# Patient Record
Sex: MALE | Race: WHITE | Employment: FULL TIME | URBAN - METROPOLITAN AREA
[De-identification: names, ages, dates, MRNs, and addresses within clinical notes are randomized per-mention and may not be internally consistent; named-entity substitution may affect disease eponyms.]

---

## 2017-06-26 ENCOUNTER — TRANSCRIBE ORDERS (OUTPATIENT)
Dept: ADMINISTRATIVE | Facility: HOSPITAL | Age: 57
End: 2017-06-26

## 2017-06-26 ENCOUNTER — ALLSCRIPTS OFFICE VISIT (OUTPATIENT)
Dept: OTHER | Facility: OTHER | Age: 57
End: 2017-06-26

## 2017-06-26 DIAGNOSIS — I25.10 ATHEROSCLEROSIS OF NATIVE CORONARY ARTERY OF NATIVE HEART WITHOUT ANGINA PECTORIS: Primary | ICD-10-CM

## 2017-09-26 ENCOUNTER — HOSPITAL ENCOUNTER (OUTPATIENT)
Dept: NON INVASIVE DIAGNOSTICS | Facility: CLINIC | Age: 57
Discharge: HOME | End: 2017-09-26
Payer: COMMERCIAL

## 2017-09-26 DIAGNOSIS — E78.00 PURE HYPERCHOLESTEROLEMIA: ICD-10-CM

## 2017-09-26 DIAGNOSIS — I25.10 ATHEROSCLEROTIC HEART DISEASE OF NATIVE CORONARY ARTERY WITHOUT ANGINA PECTORIS: ICD-10-CM

## 2017-09-26 DIAGNOSIS — I10 ESSENTIAL (PRIMARY) HYPERTENSION: ICD-10-CM

## 2017-09-26 DIAGNOSIS — Z72.0 TOBACCO USE: ICD-10-CM

## 2017-09-26 PROCEDURE — 93306 TTE W/DOPPLER COMPLETE: CPT

## 2018-01-13 VITALS
WEIGHT: 264 LBS | BODY MASS INDEX: 35.76 KG/M2 | SYSTOLIC BLOOD PRESSURE: 142 MMHG | HEART RATE: 78 BPM | DIASTOLIC BLOOD PRESSURE: 88 MMHG | HEIGHT: 72 IN

## 2018-04-09 DIAGNOSIS — E78.00 PURE HYPERCHOLESTEROLEMIA: Primary | ICD-10-CM

## 2018-04-09 RX ORDER — ATORVASTATIN CALCIUM 40 MG/1
TABLET, FILM COATED ORAL
Qty: 30 TABLET | Refills: 5 | Status: SHIPPED | OUTPATIENT
Start: 2018-04-09 | End: 2018-10-03 | Stop reason: SDUPTHER

## 2018-06-11 DIAGNOSIS — I10 ESSENTIAL (PRIMARY) HYPERTENSION: Primary | ICD-10-CM

## 2018-06-11 RX ORDER — LISINOPRIL 10 MG/1
TABLET ORAL
Qty: 30 TABLET | Refills: 5 | Status: SHIPPED | OUTPATIENT
Start: 2018-06-11 | End: 2018-12-20 | Stop reason: SDUPTHER

## 2018-06-20 ENCOUNTER — OFFICE VISIT (OUTPATIENT)
Dept: CARDIOLOGY CLINIC | Facility: CLINIC | Age: 58
End: 2018-06-20
Payer: COMMERCIAL

## 2018-06-20 VITALS
HEART RATE: 96 BPM | HEIGHT: 72 IN | WEIGHT: 256 LBS | DIASTOLIC BLOOD PRESSURE: 84 MMHG | SYSTOLIC BLOOD PRESSURE: 140 MMHG | BODY MASS INDEX: 34.67 KG/M2

## 2018-06-20 DIAGNOSIS — I25.10 CORONARY ARTERIOSCLEROSIS: ICD-10-CM

## 2018-06-20 DIAGNOSIS — Z72.0 TOBACCO ABUSE: ICD-10-CM

## 2018-06-20 DIAGNOSIS — E78.00 PURE HYPERCHOLESTEROLEMIA: ICD-10-CM

## 2018-06-20 DIAGNOSIS — I10 ESSENTIAL (PRIMARY) HYPERTENSION: Primary | ICD-10-CM

## 2018-06-20 PROCEDURE — 99214 OFFICE O/P EST MOD 30 MIN: CPT | Performed by: INTERNAL MEDICINE

## 2018-06-20 PROCEDURE — 93000 ELECTROCARDIOGRAM COMPLETE: CPT | Performed by: INTERNAL MEDICINE

## 2018-06-20 RX ORDER — GLIMEPIRIDE 4 MG/1
1 TABLET ORAL DAILY
COMMUNITY
Start: 2015-02-27

## 2018-06-20 RX ORDER — SITAGLIPTIN AND METFORMIN HYDROCHLORIDE 100; 1000 MG/1; MG/1
TABLET, FILM COATED, EXTENDED RELEASE ORAL DAILY
COMMUNITY
Start: 2018-06-11

## 2018-06-20 NOTE — PROGRESS NOTES
Cardiology Follow Up    Giuseppe Myers  1960  [id number]  [address] [facility]  [address] [place] [address] [address]    1  Essential (primary) hypertension     2  Pure hypercholesterolemia     3  Coronary arteriosclerosis     4  Tobacco abuse         Interval History:  Patient is here for a follow-up visit  He was last seen by me in June of last year  He has a prior history of drug-eluting stent placement in the mid left circumflex in February 2015  He does have a history of tobacco use  He has had no chest pain or significant dyspnea  He had an echocardiogram done September 2017 which demonstrated preserved LV systolic function with mild LVH and no significant valvular heart disease  He has had no chest pain or significant dyspnea  I did talk to him about tobacco cessation  His EKG today demonstrates sinus rhythm with no ST segment abnormality noted  There is no problem list on file for this patient  No past medical history on file  Social History     Social History    Marital status: /Civil Union     Spouse name: N/A    Number of children: N/A    Years of education: N/A     Occupational History    Not on file  Social History Main Topics    Smoking status: Not on file    Smokeless tobacco: Not on file    Alcohol use Not on file    Drug use: Unknown    Sexual activity: Not on file     Other Topics Concern    Not on file     Social History Narrative    No narrative on file      No family history on file  No past surgical history on file  Current Outpatient Prescriptions:     atorvastatin (LIPITOR) 40 mg tablet, TAKE ONE TABLET BY MOUTH ONE TIME DAILY, Disp: 30 tablet, Rfl: 5    lisinopril (ZESTRIL) 10 mg tablet, TAKE 1 TABLET BY MOUTH EVERY DAY, Disp: 30 tablet, Rfl: 5  Allergies not on file    Labs:not applicable  Imaging: No results found      Review of Systems:  Review of Systems All other systems reviewed and are negative  Physical Exam:  Physical Exam   Constitutional: He is oriented to person, place, and time  He appears well-developed and well-nourished  HENT:   Head: Normocephalic and atraumatic  Eyes: Conjunctivae are normal  Pupils are equal, round, and reactive to light  Neck: Normal range of motion  Neck supple  Cardiovascular: Normal rate and normal heart sounds  Pulmonary/Chest: Effort normal and breath sounds normal    Neurological: He is alert and oriented to person, place, and time  Skin: Skin is warm and dry  Psychiatric: He has a normal mood and affect  Vitals reviewed  Discussion/Summary:I will continue the patient's present medical regimen  Patient appears well compensated  I have asked the patient to call if there is a problem in the interim otherwise I will see the patient in one years time

## 2018-10-03 DIAGNOSIS — E78.00 PURE HYPERCHOLESTEROLEMIA: ICD-10-CM

## 2018-10-03 RX ORDER — ATORVASTATIN CALCIUM 40 MG/1
TABLET, FILM COATED ORAL
Qty: 30 TABLET | Refills: 5 | Status: SHIPPED | OUTPATIENT
Start: 2018-10-03 | End: 2019-06-24 | Stop reason: SDUPTHER

## 2018-12-20 DIAGNOSIS — I10 ESSENTIAL (PRIMARY) HYPERTENSION: ICD-10-CM

## 2018-12-20 RX ORDER — LISINOPRIL 10 MG/1
TABLET ORAL
Qty: 30 TABLET | Refills: 5 | Status: SHIPPED | OUTPATIENT
Start: 2018-12-20 | End: 2019-06-24 | Stop reason: SDUPTHER

## 2019-06-24 DIAGNOSIS — I10 ESSENTIAL (PRIMARY) HYPERTENSION: ICD-10-CM

## 2019-06-24 DIAGNOSIS — E78.00 PURE HYPERCHOLESTEROLEMIA: ICD-10-CM

## 2019-06-24 RX ORDER — ATORVASTATIN CALCIUM 40 MG/1
TABLET, FILM COATED ORAL
Qty: 30 TABLET | Refills: 5 | Status: SHIPPED | OUTPATIENT
Start: 2019-06-24 | End: 2019-12-21 | Stop reason: SDUPTHER

## 2019-06-24 RX ORDER — LISINOPRIL 10 MG/1
TABLET ORAL
Qty: 30 TABLET | Refills: 5 | Status: SHIPPED | OUTPATIENT
Start: 2019-06-24 | End: 2020-03-08

## 2019-10-23 NOTE — PROGRESS NOTES
Cardiology Follow Up    Sandra Manley  1960  [id number]  [facility] [address] [facility]  [phone]    1  Essential (primary) hypertension     2  Pure hypercholesterolemia     3  Coronary arteriosclerosis  POCT ECG   4  Tobacco abuse         Interval History:  Patient is here for a follow-up visit  He was last seen by me in June 2018  He has a prior history of HANNA placement in the mid LCX in February 2015  He does have a history of tobacco use  He has had no chest pain or significant dyspnea  He had an echocardiogram done September 2017 which demonstrated preserved LV systolic function with mild LVH and no significant valvular heart disease  most recent cholesterol profile done August 1, 2017 demonstrated total cholesterol of 144 an HDL of 40 and a calculated LDL of 82  EKG today demonstrates sinus tachycardia and otherwise a normal tracing  He did tell me that he had a stressful call from his son prior to this EKG being done  He has had no chest pain or significant dyspnea  He is due for a lipid profile which I will order  There is no problem list on file for this patient  No past medical history on file    Social History     Socioeconomic History    Marital status: /Civil Union     Spouse name: Not on file    Number of children: Not on file    Years of education: Not on file    Highest education level: Not on file   Occupational History    Not on file   Social Needs    Financial resource strain: Not on file    Food insecurity:     Worry: Not on file     Inability: Not on file    Transportation needs:     Medical: Not on file     Non-medical: Not on file   Tobacco Use    Smoking status: Current Every Day Smoker     Packs/day: 1 00     Types: Cigarettes    Smokeless tobacco: Never Used   Substance and Sexual Activity    Alcohol use: Not on file    Drug use: Not on file    Sexual activity: Not on file   Lifestyle    Physical activity:     Days per week: Not on file     Minutes per session: Not on file    Stress: Not on file   Relationships    Social connections:     Talks on phone: Not on file     Gets together: Not on file     Attends Church service: Not on file     Active member of club or organization: Not on file     Attends meetings of clubs or organizations: Not on file     Relationship status: Not on file    Intimate partner violence:     Fear of current or ex partner: Not on file     Emotionally abused: Not on file     Physically abused: Not on file     Forced sexual activity: Not on file   Other Topics Concern    Not on file   Social History Narrative    Not on file      No family history on file  No past surgical history on file  Current Outpatient Medications:     aspirin 81 MG tablet, Take 1 tablet by mouth daily, Disp: , Rfl:     atorvastatin (LIPITOR) 40 mg tablet, TAKE ONE TABLET BY MOUTH ONE TIME DAILY, Disp: 30 tablet, Rfl: 5    glimepiride (AMARYL) 4 mg tablet, Take 1 tablet by mouth daily, Disp: , Rfl:     JANUMET -1000 MG TB24, daily , Disp: , Rfl:     lisinopril (ZESTRIL) 10 mg tablet, TAKE 1 TABLET BY MOUTH EVERY DAY, Disp: 30 tablet, Rfl: 5  No Known Allergies    Labs:not applicable  Imaging: No results found  Review of Systems:  Review of Systems   All other systems reviewed and are negative  Physical Exam:  /82 (BP Location: Right arm, Patient Position: Sitting, Cuff Size: Large)   Pulse 102   Ht 6' (1 829 m)   Wt 111 kg (244 lb 1 6 oz)   BMI 33 11 kg/m²   Physical Exam   Constitutional: He is oriented to person, place, and time  He appears well-developed and well-nourished  HENT:   Head: Normocephalic and atraumatic  Eyes: Pupils are equal, round, and reactive to light  Conjunctivae are normal    Neck: Normal range of motion  Neck supple  Cardiovascular: Normal rate and normal heart sounds  Pulmonary/Chest: Effort normal and breath sounds normal    Neurological: He is alert and oriented to person, place, and time  Skin: Skin is warm and dry  Psychiatric: He has a normal mood and affect  Vitals reviewed  Discussion/Summary:I will continue the patient's present medical regimen  Patient appears well compensated  I have asked the patient to call if there is a problem in the interim otherwise I will see the patient in one years time  Will order blood work in reference to lipids and liver profile

## 2019-10-28 ENCOUNTER — OFFICE VISIT (OUTPATIENT)
Dept: CARDIOLOGY CLINIC | Facility: CLINIC | Age: 59
End: 2019-10-28
Payer: COMMERCIAL

## 2019-10-28 VITALS
BODY MASS INDEX: 33.06 KG/M2 | SYSTOLIC BLOOD PRESSURE: 138 MMHG | HEART RATE: 102 BPM | DIASTOLIC BLOOD PRESSURE: 82 MMHG | HEIGHT: 72 IN | WEIGHT: 244.1 LBS

## 2019-10-28 DIAGNOSIS — Z72.0 TOBACCO ABUSE: ICD-10-CM

## 2019-10-28 DIAGNOSIS — I25.10 CORONARY ARTERIOSCLEROSIS: ICD-10-CM

## 2019-10-28 DIAGNOSIS — I10 ESSENTIAL (PRIMARY) HYPERTENSION: Primary | ICD-10-CM

## 2019-10-28 DIAGNOSIS — E78.00 PURE HYPERCHOLESTEROLEMIA: ICD-10-CM

## 2019-10-28 PROCEDURE — 3075F SYST BP GE 130 - 139MM HG: CPT | Performed by: INTERNAL MEDICINE

## 2019-10-28 PROCEDURE — 99214 OFFICE O/P EST MOD 30 MIN: CPT | Performed by: INTERNAL MEDICINE

## 2019-10-28 PROCEDURE — 93000 ELECTROCARDIOGRAM COMPLETE: CPT | Performed by: INTERNAL MEDICINE

## 2019-10-28 PROCEDURE — 3079F DIAST BP 80-89 MM HG: CPT | Performed by: INTERNAL MEDICINE

## 2019-10-28 NOTE — PATIENT INSTRUCTIONS
I will continue the patient's present medical regimen  Patient appears well compensated  I have asked the patient to call if there is a problem in the interim otherwise I will see the patient in one years time    Please have your blood work done prior to her next visit

## 2019-12-21 DIAGNOSIS — E78.00 PURE HYPERCHOLESTEROLEMIA: ICD-10-CM

## 2019-12-21 RX ORDER — ATORVASTATIN CALCIUM 40 MG/1
TABLET, FILM COATED ORAL
Qty: 30 TABLET | Refills: 5 | Status: SHIPPED | OUTPATIENT
Start: 2019-12-21 | End: 2020-07-28

## 2020-02-12 LAB
CREAT ?TM UR-SCNC: 74.5 UMOL/L
EXT MICROALBUMIN URINE RANDOM: 27.2
HBA1C MFR BLD HPLC: 12.1 %
MICROALBUMIN/CREAT UR: 365.1 MG/G{CREAT}

## 2020-03-08 DIAGNOSIS — I10 ESSENTIAL (PRIMARY) HYPERTENSION: ICD-10-CM

## 2020-03-08 RX ORDER — LISINOPRIL 10 MG/1
TABLET ORAL
Qty: 30 TABLET | Refills: 5 | Status: SHIPPED | OUTPATIENT
Start: 2020-03-08 | End: 2020-09-21

## 2020-07-28 DIAGNOSIS — E78.00 PURE HYPERCHOLESTEROLEMIA: ICD-10-CM

## 2020-07-28 RX ORDER — ATORVASTATIN CALCIUM 40 MG/1
TABLET, FILM COATED ORAL
Qty: 30 TABLET | Refills: 5 | Status: SHIPPED | OUTPATIENT
Start: 2020-07-28 | End: 2021-08-26

## 2020-09-20 DIAGNOSIS — I10 ESSENTIAL (PRIMARY) HYPERTENSION: ICD-10-CM

## 2020-09-21 RX ORDER — LISINOPRIL 10 MG/1
TABLET ORAL
Qty: 30 TABLET | Refills: 5 | Status: SHIPPED | OUTPATIENT
Start: 2020-09-21 | End: 2021-09-22

## 2021-01-04 NOTE — PROGRESS NOTES
Cardiology Follow Up    Lindsay Valadez  1960  [id number]  [facility] [address]  [phone]    1  Essential (primary) hypertension  POCT ECG   2  Pure hypercholesterolemia  POCT ECG   3  Coronary arteriosclerosis  POCT ECG   4  Tobacco abuse  POCT ECG       Interval History:  Patient is here for a follow-up visit  He has a prior history of HANNA placement in the mid LCX (dominant vessel) in the setting of a 99% stenosis  in February 2015  At that time he had 40% mid and distal LAD disease with a 50% stenosis in an left AV groove vessel   He does have a history of tobacco use  He has a history of hypertension, hyperlipidemia and diabetes mellitus  Mel Barnett had an echocardiogram done September 2017 which demonstrated preserved LV systolic function with mild LVH and no significant valvular heart disease   Patient had a lipid profile done February 2020 which demonstrated total cholesterol of 179 with an HDL of 36 and a calculated LDL of 110  EKG today demonstrates sinus tachycardia and is otherwise a normal tracing with no significant change compared to a prior tracing done November 1, 2019  Systolic blood pressure is mildly elevated today although he is anxious  There is no problem list on file for this patient  No past medical history on file    Social History     Socioeconomic History    Marital status: /Civil Union     Spouse name: Not on file    Number of children: Not on file    Years of education: Not on file    Highest education level: Not on file   Occupational History    Not on file   Social Needs    Financial resource strain: Not on file    Food insecurity     Worry: Not on file     Inability: Not on file    Transportation needs     Medical: Not on file     Non-medical: Not on file   Tobacco Use    Smoking status: Current Every Day Smoker     Packs/day: 1 00 Types: Cigarettes    Smokeless tobacco: Never Used   Substance and Sexual Activity    Alcohol use: Not on file    Drug use: Not on file    Sexual activity: Not on file   Lifestyle    Physical activity     Days per week: Not on file     Minutes per session: Not on file    Stress: Not on file   Relationships    Social connections     Talks on phone: Not on file     Gets together: Not on file     Attends [employer] service: Not on file     Active member of club or organization: Not on file     Attends meetings of clubs or organizations: Not on file     Relationship status: Not on file    Intimate partner violence     Fear of current or ex partner: Not on file     Emotionally abused: Not on file     Physically abused: Not on file     Forced sexual activity: Not on file   Other Topics Concern    Not on file   Social History Narrative    Not on file      No family history on file  No past surgical history on file  Current Outpatient Medications:     aspirin 81 MG tablet, Take 1 tablet by mouth daily, Disp: , Rfl:     atorvastatin (LIPITOR) 40 mg tablet, TAKE ONE TABLET BY MOUTH ONE TIME DAILY, Disp: 30 tablet, Rfl: 5    glimepiride (AMARYL) 4 mg tablet, Take 1 tablet by mouth daily, Disp: , Rfl:     JANUMET -1000 MG TB24, daily , Disp: , Rfl:     lisinopril (ZESTRIL) 10 mg tablet, TAKE 1 TABLET BY MOUTH EVERY DAY, Disp: 30 tablet, Rfl: 5  No Known Allergies    Labs:not applicable  Imaging: No results found  Review of Systems:  Review of Systems   All other systems reviewed and are negative  Physical Exam:  /78 (BP Location: Right arm, Cuff Size: Large)   Pulse 100   Ht 6' (1 829 m)   Wt 103 kg (228 lb)   BMI 30 92 kg/m²   Physical Exam  Vitals signs reviewed  Constitutional:       Appearance: He is well-developed  HENT:      Head: Normocephalic and atraumatic  Eyes:      Conjunctiva/sclera: Conjunctivae normal       Pupils: Pupils are equal, round, and reactive to light  Neck:      Musculoskeletal: Normal range of motion and neck supple  Cardiovascular:      Rate and Rhythm: Normal rate  Heart sounds: Normal heart sounds  Pulmonary:      Effort: Pulmonary effort is normal       Breath sounds: Normal breath sounds  Skin:     General: Skin is warm and dry  Neurological:      Mental Status: He is alert and oriented to person, place, and time  Discussion/Summary:I will continue the patient's present medical regimen  Patient appears well compensated  I have asked the patient to call if there is a problem in the interim otherwise I will see the patient in one years time

## 2021-01-13 ENCOUNTER — OFFICE VISIT (OUTPATIENT)
Dept: CARDIOLOGY CLINIC | Facility: CLINIC | Age: 61
End: 2021-01-13

## 2021-01-13 VITALS
DIASTOLIC BLOOD PRESSURE: 78 MMHG | HEART RATE: 100 BPM | SYSTOLIC BLOOD PRESSURE: 148 MMHG | HEIGHT: 72 IN | BODY MASS INDEX: 30.88 KG/M2 | WEIGHT: 228 LBS

## 2021-01-13 DIAGNOSIS — I10 ESSENTIAL (PRIMARY) HYPERTENSION: Primary | ICD-10-CM

## 2021-01-13 DIAGNOSIS — E78.00 PURE HYPERCHOLESTEROLEMIA: ICD-10-CM

## 2021-01-13 DIAGNOSIS — I25.10 CORONARY ARTERIOSCLEROSIS: ICD-10-CM

## 2021-01-13 DIAGNOSIS — Z72.0 TOBACCO ABUSE: ICD-10-CM

## 2021-01-13 PROCEDURE — 99214 OFFICE O/P EST MOD 30 MIN: CPT | Performed by: INTERNAL MEDICINE

## 2021-01-13 PROCEDURE — 93000 ELECTROCARDIOGRAM COMPLETE: CPT | Performed by: INTERNAL MEDICINE

## 2021-08-26 DIAGNOSIS — E78.00 PURE HYPERCHOLESTEROLEMIA: ICD-10-CM

## 2021-08-26 RX ORDER — ATORVASTATIN CALCIUM 40 MG/1
TABLET, FILM COATED ORAL
Qty: 30 TABLET | Refills: 5 | Status: SHIPPED | OUTPATIENT
Start: 2021-08-26

## 2021-09-22 DIAGNOSIS — I10 ESSENTIAL (PRIMARY) HYPERTENSION: ICD-10-CM

## 2021-09-22 RX ORDER — LISINOPRIL 10 MG/1
TABLET ORAL
Qty: 30 TABLET | Refills: 5 | Status: SHIPPED | OUTPATIENT
Start: 2021-09-22 | End: 2022-06-08

## 2022-03-22 NOTE — PROGRESS NOTES
Cardiology Follow Up    Jose M Carbajal  1960  [id number]  [facility] [address]  [phone]    1  Essential (primary) hypertension     2  Pure hypercholesterolemia     3  Coronary arteriosclerosis     4  Tobacco abuse         Interval History: Patient is here for a follow-up visit  Raad Fiore had HANNA placement in the mid LCX (dominant vessel) in the setting of a 99% stenosis in February 2015  At that time he had 40% mid and distal LAD disease with a 50% stenosis in an left AV groove vessel   He had tobacco use  He has HTN, HLD and DM   An echocardiogram done September 2017  demonstrated preserved LV systolic function with mild LVH and no significant valvular heart disease  A lipid profile done February 2020  demonstrated total cholesterol of 179 with an HDL of 36 and a calculated LDL of 110  Patient has been well  He has had no chest pain or significant dyspnea  There is no problem list on file for this patient  No past medical history on file    Social History     Socioeconomic History    Marital status: /Civil Union     Spouse name: Not on file    Number of children: Not on file    Years of education: Not on file    Highest education level: Not on file   Occupational History    Not on file   Tobacco Use    Smoking status: Current Every Day Smoker     Packs/day: 1 00     Types: Cigarettes    Smokeless tobacco: Never Used   Substance and Sexual Activity    Alcohol use: Not on file    Drug use: Not on file    Sexual activity: Not on file   Other Topics Concern    Not on file   Social History Narrative    Not on file     Social Determinants of Health     Financial Resource Strain: Not on file   Food Insecurity: Not on file   Transportation Needs: Not on file   Physical Activity: Not on file   Stress: Not on file   Social Connections: Not on file   Intimate Partner Violence: Not on file   Housing Stability: Not on file      No family history on file  No past surgical history on file  Current Outpatient Medications:     aspirin 81 MG tablet, Take 1 tablet by mouth daily, Disp: , Rfl:     atorvastatin (LIPITOR) 40 mg tablet, TAKE 1 TABLET BY MOUTH EVERY DAY, Disp: 30 tablet, Rfl: 5    glimepiride (AMARYL) 4 mg tablet, Take 1 tablet by mouth daily, Disp: , Rfl:     JANUMET -1000 MG TB24, daily , Disp: , Rfl:     lisinopril (ZESTRIL) 10 mg tablet, TAKE 1 TABLET BY MOUTH EVERY DAY, Disp: 30 tablet, Rfl: 5    metFORMIN (GLUCOPHAGE) 1000 MG tablet, TAKE 1 TABLET BY MOUTH TWICE DAILY WITH A MEAL, Disp: , Rfl:   No Known Allergies    Labs:not applicable  Imaging: No results found  Review of Systems:  Review of Systems   All other systems reviewed and are negative  Physical Exam:  /70 (BP Location: Left arm, Patient Position: Sitting, Cuff Size: Standard)   Pulse 90   Wt 102 kg (224 lb 12 8 oz)   SpO2 96%   BMI 30 49 kg/m²   Physical Exam  Vitals reviewed  Constitutional:       Appearance: He is well-developed  HENT:      Head: Normocephalic and atraumatic  Eyes:      Conjunctiva/sclera: Conjunctivae normal       Pupils: Pupils are equal, round, and reactive to light  Cardiovascular:      Rate and Rhythm: Normal rate  Heart sounds: Normal heart sounds  Pulmonary:      Effort: Pulmonary effort is normal       Breath sounds: Normal breath sounds  Musculoskeletal:      Cervical back: Normal range of motion and neck supple  Skin:     General: Skin is warm and dry  Neurological:      Mental Status: He is alert and oriented to person, place, and time  Discussion/Summary:I will continue the patient's present medical regimen  Patient appears well compensated  I have asked the patient to call if there is a problem in the interim otherwise I will see the patient in one years time

## 2022-04-01 ENCOUNTER — OFFICE VISIT (OUTPATIENT)
Dept: CARDIOLOGY CLINIC | Facility: CLINIC | Age: 62
End: 2022-04-01

## 2022-04-01 VITALS
OXYGEN SATURATION: 96 % | DIASTOLIC BLOOD PRESSURE: 70 MMHG | HEART RATE: 90 BPM | WEIGHT: 224.8 LBS | SYSTOLIC BLOOD PRESSURE: 120 MMHG | BODY MASS INDEX: 30.49 KG/M2

## 2022-04-01 DIAGNOSIS — I25.10 CORONARY ARTERIOSCLEROSIS: ICD-10-CM

## 2022-04-01 DIAGNOSIS — Z72.0 TOBACCO ABUSE: ICD-10-CM

## 2022-04-01 DIAGNOSIS — E78.00 PURE HYPERCHOLESTEROLEMIA: ICD-10-CM

## 2022-04-01 DIAGNOSIS — I10 ESSENTIAL (PRIMARY) HYPERTENSION: Primary | ICD-10-CM

## 2022-04-01 PROCEDURE — 99214 OFFICE O/P EST MOD 30 MIN: CPT | Performed by: INTERNAL MEDICINE

## 2022-06-08 DIAGNOSIS — I10 ESSENTIAL (PRIMARY) HYPERTENSION: ICD-10-CM

## 2022-06-08 RX ORDER — LISINOPRIL 10 MG/1
TABLET ORAL
Qty: 30 TABLET | Refills: 5 | Status: SHIPPED | OUTPATIENT
Start: 2022-06-08

## 2022-09-14 DIAGNOSIS — E78.00 PURE HYPERCHOLESTEROLEMIA: ICD-10-CM

## 2022-09-14 RX ORDER — ATORVASTATIN CALCIUM 40 MG/1
TABLET, FILM COATED ORAL
Qty: 30 TABLET | Refills: 5 | Status: SHIPPED | OUTPATIENT
Start: 2022-09-14

## 2023-04-24 NOTE — PROGRESS NOTES
Cardiology Follow Up    Zahida Son  1960  [id number]  [address] [facility] [id number] [phone]    1  Essential (primary) hypertension  POCT ECG    Transfer to other facility      2  Pure hypercholesterolemia  POCT ECG    Transfer to other facility      3  Coronary arteriosclerosis  POCT ECG    Transfer to other facility      4  Tobacco abuse  Transfer to other facility      5  Atrial fibrillation, unspecified type ([address] )  Transfer to other facility      6  LINDSEY (dyspnea on exertion)  Transfer to other facility          Interval History:  Patient is here for a follow-up visit  America Martinez had HANNA placement in the mid [address] vessel) in the setting of a 99% stenosis in February 2015  At that time he had 40% mid and distal LAD disease with a 50% stenosis in an left AV groove vessel   He had tobacco use   He has HTN, HLD and DM   An echocardiogram done September 2017  demonstrated preserved LV systolic function with mild LVH and no significant valvular heart disease  A lipid profile done February 2023 demonstrated total cholesterol of 236 with an HDL of 42 and a calculated LDL of 168  Patient tells me he has had LINDSEY  EKG demonstrates atrial fibrillation with RVR with a new RBBB and AWMI, age indeterminant  He will need to go to the hospital to be stabilized  On exam he does have rales in the bases  He has lower extremity edema  He has no chest pain  He is an ongoing tobacco user  There is no problem list on file for this patient  No past medical history on file    Social History     Socioeconomic History    Marital status: /Civil Union     Spouse name: Not on file    Number of children: Not on file    Years of education: Not on file    Highest education level: Not on file   Occupational History    Not on file   Tobacco Use    Smoking status: Every Day     Packs/day: 1 00     Types: Cigarettes    Smokeless tobacco: Never   Substance and Sexual Activity    Alcohol use: Not on file    Drug use: Not on file    Sexual activity: Not on file   Other Topics Concern    Not on file   Social History Narrative    Not on file     Social Determinants of Health     Financial Resource Strain: Not on file   Food Insecurity: Not on file   Transportation Needs: Not on file   Physical Activity: Not on file   Stress: Not on file   Social Connections: Not on file   Intimate Partner Violence: Not on file   Housing Stability: Not on file      No family history on file  No past surgical history on file  Current Outpatient Medications:     aspirin 81 MG tablet, Take 1 tablet by mouth daily, Disp: , Rfl:     atorvastatin (LIPITOR) 40 mg tablet, TAKE 1 TABLET BY MOUTH EVERY DAY, Disp: 30 tablet, Rfl: 5    JANUMET -1000 MG TB24, daily , Disp: , Rfl:     lisinopril (ZESTRIL) 10 mg tablet, TAKE 1 TABLET BY MOUTH EVERY DAY, Disp: 30 tablet, Rfl: 5    metFORMIN (GLUCOPHAGE) 1000 MG tablet, TAKE 1 TABLET BY MOUTH TWICE DAILY WITH A MEAL, Disp: , Rfl:     glimepiride (AMARYL) 4 mg tablet, Take 1 tablet by mouth daily (Patient not taking: Reported on 5/2/2023), Disp: , Rfl:   No Known Allergies    Labs:not applicable  Imaging: No results found  Review of Systems:  Review of Systems   All other systems reviewed and are negative  Physical Exam:  /80 (BP Location: Right arm, Patient Position: Sitting, Cuff Size: Large)   Pulse (!) 144   Ht 6' (1 829 m)   Wt 116 kg (254 lb 12 8 oz)   SpO2 99%   BMI 34 56 kg/m²   Physical Exam  Vitals reviewed  Constitutional:       Appearance: He is well-developed  HENT:      Head: Normocephalic and atraumatic  Cardiovascular:      Rate and Rhythm: Normal rate  Heart sounds: Normal heart sounds  Pulmonary:      Effort: Pulmonary effort is normal       Breath sounds: Rales present  Musculoskeletal:      Cervical back: Normal range of motion  Right lower leg: Edema present  Left lower leg: Edema present  Skin:     General: Skin is warm and dry  Neurological:      Mental Status: He is alert and oriented to person, place, and time  Psychiatric:         Mood and Affect: Mood normal          Discussion/Summary: Given atrial fibrillation with RVR and new RBBB and AWMI (which is age indeterminant) compared to prior EKG done January 13, 2021, have advised the patient go to the ED  I arranged an ambulance  His wife is with him  Patient will likely require admit for stabilization  I have notified my cardiology colleagues

## 2023-05-02 ENCOUNTER — APPOINTMENT (EMERGENCY)
Dept: RADIOLOGY | Facility: HOSPITAL | Age: 63
End: 2023-05-02

## 2023-05-02 ENCOUNTER — APPOINTMENT (INPATIENT)
Dept: RADIOLOGY | Facility: HOSPITAL | Age: 63
End: 2023-05-02

## 2023-05-02 ENCOUNTER — OFFICE VISIT (OUTPATIENT)
Dept: CARDIOLOGY CLINIC | Facility: CLINIC | Age: 63
End: 2023-05-02

## 2023-05-02 ENCOUNTER — HOSPITAL ENCOUNTER (INPATIENT)
Facility: HOSPITAL | Age: 63
LOS: 14 days | Discharge: HOME HEALTH | End: 2023-05-16
Attending: EMERGENCY MEDICINE | Admitting: INTERNAL MEDICINE

## 2023-05-02 ENCOUNTER — APPOINTMENT (EMERGENCY)
Dept: NON INVASIVE DIAGNOSTICS | Facility: HOSPITAL | Age: 63
End: 2023-05-02

## 2023-05-02 VITALS
HEIGHT: 72 IN | WEIGHT: 254.8 LBS | SYSTOLIC BLOOD PRESSURE: 110 MMHG | OXYGEN SATURATION: 99 % | DIASTOLIC BLOOD PRESSURE: 80 MMHG | HEART RATE: 144 BPM | BODY MASS INDEX: 34.51 KG/M2

## 2023-05-02 DIAGNOSIS — R06.09 DOE (DYSPNEA ON EXERTION): ICD-10-CM

## 2023-05-02 DIAGNOSIS — R77.8 ELEVATED TROPONIN: ICD-10-CM

## 2023-05-02 DIAGNOSIS — I48.91 ATRIAL FIBRILLATION, UNSPECIFIED TYPE (HCC): ICD-10-CM

## 2023-05-02 DIAGNOSIS — I10 ESSENTIAL (PRIMARY) HYPERTENSION: Primary | ICD-10-CM

## 2023-05-02 DIAGNOSIS — I50.9 ACUTE ON CHRONIC HEART FAILURE (HCC): ICD-10-CM

## 2023-05-02 DIAGNOSIS — I50.21 ACUTE HFREF (HEART FAILURE WITH REDUCED EJECTION FRACTION) (HCC): ICD-10-CM

## 2023-05-02 DIAGNOSIS — R60.0 BILATERAL LOWER EXTREMITY EDEMA: ICD-10-CM

## 2023-05-02 DIAGNOSIS — I48.91 A-FIB (HCC): Primary | ICD-10-CM

## 2023-05-02 DIAGNOSIS — I25.5 ISCHEMIC CARDIOMYOPATHY: ICD-10-CM

## 2023-05-02 DIAGNOSIS — I25.10 CORONARY ARTERIOSCLEROSIS: ICD-10-CM

## 2023-05-02 DIAGNOSIS — I47.20 SUSTAINED VT (VENTRICULAR TACHYCARDIA) (HCC): ICD-10-CM

## 2023-05-02 DIAGNOSIS — E78.00 PURE HYPERCHOLESTEROLEMIA: ICD-10-CM

## 2023-05-02 DIAGNOSIS — K59.09 OTHER CONSTIPATION: ICD-10-CM

## 2023-05-02 DIAGNOSIS — F17.200 TOBACCO DEPENDENCE SYNDROME: ICD-10-CM

## 2023-05-02 DIAGNOSIS — I25.10 3-VESSEL CORONARY ARTERY DISEASE: ICD-10-CM

## 2023-05-02 DIAGNOSIS — I47.20 V-TACH (HCC): ICD-10-CM

## 2023-05-02 DIAGNOSIS — I50.23 ACUTE ON CHRONIC SYSTOLIC HEART FAILURE (HCC): ICD-10-CM

## 2023-05-02 DIAGNOSIS — Z72.0 TOBACCO ABUSE: ICD-10-CM

## 2023-05-02 PROBLEM — N40.0 BENIGN PROSTATIC HYPERPLASIA WITHOUT LOWER URINARY TRACT SYMPTOMS: Status: ACTIVE | Noted: 2018-04-11

## 2023-05-02 LAB
2HR DELTA HS TROPONIN: -7 NG/L
4HR DELTA HS TROPONIN: 5 NG/L
ALBUMIN SERPL BCP-MCNC: 3.1 G/DL (ref 3.5–5)
ALP SERPL-CCNC: 41 U/L (ref 46–116)
ALT SERPL W P-5'-P-CCNC: 36 U/L (ref 12–78)
ANION GAP SERPL CALCULATED.3IONS-SCNC: 3 MMOL/L (ref 4–13)
ANION GAP SERPL CALCULATED.3IONS-SCNC: 4 MMOL/L (ref 4–13)
AORTIC ROOT: 3.3 CM
APTT PPP: 28 SECONDS (ref 23–37)
APTT PPP: 37 SECONDS (ref 23–37)
ASCENDING AORTA: 3.4 CM
AST SERPL W P-5'-P-CCNC: 25 U/L (ref 5–45)
ATRIAL RATE: 108 BPM
ATRIAL RATE: 145 BPM
ATRIAL RATE: 89 BPM
BASE EX.OXY STD BLDV CALC-SCNC: 44 % (ref 60–80)
BASE EXCESS BLDV CALC-SCNC: -3.9 MMOL/L
BASOPHILS # BLD AUTO: 0.03 THOUSANDS/ÂΜL (ref 0–0.1)
BASOPHILS # BLD AUTO: 0.04 THOUSANDS/ÂΜL (ref 0–0.1)
BASOPHILS NFR BLD AUTO: 0 % (ref 0–1)
BASOPHILS NFR BLD AUTO: 0 % (ref 0–1)
BILIRUB SERPL-MCNC: 0.39 MG/DL (ref 0.2–1)
BUN SERPL-MCNC: 36 MG/DL (ref 5–25)
BUN SERPL-MCNC: 46 MG/DL (ref 5–25)
CALCIUM ALBUM COR SERPL-MCNC: 9.4 MG/DL (ref 8.3–10.1)
CALCIUM SERPL-MCNC: 8.7 MG/DL (ref 8.3–10.1)
CALCIUM SERPL-MCNC: 9 MG/DL (ref 8.3–10.1)
CARDIAC TROPONIN I PNL SERPL HS: 23 NG/L
CARDIAC TROPONIN I PNL SERPL HS: 30 NG/L
CARDIAC TROPONIN I PNL SERPL HS: 35 NG/L
CHLORIDE SERPL-SCNC: 104 MMOL/L (ref 96–108)
CHLORIDE SERPL-SCNC: 105 MMOL/L (ref 96–108)
CO2 SERPL-SCNC: 23 MMOL/L (ref 21–32)
CO2 SERPL-SCNC: 25 MMOL/L (ref 21–32)
CREAT SERPL-MCNC: 0.97 MG/DL (ref 0.6–1.3)
CREAT SERPL-MCNC: 1.37 MG/DL (ref 0.6–1.3)
EOSINOPHIL # BLD AUTO: 0.06 THOUSAND/ÂΜL (ref 0–0.61)
EOSINOPHIL # BLD AUTO: 0.07 THOUSAND/ÂΜL (ref 0–0.61)
EOSINOPHIL NFR BLD AUTO: 1 % (ref 0–6)
EOSINOPHIL NFR BLD AUTO: 1 % (ref 0–6)
ERYTHROCYTE [DISTWIDTH] IN BLOOD BY AUTOMATED COUNT: 13.5 % (ref 11.6–15.1)
ERYTHROCYTE [DISTWIDTH] IN BLOOD BY AUTOMATED COUNT: 13.5 % (ref 11.6–15.1)
FRACTIONAL SHORTENING: 13 % (ref 28–44)
GFR SERPL CREATININE-BSD FRML MDRD: 54 ML/MIN/1.73SQ M
GFR SERPL CREATININE-BSD FRML MDRD: 83 ML/MIN/1.73SQ M
GLUCOSE SERPL-MCNC: 145 MG/DL (ref 65–140)
GLUCOSE SERPL-MCNC: 166 MG/DL (ref 65–140)
GLUCOSE SERPL-MCNC: 171 MG/DL (ref 65–140)
GLUCOSE SERPL-MCNC: 184 MG/DL (ref 65–140)
GLUCOSE SERPL-MCNC: 195 MG/DL (ref 65–140)
GLUCOSE SERPL-MCNC: 249 MG/DL (ref 65–140)
GLUCOSE SERPL-MCNC: 276 MG/DL (ref 65–140)
HCO3 BLDV-SCNC: 22.3 MMOL/L (ref 24–30)
HCT VFR BLD AUTO: 48.7 % (ref 36.5–49.3)
HCT VFR BLD AUTO: 48.8 % (ref 36.5–49.3)
HGB BLD-MCNC: 14.9 G/DL (ref 12–17)
HGB BLD-MCNC: 14.9 G/DL (ref 12–17)
IMM GRANULOCYTES # BLD AUTO: 0.04 THOUSAND/UL (ref 0–0.2)
IMM GRANULOCYTES # BLD AUTO: 0.06 THOUSAND/UL (ref 0–0.2)
IMM GRANULOCYTES NFR BLD AUTO: 0 % (ref 0–2)
IMM GRANULOCYTES NFR BLD AUTO: 1 % (ref 0–2)
INR PPP: 1.13 (ref 0.84–1.19)
INTERVENTRICULAR SEPTUM IN DIASTOLE (PARASTERNAL SHORT AXIS VIEW): 1.3 CM
INTERVENTRICULAR SEPTUM: 1.3 CM (ref 0.6–1.1)
LAAS-AP2: 24.8 CM2
LAAS-AP4: 22.6 CM2
LACTATE SERPL-SCNC: 1.9 MMOL/L (ref 0.5–2)
LEFT ATRIUM SIZE: 4.5 CM
LEFT INTERNAL DIMENSION IN SYSTOLE: 4.9 CM (ref 2.1–4)
LEFT VENTRICULAR INTERNAL DIMENSION IN DIASTOLE: 5.6 CM (ref 3.5–6)
LEFT VENTRICULAR POSTERIOR WALL IN END DIASTOLE: 1.1 CM
LEFT VENTRICULAR STROKE VOLUME: 43 ML
LVSV (TEICH): 43 ML
LYMPHOCYTES # BLD AUTO: 1.36 THOUSANDS/ÂΜL (ref 0.6–4.47)
LYMPHOCYTES # BLD AUTO: 2.1 THOUSANDS/ÂΜL (ref 0.6–4.47)
LYMPHOCYTES NFR BLD AUTO: 15 % (ref 14–44)
LYMPHOCYTES NFR BLD AUTO: 23 % (ref 14–44)
MAGNESIUM SERPL-MCNC: 2.2 MG/DL (ref 1.6–2.6)
MAGNESIUM SERPL-MCNC: 2.5 MG/DL (ref 1.6–2.6)
MCH RBC QN AUTO: 26.5 PG (ref 26.8–34.3)
MCH RBC QN AUTO: 26.9 PG (ref 26.8–34.3)
MCHC RBC AUTO-ENTMCNC: 30.5 G/DL (ref 31.4–37.4)
MCHC RBC AUTO-ENTMCNC: 30.6 G/DL (ref 31.4–37.4)
MCV RBC AUTO: 87 FL (ref 82–98)
MCV RBC AUTO: 88 FL (ref 82–98)
MONOCYTES # BLD AUTO: 0.54 THOUSAND/ÂΜL (ref 0.17–1.22)
MONOCYTES # BLD AUTO: 0.62 THOUSAND/ÂΜL (ref 0.17–1.22)
MONOCYTES NFR BLD AUTO: 6 % (ref 4–12)
MONOCYTES NFR BLD AUTO: 7 % (ref 4–12)
NEUTROPHILS # BLD AUTO: 6.4 THOUSANDS/ÂΜL (ref 1.85–7.62)
NEUTROPHILS # BLD AUTO: 7.1 THOUSANDS/ÂΜL (ref 1.85–7.62)
NEUTS SEG NFR BLD AUTO: 69 % (ref 43–75)
NEUTS SEG NFR BLD AUTO: 77 % (ref 43–75)
NRBC BLD AUTO-RTO: 0 /100 WBCS
NRBC BLD AUTO-RTO: 0 /100 WBCS
NT-PROBNP SERPL-MCNC: 5942 PG/ML
O2 CT BLDV-SCNC: 9.7 ML/DL
P AXIS: 71 DEGREES
P AXIS: 85 DEGREES
P AXIS: 89 DEGREES
PCO2 BLDV: 44.3 MM HG (ref 42–50)
PH BLDV: 7.32 [PH] (ref 7.3–7.4)
PLATELET # BLD AUTO: 184 THOUSANDS/UL (ref 149–390)
PLATELET # BLD AUTO: 195 THOUSANDS/UL (ref 149–390)
PMV BLD AUTO: 12.1 FL (ref 8.9–12.7)
PMV BLD AUTO: 12.3 FL (ref 8.9–12.7)
PO2 BLDV: 27.3 MM HG (ref 35–45)
POTASSIUM SERPL-SCNC: 4.8 MMOL/L (ref 3.5–5.3)
POTASSIUM SERPL-SCNC: 5.2 MMOL/L (ref 3.5–5.3)
PR INTERVAL: 142 MS
PR INTERVAL: 146 MS
PROCALCITONIN SERPL-MCNC: 0.16 NG/ML
PROT SERPL-MCNC: 6.3 G/DL (ref 6.4–8.4)
PROTHROMBIN TIME: 14.7 SECONDS (ref 11.6–14.5)
QRS AXIS: 57 DEGREES
QRS AXIS: 68 DEGREES
QRS AXIS: 94 DEGREES
QRSD INTERVAL: 88 MS
QRSD INTERVAL: 90 MS
QRSD INTERVAL: 90 MS
QT INTERVAL: 338 MS
QT INTERVAL: 342 MS
QT INTERVAL: 374 MS
QTC INTERVAL: 416 MS
QTC INTERVAL: 422 MS
QTC INTERVAL: 501 MS
RA PRESSURE ESTIMATED: 15 MMHG
RBC # BLD AUTO: 5.54 MILLION/UL (ref 3.88–5.62)
RBC # BLD AUTO: 5.63 MILLION/UL (ref 3.88–5.62)
RIGHT ATRIUM AREA SYSTOLE A4C: 24.1 CM2
RIGHT VENTRICLE ID DIMENSION: 4 CM
RV PSP: 49 MMHG
SL CV LEFT ATRIUM LENGTH A2C: 6.2 CM
SL CV LV EF: 15
SL CV PED ECHO LEFT VENTRICLE DIASTOLIC VOLUME (MOD BIPLANE) 2D: 153 ML
SL CV PED ECHO LEFT VENTRICLE SYSTOLIC VOLUME (MOD BIPLANE) 2D: 110 ML
SODIUM SERPL-SCNC: 131 MMOL/L (ref 135–147)
SODIUM SERPL-SCNC: 133 MMOL/L (ref 135–147)
T WAVE AXIS: 58 DEGREES
T WAVE AXIS: 71 DEGREES
T WAVE AXIS: 80 DEGREES
TR MAX PG: 34 MMHG
TR PEAK VELOCITY: 2.9 M/S
TRICUSPID ANNULAR PLANE SYSTOLIC EXCURSION: 2 CM
TRICUSPID VALVE PEAK REGURGITATION VELOCITY: 2.91 M/S
VENTRICULAR RATE: 108 BPM
VENTRICULAR RATE: 89 BPM
VENTRICULAR RATE: 94 BPM
WBC # BLD AUTO: 9.15 THOUSAND/UL (ref 4.31–10.16)
WBC # BLD AUTO: 9.27 THOUSAND/UL (ref 4.31–10.16)

## 2023-05-02 RX ORDER — FUROSEMIDE 10 MG/ML
40 INJECTION INTRAMUSCULAR; INTRAVENOUS DAILY
Status: DISCONTINUED | OUTPATIENT
Start: 2023-05-02 | End: 2023-05-02

## 2023-05-02 RX ORDER — ALBUMIN (HUMAN) 12.5 G/50ML
25 SOLUTION INTRAVENOUS ONCE
Status: COMPLETED | OUTPATIENT
Start: 2023-05-02 | End: 2023-05-03

## 2023-05-02 RX ORDER — INSULIN LISPRO 100 [IU]/ML
2-12 INJECTION, SOLUTION INTRAVENOUS; SUBCUTANEOUS
Status: DISCONTINUED | OUTPATIENT
Start: 2023-05-02 | End: 2023-05-05

## 2023-05-02 RX ORDER — HEPARIN SODIUM 1000 [USP'U]/ML
4000 INJECTION, SOLUTION INTRAVENOUS; SUBCUTANEOUS ONCE
Status: COMPLETED | OUTPATIENT
Start: 2023-05-02 | End: 2023-05-02

## 2023-05-02 RX ORDER — MAGNESIUM SULFATE HEPTAHYDRATE 40 MG/ML
2 INJECTION, SOLUTION INTRAVENOUS ONCE
Status: COMPLETED | OUTPATIENT
Start: 2023-05-02 | End: 2023-05-02

## 2023-05-02 RX ORDER — PRAVASTATIN SODIUM 80 MG/1
80 TABLET ORAL EVERY EVENING
Status: DISCONTINUED | OUTPATIENT
Start: 2023-05-02 | End: 2023-05-02

## 2023-05-02 RX ORDER — NITROGLYCERIN 0.4 MG/1
0.4 TABLET SUBLINGUAL
Status: DISCONTINUED | OUTPATIENT
Start: 2023-05-02 | End: 2023-05-16 | Stop reason: HOSPADM

## 2023-05-02 RX ORDER — METOPROLOL TARTRATE 5 MG/5ML
5 INJECTION INTRAVENOUS ONCE
Status: COMPLETED | OUTPATIENT
Start: 2023-05-02 | End: 2023-05-02

## 2023-05-02 RX ORDER — HYDRALAZINE HYDROCHLORIDE 20 MG/ML
5 INJECTION INTRAMUSCULAR; INTRAVENOUS EVERY 6 HOURS PRN
Status: DISCONTINUED | OUTPATIENT
Start: 2023-05-02 | End: 2023-05-10

## 2023-05-02 RX ORDER — FUROSEMIDE 10 MG/ML
40 INJECTION INTRAMUSCULAR; INTRAVENOUS
Status: DISCONTINUED | OUTPATIENT
Start: 2023-05-03 | End: 2023-05-02

## 2023-05-02 RX ORDER — AMOXICILLIN 250 MG
1 CAPSULE ORAL
Status: DISCONTINUED | OUTPATIENT
Start: 2023-05-02 | End: 2023-05-08

## 2023-05-02 RX ORDER — INSULIN GLARGINE 100 [IU]/ML
10 INJECTION, SOLUTION SUBCUTANEOUS
Status: DISCONTINUED | OUTPATIENT
Start: 2023-05-02 | End: 2023-05-09

## 2023-05-02 RX ORDER — ATORVASTATIN CALCIUM 80 MG/1
80 TABLET, FILM COATED ORAL
Status: DISCONTINUED | OUTPATIENT
Start: 2023-05-02 | End: 2023-05-16 | Stop reason: HOSPADM

## 2023-05-02 RX ORDER — ASPIRIN 325 MG
325 TABLET ORAL ONCE
Status: COMPLETED | OUTPATIENT
Start: 2023-05-02 | End: 2023-05-02

## 2023-05-02 RX ORDER — ONDANSETRON 2 MG/ML
4 INJECTION INTRAMUSCULAR; INTRAVENOUS EVERY 6 HOURS PRN
Status: DISCONTINUED | OUTPATIENT
Start: 2023-05-02 | End: 2023-05-16 | Stop reason: HOSPADM

## 2023-05-02 RX ORDER — ASPIRIN 81 MG/1
81 TABLET, CHEWABLE ORAL DAILY
Status: DISCONTINUED | OUTPATIENT
Start: 2023-05-03 | End: 2023-05-16 | Stop reason: HOSPADM

## 2023-05-02 RX ORDER — INSULIN LISPRO 100 [IU]/ML
2-12 INJECTION, SOLUTION INTRAVENOUS; SUBCUTANEOUS EVERY 6 HOURS SCHEDULED
Status: DISCONTINUED | OUTPATIENT
Start: 2023-05-02 | End: 2023-05-02

## 2023-05-02 RX ORDER — NICOTINE 21 MG/24HR
1 PATCH, TRANSDERMAL 24 HOURS TRANSDERMAL DAILY
Status: DISCONTINUED | OUTPATIENT
Start: 2023-05-02 | End: 2023-05-16 | Stop reason: HOSPADM

## 2023-05-02 RX ORDER — FUROSEMIDE 10 MG/ML
40 INJECTION INTRAMUSCULAR; INTRAVENOUS
Status: DISCONTINUED | OUTPATIENT
Start: 2023-05-03 | End: 2023-05-03

## 2023-05-02 RX ORDER — HEPARIN SODIUM 10000 [USP'U]/100ML
3-20 INJECTION, SOLUTION INTRAVENOUS
Status: DISCONTINUED | OUTPATIENT
Start: 2023-05-02 | End: 2023-05-05

## 2023-05-02 RX ADMIN — MAGNESIUM SULFATE HEPTAHYDRATE 2 G: 40 INJECTION, SOLUTION INTRAVENOUS at 13:02

## 2023-05-02 RX ADMIN — HEPARIN SODIUM 4000 UNITS: 1000 INJECTION INTRAVENOUS; SUBCUTANEOUS at 11:51

## 2023-05-02 RX ADMIN — INSULIN LISPRO 4 UNITS: 100 INJECTION, SOLUTION INTRAVENOUS; SUBCUTANEOUS at 17:57

## 2023-05-02 RX ADMIN — Medication 12.5 MG: at 17:57

## 2023-05-02 RX ADMIN — ASPIRIN 325 MG ORAL TABLET 325 MG: 325 PILL ORAL at 10:05

## 2023-05-02 RX ADMIN — FUROSEMIDE 40 MG: 10 INJECTION, SOLUTION INTRAMUSCULAR; INTRAVENOUS at 13:02

## 2023-05-02 RX ADMIN — METOROPROLOL TARTRATE 5 MG: 5 INJECTION, SOLUTION INTRAVENOUS at 10:06

## 2023-05-02 RX ADMIN — ALBUMIN (HUMAN) 25 G: 0.25 INJECTION, SOLUTION INTRAVENOUS at 23:45

## 2023-05-02 RX ADMIN — ATORVASTATIN CALCIUM 80 MG: 80 TABLET, FILM COATED ORAL at 17:57

## 2023-05-02 RX ADMIN — SENNOSIDES AND DOCUSATE SODIUM 1 TABLET: 8.6; 5 TABLET ORAL at 22:07

## 2023-05-02 RX ADMIN — INSULIN GLARGINE 10 UNITS: 100 INJECTION, SOLUTION SUBCUTANEOUS at 22:07

## 2023-05-02 RX ADMIN — HEPARIN SODIUM 11.1 UNITS/KG/HR: 10000 INJECTION, SOLUTION INTRAVENOUS at 11:51

## 2023-05-02 RX ADMIN — PERFLUTREN 0.8 ML/MIN: 6.52 INJECTION, SUSPENSION INTRAVENOUS at 12:00

## 2023-05-02 NOTE — ASSESSMENT & PLAN NOTE
· At cardio appointment outpt when he was noted to be in A-fib RVR on EKG, also with Q waves concerning for prior infarct, in the setting of chest pressure last week concerning for MI  · Heparin drip pending ischemic evaluation  · Noted reduced LVEF of 10 to 15%, concern for ischemia versus tachycardia mediated cardiomyopathy?

## 2023-05-02 NOTE — CONSULTS
Reason for Consult / Principal Problem:Heart failure     Physician Requesting Consult:  Vinetta Nageotte, DO    Cardiologist: Dr Francine Pierre  Assessment and Plan      Current Problem List   Principal Problem:    Acute on chronic heart failure (HCC)  Active Problems:    Essential (primary) hypertension    Coronary artery disease    Type 2 diabetes mellitus, with long-term current use of insulin (HCC)    Tobacco dependence syndrome    A-fib ([facility])    Assessment/Plan:    1  Acute on chronic heart failure - likely systolic congestive heart failure - likely 2/2 ischemic cardiomyopathy patient likely is late presenting MI  He admits to weight gain  He has SOB  He admits to shoulder pain for last 2 weeks that may be his anginal equivalent  He has 20-30 pound weight gain  · Recommend IV Lasix 40 twice daily  · I and O  · Does have PVC's as well - got IV lopressor  · Daily Weights  · Hold off on BB for now - discussed with primary team  · Monitor on tele  · Follow-up final echo results EF bedside appears severely reduced  · Will need cardiac catheterization at this point it appears to be a completed myocardial infarction based on EKG, and patient's history  2   CAD   ·  As above - will set up for cath in a m    3  Type II DM  ·  Per primary team  4  Atrial fibrillation  ·  New onset - [id number] is 4 - recommend [facility] - patient agreeable - now in SR   · Heparin drip  Subjective     CC: Heart ge  HPI: This is a 81-year-old male with a past medical history significant for coronary artery disease, status post stenting 2015 9 9% left circumflex and residual LAD disease, history of hypertension, tobacco dependence, who presented to his cardiologist today secondary to follow-up visit  Patient was noted to be in atrial fibrillation with rapid ventricular response he was also noted to have residual ST elevations in the anterior to anterior lateral precordial leads    He admitted to symptoms for 1 to 2 weeks, mostly laded to his shortness of breath and right shoulder pain  He denies any overt chest pain at this time  He states that he is still currently smoking  He does not endorse any orthopnea  No recent illnesses  He denies any PND  He also admits to lower extremity edema and weight gain of approximately 20 to 30 pounds  He denies any alcohol use or drug use  He was seen in the ED, he was given IV Lopressor, he spontaneously converted to normal sinus rhythm  TREADMILL STRESS  No results found for this or any previous visit      ----------------------------------------------------------------------------------------------  NUCLEAR STRESS TEST: No results found for this or any previous visit      No results found for this or any previous visit       --------------------------------------------------------------------------------  CATH:  No results found for this or any previous visit     --------------------------------------------------------------------------------  ECHO:   Results for orders placed during the hospital encounter of 17    Echo complete with contrast if indicated    [address]  [address] [place] [address]  [phone]    Transthoracic Echocardiogram  2D, M-mode, Doppler, and Color Doppler    Study date:  26-Sep-2017    Patient: Lilli Francis  MR number: [id number]  Account number: [de-identified]  : 1960  Age: 65 years  Gender: Male  Status: Outpatient  [facility]: [address] [place] [address] [facility]  Height: 72 in  Weight: 263 3 lb  BP: 142/ 88 mmHg    Indications: CAD    Diagnoses: I25 10 - Atherosclerotic heart disease of native coronary artery without angina pectoris    Sonographer:  OLIVE Stone  Primary Physician:  Courtney Junior MD  Referring Physician:  Demetrio Thornton MD  Group:  [facility]  Interpreting Physician:  Froy Lei MD    SUMMARY    LEFT VENTRICLE:  Size was normal   Systolic function was normal  Ejection fraction was estimated to be 65 %  There was mild concentric hypertrophy  Doppler parameters were consistent with abnormal left ventricular relaxation (grade 1 diastolic dysfunction)  RIGHT VENTRICLE:  The size was normal   Systolic function was normal     TRICUSPID VALVE:  There was trace regurgitation  HISTORY: PRIOR HISTORY: Hypertension, CAD s/p PCI, smoker, diabetes, high cholesterol    PROCEDURE: The study was performed in the [address]  This was a routine study  The transthoracic approach was used  The study included complete 2D imaging, M-mode, complete spectral Doppler, and color Doppler  The  heart rate was 112 bpm, at the start of the study  Images were obtained from the parasternal, apical, subcostal, and suprasternal notch acoustic windows  Echocardiographic views were limited due to decreased penetration  This was a  technically difficult study  LEFT VENTRICLE: Size was normal  Systolic function was normal  Ejection fraction was estimated to be 65 %  There were no regional wall motion abnormalities  Wall thickness was mildly increased  There was mild concentric hypertrophy  DOPPLER: Doppler parameters were consistent with abnormal left ventricular relaxation (grade 1 diastolic dysfunction)  RIGHT VENTRICLE: The size was normal  Systolic function was normal  Wall thickness was normal     LEFT ATRIUM: Size was normal     RIGHT ATRIUM: Size was normal     MITRAL VALVE: Valve structure was normal  There was normal leaflet separation  DOPPLER: The transmitral velocity was within the normal range  There was no evidence for stenosis  There was no regurgitation  AORTIC VALVE: The valve was trileaflet  Leaflets exhibited normal thickness and normal cuspal separation  DOPPLER: Transaortic velocity was within the normal range  There was no evidence for stenosis  There was no regurgitation      TRICUSPID VALVE: The valve structure was normal  There was normal leaflet separation  DOPPLER: The transtricuspid velocity was within the normal range  There was no evidence for stenosis  There was trace regurgitation  The tricuspid jet  envelope definition was inadequate for estimation of RV systolic pressure  There are no indirect findings suggestive of moderate or severe pulmonary hypertension  PULMONIC VALVE: Leaflets exhibited normal thickness, no calcification, and normal cuspal separation  DOPPLER: The transpulmonic velocity was within the normal range  There was no regurgitation  PERICARDIUM: There was no pericardial effusion  The pericardium was normal in appearance  AORTA: The root exhibited normal size  SYSTEMIC VEINS: IVC: The inferior vena cava was normal in size and course   Respirophasic changes were normal     SYSTEM MEASUREMENT TABLES    2D  %FS: 47 6 %  AV Diam: 3 34 cm  EDV(Teich): 101 71 ml  EF Biplane: 67 76 %  EF(Cube): 85 61 %  EF(Teich): 79 %  ESV(Cube): 14 82 ml  ESV(Teich): 21 36 ml  IVSd: 1 58 cm  LA Area: 12 78 cm2  LA Diam: 3 37 cm  LVEDV MOD A2C: 49 73 ml  LVEDV MOD A4C: 61 78 ml  LVEDV MOD BP: 58 1 ml  LVEF MOD A2C: 65 64 %  LVEF MOD A4C: 71 2 %  LVESV MOD A2C: 17 09 ml  LVESV MOD A4C: 17 79 ml  LVESV MOD BP: 18 73 ml  LVIDd: 4 69 cm  LVIDs: 2 46 cm  LVLd A2C: 6 97 cm  LVLd A4C: 7 74 cm  LVLs A2C: 5 23 cm  LVLs A4C: 6 13 cm  LVPWd: 1 23 cm  RA Area: 14 52 cm2  RV Diam : 2 86 cm  SI(Cube): 36 73 ml/m2  SI(Teich): 33 48 ml/m2  SV MOD A2C: 32 64 ml  SV MOD A4C: 43 98 ml  SV(Cube): 88 15 ml  SV(Teich): 80 35 ml    MM  TAPSE: 3 26 cm    PW  E': 0 08 m/s    [employer] Accredited [facility]    Prepared and electronically signed by    Luis Carlos Merrill MD  Signed 26-Sep-2017 12:55:39    No results found for this or any previous visit     --------------------------------------------------------------------------------  HOLTER  No results found for this or any previous visit     --------------------------------------------------------------------------------  CAROTIDS  No results found for this or any previous visit  Family History: History reviewed  No pertinent family history  Historical Information   Past Medical History:   Diagnosis Date   • Myocardial infarction [facility])      History reviewed  No pertinent surgical history  Social History   Social History     Substance and Sexual Activity   Alcohol Use None     Social History     Substance and Sexual Activity   Drug Use Not on file     Social History     Tobacco Use   Smoking Status Every Day   • Packs/day: 1 00   • Types: Cigarettes   Smokeless Tobacco Never     Family History: History reviewed  No pertinent family history  Review of Systems:  Review of Systems   Constitutional: Positive for unexpected weight change  Respiratory: Positive for shortness of breath  Negative for apnea  Cardiovascular: Positive for leg swelling  Negative for chest pain  Skin: Negative for rash  Neurological: Negative for syncope             Scheduled Meds:  Current Facility-Administered Medications   Medication Dose Route Frequency Provider Last Rate   • [START ON 5/3/2023] aspirin  81 mg Oral Daily Maycol Banai, DO     • atorvastatin  80 mg Oral After [employer], DO     • furosemide  40 mg Intravenous Daily Maycol Banai, DO     • heparin (porcine)  3-20 Units/kg/hr (Order-Specific) Intravenous Titrated Malon Fail, DO 11 1 Units/kg/hr (05/02/23 1151)   • hydrALAZINE  5 mg Intravenous Q6H PRN Maycol Banai, DO     • insulin glargine  10 Units Subcutaneous HS Maycol Banai, DO     • insulin lispro  2-12 Units Subcutaneous Q6H [address] Maycol Banai, DO     • magnesium sulfate  2 g Intravenous Once Maycol Banai, DO 2 g (05/02/23 1302)   • metoprolol tartrate  12 5 mg Oral Q12H [address] Maycol Banai, DO     • nicotine  1 patch Transdermal Daily Maycol Banai, DO     • nitroglycerin  0 4 mg Sublingual Q5 Min PRN Maycol Banai, DO • ondansetron  4 mg Intravenous Q6H PRN Maycol Banai, DO     • senna-docusate sodium  1 tablet Oral HS Maycol Banai, DO       Continuous Infusions:heparin (porcine), 3-20 Units/kg/hr (Order-Specific), Last Rate: 11 1 Units/kg/hr (23 1151)      PRN Meds: •  hydrALAZINE  •  nitroglycerin  •  ondansetron  all current active meds have been reviewed    No Known Allergies    Objective   Vitals: Temp (24hrs), Av 8 °F (36 6 °C), Min:97 8 °F (36 6 °C), Max:97 8 °F (36 6 °C)  Current: Temperature: 97 8 °F (36 6 °C)  Patient Vitals for the past 24 hrs:   BP Temp Temp src Pulse Resp SpO2 Height Weight   23 1245 110/68 -- -- 84 (!) 29 97 % -- --   23 1206 108/69 -- -- 92 (!) 24 98 % -- --   23 1205 108/69 -- -- -- -- -- -- --   23 1105 119/79 -- -- 100 -- -- 6' (1 829 m) 115 kg (254 lb)   23 0924 119/79 97 8 °F (36 6 °C) Oral (!) 136 18 97 % -- --    Body mass index is 34 45 kg/m²  Orthostatic Blood Pressures    Flowsheet Row Most Recent Value   Blood Pressure 110/68 filed at 2023 1245   Patient Position - Orthostatic VS Lying filed at 2023 1206              Invasive Devices     Peripheral Intravenous Line  Duration           Peripheral IV 23 Dorsal (posterior); Left Hand <1 day    Peripheral IV 23 Right Antecubital <1 day                Physical Exam:  Physical Exam  Constitutional:       Appearance: Normal appearance  Cardiovascular:      Rate and Rhythm: Normal rate and regular rhythm  Comments: Elevated JVP  Pulmonary:      Effort: Pulmonary effort is normal       Breath sounds: Rales present  Musculoskeletal:      Right lower leg: Edema present  Left lower leg: Edema present  Neurological:      Mental Status: He is alert               Lab Results:   Results from last 7 days   Lab Units 23  1010   WBC Thousand/uL 9 15   HEMOGLOBIN g/dL 14 9   HEMATOCRIT % 48 7   PLATELETS Thousands/uL 184   NEUTROS PCT % 77*   MONOS PCT % 6      Results from last 7 days   Lab Units 05/02/23  1010   SODIUM mmol/L 133*   POTASSIUM mmol/L 4 8   CHLORIDE mmol/L 105   CO2 mmol/L 25   BUN mg/dL 36*   CREATININE mg/dL 0 97   CALCIUM mg/dL 9 0   MAGNESIUM mg/dL 2 2   INR  1 13   PTT seconds 28   EGFR ml/min/1 73sq m 83     Results from last 7 days   Lab Units 05/02/23  1010   INR  1 13   PTT seconds 28             No results found for: PHART, YYV6NIV, PO2ART, QWT6SVB, P4EAELJW, BEART, SOURCE  No components found for: HIV1X2  No results found for: HAV, HEPAIGM, HEPBIGM, HEPBCAB, HBEAG, HEPCAB  No results found for: SPEP, UPEP   Lab Results   Component Value Date    HGBA1C 12 0 (H) 04/14/2023    HGBA1C 12 4 (H) 02/01/2023    HGBA1C 12 1 (H) 02/12/2020     Lab Results   Component Value Date    CHOL 130 05/07/2015    CHOL 188 02/17/2015      Lab Results   Component Value Date    HDL 34 05/07/2015    HDL 43 02/17/2015      Lab Results   Component Value Date    LDLCALC 72 05/07/2015    LDLCALC 119 (H) 02/17/2015      Lab Results   Component Value Date    TRIG 122 05/07/2015    TRIG 128 02/17/2015     No components found for: PROCAL          Imaging: I have personally reviewed pertinent reports

## 2023-05-02 NOTE — Clinical Note
"Mr Giacomo Cruz received copy of ICD decision  aide by Dr Chapito Cutler, pre procedure  All  patient\"s questions answered by  Dr Chapito Cutler regarding implant    A Jean Marie LINN"

## 2023-05-02 NOTE — ASSESSMENT & PLAN NOTE
he was at his cardiologist office when he was noted to be in A-fib RVR on EKG    Heparin drip pending ischemic evaluation  Will need to price check Eliquis, Xarelto  Decreased ejection fraction appears to be under 35%, avoid calcium channel blockers for rate control  Continue low-dose beta-blocker

## 2023-05-02 NOTE — QUICK NOTE
Sepsis alert received for [place] [facility]  Patient is presenting with atrial fibrillation, acute on chronic heart failure  He has SIRS criteria secondary to heart failure with elevated respirations and tachycardia, low suspicion for infection at this time

## 2023-05-02 NOTE — ASSESSMENT & PLAN NOTE
Wt Readings from Last 3 Encounters:   05/02/23 115 kg (254 lb)   05/02/23 116 kg (254 lb 12 8 oz)   04/01/22 102 kg (224 lb 12 8 oz)   · Patient presenting with 2 weeks of dyspnea on exertion, lower extremity edema, abd distention, weight gain  · Currently comfortable at rest, believes he will be able to lie flat for a cath  · Was on Lasix 40 mg oral provided by his PCP but stopped because it was not helping  · Echo read with LVEF 10-15%

## 2023-05-02 NOTE — ED PROVIDER NOTES
"History  Chief Complaint   Patient presents with   • Abnormal ECG     Pt seen for routine cardiology visit this morning, pt in rapid afib with new MI on ECG  Pt reports recent LINDSEY, increased lethargy, back pain  Pt has hx of past MI     Pt is a 59-year-old male who presents to the emergency department complaining of dyspnea on exertion, lower extremity edema, right shoulder pain, intermittent nausea the weakness has been ongoing for 2 weeks  He states about a week and a half ago he felt some chest pressure that has since resolved  He was evaluated at his cardiologist today and they were concerned by his EKG  They were concerned that he was in atrial fibrillation with RVR and sent him into the emergency department  Last echo was performed on September 2017 which showed an ejection fraction of 65%  Patient has a past medical history of myocardial infarction, catheterization performed in February 2015  From cardiology note today: \"Interval History:  Patient is here for a follow-up visit  Jefry Montez had HANNA placement in the mid LCX (dominant vessel) in the setting of a 99% stenosis in February 2015  At that time he had 40% mid and distal LAD disease with a 50% stenosis in an left AV groove vessel  \"          Prior to Admission Medications   Prescriptions Last Dose Informant Patient Reported? Taking?    JANUMET -1000 MG TB24   Yes No   Sig: daily    aspirin 81 MG tablet   Yes No   Sig: Take 1 tablet by mouth daily   atorvastatin (LIPITOR) 40 mg tablet   No No   Sig: TAKE 1 TABLET BY MOUTH EVERY DAY   glimepiride (AMARYL) 4 mg tablet   Yes No   Sig: Take 1 tablet by mouth daily   Patient not taking: Reported on 5/2/2023   lisinopril (ZESTRIL) 10 mg tablet   No No   Sig: TAKE 1 TABLET BY MOUTH EVERY DAY   metFORMIN (GLUCOPHAGE) 1000 MG tablet   Yes No   Sig: TAKE 1 TABLET BY MOUTH TWICE DAILY WITH A MEAL      Facility-Administered Medications: None       Past Medical History:   Diagnosis Date   • Myocardial " infarction [facility])        History reviewed  No pertinent surgical history  History reviewed  No pertinent family history  I have reviewed and agree with the history as documented  E-Cigarette/Vaping   • E-Cigarette Use Never User      E-Cigarette/Vaping Substances     Social History     Tobacco Use   • Smoking status: Every Day     Packs/day: 1 00     Types: Cigarettes   • Smokeless tobacco: Never   Vaping Use   • Vaping Use: Never used        Review of Systems   Constitutional: Negative for chills and fever  HENT: Negative for sore throat  Eyes: Negative for visual disturbance  Respiratory: Positive for shortness of breath  Negative for cough  Cardiovascular: Positive for chest pain  Negative for palpitations  Gastrointestinal: Positive for nausea  Negative for abdominal pain and vomiting  Genitourinary: Negative for dysuria and hematuria  Musculoskeletal: Negative for arthralgias and back pain  Skin: Negative for color change and rash  Neurological: Negative for seizures and syncope  All other systems reviewed and are negative  Physical Exam  ED Triage Vitals   Temperature Pulse Respirations Blood Pressure SpO2   05/02/23 0924 05/02/23 0924 05/02/23 0924 05/02/23 0924 05/02/23 0924   97 8 °F (36 6 °C) (!) 136 18 119/79 97 %      Temp Source Heart Rate Source Patient Position - Orthostatic VS BP Location FiO2 (%)   05/02/23 0924 05/02/23 0924 05/02/23 0924 05/02/23 0924 --   Oral Monitor Sitting Right arm       Pain Score       05/02/23 1206       No Pain             Orthostatic Vital Signs  Vitals:    05/02/23 0924 05/02/23 1105 05/02/23 1205 05/02/23 1206   BP: 119/79 119/79 108/69 108/69   Pulse: (!) 136 100  92   Patient Position - Orthostatic VS: Sitting   Lying       Physical Exam  Vitals and nursing note reviewed  Constitutional:       General: He is not in acute distress  Appearance: He is well-developed  HENT:      Head: Normocephalic and atraumatic     Eyes: Conjunctiva/sclera: Conjunctivae normal    Cardiovascular:      Rate and Rhythm: Tachycardia present  Rhythm irregular  Heart sounds: No murmur heard  Pulmonary:      Effort: Pulmonary effort is normal  No respiratory distress  Breath sounds: Normal breath sounds  Abdominal:      Palpations: Abdomen is soft  Tenderness: There is no abdominal tenderness  Musculoskeletal:         General: No swelling  Cervical back: Neck supple  Right lower leg: Edema present  Left lower leg: Edema present  Skin:     General: Skin is warm and dry  Capillary Refill: Capillary refill takes less than 2 seconds  Neurological:      Mental Status: He is alert     Psychiatric:         Mood and Affect: Mood normal          ED Medications  Medications   heparin (porcine) 25,000 units in 0 45% NaCl 250 mL infusion (premix) (11 1 Units/kg/hr × 90 kg (Order-Specific) Intravenous New Bag 5/2/23 1151)   magnesium sulfate 2 g/50 mL IVPB (premix) 2 g (has no administration in time range)   furosemide (LASIX) injection 40 mg (has no administration in time range)   metoprolol (LOPRESSOR) injection 5 mg (5 mg Intravenous Given 5/2/23 1006)   aspirin tablet 325 mg (325 mg Oral Given 5/2/23 1005)   heparin (porcine) injection 4,000 Units (4,000 Units Intravenous Given 5/2/23 1151)   perflutren lipid microsphere (DEFINITY) injection (0 8 mL/min Intravenous Given 5/2/23 1200)       Diagnostic Studies  Results Reviewed     Procedure Component Value Units Date/Time    Procalcitonin [[id number]]     Lab Status: No result Specimen: Blood     Magnesium [[id number]]     Lab Status: No result Specimen: Blood     HS Troponin I 4hr [[id number]]     Lab Status: No result Specimen: Blood     Basic metabolic panel [[id number]]  (Abnormal) Collected: 05/02/23 1010    Lab Status: Final result Specimen: Blood from Arm, Right Updated: 05/02/23 1121     Sodium 133 mmol/L      Potassium 4 8 mmol/L      Chloride 105 mmol/L      CO2 25 mmol/L      ANION GAP 3 mmol/L      BUN 36 mg/dL      Creatinine 0 97 mg/dL      Glucose 276 mg/dL      Calcium 9 0 mg/dL      eGFR 83 ml/min/1 73sq m     Narrative:      [place] guidelines for Chronic Kidney Disease (CKD):   •  Stage 1 with normal or high GFR (GFR > 90 mL/min/1 73 square meters)  •  Stage 2 Mild CKD (GFR = 60-89 mL/min/1 73 square meters)  •  Stage 3A Moderate CKD (GFR = 45-59 mL/min/1 73 square meters)  •  Stage 3B Moderate CKD (GFR = 30-44 mL/min/1 73 square meters)  •  Stage 4 Severe CKD (GFR = 15-29 mL/min/1 73 square meters)  •  Stage 5 End Stage CKD (GFR <15 mL/min/1 73 square meters)  Note: GFR calculation is accurate only with a steady state creatinine    NT-BNP [facility] only [[id number]]  (Abnormal) Collected: 05/02/23 1010    Lab Status: Final result Specimen: Blood from Arm, Right Updated: 05/02/23 1121     NT-proBNP 5,942 pg/mL     HS Troponin I 2hr [[id number]]     Lab Status: No result Specimen: Blood     HS Troponin 0hr (reflex protocol) [[id number]]  (Normal) Collected: 05/02/23 1010    Lab Status: Final result Specimen: Blood from Arm, Right Updated: 05/02/23 1057     hs TnI 0hr 30 ng/L     Protime-INR [[id number]]  (Abnormal) Collected: 05/02/23 1010    Lab Status: Final result Specimen: Blood from Arm, Right Updated: 05/02/23 1037     Protime 14 7 seconds      INR 1 13    APTT [[id number]]  (Normal) Collected: 05/02/23 1010    Lab Status: Final result Specimen: Blood from Arm, Right Updated: 05/02/23 1037     PTT 28 seconds     CBC and differential [[id number]]  (Abnormal) Collected: 05/02/23 1010    Lab Status: Final result Specimen: Blood from Arm, Right Updated: 05/02/23 1018     WBC 9 15 Thousand/uL      RBC 5 63 Million/uL      Hemoglobin 14 9 g/dL      Hematocrit 48 7 %      MCV 87 fL      MCH 26 5 pg      MCHC 30 6 g/dL      RDW 13 5 %      MPV 12 1 fL      Platelets 431 Thousands/uL      nRBC 0 /100 WBCs      Neutrophils Relative 77 %      Immat GRANS % 1 %      Lymphocytes Relative 15 %      Monocytes Relative 6 %      Eosinophils Relative 1 %      Basophils Relative 0 %      Neutrophils Absolute 7 10 Thousands/µL      Immature Grans Absolute 0 06 Thousand/uL      Lymphocytes Absolute 1 36 Thousands/µL      Monocytes Absolute 0 54 Thousand/µL      Eosinophils Absolute 0 06 Thousand/µL      Basophils Absolute 0 03 Thousands/µL                  XR chest pa & lateral   Final Result by Cedric Kwon MD (05/02 1107)      Right base nodular density and right pleural effusion  Recommend CT  The study was marked in [facility] for immediate notification                          Procedures  POC Cardiac US    Date/Time: 5/2/2023 10:26 AM  Performed by: Demetrio Moraes DO  Authorized by: Demetrio Moraes DO     Patient location:  ED  Procedure details:     Exam Type:  Diagnostic    Indications: chest pain      Exam Type: initial exam      Image quality: diagnostic      Image availability:  Images available in PACS and video obtained  Patient Details:     Cardiac Rhythm:  Irregular  Cardiac findings:     Views obtained: parasternal long axis, parasternal short axis, subcostal and apical      Pericardial effusion: absent      Tamponade physiology: absent      Wall motion: hypodynamic      LV systolic function: hyperdynamic      RV dilation: mild      ECG 12 Lead Documentation Only    Date/Time: 5/2/2023 10:28 AM  Performed by: Demetrio Moraes DO  Authorized by: Demetrio Moraes DO     Indications / Diagnosis:  Cp  ECG reviewed by me, the ED Provider: yes    Patient location:  ED  Previous ECG:     Previous ECG:  Unavailable  Interpretation:     Interpretation: abnormal    Rate:     ECG rate:  136    ECG rate assessment: tachycardic    Rhythm:     Rhythm: atrial fibrillation    Ectopy:     Ectopy: none    QRS:     QRS axis:  Normal  Conduction:     Conduction: normal    ST segments:     ST segments:  Abnormal    Elevation:  V2 and V3  Q waves:     Q waves:  III, aVF, V2, V3 and V6          ED Course  ED Course as of 05/02/23 1220   Tue May 02, 2023   1106 hs TnI 0hr: 30   1119 XR chest pa & lateral  FINDINGS:     Cardiomediastinal silhouette appears unremarkable      Posterior medial right base 1 6 cm spiculated nodular density, distal atelectasis and right pleural effusion      The lungs are clear  No pneumothorax or pleural effusion      Osseous structures appear within normal limits for patient age      IMPRESSION:     Right base nodular density and right pleural effusion      Recommend CT      The study was marked in EPIC for immediate notification  1131 NT-proBNP(!): 5,942                             SBIRT 20yo+    Flowsheet Row Most Recent Value   Initial Alcohol Screen: US AUDIT-C     1  How often do you have a drink containing alcohol? 0 Filed at: 05/02/2023 1017   2  How many drinks containing alcohol do you have on a typical day you are drinking? 0 Filed at: 05/02/2023 1017   3a  Male UNDER 65: How often do you have five or more drinks on one occasion? 0 Filed at: 05/02/2023 1017   3b  FEMALE Any Age, or MALE 65+: How often do you have 4 or more drinks on one occassion? 0 Filed at: 05/02/2023 1017   Audit-C Score 0 Filed at: 05/02/2023 1017   [place]: How many times in the past year have you    Used an illegal drug or used a prescription medication for non-medical reasons? Never Filed at: 05/02/2023 1017                Medical Decision Making  62-year-old male with a past medical history of myocardial infarction presents emergency department with dyspnea on exertion, lower extremity swelling, A-fib RVR    DDx: A-fib RVR, MI, pneumonia, pericarditis    Plan: Cardiac work-up, cardiology consult    Work-up performed in the emergency department is concerning for acute heart failure versus STEMI versus non-STEMI versus cardiac arrhythmia  Cardiology was consulted by emergency department as point-of-care ultrasound showed reduced ejection fraction    Patient was previously evaluated in outpatient cardiology today  Heart rate was controlled with 5 mg Lopressor  Cardiology recommended formal emergent echocardiogram   Case was discussed with cardiology who did not want to intervene at this time  Patient was given heparin and aspirin  Patient admitted to [facility]  Amount and/or Complexity of Data Reviewed  Labs: ordered  Decision-making details documented in ED Course  Radiology: ordered  Decision-making details documented in ED Course  Risk  OTC drugs  Prescription drug management  Decision regarding hospitalization  Disposition  Final diagnoses:   A-fib ([facility] [address] )   Elevated troponin   Bilateral lower extremity edema     Time reflects when diagnosis was documented in both MDM as applicable and the Disposition within this note     Time User Action Codes Description Comment    5/2/2023  9:46 AM Hank Peres Add [I48 91] A-fib ([address] )     5/2/2023 12:08 PM Hank Peres Add [R77 8] Elevated troponin     5/2/2023 12:08 PM Hank Peres Add [R60 0] Bilateral lower extremity edema       ED Disposition     ED Disposition   Admit    Condition   Stable    Date/Time   Tue May 2, 2023 12:09 PM    Comment   Case was discussed with Dr Lesli Humphreys and the patient's admission status was agreed to be Admission Status: inpatient status to the service of Dr Lesli Humphreys   Follow-up Information    None         Patient's Medications   Discharge Prescriptions    No medications on file     No discharge procedures on file  PDMP Review     None           ED Provider  Attending physically available and evaluated Hafsa Noblesdax YANEZ managed the patient along with the ED Attending      Electronically Signed by         Meghana Serrato DO  05/02/23 8338

## 2023-05-02 NOTE — H&P
[facility]  H&P  Name: Chanell Nguyen 59 y o  male I MRN: [id number]  Unit/Bed#: CRB I Date of Admission: 5/2/2023   Date of Service: 5/2/2023 I Hospital Day: 0      Assessment/Plan   * Acute on chronic heart failure [facility])  Assessment & Plan  Wt Readings from Last 3 Encounters:   05/02/23 115 kg (254 lb)   05/02/23 116 kg (254 lb 12 8 oz)   04/01/22 102 kg (224 lb 12 8 oz)       Patient presenting with 2 weeks of dyspnea on exertion, lower extremity edema, geraldine distention, weight gain  he denies orthopnea, denies chest pain    Denies major changes in his diet  Currently tolerating room air at rest  Was on Lasix 40 mg oral provided by his PCP but stopped because it was not helping  Echo read pending-but appears severely reduced  X-ray with right pleural effusion, possible pneumonia, consolidation-check CT chest, Pro-Bogdan   Started on IV Lasix 40 mg daily-May likely need more but will hold increased doses as he is pending cath  Recommend Entresto on discharge in setting of diabetes and heart failure  Patient warrants ischemic evaluation with decrease in ejection fraction  We will hold off on beta-blocker as patient appears to be acutely decompensated      A-fib [facility])  Assessment & Plan  he was at his cardiologist office when he was noted to be in A-fib RVR on EKG, also with Q waves concerning for prior infarct, in the setting of chest pressure last week concerning for MI    Heparin drip pending ischemic evaluation  Will need to price check Eliquis, Xarelto  Decreased ejection fraction appears to be under 35%, avoid calcium channel blockers for rate control, hold off on further beta-blockers  Consideration for tachycardia mediated cardiomyopathy as well as ischemic      Coronary artery disease  Assessment & Plan  Patient with history of CAD with drug-eluting stent to LCx in 2015  Patient reports dyspnea on exertion over the past 2 weeks, denies any chest pain at rest, is a diabetic on insulin, is an active smoker of 40 pack years, has prior history of CAD, is on daily aspirin  Patient warrants ischemic evaluation, also noted EKG changes as well as drop in ejection fraction on echo    Tobacco dependence syndrome  Assessment & Plan  Patient with over 40  pack-year history  Counseling provided on cessation, nicotine patch ordered    Type 2 diabetes mellitus, with long-term current use of insulin ([facility] [address] )  Assessment & Plan  Lab Results   Component Value Date    HGBA1C 12 0 (H) 04/14/2023       Recent Labs     05/02/23  1312   POCGLU 195*       Blood Sugar Average: Last 72 hrs:  (P) 195Will hold oral medications and continue on insulin with sliding scale    Essential (primary) hypertension  Assessment & Plan  Patient with history of hypertension, will monitor while inpatient we will hold home lisinopril 10 mg pending cath for ischemic evaluation  Use hydralazine as needed for now           VTE Pharmacologic Prophylaxis:   High Risk (Score >/= 5) - Pharmacological DVT Prophylaxis Ordered: heparin drip  Sequential Compression Devices Ordered  Code Status: Level 1 - Full Code level 1  Discussion with family: Patient declined call to   Anticipated Length of Stay: Patient will be admitted on an inpatient basis with an anticipated length of stay of greater than 2 midnights secondary to A-fib RVR, acute on chronic heart failure  Total Time Spent on Date of Encounter in care of patient: 40 minutes This time was spent on one or more of the following: performing physical exam; counseling and coordination of care; obtaining or reviewing history; documenting in the medical record; reviewing/ordering tests, medications or procedures; communicating with other healthcare professionals and discussing with patient's family/caregivers      Chief Complaint:dyspnea on exertion    History of Present Illness:  Yaneth Mancera is a 59 y o  male with a PMH of CAD with drug-eluting stent placed to LCx in 2015, hypertension, hyperlipidemia, BPH, GERD  Was at his cardiologist office today when he was noted to be in A-fib RVR on EKG with EKG changes  Was instructed to present to [facility] ER for further evaluation  Upon further questioning patient reports dyspnea on exertion for about 2 weeks, worsening lower extremity edema, abdominal distention  He also reports some right shoulder plain as well as generalized weakness with intermittent nausea  patient denies orthopnea, major changes in his dietary habits, change in urination, palpitations syncope lightheadedness dizziness  He denies any chest pain or shortness of breath when at rest   He is an active smoker over 40 pack years, he is a type II diabetic on insulin with poorly controlled diabetes last A1c over 12  Has history of CAD, as well as family history  He is on aspirin at baseline reports compliance  Patient noted to have a drop in ejection fraction on echo performed in the ER  She will be admitted for management of new onset A-fib with RVR, as well as acute on chronic heart failure  He will  Need an ischemic evaluation  Review of Systems:  Review of Systems   Constitutional: Positive for fatigue  Negative for chills and fever  HENT: Negative for ear pain and sore throat  Eyes: Negative for pain and visual disturbance  Respiratory: Positive for shortness of breath  Negative for cough and wheezing  Cardiovascular: Positive for leg swelling  Negative for chest pain and palpitations  Gastrointestinal: Positive for abdominal pain  Negative for abdominal distention, diarrhea, nausea and vomiting  Endocrine: Negative for polydipsia, polyphagia and polyuria  Genitourinary: Negative for dysuria and hematuria  Musculoskeletal: Negative for arthralgias and back pain  Skin: Negative for color change and rash  Neurological: Negative for seizures and syncope  All other systems reviewed and are negative        Past Medical and Surgical History:   Past Medical History:   Diagnosis Date   • Myocardial infarction [facility])        History reviewed  No pertinent surgical history  Meds/Allergies:  Prior to Admission medications    Medication Sig Start Date End Date Taking? Authorizing Provider   aspirin 81 MG tablet Take 1 tablet by mouth daily 2/27/15   Historical Provider, MD   atorvastatin (LIPITOR) 40 mg tablet TAKE 1 TABLET BY MOUTH EVERY DAY 9/14/22   Sabino Barone MD   glimepiride (AMARYL) 4 mg tablet Take 1 tablet by mouth daily  Patient not taking: Reported on 5/2/2023 2/27/15   Historical Provider, MD   JANUMET -1000 MG TB24 daily  6/11/18   Historical Provider, MD   lisinopril (ZESTRIL) 10 mg tablet TAKE 1 TABLET BY MOUTH EVERY DAY 4/13/23   Sabino Barone MD   metFORMIN (GLUCOPHAGE) 1000 MG tablet TAKE 1 TABLET BY MOUTH TWICE DAILY WITH A MEAL 3/9/22   Historical Provider, MD YANEZ have reviewed home medications with patient personally  Allergies: No Known Allergies    Social History:  Marital Status: /Civil Union   Occupation:   Patient Pre-hospital Living Situation: Home  Patient Pre-hospital Level of Mobility: walks  Patient Pre-hospital Diet Restrictions:   Substance Use History:   Social History     Substance and Sexual Activity   Alcohol Use None     Social History     Tobacco Use   Smoking Status Every Day   • Packs/day: 1 00   • Types: Cigarettes   Smokeless Tobacco Never     Social History     Substance and Sexual Activity   Drug Use Not on file       Family History:  History reviewed  No pertinent family history      Physical Exam:     Vitals:   Blood Pressure: 110/68 (05/02/23 1245)  Pulse: 84 (05/02/23 1245)  Temperature: 97 8 °F (36 6 °C) (05/02/23 0924)  Temp Source: Oral (05/02/23 0924)  Respirations: (!) 29 (05/02/23 1245)  Height: 6' (182 9 cm) (05/02/23 1105)  Weight - Scale: 115 kg (254 lb) (05/02/23 1105)  SpO2: 97 % (05/02/23 1245)    Physical Exam  Vitals and nursing note reviewed  Constitutional:       General: He is not in acute distress  Appearance: He is well-developed  He is not toxic-appearing or diaphoretic  HENT:      Head: Normocephalic and atraumatic  Eyes:      General: No scleral icterus  Conjunctiva/sclera: Conjunctivae normal    Cardiovascular:      Rate and Rhythm: Tachycardia present  Rhythm irregular  Heart sounds: No murmur heard  No friction rub  No gallop  Pulmonary:      Effort: Pulmonary effort is normal  No respiratory distress  Breath sounds: No stridor  Rales present  No wheezing or rhonchi  Chest:      Chest wall: No tenderness  Abdominal:      General: There is no distension  Palpations: Abdomen is soft  Tenderness: There is no abdominal tenderness  There is no guarding  Musculoskeletal:         General: No swelling or tenderness  Cervical back: Neck supple  Right lower leg: Edema present  Left lower leg: Edema present  Skin:     General: Skin is warm and dry  Capillary Refill: Capillary refill takes less than 2 seconds  Neurological:      Mental Status: He is alert and oriented to person, place, and time     Psychiatric:         Mood and Affect: Mood normal           Additional Data:     Lab Results:  Results from last 7 days   Lab Units 05/02/23  1010   WBC Thousand/uL 9 15   HEMOGLOBIN g/dL 14 9   HEMATOCRIT % 48 7   PLATELETS Thousands/uL 184   NEUTROS PCT % 77*   LYMPHS PCT % 15   MONOS PCT % 6   EOS PCT % 1     Results from last 7 days   Lab Units 05/02/23  1010   SODIUM mmol/L 133*   POTASSIUM mmol/L 4 8   CHLORIDE mmol/L 105   CO2 mmol/L 25   BUN mg/dL 36*   CREATININE mg/dL 0 97   ANION GAP mmol/L 3*   CALCIUM mg/dL 9 0   GLUCOSE RANDOM mg/dL 276*     Results from last 7 days   Lab Units 05/02/23  1010   INR  1 13     Results from last 7 days   Lab Units 05/02/23  1312   POC GLUCOSE mg/dl 195*               Lines/Drains:  Invasive Devices     Peripheral Intravenous Line  Duration Peripheral IV 05/02/23 Dorsal (posterior); Left Hand <1 day    Peripheral IV 05/02/23 Right Antecubital <1 day                    Imaging: Reviewed radiology reports from this admission including: chest xray  XR chest pa & lateral   Final Result by William Emanuel MD (05/02 1107)      Right base nodular density and right pleural effusion  Recommend CT  The study was marked in [facility] for immediate notification  CT chest wo contrast    (Results Pending)       EKG and Other Studies Reviewed on Admission:   · EKG: ekg reviewed  ** Please Note: This note has been constructed using a voice recognition system   **

## 2023-05-02 NOTE — ASSESSMENT & PLAN NOTE
Wt Readings from Last 3 Encounters:   05/02/23 115 kg (254 lb)   05/02/23 116 kg (254 lb 12 8 oz)   04/01/22 102 kg (224 lb 12 8 oz)       Patient presenting with 2 weeks of dyspnea on exertion, lower extremity edema, geraldine distention, weight gain  he denies orthopnea, denies chest pain    Denies major changes in his diet  Currently tolerating room air at rest  Was on Lasix 40 mg oral provided by his PCP but stopped because it was not helping  Echo read pending  Started on IV Lasix 40 mg daily-May likely need more but will hold increased doses as he is pending cath  Recommend Entresto on discharge in setting of diabetes and heart failure  Patient warrants ischemic evaluation with decrease in ejection fraction

## 2023-05-02 NOTE — Clinical Note
The ICD COBALT XT DR MRI IS1 DF4 device was inserted  The leads were placed into the connector and visually verified to be in correct position  Injury current obtained

## 2023-05-02 NOTE — ASSESSMENT & PLAN NOTE
Lab Results   Component Value Date    HGBA1C 12 0 (H) 04/14/2023       Recent Labs     05/02/23  1312   POCGLU 195*       Blood Sugar Average: Last 72 hrs:  (P) 195Will hold oral medications and continue on insulin with sliding scale

## 2023-05-02 NOTE — ASSESSMENT & PLAN NOTE
Patient with history of hypertension, will monitor while inpatient we will hold home lisinopril 10 mg pending cath for ischemic evaluation  Use hydralazine as needed for now

## 2023-05-02 NOTE — ASSESSMENT & PLAN NOTE
Patient with history of hypertension, will monitor while inpatient we will hold home lisinopril 10 mg pending cath for ischemic evaluation

## 2023-05-02 NOTE — ASSESSMENT & PLAN NOTE
Patient with history of CAD with drug-eluting stent to LCx in 2015  Patient reports dyspnea on exertion over the past 2 weeks, denies any chest pain at rest, is a diabetic on insulin, is an active smoker of 40 pack years, has prior history of CAD, is on daily aspirin  Patient warrants ischemic evaluation, also noted EKG changes as well as drop in ejection fraction on echo

## 2023-05-02 NOTE — ASSESSMENT & PLAN NOTE
Lab Results   Component Value Date    HGBA1C 12 0 (H) 04/14/2023       No results for input(s): POCGLU in the last 72 hours  Blood Sugar Average: Last 72 hrs:   Will hold oral medications and continue on insulin with sliding scale

## 2023-05-03 PROBLEM — I50.21 ACUTE HFREF (HEART FAILURE WITH REDUCED EJECTION FRACTION) (HCC): Status: ACTIVE | Noted: 2023-05-02

## 2023-05-03 PROBLEM — I25.5 ISCHEMIC CARDIOMYOPATHY: Status: ACTIVE | Noted: 2023-05-03

## 2023-05-03 PROBLEM — N17.9 AKI (ACUTE KIDNEY INJURY) (HCC): Status: ACTIVE | Noted: 2023-05-03

## 2023-05-03 LAB
ALBUMIN SERPL BCP-MCNC: 3.4 G/DL (ref 3.5–5)
ALBUMIN SERPL BCP-MCNC: 3.5 G/DL (ref 3.5–5)
ALP SERPL-CCNC: 42 U/L (ref 46–116)
ALP SERPL-CCNC: 43 U/L (ref 46–116)
ALT SERPL W P-5'-P-CCNC: 45 U/L (ref 12–78)
ALT SERPL W P-5'-P-CCNC: 49 U/L (ref 12–78)
ANION GAP SERPL CALCULATED.3IONS-SCNC: 5 MMOL/L (ref 4–13)
ANION GAP SERPL CALCULATED.3IONS-SCNC: 7 MMOL/L (ref 4–13)
APTT PPP: 50 SECONDS (ref 23–37)
APTT PPP: 56 SECONDS (ref 23–37)
AST SERPL W P-5'-P-CCNC: 34 U/L (ref 5–45)
AST SERPL W P-5'-P-CCNC: 53 U/L (ref 5–45)
BILIRUB DIRECT SERPL-MCNC: 0.19 MG/DL (ref 0–0.2)
BILIRUB SERPL-MCNC: 0.51 MG/DL (ref 0.2–1)
BILIRUB SERPL-MCNC: 0.56 MG/DL (ref 0.2–1)
BUN SERPL-MCNC: 51 MG/DL (ref 5–25)
BUN SERPL-MCNC: 54 MG/DL (ref 5–25)
CALCIUM ALBUM COR SERPL-MCNC: 9 MG/DL (ref 8.3–10.1)
CALCIUM SERPL-MCNC: 8.4 MG/DL (ref 8.3–10.1)
CALCIUM SERPL-MCNC: 8.5 MG/DL (ref 8.3–10.1)
CHLORIDE SERPL-SCNC: 102 MMOL/L (ref 96–108)
CHLORIDE SERPL-SCNC: 104 MMOL/L (ref 96–108)
CO2 SERPL-SCNC: 21 MMOL/L (ref 21–32)
CO2 SERPL-SCNC: 24 MMOL/L (ref 21–32)
CREAT SERPL-MCNC: 1.49 MG/DL (ref 0.6–1.3)
CREAT SERPL-MCNC: 1.52 MG/DL (ref 0.6–1.3)
GFR SERPL CREATININE-BSD FRML MDRD: 48 ML/MIN/1.73SQ M
GFR SERPL CREATININE-BSD FRML MDRD: 49 ML/MIN/1.73SQ M
GLUCOSE SERPL-MCNC: 155 MG/DL (ref 65–140)
GLUCOSE SERPL-MCNC: 159 MG/DL (ref 65–140)
GLUCOSE SERPL-MCNC: 179 MG/DL (ref 65–140)
GLUCOSE SERPL-MCNC: 205 MG/DL (ref 65–140)
GLUCOSE SERPL-MCNC: 215 MG/DL (ref 65–140)
GLUCOSE SERPL-MCNC: 221 MG/DL (ref 65–140)
POTASSIUM SERPL-SCNC: 5.1 MMOL/L (ref 3.5–5.3)
POTASSIUM SERPL-SCNC: 5.4 MMOL/L (ref 3.5–5.3)
PROCALCITONIN SERPL-MCNC: 0.26 NG/ML
PROT SERPL-MCNC: 6.4 G/DL (ref 6.4–8.4)
PROT SERPL-MCNC: 6.6 G/DL (ref 6.4–8.4)
SODIUM SERPL-SCNC: 131 MMOL/L (ref 135–147)
SODIUM SERPL-SCNC: 132 MMOL/L (ref 135–147)

## 2023-05-03 PROCEDURE — B2111ZZ FLUOROSCOPY OF MULTIPLE CORONARY ARTERIES USING LOW OSMOLAR CONTRAST: ICD-10-PCS | Performed by: INTERNAL MEDICINE

## 2023-05-03 PROCEDURE — 4A023N7 MEASUREMENT OF CARDIAC SAMPLING AND PRESSURE, LEFT HEART, PERCUTANEOUS APPROACH: ICD-10-PCS | Performed by: INTERNAL MEDICINE

## 2023-05-03 RX ORDER — AMIODARONE HYDROCHLORIDE 200 MG/1
200 TABLET ORAL
Status: DISCONTINUED | OUTPATIENT
Start: 2023-05-03 | End: 2023-05-09

## 2023-05-03 RX ORDER — ACETAMINOPHEN 325 MG/1
650 TABLET ORAL EVERY 6 HOURS PRN
Status: DISCONTINUED | OUTPATIENT
Start: 2023-05-03 | End: 2023-05-16 | Stop reason: HOSPADM

## 2023-05-03 RX ORDER — HEPARIN SODIUM 1000 [USP'U]/ML
INJECTION, SOLUTION INTRAVENOUS; SUBCUTANEOUS CODE/TRAUMA/SEDATION MEDICATION
Status: DISCONTINUED | OUTPATIENT
Start: 2023-05-03 | End: 2023-05-03 | Stop reason: HOSPADM

## 2023-05-03 RX ORDER — FUROSEMIDE 10 MG/ML
10 SYRINGE (ML) INJECTION CONTINUOUS
Status: DISCONTINUED | OUTPATIENT
Start: 2023-05-03 | End: 2023-05-05

## 2023-05-03 RX ORDER — NITROGLYCERIN 20 MG/100ML
INJECTION INTRAVENOUS CODE/TRAUMA/SEDATION MEDICATION
Status: DISCONTINUED | OUTPATIENT
Start: 2023-05-03 | End: 2023-05-03 | Stop reason: HOSPADM

## 2023-05-03 RX ORDER — LIDOCAINE HYDROCHLORIDE 10 MG/ML
INJECTION, SOLUTION EPIDURAL; INFILTRATION; INTRACAUDAL; PERINEURAL CODE/TRAUMA/SEDATION MEDICATION
Status: DISCONTINUED | OUTPATIENT
Start: 2023-05-03 | End: 2023-05-03 | Stop reason: HOSPADM

## 2023-05-03 RX ORDER — ISOSORBIDE DINITRATE 10 MG/1
10 TABLET ORAL
Status: DISCONTINUED | OUTPATIENT
Start: 2023-05-03 | End: 2023-05-05

## 2023-05-03 RX ORDER — MIDAZOLAM HYDROCHLORIDE 2 MG/2ML
INJECTION, SOLUTION INTRAMUSCULAR; INTRAVENOUS CODE/TRAUMA/SEDATION MEDICATION
Status: DISCONTINUED | OUTPATIENT
Start: 2023-05-03 | End: 2023-05-03 | Stop reason: HOSPADM

## 2023-05-03 RX ORDER — FUROSEMIDE 10 MG/ML
80 INJECTION INTRAMUSCULAR; INTRAVENOUS
Status: DISCONTINUED | OUTPATIENT
Start: 2023-05-03 | End: 2023-05-03

## 2023-05-03 RX ORDER — FUROSEMIDE 10 MG/ML
INJECTION INTRAMUSCULAR; INTRAVENOUS CODE/TRAUMA/SEDATION MEDICATION
Status: DISCONTINUED | OUTPATIENT
Start: 2023-05-03 | End: 2023-05-03 | Stop reason: HOSPADM

## 2023-05-03 RX ORDER — VERAPAMIL HCL 2.5 MG/ML
AMPUL (ML) INTRAVENOUS CODE/TRAUMA/SEDATION MEDICATION
Status: DISCONTINUED | OUTPATIENT
Start: 2023-05-03 | End: 2023-05-03 | Stop reason: HOSPADM

## 2023-05-03 RX ORDER — ACETAMINOPHEN 325 MG/1
650 TABLET ORAL EVERY 6 HOURS PRN
Status: DISCONTINUED | OUTPATIENT
Start: 2023-05-03 | End: 2023-05-03 | Stop reason: SDUPTHER

## 2023-05-03 RX ORDER — FENTANYL CITRATE 50 UG/ML
INJECTION, SOLUTION INTRAMUSCULAR; INTRAVENOUS CODE/TRAUMA/SEDATION MEDICATION
Status: DISCONTINUED | OUTPATIENT
Start: 2023-05-03 | End: 2023-05-03 | Stop reason: HOSPADM

## 2023-05-03 RX ORDER — METOPROLOL SUCCINATE 25 MG/1
12.5 TABLET, EXTENDED RELEASE ORAL DAILY
Status: DISCONTINUED | OUTPATIENT
Start: 2023-05-03 | End: 2023-05-03

## 2023-05-03 RX ORDER — SODIUM CHLORIDE 9 MG/ML
50 INJECTION, SOLUTION INTRAVENOUS CONTINUOUS
Status: DISCONTINUED | OUTPATIENT
Start: 2023-05-03 | End: 2023-05-03

## 2023-05-03 RX ORDER — HYDRALAZINE HYDROCHLORIDE 10 MG/1
10 TABLET, FILM COATED ORAL EVERY 8 HOURS SCHEDULED
Status: DISCONTINUED | OUTPATIENT
Start: 2023-05-03 | End: 2023-05-05

## 2023-05-03 RX ORDER — FUROSEMIDE 10 MG/ML
40 INJECTION INTRAMUSCULAR; INTRAVENOUS ONCE
Status: DISCONTINUED | OUTPATIENT
Start: 2023-05-03 | End: 2023-05-03

## 2023-05-03 RX ADMIN — HEPARIN SODIUM 17.1 UNITS/KG/HR: 10000 INJECTION, SOLUTION INTRAVENOUS at 08:32

## 2023-05-03 RX ADMIN — FUROSEMIDE 80 MG: 10 INJECTION, SOLUTION INTRAMUSCULAR; INTRAVENOUS at 14:58

## 2023-05-03 RX ADMIN — SODIUM CHLORIDE 50 ML/HR: 0.9 INJECTION, SOLUTION INTRAVENOUS at 13:14

## 2023-05-03 RX ADMIN — INSULIN LISPRO 4 UNITS: 100 INJECTION, SOLUTION INTRAVENOUS; SUBCUTANEOUS at 17:19

## 2023-05-03 RX ADMIN — AMIODARONE HYDROCHLORIDE 200 MG: 200 TABLET ORAL at 17:17

## 2023-05-03 RX ADMIN — ISOSORBIDE DINITRATE 10 MG: 10 TABLET ORAL at 21:39

## 2023-05-03 RX ADMIN — HYDRALAZINE HYDROCHLORIDE 10 MG: 10 TABLET, FILM COATED ORAL at 17:17

## 2023-05-03 RX ADMIN — Medication 10 MG/HR: at 22:31

## 2023-05-03 RX ADMIN — FUROSEMIDE 120 MG: 10 INJECTION, SOLUTION INTRAMUSCULAR; INTRAVENOUS at 21:29

## 2023-05-03 RX ADMIN — ATORVASTATIN CALCIUM 80 MG: 80 TABLET, FILM COATED ORAL at 17:17

## 2023-05-03 RX ADMIN — HEPARIN SODIUM 19.1 UNITS/KG/HR: 10000 INJECTION, SOLUTION INTRAVENOUS at 20:36

## 2023-05-03 RX ADMIN — SENNOSIDES AND DOCUSATE SODIUM 1 TABLET: 8.6; 5 TABLET ORAL at 21:39

## 2023-05-03 RX ADMIN — INSULIN GLARGINE 10 UNITS: 100 INJECTION, SOLUTION SUBCUTANEOUS at 21:39

## 2023-05-03 RX ADMIN — ASPIRIN 81 MG 81 MG: 81 TABLET ORAL at 09:27

## 2023-05-03 NOTE — RESTORATIVE TECHNICIAN NOTE
Restorative Technician Note      Patient Name: Cesario Gonzalez     Restorative Tech Visit Date: 05/03/23  Note Type: Mobility  Patient Position Upon Consult: Other (Comment) (in the BR)  Activity Performed: Ambulated  Assistive Device: Other (Comment) (none)  Patient Position at End of Consult: All needs within reach;  Seated edge of bed

## 2023-05-03 NOTE — OCCUPATIONAL THERAPY NOTE
Occupational Therapy Evaluation     Patient Name: Jonathon Hanson  ZDXNT'F Date: 5/3/2023  Problem List  Principal Problem:    Acute on chronic heart failure ([facility] [address] )  Active Problems:    Essential (primary) hypertension    Coronary artery disease    Type 2 diabetes mellitus, with long-term current use of insulin ([facility] [address] )    Tobacco dependence syndrome    A-fib [facility])    Past Medical History  Past Medical History:   Diagnosis Date    Myocardial infarction [facility])      Past Surgical History  History reviewed  No pertinent surgical history  05/03/23 0846   OT Last Visit   OT Visit Date 05/03/23   Note Type   Note type Evaluation   Additional Comments Pt seen w/ PT to increase safety, decrease fall risk, and maximize functional/occupational performance 2* medical complexity which is a regression from pt's functional baseline  Pain Assessment   Pain Assessment Tool 0-10   Pain Score No Pain   Hospital Pain Intervention(s) Repositioned; Ambulation/increased activity; Emotional support   Restrictions/Precautions   Weight Bearing Precautions Per Order No   Other Precautions Multiple lines;Telemetry   Home Living   Type of Home House  (2  with 3 CONNIE)   Home Layout Two level;Bed/bath upstairs; Access   Bathroom Shower/Tub Tub/shower unit   Bathroom Toilet Standard   Bathroom Equipment Other (Comment)  (No DME)   Bathroom Accessibility Accessible   Home Equipment Cane   Additional Comments Pt reports living with his wife in a 2  with 3 CONNIE; reports using cane prn for functional mobility   Prior Function   Level of [place] Independent with ADLs; Independent with functional mobility; Independent with IADLS   Lives With Spouse   Receives Help From Family   IADLs Independent with driving; Independent with meal prep; Independent with medication management   Falls in the last 6 months 0   Vocational Retired   Comments (+) driving   Lifestyle   Autonomy PTA, pt was independent in ADLs/IADLs and functional mobility w/ cane prn; "(+) driving   Reciprocal Relationships Lives with his wife   Service to Others Retired; 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   General   Family/Caregiver Present Yes   Additional General Comments Pt's brother in law and mother in law were present @ beginning of session in which both were supportive and interactive w/ pt   Subjective   Subjective \"I don't have any pain  \"   ADL   Where Assessed Edge of bed   Eating Assistance 7  Independent   Grooming Assistance 7  Independent   UB Bathing Assistance 5  Supervision/Setup   LB Bathing Assistance 5  Supervision/Setup   UB Dressing Assistance 5  Supervision/Setup   LB Dressing Assistance 5  Supervision/Setup   Toileting Assistance  5  Supervision/Setup   Functional Assistance 5  Supervision/Setup   Bed Mobility   Supine to Sit 5  Supervision   Sit to Supine Unable to assess   Additional Comments When sitting EOB, pt was tachycardic in which RN present in room; at end of the session, pt left sitting upright in recliner with all functional needs in reach   Transfers   Sit to Stand 5  Supervision   Stand to Sit 5  Supervision   Additional Comments no DME   Functional Mobility   Functional Mobility 4  Minimal assistance  (CGA)   Additional Comments Pt completed few small steps from EOB <> recliner with CGA with no DME as pt's functional mobility was limited by tachycardia @ this time   Balance   Static Sitting Normal   Dynamic Sitting Good   Static Standing Fair +   Dynamic Standing Fair   Ambulatory Fair -   Activity Tolerance   Activity Tolerance Patient tolerated treatment well;Treatment limited secondary to medical complications (Comment)  (Tachycardia)   Medical Staff Made Aware Vernamelony Terrazas DPT   Nurse Made Aware RN cleared and present in room @ times   RUE Assessment   RUE Assessment WFL   LUE Assessment   LUE Assessment WFL   Hand Function   Gross Motor Coordination Functional   Fine Motor Coordination Functional " Psychosocial   Psychosocial (WDL) WDL   Cognition   Overall Cognitive Status WFL   Arousal/Participation Alert; Responsive;Arousable; Cooperative   Attention Within functional limits   Orientation Level Oriented X4   Memory Within functional limits   Following Commands Follows all commands and directions without difficulty   Comments Pt pleasant and cooperative   Assessment   Limitation Decreased ADL status; Decreased endurance;Decreased self-care trans;Decreased high-level ADLs   Prognosis Fair   Assessment Pt is a pleasant and cooperative 58 yo male who presented to [facility] from outpatient cardiology in which pt noted to be in A-fib with RVR on EKG with EKG changes in which pt instructed to present to ER for further evaluation  Pt dx w/ acute on chronic heart failure  Pt  has a past medical history of Myocardial infarction ([facility] [address] )  Pt with active OT orders in which OT consulted to assess pt's functional status and occupational performance to determine safe d/c needs  Pt seen with PT to increase safety, decrease fall risk, and maximize functional/occupational performance 2* medical complexity which is a regression from pt's functional baseline  Pt lives with his wife in a 2  with 3 CONNIE  PTA, pt was independent in ADLs/IADLs and functional mobility w/ cane prn  (+) driving  Currently, pt performing bed mobility w/ supervision, functional transfers w/ supervision and no DME, functional mobility w/ CGA and no DME, and UB/LB ADLs w/ supervision  Pt demonstrates the following limitations/impairments which impact the pt's ability to engage in valued occupations: endurance/activity tolerance, standing tolerance, and tachycardia  From an OT standpoint, recommend discharge to home once medically stable  The patient's raw score on the AM-PAC Daily Activity Inpatient Short Form is 21  A raw score of greater than or equal to 19 suggests the patient may benefit from discharge to home   Please refer to the recommendation of the Occupational Therapist for safe discharge planning  Pt would benefit from skilled OT services 1-2x/wk to address immediate acute care needs and underlying performance skills to promote safety, decrease fall risk, and enhance occupational performance to return to PLOF  Goals to be met within the next 10-14 days  Goals   Patient Goals To feel better   LTG Time Frame 10-14   Long Term Goal #1 See OT goals listed below   Plan   Treatment Interventions ADL retraining;Functional transfer training; Endurance training;Patient/family training;Equipment evaluation/education; Compensatory technique education;Continued evaluation; Energy conservation; Activityengagement   Goal Expiration Date 05/17/23   OT Frequency 1-2x/wk   Recommendation   OT Discharge Recommendation No rehabilitation needs   Equipment Recommended Other (comment)  (TBD however anticipate pt w/ require no DME upon d/c)   AM-PAC Daily Activity Inpatient   Lower Body Dressing 3   Bathing 3   Toileting 3   Upper Body Dressing 4   Grooming 4   Eating 4   Daily Activity Raw Score 21   Daily Activity Standardized Score (Calc for Raw Score >=11) 44 27   End of Consult   Education Provided Yes;Family or social support of family present for education by provider   Patient Position at End of Consult Bedside chair; All needs within reach   Nurse Communication Nurse aware of consult     OT GOALS:    Pt will improve functional mobility during ADL/IADL/leisure tasks with Mod I using AE/DME prn  Pt will improve activity tolerance/functional endurance during ADL/IADL/leisure tasks for at least 20 minutes to improve occupational performance and engagement in valued occupations using AE/DME prn  Pt will engage in ongoing functional/formal cognitive assessments to assist with safe d/c planning and increase safety during functional tasks      Pt will participate in simulated IADL management task w/ Mod I to increase independence and engagement in valued occupations w/ good balance/safety  Pt will improve dynamic standing balance for at least 15 minutes with Mod I during functional tasks to decrease fall risk and improve independence and engagement in ADL/IADL/leisure activities  Pt will follow 100% of multi-step commands in ADL/IADL/leisure activities to improve functional cognition used in functional daily routines  Pt will complete functional transfers on and off all surfaces used in daily routines with Mod I for safety to maximize functional/occupational performance  Pt will complete all bed mobility tasks with Mod I to serve as a prerequisite for EOB/OOB ADL/IADL/leisure tasks, optimize positioning/comfort, and increase functional independence  Pt will independently demonstrate good carryover of safety precautions and education/training during ADL/IADL/leisure tasks with energy conservation techniques s/p skilled instruction without verbal cues  Pt will complete UB ADL tasks with Mod I using AE/DME prn to increase functional independence in ADL/IADL/leisure tasks  Pt will complete LB ADL tasks with Mod I using AE/DME prn to increase functional independence in ADL/IADL/leisure tasks  Pt will complete toileting tasks with Mod I and good hygiene/thoroughness using AE/DME prn to increase functional independence  Pt will independently identify and utilize 2-3 positive coping strategies to enhance overall wellbeing and engagement in valued occupations      Cari Swanson, MS, OTR/L

## 2023-05-03 NOTE — UTILIZATION REVIEW
Initial Clinical Review    Admission: Date/Time/Statement:   Admission Orders (From admission, onward)     Ordered        05/02/23 1209  1 [facility],[address]  Once                      Orders Placed This Encounter   Procedures   • INPATIENT ADMISSION     Standing Status:   Standing     Number of Occurrences:   1     Order Specific Question:   Level of Care     Answer:   Level 2 Stepdown / HOT [14]     Order Specific Question:   Estimated length of stay     Answer:   More than 2 Midnights     Order Specific Question:   Certification     Answer:   I certify that inpatient services are medically necessary for this patient for a duration of greater than two midnights  See H&P and MD Progress Notes for additional information about the patient's course of treatment  ED Arrival Information     Expected   5/2/2023     Arrival   5/2/2023 09:01    Acuity   Emergent            Means of arrival   Ambulance    Escorted by   [facility]    Service   Hospitalist    Admission type   Emergency            Arrival complaint   Essential (primary) hypertension           Chief Complaint   Patient presents with   • Abnormal ECG     Pt seen for routine cardiology visit this morning, pt in rapid afib with new MI on ECG  Pt reports recent LINDSEY, increased lethargy, back pain  Pt has hx of past MI       Initial Presentation: 59 y o  male who presented by EMS to [address] [place] [address] ED  Inpatient admission for evaluation and treatment of new onset afib w/ RVR and CHF  PMHx: MI, CHF, CAD, HTN, T2DM (HgbA1c > 12), BPH  Presented after cardiology appt where he was noted to be in afib w/ RVR  Reports dyspnea for 2 weeks, worsening LE edema, abdominal distention  On exam, irregular tachycardic rhythm, rales,b/l LE edema  CXR showed R pleural effusion  Plan: IV Lasix 40 mg, echo, heparin gtt, SSI w/ accuchecks ACHS  Cardiology consulted: Pt appears clinically volume overloaded   EKG w/stuble ST changes w/ wide Q waves suggestive of possible old infarct  Echo w/ EF 10-15%  Plan: IV Lasix 40mg BID, low dose metoprolol to help w/ frequent PVCs  Will need ischemic eval w/ cardic cath, NPO after midnight, DW, I&O, telemetry  Date: 05/03/23   Day 2: Reports feeling improved from presentation  On exam, JVP @ 12 cm, irregular rhythm, b/l LE edema  AAC Stage C HFrEF  New Q-wave MI  Will proceed with cardiac catheterization to evaluate anatomy, although he does have non-viability of the mid-distal LAD territory  If he is found to have multivessel CAD with viability of other cardiac segments, eventual CABG could be considered  Telemetry showing sinus rhythm in 90s with frequent PVCs in couplets  Continue aggressive diuresis w/ IV lasix 80 mg BID, DW, I&O, Trend labs, replete electrolytes as needed   Continue ASA, statin, initiate metoprolol succinate 12 5 mg          ED Triage Vitals   Temperature Pulse Respirations Blood Pressure SpO2   05/02/23 0924 05/02/23 0924 05/02/23 0924 05/02/23 0924 05/02/23 0924   97 8 °F (36 6 °C) (!) 136 18 119/79 97 %      Temp Source Heart Rate Source Patient Position - Orthostatic VS BP Location FiO2 (%)   05/02/23 0924 05/02/23 0924 05/02/23 0924 05/02/23 0924 --   Oral Monitor Sitting Right arm       Pain Score       05/02/23 1206       No Pain          Wt Readings from Last 1 Encounters:   05/03/23 117 kg (257 lb 15 oz)     Additional Vital Signs:   Date/Time Temp Pulse Resp BP MAP (mmHg) SpO2 O2 Device   05/03/23 0717 97 6 °F (36 4 °C) -- -- -- -- -- --   05/03/23 0545 -- 84 18 118/62 81 97 % None (Room air)   05/03/23 0300 97 8 °F (36 6 °C) 83 18 93/52 66 97 % None (Room air)   05/03/23 0200 -- 84 18 93/60 72 98 % None (Room air)   05/03/23 0100 -- 98 20 90/51 64 Abnormal  95 % None (Room air)   05/03/23 0000 -- 84 20 87/56 Abnormal  67 99 % None (Room air)   05/02/23 2225 97 4 °F (36 3 °C) Abnormal  84 28 Abnormal  81/54 Abnormal  63 Abnormal  94 % None (Room air)   05/02/23 1851 97 8 °F (36 6 °C) 96 24 Abnormal  94/50 61 Abnormal  95 % None (Room air)   05/02/23 1528 97 7 °F (36 5 °C) 96 -- 120/69 88 97 % None (Room air)   05/02/23 1245 -- 84 29 Abnormal  110/68 82 97 % --   05/02/23 1206 -- 92 24 Abnormal  108/69 82 98 % None (Room air)   05/02/23 1205 -- -- -- 108/69 -- -- --   05/02/23 1105 -- 100 -- 119/79 -- -- --     Pertinent Labs/Diagnostic Test Results:   5/2 - EKG 1  Sinus tachycardia with frequent and consecutive Premature ventricular complexes  Biatrial enlargement  Low voltage QRS  Possible Inferior infarct , old (cited on or before 02-MAY-2023)  Possible Anterolateral infarct , old (cited on or before 02-MAY-2023)    5/2 - EKG 2  Sinus tachycardia with frequent Premature ventricular complexes in a pattern of bigeminy  Low voltage QRS  Inferior infarct (cited on or before 02-MAY-2023)  Anterolateral infarct (cited on or before 02-MAY-2023)    5/2 - EKG 3  Sinus rhythm with frequent Premature ventricular complexes in a pattern of bigeminy  Biatrial enlargement  Inferior infarct (cited on or before 02-MAY-2023)  Anterolateral infarct (cited on or before 02-MAY-2023)    5/2 - Echo  •  Left Ventricle: Left ventricular cavity size is mildly dilated  Wall thickness is mildly increased  The left ventricular ejection fraction is 10-15%  Systolic function is severely reduced  There is severe global hypokinesis, with akinesis of the apex with aneurysm formation  There is no thrombus  •  Right Ventricle: Right ventricular cavity size is normal  Systolic function is moderately to severely reduced  •  Mitral Valve: There is mild regurgitation  •  Tricuspid Valve: There is mild regurgitation  The right ventricular systolic pressure is mildly elevated  The estimated right ventricular systolic pressure is 70 86 mmHg  •  Prior TTE study available for comparison  Prior study date: 9/26/2017  Changes noted when compared to prior study  Changes include: LVEF is now severely reduced          CT chest wo contrast Final Result by Radha Bliss MD (05/03 4407)      Small to moderate right effusion with ascites may be due to congestion/heart failure      Mild groundglass is seen at the right lung base, nonspecific related to atelectasis or pneumonitis      Mild enlarged mediastinal lymph nodes may be reactive, consider follow-up at 3 months to demonstrate resolution/stability  The study was marked in EPIC for significant notification  Workstation performed: [id number]         XR chest pa & lateral   Final Result by Keena Price MD (05/02 1107)      Right base nodular density and right pleural effusion  Recommend CT  The study was marked in [facility] for immediate notification                          Results from last 7 days   Lab Units 05/02/23 2314 05/02/23  1010   WBC Thousand/uL 9 27 9 15   HEMOGLOBIN g/dL 14 9 14 9   HEMATOCRIT % 48 8 48 7   PLATELETS Thousands/uL 195 184   NEUTROS ABS Thousands/µL 6 40 7 10         Results from last 7 days   Lab Units 05/02/23  2314 05/02/23  1010   SODIUM mmol/L 131* 133*   POTASSIUM mmol/L 5 2 4 8   CHLORIDE mmol/L 104 105   CO2 mmol/L 23 25   ANION GAP mmol/L 4 3*   BUN mg/dL 46* 36*   CREATININE mg/dL 1 37* 0 97   EGFR ml/min/1 73sq m 54 83   CALCIUM mg/dL 8 7 9 0   MAGNESIUM mg/dL 2 5 2 2     Results from last 7 days   Lab Units 05/02/23  2314   AST U/L 25   ALT U/L 36   ALK PHOS U/L 41*   TOTAL PROTEIN g/dL 6 3*   ALBUMIN g/dL 3 1*   TOTAL BILIRUBIN mg/dL 0 39     Results from last 7 days   Lab Units 05/03/23  0614 05/02/23  2240 05/02/23  2050 05/02/23  1610 05/02/23  1429 05/02/23  1312   POC GLUCOSE mg/dl 155* 145* 184* 249* 171* 195*     Results from last 7 days   Lab Units 05/02/23 2314 05/02/23  1010   GLUCOSE RANDOM mg/dL 166* 276*         Results from last 7 days   Lab Units 05/02/23  2314   PH RODRIGUEZ  7 319   PCO2 RODRIGUEZ mm Hg 44 3   PO2 RODRIGUEZ mm Hg 27 3*   HCO3 RODRIGUEZ mmol/L 22 3*   BASE EXC RODRIGUEZ mmol/L -3 9   O2 CONTENT RODRIGUEZ ml/dL 9 7   O2 HGB, VENOUS % 44 0*             Results from last 7 days   Lab Units 05/02/23  1430 05/02/23  1315 05/02/23  1010   HS TNI 0HR ng/L  --   --  30   HS TNI 2HR ng/L  --  23  --    HSTNI D2 ng/L  --  -7  --    HS TNI 4HR ng/L 35  --   --    HSTNI D4 ng/L 5  --   --          Results from last 7 days   Lab Units 05/03/23  0029 05/02/23  1802 05/02/23  1010   PROTIME seconds  --   --  14 7*   INR   --   --  1 13   PTT seconds 50* 37 28         Results from last 7 days   Lab Units 05/02/23  2314 05/02/23  1010   PROCALCITONIN ng/ml 0 26* 0 16     Results from last 7 days   Lab Units 05/02/23  2314   LACTIC ACID mmol/L 1 9             Results from last 7 days   Lab Units 05/02/23  1010   NT-PRO BNP pg/mL 5,942*         ED Treatment:   Medication Administration from 05/02/2023 0834 to 05/02/2023 1454       Date/Time Order Dose Route Action     05/02/2023 1006 EDT metoprolol (LOPRESSOR) injection 5 mg 5 mg Intravenous Given     05/02/2023 1005 EDT aspirin tablet 325 mg 325 mg Oral Given     05/02/2023 1151 EDT heparin (porcine) injection 4,000 Units 4,000 Units Intravenous Given     05/02/2023 1151 EDT heparin (porcine) 25,000 units in 0 45% NaCl 250 mL infusion (premix) 11 1 Units/kg/hr Intravenous New Bag     05/02/2023 1200 EDT perflutren lipid microsphere (DEFINITY) injection 0 8 mL/min Intravenous Given     05/02/2023 1302 EDT magnesium sulfate 2 g/50 mL IVPB (premix) 2 g 2 g Intravenous New Bag     05/02/2023 1302 EDT furosemide (LASIX) injection 40 mg 40 mg Intravenous Given        Past Medical History:   Diagnosis Date   • [facility]      Present on Admission:  • Acute on chronic heart failure (HCC)  • Coronary artery disease  • Tobacco dependence syndrome  • A-fib ([facility])      Admitting Diagnosis: A-fib ([facility] [address] ) [I48 91]  Essential (primary) hypertension [I10]  Elevated troponin [R77 8]  Bilateral lower extremity edema [R60 0]  Age/Sex: 59 y o  male  Admission Orders:  NPO  -- Cardiac Consistent Carbohydrate Diet   Accu-checks ACHS  Telemetry  DW  I&O  SCDs  Scheduled Medications:  aspirin, 81 mg, Oral, Daily  atorvastatin, 80 mg, Oral, After Dinner  furosemide, 40 mg, Intravenous, Once  furosemide, 80 mg, Intravenous, BID (diuretic)  insulin glargine, 10 Units, Subcutaneous, HS  insulin lispro, 2-12 Units, Subcutaneous, TID AC  nicotine, 1 patch, Transdermal, Daily  senna-docusate sodium, 1 tablet, Oral, HS    Continuous IV Infusions:  heparin (porcine), 3-20 Units/kg/hr (Order-Specific), Intravenous, Titrated    PRN Meds:  albumin human 25 %, 25 g, Intravenous; 5/2 x1  hydrALAZINE, 5 mg, Intravenous, Q6H PRN  metoprolol tartrate, 12 5 mg, Oral; 5/2 x1  nitroglycerin, 0 4 mg, Sublingual, Q5 Min PRN  ondansetron, 4 mg, Intravenous, Q6H PRN        IP CONSULT TO CARDIOLOGY  IP CONSULT TO CASE MANAGEMENT    Network Utilization Review Department  ATTENTION: Please call with any questions or concerns to [phone] and carefully listen to the prompts so that you are directed to the right person  All voicemails are confidential   [place] all requests for admission clinical reviews, approved or denied determinations and any other requests to dedicated fax number below belonging to the campus where the patient is receiving treatment  List of dedicated fax numbers for the Facilities:  [address] DENIALS (Administrative/Medical Necessity) [phone]   [address]  (Maternity/NICU/Pediatrics) [phone]   [facility] Josefina Plunkett [phone]   [facility] [phone]   [facility] [place] [address] Medical [place] [address] Soph[place] [address] [phone]     [address] [phone]

## 2023-05-03 NOTE — CASE MANAGEMENT
Case Management Discharge Planning Note    Patient name Heri Mahoney  Location [facility] 411/[facility] 310-41 MRN [id number]  : 1960 Date 5/3/2023       Current Admission Date: 2023  Current Admission Diagnosis:Ischemic cardiomyopathy   Patient Active Problem List    Diagnosis Date Noted   • Ischemic cardiomyopathy 2023   • LINDEN (acute kidney injury) ([facility] ) 2023   • Essential (primary) hypertension 2023   • Acute HFrEF 2023   • A-fib ([facility] ) 2023   • Benign prostatic hyperplasia without lower urinary tract symptoms 2018   • 3-vessel CAD 2015   • Type 2 diabetes mellitus, with long-term current use of insulin ([place] [address] ) 2015   • GERD (gastroesophageal reflux disease) 2013   • Tobacco dependence syndrome 2012      LOS (days): 1  Geometric Mean LOS (GMLOS) (days): 3 90  Days to GMLOS:2 7     OBJECTIVE:  Risk of Unplanned Readmission Score: 11 07         Current admission status: Inpatient   Preferred Pharmacy:   [address] [address]  Phone: [phone] Fax: [phone]    Primary Care Provider: Harmeet Ferrell DO    Primary Insurance: [facility]  Secondary Insurance:     DISCHARGE DETAILS:    CM called pt room and wife to complete assessment, no answer

## 2023-05-03 NOTE — CONSULTS
Consultation - Electrophysiology Cardiology (EP)   Andre Mark 59 y o  male MRN: [id number]  Unit/Bed#: [facility] 411-01 Encounter: [id number]            Inpatient consult to Electrophysiology     Performed by  Aatri oSlano MD     Authorized by Thomas Donahue MD            PCP: Caro Hughes DO  Outpatient Cardiologist: Shelley Ramires    Assessment/Plan   Andre Mark is a 59y o  year old male past medical history of coronary artery disease with left heart catheterization in 2015 nonobstructive disease of the mid circumflex treated with a balloon and stenting and residual nonobstructive disease in the LAD, that is presenting with 2 weeks of heart failure symptoms including shortness of breath, leg swelling and 20 pound weight gain  He was found to have new worsen cardiomyopathy and  of the LAD and PDA from the left dominant system  Ventricular Tachycardia RBBB with early transition and mostly negative in the precordial lead, negative in the inferior leads, mostly negative in the lateral leads  Tachycardia concerning VT from posterior LV apex area  This appears to be all scar mediated  -worsening kidney function, cardiomyopathy, and structural heart disease make amio the only real option at this time   -would start BB when the patient is more compensated   -start oral amio load 200 mg TID     Cardiomyopathy HFrEF 15% NYHA III/C: ischemic etiology with significant scar related  of the LAD:  -GDMT and diuretics per heart failure team   -would start BB when able   -amio as above for VT and PVC burden   -I do not foresee the patient's cardiomyopathy significantly improving and I would plan for a dual chamber ICD this admission before discharge     Case discussed and reviewed with Dr Kaylynn Steward pending attending addendum  Thank you for involving us in the care of your patient        Aarti Solano MD  Cardiology Fellow PGY-5    ==========================================================================================    History of Present Illness   Physician Requesting Consult: Roberto Wren  Reason for Consult / Principal Problem: VT    HPI: Jayla Montalvo is a 59y o  year old male past medical history of coronary artery disease with left heart catheterization in 2015 nonobstructive disease of the mid circumflex treated with a balloon and stenting and residual nonobstructive disease in the LAD, that is presenting with 2 weeks of heart failure symptoms including shortness of breath, leg swelling and 20 pound weight gain  He was found to have new worsen cardiomyopathy and  of the LAD and PDA from the left dominant system  At home he was not having any chest pain symptoms for years and only most recently had heart failure symptoms two weeks ago  Unfortunately he continues to use tobacco products  Here in the hospital he is very decompensated and is being started on IV diuresis per the heart failure  He is still having bigeminy of the PVCS from the scar  Currently Denies fevers/chills, nausea/vomiting, abdominal pain, diarrhea, cough, chest pain, shortness of breath, PND, orthopnea, presyncopal symptoms, syncopal episodes    Electrophysiology is consulted for antiarrhythmic therapy and question for ICD device  TELEMETRY: personally reviewed by myself Sherren Banister, MD sinus with bigeminy with similar PVC morphology of the apex    ECG:        ER ECGS:            DEVICE INTERROGATION      HOLTER  No results found for this or any previous visit  Review of Systems  ROS as noted above in HPI  Historical Information   Past Medical History:   Diagnosis Date   • Myocardial infarction [facility])      History reviewed  No pertinent surgical history    Social History     Substance and Sexual Activity   Alcohol Use None     Social History     Substance and Sexual Activity   Drug Use Not on file     Social History Tobacco Use   Smoking Status Every Day   • Packs/day: 1 00   • Types: Cigarettes   Smokeless Tobacco Never     Family History: non-contributory    Meds/Allergies   Hospital Medications:   Current Facility-Administered Medications   Medication Dose Route Frequency   • acetaminophen (TYLENOL) tablet 650 mg  650 mg Oral Q6H PRN   • aspirin chewable tablet 81 mg  81 mg Oral Daily   • atorvastatin (LIPITOR) tablet 80 mg  80 mg Oral After Dinner   • furosemide (LASIX) injection 40 mg  40 mg Intravenous Once   • furosemide (LASIX) injection 80 mg  80 mg Intravenous BID (diuretic)   • heparin (porcine) 25,000 units in 0 45% NaCl 250 mL infusion (premix)  3-20 Units/kg/hr (Order-Specific) Intravenous Titrated   • hydrALAZINE (APRESOLINE) injection 5 mg  5 mg Intravenous Q6H PRN   • insulin glargine (LANTUS) subcutaneous injection 10 Units 0 1 mL  10 Units Subcutaneous HS   • insulin lispro (HumaLOG) 100 units/mL subcutaneous injection 2-12 Units  2-12 Units Subcutaneous TID AC   • metoprolol succinate (TOPROL-XL) 24 hr tablet 12 5 mg  12 5 mg Oral Daily   • nicotine (NICODERM CQ) 14 mg/24hr TD 24 hr patch 1 patch  1 patch Transdermal Daily   • nitroglycerin (NITROSTAT) SL tablet 0 4 mg  0 4 mg Sublingual Q5 Min PRN   • ondansetron (ZOFRAN) injection 4 mg  4 mg Intravenous Q6H PRN   • senna-docusate sodium (SENOKOT S) 8 6-50 mg per tablet 1 tablet  1 tablet Oral HS   • sodium chloride 0 9 % infusion  50 mL/hr Intravenous Continuous     Home Medications:   Medications Prior to Admission   Medication   • aspirin 81 MG tablet   • atorvastatin (LIPITOR) 40 mg tablet   • glimepiride (AMARYL) 4 mg tablet   • JANUMET -1000 MG TB24   • lisinopril (ZESTRIL) 10 mg tablet   • metFORMIN (GLUCOPHAGE) 1000 MG tablet       No Known Allergies    Objective   Vitals: Blood pressure 118/62, pulse 84, temperature 97 6 °F (36 4 °C), temperature source Oral, resp   rate 18, height 6' (1 829 m), weight 117 kg (257 lb 15 oz), SpO2 98 %   Orthostatic Blood Pressures    Flowsheet Row Most Recent Value   Blood Pressure 118/62 filed at 05/03/2023 0545   Patient Position - Orthostatic VS Sitting filed at 05/03/2023 0545            Intake/Output Summary (Last 24 hours) at 5/3/2023 1400  Last data filed at 5/3/2023 1202  Gross per 24 hour   Intake 330 ml   Output 250 ml   Net 80 ml       Invasive Devices     Peripheral Intravenous Line  Duration           Peripheral IV 05/02/23 Dorsal (posterior); Left Hand 1 day    Peripheral IV 05/02/23 Right Antecubital 1 day                Physical Exam    GEN: Mohan Drake appears well, alert and oriented x 3, pleasant and cooperative   HEENT:  Normocephalic, atraumatic, anicteric, moist mucous membranes  NECK: No carotid bruits   HEART: JVP 18 with AJR, regular rhythm, regular rate, normal S1 and S2, no murmurs, clicks, gallops or rubs   LUNGS: Clear to auscultation bilaterally; no wheezes, rales, or rhonchi; respiration nonlabored   ABDOMEN:  Normoactive bowel sounds, soft, no tenderness, no distention  EXTREMITIES: peripheral pulses palpable; +3 pitting edema and cool distal extremities   NEURO: no gross focal findings; cranial nerves grossly intact   SKIN:  Dry, intact, warm to touch    Lab Results:   I have personally reviewed pertinent lab results      CBC with diff:   Results from last 7 days   Lab Units 05/02/23  2314   WBC Thousand/uL 9 27   RBC Million/uL 5 54   HEMOGLOBIN g/dL 14 9   HEMATOCRIT % 48 8   MCV fL 88   MCH pg 26 9   MCHC g/dL 30 5*   RDW % 13 5   MPV fL 12 3   PLATELETS Thousands/uL 195     CMP:   Results from last 7 days   Lab Units 05/03/23  0831   SODIUM mmol/L 132*   CHLORIDE mmol/L 104   CO2 mmol/L 21   BUN mg/dL 51*   CREATININE mg/dL 1 49*   CALCIUM mg/dL 8 5   AST U/L 53*   ALT U/L 49   ALK PHOS U/L 43*   EGFR ml/min/1 73sq m 49     HS Troponin:   0   Lab Value Date/Time    HSTNI0 30 05/02/2023 1010    HSTNI2 23 05/02/2023 1315    HSTNI4 35 05/02/2023 1430     BNP:   Results from last 7 days   Lab Units 05/03/23  0831   POTASSIUM mmol/L 5 4*   CHLORIDE mmol/L 104   CO2 mmol/L 21   BUN mg/dL 51*   CREATININE mg/dL 1 49*   CALCIUM mg/dL 8 5   EGFR ml/min/1 73sq m 49     Coags:   Results from last 7 days   Lab Units 05/03/23  0831 05/02/23  1802 05/02/23  1010   PTT seconds 56*   < > 28   INR   --   --  1 13    < > = values in this interval not displayed  TSH:     Magnesium:   Results from last 7 days   Lab Units 05/02/23  2314   MAGNESIUM mg/dL 2 5     Lipid Profile:     Results from last 7 days   Lab Units 05/02/23  1430 05/02/23  1315 05/02/23  1010   HS TNI 0HR ng/L  --   --  30   HS TNI 2HR ng/L  --  23  --    HS TNI 4HR ng/L 35  --   --      Results from last 7 days   Lab Units 05/02/23  1010   NT-PRO BNP pg/mL 5,942*     Results from last 7 days   Lab Units 05/03/23  0831 05/02/23 2314 05/02/23  1010   POTASSIUM mmol/L 5 4* 5 2 4 8   CO2 mmol/L 21 23 25   CHLORIDE mmol/L 104 104 105   BUN mg/dL 51* 46* 36*   CREATININE mg/dL 1 49* 1 37* 0 97     Results from last 7 days   Lab Units 05/02/23  2314 05/02/23  1010   HEMOGLOBIN g/dL 14 9 14 9   HEMATOCRIT % 48 8 48 7   PLATELETS Thousands/uL 195 184         Results from last 7 days   Lab Units 05/03/23  0831 05/03/23  0029 05/02/23 1802 05/02/23  1010   INR   --   --   --  1 13   PTT seconds 56* 50* 37 28       Imaging: I have personally reviewed pertinent reports  Previous STRESS TEST:  No results found for this or any previous visit  No results found for this or any previous visit  No results found for this or any previous visit  Previous Cath/PCI:  No results found for this or any previous visit  No results found for this or any previous visit  No results found for this or any previous visit        ECHO:  Results for orders placed during the hospital encounter of 09/26/17    Echo complete with contrast if indicated    Narrative  [address]  [place], [place] [address]  [phone]    Transthoracic Echocardiogram  2D, M-mode, Doppler, and Color Doppler    Study date:  26-Sep-2017    Patient: Kerri Sharpe  MR number: [id number]  Account number: [de-identified]  : 1960  Age: 65 years  Gender: Male  Status: Outpatient  Location: [address] [place] [address] [facility] [place]  Height: 72 in  Weight: 263 3 lb  BP: 142/ 88 mmHg    Indications: CAD    Diagnoses: I25 10 - Atherosclerotic heart disease of native coronary artery without angina pectoris    Sonographer:  OLIVE Herzog  Primary Physician:  Candace Riley MD  Referring Physician:  Divya Armendariz MD  Group:  [facility]  Interpreting Physician:  Chris Murphy MD    SUMMARY    LEFT VENTRICLE:  Size was normal   Systolic function was normal  Ejection fraction was estimated to be 65 %  There was mild concentric hypertrophy  Doppler parameters were consistent with abnormal left ventricular relaxation (grade 1 diastolic dysfunction)  RIGHT VENTRICLE:  The size was normal   Systolic function was normal     TRICUSPID VALVE:  There was trace regurgitation  HISTORY: PRIOR HISTORY: Hypertension, CAD s/p PCI, smoker, diabetes, high cholesterol    PROCEDURE: The study was performed in the [address] [facility] [place]  This was a routine study  The transthoracic approach was used  The study included complete 2D imaging, M-mode, complete spectral Doppler, and color Doppler  The  heart rate was 112 bpm, at the start of the study  Images were obtained from the parasternal, apical, subcostal, and suprasternal notch acoustic windows  Echocardiographic views were limited due to decreased penetration  This was a  technically difficult study  LEFT VENTRICLE: Size was normal  Systolic function was normal  Ejection fraction was estimated to be 65 %  There were no regional wall motion abnormalities  Wall thickness was mildly increased  There was mild concentric hypertrophy    DOPPLER: Doppler parameters were consistent with abnormal left ventricular relaxation (grade 1 diastolic dysfunction)  RIGHT VENTRICLE: The size was normal  Systolic function was normal  Wall thickness was normal     LEFT ATRIUM: Size was normal     RIGHT ATRIUM: Size was normal     MITRAL VALVE: Valve structure was normal  There was normal leaflet separation  DOPPLER: The transmitral velocity was within the normal range  There was no evidence for stenosis  There was no regurgitation  AORTIC VALVE: The valve was trileaflet  Leaflets exhibited normal thickness and normal cuspal separation  DOPPLER: Transaortic velocity was within the normal range  There was no evidence for stenosis  There was no regurgitation  TRICUSPID VALVE: The valve structure was normal  There was normal leaflet separation  DOPPLER: The transtricuspid velocity was within the normal range  There was no evidence for stenosis  There was trace regurgitation  The tricuspid jet  envelope definition was inadequate for estimation of RV systolic pressure  There are no indirect findings suggestive of moderate or severe pulmonary hypertension  PULMONIC VALVE: Leaflets exhibited normal thickness, no calcification, and normal cuspal separation  DOPPLER: The transpulmonic velocity was within the normal range  There was no regurgitation  PERICARDIUM: There was no pericardial effusion  The pericardium was normal in appearance  AORTA: The root exhibited normal size  SYSTEMIC VEINS: IVC: The inferior vena cava was normal in size and course   Respirophasic changes were normal     SYSTEM MEASUREMENT TABLES    2D  %FS: 47 6 %  AV Diam: 3 34 cm  EDV(Teich): 101 71 ml  EF Biplane: 67 76 %  EF(Cube): 85 61 %  EF(Teich): 79 %  ESV(Cube): 14 82 ml  ESV(Teich): 21 36 ml  IVSd: 1 58 cm  LA Area: 12 78 cm2  LA Diam: 3 37 cm  LVEDV MOD A2C: 49 73 ml  LVEDV MOD A4C: 61 78 ml  LVEDV MOD BP: 58 1 ml  LVEF MOD A2C: 65 64 %  LVEF MOD A4C: 71 2 %  LVESV MOD A2C: 17 09 ml  LVESV MOD A4C: 17 79 ml  LVESV MOD BP: 18 73 ml  LVIDd: 4 69 cm  LVIDs: 2 46 cm  LVLd A2C: 6 97 cm  LVLd A4C: 7 74 cm  LVLs A2C: 5 23 cm  LVLs A4C: 6 13 cm  LVPWd: 1 23 cm  RA Area: 14 52 cm2  RV Diam : 2 86 cm  SI(Cube): 36 73 ml/m2  SI(Teich): 33 48 ml/m2  SV MOD A2C: 32 64 ml  SV MOD A4C: 43 98 ml  SV(Cube): 88 15 ml  SV(Teich): 80 35 ml    MM  TAPSE: 3 26 cm    PW  E': 0 08 m/s    [facility]    Prepared and electronically signed by    German Madera MD  Signed 26-Sep-2017 12:55:39    Results for orders placed during the hospital encounter of 05/02/23    Echo complete w/ contrast if indicated    Interpretation Summary  •  Left Ventricle: Left ventricular cavity size is mildly dilated  Wall thickness is mildly increased  The left ventricular ejection fraction is 10-15%  Systolic function is severely reduced  There is severe global hypokinesis, with akinesis of the apex with aneurysm formation  There is no thrombus  •  Right Ventricle: Right ventricular cavity size is normal  Systolic function is moderately to severely reduced  •  Mitral Valve: There is mild regurgitation  •  Tricuspid Valve: There is mild regurgitation  The right ventricular systolic pressure is mildly elevated  The estimated right ventricular systolic pressure is 92 38 mmHg  •  Prior TTE study available for comparison  Prior study date: 9/26/2017  Changes noted when compared to prior study  Changes include: LVEF is now severely reduced         GILL:  No results found for this or any previous visit  No results found for this or any previous visit  CMR:  No results found for this or any previous visit  No results found for this or any previous visit  No results found for this or any previous visit  HOLTER  No results found for this or any previous visit  No results found for this or any previous visit          Anticoagulation VTE Prophylaxis: AC per primary       Counseling / Coordination of Care  Total floor / unit time spent today 45 minutes minutes  Greater than 50% of total time was spent with the patient and / or family counseling and / or coordination of care  A description of the counseling / coordination of care:          Epic/ Allscripts/Care Everywhere records reviewed:     ** Please Note: Fluency DirectDictation voice to text software may have been used in the creation of this document   **

## 2023-05-03 NOTE — PROGRESS NOTES
Progress Note - Acute Heart Failure  Nolan Gosselin 59 y o  male MRN: [id number]  Unit/Bed#: [facility] 411-01 Encounter: [id number]    Assessment:    Acute decompensated heart failure with reduced EF (ACC Stage C) likely due to ischemic cardiomyopathy    New Q-wave MI, likely representing late-presenting LAD-territory infarction    LV apical aneurysm    Scar-mediated sustained VT, questionable atrial fibrillation/flutter    History of CAD with mid-circumflex artery stenting (2015)    Current tobacco use    Hyperlipidemia    Hypervolemic hyponatremia due to heart failure    Right pleural effusion     LINDEN     Diabetes Mellitus    59year old male with known CAD with LHC in 2015 showing obstructive mid-LCx disease treated with PTCA/stening and residual non-obstructive disease in the LAD, now presented with 1-2 weeks of shortness of breath, leg swelling, and >20 lb weight gain in 1 week  He was suspected during a cardiology visit to be in rapid atrial fibrillation, and sent to the ED  He converted to sinus rhythm shortly following IV beta blocker administration  ECG in office demonstrated wide-complex tachycardia at ~150 bpm with evidence of fusion beats which was sustained over 30 seconds  ECG in sinus rhythm with ventricular bigeminy (similar PVC morphology to [facility] seen above with unusually wide, slurred RBBB pattern and + in aVR) also showing inferior and anterolateral Q waves suggestive of an age-indeterminate myocardial infarction  ECGs above are most consistent with scar-mediated VT involving the LV apex (RBBB morphology with QS complexes in V4-6  Echocardiogram revealed global severely reduced LV EF 15% (compared to 65% in 2017) with akinesis at the apex with aneurysmal apical-anterior segment  No LV thrombus was identified, but significant swirling of contrast noted at apex  There was no significant MR or AS, with normal RV function   LA/RA sizes were normal, arguing against significant Afib chronicity  TSH was within normal limits, and serial troponins have been normal, further suggesting remote myocardial infarction  BNP elevated  Lactic acid normal  Peripheral VBG noted with SVO2 of 44 with Hb 15  Telemetry showing sinus rhythm in 90s with frequent PVCs in couplets  Warm and wet on examination  Plan: Will proceed with cardiac catheterization to evaluate anatomy, although he does have non-viability of the mid-distal LAD territory  If he is found to have multivessel CAD with viability of other cardiac segments, eventual CABG could be considered  Continue aggressive diuresis with Furosemide 80mg BID with monitoring of daily weights / I/O / and renal function  Maintain K 4-5 and Mg > 2  Continue with aspirin and high-intensity statin  Will initiate metoprolol succinate 12 5mg QD    Will need EP evaluation for ICD placement this admission  Subjective/Objective   Subjective: Seated upright in chair with no complaints  Feels significantly better than yesterday  Has been walking to the bathroom and back without any shortness of breath or chest pain  Objective:    Vitals: /62 (BP Location: Left arm)   Pulse 84   Temp 97 6 °F (36 4 °C) (Oral)   Resp 18   Ht 6' (1 829 m)   Wt 117 kg (257 lb 15 oz)   SpO2 97%   BMI 34 98 kg/m²   Vitals:    05/02/23 1105 05/03/23 0600   Weight: 115 kg (254 lb) 117 kg (257 lb 15 oz)     Orthostatic Blood Pressures    Flowsheet Row Most Recent Value   Blood Pressure 118/62 filed at 05/03/2023 0545   Patient Position - Orthostatic VS Sitting filed at 05/03/2023 0545            Intake/Output Summary (Last 24 hours) at 5/3/2023 0935  Last data filed at 5/3/2023 0717  Gross per 24 hour   Intake 330 ml   Output 250 ml   Net 80 ml       Invasive Devices     Peripheral Intravenous Line  Duration           Peripheral IV 05/02/23 Dorsal (posterior); Left Hand <1 day    Peripheral IV 05/02/23 Right Antecubital <1 day              Physical Exam  Constitutional:       Appearance: Normal appearance  HENT:      Mouth/Throat:      Mouth: Mucous membranes are moist    Neck:      Comments: JVP assessed at 12cm  Cardiovascular:      Rate and Rhythm: Normal rate  Rhythm irregular  Pulses: Normal pulses  Musculoskeletal:      Right lower leg: Edema present  Left lower leg: Edema present  Neurological:      Mental Status: He is alert

## 2023-05-03 NOTE — ASSESSMENT & PLAN NOTE
· Likely mild LINDEN in the setting of hypervolemia vs medication side effect from lisinopril  · Optimize volume status and trend BMP  · Consider Nephro consult if renal function not improving in AM

## 2023-05-03 NOTE — PROGRESS NOTES
[address] [facility]  Progress Note  Name: Frank Duenas  MRN: [id number]  Unit/Bed#: PPHP 948-72 I Date of Admission: 5/2/2023   Date of Service: 5/3/2023 I Hospital Day: 1    Assessment/Plan   LINDEN (acute kidney injury) ([facility] )  Assessment & Plan  · Likely mild LINDEN in the setting of hypervolemia vs medication side effect from lisinopril  · Optimize volume status and trend BMP  · Consider Nephro consult if renal function not improving in AM    A-fib [facility])  Assessment & Plan  · At cardio appointment outpt when he was noted to be in A-fib RVR on EKG, also with Q waves concerning for prior infarct, in the setting of chest pressure last week concerning for MI  · Heparin drip pending ischemic evaluation  · Noted reduced LVEF of 10 to 15%, concern for ischemia versus tachycardia mediated cardiomyopathy?       Acute HFrEF  Assessment & Plan  Wt Readings from Last 3 Encounters:   05/02/23 115 kg (254 lb)   05/02/23 116 kg (254 lb 12 8 oz)   04/01/22 102 kg (224 lb 12 8 oz)   · Patient presenting with 2 weeks of dyspnea on exertion, lower extremity edema, abd distention, weight gain  · Currently comfortable at rest, believes he will be able to lie flat for a cath  · Was on Lasix 40 mg oral provided by his PCP but stopped because it was not helping  · Echo read with LVEF 10-15%      Tobacco dependence syndrome  Assessment & Plan  Patient with over 40  pack-year history  Counseling provided on cessation, nicotine patch ordered    Type 2 diabetes mellitus, with long-term current use of insulin ([place] [id number] )  Assessment & Plan  Lab Results   Component Value Date    HGBA1C 12 0 (H) 04/14/2023       Recent Labs     05/02/23  1312   POCGLU 195*       Blood Sugar Average: Last 72 hrs:  (P) 195Will hold oral medications and continue on insulin with sliding scale    3-vessel CAD  Assessment & Plan  Patient with history of CAD with drug-eluting stent to LCx in 2015  Patient reports dyspnea on exertion over the past 2 weeks, denies any chest pain at rest, is a diabetic on insulin, is an active smoker of 40 pack years, has prior history of CAD, is on daily aspirin  Patient warrants ischemic evaluation, also noted EKG changes as well as drop in ejection fraction on echo    Essential (primary) hypertension  Assessment & Plan  Patient with history of hypertension, will monitor while inpatient we will hold home lisinopril 10 mg pending cath for ischemic evaluation  Use hydralazine as needed for now             VTE Pharmacologic Prophylaxis: VTE Score: 5 Moderate Risk (Score 3-4) - Pharmacological DVT Prophylaxis Ordered: heparin drip  Patient Centered Rounds: I performed bedside rounds with nursing staff today  Discussions with Specialists or Other Care Team Provider: N/A    Education and Discussions with Family / Patient: Updated  (wife) at bedside  Total Time Spent on Date of Encounter in care of patient: 55 minutes This time was spent on one or more of the following: performing physical exam; counseling and coordination of care; obtaining or reviewing history; documenting in the medical record; reviewing/ordering tests, medications or procedures; communicating with other healthcare professionals and discussing with patient's family/caregivers  Current Length of Stay: 1 day(s)  Current Patient Status: Inpatient   Certification Statement: The patient will continue to require additional inpatient hospital stay due to Heart Cath  Discharge Plan: Anticipate discharge in 48-72 hrs to discharge location to be determined pending rehab evaluations  Code Status: Level 1 - Full Code    Subjective:   Seen and examined at bedside    Kate Shea states he is feeling well and breathing a little better since coming to the hospital   He thinks he will be a little short of breath if he were to lay flat but does think to be able to tolerate a cardiac catheterization procedure    Objective:     Vitals:   Temp (24hrs), Av 7 °F (36 5 °C), Min:97 4 °F (36 3 °C), Max:97 8 °F (36 6 °C)    Temp:  [97 4 °F (36 3 °C)-97 8 °F (36 6 °C)] 97 6 °F (36 4 °C)  HR:  [83-98] 84  Resp:  [18-28] 18  BP: ()/(50-69) 118/62  SpO2:  [94 %-99 %] 98 %  Body mass index is 34 98 kg/m²  Input and Output Summary (last 24 hours): Intake/Output Summary (Last 24 hours) at 5/3/2023 1456  Last data filed at 5/3/2023 1202  Gross per 24 hour   Intake 280 ml   Output 250 ml   Net 30 ml       Physical Exam:   Physical Exam  Vitals and nursing note reviewed  Constitutional:       General: He is not in acute distress  Appearance: He is well-developed  He is not toxic-appearing or diaphoretic  HENT:      Head: Normocephalic and atraumatic  Mouth/Throat:      Mouth: Mucous membranes are moist    Eyes:      General: No scleral icterus  Extraocular Movements: Extraocular movements intact  Conjunctiva/sclera: Conjunctivae normal    Cardiovascular:      Rate and Rhythm: Normal rate and regular rhythm  Pulses: Normal pulses  Heart sounds: No murmur heard  No friction rub  No gallop  Pulmonary:      Effort: Pulmonary effort is normal  No respiratory distress  Breath sounds: Normal breath sounds  No wheezing, rhonchi or rales  Abdominal:      General: Abdomen is flat  Bowel sounds are normal  There is no distension  Palpations: Abdomen is soft  There is no mass  Tenderness: There is no abdominal tenderness  There is no guarding  Musculoskeletal:         General: No swelling  Cervical back: Neck supple  Right lower leg: Edema present  Left lower leg: Edema present  Lymphadenopathy:      Cervical: No cervical adenopathy  Skin:     General: Skin is warm and dry  Capillary Refill: Capillary refill takes less than 2 seconds  Coloration: Skin is not jaundiced  Findings: No rash  Neurological:      General: No focal deficit present        Mental Status: He is alert and oriented to person, place, and time  Sensory: No sensory deficit  Motor: No weakness  Psychiatric:         Mood and Affect: Mood normal           Additional Data:     Labs:  Results from last 7 days   Lab Units 05/02/23  2314   WBC Thousand/uL 9 27   HEMOGLOBIN g/dL 14 9   HEMATOCRIT % 48 8   PLATELETS Thousands/uL 195   NEUTROS PCT % 69   LYMPHS PCT % 23   MONOS PCT % 7   EOS PCT % 1     Results from last 7 days   Lab Units 05/03/23  0831   SODIUM mmol/L 132*   POTASSIUM mmol/L 5 4*   CHLORIDE mmol/L 104   CO2 mmol/L 21   BUN mg/dL 51*   CREATININE mg/dL 1 49*   ANION GAP mmol/L 7   CALCIUM mg/dL 8 5   ALBUMIN g/dL 3 4*   TOTAL BILIRUBIN mg/dL 0 56   ALK PHOS U/L 43*   ALT U/L 49   AST U/L 53*   GLUCOSE RANDOM mg/dL 179*     Results from last 7 days   Lab Units 05/02/23  1010   INR  1 13     Results from last 7 days   Lab Units 05/03/23  1050 05/03/23  0614 05/02/23  2240 05/02/23  2050 05/02/23  1610 05/02/23  1429 05/02/23  1312   POC GLUCOSE mg/dl 159* 155* 145* 184* 249* 171* 195*         Results from last 7 days   Lab Units 05/02/23  2314 05/02/23  1010   LACTIC ACID mmol/L 1 9  --    PROCALCITONIN ng/ml 0 26* 0 16       Lines/Drains:  Invasive Devices     Peripheral Intravenous Line  Duration           Peripheral IV 05/02/23 Dorsal (posterior); Left Hand 1 day    Peripheral IV 05/02/23 Right Antecubital 1 day                  Telemetry:  Telemetry Orders (From admission, onward)             48 Hour Telemetry Monitoring  Continuous x 48 hours        References:    Telemetry Guidelines   Question:  Reason for 48 Hour Telemetry  Answer:  Acute Decompensated CHF (continuous diuretic infusion or total diuretic dose > 200 mg daily, associated electrolyte derangement, ionotropic drip, history of ventricular arrhythmia, or new EF <35%)                 Telemetry Reviewed: Normal Sinus Rhythm and Atrial fibrillation   HR averaging 90  Indication for Continued Telemetry Use: Arrthymias requiring medical therapy             Imaging: Reviewed radiology reports from this admission including: chest CT scan    Recent Cultures (last 7 days):         Last 24 Hours Medication List:   Current Facility-Administered Medications   Medication Dose Route Frequency Provider Last Rate   • acetaminophen  650 mg Oral Q6H PRN CARLA Desouza     • aspirin  81 mg Oral Daily Maycol Banai, DO     • atorvastatin  80 mg Oral After [employer], DO     • furosemide  40 mg Intravenous Once Lamont Murcia MD     • furosemide  80 mg Intravenous BID (diuretic) Lamont Espino MD     • heparin (porcine)  3-20 Units/kg/hr (Order-Specific) Intravenous Titrated Nguyen Jared, DO 19 1 Units/kg/hr (05/03/23 0965)   • hydrALAZINE  5 mg Intravenous Q6H PRN Maycol Jones, DO     • insulin glargine  10 Units Subcutaneous HS Maycol Karen, DO     • insulin lispro  2-12 Units Subcutaneous TID AC Maycol Jones, DO     • metoprolol succinate  12 5 mg Oral Daily Lamont Murcia MD     • nicotine  1 patch Transdermal Daily Maycol Jones, DO     • nitroglycerin  0 4 mg Sublingual Q5 Min PRN Maycol Jones, DO     • ondansetron  4 mg Intravenous Q6H PRN Maycol Jones, DO     • senna-docusate sodium  1 tablet Oral HS Maycol Jones, DO     • sodium chloride  50 mL/hr Intravenous Continuous CARLA Desouza 50 mL/hr (05/03/23 1314)        Today, Patient Was Seen By: Boom Guy    **Please Note: This note may have been constructed using a voice recognition system  **

## 2023-05-03 NOTE — PLAN OF CARE
Problem: PHYSICAL THERAPY ADULT  Goal: Performs mobility at highest level of function for planned discharge setting  See evaluation for individualized goals  Description: Treatment/Interventions: Functional transfer training, LE strengthening/ROM, Elevations, Therapeutic exercise, Endurance training, Bed mobility, Gait training, Spoke to nursing, Spoke to case management, OT          See flowsheet documentation for full assessment, interventions and recommendations  Note: Prognosis: Good  Problem List: Decreased strength, Decreased endurance, Impaired balance, Decreased mobility  Assessment: Pt is 59 y o  male admitted with hx of LINDSEY and Dx of Acute on chronic heart failure, a-fib w/ RVR  Pt 's comorbidities affecting POC include: CAD, Myocardial infarction, tobacco dependence, DM, and HTN and personal factors of: CONNIE w/o hand rail and steps in the house  Pt's clinical presentation is currently unstable/unpredictable which is evident in ongoing telem monitoring, abn lab values and additional procedure pending (cardiac cath)  Pt presents w/ min overall weakness, decreased functional endurance and inconsistent amb balance and gait patterns (a few steps at bedside) w/ overall observed mobility status appearing to be near or min below premorbid level  Will cont to follow pt in PT for progressive mobilization to max level of (I), endurance, and safety  Otherwise, anticipate pt will return home w/ available family support upon D/C provided he cont improving w/ mobility skills, safety, and endurance and when medically cleared; will follow  Barriers to Discharge: Inaccessible home environment, Other (Comment) (steps in the house)     PT Discharge Recommendation: No rehabilitation needs    See flowsheet documentation for full assessment

## 2023-05-03 NOTE — PLAN OF CARE
Problem: OCCUPATIONAL THERAPY ADULT  Goal: Performs self-care activities at highest level of function for planned discharge setting  See evaluation for individualized goals  Description: Treatment Interventions: ADL retraining, Functional transfer training, Endurance training, Patient/family training, Equipment evaluation/education, Compensatory technique education, Continued evaluation, Energy conservation, Activityengagement  Equipment Recommended: Other (comment) (TBD however anticipate pt w/ require no DME upon d/c)       See flowsheet documentation for full assessment, interventions and recommendations  Note: Limitation: Decreased ADL status, Decreased endurance, Decreased self-care trans, Decreased high-level ADLs  Prognosis: Fair  Assessment: Pt is a pleasant and cooperative 60 yo male who presented to hospitals from outpatient cardiology in which pt noted to be in A-fib with RVR on EKG with EKG changes in which pt instructed to present to ER for further evaluation  Pt dx w/ acute on chronic heart failure  Pt  has a past medical history of Myocardial infarction ([facility] )  Pt with active OT orders in which OT consulted to assess pt's functional status and occupational performance to determine safe d/c needs  Pt seen with PT to increase safety, decrease fall risk, and maximize functional/occupational performance 2* medical complexity which is a regression from pt's functional baseline  Pt lives with his wife in a 2 SH with 3 CONNIE  PTA, pt was independent in ADLs/IADLs and functional mobility w/ cane prn  (+) driving  Currently, pt performing bed mobility w/ supervision, functional transfers w/ supervision and no DME, functional mobility w/ CGA and no DME, and UB/LB ADLs w/ supervision  Pt demonstrates the following limitations/impairments which impact the pt's ability to engage in valued occupations: endurance/activity tolerance, standing tolerance, and tachycardia   From an OT standpoint, recommend discharge to home once medically stable  The patient's raw score on the AM-PAC Daily Activity Inpatient Short Form is 21  A raw score of greater than or equal to 19 suggests the patient may benefit from discharge to home  Please refer to the recommendation of the Occupational Therapist for safe discharge planning  Pt would benefit from skilled OT services 1-2x/wk to address immediate acute care needs and underlying performance skills to promote safety, decrease fall risk, and enhance occupational performance to return to PLOF  Goals to be met within the next 10-14 days       OT Discharge Recommendation: No rehabilitation needs

## 2023-05-03 NOTE — PROGRESS NOTES
Advanced Heart Failure/Pulmonary Hypertension - Attending Note - Sherri Bhavin 59 y o  male MRN: [id number]      Please see complete HF note documented by the resident/fellow/AP; this is attending attestation  Assessment:  59 y o  male Hx per chart p/w ADHF, sent from cardiac clinic for ADHF, tachyarrhythmia, seen by gen radha in ED where new low EF 15% noted, started diuresis, referred for [facility]  ICM, HFrEF, EF 10% (EF wnl in 2017), LVIDD 5 6, RV size mildly dilated, RV fxn wnl, apical aneurysm  CAD, 2015 MI, s/p HANNA placement in the mid LCX (dominant vessel); also 40% midLAD disease with a 50% stenosis in an left AV groove vessel  New rapid Afib noted in clinic and ED on arrival, per chart converted to NSR frequent PVCs after IV metop  Concern for sustained VT on ECG review  Current long term smoker  HTN  HLD  DM  Obese  Lives near [place]      Reviewed all pertinent labs/imaging/data including:    Temp:  [97 4 °F (36 3 °C)-97 8 °F (36 6 °C)] 97 5 °F (36 4 °C)  HR:  [83-98] 88  Resp:  [18-28] 18  BP: ()/(50-86) 132/79     Weight (last 2 days)     Date/Time Weight    05/03/23 1202 --    Comment rows:    OBSERV: Received by tong on monitor with nurse inpatient foe elective study  Aware of planned procedure and asking  questions freely  Quiet in affect cooperative and appropriate  Withotu current complaints  Intermittent dry cough  Spoke with cardiology fellow and infrmed consent obtained for procedure   at 05/03/23 1202    05/03/23 0600 117 ([phone])    05/02/23 1105 115 (254)           Intake/Output Summary (Last 24 hours) at 5/3/2023 1540  Last data filed at 5/3/2023 1500  Gross per 24 hour   Intake 280 ml   Output 450 ml   Net -170 ml       Drips:  heparin (porcine), 3-20 Units/kg/hr (Order-Specific), Last Rate: 19 1 Units/kg/hr (05/03/23 0949)  sodium chloride, 50 mL/hr, Last Rate: 50 mL/hr (05/03/23 1314)         Plan:  Cool LE, significantly vol overloaded on exam  LVEDP 30 at [facility] done 5/3/23, rest of [facility] findings as documented  Making minimal urine so far after lasix 40 IV yesterday in ED, 80 during LHC midday today 5/3  Not an acute MI presentation    Needs bladder scan and retention protocol    DC IV fluids    Repeat labs now    Getting additional lasix 80 IV at bedside now around 3pm, 5/3 (did received 80mg IV in cath lab earlier today)  Diurese with lasix 80 IV tid, may escalate diuresis pending response  Some concern low flow even with current normotension, close monitoring now  Strict IOs and daily weights  Keep K>4, Mg>2  Check BID lytes while aggressively diuresing  Goal net neg 2-4L in 24 hr  Home diuretic: his PCP started lasix about 2 weeks ago - initially he says he responded then urine output decreased    Needs close monitoring in case evidences worsening shock  Tenuous patient    In NSR with frequent PVCs now  concern for sustained VT vs SVT/AFL with aberrancy  EP consult requested    cw ASA, high intens statin    Hold bblocker now  Consider near term pending course    Start cautious hydral/isordil 10/10 tid    On hep gtt now    GDMT future    revasc consideration future, will be limited    Neurohormonal Blockade/GDMT:  --Beta-Blocker:  --ACEi, ARB, ARNi:  --MRA:  --SGLT2i:  --Hydralazine/nitrates:    --Diuretic:    Other HF pharmaco-invasive therapy (if applicable):   PPM,  ICD , CRT (if applicable): Interrogation:  Advanced Therapies (if applicable): --Inotrope:  --LVAD/Transplant Candidacy: will continue to monitor  Current smoker    Studies:  I have reviewed all pertinent patient data/labs/imaging where available, including but not limited to:    5/2/23 TTE:  •  Left Ventricle: Left ventricular cavity size is mildly dilated  Wall thickness is mildly increased  The left ventricular ejection fraction is 10-15%  Systolic function is severely reduced  There is severe global hypokinesis, with akinesis of the apex with aneurysm formation  There is no thrombus    •  Right Ventricle: Right ventricular cavity size is normal  Systolic function is moderately to severely reduced  •  Mitral Valve: There is mild regurgitation  •  Tricuspid Valve: There is mild regurgitation  The right ventricular systolic pressure is mildly elevated  The estimated right ventricular systolic pressure is 62 16 mmHg  •  Prior TTE study available for comparison  Prior study date: 9/26/2017  Changes noted when compared to prior study  Changes include: LVEF is now severely reduced       5/3/23 LHC:  •  Chronically occluded mid LAD & LPDA consistent w/ echo findings (apical aneurysm) & not amenable to PCI  •  Widely patent mid LCx stent with mild-mod lesions proximal & distal to stent margins  •  Significantly elevated LVEDP (30 mmHg)    Thank you for the opportunity to participate in the care of this patient      Jena Feldman MD  Attending Physician  Advanced Heart Failure and Transplant Cardiology  [address] [place] [address]

## 2023-05-03 NOTE — PHYSICAL THERAPY NOTE
Physical Therapy Evaluation     Patient's Name: Andre Mark    Admitting Diagnosis  A-fib ([facility] 75 ) [I48 91]  Essential (primary) hypertension [I10]  Elevated troponin [R77 8]  Bilateral lower extremity edema [R60 0]    Problem List  Patient Active Problem List   Diagnosis    Essential (primary) hypertension    Benign prostatic hyperplasia without lower urinary tract symptoms    Coronary artery disease    Type 2 diabetes mellitus, with long-term current use of insulin ([facility])    GERD (gastroesophageal reflux disease)    Tobacco dependence syndrome    Acute on chronic heart failure ([facility] )    A-fib ([facility] )       Past Medical History  Past Medical History:   Diagnosis Date    Myocardial infarction [facility])        Past Surgical History  History reviewed  No pertinent surgical history         05/03/23 0845   PT Last Visit   PT Visit Date 05/03/23   Note Type   Note type Evaluation   Pain Assessment   Pain Assessment Tool 0-10   Pain Score No Pain   Restrictions/Precautions   Braces or Orthoses   (denies)   Other Precautions Cardiac/sternal;Telemetry   Home Living   Type of Home House   Home Layout Two level  (3 CONNIE w/o hand rail and 14 steps up w/o hand rail (wall on the side))   Prior Function   Level of Comfort Independent with functional mobility  (amb w/o AD)   Lives With Spouse   General   Additional Pertinent History cleared for assessment by osorio   Cognition   Overall Cognitive Status [facility]   Arousal/Participation Alert   Attention Within functional limits   Orientation Level Oriented to person;Oriented to situation;Oriented to place   Memory Within functional limits   Following Commands Follows one step commands without difficulty   Subjective   Subjective Alert; in bed; responds to questions appropriately; denies dizziness; agreeable to mobilize   RUE Assessment   RUE Assessment WFL  (AROM)   LUE Assessment   LUE Assessment WFL  (AROM)   RLE Assessment   RLE Assessment WFL  (AROM)   Strength RLE   RLE Overall Strength   (good - (grossly))   LLE Assessment   LLE Assessment WFL  (AROM)   Strength LLE   LLE Overall Strength   (good - (grossly))   Bed Mobility   Supine to Sit 5  Supervision   Transfers   Sit to Stand 5  Supervision   Stand to Sit 5  Supervision   Stand pivot 4  Minimal assistance   Additional items Assist x 1;Verbal cues  (took a few steps from bed to chair to the (R) side;)   Ambulation/Elevation   Gait pattern Excessively slow; Short stride   Gait Assistance 4  Minimal assist   Additional items Assist x 1;Verbal cues; Tactile cues   Assistive Device None   Distance 4 ft total distance for bed to chair transition; further amb was not performed due to elevated/irreg HR   Balance   Static Sitting Good   Dynamic Sitting Fair +   Static Standing Fair   Dynamic Standing Fair -   Ambulatory Poor +   Activity Tolerance   Activity Tolerance Treatment limited secondary to medical complications (Comment)   Medical Staff Made Aware Co-eval performed w/ OTR due to complexity of medical status   Nurse Made Aware spoke to KAVEH Floyd   Assessment   Prognosis Good   Problem List Decreased strength;Decreased endurance; Impaired balance;Decreased mobility   Assessment Pt is 59 y o  male admitted with hx of LINDSEY and Dx of Acute on chronic heart failure, a-fib w/ RVR  Pt 's comorbidities affecting POC include: CAD, Myocardial infarction, tobacco dependence, DM, and HTN and personal factors of: CONNIE w/o hand rail and steps in the house  Pt's clinical presentation is currently unstable/unpredictable which is evident in ongoing telem monitoring, abn lab values and additional procedure pending (cardiac cath)  Pt presents w/ min overall weakness, decreased functional endurance and inconsistent amb balance and gait patterns (a few steps at bedside) w/ overall observed mobility status appearing to be near or min below premorbid level  Will cont to follow pt in PT for progressive mobilization to max level of (I), endurance, and safety  Otherwise, anticipate pt will return home w/ available family support upon D/C provided he cont improving w/ mobility skills, safety, and endurance and when medically cleared; will follow  Barriers to Discharge Inaccessible home environment; Other (Comment)  (steps in the house)   Goals   Patient Goals to feel better   STG Expiration Date 05/13/23   Short Term Goal #1 7-10 days  Pt will amb 300 ft w/o assistive device, (I) in order to facilitate safe return to premorbid environment and community amb status  Pt will negotiate 12 steps w/o hand rail, mod (I) in order to navigate between levels of home environment safely  Pt will achieve (I) level w/ bed mob in order to facilitate safety with OOB and back to bed transitions in own living environment  Pt will perform transfers w/ mod (I) to assure (I) and safety w/ transitions during aspects of mobility/locomotion  Pt will participate in LE therex and balance activities to max progression w/ mobility skills  PT Treatment Day 0   Plan   Treatment/Interventions Functional transfer training;LE strengthening/ROM; Elevations; Therapeutic exercise; Endurance training;Bed mobility;Gait training;Spoke to nursing;Spoke to case management;OT   PT Frequency 3-5x/wk   Recommendation   PT Discharge Recommendation No rehabilitation needs   AM-PAC Basic Mobility Inpatient   Turning in Flat Bed Without Bedrails 4   Lying on Back to Sitting on Edge of Flat Bed Without Bedrails 3   Moving Bed to Chair 3   Standing Up From Chair Using Arms 3   Walk in Room 3   Climb 3-5 Stairs With Railing 2   Basic Mobility Inpatient Raw Score 18   Basic Mobility Standardized Score 41 05   Highest Level Of Mobility   JH-HLM Goal 6: Walk 10 steps or more   JH-HLM Achieved 4: Move to chair/commode   Modified [place] Scale   Modified [place] Scale 4   End of Consult   Patient Position at End of Consult Bedside chair; All needs within reach         [facility], PT

## 2023-05-03 NOTE — DISCHARGE INSTR - AVS FIRST PAGE
Follow-up with heart failure in 1 week  1  Please see the post cardiac catheterization dishcarge instructions  No heavy lifting, greater than 10 lbs  or strenuous  activity for 48 hrs  2 Remove band aid tomorrow  Shower and wash area- wrist gently with soap and water- beginning tomorrow  Rinse and pat dry  Apply new water seal band aid  Repeat this process for 5 days  No powders, creams lotions or antibiotic ointments  for 5 days  No tub baths, hot tubs or swimming for 5 days  3  Please call our office ([phone]) if you have any fever, redness, swelling, discharge from your wrist access site  4 No driving for 1 day        Please refer to post ICD implantation discharge instructions and restrictions below and your ICD booklet/temporary card  Keep incision dry for one week  Do not use lotions/powders/creams on incision  Leave outer bandage in place for 1 week - it is water proof, and as long as it is fully adhered to your skin you may shower with it  If it appears as though the bandage is coming off and/or there is any communication to the area of device incision, please then keep the whole area dry for the remaining week  After 1 week, please remove by pulling all edges away from the center of the bandage  No overhead reaching/pushing/pulling/lifting greater than 5-10lbs with left arm for six weeks  Please call the office if you notice redness, swelling, bleeding, or drainage from incision or if you develop fevers    Implantable Cardioverter Defibrillator      If you have any questions, please call [phone] to speak with a nurse (8:30am-4pm, or [phone] after hours)  For appointments, please call [phone]  WHAT YOU SHOULD KNOW:    An implantable cardioverter defibrillator (ICD) is a small device that monitors your heart rate and rhythm  It is commonly placed inside your upper chest region   It may be used if you have a ventricular arrhythmia, which is an irregular, dangerous rhythm from the bottom chamber of your heart  Some arrhythmias may cause your heart to suddenly stop beating  An ICD can give a shock to your heart to make it start beating again, or it can give pacing therapy (also known as pain-free therapy) to return your heart to normal rhythm  It is also a pacemaker, so it will pace your heart if needed to prevent it from beating too slowly  AFTER YOU LEAVE:      Follow up with your primary healthcare provider or cardiologist as directed: You will need to follow-up to have your ICD checked and make sure you are not having problems  Write down your questions so you remember to ask them during your visits  Driving: you are ok to drive 48 hours after device is implanted     Self-care:   Ask about activity: Ask if you need to avoid moving your shoulder or arm, and for how long  Ask if you should avoid lifting heavy objects  Do not play any contact sports, such as football or wrestling, until your primary healthcare provider [facility] or cardiologist tells you it is okay  You may only be able to drive for a certain amount of time per day, or during certain hours  Ask when you can return to work  Care for your skin over the ICD: see instructions above     When you get a shock from your ICD: A shock may feel like someone has hit you, or you may feel a thump in the chest  If someone is touching you when you get a shock, they may feel a tingling feeling  The first time you feel a shock, it may scare you  Sit or lie down and stay calm  Ask someone to stay with you if possible  Please either call your cardiologist or report to an emergency room  Safety instructions when you have an ICD:   Carry an ID card for the ICD: This card has important information about your ICD  Stay away from magnets or machines with electric fields: This includes MRI machines  Avoid leaning into a car engine or doing welding  These things can interfere with how your ICD works      Tell airport security you have an ICD: You may need to be searched by hand when you go through a security gate  The security gate or handheld wand could harm your ICD  Keep an ICD diary: Record when you get a shock and what you were doing before you got the shock  Keep track of how you felt before and after the shock, as well as how many shocks you received  Write down the day and time of each shock  Bring the diary with you when you see your PHP or cardiologist    Marilee Fox medical alert identification: Wear medical alert jewelry or carry a card that says you have an ICD  Ask your PHP or cardiologist where to get these items  Contact your primary healthcare provider or cardiologist if:   You have a fever  You feel 1 or more shocks from your ICD and feel fine afterwards  Your feet or ankles swell  The area around your ICD is painful or tender after surgery  The skin around your stitches or staples is red, swollen, or draining pus or fluid  You have chills, a cough, and feel weak or achy  You are sad or anxious and find it hard to do your usual activities  You have questions or concerns about your condition or care  Seek care immediately or call 911 if:   Your stitches or staples come apart  Blood soaks through your bandage  You feel more than 3 shocks in a row from your ICD  You become weak, dizzy, or faint  You feel your heart skip beats or beat very fast or slow, but you do not feel a shock from your ICD  You have chest pain that does not go away with rest or medicine  © 2014 [employer] is for End User's use only and may not be sold, redistributed or otherwise used for commercial purposes  All illustrations and images included in CareNotes® are the copyrighted property of [employer]  or [employer]  The above information is an  only  It is not intended as medical advice for individual conditions or treatments   Talk to your doctor, nurse or pharmacist before following any medical regimen to see if it is safe and effective for you

## 2023-05-04 PROBLEM — I47.20 V-TACH (HCC): Status: ACTIVE | Noted: 2023-05-04

## 2023-05-04 LAB
ANION GAP SERPL CALCULATED.3IONS-SCNC: 4 MMOL/L (ref 4–13)
APTT PPP: 69 SECONDS (ref 23–37)
APTT PPP: 84 SECONDS (ref 23–37)
BUN SERPL-MCNC: 59 MG/DL (ref 5–25)
CALCIUM SERPL-MCNC: 8.2 MG/DL (ref 8.3–10.1)
CHLORIDE SERPL-SCNC: 102 MMOL/L (ref 96–108)
CO2 SERPL-SCNC: 23 MMOL/L (ref 21–32)
CREAT SERPL-MCNC: 1.46 MG/DL (ref 0.6–1.3)
ERYTHROCYTE [DISTWIDTH] IN BLOOD BY AUTOMATED COUNT: 13.7 % (ref 11.6–15.1)
GFR SERPL CREATININE-BSD FRML MDRD: 50 ML/MIN/1.73SQ M
GLUCOSE SERPL-MCNC: 182 MG/DL (ref 65–140)
GLUCOSE SERPL-MCNC: 195 MG/DL (ref 65–140)
GLUCOSE SERPL-MCNC: 205 MG/DL (ref 65–140)
GLUCOSE SERPL-MCNC: 235 MG/DL (ref 65–140)
GLUCOSE SERPL-MCNC: 245 MG/DL (ref 65–140)
HCT VFR BLD AUTO: 44.9 % (ref 36.5–49.3)
HGB BLD-MCNC: 13.9 G/DL (ref 12–17)
MAGNESIUM SERPL-MCNC: 2.5 MG/DL (ref 1.6–2.6)
MCH RBC QN AUTO: 26.7 PG (ref 26.8–34.3)
MCHC RBC AUTO-ENTMCNC: 31 G/DL (ref 31.4–37.4)
MCV RBC AUTO: 86 FL (ref 82–98)
PLATELET # BLD AUTO: 166 THOUSANDS/UL (ref 149–390)
PMV BLD AUTO: 12.6 FL (ref 8.9–12.7)
POTASSIUM SERPL-SCNC: 4.5 MMOL/L (ref 3.5–5.3)
RBC # BLD AUTO: 5.2 MILLION/UL (ref 3.88–5.62)
SODIUM SERPL-SCNC: 129 MMOL/L (ref 135–147)
WBC # BLD AUTO: 8.09 THOUSAND/UL (ref 4.31–10.16)

## 2023-05-04 RX ADMIN — HYDRALAZINE HYDROCHLORIDE 10 MG: 10 TABLET, FILM COATED ORAL at 13:50

## 2023-05-04 RX ADMIN — INSULIN LISPRO 2 UNITS: 100 INJECTION, SOLUTION INTRAVENOUS; SUBCUTANEOUS at 06:24

## 2023-05-04 RX ADMIN — ASPIRIN 81 MG 81 MG: 81 TABLET ORAL at 10:07

## 2023-05-04 RX ADMIN — AMIODARONE HYDROCHLORIDE 150 MG: 50 INJECTION, SOLUTION INTRAVENOUS at 18:03

## 2023-05-04 RX ADMIN — INSULIN GLARGINE 10 UNITS: 100 INJECTION, SOLUTION SUBCUTANEOUS at 22:15

## 2023-05-04 RX ADMIN — ATORVASTATIN CALCIUM 80 MG: 80 TABLET, FILM COATED ORAL at 17:49

## 2023-05-04 RX ADMIN — Medication 10 MG/HR: at 11:30

## 2023-05-04 RX ADMIN — INSULIN LISPRO 2 UNITS: 100 INJECTION, SOLUTION INTRAVENOUS; SUBCUTANEOUS at 17:49

## 2023-05-04 RX ADMIN — AMIODARONE HYDROCHLORIDE 1 MG/MIN: 50 INJECTION, SOLUTION INTRAVENOUS at 18:11

## 2023-05-04 RX ADMIN — Medication 12.5 MG: at 20:54

## 2023-05-04 RX ADMIN — AMIODARONE HYDROCHLORIDE 200 MG: 200 TABLET ORAL at 11:53

## 2023-05-04 RX ADMIN — INSULIN LISPRO 4 UNITS: 100 INJECTION, SOLUTION INTRAVENOUS; SUBCUTANEOUS at 11:35

## 2023-05-04 RX ADMIN — ISOSORBIDE DINITRATE 10 MG: 10 TABLET ORAL at 11:51

## 2023-05-04 RX ADMIN — AMIODARONE HYDROCHLORIDE 200 MG: 200 TABLET ORAL at 17:49

## 2023-05-04 RX ADMIN — HYDRALAZINE HYDROCHLORIDE 10 MG: 10 TABLET, FILM COATED ORAL at 06:25

## 2023-05-04 RX ADMIN — ISOSORBIDE DINITRATE 10 MG: 10 TABLET ORAL at 10:16

## 2023-05-04 RX ADMIN — ISOSORBIDE DINITRATE 10 MG: 10 TABLET ORAL at 17:50

## 2023-05-04 RX ADMIN — AMIODARONE HYDROCHLORIDE 200 MG: 200 TABLET ORAL at 10:07

## 2023-05-04 RX ADMIN — HYDRALAZINE HYDROCHLORIDE 10 MG: 10 TABLET, FILM COATED ORAL at 00:12

## 2023-05-04 RX ADMIN — HEPARIN SODIUM 19.1 UNITS/KG/HR: 10000 INJECTION, SOLUTION INTRAVENOUS at 10:49

## 2023-05-04 NOTE — PROGRESS NOTES
Progress Note - Cardiology   Alisia Li 59 y o  male MRN: [id number]  Unit/Bed#: [facility] 411-01 Encounter: [id number]    Assessment/Plan:  Alisia Li is a 59y o  year old male past medical history of coronary artery disease with left heart catheterization in 2015 nonobstructive disease of the mid circumflex treated with a balloon and stenting and residual nonobstructive disease in the LAD, that is presenting with 2 weeks of heart failure symptoms including shortness of breath, leg swelling and 20 pound weight gain  He was found to have new worsen cardiomyopathy and  of the LAD and PDA from the left dominant system       Ventricular Tachycardia RBBB with early transition and mostly negative in the precordial lead, negative in the inferior leads, mostly negative in the lateral leads  Tachycardia concerning VT from posterior LV mid-apex area  This appears to be all scar mediated     -worsening kidney function, cardiomyopathy, and structural heart disease make amio the only real option at this time   -would start BB when the patient is more compensated   -start oral amio load 200 mg TID      Cardiomyopathy HFrEF 15% NYHA III/C: ischemic etiology with significant scar related  of the LAD:  -GDMT and diuretics per heart failure team   -would start BB when able   -amio as above for VT and PVC burden   -I do not foresee the patient's cardiomyopathy significantly improving and I would plan for a dual chamber ICD this admission before discharge       Recommendations Today:  -Patient very decompensated and HF is managing/adjusting diuresis  -ideally better plan for dual chamber ICD early next week closer to discharge and when patient is more euvolemic     Subjective/Objective       Vitals: /83   Pulse (!) 114   Temp 98 5 °F (36 9 °C) (Oral)   Resp 18   Ht 6' (1 829 m)   Wt 115 kg (254 lb 3 1 oz)   SpO2 94%   BMI 34 47 kg/m²   Vitals:    05/03/23 0600 05/04/23 0600   Weight: 117 kg (257 lb 15 oz) 115 kg (254 lb 3 1 oz)     Orthostatic Blood Pressures    Flowsheet Row Most Recent Value   Blood Pressure 112/83 filed at 05/04/2023 0600   Patient Position - Orthostatic VS Lying filed at 05/04/2023 0230            Intake/Output Summary (Last 24 hours) at 5/4/2023 0837  Last data filed at 5/4/2023 0831  Gross per 24 hour   Intake 1829 6 ml   Output 2300 ml   Net -470 4 ml       Invasive Devices     Peripheral Intravenous Line  Duration           Peripheral IV 05/02/23 Dorsal (posterior); Left Hand 1 day    Peripheral IV 05/02/23 Right Antecubital 1 day                Review of Systems: Negative     Physical Exam: GEN: Jonathon Hanson appears well, alert and oriented x 3, pleasant and cooperative   HEENT:  Normocephalic, atraumatic, anicteric, moist mucous membranes  NECK: No carotid bruits   HEART: JVP 18 with AJR, irregular rhythm, regular rate, normal S1 and S2, no murmurs, clicks, gallops or rubs   LUNGS: Clear to auscultation bilaterally; no wheezes, rales, or rhonchi; respiration nonlabored   ABDOMEN:  Normoactive bowel sounds, soft, no tenderness, no distention  EXTREMITIES: peripheral pulses palpable; +3 pitting edema and cool distal extremities   NEURO: no gross focal findings; cranial nerves grossly intact   SKIN:  Dry, intact, warm to touch    Lab Results: I have personally reviewed pertinent lab results  Imaging: I have personally reviewed pertinent reports  EKG:   VTE Pharmacologic Prophylaxis: Sequential compression device (Venodyne)  heparin gtt  VTE Mechanical Prophylaxis: sequential compression device and foot pump applied    Counseling / Coordination of Care  Total Critical Care time spent 45 minutes excluding procedures, teaching and family updates

## 2023-05-04 NOTE — ASSESSMENT & PLAN NOTE
· At cardio appointment outpt when he was noted to be in A-fib RVR on EKG, also with Q waves concerning for prior infarct, in the setting of chest pressure last week concerning for MI  · Heparin drip pending ischemic evaluation  · Noted reduced LVEF of 10 to 15%, concern for ischemia versus tachycardia mediated cardiomyopathy

## 2023-05-04 NOTE — ASSESSMENT & PLAN NOTE
· Patient with history of CAD with drug-eluting stent to LCx in 2015  · Patient reports dyspnea on exertion over the past 2 weeks, denies any chest pain at rest, is a diabetic on insulin, is an active smoker of 40 pack years, has prior history of CAD, is on daily aspirin  · LVEF 10-15%, continue medical management   [address] Cardiology comanagement and expertise

## 2023-05-04 NOTE — CASE MANAGEMENT
Case Management Assessment & Discharge Planning Note    Patient name Yuan Aldrich  Location [facility] 411/[facility] 377-28 MRN [id number]  : 1960 Date 2023       Current Admission Date: 2023  Current Admission Diagnosis:Ischemic cardiomyopathy   Patient Active Problem List    Diagnosis Date Noted   • V-tach [facility]) 2023   • Ischemic cardiomyopathy 2023   • LINDEN [facility]) ([facility] ) 2023   • Essential (primary) hypertension 2023   • Acute HFrEF 2023   • A-fib ([place] [address] ) 2023   • Benign prostatic hyperplasia without lower urinary tract symptoms 2018   • 3-vessel CAD 2015   • Type 2 diabetes mellitus, with long-term current use of insulin ([place] [address] ) 2015   • GERD (gastroesophageal reflux disease) 2013   • Tobacco dependence syndrome 2012      LOS (days): 2  Geometric Mean LOS (GMLOS) (days): 3 90  Days to GMLOS:1 8     OBJECTIVE:    Risk of Unplanned Readmission Score: 12 97         Current admission status: Inpatient       Preferred Pharmacy:   [place] [id number]  Phone: [phone] Fax: [phone]    Primary Care Provider: Maria Guadalupe Haile DO    Primary Insurance: [facility]  Secondary Insurance:     ASSESSMENT:  Tom Suárez Proxies    There are no active Health Care Proxies on file         Advance Directives  Does patient have a [address] [place] [address] Avenue?: No  Was patient offered paperwork?: Yes (booklet given)  Does patient currently have a Health Care decision maker?: Yes, please see Health Care Proxy section  Does patient have Advance Directives?: No  Was patient offered paperwork?: Yes (booklet given)  Primary Contact: wife Arturo Booker         Readmission Root Cause  30 Day Readmission: No    Patient Information  Admitted from[de-identified] Home  Mental Status: Alert  During Assessment patient was accompanied by: Spouse, Daughter  Assessment information provided by[de-identified] Patient  Primary Caregiver: Self  Support Systems: Spouse/significant other, Daughter, Family members  [place] of Residence: [address] do you live in?: [address] entry access options   Select all that apply : Stairs  Number of steps to enter home : 5  Do the steps have railings?: Yes  Type of Current Residence: [address] [place] home  Upon entering residence, is there a bedroom on the main floor (no further steps)?: No  A bedroom is located on the following floor levels of residence (select all that apply):: 2nd Floor  Upon entering residence, is there a bathroom on the main floor (no further steps)?: No  Indicate which floors of current residence have a bathroom (select all the apply):: 2nd Floor  Number of steps to 2nd floor from main floor: One Flight  In the last 12 months, was there a time when you were not able to pay the mortgage or rent on time?: No  In the last 12 months, how many places have you lived?: 1  In the last 12 months, was there a time when you did not have a steady place to sleep or slept in a shelter (including now)?: No  Homeless/housing insecurity resource given?: N/A  Living Arrangements: Lives w/ Spouse/significant other, Lives w/ Daughter  Is patient a ?: No    Activities of Daily Living Prior to Admission  Functional Status: Independent  Completes ADLs independently?: Yes  Ambulates independently?: Yes  Does patient use assisted devices?: Yes  Assisted Devices (DME) used: Straight Cane (cane prn)  Does patient currently own DME?: Yes  What DME does the patient currently own?: Straight Cane  Does patient have a history of Outpatient Therapy (PT/OT)?: No  Does the patient have a history of Short-Term Rehab?: No  Does patient have a history of HHC?: No  Does patient currently have [address]?: No         Patient Information Continued  Income Source: Employed  Does patient have prescription coverage?: Yes  Within the past 12 months, you worried that your food would run out before you got the money to buy more : Never true  Within the past 12 months, the food you bought just didn't last and you didn't have money to get more : Never true  Food insecurity resource given?: N/A  Does patient receive dialysis treatments?: No  Does patient have a history of substance abuse?: No  Does patient have a history of Mental Health Diagnosis?: No         Means of Transportation  Means of Transport to Appts[de-identified] Drives Self  In the past 12 months, has lack of transportation kept you from medical appointments or from getting medications?: No  In the past 12 months, has lack of transportation kept you from meetings, work, or from getting things needed for daily living?: No  Was application for public transport provided?: N/A        DISCHARGE DETAILS:    Discharge planning discussed with[de-identified] pt, wife, daughter---no [facility] needed at this time  Freedom of Choice: Yes     CM contacted family/caregiver?: Yes  Were Treatment Team discharge recommendations reviewed with patient/caregiver?: Yes  Did patient/caregiver verbalize understanding of patient care needs?: Yes  Were patient/caregiver advised of the risks associated with not following Treatment Team discharge recommendations?: Yes    Contacts  Patient Contacts: wife Giovanni Marshall  Relationship to Patient[de-identified] Family  Contact Method: Phone  Phone Number: [phone]  Reason/Outcome: Continuity of Care, Emergency Contact, Discharge Planning                                                                  Family notified[de-identified] family here  Additional Comments: 64yo male adm  with new Q-wave MI, decompensated CHF, ischemic cardiomyopathy, uncontrolled DM2  Has LV apical aneurysm  At this time no surgery planned  Pt  new EF is 10-15%, also on Lasix gtt  Room air sat 98%  Plan ICD in a few days  Noted tachycardia   [facility] tbd

## 2023-05-04 NOTE — PROGRESS NOTES
[address] [facility]  Progress Note  Name: John Chavez  MRN: [id number]  [facility] Date of Admission: 5/2/2023   Date of Service: 5/4/2023 I Hospital Day: 2    Assessment/Plan   V-tach [facility])  Assessment & Plan  · EP following and started amiodarone, given ischemic cardiomyopathy and VT, tentatively patient is planned for a ICD prior to discharge  · Plan when patient is more euvolemic    LINDEN (acute kidney injury) ([facility] [address] )  Assessment & Plan  · Likely mild LINDEN in the setting of hypervolemia vs medication side effect from lisinopril  · Optimize volume status and trend BMP  · Continue Lasix infusion, patient appears to be diuresing appropriately    A-fib [facility])  Assessment & Plan  · At cardio appointment outpt when he was noted to be in A-fib RVR on EKG, also with Q waves concerning for prior infarct, in the setting of chest pressure last week concerning for MI  · Heparin drip pending ischemic evaluation  · Noted reduced LVEF of 10 to 15%, concern for ischemia versus tachycardia mediated cardiomyopathy      Acute HFrEF  Assessment & Plan  Wt Readings from Last 3 Encounters:   05/04/23 115 kg (254 lb 3 1 oz)   05/02/23 116 kg (254 lb 12 8 oz)   04/01/22 102 kg (224 lb 12 8 oz)   · Patient presenting with 2 weeks of dyspnea on exertion, lower extremity edema, abd distention, weight gain  · Currently comfortable at rest, believes he will be able to lie flat for a cath  · Was on Lasix 40 mg oral provided by his PCP but stopped because it was not helping  · Echo read with LVEF 10-15%      Tobacco dependence syndrome  Assessment & Plan  Patient with over 40  pack-year history  Counseling provided on cessation, nicotine patch ordered    Type 2 diabetes mellitus, with long-term current use of insulin [facility])  Assessment & Plan  Lab Results   Component Value Date    HGBA1C 12 0 (H) 04/14/2023       Recent Labs     05/03/23  1600 05/03/23  2049 05/04/23  0539 05/04/23  1033   POCGLU "215* 205* 195* 245*       Blood Sugar Average: Last 72 hrs:  (P) 414 [id number] hold oral medications and continue on insulin with sliding scale    3-vessel CAD  Assessment & Plan  · Patient with history of CAD with drug-eluting stent to LCx in 2015  · Patient reports dyspnea on exertion over the past 2 weeks, denies any chest pain at rest, is a diabetic on insulin, is an active smoker of 40 pack years, has prior history of CAD, is on daily aspirin  · LVEF 10-15%, continue medical management  Appreciate Cardiology comanagement and expertise    Essential (primary) hypertension  Assessment & Plan  Resume lopressor and PRN hydralazine             VTE Pharmacologic Prophylaxis: VTE Score: 5 Moderate Risk (Score 3-4) - Pharmacological DVT Prophylaxis Ordered: heparin drip  Patient Centered Rounds: I performed bedside rounds with nursing staff today  Discussions with Specialists or Other Care Team Provider: Cardiology/EP    Education and Discussions with Family / Patient: Updated  (wife) at bedside  Total Time Spent on Date of Encounter in care of patient: 55 minutes This time was spent on one or more of the following: performing physical exam; counseling and coordination of care; obtaining or reviewing history; documenting in the medical record; reviewing/ordering tests, medications or procedures; communicating with other healthcare professionals and discussing with patient's family/caregivers  Current Length of Stay: 2 day(s)  Current Patient Status: Inpatient   Certification Statement: The patient will continue to require additional inpatient hospital stay due to Cardiac optimization and lasix Gtt  Discharge Plan: Anticipate discharge in 48-72 hrs to discharge location to be determined pending rehab evaluations  Code Status: Level 1 - Full Code    Subjective:   Seen and examined at bedside this morning  Patient states he feels better \"in every way\" today    Reports great response to " diuretics  No other concerns at this time    Objective:     Vitals:   Temp (24hrs), Av °F (36 7 °C), Min:97 5 °F (36 4 °C), Max:98 5 °F (36 9 °C)    Temp:  [97 5 °F (36 4 °C)-98 5 °F (36 9 °C)] 98 3 °F (36 8 °C)  HR:  [] 130  Resp:  [16-20] 20  BP: ()/(55-87) 111/78  SpO2:  [94 %-99 %] 97 %  Body mass index is 34 47 kg/m²  Input and Output Summary (last 24 hours): Intake/Output Summary (Last 24 hours) at 2023 1435  Last data filed at 2023 1405  Gross per 24 hour   Intake 2664 2 ml   Output 3625 ml   Net -960 8 ml       Physical Exam:   Physical Exam  Vitals and nursing note reviewed  Constitutional:       General: He is not in acute distress  Appearance: He is well-developed  He is not toxic-appearing or diaphoretic  HENT:      Head: Normocephalic and atraumatic  Mouth/Throat:      Mouth: Mucous membranes are moist    Eyes:      General: No scleral icterus  Extraocular Movements: Extraocular movements intact  Conjunctiva/sclera: Conjunctivae normal    Cardiovascular:      Rate and Rhythm: Normal rate and regular rhythm  Pulses: Normal pulses  Heart sounds: No murmur heard  No friction rub  No gallop  Pulmonary:      Effort: Pulmonary effort is normal  No respiratory distress  Breath sounds: Normal breath sounds  No wheezing, rhonchi or rales  Abdominal:      General: Abdomen is flat  Bowel sounds are normal  There is no distension  Palpations: Abdomen is soft  There is no mass  Tenderness: There is no abdominal tenderness  There is no guarding  Musculoskeletal:         General: No swelling  Cervical back: Neck supple  Right lower leg: Edema present  Left lower leg: Edema present  Lymphadenopathy:      Cervical: No cervical adenopathy  Skin:     General: Skin is warm and dry  Capillary Refill: Capillary refill takes less than 2 seconds  Coloration: Skin is not jaundiced  Findings: No rash  Neurological:      General: No focal deficit present  Mental Status: He is alert and oriented to person, place, and time  Sensory: No sensory deficit  Motor: No weakness  Psychiatric:         Mood and Affect: Mood normal           Additional Data:     Labs:  Results from last 7 days   Lab Units 05/04/23  0235 05/02/23  2314   WBC Thousand/uL 8 09 9 27   HEMOGLOBIN g/dL 13 9 14 9   HEMATOCRIT % 44 9 48 8   PLATELETS Thousands/uL 166 195   NEUTROS PCT %  --  69   LYMPHS PCT %  --  23   MONOS PCT %  --  7   EOS PCT %  --  1     Results from last 7 days   Lab Units 05/04/23  0235 05/03/23  1641   SODIUM mmol/L 129* 131*   POTASSIUM mmol/L 4 5 5 1   CHLORIDE mmol/L 102 102   CO2 mmol/L 23 24   BUN mg/dL 59* 54*   CREATININE mg/dL 1 46* 1 52*   ANION GAP mmol/L 4 5   CALCIUM mg/dL 8 2* 8 4   ALBUMIN g/dL  --  3 5   TOTAL BILIRUBIN mg/dL  --  0 51   ALK PHOS U/L  --  42*   ALT U/L  --  45   AST U/L  --  34   GLUCOSE RANDOM mg/dL 235* 221*     Results from last 7 days   Lab Units 05/02/23  1010   INR  1 13     Results from last 7 days   Lab Units 05/04/23  1033 05/04/23  0539 05/03/23  2049 05/03/23  1600 05/03/23  1050 05/03/23  0614 05/02/23  2240 05/02/23  2050 05/02/23  1610 05/02/23  1429 05/02/23  1312   POC GLUCOSE mg/dl 245* 195* 205* 215* 159* 155* 145* 184* 249* 171* 195*         Results from last 7 days   Lab Units 05/02/23  2314 05/02/23  1010   LACTIC ACID mmol/L 1 9  --    PROCALCITONIN ng/ml 0 26* 0 16       Lines/Drains:  Invasive Devices     Peripheral Intravenous Line  Duration           Peripheral IV 05/02/23 Dorsal (posterior); Left Hand 2 days    Peripheral IV 05/02/23 Right Antecubital 2 days                  Telemetry:  Telemetry Orders (From admission, onward)             48 Hour Telemetry Monitoring  Continuous x 48 hours        References:    Telemetry Guidelines   Question:  Reason for 48 Hour Telemetry  Answer:  Arrhythmias Requiring Medical Therapy (eg  SVT, Vtach/fib, Bradycardia, Uncontrolled A-fib)                 Telemetry Reviewed: Normal Sinus Rhythm with ectopy  Indication for Continued Telemetry Use: Arrthymias requiring medical therapy             Imaging: Reviewed radiology reports from this admission including: chest CT scan    Recent Cultures (last 7 days):         Last 24 Hours Medication List:   Current Facility-Administered Medications   Medication Dose Route Frequency Provider Last Rate   • acetaminophen  650 mg Oral Q6H PRN CARLA Levi     • amiodarone  200 mg Oral TID With Meals Rock Will MD     • aspirin  81 mg Oral Daily Maycol Jones DO     • atorvastatin  80 mg Oral After Dinner Kimberlee Neal DO     • furosemide  10 mg/hr Intravenous Continuous Han Wright MD 10 mg/hr (05/04/23 1130)   • heparin (porcine)  3-20 Units/kg/hr (Order-Specific) Intravenous Titrated Tristan Michel DO 19 1 Units/kg/hr (05/04/23 1049)   • hydrALAZINE  5 mg Intravenous Q6H PRN Maycol Jones, DO     • hydrALAZINE  10 mg Oral Q8H [address] Lamont Murcia MD     • insulin glargine  10 Units Subcutaneous HS Maycol Jones, DO     • insulin lispro  2-12 Units Subcutaneous TID AC Maycol Jones DO     • isosorbide dinitrate  10 mg Oral TID after meals Lamont Murcia MD     • metoprolol tartrate  12 5 mg Oral Q12H [address] Lamont Murcia MD     • nicotine  1 patch Transdermal Daily Maycol Jones, DO     • nitroglycerin  0 4 mg Sublingual Q5 Min PRN Maycol Jones DO     • ondansetron  4 mg Intravenous Q6H PRN Maycol Jones, DO     • senna-docusate sodium  1 tablet Oral HS Maycol Jones DO          Today, Patient Was Seen By: Ena Yoon    **Please Note: This note may have been constructed using a voice recognition system  **

## 2023-05-04 NOTE — ASSESSMENT & PLAN NOTE
· Likely mild LINDEN in the setting of hypervolemia vs medication side effect from lisinopril  · Optimize volume status and trend BMP  · Continue Lasix infusion, patient appears to be diuresing appropriately

## 2023-05-04 NOTE — PROGRESS NOTES
Advanced Heart Failure/Pulmonary Hypertension - Attending Note - Sonido Day 59 y o  male MRN: [id number]      Please see complete HF note documented by the resident/fellow/AP; this is attending attestation  Assessment:  59 y o  male Hx per chart p/w ADHF, sent from cardiac clinic for ADHF, tachyarrhythmia, seen by gen radha in ED where new low EF 15% noted, started diuresis, referred for [facility]  ICM, HFrEF, EF 10% (EF wnl in 2017), LVIDD 5 6, RV size mildly dilated, RV fxn wnl, apical aneurysm  CAD, 2015 MI, s/p HANNA placement in the mid LCX (dominant vessel); also 40% midLAD disease with a 50% stenosis in an left AV groove vessel  Considered to be in new rapid Afib noted in clinic and ED on arrival  converted to NSR frequent PVCs after IV metop per chart  Concern for sustained VT on ECG review  Current long term smoker  HTN  HLD  DM  Obese  Lives near [place]      Reviewed all pertinent labs/imaging/data including:    Temp:  [97 5 °F (36 4 °C)-98 5 °F (36 9 °C)] 98 3 °F (36 8 °C)  HR:  [] 130  Resp:  [16-20] 20  BP: ()/(55-87) 99/66     Weight (last 2 days)     Date/Time Weight    05/04/23 0600 115 (254 19)    05/03/23 1202 --    Comment rows:    OBSERV: Received by strether on monitor with nurse inpatient foe elective study  Aware of planned procedure and asking  questions freely  Quiet in affect cooperative and appropriate  Withotu current complaints  Intermittent dry cough  Spoke with cardiology fellow and infrmed consent obtained for procedure   at 05/03/23 1202    05/03/23 0600 117 (257 94)    05/02/23 1105 115 (254)           Intake/Output Summary (Last 24 hours) at 5/4/2023 1324  Last data filed at 5/4/2023 1131  Gross per 24 hour   Intake 1884 2 ml   Output 3125 ml   Net -1240 8 ml       Drips:  furosemide, 10 mg/hr, Last Rate: 10 mg/hr (05/04/23 1130)  heparin (porcine), 3-20 Units/kg/hr (Order-Specific), Last Rate: 19 1 Units/kg/hr (05/04/23 1047)         Plan:  Warm LE, vol overloaded on exam but improving w IV lasix, now drip    cw lasix drip 10/hr, may escalate diuresis pending response  Some initial concern low flow even with normotension, appears improved now, close monitoring now  Strict IOs and daily weights  Keep K>4, Mg>2  Check BID lytes while aggressively diuresing  Goal net neg 2-4L in 24 hr  Home diuretic: his PCP started lasix about 2 weeks ago - initially he says he responded then urine output decreased    In NSR with frequent PVCs now  concern for sustained VT vs SVT/AFL with aberrancy  EP consult recs appreciated  Starting [facility], [facility]  -  [facility]  Plan for secondary prevention ICD before DC, once euvolemic    cw ASA, high intens statin    Start cautious bblocker    cw hydral/isordil 10/10 tid    On hep gtt now    GDMT future    revasc consideration future, limited role here    Neurohormonal Blockade/GDMT: future  --Beta-Blocker:  --ACEi, ARB, ARNi:  --MRA:  --SGLT2i:  --Hydralazine/nitrates:    --Diuretic:    Other HF pharmaco-invasive therapy (if applicable):   PPM,  ICD , CRT (if applicable): Interrogation:  Advanced Therapies (if applicable): --Inotrope:  --LVAD/Transplant Candidacy: will continue to monitor  Current smoker    Studies:  I have reviewed all pertinent patient data/labs/imaging where available, including but not limited to:    5/2/23 TTE:  •  Left Ventricle: Left ventricular cavity size is mildly dilated  Wall thickness is mildly increased  The left ventricular ejection fraction is 10-15%  Systolic function is severely reduced  There is severe global hypokinesis, with akinesis of the apex with aneurysm formation  There is no thrombus  •  Right Ventricle: Right ventricular cavity size is normal  Systolic function is moderately to severely reduced  •  Mitral Valve: There is mild regurgitation  •  Tricuspid Valve: There is mild regurgitation  The right ventricular systolic pressure is mildly elevated  The estimated right ventricular systolic pressure is 04 48 mmHg    • Prior TTE study available for comparison  Prior study date: 9/26/2017  Changes noted when compared to prior study  Changes include: LVEF is now severely reduced       5/3/23 [facility]:  •  Chronically occluded mid LAD & LPDA consistent w/ echo findings (apical aneurysm) & not amenable to PCI  •  Widely patent mid LCx stent with mild-mod lesions proximal & distal to stent margins  •  Significantly elevated LVEDP (30 mmHg)    Thank you for the opportunity to participate in the care of this patient      Elvi Peraza MD  Attending Physician  Advanced Heart Failure and Transplant Cardiology  [address] [place] [address]

## 2023-05-04 NOTE — ASSESSMENT & PLAN NOTE
Wt Readings from Last 3 Encounters:   05/04/23 115 kg (254 lb 3 1 oz)   05/02/23 116 kg (254 lb 12 8 oz)   04/01/22 102 kg (224 lb 12 8 oz)   · Patient presenting with 2 weeks of dyspnea on exertion, lower extremity edema, abd distention, weight gain  · Currently comfortable at rest, believes he will be able to lie flat for a cath  · Was on Lasix 40 mg oral provided by his PCP but stopped because it was not helping  · Echo read with LVEF 10-15%

## 2023-05-04 NOTE — ASSESSMENT & PLAN NOTE
Lab Results   Component Value Date    HGBA1C 12 0 (H) 04/14/2023       Recent Labs     05/03/23  1600 05/03/23 2049 05/04/23  0539 05/04/23  1033   POCGLU 215* 205* 195* 245*       Blood Sugar Average: Last 72 hrs:  (P) 355 [id number] hold oral medications and continue on insulin with sliding scale

## 2023-05-04 NOTE — WOUND OSTOMY CARE
Consult Note - Wound   Nirali Lies 59 y o  male MRN: [id number]  Unit/Bed#: [facility] 411-01 Encounter: [id number]        History and Present Illness:  Patient is a 58 yo male that was admitted to [facility] for treatment of Ischemic Cardiomyopathy  Patient has a PMH of CAD with drug-eluting stent placed to LCx in 2015, hypertension, hyperlipidemia, BPH, GERD  Patient is a min assist for turning and repositioning  Patient reports continence of bowel and bladder  On assessment, patient is sitting in bed  Family present at bedside for assessment  Wound Care was consulted for left elbow wound  Assessment Findings:   B/L heels are dry intact and artemio with no skin loss or wounds present  Recommend preventative Hydraguard Cream and proper offloading/ repositioning  Patient refused assessment of sacro-buttocks  Patient reports no wounds or pain in area  1 Left Elbow Wound: patient reports that area began as a blister that he popped  He is unsure of what caused blister  Wound bed is irregular in shape with partial thickness skin loss  Wound bed is yellow dry scab with islands of pink tissue  Rtini-wound is intact and fragile  Scant serosanguineous drainage noted  Will recommend silvasorb gel and allevyn foam dressing to area  No induration, fluctuance, odor, warmth/temperature differences, redness, or purulence noted to the above noted wounds and skin areas assessed  New dressings applied per orders listed below  Patient tolerated well- no s/s of non-verbal pain or discomfort observed during the encounter  Bedside nurse aware of plan of care  See flow sheets for more detailed assessment findings  Orders listed below and wound care will continue to follow, call or tiger text with questions  Skin Care Plan:  1-Left Elbow Wound: Cleanse area with NS and pat dry  Apply silvasorb gel to wound bed and cover with small clover allevyn foam dressing   Jones with T for Treatment and change every other day or PRN   2-Turn/reposition q2h or when medically stable for pressure re-distribution on skin   3-Elevate heels to offload pressure  4-Moisturize skin daily with skin nourishing cream  5-Ehob cushion in chair when out of bed  6-Preventative Hydraguard to bilateral heels BID and PRN  Wounds:  Wound 05/04/23 Elbow Left;Posterior (Active)   Wound Image   05/04/23 1134   Wound Description Yellow;Fragile;Dry 05/04/23 1134   Trini-wound Assessment Fragile 05/04/23 1134   Wound Length (cm) 1 8 cm 05/04/23 1134   Wound Width (cm) 1 4 cm 05/04/23 1134   Wound Depth (cm) 0 1 cm 05/04/23 1134   Wound Surface Area (cm^2) 2 52 cm^2 05/04/23 1134   Wound Volume (cm^3) 0 252 cm^3 05/04/23 1134   Calculated Wound Volume (cm^3) 0 25 cm^3 05/04/23 1134   Drainage Amount Scant 05/04/23 1134   Drainage Description Serosanguineous 05/04/23 1134   Non-staged Wound Description Partial thickness 05/04/23 1134   Treatments Cleansed;Irrigation with NSS;Site care 05/04/23 1134   Dressing Silvasorb gel; Foam, Silicon (eg  Allevyn, etc) 05/04/23 1134   Wound packed?  No 05/04/23 1134   Dressing Changed New 05/04/23 1134   Patient Tolerance Tolerated well 05/04/23 1134   Dressing Status Intact;Dry;Clean 05/04/23 [facility] [facility] RN, BSN

## 2023-05-04 NOTE — QUICK NOTE
Patient net neg 500mL today on IV lasix 80mg BID  Warm on exam with no worsening symptoms per patient  Will give IV lasix 120mg x1 and start lasix gtt at 10mg/hr  Titrate to urine output      Ángel Hernandez MD

## 2023-05-04 NOTE — PROGRESS NOTES
Progress Note - Heart Failure  Cuba Chow 59 y o  male MRN: [id number]  Unit/Bed#: [facility] 411-01 Encounter: [id number]    Assessment:    Acute decompensated heart failure with reduced EF (ACC Stage C) likely due to ischemic cardiomyopathy     New Q-wave MI, likely representing late-presenting LAD-territory infarction     LV apical aneurysm     Scar-mediated sustained VT, questionable atrial fibrillation/flutter     History of CAD with mid-circumflex artery stenting (2015)     Current tobacco use     Hyperlipidemia     Hypervolemic hyponatremia due to heart failure     Right pleural effusion      LINDEN      Diabetes Mellitus    59year old male with known CAD with LHC in 2015 showing obstructive mid-LCx disease treated with PTCA/stening and residual non-obstructive disease in the LAD, now presented with 1-2 weeks of shortness of breath, leg swelling, and >20 lb weight gain in 1 week  He was suspected during a cardiology visit to be in rapid atrial fibrillation, which on further analysis appeared to be consistent with stable, but sustained monomorphic VT  Echocardiogram revealed global severely reduced LV EF 15% (compared to 65% in 2017) with akinesis at the apex with aneurysmal apical-anterior segment  No LV thrombus was identified, but significant swirling of contrast noted at apex  There was no significant MR or AS, with normal RV function  LA/RA sizes were normal     He underwent cardiac catheterization yesterday, demonstrating occluded mid-LAD and posterior descending arteries, and patent circumflex stent from 2015  Since the occluded arteries are supplying clearly infarcted/non-viable myocardium on echocardiogram, revascularization with PCI or CABG is not indicated  Aggressive diuresis initiated yesterday with Furosemide 80mg x 2 + 120mg IV  Due to limited urine output, he is now switched to continuous infusion at 10mg/hr (240mg daily)     Serum creatinine is stable and LFTs not markedly elevated, consistent with preserved cardiac output, with no need for inotrope support  Plan:  Continue amiodarone per EP, and will cautiously initiate beta blocker (Metoprolol Tartrate 12 5mg BID) today  Plan for ICD implantation prior to discharge  As he is not expected to be pacer dependent and has a narrow sinus QRS, there is no indication for CRT  Continue IV Furosemide at 10/hr with goal to maintain net output of ~2L over the next 24 hours  Will plan to introduce further GDMT once he is closer to euvolemia and renal function has stabilized  Subjective/Objective     Subjective: Reports feeling improved compared to yesterday with easier breathing and reduced leg swelling  Overnight was switched from bolus dosing to continuous infusion  Objective:     Vitals: BP 99/66   Pulse (!) 130   Temp 98 3 °F (36 8 °C) (Oral)   Resp 20   Ht 6' (1 829 m)   Wt 115 kg (254 lb 3 1 oz)   SpO2 98%   BMI 34 47 kg/m²   Vitals:    05/03/23 0600 05/04/23 0600   Weight: 117 kg (257 lb 15 oz) 115 kg (254 lb 3 1 oz)     Orthostatic Blood Pressures    Flowsheet Row Most Recent Value   Blood Pressure 99/66 filed at 05/04/2023 1036   Patient Position - Orthostatic VS Lying filed at 05/04/2023 0230            Intake/Output Summary (Last 24 hours) at 5/4/2023 1331  Last data filed at 5/4/2023 1131  Gross per 24 hour   Intake 1884 2 ml   Output 3125 ml   Net -1240 8 ml       Invasive Devices     Peripheral Intravenous Line  Duration           Peripheral IV 05/02/23 Dorsal (posterior); Left Hand 2 days    Peripheral IV 05/02/23 Right Antecubital 2 days              Physical Exam  Neck:      Comments: JVP elevated to 12cm  Cardiovascular:      Rate and Rhythm: Tachycardia present  Rhythm irregular  Pulses: Normal pulses  Musculoskeletal:      Right lower leg: Edema present  Left lower leg: Edema present  Neurological:      Mental Status: He is alert

## 2023-05-04 NOTE — ASSESSMENT & PLAN NOTE
· EP following and started amiodarone, given ischemic cardiomyopathy and VT, tentatively patient is planned for a ICD prior to discharge  · Plan when patient is more euvolemic

## 2023-05-04 NOTE — CONSULTS
Please see note dated 05/03/2023 at 0935 from cardiology fellow, MELANIE Sousa , for heart failure consultation note

## 2023-05-04 NOTE — RESTORATIVE TECHNICIAN NOTE
Restorative Technician Note      Patient Name: Enid Tanner     Restorative Tech Visit Date: 05/04/23  Note Type: Mobility      Per pt's nurse he is allowed to ambulate if his heart rate does not reach 140's and stays in the 140's  Upon standing his heart rate reached 141-143 and stayed there for 4-5 minutes of just standing  Returned pt to his bed

## 2023-05-04 NOTE — DISCHARGE INSTR - OTHER ORDERS
Skin Care Plan:  1-Left Elbow Wound: Cleanse area with NS and pat dry  Apply silvasorb gel to wound bed and cover with small clover allevyn foam dressing  Jones with T for Treatment and change every other day or PRN   2-Turn/reposition q2h or when medically stable for pressure re-distribution on skin   3-Elevate heels to offload pressure  4-Moisturize skin daily with skin nourishing cream  5-Ehob cushion in chair when out of bed  6-Preventative Hydraguard to bilateral heels BID and PRN

## 2023-05-05 LAB
ANION GAP SERPL CALCULATED.3IONS-SCNC: 8 MMOL/L (ref 4–13)
APTT PPP: 90 SECONDS (ref 23–37)
ATRIAL RATE: 110 BPM
BUN SERPL-MCNC: 56 MG/DL (ref 5–25)
CALCIUM SERPL-MCNC: 8.3 MG/DL (ref 8.3–10.1)
CHLORIDE SERPL-SCNC: 99 MMOL/L (ref 96–108)
CO2 SERPL-SCNC: 20 MMOL/L (ref 21–32)
CREAT SERPL-MCNC: 1.77 MG/DL (ref 0.6–1.3)
ERYTHROCYTE [DISTWIDTH] IN BLOOD BY AUTOMATED COUNT: 13.8 % (ref 11.6–15.1)
GFR SERPL CREATININE-BSD FRML MDRD: 40 ML/MIN/1.73SQ M
GLUCOSE SERPL-MCNC: 219 MG/DL (ref 65–140)
GLUCOSE SERPL-MCNC: 254 MG/DL (ref 65–140)
GLUCOSE SERPL-MCNC: 260 MG/DL (ref 65–140)
GLUCOSE SERPL-MCNC: 286 MG/DL (ref 65–140)
GLUCOSE SERPL-MCNC: 316 MG/DL (ref 65–140)
HCT VFR BLD AUTO: 49.9 % (ref 36.5–49.3)
HGB BLD-MCNC: 15.8 G/DL (ref 12–17)
MAGNESIUM SERPL-MCNC: 2.4 MG/DL (ref 1.6–2.6)
MCH RBC QN AUTO: 27.1 PG (ref 26.8–34.3)
MCHC RBC AUTO-ENTMCNC: 31.7 G/DL (ref 31.4–37.4)
MCV RBC AUTO: 86 FL (ref 82–98)
PLATELET # BLD AUTO: 181 THOUSANDS/UL (ref 149–390)
PMV BLD AUTO: 12 FL (ref 8.9–12.7)
POTASSIUM SERPL-SCNC: 4.6 MMOL/L (ref 3.5–5.3)
QRS AXIS: 201 DEGREES
QRSD INTERVAL: 142 MS
QT INTERVAL: 358 MS
QTC INTERVAL: 538 MS
RBC # BLD AUTO: 5.82 MILLION/UL (ref 3.88–5.62)
SODIUM SERPL-SCNC: 127 MMOL/L (ref 135–147)
T WAVE AXIS: 41 DEGREES
VENTRICULAR RATE: 136 BPM
WBC # BLD AUTO: 12.21 THOUSAND/UL (ref 4.31–10.16)

## 2023-05-05 RX ORDER — FUROSEMIDE 10 MG/ML
60 INJECTION INTRAMUSCULAR; INTRAVENOUS
Status: DISCONTINUED | OUTPATIENT
Start: 2023-05-05 | End: 2023-05-05

## 2023-05-05 RX ORDER — HEPARIN SODIUM 5000 [USP'U]/ML
5000 INJECTION, SOLUTION INTRAVENOUS; SUBCUTANEOUS EVERY 8 HOURS SCHEDULED
Status: DISCONTINUED | OUTPATIENT
Start: 2023-05-05 | End: 2023-05-16 | Stop reason: HOSPADM

## 2023-05-05 RX ORDER — FUROSEMIDE 10 MG/ML
60 INJECTION INTRAMUSCULAR; INTRAVENOUS
Status: DISCONTINUED | OUTPATIENT
Start: 2023-05-05 | End: 2023-05-06

## 2023-05-05 RX ORDER — INSULIN LISPRO 100 [IU]/ML
2-12 INJECTION, SOLUTION INTRAVENOUS; SUBCUTANEOUS
Status: DISCONTINUED | OUTPATIENT
Start: 2023-05-05 | End: 2023-05-06

## 2023-05-05 RX ORDER — INSULIN LISPRO 100 [IU]/ML
2-12 INJECTION, SOLUTION INTRAVENOUS; SUBCUTANEOUS
Status: DISCONTINUED | OUTPATIENT
Start: 2023-05-06 | End: 2023-05-05

## 2023-05-05 RX ORDER — HYDRALAZINE HYDROCHLORIDE 10 MG/1
10 TABLET, FILM COATED ORAL EVERY 8 HOURS SCHEDULED
Status: DISCONTINUED | OUTPATIENT
Start: 2023-05-05 | End: 2023-05-06

## 2023-05-05 RX ORDER — INSULIN LISPRO 100 [IU]/ML
2-12 INJECTION, SOLUTION INTRAVENOUS; SUBCUTANEOUS EVERY 6 HOURS
Status: DISCONTINUED | OUTPATIENT
Start: 2023-05-05 | End: 2023-05-05

## 2023-05-05 RX ORDER — LIDOCAINE 50 MG/G
1 PATCH TOPICAL DAILY
Status: DISCONTINUED | OUTPATIENT
Start: 2023-05-05 | End: 2023-05-16 | Stop reason: HOSPADM

## 2023-05-05 RX ORDER — ISOSORBIDE DINITRATE 10 MG/1
10 TABLET ORAL
Status: DISCONTINUED | OUTPATIENT
Start: 2023-05-05 | End: 2023-05-06

## 2023-05-05 RX ADMIN — AMIODARONE HYDROCHLORIDE 200 MG: 200 TABLET ORAL at 11:54

## 2023-05-05 RX ADMIN — INSULIN LISPRO 6 UNITS: 100 INJECTION, SOLUTION INTRAVENOUS; SUBCUTANEOUS at 11:54

## 2023-05-05 RX ADMIN — AMIODARONE HYDROCHLORIDE 200 MG: 200 TABLET ORAL at 09:03

## 2023-05-05 RX ADMIN — INSULIN LISPRO 6 UNITS: 100 INJECTION, SOLUTION INTRAVENOUS; SUBCUTANEOUS at 06:29

## 2023-05-05 RX ADMIN — LIDOCAINE 5% 1 PATCH: 700 PATCH TOPICAL at 13:48

## 2023-05-05 RX ADMIN — AMIODARONE HYDROCHLORIDE 1 MG/MIN: 50 INJECTION, SOLUTION INTRAVENOUS at 00:32

## 2023-05-05 RX ADMIN — ATORVASTATIN CALCIUM 80 MG: 80 TABLET, FILM COATED ORAL at 17:21

## 2023-05-05 RX ADMIN — INSULIN GLARGINE 10 UNITS: 100 INJECTION, SOLUTION SUBCUTANEOUS at 21:31

## 2023-05-05 RX ADMIN — INSULIN LISPRO 8 UNITS: 100 INJECTION, SOLUTION INTRAVENOUS; SUBCUTANEOUS at 16:41

## 2023-05-05 RX ADMIN — AMIODARONE HYDROCHLORIDE 1 MG/MIN: 50 INJECTION, SOLUTION INTRAVENOUS at 12:06

## 2023-05-05 RX ADMIN — ASPIRIN 81 MG 81 MG: 81 TABLET ORAL at 09:04

## 2023-05-05 RX ADMIN — HEPARIN SODIUM 5000 UNITS: 5000 INJECTION INTRAVENOUS; SUBCUTANEOUS at 21:31

## 2023-05-05 RX ADMIN — HEPARIN SODIUM 19.1 UNITS/KG/HR: 10000 INJECTION, SOLUTION INTRAVENOUS at 01:55

## 2023-05-05 RX ADMIN — SENNOSIDES AND DOCUSATE SODIUM 1 TABLET: 8.6; 5 TABLET ORAL at 21:30

## 2023-05-05 RX ADMIN — FUROSEMIDE 60 MG: 10 INJECTION, SOLUTION INTRAMUSCULAR; INTRAVENOUS at 17:23

## 2023-05-05 RX ADMIN — AMIODARONE HYDROCHLORIDE 200 MG: 200 TABLET ORAL at 17:21

## 2023-05-05 NOTE — PROGRESS NOTES
Progress Note - Cardiology   Robby Luis 59 y o  male MRN: [id number]  Unit/Bed#: [facility] 411-01 Encounter: [id number]    Assessment/Plan:  Robby Luis is a 59year old male with past medical history of coronary artery disease with left heart catheterization in 2015 showing nonobstructive disease of the mid circumflex treated with a balloon and stenting and residual nonobstructive disease in the LAD, that is presenting with 2 weeks of heart failure symptoms including shortness of breath, leg swelling and 20 pound weight gain  He was found to have new worsening cardiomyopathy and  of the LAD and PDA from the left dominant system       Ventricular Tachycardia RBBB with early transition and mostly negative in the precordial lead, negative in the inferior leads, mostly negative in the lateral leads  Tachycardia concerning VT from posterior LV mid-apex area  This appears to be all scar mediated     -worsening kidney function, cardiomyopathy, and structural heart disease make amio the only real option at this time   -would start BB when the patient is more compensated   -oral amio load 200 mg TID and amio IV 1 mg/min  -frequent PVCs on tele (bigemin)  -plan for implantation of dual chamber ICD on Monday 5/8 (NPO p midnight Sunday night)     Cardiomyopathy HFrEF 15% NYHA III/C: ischemic etiology with significant scar related  of the LAD:  -GDMT and diuretics per heart failure team   -would start BB when able   -amio as above for VT and PVC burden   -I do not foresee the patient's cardiomyopathy significantly improving and I would plan for a dual chamber ICD this admission before discharge     Recommendations Today:  -continue diuresis for decompensated HF per HF team  -NPO p MN on Sunday for implantation of [employer] dual chamber ICD on Monday 5/8    Subjective/Objective       Vitals: BP 95/63   Pulse 76   Temp 98 °F (36 7 °C) (Oral)   Resp 20   Ht 6' (1 829 m)   Wt 115 kg (254 lb 10 1 oz)   SpO2 96% BMI 34 53 kg/m²   Vitals:    05/04/23 0600 05/05/23 0552   Weight: 115 kg (254 lb 3 1 oz) 115 kg (254 lb 10 1 oz)     Orthostatic Blood Pressures    Flowsheet Row Most Recent Value   Blood Pressure 95/63 filed at 05/05/2023 1355   Patient Position - Orthostatic VS Sitting filed at 05/05/2023 0615            Intake/Output Summary (Last 24 hours) at 5/5/2023 1456  Last data filed at 5/5/2023 1140  Gross per 24 hour   Intake 480 ml   Output 2325 ml   Net -1845 ml       Invasive Devices     Peripheral Intravenous Line  Duration           Peripheral IV 05/02/23 Dorsal (posterior); Left Hand 3 days    Peripheral IV 05/02/23 Right Antecubital 3 days    Peripheral IV 05/04/23 Dorsal (posterior); Right Hand <1 day                Review of Systems: Negative     Physical Exam: GEN: Abraham Mendosa appears well, alert and oriented x 3, pleasant and cooperative   HEENT:  Normocephalic, atraumatic, anicteric, moist mucous membranes  NECK: No carotid bruits   HEART: JVP 18 with AJR, irregular rhythm, regular rate, normal S1 and S2, no murmurs, clicks, gallops or rubs   LUNGS: Clear to auscultation bilaterally; no wheezes, rales, or rhonchi; respiration nonlabored   ABDOMEN:  Normoactive bowel sounds, soft, no tenderness, no distention  EXTREMITIES: peripheral pulses palpable; +2 pitting edema and cool distal extremities   NEURO: no gross focal findings; cranial nerves grossly intact   SKIN:  Dry, intact, warm to touch    Lab Results: I have personally reviewed pertinent lab results  Imaging: I have personally reviewed pertinent reports  EKG:   VTE Pharmacologic Prophylaxis: Sequential compression device (Venodyne)  heparin gtt  VTE Mechanical Prophylaxis: sequential compression device and foot pump applied    Counseling / Coordination of Care  Total Critical Care time spent 45 minutes excluding procedures, teaching and family updates

## 2023-05-05 NOTE — ASSESSMENT & PLAN NOTE
Lab Results   Component Value Date    HGBA1C 12 0 (H) 04/14/2023       Recent Labs     05/04/23  1536 05/04/23  2044 05/05/23  0544 05/05/23  1020   POCGLU 182* 205* 260* 254*       Blood Sugar Average: Last 72 hrs:  (P) 782 [id number] hold oral medications and continue on insulin with sliding scale

## 2023-05-05 NOTE — ASSESSMENT & PLAN NOTE
· Patient with history of CAD with drug-eluting stent to LCx in 2015  · Patient reports dyspnea on exertion over the past 2 weeks, denies any chest pain at rest, is a diabetic on insulin, is an active smoker of 40 pack years, has prior history of CAD, is on daily aspirin  · LVEF 10-15%, continue medical management  Appreciate Cardiology comanagement and expertise  · Cath 5/3 with multivessel disease not amenable to PCI, continue medical management   Appreciate cardio recommendations

## 2023-05-05 NOTE — ASSESSMENT & PLAN NOTE
· Likely mild LINDEN in the setting of hypervolemia vs medication side effect from lisinopril  · Optimize volume status and trend BMP  · Still appears hypervolemic  · Check Renal US and consider renal consult if failing to improve medically  · Continue Lasix infusion, patient appears to be diuresing appropriately

## 2023-05-05 NOTE — PROGRESS NOTES
[facility]  Progress Note  Name: Lindsay Ingram  MRN: [id number]  [facility] I Date of Admission: 5/2/2023   Date of Service: 5/5/2023 I Hospital Day: 3    Assessment/Plan   V-tach [facility])  Assessment & Plan  · EP following and started amiodarone, given ischemic cardiomyopathy and VT, tentatively patient is planned for a ICD prior to discharge  · Plan when patient is more euvolemic    LINDEN (acute kidney injury) ([address] )  Assessment & Plan  · Likely mild LINDEN in the setting of hypervolemia vs medication side effect from lisinopril  · Optimize volume status and trend BMP  · Still appears hypervolemic  · Check Renal US and consider renal consult if failing to improve medically  · Continue Lasix infusion, patient appears to be diuresing appropriately    A-fib [facility])  Assessment & Plan  · At cardio appointment outpt when he was noted to be in A-fib RVR on EKG, also with Q waves concerning for prior infarct, in the setting of chest pressure last week concerning for MI  · Heparin drip pending ischemic evaluation  · Noted reduced LVEF of 10 to 15%, concern for ischemia versus tachycardia mediated cardiomyopathy      Acute HFrEF  Assessment & Plan  Wt Readings from Last 3 Encounters:   05/05/23 115 kg (254 lb 10 1 oz)   05/02/23 116 kg (254 lb 12 8 oz)   04/01/22 102 kg (224 lb 12 8 oz)   · Patient presenting with 2 weeks of dyspnea on exertion, lower extremity edema, abd distention, weight gain  · Appears to be diuresing well, switch to 60mg IV push bid Lasix today  · Continue I/O, daily weight  · Appreciate heart failure team  · -1 6 L net yesterday      Tobacco dependence syndrome  Assessment & Plan  Patient with over 40  pack-year history  Counseling provided on cessation, nicotine patch ordered    Type 2 diabetes mellitus, with long-term current use of insulin [facility])  Assessment & Plan  Lab Results   Component Value Date    HGBA1C 12 0 (H) 04/14/2023       Recent Labs 05/04/23  1536 05/04/23  2044 05/05/23  0544 05/05/23  1020   POCGLU 182* 205* 260* 254*       Blood Sugar Average: Last 72 hrs:  (P) 038 [id number] hold oral medications and continue on insulin with sliding scale    3-vessel CAD  Assessment & Plan  · Patient with history of CAD with drug-eluting stent to LCx in 2015  · Patient reports dyspnea on exertion over the past 2 weeks, denies any chest pain at rest, is a diabetic on insulin, is an active smoker of 40 pack years, has prior history of CAD, is on daily aspirin  · LVEF 10-15%, continue medical management  Appreciate Cardiology comanagement and expertise  · Cath 5/3 with multivessel disease not amenable to PCI, continue medical management  Appreciate cardio recommendations    Essential (primary) hypertension  Assessment & Plan  Resume lopressor and PRN hydralazine           VTE Pharmacologic Prophylaxis: VTE Score: 5 Moderate Risk (Score 3-4) - Pharmacological DVT Prophylaxis Ordered: heparin  Patient Centered Rounds: I performed bedside rounds with nursing staff today  Discussions with Specialists or Other Care Team Provider: Heart failure service    Education and Discussions with Family / Patient: Updated  (wife and daughter) at bedside  Total Time Spent on Date of Encounter in care of patient: 55 minutes This time was spent on one or more of the following: performing physical exam; counseling and coordination of care; obtaining or reviewing history; documenting in the medical record; reviewing/ordering tests, medications or procedures; communicating with other healthcare professionals and discussing with patient's family/caregivers  Current Length of Stay: 3 day(s)  Current Patient Status: Inpatient   Certification Statement: The patient will continue to require additional inpatient hospital stay due to CHF  Discharge Plan: Anticipate discharge in 48-72 hrs to home      Code Status: Level 1 - Full Code    Subjective:   Seen and examined at bedside  Today patient is a little frustrated because he is n p o  He was told he was getting his ICD today but does not have a time  Denies any chest pain, dyspnea, or any other issues    Objective:     Vitals:   Temp (24hrs), Av 1 °F (36 7 °C), Min:97 7 °F (36 5 °C), Max:98 7 °F (37 1 °C)    Temp:  [97 7 °F (36 5 °C)-98 7 °F (37 1 °C)] 98 7 °F (37 1 °C)  HR:  [] 80  Resp:  [17-20] 20  BP: ()/(53-85) 139/79  SpO2:  [95 %-100 %] 97 %  Body mass index is 34 53 kg/m²  Input and Output Summary (last 24 hours): Intake/Output Summary (Last 24 hours) at 2023 1626  Last data filed at 2023 1140  Gross per 24 hour   Intake 480 ml   Output 1925 ml   Net -1445 ml       Physical Exam:   Physical Exam  Vitals and nursing note reviewed  Constitutional:       General: He is not in acute distress  Appearance: He is well-developed  He is not toxic-appearing or diaphoretic  HENT:      Head: Normocephalic and atraumatic  Mouth/Throat:      Mouth: Mucous membranes are moist    Eyes:      General: No scleral icterus  Extraocular Movements: Extraocular movements intact  Conjunctiva/sclera: Conjunctivae normal    Cardiovascular:      Rate and Rhythm: Normal rate and regular rhythm  Pulses: Normal pulses  Heart sounds: No murmur heard  No friction rub  No gallop  Pulmonary:      Effort: Pulmonary effort is normal  No respiratory distress  Breath sounds: Normal breath sounds  No wheezing, rhonchi or rales  Abdominal:      General: Abdomen is flat  Bowel sounds are normal  There is no distension  Palpations: Abdomen is soft  There is no mass  Tenderness: There is no abdominal tenderness  There is no guarding  Musculoskeletal:         General: No swelling  Cervical back: Neck supple  Right lower leg: Edema present  Left lower leg: Edema present  Lymphadenopathy:      Cervical: No cervical adenopathy     Skin:     General: Skin is warm and dry  Capillary Refill: Capillary refill takes less than 2 seconds  Coloration: Skin is not jaundiced  Findings: No rash  Neurological:      General: No focal deficit present  Mental Status: He is alert and oriented to person, place, and time  Sensory: No sensory deficit  Motor: No weakness  Psychiatric:         Mood and Affect: Mood normal           Additional Data:     Labs:  Results from last 7 days   Lab Units 05/05/23 0447 05/04/23  0235 05/02/23  2314   WBC Thousand/uL 12 21*   < > 9 27   HEMOGLOBIN g/dL 15 8   < > 14 9   HEMATOCRIT % 49 9*   < > 48 8   PLATELETS Thousands/uL 181   < > 195   NEUTROS PCT %  --   --  69   LYMPHS PCT %  --   --  23   MONOS PCT %  --   --  7   EOS PCT %  --   --  1    < > = values in this interval not displayed  Results from last 7 days   Lab Units 05/05/23 0447 05/04/23  0235 05/03/23  1641   SODIUM mmol/L 127*   < > 131*   POTASSIUM mmol/L 4 6   < > 5 1   CHLORIDE mmol/L 99   < > 102   CO2 mmol/L 20*   < > 24   BUN mg/dL 56*   < > 54*   CREATININE mg/dL 1 77*   < > 1 52*   ANION GAP mmol/L 8   < > 5   CALCIUM mg/dL 8 3   < > 8 4   ALBUMIN g/dL  --   --  3 5   TOTAL BILIRUBIN mg/dL  --   --  0 51   ALK PHOS U/L  --   --  42*   ALT U/L  --   --  45   AST U/L  --   --  34   GLUCOSE RANDOM mg/dL 286*   < > 221*    < > = values in this interval not displayed       Results from last 7 days   Lab Units 05/02/23  1010   INR  1 13     Results from last 7 days   Lab Units 05/05/23  1618 05/05/23  1020 05/05/23  0544 05/04/23  2044 05/04/23  1536 05/04/23  1033 05/04/23  0539 05/03/23  2049 05/03/23  1600 05/03/23  1050 05/03/23  0614 05/02/23  2240   POC GLUCOSE mg/dl 316* 254* 260* 205* 182* 245* 195* 205* 215* 159* 155* 145*         Results from last 7 days   Lab Units 05/02/23  2314 05/02/23  1010   LACTIC ACID mmol/L 1 9  --    PROCALCITONIN ng/ml 0 26* 0 16       Lines/Drains:  Invasive Devices     Peripheral Intravenous Line Duration           Peripheral IV 05/02/23 Dorsal (posterior); Left Hand 3 days    Peripheral IV 05/02/23 Right Antecubital 3 days    Peripheral IV 05/04/23 Dorsal (posterior); Right Hand <1 day                  Telemetry:  Telemetry Orders (From admission, onward)             48 Hour Telemetry Monitoring  Continuous x 48 hours        References:    Telemetry Guidelines   Question:  Reason for 48 Hour Telemetry  Answer:  Arrhythmias Requiring Medical Therapy (eg  SVT, Vtach/fib, Bradycardia, Uncontrolled A-fib)                 Telemetry Reviewed: Atrial fibrillation   HR averaging 90s  Indication for Continued Telemetry Use: Arrthymias requiring medical therapy             Imaging: Reviewed radiology reports from this admission including: chest CT scan    Recent Cultures (last 7 days):         Last 24 Hours Medication List:   Current Facility-Administered Medications   Medication Dose Route Frequency Provider Last Rate   • acetaminophen  650 mg Oral Q6H PRN CARLA Reno     • amiodarone  1 mg/min Intravenous Continuous Angelo Tan MD 1 mg/min (05/05/23 1206)   • amiodarone  200 mg Oral TID With Meals Angelo Tan MD     • aspirin  81 mg Oral Daily Maycolprema Jones, DO     • atorvastatin  80 mg Oral After [employer], DO     • furosemide  60 mg Intravenous BID (diuretic) Lamont Fry MD     • hydrALAZINE  5 mg Intravenous Q6H PRN Maycol Jones, DO     • hydrALAZINE  10 mg Oral Q8H [address] Lamont Murcia MD     • insulin glargine  10 Units Subcutaneous HS Maycol Jones, DO     • insulin lispro  2-12 Units Subcutaneous Q6H Lilly Sandifer, CRNP     • isosorbide dinitrate  10 mg Oral TID after meals Lamont Murcia MD     • lidocaine  1 patch Topical Daily Deborha Allis     • metoprolol tartrate  25 mg Oral Q12H Nilson Castelan MD     • nicotine  1 patch Transdermal Daily Maycolprema Simentalai, DO     • nitroglycerin  0 4 mg Sublingual Q5 Min PRN Maycol Jones, DO     • ondansetron  4 mg Intravenous Q6H PRN Maycol Simentalai, DO • senna-docusate sodium  1 tablet Oral HS Maycol Jones DO          Today, Patient Was Seen By: Clay Quiroga    **Please Note: This note may have been constructed using a voice recognition system  **

## 2023-05-05 NOTE — PROGRESS NOTES
Progress Note - Heart Failure  Abraham Mendosa 59 y o  male MRN: [id number]  Unit/Bed#: [facility] 411-01 Encounter: [id number]    Assessment:    Acute decompensated heart failure with reduced EF (ACC Stage C) due to ischemic cardiomyopathy     New Q-wave MI, representing late-presenting or silent LAD-territory infarction (acute MI / ACS ruled out)     Multivessel coronary artery disease    LV apical aneurysm     Scar-mediated sustained VT     Current tobacco use     Hyperlipidemia     Persistent/worsening hyponatremia despite approaching euvolemia, possibly related to limited oral intake     Right pleural effusion and ascites related to heart failure    LINDEN      Diabetes Mellitus    59year old male with known CAD with [facility] in 2015 showing obstructive mid-LCx disease treated with PTCA/stening and residual non-obstructive disease in the LAD, now presented with 1-2 weeks of shortness of breath, leg swelling, and >20 lb weight gain in 1 week  He was suspected during a cardiology visit to be in rapid atrial fibrillation, which on further analysis appeared to be consistent with stable, but sustained monomorphic VT  Echocardiogram revealed global severely reduced LV EF 15% (compared to 65% in 2017) with akinesis at the apex with aneurysmal apical-anterior segment  No LV thrombus was identified, but significant swirling of contrast noted at apex  There was no significant MR or AS, with normal RV function  LA/RA sizes were normal     He underwent cardiac catheterization yesterday, demonstrating occluded mid-LAD and posterior descending arteries, and patent circumflex stent from 2015  Since the occluded arteries are supplying clearly infarcted/non-viable myocardium on echocardiogram, revascularization with PCI or CABG is not indicated  Aggressive diuresis initiated with Furosemide IVP -> drip  Adequate urine output over past 24 hours with net 1 5L negative   Patient appears more clinically euvolemic, though reporting PND overnight  Creatinine rise from 1 5-> 1 7 noted today  Potassium 4 6 / Mg 2 4 today  Telemetry showing sinus rhythm with ventricular bigeminy, decreased ectopy noted on amio and beta blocker  Plan:  Rhythm Management: Continue amiodarone per EP  Would suggest discontinuation of heparin drip given no ongoing ACS and no clear evidence of atrial fibrillation  Increase Metoprolol Tartrate to 25mg BID given continued high burden of ectopy, will uptitrate based on BP/HR response and eventually transition to metoprolol succinate  Device therapy  Plan for secondary prevention ICD implantation prior to discharge per EP  As he is not expected to be pacer dependent and has a narrow sinus QRS, there is no indication for CRT  GDMT  Will continue Hydral/ISDN 10/10mg TID for now given fluctuating renal function, will plan to transition to ACE/ARB/ARNI when appropriate  Beta blocker to be adjusted as above  MRA/SGLT-2i to be considered prior to discharge once renal function stabilizes  Diuretic Therapy  Switch to bolus dosing Furosemide IV 60mg BID for today, goal to maintain -1 to -2L net volume output over next 24 hours  Subjective/Objective     Subjective: Reports feeling improved compared to yesterday with reduced leg swelling  Overnight did have one episode of waking up feeling short of breath  Ambulating to bathroom without any difficulty or symptoms       Objective:     Vitals: BP (!) 87/58 (BP Location: Left arm)   Pulse 75   Temp 97 9 °F (36 6 °C) (Oral)   Resp 20   Ht 6' (1 829 m)   Wt 115 kg (254 lb 10 1 oz)   SpO2 97%   BMI 34 53 kg/m²   Vitals:    05/04/23 0600 05/05/23 0552   Weight: 115 kg (254 lb 3 1 oz) 115 kg (254 lb 10 1 oz)     Orthostatic Blood Pressures    Flowsheet Row Most Recent Value   Blood Pressure 87/58 filed at 05/05/2023 9080   Patient Position - Orthostatic VS Sitting filed at 05/05/2023 0615            Intake/Output Summary (Last 24 hours) at 5/5/2023 525 [facility] filed at 5/5/2023 0700  Gross per 24 hour   Intake 1014 6 ml   Output 2750 ml   Net -1735 4 ml       Invasive Devices     Peripheral Intravenous Line  Duration           Peripheral IV 05/02/23 Right Antecubital 3 days    Peripheral IV 05/02/23 Dorsal (posterior); Left Hand 2 days    Peripheral IV 05/04/23 Dorsal (posterior); Right Hand <1 day              Physical Exam  Neck:      Comments: JVP to 7cm  Cardiovascular:      Rate and Rhythm: Normal rate  Rhythm irregular  Pulses: Normal pulses  Musculoskeletal:      Right lower leg: No edema  Left lower leg: No edema  Neurological:      Mental Status: He is alert

## 2023-05-05 NOTE — ASSESSMENT & PLAN NOTE
Wt Readings from Last 3 Encounters:   05/05/23 115 kg (254 lb 10 1 oz)   05/02/23 116 kg (254 lb 12 8 oz)   04/01/22 102 kg (224 lb 12 8 oz)   · Patient presenting with 2 weeks of dyspnea on exertion, lower extremity edema, abd distention, weight gain  · Appears to be diuresing well, switch to 60mg IV push bid Lasix today  · Continue I/O, daily weight  · Appreciate heart failure team  · -1 6 L net yesterday

## 2023-05-06 ENCOUNTER — APPOINTMENT (INPATIENT)
Dept: RADIOLOGY | Facility: HOSPITAL | Age: 63
End: 2023-05-06

## 2023-05-06 PROBLEM — E87.20 ACIDOSIS: Status: ACTIVE | Noted: 2023-05-06

## 2023-05-06 PROBLEM — E87.1 HYPONATREMIA: Status: ACTIVE | Noted: 2023-05-06

## 2023-05-06 LAB
ANION GAP SERPL CALCULATED.3IONS-SCNC: 11 MMOL/L (ref 4–13)
ATRIAL RATE: 88 BPM
ATRIAL RATE: 94 BPM
BACTERIA UR QL AUTO: ABNORMAL /HPF
BASE EX.OXY STD BLDV CALC-SCNC: 21.6 % (ref 60–80)
BASE EXCESS BLDV CALC-SCNC: -6.2 MMOL/L
BILIRUB UR QL STRIP: NEGATIVE
BUN SERPL-MCNC: 69 MG/DL (ref 5–25)
CALCIUM SERPL-MCNC: 7.7 MG/DL (ref 8.3–10.1)
CHLORIDE SERPL-SCNC: 99 MMOL/L (ref 96–108)
CHLORIDE UR-SCNC: <10 MMOL/L
CLARITY UR: CLEAR
CO2 SERPL-SCNC: 15 MMOL/L (ref 21–32)
COLOR UR: YELLOW
CREAT SERPL-MCNC: 2.33 MG/DL (ref 0.6–1.3)
CREAT UR-MCNC: 138 MG/DL
ERYTHROCYTE [DISTWIDTH] IN BLOOD BY AUTOMATED COUNT: 13.9 % (ref 11.6–15.1)
GFR SERPL CREATININE-BSD FRML MDRD: 28 ML/MIN/1.73SQ M
GLUCOSE SERPL-MCNC: 186 MG/DL (ref 65–140)
GLUCOSE SERPL-MCNC: 269 MG/DL (ref 65–140)
GLUCOSE SERPL-MCNC: 273 MG/DL (ref 65–140)
GLUCOSE SERPL-MCNC: 282 MG/DL (ref 65–140)
GLUCOSE SERPL-MCNC: 293 MG/DL (ref 65–140)
GLUCOSE UR STRIP-MCNC: ABNORMAL MG/DL
HCO3 BLDV-SCNC: 21.6 MMOL/L (ref 24–30)
HCT VFR BLD AUTO: 49 % (ref 36.5–49.3)
HGB BLD-MCNC: 15.6 G/DL (ref 12–17)
HGB UR QL STRIP.AUTO: NEGATIVE
HYALINE CASTS #/AREA URNS LPF: ABNORMAL /LPF
KETONES UR STRIP-MCNC: NEGATIVE MG/DL
LEUKOCYTE ESTERASE UR QL STRIP: NEGATIVE
MAGNESIUM SERPL-MCNC: 2.6 MG/DL (ref 1.6–2.6)
MCH RBC QN AUTO: 27.1 PG (ref 26.8–34.3)
MCHC RBC AUTO-ENTMCNC: 31.8 G/DL (ref 31.4–37.4)
MCV RBC AUTO: 85 FL (ref 82–98)
NITRITE UR QL STRIP: NEGATIVE
NON-SQ EPI CELLS URNS QL MICRO: ABNORMAL /HPF
O2 CT BLDV-SCNC: 4.8 ML/DL
P AXIS: 76 DEGREES
P AXIS: 89 DEGREES
PCO2 BLDV: 51.1 MM HG (ref 42–50)
PH BLDV: 7.24 [PH] (ref 7.3–7.4)
PH UR STRIP.AUTO: 5 [PH]
PLATELET # BLD AUTO: 188 THOUSANDS/UL (ref 149–390)
PMV BLD AUTO: 12.7 FL (ref 8.9–12.7)
PO2 BLDV: 18.4 MM HG (ref 35–45)
POTASSIUM SERPL-SCNC: 5.1 MMOL/L (ref 3.5–5.3)
PR INTERVAL: 140 MS
PR INTERVAL: 150 MS
PROT UR STRIP-MCNC: ABNORMAL MG/DL
QRS AXIS: 6 DEGREES
QRS AXIS: 78 DEGREES
QRSD INTERVAL: 104 MS
QRSD INTERVAL: 110 MS
QT INTERVAL: 340 MS
QT INTERVAL: 404 MS
QTC INTERVAL: 494 MS
QTC INTERVAL: 539 MS
RBC # BLD AUTO: 5.75 MILLION/UL (ref 3.88–5.62)
RBC #/AREA URNS AUTO: ABNORMAL /HPF
SODIUM 24H UR-SCNC: <5 MOL/L
SODIUM SERPL-SCNC: 125 MMOL/L (ref 135–147)
SP GR UR STRIP.AUTO: 1.02 (ref 1–1.03)
T WAVE AXIS: 65 DEGREES
T WAVE AXIS: 73 DEGREES
UROBILINOGEN UR STRIP-ACNC: 2 MG/DL
UUN 24H UR-MCNC: 306 MG/DL
VENTRICULAR RATE: 107 BPM
VENTRICULAR RATE: 127 BPM
WBC # BLD AUTO: 11.31 THOUSAND/UL (ref 4.31–10.16)
WBC #/AREA URNS AUTO: ABNORMAL /HPF

## 2023-05-06 RX ORDER — MILRINONE LACTATE 0.2 MG/ML
0.13 INJECTION, SOLUTION INTRAVENOUS CONTINUOUS
Status: DISCONTINUED | OUTPATIENT
Start: 2023-05-06 | End: 2023-05-10

## 2023-05-06 RX ORDER — INSULIN LISPRO 100 [IU]/ML
2-12 INJECTION, SOLUTION INTRAVENOUS; SUBCUTANEOUS
Status: DISCONTINUED | OUTPATIENT
Start: 2023-05-06 | End: 2023-05-16 | Stop reason: HOSPADM

## 2023-05-06 RX ORDER — FUROSEMIDE 10 MG/ML
10 SYRINGE (ML) INJECTION CONTINUOUS
Status: DISCONTINUED | OUTPATIENT
Start: 2023-05-06 | End: 2023-05-10

## 2023-05-06 RX ADMIN — LIDOCAINE 5% 1 PATCH: 700 PATCH TOPICAL at 08:04

## 2023-05-06 RX ADMIN — ASPIRIN 81 MG 81 MG: 81 TABLET ORAL at 08:04

## 2023-05-06 RX ADMIN — INSULIN GLARGINE 10 UNITS: 100 INJECTION, SOLUTION SUBCUTANEOUS at 21:07

## 2023-05-06 RX ADMIN — AMIODARONE HYDROCHLORIDE 200 MG: 200 TABLET ORAL at 08:04

## 2023-05-06 RX ADMIN — SENNOSIDES AND DOCUSATE SODIUM 1 TABLET: 8.6; 5 TABLET ORAL at 21:06

## 2023-05-06 RX ADMIN — MILRINONE LACTATE IN DEXTROSE 0.25 MCG/KG/MIN: 200 INJECTION, SOLUTION INTRAVENOUS at 18:05

## 2023-05-06 RX ADMIN — INSULIN LISPRO 6 UNITS: 100 INJECTION, SOLUTION INTRAVENOUS; SUBCUTANEOUS at 06:24

## 2023-05-06 RX ADMIN — MILRINONE LACTATE IN DEXTROSE 0.25 MCG/KG/MIN: 200 INJECTION, SOLUTION INTRAVENOUS at 10:00

## 2023-05-06 RX ADMIN — METOPROLOL TARTRATE 25 MG: 25 TABLET, FILM COATED ORAL at 08:04

## 2023-05-06 RX ADMIN — ATORVASTATIN CALCIUM 80 MG: 80 TABLET, FILM COATED ORAL at 17:16

## 2023-05-06 RX ADMIN — HEPARIN SODIUM 5000 UNITS: 5000 INJECTION INTRAVENOUS; SUBCUTANEOUS at 14:32

## 2023-05-06 RX ADMIN — AMIODARONE HYDROCHLORIDE 200 MG: 200 TABLET ORAL at 12:25

## 2023-05-06 RX ADMIN — ONDANSETRON 4 MG: 2 INJECTION INTRAMUSCULAR; INTRAVENOUS at 09:54

## 2023-05-06 RX ADMIN — HEPARIN SODIUM 5000 UNITS: 5000 INJECTION INTRAVENOUS; SUBCUTANEOUS at 05:56

## 2023-05-06 RX ADMIN — AMIODARONE HYDROCHLORIDE 1 MG/MIN: 50 INJECTION, SOLUTION INTRAVENOUS at 05:20

## 2023-05-06 RX ADMIN — INSULIN LISPRO 2 UNITS: 100 INJECTION, SOLUTION INTRAVENOUS; SUBCUTANEOUS at 21:11

## 2023-05-06 RX ADMIN — AMIODARONE HYDROCHLORIDE 1 MG/MIN: 50 INJECTION, SOLUTION INTRAVENOUS at 22:18

## 2023-05-06 RX ADMIN — FUROSEMIDE 60 MG: 10 INJECTION, SOLUTION INTRAMUSCULAR; INTRAVENOUS at 08:04

## 2023-05-06 RX ADMIN — INSULIN LISPRO 6 UNITS: 100 INJECTION, SOLUTION INTRAVENOUS; SUBCUTANEOUS at 15:57

## 2023-05-06 RX ADMIN — HEPARIN SODIUM 5000 UNITS: 5000 INJECTION INTRAVENOUS; SUBCUTANEOUS at 21:06

## 2023-05-06 RX ADMIN — AMIODARONE HYDROCHLORIDE 200 MG: 200 TABLET ORAL at 15:58

## 2023-05-06 RX ADMIN — Medication 10 MG/HR: at 10:51

## 2023-05-06 RX ADMIN — INSULIN LISPRO 6 UNITS: 100 INJECTION, SOLUTION INTRAVENOUS; SUBCUTANEOUS at 12:22

## 2023-05-06 NOTE — ASSESSMENT & PLAN NOTE
Caller: Milagro Sanderson    Relationship: Self    Best call back number: [phone]     Requested Prescriptions:   Requested Prescriptions     Pending Prescriptions Disp Refills   • levothyroxine (SYNTHROID, LEVOTHROID) 25 MCG tablet 90 tablet 1        Pharmacy where request should be sent: [id number] [place] [address] AT [facility] - [phone] PH - [phone] FX     Additional details provided by patient: HAS 10 DAYS REMAINING AND IS REQUESTING A 90 DAY SUPPLY    Does the patient have less than a 3 day supply:  [] Yes  [x] No    Would you like a call back once the refill request has been completed: [x] Yes [] No    If the office needs to give you a call back, can they leave a voicemail: [x] Yes [] No    Ray Harrell Rep   12/28/22 09:59 EST        Lab Results   Component Value Date    HGBA1C 12 0 (H) 04/14/2023       Recent Labs     05/05/23  2130 05/06/23  0552 05/06/23  1140 05/06/23  1548   POCGLU 219* 273* 293* 282*       Blood Sugar Average: Last 72 hrs:  (P) 230 [id number] hold oral medications and continue on insulin with sliding scale

## 2023-05-06 NOTE — ASSESSMENT & PLAN NOTE
Wt Readings from Last 3 Encounters:   05/06/23 118 kg (261 lb 0 4 oz)   05/02/23 116 kg (254 lb 12 8 oz)   04/01/22 102 kg (224 lb 12 8 oz)   · Patient presenting with 2 weeks of dyspnea on exertion, lower extremity edema, abd distention, weight gain  · Increasing renal function parameters, reached out to nephrology and cardio and we will initiate milrinone at this time  · Continue I/O, daily weight  · Appreciate heart failure team

## 2023-05-06 NOTE — CONSULTS
Consultation - Nephrology   Yuan Aldrich 59 y o  male MRN: [id number]  Unit/Bed#: [facility] 411-01 Encounter: [id number]    ASSESSMENT and PLAN:  1  LINDEN, worsening during hospital stay, likely due to cardiorenal syndrome  -b/l sCr prior to admission < 1, sCr 0 9 on admission, up to 2 33 today  -receiving diuresis per cardiology with minimal urine output  -now on milrinone gtt  -monitor serial labs/UOP  -hopeful for improvement with increased renal perfusion with inotropic support  -f/u renal u/s  -f/u urine studies    2  Hyponatremia in part due to hyperglycemia - sNa 125, glucose 269,     3  High normal potassium due to low flow state/LINDEN - monitor this  No acute RRT needs yet, on lasix gtt per cardio, not on K supplementation    4  Decompensated CHF with EF 10-15%, ICM - on lasix gtt/milrinone gtt per HF team, monitor UOP/daily weight    5  Non elevated anion gap acidosis - serum bicarb has downtrended to 15, monitor on lasix gtt  ? If lactate rising  If no improvement, consider checking lactate in setting of heart failure    6  DM2 - needs improved glycemic control, per primary team    HISTORY OF PRESENT ILLNESS:  Requesting Physician: Leonard Barrera  Reason for Consult: LINDEN    Yuan Aldrich is a 59y o  year old male who was admitted to [address] [place] [address] after presenting with dyspnea on exertion  A renal consultation is requested today for assistance in the management of LINDEN  The patient denies history of any kidney disease or having seen a kidney doctor in the past   He denies history of recurrent UTIs or kidney stones  He does have a history of diabetes mellitus type 2 for 20 years and his blood sugars have never been controlled  He denies NSAID use  He states his appetite is okay  He presented to the ER for dyspnea on exertion  He also has noted recently that his urine output has declined  He denies fevers, chills, nausea, vomiting, diarrhea, constipation, chest pain or headache    He has been having some dizziness  He has had lower extremity edema for about 3 weeks  No other complaints  PAST MEDICAL HISTORY:  Past Medical History:   Diagnosis Date   • Myocardial infarction [facility])        PAST SURGICAL HISTORY:  Past Surgical History:   Procedure Laterality Date   • CARDIAC CATHETERIZATION N/A 5/3/2023    Procedure: Cardiac Coronary Angiogram;  Surgeon: Felecia Brenner MD;  Location: [facility] CARD[facility]AC [facility]ATH LAB; Service: Cardiology   • CARDIAC CATHETERIZATION Left 5/3/2023    Procedure: Cardiac Left Heart Cath;  Surgeon: Felecia Brenner MD;  Location: [facility] CARDIAC CATH [facility]; Service: Cardiology       ALLERGIES:  No Known Allergies    SOCIAL HISTORY:  Social History     Substance and Sexual Activity   Alcohol Use None     Social History     Substance and Sexual Activity   Drug Use Not on file     Social History     Tobacco Use   Smoking Status Every Day   • Packs/day: 1 00   • Types: Cigarettes   Smokeless Tobacco Never       FAMILY HISTORY:  History reviewed  No pertinent family history      MEDICATIONS:    Current Facility-Administered Medications:   •  acetaminophen (TYLENOL) tablet 650 mg, 650 mg, Oral, Q6H PRN, CARLA Shelton  •  [] amiodarone (CORDARONE) 900 mg in dextrose 5 % 500 mL infusion, 1 mg/min, Intravenous, Continuous, Stopped at 23 0031 **FOLLOWED BY** amiodarone (CORDARONE) 900 mg in dextrose 5 % 500 mL infusion, 1 mg/min, Intravenous, Continuous, Livia Ball MD, Last Rate: 33 3 mL/hr at 23 0520, 1 mg/min at 23  •  amiodarone tablet 200 mg, 200 mg, Oral, TID With Meals, Livia Ball MD, 200 mg at 23 0804  •  aspirin chewable tablet 81 mg, 81 mg, Oral, Daily, Maycol Jones DO, 81 mg at 23 0804  •  atorvastatin (LIPITOR) tablet 80 mg, 80 mg, Oral, After Dinner, Maycol Jones DO, 80 mg at 23 1721  •  furosemide (LASIX) 500 mg infusion 50 mL, 10 mg/hr, Intravenous, Continuous, Leopold Lei, DO, Last Rate: 1 mL/hr at 23 1051, 10 mg/hr at 05/06/23 1051  •  heparin (porcine) subcutaneous injection 5,000 Units, 5,000 Units, Subcutaneous, Q8H [facility], Gustavo Cash, 5,000 Units at 05/06/23 0556  •  hydrALAZINE (APRESOLINE) injection 5 mg, 5 mg, Intravenous, Q6H PRN, Maycol Jones, DO  •  hydrALAZINE (APRESOLINE) tablet 10 mg, 10 mg, Oral, Q8H [facility], Lamont Murcia MD  •  insulin glargine (LANTUS) subcutaneous injection 10 Units 0 1 mL, 10 Units, Subcutaneous, HS, Maycol Kamilleai, DO, 10 Units at 05/05/23 2131  •  insulin lispro (HumaLOG) 100 units/mL subcutaneous injection 2-12 Units, 2-12 Units, Subcutaneous, 4x Daily (AC & HS), 6 Units at 05/06/23 2006 **AND** Fingerstick Glucose (POCT), , , 4x Daily AC and at bedtime, CARLA Srivastava  •  isosorbide dinitrate (ISORDIL) tablet 10 mg, 10 mg, Oral, TID after meals, Lamont Murcia MD  •  lidocaine (LIDODERM) 5 % patch 1 patch, 1 patch, Topical, Daily, Gustavo Cash, 1 patch at 05/06/23 0804  •  metoprolol tartrate (LOPRESSOR) partial tablet 12 5 mg, 12 5 mg, Oral, Q12H [facility], Rosa Adhikari, DO  •  milrinone (PRIMACOR) 20 mg in 100 mL infusion (premix), 0 25 mcg/kg/min, Intravenous, Continuous, Rosa Adhikari, DO, Last Rate: 8 9 mL/hr at 05/06/23 1000, 0 25 mcg/kg/min at 05/06/23 1000  •  nicotine (NICODERM CQ) 14 mg/24hr TD 24 hr patch 1 patch, 1 patch, Transdermal, Daily, Maycol Jones, DO  •  nitroglycerin (NITROSTAT) SL tablet 0 4 mg, 0 4 mg, Sublingual, Q5 Min PRN, Maycol Jones, DO  •  ondansetron (ZOFRAN) injection 4 mg, 4 mg, Intravenous, Q6H PRN, Maycol Banai, DO, 4 mg at 05/06/23 0954  •  senna-docusate sodium (SENOKOT S) 8 6-50 mg per tablet 1 tablet, 1 tablet, Oral, HS, Maycol Jones, DO, 1 tablet at 05/05/23 2130    REVIEW OF SYSTEMS:  More than 10 systems were reviewed  No other pertinent positive findings other than those mentioned in HPI      PHYSICAL EXAM:  Current Weight: Weight - Scale: 118 kg (261 lb 0 4 oz)  First Weight: Weight - Scale: 115 kg (254 lb)  Vitals:    05/06/23 0600 05/06/23 0604 05/06/23 0804 05/06/23 1142   BP:  98/53 99/73 (!) 83/51   BP Location:    Left arm   Pulse:   69 58   Resp:    17   Temp:   (!) 97 2 °F (36 2 °C) 97 5 °F (36 4 °C)   TempSrc:   Oral Oral   SpO2:   97% 94%   Weight: 118 kg (261 lb 0 4 oz)      Height:           Intake/Output Summary (Last 24 hours) at 5/6/2023 1218  Last data filed at 5/6/2023 0800  Gross per 24 hour   Intake 1763 82 ml   Output 100 ml   Net 1663 82 ml     Physical Exam  Vitals reviewed  Constitutional:       General: He is not in acute distress  Appearance: Normal appearance  He is well-developed  He is not diaphoretic  HENT:      Head: Normocephalic and atraumatic  Nose: Nose normal       Mouth/Throat:      Mouth: Mucous membranes are moist       Pharynx: No oropharyngeal exudate  Eyes:      General: No scleral icterus  Right eye: No discharge  Left eye: No discharge  Neck:      Thyroid: No thyromegaly  Comments: +JVD  Cardiovascular:      Rate and Rhythm: Normal rate and regular rhythm  Heart sounds: Normal heart sounds  Pulmonary:      Effort: Pulmonary effort is normal       Breath sounds: Normal breath sounds  No wheezing or rales  Abdominal:      General: Bowel sounds are normal  There is no distension  Palpations: Abdomen is soft  Tenderness: There is no abdominal tenderness  Musculoskeletal:         General: Swelling (b/l LE) present  Normal range of motion  Cervical back: Neck supple  Lymphadenopathy:      Cervical: No cervical adenopathy  Skin:     General: Skin is warm and dry  Findings: No rash  Neurological:      General: No focal deficit present  Mental Status: He is alert        Comments: awake   Psychiatric:         Mood and Affect: Mood normal          Behavior: Behavior normal          Invasive Devices:      Lab Results:   Results from last 7 days   Lab Units 05/06/23  0547 05/05/23  0447 05/04/23  0235 05/03/23  1641 05/03/23  0831 05/02/23  2314   WBC Thousand/uL 11 31* 12 21* 8 09  --   --  9 27   HEMOGLOBIN g/dL 15 6 15 8 13 9  --   --  14 9   HEMATOCRIT % 49 0 49 9* 44 9  --   --  48 8   PLATELETS Thousands/uL 188 181 166  --   --  195   POTASSIUM mmol/L 5 1 4 6 4 5 5 1 5 4* 5 2   CHLORIDE mmol/L 99 99 102 102 104 104   CO2 mmol/L 15* 20* 23 24 21 23   BUN mg/dL 69* 56* 59* 54* 51* 46*   CREATININE mg/dL 2 33* 1 77* 1 46* 1 52* 1 49* 1 37*   CALCIUM mg/dL 7 7* 8 3 8 2* 8 4 8 5 8 7   MAGNESIUM mg/dL 2 6 2 4 2 5  --   --  2 5   ALK PHOS U/L  --   --   --  42* 43* 41*   ALT U/L  --   --   --  45 49 36   AST U/L  --   --   --  34 53* 25

## 2023-05-06 NOTE — PROGRESS NOTES
[facility]  Progress Note  Name: Gabriela Slade  MRN: [id number]  Unit/Bed#: [facility] 662-79 I Date of Admission: 5/2/2023   Date of Service: 5/6/2023 I Hospital Day: 4    Assessment/Plan   V-tach [facility])  Assessment & Plan  · EP following and started amiodarone, given ischemic cardiomyopathy and VT, tentatively patient is planned for a ICD prior to discharge  · Plan when patient is more euvolemic    LINDEN (acute kidney injury) ([facility] [address] )  Assessment & Plan  · Progressive increase in BUN and creatinine, possibly somewhat cardiorenal from poor forward perfusion  · Discussed with heart failure team, and milrinone infusion started  · We will also reach out to nephrology, appreciate the comanagement of all specializing services    A-fib [facility])  Assessment & Plan  · At cardio appointment outpt when he appeared to be in A-fib RVR on EKG, also with Q waves concerning for prior infarct, in the setting of chest pressure last week concerning for MI  · Converted to sinus  · Noted reduced LVEF of 10 to 15%, concern for ischemia versus tachycardia mediated cardiomyopathy      Acute HFrEF  Assessment & Plan  Wt Readings from Last 3 Encounters:   05/06/23 118 kg (261 lb 0 4 oz)   05/02/23 116 kg (254 lb 12 8 oz)   04/01/22 102 kg (224 lb 12 8 oz)   · Patient presenting with 2 weeks of dyspnea on exertion, lower extremity edema, abd distention, weight gain  · Increasing renal function parameters, reached out to nephrology and cardio and we will initiate milrinone at this time  · Continue I/O, daily weight  · Appreciate heart failure team      Tobacco dependence syndrome  Assessment & Plan  Patient with over 40  pack-year history  Counseling provided on cessation, nicotine patch ordered    Type 2 diabetes mellitus, with long-term current use of insulin [facility])  Assessment & Plan  Lab Results   Component Value Date    HGBA1C 12 0 (H) 04/14/2023       Recent Labs     05/05/23 2130 05/06/23  5007 05/06/23  1140 05/06/23  1548   POCGLU 219* 273* 293* 282*       Blood Sugar Average: Last 72 hrs:  (P) 866 [id number] hold oral medications and continue on insulin with sliding scale    3-vessel CAD  Assessment & Plan  · Patient with history of CAD with drug-eluting stent to LCx in 2015  · Patient reports dyspnea on exertion over the past 2 weeks, denies any chest pain at rest, is a diabetic on insulin, is an active smoker of 40 pack years, has prior history of CAD, is on daily aspirin  · LVEF 10-15%, continue medical management  Appreciate Cardiology comanagement and expertise  · Cath 5/3 with multivessel disease not amenable to PCI, continue medical management  Appreciate cardio recommendations    Essential (primary) hypertension  Assessment & Plan  Resume lopressor and PRN hydralazine           VTE Pharmacologic Prophylaxis: VTE Score: 5 Moderate Risk (Score 3-4) - Pharmacological DVT Prophylaxis Ordered: heparin  Patient Centered Rounds: I performed bedside rounds with nursing staff today  Discussions with Specialists or Other Care Team Provider: Heart failure service    Education and Discussions with Family / Patient: Updated  (sister) at bedside  Total Time Spent on Date of Encounter in care of patient: 55 minutes This time was spent on one or more of the following: performing physical exam; counseling and coordination of care; obtaining or reviewing history; documenting in the medical record; reviewing/ordering tests, medications or procedures; communicating with other healthcare professionals and discussing with patient's family/caregivers  Current Length of Stay: 4 day(s)  Current Patient Status: Inpatient   Certification Statement: The patient will continue to require additional inpatient hospital stay due to CHF management  Discharge Plan: Anticipate discharge in >72 hrs to discharge location to be determined pending rehab evaluations      Code Status: Level 1 - Full Code    Subjective:   Seen and examined at bedside  Carla Cronin states he did not have a great night and is complaining some abdominal distention  Denies any chest pain, nausea, vomiting, or trouble breathing  Blood pressures on the lower side and so cardiology ordered milrinone  Patient endorsing a little bit of lightheadedness throughout the morning    Objective:     Vitals:   Temp (24hrs), Av 7 °F (36 5 °C), Min:97 2 °F (36 2 °C), Max:98 1 °F (36 7 °C)    Temp:  [97 2 °F (36 2 °C)-98 1 °F (36 7 °C)] 97 6 °F (36 4 °C)  HR:  [58-89] 65  Resp:  [14-20] 17  BP: ()/(51-73) 90/63  SpO2:  [91 %-97 %] 91 %  Body mass index is 35 4 kg/m²  Input and Output Summary (last 24 hours): Intake/Output Summary (Last 24 hours) at 2023 1637  Last data filed at 2023 1200  Gross per 24 hour   Intake 778 34 ml   Output 100 ml   Net 678 34 ml       Physical Exam:   Physical Exam  Vitals and nursing note reviewed  Constitutional:       General: He is not in acute distress  Appearance: He is well-developed  He is not toxic-appearing or diaphoretic  HENT:      Head: Normocephalic and atraumatic  Mouth/Throat:      Mouth: Mucous membranes are moist    Eyes:      General: No scleral icterus  Extraocular Movements: Extraocular movements intact  Conjunctiva/sclera: Conjunctivae normal    Cardiovascular:      Rate and Rhythm: Normal rate and regular rhythm  Pulses: Normal pulses  Heart sounds: No murmur heard  No friction rub  No gallop  Pulmonary:      Effort: Pulmonary effort is normal  No respiratory distress  Breath sounds: Normal breath sounds  No wheezing, rhonchi or rales  Abdominal:      General: Abdomen is flat  Bowel sounds are normal  There is distension  Palpations: Abdomen is soft  There is no mass  Tenderness: There is no abdominal tenderness  There is no guarding  Musculoskeletal:         General: No swelling  Cervical back: Neck supple  Right lower leg: Edema present  Left lower leg: Edema present  Comments: 2+ BL LE pitting edema   Lymphadenopathy:      Cervical: No cervical adenopathy  Skin:     General: Skin is warm and dry  Capillary Refill: Capillary refill takes less than 2 seconds  Coloration: Skin is not jaundiced  Findings: No rash  Neurological:      General: No focal deficit present  Mental Status: He is alert and oriented to person, place, and time  Sensory: No sensory deficit  Motor: No weakness  Psychiatric:         Mood and Affect: Mood normal           Additional Data:     Labs:  Results from last 7 days   Lab Units 05/06/23  0547 05/04/23  0235 05/02/23  2314   WBC Thousand/uL 11 31*   < > 9 27   HEMOGLOBIN g/dL 15 6   < > 14 9   HEMATOCRIT % 49 0   < > 48 8   PLATELETS Thousands/uL 188   < > 195   NEUTROS PCT %  --   --  69   LYMPHS PCT %  --   --  23   MONOS PCT %  --   --  7   EOS PCT %  --   --  1    < > = values in this interval not displayed  Results from last 7 days   Lab Units 05/06/23  0547 05/04/23  0235 05/03/23  1641   SODIUM mmol/L 125*   < > 131*   POTASSIUM mmol/L 5 1   < > 5 1   CHLORIDE mmol/L 99   < > 102   CO2 mmol/L 15*   < > 24   BUN mg/dL 69*   < > 54*   CREATININE mg/dL 2 33*   < > 1 52*   ANION GAP mmol/L 11   < > 5   CALCIUM mg/dL 7 7*   < > 8 4   ALBUMIN g/dL  --   --  3 5   TOTAL BILIRUBIN mg/dL  --   --  0 51   ALK PHOS U/L  --   --  42*   ALT U/L  --   --  45   AST U/L  --   --  34   GLUCOSE RANDOM mg/dL 269*   < > 221*    < > = values in this interval not displayed       Results from last 7 days   Lab Units 05/02/23  1010   INR  1 13     Results from last 7 days   Lab Units 05/06/23  1548 05/06/23  1140 05/06/23  0552 05/05/23  2130 05/05/23  1618 05/05/23  1020 05/05/23  0544 05/04/23  2044 05/04/23  1536 05/04/23  1033 05/04/23  0539 05/03/23  2049   POC GLUCOSE mg/dl 282* 293* 273* 219* 316* 254* 260* 205* 182* 245* 195* 205*         Results from last 7 days   Lab Units 05/02/23  2314 05/02/23  1010   LACTIC ACID mmol/L 1 9  --    PROCALCITONIN ng/ml 0 26* 0 16       Lines/Drains:  Invasive Devices     Peripheral Intravenous Line  Duration           Peripheral IV 05/04/23 Dorsal (posterior); Right Hand 1 day    Peripheral IV 05/06/23 Right;Upper;Ventral (anterior) Arm <1 day                  Telemetry:  Telemetry Orders (From admission, onward)             48 Hour Telemetry Monitoring  Continuous x 48 hours        References:    Telemetry Guidelines   Question:  Reason for 48 Hour Telemetry  Answer:  Acute Decompensated CHF (continuous diuretic infusion or total diuretic dose > 200 mg daily, associated electrolyte derangement, ionotropic drip, history of ventricular arrhythmia, or new EF <35%)                 Telemetry Reviewed: Normal Sinus Rhythm  Indication for Continued Telemetry Use: Acute CHF on >200 mg lasix/day or equivalent dose or with new reduced EF                Imaging: Reviewed radiology reports from this admission including: ultrasound(s)    Recent Cultures (last 7 days):         Last 24 Hours Medication List:   Current Facility-Administered Medications   Medication Dose Route Frequency Provider Last Rate   • acetaminophen  650 mg Oral Q6H PRN CARLA Mcmillan     • amiodarone  1 mg/min Intravenous Continuous Amy Emerson MD 1 mg/min (05/06/23 0520)   • amiodarone  200 mg Oral TID With Meals Amy Emerson MD     • aspirin  81 mg Oral Daily Maycol Simentalai, DO     • atorvastatin  80 mg Oral After Dinner Deyanira Cardoza DO     • furosemide  10 mg/hr Intravenous Continuous Meño Tavares DO 10 mg/hr (05/06/23 1051)   • heparin (porcine)  5,000 Units Subcutaneous Q8H [address] Michelle Croft     • hydrALAZINE  5 mg Intravenous Q6H PRN Maycol Simentalai, DO     • insulin glargine  10 Units Subcutaneous HS Maycolprmea Simentalai, DO     • insulin lispro  2-12 Units Subcutaneous 4x Daily (AC & HS) CARLA Payne     • lidocaine  1 patch Topical Daily Thomas Monterroso Elliot     • metoprolol tartrate  12 5 mg Oral Q12H [address], DO     • milrinone [facility]) infusion  0 25 mcg/kg/min Intravenous Continuous Yoly Grange, DO 0 25 mcg/kg/min (05/06/23 1000)   • nicotine  1 patch Transdermal Daily Maycol Jones, DO     • nitroglycerin  0 4 mg Sublingual Q5 Min PRN Maycol Jones, DO     • ondansetron  4 mg Intravenous Q6H PRN Maycol Jones, DO     • senna-docusate sodium  1 tablet Oral HS Maycol Jones, DO          Today, Patient Was Seen By: Boom Guy    **Please Note: This note may have been constructed using a voice recognition system  **

## 2023-05-06 NOTE — ASSESSMENT & PLAN NOTE
· Progressive increase in BUN and creatinine, possibly somewhat cardiorenal from poor forward perfusion  · Discussed with heart failure team, and milrinone infusion started  · We will also reach out to nephrology, appreciate the comanagement of all specializing services

## 2023-05-06 NOTE — ASSESSMENT & PLAN NOTE
· At cardio appointment outpt when he appeared to be in A-fib RVR on EKG, also with Q waves concerning for prior infarct, in the setting of chest pressure last week concerning for MI  · Converted to sinus  · Noted reduced LVEF of 10 to 15%, concern for ischemia versus tachycardia mediated cardiomyopathy

## 2023-05-06 NOTE — PROGRESS NOTES
Heart Failure/ Pulmonary Hypertension Progress Note - Sonido Day 59 y o  male MRN: [id number]    Unit/Bed#: [facility] 411-01 Encounter: [id number]      Assessment:    Principal Problem:    Ischemic cardiomyopathy  Active Problems:    Essential (primary) hypertension    3-vessel CAD    Type 2 diabetes mellitus, with long-term current use of insulin (HCC)    Tobacco dependence syndrome    Acute HFrEF    A-fib (HCC)    LINDEN (acute kidney injury) ([address] )    V-tach (HCC)    Objective: Intake/ Output:  1997/700 w/ lasix 60 mg IV  Weight: 261 lbs-  (254 lbs_  Tele:   Cr 1 77--> 2 3  MAPs dipped to high 50's mmHg over night  Oxygen: room air     # Acute HFrEF, Stage D,   Impression: iCM with large apical aneurysm     Weight:  NT proBNP     Studies- personally reviewed by me  EKG- narrow QRS with bigeminy; inferior infarct, anterolateral infarct     LHC 5/3/23: chronically occluded mid LAD and LPDA c/w echo findings of apical aneurysm, not amenable to PCI  Patent mid LCx stent   LVEDP 30 mmHg     Echocardiogram 5/2/23  LVEF:  10-15%, apical aneurysm  LVIDd: 5 6 cm  RV: normal  MR: mild  PASP: 49 mmHg     Echo 9/26/17:  LVEF: 65%     Neurohormonal Blockade:  --Beta-Blocker: metoprolol tartrate 25 mg BID- decrease to 12 mg BID  --ACEi, ARB or ARNi: hydralazine 10 mg Q8    (or SVR reduction) hydralazine 10 mg Q8, isordil 10 mg Q8  --Aldosterone Receptor Blocker:  --SGLT2-I:   --Diuretic: lasix 60 mg IV BID     Sudden Cardiac Death Risk Reduction:  --ICD: LifeVest     Electrical Resynchronization:  Narrow QRS     Advanced Therapies (If appropriate):   --Inotrope:  --LVAD/Transplant Candidacy:     # New Q wave MI, later presenting or silent LAD territory infarction  # hyperlipidemia-  # current tobacco use  # Ventricular tachycardia- scar mediated  Rx: amiodarone 200 mg TID  # CKD, Cr 1 77--> 2 3  # hyponatremia- due to HF,      Plan:  JVP up, Cr rising  Start milrinone 0 25 mcg/kg/min  picc line  Decrease metoprolol to 12 5 mg BID  Watch tele given V ectopy  Continue amio 200 mg TID and gtt  May need paracentesis  Restart lasix gtt 10 mg/hr      LifeVest eventually    Review of Systems   Constitutional: Negative for activity change, appetite change, fatigue and unexpected weight change  HENT: Negative for congestion and nosebleeds  Eyes: Negative  Respiratory: Negative for cough, chest tightness and shortness of breath  Cardiovascular: Negative for chest pain, palpitations and leg swelling  Gastrointestinal: Positive for abdominal distention  Endocrine: Negative  Genitourinary: Negative  Musculoskeletal: Negative  Skin: Negative  Neurological: Negative for dizziness, syncope and weakness  Hematological: Negative  Psychiatric/Behavioral: Negative  [facility] ([facility], reason): Escobar catheter (day, reason):    Vitals: Blood pressure 99/73, pulse 69, temperature (!) 97 2 °F (36 2 °C), temperature source Oral, resp  rate 16, height 6' (1 829 m), weight 118 kg (261 lb 0 4 oz), SpO2 97 %  , Body mass index is 35 4 kg/m² , I/O last 3 completed shifts: In: 1997 8 [P O :300; I V :1697 8]  Out: 1500 [Urine:1500]  No intake/output data recorded  Wt Readings from Last 3 Encounters:   05/06/23 118 kg (261 lb 0 4 oz)   05/02/23 116 kg (254 lb 12 8 oz)   04/01/22 102 kg (224 lb 12 8 oz)       Intake/Output Summary (Last 24 hours) at 5/6/2023 0830  Last data filed at 5/6/2023 0601  Gross per 24 hour   Intake 1997 78 ml   Output 700 ml   Net 1297 78 ml     I/O last 3 completed shifts: In: 1997 8 [P O :300; I V :1697 8]  Out: 1500 [Urine:1500]    No significant arrhythmias seen on telemetry review         Physical Exam:  Vitals:    05/06/23 0211 05/06/23 0600 05/06/23 0604 05/06/23 0804   BP: 92/57  98/53 99/73   BP Location: Left arm      Pulse: 68   69   Resp: 16      Temp: 97 8 °F (36 6 °C)   (!) 97 2 °F (36 2 °C)   TempSrc: Oral   Oral   SpO2: 95%   97%   Weight:  118 kg (261 lb 0 4 oz)     Height: GEN: Chidi Parks appears well, alert and oriented x 3, pleasant and cooperative   HEENT: pupils equal, round, and reactive to light; extraocular muscles intact  NECK: supple, no carotid bruits   HEART: regular rhythm, normal S1 and S2, no murmurs, clicks, gallops or rubs, JVP is elevated  LUNGS: clear to auscultation bilaterally; no wheezes, rales, or rhonchi   ABDOMEN: normal bowel sounds, soft, no tenderness, positive for distention  EXTREMITIES: peripheral pulses normal; no clubbing, cyanosis, or edema  NEURO: no focal findings   SKIN: normal without suspicious lesions on exposed skin      Current Facility-Administered Medications:   •  acetaminophen (TYLENOL) tablet 650 mg, 650 mg, Oral, Q6H PRN, CARLA Reno  •  [] amiodarone (CORDARONE) 900 mg in dextrose 5 % 500 mL infusion, 1 mg/min, Intravenous, Continuous, Stopped at 23 0031 **FOLLOWED BY** amiodarone (CORDARONE) 900 mg in dextrose 5 % 500 mL infusion, 1 mg/min, Intravenous, Continuous, Angelo Tan MD, Last Rate: 33 3 mL/hr at 23 0520, 1 mg/min at 23 1319  •  amiodarone tablet 200 mg, 200 mg, Oral, TID With Meals, Angelo Tan MD, 200 mg at 23 0804  •  aspirin chewable tablet 81 mg, 81 mg, Oral, Daily, Maycol Jones, DO, 81 mg at 23 0804  •  atorvastatin (LIPITOR) tablet 80 mg, 80 mg, Oral, After Dinner, Maycol Banai, DO, 80 mg at 23 1721  •  furosemide (LASIX) injection 60 mg, 60 mg, Intravenous, BID (diuretic), Lamont Fry MD, 60 mg at 23 0804  •  heparin (porcine) subcutaneous injection 5,000 Units, 5,000 Units, Subcutaneous, Q8H [facility], Dhirajmary Franciscoml, 5,000 Units at 23 0556  •  hydrALAZINE (APRESOLINE) injection 5 mg, 5 mg, Intravenous, Q6H PRN, Maycol Jones DO  •  hydrALAZINE (APRESOLINE) tablet 10 mg, 10 mg, Oral, Q8H [facility], Lamont Murcia MD  •  insulin glargine (LANTUS) subcutaneous injection 10 Units 0 1 mL, 10 Units, Subcutaneous, HS, Maycol Jones DO, 10 Units at 23 2131  •  insulin lispro (HumaLOG) 100 units/mL subcutaneous injection 2-12 Units, 2-12 Units, Subcutaneous, 4x Daily (AC & HS), 6 Units at 05/06/23 0157 **AND** Fingerstick Glucose (POCT), , , 4x Daily AC and at bedtime, CARLA Grider  •  isosorbide dinitrate (ISORDIL) tablet 10 mg, 10 mg, Oral, TID after meals, Lamont Rodriguez MD  •  lidocaine (LIDODERM) 5 % patch 1 patch, 1 patch, Topical, Daily, Lajean Spine, 1 patch at 05/06/23 0804  •  metoprolol tartrate (LOPRESSOR) tablet 25 mg, 25 mg, Oral, Q12H [address], Lamont Murcia MD, 25 mg at 05/06/23 0804  •  nicotine (NICODERM CQ) 14 mg/24hr TD 24 hr patch 1 patch, 1 patch, Transdermal, Daily, Maycol Jones, DO  •  nitroglycerin (NITROSTAT) SL tablet 0 4 mg, 0 4 mg, Sublingual, Q5 Min PRN, Maycol Jones, DO  •  ondansetron (ZOFRAN) injection 4 mg, 4 mg, Intravenous, Q6H PRN, Maycol Jones, DO  •  senna-docusate sodium (SENOKOT S) 8 6-50 mg per tablet 1 tablet, 1 tablet, Oral, HS, Maycol Jones, DO, 1 tablet at 05/05/23 2130      Labs & Results:        Results from last 7 days   Lab Units 05/06/23  0547 05/05/23 0447 05/04/23  0235   WBC Thousand/uL 11 31* 12 21* 8 09   HEMOGLOBIN g/dL 15 6 15 8 13 9   HEMATOCRIT % 49 0 49 9* 44 9   PLATELETS Thousands/uL 188 181 166         Results from last 7 days   Lab Units 05/06/23  0547 05/05/23  0447 05/04/23  0235 05/03/23  1641 05/03/23  0831 05/02/23  2314   POTASSIUM mmol/L 5 1 4 6 4 5 5 1 5 4* 5 2   CHLORIDE mmol/L 99 99 102 102 104 104   CO2 mmol/L 15* 20* 23 24 21 23   BUN mg/dL 69* 56* 59* 54* 51* 46*   CREATININE mg/dL 2 33* 1 77* 1 46* 1 52* 1 49* 1 37*   CALCIUM mg/dL 7 7* 8 3 8 2* 8 4 8 5 8 7   ALK PHOS U/L  --   --   --  42* 43* 41*   ALT U/L  --   --   --  45 49 36   AST U/L  --   --   --  34 53* 25     Results from last 7 days   Lab Units 05/02/23  1010   INR  1 13         Counseling / Coordination of Care  Total floor / unit time spent today 35 minutes    Greater than 50% of total time was spent with the patient and / or family counseling and / or coordination of care  A description of the counseling / coordination of care:25      Thank you for the opportunity to participate in the care of this patient    [facility] PULMONARY HYPERTENSION  MEDICAL DIRECTOR OF [place]

## 2023-05-07 LAB
ANION GAP SERPL CALCULATED.3IONS-SCNC: 10 MMOL/L (ref 4–13)
BASE EX.OXY STD BLDV CALC-SCNC: 86.5 % (ref 60–80)
BASE EXCESS BLDV CALC-SCNC: -4.6 MMOL/L
BUN SERPL-MCNC: 79 MG/DL (ref 5–25)
CALCIUM SERPL-MCNC: 8.4 MG/DL (ref 8.3–10.1)
CHLORIDE SERPL-SCNC: 95 MMOL/L (ref 96–108)
CO2 SERPL-SCNC: 22 MMOL/L (ref 21–32)
CREAT SERPL-MCNC: 2.34 MG/DL (ref 0.6–1.3)
ERYTHROCYTE [DISTWIDTH] IN BLOOD BY AUTOMATED COUNT: 13.6 % (ref 11.6–15.1)
GFR SERPL CREATININE-BSD FRML MDRD: 28 ML/MIN/1.73SQ M
GLUCOSE SERPL-MCNC: 162 MG/DL (ref 65–140)
GLUCOSE SERPL-MCNC: 169 MG/DL (ref 65–140)
GLUCOSE SERPL-MCNC: 188 MG/DL (ref 65–140)
GLUCOSE SERPL-MCNC: 286 MG/DL (ref 65–140)
GLUCOSE SERPL-MCNC: 309 MG/DL (ref 65–140)
HCO3 BLDV-SCNC: 21.4 MMOL/L (ref 24–30)
HCT VFR BLD AUTO: 42.9 % (ref 36.5–49.3)
HGB BLD-MCNC: 14.4 G/DL (ref 12–17)
MAGNESIUM SERPL-MCNC: 2.4 MG/DL (ref 1.6–2.6)
MCH RBC QN AUTO: 27.6 PG (ref 26.8–34.3)
MCHC RBC AUTO-ENTMCNC: 33.6 G/DL (ref 31.4–37.4)
MCV RBC AUTO: 82 FL (ref 82–98)
O2 CT BLDV-SCNC: 18.2 ML/DL
PCO2 BLDV: 42.5 MM HG (ref 42–50)
PH BLDV: 7.32 [PH] (ref 7.3–7.4)
PLATELET # BLD AUTO: 185 THOUSANDS/UL (ref 149–390)
PMV BLD AUTO: 12.5 FL (ref 8.9–12.7)
PO2 BLDV: 61.5 MM HG (ref 35–45)
POTASSIUM SERPL-SCNC: 4.2 MMOL/L (ref 3.5–5.3)
RBC # BLD AUTO: 5.21 MILLION/UL (ref 3.88–5.62)
SODIUM SERPL-SCNC: 127 MMOL/L (ref 135–147)
WBC # BLD AUTO: 11.63 THOUSAND/UL (ref 4.31–10.16)

## 2023-05-07 RX ORDER — POTASSIUM CHLORIDE 20 MEQ/1
20 TABLET, EXTENDED RELEASE ORAL 2 TIMES DAILY
Status: COMPLETED | OUTPATIENT
Start: 2023-05-07 | End: 2023-05-07

## 2023-05-07 RX ORDER — FUROSEMIDE 10 MG/ML
40 INJECTION INTRAMUSCULAR; INTRAVENOUS ONCE
Status: COMPLETED | OUTPATIENT
Start: 2023-05-07 | End: 2023-05-07

## 2023-05-07 RX ADMIN — AMIODARONE HYDROCHLORIDE 200 MG: 200 TABLET ORAL at 08:26

## 2023-05-07 RX ADMIN — Medication 12.5 MG: at 08:27

## 2023-05-07 RX ADMIN — POTASSIUM CHLORIDE 20 MEQ: 1500 TABLET, EXTENDED RELEASE ORAL at 13:10

## 2023-05-07 RX ADMIN — POTASSIUM CHLORIDE 20 MEQ: 1500 TABLET, EXTENDED RELEASE ORAL at 17:15

## 2023-05-07 RX ADMIN — INSULIN GLARGINE 10 UNITS: 100 INJECTION, SOLUTION SUBCUTANEOUS at 21:40

## 2023-05-07 RX ADMIN — MILRINONE LACTATE IN DEXTROSE 0.25 MCG/KG/MIN: 200 INJECTION, SOLUTION INTRAVENOUS at 06:07

## 2023-05-07 RX ADMIN — AMIODARONE HYDROCHLORIDE 200 MG: 200 TABLET ORAL at 17:15

## 2023-05-07 RX ADMIN — FUROSEMIDE 40 MG: 10 INJECTION, SOLUTION INTRAMUSCULAR; INTRAVENOUS at 13:10

## 2023-05-07 RX ADMIN — LIDOCAINE 5% 1 PATCH: 700 PATCH TOPICAL at 08:26

## 2023-05-07 RX ADMIN — ATORVASTATIN CALCIUM 80 MG: 80 TABLET, FILM COATED ORAL at 17:16

## 2023-05-07 RX ADMIN — Medication 12.5 MG: at 21:43

## 2023-05-07 RX ADMIN — ASPIRIN 81 MG 81 MG: 81 TABLET ORAL at 08:26

## 2023-05-07 RX ADMIN — HEPARIN SODIUM 5000 UNITS: 5000 INJECTION INTRAVENOUS; SUBCUTANEOUS at 21:40

## 2023-05-07 RX ADMIN — HEPARIN SODIUM 5000 UNITS: 5000 INJECTION INTRAVENOUS; SUBCUTANEOUS at 06:09

## 2023-05-07 RX ADMIN — INSULIN LISPRO 2 UNITS: 100 INJECTION, SOLUTION INTRAVENOUS; SUBCUTANEOUS at 06:08

## 2023-05-07 RX ADMIN — INSULIN LISPRO 8 UNITS: 100 INJECTION, SOLUTION INTRAVENOUS; SUBCUTANEOUS at 21:39

## 2023-05-07 RX ADMIN — INSULIN LISPRO 2 UNITS: 100 INJECTION, SOLUTION INTRAVENOUS; SUBCUTANEOUS at 11:42

## 2023-05-07 RX ADMIN — HEPARIN SODIUM 5000 UNITS: 5000 INJECTION INTRAVENOUS; SUBCUTANEOUS at 13:10

## 2023-05-07 RX ADMIN — SENNOSIDES AND DOCUSATE SODIUM 1 TABLET: 8.6; 5 TABLET ORAL at 21:40

## 2023-05-07 RX ADMIN — Medication 20 MG/HR: at 15:54

## 2023-05-07 RX ADMIN — INSULIN LISPRO 6 UNITS: 100 INJECTION, SOLUTION INTRAVENOUS; SUBCUTANEOUS at 15:58

## 2023-05-07 RX ADMIN — MILRINONE LACTATE IN DEXTROSE 0.25 MCG/KG/MIN: 200 INJECTION, SOLUTION INTRAVENOUS at 17:23

## 2023-05-07 RX ADMIN — AMIODARONE HYDROCHLORIDE 1 MG/MIN: 50 INJECTION, SOLUTION INTRAVENOUS at 14:22

## 2023-05-07 RX ADMIN — AMIODARONE HYDROCHLORIDE 200 MG: 200 TABLET ORAL at 11:42

## 2023-05-07 NOTE — PROGRESS NOTES
Progress Note - Nephrology   Nolan Gosselin 59 y o  male MRN: [id number]  Unit/Bed#: [facility] 411-01 Encounter: [id number]      Assessment / Plan:  1  LINDEN, worsening during hospital stay, likely due to cardiorenal syndrome  -b/l sCr prior to admission < 1, sCr 0 9 on admission, up to 2 34 and stable today  -receiving diuresis per cardiology with minimal urine output  -now on milrinone gtt  -monitor serial labs/UOP  -hopeful for improvement with increased renal perfusion with inotropic support   -renal u/s shows small volume ascites, trace perinephric fluid seen bilaterally   -Urine studies show low urine sodium and low urine chloride with innumerable hyaline casts on urinalysis performed yesterday  - If blood pressure drops further serum creatinine rises, would have concern for possible volume depletion in light of these urine studies     2  Hyponatremia in part due to hyperglycemia - sNa 127, glucose 269, continue to monitor on Lasix drip     3  High normal potassium due to low flow state/LINDEN - monitor this  No acute RRT needs yet, on lasix gtt per cardio, not on K supplementation, potassium improved down to 4 2     4  Decompensated CHF with EF 10-15%, ICM - on lasix gtt/milrinone gtt per HF team, monitor UOP/daily weight     5  Non elevated anion gap acidosis -improved on Lasix drip, monitor this     6  DM2 - needs improved glycemic control, per primary team        Subjective:   He remains on Lasix and milrinone drips  He denies dizziness  He denies chest pain or shortness of breath  He thinks he is urinating a lot  Objective:     Vitals: Blood pressure 116/67, pulse 71, temperature (!) 97 4 °F (36 3 °C), temperature source Oral, resp  rate 17, height 6' (1 829 m), weight 117 kg (258 lb 2 5 oz), SpO2 94 %  ,Body mass index is 35 01 kg/m²  Temp (24hrs), Av 6 °F (36 4 °C), Min:97 4 °F (36 3 °C), Max:97 8 °F (36 6 °C)      Weight (last 2 days)     Date/Time Weight    23 0600 117 (258 16)    23 0600 118 (261 02)    05/05/23 0552 115 (254 63)            Intake/Output Summary (Last 24 hours) at 5/7/2023 1013  Last data filed at 5/7/2023 0600  Gross per 24 hour   Intake 929 75 ml   Output 1225 ml   Net -295 25 ml     I/O last 24 hours: In: 995 8 [I V :995 8]  Out: 9329 [Urine:1225]        Physical Exam:   Physical Exam  Vitals reviewed  Constitutional:       General: He is not in acute distress  Appearance: He is well-developed  He is not diaphoretic  HENT:      Head: Normocephalic and atraumatic  Mouth/Throat:      Pharynx: No oropharyngeal exudate  Eyes:      General: No scleral icterus  Right eye: No discharge  Left eye: No discharge  Neck:      Thyroid: No thyromegaly  Cardiovascular:      Rate and Rhythm: Normal rate and regular rhythm  Heart sounds: Normal heart sounds  Pulmonary:      Effort: Pulmonary effort is normal       Breath sounds: Normal breath sounds  No wheezing or rales  Abdominal:      General: Bowel sounds are normal  There is no distension  Palpations: Abdomen is soft  Tenderness: There is no abdominal tenderness  Genitourinary:     Comments: chatman  Musculoskeletal:         General: Swelling (b/l LE) present  Normal range of motion  Cervical back: Neck supple  Lymphadenopathy:      Cervical: No cervical adenopathy  Skin:     General: Skin is warm and dry  Findings: No rash  Neurological:      Mental Status: He is alert  Comments: awake   Psychiatric:         Behavior: Behavior normal          Invasive Devices     Peripheral Intravenous Line  Duration           Peripheral IV 05/04/23 Dorsal (posterior); Right Hand 2 days    Peripheral IV 05/06/23 Right;Upper;Ventral (anterior) Arm <1 day    Peripheral IV 05/07/23 Left Antecubital <1 day          Drain  Duration           Urethral Catheter <1 day                Medications:    Scheduled Meds:  Current Facility-Administered Medications   Medication Dose Route Frequency Provider Last Rate   • acetaminophen  650 mg Oral Q6H PRN CARLA Tineo     • amiodarone  1 mg/min Intravenous Continuous Opal Johnson MD 1 mg/min (05/06/23 2218)   • amiodarone  200 mg Oral TID With Meals Opal Johnson MD     • aspirin  81 mg Oral Daily Maycol Banai, DO     • atorvastatin  80 mg Oral After Dinner Blas Blight, DO     • furosemide  10 mg/hr Intravenous Continuous Orpah Prime, DO 10 mg/hr (05/06/23 1051)   • heparin (porcine)  5,000 Units Subcutaneous Q8H [facility] Particia Junaid     • hydrALAZINE  5 mg Intravenous Q6H PRN Maycol Banai, DO     • insulin glargine  10 Units Subcutaneous HS Maycol Banai, DO     • insulin lispro  2-12 Units Subcutaneous 4x Daily (AC & HS) CARLA Razo     • lidocaine  1 patch Topical Daily Particia Junaid     • metoprolol tartrate  12 5 mg Oral Q12H [facility] Orpah Prime, DO     • milrinone [facility]) infusion  0 25 mcg/kg/min Intravenous Continuous Orpah Prime, DO 0 25 mcg/kg/min (05/07/23 7810)   • nicotine  1 patch Transdermal Daily Maycol Banai, DO     • nitroglycerin  0 4 mg Sublingual Q5 Min PRN Maycol Banai, DO     • ondansetron  4 mg Intravenous Q6H PRN Maycol Banai, DO     • senna-docusate sodium  1 tablet Oral HS Maycol Banai, DO         PRN Meds: •  acetaminophen  •  hydrALAZINE  •  nitroglycerin  •  ondansetron    Continuous Infusions:amiodarone, 1 mg/min, Last Rate: 1 mg/min (05/06/23 2218)  furosemide, 10 mg/hr, Last Rate: 10 mg/hr (05/06/23 1051)  milrinone (PRIMACOR) infusion, 0 25 mcg/kg/min, Last Rate: 0 25 mcg/kg/min (05/07/23 0607)            LAB RESULTS:      Results from last 7 days   Lab Units 05/07/23  0451 05/06/23  0547 05/05/23  0447 05/04/23  0235 05/03/23  1641 05/03/23  0831 05/02/23  2314 05/02/23  1010   WBC Thousand/uL 11 63* 11 31* 12 21* 8 09  --   --  9 27 9 15   HEMOGLOBIN g/dL 14 4 15 6 15 8 13 9  --   --  14 9 14 9   HEMATOCRIT % 42 9 49 0 49 9* 44 9  --   --  48 8 48 7   PLATELETS Thousands/uL 185 188 181 166 "--   --  195 184   NEUTROS PCT %  --   --   --   --   --   --  69 77*   LYMPHS PCT %  --   --   --   --   --   --  23 15   MONOS PCT %  --   --   --   --   --   --  7 6   EOS PCT %  --   --   --   --   --   --  1 1   POTASSIUM mmol/L 4 2 5 1 4 6 4 5 5 1 5 4* 5 2 4 8   CHLORIDE mmol/L 95* 99 99 102 102 104 104 105   CO2 mmol/L 22 15* 20* 23 24 21 23 25   BUN mg/dL 79* 69* 56* 59* 54* 51* 46* 36*   CREATININE mg/dL 2 34* 2 33* 1 77* 1 46* 1 52* 1 49* 1 37* 0 97   CALCIUM mg/dL 8 4 7 7* 8 3 8 2* 8 4 8 5 8 7 9 0   ALK PHOS U/L  --   --   --   --  42* 43* 41*  --    ALT U/L  --   --   --   --  45 49 36  --    AST U/L  --   --   --   --  34 53* 25  --    MAGNESIUM mg/dL 2 4 2 6 2 4 2 5  --   --  2 5 2 2       CUTURES:  No results found for: Ritu Janel              Portions of the record may have been created with voice recognition software  Occasional wrong word or \"sound a like\" substitutions may have occurred due to the inherent limitations of voice recognition software  Read the chart carefully and recognize, using context, where substitutions have occurred  If you have any questions, please contact the dictating provider      "

## 2023-05-07 NOTE — PROGRESS NOTES
[address] [facility]  Progress Note  Name: Luisa Smith  MRN: [id number]  Unit/Bed#: [facility] 715-01 I Date of Admission: 5/2/2023   Date of Service: 5/7/2023 I Hospital Day: 5    Assessment/Plan   V-tach [facility])  Assessment & Plan  · EP following and started amiodarone, given ischemic cardiomyopathy and VT, tentatively patient is planned for a ICD prior to discharge  · Plan when patient is more euvolemic    LINDEN (acute kidney injury) ([address] )  Assessment & Plan  · Progressive increase in BUN and creatinine, possibly somewhat cardiorenal from poor forward perfusion  · Discussed with heart failure team, and milrinone infusion started  · We will also reach out to nephrology, appreciate the comanagement of all specializing services    A-fib [facility])  Assessment & Plan  · At cardio appointment outpt when he appeared to be in A-fib RVR on EKG, also with Q waves concerning for prior infarct, in the setting of chest pressure last week concerning for MI  · Now in sinus  Noted reduced LVEF of 10 to 15%, continue positive inotropic support to aid in diuresis    Acute HFrEF  Assessment & Plan  Wt Readings from Last 3 Encounters:   05/07/23 117 kg (258 lb 2 5 oz)   05/02/23 116 kg (254 lb 12 8 oz)   04/01/22 102 kg (224 lb 12 8 oz)   · Patient presenting with 2 weeks of dyspnea on exertion, lower extremity edema, abd distention, weight gain  · Increasing renal function parameters, reached out to nephrology and cardio and we will initiate milrinone at this time  · Continue I/O, daily weight  · Appreciate heart failure team      Tobacco dependence syndrome  Assessment & Plan  Patient with over 40  pack-year history  Counseling provided on cessation, nicotine patch ordered    Type 2 diabetes mellitus, with long-term current use of insulin [facility])  Assessment & Plan  Lab Results   Component Value Date    HGBA1C 12 0 (H) 04/14/2023       Recent Labs     05/06/23 2049 05/07/23  0550 05/07/23  1123 05/07/23  1557 POCGLU 186* 162* 188* 286*       Blood Sugar Average: Last 72 hrs:  (P) 236 4Will hold oral medications and continue on insulin with sliding scale    3-vessel CAD  Assessment & Plan  · Patient with history of CAD with drug-eluting stent to LCx in 2015  · Patient reports dyspnea on exertion over the past 2 weeks, denies any chest pain at rest, is a diabetic on insulin, is an active smoker of 40 pack years, has prior history of CAD, is on daily aspirin  · LVEF 10-15%, continue medical management  Appreciate Cardiology comanagement and expertise  · Cath 5/3 with multivessel disease not amenable to PCI, continue medical management  Appreciate cardio recommendations    Essential (primary) hypertension  Assessment & Plan  Resume lopressor and PRN hydralazine           VTE Pharmacologic Prophylaxis: VTE Score: 5 Moderate Risk (Score 3-4) - Pharmacological DVT Prophylaxis Ordered: heparin  Patient Centered Rounds: I performed bedside rounds with nursing staff today  Discussions with Specialists or Other Care Team Provider: Cardiology    Education and Discussions with Family / Patient: Updated  (wife) at bedside  Total Time Spent on Date of Encounter in care of patient: 55 minutes This time was spent on one or more of the following: performing physical exam; counseling and coordination of care; obtaining or reviewing history; documenting in the medical record; reviewing/ordering tests, medications or procedures; communicating with other healthcare professionals and discussing with patient's family/caregivers  Current Length of Stay: 5 day(s)  Current Patient Status: Inpatient   Certification Statement: The patient will continue to require additional inpatient hospital stay due to Heart failure  Discharge Plan: Anticipate discharge in 48-72 hrs to home  Code Status: Level 1 - Full Code    Subjective: And examined at bedside  Patient states he feels much better than yesterday    Feels like he is making more urine and swelling is improving    Objective:     Vitals:   Temp (24hrs), Av 6 °F (36 4 °C), Min:97 3 °F (36 3 °C), Max:97 8 °F (36 6 °C)    Temp:  [97 3 °F (36 3 °C)-97 8 °F (36 6 °C)] 97 3 °F (36 3 °C)  HR:  [65-73] 72  Resp:  [17-18] 17  BP: ()/(55-67) 98/61  SpO2:  [92 %-97 %] 92 %  Body mass index is 35 01 kg/m²  Input and Output Summary (last 24 hours): Intake/Output Summary (Last 24 hours) at 2023 1724  Last data filed at 2023 1700  Gross per 24 hour   Intake 777 6 ml   Output 2625 ml   Net -1847 4 ml       Physical Exam:   Physical Exam  Vitals and nursing note reviewed  Constitutional:       General: He is not in acute distress  Appearance: He is well-developed  He is not toxic-appearing or diaphoretic  HENT:      Head: Normocephalic and atraumatic  Mouth/Throat:      Mouth: Mucous membranes are moist    Eyes:      General: No scleral icterus  Extraocular Movements: Extraocular movements intact  Conjunctiva/sclera: Conjunctivae normal    Cardiovascular:      Rate and Rhythm: Normal rate and regular rhythm  Pulses: Normal pulses  Heart sounds: No murmur heard  No friction rub  No gallop  Pulmonary:      Effort: Pulmonary effort is normal  No respiratory distress  Breath sounds: Normal breath sounds  No wheezing, rhonchi or rales  Abdominal:      General: Abdomen is flat  Bowel sounds are normal  There is distension  Palpations: Abdomen is soft  There is no mass  Tenderness: There is no abdominal tenderness  There is no guarding  Musculoskeletal:         General: No swelling  Cervical back: Neck supple  Right lower leg: Edema present  Left lower leg: Edema present  Comments: 2+ BL LE pitting edema   Lymphadenopathy:      Cervical: No cervical adenopathy  Skin:     General: Skin is warm and dry  Capillary Refill: Capillary refill takes less than 2 seconds        Coloration: Skin is not jaundiced  Findings: No rash  Neurological:      General: No focal deficit present  Mental Status: He is alert and oriented to person, place, and time  Sensory: No sensory deficit  Motor: No weakness  Psychiatric:         Mood and Affect: Mood normal           Additional Data:     Labs:  Results from last 7 days   Lab Units 05/07/23 0451 05/04/23 0235 05/02/23  2314   WBC Thousand/uL 11 63*   < > 9 27   HEMOGLOBIN g/dL 14 4   < > 14 9   HEMATOCRIT % 42 9   < > 48 8   PLATELETS Thousands/uL 185   < > 195   NEUTROS PCT %  --   --  69   LYMPHS PCT %  --   --  23   MONOS PCT %  --   --  7   EOS PCT %  --   --  1    < > = values in this interval not displayed  Results from last 7 days   Lab Units 05/07/23 0451 05/04/23 0235 05/03/23  1641   SODIUM mmol/L 127*   < > 131*   POTASSIUM mmol/L 4 2   < > 5 1   CHLORIDE mmol/L 95*   < > 102   CO2 mmol/L 22   < > 24   BUN mg/dL 79*   < > 54*   CREATININE mg/dL 2 34*   < > 1 52*   ANION GAP mmol/L 10   < > 5   CALCIUM mg/dL 8 4   < > 8 4   ALBUMIN g/dL  --   --  3 5   TOTAL BILIRUBIN mg/dL  --   --  0 51   ALK PHOS U/L  --   --  42*   ALT U/L  --   --  45   AST U/L  --   --  34   GLUCOSE RANDOM mg/dL 169*   < > 221*    < > = values in this interval not displayed  Results from last 7 days   Lab Units 05/02/23  1010   INR  1 13     Results from last 7 days   Lab Units 05/07/23  1557 05/07/23  1123 05/07/23  0550 05/06/23  2049 05/06/23  1548 05/06/23  1140 05/06/23  0552 05/05/23  2130 05/05/23  1618 05/05/23  1020 05/05/23  0544 05/04/23  2044   POC GLUCOSE mg/dl 286* 188* 162* 186* 282* 293* 273* 219* 316* 254* 260* 205*         Results from last 7 days   Lab Units 05/02/23  2314 05/02/23  1010   LACTIC ACID mmol/L 1 9  --    PROCALCITONIN ng/ml 0 26* 0 16       Lines/Drains:  Invasive Devices     Peripheral Intravenous Line  Duration           Peripheral IV 05/04/23 Dorsal (posterior); Right Hand 2 days    Peripheral IV 05/06/23 Right; Upper;Ventral (anterior) Arm 1 day    Peripheral IV 05/07/23 Left Antecubital <1 day          Drain  Duration           Urethral Catheter 1 day              Urinary Catheter:  Goal for removal: Remove after 48 hrs of I/O monitoring           Telemetry:  Telemetry Orders (From admission, onward)             48 Hour Telemetry Monitoring  Continuous x 48 hours        References:    Telemetry Guidelines   Question:  Reason for 48 Hour Telemetry  Answer:  Acute Decompensated CHF (continuous diuretic infusion or total diuretic dose > 200 mg daily, associated electrolyte derangement, ionotropic drip, history of ventricular arrhythmia, or new EF <35%)                 Telemetry Reviewed: Normal Sinus Rhythm  Indication for Continued Telemetry Use: Acute CHF on >200 mg lasix/day or equivalent dose or with new reduced EF                Imaging: Reviewed radiology reports from this admission including: ultrasound(s)    Recent Cultures (last 7 days):         Last 24 Hours Medication List:   Current Facility-Administered Medications   Medication Dose Route Frequency Provider Last Rate   • acetaminophen  650 mg Oral Q6H PRN Merline Baas, CRNP     • amiodarone  1 mg/min Intravenous Continuous Juna Mohs, MD 1 mg/min (05/07/23 1422)   • amiodarone  200 mg Oral TID With Meals Juna Mohs, MD     • aspirin  81 mg Oral Daily Maycol Jones DO     • atorvastatin  80 mg Oral After Dinner Abdulaziz Campos DO     • furosemide  20 mg/hr Intravenous Continuous Marva Jordan MD 20 mg/hr (05/07/23 4304)   • heparin (porcine)  5,000 Units Subcutaneous Q8H [address] Laisha Cruz     • hydrALAZINE  5 mg Intravenous Q6H PRN Maycol Jones DO     • insulin glargine  10 Units Subcutaneous HS Maycol Jones, DO     • insulin lispro  2-12 Units Subcutaneous 4x Daily (AC & HS) CARLA Rubalcava     • lidocaine  1 patch Topical Daily Laisha Cruz     • metoprolol tartrate  12 5 mg Oral Q12H [address] Tyrese Mckenna DO     • [facility]) infusion  0 25 mcg/kg/min Intravenous Continuous Pretty Mendes DO 0 25 mcg/kg/min (05/07/23 9664)   • nicotine  1 patch Transdermal Daily Maycolprema Jones, DO     • nitroglycerin  0 4 mg Sublingual Q5 Min PRN Maycol Simentalai, DO     • ondansetron  4 mg Intravenous Q6H PRN Maycol Simentalai, DO     • senna-docusate sodium  1 tablet Oral HS Maycol Jones, DO          Today, Patient Was Seen By: Leonard Barrera    **Please Note: This note may have been constructed using a voice recognition system  **

## 2023-05-07 NOTE — ASSESSMENT & PLAN NOTE
Wt Readings from Last 3 Encounters:   05/07/23 117 kg (258 lb 2 5 oz)   05/02/23 116 kg (254 lb 12 8 oz)   04/01/22 102 kg (224 lb 12 8 oz)   · Patient presenting with 2 weeks of dyspnea on exertion, lower extremity edema, abd distention, weight gain  · Increasing renal function parameters, reached out to nephrology and cardio and we will initiate milrinone at this time  · Continue I/O, daily weight  · Appreciate heart failure team

## 2023-05-07 NOTE — PROGRESS NOTES
Progress Note - Cardiology   Bianca Ty 59 y o  male MRN: [id number]  Unit/Bed#: [facility] 411-01 Encounter: [id number]    Assessment/Plan:  Bianca Ty is a 59year old male with past medical history of coronary artery disease with left heart catheterization in 2015 showing nonobstructive disease of the mid circumflex treated with a balloon and stenting and residual nonobstructive disease in the LAD, that is presenting with 2 weeks of heart failure symptoms including shortness of breath, leg swelling and 20 pound weight gain  He was found to have new worsening cardiomyopathy and  of the LAD and PDA from the left dominant system       Ventricular Tachycardia RBBB with early transition and mostly negative in the precordial lead, negative in the inferior leads, mostly negative in the lateral leads  Tachycardia concerning VT from posterior LV mid-apex area  This appears to be all scar mediated     -worsening kidney function, cardiomyopathy, and structural heart disease make amio the only real option at this time   -metoprolol 12 5 mg po bid started  -oral amio load 200 mg TID and amio IV 1 mg/min  -frequent PVCs on tele (bigemin)  -plan for implantation of dual chamber ICD on Monday 5/8 (NPO p midnight Edwin night)     Cardiomyopathy HFrEF 15% NYHA III/C: ischemic etiology with significant scar related  of the LAD:  -GDMT and diuretics per heart failure team   -low dose metoprolol started  -amio as above for VT and PVC burden   -on Lasix IV  -milrinone started Saturday 5/6/23  -I do not foresee the patient's cardiomyopathy significantly improving and I would plan for a dual chamber ICD this admission before discharge     Recommendations Today:  -continue management for decompensated HF per HF team (currently on Lasix drip and milrinone)  -may need paracentesis  -monitor for VT on telemetry  -will defer ICD implant for Monday, plan for dual chamber ICD implant when medically optimized    Subjective/Objective Vitals: /67 (BP Location: Right arm)   Pulse 71   Temp (!) 97 4 °F (36 3 °C) (Oral)   Resp 17   Ht 6' (1 829 m)   Wt 117 kg (258 lb 2 5 oz)   SpO2 94%   BMI 35 01 kg/m²   Vitals:    05/06/23 0600 05/07/23 0600   Weight: 118 kg (261 lb 0 4 oz) 117 kg (258 lb 2 5 oz)     Orthostatic Blood Pressures    Flowsheet Row Most Recent Value   Blood Pressure 116/67 filed at 05/07/2023 0744   Patient Position - Orthostatic VS Lying filed at 05/07/2023 0744            Intake/Output Summary (Last 24 hours) at 5/7/2023 1056  Last data filed at 5/7/2023 0600  Gross per 24 hour   Intake 929 75 ml   Output 1225 ml   Net -295 25 ml       Invasive Devices     Peripheral Intravenous Line  Duration           Peripheral IV 05/04/23 Dorsal (posterior); Right Hand 2 days    Peripheral IV 05/06/23 Right;Upper;Ventral (anterior) Arm 1 day    Peripheral IV 05/07/23 Left Antecubital <1 day          Drain  Duration           Urethral Catheter <1 day                Review of Systems: Negative     Physical Exam: GEN: Cuba Chow appears well, alert and oriented x 3, pleasant and cooperative   HEENT:  Normocephalic, atraumatic, anicteric, moist mucous membranes  NECK: No carotid bruits   HEART: JVP 18 with AJR, irregular rhythm, regular rate, normal S1 and S2, no murmurs, clicks, gallops or rubs   LUNGS: Clear to auscultation bilaterally; no wheezes, rales, or rhonchi; respiration nonlabored   ABDOMEN:  Normoactive bowel sounds, soft, no tenderness, no distention  EXTREMITIES: peripheral pulses palpable; +2 pitting edema and cool distal extremities   NEURO: no gross focal findings; cranial nerves grossly intact   SKIN:  Dry, intact, warm to touch    Lab Results: I have personally reviewed pertinent lab results  Imaging: I have personally reviewed pertinent reports      EKG:   VTE Pharmacologic Prophylaxis: Sequential compression device (Venodyne)  heparin gtt  VTE Mechanical Prophylaxis: sequential compression device and foot pump applied    Counseling / Coordination of Care  Total Critical Care time spent 45 minutes excluding procedures, teaching and family updates

## 2023-05-07 NOTE — ASSESSMENT & PLAN NOTE
Lab Results   Component Value Date    HGBA1C 12 0 (H) 04/14/2023       Recent Labs     05/06/23  2049 05/07/23  0550 05/07/23  1123 05/07/23  1557   POCGLU 186* 162* 188* 286*       Blood Sugar Average: Last 72 hrs:  (P) 236 4Will hold oral medications and continue on insulin with sliding scale

## 2023-05-07 NOTE — PROGRESS NOTES
Heart Failure/ Pulmonary Hypertension Progress Note - Parth Weber 59 y o  male MRN: [id number]    Unit/Bed#: [facility] 411-01 Encounter: [id number]      Assessment:    Principal Problem:    Ischemic cardiomyopathy  Active Problems:    Essential (primary) hypertension    3-vessel CAD    Type 2 diabetes mellitus, with long-term current use of insulin (HCC)    Tobacco dependence syndrome    Acute HFrEF    A-fib (HCC)    LINDEN (acute kidney injury) ([address] )    V-tach (HCC)    Hyponatremia    Acidosis    # Acute HFrEF, Stage D  Impression: iCM with large apical aneurysm  Started on milrinone 0 25mcg/kg/min 5/6/23 due to rising creatinine and worsening volume status    Studies- personally reviewed by me  EKG- narrow QRS with bigeminy; inferior infarct, anterolateral infarct     LHC 5/3/23: chronically occluded mid LAD and LPDA c/w echo findings of apical aneurysm, not amenable to PCI  Patent mid LCx stent   LVEDP 30 mmHg     Echocardiogram 5/2/23  LVEF:  10-15%, apical aneurysm  LVIDd: 5 6 cm  RV: normal  MR: mild  PASP: 49 mmHg     Echo 9/26/17:  LVEF: 65%     Neurohormonal Blockade:  --Beta-Blocker: metoprolol tartrate 25 mg BID- decreased to 12 mg BID  --ACEi, ARB or ARNi:    (or SVR reduction) hydralazine 10 mg Q8, isordil 10 mg Q8 - off  --Aldosterone Receptor Blocker:  --SGLT2-I:   --Diuretic: lasix 60 mg IV BID - switched to lasix 10mg/hr     Sudden Cardiac Death Risk Reduction:  --ICD: LifeVest     Electrical Resynchronization:  Narrow QRS     Advanced Therapies (If appropriate):   --Inotrope: milrinone 0 25mcg/kg/min  --LVAD/Transplant Candidacy:     # New Q wave MI, later presenting or silent LAD territory infarction  # hyperlipidemia-  # current tobacco use  # Ventricular tachycardia- scar mediated  Rx: amiodarone 200 mg TID  # CKD, Cr 1 77--> 2 3 >> 2 34  # hyponatremia- due to HF,  >>127  # ascites, small volume on US 5/6/23    Plan:  Continue milrinone 0 25 mcg/kg/min at this time  Continue decreased dose of metoprolol  Increase Lasix to 20 mg/h with 40 mg IV bolus  Strict I's and O's and daily weights  Keep K>4 and Mg>2      Subjective:   Patient seen and examined  No significant events overnight  Review of Systems   Constitutional: Negative for chills and fever  Respiratory: Negative for chest tightness  Cardiovascular: Positive for leg swelling  Negative for chest pain and palpitations  Gastrointestinal: Positive for abdominal distention  Negative for nausea and vomiting  Neurological: Negative for dizziness and light-headedness  Objective: Intake/ Output: 995/1225, -229  Weight: 258 from 261 lbs  Tele: sinus, no events      Vitals: Blood pressure 116/67, pulse 71, temperature (!) 97 4 °F (36 3 °C), temperature source Oral, resp  rate 17, height 6' (1 829 m), weight 117 kg (258 lb 2 5 oz), SpO2 94 %  , Body mass index is 35 01 kg/m² , I/O last 3 completed shifts: In: 1555 9 [I V :1555 9]  Out: 1325 [Urine:1325]  I/O this shift:  In: -   Out: 800 [Urine:800]  Wt Readings from Last 3 Encounters:   05/07/23 117 kg (258 lb 2 5 oz)   05/02/23 116 kg (254 lb 12 8 oz)   04/01/22 102 kg (224 lb 12 8 oz)       Intake/Output Summary (Last 24 hours) at 5/7/2023 1216  Last data filed at 5/7/2023 1200  Gross per 24 hour   Intake 777 6 ml   Output 2025 ml   Net -1247 4 ml     I/O last 3 completed shifts:   In: 1555 9 [I V :1555 9]  Out: 1325 [[facility]:9065]      Physical Exam:  Vitals:    05/06/23 2258 05/07/23 0251 05/07/23 0600 05/07/23 0744   BP: 94/62 100/62  116/67   BP Location: Left arm Left arm  Right arm   Pulse: 66 67  71   Resp:  18  17   Temp: 97 8 °F (36 6 °C) (!) 97 4 °F (36 3 °C)     TempSrc: Oral Oral     SpO2: 97% 92%  94%   Weight:   117 kg (258 lb 2 5 oz)    Height:           GEN: Aniyah Myers ill appearing, alert and oriented x 3, pleasant and cooperative   HEENT: NC/AT, moist mucosa, anicteric sclerae; extraocular muscles intact  NECK: supple, no carotid bruits   HEART: regular rhythm, normal S1 and S2, no murmurs, clicks, gallops or rubs, JVP is elevated, +HJR  LUNGS: clear to auscultation bilaterally; no wheezes, rales, or rhonchi   ABDOMEN: normal bowel sounds, soft, no tenderness, + abdominal distension  EXTREMITIES: peripheral pulses normal; no clubbing, cyanosis, pitting edema up to the thighs, R>L  NEURO: no focal findings   SKIN: normal without suspicious lesions on exposed skin      Current Facility-Administered Medications:   •  acetaminophen (TYLENOL) tablet 650 mg, 650 mg, Oral, Q6H PRN, CARLA Kemp  •  [] amiodarone (CORDARONE) 900 mg in dextrose 5 % 500 mL infusion, 1 mg/min, Intravenous, Continuous, Stopped at 23 0031 **FOLLOWED BY** amiodarone (CORDARONE) 900 mg in dextrose 5 % 500 mL infusion, 1 mg/min, Intravenous, Continuous, Helena Uribe MD, Last Rate: 33 3 mL/hr at 23 2218, 1 mg/min at 23 2218  •  amiodarone tablet 200 mg, 200 mg, Oral, TID With Meals, Helena Uribe MD, 200 mg at 23 1142  •  aspirin chewable tablet 81 mg, 81 mg, Oral, Daily, Maycol Jones DO, 81 mg at 23 7986  •  atorvastatin (LIPITOR) tablet 80 mg, 80 mg, Oral, After Dinner, Maycol Jones DO, 80 mg at 23 1716  •  furosemide (LASIX) 500 mg infusion 50 mL, 10 mg/hr, Intravenous, Continuous, Luciano Brush DO, Last Rate: 1 mL/hr at 23 1051, 10 mg/hr at 23 1051  •  heparin (porcine) subcutaneous injection 5,000 Units, 5,000 Units, Subcutaneous, Q8H [address], Quan Rubinstein, 5,000 Units at 23 0609  •  hydrALAZINE (APRESOLINE) injection 5 mg, 5 mg, Intravenous, Q6H PRN, Maycol Jones DO  •  insulin glargine (LANTUS) subcutaneous injection 10 Units 0 1 mL, 10 Units, Subcutaneous, HS, Maycol Jones, DO, 10 Units at 23 2107  •  insulin lispro (HumaLOG) 100 units/mL subcutaneous injection 2-12 Units, 2-12 Units, Subcutaneous, 4x Daily (AC & HS), 2 Units at 23 1142 **AND** Fingerstick Glucose (POCT), , , 4x Daily AC and at bedtime, CARLA Damon  •  lidocaine (LIDODERM) 5 % patch 1 patch, 1 patch, Topical, Daily, Alexandra Escobra, 1 patch at 05/07/23 1690  •  metoprolol tartrate (LOPRESSOR) partial tablet 12 5 mg, 12 5 mg, Oral, Q12H [facility], Guzman DO Ángel, 12 5 mg at 05/07/23 0827  •  milrinone (PRIMACOR) 20 mg in 100 mL infusion (premix), 0 25 mcg/kg/min, Intravenous, Continuous, Megan Neal DO, Last Rate: 8 9 mL/hr at 05/07/23 0607, 0 25 mcg/kg/min at 05/07/23 0607  •  nicotine (NICODERM CQ) 14 mg/24hr TD 24 hr patch 1 patch, 1 patch, Transdermal, Daily, Maycol Jones, DO  •  nitroglycerin (NITROSTAT) SL tablet 0 4 mg, 0 4 mg, Sublingual, Q5 Min PRN, Maycol Jones, DO  •  ondansetron (ZOFRAN) injection 4 mg, 4 mg, Intravenous, Q6H PRN, Maycol Jones, DO, 4 mg at 05/06/23 0954  •  senna-docusate sodium (SENOKOT S) 8 6-50 mg per tablet 1 tablet, 1 tablet, Oral, HS, Maycol Jones, DO, 1 tablet at 05/06/23 2106      Labs & Results:        Results from last 7 days   Lab Units 05/07/23  0451 05/06/23  0547 05/05/23  0447   WBC Thousand/uL 11 63* 11 31* 12 21*   HEMOGLOBIN g/dL 14 4 15 6 15 8   HEMATOCRIT % 42 9 49 0 49 9*   PLATELETS Thousands/uL 185 188 181         Results from last 7 days   Lab Units 05/07/23  0451 05/06/23  0547 05/05/23  0447 05/04/23  0235 05/03/23  1641 05/03/23  0831 05/02/23  2314   POTASSIUM mmol/L 4 2 5 1 4 6   < > 5 1 5 4* 5 2   CHLORIDE mmol/L 95* 99 99   < > 102 104 104   CO2 mmol/L 22 15* 20*   < > 24 21 23   BUN mg/dL 79* 69* 56*   < > 54* 51* 46*   CREATININE mg/dL 2 34* 2 33* 1 77*   < > 1 52* 1 49* 1 37*   CALCIUM mg/dL 8 4 7 7* 8 3   < > 8 4 8 5 8 7   ALK PHOS U/L  --   --   --   --  42* 43* 41*   ALT U/L  --   --   --   --  45 49 36   AST U/L  --   --   --   --  34 53* 25    < > = values in this interval not displayed       Results from last 7 days   Lab Units 05/02/23  1010   INR  1 13       Parish Ledesma MD  Advanced Heart Failure and Mechanical Circulatory [id number] [facility]

## 2023-05-07 NOTE — ASSESSMENT & PLAN NOTE
· At cardio appointment outpt when he appeared to be in A-fib RVR on EKG, also with Q waves concerning for prior infarct, in the setting of chest pressure last week concerning for MI  · Now in sinus  Noted reduced LVEF of 10 to 15%, continue positive inotropic support to aid in diuresis

## 2023-05-08 PROBLEM — K59.09 OTHER CONSTIPATION: Status: ACTIVE | Noted: 2023-05-08

## 2023-05-08 LAB
ANION GAP SERPL CALCULATED.3IONS-SCNC: 6 MMOL/L (ref 4–13)
BASE EX.OXY STD BLDV CALC-SCNC: 51.7 % (ref 60–80)
BASE EXCESS BLDV CALC-SCNC: 4 MMOL/L
BASOPHILS # BLD AUTO: 0.02 THOUSANDS/ÂΜL (ref 0–0.1)
BASOPHILS NFR BLD AUTO: 0 % (ref 0–1)
BUN SERPL-MCNC: 68 MG/DL (ref 5–25)
CALCIUM SERPL-MCNC: 8.4 MG/DL (ref 8.3–10.1)
CHLORIDE SERPL-SCNC: 97 MMOL/L (ref 96–108)
CO2 SERPL-SCNC: 26 MMOL/L (ref 21–32)
CREAT SERPL-MCNC: 1.89 MG/DL (ref 0.6–1.3)
EOSINOPHIL # BLD AUTO: 0 THOUSAND/ÂΜL (ref 0–0.61)
EOSINOPHIL NFR BLD AUTO: 0 % (ref 0–6)
ERYTHROCYTE [DISTWIDTH] IN BLOOD BY AUTOMATED COUNT: 13.9 % (ref 11.6–15.1)
GFR SERPL CREATININE-BSD FRML MDRD: 37 ML/MIN/1.73SQ M
GLUCOSE SERPL-MCNC: 172 MG/DL (ref 65–140)
GLUCOSE SERPL-MCNC: 178 MG/DL (ref 65–140)
GLUCOSE SERPL-MCNC: 247 MG/DL (ref 65–140)
GLUCOSE SERPL-MCNC: 271 MG/DL (ref 65–140)
GLUCOSE SERPL-MCNC: 318 MG/DL (ref 65–140)
HCO3 BLDV-SCNC: 29.2 MMOL/L (ref 24–30)
HCT VFR BLD AUTO: 43.8 % (ref 36.5–49.3)
HGB BLD-MCNC: 14.6 G/DL (ref 12–17)
IMM GRANULOCYTES # BLD AUTO: 0.06 THOUSAND/UL (ref 0–0.2)
IMM GRANULOCYTES NFR BLD AUTO: 1 % (ref 0–2)
LYMPHOCYTES # BLD AUTO: 0.9 THOUSANDS/ÂΜL (ref 0.6–4.47)
LYMPHOCYTES NFR BLD AUTO: 7 % (ref 14–44)
MCH RBC QN AUTO: 27.3 PG (ref 26.8–34.3)
MCHC RBC AUTO-ENTMCNC: 33.3 G/DL (ref 31.4–37.4)
MCV RBC AUTO: 82 FL (ref 82–98)
MONOCYTES # BLD AUTO: 1.12 THOUSAND/ÂΜL (ref 0.17–1.22)
MONOCYTES NFR BLD AUTO: 9 % (ref 4–12)
NEUTROPHILS # BLD AUTO: 10.12 THOUSANDS/ÂΜL (ref 1.85–7.62)
NEUTS SEG NFR BLD AUTO: 83 % (ref 43–75)
NRBC BLD AUTO-RTO: 0 /100 WBCS
O2 CT BLDV-SCNC: 10.9 ML/DL
PCO2 BLDV: 45.9 MM HG (ref 42–50)
PH BLDV: 7.42 [PH] (ref 7.3–7.4)
PLATELET # BLD AUTO: 193 THOUSANDS/UL (ref 149–390)
PMV BLD AUTO: 12.7 FL (ref 8.9–12.7)
PO2 BLDV: 29.9 MM HG (ref 35–45)
POTASSIUM SERPL-SCNC: 4 MMOL/L (ref 3.5–5.3)
RBC # BLD AUTO: 5.35 MILLION/UL (ref 3.88–5.62)
SODIUM SERPL-SCNC: 129 MMOL/L (ref 135–147)
WBC # BLD AUTO: 12.22 THOUSAND/UL (ref 4.31–10.16)

## 2023-05-08 RX ORDER — AMOXICILLIN 250 MG
2 CAPSULE ORAL
Status: DISCONTINUED | OUTPATIENT
Start: 2023-05-08 | End: 2023-05-16 | Stop reason: HOSPADM

## 2023-05-08 RX ORDER — POLYETHYLENE GLYCOL 3350 17 G/17G
17 POWDER, FOR SOLUTION ORAL DAILY
Status: DISCONTINUED | OUTPATIENT
Start: 2023-05-08 | End: 2023-05-16 | Stop reason: HOSPADM

## 2023-05-08 RX ADMIN — INSULIN LISPRO 2 UNITS: 100 INJECTION, SOLUTION INTRAVENOUS; SUBCUTANEOUS at 06:17

## 2023-05-08 RX ADMIN — AMIODARONE HYDROCHLORIDE 200 MG: 200 TABLET ORAL at 08:53

## 2023-05-08 RX ADMIN — INSULIN LISPRO 6 UNITS: 100 INJECTION, SOLUTION INTRAVENOUS; SUBCUTANEOUS at 11:53

## 2023-05-08 RX ADMIN — AMIODARONE HYDROCHLORIDE 200 MG: 200 TABLET ORAL at 16:44

## 2023-05-08 RX ADMIN — MILRINONE LACTATE IN DEXTROSE 0.25 MCG/KG/MIN: 200 INJECTION, SOLUTION INTRAVENOUS at 05:24

## 2023-05-08 RX ADMIN — AMIODARONE HYDROCHLORIDE 200 MG: 200 TABLET ORAL at 11:52

## 2023-05-08 RX ADMIN — MILRINONE LACTATE IN DEXTROSE 0.25 MCG/KG/MIN: 200 INJECTION, SOLUTION INTRAVENOUS at 16:47

## 2023-05-08 RX ADMIN — Medication 12.5 MG: at 08:55

## 2023-05-08 RX ADMIN — ATORVASTATIN CALCIUM 80 MG: 80 TABLET, FILM COATED ORAL at 17:22

## 2023-05-08 RX ADMIN — HEPARIN SODIUM 5000 UNITS: 5000 INJECTION INTRAVENOUS; SUBCUTANEOUS at 06:17

## 2023-05-08 RX ADMIN — AMIODARONE HYDROCHLORIDE 1 MG/MIN: 50 INJECTION, SOLUTION INTRAVENOUS at 16:46

## 2023-05-08 RX ADMIN — LIDOCAINE 5% 1 PATCH: 700 PATCH TOPICAL at 08:54

## 2023-05-08 RX ADMIN — AMIODARONE HYDROCHLORIDE 1 MG/MIN: 50 INJECTION, SOLUTION INTRAVENOUS at 08:53

## 2023-05-08 RX ADMIN — HEPARIN SODIUM 5000 UNITS: 5000 INJECTION INTRAVENOUS; SUBCUTANEOUS at 21:59

## 2023-05-08 RX ADMIN — INSULIN GLARGINE 10 UNITS: 100 INJECTION, SOLUTION SUBCUTANEOUS at 21:58

## 2023-05-08 RX ADMIN — SENNOSIDES AND DOCUSATE SODIUM 2 TABLET: 8.6; 5 TABLET ORAL at 21:57

## 2023-05-08 RX ADMIN — MILRINONE LACTATE IN DEXTROSE 0.25 MCG/KG/MIN: 200 INJECTION, SOLUTION INTRAVENOUS at 21:56

## 2023-05-08 RX ADMIN — POLYETHYLENE GLYCOL 3350 17 G: 17 POWDER, FOR SOLUTION ORAL at 09:51

## 2023-05-08 RX ADMIN — Medication 12.5 MG: at 21:57

## 2023-05-08 RX ADMIN — HEPARIN SODIUM 5000 UNITS: 5000 INJECTION INTRAVENOUS; SUBCUTANEOUS at 16:51

## 2023-05-08 RX ADMIN — ASPIRIN 81 MG 81 MG: 81 TABLET ORAL at 08:53

## 2023-05-08 RX ADMIN — INSULIN LISPRO 4 UNITS: 100 INJECTION, SOLUTION INTRAVENOUS; SUBCUTANEOUS at 16:44

## 2023-05-08 RX ADMIN — INSULIN LISPRO 8 UNITS: 100 INJECTION, SOLUTION INTRAVENOUS; SUBCUTANEOUS at 22:01

## 2023-05-08 RX ADMIN — Medication 20 MG/HR: at 16:51

## 2023-05-08 NOTE — ASSESSMENT & PLAN NOTE
Lab Results   Component Value Date    HGBA1C 12 0 (H) 04/14/2023       Recent Labs     05/07/23  1123 05/07/23  1557 05/07/23  2130 05/08/23  0557   POCGLU 188* 286* 309* 172*       Blood Sugar Average: Last 72 hrs:  (P) 974 [id number]     · Will hold oral medications   · Continue Lantus 18 U   · Continue SSI algorithm 4

## 2023-05-08 NOTE — PROGRESS NOTES
NEPHROLOGY PROGRESS NOTE   Abraham Mendosa 59 y o  male MRN: [id number]  Unit/Bed#: [place] 411-01 Encounter: [id number]      ASSESSMENT & PLAN:  1 acute kidney injury suspected secondary to cardiorenal syndrome, volume overload, decompensated heart failure  Creatinine prior to admission less than 1    Creatinine on admission 0 97, creatinine peak at 2 34 on 5/7, creatinine improving down to 1 89 this morning  Good urine output with 4 2 L over the last 24 hours and net negative of 2 3  Heart failure team on board managing diuretics, currently on Lasix drip  Monitor ins and outs, follow daily labs and daily weight    2 acute HFrEF stage D, heart failure team on board managing diuretics, currently on milrinone as well as Lasix drip  Follow daily weights, monitor ins and outs    #3 V-tach, EP on board, given ischemic cardiomyopathy plan for ICD    #4 hyponatremia in the setting of volume overload, CHF, LINDEN, continue with diuresis, follow daily labs    My plan and recommendation were discussed with internal medicine attending, he agreed with the plan, continue with diuresis as per heart failure team, follow daily labs      SUBJECTIVE:  Patient seen and examined, feeling in general better, shortness of breath improving, denies any chest pain, no shortness of breath, no nausea, no vomiting      OBJECTIVE:  Current Weight: Weight - Scale: 118 kg (259 lb 4 2 oz)  Vitals:    05/08/23 0855   BP: 111/63   Pulse: 76   Resp:    Temp:    SpO2:        Intake/Output Summary (Last 24 hours) at 5/8/2023 0958  Last data filed at 5/8/2023 0800  Gross per 24 hour   Intake 1586 04 ml   Output 4275 ml   Net -2688 96 ml       General: Morbidly obese, conscious, cooperative, in not acute distress  Eyes: conjunctivae pink, anicteric sclerae  ENT: lips and mucous membranes moist  Neck: supple, no JVD  Chest: clear breath sounds bilateral, no crackles, ronchus or wheezings  CVS: distinct S1 & S2, normal rate, regular rhythm  Abdomen: Obese, non-tender, non-distended, normoactive bowel sounds  Extremities: + edema of both legs    Skin: no rash  Neuro: awake, alert, oriented        Medications:    Current Facility-Administered Medications:   •  acetaminophen (TYLENOL) tablet 650 mg, 650 mg, Oral, Q6H PRN, Vinton Najjar, CRNP  •  [] amiodarone (CORDARONE) 900 mg in dextrose 5 % 500 mL infusion, 1 mg/min, Intravenous, Continuous, Stopped at 23 0031 **FOLLOWED BY** amiodarone (CORDARONE) 900 mg in dextrose 5 % 500 mL infusion, 1 mg/min, Intravenous, Continuous, Melita Jamil MD, Last Rate: 33 3 mL/hr at 23 0853, 1 mg/min at 23 4629  •  amiodarone tablet 200 mg, 200 mg, Oral, TID With Meals, Melita Jamil MD, 200 mg at 23 3253  •  aspirin chewable tablet 81 mg, 81 mg, Oral, Daily, Maycol Jones DO, 81 mg at 23 7826  •  atorvastatin (LIPITOR) tablet 80 mg, 80 mg, Oral, After Dinner, Maycol Jones DO, 80 mg at 23 1716  •  furosemide (LASIX) 500 mg infusion 50 mL, 20 mg/hr, Intravenous, Continuous, Kamlesh Nunez MD, Last Rate: 2 mL/hr at 23 1554, 20 mg/hr at 23 1554  •  heparin (porcine) subcutaneous injection 5,000 Units, 5,000 Units, Subcutaneous, Q8H [address], Odalis Waldrop, 5,000 Units at 23 0617  •  hydrALAZINE (APRESOLINE) injection 5 mg, 5 mg, Intravenous, Q6H PRN, Maycol Jones, DO  •  insulin glargine (LANTUS) subcutaneous injection 10 Units 0 1 mL, 10 Units, Subcutaneous, HS, Maycol Jones, DO, 10 Units at 23 2140  •  insulin lispro (HumaLOG) 100 units/mL subcutaneous injection 2-12 Units, 2-12 Units, Subcutaneous, 4x Daily (AC & HS), 2 Units at 23 0617 **AND** Fingerstick Glucose (POCT), , , 4x Daily AC and at bedtime, CARLA Benton  •  lidocaine (LIDODERM) 5 % patch 1 patch, 1 patch, Topical, Daily, Odalis Waldrop, 1 patch at 23 0854  •  metoprolol tartrate (LOPRESSOR) partial tablet 12 5 mg, 12 5 mg, Oral, Q12H [address], Marlboromando Baez, DO, 12 5 mg at 23 0262  •  milrinone (PRIMACOR) 20 mg in 100 mL infusion (premix), 0 25 mcg/kg/min, Intravenous, Continuous, Manju Carrier, DO, Last Rate: 8 9 mL/hr at 05/08/23 0524, 0 25 mcg/kg/min at 05/08/23 0524  •  nicotine (NICODERM CQ) 14 mg/24hr TD 24 hr patch 1 patch, 1 patch, Transdermal, Daily, Maycol Banai, DO  •  nitroglycerin (NITROSTAT) SL tablet 0 4 mg, 0 4 mg, Sublingual, Q5 Min PRN, Maycol Banai, DO  •  ondansetron (ZOFRAN) injection 4 mg, 4 mg, Intravenous, Q6H PRN, Maycol Banai, DO, 4 mg at 05/06/23 0954  •  polyethylene glycol (MIRALAX) packet 17 g, 17 g, Oral, Daily, Sharon Gallery Minnix, DO, 17 g at 05/08/23 7409  •  senna-docusate sodium (SENOKOT S) 8 6-50 mg per tablet 2 tablet, 2 tablet, Oral, HS, Sharon Gallery Minnix, DO    Invasive Devices:   Urethral Catheter (Active)   Amt returned on insertion(mL) 0 mL 05/06/23 1800   Reasons to continue Urinary Catheter  Accurate I&O assessment in critically ill patients (48 hr  max) 05/07/23 0800   Site Assessment Clean;Skin intact 05/08/23 0027   Escobar Care Done 05/07/23 2100   Collection Container Standard drainage bag 05/08/23 0027   Securement Method Securing device (Describe) 05/08/23 0027   Output (mL) 1250 mL 05/08/23 0621   CAUTI Time-out performed before Urine Culture Yes 05/06/23 1800       Lab Results:   Results from last 7 days   Lab Units 05/08/23  0425 05/07/23  0451 05/06/23  0547 05/05/23  0447 05/04/23  0235 05/03/23  1641 05/03/23  0831 05/02/23  2314   WBC Thousand/uL 12 22* 11 63* 11 31* 12 21*   < >  --   --  9 27   HEMOGLOBIN g/dL 14 6 14 4 15 6 15 8   < >  --   --  14 9   HEMATOCRIT % 43 8 42 9 49 0 49 9*   < >  --   --  48 8   PLATELETS Thousands/uL 193 185 188 181   < >  --   --  195   SODIUM mmol/L 129* 127* 125* 127*   < > 131* 132* 131*   POTASSIUM mmol/L 4 0 4 2 5 1 4 6   < > 5 1 5 4* 5 2   CHLORIDE mmol/L 97 95* 99 99   < > 102 104 104   CO2 mmol/L 26 22 15* 20*   < > 24 21 23   BUN mg/dL 68* 79* 69* 56*   < > 54* 51* 46*   CREATININE mg/dL 1 89* 2 34* "2 33* 1 77*   < > 1 52* 1 49* 1 37*   CALCIUM mg/dL 8 4 8 4 7 7* 8 3   < > 8 4 8 5 8 7   MAGNESIUM mg/dL  --  2 4 2 6 2 4   < >  --   --  2 5   ALK PHOS U/L  --   --   --   --   --  42* 43* 41*   ALT U/L  --   --   --   --   --  45 49 36   AST U/L  --   --   --   --   --  34 53* 25    < > = values in this interval not displayed  Previous work up:  See previous notes      Portions of the record may have been created with voice recognition software  Occasional wrong word or \"sound a like\" substitutions may have occurred due to the inherent limitations of voice recognition software  Read the chart carefully and recognize, using context, where substitutions have occurred  If you have any questions, please contact the dictating provider    "

## 2023-05-08 NOTE — ASSESSMENT & PLAN NOTE
· EP following and started amiodarone, given ischemic cardiomyopathy and VT plan for ICD  · S/p amio load    · Continue amiodarone 200 mg  · ICD placement on 5/12

## 2023-05-08 NOTE — PROGRESS NOTES
INTERNAL MEDICINE RESIDENCY PROGRESS NOTE     Name: David Moyer   Age & Sex: 59 y o  male   MRN: [id number]  Unit/Bed#: [facility] 411-01   Encounter: [id number]  Team: Montserrat Lora    PATIENT INFORMATION     Name: David Moyer   Age & Sex: 59 y o  male   MRN: [id number]  Hospital Stay Days: 6    ASSESSMENT/PLAN     Principal Problem:    Acute HFrEF  Active Problems:    Ischemic cardiomyopathy    3-vessel CAD    [facility])    Essential (primary) hypertension    Type 2 diabetes mellitus, with long-term current use of insulin ([facility])    Tobacco dependence syndrome    A-fib (HCC)    LINDEN (acute kidney injury) ([facility] [address] )    Hyponatremia    Acidosis    Other constipation      * Acute HFrEF  Assessment & Plan  Wt Readings from Last 3 Encounters:   05/08/23 118 kg (259 lb 4 2 oz)   05/02/23 116 kg (254 lb 12 8 oz)   04/01/22 102 kg (224 lb 12 8 oz)   · Patient presenting with 2 weeks of dyspnea on exertion, lower extremity edema, abd distention, weight gain  · Increasing renal function parameters, reached out to nephrology and cardio   · Continue milrinone drip  · Continue I/O, daily weight  · Appreciate heart failure team      3-vessel CAD  Assessment & Plan  · Patient with history of CAD with drug-eluting stent to LCx in 2015  · Patient reports dyspnea on exertion over the past 2 weeks, denies any chest pain at rest, is a diabetic on insulin, is an active smoker of 40 pack years, has prior history of CAD, is on daily aspirin  · LVEF 10-15%, continue medical management  Appreciate Cardiology comanagement and expertise  · Cath 5/3 with multivessel disease not amenable to PCI, continue medical management   Appreciate cardio recommendations    [facility])  Assessment & Plan  · EP following and started amiodarone, given ischemic cardiomyopathy and VT, tentatively patient is planned for a ICD prior to discharge  · Plan when patient is more euvolemic    Other constipation  Assessment & Plan  Complains of constipation for 2 days     · Increase Senokot-S to 2 tablets  · Start MiraLAX    LINDEN (acute kidney injury) ([facility] [address] )  Assessment & Plan  · Progressive increase in BUN and creatinine, possibly somewhat cardiorenal from poor forward perfusion  · Discussed with heart failure team, and milrinone infusion started  · We will also reach out to nephrology, appreciate the comanagement of all specializing services    [facility])  Assessment & Plan  · At cardio appointment outpt when he appeared to be in A-fib RVR on EKG, also with Q waves concerning for prior infarct, in the setting of chest pressure last week concerning for MI  · Now in sinus  Noted reduced LVEF of 10 to 15%, continue positive inotropic support to aid in diuresis    Tobacco dependence syndrome  Assessment & Plan  Patient with over 40  pack-year history  Counseling provided on cessation, nicotine patch ordered    Type 2 diabetes mellitus, with long-term current use of insulin [facility])  Assessment & Plan  Lab Results   Component Value Date    HGBA1C 12 0 (H) 04/14/2023       Recent Labs     05/07/23  1123 05/07/23  1557 05/07/23  2130 05/08/23  0557   POCGLU 188* 286* 309* 172*       Blood Sugar Average: Last 72 hrs:  (P) 069 [id number]     · Will hold oral medications and continue on insulin with sliding scale    Essential (primary) hypertension  Assessment & Plan  Resume lopressor and PRN hydralazine      Disposition: Continue inpatient management    SUBJECTIVE     Patient seen and examined  No acute events overnight  Endorses diffuse abdominal pain secondary to constipation  States his last bowel movement was 2 days ago  Denies chest pain, shortness of breath, nausea, vomiting, difficulty urinating      OBJECTIVE     Vitals:    05/08/23 0156 05/08/23 0538 05/08/23 0702 05/08/23 0855   BP: (!) 88/58  105/65 111/63   BP Location: Left arm      Pulse:   68 76   Resp:   20    Temp:   (!) 97 4 °F (36 3 °C)    TempSrc:   Oral    SpO2:   93%    Weight:  118 kg (259 lb 4 2 oz) Height:          Temperature:   Temp (24hrs), Av 5 °F (36 4 °C), Min:97 3 °F (36 3 °C), Max:97 7 °F (36 5 °C)    Temperature: (!) 97 4 °F (36 3 °C)  Intake & Output:  I/O       05/06 0701  05/07 0700 05/07 0701  05/08 0700 05/08 0701  05/09 0700    P  O   844     I V  (mL/kg) 995 8 (8 5) 1069 1 (9 1) 72 9 (0 6)    Total Intake(mL/kg) 995 8 (8 5) 1913 1 (16 2) 72 9 (0 6)    Urine (mL/kg/hr) 1225 (0 4) 4275 (1 5) 850 (2)    Total Output 1225 4275 850    Net -229 2 -2361 9 -777 1           Unmeasured Urine Occurrence 2 x          Weights:   IBW (Ideal Body Weight): 77 6 kg    Body mass index is 35 16 kg/m²  Weight (last 2 days)     Date/Time Weight    23 0538 118 (259 26)    23 0600 117 (258 16)    23 0600 118 (261 02)        Physical Exam  Vitals reviewed  Constitutional:       General: He is not in acute distress  Appearance: He is obese  HENT:      Head: Normocephalic and atraumatic  Nose: No rhinorrhea  Eyes:      General: No scleral icterus  Cardiovascular:      Rate and Rhythm: Normal rate and regular rhythm  Pulses: Normal pulses  Heart sounds: Normal heart sounds  No murmur heard  No friction rub  No gallop  Pulmonary:      Effort: Pulmonary effort is normal  No respiratory distress  Breath sounds: Normal breath sounds  No wheezing, rhonchi or rales  Abdominal:      General: Bowel sounds are normal  There is no distension  Palpations: Abdomen is soft  There is no mass  Tenderness: There is no abdominal tenderness  There is no guarding  Hernia: No hernia is present  Musculoskeletal:      Right lower leg: Edema present  Left lower leg: Edema present  Skin:     General: Skin is warm and dry  Capillary Refill: Capillary refill takes less than 2 seconds  Coloration: Skin is not jaundiced  Neurological:      Mental Status: He is alert  Cranial Nerves: No dysarthria        Comments: CN 2-12 grossly intact, moves all 4 extremities   Psychiatric:         Mood and Affect: Mood normal          Behavior: Behavior normal        LABORATORY DATA     Labs: I have personally reviewed pertinent reports  Results from last 7 days   Lab Units 05/08/23 0425 05/07/23  0451 05/06/23  0547 05/04/23  0235 05/02/23  2314 05/02/23  1010   WBC Thousand/uL 12 22* 11 63* 11 31*   < > 9 27 9 15   HEMOGLOBIN g/dL 14 6 14 4 15 6   < > 14 9 14 9   HEMATOCRIT % 43 8 42 9 49 0   < > 48 8 48 7   PLATELETS Thousands/uL 193 185 188   < > 195 184   NEUTROS PCT % 83*  --   --   --  69 77*   MONOS PCT % 9  --   --   --  7 6    < > = values in this interval not displayed  Results from last 7 days   Lab Units 05/08/23 0425 05/07/23 0451 05/06/23  0547 05/04/23  0235 05/03/23  1641 05/03/23  0831 05/02/23  2314   POTASSIUM mmol/L 4 0 4 2 5 1   < > 5 1 5 4* 5 2   CHLORIDE mmol/L 97 95* 99   < > 102 104 104   CO2 mmol/L 26 22 15*   < > 24 21 23   BUN mg/dL 68* 79* 69*   < > 54* 51* 46*   CREATININE mg/dL 1 89* 2 34* 2 33*   < > 1 52* 1 49* 1 37*   CALCIUM mg/dL 8 4 8 4 7 7*   < > 8 4 8 5 8 7   ALK PHOS U/L  --   --   --   --  42* 43* 41*   ALT U/L  --   --   --   --  45 49 36   AST U/L  --   --   --   --  34 53* 25    < > = values in this interval not displayed  Results from last 7 days   Lab Units 05/07/23 0451 05/06/23  0547 05/05/23  0447   MAGNESIUM mg/dL 2 4 2 6 2 4          Results from last 7 days   Lab Units 05/05/23 0447 05/04/23  0926 05/04/23  0235 05/02/23  1802 05/02/23  1010   INR   --   --   --   --  1 13   PTT seconds 90* 84* 69*   < > 28    < > = values in this interval not displayed  Results from last 7 days   Lab Units 05/02/23  2314   LACTIC ACID mmol/L 1 9           IMAGING & DIAGNOSTIC TESTING     Radiology Results: I have personally reviewed pertinent reports  XR chest pa & lateral    Result Date: 5/2/2023  Impression: Right base nodular density and right pleural effusion  Recommend CT   The study was marked in [facility] for immediate notification  CT chest wo contrast    Result Date: 5/3/2023  Impression: Small to moderate right effusion with ascites may be due to congestion/heart failure Mild groundglass is seen at the right lung base, nonspecific related to atelectasis or pneumonitis Mild enlarged mediastinal lymph nodes may be reactive, consider follow-up at 3 months to demonstrate resolution/stability  The study was marked in [facility] for significant notification  Workstation performed: Simple Emotion abdomen limited    Result Date: 5/6/2023  Impression: 1  Small volume ascites within all 4 quadrants  Workstation performed: [id number]     US kidney and bladder    Result Date: 5/7/2023  Impression: 1  No hydronephrosis  Trace perinephric fluid is seen bilaterally  2  The bladder is nondistended, limiting evaluation  3  Small volume ascites  Workstation performed: [id number]     Other Diagnostic Testing: I have personally reviewed pertinent reports      ACTIVE MEDICATIONS     Current Facility-Administered Medications   Medication Dose Route Frequency   • acetaminophen (TYLENOL) tablet 650 mg  650 mg Oral Q6H PRN   • amiodarone (CORDARONE) 900 mg in dextrose 5 % 500 mL infusion  1 mg/min Intravenous Continuous   • amiodarone tablet 200 mg  200 mg Oral TID With Meals   • aspirin chewable tablet 81 mg  81 mg Oral Daily   • atorvastatin (LIPITOR) tablet 80 mg  80 mg Oral After Dinner   • furosemide (LASIX) 500 mg infusion 50 mL  20 mg/hr Intravenous Continuous   • heparin (porcine) subcutaneous injection 5,000 Units  5,000 Units Subcutaneous Q8H [facility]   • hydrALAZINE (APRESOLINE) injection 5 mg  5 mg Intravenous Q6H PRN   • insulin glargine (LANTUS) subcutaneous injection 10 Units 0 1 mL  10 Units Subcutaneous HS   • insulin lispro (HumaLOG) 100 units/mL subcutaneous injection 2-12 Units  2-12 Units Subcutaneous 4x Daily (AC & HS)   • lidocaine (LIDODERM) 5 % patch 1 patch  1 patch Topical Daily   • metoprolol tartrate (LOPRESSOR) "partial tablet 12 5 mg  12 5 mg Oral Q12H [facility]   • milrinone (PRIMACOR) 20 mg in 100 mL infusion (premix)  0 25 mcg/kg/min Intravenous Continuous   • nicotine (NICODERM CQ) 14 mg/24hr TD 24 hr patch 1 patch  1 patch Transdermal Daily   • nitroglycerin (NITROSTAT) SL tablet 0 4 mg  0 4 mg Sublingual Q5 Min PRN   • ondansetron (ZOFRAN) injection 4 mg  4 mg Intravenous Q6H PRN   • polyethylene glycol (MIRALAX) packet 17 g  17 g Oral Daily   • senna-docusate sodium (SENOKOT S) 8 6-50 mg per tablet 2 tablet  2 tablet Oral HS       VTE Pharmacologic Prophylaxis: Heparin  VTE Mechanical Prophylaxis: sequential compression device    Portions of the record may have been created with voice recognition software  Occasional wrong word or \"sound a like\" substitutions may have occurred due to the inherent limitations of voice recognition software    Read the chart carefully and recognize, using context, where substitutions have occurred   ==  Mariaelena Skelton, DO  [address]  Internal Medicine Residency PGY-2       "

## 2023-05-08 NOTE — ASSESSMENT & PLAN NOTE
· Continue Lopressor 12 5 mg twice daily  · Continue Entresto 49-51 mg twice daily  · Spironolactone 25 mg

## 2023-05-08 NOTE — PLAN OF CARE
Problem: OCCUPATIONAL THERAPY ADULT  Goal: Performs self-care activities at highest level of function for planned discharge setting  See evaluation for individualized goals  Description: Treatment Interventions: ADL retraining, Functional transfer training, Endurance training, Patient/family training, Equipment evaluation/education, Compensatory technique education, Continued evaluation, Energy conservation, Activityengagement  Equipment Recommended: Other (comment) (TBD however anticipate pt w/ require no DME upon d/c)       See flowsheet documentation for full assessment, interventions and recommendations  Note: Limitation: Decreased ADL status, Decreased endurance, Decreased self-care trans, Decreased high-level ADLs  Prognosis: Fair  Assessment: Pt seen for skilled OT treatment session this day  Pt demonstrates ability to complete all UB self care self, S for LB  Cues for self pacing and breathing  Pt able to walk to bathroom w S, no AD  No loss of balance  Tolerate standing at sink side for completion of hygiene  Pt S for mobility in room, no use of AD, no loss of balance noted  Pt has chatman, but demonstrates on/off toilet mod I  Pt educated on ECT/self pacing skills, breathing techniques with good understanding  Pt instructed in HEP for UE such as shoulder flex, abduction, bicep curls etc to maintain movement/strenght UE and to reduce edema hands  Pt with good understanding  OT will continue to follow while in acute care       OT Discharge Recommendation: No rehabilitation needs

## 2023-05-08 NOTE — PLAN OF CARE
Problem: CARDIOVASCULAR - ADULT  Goal: Maintains optimal cardiac output and hemodynamic stability  Description: INTERVENTIONS:  - Monitor I/O, vital signs and rhythm  - Monitor for S/S and trends of decreased cardiac output  - Administer and titrate ordered vasoactive medications to optimize hemodynamic stability  - Assess quality of pulses, skin color and temperature  - Assess for signs of decreased coronary artery perfusion  - Instruct patient to report change in severity of symptoms  Outcome: Progressing  Goal: Absence of cardiac dysrhythmias or at baseline rhythm  Description: INTERVENTIONS:  - Continuous cardiac monitoring, vital signs, obtain 12 lead EKG if ordered  - Administer antiarrhythmic and heart rate control medications as ordered  - Monitor electrolytes and administer replacement therapy as ordered  Outcome: Progressing     Problem: PAIN - ADULT  Goal: Verbalizes/displays adequate comfort level or baseline comfort level  Description: Interventions:  - Encourage patient to monitor pain and request assistance  - Assess pain using appropriate pain scale  - Administer analgesics based on type and severity of pain and evaluate response  - Implement non-pharmacological measures as appropriate and evaluate response  - Consider cultural and social influences on pain and pain management  - Notify physician/advanced practitioner if interventions unsuccessful or patient reports new pain  Outcome: Progressing     Problem: INFECTION - ADULT  Goal: Absence or prevention of progression during hospitalization  Description: INTERVENTIONS:  - Assess and monitor for signs and symptoms of infection  - Monitor lab/diagnostic results  - Monitor all insertion sites, i e  indwelling lines, tubes, and drains  - Monitor endotracheal if appropriate and nasal secretions for changes in amount and color  - Whigham appropriate cooling/warming therapies per order  - Administer medications as ordered  - Instruct and encourage patient and family to use good hand hygiene technique  - Identify and instruct in appropriate isolation precautions for identified infection/condition  Outcome: Progressing  Goal: Absence of fever/infection during neutropenic period  Description: INTERVENTIONS:  - Monitor WBC    Outcome: Progressing     Problem: SAFETY ADULT  Goal: Patient will remain free of falls  Description: INTERVENTIONS:  - Educate patient/family on patient safety including physical limitations  - Instruct patient to call for assistance with activity   - Consult OT/PT to assist with strengthening/mobility   - Keep Call bell within reach  - Keep bed low and locked with side rails adjusted as appropriate  - Keep care items and personal belongings within reach  - Initiate and maintain comfort rounds  - Make Fall Risk Sign visible to staff  - Apply yellow socks and bracelet for high fall risk patients  - Consider moving patient to room near nurses station  Outcome: Progressing  Goal: Maintain or return to baseline ADL function  Description: INTERVENTIONS:  -  Assess patient's ability to carry out ADLs; assess patient's baseline for ADL function and identify physical deficits which impact ability to perform ADLs (bathing, care of mouth/teeth, toileting, grooming, dressing, etc )  - Assess/evaluate cause of self-care deficits   - Assess range of motion  - Assess patient's mobility; develop plan if impaired  - Assess patient's need for assistive devices and provide as appropriate  - Encourage maximum independence but intervene and supervise when necessary  - Involve family in performance of ADLs  - Assess for home care needs following discharge   - Consider OT consult to assist with ADL evaluation and planning for discharge  - Provide patient education as appropriate  Outcome: Progressing  Goal: Maintains/Returns to pre admission functional level  Description: INTERVENTIONS:  - Perform BMAT or MOVE assessment daily    - Set and communicate daily mobility goal to care team and patient/family/caregiver  - Collaborate with rehabilitation services on mobility goals if consulted  - Out of bed for toileting  - Record patient progress and toleration of activity level   Outcome: Progressing     Problem: DISCHARGE PLANNING  Goal: Discharge to home or other facility with appropriate resources  Description: INTERVENTIONS:  - Identify barriers to discharge w/patient and caregiver  - Arrange for needed discharge resources and transportation as appropriate  - Identify discharge learning needs (meds, wound care, etc )  - Arrange for interpretive services to assist at discharge as needed  - Refer to Case Management Department for coordinating discharge planning if the patient needs post-hospital services based on physician/advanced practitioner order or complex needs related to functional status, cognitive ability, or social support system  Outcome: Progressing     Problem: Knowledge Deficit  Goal: Patient/family/caregiver demonstrates understanding of disease process, treatment plan, medications, and discharge instructions  Description: Complete learning assessment and assess knowledge base    Interventions:  - Provide teaching at level of understanding  - Provide teaching via preferred learning methods  Outcome: Progressing     Problem: MOBILITY - ADULT  Goal: Maintain or return to baseline ADL function  Description: INTERVENTIONS:  -  Assess patient's ability to carry out ADLs; assess patient's baseline for ADL function and identify physical deficits which impact ability to perform ADLs (bathing, care of mouth/teeth, toileting, grooming, dressing, etc )  - Assess/evaluate cause of self-care deficits   - Assess range of motion  - Assess patient's mobility; develop plan if impaired  - Assess patient's need for assistive devices and provide as appropriate  - Encourage maximum independence but intervene and supervise when necessary  - Involve family in performance of ADLs  - Assess for home care needs following discharge   - Consider OT consult to assist with ADL evaluation and planning for discharge  - Provide patient education as appropriate  Outcome: Progressing  Goal: Maintains/Returns to pre admission functional level  Description: INTERVENTIONS:  - Perform BMAT or MOVE assessment daily    - Set and communicate daily mobility goal to care team and patient/family/caregiver     - Collaborate with rehabilitation services on mobility goals if consulted  - Out of bed for toileting  - Record patient progress and toleration of activity level   Outcome: Progressing

## 2023-05-08 NOTE — NURSING NOTE
Received PICC consult  Reached out to Dr Lorna Zhu, nephrology, as pt's GFR is 45 with LINDEN  Ok'd to place PICC as long as it's in dominant arm

## 2023-05-08 NOTE — PROGRESS NOTES
Cardiology Progress Note   [facility] Living 59 y o  male MRN: [id number]  Unit/Bed#: [facility] 411-01 Encounter: [id number]    Hospital Course/Assessment  The patient is a 43-year-old male with a past medical history of CAD status post PCI in 2015 who presented to the emergency department on 5/2 complaining of 2 weeks of worsening shortness of breath, lower extremity edema and 20 pound weight gain  In the ED he was found to be in rapid atrial fibrillation and heart failure  He received a dose of IV metoprolol and converted back to sinus rhythm  Echocardiogram was obtained which showed severely reduced LVEF to 10 to 15% and cardiac catheterization showed chronic occlusion of mid LAD and L PDA  The patient was admitted for heart failure exacerbation  Subjective: Today the patient was seen and examined at bedside  He had no acute events overnight  He reports that his lower extremity swelling has been improving as well as his respiratory status  Otherwise he had no other acute complaints  Plan  1  Acute HFrEF, Stage D  -Secondary to ICM with large apical aneurysm  -Echocardiogram on 5/2 showed an EF of 10 to 15% with an apical aneurysm  -LHC on 5/3 showed a chronically occluded mid LAD and LPDA, not amenable to PCI  -Patient was started on milrinone 0 25 mcg/kg/min on 5/6 due to rising creatinine and worsening volume status -> continue for now, will reassess in a m   -Follow up VBG  -Patient was originally on Lasix 60 mg IV twice daily but was later switched to a Lasix drip on 5/7 that is currently running at 20 mg/h -> continue for now, will reassess in a m   -Metoprolol tartrate was decreased from 25 mg twice daily to 12 5 mg twice daily on 5/6 -> continue 12 5 mg twice daily  -Electrophysiology is following -> plan for ICD placement prior to discharge after improvement of heart failure symptoms  -Keep K above 4 and mag above 2  -Monitor I's and O's and daily weights    2  Atrial fibrillation  -Continue p o  and IV amiodarone with plan to transition to p o   -Continue metoprolol tartrate 12 5 mg twice daily  -Continue telemetry monitoring  -EP following    3  Scar mediated VT  -Continue p o  and IV amiodarone with plan to transition to p o   -Continue metoprolol tartrate 12 5 mg twice daily  -Continue telemetry monitoring -> frequent PVCs  -EP following      Wiley Ji MD  - PGY-3 IM Resident  - Tiger text enabled    ======================================================  Objective  VS: Blood pressure 105/65, pulse 69, temperature 97 6 °F (36 4 °C), resp  rate 20, height 6' (1 829 m), weight 118 kg (259 lb 4 2 oz), SpO2 95 %  Gen: well appearing  Psych: AOx3  Skin: intact  Cardiac: S1, S2, regular rate, no S3 or S4 appreciated  No murmurs  +2 PT, radial pulses  Bilateral lower extremity edema  JVD present  No carotid bruits  Resp: CTABL  Decreased bilateral lower lobe breath sounds  MSK: 5/5 strength throughout muscle groups  Neuro: CN grossly intact  Sensory to light touch, pain, proprioception intact BL LE, UE  LN: no cervical LAD  Rheum: no joint deformities in UE or LE  ======================================================  TREADMILL STRESS  No results found for this or any previous visit      ----------------------------------------------------------------------------------------------  NUCLEAR STRESS TEST: No results found for this or any previous visit      No results found for this or any previous visit       --------------------------------------------------------------------------------  CATH:  No results found for this or any previous visit     --------------------------------------------------------------------------------  ECHO:   Results for orders placed during the hospital encounter of 09/26/17    Echo complete with contrast if indicated    Bronson Oden [address] [place] [address], [address] [place] [address]  [phone]    Transthoracic Echocardiogram  2D, M-mode, Doppler, and Color Doppler    Study date:  26-Sep-2017    Patient: Audrey Mabry  MR number: [id number]  Account number: [de-identified]  : 1960  Age: 65 years  Gender: Male  Status: Outpatient  Location: [address] [place] [address] [facility]  Height: 72 in  Weight: 263 3 lb  BP: 142/ 88 mmHg    Indications: CAD    Diagnoses: I25 10 - Atherosclerotic heart disease of native coronary artery without angina pectoris    Sonographer:  OLIVE Arana  Primary Physician:  Patito Wilson MD  Referring Physician:  Jes Jain MD  Group:  [facility]  Interpreting Physician:  Luis Carlos Merrill MD    SUMMARY    LEFT VENTRICLE:  Size was normal   Systolic function was normal  Ejection fraction was estimated to be 65 %  There was mild concentric hypertrophy  Doppler parameters were consistent with abnormal left ventricular relaxation (grade 1 diastolic dysfunction)  RIGHT VENTRICLE:  The size was normal   Systolic function was normal     TRICUSPID VALVE:  There was trace regurgitation  HISTORY: PRIOR HISTORY: Hypertension, CAD s/p PCI, smoker, diabetes, high cholesterol    PROCEDURE: The study was performed in the [address] [facility]  This was a routine study  The transthoracic approach was used  The study included complete 2D imaging, M-mode, complete spectral Doppler, and color Doppler  The  heart rate was 112 bpm, at the start of the study  Images were obtained from the parasternal, apical, subcostal, and suprasternal notch acoustic windows  Echocardiographic views were limited due to decreased penetration  This was a  technically difficult study  LEFT VENTRICLE: Size was normal  Systolic function was normal  Ejection fraction was estimated to be 65 %  There were no regional wall motion abnormalities  Wall thickness was mildly increased  There was mild concentric hypertrophy    DOPPLER: Doppler parameters were consistent with abnormal left ventricular relaxation (grade 1 diastolic dysfunction)  RIGHT VENTRICLE: The size was normal  Systolic function was normal  Wall thickness was normal     LEFT ATRIUM: Size was normal     RIGHT ATRIUM: Size was normal     MITRAL VALVE: Valve structure was normal  There was normal leaflet separation  DOPPLER: The transmitral velocity was within the normal range  There was no evidence for stenosis  There was no regurgitation  AORTIC VALVE: The valve was trileaflet  Leaflets exhibited normal thickness and normal cuspal separation  DOPPLER: Transaortic velocity was within the normal range  There was no evidence for stenosis  There was no regurgitation  TRICUSPID VALVE: The valve structure was normal  There was normal leaflet separation  DOPPLER: The transtricuspid velocity was within the normal range  There was no evidence for stenosis  There was trace regurgitation  The tricuspid jet  envelope definition was inadequate for estimation of RV systolic pressure  There are no indirect findings suggestive of moderate or severe pulmonary hypertension  PULMONIC VALVE: Leaflets exhibited normal thickness, no calcification, and normal cuspal separation  DOPPLER: The transpulmonic velocity was within the normal range  There was no regurgitation  PERICARDIUM: There was no pericardial effusion  The pericardium was normal in appearance  AORTA: The root exhibited normal size  SYSTEMIC VEINS: IVC: The inferior vena cava was normal in size and course   Respirophasic changes were normal     SYSTEM MEASUREMENT TABLES    2D  %FS: 47 6 %  AV Diam: 3 34 cm  EDV(Teich): 101 71 ml  EF Biplane: 67 76 %  EF(Cube): 85 61 %  EF(Teich): 79 %  ESV(Cube): 14 82 ml  ESV(Teich): 21 36 ml  IVSd: 1 58 cm  LA Area: 12 78 cm2  LA Diam: 3 37 cm  LVEDV MOD A2C: 49 73 ml  LVEDV MOD A4C: 61 78 ml  LVEDV MOD BP: 58 1 ml  LVEF MOD A2C: 65 64 %  LVEF MOD A4C: 71 2 %  LVESV MOD A2C: 17 09 ml  LVESV MOD A4C: 17 79 ml  LVESV MOD BP: 18 73 ml  LVIDd: 4 69 cm  LVIDs: 2 46 cm  LVLd A2C: 6 97 cm  LVLd A4C: 7 74 cm  LVLs A2C: 5 23 cm  LVLs A4C: 6 13 cm  LVPWd: 1 23 cm  RA Area: 14 52 cm2  RV Diam : 2 86 cm  SI(Cube): 36 73 ml/m2  SI(Teich): 33 48 ml/m2  SV MOD A2C: 32 64 ml  SV MOD A4C: 43 98 ml  SV(Cube): 88 15 ml  SV(Teich): 80 35 ml    MM  TAPSE: 3 26 cm    PW  E': 0 08 m/s    [facility]    Prepared and electronically signed by    Holland Buchanan MD  Signed 26-Sep-2017 12:55:39    No results found for this or any previous visit     --------------------------------------------------------------------------------  HOLTER  No results found for this or any previous visit     --------------------------------------------------------------------------------  CAROTIDS  No results found for this or any previous visit         [unfilled]   =====================================================    Active Meds    Current Facility-Administered Medications:   •  acetaminophen (TYLENOL) tablet 650 mg, 650 mg, Oral, Q6H PRN, CARLA Muir  •  [] amiodarone (CORDARONE) 900 mg in dextrose 5 % 500 mL infusion, 1 mg/min, Intravenous, Continuous, Stopped at 23 0031 **FOLLOWED BY** amiodarone (CORDARONE) 900 mg in dextrose 5 % 500 mL infusion, 1 mg/min, Intravenous, Continuous, Pari Yi MD, Last Rate: 33 3 mL/hr at 23 0853, 1 mg/min at 23 8076  •  amiodarone tablet 200 mg, 200 mg, Oral, TID With Meals, Pari Yi MD, 200 mg at 23 1152  •  aspirin chewable tablet 81 mg, 81 mg, Oral, Daily, Maycol Jones DO, 81 mg at 23 0853  •  atorvastatin (LIPITOR) tablet 80 mg, 80 mg, Oral, After Dinner, Maycol Jones DO, 80 mg at 23 1716  •  furosemide (LASIX) 500 mg infusion 50 mL, 20 mg/hr, Intravenous, Continuous, Raúl Earl MD, Last Rate: 2 mL/hr at 23 1554, 20 mg/hr at 23 1554  •  heparin (porcine) subcutaneous injection 5,000 Units, 5,000 Units, Subcutaneous, Q8H [address], Antoine Dys, 5,000 Units at 05/08/23 0617  •  hydrALAZINE (APRESOLINE) injection 5 mg, 5 mg, Intravenous, Q6H PRN, Maycol Jones, DO  •  insulin glargine (LANTUS) subcutaneous injection 10 Units 0 1 mL, 10 Units, Subcutaneous, HS, Maycol Jones, DO, 10 Units at 05/07/23 2140  •  insulin lispro (HumaLOG) 100 units/mL subcutaneous injection 2-12 Units, 2-12 Units, Subcutaneous, 4x Daily (AC & HS), 6 Units at 05/08/23 1153 **AND** Fingerstick Glucose (POCT), , , 4x Daily AC and at bedtime, CARLA Byrd  •  lidocaine (LIDODERM) 5 % patch 1 patch, 1 patch, Topical, Daily, Antoine Dys, 1 patch at 05/08/23 0854  •  metoprolol tartrate (LOPRESSOR) partial tablet 12 5 mg, 12 5 mg, Oral, Q12H [address], Guillermo Anderson, DO, 12 5 mg at 05/08/23 0855  •  milrinone (PRIMACOR) 20 mg in 100 mL infusion (premix), 0 25 mcg/kg/min, Intravenous, Continuous, Guillermo Anderson DO, Last Rate: 8 9 mL/hr at 05/08/23 0524, 0 25 mcg/kg/min at 05/08/23 0524  •  nicotine (NICODERM CQ) 14 mg/24hr TD 24 hr patch 1 patch, 1 patch, Transdermal, Daily, Maycol Jones DO  •  nitroglycerin (NITROSTAT) SL tablet 0 4 mg, 0 4 mg, Sublingual, Q5 Min PRN, Maycol Jones, DO  •  ondansetron (ZOFRAN) injection 4 mg, 4 mg, Intravenous, Q6H PRN, Maycol Jones, DO, 4 mg at 05/06/23 0954  •  polyethylene glycol (MIRALAX) packet 17 g, 17 g, Oral, Daily, Theola Fior Minnix, DO, 17 g at 05/08/23 7315  •  senna-docusate sodium (SENOKOT S) 8 6-50 mg per tablet 2 tablet, 2 tablet, Oral, HS, Theola Fior Minnix, DO    Labs & Results  Lab Results   Component Value Date    TROPONINI 2 79 (H) 02/16/2015    TROPONINI 1 31 (H) 02/16/2015     Lab Results   Component Value Date    GLUCOSE 203 (H) 02/17/2015    CALCIUM 8 4 05/08/2023     02/17/2015    K 4 0 05/08/2023    CO2 26 05/08/2023    CL 97 05/08/2023    BUN 68 (H) 05/08/2023    CREATININE 1 89 (H) 05/08/2023     Lab Results   Component Value Date    WBC 12 22 (H) 05/08/2023    HGB 14 6 05/08/2023    HCT 43 8 05/08/2023    MCV 82 05/08/2023     05/08/2023     Results from last 7 days   Lab Units 05/02/23  1010   INR  1 13     Lab Results   Component Value Date    CHOL 130 05/07/2015    CHOL 188 02/17/2015     Lab Results   Component Value Date    HDL 34 05/07/2015    HDL 43 02/17/2015     Lab Results   Component Value Date    LDLCALC 72 05/07/2015    LDLCALC 119 (H) 02/17/2015     Lab Results   Component Value Date    TRIG 122 05/07/2015    TRIG 128 02/17/2015     Lab Results   Component Value Date    ALT 45 05/03/2023    AST 34 05/03/2023

## 2023-05-08 NOTE — ASSESSMENT & PLAN NOTE
· Progressive increase in BUN and creatinine, possibly somewhat cardiorenal from poor forward perfusion  · Discussed with heart failure team, and milrinone infusion started  · We will also reach out to nephrology, appreciate the comanagement of all specializing services  · Improving

## 2023-05-08 NOTE — ASSESSMENT & PLAN NOTE
· At cardio appointment outpt when he appeared to be in A-fib RVR on EKG, also with Q waves concerning for prior infarct, in the setting of chest pressure last week concerning for MI  · Now in sinus, no episodes while inpatient  · Noted reduced LVEF of 10 to 15%  · Milrinone discontinued on 5/10  · Continue amiodarone 200 mg

## 2023-05-08 NOTE — PHYSICAL THERAPY NOTE
PHYSICAL THERAPY NOTE          Patient Name: Yuan Aldrich  [id number] Date: 5/8/2023 05/08/23 1032   PT Last Visit   PT Visit Date 05/08/23   Note Type   Note Type Treatment   Pain Assessment   Pain Assessment Tool 0-10   Pain Score No Pain   Restrictions/Precautions   Other Precautions Cardiac/sternal;Multiple lines;Telemetry   General   Chart Reviewed Yes   Additional Pertinent History cleared for Tx session (spoke to osorio)   Response to Previous Treatment Patient with no complaints from previous session  Family/Caregiver Present Yes   Cognition   Overall Cognitive Status WFL   Arousal/Participation Alert; Cooperative   Attention Within functional limits   Orientation Level Oriented to person;Oriented to place;Oriented to situation   Memory Within functional limits   Following Commands Follows one step commands without difficulty   Subjective   Subjective Pt is observed coming out of the BR w/o rw (holding on to a pole); agreeable to amb after seated rest period   Transfers   Sit to Stand 5  Supervision   Stand to Sit 5  Supervision   Ambulation/Elevation   Gait pattern Excessively slow; Short stride; Inconsistent balaji   Gait Assistance 4  Minimal assist  (w/ periods of close (S))   Additional items Verbal cues; Tactile cues; Assist x 1   Assistive Device Rolling walker   Distance 8 ft w/o AD; after seated rest period, another 35 ft + 25 ft + 25 ft + 35 ft w/ extended seated rest period in between   Balance   Static Sitting Good   Dynamic Sitting Fair +   Static Standing Fair   Dynamic Standing Fair -   Ambulatory Poor +   Activity Tolerance   Activity Tolerance Patient limited by fatigue   Nurse Made Aware spoke to KAVEH Adams   Assessment   Prognosis Guarded   Problem List Decreased strength;Decreased endurance; Impaired balance;Decreased mobility   Assessment Pt was able to demonstrate increased tolerance to mobilization ambulating further distances w/ rw and close guarding/min (A); pt still exhibits general weakness and deconditioning w/ associated min balance and mobility deficits requiring rw for amb and mint to assure safety; overall, cont to anticipate pt will return home w/ available family support upon D/C provided he cont improving w/ mobility skills, safety, and endurance and when medically cleared; home PT follow up may need to be considered; will follow   Barriers to Discharge Inaccessible home environment   Goals   Patient Goals to walk   STG Expiration Date 05/13/23   PT Treatment Day 1   Plan   Treatment/Interventions Functional transfer training;LE strengthening/ROM; Elevations; Therapeutic exercise; Endurance training;Bed mobility;Gait training;Spoke to nursing;Spoke to case management; Family   Progress Progressing toward goals   PT Frequency 3-5x/wk   Recommendation   PT Discharge Recommendation (S)  Home with home health rehabilitation  (home PT)   Equipment Recommended [place] walker   AM-PAC Basic Mobility Inpatient   Turning in Flat Bed Without Bedrails 4   Lying on Back to Sitting on Edge of Flat Bed Without Bedrails 3   Moving Bed to Chair 3   Standing Up From Chair Using Arms 3   Walk in Room 3   Climb 3-5 Stairs With Railing 2   Basic Mobility Inpatient Raw Score 18   Basic Mobility Standardized Score 41 05   Highest Level Of Mobility   JH-HLM Goal 6: Walk 10 steps or more   JH-HLM Achieved 7: Walk 25 feet or more   End of Consult   Patient Position at End of Consult Bedside chair; All needs within [facility]

## 2023-05-08 NOTE — OCCUPATIONAL THERAPY NOTE
Occupational Therapy Treatment Note      Jayla Gomezs    5/8/2023    Principal Problem:    Acute HFrEF  Active Problems:    Essential (primary) hypertension    3-vessel CAD    Type 2 diabetes mellitus, with long-term current use of insulin (HCC)    Tobacco dependence syndrome    A-fib (HCC)    Ischemic cardiomyopathy    LINDEN (acute kidney injury) ([facility] [address] )    V-tach (HCC)    Hyponatremia    Acidosis    Other constipation      Past Medical History:   Diagnosis Date    Myocardial infarction [facility])        Past Surgical History:   Procedure Laterality Date    CARDIAC CATHETERIZATION N/A 5/3/2023    Procedure: Cardiac Coronary Angiogram;  Surgeon: Jennifer Zuluaga MD;  Location: [facility]; Service: Cardiology    CARDIAC CATHETERIZATION Left 5/3/2023    Procedure: Cardiac Left Heart Cath;  Surgeon: Jennifer Zuluaga MD;  Location: [facility]; Service: Cardiology        05/08/23 1349   OT Last Visit   OT Visit Date 05/08/23   Note Type   Note Type Treatment   Pain Assessment   Pain Assessment Tool 0-10   Pain Score No Pain   Restrictions/Precautions   Other Precautions Multiple lines;Telemetry   Lifestyle   Autonomy PTA, pt was independent in ADLs/IADLs and functional mobility w/ cane prn; (+) driving   Reciprocal Relationships Lives with his wife   Service to Others Retired; reports previously working at [employer] Enjoys cutting wood   ADL   Eating Assistance 7  Independent   Grooming Assistance 7  Independent   Grooming Deficit Wash/dry hands; Wash/dry face; Teeth care   Grooming Comments standing at sink side   UB Bathing Assistance 7  Independent   UB Bathing Deficit Setup; Increased time to complete   LB Bathing Assistance 5  Supervision/Setup   UB Dressing Assistance 7  Independent   LB Dressing Assistance 5  Supervision/Setup   LB Dressing Deficit Don/doff R sock; Don/doff L sock   LB Dressing Comments cues for self pacing   noted to hold his breath   Functional Standing "Tolerance   Time 4-5 min   Activity standing in bathroom for functional self care   Transfers   Sit to Stand 7  Independent   Stand to Sit 7  Independent   Stand pivot 5  Supervision   Toilet transfer 5  Supervision   Additional items Standard toilet   Additional Comments no use of DME   Functional Mobility   Functional Mobility 5  Supervision   Additional Comments walk in room  no use of DME  cues for line managment   Subjective   Subjective \"i probably wont have the defibrillator put in until wednesday\"   Cognition   Overall Cognitive Status [facility]   Arousal/Participation Alert; Cooperative   Attention Within functional limits   Orientation Level Oriented X4   Memory Within functional limits   Following Commands Follows all commands and directions without difficulty   Activity Tolerance   Activity Tolerance Patient tolerated treatment well   Assessment   Assessment Pt seen for skilled OT treatment session this day  Pt demonstrates ability to complete all UB self care self, S for LB  Cues for self pacing and breathing  Pt able to walk to bathroom w S, no AD  No loss of balance  Tolerate standing at sink side for completion of hygiene  Pt S for mobility in room, no use of AD, no loss of balance noted  Pt has lurdes, but demonstrates on/off toilet mod I  Pt educated on ECT/self pacing skills, breathing techniques with good understanding  Pt instructed in HEP for UE such as shoulder flex, abduction, bicep curls etc to maintain movement/strenght UE and to reduce edema hands  Pt with good understanding  OT will continue to follow while in acute care  Plan   Treatment Interventions ADL retraining;Functional transfer training; Endurance training;Patient/family training;Energy conservation; Activityengagement; Compensatory technique education   Goal Expiration Date 05/17/23   OT Treatment Day 1   OT Frequency 1-2x/wk   Recommendation   OT Discharge Recommendation No rehabilitation needs   AM-PAC Daily Activity Inpatient   Lower " Body Dressing 3   Bathing 3   Toileting 4   Upper Body Dressing 4   Grooming 4   Eating 4   Daily Activity Raw Score 22   Daily Activity Standardized Score (Calc for Raw Score >=11) 47 1

## 2023-05-08 NOTE — PLAN OF CARE
Problem: PHYSICAL THERAPY ADULT  Goal: Performs mobility at highest level of function for planned discharge setting  See evaluation for individualized goals  Description: Treatment/Interventions: Functional transfer training, LE strengthening/ROM, Elevations, Therapeutic exercise, Endurance training, Bed mobility, Gait training, Spoke to nursing, Spoke to case management, OT          See flowsheet documentation for full assessment, interventions and recommendations  Outcome: Progressing  Note: Prognosis: Guarded  Problem List: Decreased strength, Decreased endurance, Impaired balance, Decreased mobility  Assessment: Pt was able to demonstrate increased tolerance to mobilization ambulating further distances w/ rw and close guarding/min (A); pt still exhibits general weakness and deconditioning w/ associated min balance and mobility deficits requiring rw for amb and mint to assure safety; overall, cont to anticipate pt will return home w/ available family support upon D/C provided he cont improving w/ mobility skills, safety, and endurance and when medically cleared; home PT follow up may need to be considered; will follow  Barriers to Discharge: Inaccessible home environment     PT Discharge Recommendation: (S) Home with home health rehabilitation (home PT)    See flowsheet documentation for full assessment

## 2023-05-08 NOTE — ASSESSMENT & PLAN NOTE
Wt Readings from Last 3 Encounters:   05/08/23 118 kg (259 lb 4 2 oz)   05/02/23 116 kg (254 lb 12 8 oz)   04/01/22 102 kg (224 lb 12 8 oz)   · Patient presenting with 2 weeks of dyspnea on exertion, lower extremity edema, abd distention, weight gain  · Nephrology and cardiology on board  · Echo showed ejection fraction of 10 to 15% with apical aneurysm  · Cardiac catheterization showed chronically occluded mid LAD and LPDA, not amenable to PCI  · Was on milrinone and Lasix drip, both discontinued 5/10/2023  · Started on Lasix 80 mg IV twice daily, decreased to 60 mg IV twice daily, continues to have good urine output, continues to lose weight  · Plan is to continue Lasix IV today  · Continue Entresto  · Continue spironolactone 25 mg daily  · Status post ICD placement 5/12/2023  · Escobar catheter placed on this admission for accurate I's and O's    Discontinue Escobar  · Continue I/O, daily weight  · Appreciate heart failure team

## 2023-05-08 NOTE — ASSESSMENT & PLAN NOTE
· Patient with history of CAD with drug-eluting stent to LCx in 2015  · Patient reports dyspnea on exertion over the past 2 weeks, denies any chest pain at rest, is a diabetic on insulin, is an active smoker of 40 pack years, has prior history of CAD, is on daily aspirin  · LVEF 10-15%, continue medical management  Appreciate Cardiology comanagement and expertise  · Cath 5/3 with multivessel disease not amenable to PCI, continue medical management   Appreciate cardio recommendations  · Continue aspirin, Lipitor, metoprolol

## 2023-05-09 ENCOUNTER — HOME HEALTH ADMISSION (OUTPATIENT)
Dept: HOME HEALTH SERVICES | Facility: HOME HEALTHCARE | Age: 63
End: 2023-05-09
Payer: COMMERCIAL

## 2023-05-09 LAB
ANION GAP SERPL CALCULATED.3IONS-SCNC: 5 MMOL/L (ref 4–13)
BASE EX.OXY STD BLDV CALC-SCNC: 54.5 % (ref 60–80)
BASE EX.OXY STD BLDV CALC-SCNC: 60.6 % (ref 60–80)
BASE EXCESS BLDV CALC-SCNC: 2.6 MMOL/L
BASE EXCESS BLDV CALC-SCNC: 5 MMOL/L
BASOPHILS # BLD AUTO: 0.01 THOUSANDS/ÂΜL (ref 0–0.1)
BASOPHILS NFR BLD AUTO: 0 % (ref 0–1)
BODY TEMPERATURE: 98.2 DEGREES FEHRENHEIT
BUN SERPL-MCNC: 47 MG/DL (ref 5–25)
CALCIUM SERPL-MCNC: 8.6 MG/DL (ref 8.3–10.1)
CHLORIDE SERPL-SCNC: 97 MMOL/L (ref 96–108)
CO2 SERPL-SCNC: 29 MMOL/L (ref 21–32)
CREAT SERPL-MCNC: 1.3 MG/DL (ref 0.6–1.3)
EOSINOPHIL # BLD AUTO: 0 THOUSAND/ÂΜL (ref 0–0.61)
EOSINOPHIL NFR BLD AUTO: 0 % (ref 0–6)
ERYTHROCYTE [DISTWIDTH] IN BLOOD BY AUTOMATED COUNT: 13.8 % (ref 11.6–15.1)
GFR SERPL CREATININE-BSD FRML MDRD: 58 ML/MIN/1.73SQ M
GLUCOSE SERPL-MCNC: 189 MG/DL (ref 65–140)
GLUCOSE SERPL-MCNC: 209 MG/DL (ref 65–140)
GLUCOSE SERPL-MCNC: 216 MG/DL (ref 65–140)
GLUCOSE SERPL-MCNC: 269 MG/DL (ref 65–140)
GLUCOSE SERPL-MCNC: 295 MG/DL (ref 65–140)
HCO3 BLDV-SCNC: 28.1 MMOL/L (ref 24–30)
HCO3 BLDV-SCNC: 30.1 MMOL/L (ref 24–30)
HCT VFR BLD AUTO: 43 % (ref 36.5–49.3)
HGB BLD-MCNC: 14.3 G/DL (ref 12–17)
IMM GRANULOCYTES # BLD AUTO: 0.07 THOUSAND/UL (ref 0–0.2)
IMM GRANULOCYTES NFR BLD AUTO: 1 % (ref 0–2)
LYMPHOCYTES # BLD AUTO: 0.88 THOUSANDS/ÂΜL (ref 0.6–4.47)
LYMPHOCYTES NFR BLD AUTO: 8 % (ref 14–44)
MCH RBC QN AUTO: 27.5 PG (ref 26.8–34.3)
MCHC RBC AUTO-ENTMCNC: 33.3 G/DL (ref 31.4–37.4)
MCV RBC AUTO: 83 FL (ref 82–98)
MONOCYTES # BLD AUTO: 1.13 THOUSAND/ÂΜL (ref 0.17–1.22)
MONOCYTES NFR BLD AUTO: 10 % (ref 4–12)
NEUTROPHILS # BLD AUTO: 9.59 THOUSANDS/ÂΜL (ref 1.85–7.62)
NEUTS SEG NFR BLD AUTO: 81 % (ref 43–75)
NON VENT ROOM AIR: YES %
NRBC BLD AUTO-RTO: 0 /100 WBCS
O2 CT BLDV-SCNC: 11.5 ML/DL
O2 CT BLDV-SCNC: 13 ML/DL
PCO2 BLD: 45.6 MM HG (ref 42–50)
PCO2 BLDV: 46 MM HG (ref 42–50)
PCO2 BLDV: 46.5 MM HG (ref 42–50)
PH BLD: 7.44 [PH] (ref 7.3–7.4)
PH BLDV: 7.4 [PH] (ref 7.3–7.4)
PH BLDV: 7.43 [PH] (ref 7.3–7.4)
PLATELET # BLD AUTO: 195 THOUSANDS/UL (ref 149–390)
PMV BLD AUTO: 12.5 FL (ref 8.9–12.7)
PO2 BLDV: 29.2 MM HG (ref 35–45)
PO2 BLDV: 33.1 MM HG (ref 35–45)
PO2 VENOUS TEMP CORRECTED: 28.8 MM HG (ref 35–45)
POTASSIUM SERPL-SCNC: 3.5 MMOL/L (ref 3.5–5.3)
RBC # BLD AUTO: 5.2 MILLION/UL (ref 3.88–5.62)
SODIUM SERPL-SCNC: 131 MMOL/L (ref 135–147)
WBC # BLD AUTO: 11.68 THOUSAND/UL (ref 4.31–10.16)

## 2023-05-09 RX ORDER — AMIODARONE HYDROCHLORIDE 200 MG/1
200 TABLET ORAL
Status: DISCONTINUED | OUTPATIENT
Start: 2023-05-10 | End: 2023-05-16 | Stop reason: HOSPADM

## 2023-05-09 RX ORDER — SACUBITRIL AND VALSARTAN 24; 26 MG/1; MG/1
1 TABLET, FILM COATED ORAL 2 TIMES DAILY
Qty: 60 TABLET | Refills: 0 | Status: SHIPPED | OUTPATIENT
Start: 2023-05-09 | End: 2023-05-16

## 2023-05-09 RX ORDER — INSULIN GLARGINE 100 [IU]/ML
15 INJECTION, SOLUTION SUBCUTANEOUS
Status: DISCONTINUED | OUTPATIENT
Start: 2023-05-09 | End: 2023-05-10

## 2023-05-09 RX ADMIN — ASPIRIN 81 MG 81 MG: 81 TABLET ORAL at 08:57

## 2023-05-09 RX ADMIN — AMIODARONE HYDROCHLORIDE 200 MG: 200 TABLET ORAL at 07:33

## 2023-05-09 RX ADMIN — ATORVASTATIN CALCIUM 80 MG: 80 TABLET, FILM COATED ORAL at 17:58

## 2023-05-09 RX ADMIN — MILRINONE LACTATE IN DEXTROSE 0.25 MCG/KG/MIN: 200 INJECTION, SOLUTION INTRAVENOUS at 09:05

## 2023-05-09 RX ADMIN — Medication 12.5 MG: at 21:14

## 2023-05-09 RX ADMIN — HEPARIN SODIUM 5000 UNITS: 5000 INJECTION INTRAVENOUS; SUBCUTANEOUS at 13:15

## 2023-05-09 RX ADMIN — AMIODARONE HYDROCHLORIDE 1 MG/MIN: 50 INJECTION, SOLUTION INTRAVENOUS at 06:39

## 2023-05-09 RX ADMIN — Medication 12.5 MG: at 08:57

## 2023-05-09 RX ADMIN — INSULIN LISPRO 6 UNITS: 100 INJECTION, SOLUTION INTRAVENOUS; SUBCUTANEOUS at 21:14

## 2023-05-09 RX ADMIN — INSULIN LISPRO 2 UNITS: 100 INJECTION, SOLUTION INTRAVENOUS; SUBCUTANEOUS at 17:58

## 2023-05-09 RX ADMIN — Medication 20 MG/HR: at 03:24

## 2023-05-09 RX ADMIN — HEPARIN SODIUM 5000 UNITS: 5000 INJECTION INTRAVENOUS; SUBCUTANEOUS at 21:14

## 2023-05-09 RX ADMIN — SACUBITRIL AND VALSARTAN 1 TABLET: 24; 26 TABLET, FILM COATED ORAL at 17:58

## 2023-05-09 RX ADMIN — SACUBITRIL AND VALSARTAN 1 TABLET: 24; 26 TABLET, FILM COATED ORAL at 14:37

## 2023-05-09 RX ADMIN — INSULIN LISPRO 4 UNITS: 100 INJECTION, SOLUTION INTRAVENOUS; SUBCUTANEOUS at 07:30

## 2023-05-09 RX ADMIN — INSULIN GLARGINE 15 UNITS: 100 INJECTION, SOLUTION SUBCUTANEOUS at 21:14

## 2023-05-09 RX ADMIN — POLYETHYLENE GLYCOL 3350 17 G: 17 POWDER, FOR SOLUTION ORAL at 08:57

## 2023-05-09 RX ADMIN — HEPARIN SODIUM 5000 UNITS: 5000 INJECTION INTRAVENOUS; SUBCUTANEOUS at 06:36

## 2023-05-09 RX ADMIN — LIDOCAINE 5% 1 PATCH: 700 PATCH TOPICAL at 08:58

## 2023-05-09 RX ADMIN — INSULIN LISPRO 6 UNITS: 100 INJECTION, SOLUTION INTRAVENOUS; SUBCUTANEOUS at 13:15

## 2023-05-09 NOTE — PROGRESS NOTES
Progress Note - Nephrology   Page Living 59 y o  male MRN: [id number]  Unit/Bed#: [facility] 411-01 Encounter: [id number]      Assessment / Plan:    Acute kidney injury  · Baseline creatinine of less than 1  · Peak creatinine of 2 34 on   · Creatinine trending down with diuresis, milrinone and optimization of hemodynamics  · Trend renal function with decreasing Primacor and Lasix drip and initiation of Entresto by cardiology today  · Recheck BMP in a m  · Renal ultrasound without hydronephrosis/urinalysis with hyaline cast  · The patient is at high risk for recurrent acute kidney injury given his underlying cardiomyopathy and hemodynamic fluctuations  Acute HFrEF CHF  · Ejection fraction of 10 to 15%  · Milrinone and Lasix drip adjusted today, both decreased  · Monitor renal function, volume status, peripheral edema, weights and hemodynamics closely with changes made today  Hyponatremia  · Secondary to volume overload and LINDEN with decreased free water clearance  · Continue to diurese  · Add fluid restriction to current diet  Other issues:  · Three-vessel coronary artery disease  · V  Tach  · Type 2 diabetes mellitus  · Atrial fibrillation  · Constipation  · + Apical aneurysm    Subjective: The patient was seen and examined  He denied any shortness of breath, chest pain or pressure, abdominal pain, vomiting, diarrhea, sweats, chills, paroxysmal nocturnal dyspnea, orthopnea  He felt that his peripheral edema has improved but still above his baseline  Objective:     Vitals: Blood pressure 100/66, pulse 68, temperature 98 5 °F (36 9 °C), temperature source Oral, resp  rate 18, height 6' (1 829 m), weight 113 kg (250 lb 3 6 oz), SpO2 96 %  ,Body mass index is 33 94 kg/m²  Temp (24hrs), Av 1 °F (36 7 °C), Min:97 5 °F (36 4 °C), Max:98 5 °F (36 9 °C)      Weight (last 2 days)     Date/Time Weight    23 0600 113 (250 22)    23 0459 113 (250 22)    23 0538 118 (259 26)    23 0600 117 (258 16)                 Intake/Output Summary (Last 24 hours) at 5/9/2023 1500  Last data filed at 5/9/2023 1301  Gross per 24 hour   Intake 1901 7 ml   Output 6825 ml   Net -4923 3 ml     I/O last 24 hours:   In: 1974 6 [P O :716; I V :1258 6]  Out: 7675 [Urine:7675]        Physical Exam    /66   Pulse 68   Temp 98 5 °F (36 9 °C) (Oral)   Resp 18   Ht 6' (1 829 m)   Wt 113 kg (250 lb 3 6 oz)   SpO2 96%   BMI 33 94 kg/m²   Vital signs were reviewed  Constitutional: The patient was awake, alert, pleasant, cooperative and in no apparent distress  Cardiovascular: Jugular venous distention was present, S1-S2, no pericardial friction rub S3 were present, there was 3+ millimeter pitting edema three quarters of the way up to the knees bilaterally  Pulmonary: Adequate inspiratory effort, lungs were clear with decreased breath sounds  Abdominal/GI: Soft, nontender              Invasive Devices     Peripherally Inserted Central Catheter Line  Duration           PICC Line 88/02/76 Right Basilic <1 day          Drain  Duration           Urethral Catheter 2 days                Medications:    Scheduled Meds:  Current Facility-Administered Medications   Medication Dose Route Frequency Provider Last Rate   • acetaminophen  650 mg Oral Q6H PRN CARLA Hoyt     • [START ON 5/10/2023] amiodarone  200 mg Oral Daily With Breakfast Aarti Solano MD     • aspirin  81 mg Oral Daily Maycol Jones, DO     • atorvastatin  80 mg Oral After Dinner Miley Minors, DO     • furosemide  10 mg/hr Intravenous Continuous Aarti Solano MD 10 mg/hr (05/09/23 1043)   • heparin (porcine)  5,000 Units Subcutaneous Q8H [address]      • hydrALAZINE  5 mg Intravenous Q6H PRN Maycol Jones DO     • insulin glargine  10 Units Subcutaneous HS Maycol Jones DO     • insulin lispro  2-12 Units Subcutaneous 4x Daily (AC & HS) CARLA Leone     • lidocaine  1 patch Topical Daily Fleeta Sand     • metoprolol tartrate 12 5 mg Oral Q12H [facility] Nanci Leon, DO     • milrinone [facility]) infusion  0 13 mcg/kg/min Intravenous Continuous Cedric Faulkner MD 0 13 mcg/kg/min (05/09/23 1043)   • nicotine  1 patch Transdermal Daily Maycol Jones, DO     • nitroglycerin  0 4 mg Sublingual Q5 Min PRN Maycol Jones, DO     • ondansetron  4 mg Intravenous Q6H PRN Maycol Jones, DO     • polyethylene glycol  17 g Oral Daily Camelia MACK Amyx, DO     • sacubitril-valsartan  1 tablet Oral BID Cedric Faulkner MD     • senna-docusate sodium  2 tablet Oral HS Camelia MACK Es, DO         PRN Meds: •  acetaminophen  •  hydrALAZINE  •  nitroglycerin  •  ondansetron    Continuous Infusions:furosemide, 10 mg/hr, Last Rate: 10 mg/hr (05/09/23 1043)  milrinone (PRIMACOR) infusion, 0 13 mcg/kg/min, Last Rate: 0 13 mcg/kg/min (05/09/23 1043)            LAB RESULTS:      Results from last 7 days   Lab Units 05/09/23  0511 05/08/23  0425 05/07/23  0451 05/06/23  0547 05/05/23  0447 05/04/23  0235 05/03/23  1641 05/03/23  0831 05/02/23  2314   WBC Thousand/uL 11 68* 12 22* 11 63* 11 31* 12 21* 8 09  --   --  9 27   HEMOGLOBIN g/dL 14 3 14 6 14 4 15 6 15 8 13 9  --   --  14 9   HEMATOCRIT % 43 0 43 8 42 9 49 0 49 9* 44 9  --   --  48 8   PLATELETS Thousands/uL 195 193 185 188 181 166  --   --  195   NEUTROS PCT % 81* 83*  --   --   --   --   --   --  69   LYMPHS PCT % 8* 7*  --   --   --   --   --   --  23   MONOS PCT % 10 9  --   --   --   --   --   --  7   EOS PCT % 0 0  --   --   --   --   --   --  1   POTASSIUM mmol/L 3 5 4 0 4 2 5 1 4 6 4 5 5 1 5 4* 5 2   CHLORIDE mmol/L 97 97 95* 99 99 102 102 104 104   CO2 mmol/L 29 26 22 15* 20* 23 24 21 23   BUN mg/dL 47* 68* 79* 69* 56* 59* 54* 51* 46*   CREATININE mg/dL 1 30 1 89* 2 34* 2 33* 1 77* 1 46* 1 52* 1 49* 1 37*   CALCIUM mg/dL 8 6 8 4 8 4 7 7* 8 3 8 2* 8 4 8 5 8 7   ALK PHOS U/L  --   --   --   --   --   --  42* 43* 41*   ALT U/L  --   --   --   --   --   --  45 49 36   AST U/L  --   --   --   --   --   --  34 53* 25 MAGNESIUM mg/dL  --   --  2 4 2 6 2 4 2 5  --   --  2 5       CUTURES:  No results found for: Recardo Breeding              PLEASE NOTE:  This encounter was completed utilizing the [employer] Direct Speech Voice Recognition Software  Grammatical errors, random word insertions, pronoun errors and incomplete sentences are occasional consequences of the system due to software limitations, ambient noise and hardware issues  These may be missed by proof reading prior to affixing electronic signature  Any questions or concerns about the content, text or information contained within the body of this dictation should be directly addressed to the physician for clarification  Please do not hesitate to call me directly if you have any any questions or concerns

## 2023-05-09 NOTE — PROGRESS NOTES
INTERNAL MEDICINE RESIDENCY PROGRESS NOTE     Name: Enid Living   Age & Sex: 59 y o  male   MRN: [id number]  Unit/Bed#: [facility] 411-01   Encounter: [id number]  Team: [facility]    PATIENT INFORMATION     Name: Enid Living   Age & Sex: 59 y o  male   MRN: [id number]  Hospital Stay Days: 7    ASSESSMENT/PLAN     Principal Problem:    Acute HFrEF  Active Problems:    Ischemic cardiomyopathy    3-vessel CAD    [facility])    Essential (primary) hypertension    Type 2 diabetes mellitus, with long-term current use of insulin ([facility])    Tobacco dependence syndrome    A-fib (HCC)    ILNDEN (acute kidney injury) ([facility] [address] )    Hyponatremia    Acidosis    Other constipation      * Acute HFrEF  Assessment & Plan  Wt Readings from Last 3 Encounters:   05/08/23 118 kg (259 lb 4 2 oz)   05/02/23 116 kg (254 lb 12 8 oz)   04/01/22 102 kg (224 lb 12 8 oz)   · Patient presenting with 2 weeks of dyspnea on exertion, lower extremity edema, abd distention, weight gain  · Increasing renal function parameters, reached out to nephrology and cardio   · Continue milrinone drip, Lasix drip  · Continue I/O, daily weight  · Appreciate heart failure team      3-vessel CAD  Assessment & Plan  · Patient with history of CAD with drug-eluting stent to LCx in 2015  · Patient reports dyspnea on exertion over the past 2 weeks, denies any chest pain at rest, is a diabetic on insulin, is an active smoker of 40 pack years, has prior history of CAD, is on daily aspirin  · LVEF 10-15%, continue medical management  Appreciate Cardiology comanagement and expertise  · Cath 5/3 with multivessel disease not amenable to PCI, continue medical management   Appreciate cardio recommendations    [facility])  Assessment & Plan  · EP following and started amiodarone, given ischemic cardiomyopathy and VT, tentatively patient is planned for a ICD prior to discharge  · Plan when patient is more euvolemic    Other constipation  Assessment & Plan  Complains of constipation for 2 days     · Increase Senokot-S to 2 tablets  · Start MiraLAX    LINDEN (acute kidney injury) ([facility] [address] )  Assessment & Plan  · Progressive increase in BUN and creatinine, possibly somewhat cardiorenal from poor forward perfusion  · Discussed with heart failure team, and milrinone infusion started  · We will also reach out to nephrology, appreciate the comanagement of all specializing services  · Improving    A-fib [facility])  Assessment & Plan  · At cardio appointment outpt when he appeared to be in A-fib RVR on EKG, also with Q waves concerning for prior infarct, in the setting of chest pressure last week concerning for MI  · Now in sinus  Noted reduced LVEF of 10 to 15%, continue positive inotropic support to aid in diuresis    Tobacco dependence syndrome  Assessment & Plan  Patient with over 40  pack-year history  Counseling provided on cessation, nicotine patch ordered    Type 2 diabetes mellitus, with long-term current use of insulin [facility])  Assessment & Plan  Lab Results   Component Value Date    HGBA1C 12 0 (H) 04/14/2023       Recent Labs     05/07/23  1123 05/07/23  1557 05/07/23  2130 05/08/23  0557   POCGLU 188* 286* 309* 172*       Blood Sugar Average: Last 72 hrs:  (P) 974 [id number]     · Will hold oral medications and continue on insulin with sliding scale    Essential (primary) hypertension  Assessment & Plan  Resume lopressor and PRN hydralazine      Disposition: Continue inpatient management for heart failure    SUBJECTIVE     Patient seen and examined  No acute events overnight  Able to walk to the end of the hallway  Had small bowel movement yesterday  Denies chest pain, shortness of breath, abdominal pain, nausea, vomiting, difficulty urinating      OBJECTIVE     Vitals:    05/09/23 0459 05/09/23 0600 05/09/23 0705 05/09/23 1031   BP:   133/68 100/66   BP Location:   Left arm    Pulse:   77 68   Resp:   20 18   Temp:   98 4 °F (36 9 °C) 98 5 °F (36 9 °C)   TempSrc:   Oral Oral   SpO2:   93% 96% Weight: 113 kg (250 lb 3 6 oz) 113 kg (250 lb 3 6 oz)     Height:          Temperature:   Temp (24hrs), Av 1 °F (36 7 °C), Min:97 5 °F (36 4 °C), Max:98 5 °F (36 9 °C)    Temperature: 98 5 °F (36 9 °C)  Intake & Output:  I/O       / 0701  05/08 0700 / 0701  / 0700 / 0701  /10 0700    P  O  844 480 236    I V  (mL/kg) 1069 1 (9 1) 1077 2 (9 5) 38 3 (0 3)    Total Intake(mL/kg) 1913 1 (16 2) 1557 2 (13 8) 274 3 (2 4)    Urine (mL/kg/hr) 4275 (1 5) 6775 (2 5)     Stool  0     Total Output 4275 6775     Net -2361 9 -5217 8 +274 3           Unmeasured Stool Occurrence  1 x         Weights:   IBW (Ideal Body Weight): 77 6 kg    Body mass index is 33 94 kg/m²  Weight (last 2 days)     Date/Time Weight    23 0600 113 (250 22)    23 0459 113 (250 22)    23 0538 118 (259 26)    23 0600 117 (258 16)        Physical Exam  Vitals reviewed  Constitutional:       General: He is not in acute distress  Appearance: He is obese  HENT:      Head: Normocephalic and atraumatic  Nose: No rhinorrhea  Eyes:      General: No scleral icterus  Cardiovascular:      Rate and Rhythm: Normal rate and regular rhythm  Pulses: Normal pulses  Heart sounds: Normal heart sounds  No murmur heard  No friction rub  No gallop  Pulmonary:      Effort: Pulmonary effort is normal  No respiratory distress  Breath sounds: Normal breath sounds  No wheezing, rhonchi or rales  Abdominal:      General: Bowel sounds are normal       Palpations: Abdomen is soft  There is no mass  Tenderness: There is no abdominal tenderness  There is no guarding  Hernia: No hernia is present  Musculoskeletal:      Right lower leg: Edema present  Left lower leg: Edema present  Skin:     General: Skin is warm and dry  Capillary Refill: Capillary refill takes less than 2 seconds  Coloration: Skin is not jaundiced  Neurological:      Mental Status: He is alert        Cranial Nerves: No dysarthria  Comments: CN 2-12 grossly intact, moves all 4 extremities   Psychiatric:         Mood and Affect: Mood normal          Behavior: Behavior normal        LABORATORY DATA     Labs: I have personally reviewed pertinent reports  Results from last 7 days   Lab Units 05/09/23 0511 05/08/23 0425 05/07/23  0451 05/04/23  0235 05/02/23  2314   WBC Thousand/uL 11 68* 12 22* 11 63*   < > 9 27   HEMOGLOBIN g/dL 14 3 14 6 14 4   < > 14 9   HEMATOCRIT % 43 0 43 8 42 9   < > 48 8   PLATELETS Thousands/uL 195 193 185   < > 195   NEUTROS PCT % 81* 83*  --   --  69   MONOS PCT % 10 9  --   --  7    < > = values in this interval not displayed  Results from last 7 days   Lab Units 05/09/23 0511 05/08/23 0425 05/07/23  0451 05/04/23  0235 05/03/23  1641 05/03/23  0831 05/02/23  2314   POTASSIUM mmol/L 3 5 4 0 4 2   < > 5 1 5 4* 5 2   CHLORIDE mmol/L 97 97 95*   < > 102 104 104   CO2 mmol/L 29 26 22   < > 24 21 23   BUN mg/dL 47* 68* 79*   < > 54* 51* 46*   CREATININE mg/dL 1 30 1 89* 2 34*   < > 1 52* 1 49* 1 37*   CALCIUM mg/dL 8 6 8 4 8 4   < > 8 4 8 5 8 7   ALK PHOS U/L  --   --   --   --  42* 43* 41*   ALT U/L  --   --   --   --  45 49 36   AST U/L  --   --   --   --  34 53* 25    < > = values in this interval not displayed  Results from last 7 days   Lab Units 05/07/23 0451 05/06/23  0547 05/05/23  0447   MAGNESIUM mg/dL 2 4 2 6 2 4          Results from last 7 days   Lab Units 05/05/23 0447 05/04/23  0926 05/04/23  0235   PTT seconds 90* 84* 69*     Results from last 7 days   Lab Units 05/02/23  2314   LACTIC ACID mmol/L 1 9           IMAGING & DIAGNOSTIC TESTING     Radiology Results: I have personally reviewed pertinent reports  XR chest pa & lateral    Result Date: 5/2/2023  Impression: Right base nodular density and right pleural effusion  Recommend CT  The study was marked in [facility] for immediate notification       CT chest wo contrast    Result Date: 5/3/2023  Impression: Small to moderate right effusion with ascites may be due to congestion/heart failure Mild groundglass is seen at the right lung base, nonspecific related to atelectasis or pneumonitis Mild enlarged mediastinal lymph nodes may be reactive, consider follow-up at 3 months to demonstrate resolution/stability  The study was marked in [facility] for significant notification  Workstation performed: Nevin Grecoett abdomen limited    Result Date: 5/6/2023  Impression: 1  Small volume ascites within all 4 quadrants  Workstation performed: [id number]     US kidney and bladder    Result Date: 5/7/2023  Impression: 1  No hydronephrosis  Trace perinephric fluid is seen bilaterally  2  The bladder is nondistended, limiting evaluation  3  Small volume ascites  Workstation performed: [id number]     Other Diagnostic Testing: I have personally reviewed pertinent reports      ACTIVE MEDICATIONS     Current Facility-Administered Medications   Medication Dose Route Frequency   • acetaminophen (TYLENOL) tablet 650 mg  650 mg Oral Q6H PRN   • [START ON 5/10/2023] amiodarone tablet 200 mg  200 mg Oral Daily With Breakfast   • aspirin chewable tablet 81 mg  81 mg Oral Daily   • atorvastatin (LIPITOR) tablet 80 mg  80 mg Oral After Dinner   • furosemide (LASIX) 500 mg infusion 50 mL  10 mg/hr Intravenous Continuous   • heparin (porcine) subcutaneous injection 5,000 Units  5,000 Units Subcutaneous Q8H [address]   • hydrALAZINE (APRESOLINE) injection 5 mg  5 mg Intravenous Q6H PRN   • insulin glargine (LANTUS) subcutaneous injection 10 Units 0 1 mL  10 Units Subcutaneous HS   • insulin lispro (HumaLOG) 100 units/mL subcutaneous injection 2-12 Units  2-12 Units Subcutaneous 4x Daily (AC & HS)   • lidocaine (LIDODERM) 5 % patch 1 patch  1 patch Topical Daily   • metoprolol tartrate (LOPRESSOR) partial tablet 12 5 mg  12 5 mg Oral Q12H TEJAS   • milrinone (PRIMACOR) 20 mg in 100 mL infusion (premix)  0 13 mcg/kg/min Intravenous Continuous   • nicotine (NICODERM CQ) "14 mg/24hr TD 24 hr patch 1 patch  1 patch Transdermal Daily   • nitroglycerin (NITROSTAT) SL tablet 0 4 mg  0 4 mg Sublingual Q5 Min PRN   • ondansetron (ZOFRAN) injection 4 mg  4 mg Intravenous Q6H PRN   • polyethylene glycol (MIRALAX) packet 17 g  17 g Oral Daily   • sacubitril-valsartan (ENTRESTO) 24-26 MG per tablet 1 tablet  1 tablet Oral BID   • senna-docusate sodium (SENOKOT S) 8 6-50 mg per tablet 2 tablet  2 tablet Oral HS       VTE Pharmacologic Prophylaxis: Heparin  VTE Mechanical Prophylaxis: sequential compression device    Portions of the record may have been created with voice recognition software  Occasional wrong word or \"sound a like\" substitutions may have occurred due to the inherent limitations of voice recognition software    Read the chart carefully and recognize, using context, where substitutions have occurred   ==  Damaso Adams DO  [address]  Internal Medicine Residency PGY-2       "

## 2023-05-09 NOTE — RESTORATIVE TECHNICIAN NOTE
Restorative Technician Note      Patient Name: Andre Mark     Restorative Tech Visit Date: 05/09/23  Note Type: Mobility  Patient Position Upon Consult: Bedside chair  Activity Performed: Ambulated  Assistive Device: Roller walker  Patient Position at End of Consult: All needs within reach;  Bedside chair

## 2023-05-09 NOTE — PLAN OF CARE
Problem: PHYSICAL THERAPY ADULT  Goal: Performs mobility at highest level of function for planned discharge setting  See evaluation for individualized goals  Description: Treatment/Interventions: Functional transfer training, LE strengthening/ROM, Elevations, Therapeutic exercise, Endurance training, Bed mobility, Gait training, Spoke to nursing, Spoke to case management, OT          See flowsheet documentation for full assessment, interventions and recommendations  Outcome: Progressing  Note: Prognosis: Good  Problem List: Decreased strength, Decreased endurance, Impaired balance, Decreased mobility  Assessment: Pt seen for session for setup, transfers, gait w/ rest time, repositioning  Pt cooperative, states he feels stronger  HR stable throughout except 1 brief period in 140s  some fatugue but improving  continue to anticipate d/c home when stable w/ home PT  Barriers to Discharge: [address] home environment     PT Discharge Recommendation: Home with home health rehabilitation    See flowsheet documentation for full assessment

## 2023-05-09 NOTE — QUICK NOTE
HEMOS this morning:  Primacor 0 25   Lasix gtt at 20     BSA: 2 3   Venous sat off picc line: 60   Arterial sat 93% RA   CO: 4 3  CI: 1 9     I/O: 6 775 urine on lasix gtt at 20   Negative 5 2     -Decrease primacor to 0 13 and lasix gtt to 10  -Start entresto low dose   -recheck venous sat off picc line around 3 pm         Illene Riser

## 2023-05-09 NOTE — PROGRESS NOTES
Cardiology Progress Note   Darrius Ferris 59 y o  male MRN: [id number]  Unit/Bed#: [facility] 411-01 Encounter: [id number]    Hospital Course/Assessment  The patient is a 71-year-old male with a past medical history of CAD status post PCI in 2015 who presented to the emergency department on 5/2 complaining of 2 weeks of worsening shortness of breath, lower extremity edema and 20 pound weight gain  In the ED he was found to be in rapid atrial fibrillation and heart failure  He received a dose of IV metoprolol and converted back to sinus rhythm  Echocardiogram was obtained which showed severely reduced LVEF to 10 to 15% and cardiac catheterization showed chronic occlusion of mid LAD and L PDA  The patient was admitted for heart failure exacerbation      Subjective: Today the patient was seen and examined at bedside  He had no acute events overnight  He reports that his lower extremity swelling has been improving as well as his respiratory status  Otherwise he had no other acute complaints      Plan  1  Acute HFrEF, Stage D  -Secondary to ICM with large apical aneurysm  -Echocardiogram on 5/2 showed an EF of 10 to 15% with an apical aneurysm  -LHC on 5/3 showed a chronically occluded mid LAD and LPDA, not amenable to PCI  -Patient was started on milrinone 0 25 mcg/kg/min on 5/6 due to rising creatinine and worsening volume status -> improving -> will decrease to 0 13 mcg/kg/min   -Monitor VBG  -Patient was originally on Lasix 60 mg IV twice daily but was later switched to a Lasix drip on 5/7 -> will decrease from 20 mg/h to 10 mg/hr  -Metoprolol tartrate was decreased from 25 mg twice daily to 12 5 mg twice daily on 5/6 -> continue 12 5 mg twice daily  -Will start Entresto 24-26 mg daily    -Price check sent: $47  -Electrophysiology is following -> plan for ICD placement prior to discharge after improvement of heart failure symptoms  -Keep K above 4 and mag above 2  -Monitor I's and O's and daily weights     2  Atrial fibrillation  -Continue p o  amiodarone   -Continue metoprolol tartrate 12 5 mg twice daily  -Continue telemetry monitoring  -EP following     3  Scar mediated VT  -Continue p o  and IV amiodarone with plan to transition to p o   -Continue metoprolol tartrate 12 5 mg twice daily  -Continue telemetry monitoring -> frequent PVCs  -EP following  -Plan for ICD placement prior to discharge        Goldy Zhang MD  - PGY-3 IM Resident  - Tiger text enabled    ======================================================  Objective  VS: Blood pressure 100/66, pulse 68, temperature 98 5 °F (36 9 °C), temperature source Oral, resp  rate 18, height 6' (1 829 m), weight 113 kg (250 lb 3 6 oz), SpO2 96 %  Gen: well appearing  Psych: AOx3  Skin: intact  Cardiac: S1, S2, regular rate, no S3 or S4 appreciated  No murmurs  +2 PT, radial pulses  Bilateral lower extremity edema  No carotid bruits  Resp: CTABL  No crackles  MSK: 5/5 strength throughout muscle groups  Neuro: CN grossly intact  Sensory to light touch, pain, proprioception intact BL LE, UE  LN: no cervical LAD  Rheum: no joint deformities in UE or LE  ======================================================  TREADMILL STRESS  No results found for this or any previous visit      ----------------------------------------------------------------------------------------------  NUCLEAR STRESS TEST: No results found for this or any previous visit      No results found for this or any previous visit       --------------------------------------------------------------------------------  CATH:  No results found for this or any previous visit     --------------------------------------------------------------------------------  ECHO:   Results for orders placed during the hospital encounter of 09/26/17    Echo complete with contrast if indicated    Bronson Oden [id number] [address]  [phone]    Transthoracic Echocardiogram  2D, M-mode, Doppler, and Color Doppler    Study date:  26-Sep-2017    Patient: Wilber Nascimento  MR number: [id number]  Account number: [de-identified]  : 1960  Age: 65 years  Gender: Male  Status: Outpatient  Location: [address] [place] [address] [facility]  Height: 72 in  Weight: 263 3 lb  BP: 142/ 88 mmHg    Indications: CAD    Diagnoses: I25 10 - Atherosclerotic heart disease of native coronary artery without angina pectoris    Sonographer:  OLIVE Norris  Primary Physician:  Sol Bernardo MD  Referring Physician:  Duane Manes, MD  Group:  [facility]  Interpreting Physician:  Gus Skelton MD    SUMMARY    LEFT VENTRICLE:  Size was normal   Systolic function was normal  Ejection fraction was estimated to be 65 %  There was mild concentric hypertrophy  Doppler parameters were consistent with abnormal left ventricular relaxation (grade 1 diastolic dysfunction)  RIGHT VENTRICLE:  The size was normal   Systolic function was normal     TRICUSPID VALVE:  There was trace regurgitation  HISTORY: PRIOR HISTORY: Hypertension, CAD s/p PCI, smoker, diabetes, high cholesterol    PROCEDURE: The study was performed in the [address]  This was a routine study  The transthoracic approach was used  The study included complete 2D imaging, M-mode, complete spectral Doppler, and color Doppler  The  heart rate was 112 bpm, at the start of the study  Images were obtained from the parasternal, apical, subcostal, and suprasternal notch acoustic windows  Echocardiographic views were limited due to decreased penetration  This was a  technically difficult study  LEFT VENTRICLE: Size was normal  Systolic function was normal  Ejection fraction was estimated to be 65 %  There were no regional wall motion abnormalities  Wall thickness was mildly increased  There was mild concentric hypertrophy    DOPPLER: Doppler parameters were consistent with abnormal left ventricular relaxation (grade 1 diastolic dysfunction)  RIGHT VENTRICLE: The size was normal  Systolic function was normal  Wall thickness was normal     LEFT ATRIUM: Size was normal     RIGHT ATRIUM: Size was normal     MITRAL VALVE: Valve structure was normal  There was normal leaflet separation  DOPPLER: The transmitral velocity was within the normal range  There was no evidence for stenosis  There was no regurgitation  AORTIC VALVE: The valve was trileaflet  Leaflets exhibited normal thickness and normal cuspal separation  DOPPLER: Transaortic velocity was within the normal range  There was no evidence for stenosis  There was no regurgitation  TRICUSPID VALVE: The valve structure was normal  There was normal leaflet separation  DOPPLER: The transtricuspid velocity was within the normal range  There was no evidence for stenosis  There was trace regurgitation  The tricuspid jet  envelope definition was inadequate for estimation of RV systolic pressure  There are no indirect findings suggestive of moderate or severe pulmonary hypertension  PULMONIC VALVE: Leaflets exhibited normal thickness, no calcification, and normal cuspal separation  DOPPLER: The transpulmonic velocity was within the normal range  There was no regurgitation  PERICARDIUM: There was no pericardial effusion  The pericardium was normal in appearance  AORTA: The root exhibited normal size  SYSTEMIC VEINS: IVC: The inferior vena cava was normal in size and course   Respirophasic changes were normal     SYSTEM MEASUREMENT TABLES    2D  %FS: 47 6 %  AV Diam: 3 34 cm  EDV(Teich): 101 71 ml  EF Biplane: 67 76 %  EF(Cube): 85 61 %  EF(Teich): 79 %  ESV(Cube): 14 82 ml  ESV(Teich): 21 36 ml  IVSd: 1 58 cm  LA Area: 12 78 cm2  LA Diam: 3 37 cm  LVEDV MOD A2C: 49 73 ml  LVEDV MOD A4C: 61 78 ml  LVEDV MOD BP: 58 1 ml  LVEF MOD A2C: 65 64 %  LVEF MOD A4C: 71 2 %  LVESV MOD A2C: 17 09 ml  LVESV MOD A4C: 17 79 ml  LVESV MOD BP: 18 73 ml  LVIDd: 4 69 cm  LVIDs: 2 46 cm  LVLd A2C: 6 97 cm  LVLd A4C: 7 74 cm  LVLs A2C: 5 23 cm  LVLs A4C: 6 13 cm  LVPWd: 1 23 cm  RA Area: 14 52 cm2  RV Diam : 2 86 cm  SI(Cube): 36 73 ml/m2  SI(Teich): 33 48 ml/m2  SV MOD A2C: 32 64 ml  SV MOD A4C: 43 98 ml  SV(Cube): 88 15 ml  SV(Teich): 80 35 ml    MM  TAPSE: 3 26 cm    PW  E': 0 08 m/s    [facility]    Prepared and electronically signed by    Allison Ayala MD  Signed 26-Sep-2017 12:55:39    No results found for this or any previous visit     --------------------------------------------------------------------------------  HOLTER  No results found for this or any previous visit     --------------------------------------------------------------------------------  CAROTIDS  No results found for this or any previous visit         [unfilled]   =====================================================    Active Meds    Current Facility-Administered Medications:   •  acetaminophen (TYLENOL) tablet 650 mg, 650 mg, Oral, Q6H PRN, CARLA Garzon  •  [START ON 5/10/2023] amiodarone tablet 200 mg, 200 mg, Oral, Daily With Breakfast, Richar Dejesus MD  •  aspirin chewable tablet 81 mg, 81 mg, Oral, Daily, Maycol oJnes DO, 81 mg at 05/09/23 0857  •  atorvastatin (LIPITOR) tablet 80 mg, 80 mg, Oral, After Dinner, Maycol Jones DO, 80 mg at 05/08/23 1722  •  furosemide (LASIX) 500 mg infusion 50 mL, 10 mg/hr, Intravenous, Continuous, Richar Dejesus MD, Last Rate: 1 mL/hr at 05/09/23 1043, 10 mg/hr at 05/09/23 1043  •  heparin (porcine) subcutaneous injection 5,000 Units, 5,000 Units, Subcutaneous, Q8H [address], [place], 5,000 Units at 05/09/23 1315  •  hydrALAZINE (APRESOLINE) injection 5 mg, 5 mg, Intravenous, Q6H PRN, Maycol Jones DO  •  insulin glargine (LANTUS) subcutaneous injection 10 Units 0 1 mL, 10 Units, Subcutaneous, HS, Maycol Jones DO, 10 Units at 05/08/23 2158  •  insulin lispro (HumaLOG) 100 units/mL subcutaneous injection 2-12 Units, 2-12 Units, Subcutaneous, 4x Daily (AC & HS), 6 Units at 05/09/23 1315 **AND** Fingerstick Glucose (POCT), , , 4x Daily AC and at bedtime, CARLA Aguirre  •  lidocaine (LIDODERM) 5 % patch 1 patch, 1 patch, Topical, Daily, Daralyn Marcellus, 1 patch at 05/09/23 0858  •  metoprolol tartrate (LOPRESSOR) partial tablet 12 5 mg, 12 5 mg, Oral, Q12H [address], Paul Zia, DO, 12 5 mg at 05/09/23 0857  •  milrinone (PRIMACOR) 20 mg in 100 mL infusion (premix), 0 13 mcg/kg/min, Intravenous, Continuous, Mohamud Peraza MD, Last Rate: 4 6 mL/hr at 05/09/23 1043, 0 13 mcg/kg/min at 05/09/23 1043  •  nicotine (NICODERM CQ) 14 mg/24hr TD 24 hr patch 1 patch, 1 patch, Transdermal, Daily, Maycol Jones, DO  •  nitroglycerin (NITROSTAT) SL tablet 0 4 mg, 0 4 mg, Sublingual, Q5 Min PRN, Maycol Jones, DO  •  ondansetron (ZOFRAN) injection 4 mg, 4 mg, Intravenous, Q6H PRN, Maycol Jones, DO, 4 mg at 05/06/23 0954  •  polyethylene glycol (MIRALAX) packet 17 g, 17 g, Oral, Daily, Camelia GIRON Minnix, DO, 17 g at 05/09/23 0857  •  sacubitril-valsartan (ENTRESTO) 24-26 MG per tablet 1 tablet, 1 tablet, Oral, BID, Mohamud Peraza MD  •  senna-docusate sodium (SENOKOT S) 8 6-50 mg per tablet 2 tablet, 2 tablet, Oral, HS, Adialka Craft Minnix, DO, 2 tablet at 05/08/23 9983    Labs & Results  Lab Results   Component Value Date    TROPONINI 2 79 (H) 02/16/2015    TROPONINI 1 31 (H) 02/16/2015     Lab Results   Component Value Date    GLUCOSE 203 (H) 02/17/2015    CALCIUM 8 6 05/09/2023     02/17/2015    K 3 5 05/09/2023    CO2 29 05/09/2023    CL 97 05/09/2023    BUN 47 (H) 05/09/2023    CREATININE 1 30 05/09/2023     Lab Results   Component Value Date    WBC 11 68 (H) 05/09/2023    HGB 14 3 05/09/2023    HCT 43 0 05/09/2023    MCV 83 05/09/2023     05/09/2023         Lab Results   Component Value Date    CHOL 130 05/07/2015    CHOL 188 02/17/2015     Lab Results   Component Value Date    HDL 34 05/07/2015    HDL 43 02/17/2015     Lab Results   Component Value Date    LDLCALC 72 05/07/2015    LDLCALC 119 (H) 02/17/2015     Lab Results   Component Value Date    TRIG 122 05/07/2015    TRIG 128 02/17/2015     Lab Results   Component Value Date    ALT 45 05/03/2023    AST 34 05/03/2023

## 2023-05-09 NOTE — PHYSICAL THERAPY NOTE
Physical Therapy Treatment Note       05/09/23 0950   PT Last Visit   PT Visit Date 05/09/23   Note Type   Note Type Treatment   Pain Assessment   Pain Assessment Tool 0-10   Pain Score No Pain   Restrictions/Precautions   Weight Bearing Precautions Per Order No   Other Precautions Multiple lines;Telemetry; Fall Risk   General   Chart Reviewed Yes   Family/Caregiver Present No   Cognition   Overall Cognitive Status WFL   Arousal/Participation Alert; Responsive   Attention Attends with cues to redirect   Orientation Level Oriented X4   Memory Unable to assess   Following Commands Follows all commands and directions without difficulty   Subjective   Subjective states he feels better overall  feels stronger w/ imprived mobility tolerance   Transfers   Sit to Stand 7  Independent   Additional items Increased time required   Stand to Sit 7  Independent   Additional items Increased time required   Ambulation/Elevation   Gait pattern   (slow, short step length, forward flexion)   Gait Assistance   (CGA)   Additional items Assist x 1;Verbal cues   Assistive Device Rolling walker   Distance 130'x2, standing rest x 3-5 min   time spent for setup, repostioning   Balance   Static Sitting Normal   Dynamic Sitting Good   Static Standing Fair   Dynamic Standing Fair -   Ambulatory Fair -   Activity Tolerance   Activity Tolerance Patient limited by fatigue;Treatment limited secondary to medical complications (Comment)   Nurse Made Aware yes   Assessment   Prognosis Good   Problem List Decreased strength;Decreased endurance; Impaired balance;Decreased mobility   Assessment Pt seen for session for setup, transfers, gait w/ rest time, repositioning  Pt cooperative, states he feels stronger  HR stable throughout except 1 brief period in 140s  some fatugue but improving    continue to anticipate d/c home when stable w/ home PT   Goals   Patient Goals to walk more   STG Expiration Date 05/13/23   PT Treatment Day 2   Plan Treatment/Interventions Functional transfer training;LE strengthening/ROM; Therapeutic exercise;Elevations; Endurance training;Patient/family training;Equipment eval/education;Gait training   Progress Progressing toward goals   PT Frequency 3-5x/wk   Recommendation   PT Discharge Recommendation Home with home health rehabilitation   Equipment Recommended [facility] Recommended Wheeled walker   Change/add to [employer]?  No   AM-PAC Basic Mobility Inpatient   Turning in Flat Bed Without Bedrails 4   Lying on Back to Sitting on Edge of Flat Bed Without Bedrails 4   Moving Bed to Chair 4   Standing Up From Chair Using Arms 4   Walk in Room 3   Climb 3-5 Stairs With Railing 3   Basic Mobility Inpatient Raw Score 22   Basic Mobility Standardized Score 47 4   Highest Level Of Mobility   JH-HLM Goal 7: Walk 25 feet or more   JH-HLM Achieved 8: Walk 250 feet ot more     Brayden Womack PT, DPT CSRS

## 2023-05-10 PROBLEM — N17.9 AKI (ACUTE KIDNEY INJURY) (HCC): Status: RESOLVED | Noted: 2023-05-03 | Resolved: 2023-05-10

## 2023-05-10 LAB
ANION GAP SERPL CALCULATED.3IONS-SCNC: 2 MMOL/L (ref 4–13)
BASE EX.OXY STD BLDV CALC-SCNC: 72.6 % (ref 60–80)
BASE EXCESS BLDV CALC-SCNC: 8.6 MMOL/L
BUN SERPL-MCNC: 29 MG/DL (ref 5–25)
CALCIUM SERPL-MCNC: 8.1 MG/DL (ref 8.3–10.1)
CHLORIDE SERPL-SCNC: 97 MMOL/L (ref 96–108)
CO2 SERPL-SCNC: 33 MMOL/L (ref 21–32)
CREAT SERPL-MCNC: 0.91 MG/DL (ref 0.6–1.3)
GFR SERPL CREATININE-BSD FRML MDRD: 90 ML/MIN/1.73SQ M
GLUCOSE SERPL-MCNC: 206 MG/DL (ref 65–140)
GLUCOSE SERPL-MCNC: 225 MG/DL (ref 65–140)
GLUCOSE SERPL-MCNC: 238 MG/DL (ref 65–140)
GLUCOSE SERPL-MCNC: 249 MG/DL (ref 65–140)
GLUCOSE SERPL-MCNC: 374 MG/DL (ref 65–140)
HCO3 BLDV-SCNC: 35.5 MMOL/L (ref 24–30)
MAGNESIUM SERPL-MCNC: 1.9 MG/DL (ref 1.6–2.6)
O2 CT BLDV-SCNC: 14.9 ML/DL
PCO2 BLDV: 57.8 MM HG (ref 42–50)
PH BLDV: 7.41 [PH] (ref 7.3–7.4)
PHOSPHATE SERPL-MCNC: 2.5 MG/DL (ref 2.3–4.1)
PO2 BLDV: 39.7 MM HG (ref 35–45)
POTASSIUM SERPL-SCNC: 3.3 MMOL/L (ref 3.5–5.3)
SODIUM SERPL-SCNC: 132 MMOL/L (ref 135–147)

## 2023-05-10 RX ORDER — FUROSEMIDE 10 MG/ML
80 INJECTION INTRAMUSCULAR; INTRAVENOUS
Status: DISCONTINUED | OUTPATIENT
Start: 2023-05-10 | End: 2023-05-11

## 2023-05-10 RX ORDER — POTASSIUM CHLORIDE 20 MEQ/1
40 TABLET, EXTENDED RELEASE ORAL ONCE
Status: COMPLETED | OUTPATIENT
Start: 2023-05-10 | End: 2023-05-10

## 2023-05-10 RX ORDER — INSULIN GLARGINE 100 [IU]/ML
18 INJECTION, SOLUTION SUBCUTANEOUS
Status: DISCONTINUED | OUTPATIENT
Start: 2023-05-10 | End: 2023-05-14

## 2023-05-10 RX ADMIN — ATORVASTATIN CALCIUM 80 MG: 80 TABLET, FILM COATED ORAL at 18:10

## 2023-05-10 RX ADMIN — ASPIRIN 81 MG 81 MG: 81 TABLET ORAL at 08:28

## 2023-05-10 RX ADMIN — SACUBITRIL AND VALSARTAN 1 TABLET: 24; 26 TABLET, FILM COATED ORAL at 08:28

## 2023-05-10 RX ADMIN — FUROSEMIDE 80 MG: 10 INJECTION, SOLUTION INTRAMUSCULAR; INTRAVENOUS at 11:30

## 2023-05-10 RX ADMIN — INSULIN LISPRO 4 UNITS: 100 INJECTION, SOLUTION INTRAVENOUS; SUBCUTANEOUS at 18:12

## 2023-05-10 RX ADMIN — SACUBITRIL AND VALSARTAN 1 TABLET: 24; 26 TABLET, FILM COATED ORAL at 11:33

## 2023-05-10 RX ADMIN — HEPARIN SODIUM 5000 UNITS: 5000 INJECTION INTRAVENOUS; SUBCUTANEOUS at 22:17

## 2023-05-10 RX ADMIN — MILRINONE LACTATE IN DEXTROSE 0.13 MCG/KG/MIN: 200 INJECTION, SOLUTION INTRAVENOUS at 05:48

## 2023-05-10 RX ADMIN — INSULIN LISPRO 4 UNITS: 100 INJECTION, SOLUTION INTRAVENOUS; SUBCUTANEOUS at 11:36

## 2023-05-10 RX ADMIN — HEPARIN SODIUM 5000 UNITS: 5000 INJECTION INTRAVENOUS; SUBCUTANEOUS at 05:49

## 2023-05-10 RX ADMIN — INSULIN LISPRO 10 UNITS: 100 INJECTION, SOLUTION INTRAVENOUS; SUBCUTANEOUS at 22:14

## 2023-05-10 RX ADMIN — Medication 12.5 MG: at 08:28

## 2023-05-10 RX ADMIN — Medication 12.5 MG: at 22:13

## 2023-05-10 RX ADMIN — FUROSEMIDE 80 MG: 10 INJECTION, SOLUTION INTRAMUSCULAR; INTRAVENOUS at 18:12

## 2023-05-10 RX ADMIN — LIDOCAINE 5% 1 PATCH: 700 PATCH TOPICAL at 08:28

## 2023-05-10 RX ADMIN — INSULIN LISPRO 6 UNITS: 100 INJECTION, SOLUTION INTRAVENOUS; SUBCUTANEOUS at 06:10

## 2023-05-10 RX ADMIN — INSULIN GLARGINE 18 UNITS: 100 INJECTION, SOLUTION SUBCUTANEOUS at 22:14

## 2023-05-10 RX ADMIN — SENNOSIDES AND DOCUSATE SODIUM 2 TABLET: 8.6; 5 TABLET ORAL at 22:13

## 2023-05-10 RX ADMIN — SACUBITRIL AND VALSARTAN 1 TABLET: 49; 51 TABLET, FILM COATED ORAL at 18:11

## 2023-05-10 RX ADMIN — POTASSIUM CHLORIDE 40 MEQ: 1500 TABLET, EXTENDED RELEASE ORAL at 11:30

## 2023-05-10 RX ADMIN — POTASSIUM CHLORIDE 40 MEQ: 1500 TABLET, EXTENDED RELEASE ORAL at 08:28

## 2023-05-10 RX ADMIN — AMIODARONE HYDROCHLORIDE 200 MG: 200 TABLET ORAL at 08:28

## 2023-05-10 NOTE — PROGRESS NOTES
Progress Note - Nephrology   Cesario Gonzalez 59 y o  male MRN: [id number]  Unit/Bed#: [facility] 411-01 Encounter: [id number]      Assessment / Plan:    Acute kidney injury  • Baseline creatinine of less than 1  • Peak creatinine of 2 34 on   • Creatinine continues to trend down with diuresis, milrinone, initiation of Entresto  • Trend renal function with continued diuresis and initiation of Entresto   • Recheck BMP in a m  • Renal ultrasound without hydronephrosis/urinalysis with hyaline cast  + Simple renal cyst of the right kidney  • The patient is at high risk for recurrent acute kidney injury given his underlying cardiomyopathy and hemodynamic fluctuations  Acute HFrEF CHF  • Ejection fraction of 10 to 15%  • Milrinone and Lasix drip per cardiology  • Monitor renal function, volume status, peripheral edema, weights and hemodynamics closely with changes made today  Hyponatremia  • Secondary to volume overload and LINDEN with decreased free water clearance  • Continue to diurese  • Sodium level corrects to near normal based on his elevated glucose  • Continue fluid restriction  Other issues:  • Three-vessel coronary artery disease  • V  Tach  • Type 2 diabetes mellitus  • Atrial fibrillation  • Constipation  • + Apical aneurysm  • Hypokalemia-replete        Subjective: The patient was seen and examined  He denied any shortness of breath, chest pain or pressure, abdominal pain, chills, sweats, paroxysmal nocturnal dyspnea, orthopnea  He feels that his peripheral edema has improved somewhat but is still significantly above baseline  Objective:     Vitals: Blood pressure 119/65, pulse 80, temperature 98 2 °F (36 8 °C), temperature source Oral, resp  rate 18, height 6' (1 829 m), weight 112 kg (246 lb 4 1 oz), SpO2 95 %  ,Body mass index is 33 4 kg/m²  Temp (24hrs), Av 4 °F (36 9 °C), Min:98 2 °F (36 8 °C), Max:98 9 °F (37 2 °C)      Weight (last 2 days)     Date/Time Weight    05/10/23 0544 112 (246 25) 05/09/23 0600 113 (250 22)    05/09/23 0459 113 (250 22)    05/08/23 0538 118 (259 26)                 Intake/Output Summary (Last 24 hours) at 5/10/2023 0943  Last data filed at 5/10/2023 0758  Gross per 24 hour   Intake 1006 11 ml   Output 6350 ml   Net -5343 89 ml     I/O last 24 hours:   In: 1280 4 [P O :1016; I V :264 4]  Out: 0054 [Urine:6350]        Physical Exam    /65 (BP Location: Left arm)   Pulse 80   Temp 98 2 °F (36 8 °C) (Oral)   Resp 18   Ht 6' (1 829 m)   Wt 112 kg (246 lb 4 1 oz)   SpO2 95%   BMI 33 40 kg/m²   Vital signs were reviewed  Constitutional: The patient was awake, alert, pleasant, cooperative and in no apparent distress  Cardiovascular: No overt jugular venous tension was noted, S1-S2, no pericardial friction rub or S3 were appreciated, peripheral edema is slightly improved but still significant bilaterally in the lower extremities  Pulmonary: Adequate inspiratory effort, decreased breath sounds, no rales or rhonchi noted  Abdominal/GI: Soft, nontender              Invasive Devices     Peripherally Inserted Central Catheter Line  Duration           PICC Line 80/68/23 Right Basilic 1 day          Drain  Duration           Urethral Catheter 3 days                Medications:    Scheduled Meds:  Current Facility-Administered Medications   Medication Dose Route Frequency Provider Last Rate   • acetaminophen  650 mg Oral Q6H PRN Vinton Najjar, CRNP     • amiodarone  200 mg Oral Daily With Breakfast Melita Jamil MD     • aspirin  81 mg Oral Daily Maycol Jones DO     • atorvastatin  80 mg Oral After Dinner Sahra Spangler DO     • furosemide  10 mg/hr Intravenous Continuous Melita Jamil MD 10 mg/hr (05/09/23 1043)   • heparin (porcine)  5,000 Units Subcutaneous Q8H [address] Odalis Benjie     • hydrALAZINE  5 mg Intravenous Q6H PRN Maycol Jones DO     • insulin glargine  15 Units Subcutaneous HS Camelia Suggs DO     • insulin lispro  2-12 Units Subcutaneous 4x Daily (AC & HS) CARLA Mustafa     • lidocaine  1 patch Topical Daily Fabiola Burney     • metoprolol tartrate  12 5 mg Oral Q12H [facility] Leopold Lei, DO     • milrinone [facility]) infusion  0 13 mcg/kg/min Intravenous Continuous Livia Ball MD 0 13 mcg/kg/min (05/10/23 0548)   • nicotine  1 patch Transdermal Daily Maycolprema Simentalai, DO     • nitroglycerin  0 4 mg Sublingual Q5 Min PRN Maycol Simentalai, DO     • ondansetron  4 mg Intravenous Q6H PRN Maycol Simentalai, DO     • polyethylene glycol  17 g Oral Daily Camelia GIRON Samnix, DO     • sacubitril-valsartan  1 tablet Oral BID Livia Ball MD     • senna-docusate sodium  2 tablet Oral HS Camelia F Samnix, DO         PRN Meds: •  acetaminophen  •  hydrALAZINE  •  nitroglycerin  •  ondansetron    Continuous Infusions:furosemide, 10 mg/hr, Last Rate: 10 mg/hr (05/09/23 1043)  milrinone (PRIMACOR) infusion, 0 13 mcg/kg/min, Last Rate: 0 13 mcg/kg/min (05/10/23 0548)            LAB RESULTS:      Results from last 7 days   Lab Units 05/10/23  0444 05/09/23  0511 05/08/23  0425 05/07/23  0451 05/06/23  0547 05/05/23  0447 05/04/23  0235 05/03/23  1641   WBC Thousand/uL  --  11 68* 12 22* 11 63* 11 31* 12 21* 8 09  --    HEMOGLOBIN g/dL  --  14 3 14 6 14 4 15 6 15 8 13 9  --    HEMATOCRIT %  --  43 0 43 8 42 9 49 0 49 9* 44 9  --    PLATELETS Thousands/uL  --  195 193 185 188 181 166  --    NEUTROS PCT %  --  81* 83*  --   --   --   --   --    LYMPHS PCT %  --  8* 7*  --   --   --   --   --    MONOS PCT %  --  10 9  --   --   --   --   --    EOS PCT %  --  0 0  --   --   --   --   --    POTASSIUM mmol/L 3 3* 3 5 4 0 4 2 5 1 4 6 4 5 5 1   CHLORIDE mmol/L 97 97 97 95* 99 99 102 102   CO2 mmol/L 33* 29 26 22 15* 20* 23 24   BUN mg/dL 29* 47* 68* 79* 69* 56* 59* 54*   CREATININE mg/dL 0 91 1 30 1 89* 2 34* 2 33* 1 77* 1 46* 1 52*   CALCIUM mg/dL 8 1* 8 6 8 4 8 4 7 7* 8 3 8 2* 8 4   ALK PHOS U/L  --   --   --   --   --   --   --  42*   ALT U/L  --   --   --   --   --   --   --  45   AST U/L  -- --   --   --   --   --   --  34   MAGNESIUM mg/dL 1 9  --   --  2 4 2 6 2 4 2 5  --    PHOSPHORUS mg/dL 2 5  --   --   --   --   --   --   --        CUTURES:  No results found for: Aida Almonte              PLEASE NOTE:  This encounter was completed utilizing the [employer] Direct Speech Voice Recognition Software  Grammatical errors, random word insertions, pronoun errors and incomplete sentences are occasional consequences of the system due to software limitations, ambient noise and hardware issues  These may be missed by proof reading prior to affixing electronic signature  Any questions or concerns about the content, text or information contained within the body of this dictation should be directly addressed to the physician for clarification  Please do not hesitate to call me directly if you have any any questions or concerns

## 2023-05-10 NOTE — PROGRESS NOTES
Cardiology Progress Note   Mohan Drake 59 y o  male MRN: [id number]  Unit/Bed#: [facility] 411-01 Encounter: [id number]    Hospital Course/Assessment  The patient is a 61-year-old male with a past medical history of CAD status post PCI in 2015 who presented to the emergency department on 5/2 complaining of 2 weeks of worsening shortness of breath, lower extremity edema and 20 pound weight gain   In the ED he was found to be in rapid atrial fibrillation and heart failure   He received a dose of IV metoprolol and converted back to sinus rhythm   Echocardiogram was obtained which showed severely reduced LVEF to 10 to 15% and cardiac catheterization showed chronic occlusion of mid LAD and L PDA   The patient was admitted for heart failure exacerbation      Subjective:   Today the patient was seen and examined at bedside  Rodney Diaz had no acute events overnight   He reports that his lower extremity swelling and respiratory status have been slowly improving   Otherwise he had no other acute complaints      Plan  1  Acute HFrEF, Stage D  -Secondary to ICM with large apical aneurysm  -Echocardiogram on 5/2 showed an EF of 10 to 15% with an apical aneurysm  -LHC on 5/3 showed a chronically occluded mid LAD and LPDA, not amenable to PCI  -Patient was started on milrinone 0 25 mcg/kg/min on 5/6 due to rising creatinine and worsening volume status -> improving -> decreased to 0 13 mcg/kg/min on 5/9 -> milrinone dc'd 5/10  -Continue to monitor VBG   -5/9: Venous O2 54 5, Arterial sat 93%, CO 3 84, CI 1 67   -5/10: Venous O2 72 6, Arterial sat 95%, CO 6 6, CI 2 87  -Patient was originally on Lasix 60 mg IV twice daily but was later switched to a Lasix drip on 5/7 -> improving -> decreased from 20 mg/h to 10 mg/hr on 5/9 -> Lasix gtt dc'd 5/10 and switched to Lasix IV 80 mg BID  -Metoprolol tartrate was decreased from 25 mg twice daily to 12 5 mg twice daily on 5/6 -> continue 12 5 mg twice daily  -Entresto 24-26 mg daily started 5/9 -> Entresto increased to 49-52 mg daily on 5/10              -Price check sent: $47  -Electrophysiology is following -> plan for ICD placement prior to discharge after improvement of heart failure symptoms -> Tentative plan for Friday 5/12  EP made aware  -Monitor daily BMP  -Keep K above 4 and mag above 2  -Monitor I's and O's and daily weights     2   Atrial fibrillation  -Continue p o  amiodarone   -Continue metoprolol tartrate 12 5 mg twice daily  -Continue telemetry monitoring  -EP following     3  Scar mediated VT  -Continue p o  amiodarone  -Continue metoprolol tartrate 12 5 mg twice daily  -Continue telemetry monitoring -> frequent PVCs  -EP following  -Plan for ICD placement prior to discharge        Jose Francisco Crump MD  - PGY-3 IM Resident  - Jama text enabled       ======================================================  Objective  VS: Blood pressure 119/65, pulse 80, temperature 98 2 °F (36 8 °C), temperature source Oral, resp  rate 18, height 6' (1 829 m), weight 112 kg (246 lb 4 1 oz), SpO2 95 %  Gen: well appearing  Psych: AOx3  Skin: intact  Cardiac: S1, S2, regular rate, no S3 or S4 appreciated  No murmurs  +2 PT, radial pulses  Bilateral lower extremity edema  No carotid bruits  Resp: CTABL  No crackles  MSK: 5/5 strength throughout muscle groups  Neuro: CN grossly intact  Sensory to light touch, pain, proprioception intact BL LE, UE  LN: no cervical LAD  Rheum: no joint deformities in UE or LE  ======================================================  TREADMILL STRESS  No results found for this or any previous visit      ----------------------------------------------------------------------------------------------  NUCLEAR STRESS TEST: No results found for this or any previous visit      No results found for this or any previous visit       --------------------------------------------------------------------------------  CATH:  No results found for this or any previous visit     --------------------------------------------------------------------------------  ECHO:   Results for orders placed during the hospital encounter of 17    Echo complete with contrast if indicated    [facility]  [address]  [facility]  [phone]    Transthoracic Echocardiogram  2D, M-mode, Doppler, and Color Doppler    Study date:  26-Sep-2017    Patient: Jabari Wharton  MR number: [id number]  Account number: [de-identified]  : 1960  Age: 65 years  Gender: Male  Status: Outpatient  Location: [address] [place] [address]  Height: 72 in  Weight: 263 3 lb  BP: 142/ 88 mmHg    Indications: CAD    Diagnoses: I25 10 - Atherosclerotic heart disease of native coronary artery without angina pectoris    Sonographer:  OLIVE Pate  Primary Physician:  Duncan Naidu MD  Referring Physician:  Johnny Hall MD  Group:  [facility]  Interpreting Physician:  Rosalie Pierre MD    SUMMARY    LEFT VENTRICLE:  Size was normal   Systolic function was normal  Ejection fraction was estimated to be 65 %  There was mild concentric hypertrophy  Doppler parameters were consistent with abnormal left ventricular relaxation (grade 1 diastolic dysfunction)  RIGHT VENTRICLE:  The size was normal   Systolic function was normal     TRICUSPID VALVE:  There was trace regurgitation  HISTORY: PRIOR HISTORY: Hypertension, CAD s/p PCI, smoker, diabetes, high cholesterol    PROCEDURE: The study was performed in the [address]  This was a routine study  The transthoracic approach was used  The study included complete 2D imaging, M-mode, complete spectral Doppler, and color Doppler  The  heart rate was 112 bpm, at the start of the study  Images were obtained from the parasternal, apical, subcostal, and suprasternal notch acoustic windows   Echocardiographic views were limited due to decreased penetration  This was a  technically difficult study  LEFT VENTRICLE: Size was normal  Systolic function was normal  Ejection fraction was estimated to be 65 %  There were no regional wall motion abnormalities  Wall thickness was mildly increased  There was mild concentric hypertrophy  DOPPLER: Doppler parameters were consistent with abnormal left ventricular relaxation (grade 1 diastolic dysfunction)  RIGHT VENTRICLE: The size was normal  Systolic function was normal  Wall thickness was normal     LEFT ATRIUM: Size was normal     RIGHT ATRIUM: Size was normal     MITRAL VALVE: Valve structure was normal  There was normal leaflet separation  DOPPLER: The transmitral velocity was within the normal range  There was no evidence for stenosis  There was no regurgitation  AORTIC VALVE: The valve was trileaflet  Leaflets exhibited normal thickness and normal cuspal separation  DOPPLER: Transaortic velocity was within the normal range  There was no evidence for stenosis  There was no regurgitation  TRICUSPID VALVE: The valve structure was normal  There was normal leaflet separation  DOPPLER: The transtricuspid velocity was within the normal range  There was no evidence for stenosis  There was trace regurgitation  The tricuspid jet  envelope definition was inadequate for estimation of RV systolic pressure  There are no indirect findings suggestive of moderate or severe pulmonary hypertension  PULMONIC VALVE: Leaflets exhibited normal thickness, no calcification, and normal cuspal separation  DOPPLER: The transpulmonic velocity was within the normal range  There was no regurgitation  PERICARDIUM: There was no pericardial effusion  The pericardium was normal in appearance  AORTA: The root exhibited normal size  SYSTEMIC VEINS: IVC: The inferior vena cava was normal in size and course   Respirophasic changes were normal     SYSTEM MEASUREMENT TABLES    2D  %FS: 47 6 %  AV Diam: 3 34 cm  EDV(Teich): 101 71 ml  EF Biplane: 67 76 %  EF(Cube): 85 61 %  EF(Teich): 79 %  ESV(Cube): 14 82 ml  ESV(Teich): 21 36 ml  IVSd: 1 58 cm  LA Area: 12 78 cm2  LA Diam: 3 37 cm  LVEDV MOD A2C: 49 73 ml  LVEDV MOD A4C: 61 78 ml  LVEDV MOD BP: 58 1 ml  LVEF MOD A2C: 65 64 %  LVEF MOD A4C: 71 2 %  LVESV MOD A2C: 17 09 ml  LVESV MOD A4C: 17 79 ml  LVESV MOD BP: 18 73 ml  LVIDd: 4 69 cm  LVIDs: 2 46 cm  LVLd A2C: 6 97 cm  LVLd A4C: 7 74 cm  LVLs A2C: 5 23 cm  LVLs A4C: 6 13 cm  LVPWd: 1 23 cm  RA Area: 14 52 cm2  RV Diam : 2 86 cm  SI(Cube): 36 73 ml/m2  SI(Teich): 33 48 ml/m2  SV MOD A2C: 32 64 ml  SV MOD A4C: 43 98 ml  SV(Cube): 88 15 ml  SV(Teich): 80 35 ml    MM  TAPSE: 3 26 cm    PW  E': 0 08 m/s    [facility] Accredited [facility]chocardi[facility]    Prepared and electronically signed by    Hodan Ann MD  Signed 26-Sep-2017 12:55:39    No results found for this or any previous visit     --------------------------------------------------------------------------------  HOLTER  No results found for this or any previous visit     --------------------------------------------------------------------------------  CAROTIDS  No results found for this or any previous visit         [unfilled]   =====================================================    Active Meds    Current Facility-Administered Medications:   •  acetaminophen (TYLENOL) tablet 650 mg, 650 mg, Oral, Q6H PRN, CALRA Peacock  •  amiodarone tablet 200 mg, 200 mg, Oral, Daily With Breakfast, Mehrdad Zamorano MD, 200 mg at 05/10/23 1209  •  aspirin chewable tablet 81 mg, 81 mg, Oral, Daily, Maycol Jones DO, 81 mg at 05/10/23 4858  •  atorvastatin (LIPITOR) tablet 80 mg, 80 mg, Oral, After Dinner, Maycol Jones DO, 80 mg at 05/09/23 1758  •  furosemide (LASIX) injection 80 mg, 80 mg, Intravenous, BID (diuretic), Mehrdad Zamorano MD, 80 mg at 05/10/23 1130  •  heparin (porcine) subcutaneous injection 5,000 Units, 5,000 Units, Subcutaneous, Q8H [address], Marisollino Eid, 5,000 Units at 05/10/23 0549  •  insulin glargine (LANTUS) subcutaneous injection 15 Units 0 15 mL, 15 Units, Subcutaneous, HS, Mitra Suggs, DO, 15 Units at 05/09/23 2114  •  insulin lispro (HumaLOG) 100 units/mL subcutaneous injection 2-12 Units, 2-12 Units, Subcutaneous, 4x Daily (AC & HS), 4 Units at 05/10/23 1136 **AND** Fingerstick Glucose (POCT), , , 4x Daily AC and at bedtime, CARLA Yeh  •  lidocaine (LIDODERM) 5 % patch 1 patch, 1 patch, Topical, Daily, Marisol Eid, 1 patch at 05/10/23 0625  •  metoprolol tartrate (LOPRESSOR) partial tablet 12 5 mg, 12 5 mg, Oral, Q12H [address], Leafy Malaika, DO, 12 5 mg at 05/10/23 5959  •  nicotine (NICODERM CQ) 14 mg/24hr TD 24 hr patch 1 patch, 1 patch, Transdermal, Daily, Maycol Jones, DO  •  nitroglycerin (NITROSTAT) SL tablet 0 4 mg, 0 4 mg, Sublingual, Q5 Min PRN, Maycol Jones, DO  •  ondansetron (ZOFRAN) injection 4 mg, 4 mg, Intravenous, Q6H PRN, Maycol Jones, DO, 4 mg at 05/06/23 0954  •  polyethylene glycol (MIRALAX) packet 17 g, 17 g, Oral, Daily, Camelia Suggs, DO, 17 g at 05/09/23 0857  •  sacubitril-valsartan (ENTRESTO) 49-51 MG per tablet 1 tablet, 1 tablet, Oral, BID, Lavon De Jesus MD  •  senna-docusate sodium (SENOKOT S) 8 6-50 mg per tablet 2 tablet, 2 tablet, Oral, HS, Mitra Suggs, DO, 2 tablet at 05/08/23 8218    Labs & Results  Lab Results   Component Value Date    TROPONINI 2 79 (H) 02/16/2015    TROPONINI 1 31 (H) 02/16/2015     Lab Results   Component Value Date    GLUCOSE 203 (H) 02/17/2015    CALCIUM 8 1 (L) 05/10/2023     02/17/2015    K 3 3 (L) 05/10/2023    CO2 33 (H) 05/10/2023    CL 97 05/10/2023    BUN 29 (H) 05/10/2023    CREATININE 0 91 05/10/2023     Lab Results   Component Value Date    WBC 11 68 (H) 05/09/2023    HGB 14 3 05/09/2023    HCT 43 0 05/09/2023    MCV 83 05/09/2023     05/09/2023         Lab Results   Component Value Date    CHOL 130 05/07/2015    CHOL 188 02/17/2015     Lab Results   Component Value Date    HDL 34 05/07/2015    HDL 43 02/17/2015     Lab Results   Component Value Date    LDLCALC 72 05/07/2015    LDLCALC 119 (H) 02/17/2015     Lab Results   Component Value Date    TRIG 122 05/07/2015    TRIG 128 02/17/2015     Lab Results   Component Value Date    ALT 45 05/03/2023    AST 34 05/03/2023

## 2023-05-10 NOTE — PROGRESS NOTES
INTERNAL MEDICINE RESIDENCY PROGRESS NOTE     Name: Chidi Parks   Age & Sex: 59 y o  male   MRN: [id number]  Unit/Bed#: [facility] 411-01   Encounter: [id number]  Team: [facility]    PATIENT INFORMATION     Name: Chidi Parks   Age & Sex: 59 y o  male   MRN: [id number]  Hospital Stay Days: 8    ASSESSMENT/PLAN     Principal Problem:    Acute HFrEF  Active Problems:    Ischemic cardiomyopathy    3-vessel CAD    [facility])    Essential (primary) hypertension    Type 2 diabetes mellitus, with long-term current use of insulin (HCC)    Tobacco dependence syndrome    A-fib (HCC)    Hyponatremia    Acidosis    Other constipation      * Acute HFrEF  Assessment & Plan  Wt Readings from Last 3 Encounters:   05/08/23 118 kg (259 lb 4 2 oz)   05/02/23 116 kg (254 lb 12 8 oz)   04/01/22 102 kg (224 lb 12 8 oz)   · Patient presenting with 2 weeks of dyspnea on exertion, lower extremity edema, abd distention, weight gain  · Increasing renal function parameters, reached out to nephrology and cardio   · Per cardiology, milrinone 0 13 mcg/kg/min discontinued, transition from Lasix 10 mg/hr drip to IV Lasix 80 mg twice daily today  · Possible ICD placement on 5/12  · Continue Entresto 24-26 mg BID, Lopressor 12 5 mg BID  · Continue I/O, daily weight  · Appreciate heart failure team      3-vessel CAD  Assessment & Plan  · Patient with history of CAD with drug-eluting stent to LCx in 2015  · Patient reports dyspnea on exertion over the past 2 weeks, denies any chest pain at rest, is a diabetic on insulin, is an active smoker of 40 pack years, has prior history of CAD, is on daily aspirin  · LVEF 10-15%, continue medical management  Appreciate Cardiology comanagement and expertise  · Cath 5/3 with multivessel disease not amenable to PCI, continue medical management   Appreciate cardio recommendations    [facility])  Assessment & Plan  · EP following and started amiodarone, given ischemic cardiomyopathy and VT, tentatively patient is planned for a ICD on 5/12    Other constipation  Assessment & Plan  Complains of constipation for 2 days  · Increase Senokot-S to 2 tablets  · Start MiraLAX    A-fib [facility])  Assessment & Plan  · At cardio appointment outpt when he appeared to be in A-fib RVR on EKG, also with Q waves concerning for prior infarct, in the setting of chest pressure last week concerning for MI  · Now in sinus  · Noted reduced LVEF of 10 to 15%  · Milrinone discontinued on 5/10    Tobacco dependence syndrome  Assessment & Plan  Patient with over 40  pack-year history  Counseling provided on cessation, nicotine patch ordered    Type 2 diabetes mellitus, with long-term current use of insulin [facility])  Assessment & Plan  Lab Results   Component Value Date    HGBA1C 12 0 (H) 04/14/2023       Recent Labs     05/07/23  1123 05/07/23  1557 05/07/23  2130 05/08/23  0557   POCGLU 188* 286* 309* 172*       Blood Sugar Average: Last 72 hrs:  (P) 371 [id number]     · Will hold oral medications   · Increase Lantus from 15 to 18 U due to fasting BG in 200s  · Continue SSI algorithm 4    Essential (primary) hypertension  Assessment & Plan  Continue Lopressor 12 5 mg twice daily, Entresto 24-26 mg twice daily    LINDEN (acute kidney injury) (HCC)-resolved as of 5/10/2023  Assessment & Plan  · Progressive increase in BUN and creatinine, possibly somewhat cardiorenal from poor forward perfusion  · Discussed with heart failure team, and milrinone infusion started  · We will also reach out to nephrology, appreciate the comanagement of all specializing services  · Improving      Disposition: Continue inpatient management     SUBJECTIVE     Patient seen and examined  No acute events overnight  Feels like his leg swelling is improving  Had a bowel movement last night  Wife at bedside      OBJECTIVE     Vitals:    05/09/23 2300 05/10/23 0258 05/10/23 0544 05/10/23 0758   BP: 107/60 109/65  119/65   BP Location:  Right arm  Left arm   Pulse:  81  80   Resp: 18     Temp:  98 9 °F (37 2 °C)  98 2 °F (36 8 °C)   TempSrc:  Oral  Oral   SpO2:  95%  95%   Weight:   112 kg (246 lb 4 1 oz)    Height:          Temperature:   Temp (24hrs), Av 4 °F (36 9 °C), Min:98 2 °F (36 8 °C), Max:98 9 °F (37 2 °C)    Temperature: 98 2 °F (36 8 °C)  Intake & Output:  I/O        07 0700  0701  05/10 0700 05/10 0701   0700    P  O  480 716 520    I V  (mL/kg) 1077 2 (9 5) 264 4 (2 4)     Total Intake(mL/kg) 1557 2 (13 8) 980 4 (8 8) 520 (4 6)    Urine (mL/kg/hr) 6775 (2 5) 6350 (2 4)     Stool 0 0     Total Output 6775 6350     Net -5217 8 -5369 6 +520           Unmeasured Stool Occurrence 1 x 1 x         Weights:   IBW (Ideal Body Weight): 77 6 kg    Body mass index is 33 4 kg/m²  Weight (last 2 days)     Date/Time Weight    05/10/23 0544 112 (246 25)    23 0600 113 (250 22)    23 0459 113 (250 22)    23 0538 118 (259 26)        Physical Exam  Vitals reviewed  Constitutional:       General: He is not in acute distress  Appearance: He is obese  HENT:      Head: Normocephalic and atraumatic  Nose: No rhinorrhea  Eyes:      General: No scleral icterus  Cardiovascular:      Rate and Rhythm: Normal rate and regular rhythm  Pulses: Normal pulses  Heart sounds: Normal heart sounds  No murmur heard  No friction rub  No gallop  Pulmonary:      Effort: Pulmonary effort is normal  No respiratory distress  Breath sounds: Normal breath sounds  No wheezing, rhonchi or rales  Abdominal:      General: Bowel sounds are normal       Palpations: Abdomen is soft  There is no mass  Tenderness: There is no abdominal tenderness  There is no guarding  Hernia: No hernia is present  Musculoskeletal:      Right lower leg: Edema present  Left lower leg: Edema present  Skin:     General: Skin is warm and dry  Capillary Refill: Capillary refill takes less than 2 seconds  Coloration: Skin is not jaundiced  Neurological:      Mental Status: He is alert  Cranial Nerves: No dysarthria  Comments: CN 2-12 grossly intact, moves all 4 extremities   Psychiatric:         Mood and Affect: Mood normal          Behavior: Behavior normal        LABORATORY DATA     Labs: I have personally reviewed pertinent reports  Results from last 7 days   Lab Units 05/09/23  0511 05/08/23  0425 05/07/23  0451   WBC Thousand/uL 11 68* 12 22* 11 63*   HEMOGLOBIN g/dL 14 3 14 6 14 4   HEMATOCRIT % 43 0 43 8 42 9   PLATELETS Thousands/uL 195 193 185   NEUTROS PCT % 81* 83*  --    MONOS PCT % 10 9  --       Results from last 7 days   Lab Units 05/10/23  0444 05/09/23  0511 05/08/23  0425 05/04/23  0235 05/03/23  1641   POTASSIUM mmol/L 3 3* 3 5 4 0   < > 5 1   CHLORIDE mmol/L 97 97 97   < > 102   CO2 mmol/L 33* 29 26   < > 24   BUN mg/dL 29* 47* 68*   < > 54*   CREATININE mg/dL 0 91 1 30 1 89*   < > 1 52*   CALCIUM mg/dL 8 1* 8 6 8 4   < > 8 4   ALK PHOS U/L  --   --   --   --  42*   ALT U/L  --   --   --   --  45   AST U/L  --   --   --   --  34    < > = values in this interval not displayed  Results from last 7 days   Lab Units 05/10/23  0444 05/07/23  0451 05/06/23  0547   MAGNESIUM mg/dL 1 9 2 4 2 6     Results from last 7 days   Lab Units 05/10/23  0444   PHOSPHORUS mg/dL 2 5      Results from last 7 days   Lab Units 05/05/23  0447 05/04/23  0926 05/04/23  0235   PTT seconds 90* 84* 69*               IMAGING & DIAGNOSTIC TESTING     Radiology Results: I have personally reviewed pertinent reports  XR chest pa & lateral    Result Date: 5/2/2023  Impression: Right base nodular density and right pleural effusion  Recommend CT  The study was marked in [facility] for immediate notification       CT chest wo contrast    Result Date: 5/3/2023  Impression: Small to moderate right effusion with ascites may be due to congestion/heart failure Mild groundglass is seen at the right lung base, nonspecific related to atelectasis or pneumonitis Mild enlarged mediastinal lymph nodes may be reactive, consider follow-up at 3 months to demonstrate resolution/stability  The study was marked in [facility] for significant notification  Workstation performed: [place] abdomen limited    Result Date: 5/6/2023  Impression: 1  Small volume ascites within all 4 quadrants  Workstation performed: [id number]     US kidney and bladder    Result Date: 5/7/2023  Impression: 1  No hydronephrosis  Trace perinephric fluid is seen bilaterally  2  The bladder is nondistended, limiting evaluation  3  Small volume ascites  Workstation performed: [id number]     Other Diagnostic Testing: I have personally reviewed pertinent reports      ACTIVE MEDICATIONS     Current Facility-Administered Medications   Medication Dose Route Frequency   • acetaminophen (TYLENOL) tablet 650 mg  650 mg Oral Q6H PRN   • amiodarone tablet 200 mg  200 mg Oral Daily With Breakfast   • aspirin chewable tablet 81 mg  81 mg Oral Daily   • atorvastatin (LIPITOR) tablet 80 mg  80 mg Oral After Dinner   • furosemide (LASIX) injection 80 mg  80 mg Intravenous BID (diuretic)   • heparin (porcine) subcutaneous injection 5,000 Units  5,000 Units Subcutaneous Q8H [address]   • insulin glargine (LANTUS) subcutaneous injection 15 Units 0 15 mL  15 Units Subcutaneous HS   • insulin lispro (HumaLOG) 100 units/mL subcutaneous injection 2-12 Units  2-12 Units Subcutaneous 4x Daily (AC & HS)   • lidocaine (LIDODERM) 5 % patch 1 patch  1 patch Topical Daily   • metoprolol tartrate (LOPRESSOR) partial tablet 12 5 mg  12 5 mg Oral Q12H TEJAS   • nicotine (NICODERM CQ) 14 mg/24hr TD 24 hr patch 1 patch  1 patch Transdermal Daily   • nitroglycerin (NITROSTAT) SL tablet 0 4 mg  0 4 mg Sublingual Q5 Min PRN   • ondansetron (ZOFRAN) injection 4 mg  4 mg Intravenous Q6H PRN   • polyethylene glycol (MIRALAX) packet 17 g  17 g Oral Daily   • sacubitril-valsartan (ENTRESTO) 49-51 MG per tablet 1 tablet  1 tablet Oral BID   • senna-docusate "sodium (SENOKOT S) 8 6-50 mg per tablet 2 tablet  2 tablet Oral HS       VTE Pharmacologic Prophylaxis: Heparin  VTE Mechanical Prophylaxis: sequential compression device    Portions of the record may have been created with voice recognition software  Occasional wrong word or \"sound a like\" substitutions may have occurred due to the inherent limitations of voice recognition software    Read the chart carefully and recognize, using context, where substitutions have occurred   ==  Ariana Torres DO  [facility]  Internal Medicine Residency PGY-2       "

## 2023-05-10 NOTE — CASE MANAGEMENT
Case Management Discharge Planning Note    Patient name Chanell Nguyen  Location [facility] 411/[facility] 428-39 MRN [id number]  : 1960 Date 5/10/2023       Current Admission Date: 2023  Current Admission Diagnosis:Acute HFrEF   Patient Active Problem List    Diagnosis Date Noted   • Other constipation 2023   • Hyponatremia 2023   • Acidosis 2023   • V-tach ([facility] [facility] ) 2023   • Ischemic cardiomyopathy 2023   • Essential (primary) hypertension 2023   • Acute HFrEF 2023   • A-fib ([facility] [facility] ) 2023   • Benign prostatic hyperplasia without lower urinary tract symptoms 2018   • 3-vessel CAD 2015   • Type 2 diabetes mellitus, with long-term current use of insulin ([facility] [facility] ) 2015   • GERD (gastroesophageal reflux disease) 2013   • Tobacco dependence syndrome 2012      LOS (days): 8  Geometric Mean LOS (GMLOS) (days): 4 10  Days to GMLOS:-4     OBJECTIVE:  Risk of Unplanned Readmission Score: 16 72         Current admission status: Inpatient   Preferred Pharmacy:   [place] [address]  Phone: [phone] Fax: [phone]    Primary Care Provider: Joanne Garrison DO    Primary Insurance: [facility]  Secondary Insurance:     DISCHARGE DETAILS:                                [address] [facility] Name[de-identified] [facility]         Other Referral/Resources/Interventions Provided:  Referral Comments: Accepted by [facility]                                                      Additional Comments: Accepted by [facility]

## 2023-05-11 PROBLEM — K59.09 OTHER CONSTIPATION: Status: RESOLVED | Noted: 2023-05-08 | Resolved: 2023-05-11

## 2023-05-11 LAB
ANION GAP SERPL CALCULATED.3IONS-SCNC: 1 MMOL/L (ref 4–13)
ARTERIAL PATENCY WRIST A: NO
ATRIAL RATE: 72 BPM
BASE EX.OXY STD BLDV CALC-SCNC: 65.4 % (ref 60–80)
BASE EXCESS BLDV CALC-SCNC: 7.2 MMOL/L
BUN SERPL-MCNC: 24 MG/DL (ref 5–25)
CALCIUM SERPL-MCNC: 8.4 MG/DL (ref 8.3–10.1)
CHLORIDE SERPL-SCNC: 95 MMOL/L (ref 96–108)
CO2 SERPL-SCNC: 35 MMOL/L (ref 21–32)
CREAT SERPL-MCNC: 0.83 MG/DL (ref 0.6–1.3)
GFR SERPL CREATININE-BSD FRML MDRD: 94 ML/MIN/1.73SQ M
GLUCOSE SERPL-MCNC: 185 MG/DL (ref 65–140)
GLUCOSE SERPL-MCNC: 195 MG/DL (ref 65–140)
GLUCOSE SERPL-MCNC: 210 MG/DL (ref 65–140)
GLUCOSE SERPL-MCNC: 216 MG/DL (ref 65–140)
GLUCOSE SERPL-MCNC: 229 MG/DL (ref 65–140)
HCO3 BLDV-SCNC: 34.3 MMOL/L (ref 24–30)
O2 CT BLDV-SCNC: 14.1 ML/DL
P AXIS: 67 DEGREES
PCO2 BLDV: 58.4 MM HG (ref 42–50)
PH BLDV: 7.39 [PH] (ref 7.3–7.4)
PO2 BLDV: 35.3 MM HG (ref 35–45)
POTASSIUM SERPL-SCNC: 3.7 MMOL/L (ref 3.5–5.3)
PR INTERVAL: 156 MS
QRS AXIS: -14 DEGREES
QRSD INTERVAL: 116 MS
QT INTERVAL: 456 MS
QTC INTERVAL: 499 MS
SODIUM SERPL-SCNC: 131 MMOL/L (ref 135–147)
T WAVE AXIS: 61 DEGREES
VENTRICULAR RATE: 72 BPM

## 2023-05-11 RX ORDER — CEFAZOLIN SODIUM 2 G/50ML
2000 SOLUTION INTRAVENOUS ONCE
Status: COMPLETED | OUTPATIENT
Start: 2023-05-12 | End: 2023-05-12

## 2023-05-11 RX ORDER — CEFAZOLIN SODIUM 2 G/50ML
2000 SOLUTION INTRAVENOUS ONCE
Status: DISCONTINUED | OUTPATIENT
Start: 2023-05-12 | End: 2023-05-11

## 2023-05-11 RX ORDER — FUROSEMIDE 10 MG/ML
60 INJECTION INTRAMUSCULAR; INTRAVENOUS
Status: DISCONTINUED | OUTPATIENT
Start: 2023-05-11 | End: 2023-05-12

## 2023-05-11 RX ORDER — POTASSIUM CHLORIDE 20 MEQ/1
40 TABLET, EXTENDED RELEASE ORAL ONCE
Status: COMPLETED | OUTPATIENT
Start: 2023-05-11 | End: 2023-05-11

## 2023-05-11 RX ORDER — METOPROLOL SUCCINATE 25 MG/1
12.5 TABLET, EXTENDED RELEASE ORAL EVERY 12 HOURS
Status: DISCONTINUED | OUTPATIENT
Start: 2023-05-11 | End: 2023-05-14

## 2023-05-11 RX ADMIN — POTASSIUM CHLORIDE 40 MEQ: 1500 TABLET, EXTENDED RELEASE ORAL at 08:38

## 2023-05-11 RX ADMIN — FUROSEMIDE 80 MG: 10 INJECTION, SOLUTION INTRAMUSCULAR; INTRAVENOUS at 08:38

## 2023-05-11 RX ADMIN — AMIODARONE HYDROCHLORIDE 200 MG: 200 TABLET ORAL at 08:39

## 2023-05-11 RX ADMIN — SACUBITRIL AND VALSARTAN 1 TABLET: 49; 51 TABLET, FILM COATED ORAL at 17:34

## 2023-05-11 RX ADMIN — Medication 12.5 MG: at 08:39

## 2023-05-11 RX ADMIN — INSULIN LISPRO 4 UNITS: 100 INJECTION, SOLUTION INTRAVENOUS; SUBCUTANEOUS at 17:34

## 2023-05-11 RX ADMIN — ASPIRIN 81 MG 81 MG: 81 TABLET ORAL at 08:39

## 2023-05-11 RX ADMIN — INSULIN GLARGINE 18 UNITS: 100 INJECTION, SOLUTION SUBCUTANEOUS at 21:22

## 2023-05-11 RX ADMIN — FUROSEMIDE 60 MG: 10 INJECTION, SOLUTION INTRAMUSCULAR; INTRAVENOUS at 17:34

## 2023-05-11 RX ADMIN — HEPARIN SODIUM 5000 UNITS: 5000 INJECTION INTRAVENOUS; SUBCUTANEOUS at 06:08

## 2023-05-11 RX ADMIN — ATORVASTATIN CALCIUM 80 MG: 80 TABLET, FILM COATED ORAL at 17:34

## 2023-05-11 RX ADMIN — INSULIN LISPRO 2 UNITS: 100 INJECTION, SOLUTION INTRAVENOUS; SUBCUTANEOUS at 06:08

## 2023-05-11 RX ADMIN — LIDOCAINE 5% 1 PATCH: 700 PATCH TOPICAL at 08:39

## 2023-05-11 RX ADMIN — INSULIN LISPRO 4 UNITS: 100 INJECTION, SOLUTION INTRAVENOUS; SUBCUTANEOUS at 21:27

## 2023-05-11 RX ADMIN — SENNOSIDES AND DOCUSATE SODIUM 2 TABLET: 8.6; 5 TABLET ORAL at 21:20

## 2023-05-11 RX ADMIN — SACUBITRIL AND VALSARTAN 1 TABLET: 49; 51 TABLET, FILM COATED ORAL at 08:38

## 2023-05-11 RX ADMIN — HEPARIN SODIUM 5000 UNITS: 5000 INJECTION INTRAVENOUS; SUBCUTANEOUS at 21:21

## 2023-05-11 RX ADMIN — HEPARIN SODIUM 5000 UNITS: 5000 INJECTION INTRAVENOUS; SUBCUTANEOUS at 14:32

## 2023-05-11 RX ADMIN — INSULIN LISPRO 4 UNITS: 100 INJECTION, SOLUTION INTRAVENOUS; SUBCUTANEOUS at 12:36

## 2023-05-11 RX ADMIN — METOPROLOL SUCCINATE 12.5 MG: 25 TABLET, EXTENDED RELEASE ORAL at 21:20

## 2023-05-11 NOTE — PROGRESS NOTES
INTERNAL MEDICINE RESIDENCY PROGRESS NOTE     Name: Jonathon Hanson   Age & Sex: 59 y o  male   MRN: [id number]  Unit/Bed#: [facility] 411-01   Encounter: [id number]  Team: Jahaira Anderson    PATIENT INFORMATION     Name: Jonathon Hanson   Age & Sex: 59 y o  male   MRN: [id number]  Hospital Stay Days: 9    ASSESSMENT/PLAN     Principal Problem:    Acute HFrEF  Active Problems:    Ischemic cardiomyopathy    3-vessel CAD    Type 2 diabetes mellitus, with long-term current use of insulin (HCC)    V-tach ([facility] )    A-fib ([facility] )    Essential (primary) hypertension    Tobacco dependence syndrome    Hyponatremia    Acidosis      * Acute HFrEF  Assessment & Plan  Wt Readings from Last 3 Encounters:   05/08/23 118 kg (259 lb 4 2 oz)   05/02/23 116 kg (254 lb 12 8 oz)   04/01/22 102 kg (224 lb 12 8 oz)   · Patient presenting with 2 weeks of dyspnea on exertion, lower extremity edema, abd distention, weight gain  · Increasing renal function parameters, reached out to nephrology and cardio   · Off milrinone and Lasix drip since 5/10    · Per cardiology, continue IV Lasix 80 mg twice daily   · ICD placement at 1430 tomorrow, 5/12  · Continue Entresto 49-51 mg BID, Lopressor 12 5 mg BID  · Continue I/O, daily weight  · Appreciate heart failure team      3-vessel CAD  Assessment & Plan  · Patient with history of CAD with drug-eluting stent to LCx in 2015  · Patient reports dyspnea on exertion over the past 2 weeks, denies any chest pain at rest, is a diabetic on insulin, is an active smoker of 40 pack years, has prior history of CAD, is on daily aspirin  · LVEF 10-15%, continue medical management  Appreciate Cardiology comanagement and expertise  · Cath 5/3 with multivessel disease not amenable to PCI, continue medical management   Appreciate cardio recommendations    V-tach [facility])  Assessment & Plan  · EP following and started amiodarone, given ischemic cardiomyopathy and VT plan for ICD  · S/p amio load    · Continue amiodarone 200 mg  · ICD placement on 5/12    Type 2 diabetes mellitus, with long-term current use of insulin [facility])  Assessment & Plan  Lab Results   Component Value Date    HGBA1C 12 0 (H) 04/14/2023       Recent Labs     05/07/23  1123 05/07/23  1557 05/07/23  2130 05/08/23  0557   POCGLU 188* 286* 309* 172*       Blood Sugar Average: Last 72 hrs:  (P) 155 [id number]     · Will hold oral medications   · Continue Lantus 18 U   · Continue SSI algorithm 4    A-fib (HCC)  Assessment & Plan  · At cardio appointment outpt when he appeared to be in A-fib RVR on EKG, also with Q waves concerning for prior infarct, in the setting of chest pressure last week concerning for MI  · Now in sinus  · Noted reduced LVEF of 10 to 15%  · Milrinone discontinued on 5/10    Tobacco dependence syndrome  Assessment & Plan  Patient with over 40  pack-year history  Counseling provided on cessation, nicotine patch ordered    Essential (primary) hypertension  Assessment & Plan  Continue Lopressor 12 5 mg twice daily, Entresto 49-51 mg twice daily    Other constipation-resolved as of 5/11/2023  Assessment & Plan  Complains of constipation for 2 days  · Increase Senokot-S to 2 tablets  · Start MiraLAX    LINDEN (acute kidney injury) (HCC)-resolved as of 5/10/2023  Assessment & Plan  · Progressive increase in BUN and creatinine, possibly somewhat cardiorenal from poor forward perfusion  · Discussed with heart failure team, and milrinone infusion started  · We will also reach out to nephrology, appreciate the comanagement of all specializing services  · Improving      Disposition: Continue inpatient management     SUBJECTIVE     Patient seen and examined  No acute events overnight  Feels leg swelling is improving        OBJECTIVE     Vitals:    05/10/23 2211 05/11/23 0249 05/11/23 0552 05/11/23 0753   BP: 125/57 96/54  119/65   BP Location: Left arm Left arm  Left arm   Pulse: 86 75  80   Resp: 20 17     Temp: 99 7 °F (37 6 °C) 99 °F (37 2 °C)  98 2 °F (36 8 °C) TempSrc: Oral Oral  Oral   SpO2: 96% 98%  97%   Weight:   110 kg (241 lb 6 5 oz)    Height:          Temperature:   Temp (24hrs), Av 7 °F (37 1 °C), Min:97 8 °F (36 6 °C), Max:99 7 °F (37 6 °C)    Temperature: 98 2 °F (36 8 °C)  Intake & Output:  I/O        0701  05/10 0700 05/10 0701   07 0701   0700    P  O  716 520 300    I V  (mL/kg) 264 4 (2 4)      Total Intake(mL/kg) 980 4 (8 8) 520 (4 8) 300 (2 8)    Urine (mL/kg/hr) 6350 (2 4) 7325 (2 8) 500 (1 7)    Stool 0      Total Output 6350 7325 500    Net -5369 6 -6805 -200           Unmeasured Stool Occurrence 1 x          Weights:   IBW (Ideal Body Weight): 77 6 kg    Body mass index is 32 74 kg/m²  Weight (last 2 days)     Date/Time Weight    23 0552 110 (241 4)    05/10/23 0544 112 (246 25)    23 0600 113 (250 22)    23 0459 113 (250 22)        Physical Exam  Vitals reviewed  Constitutional:       General: He is not in acute distress  HENT:      Head: Normocephalic and atraumatic  Nose: No rhinorrhea  Eyes:      General: No scleral icterus  Cardiovascular:      Rate and Rhythm: Normal rate and regular rhythm  Pulses: Normal pulses  Heart sounds: Normal heart sounds  No murmur heard  No friction rub  No gallop  Pulmonary:      Effort: Pulmonary effort is normal  No respiratory distress  Breath sounds: Normal breath sounds  No wheezing, rhonchi or rales  Abdominal:      General: Bowel sounds are normal  There is no distension  Palpations: Abdomen is soft  There is no mass  Tenderness: There is no abdominal tenderness  There is no guarding  Hernia: No hernia is present  Musculoskeletal:      Right lower leg: Edema present  Left lower leg: Edema present  Skin:     General: Skin is warm and dry  Coloration: Skin is not jaundiced  Neurological:      Mental Status: He is alert  Cranial Nerves: No dysarthria        Comments: CN 2-12 grossly intact, moves all 4 extremities   Psychiatric:         Mood and Affect: Mood normal          Behavior: Behavior normal        LABORATORY DATA     Labs: I have personally reviewed pertinent reports  Results from last 7 days   Lab Units 05/09/23  0511 05/08/23  0425 05/07/23  0451   WBC Thousand/uL 11 68* 12 22* 11 63*   HEMOGLOBIN g/dL 14 3 14 6 14 4   HEMATOCRIT % 43 0 43 8 42 9   PLATELETS Thousands/uL 195 193 185   NEUTROS PCT % 81* 83*  --    MONOS PCT % 10 9  --       Results from last 7 days   Lab Units 05/11/23  0501 05/10/23  0444 05/09/23  0511   POTASSIUM mmol/L 3 7 3 3* 3 5   CHLORIDE mmol/L 95* 97 97   CO2 mmol/L 35* 33* 29   BUN mg/dL 24 29* 47*   CREATININE mg/dL 0 83 0 91 1 30   CALCIUM mg/dL 8 4 8 1* 8 6     Results from last 7 days   Lab Units 05/10/23  0444 05/07/23  0451 05/06/23  0547   MAGNESIUM mg/dL 1 9 2 4 2 6     Results from last 7 days   Lab Units 05/10/23  0444   PHOSPHORUS mg/dL 2 5      Results from last 7 days   Lab Units 05/05/23  0447   PTT seconds 90*               IMAGING & DIAGNOSTIC TESTING     Radiology Results: I have personally reviewed pertinent reports  XR chest pa & lateral    Result Date: 5/2/2023  Impression: Right base nodular density and right pleural effusion  Recommend CT  The study was marked in [facility] for immediate notification  CT chest wo contrast    Result Date: 5/3/2023  Impression: Small to moderate right effusion with ascites may be due to congestion/heart failure Mild groundglass is seen at the right lung base, nonspecific related to atelectasis or pneumonitis Mild enlarged mediastinal lymph nodes may be reactive, consider follow-up at 3 months to demonstrate resolution/stability  The study was marked in [facility] for significant notification  Workstation performed: Stan Rojas abdomen limited    Result Date: 5/6/2023  Impression: 1  Small volume ascites within all 4 quadrants   Workstation performed: [id number]      kidney and bladder    Result Date: "5/7/2023  Impression: 1  No hydronephrosis  Trace perinephric fluid is seen bilaterally  2  The bladder is nondistended, limiting evaluation  3  Small volume ascites  Workstation performed: [id number]     Other Diagnostic Testing: I have personally reviewed pertinent reports  ACTIVE MEDICATIONS     Current Facility-Administered Medications   Medication Dose Route Frequency   • acetaminophen (TYLENOL) tablet 650 mg  650 mg Oral Q6H PRN   • amiodarone tablet 200 mg  200 mg Oral Daily With Breakfast   • aspirin chewable tablet 81 mg  81 mg Oral Daily   • atorvastatin (LIPITOR) tablet 80 mg  80 mg Oral After Dinner   • [START ON 5/12/2023] ceFAZolin (ANCEF) IVPB (premix in dextrose) 2,000 mg 50 mL  2,000 mg Intravenous Once   • furosemide (LASIX) injection 80 mg  80 mg Intravenous BID (diuretic)   • heparin (porcine) subcutaneous injection 5,000 Units  5,000 Units Subcutaneous Q8H [facility]   • insulin glargine (LANTUS) subcutaneous injection 18 Units 0 18 mL  18 Units Subcutaneous HS   • insulin lispro (HumaLOG) 100 units/mL subcutaneous injection 2-12 Units  2-12 Units Subcutaneous 4x Daily (AC & HS)   • lidocaine (LIDODERM) 5 % patch 1 patch  1 patch Topical Daily   • metoprolol tartrate (LOPRESSOR) partial tablet 12 5 mg  12 5 mg Oral Q12H TEJAS   • nicotine (NICODERM CQ) 14 mg/24hr TD 24 hr patch 1 patch  1 patch Transdermal Daily   • nitroglycerin (NITROSTAT) SL tablet 0 4 mg  0 4 mg Sublingual Q5 Min PRN   • ondansetron (ZOFRAN) injection 4 mg  4 mg Intravenous Q6H PRN   • polyethylene glycol (MIRALAX) packet 17 g  17 g Oral Daily   • sacubitril-valsartan (ENTRESTO) 49-51 MG per tablet 1 tablet  1 tablet Oral BID   • senna-docusate sodium (SENOKOT S) 8 6-50 mg per tablet 2 tablet  2 tablet Oral HS       VTE Pharmacologic Prophylaxis: Heparin  VTE Mechanical Prophylaxis: sequential compression device    Portions of the record may have been created with voice recognition software    Occasional wrong word or \"sound a like\" " substitutions may have occurred due to the inherent limitations of voice recognition software    Read the chart carefully and recognize, using context, where substitutions have occurred   ==  Lazaro Mendoza, DO  [address]  Internal Medicine Residency PGY-2

## 2023-05-11 NOTE — TREATMENT PLAN
Was reached out to by Heart Failure team about timing of ICD implantation as patient is now off lasix and milrinone drip with better optimization to undergo EP procedure  Looking at prior notes, patient was recommended to undergo dual chamber ICD implantation (EF close to 15%, need for atrial lead given up-titration of metoprolol for GDMT and VT suppression, and narrow QRS complex on review of EKG's although significant burden of PVC's)  He will be made NPO after midnight with antibiotics on call to EP lab for ICD implantation tomorrow  No anticoagulation needs to be held

## 2023-05-11 NOTE — PHYSICAL THERAPY NOTE
Physical Therapy Treatment Note       05/11/23 0925   PT Last Visit   PT Visit Date 05/11/23   Note Type   Note Type Treatment   Pain Assessment   Pain Assessment Tool 0-10   Pain Score No Pain   Restrictions/Precautions   Weight Bearing Precautions Per Order No   Other Precautions Multiple lines;Telemetry; Fall Risk   General   Chart Reviewed Yes   Family/Caregiver Present No   Cognition   Overall Cognitive Status WFL   Arousal/Participation Responsive   Attention Within functional limits   Orientation Level Oriented X4   Memory Unable to assess   Following Commands Follows all commands and directions without difficulty   Subjective   Subjective states he feels OK  cooperative w/ session  Transfers   Sit to Stand 7  Independent   Additional items Increased time required   Stand to Sit 7  Independent   Additional items Increased time required   Ambulation/Elevation   Gait pattern   (slow, forward flexion)   Gait Assistance   (CGA of 1)   Additional items Assist x 1   Assistive Device Rolling walker   Distance 100'x2, 140'x2, standing rest x 20-30 sec each  time spent for both setup, reposiitoning   Balance   Static Sitting Normal   Dynamic Sitting Good   Static Standing Fair   Dynamic Standing Fair -   Ambulatory Fair -   Endurance Deficit   Endurance Deficit No   Activity Tolerance   Activity Tolerance Patient tolerated treatment well;Treatment limited secondary to medical complications (Comment)   Nurse Made Aware yes   Assessment   Prognosis Good   Problem List Decreased strength;Decreased endurance; Impaired balance;Decreased mobility   Assessment Pt seen for session for setup, transfers, gait w/ rest time, reposiitoning  Pt cooperative w/ session, willing to participate  noted that his HR remained in 70s-80s throughout   mild fatigue, mild LOB w/ gait   continue to anticipate d/c home w/ home PT when stable   Goals   Patient Goals to walk more   STG Expiration Date 05/13/23   PT Treatment Day 3   Plan Treatment/Interventions Functional transfer training;LE strengthening/ROM; Elevations; Therapeutic exercise; Endurance training;Equipment eval/education; Bed mobility;Gait training;Patient/family training   Progress Progressing toward goals   PT Frequency 3-5x/wk   Recommendation   PT Discharge Recommendation Home with home health rehabilitation   Equipment Recommended [place] walker   Change/add to VerbalizeIt?  No   AM-PAC Basic Mobility Inpatient   Turning in Flat Bed Without Bedrails 4   Lying on Back to Sitting on Edge of Flat Bed Without Bedrails 4   Moving Bed to Chair 3   Standing Up From Chair Using Arms 3   Walk in Room 3   Climb 3-5 Stairs With Railing 3   Basic Mobility Inpatient Raw Score 20   Basic Mobility Standardized Score 43 99   Highest Level Of Mobility   JH-HLM Goal 6: Walk 10 steps or more   JH-HLM Achieved 8: Walk 250 feet ot more     Bakari Sanchez PT, DPT CSRS

## 2023-05-11 NOTE — PROGRESS NOTES
Progress Note - Nephrology   Cesario Gonzalez 59 y o  male MRN: [id number]  Unit/Bed#: [facility] 411-01 Encounter: [id number]      Assessment / Plan:    Acute kidney injury  • Baseline creatinine of less than 1   • Peak creatinine of 2 34 on 5/7  • Creatinine is stable with continued diuresis, discontinuation of milrinone and initiation of Entresto  • Trend renal function with continued diuresis and initiation of Entresto 5/9  • Recheck BMP in a m  • Renal ultrasound without hydronephrosis/urinalysis with hyaline cast  + Simple renal cyst of the right kidney  • The patient remains at high risk for recurrent LINDEN given his underlying cardiomyopathy and hemodynamic fluctuations  Acute HFrEF CHF  • Ejection fraction of 10 to 15%  • Milrinone has been discontinued and Lasix drip converted to intravenous intermittent Lasix per cardiology  • Monitor renal function, volume status, peripheral edema, weights and hemodynamics closely   Hyponatremia  • Secondary to volume overload and LINDEN with decreased free water clearance  • Continue to diurese  • Sodium level currently in the low 130s  • Continue fluid restriction  Other issues:  • Three-vessel coronary artery disease  • V  Tach  • Type 2 diabetes mellitus  • Atrial fibrillation  • Constipation  • + Apical aneurysm  • Hypokalemia-replete as needed  • Contraction alkalosis -trend for now, give Diamox if needed  Case reviewed with cardiology today, specifically regarding Diamox dosing  Subjective: The patient was seen and examined  He denied any shortness of breath, chest pain or pressure, abdominal pain, vomiting, diarrhea, sweats, chills  He felt that his peripheral edema has improved  He denied any paroxysmal nocturnal dyspnea orthopnea  His wife was at bedside  Objective:     Vitals: Blood pressure 119/65, pulse 80, temperature 98 2 °F (36 8 °C), temperature source Oral, resp  rate 17, height 6' (1 829 m), weight 110 kg (241 lb 6 5 oz), SpO2 97 %  ,Body mass index is 32 74 kg/m²  Temp (24hrs), Av 7 °F (37 1 °C), Min:97 8 °F (36 6 °C), Max:99 7 °F (37 6 °C)      Weight (last 2 days)     Date/Time Weight    23 0552 110 (241 4)    05/10/23 0544 112 (246 25)    23 0600 113 (250 22)    23 0459 113 (250 22)                 Intake/Output Summary (Last 24 hours) at 2023 1536  Last data filed at 2023 1426  Gross per 24 hour   Intake 300 ml   Output 7025 ml   Net -6725 ml     I/O last 24 hours:   In: 36 [P O :820]  Out: 40289 [Urine:59890]        Physical Exam    /65 (BP Location: Left arm)   Pulse 80   Temp 98 2 °F (36 8 °C) (Oral)   Resp 17   Ht 6' (1 829 m)   Wt 110 kg (241 lb 6 5 oz)   SpO2 97%   BMI 32 74 kg/m²   Vital signs were reviewed  Constitutional: The patient was awake, alert, pleasant, cooperative in no apparent distress  Cardiovascular: Jugular venous distention was present, S1-S2, no pericardial friction rub S3 noted, peripheral edema has improved but still significant  Pulmonary: Adequate inspiratory effort, lungs were clear  Abdominal/GI: Soft, nontender              Invasive Devices     Peripherally Inserted Central Catheter Line  Duration           PICC Line  Right Basilic 2 days          Drain  Duration           Urethral Catheter 4 days                Medications:    Scheduled Meds:  Current Facility-Administered Medications   Medication Dose Route Frequency Provider Last Rate   • acetaminophen  650 mg Oral Q6H PRN CARLA Xie     • amiodarone  200 mg Oral Daily With Breakfast Yesica Byers MD     • aspirin  81 mg Oral Daily Maycol Karen, DO     • atorvastatin  80 mg Oral After [employer], DO     • [START ON 2023] cefazolin  2,000 mg Intravenous Once Chel Edwards PA-C     • furosemide  60 mg Intravenous BID (diuretic) Yandy Coles MD     • heparin (porcine)  5,000 Units Subcutaneous Q8H [address]      • insulin glargine  18 Units Subcutaneous HS Alina Parker MD • insulin lispro  2-12 Units Subcutaneous 4x Daily (AC & HS) Maron November, CRNP     • lidocaine  1 patch Topical Daily Favianasia Diaz     • metoprolol succinate  12 5 mg Oral Q12H Jethro Douglass MD     • nicotine  1 patch Transdermal Daily Myacol Jones, DO     • nitroglycerin  0 4 mg Sublingual Q5 Min PRN Maycol Jones, DO     • ondansetron  4 mg Intravenous Q6H PRN Maycol Jones, DO     • polyethylene glycol  17 g Oral Daily Camelia Suggs, DO     • sacubitril-valsartan  1 tablet Oral BID Pari Yi MD     • senna-docusate sodium  2 tablet Oral HS Camelia Suggs DO         PRN Meds: •  acetaminophen  •  nitroglycerin  •  ondansetron    Continuous Infusions:         LAB RESULTS:      Results from last 7 days   Lab Units 05/11/23  0501 05/10/23  0444 05/09/23  0511 05/08/23  0425 05/07/23  0451 05/06/23  0547 05/05/23  0447   WBC Thousand/uL  --   --  11 68* 12 22* 11 63* 11 31* 12 21*   HEMOGLOBIN g/dL  --   --  14 3 14 6 14 4 15 6 15 8   HEMATOCRIT %  --   --  43 0 43 8 42 9 49 0 49 9*   PLATELETS Thousands/uL  --   --  195 193 185 188 181   NEUTROS PCT %  --   --  81* 83*  --   --   --    LYMPHS PCT %  --   --  8* 7*  --   --   --    MONOS PCT %  --   --  10 9  --   --   --    EOS PCT %  --   --  0 0  --   --   --    POTASSIUM mmol/L 3 7 3 3* 3 5 4 0 4 2 5 1 4 6   CHLORIDE mmol/L 95* 97 97 97 95* 99 99   CO2 mmol/L 35* 33* 29 26 22 15* 20*   BUN mg/dL 24 29* 47* 68* 79* 69* 56*   CREATININE mg/dL 0 83 0 91 1 30 1 89* 2 34* 2 33* 1 77*   CALCIUM mg/dL 8 4 8 1* 8 6 8 4 8 4 7 7* 8 3   MAGNESIUM mg/dL  --  1 9  --   --  2 4 2 6 2 4   PHOSPHORUS mg/dL  --  2 5  --   --   --   --   --        CUTURES:  No results found for: Jada Hopper              PLEASE NOTE:  This encounter was completed utilizing the [employer] Direct Speech Voice Recognition Software   Grammatical errors, random word insertions, pronoun errors and incomplete sentences are occasional consequences of the system due to software limitations, ambient noise and hardware issues  These may be missed by proof reading prior to affixing electronic signature  Any questions or concerns about the content, text or information contained within the body of this dictation should be directly addressed to the physician for clarification  Please do not hesitate to call me directly if you have any any questions or concerns

## 2023-05-11 NOTE — CASE MANAGEMENT
Case Management Discharge Planning Note    Patient name Jayla Montalvo  Location [facility] 411/[facility] 427-85 MRN [id number]  : 1960 Date 2023       Current Admission Date: 2023  Current Admission Diagnosis:Acute HFrEF   Patient Active Problem List    Diagnosis Date Noted   • Hyponatremia 2023   • Acidosis 2023   • V-tach ([facility] ) 2023   • Ischemic cardiomyopathy 2023   • Essential (primary) hypertension 2023   • Acute HFrEF 2023   • A-fib ([facility] ) 2023   • Benign prostatic hyperplasia without lower urinary tract symptoms 2018   • 3-vessel CAD 2015   • Type 2 diabetes mellitus, with long-term current use of insulin ([facility] ) 2015   • GERD (gastroesophageal reflux disease) 2013   • Tobacco dependence syndrome 2012      LOS (days): 9  Geometric Mean LOS (GMLOS) (days): 4 10  Days to GMLOS:-5     OBJECTIVE:  Risk of Unplanned Readmission Score: 13 18         Current admission status: Inpatient   Preferred Pharmacy:   [address] [place] [address]  Phone: [phone] Fax: [phone]    Primary Care Provider: Alberto St DO    Primary Insurance: [facility]  Secondary Insurance:     DISCHARGE DETAILS:                                     DME Referral Provided  Referral made for DME?:  (has cane and RW)                                                           Additional Comments: Afib, CHF, ischemic CM, afib, Vtach, Plan ICD on   mv CAD no PCI or surgery  Lasix 80mg IV bid, has PICC  [facility] nsg and PT  Has cane and RW

## 2023-05-11 NOTE — PLAN OF CARE
Problem: PHYSICAL THERAPY ADULT  Goal: Performs mobility at highest level of function for planned discharge setting  See evaluation for individualized goals  Description: Treatment/Interventions: Functional transfer training, LE strengthening/ROM, Elevations, Therapeutic exercise, Endurance training, Bed mobility, Gait training, Spoke to nursing, Spoke to case management, OT          See flowsheet documentation for full assessment, interventions and recommendations  Outcome: Progressing  Note: Prognosis: Good  Problem List: Decreased strength, Decreased endurance, Impaired balance, Decreased mobility  Assessment: Pt seen for session for setup, transfers, gait w/ rest time, reposiitoning  Pt cooperative w/ session, willing to participate  noted that his HR remained in 70s-80s throughout   mild fatigue, mild LOB w/ gait  continue to anticipate d/c home w/ home PT when stable  Barriers to Discharge: Inaccessible home environment     PT Discharge Recommendation: Home with home health rehabilitation    See flowsheet documentation for full assessment

## 2023-05-11 NOTE — PROGRESS NOTES
Cardiology Progress Note   Sydney Sinha 59 y o  male MRN: [id number]  Unit/Bed#: [facility] 411-01 Encounter: [id number]    Hospital Course/Assessment  The patient is a 21-year-old male with a past medical history of CAD status post PCI in 2015 who presented to the emergency department on 5/2 complaining of 2 weeks of worsening shortness of breath, lower extremity edema and 20 pound weight gain   In the ED he was found to be in rapid atrial fibrillation and heart failure   He received a dose of IV metoprolol and converted back to sinus rhythm   Echocardiogram was obtained which showed severely reduced LVEF to 10 to 15% and cardiac catheterization showed chronic occlusion of mid LAD and L PDA   The patient was admitted for heart failure exacerbation      Subjective:   Today the patient was seen and examined at bedside  [facility] had no acute events overnight   He reports that his lower extremity swelling and respiratory status are improving   Otherwise he had no other acute complaints      Plan  1  Acute HFrEF, Stage D  -Secondary to ICM with large apical aneurysm  -Echocardiogram on 5/2 showed an EF of 10 to 15% with an apical aneurysm  -LHC on 5/3 showed a chronically occluded mid LAD and LPDA, not amenable to PCI  -Patient was started on milrinone 0 25 mcg/kg/min on 5/6 due to rising creatinine and worsening volume status -> improving -> decreased to 0 13 mcg/kg/min on 5/9 -> milrinone dc'd 5/10  -Continue to monitor VBG  -Patient was originally on Lasix 60 mg IV twice daily but was later switched to a Lasix drip on 5/7 -> improving -> decreased from 20 mg/h to 10 mg/hr on 5/9 -> Lasix gtt dc'd 5/10 and switched to Lasix IV 80 mg BID -> Will decrease to 60 mg IV BID 5/11  -Metoprolol tartrate was decreased from 25 mg twice daily to 12 5 mg twice daily on 5/6 -> switched to metoprolol succinate 12 5 mg BID on 5/11  -Entresto 24-26 mg daily started 5/9 -> Entresto increased to 49-52 mg BID on 5/10 -> continue              -Price check sent: $47  -Electrophysiology is following -> plan for ICD placement prior to discharge after improvement of heart failure symptoms -> EP aware, plan for ICD placement Friday 5/12 -> NPO at [place]  -Monitor daily BMP  -Keep K above 4 and mag above 2  -Monitor I's and O's and daily weights     2   Atrial fibrillation  -Continue p o  amiodarone   -Continue metoprolol succinate 12 5 mg twice daily  -Continue telemetry monitoring  -EP following     3  Scar mediated VT  -Continue p o  amiodarone  -Continue metoprolol succinate 12 5 mg twice daily  -Continue telemetry monitoring -> frequent PVCs  -EP following  -Plan for ICD placement 5/12        Funmilayo Sexton MD  - PGY-3 IM Resident  - Tiger text enabled    ======================================================  Objective  VS: Blood pressure 119/65, pulse 80, temperature 98 2 °F (36 8 °C), temperature source Oral, resp  rate 17, height 6' (1 829 m), weight 110 kg (241 lb 6 5 oz), SpO2 97 %  Gen: well appearing  Psych: AOx3  Skin: intact  Cardiac: S1, S2, regular rate, no S3 or S4 appreciated  No murmurs  +2 PT, radial pulses  Bilateral lower extremity edema  No carotid bruits  Resp: CTABL  No crackles  MSK: 5/5 strength throughout muscle groups  Neuro: CN grossly intact  Sensory to light touch, pain, proprioception intact BL LE, UE  LN: no cervical LAD  Rheum: no joint deformities in UE or LE  ======================================================  TREADMILL STRESS  No results found for this or any previous visit      ----------------------------------------------------------------------------------------------  NUCLEAR STRESS TEST: No results found for this or any previous visit      No results found for this or any previous visit       --------------------------------------------------------------------------------  CATH:  No results found for this or any previous visit     --------------------------------------------------------------------------------  ECHO:   Results for orders placed during the hospital encounter of 17    Echo complete with contrast if indicated    [address]  [facility], [address] [facility]  [phone]    Transthoracic Echocardiogram  2D, M-mode, Doppler, and Color Doppler    Study date:  26-Sep-2017    Patient: Clifford Ramirez  MR number: [id number]  Account number: [de-identified]  : 1960  Age: 65 years  Gender: Male  Status: Outpatient  Location: [address] [place] [address]  Height: 72 in  Weight: 263 3 lb  BP: 142/ 88 mmHg    Indications: CAD    Diagnoses: I25 10 - Atherosclerotic heart disease of native coronary artery without angina pectoris    Sonographer:  OLIVE Silver  Primary Physician:  Susana Ordaz MD  Referring Physician:  Papito Bob MD  Group:  [facility]  Interpreting Physician:  Mari Forrester MD    SUMMARY    LEFT VENTRICLE:  Size was normal   Systolic function was normal  Ejection fraction was estimated to be 65 %  There was mild concentric hypertrophy  Doppler parameters were consistent with abnormal left ventricular relaxation (grade 1 diastolic dysfunction)  RIGHT VENTRICLE:  The size was normal   Systolic function was normal     TRICUSPID VALVE:  There was trace regurgitation  HISTORY: PRIOR HISTORY: Hypertension, CAD s/p PCI, smoker, diabetes, high cholesterol    PROCEDURE: The study was performed in the [address]  This was a routine study  The transthoracic approach was used  The study included complete 2D imaging, M-mode, complete spectral Doppler, and color Doppler  The  heart rate was 112 bpm, at the start of the study  Images were obtained from the parasternal, apical, subcostal, and suprasternal notch acoustic windows   Echocardiographic views were limited due to decreased penetration  This was a  technically difficult study  LEFT VENTRICLE: Size was normal  Systolic function was normal  Ejection fraction was estimated to be 65 %  There were no regional wall motion abnormalities  Wall thickness was mildly increased  There was mild concentric hypertrophy  DOPPLER: Doppler parameters were consistent with abnormal left ventricular relaxation (grade 1 diastolic dysfunction)  RIGHT VENTRICLE: The size was normal  Systolic function was normal  Wall thickness was normal     LEFT ATRIUM: Size was normal     RIGHT ATRIUM: Size was normal     MITRAL VALVE: Valve structure was normal  There was normal leaflet separation  DOPPLER: The transmitral velocity was within the normal range  There was no evidence for stenosis  There was no regurgitation  AORTIC VALVE: The valve was trileaflet  Leaflets exhibited normal thickness and normal cuspal separation  DOPPLER: Transaortic velocity was within the normal range  There was no evidence for stenosis  There was no regurgitation  TRICUSPID VALVE: The valve structure was normal  There was normal leaflet separation  DOPPLER: The transtricuspid velocity was within the normal range  There was no evidence for stenosis  There was trace regurgitation  The tricuspid jet  envelope definition was inadequate for estimation of RV systolic pressure  There are no indirect findings suggestive of moderate or severe pulmonary hypertension  PULMONIC VALVE: Leaflets exhibited normal thickness, no calcification, and normal cuspal separation  DOPPLER: The transpulmonic velocity was within the normal range  There was no regurgitation  PERICARDIUM: There was no pericardial effusion  The pericardium was normal in appearance  AORTA: The root exhibited normal size  SYSTEMIC VEINS: IVC: The inferior vena cava was normal in size and course   Respirophasic changes were normal     SYSTEM MEASUREMENT TABLES    2D  %FS: 47 6 %  AV Diam: 3 34 cm  EDV(Teich): 101 71 ml  EF Biplane: 67 76 %  EF(Cube): 85 61 %  EF(Teich): 79 %  ESV(Cube): 14 82 ml  ESV(Teich): 21 36 ml  IVSd: 1 58 cm  LA Area: 12 78 cm2  LA Diam: 3 37 cm  LVEDV MOD A2C: 49 73 ml  LVEDV MOD A4C: 61 78 ml  LVEDV MOD BP: 58 1 ml  LVEF MOD A2C: 65 64 %  LVEF MOD A4C: 71 2 %  LVESV MOD A2C: 17 09 ml  LVESV MOD A4C: 17 79 ml  LVESV MOD BP: 18 73 ml  LVIDd: 4 69 cm  LVIDs: 2 46 cm  LVLd A2C: 6 97 cm  LVLd A4C: 7 74 cm  LVLs A2C: 5 23 cm  LVLs A4C: 6 13 cm  LVPWd: 1 23 cm  RA Area: 14 52 cm2  RV Diam : 2 86 cm  SI(Cube): 36 73 ml/m2  SI(Teich): 33 48 ml/m2  SV MOD A2C: 32 64 ml  SV MOD A4C: 43 98 ml  SV(Cube): 88 15 ml  SV(Teich): 80 35 ml    MM  TAPSE: 3 26 cm    PW  E': 0 08 m/s    [facility] Accredited [facility]chocardiography [facility]    Prepared and electronically signed by    Wyatt Natarajan MD  Signed 26-Sep-2017 12:55:39    No results found for this or any previous visit     --------------------------------------------------------------------------------  HOLTER  No results found for this or any previous visit     --------------------------------------------------------------------------------  CAROTIDS  No results found for this or any previous visit         [unfilled]   =====================================================    Active Meds    Current Facility-Administered Medications:   •  acetaminophen (TYLENOL) tablet 650 mg, 650 mg, Oral, Q6H PRN, CARLA Howell  •  amiodarone tablet 200 mg, 200 mg, Oral, Daily With Breakfast, Janet Wagoner MD, 200 mg at 05/11/23 6816  •  aspirin chewable tablet 81 mg, 81 mg, Oral, Daily, Maycol Jones DO, 81 mg at 05/11/23 7086  •  atorvastatin (LIPITOR) tablet 80 mg, 80 mg, Oral, After Tammy Jackson DO, 80 mg at 05/10/23 1810  •  [START ON 5/12/2023] ceFAZolin (ANCEF) IVPB (premix in dextrose) 2,000 mg 50 mL, 2,000 mg, Intravenous, Once, Chel Edwards PA-C  •  furosemide (LASIX) injection 60 mg, 60 mg, Intravenous, BID (diuretic), Myrtice Boast Kerri Aguayo MD  •  heparin (porcine) subcutaneous injection 5,000 Units, 5,000 Units, Subcutaneous, Q8H [address], Particdarrian Quiroga, 5,000 Units at 05/11/23 0608  •  insulin glargine (LANTUS) subcutaneous injection 18 Units 0 18 mL, 18 Units, Subcutaneous, HS, Alexa Morales MD, 18 Units at 05/10/23 2214  •  insulin lispro (HumaLOG) 100 units/mL subcutaneous injection 2-12 Units, 2-12 Units, Subcutaneous, 4x Daily (AC & HS), 4 Units at 05/11/23 1236 **AND** Fingerstick Glucose (POCT), , , 4x Daily AC and at bedtime, CARLA Razo  •  lidocaine (LIDODERM) 5 % patch 1 patch, 1 patch, Topical, Daily, Particdarrian Quiroga, 1 patch at 05/11/23 5928  •  metoprolol succinate (TOPROL-XL) 24 hr tablet 12 5 mg, 12 5 mg, Oral, Q12H, Harjinder Avalos MD  •  nicotine (NICODERM CQ) 14 mg/24hr TD 24 hr patch 1 patch, 1 patch, Transdermal, Daily, Maycol Simentalai, DO  •  nitroglycerin (NITROSTAT) SL tablet 0 4 mg, 0 4 mg, Sublingual, Q5 Min PRN, Maycol Jones, DO  •  ondansetron (ZOFRAN) injection 4 mg, 4 mg, Intravenous, Q6H PRN, Maycol Jones, DO, 4 mg at 05/06/23 0954  •  polyethylene glycol (MIRALAX) packet 17 g, 17 g, Oral, Daily, Tenna Prader Minnix, DO, 17 g at 05/09/23 0857  •  sacubitril-valsartan (ENTRESTO) 49-51 MG per tablet 1 tablet, 1 tablet, Oral, BID, Opal Johnson MD, 1 tablet at 05/11/23 0838  •  senna-docusate sodium (SENOKOT S) 8 6-50 mg per tablet 2 tablet, 2 tablet, Oral, HS, Tenna Prader Minnix, DO, 2 tablet at 05/10/23 2213    Labs & Results  Lab Results   Component Value Date    TROPONINI 2 79 (H) 02/16/2015    TROPONINI 1 31 (H) 02/16/2015     Lab Results   Component Value Date    GLUCOSE 203 (H) 02/17/2015    CALCIUM 8 4 05/11/2023     02/17/2015    K 3 7 05/11/2023    CO2 35 (H) 05/11/2023    CL 95 (L) 05/11/2023    BUN 24 05/11/2023    CREATININE 0 83 05/11/2023     Lab Results   Component Value Date    WBC 11 68 (H) 05/09/2023    HGB 14 3 05/09/2023    HCT 43 0 05/09/2023    MCV 83 05/09/2023     05/09/2023         Lab Results   Component Value Date    CHOL 130 05/07/2015    CHOL 188 02/17/2015     Lab Results   Component Value Date    HDL 34 05/07/2015    HDL 43 02/17/2015     Lab Results   Component Value Date    LDLCALC 72 05/07/2015    LDLCALC 119 (H) 02/17/2015     Lab Results   Component Value Date    TRIG 122 05/07/2015    TRIG 128 02/17/2015     Lab Results   Component Value Date    ALT 45 05/03/2023    AST 34 05/03/2023

## 2023-05-11 NOTE — WOUND OSTOMY CARE
Progress Note - Wound   Collie Deondre 59 y o  male MRN: [id number]  Unit/Bed#: [facility] 411-01 Encounter: [id number]        Assessment:   Patient is seen for wound care follow-up  Patient sitting at edge of bed  Patient reports continence of bowel and bladder  Min assist for turning and repositioning  Findings:  B/L heels are dry intact and artemio with no skin loss or wounds present  Recommend preventative Hydraguard Cream and proper offloading/ repositioning        Patient refused assessment of sacro-buttocks  Patient reports no wounds or pain in area  1  Left Elbow Wound: measures smaller in size  Circular in shape with partial thickness skin loss  Wound bed is mix of 80% yellow slough tissue and 20% pink tissue  Trini-wound is fragile and pink  Scant serosanguineous drainage noted  Will recommend continuing with silvasorb gel and allevyn foam dressing to area  No induration, fluctuance, odor, warmth/temperature differences, redness, or purulence noted to the above noted wounds and skin areas assessed  New dressings applied per orders listed below  Patient tolerated well- no s/s of non-verbal pain or discomfort observed during the encounter  Bedside nurse aware of plan of care  See flow sheets for more detailed assessment findings  Orders listed below and wound care will continue to follow, call or tiger text with questions  Skin Care Plan:  1-Left Elbow Wound: Cleanse area with NS and pat dry  Apply silvasorb gel to wound bed and cover with small clover allevyn foam dressing  Jones with T for Treatment and change every other day or PRN   2-Turn/reposition q2h or when medically stable for pressure re-distribution on skin   3-Elevate heels to offload pressure  4-Moisturize skin daily with skin nourishing cream  5-Ehob cushion in chair when out of bed  6-Preventative Hydraguard to bilateral heels BID and PRN         WOUNDS:  Wound 05/04/23 Elbow Left;Posterior (Active)   Wound Image   05/11/23 1121   Wound Description Pink;Yellow;Slough 05/11/23 1121   Trini-wound Assessment Fragile;Pink 05/11/23 1121   Wound Length (cm) 1 4 cm 05/11/23 1121   Wound Width (cm) 1 2 cm 05/11/23 1121   Wound Depth (cm) 0 1 cm 05/11/23 1121   Wound Surface Area (cm^2) 1 68 cm^2 05/11/23 1121   Wound Volume (cm^3) 0 168 cm^3 05/11/23 1121   Calculated Wound Volume (cm^3) 0 17 cm^3 05/11/23 1121   Change in Wound Size % 32 05/11/23 1121   Drainage Amount Scant 05/11/23 1121   Drainage Description Serosanguineous 05/11/23 1121   Non-staged Wound Description Partial thickness 05/11/23 1121   Treatments Cleansed;Irrigation with NSS;Site care 05/11/23 1121   Dressing Silvasorb gel; Foam, Silicon (eg  Allevyn, etc) 05/11/23 1121   Wound packed?  No 05/11/23 1121   Dressing Changed Changed 05/11/23 1121   Patient Tolerance Tolerated well 05/11/23 1121   Dressing Status Intact;Dry;Clean 05/11/23 [facility], BSN

## 2023-05-12 ENCOUNTER — APPOINTMENT (INPATIENT)
Dept: RADIOLOGY | Facility: HOSPITAL | Age: 63
End: 2023-05-12

## 2023-05-12 ENCOUNTER — ANESTHESIA EVENT (INPATIENT)
Dept: NON INVASIVE DIAGNOSTICS | Facility: HOSPITAL | Age: 63
End: 2023-05-12

## 2023-05-12 ENCOUNTER — ANESTHESIA (INPATIENT)
Dept: NON INVASIVE DIAGNOSTICS | Facility: HOSPITAL | Age: 63
End: 2023-05-12

## 2023-05-12 LAB
ANION GAP SERPL CALCULATED.3IONS-SCNC: 1 MMOL/L (ref 4–13)
ATRIAL RATE: 75 BPM
BASE EX.OXY STD BLDV CALC-SCNC: 55.3 % (ref 60–80)
BASE EXCESS BLDV CALC-SCNC: 10.5 MMOL/L
BUN SERPL-MCNC: 19 MG/DL (ref 5–25)
CALCIUM SERPL-MCNC: 9 MG/DL (ref 8.3–10.1)
CHLORIDE SERPL-SCNC: 94 MMOL/L (ref 96–108)
CO2 SERPL-SCNC: 35 MMOL/L (ref 21–32)
CREAT SERPL-MCNC: 0.83 MG/DL (ref 0.6–1.3)
ERYTHROCYTE [DISTWIDTH] IN BLOOD BY AUTOMATED COUNT: 14 % (ref 11.6–15.1)
GFR SERPL CREATININE-BSD FRML MDRD: 94 ML/MIN/1.73SQ M
GLUCOSE SERPL-MCNC: 146 MG/DL (ref 65–140)
GLUCOSE SERPL-MCNC: 157 MG/DL (ref 65–140)
GLUCOSE SERPL-MCNC: 161 MG/DL (ref 65–140)
GLUCOSE SERPL-MCNC: 231 MG/DL (ref 65–140)
GLUCOSE SERPL-MCNC: 238 MG/DL (ref 65–140)
HCO3 BLDV-SCNC: 37.6 MMOL/L (ref 24–30)
HCT VFR BLD AUTO: 47.9 % (ref 36.5–49.3)
HGB BLD-MCNC: 15.6 G/DL (ref 12–17)
INR PPP: 1.03 (ref 0.84–1.19)
MCH RBC QN AUTO: 27.2 PG (ref 26.8–34.3)
MCHC RBC AUTO-ENTMCNC: 32.6 G/DL (ref 31.4–37.4)
MCV RBC AUTO: 84 FL (ref 82–98)
O2 CT BLDV-SCNC: 12.5 ML/DL
P AXIS: 62 DEGREES
PCO2 BLDV: 58.5 MM HG (ref 42–50)
PH BLDV: 7.43 [PH] (ref 7.3–7.4)
PLATELET # BLD AUTO: 190 THOUSANDS/UL (ref 149–390)
PMV BLD AUTO: 10.7 FL (ref 8.9–12.7)
PO2 BLDV: 29 MM HG (ref 35–45)
POTASSIUM SERPL-SCNC: 3.8 MMOL/L (ref 3.5–5.3)
PR INTERVAL: 154 MS
PROTHROMBIN TIME: 13.8 SECONDS (ref 11.6–14.5)
QRS AXIS: -8 DEGREES
QRSD INTERVAL: 118 MS
QT INTERVAL: 456 MS
QTC INTERVAL: 509 MS
RBC # BLD AUTO: 5.73 MILLION/UL (ref 3.88–5.62)
SODIUM SERPL-SCNC: 130 MMOL/L (ref 135–147)
T WAVE AXIS: 51 DEGREES
VENTRICULAR RATE: 75 BPM
WBC # BLD AUTO: 7.92 THOUSAND/UL (ref 4.31–10.16)

## 2023-05-12 PROCEDURE — 02HK3KZ INSERTION OF DEFIBRILLATOR LEAD INTO RIGHT VENTRICLE, PERCUTANEOUS APPROACH: ICD-10-PCS | Performed by: INTERNAL MEDICINE

## 2023-05-12 PROCEDURE — 02HV33Z INSERTION OF INFUSION DEVICE INTO SUPERIOR VENA CAVA, PERCUTANEOUS APPROACH: ICD-10-PCS | Performed by: ANESTHESIOLOGY

## 2023-05-12 PROCEDURE — 02H63KZ INSERTION OF DEFIBRILLATOR LEAD INTO RIGHT ATRIUM, PERCUTANEOUS APPROACH: ICD-10-PCS | Performed by: INTERNAL MEDICINE

## 2023-05-12 PROCEDURE — 3E0102A INTRODUCTION OF ANTI-INFECTIVE ENVELOPE INTO SUBCUTANEOUS TISSUE, OPEN APPROACH: ICD-10-PCS | Performed by: INTERNAL MEDICINE

## 2023-05-12 PROCEDURE — 0JH608Z INSERTION OF DEFIBRILLATOR GENERATOR INTO CHEST SUBCUTANEOUS TISSUE AND FASCIA, OPEN APPROACH: ICD-10-PCS | Performed by: INTERNAL MEDICINE

## 2023-05-12 DEVICE — LEAD 6935M62 QUATTRO SECURE S MRI US
Type: IMPLANTABLE DEVICE | Site: HEART | Status: FUNCTIONAL
Brand: SPRINT QUATTRO SECURE S MRI™ SURESCAN™

## 2023-05-12 DEVICE — ENVELOPE CMRM6133 ABSORB LRG MR
Type: IMPLANTABLE DEVICE | Site: CHEST  WALL | Status: FUNCTIONAL
Brand: TYRX™

## 2023-05-12 DEVICE — LEAD 5076-52 MRI US RCMCRD
Type: IMPLANTABLE DEVICE | Site: HEART | Status: FUNCTIONAL
Brand: CAPSUREFIX NOVUS MRI™ SURESCAN®

## 2023-05-12 DEVICE — ICD DDPA2D4 COBALT XT DR MRI DF4 USA
Type: IMPLANTABLE DEVICE | Site: CHEST  WALL | Status: FUNCTIONAL
Brand: COBALT™ XT DR MRI SURESCAN™

## 2023-05-12 RX ORDER — SPIRONOLACTONE 25 MG/1
25 TABLET ORAL DAILY
Status: DISCONTINUED | OUTPATIENT
Start: 2023-05-12 | End: 2023-05-16 | Stop reason: HOSPADM

## 2023-05-12 RX ORDER — LIDOCAINE HYDROCHLORIDE 10 MG/ML
INJECTION, SOLUTION EPIDURAL; INFILTRATION; INTRACAUDAL; PERINEURAL CODE/TRAUMA/SEDATION MEDICATION
Status: DISCONTINUED | OUTPATIENT
Start: 2023-05-12 | End: 2023-05-12 | Stop reason: HOSPADM

## 2023-05-12 RX ORDER — MIDAZOLAM HYDROCHLORIDE 2 MG/2ML
INJECTION, SOLUTION INTRAMUSCULAR; INTRAVENOUS AS NEEDED
Status: DISCONTINUED | OUTPATIENT
Start: 2023-05-12 | End: 2023-05-12

## 2023-05-12 RX ORDER — ACETAMINOPHEN 325 MG/1
650 TABLET ORAL EVERY 4 HOURS PRN
Status: DISCONTINUED | OUTPATIENT
Start: 2023-05-12 | End: 2023-05-16 | Stop reason: HOSPADM

## 2023-05-12 RX ORDER — FUROSEMIDE 10 MG/ML
60 INJECTION INTRAMUSCULAR; INTRAVENOUS
Status: DISCONTINUED | OUTPATIENT
Start: 2023-05-12 | End: 2023-05-14

## 2023-05-12 RX ORDER — GENTAMICIN SULFATE 40 MG/ML
INJECTION, SOLUTION INTRAMUSCULAR; INTRAVENOUS CODE/TRAUMA/SEDATION MEDICATION
Status: DISCONTINUED | OUTPATIENT
Start: 2023-05-12 | End: 2023-05-12 | Stop reason: HOSPADM

## 2023-05-12 RX ORDER — SODIUM CHLORIDE 9 MG/ML
INJECTION, SOLUTION INTRAVENOUS CONTINUOUS PRN
Status: DISCONTINUED | OUTPATIENT
Start: 2023-05-12 | End: 2023-05-12

## 2023-05-12 RX ORDER — FENTANYL CITRATE 50 UG/ML
INJECTION, SOLUTION INTRAMUSCULAR; INTRAVENOUS AS NEEDED
Status: DISCONTINUED | OUTPATIENT
Start: 2023-05-12 | End: 2023-05-12

## 2023-05-12 RX ADMIN — HEPARIN SODIUM 5000 UNITS: 5000 INJECTION INTRAVENOUS; SUBCUTANEOUS at 21:14

## 2023-05-12 RX ADMIN — INSULIN LISPRO 4 UNITS: 100 INJECTION, SOLUTION INTRAVENOUS; SUBCUTANEOUS at 17:20

## 2023-05-12 RX ADMIN — HEPARIN SODIUM 5000 UNITS: 5000 INJECTION INTRAVENOUS; SUBCUTANEOUS at 13:30

## 2023-05-12 RX ADMIN — INSULIN LISPRO 2 UNITS: 100 INJECTION, SOLUTION INTRAVENOUS; SUBCUTANEOUS at 13:31

## 2023-05-12 RX ADMIN — METOPROLOL SUCCINATE 12.5 MG: 25 TABLET, EXTENDED RELEASE ORAL at 08:33

## 2023-05-12 RX ADMIN — SPIRONOLACTONE 25 MG: 25 TABLET ORAL at 09:41

## 2023-05-12 RX ADMIN — SACUBITRIL AND VALSARTAN 1 TABLET: 49; 51 TABLET, FILM COATED ORAL at 08:39

## 2023-05-12 RX ADMIN — HEPARIN SODIUM 5000 UNITS: 5000 INJECTION INTRAVENOUS; SUBCUTANEOUS at 06:16

## 2023-05-12 RX ADMIN — FENTANYL CITRATE 25 MCG: 50 INJECTION INTRAMUSCULAR; INTRAVENOUS at 11:03

## 2023-05-12 RX ADMIN — FENTANYL CITRATE 25 MCG: 50 INJECTION INTRAMUSCULAR; INTRAVENOUS at 10:41

## 2023-05-12 RX ADMIN — SACUBITRIL AND VALSARTAN 1 TABLET: 49; 51 TABLET, FILM COATED ORAL at 17:20

## 2023-05-12 RX ADMIN — FENTANYL CITRATE 25 MCG: 50 INJECTION INTRAMUSCULAR; INTRAVENOUS at 11:04

## 2023-05-12 RX ADMIN — METOPROLOL SUCCINATE 12.5 MG: 25 TABLET, EXTENDED RELEASE ORAL at 21:15

## 2023-05-12 RX ADMIN — INSULIN GLARGINE 18 UNITS: 100 INJECTION, SOLUTION SUBCUTANEOUS at 21:15

## 2023-05-12 RX ADMIN — CEFAZOLIN SODIUM 2000 MG: 2 SOLUTION INTRAVENOUS at 10:37

## 2023-05-12 RX ADMIN — ATORVASTATIN CALCIUM 80 MG: 80 TABLET, FILM COATED ORAL at 17:27

## 2023-05-12 RX ADMIN — MIDAZOLAM 1 MG: 1 INJECTION INTRAMUSCULAR; INTRAVENOUS at 11:03

## 2023-05-12 RX ADMIN — SODIUM CHLORIDE: 9 INJECTION, SOLUTION INTRAVENOUS at 10:29

## 2023-05-12 RX ADMIN — INSULIN LISPRO 4 UNITS: 100 INJECTION, SOLUTION INTRAVENOUS; SUBCUTANEOUS at 21:14

## 2023-05-12 RX ADMIN — FUROSEMIDE 60 MG: 10 INJECTION, SOLUTION INTRAMUSCULAR; INTRAVENOUS at 17:18

## 2023-05-12 RX ADMIN — ASPIRIN 81 MG 81 MG: 81 TABLET ORAL at 08:33

## 2023-05-12 RX ADMIN — AMIODARONE HYDROCHLORIDE 200 MG: 200 TABLET ORAL at 08:33

## 2023-05-12 RX ADMIN — MIDAZOLAM 1 MG: 1 INJECTION INTRAMUSCULAR; INTRAVENOUS at 10:41

## 2023-05-12 RX ADMIN — LIDOCAINE 5% 1 PATCH: 700 PATCH TOPICAL at 08:34

## 2023-05-12 RX ADMIN — FUROSEMIDE 60 MG: 10 INJECTION, SOLUTION INTRAMUSCULAR; INTRAVENOUS at 09:41

## 2023-05-12 NOTE — PROGRESS NOTES
INTERNAL MEDICINE RESIDENCY PROGRESS NOTE     Name: Enid Living   Age & Sex: 59 y o  male   MRN: [id number]  Unit/Bed#: BE CATH LAB ROOM   Encounter: [id number]  Team: SLIM    PATIENT INFORMATION     Name: Enid Living   Age & Sex: 59 y o  male   MRN: [id number]  Hospital Stay Days: 10    ASSESSMENT/PLAN     Principal Problem:    Acute HFrEF  Active Problems:    Ischemic cardiomyopathy    3-vessel CAD    Type 2 diabetes mellitus, with long-term current use of insulin ([facility])    V-tach ([facility])    A-fib ([facility] [address] )    Essential (primary) hypertension    Tobacco dependence syndrome    Hyponatremia    Acidosis      * Acute HFrEF  Assessment & Plan  Wt Readings from Last 3 Encounters:   05/08/23 118 kg (259 lb 4 2 oz)   05/02/23 116 kg (254 lb 12 8 oz)   04/01/22 102 kg (224 lb 12 8 oz)   · Patient presenting with 2 weeks of dyspnea on exertion, lower extremity edema, abd distention, weight gain  · Increasing renal function parameters, reached out to nephrology and cardio   · Off milrinone and Lasix drip since 5/10    · Per cardiology, continue IV Lasix 60 mg twice daily   · Start spironolactone 25 mg   · ICD placement today, 5/12  · Continue Entresto 49-51 mg BID, Lopressor 12 5 mg BID  · Continue I/O, daily weight  · Appreciate heart failure team      3-vessel CAD  Assessment & Plan  · Patient with history of CAD with drug-eluting stent to LCx in 2015  · Patient reports dyspnea on exertion over the past 2 weeks, denies any chest pain at rest, is a diabetic on insulin, is an active smoker of 40 pack years, has prior history of CAD, is on daily aspirin  · LVEF 10-15%, continue medical management  Appreciate Cardiology comanagement and expertise  · Cath 5/3 with multivessel disease not amenable to PCI, continue medical management   Appreciate cardio recommendations    V-tach [facility])  Assessment & Plan  · EP following and started amiodarone, given ischemic cardiomyopathy and VT plan for ICD  · S/p amio load    · Continue amiodarone 200 mg  · ICD placement on 5/12    Type 2 diabetes mellitus, with long-term current use of insulin [facility])  Assessment & Plan  Lab Results   Component Value Date    HGBA1C 12 0 (H) 04/14/2023       Recent Labs     05/07/23  1123 05/07/23  1557 05/07/23  2130 05/08/23  0557   POCGLU 188* 286* 309* 172*       Blood Sugar Average: Last 72 hrs:  (P) 855 [id number]     · Will hold oral medications   · Continue Lantus 18 U   · Continue SSI algorithm 4    A-fib (HCC)  Assessment & Plan  · At cardio appointment outpt when he appeared to be in A-fib RVR on EKG, also with Q waves concerning for prior infarct, in the setting of chest pressure last week concerning for MI  · Now in sinus  · Noted reduced LVEF of 10 to 15%  · Milrinone discontinued on 5/10  · Continue amiodarone 200 mg     Tobacco dependence syndrome  Assessment & Plan  Patient with over 40  pack-year history  Counseling provided on cessation, nicotine patch ordered    Essential (primary) hypertension  Assessment & Plan  Continue Lopressor 12 5 mg twice daily, Entresto 49-51 mg twice daily  Start losartan 25 mg    Other constipation-resolved as of 5/11/2023  Assessment & Plan  Complains of constipation for 2 days  · Continue Senokot-S to 2 tablets, MiraLAX    LINDEN (acute kidney injury) (HCC)-resolved as of 5/10/2023  Assessment & Plan  · Progressive increase in BUN and creatinine, possibly somewhat cardiorenal from poor forward perfusion  · Discussed with heart failure team, and milrinone infusion started  · We will also reach out to nephrology, appreciate the comanagement of all specializing services  · Improving      Disposition: Continue inpatient management    SUBJECTIVE     Patient seen and examined  No acute events overnight  Feels nervous for ICD placement later today  No acute complaints      OBJECTIVE     Vitals:    05/12/23 0600 05/12/23 0651 05/12/23 0707 05/12/23 1152   BP:   119/69 102/65   BP Location:   Left arm    Pulse:   89 77 Resp:   17 14   Temp:   98 4 °F (36 9 °C)    TempSrc:   Oral    SpO2:   91% 95%   Weight: 107 kg (236 lb 15 9 oz) 107 kg (236 lb 15 9 oz)     Height:          Temperature:   Temp (24hrs), Av 4 °F (36 9 °C), Min:97 5 °F (36 4 °C), Max:99 7 °F (37 6 °C)    Temperature: 98 4 °F (36 9 °C)  Intake & Output:  I/O       05/10 07 07 0701   07 07 0700    P  O  520 720 0    I V  (mL/kg)   30 (0 3)    IV Piggyback   50    Total Intake(mL/kg) 520 (4 8) 720 (6 7) 80 (0 7)    Urine (mL/kg/hr) 7325 (2 8) 6350 (2 5) 250 (0 5)    Stool  0     Blood   1700    Total Output 7325 6350 1950    Net -6805 -5630 -1870           Unmeasured Stool Occurrence  0 x         Weights:   IBW (Ideal Body Weight): 77 6 kg    Body mass index is 32 14 kg/m²  Weight (last 2 days)     Date/Time Weight    23 0651 107 (236 99)    23 0600 107 (236 99)    23 0552 110 (241 4)    05/10/23 0544 112 (246 25)        Physical Exam  Vitals reviewed  Constitutional:       General: He is not in acute distress  Appearance: He is obese  HENT:      Head: Normocephalic and atraumatic  Nose: No rhinorrhea  Eyes:      General: No scleral icterus  Cardiovascular:      Rate and Rhythm: Normal rate and regular rhythm  Pulses: Normal pulses  Heart sounds: Normal heart sounds  No murmur heard  No friction rub  No gallop  Pulmonary:      Effort: Pulmonary effort is normal  No respiratory distress  Breath sounds: Normal breath sounds  No wheezing, rhonchi or rales  Abdominal:      General: Bowel sounds are normal  There is no distension  Palpations: Abdomen is soft  Tenderness: There is no abdominal tenderness  There is no guarding or rebound  Musculoskeletal:      Right lower leg: Edema present  Left lower leg: Edema present  Skin:     General: Skin is warm and dry  Coloration: Skin is not jaundiced  Neurological:      Mental Status: He is alert  Cranial Nerves: No dysarthria  Comments: CN 2-12 grossly intact, moves all 4 extremities   Psychiatric:         Mood and Affect: Mood normal          Behavior: Behavior normal        LABORATORY DATA     Labs: I have personally reviewed pertinent reports  Results from last 7 days   Lab Units 05/12/23  0522 05/09/23  0511 05/08/23  0425   WBC Thousand/uL 7 92 11 68* 12 22*   HEMOGLOBIN g/dL 15 6 14 3 14 6   HEMATOCRIT % 47 9 43 0 43 8   PLATELETS Thousands/uL 190 195 193   NEUTROS PCT %  --  81* 83*   MONOS PCT %  --  10 9      Results from last 7 days   Lab Units 05/12/23  0522 05/11/23  0501 05/10/23  0444   POTASSIUM mmol/L 3 8 3 7 3 3*   CHLORIDE mmol/L 94* 95* 97   CO2 mmol/L 35* 35* 33*   BUN mg/dL 19 24 29*   CREATININE mg/dL 0 83 0 83 0 91   CALCIUM mg/dL 9 0 8 4 8 1*     Results from last 7 days   Lab Units 05/10/23  0444 05/07/23  0451 05/06/23  0547   MAGNESIUM mg/dL 1 9 2 4 2 6     Results from last 7 days   Lab Units 05/10/23  0444   PHOSPHORUS mg/dL 2 5      Results from last 7 days   Lab Units 05/12/23  0522   INR  1 03               IMAGING & DIAGNOSTIC TESTING     Radiology Results: I have personally reviewed pertinent reports  XR chest pa & lateral    Result Date: 5/2/2023  Impression: Right base nodular density and right pleural effusion  Recommend CT  The study was marked in [facility] for immediate notification  CT chest wo contrast    Result Date: 5/3/2023  Impression: Small to moderate right effusion with ascites may be due to congestion/heart failure Mild groundglass is seen at the right lung base, nonspecific related to atelectasis or pneumonitis Mild enlarged mediastinal lymph nodes may be reactive, consider follow-up at 3 months to demonstrate resolution/stability  The study was marked in [facility] for significant notification  Workstation performed: Silvia Palacios abdomen limited    Result Date: 5/6/2023  Impression: 1  Small volume ascites within all 4 quadrants   Workstation performed: [id number]      kidney and bladder    Result Date: 5/7/2023  Impression: 1  No hydronephrosis  Trace perinephric fluid is seen bilaterally  2  The bladder is nondistended, limiting evaluation  3  Small volume ascites  Workstation performed: [id number]     Other Diagnostic Testing: I have personally reviewed pertinent reports  ACTIVE MEDICATIONS     Current Facility-Administered Medications   Medication Dose Route Frequency   • acetaminophen (TYLENOL) tablet 650 mg  650 mg Oral Q6H PRN   • amiodarone tablet 200 mg  200 mg Oral Daily With Breakfast   • aspirin chewable tablet 81 mg  81 mg Oral Daily   • atorvastatin (LIPITOR) tablet 80 mg  80 mg Oral After Dinner   • furosemide (LASIX) injection 60 mg  60 mg Intravenous BID (diuretic)   • heparin (porcine) subcutaneous injection 5,000 Units  5,000 Units Subcutaneous Q8H [facility]   • insulin glargine (LANTUS) subcutaneous injection 18 Units 0 18 mL  18 Units Subcutaneous HS   • insulin lispro (HumaLOG) 100 units/mL subcutaneous injection 2-12 Units  2-12 Units Subcutaneous 4x Daily (AC & HS)   • lidocaine (LIDODERM) 5 % patch 1 patch  1 patch Topical Daily   • metoprolol succinate (TOPROL-XL) 24 hr tablet 12 5 mg  12 5 mg Oral Q12H   • nicotine (NICODERM CQ) 14 mg/24hr TD 24 hr patch 1 patch  1 patch Transdermal Daily   • nitroglycerin (NITROSTAT) SL tablet 0 4 mg  0 4 mg Sublingual Q5 Min PRN   • ondansetron (ZOFRAN) injection 4 mg  4 mg Intravenous Q6H PRN   • polyethylene glycol (MIRALAX) packet 17 g  17 g Oral Daily   • sacubitril-valsartan (ENTRESTO) 49-51 MG per tablet 1 tablet  1 tablet Oral BID   • senna-docusate sodium (SENOKOT S) 8 6-50 mg per tablet 2 tablet  2 tablet Oral HS   • spironolactone (ALDACTONE) tablet 25 mg  25 mg Oral Daily       VTE Pharmacologic Prophylaxis: Heparin  VTE Mechanical Prophylaxis: sequential compression device    Portions of the record may have been created with voice recognition software    Occasional wrong word or "\"sound a like\" substitutions may have occurred due to the inherent limitations of voice recognition software    Read the chart carefully and recognize, using context, where substitutions have occurred   ==  Radha Matos,   [address]  Internal Medicine Residency PGY-2       "

## 2023-05-12 NOTE — ANESTHESIA PREPROCEDURE EVALUATION
Procedure:  Cardiac icd implant (Chest)    Relevant Problems   ANESTHESIA (within normal limits)   (-) History of anesthesia complications      CARDIO   (+) 3-vessel CAD   (+) A-fib (HCC)   (+) Essential (primary) hypertension      ENDO   (+) Type 2 diabetes mellitus, with long-term current use of insulin (HCC)      GI/HEPATIC   (+) GERD (gastroesophageal reflux disease)      /RENAL   (+) Benign prostatic hyperplasia without lower urinary tract symptoms      HEMATOLOGY (within normal limits)      MUSCULOSKELETAL (within normal limits)      NEURO/PSYCH (within normal limits)      PULMONARY (within normal limits)      Cardiovascular and Mediastinum   (+) Acute HFrEF   (+) Ischemic cardiomyopathy   (+) V-tach (HCC)      Other   (+) Hyponatremia   (+) Tobacco dependence syndrome        Physical Exam    Airway    Mallampati score: II  TM Distance: >3 FB  Neck ROM: full     Dental   Comment: Edentulous upper, multiple missing lower teeth,     Cardiovascular  Rhythm: irregular, Rate: normal,     Pulmonary  Pulmonary exam normal     Other Findings        Anesthesia Plan  ASA Score- 4     Anesthesia Type- IV sedation with anesthesia with ASA Monitors  Additional Monitors:   Airway Plan:           Plan Factors-    Chart reviewed  EKG reviewed  Imaging results reviewed  Existing labs reviewed  Patient summary reviewed  Patient is a current smoker  Patient did not smoke on day of surgery  Induction- intravenous  Postoperative Plan-     Informed Consent- Anesthetic plan and risks discussed with patient  I personally reviewed this patient with the CRNA  Discussed and agreed on the Anesthesia Plan with the CRNA  Ernesto Nose Echo (5/2/23): •  Left Ventricle: Left ventricular cavity size is mildly dilated  Wall thickness is mildly increased  The left ventricular ejection fraction is 10-15%  Systolic function is severely reduced   There is severe global hypokinesis, with akinesis of the apex with aneurysm formation  There is no thrombus  •  Right Ventricle: Right ventricular cavity size is normal  Systolic function is moderately to severely reduced  •  Mitral Valve: There is mild regurgitation  •  Tricuspid Valve: There is mild regurgitation  The right ventricular systolic pressure is mildly elevated  The estimated right ventricular systolic pressure is 85 21 mmHg  •  Prior TTE study available for comparison  Prior study date: 9/26/2017  Changes noted when compared to prior study  Changes include: LVEF is now severely reduced       Cardiac Cath (5/3/23): •  Chronically occluded mid LAD & LPDA consistent w/ echo findings (apical aneurysm) & not amenable to PCI  •  Widely patent mid LCx stent with mild-mod lesions proximal & distal to stent margins  •  Significantly elevated LVEDP (30 mmHg)      NPO verified  NKDA  Preprocedure glucose was 146 this morning, 5/12/23  Plan:  MAC, light sedation    Benefits and risks of sedation were discussed with the patient including possibility of recall under sedation and the potential for conversion to general anesthesia if necessary  All questions were answered  Anesthesia consent was obtained from the patient

## 2023-05-12 NOTE — ANESTHESIA POSTPROCEDURE EVALUATION
Post-Op Assessment Note    CV Status:  Stable    Pain management: adequate     Mental Status:  Alert and awake   Hydration Status:  Euvolemic and stable   PONV Controlled:  None   Airway Patency:  Patent and adequate      Post Op Vitals Reviewed: Yes      Staff: Anesthesiologist, CRNA         No notable events documented      /65 (05/12/23 1152)    Temp      Pulse 77 (05/12/23 1152)   Resp 14 (05/12/23 1152)    SpO2 95 % (05/12/23 1152)

## 2023-05-12 NOTE — PROGRESS NOTES
Heart Failure/ Pulmonary Hypertension Progress Note - Cesario Gonzalez 59 y o  male MRN: [id number]    Unit/Bed#: [facility] [place] 411-01 Encounter: [id number]      Assessment:    Principal Problem:    Acute HFrEF  Active Problems:    Essential (primary) hypertension    3-vessel CAD    Type 2 diabetes mellitus, with long-term current use of insulin (HCC)    Tobacco dependence syndrome    A-fib (HCC)    Ischemic cardiomyopathy    V-tach (HCC)    Hyponatremia    Acidosis    # Acute HFrEF, Stage D  Impression: iCM with large apical aneurysm with progression of CAD/late presenting MI with occluded mid LAD and LPDA  LV mildly dilated  Started on milrinone 0 25mcg/kg/min 5/6/23 due to rising creatinine and worsening volume status, stopped 5/10/23     VBG 5/9/23: 61%  Hb 14 3 Brii CI 1 9 - on milrinone 0 25  VBG 5/10/23: 72 6% on milrinone 0 13  VBG 5/11/23: 65% - off milrinone  VBG 5/12/23 - 55%     Studies- personally reviewed by me  EKG- narrow QRS with bigeminy; inferior infarct, anterolateral infarct     [facility] 5/3/23: chronically occluded mid LAD and LPDA c/w echo findings of apical aneurysm, not amenable to PCI  Patent mid LCx stent   LVEDP 30 mmHg     Echocardiogram 5/2/23  LVEF:  10-15%, apical aneurysm  LVIDd: 5 6 cm, mildly increased wall thickness  RV: normal size, moderately reduced systolic function  MR: mild  PASP: 49 mmHg, estimated RAP 15mmHg  RVOT: no notching     Echo 9/26/17:  LVEF: 65%  [facility] 2/17/2015: The coronary circulation is left dominant  LAD: Angiography showed minor luminal irregularities  Mid LAD: There was a 40 % stenosis  Distal LAD: There was a 40 % stenosis  Mid circumflex: There was a 99 % stenosis  Left AV groove artery: The vessel was small sized  There was a 50 % stenosis     RCA: Angiography showed minor luminal irregularities       Neurohormonal Blockade:  --Beta-Blocker: metoprolol succinate 12 5mg BID  --ACEi, ARB or ARNi: entresto 49-51mg BID    (or SVR reduction)  --Aldosterone Receptor Blocker: none  --SGLT2-I: none  --Diuretic: lasix 60mg IV BID     Sudden Cardiac Death Risk Reduction:  --ICD: For ICD placement this admission     Electrical Resynchronization:  Narrow QRS     Advanced Therapies (If appropriate): --Inotrope: milrinone 0 25mcg/kg/min  --LVAD/Transplant Candidacy: Current tobacco use prior to admission precludes heart transplant  Moderately reduced RV function and mildly dilated LV concerning for LVAD  Mildly dilated LV also concerning given VT       # New Q wave MI, later presenting or silent LAD territory infarction  # hyperlipidemia-  # current tobacco use  # Ventricular tachycardia- scar mediated, plan for ICD placement with EP when optimized from heart failure standpoint  Rx: amiodarone 200 mg daily  # CKD, Cr 1 77--> 2 3 >> 2 34 >>  1 89 >> 1 3 >> 0 9 >> 0 83 >> 0 83  # hyponatremia- due to HF,  >>127 >> 129 >> 131 >> 132 >> 131 >> 130  # ascites, small volume on US 5/6/23     Plan:  Continue diuresis with IV lasix 60mg BID today  Continue entresto and low dose metoprolol  Start spironolactone 25mg daily  Continue to monitor off milrinone, clinically warm  Strict I's and O's and daily weights  Keep K>4 and Mg>2  ICD placement today    Subjective:   Patient seen and examined  No significant events overnight  Review of Systems   Constitutional: Negative for chills and fever  Respiratory: Negative for chest tightness  Cardiovascular: Positive for leg swelling  Negative for chest pain and palpitations  Gastrointestinal: Negative for abdominal distention, abdominal pain, nausea and vomiting  Neurological: Negative for dizziness and light-headedness  Objective: Intake/ Output: 720/6350, -5630  Weight: 236 from 241 lbs today  Tele: sinus rhythm, no events      Vitals: Blood pressure 119/69, pulse 89, temperature 98 4 °F (36 9 °C), temperature source Oral, resp   rate 17, height 6' (1 829 m), weight 107 kg (236 lb 15 9 oz), SpO2 91 % , Body mass index is 32 14 kg/m² , I/O last 3 completed shifts: In: 720 [P O :720]  Out: 9825 [Urine:9825]  No intake/output data recorded  Wt Readings from Last 3 Encounters:   05/12/23 107 kg (236 lb 15 9 oz)   05/02/23 116 kg (254 lb 12 8 oz)   04/01/22 102 kg (224 lb 12 8 oz)       Intake/Output Summary (Last 24 hours) at 5/12/2023 0812  Last data filed at 5/12/2023 1677  Gross per 24 hour   Intake 420 ml   Output 6350 ml   Net -5930 ml     I/O last 3 completed shifts:   In: 720 [P O :720]  Out: 9825 [Urine:9825]      Physical Exam:  Vitals:    05/12/23 0331 05/12/23 0600 05/12/23 0651 05/12/23 0707   BP: 98/65   119/69   BP Location: Left arm   Left arm   Pulse: 82   89   Resp: 18   17   Temp: 98 5 °F (36 9 °C)   98 4 °F (36 9 °C)   TempSrc: Oral   Oral   SpO2: 91%   91%   Weight:  107 kg (236 lb 15 9 oz) 107 kg (236 lb 15 9 oz)    Height:           GEN: Mohan Drake appears well, alert and oriented x 3, pleasant and cooperative   HEENT: NC/AT, moist mucosa, anicteric sclerae; extraocular muscles intact  NECK: supple, no carotid bruits   HEART: regular rhythm, normal S1 and S2, no murmurs, clicks, gallops or rubs, JVP is elevated  LUNGS: clear to auscultation bilaterally; no wheezes, rales, or rhonchi   ABDOMEN: normal bowel sounds, soft, no tenderness, no distention  EXTREMITIES: peripheral pulses normal; no clubbing, cyanosis; bilateral lower extremity edema  NEURO: no focal findings   SKIN: normal without suspicious lesions on exposed skin      Current Facility-Administered Medications:   •  acetaminophen (TYLENOL) tablet 650 mg, 650 mg, Oral, Q6H PRN, CARLA Gary  •  amiodarone tablet 200 mg, 200 mg, Oral, Daily With Breakfast, Jese Bhatia MD, 200 mg at 05/11/23 0839  •  aspirin chewable tablet 81 mg, 81 mg, Oral, Daily, Maycol Jones DO, 81 mg at 05/11/23 0839  •  atorvastatin (LIPITOR) tablet 80 mg, 80 mg, Oral, After Dinner, Maycol Jones DO, 80 mg at 05/11/23 5087  •  ceFAZolin (ANCEF) IVPB (premix in dextrose) 2,000 mg 50 mL, 2,000 mg, Intravenous, Once, Chel Edwards PA-C  •  heparin (porcine) subcutaneous injection 5,000 Units, 5,000 Units, Subcutaneous, Q8H [address], Michelle Croft, 5,000 Units at 05/12/23 0616  •  insulin glargine (LANTUS) subcutaneous injection 18 Units 0 18 mL, 18 Units, Subcutaneous, HS, Evita Casey MD, 18 Units at 05/11/23 2122  •  insulin lispro (HumaLOG) 100 units/mL subcutaneous injection 2-12 Units, 2-12 Units, Subcutaneous, 4x Daily (AC & HS), 4 Units at 05/11/23 2127 **AND** Fingerstick Glucose (POCT), , , 4x Daily AC and at bedtime, CARLA Payne  •  lidocaine (LIDODERM) 5 % patch 1 patch, 1 patch, Topical, Daily, Michelle Salinas, 1 patch at 05/11/23 1301  •  metoprolol succinate (TOPROL-XL) 24 hr tablet 12 5 mg, 12 5 mg, Oral, Q12H, Vincent Carney MD, 12 5 mg at 05/11/23 2120  •  nicotine (NICODERM CQ) 14 mg/24hr TD 24 hr patch 1 patch, 1 patch, Transdermal, Daily, Maycol Jones, DO  •  nitroglycerin (NITROSTAT) SL tablet 0 4 mg, 0 4 mg, Sublingual, Q5 Min PRN, Maycol Jones, DO  •  ondansetron (ZOFRAN) injection 4 mg, 4 mg, Intravenous, Q6H PRN, Maycol Jones DO, 4 mg at 05/06/23 0954  •  polyethylene glycol (MIRALAX) packet 17 g, 17 g, Oral, Daily, Camelia Suggs DO, 17 g at 05/09/23 0857  •  sacubitril-valsartan (ENTRESTO) 49-51 MG per tablet 1 tablet, 1 tablet, Oral, BID, Amy Emerson MD, 1 tablet at 05/11/23 1734  •  senna-docusate sodium (SENOKOT S) 8 6-50 mg per tablet 2 tablet, 2 tablet, Oral, HS, Emanuel Dicker Minnix, DO, 2 tablet at 05/11/23 2120      Labs & Results:        Results from last 7 days   Lab Units 05/12/23  0522 05/09/23  0511 05/08/23  0425   WBC Thousand/uL 7 92 11 68* 12 22*   HEMOGLOBIN g/dL 15 6 14 3 14 6   HEMATOCRIT % 47 9 43 0 43 8   PLATELETS Thousands/uL 190 195 193         Results from last 7 days   Lab Units 05/12/23  0522 05/11/23  0501 05/10/23  0444   POTASSIUM mmol/L 3 8 3 7 3 3*   CHLORIDE mmol/L 94* 95* 97   CO2 mmol/L 35* 35* 33*   BUN mg/dL 19 24 29*   CREATININE mg/dL 0 83 0 83 0 91   CALCIUM mg/dL 9 0 8 4 8 1*     Results from last 7 days   Lab Units 05/12/23  0522   INR  1 03         Rubi Blount MD  Advanced Heart Failure and Mechanical Circulatory Support  [address]

## 2023-05-12 NOTE — PROGRESS NOTES
NEPHROLOGY PROGRESS NOTE   Abraham Mendosa 59 y o  male MRN: [id number]  Unit/Bed#: [facility] 411-01 Encounter: [id number]      ASSESSMENT & PLAN:  1 acute kidney injury suspected secondary to cardiorenal syndrome, volume overload, decompensated heart failure  Creatinine prior to admission less than 1  Creatinine on admission 0 97, creatinine peak at 2 34 on 5/7, function improved with creatinine down to 0 83 today  Patient is still volume overload, continue with diuresis as per heart failure team   Avoid relative hypotension  Follow daily labs  If CO2 worsened consider adding Diamox  No further recommendation from kidney standpoint of view, will sign off, call if any questions    2 acute HFrEF stage D, heart failure team on board managing diuretics, now off milrinone  Continue with diuresis as per cardiology currently on Lasix 60 mg IV twice a day  Patient is on Entresto and low-dose metoprolol, renal function is stable  Follow daily weights, monitor ins and outs    #3 V-tach, EP on board, given ischemic cardiomyopathy plan for ICD    #4 hyponatremia factorial, hyperkalemia, CHF, prior LINDEN that now resolved, continue with diuresis, follow daily labs and fluid restriction, follow daily labs    Plan and recommendation were discussed with internal medicine as well as heart failure attending, they both agree with the plan  Patient remains volume overloaded, continue with diuresis, avoid relative hypotension  Kidney function is stable and back to baseline  Monitor serial labs, CO2 elevated but is stable, if worsening consider Diamox    No further recommendation from kidney standpoint of view, will sign off, call if any questions      SUBJECTIVE:  Seen and examined, denies medical complaints, report continues having leg swelling, denies any chest pain, shortness of breath, abdominal pain, no nausea, no vomiting      OBJECTIVE:  Current Weight: Weight - Scale: 107 kg (236 lb 15 9 oz)  Vitals:    05/12/23 0707   BP: 119/69   Pulse: 89   Resp: 17   Temp: 98 4 °F (36 9 °C)   SpO2: 91%       Intake/Output Summary (Last 24 hours) at 5/12/2023 1003  Last data filed at 5/12/2023 2720  Gross per 24 hour   Intake 420 ml   Output 5350 ml   Net -4930 ml       General: morbid obese   conscious, cooperative, in not acute distress  Eyes: conjunctivae pink, anicteric sclerae  ENT: lips and mucous membranes moist  Neck: supple, elevated JVD  Chest: clear breath sounds bilateral, no crackles, ronchus or wheezings  CVS: distinct S1 & S2, normal rate, regular rhythm  Abdomen: obese, non-tender, non-distended, normoactive bowel sounds  Extremities: 1-2+ edema of both legs  Skin: no rash  Neuro: awake, alert, oriented        Medications:    Current Facility-Administered Medications:   •  acetaminophen (TYLENOL) tablet 650 mg, 650 mg, Oral, Q6H PRN, Merline Baas, CRNP  •  amiodarone tablet 200 mg, 200 mg, Oral, Daily With Breakfast, Juna Mohs, MD, 200 mg at 05/12/23 1439  •  aspirin chewable tablet 81 mg, 81 mg, Oral, Daily, Maycol Jones DO, 81 mg at 05/12/23 4286  •  atorvastatin (LIPITOR) tablet 80 mg, 80 mg, Oral, After Dinner, Maycol Jones DO, 80 mg at 05/11/23 1734  •  ceFAZolin (ANCEF) IVPB (premix in dextrose) 2,000 mg 50 mL, 2,000 mg, Intravenous, Once, Chel Edwards PA-C  •  furosemide (LASIX) injection 60 mg, 60 mg, Intravenous, BID (diuretic), Marva Jordan MD, 60 mg at 05/12/23 0941  •  heparin (porcine) subcutaneous injection 5,000 Units, 5,000 Units, Subcutaneous, Q8H [facility], Laisha Cruz, 5,000 Units at 05/12/23 0616  •  insulin glargine (LANTUS) subcutaneous injection 18 Units 0 18 mL, 18 Units, Subcutaneous, HS, Evita Casey MD, 18 Units at 05/11/23 2122  •  insulin lispro (HumaLOG) 100 units/mL subcutaneous injection 2-12 Units, 2-12 Units, Subcutaneous, 4x Daily (AC & HS), 4 Units at 05/11/23 2127 **AND** Fingerstick Glucose (POCT), , , 4x Daily AC and at bedtime, CARLA Rubalcava  •  lidocaine (LIDODERM) 5 % patch 1 patch, 1 patch, Topical, Daily, Antoine Dys, 1 patch at 05/12/23 8479  •  metoprolol succinate (TOPROL-XL) 24 hr tablet 12 5 mg, 12 5 mg, Oral, Q12H, Estefany More MD, 12 5 mg at 05/12/23 9551  •  nicotine (NICODERM CQ) 14 mg/24hr TD 24 hr patch 1 patch, 1 patch, Transdermal, Daily, Maycol Jones, DO  •  nitroglycerin (NITROSTAT) SL tablet 0 4 mg, 0 4 mg, Sublingual, Q5 Min PRN, Maycol Jones, DO  •  ondansetron (ZOFRAN) injection 4 mg, 4 mg, Intravenous, Q6H PRN, Maycol Jones DO, 4 mg at 05/06/23 0954  •  polyethylene glycol (MIRALAX) packet 17 g, 17 g, Oral, Daily, Camelia F Minnix, DO, 17 g at 05/09/23 0857  •  sacubitril-valsartan (ENTRESTO) 49-51 MG per tablet 1 tablet, 1 tablet, Oral, BID, Kerri Kitchen MD, 1 tablet at 05/12/23 0839  •  senna-docusate sodium (SENOKOT S) 8 6-50 mg per tablet 2 tablet, 2 tablet, Oral, HS, Theola Fior Minnix, DO, 2 tablet at 05/11/23 2120  •  spironolactone (ALDACTONE) tablet 25 mg, 25 mg, Oral, Daily, Jose Mcdonald MD, 25 mg at 05/12/23 0941    Invasive Devices:   Urethral Catheter (Active)   Amt returned on insertion(mL) 0 mL 05/06/23 1800   Reasons to continue Urinary Catheter  Accurate I&O assessment in critically ill patients (48 hr  max) 05/07/23 0800   Site Assessment Clean;Skin intact 05/08/23 0027   Escobar Care Done 05/07/23 2100   Collection Container Standard drainage bag 05/08/23 0027   Securement Method Securing device (Describe) 05/08/23 0027   Output (mL) 1250 mL 05/08/23 0621   CAUTI Time-out performed before Urine Culture Yes 05/06/23 1800       Lab Results:   Results from last 7 days   Lab Units 05/12/23  0522 05/11/23  0501 05/10/23  0444 05/09/23  0511 05/08/23  0425 05/07/23  0451 05/06/23  0547   WBC Thousand/uL 7 92  --   --  11 68* 12 22* 11 63* 11 31*   HEMOGLOBIN g/dL 15 6  --   --  14 3 14 6 14 4 15 6   HEMATOCRIT % 47 9  --   --  43 0 43 8 42 9 49 0   PLATELETS Thousands/uL 190  --   --  195 193 185 188   SODIUM mmol/L 130* 131* 132* 131* "129* 127* 125*   POTASSIUM mmol/L 3 8 3 7 3 3* 3 5 4 0 4 2 5 1   CHLORIDE mmol/L 94* 95* 97 97 97 95* 99   CO2 mmol/L 35* 35* 33* 29 26 22 15*   BUN mg/dL 19 24 29* 47* 68* 79* 69*   CREATININE mg/dL 0 83 0 83 0 91 1 30 1 89* 2 34* 2 33*   CALCIUM mg/dL 9 0 8 4 8 1* 8 6 8 4 8 4 7 7*   MAGNESIUM mg/dL  --   --  1 9  --   --  2 4 2 6   PHOSPHORUS mg/dL  --   --  2 5  --   --   --   --        Previous work up:  See previous notes      Portions of the record may have been created with voice recognition software  Occasional wrong word or \"sound a like\" substitutions may have occurred due to the inherent limitations of voice recognition software  Read the chart carefully and recognize, using context, where substitutions have occurred  If you have any questions, please contact the dictating provider    "

## 2023-05-12 NOTE — RESTORATIVE TECHNICIAN NOTE
Restorative Technician Note      Patient Name: Karie Barahona     Restorative Tech Visit Date: 05/12/23  Note Type: Mobility  Patient Position Upon Consult: Seated edge of bed  Activity Performed: Ambulated  Assistive Device: Other (Comment) (no device)  Patient Position at End of Consult: All needs within reach;  Other (comment) (in the BR)

## 2023-05-12 NOTE — PHYSICAL THERAPY NOTE
05/12/23 0805   Note Type   Note Type Cancelled Session   Cancel Reasons Patient to operating room     Pt for ICD today  Will follow    Ludwig Barnett PT, DPT CSRS

## 2023-05-13 ENCOUNTER — APPOINTMENT (INPATIENT)
Dept: RADIOLOGY | Facility: HOSPITAL | Age: 63
End: 2023-05-13

## 2023-05-13 PROBLEM — R22.32 LOCALIZED SWELLING ON LEFT HAND: Status: ACTIVE | Noted: 2023-05-13

## 2023-05-13 LAB
ANION GAP SERPL CALCULATED.3IONS-SCNC: 1 MMOL/L (ref 4–13)
BUN SERPL-MCNC: 16 MG/DL (ref 5–25)
CALCIUM SERPL-MCNC: 8.8 MG/DL (ref 8.3–10.1)
CHLORIDE SERPL-SCNC: 96 MMOL/L (ref 96–108)
CO2 SERPL-SCNC: 38 MMOL/L (ref 21–32)
CREAT SERPL-MCNC: 0.72 MG/DL (ref 0.6–1.3)
ERYTHROCYTE [DISTWIDTH] IN BLOOD BY AUTOMATED COUNT: 14 % (ref 11.6–15.1)
GFR SERPL CREATININE-BSD FRML MDRD: 99 ML/MIN/1.73SQ M
GLUCOSE SERPL-MCNC: 113 MG/DL (ref 65–140)
GLUCOSE SERPL-MCNC: 114 MG/DL (ref 65–140)
GLUCOSE SERPL-MCNC: 163 MG/DL (ref 65–140)
GLUCOSE SERPL-MCNC: 234 MG/DL (ref 65–140)
GLUCOSE SERPL-MCNC: 284 MG/DL (ref 65–140)
HCT VFR BLD AUTO: 47.5 % (ref 36.5–49.3)
HGB BLD-MCNC: 15.5 G/DL (ref 12–17)
MCH RBC QN AUTO: 27.4 PG (ref 26.8–34.3)
MCHC RBC AUTO-ENTMCNC: 32.6 G/DL (ref 31.4–37.4)
MCV RBC AUTO: 84 FL (ref 82–98)
PLATELET # BLD AUTO: 156 THOUSANDS/UL (ref 149–390)
PMV BLD AUTO: 9.8 FL (ref 8.9–12.7)
POTASSIUM SERPL-SCNC: 3.6 MMOL/L (ref 3.5–5.3)
RBC # BLD AUTO: 5.66 MILLION/UL (ref 3.88–5.62)
SODIUM SERPL-SCNC: 135 MMOL/L (ref 135–147)
WBC # BLD AUTO: 7.41 THOUSAND/UL (ref 4.31–10.16)

## 2023-05-13 RX ORDER — POTASSIUM CHLORIDE 20 MEQ/1
40 TABLET, EXTENDED RELEASE ORAL ONCE
Status: COMPLETED | OUTPATIENT
Start: 2023-05-13 | End: 2023-05-13

## 2023-05-13 RX ADMIN — POTASSIUM CHLORIDE 40 MEQ: 1500 TABLET, EXTENDED RELEASE ORAL at 09:59

## 2023-05-13 RX ADMIN — METOPROLOL SUCCINATE 12.5 MG: 25 TABLET, EXTENDED RELEASE ORAL at 20:58

## 2023-05-13 RX ADMIN — ASPIRIN 81 MG 81 MG: 81 TABLET ORAL at 10:00

## 2023-05-13 RX ADMIN — LIDOCAINE 5% 1 PATCH: 700 PATCH TOPICAL at 10:05

## 2023-05-13 RX ADMIN — SACUBITRIL AND VALSARTAN 1 TABLET: 49; 51 TABLET, FILM COATED ORAL at 10:07

## 2023-05-13 RX ADMIN — HEPARIN SODIUM 5000 UNITS: 5000 INJECTION INTRAVENOUS; SUBCUTANEOUS at 05:33

## 2023-05-13 RX ADMIN — SACUBITRIL AND VALSARTAN 1 TABLET: 49; 51 TABLET, FILM COATED ORAL at 19:13

## 2023-05-13 RX ADMIN — FUROSEMIDE 60 MG: 10 INJECTION, SOLUTION INTRAMUSCULAR; INTRAVENOUS at 09:59

## 2023-05-13 RX ADMIN — HEPARIN SODIUM 5000 UNITS: 5000 INJECTION INTRAVENOUS; SUBCUTANEOUS at 15:40

## 2023-05-13 RX ADMIN — INSULIN GLARGINE 18 UNITS: 100 INJECTION, SOLUTION SUBCUTANEOUS at 21:04

## 2023-05-13 RX ADMIN — METOPROLOL SUCCINATE 12.5 MG: 25 TABLET, EXTENDED RELEASE ORAL at 10:00

## 2023-05-13 RX ADMIN — INSULIN LISPRO 2 UNITS: 100 INJECTION, SOLUTION INTRAVENOUS; SUBCUTANEOUS at 11:07

## 2023-05-13 RX ADMIN — AMIODARONE HYDROCHLORIDE 200 MG: 200 TABLET ORAL at 09:59

## 2023-05-13 RX ADMIN — SPIRONOLACTONE 25 MG: 25 TABLET ORAL at 10:00

## 2023-05-13 RX ADMIN — ATORVASTATIN CALCIUM 80 MG: 80 TABLET, FILM COATED ORAL at 19:12

## 2023-05-13 RX ADMIN — HEPARIN SODIUM 5000 UNITS: 5000 INJECTION INTRAVENOUS; SUBCUTANEOUS at 21:06

## 2023-05-13 RX ADMIN — FUROSEMIDE 60 MG: 10 INJECTION, SOLUTION INTRAMUSCULAR; INTRAVENOUS at 15:41

## 2023-05-13 RX ADMIN — INSULIN LISPRO 4 UNITS: 100 INJECTION, SOLUTION INTRAVENOUS; SUBCUTANEOUS at 15:56

## 2023-05-13 RX ADMIN — INSULIN LISPRO 6 UNITS: 100 INJECTION, SOLUTION INTRAVENOUS; SUBCUTANEOUS at 21:03

## 2023-05-13 NOTE — PROGRESS NOTES
Advanced Heart Failure / Pulmonary Hypertension Service Progress Note    Chanell Nguyen 59 y o  male   MRN: [id number]  Unit/Bed#: [facility] 411-01; Encounter: [id number]    Assessment:  Principal Problem:    Acute HFrEF  Active Problems:    Essential (primary) hypertension    3-vessel CAD    Type 2 diabetes mellitus, with long-term current use of insulin (HCC)    Tobacco dependence syndrome    A-fib (HCC)    Ischemic cardiomyopathy    V-tach (HCC)    Hyponatremia    Acidosis    Localized swelling on left hand      Subjective:   Patient seen and examined  No significant events overnight  Feels well  Has been up and walking and doing well with this  Reports left upper extremity swelling, primarily over the left hand, which he noticed yesterday prior to procedure  Objective: Intake/ Output: 360/5225 urine, 1700 recorded as blood loss (erroneous?) (-4431)  Weight: 241 > 236 > 229 lb (from 254 lb)  Telemetry: reviewed  Today's Plan:  • Status post ICD placement with EP 5/12  • Continue diuresis with Lasix 60 mg IV BID today  • Continue to monitor off milrinone  Clinically warm, kidney function continuing to improve  Creatinine 0 72 today  • Started on spironolactone yesterday  • Strict I/Os, daily weights, daily BMP  • Keep K>4, Mg>2  K 3 6 today, potassium repleted  • Discussed arm/hand swelling with primary team; noted at the site of previous IV, will elevate and observe for now  Management per primary team     Plan:  Acute HFrEF, Stage D  Impression: iCM with large apical aneurysm with progression of CAD/late presenting MI with occluded mid LAD and LPDA  LV mildly dilated  Started on milrinone 0 25mcg/kg/min 5/6/23 due to rising creatinine and worsening volume status, stopped 5/10/23     VBG 5/9/23: 61%   Hb 14 3 Brii CI 1 9 - on milrinone 0 25  VBG 5/10/23: 72 6% on milrinone 0 13  VBG 5/11/23: 65% - off milrinone  VBG 5/12/23 - 55%     Studies  EKG- narrow QRS with bigeminy; inferior infarct, anterolateral infarct     [facility] 5/3/23: chronically occluded mid LAD and LPDA c/w echo findings of apical aneurysm, not amenable to PCI  Patent mid LCx stent   LVEDP 30 mmHg     Echocardiogram 5/2/23  LVEF:  10-15%, apical aneurysm  LVIDd: 5 6 cm, mildly increased wall thickness  RV: normal size, moderately reduced systolic function  MR: mild  PASP: 49 mmHg, estimated RAP 15mmHg  RVOT: no notching     Echo 9/26/17:  LVEF: 65%  [facility] 2/17/2015: The coronary circulation is left dominant  LAD: Angiography showed minor luminal irregularities  Mid LAD: There was a 40 % stenosis  Distal LAD: There was a 40 % stenosis  Mid circumflex: There was a 99 % stenosis  Left AV groove artery: The vessel was small sized  There was a 50 % stenosis  RCA: Angiography showed minor luminal irregularities       Neurohormonal Blockade:  --Beta-Blocker: metoprolol succinate 12 5mg BID  --ACEi, ARB or ARNi: entresto 49-51mg BID    (or SVR reduction)  --Aldosterone Receptor Blocker: spironolactone 25 mg QD  --SGLT2-I: none  --Diuretic: lasix 60mg IV BID     Sudden Cardiac Death Risk Reduction:  --ICD: s/p ICD placement 5/12/2023     Electrical Resynchronization:  Narrow QRS     Advanced Therapies (If appropriate): --Inotrope: milrinone 0 25mcg/kg/min  --LVAD/Transplant Candidacy: Current tobacco use prior to admission precludes heart transplant  Moderately reduced RV function and mildly dilated LV concerning for LVAD  Mildly dilated LV also concerning given VT       New Q wave MI, later presenting or silent LAD territory infarction  Hyperlipidemia  Current tobacco use  Ventricular tachycardia  Scar mediated, s/p ICD placement with EP 5/12/23   Rx: amiodarone 200 mg daily  CKD  Cr 1 77--> 2 3 >> 2 34 >>  1 89 >> 1 3 >> 0 9 >> 0 83 >> 0 83 >>0 72  Hyponatremia  Due to HF, resolved    >>127 >> 129 >> 131 >> 132 >> 131 >> 130 >> 135  Ascites  Small volume on US 5/6/23    Vitals:   Blood pressure 117/63, pulse 83, temperature 97 7 °F (36 5 °C), temperature source Oral, resp  rate 20, height 6' (1 829 m), weight 104 kg (229 lb 11 5 oz), SpO2 94 %  Body mass index is 31 16 kg/m²  I/O last 3 completed shifts: In: 360 [P O :280; I V :30; IV Piggyback:50]  Out: 8369 [Urine:7875; Blood:1700]    Wt Readings from Last 10 Encounters:   23 104 kg (229 lb 11 5 oz)   23 116 kg (254 lb 12 8 oz)   22 102 kg (224 lb 12 8 oz)   21 103 kg (228 lb)   10/28/19 111 kg (244 lb 1 6 oz)   18 116 kg (256 lb)   17 120 kg (264 lb)   12/15/16 121 kg (267 lb)   16 122 kg (269 lb)   06/03/15 123 kg (272 lb)     Vitals:    23 0242 23 0552 23 0710 23 1000   BP: 107/63  90/61 117/63   BP Location: Left arm  Left arm    Pulse: 75  75 83   Resp: 18  20    Temp: 97 7 °F (36 5 °C)      TempSrc: Oral      SpO2: 94%  94%    Weight:  104 kg (229 lb 11 5 oz)     Height:           Physical Exam  Constitutional:       Appearance: Normal appearance  HENT:      Head: Normocephalic  Nose: Nose normal       Mouth/Throat:      Mouth: Mucous membranes are moist    Eyes:      Conjunctiva/sclera: Conjunctivae normal    Neck:      Comments: JVP mildly elevated  Cardiovascular:      Rate and Rhythm: Normal rate and regular rhythm  Pulmonary:      Effort: Pulmonary effort is normal       Breath sounds: Normal breath sounds  Musculoskeletal:      Cervical back: Neck supple  Right lower le+ Edema present  Left lower le+ Edema present  Skin:     General: Skin is warm and dry  Neurological:      General: No focal deficit present  Mental Status: He is alert and oriented to person, place, and time     Psychiatric:         Mood and Affect: Mood normal          Behavior: Behavior normal            Current Facility-Administered Medications:   •  acetaminophen (TYLENOL) tablet 650 mg, 650 mg, Oral, Q6H PRN, CARLA Hoyt  •  acetaminophen (TYLENOL) tablet 650 mg, 650 mg, Oral, Q4H PRN, Timothy Zavaleta PA-C  •  amiodarone tablet 200 mg, 200 mg, Oral, Daily With Breakfast, Bhargav Callahan MD, 200 mg at 05/13/23 1127  •  aspirin chewable tablet 81 mg, 81 mg, Oral, Daily, Maycol Banai, DO, 81 mg at 05/13/23 1000  •  atorvastatin (LIPITOR) tablet 80 mg, 80 mg, Oral, After Dinner, Maycol Banai, DO, 80 mg at 05/12/23 1727  •  furosemide (LASIX) injection 60 mg, 60 mg, Intravenous, BID (diuretic), Ritu Loza MD, 60 mg at 05/13/23 4107  •  heparin (porcine) subcutaneous injection 5,000 Units, 5,000 Units, Subcutaneous, Q8H [facility], Tree Luna, 5,000 Units at 05/13/23 0533  •  insulin glargine (LANTUS) subcutaneous injection 18 Units 0 18 mL, 18 Units, Subcutaneous, HS, Kristen Almanzar MD, 18 Units at 05/12/23 2115  •  insulin lispro (HumaLOG) 100 units/mL subcutaneous injection 2-12 Units, 2-12 Units, Subcutaneous, 4x Daily (AC & HS), 4 Units at 05/12/23 2114 **AND** Fingerstick Glucose (POCT), , , 4x Daily AC and at bedtime, CARLA Farmer  •  lidocaine (LIDODERM) 5 % patch 1 patch, 1 patch, Topical, Daily, Tree Luna, 1 patch at 05/13/23 1005  •  metoprolol succinate (TOPROL-XL) 24 hr tablet 12 5 mg, 12 5 mg, Oral, Q12H, Evangelina Weinstein MD, 12 5 mg at 05/13/23 1000  •  nicotine (NICODERM CQ) 14 mg/24hr TD 24 hr patch 1 patch, 1 patch, Transdermal, Daily, Maycol Banai, DO  •  nitroglycerin (NITROSTAT) SL tablet 0 4 mg, 0 4 mg, Sublingual, Q5 Min PRN, Maycol Banai, DO  •  ondansetron (ZOFRAN) injection 4 mg, 4 mg, Intravenous, Q6H PRN, Maycol Banai, DO, 4 mg at 05/06/23 0954  •  polyethylene glycol (MIRALAX) packet 17 g, 17 g, Oral, Daily, Camelia Suggs DO, 17 g at 05/09/23 0857  •  sacubitril-valsartan (ENTRESTO) 49-51 MG per tablet 1 tablet, 1 tablet, Oral, BID, Bhargav Callahan MD, 1 tablet at 05/13/23 1007  •  senna-docusate sodium (SENOKOT S) 8 6-50 mg per tablet 2 tablet, 2 tablet, Oral, HS, Aida Suggs DO, 2 tablet at 05/11/23 2120  •  spironolactone (ALDACTONE) tablet 25 mg, 25 mg, Oral, Daily, Janet Chu MD, 25 mg at 05/13/23 1000    Labs & Results:      Results from last 7 days   Lab Units 05/13/23  0507 05/12/23  0522 05/09/23  0511   WBC Thousand/uL 7 41 7 92 11 68*   HEMOGLOBIN g/dL 15 5 15 6 14 3   HEMATOCRIT % 47 5 47 9 43 0   PLATELETS Thousands/uL 156 190 195         Results from last 7 days   Lab Units 05/13/23  0507 05/12/23  0522 05/11/23  0501   POTASSIUM mmol/L 3 6 3 8 3 7   CHLORIDE mmol/L 96 94* 95*   CO2 mmol/L 38* 35* 35*   BUN mg/dL 16 19 24   CREATININE mg/dL 0 72 0 83 0 83   CALCIUM mg/dL 8 8 9 0 8 4     Results from last 7 days   Lab Units 05/12/23  0522   INR  1 03         Thank you for the opportunity to participate in the care of this patient      Dennie Rankin, PA-C  Advanced Heart Failure  [address] [place] [address]

## 2023-05-13 NOTE — ASSESSMENT & PLAN NOTE
· At the site of previous IV line, suspect superficial thrombophlebitis  · Elevation, cold compress  · Consider ultrasound if it worsens

## 2023-05-13 NOTE — PROGRESS NOTES
[facility]  Progress Note  Name: Frank Duenas  MRN: [id number]  Unit/Bed#: [facility] 014-03 I Date of Admission: 5/2/2023   Date of Service: 5/13/2023 I Hospital Day: 11    Assessment/Plan   * Acute HFrEF  Assessment & Plan  Wt Readings from Last 3 Encounters:   05/08/23 118 kg (259 lb 4 2 oz)   05/02/23 116 kg (254 lb 12 8 oz)   04/01/22 102 kg (224 lb 12 8 oz)   · Patient presenting with 2 weeks of dyspnea on exertion, lower extremity edema, abd distention, weight gain  · Nephrology and cardiology on board  · Echo showed ejection fraction of 10 to 15% with apical aneurysm  · Cardiac catheterization showed chronically occluded mid LAD and LPDA, not amenable to PCI  · Was on milrinone and Lasix drip, both discontinued 5/10/2023  · Started on Lasix 80 mg IV twice daily, decreased to 60 mg IV twice daily, continues to have good urine output, continues to lose weight  · Plan is to continue Lasix IV today  · Continue Entresto  · Continue spironolactone 25 mg daily  · Status post ICD placement 5/12/2023  · Escobar catheter placed on this admission for accurate I's and O's  Discontinue Escobar  · Continue I/O, daily weight  · Appreciate heart failure team      3-vessel CAD  Assessment & Plan  · Patient with history of CAD with drug-eluting stent to LCx in 2015  · Patient reports dyspnea on exertion over the past 2 weeks, denies any chest pain at rest, is a diabetic on insulin, is an active smoker of 40 pack years, has prior history of CAD, is on daily aspirin  · LVEF 10-15%, continue medical management  Appreciate Cardiology comanagement and expertise  · Cath 5/3 with multivessel disease not amenable to PCI, continue medical management   Appreciate cardio recommendations  · Continue aspirin, Lipitor, metoprolol    A-fib (HCC)  Assessment & Plan  · At cardio appointment outpt when he appeared to be in A-fib RVR on EKG, also with Q waves concerning for prior infarct, in the setting of chest pressure last week concerning for MI  · Now in sinus, no episodes while inpatient  · Noted reduced LVEF of 10 to 15%  · Milrinone discontinued on 5/10  · Continue amiodarone 200 mg     Localized swelling on left hand  Assessment & Plan  · At the site of previous IV line, suspect superficial thrombophlebitis  · Elevation, cold compress  · Consider ultrasound if it worsens    V-tach [facility])  Assessment & Plan  · EP following and started amiodarone, given ischemic cardiomyopathy and VT plan for ICD  · S/p amio load    · Continue amiodarone 200 mg  · ICD placement on 5/12    Tobacco dependence syndrome  Assessment & Plan  Patient with over 40  pack-year history  Counseling provided on cessation, nicotine patch ordered    Type 2 diabetes mellitus, with long-term current use of insulin [facility])  Assessment & Plan  Lab Results   Component Value Date    HGBA1C 12 0 (H) 04/14/2023       Recent Labs     05/07/23  1123 05/07/23  1557 05/07/23  2130 05/08/23  0557   POCGLU 188* 286* 309* 172*       Blood Sugar Average: Last 72 hrs:  (P) 528 [id number]     · Will hold oral medications   · Continue Lantus 18 U   · Continue SSI algorithm 4    Essential (primary) hypertension  Assessment & Plan  · Continue Lopressor 12 5 mg twice daily  · Continue Entresto 49-51 mg twice daily  · Spironolactone 25 mg      Other constipation-resolved as of 5/11/2023  Assessment & Plan  Complains of constipation for 2 days      · Continue Senokot-S to 2 tablets, MiraLAX    LINDEN (acute kidney injury) (HCC)-resolved as of 5/10/2023  Assessment & Plan  · Progressive increase in BUN and creatinine, possibly somewhat cardiorenal from poor forward perfusion  · Discussed with heart failure team, and milrinone infusion started  · We will also reach out to nephrology, appreciate the comanagement of all specializing services  · Improving         VTE Pharmacologic Prophylaxis: VTE Score: 5 High Risk (Score >/= 5) - Pharmacological DVT Prophylaxis Ordered: heparin  Sequential Compression Devices Ordered  Patient Centered Rounds: Discussed with nursing  Discussions with Specialists or Other Care Team Provider: Nursing, heart failure    Education and Discussions with Family / Patient: Updated  (daughter and son in law) at bedside  Total Time Spent on Date of Encounter in care of patient: 35 minutes This time was spent on one or more of the following: performing physical exam; counseling and coordination of care; obtaining or reviewing history; documenting in the medical record; reviewing/ordering tests, medications or procedures; communicating with other healthcare professionals and discussing with patient's family/caregivers  Current Length of Stay: 11 day(s)  Current Patient Status: Inpatient   Certification Statement: The patient will continue to require additional inpatient hospital stay due to Heart failure  Discharge Plan: Anticipate discharge in 48-72 hrs to home with home services  Code Status: Level 1 - Full Code    Subjective:   Patient was seen and evaluated bedside, he is feeling well, denies chest pain palpitation shortness of breath  Feeling better  Slight swelling of the left    Objective:     Vitals:   Temp (24hrs), Av 1 °F (36 7 °C), Min:97 7 °F (36 5 °C), Max:98 4 °F (36 9 °C)    Temp:  [97 7 °F (36 5 °C)-98 4 °F (36 9 °C)] 97 7 °F (36 5 °C)  HR:  [74-86] 83  Resp:  [14-20] 20  BP: ()/(53-85) 117/63  SpO2:  [91 %-96 %] 94 %  Body mass index is 31 16 kg/m²  Input and Output Summary (last 24 hours): Intake/Output Summary (Last 24 hours) at 2023 1045  Last data filed at 2023 1031  Gross per 24 hour   Intake 640 ml   Output 7300 ml   Net -6660 ml       Physical Exam:   Physical Exam  Constitutional:       General: He is not in acute distress  HENT:      Head: Atraumatic  Cardiovascular:      Rate and Rhythm: Normal rate and regular rhythm  Heart sounds: No murmur heard  No friction rub   No gallop  Pulmonary:      Effort: Pulmonary effort is normal  No respiratory distress  Breath sounds: Normal breath sounds  No wheezing  Abdominal:      General: Bowel sounds are normal  There is no distension  Palpations: Abdomen is soft  Tenderness: There is no abdominal tenderness  Musculoskeletal:      Cervical back: Neck supple  Comments: Mild left hand swelling at the site of previous IV line   Neurological:      General: No focal deficit present  Mental Status: He is alert  Psychiatric:         Mood and Affect: Mood normal         Additional Data:     Labs:  Results from last 7 days   Lab Units 05/13/23  0507 05/12/23  0522 05/09/23  0511   WBC Thousand/uL 7 41   < > 11 68*   HEMOGLOBIN g/dL 15 5   < > 14 3   HEMATOCRIT % 47 5   < > 43 0   PLATELETS Thousands/uL 156   < > 195   NEUTROS PCT %  --   --  81*   LYMPHS PCT %  --   --  8*   MONOS PCT %  --   --  10   EOS PCT %  --   --  0    < > = values in this interval not displayed  Results from last 7 days   Lab Units 05/13/23  0507   SODIUM mmol/L 135   POTASSIUM mmol/L 3 6   CHLORIDE mmol/L 96   CO2 mmol/L 38*   BUN mg/dL 16   CREATININE mg/dL 0 72   ANION GAP mmol/L 1*   CALCIUM mg/dL 8 8   GLUCOSE RANDOM mg/dL 113     Results from last 7 days   Lab Units 05/12/23  0522   INR  1 03     Results from last 7 days   Lab Units 05/13/23  0532 05/12/23  2055 05/12/23  1625 05/12/23  1252 05/12/23  0534 05/11/23  2127 05/11/23  1617 05/11/23  1120 05/11/23  0534 05/10/23  2057 05/10/23  1536 05/10/23  1027   POC GLUCOSE mg/dl 114 231* 238* 157* 146* 229* 210* 216* 185* 374* 238* 206*               Lines/Drains:  Invasive Devices     Peripherally Inserted Central Catheter Line  Duration           PICC Line 05/36/28 Right Basilic 4 days          Drain  Duration           Urethral Catheter 6 days              Urinary Catheter:  Goal for removal: No longer needed   Will place order to discontinue         Central Line:  Goal for removal: Will discontinue when meds requiring line are completed  Telemetry:  Telemetry Orders (From admission, onward)             48 Hour Telemetry Monitoring  Continuous x 48 hours        References:    Telemetry Guidelines   Question:  Reason for 48 Hour Telemetry  Answer:  Acute MI, chest pain - R/O MI, or unstable angina                 Telemetry Reviewed: Normal Sinus Rhythm  Indication for Continued Telemetry Use: Acute CHF on >200 mg lasix/day or equivalent dose or with new reduced EF                Imaging: Reviewed radiology reports from this admission including: chest xray    Recent Cultures (last 7 days):         Last 24 Hours Medication List:   Current Facility-Administered Medications   Medication Dose Route Frequency Provider Last Rate   • acetaminophen  650 mg Oral Q6H PRN CARLA Sykes     • acetaminophen  650 mg Oral Q4H PRN Fabian Zavaleta PA-C     • amiodarone  200 mg Oral Daily With Breakfast Cedric Faulkner MD     • aspirin  81 mg Oral Daily Maycol Jones, DO     • atorvastatin  80 mg Oral After Dinner Addie Ortiz DO     • furosemide  60 mg Intravenous BID (diuretic) Hannah Bonilla MD     • heparin (porcine)  5,000 Units Subcutaneous Q8H [address] Daune Burr     • insulin glargine  18 Units Subcutaneous HS William Rangel MD     • insulin lispro  2-12 Units Subcutaneous 4x Daily (AC & HS) CARLA Prieto     • lidocaine  1 patch Topical Daily Daune Burr     • metoprolol succinate  12 5 mg Oral Q12H Kimani Pacheco MD     • nicotine  1 patch Transdermal Daily Maycol Jones, DO     • nitroglycerin  0 4 mg Sublingual Q5 Min PRN Maycol Jones DO     • ondansetron  4 mg Intravenous Q6H PRN Maycol Jones DO     • polyethylene glycol  17 g Oral Daily Camelia Suggs, DO     • sacubitril-valsartan  1 tablet Oral BID Cedric Faulkner MD     • senna-docusate sodium  2 tablet Oral HS Camelia Suggs DO     • spironolactone  25 mg Oral Daily Hannah Bonilla MD          Today, Patient Was Seen By: Canelo Vaughan MD    **Please Note: This note may have been constructed using a voice recognition system  **

## 2023-05-13 NOTE — PROGRESS NOTES
Dual chamber ICD lead parameters reviewed, all WNL  CXR reviewed, no PTX, leads in normal position  He has Greetl set up for remote monitoring  Device site slightly swollen, no hematoma  C/di    Sling is removed, not needed  Usual device post op instructions reviewed  F/u for device check in office in 2 weeks set up  EP will sign off

## 2023-05-14 LAB
ANION GAP SERPL CALCULATED.3IONS-SCNC: 0 MMOL/L (ref 4–13)
BASOPHILS # BLD AUTO: 0.03 THOUSANDS/ÂΜL (ref 0–0.1)
BASOPHILS NFR BLD AUTO: 0 % (ref 0–1)
BUN SERPL-MCNC: 15 MG/DL (ref 5–25)
CALCIUM SERPL-MCNC: 8.8 MG/DL (ref 8.3–10.1)
CHLORIDE SERPL-SCNC: 97 MMOL/L (ref 96–108)
CO2 SERPL-SCNC: 36 MMOL/L (ref 21–32)
CREAT SERPL-MCNC: 0.78 MG/DL (ref 0.6–1.3)
EOSINOPHIL # BLD AUTO: 0.03 THOUSAND/ÂΜL (ref 0–0.61)
EOSINOPHIL NFR BLD AUTO: 0 % (ref 0–6)
ERYTHROCYTE [DISTWIDTH] IN BLOOD BY AUTOMATED COUNT: 14 % (ref 11.6–15.1)
GFR SERPL CREATININE-BSD FRML MDRD: 96 ML/MIN/1.73SQ M
GLUCOSE SERPL-MCNC: 195 MG/DL (ref 65–140)
GLUCOSE SERPL-MCNC: 195 MG/DL (ref 65–140)
GLUCOSE SERPL-MCNC: 255 MG/DL (ref 65–140)
GLUCOSE SERPL-MCNC: 286 MG/DL (ref 65–140)
HCT VFR BLD AUTO: 47.3 % (ref 36.5–49.3)
HGB BLD-MCNC: 15.1 G/DL (ref 12–17)
IMM GRANULOCYTES # BLD AUTO: 0.03 THOUSAND/UL (ref 0–0.2)
IMM GRANULOCYTES NFR BLD AUTO: 0 % (ref 0–2)
LYMPHOCYTES # BLD AUTO: 1.62 THOUSANDS/ÂΜL (ref 0.6–4.47)
LYMPHOCYTES NFR BLD AUTO: 18 % (ref 14–44)
MAGNESIUM SERPL-MCNC: 2.1 MG/DL (ref 1.6–2.6)
MCH RBC QN AUTO: 26.9 PG (ref 26.8–34.3)
MCHC RBC AUTO-ENTMCNC: 31.9 G/DL (ref 31.4–37.4)
MCV RBC AUTO: 84 FL (ref 82–98)
MONOCYTES # BLD AUTO: 0.68 THOUSAND/ÂΜL (ref 0.17–1.22)
MONOCYTES NFR BLD AUTO: 8 % (ref 4–12)
NEUTROPHILS # BLD AUTO: 6.62 THOUSANDS/ÂΜL (ref 1.85–7.62)
NEUTS SEG NFR BLD AUTO: 74 % (ref 43–75)
NRBC BLD AUTO-RTO: 0 /100 WBCS
PLATELET # BLD AUTO: 151 THOUSANDS/UL (ref 149–390)
PMV BLD AUTO: 9.7 FL (ref 8.9–12.7)
POTASSIUM SERPL-SCNC: 4.2 MMOL/L (ref 3.5–5.3)
RBC # BLD AUTO: 5.62 MILLION/UL (ref 3.88–5.62)
SODIUM SERPL-SCNC: 133 MMOL/L (ref 135–147)
WBC # BLD AUTO: 9.01 THOUSAND/UL (ref 4.31–10.16)

## 2023-05-14 RX ORDER — METOPROLOL SUCCINATE 25 MG/1
25 TABLET, EXTENDED RELEASE ORAL EVERY 12 HOURS
Status: DISCONTINUED | OUTPATIENT
Start: 2023-05-14 | End: 2023-05-16 | Stop reason: HOSPADM

## 2023-05-14 RX ORDER — FUROSEMIDE 40 MG/1
40 TABLET ORAL
Status: DISCONTINUED | OUTPATIENT
Start: 2023-05-14 | End: 2023-05-16

## 2023-05-14 RX ORDER — INSULIN GLARGINE 100 [IU]/ML
20 INJECTION, SOLUTION SUBCUTANEOUS
Status: DISCONTINUED | OUTPATIENT
Start: 2023-05-14 | End: 2023-05-16 | Stop reason: HOSPADM

## 2023-05-14 RX ADMIN — HEPARIN SODIUM 5000 UNITS: 5000 INJECTION INTRAVENOUS; SUBCUTANEOUS at 15:58

## 2023-05-14 RX ADMIN — ATORVASTATIN CALCIUM 80 MG: 80 TABLET, FILM COATED ORAL at 17:16

## 2023-05-14 RX ADMIN — ASPIRIN 81 MG 81 MG: 81 TABLET ORAL at 08:24

## 2023-05-14 RX ADMIN — FUROSEMIDE 60 MG: 10 INJECTION, SOLUTION INTRAMUSCULAR; INTRAVENOUS at 08:24

## 2023-05-14 RX ADMIN — METOPROLOL SUCCINATE 25 MG: 25 TABLET, EXTENDED RELEASE ORAL at 21:13

## 2023-05-14 RX ADMIN — LIDOCAINE 5% 1 PATCH: 700 PATCH TOPICAL at 08:24

## 2023-05-14 RX ADMIN — HEPARIN SODIUM 5000 UNITS: 5000 INJECTION INTRAVENOUS; SUBCUTANEOUS at 21:13

## 2023-05-14 RX ADMIN — INSULIN LISPRO 6 UNITS: 100 INJECTION, SOLUTION INTRAVENOUS; SUBCUTANEOUS at 06:53

## 2023-05-14 RX ADMIN — INSULIN LISPRO 6 UNITS: 100 INJECTION, SOLUTION INTRAVENOUS; SUBCUTANEOUS at 21:13

## 2023-05-14 RX ADMIN — INSULIN GLARGINE 20 UNITS: 100 INJECTION, SOLUTION SUBCUTANEOUS at 21:13

## 2023-05-14 RX ADMIN — SACUBITRIL AND VALSARTAN 1 TABLET: 49; 51 TABLET, FILM COATED ORAL at 08:25

## 2023-05-14 RX ADMIN — HEPARIN SODIUM 5000 UNITS: 5000 INJECTION INTRAVENOUS; SUBCUTANEOUS at 05:25

## 2023-05-14 RX ADMIN — FUROSEMIDE 40 MG: 40 TABLET ORAL at 15:58

## 2023-05-14 RX ADMIN — INSULIN LISPRO 2 UNITS: 100 INJECTION, SOLUTION INTRAVENOUS; SUBCUTANEOUS at 12:19

## 2023-05-14 RX ADMIN — SACUBITRIL AND VALSARTAN 1 TABLET: 49; 51 TABLET, FILM COATED ORAL at 17:17

## 2023-05-14 RX ADMIN — METOPROLOL SUCCINATE 12.5 MG: 25 TABLET, EXTENDED RELEASE ORAL at 08:24

## 2023-05-14 RX ADMIN — SPIRONOLACTONE 25 MG: 25 TABLET ORAL at 08:24

## 2023-05-14 RX ADMIN — INSULIN LISPRO 6 UNITS: 100 INJECTION, SOLUTION INTRAVENOUS; SUBCUTANEOUS at 17:16

## 2023-05-14 RX ADMIN — AMIODARONE HYDROCHLORIDE 200 MG: 200 TABLET ORAL at 08:24

## 2023-05-14 NOTE — PROGRESS NOTES
Advanced Heart Failure / Pulmonary Hypertension Service Progress Note    Marcus Diaz 59 y o  male   MRN: [id number]  Unit/Bed#: [facility] 411-01; Encounter: [id number]    Assessment:  Principal Problem:    Acute HFrEF  Active Problems:    Essential (primary) hypertension    3-vessel CAD    Type 2 diabetes mellitus, with long-term current use of insulin (HCC)    Tobacco dependence syndrome    A-fib (HCC)    Ischemic cardiomyopathy    V-tach (HCC)    Hyponatremia    Acidosis    Localized swelling on left hand      Subjective:   Patient seen and examined  No significant events overnight  Feels well, has been walking with minimal shortness of breath  Denies chest pain, dizziness, palpitations  Some soreness at ICD site  Objective: Intake/ Output: 748/5302 (-3171)  Weight: 241 > 236 > 229> 227 lb (from 254 lb)  Telemetry: reviewed  Today's Plan:  • Status post ICD placement with EP 5/12  • Transition to oral diuretic today - start Lasix 40 mg BID  • Continue to monitor off milrinone  Clinically warm, kidney function stable  Creatinine 0 78 today  • Increase metoprolol succinate to 25 mg BID  • Started on spironolactone, continue  • Consider Jardiance - cost $47/month  • Strict I/Os, daily weights, daily BMP  • Keep K>4, Mg>2  Plan:  Acute HFrEF, Stage D  Impression: iCM with large apical aneurysm with progression of CAD/late presenting MI with occluded mid LAD and LPDA  LV mildly dilated  Started on milrinone 0 25mcg/kg/min 5/6/23 due to rising creatinine and worsening volume status, stopped 5/10/23     VBG 5/9/23: 61%   Hb 14 3 Brii CI 1 9 - on milrinone 0 25  VBG 5/10/23: 72 6% on milrinone 0 13  VBG 5/11/23: 65% - off milrinone  VBG 5/12/23 - 55%     Studies  EKG- narrow QRS with bigeminy; inferior infarct, anterolateral infarct     LHC 5/3/23: chronically occluded mid LAD and LPDA c/w echo findings of apical aneurysm, not amenable to PCI  Patent mid LCx stent   LVEDP 30 mmHg     Echocardiogram 5/2/23  LVEF:  10-15%, apical aneurysm  LVIDd: 5 6 cm, mildly increased wall thickness  RV: normal size, moderately reduced systolic function  MR: mild  PASP: 49 mmHg, estimated RAP 15mmHg  RVOT: no notching     Echo 9/26/17:  LVEF: 65%  [facility] 2/17/2015: The coronary circulation is left dominant  LAD: Angiography showed minor luminal irregularities  Mid LAD: There was a 40 % stenosis  Distal LAD: There was a 40 % stenosis  Mid circumflex: There was a 99 % stenosis  Left AV groove artery: The vessel was small sized  There was a 50 % stenosis  RCA: Angiography showed minor luminal irregularities       Neurohormonal Blockade:  --Beta-Blocker: metoprolol succinate 12 5mg BID > 25 mg BID  --ACEi, ARB or ARNi: entresto 49-51mg BID    (or SVR reduction)  --Aldosterone Receptor Blocker: spironolactone 25 mg QD  --SGLT2-I: none  --Diuretic: lasix 60mg IV BID > Lasix 40 mg PO BID     Sudden Cardiac Death Risk Reduction:  --ICD: s/p ICD placement 5/12/2023     Electrical Resynchronization:  Narrow QRS     Advanced Therapies (If appropriate): --Inotrope: milrinone 0 25mcg/kg/min titrated down to off   --LVAD/Transplant Candidacy: Current tobacco use prior to admission precludes heart transplant  Moderately reduced RV function and mildly dilated LV concerning for LVAD  Mildly dilated LV also concerning given VT       New Q wave MI, later presenting or silent LAD territory infarction  Hyperlipidemia  Current tobacco use  Ventricular tachycardia  Scar mediated, s/p ICD placement with EP 5/12/23   Rx: amiodarone 200 mg daily  CKD  Cr 1 77--> 2 3 >> 2 34 >>  1 89 >> 1 3 >> 0 9 >> 0 83 >> 0 83 >>0 72>>0 78  Hyponatremia  Due to HF   >>127 >> 129 >> 131 >> 132 >> 131 >> 130 >> 135  Ascites  Small volume on US 5/6/23    Vitals:   Blood pressure 127/86, pulse 79, temperature 97 9 °F (36 6 °C), temperature source Oral, resp   rate 17, height 6' (1 829 m), weight 103 kg (227 lb 4 7 oz), SpO2 94 %     Body mass index is 30 83 kg/m²  I/O last 3 completed shifts: In: 748 [P O :748]  Out: 7950 [Urine:7950]    Wt Readings from Last 10 Encounters:   23 103 kg (227 lb 4 7 oz)   23 116 kg (254 lb 12 8 oz)   22 102 kg (224 lb 12 8 oz)   21 103 kg (228 lb)   10/28/19 111 kg (244 lb 1 6 oz)   18 116 kg (256 lb)   17 120 kg (264 lb)   12/15/16 121 kg (267 lb)   16 122 kg (269 lb)   06/03/15 123 kg (272 lb)     Vitals:    23 0234 23 0533 23 0759 23 1144   BP: 114/61  141/92 127/86   BP Location: Left arm  Right arm Right arm   Pulse: 78  80 79   Resp:    Temp: 98 1 °F (36 7 °C)  99 2 °F (37 3 °C) 97 9 °F (36 6 °C)   TempSrc: Oral  Oral Oral   SpO2: 93%  95% 94%   Weight:  103 kg (227 lb 4 7 oz)     Height:           Physical Exam  Constitutional:       Appearance: Normal appearance  HENT:      Head: Normocephalic  Nose: Nose normal       Mouth/Throat:      Mouth: Mucous membranes are moist    Eyes:      Conjunctiva/sclera: Conjunctivae normal    Neck:      Comments: JVP mildly elevated  Cardiovascular:      Rate and Rhythm: Normal rate and regular rhythm  Pulmonary:      Effort: Pulmonary effort is normal       Breath sounds: Normal breath sounds  Musculoskeletal:      Cervical back: Neck supple  Right lower le+ Edema present  Left lower le+ Edema present  Skin:     General: Skin is warm and dry  Neurological:      General: No focal deficit present  Mental Status: He is alert and oriented to person, place, and time     Psychiatric:         Mood and Affect: Mood normal          Behavior: Behavior normal            Current Facility-Administered Medications:   •  acetaminophen (TYLENOL) tablet 650 mg, 650 mg, Oral, Q6H PRN, Merline Baas, CRNP  •  acetaminophen (TYLENOL) tablet 650 mg, 650 mg, Oral, Q4H PRN, Jamie Zavaleta PA-C  •  amiodarone tablet 200 mg, 200 mg, Oral, Daily With Breakfast, Bob Jaden Gomez MD, 200 mg at 05/14/23 1400  •  aspirin chewable tablet 81 mg, 81 mg, Oral, Daily, Maycol Kamilleai, DO, 81 mg at 05/14/23 9967  •  atorvastatin (LIPITOR) tablet 80 mg, 80 mg, Oral, After Dinner, Maycol Kamilleai, DO, 80 mg at 05/13/23 1912  •  furosemide (LASIX) tablet 40 mg, 40 mg, Oral, BID (diuretic), Ines Melendez PA-C  •  heparin (porcine) subcutaneous injection 5,000 Units, 5,000 Units, Subcutaneous, Q8H [address], Arno Cash, 5,000 Units at 05/14/23 0525  •  insulin glargine (LANTUS) subcutaneous injection 18 Units 0 18 mL, 18 Units, Subcutaneous, HS, Penny Workman MD, 18 Units at 05/13/23 2104  •  insulin lispro (HumaLOG) 100 units/mL subcutaneous injection 2-12 Units, 2-12 Units, Subcutaneous, 4x Daily (AC & HS), 2 Units at 05/14/23 1219 **AND** Fingerstick Glucose (POCT), , , 4x Daily AC and at bedtime, CARLA Srivastava  •  lidocaine (LIDODERM) 5 % patch 1 patch, 1 patch, Topical, Daily, Gustavo Cash, 1 patch at 05/14/23 7099  •  metoprolol succinate (TOPROL-XL) 24 hr tablet 12 5 mg, 12 5 mg, Oral, Q12H, Joanie Swift MD, 12 5 mg at 05/14/23 0824  •  nicotine (NICODERM CQ) 14 mg/24hr TD 24 hr patch 1 patch, 1 patch, Transdermal, Daily, Maycol Simentalai, DO  •  nitroglycerin (NITROSTAT) SL tablet 0 4 mg, 0 4 mg, Sublingual, Q5 Min PRN, Maycol Simentalai, DO  •  ondansetron (ZOFRAN) injection 4 mg, 4 mg, Intravenous, Q6H PRN, Maycol Jones, DO, 4 mg at 05/06/23 5279  •  polyethylene glycol (MIRALAX) packet 17 g, 17 g, Oral, Daily, Maria C Ledger Minnix, DO, 17 g at 05/09/23 0857  •  sacubitril-valsartan (ENTRESTO) 49-51 MG per tablet 1 tablet, 1 tablet, Oral, BID, Jean Claude Noyola MD, 1 tablet at 05/14/23 0825  •  senna-docusate sodium (SENOKOT S) 8 6-50 mg per tablet 2 tablet, 2 tablet, Oral, HS, Maria C Suggs DO, 2 tablet at 05/11/23 2120  •  spironolactone (ALDACTONE) tablet 25 mg, 25 mg, Oral, Daily, Yazmin Matthews MD, 25 mg at 05/14/23 9675    Labs & Results:      Results from last 7 days   Lab Units 05/14/23  0530 05/13/23  0507 05/12/23  0522   WBC Thousand/uL 9 01 7 41 7 92   HEMOGLOBIN g/dL 15 1 15 5 15 6   HEMATOCRIT % 47 3 47 5 47 9   PLATELETS Thousands/uL 151 156 190         Results from last 7 days   Lab Units 05/14/23  0530 05/13/23  0507 05/12/23  0522   POTASSIUM mmol/L 4 2 3 6 3 8   CHLORIDE mmol/L 97 96 94*   CO2 mmol/L 36* 38* 35*   BUN mg/dL 15 16 19   CREATININE mg/dL 0 78 0 72 0 83   CALCIUM mg/dL 8 8 8 8 9 0     Results from last 7 days   Lab Units 05/12/23  0522   INR  1 03         Thank you for the opportunity to participate in the care of this patient      Cece Pérez PA-C  Advanced Heart Failure  Formerly named [facility]

## 2023-05-14 NOTE — PLAN OF CARE
Problem: CARDIOVASCULAR - ADULT  Goal: Maintains optimal cardiac output and hemodynamic stability  Description: INTERVENTIONS:  - Monitor I/O, vital signs and rhythm  - Monitor for S/S and trends of decreased cardiac output  - Administer and titrate ordered vasoactive medications to optimize hemodynamic stability  - Assess quality of pulses, skin color and temperature  - Assess for signs of decreased coronary artery perfusion  - Instruct patient to report change in severity of symptoms  Outcome: Progressing  Goal: Absence of cardiac dysrhythmias or at baseline rhythm  Description: INTERVENTIONS:  - Continuous cardiac monitoring, vital signs, obtain 12 lead EKG if ordered  - Administer antiarrhythmic and heart rate control medications as ordered  - Monitor electrolytes and administer replacement therapy as ordered  Outcome: Progressing     Problem: PAIN - ADULT  Goal: Verbalizes/displays adequate comfort level or baseline comfort level  Description: Interventions:  - Encourage patient to monitor pain and request assistance  - Assess pain using appropriate pain scale  - Administer analgesics based on type and severity of pain and evaluate response  - Implement non-pharmacological measures as appropriate and evaluate response  - Consider cultural and social influences on pain and pain management  - Notify physician/advanced practitioner if interventions unsuccessful or patient reports new pain  Outcome: Progressing     Problem: INFECTION - ADULT  Goal: Absence or prevention of progression during hospitalization  Description: INTERVENTIONS:  - Assess and monitor for signs and symptoms of infection  - Monitor lab/diagnostic results  - Monitor all insertion sites, i e  indwelling lines, tubes, and drains  - Monitor endotracheal if appropriate and nasal secretions for changes in amount and color  - [place] appropriate cooling/warming therapies per order  - Administer medications as ordered  - Instruct and encourage patient and family to use good hand hygiene technique  - Identify and instruct in appropriate isolation precautions for identified infection/condition  Outcome: Progressing  Goal: Absence of fever/infection during neutropenic period  Description: INTERVENTIONS:  - Monitor WBC    Outcome: Progressing     Problem: SAFETY ADULT  Goal: Patient will remain free of falls  Description: INTERVENTIONS:  - Educate patient/family on patient safety including physical limitations  - Instruct patient to call for assistance with activity   - Consult OT/PT to assist with strengthening/mobility   - Keep Call bell within reach  - Keep bed low and locked with side rails adjusted as appropriate  - Keep care items and personal belongings within reach  - Initiate and maintain comfort rounds  - Make Fall Risk Sign visible to staff  - Apply yellow socks and bracelet for high fall risk patients  - Consider moving patient to room near nurses station  Outcome: Progressing  Goal: Maintain or return to baseline ADL function  Description: INTERVENTIONS:  -  Assess patient's ability to carry out ADLs; assess patient's baseline for ADL function and identify physical deficits which impact ability to perform ADLs (bathing, care of mouth/teeth, toileting, grooming, dressing, etc )  - Assess/evaluate cause of self-care deficits   - Assess range of motion  - Assess patient's mobility; develop plan if impaired  - Assess patient's need for assistive devices and provide as appropriate  - Encourage maximum independence but intervene and supervise when necessary  - Involve family in performance of ADLs  - Assess for home care needs following discharge   - Consider OT consult to assist with ADL evaluation and planning for discharge  - Provide patient education as appropriate  Outcome: Progressing  Goal: Maintains/Returns to pre admission functional level  Description: INTERVENTIONS:  - Perform BMAT or MOVE assessment daily    - Set and communicate daily mobility goal to care team and patient/family/caregiver     - Collaborate with rehabilitation services on mobility goals if   - Out of bed for toileting  - Record patient progress and toleration of activity level   Outcome: Progressing

## 2023-05-14 NOTE — ASSESSMENT & PLAN NOTE
Lab Results   Component Value Date    HGBA1C 12 0 (H) 04/14/2023       Recent Labs     05/13/23  1104 05/13/23  1551 05/13/23 2056 05/14/23 1103   POCGLU 163* 234* 284* 195*       Blood Sugar Average: Last 72 hrs:  (P) 711 [id number]     · Will hold oral medications   · Increase Lantus to 20 units at bedtime  · Continue SSI algorithm 4

## 2023-05-14 NOTE — ASSESSMENT & PLAN NOTE
Wt Readings from Last 3 Encounters:   05/14/23 103 kg (227 lb 4 7 oz)   05/02/23 116 kg (254 lb 12 8 oz)   04/01/22 102 kg (224 lb 12 8 oz)   · Patient presenting with 2 weeks of dyspnea on exertion, lower extremity edema, abd distention, weight gain  · Nephrology and cardiology on board  · Echo showed ejection fraction of 10 to 15% with apical aneurysm  · Cardiac catheterization showed chronically occluded mid LAD and LPDA, not amenable to PCI  · Was on milrinone and Lasix drip, both discontinued 5/10/2023  · Started on Lasix 80 mg IV twice daily, decreased to 60 mg IV twice daily, continues to have good urine output, continues to lose weight  · Transition to Lasix 40 mg p o  twice daily today  · Continue Entresto  · Continue spironolactone 25 mg daily  · Status post ICD placement 5/12/2023  · Escobar removed 5/13, voiding well  · Continue I/O, daily weight  · Appreciate heart failure team

## 2023-05-14 NOTE — PROGRESS NOTES
[address] [facility]  Progress Note  Name: Roque Nj  MRN: [id number]  Unit/Bed#: [facility] 743-81 I Date of Admission: 5/2/2023   Date of Service: 5/14/2023 I Hospital Day: 12    Assessment/Plan   * Acute HFrEF  Assessment & Plan  Wt Readings from Last 3 Encounters:   05/14/23 103 kg (227 lb 4 7 oz)   05/02/23 116 kg (254 lb 12 8 oz)   04/01/22 102 kg (224 lb 12 8 oz)   · Patient presenting with 2 weeks of dyspnea on exertion, lower extremity edema, abd distention, weight gain  · Nephrology and cardiology on board  · Echo showed ejection fraction of 10 to 15% with apical aneurysm  · Cardiac catheterization showed chronically occluded mid LAD and LPDA, not amenable to PCI  · Was on milrinone and Lasix drip, both discontinued 5/10/2023  · Started on Lasix 80 mg IV twice daily, decreased to 60 mg IV twice daily, continues to have good urine output, continues to lose weight  · Transition to Lasix 40 mg p o  twice daily today  · Continue Entresto  · Continue spironolactone 25 mg daily  · Status post ICD placement 5/12/2023  · Escobar removed 5/13, voiding well  · Continue I/O, daily weight  · Appreciate heart failure team      3-vessel CAD  Assessment & Plan  · Patient with history of CAD with drug-eluting stent to LCx in 2015  · Patient reports dyspnea on exertion over the past 2 weeks, denies any chest pain at rest, is a diabetic on insulin, is an active smoker of 40 pack years, has prior history of CAD, is on daily aspirin  · LVEF 10-15%, continue medical management  Appreciate Cardiology comanagement and expertise  · Cath 5/3 with multivessel disease not amenable to PCI, continue medical management   Appreciate cardio recommendations  · Continue aspirin, Lipitor, metoprolol    A-fib (HCC)  Assessment & Plan  · At cardio appointment outpt when he appeared to be in A-fib RVR on EKG, also with Q waves concerning for prior infarct, in the setting of chest pressure last week concerning for MI  · Now in sinus, no episodes while inpatient  · Noted reduced LVEF of 10 to 15%  · Milrinone discontinued on 5/10  · Continue amiodarone 200 mg     Localized swelling on left hand  Assessment & Plan  · At the site of previous IV line, suspect superficial thrombophlebitis  · Elevation, cold compress  · Consider ultrasound if it worsens    V-tach [facility])  Assessment & Plan  · EP following and started amiodarone, given ischemic cardiomyopathy and VT plan for ICD  · S/p amio load    · Continue amiodarone 200 mg  · ICD placement on 5/12    Tobacco dependence syndrome  Assessment & Plan  Patient with over 40  pack-year history  Counseling provided on cessation, nicotine patch ordered    Type 2 diabetes mellitus, with long-term current use of insulin [facility])  Assessment & Plan  Lab Results   Component Value Date    HGBA1C 12 0 (H) 04/14/2023       Recent Labs     05/13/23  1104 05/13/23  1551 05/13/23  2056 05/14/23  1103   POCGLU 163* 234* 284* 195*       Blood Sugar Average: Last 72 hrs:  (P) 311 [id number]     · Will hold oral medications   · Increase Lantus to 20 units at bedtime  · Continue SSI algorithm 4    Essential (primary) hypertension  Assessment & Plan  · Continue Lopressor 12 5 mg twice daily  · Continue Entresto 49-51 mg twice daily  · Spironolactone 25 mg             VTE Pharmacologic Prophylaxis: VTE Score: 5 High Risk (Score >/= 5) - Pharmacological DVT Prophylaxis Ordered: heparin  Sequential Compression Devices Ordered  Patient Centered Rounds: I performed bedside rounds with nursing staff today    Discussions with Specialists or Other Care Team Provider: Nursing, heart failure    Education and Discussions with Family / Patient: Patient    Total Time Spent on Date of Encounter in care of patient: 35 minutes This time was spent on one or more of the following: performing physical exam; counseling and coordination of care; obtaining or reviewing history; documenting in the medical record; reviewing/ordering tests, medications or procedures; communicating with other healthcare professionals and discussing with patient's family/caregivers  Current Length of Stay: 12 day(s)  Current Patient Status: Inpatient   Certification Statement: The patient will continue to require additional inpatient hospital stay due to Heart failure  Discharge Plan: Anticipate discharge in 48 hrs to home  Code Status: Level 1 - Full Code    Subjective:   Patient was seen and evaluated bedside  Feeling well denies chest pain palpitation shortness of breath, eating well  Ambulating in the hallway, urinating well    Objective:     Vitals:   Temp (24hrs), Av 2 °F (36 8 °C), Min:97 5 °F (36 4 °C), Max:99 2 °F (37 3 °C)    Temp:  [97 5 °F (36 4 °C)-99 2 °F (37 3 °C)] 98 3 °F (36 8 °C)  HR:  [78-94] 94  Resp:  [16-19] 16  BP: ()/(54-92) 137/87  SpO2:  [93 %-96 %] 94 %  Body mass index is 30 83 kg/m²  Input and Output Summary (last 24 hours): Intake/Output Summary (Last 24 hours) at 2023 1552  Last data filed at 2023 1220  Gross per 24 hour   Intake 755 ml   Output 4975 ml   Net -4220 ml       Physical Exam:   Physical Exam  Constitutional:       General: He is not in acute distress  HENT:      Head: Atraumatic  Cardiovascular:      Rate and Rhythm: Normal rate and regular rhythm  Heart sounds: No murmur heard  No friction rub  No gallop  Pulmonary:      Effort: Pulmonary effort is normal  No respiratory distress  Breath sounds: Normal breath sounds  No wheezing  Abdominal:      General: Bowel sounds are normal  There is no distension  Palpations: Abdomen is soft  Tenderness: There is no abdominal tenderness  Musculoskeletal:         General: No swelling  Cervical back: Neck supple  Comments: Extremities well perfused, warm   Skin:     General: Skin is warm and dry  Neurological:      General: No focal deficit present  Mental Status: He is alert  Psychiatric:         Mood and Affect: Mood normal           Additional Data:     Labs:  Results from last 7 days   Lab Units 05/14/23  0530   WBC Thousand/uL 9 01   HEMOGLOBIN g/dL 15 1   HEMATOCRIT % 47 3   PLATELETS Thousands/uL 151   NEUTROS PCT % 74   LYMPHS PCT % 18   MONOS PCT % 8   EOS PCT % 0     Results from last 7 days   Lab Units 05/14/23  0530   SODIUM mmol/L 133*   POTASSIUM mmol/L 4 2   CHLORIDE mmol/L 97   CO2 mmol/L 36*   BUN mg/dL 15   CREATININE mg/dL 0 78   ANION GAP mmol/L 0*   CALCIUM mg/dL 8 8   GLUCOSE RANDOM mg/dL 195*     Results from last 7 days   Lab Units 05/12/23  0522   INR  1 03     Results from last 7 days   Lab Units 05/14/23  1103 05/13/23  2056 05/13/23  1551 05/13/23  1104 05/13/23  0532 05/12/23  2055 05/12/23  1625 05/12/23  1252 05/12/23  0534 05/11/23  2127 05/11/23  1617 05/11/23  1120   POC GLUCOSE mg/dl 195* 284* 234* 163* 114 231* 238* 157* 146* 229* 210* 216*               Lines/Drains:  Invasive Devices     Peripherally Inserted Central Catheter Line  Duration           PICC Line 36/31/17 Right Basilic 5 days                Central Line:  Goal for removal: Will discontinue when meds requiring line are completed           Telemetry:  Telemetry Orders (From admission, onward)             48 Hour Telemetry Monitoring  Continuous x 48 hours        References:    Telemetry Guidelines   Question:  Reason for 48 Hour Telemetry  Answer:  Acute MI, chest pain - R/O MI, or unstable angina                 Telemetry Reviewed: Normal Sinus Rhythm  Indication for Continued Telemetry Use: Post op PPM or ICD             Imaging: Reviewed radiology reports from this admission including: chest xray    Recent Cultures (last 7 days):         Last 24 Hours Medication List:   Current Facility-Administered Medications   Medication Dose Route Frequency Provider Last Rate   • acetaminophen  650 mg Oral Q6H PRN CARLA Parrish     • acetaminophen  650 mg Oral Q4H PRN Francois Zavaleta DENNIS     • amiodarone  200 mg Oral Daily With Breakfast Juna Mohs, MD     • aspirin  81 mg Oral Daily Maycol Banethel, DO     • atorvastatin  80 mg Oral After [employer], DO     • furosemide  40 mg Oral BID (diuretic) Misti Raymundo PA-C     • heparin (porcine)  5,000 Units Subcutaneous Q8H [address] Laisha Nancy     • insulin glargine  20 Units Subcutaneous HS Canelo Vaughan MD     • insulin lispro  2-12 Units Subcutaneous 4x Daily (AC & HS) CARLA Rubalcava     • lidocaine  1 patch Topical Daily Laisha Cruz     • metoprolol succinate  25 mg Oral Q12H Misti Raymundo PA-C     • nicotine  1 patch Transdermal Daily Maycol Jones, DO     • nitroglycerin  0 4 mg Sublingual Q5 Min PRN Maycol Jones, DO     • ondansetron  4 mg Intravenous Q6H PRN Maycol Jones, DO     • polyethylene glycol  17 g Oral Daily Camelia Suggs, DO     • sacubitril-valsartan  1 tablet Oral BID Juna Mohs, MD     • senna-docusate sodium  2 tablet Oral HS Camelia Suggs, DO     • spironolactone  25 mg Oral Daily Marva Jordan MD          Today, Patient Was Seen By: Canelo Vaughan MD    **Please Note: This note may have been constructed using a voice recognition system  **

## 2023-05-15 LAB
ANION GAP SERPL CALCULATED.3IONS-SCNC: 1 MMOL/L (ref 4–13)
BUN SERPL-MCNC: 13 MG/DL (ref 5–25)
CALCIUM SERPL-MCNC: 8.7 MG/DL (ref 8.3–10.1)
CHLORIDE SERPL-SCNC: 98 MMOL/L (ref 96–108)
CO2 SERPL-SCNC: 33 MMOL/L (ref 21–32)
CREAT SERPL-MCNC: 0.66 MG/DL (ref 0.6–1.3)
GFR SERPL CREATININE-BSD FRML MDRD: 103 ML/MIN/1.73SQ M
GLUCOSE SERPL-MCNC: 102 MG/DL (ref 65–140)
GLUCOSE SERPL-MCNC: 192 MG/DL (ref 65–140)
GLUCOSE SERPL-MCNC: 231 MG/DL (ref 65–140)
GLUCOSE SERPL-MCNC: 314 MG/DL (ref 65–140)
GLUCOSE SERPL-MCNC: 334 MG/DL (ref 65–140)
GLUCOSE SERPL-MCNC: 92 MG/DL (ref 65–140)
POTASSIUM SERPL-SCNC: 3.9 MMOL/L (ref 3.5–5.3)
SODIUM SERPL-SCNC: 132 MMOL/L (ref 135–147)

## 2023-05-15 RX ORDER — POTASSIUM CHLORIDE 20 MEQ/1
40 TABLET, EXTENDED RELEASE ORAL ONCE
Status: COMPLETED | OUTPATIENT
Start: 2023-05-15 | End: 2023-05-15

## 2023-05-15 RX ADMIN — INSULIN LISPRO 4 UNITS: 100 INJECTION, SOLUTION INTRAVENOUS; SUBCUTANEOUS at 12:22

## 2023-05-15 RX ADMIN — SACUBITRIL AND VALSARTAN 1 TABLET: 49; 51 TABLET, FILM COATED ORAL at 17:13

## 2023-05-15 RX ADMIN — LIDOCAINE 5% 1 PATCH: 700 PATCH TOPICAL at 09:02

## 2023-05-15 RX ADMIN — AMIODARONE HYDROCHLORIDE 200 MG: 200 TABLET ORAL at 09:02

## 2023-05-15 RX ADMIN — FUROSEMIDE 40 MG: 40 TABLET ORAL at 15:02

## 2023-05-15 RX ADMIN — FUROSEMIDE 40 MG: 40 TABLET ORAL at 09:02

## 2023-05-15 RX ADMIN — METOPROLOL SUCCINATE 25 MG: 25 TABLET, EXTENDED RELEASE ORAL at 21:08

## 2023-05-15 RX ADMIN — INSULIN GLARGINE 20 UNITS: 100 INJECTION, SOLUTION SUBCUTANEOUS at 21:08

## 2023-05-15 RX ADMIN — ASPIRIN 81 MG 81 MG: 81 TABLET ORAL at 09:02

## 2023-05-15 RX ADMIN — INSULIN LISPRO 8 UNITS: 100 INJECTION, SOLUTION INTRAVENOUS; SUBCUTANEOUS at 17:14

## 2023-05-15 RX ADMIN — HEPARIN SODIUM 5000 UNITS: 5000 INJECTION INTRAVENOUS; SUBCUTANEOUS at 14:58

## 2023-05-15 RX ADMIN — HEPARIN SODIUM 5000 UNITS: 5000 INJECTION INTRAVENOUS; SUBCUTANEOUS at 05:48

## 2023-05-15 RX ADMIN — SPIRONOLACTONE 25 MG: 25 TABLET ORAL at 09:02

## 2023-05-15 RX ADMIN — SACUBITRIL AND VALSARTAN 1 TABLET: 49; 51 TABLET, FILM COATED ORAL at 09:03

## 2023-05-15 RX ADMIN — METOPROLOL SUCCINATE 25 MG: 25 TABLET, EXTENDED RELEASE ORAL at 09:02

## 2023-05-15 RX ADMIN — ATORVASTATIN CALCIUM 80 MG: 80 TABLET, FILM COATED ORAL at 17:13

## 2023-05-15 RX ADMIN — HEPARIN SODIUM 5000 UNITS: 5000 INJECTION INTRAVENOUS; SUBCUTANEOUS at 21:08

## 2023-05-15 RX ADMIN — INSULIN LISPRO 8 UNITS: 100 INJECTION, SOLUTION INTRAVENOUS; SUBCUTANEOUS at 21:10

## 2023-05-15 RX ADMIN — POTASSIUM CHLORIDE 40 MEQ: 1500 TABLET, EXTENDED RELEASE ORAL at 09:02

## 2023-05-15 NOTE — PROGRESS NOTES
Pastoral Care Progress Note    5/15/2023  Patient: Enid Tanner : 1960  Admission Date & Time: 2023 0910  MRN: [id number] CSN: [id number]          Made introductory visit to Pt and remain available

## 2023-05-15 NOTE — ASSESSMENT & PLAN NOTE
· Continue Toprol 25 mg twice daily  · Continue Entresto 49-51 mg twice daily  · Spironolactone 25 mg

## 2023-05-15 NOTE — ASSESSMENT & PLAN NOTE
Lab Results   Component Value Date    HGBA1C 12 0 (H) 04/14/2023       Recent Labs     05/14/23  1604 05/14/23  2050 05/15/23  0529 05/15/23  1042   POCGLU 255* 286* 102 231*       Blood Sugar Average: Last 72 hrs:  (P) 202     · Continue glimepiride 4 mg, metformin 1000 mg, Janumet

## 2023-05-15 NOTE — PROGRESS NOTES
INTERNAL MEDICINE RESIDENCY PROGRESS NOTE     Name: Hafsa Houston   Age & Sex: 59 y o  male   MRN: [id number]  Unit/Bed#: [facility] 411-01   Encounter: [id number]  Team: Terri Arias    PATIENT INFORMATION     Name: Hafsa Houston   Age & Sex: 59 y o  male   MRN: [id number]  Hospital Stay Days: 15    ASSESSMENT/PLAN     Principal Problem:    Acute HFrEF  Active Problems:    Ischemic cardiomyopathy    3-vessel CAD    Type 2 diabetes mellitus, with long-term current use of insulin (HCC)    V-tach ([facility] [address] )    A-fib ([facility] [address] )    Essential (primary) hypertension    Tobacco dependence syndrome    Hyponatremia    Acidosis    Localized swelling on left hand      * Acute HFrEF  Assessment & Plan  Wt Readings from Last 3 Encounters:   05/15/23 102 kg (224 lb 6 9 oz)   05/02/23 116 kg (254 lb 12 8 oz)   04/01/22 102 kg (224 lb 12 8 oz)   · Patient presenting with 2 weeks of dyspnea on exertion, lower extremity edema, abd distention, weight gain  · Nephrology and cardiology on board  · Echo showed ejection fraction of 10 to 15% with apical aneurysm  · Cardiac catheterization showed chronically occluded mid LAD and LPDA, not amenable to PCI  · Was on milrinone and Lasix drip, both discontinued 5/10/2023  · Started on Lasix 80 mg IV twice daily, decreased to 60 mg IV twice daily, continues to have good urine output, continues to lose weight      · Transition to Lasix 40 mg p o  twice daily on 5/15  · Continue Entresto 49-51 mg BID  · Continue spironolactone 25 mg daily  · Status post ICD placement 5/12/2023  · Escobar removed 5/13, voiding well  · Continue I/O, daily weight  · Appreciate heart failure team      3-vessel CAD  Assessment & Plan  · Patient with history of CAD with drug-eluting stent to LCx in 2015  · Patient reports dyspnea on exertion over the past 2 weeks, denies any chest pain at rest, is a diabetic on insulin, is an active smoker of 40 pack years, has prior history of CAD, is on daily aspirin  · LVEF 10-15%, continue medical management  Appreciate Cardiology comanagement and expertise  · Cath 5/3 with multivessel disease not amenable to PCI, continue medical management   Appreciate cardio recommendations  · Continue aspirin, Lipitor, metoprolol    V-tach [facility])  Assessment & Plan  · EP following and started amiodarone, given ischemic cardiomyopathy and VT plan for ICD  · S/p amio load    · Continue amiodarone 200 mg  · ICD placement on 5/12    Type 2 diabetes mellitus, with long-term current use of insulin [facility])  Assessment & Plan  Lab Results   Component Value Date    HGBA1C 12 0 (H) 04/14/2023       Recent Labs     05/14/23  1604 05/14/23  2050 05/15/23  0529 05/15/23  1042   POCGLU 255* 286* 102 231*       Blood Sugar Average: Last 72 hrs:  (P) 202     · Will hold oral medications   · Continue Lantus to 20 units at bedtime  · Continue SSI algorithm 4    A-fib (HCC)  Assessment & Plan  · At cardio appointment outpt when he appeared to be in A-fib RVR on EKG, also with Q waves concerning for prior infarct, in the setting of chest pressure last week concerning for MI  · Now in sinus, no episodes while inpatient  · Noted reduced LVEF of 10 to 15%  · Milrinone discontinued on 5/10  · Continue amiodarone 200 mg     Localized swelling on left hand  Assessment & Plan  · At the site of previous IV line, suspect superficial thrombophlebitis  · Elevation, cold compress  · Consider ultrasound if it worsens    Tobacco dependence syndrome  Assessment & Plan  Patient with over 40  pack-year history  Counseling provided on cessation, nicotine patch ordered    Essential (primary) hypertension  Assessment & Plan  · Continue Toprol 25 mg twice daily  · Continue Entresto 49-51 mg twice daily  · Spironolactone 25 mg      LINDEN (acute kidney injury) (HCC)-resolved as of 5/10/2023  Assessment & Plan  · Progressive increase in BUN and creatinine, possibly somewhat cardiorenal from poor forward perfusion  · Discussed with heart failure team, and milrinone infusion started  · We will also reach out to nephrology, appreciate the comanagement of all specializing services  · Improving      Disposition: Continue inpatient management, likely discharge tomorrow     SUBJECTIVE     Patient seen and examined  No acute events overnight  No acute complaints  Urinating without difficulty  Leg swelling much improved  OBJECTIVE     Vitals:    05/15/23 0345 05/15/23 0555 05/15/23 0703 05/15/23 1134   BP: 111/74  117/73 125/83   BP Location: Right arm  Right arm Right arm   Pulse: 77  76 76   Resp: 18  18 18   Temp: 98 3 °F (36 8 °C)  98 5 °F (36 9 °C) 97 5 °F (36 4 °C)   TempSrc: Oral  Oral Oral   SpO2: 95%  95% 95%   Weight:  102 kg (224 lb 6 9 oz)     Height:          Temperature:   Temp (24hrs), Av 2 °F (36 8 °C), Min:97 5 °F (36 4 °C), Max:98 5 °F (36 9 °C)    Temperature: 97 5 °F (36 4 °C)  Intake & Output:  I/O        0701   0700  0701  05/15 0700 05/15 0701   0700    P  O  748 630 900    I V  (mL/kg)       IV Piggyback       Total Intake(mL/kg) 748 (7 3) 630 (6 2) 900 (8 8)    Urine (mL/kg/hr) 5375 (2 2) 5150 (2 1)     Stool       Blood       Total Output 5375 5150     Net -9625 -2914 +900               Weights:   IBW (Ideal Body Weight): 77 6 kg    Body mass index is 30 44 kg/m²  Weight (last 2 days)     Date/Time Weight    05/15/23 0555 102 (224 43)    23 0533 103 (227 29)    23 0552 104 (229 72)        Physical Exam  Vitals reviewed  Constitutional:       General: He is not in acute distress  Appearance: He is obese  HENT:      Head: Normocephalic and atraumatic  Nose: No rhinorrhea  Eyes:      General: No scleral icterus  Cardiovascular:      Rate and Rhythm: Normal rate and regular rhythm  Pulses: Normal pulses  Heart sounds: Normal heart sounds  No murmur heard  No friction rub  No gallop  Pulmonary:      Effort: Pulmonary effort is normal  No respiratory distress        Breath sounds: Normal breath sounds  No wheezing, rhonchi or rales  Abdominal:      General: Bowel sounds are normal  There is no distension  Palpations: Abdomen is soft  Tenderness: There is no abdominal tenderness  There is no guarding or rebound  Musculoskeletal:      Right lower leg: Edema present  Left lower leg: Edema present  Skin:     General: Skin is warm and dry  Capillary Refill: Capillary refill takes less than 2 seconds  Coloration: Skin is not jaundiced  Neurological:      Mental Status: He is alert  Cranial Nerves: No dysarthria  Comments: CN 2-12 grossly intact, moves all 4 extremities   Psychiatric:         Mood and Affect: Mood normal          Behavior: Behavior normal        LABORATORY DATA     Labs: I have personally reviewed pertinent reports  Results from last 7 days   Lab Units 05/14/23  0530 05/13/23  0507 05/12/23  0522 05/09/23  0511   WBC Thousand/uL 9 01 7 41 7 92 11 68*   HEMOGLOBIN g/dL 15 1 15 5 15 6 14 3   HEMATOCRIT % 47 3 47 5 47 9 43 0   PLATELETS Thousands/uL 151 156 190 195   NEUTROS PCT % 74  --   --  81*   MONOS PCT % 8  --   --  10      Results from last 7 days   Lab Units 05/15/23  0547 05/14/23  0530 05/13/23  0507   POTASSIUM mmol/L 3 9 4 2 3 6   CHLORIDE mmol/L 98 97 96   CO2 mmol/L 33* 36* 38*   BUN mg/dL 13 15 16   CREATININE mg/dL 0 66 0 78 0 72   CALCIUM mg/dL 8 7 8 8 8 8     Results from last 7 days   Lab Units 05/14/23  0530 05/10/23  0444   MAGNESIUM mg/dL 2 1 1 9     Results from last 7 days   Lab Units 05/10/23  0444   PHOSPHORUS mg/dL 2 5      Results from last 7 days   Lab Units 05/12/23  0522   INR  1 03               IMAGING & DIAGNOSTIC TESTING     Radiology Results: I have personally reviewed pertinent reports  XR chest pa & lateral    Result Date: 5/2/2023  Impression: Right base nodular density and right pleural effusion  Recommend CT  The study was marked in [facility] for immediate notification       CT chest wo contrast    Result Date: 5/3/2023  Impression: Small to moderate right effusion with ascites may be due to congestion/heart failure Mild groundglass is seen at the right lung base, nonspecific related to atelectasis or pneumonitis Mild enlarged mediastinal lymph nodes may be reactive, consider follow-up at 3 months to demonstrate resolution/stability  The study was marked in [facility] for significant notification  Workstation performed: Stan Bob abdomen limited    Result Date: 5/6/2023  Impression: 1  Small volume ascites within all 4 quadrants  Workstation performed: [id number]     US kidney and bladder    Result Date: 5/7/2023  Impression: 1  No hydronephrosis  Trace perinephric fluid is seen bilaterally  2  The bladder is nondistended, limiting evaluation  3  Small volume ascites  Workstation performed: [id number]     Other Diagnostic Testing: I have personally reviewed pertinent reports      ACTIVE MEDICATIONS     Current Facility-Administered Medications   Medication Dose Route Frequency   • acetaminophen (TYLENOL) tablet 650 mg  650 mg Oral Q6H PRN   • acetaminophen (TYLENOL) tablet 650 mg  650 mg Oral Q4H PRN   • amiodarone tablet 200 mg  200 mg Oral Daily With Breakfast   • aspirin chewable tablet 81 mg  81 mg Oral Daily   • atorvastatin (LIPITOR) tablet 80 mg  80 mg Oral After Dinner   • furosemide (LASIX) tablet 40 mg  40 mg Oral BID (diuretic)   • heparin (porcine) subcutaneous injection 5,000 Units  5,000 Units Subcutaneous Q8H [address]   • insulin glargine (LANTUS) subcutaneous injection 20 Units 0 2 mL  20 Units Subcutaneous HS   • insulin lispro (HumaLOG) 100 units/mL subcutaneous injection 2-12 Units  2-12 Units Subcutaneous 4x Daily (AC & HS)   • lidocaine (LIDODERM) 5 % patch 1 patch  1 patch Topical Daily   • metoprolol succinate (TOPROL-XL) 24 hr tablet 25 mg  25 mg Oral Q12H   • nicotine (NICODERM CQ) 14 mg/24hr TD 24 hr patch 1 patch  1 patch Transdermal Daily   • nitroglycerin (NITROSTAT) SL tablet 0 4 mg  0 4 mg "Sublingual Q5 Min PRN   • ondansetron (ZOFRAN) injection 4 mg  4 mg Intravenous Q6H PRN   • polyethylene glycol (MIRALAX) packet 17 g  17 g Oral Daily   • sacubitril-valsartan (ENTRESTO) 49-51 MG per tablet 1 tablet  1 tablet Oral BID   • senna-docusate sodium (SENOKOT S) 8 6-50 mg per tablet 2 tablet  2 tablet Oral HS   • spironolactone (ALDACTONE) tablet 25 mg  25 mg Oral Daily       VTE Pharmacologic Prophylaxis: Heparin  VTE Mechanical Prophylaxis: sequential compression device    Portions of the record may have been created with voice recognition software  Occasional wrong word or \"sound a like\" substitutions may have occurred due to the inherent limitations of voice recognition software    Read the chart carefully and recognize, using context, where substitutions have occurred   ==  Victor Manuel Gonsales,   [address] [facility]  Internal Medicine Residency PGY-2       "

## 2023-05-15 NOTE — PROGRESS NOTES
Cardiology Progress Note   Jonathon Hanson 59 y o  male MRN: [id number]  Unit/Bed#: [facility] 411-01 Encounter: [id number]    Hospital Course/Assessment  The patient is a 52-year-old male with a past medical history of CAD status post PCI in 2015 who presented to the emergency department on 5/2 complaining of 2 weeks of worsening shortness of breath, lower extremity edema and 20 pound weight gain   In the ED he was found to be in rapid atrial fibrillation and heart failure   He received a dose of IV metoprolol and converted back to sinus rhythm   Echocardiogram was obtained which showed severely reduced LVEF to 10 to 15% and cardiac catheterization showed chronic occlusion of mid LAD and L PDA   The patient was admitted for heart failure exacerbation      Subjective:   Today the patient was seen and examined at bedside  Erika Worthy had no acute events overnight   He reports that his lower extremity swelling is improving   Otherwise he had no other acute complaints      Plan  1  Acute HFrEF, Stage D  -Secondary to ICM with large apical aneurysm  -Echocardiogram on 5/2 showed an EF of 10 to 15% with an apical aneurysm  -LHC on 5/3 showed a chronically occluded mid LAD and LPDA, not amenable to PCI  -Patient was started on milrinone 0 25 mcg/kg/min on 5/6 due to rising creatinine and worsening volume status -> improving -> decreased to 0 13 mcg/kg/min on 5/9 -> milrinone dc'd 5/10  -Patient was originally on Lasix 60 mg IV twice daily but was later switched to a Lasix drip on 5/7 -> improving -> decreased from 20 mg/h to 10 mg/hr on 5/9 -> Lasix gtt dc'd 5/10 and switched to bolus dosing -> will continue diuresis with PO Lasix 40 mg twice daily -> monitor I's and O's closely -> possible dc tomorrow  -Continue Metoprolol succinate 25 mg BID  -Continue Spironolactone 25 mg daily  -Entresto 24-26 mg daily started 5/9 -> Entresto increased to 49-52 mg BID on 5/10 -> continue  -Price check for Rebecca Mirza sent: $47  -S/p ICD placement 5/12  -Monitor daily BMP  -Keep K above 4 and mag above 2  -Monitor I's and O's and daily weights  -Will consider starting Jardiance prior to discharge     2   Atrial fibrillation  -Continue p o  amiodarone   -Continue metoprolol succinate 25 mg twice daily  -Continue telemetry monitoring  -EP following     3  Scar mediated VT  -Continue p o  amiodarone  -Continue metoprolol succinate 25 mg twice daily  -Continue telemetry monitoring  -S/p ICD placement 5/12       Abundio Cerna MD  - PGY-3 IM Resident  - Tiger text enabled    ======================================================  Objective  VS: Blood pressure 125/83, pulse 76, temperature 97 5 °F (36 4 °C), temperature source Oral, resp  rate 18, height 6' (1 829 m), weight 102 kg (224 lb 6 9 oz), SpO2 95 %  Gen: well appearing  Psych: AOx3  Skin: intact  Cardiac: S1, S2, regular rate, no S3 or S4 appreciated  No murmurs  +2 PT, radial pulses  Bilateral lower extremity edema  No carotid bruits  Resp: CTABL  No crackles  MSK: 5/5 strength throughout muscle groups  Neuro: CN grossly intact  Sensory to light touch, pain, proprioception intact BL LE, UE  LN: no cervical LAD  Rheum: no joint deformities in UE or LE  ======================================================  TREADMILL STRESS  No results found for this or any previous visit      ----------------------------------------------------------------------------------------------  NUCLEAR STRESS TEST: No results found for this or any previous visit      No results found for this or any previous visit       --------------------------------------------------------------------------------  CATH:  No results found for this or any previous visit     --------------------------------------------------------------------------------  ECHO:   Results for orders placed during the hospital encounter of 09/26/17    Echo complete with contrast if indicated    Narrative  [facility] [address] [facility]  [phone]    Transthoracic Echocardiogram  2D, M-mode, Doppler, and Color Doppler    Study date:  26-Sep-2017    Patient: Mary Blum  MR number: [id number]  Account number: [de-identified]  : 1960  Age: 65 years  Gender: Male  Status: Outpatient  Location: [address] [place] [address] [facility] [place]  Height: 72 in  Weight: 263 3 lb  BP: 142/ 88 mmHg    Indications: CAD    Diagnoses: I25 10 - Atherosclerotic heart disease of native coronary artery without angina pectoris    Sonographer:  OLIVE Carpenter  Primary Physician:  Wilda Pérez MD  Referring Physician:  Sada Paulino MD  Group:  [facility]  Interpreting Physician:  Holland Buchanan MD    SUMMARY    LEFT VENTRICLE:  Size was normal   Systolic function was normal  Ejection fraction was estimated to be 65 %  There was mild concentric hypertrophy  Doppler parameters were consistent with abnormal left ventricular relaxation (grade 1 diastolic dysfunction)  RIGHT VENTRICLE:  The size was normal   Systolic function was normal     TRICUSPID VALVE:  There was trace regurgitation  HISTORY: PRIOR HISTORY: Hypertension, CAD s/p PCI, smoker, diabetes, high cholesterol    PROCEDURE: The study was performed in the [address]  This was a routine study  The transthoracic approach was used  The study included complete 2D imaging, M-mode, complete spectral Doppler, and color Doppler  The  heart rate was 112 bpm, at the start of the study  Images were obtained from the parasternal, apical, subcostal, and suprasternal notch acoustic windows  Echocardiographic views were limited due to decreased penetration  This was a  technically difficult study  LEFT VENTRICLE: Size was normal  Systolic function was normal  Ejection fraction was estimated to be 65 %  There were no regional wall motion abnormalities  Wall thickness was mildly increased   There was mild concentric hypertrophy  DOPPLER: Doppler parameters were consistent with abnormal left ventricular relaxation (grade 1 diastolic dysfunction)  RIGHT VENTRICLE: The size was normal  Systolic function was normal  Wall thickness was normal     LEFT ATRIUM: Size was normal     RIGHT ATRIUM: Size was normal     MITRAL VALVE: Valve structure was normal  There was normal leaflet separation  DOPPLER: The transmitral velocity was within the normal range  There was no evidence for stenosis  There was no regurgitation  AORTIC VALVE: The valve was trileaflet  Leaflets exhibited normal thickness and normal cuspal separation  DOPPLER: Transaortic velocity was within the normal range  There was no evidence for stenosis  There was no regurgitation  TRICUSPID VALVE: The valve structure was normal  There was normal leaflet separation  DOPPLER: The transtricuspid velocity was within the normal range  There was no evidence for stenosis  There was trace regurgitation  The tricuspid jet  envelope definition was inadequate for estimation of RV systolic pressure  There are no indirect findings suggestive of moderate or severe pulmonary hypertension  PULMONIC VALVE: Leaflets exhibited normal thickness, no calcification, and normal cuspal separation  DOPPLER: The transpulmonic velocity was within the normal range  There was no regurgitation  PERICARDIUM: There was no pericardial effusion  The pericardium was normal in appearance  AORTA: The root exhibited normal size  SYSTEMIC VEINS: IVC: The inferior vena cava was normal in size and course   Respirophasic changes were normal     SYSTEM MEASUREMENT TABLES    2D  %FS: 47 6 %  AV Diam: 3 34 cm  EDV(Teich): 101 71 ml  EF Biplane: 67 76 %  EF(Cube): 85 61 %  EF(Teich): 79 %  ESV(Cube): 14 82 ml  ESV(Teich): 21 36 ml  IVSd: 1 58 cm  LA Area: 12 78 cm2  LA Diam: 3 37 cm  LVEDV MOD A2C: 49 73 ml  LVEDV MOD A4C: 61 78 ml  LVEDV MOD BP: 58 1 ml  LVEF MOD A2C: 65 64 %  LVEF MOD A4C: 71 2 %  LVESV MOD A2C: 17 09 ml  LVESV MOD A4C: 17 79 ml  LVESV MOD BP: 18 73 ml  LVIDd: 4 69 cm  LVIDs: 2 46 cm  LVLd A2C: 6 97 cm  LVLd A4C: 7 74 cm  LVLs A2C: 5 23 cm  LVLs A4C: 6 13 cm  LVPWd: 1 23 cm  RA Area: 14 52 cm2  RV Diam : 2 86 cm  SI(Cube): 36 73 ml/m2  SI(Teich): 33 48 ml/m2  SV MOD A2C: 32 64 ml  SV MOD A4C: 43 98 ml  SV(Cube): 88 15 ml  SV(Teich): 80 35 ml    MM  TAPSE: 3 26 cm    PW  E': 0 08 m/s    [facility] Accredited [facility]    Prepared and electronically signed by    Jordana Short MD  Signed 26-Sep-2017 12:55:39    No results found for this or any previous visit     --------------------------------------------------------------------------------  HOLTER  No results found for this or any previous visit     --------------------------------------------------------------------------------  CAROTIDS  No results found for this or any previous visit         [unfilled]   =====================================================    Active Meds    Current Facility-Administered Medications:   •  acetaminophen (TYLENOL) tablet 650 mg, 650 mg, Oral, Q6H PRN, Vinton Najjar, CRNP  •  acetaminophen (TYLENOL) tablet 650 mg, 650 mg, Oral, Q4H PRN, Conner Zavaleta, PA-C  •  amiodarone tablet 200 mg, 200 mg, Oral, Daily With Breakfast, Melita Jamil MD, 200 mg at 05/15/23 0902  •  aspirin chewable tablet 81 mg, 81 mg, Oral, Daily, Maycol Jones DO, 81 mg at 05/15/23 0902  •  atorvastatin (LIPITOR) tablet 80 mg, 80 mg, Oral, After Dinner, Maycol Jones DO, 80 mg at 05/14/23 1716  •  furosemide (LASIX) tablet 40 mg, 40 mg, Oral, BID (diuretic), Diego Galvez PA-C, 40 mg at 05/15/23 0902  •  heparin (porcine) subcutaneous injection 5,000 Units, 5,000 Units, Subcutaneous, Q8H [address], Odalis Waldrop, 5,000 Units at 05/15/23 0548  •  insulin glargine (LANTUS) subcutaneous injection 20 Units 0 2 mL, 20 Units, Subcutaneous, HS, Martell Hirsch MD, 20 Units at 05/14/23 2113  • insulin lispro (HumaLOG) 100 units/mL subcutaneous injection 2-12 Units, 2-12 Units, Subcutaneous, 4x Daily (AC & HS), 4 Units at 05/15/23 1222 **AND** Fingerstick Glucose (POCT), , , 4x Daily AC and at bedtime, CARLA Aguirre  •  lidocaine (LIDODERM) 5 % patch 1 patch, 1 patch, Topical, Daily, Curt Guadarramaner, 1 patch at 05/15/23 0902  •  metoprolol succinate (TOPROL-XL) 24 hr tablet 25 mg, 25 mg, Oral, Q12H, Juan Luis Senior PA-C, 25 mg at 05/15/23 0902  •  nicotine (NICODERM CQ) 14 mg/24hr TD 24 hr patch 1 patch, 1 patch, Transdermal, Daily, Maycol Jones, DO  •  nitroglycerin (NITROSTAT) SL tablet 0 4 mg, 0 4 mg, Sublingual, Q5 Min PRN, Maycol Jones, DO  •  ondansetron (ZOFRAN) injection 4 mg, 4 mg, Intravenous, Q6H PRN, Maycol Jones, DO, 4 mg at 05/06/23 0954  •  polyethylene glycol (MIRALAX) packet 17 g, 17 g, Oral, Daily, Adi Craft Minnix, DO, 17 g at 05/09/23 0857  •  sacubitril-valsartan (ENTRESTO) 49-51 MG per tablet 1 tablet, 1 tablet, Oral, BID, Mohamud Peraza MD, 1 tablet at 05/15/23 3146  •  senna-docusate sodium (SENOKOT S) 8 6-50 mg per tablet 2 tablet, 2 tablet, Oral, HS, Adi Craft Minnix, DO, 2 tablet at 05/11/23 2120  •  spironolactone (ALDACTONE) tablet 25 mg, 25 mg, Oral, Daily, Ale Colvin MD, 25 mg at 05/15/23 0902    Labs & Results  Lab Results   Component Value Date    TROPONINI 2 79 (H) 02/16/2015    TROPONINI 1 31 (H) 02/16/2015     Lab Results   Component Value Date    GLUCOSE 203 (H) 02/17/2015    CALCIUM 8 7 05/15/2023     02/17/2015    K 3 9 05/15/2023    CO2 33 (H) 05/15/2023    CL 98 05/15/2023    BUN 13 05/15/2023    CREATININE 0 66 05/15/2023     Lab Results   Component Value Date    WBC 9 01 05/14/2023    HGB 15 1 05/14/2023    HCT 47 3 05/14/2023    MCV 84 05/14/2023     05/14/2023     Results from last 7 days   Lab Units 05/12/23  0522   INR  1 03     Lab Results   Component Value Date    CHOL 130 05/07/2015    CHOL 188 02/17/2015     Lab Results   Component Value Date    HDL 34 05/07/2015    HDL 43 02/17/2015     Lab Results   Component Value Date    LDLCALC 72 05/07/2015    LDLCALC 119 (H) 02/17/2015     Lab Results   Component Value Date    TRIG 122 05/07/2015    TRIG 128 02/17/2015     Lab Results   Component Value Date    ALT 45 05/03/2023    AST 34 05/03/2023

## 2023-05-15 NOTE — CASE MANAGEMENT
Case Management Discharge Planning Note    Patient name Nirali Redd  Location [facility] 411/[facility] 589-51 MRN [id number]  : 1960 Date 5/15/2023       Current Admission Date: 2023  Current Admission Diagnosis:Acute HFrEF   Patient Active Problem List    Diagnosis Date Noted   • Localized swelling on left hand 2023   • Hyponatremia 2023   • Acidosis 2023   • V-tach ([facility] [address] ) 2023   • Ischemic cardiomyopathy 2023   • Essential (primary) hypertension 2023   • Acute HFrEF 2023   • A-fib ([facility] [address] ) 2023   • Benign prostatic hyperplasia without lower urinary tract symptoms 2018   • 3-vessel CAD 2015   • Type 2 diabetes mellitus, with long-term current use of insulin ([facility] [address] ) 2015   • GERD (gastroesophageal reflux disease) 2013   • Tobacco dependence syndrome 2012      LOS (days): 13  Geometric Mean LOS (GMLOS) (days): 4 10  Days to GMLOS:-9     OBJECTIVE:  Risk of Unplanned Readmission Score: 12 5         Current admission status: Inpatient   Preferred Pharmacy:   [address] [place] [address]  Phone: [phone] Fax: [address] [place] [address]  Phone: [phone] Fax: [phone]    Primary Care Provider: El Navas DO    Primary Insurance: [facility] REP  Secondary Insurance:     DISCHARGE DETAILS:                                                                                        IMM Given (Date):: 05/15/23  IMM Given to[de-identified] Patient     Additional Comments: NSTEMI, ICD, CHF  Ready for d/c when cleared by heart failure team  Doing well, plan [facility] nsg and PT  Has cane and RW at home  IMM was signed, family to transport when cleared

## 2023-05-15 NOTE — ASSESSMENT & PLAN NOTE
Wt Readings from Last 3 Encounters:   05/15/23 102 kg (224 lb 6 9 oz)   05/02/23 116 kg (254 lb 12 8 oz)   04/01/22 102 kg (224 lb 12 8 oz)   · Patient presenting with 2 weeks of dyspnea on exertion, lower extremity edema, abd distention, weight gain  · Nephrology and cardiology on board  · Echo showed ejection fraction of 10 to 15% with apical aneurysm  · Cardiac catheterization showed chronically occluded mid LAD and LPDA, not amenable to PCI  · Was on milrinone and Lasix drip, both discontinued 5/10/2023  · Status post ICD placement 5/12/2023  · Started on Lasix 80 mg IV twice daily, decreased to 60 mg IV twice daily  · Transition to Lasix 40 mg p o  twice daily on 5/15    · Continue Lasix 40 mg p o  daily, Entresto 49-51 mg BID, spironolactone 25 mg daily  · Start Jardiance 10 mg daily, ootassium 20 mill equivalents daily  · Follow-up with heart failure in 1 week

## 2023-05-16 VITALS
HEART RATE: 72 BPM | DIASTOLIC BLOOD PRESSURE: 82 MMHG | TEMPERATURE: 97.7 F | HEIGHT: 72 IN | SYSTOLIC BLOOD PRESSURE: 115 MMHG | RESPIRATION RATE: 18 BRPM | OXYGEN SATURATION: 97 % | WEIGHT: 222.66 LBS | BODY MASS INDEX: 30.16 KG/M2

## 2023-05-16 LAB
ANION GAP SERPL CALCULATED.3IONS-SCNC: 0 MMOL/L (ref 4–13)
BUN SERPL-MCNC: 16 MG/DL (ref 5–25)
CALCIUM SERPL-MCNC: 8.9 MG/DL (ref 8.3–10.1)
CHLORIDE SERPL-SCNC: 99 MMOL/L (ref 96–108)
CO2 SERPL-SCNC: 32 MMOL/L (ref 21–32)
CREAT SERPL-MCNC: 0.77 MG/DL (ref 0.6–1.3)
GFR SERPL CREATININE-BSD FRML MDRD: 97 ML/MIN/1.73SQ M
GLUCOSE SERPL-MCNC: 185 MG/DL (ref 65–140)
GLUCOSE SERPL-MCNC: 199 MG/DL (ref 65–140)
GLUCOSE SERPL-MCNC: 222 MG/DL (ref 65–140)
POTASSIUM SERPL-SCNC: 4.7 MMOL/L (ref 3.5–5.3)
SODIUM SERPL-SCNC: 131 MMOL/L (ref 135–147)

## 2023-05-16 RX ORDER — FUROSEMIDE 40 MG/1
40 TABLET ORAL DAILY
Qty: 30 TABLET | Refills: 0 | Status: SHIPPED | OUTPATIENT
Start: 2023-05-17 | End: 2023-05-22 | Stop reason: SDUPTHER

## 2023-05-16 RX ORDER — SPIRONOLACTONE 25 MG/1
25 TABLET ORAL DAILY
Qty: 30 TABLET | Refills: 0 | Status: SHIPPED | OUTPATIENT
Start: 2023-05-16 | End: 2023-05-22 | Stop reason: SDUPTHER

## 2023-05-16 RX ORDER — AMOXICILLIN 250 MG
2 CAPSULE ORAL
Qty: 60 TABLET | Refills: 0 | Status: SHIPPED | OUTPATIENT
Start: 2023-05-16 | End: 2023-06-15

## 2023-05-16 RX ORDER — POTASSIUM CHLORIDE 20 MEQ/1
20 TABLET, EXTENDED RELEASE ORAL DAILY
Status: DISCONTINUED | OUTPATIENT
Start: 2023-05-17 | End: 2023-05-16 | Stop reason: HOSPADM

## 2023-05-16 RX ORDER — FUROSEMIDE 40 MG/1
40 TABLET ORAL DAILY
Status: DISCONTINUED | OUTPATIENT
Start: 2023-05-17 | End: 2023-05-16 | Stop reason: HOSPADM

## 2023-05-16 RX ORDER — FUROSEMIDE 40 MG/1
40 TABLET ORAL 2 TIMES DAILY
Qty: 60 TABLET | Refills: 0 | Status: CANCELLED | OUTPATIENT
Start: 2023-05-16 | End: 2023-06-15

## 2023-05-16 RX ORDER — METOPROLOL SUCCINATE 25 MG/1
25 TABLET, EXTENDED RELEASE ORAL EVERY 12 HOURS
Qty: 60 TABLET | Refills: 0 | Status: SHIPPED | OUTPATIENT
Start: 2023-05-16 | End: 2023-05-22 | Stop reason: SDUPTHER

## 2023-05-16 RX ORDER — POTASSIUM CHLORIDE 20 MEQ/1
20 TABLET, EXTENDED RELEASE ORAL DAILY
Qty: 30 TABLET | Refills: 0 | Status: SHIPPED | OUTPATIENT
Start: 2023-05-17 | End: 2023-05-22 | Stop reason: SDUPTHER

## 2023-05-16 RX ORDER — LIDOCAINE 50 MG/G
1 PATCH TOPICAL DAILY
Qty: 3 PATCH | Refills: 0 | Status: SHIPPED | OUTPATIENT
Start: 2023-05-16 | End: 2023-05-19

## 2023-05-16 RX ORDER — NICOTINE 21 MG/24HR
1 PATCH, TRANSDERMAL 24 HOURS TRANSDERMAL DAILY
Qty: 28 PATCH | Refills: 0 | Status: SHIPPED | OUTPATIENT
Start: 2023-05-16

## 2023-05-16 RX ORDER — POTASSIUM CHLORIDE 20 MEQ/1
40 TABLET, EXTENDED RELEASE ORAL ONCE
Status: COMPLETED | OUTPATIENT
Start: 2023-05-16 | End: 2023-05-16

## 2023-05-16 RX ORDER — AMIODARONE HYDROCHLORIDE 200 MG/1
200 TABLET ORAL
Qty: 30 TABLET | Refills: 0 | Status: SHIPPED | OUTPATIENT
Start: 2023-05-16 | End: 2023-05-22 | Stop reason: SDUPTHER

## 2023-05-16 RX ORDER — ATORVASTATIN CALCIUM 80 MG/1
80 TABLET, FILM COATED ORAL
Qty: 30 TABLET | Refills: 0 | Status: SHIPPED | OUTPATIENT
Start: 2023-05-16 | End: 2023-05-22 | Stop reason: SDUPTHER

## 2023-05-16 RX ADMIN — INSULIN LISPRO 2 UNITS: 100 INJECTION, SOLUTION INTRAVENOUS; SUBCUTANEOUS at 06:30

## 2023-05-16 RX ADMIN — SACUBITRIL AND VALSARTAN 1 TABLET: 49; 51 TABLET, FILM COATED ORAL at 09:58

## 2023-05-16 RX ADMIN — FUROSEMIDE 40 MG: 40 TABLET ORAL at 09:58

## 2023-05-16 RX ADMIN — ASPIRIN 81 MG 81 MG: 81 TABLET ORAL at 09:58

## 2023-05-16 RX ADMIN — POTASSIUM CHLORIDE 40 MEQ: 1500 TABLET, EXTENDED RELEASE ORAL at 09:58

## 2023-05-16 RX ADMIN — AMIODARONE HYDROCHLORIDE 200 MG: 200 TABLET ORAL at 09:58

## 2023-05-16 RX ADMIN — SPIRONOLACTONE 25 MG: 25 TABLET ORAL at 09:58

## 2023-05-16 RX ADMIN — HEPARIN SODIUM 5000 UNITS: 5000 INJECTION INTRAVENOUS; SUBCUTANEOUS at 05:20

## 2023-05-16 RX ADMIN — LIDOCAINE 5% 1 PATCH: 700 PATCH TOPICAL at 10:02

## 2023-05-16 RX ADMIN — METOPROLOL SUCCINATE 25 MG: 25 TABLET, EXTENDED RELEASE ORAL at 09:58

## 2023-05-16 NOTE — OCCUPATIONAL THERAPY NOTE
Occupational Therapy Screen        Patient Name: Bianca Ty  XDHKH'H Date: 5/16/2023 05/16/23 0810   OT Last Visit   OT Visit Date 05/16/23   Note Type   Note type Screen   Additional Comments Pt seen ambulating in hallway, independently with no AD  Pt does not require further acute OT services while in the hospital  OT will sign off       Yang Becker OTR/[place]

## 2023-05-16 NOTE — PLAN OF CARE
Problem: PHYSICAL THERAPY ADULT  Goal: Performs mobility at highest level of function for planned discharge setting  See evaluation for individualized goals  Description: Treatment/Interventions: Functional transfer training, LE strengthening/ROM, Elevations, Therapeutic exercise, Endurance training, Bed mobility, Gait training, Spoke to nursing, Spoke to case management, OT          See flowsheet documentation for full assessment, interventions and recommendations  Outcome: Adequate for Discharge  Note: Prognosis: Good  Problem List: Decreased strength, Decreased endurance, Impaired balance, Decreased mobility  Assessment: Pt demonstrated overall improvement in balance, endurance and all aspects of observed mobility progressing to mod (I) level on level surfaces (incl amb w/o AD) and (S) on the steps; no overt uncorrected LOB, gross knee buckling, or swaying observed during the session; no increased discomfort or excessive fatigue expressed; LE therex/HEP reviewed and performed; pt appeared to be comfortable at the end of session; overall, cont to anticipate pt will return home w/ available family support upon D/C from PT/mobility stand point and when medically cleared; home PT follow up is recommended; no other immediate PT needs otherwise identified while in the hospital; pt informed and concurred; pt expressed no concerns about going home from mobility stand point, when medically cleared; will therefore sign off  Barriers to Discharge: None     PT Discharge Recommendation: Home with home health rehabilitation    See flowsheet documentation for full assessment

## 2023-05-16 NOTE — PHYSICAL THERAPY NOTE
Physical Therapy Tx note  Patient's Name: Andre Mark    Admitting Diagnosis  A-fib ([facility] [address] ) [I48 91]  Essential (primary) hypertension [I10]  Elevated troponin [R77 8]  Bilateral lower extremity edema [R60 0]    Problem List  Patient Active Problem List   Diagnosis   • Essential (primary) hypertension   • Benign prostatic hyperplasia without lower urinary tract symptoms   • 3-vessel CAD   • Type 2 diabetes mellitus, with long-term current use of insulin ([facility])   • GERD (gastroesophageal reflux disease)   • Tobacco dependence syndrome   • Acute HFrEF   • A-fib ([facility])   • Ischemic cardiomyopathy   • V-tach [facility])   • Hyponatremia   • Acidosis   • Localized swelling on left hand       Past Medical History  Past Medical History:   Diagnosis Date   • Myocardial infarction [facility])        Past Surgical History  Past Surgical History:   Procedure Laterality Date   • CARDIAC CATHETERIZATION N/A 5/3/2023    Procedure: Cardiac Coronary Angiogram;  Surgeon: Jasmin Cheung MD;  Location: [facility] CARDIAC CATH LAB; Service: Cardiology   • CARDIAC CATHETERIZATION Left 5/3/2023    Procedure: Cardiac Left Heart Cath;  Surgeon: Jasmin Cheung MD;  Location: [facility] CARDIAC CATH [facility]; Service: Cardiology   • CARDIAC ELECTROPHYSIOLOGY PROCEDURE N/A 5/12/2023    Procedure: Cardiac icd implant;  Surgeon: Suki Bates MD;  Location: [facility] CARDIAC CATH LAB; Service: Cardiology          05/16/23 0950   PT Last Visit   PT Visit Date 05/16/23   Note Type   Note Type Treatment   Pain Assessment   Pain Assessment Tool 0-10   Pain Score No Pain   Restrictions/Precautions   Other Precautions Telemetry;Cardiac/sternal;Hard of hearing  ((L) UE post ICD placement)   General   Chart Reviewed Yes   Additional Pertinent History cleared for Tx session (spoke to nsg)   Response to Previous Treatment Patient with no complaints from previous session  Cognition   Overall Cognitive Status WFL   Arousal/Participation Alert; Cooperative   Attention Within functional limits   Orientation Level Oriented to person;Oriented to place;Oriented to situation   Memory Within functional limits   Following Commands Follows all commands and directions without difficulty   Subjective   Subjective Pt is observed amb at bravo in the hallway on his own and w/o AD; agreeable to try steps, go over HEP and progress w/ amb distances   Transfers   Sit to Stand 6  Modified independent   Stand to Sit 6  Modified independent   Ambulation/Elevation   Gait pattern Short stride; Excessively slow  (no overt LOB, knee buckling or swaying)   Gait Assistance 6  Modified independent   Assistive Device None   Distance 3 x 30 ft w/ seated rest periods and steps negotiation in between; after another seated rest, additional 2 x 130 ft w/ seated rest period in between   Stair Management Assistance 5  Supervision   Stair Management Technique Step to pattern;Nonreciprocal;One rail R;Other (Comment); Foreward;Backward  ((R) hand rail (x 2 steps) --> (R) wall (x 2 steps))   Number of Stairs 4  (1 step x 4 trials)   Balance   Static Sitting Good   Static Standing Fair +   Ambulatory Fair   Activity Tolerance   Activity Tolerance Patient tolerated treatment well   Nurse Made Aware spoke to KAVEH Floyd   Exercises   Knee AROM Long Arc Quad Sitting;20 reps;AROM; Bilateral   Ankle Pumps Sitting;20 reps;AROM; Bilateral   THR Sitting;10 reps;AROM; Bilateral   Assessment   Assessment Pt demonstrated overall improvement in balance, endurance and all aspects of observed mobility progressing to mod (I) level on level surfaces (incl amb w/o AD) and (S) on the steps; no overt uncorrected LOB, gross knee buckling, or swaying observed during the session; no increased discomfort or excessive fatigue expressed; LE therex/HEP reviewed and performed; pt appeared to be comfortable at the end of session; overall, cont to anticipate pt will return home w/ available family support upon D/C from PT/mobility stand point and when medically cleared; home PT follow up is recommended; no other immediate PT needs otherwise identified while in the hospital; pt informed and concurred; pt expressed no concerns about going home from mobility stand point, when medically cleared; will therefore sign off   Barriers to Discharge None   Goals   Patient Goals to go home   PT Treatment Day 4   Plan   Treatment/Interventions Spoke to nursing;Spoke to case management   Progress Discontinue PT   Recommendation   PT Discharge Recommendation Home with home health rehabilitation   Equipment Recommended   (none)   Walker Package Recommended   (none)   [place] 8 in Flat Bed Without Bedrails 4   Lying on Back to Sitting on Edge of Flat Bed Without Bedrails 4   Moving Bed to Chair 4   Standing Up From Chair Using Arms 4   Walk in Room 4   Climb 3-5 Stairs With Railing 3   Basic Mobility Inpatient Raw Score 23   Basic Mobility Standardized Score 50 88   Highest Level Of Mobility   JH-HLM Goal 7: Walk 25 feet or more   JH-HLM Achieved 8: Walk 250 feet ot more   Education   Education Provided Mobility training;Home exercise program   Patient Demonstrates verbal understanding   End of Consult   Patient Position at End of Consult Bedside chair; All needs within reach         [facility], PT

## 2023-05-16 NOTE — DISCHARGE SUMMARY
INTERNAL MEDICINE RESIDENCY DISCHARGE SUMMARY     Florentin Ivy   59 y o  male  MRN: [id number]  Room/Bed: [facility] 411/[facility] [address]    Encounter: [id number]    Principal Problem:    Acute HFrEF  Active Problems:    Ischemic cardiomyopathy    3-vessel CAD    Type 2 diabetes mellitus, with long-term current use of insulin (HCC)    V-tach (HCC)    A-fib ([address] )    Essential (primary) hypertension    Tobacco dependence syndrome    Hyponatremia    Acidosis    Localized swelling on left hand      * Acute HFrEF  Assessment & Plan  Wt Readings from Last 3 Encounters:   05/15/23 102 kg (224 lb 6 9 oz)   05/02/23 116 kg (254 lb 12 8 oz)   04/01/22 102 kg (224 lb 12 8 oz)   · Patient presenting with 2 weeks of dyspnea on exertion, lower extremity edema, abd distention, weight gain  · Nephrology and cardiology on board  · Echo showed ejection fraction of 10 to 15% with apical aneurysm  · Cardiac catheterization showed chronically occluded mid LAD and LPDA, not amenable to PCI  · Was on milrinone and Lasix drip, both discontinued 5/10/2023  · Status post ICD placement 5/12/2023  · Started on Lasix 80 mg IV twice daily, decreased to 60 mg IV twice daily  · Transition to Lasix 40 mg p o  twice daily on 5/15    · Continue Lasix 40 mg p o  daily, Entresto 49-51 mg BID, spironolactone 25 mg daily  · Start Jardiance 10 mg daily, ootassium 20 mill equivalents daily  · Follow-up with heart failure in 1 week      3-vessel CAD  Assessment & Plan  · Patient with history of CAD with drug-eluting stent to LCx in 2015  · Patient reports dyspnea on exertion over the past 2 weeks, denies any chest pain at rest, is a diabetic on insulin, is an active smoker of 40 pack years, has prior history of CAD, is on daily aspirin  · LVEF 10-15%, continue medical management   Appreciate Cardiology comanagement and expertise  · Cath 5/3 with multivessel disease not amenable to PCI, continue medical management   Appreciate cardio recommendations  · Continue aspirin, Lipitor, metoprolol    V-tach [facility])  Assessment & Plan  · EP following and started amiodarone, given ischemic cardiomyopathy and VT plan for ICD  · S/p amio load    · Continue amiodarone 200 mg  · ICD placement on 5/12    Type 2 diabetes mellitus, with long-term current use of insulin [facility])  Assessment & Plan  Lab Results   Component Value Date    HGBA1C 12 0 (H) 04/14/2023       Recent Labs     05/14/23  1604 05/14/23  2050 05/15/23  0529 05/15/23  1042   POCGLU 255* 286* 102 231*       Blood Sugar Average: Last 72 hrs:  (P) 202     · Continue glimepiride 4 mg, metformin 1000 mg, Janumet    A-fib (HCC)  Assessment & Plan  · At cardio appointment outpt when he appeared to be in A-fib RVR on EKG, also with Q waves concerning for prior infarct, in the setting of chest pressure last week concerning for MI  · Now in sinus, no episodes while inpatient  · Noted reduced LVEF of 10 to 15%  · Milrinone discontinued on 5/10  · Continue amiodarone 200 mg     Localized swelling on left hand  Assessment & Plan  · At the site of previous IV line, suspect superficial thrombophlebitis  · Elevation, cold compress  · Consider ultrasound if it worsens    Tobacco dependence syndrome  Assessment & Plan  Patient with over 40  pack-year history  Counseling provided on cessation, nicotine patch ordered    Essential (primary) hypertension  Assessment & Plan  · Continue Toprol 25 mg twice daily  · Continue Entresto 49-51 mg twice daily  · Spironolactone 25 mg      LINDEN (acute kidney injury) (HCC)-resolved as of 5/10/2023  Assessment & Plan  · Progressive increase in BUN and creatinine, possibly somewhat cardiorenal from poor forward perfusion  · Discussed with heart failure team, and milrinone infusion started  · We will also reach out to nephrology, appreciate the comanagement of all specializing services  · Improving      [address]     64-year-old male with history of coronary artery disease status post balloon 6 stenting of mid circumflex and 2015, hypertension, atrial fibrillation, hyperlipidemia, diabetes who was admitted on 5/2 for heart failure and V  tach with RBBB  Electrophysiology consulted and suspected arrhythmia due to scarring  Patient underwent IV and Amio load  Continued on p o  amiodarone 200 mg  Underwent ICD placement on 5/12  Heart failure consulted  Patient required milrinone due to LINDEN and Lasix drip  Both drips discontinued on 5/10 and transitioned IV Lasix 80 twice daily  Eventually transitioned to p o  Lasix 40 mg daily, Entresto 49-51 mg twice daily-51, spironolactone 25 mg, Toprol 25 mg twice daily  On the day of discharge, patient was seen walking the hallways and had no acute complaints  Physical Exam  Vitals reviewed  Constitutional:       General: He is not in acute distress  HENT:      Head: Normocephalic and atraumatic  Nose: No rhinorrhea  Eyes:      General: No scleral icterus  Cardiovascular:      Rate and Rhythm: Normal rate and regular rhythm  Pulses: Normal pulses  Heart sounds: Normal heart sounds  No murmur heard  No friction rub  No gallop  Pulmonary:      Effort: Pulmonary effort is normal  No respiratory distress  Breath sounds: Normal breath sounds  No wheezing, rhonchi or rales  Abdominal:      General: Bowel sounds are normal  There is no distension  Palpations: Abdomen is soft  Tenderness: There is no abdominal tenderness  There is no guarding or rebound  Musculoskeletal:      Right lower leg: Edema (trace) present  Left lower leg: Edema (trace) present  Skin:     General: Skin is warm and dry  Capillary Refill: Capillary refill takes less than 2 seconds  Coloration: Skin is not jaundiced  Neurological:      Mental Status: He is alert  Cranial Nerves: No dysarthria        Comments: CN 2-12 grossly intact, moves all 4 extremities   Psychiatric: Mood and Affect: Mood normal          Behavior: Behavior normal            DISCHARGE INFORMATION     PCP at Discharge: Charo Albarado DO    Admitting Provider: Recardo Severs, DO  Admission Date: 5/2/2023    Discharge Provider: Yokasta Gomez MD  Discharge Date: 5/16/2023    Discharge Disposition: Home with [facility]  Discharge Condition: stable  Discharge with Lines: no    Discharge Diet: cardiac diet  Activity Restrictions: none  Test Results Pending at Discharge: None    Discharge Diagnoses:  Principal Problem:    Acute HFrEF  Active Problems:    Ischemic cardiomyopathy    3-vessel CAD    Type 2 diabetes mellitus, with long-term current use of insulin (HCC)    V-tach ([facility] )    A-fib ([facility] )    Essential (primary) hypertension    Tobacco dependence syndrome    Hyponatremia    Acidosis    Localized swelling on left hand  Resolved Problems:    LINDEN (acute kidney injury) ([facility] )    Other constipation      Consulting Providers:      Diagnostic & Therapeutic Procedures Performed:  XR chest pa & lateral    Result Date: 5/2/2023  Impression: Right base nodular density and right pleural effusion  Recommend CT  The study was marked in [facility] for immediate notification  CT chest wo contrast    Result Date: 5/3/2023  Impression: Small to moderate right effusion with ascites may be due to congestion/heart failure Mild groundglass is seen at the right lung base, nonspecific related to atelectasis or pneumonitis Mild enlarged mediastinal lymph nodes may be reactive, consider follow-up at 3 months to demonstrate resolution/stability  The study was marked in [facility] for significant notification  Workstation performed: Stan Rojas abdomen limited    Result Date: 5/6/2023  Impression: 1  Small volume ascites within all 4 quadrants  Workstation performed: [id number]     US kidney and bladder    Result Date: 5/7/2023  Impression: 1  No hydronephrosis  Trace perinephric fluid is seen bilaterally   2  The bladder is nondistended, limiting evaluation  3  Small volume ascites   Workstation performed: [id number]       Code Status: Level 1 - Full Code  Advance Directive & Living Will: <no information>  Power of :    POLST:      Medications:  Discharge Medication List as of 5/16/2023 12:22 PM      STOP taking these medications       lisinopril (ZESTRIL) 10 mg tablet Comments:   Reason for Stopping:             Discharge Medication List as of 5/16/2023 12:22 PM      START taking these medications    Details   amiodarone 200 mg tablet Take 1 tablet (200 mg total) by mouth daily with breakfast, Starting Tue 5/16/2023, Until Thu 6/15/2023, Normal      Empagliflozin (Jardiance) 10 MG TABS tablet Take 1 tablet (10 mg total) by mouth every morning, Starting Tue 5/16/2023, Until Thu 6/15/2023, Normal      furosemide (LASIX) 40 mg tablet Take 1 tablet (40 mg total) by mouth daily Do not start before May 17, 2023 , Starting Wed 5/17/2023, Until Fri 6/16/2023, Normal      lidocaine (LIDODERM) 5 % Apply 1 patch topically over 12 hours daily for 3 days Remove & Discard patch within 12 hours or as directed by MD, Starting Tue 5/16/2023, Until Fri 5/19/2023, Normal      metoprolol succinate (TOPROL-XL) 25 mg 24 hr tablet Take 1 tablet (25 mg total) by mouth every 12 (twelve) hours, Starting Tue 5/16/2023, Until Thu 6/15/2023, Normal      nicotine (NICODERM CQ) 14 mg/24hr TD 24 hr patch Place 1 patch on the skin over 24 hours daily, Starting Tue 5/16/2023, Normal      potassium chloride (K-DUR,KLOR-CON) 20 mEq tablet Take 1 tablet (20 mEq total) by mouth daily Do not start before May 17, 2023 , Starting Wed 5/17/2023, Until Fri 6/16/2023, Normal      sacubitril-valsartan (ENTRESTO) 49-51 MG TABS Take 1 tablet by mouth 2 (two) times a day, Starting Tue 5/16/2023, Until Thu 6/15/2023, Normal      senna-docusate sodium (SENOKOT S) 8 6-50 mg per tablet Take 2 tablets by mouth daily at bedtime, Starting Tue 5/16/2023, Until Thu 6/15/2023, Normal "     spironolactone (ALDACTONE) 25 mg tablet Take 1 tablet (25 mg total) by mouth daily, Starting Tue 5/16/2023, Until Thu 6/15/2023, Normal           Discharge Medication List as of 5/16/2023 12:22 PM      CONTINUE these medications which have NOT CHANGED    Details   aspirin 81 MG tablet Take 1 tablet by mouth daily, Starting Fri 2/27/2015, Historical Med      glimepiride (AMARYL) 4 mg tablet Take 1 tablet by mouth daily, Starting Fri 2/27/2015, Historical Med      JANUMET -1000 MG TB24 daily , Starting Mon 6/11/2018, Historical Med      metFORMIN (GLUCOPHAGE) 1000 MG tablet TAKE 1 TABLET BY MOUTH TWICE DAILY WITH A MEAL, Historical Med             Allergies:  No Known Allergies    FOLLOW-UP     PCP Outpatient Follow-up:  Follow up with PCP in 1-2 weeks  Consulting Providers Follow-up:  Heart failure     Active Issues Requiring Follow-up:   Referral reduced ejection fraction, VT status post ICD    Discharge Statement:   I spent 40 minutes discharging the patient  This time was spent on the day of discharge  I had direct contact with the patient on the day of discharge  Additional documentation is required if more than 30 minutes were spent on discharge  Portions of the record may have been created with voice recognition software  Occasional wrong word or \"sound a like\" substitutions may have occurred due to the inherent limitations of voice recognition software    Read the chart carefully and recognize, using context, where substitutions have occurred     ==  Chitra Coffman,   [address]  Internal Medicine Resident PGY-2      "

## 2023-05-16 NOTE — PROGRESS NOTES
CARDIOLOGY PROGRESS NOTE   Abraham Mendosa 59 y o  male MRN: [id number]  Unit/Bed#: [facility] 411-01 Encounter: [id number]    ASSESSMENT/PLAN:  1  Acute HFrEF  · New diagnosis this admission, EF 10-15% seen on Echo done 5/2/23  · 2/2 ischemic cardiomyopathy with large apical aneurysm and progression of CAD, s/p ICD placement on 5/12  · Transitioned from IV diuretic to PO Lasix 40 bid on 5/14  High volume urine output noted  · GDMT:  · Beta blocker- Toprol 25 mg q12h  · ACEI/ARB/ARNi- Entresto 49-51 bid ($47, patient ok with price)  · MRA- Spironolactone 25 mg  · SGLT2- Started on Jardiance 10 mg daily ($47 on price check, patient ok with price)  · Diuretic: decrease from bid to Lasix 40 mg daily (given continued high volume urine output)  Discharge on KCl 20 Meq daily supplementation   · Hemodynamically stable, creatinine at baseline, exam is euvolemic, stable for DC with outpatient f/u    2  VT  · Scar mediated VT  · EP placed ICD on 5/12/23  · No further VT events noted on telemetry  · On BB and Amiodarone    3  CAD  · PCI of LCx done in 2015  · Cardiac cath showed chronic occlusion of mid LAD and Lt PDA  · Medical management with aspirin and statin    4  CKD  · LINDEN POA, now resolved  · stable    Disposition:  Stable for discharge from cardiac standpoint  Needs outpatient Cardiology follow up within 7 days, with labs  SUBJECTIVE:  No significant events overnight  Patient seen this AM in NAD, reports feeling well overall  Denies SOB or chest pain  No new complaints  Wife at bedside, answered all questions and concerns      OBJECTIVE:  Current Weight: Weight - Scale: 101 kg (222 lb 10 6 oz)  Vitals:    05/15/23 2253 05/16/23 0254 05/16/23 0600 05/16/23 0648   BP: 123/66 94/58  122/78   BP Location: Right arm Left arm  Left arm   Pulse: 84 72  76   Resp: 18 18  17   Temp: 98 3 °F (36 8 °C) (!) 97 4 °F (36 3 °C)  97 5 °F (36 4 °C)   TempSrc: Oral Oral  Oral   SpO2: 94% 93%  95%   Weight:   101 kg (222 lb 10 6 oz) Height:           Intake/Output Summary (Last 24 hours) at 5/16/2023 0947  Last data filed at 5/16/2023 0900  Gross per 24 hour   Intake 680 ml   Output 3700 ml   Net -3020 ml     Physical Exam  Vitals reviewed  Constitutional:       General: He is not in acute distress  Appearance: He is not toxic-appearing  HENT:      Head: Normocephalic and atraumatic  Mouth/Throat:      Mouth: Mucous membranes are moist       Pharynx: Oropharynx is clear  Eyes:      Extraocular Movements: Extraocular movements intact  Cardiovascular:      Rate and Rhythm: Normal rate and regular rhythm  Pulses: Normal pulses  Heart sounds: No murmur heard  Pulmonary:      Effort: Pulmonary effort is normal  No respiratory distress  Breath sounds: No wheezing, rhonchi or rales  Musculoskeletal:         General: No tenderness  Normal range of motion  Comments: Trace b/l LE edema   Skin:     General: Skin is warm and dry  Neurological:      Mental Status: He is alert and oriented to person, place, and time  Mental status is at baseline           Medications:    Current Facility-Administered Medications:   •  acetaminophen (TYLENOL) tablet 650 mg, 650 mg, Oral, Q6H PRN, CARLA Mccann  •  acetaminophen (TYLENOL) tablet 650 mg, 650 mg, Oral, Q4H PRN, Tamiko Zavaleta PA-C  •  amiodarone tablet 200 mg, 200 mg, Oral, Daily With Breakfast, Matthew Mancilla MD, 200 mg at 05/15/23 0902  •  aspirin chewable tablet 81 mg, 81 mg, Oral, Daily, Macyol Simentalai DO, 81 mg at 05/15/23 0902  •  atorvastatin (LIPITOR) tablet 80 mg, 80 mg, Oral, After Dinner, Maycol Karen DO, 80 mg at 05/15/23 1713  •  furosemide (LASIX) tablet 40 mg, 40 mg, Oral, BID (diuretic), Rashida Rodriguez PA-C, 40 mg at 05/15/23 1502  •  heparin (porcine) subcutaneous injection 5,000 Units, 5,000 Units, Subcutaneous, Q8H [facility], Raudel Chavezn, 5,000 Units at 05/16/23 0520  •  insulin glargine (LANTUS) subcutaneous injection 20 Units 0 2 mL, 20 Units, Subcutaneous, HS, Amber Salvador MD, 20 Units at 05/15/23 2108  •  insulin lispro (HumaLOG) 100 units/mL subcutaneous injection 2-12 Units, 2-12 Units, Subcutaneous, 4x Daily (AC & HS), 2 Units at 05/16/23 0630 **AND** Fingerstick Glucose (POCT), , , 4x Daily AC and at bedtime, CARLA Ward  •  lidocaine (LIDODERM) 5 % patch 1 patch, 1 patch, Topical, Daily, [place], 1 patch at 05/15/23 9761  •  metoprolol succinate (TOPROL-XL) 24 hr tablet 25 mg, 25 mg, Oral, Q12H, Purvi Madrid PA-C, 25 mg at 05/15/23 2108  •  nicotine (NICODERM CQ) 14 mg/24hr TD 24 hr patch 1 patch, 1 patch, Transdermal, Daily, Maycol Simentalai, DO  •  nitroglycerin (NITROSTAT) SL tablet 0 4 mg, 0 4 mg, Sublingual, Q5 Min PRN, Maycol Jones, DO  •  ondansetron (ZOFRAN) injection 4 mg, 4 mg, Intravenous, Q6H PRN, Maycol Simentalai, DO, 4 mg at 05/06/23 0954  •  polyethylene glycol (MIRALAX) packet 17 g, 17 g, Oral, Daily, Camelia Vargasnix, DO, 17 g at 05/09/23 0857  •  potassium chloride (K-DUR,KLOR-CON) CR tablet 40 mEq, 40 mEq, Oral, Once, Camelia Reyesx, DO  •  sacubitril-valsartan (ENTRESTO) 49-51 MG per tablet 1 tablet, 1 tablet, Oral, BID, Miguel King MD, 1 tablet at 05/15/23 1713  •  senna-docusate sodium (SENOKOT S) 8 6-50 mg per tablet 2 tablet, 2 tablet, Oral, HS, Camelia Reyesx, DO, 2 tablet at 05/11/23 2120  •  spironolactone (ALDACTONE) tablet 25 mg, 25 mg, Oral, Daily, Jose Luis Levin MD, 25 mg at 05/15/23 0902    Laboratory Results:  Results from last 7 days   Lab Units 05/15/23  0547 05/14/23  0530 05/13/23  0507 05/12/23  0522 05/11/23  0501 05/10/23  0444   WBC Thousand/uL  --  9 01 7 41 7 92  --   --    HEMOGLOBIN g/dL  --  15 1 15 5 15 6  --   --    HEMATOCRIT %  --  47 3 47 5 47 9  --   --    PLATELETS Thousands/uL  --  151 156 190  --   --    POTASSIUM mmol/L 3 9 4 2 3 6 3 8 3 7 3 3*   CHLORIDE mmol/L 98 97 96 94* 95* 97   CO2 mmol/L 33* 36* 38* 35* 35* 33*   BUN mg/dL 13 15 16 19 24 29*   CREATININE mg/dL 0 66 0 78 0 72 0 83 0 83 0 91   CALCIUM mg/dL 8 7 8 8 8 8 9 0 8 4 8 1*   MAGNESIUM mg/dL  --  2 1  --   --   --  1 9   PHOSPHORUS mg/dL  --   --   --   --   --  2 5       I have personally reviewed the blood work as stated above and in my note

## 2023-05-17 ENCOUNTER — HOME CARE VISIT (OUTPATIENT)
Dept: HOME HEALTH SERVICES | Facility: HOME HEALTHCARE | Age: 63
End: 2023-05-17

## 2023-05-17 NOTE — UTILIZATION REVIEW
NOTIFICATION OF ADMISSION DISCHARGE   This is a Notification of Discharge from [address]  Please be advised that this patient has been discharge from our facility  Below you will find the admission and discharge date and time including the patient’s disposition  UTILIZATION REVIEW CONTACT:  [facility] [place]  Utilization   Network Utilization Review Department  Phone: [phone] x carefully listen to the prompts  All voicemails are confidential   Email: [email or web address]     ADMISSION INFORMATION  PRESENTATION DATE: 5/2/2023  9:10 AM  OBERVATION ADMISSION DATE:  INPATIENT ADMISSION DATE: 5/2/23 12:09 PM   DISCHARGE DATE: 5/16/2023  1:07 PM   DISPOSITION:Home with [address] INFORMATION:  Send all requests for admission clinical reviews, approved or denied determinations and any other requests to dedicated fax number below belonging to the campus where the patient is receiving treatment   List of dedicated fax numbers:  [address] DENIALS (Administrative/Medical Necessity) [phone]   [address] (Maternity/NICU/Pediatrics) [phone]   [facility] [phone]   [facility] [phone]   [address] [phone]   220 [facility] [phone]   90 [facility] [phone]   [address] [place] [address] 119 [phone]   [facility]  [phone]   [address] [facility] [phone]   412 [facility] [address] [facility] [phone]

## 2023-05-18 VITALS
RESPIRATION RATE: 16 BRPM | TEMPERATURE: 98.2 F | HEART RATE: 72 BPM | DIASTOLIC BLOOD PRESSURE: 78 MMHG | SYSTOLIC BLOOD PRESSURE: 126 MMHG | OXYGEN SATURATION: 97 %

## 2023-05-18 NOTE — ED ATTENDING ATTESTATION
5/2/2023  ISindy MD, saw and evaluated the patient  I have discussed the patient with the resident/non-physician practitioner and agree with the resident's/non-physician practitioner's findings, Plan of Care, and MDM as documented in the resident's/non-physician practitioner's note, except where noted  All available labs and Radiology studies were reviewed  I was present for key portions of any procedure(s) performed by the resident/non-physician practitioner and I was immediately available to provide assistance  At this point I agree with the current assessment done in the Emergency Department  I have conducted an independent evaluation of this patient a history and physical is as follows:    ED Course     Patient sent in for evaluation after seeing his cardiologist today  Patient states that over the last 2 weeks he has had some shortness of breath on exertion, lower extremity edema, and right shoulder pain  He states that the symptoms have been pretty constant over this time  He denies noticing any palpitations or chest pain  Patient was evaluated by his cardiologist who noted him to be in A-fib with RVR  There was also concerns for possibility of a recent MI on the EKG  Patient does have a history of MI with last catheterization in February 2015  No additional complaints  Exam: AAOx3, mild distress, IRRR, CTA, S/NT/ND, no motor/sensory deficits  A/P: A-fib with RVR, subacute MI  I reviewed the EKG  Patient does have Q waves on the EKG and some ST segment abnormalities  Given this combination along with patient's 2 weeks of symptoms, I do not believe that this is an acute STEMI  Cardiac labs ordered and cardiology contacted  Will do serial EKGs to rule out any dynamic changes and continue to monitor closely in the emergency department  Critical Care Time  CriticalCare Time    Date/Time: 5/2/2023 9:39 AM  Performed by: Sindy Cao MD  Authorized by:  Majo Wilhelm Amee Wang MD     Critical care provider statement:     Critical care time (minutes):  32    Critical care time was exclusive of:  Separately billable procedures and treating other patients and teaching time    Critical care was necessary to treat or prevent imminent or life-threatening deterioration of the following conditions:  Cardiac failure    Critical care was time spent personally by me on the following activities:  Obtaining history from patient or surrogate, discussions with consultants, development of treatment plan with patient or surrogate, evaluation of patient's response to treatment, examination of patient, ordering and performing treatments and interventions, ordering and review of laboratory studies, ordering and review of radiographic studies, re-evaluation of patient's condition and review of old charts    I assumed direction of critical care for this patient from another provider in my specialty: no

## 2023-05-18 NOTE — CASE COMMUNICATION
[facility] has admitted your patient to [address] [place] [address] service with the following disciplines:      SN and PT  This report is informational only, no responses is needed  Primary focus of home health care_cardiac assessment    Patient stated goals of care__to get stronger and feel better    Anticipated visit pattern and next visit date__twice a week for three weeks    See medication list - meds in home differ from AVS  Patient was disch arged without insulin  Oanh Villalobos has Darlys Grow in the the home with a script stating 10 units daily  Call to PCP office  a provider covering for Viet Long called this RN back and stated he should continue his insulin as ordered by Viet Long and they will discuss at follow up appointment morning of 05/18/2023    Significant clinical findings__left hand swelling has decreased  dressing that was placed from picc removal will remain intact till at  least this evening to meet the 24 hour guidlines  advised patient and his wife they can remove this later today  wound to left elbow is epithealized patient requestings this be left open to air at this time  surgical incision can not be assessed due to post surgical dressing  this will be removed by [facility] on friday 05/19/2023 as that will be one week since surgery    Potential barriers to goal achievement__disease process  patient does re port he has not resumed smoking and does not plan to  Other pertinent information__n/a    Thank you for allowing us to participate in the care of your patient        Zuleika Pham RN

## 2023-05-19 ENCOUNTER — HOME CARE VISIT (OUTPATIENT)
Dept: HOME HEALTH SERVICES | Facility: HOME HEALTHCARE | Age: 63
End: 2023-05-19

## 2023-05-19 ENCOUNTER — TELEPHONE (OUTPATIENT)
Dept: CARDIOLOGY CLINIC | Facility: CLINIC | Age: 63
End: 2023-05-19

## 2023-05-19 NOTE — TELEPHONE ENCOUNTER
Spoke with pt  His wt today 208 6lbs down about 2 lbs from yesterday  He denies any symptoms of CHF  He does have nurse coming in today to take dressing off  Went over his medication, no further questions  Is taking as prescribed  Advised to get bp monitor to be able to check bp at least 2-3 times a day  Verbally understood  Advised he had appt on 5/22/23 with NP at [address]  He was not aware of it , but will be at appt  wrote it down and repeated back  Educated on when to call the office, et gain and/or symptoms  Verbally understood  Will continue to follow pt for 30 days post discharge

## 2023-05-20 NOTE — PROGRESS NOTES
Heart Failure Outpatient Progress Note - Shaneka Bhagat 59 y o  male MRN: [id number]    @ Encounter: [id number]      Assessment/Plan:    Patient Active Problem List    Diagnosis Date Noted   • Localized swelling on left hand 05/13/2023   • Hyponatremia 05/06/2023   • Acidosis 05/06/2023   • V-tach ([facility] [address] ) 05/04/2023   • Ischemic cardiomyopathy 05/03/2023   • Essential (primary) hypertension 05/02/2023   • Acute HFrEF 05/02/2023   • A-fib ([facility] ) 05/02/2023   • Benign prostatic hyperplasia without lower urinary tract symptoms 04/11/2018   • 3-vessel CAD 02/27/2015   • Type 2 diabetes mellitus, with long-term current use of insulin ([place] [id number] ) 02/27/2015   • GERD (gastroesophageal reflux disease) 06/28/2013   • Tobacco dependence syndrome 01/18/2012     HFrEF, Stage D  Impression: iCM with large apical aneurysm with progression of CAD/late presenting MI with occluded mid LAD and LPDA  LV mildly dilated  Started on milrinone 0 25mcg/kg/min 5/6/23 due to rising creatinine and worsening volume status, stopped 5/10/23  Lower extremities cool to the touch, SBP low but with stable MAP  Feeling well with no complaints  Appetite is excellent  Weight is down  May be slightly overdiuresed but has also made significant changes in dietary habits  Will check post hospital lab work  Follow up again 2 weeks  Weight at discharge 222 lbs, today, 207 lbs     VBG 5/9/23: 61%   Hb 14 3 Brii CI 1 9 - on milrinone 0 25  VBG 5/10/23: 72 6% on milrinone 0 13  VBG 5/11/23: 65% - off milrinone  VBG 5/12/23 - 55%     Studies  EKG- narrow QRS with bigeminy; inferior infarct, anterolateral infarct     LHC 5/3/23: chronically occluded mid LAD and LPDA c/w echo findings of apical aneurysm, not amenable to PCI  Patent mid LCx stent   LVEDP 30 mmHg     Echocardiogram 5/2/23  LVEF:  10-15%, apical aneurysm  LVIDd: 5 6 cm, mildly increased wall thickness  RV: normal size, moderately reduced systolic function  MR: mild  PASP: 49 mmHg, estimated RAP 15mmHg  RVOT: no notching     Echo 9/26/17:  LVEF: 65%  [facility] 2/17/2015: The coronary circulation is left dominant  LAD: Angiography showed minor luminal irregularities  Mid LAD: There was a 40 % stenosis  Distal LAD: There was a 40 % stenosis  Mid circumflex: There was a 99 % stenosis  Left AV groove artery: The vessel was small sized  There was a 50 % stenosis  RCA: Angiography showed minor luminal irregularities       Neurohormonal Blockade:  --Beta-Blocker: metoprolol succinate 25 mg BID  --ACEi, ARB or ARNi: entresto 49-51mg BID    (or SVR reduction)  --Aldosterone Receptor Blocker: spironolactone 25 mg QD  --SGLT2-I: Not On  Jardiance 10 mg daily  --Diuretic: Lasix 40 mg BID     Sudden Cardiac Death Risk Reduction:  --ICD: s/p ICD placement 5/12/2023     Electrical Resynchronization:  Narrow QRS     Advanced Therapies (If appropriate): --Inotrope: milrinone 0 25mcg/kg/min titrated down to off   --LVAD/Transplant Candidacy: Current tobacco use prior to admission precludes heart transplant  Moderately reduced RV function and mildly dilated LV concerning for LVAD  Mildly dilated LV also concerning given VT       New Q wave MI, later presenting or silent LAD territory infarction  Hyperlipidemia  Current tobacco use  Ventricular tachycardia  Scar mediated, s/p ICD placement with EP 5/12/23   Rx: amiodarone 200 mg daily  CKD  Needs post hospital BMP  Hyponatremia  Ascites  Small volume on US 5/6/23    HPI:  59year old male with known CAD with [facility] in 2015 showing obstructive mid-LCx disease treated with PTCA/stening and residual non-obstructive disease in the LAD, now presented with 1-2 weeks of shortness of breath, leg swelling, and >20 lb weight gain in 1 week  He was suspected during a cardiology visit to be in rapid atrial fibrillation, and sent to the ED   He converted to sinus rhythm shortly following IV beta blocker administration       ECG in office demonstrated wide-complex tachycardia at ~150 bpm with evidence of fusion beats which was sustained over 30 seconds  Patient was taken for [facility] which showed chronically occluded mid LAD and LPDA c/w echo findings of apical aneurysm, not amenable to PCI  There was a patent mid LCx stent  Echocardiogram showed severely reduced LV function with EF 10-15%, apical aneurysm, and LVIDd of 5 6 cm  VT was felt to be scar mediated  A MDT dual chamber AICD was placed  Heart failure specific GDMT was initiated and optimized  5/22/23  Patient presents for hospital follow up  Feeling well since discharge  Taking all medications as prescribed  He is still confused about sodium and fluid restrictions  He is losing weight due to making major dietary changes  Feels hungry all the time  No CP or SOB  Past Medical History:   Diagnosis Date   • Myocardial infarction ([facility] [address] )        12 point ROS negative other than that stated in HPI    No Known Allergies        Current Outpatient Medications:   •  amiodarone 200 mg tablet, Take 1 tablet (200 mg total) by mouth daily with breakfast, Disp: 30 tablet, Rfl: 0  •  aspirin 81 MG tablet, Take 1 tablet by mouth daily, Disp: , Rfl:   •  atorvastatin (LIPITOR) 80 mg tablet, Take 1 tablet (80 mg total) by mouth daily after dinner, Disp: 30 tablet, Rfl: 0  •  Empagliflozin (Jardiance) 10 MG TABS tablet, Take 1 tablet (10 mg total) by mouth every morning, Disp: 30 tablet, Rfl: 0  •  furosemide (LASIX) 40 mg tablet, Take 1 tablet (40 mg total) by mouth daily Do not start before May 17, 2023 , Disp: 30 tablet, Rfl: 0  •  glimepiride (AMARYL) 4 mg tablet, Take 1 tablet by mouth daily, Disp: , Rfl:   •  JANUMET -1000 MG TB24, daily , Disp: , Rfl:   •  lidocaine (LIDODERM) 5 %, Apply 1 patch topically over 12 hours daily for 3 days Remove & Discard patch within 12 hours or as directed by MD, Disp: 3 patch, Rfl: 0  •  metFORMIN (GLUCOPHAGE) 1000 MG tablet, TAKE 1 TABLET BY MOUTH TWICE DAILY WITH A MEAL, Disp: , Rfl:   •  metoprolol succinate (TOPROL-XL) 25 mg 24 hr tablet, Take 1 tablet (25 mg total) by mouth every 12 (twelve) hours, Disp: 60 tablet, Rfl: 0  •  nicotine (NICODERM CQ) 14 mg/24hr TD 24 hr patch, Place 1 patch on the skin over 24 hours daily, Disp: 28 patch, Rfl: 0  •  potassium chloride (K-DUR,KLOR-CON) 20 mEq tablet, Take 1 tablet (20 mEq total) by mouth daily Do not start before May 17, 2023 , Disp: 30 tablet, Rfl: 0  •  sacubitril-valsartan (ENTRESTO) 49-51 MG TABS, Take 1 tablet by mouth 2 (two) times a day, Disp: 60 tablet, Rfl: 0  •  senna-docusate sodium (SENOKOT S) 8 6-50 mg per tablet, Take 2 tablets by mouth daily at bedtime, Disp: 60 tablet, Rfl: 0  •  spironolactone (ALDACTONE) 25 mg tablet, Take 1 tablet (25 mg total) by mouth daily, Disp: 30 tablet, Rfl: 0    Social History     Socioeconomic History   • Marital status: /Civil Union     Spouse name: Not on file   • Number of children: Not on file   • Years of education: Not on file   • Highest education level: Not on file   Occupational History   • Not on file   Tobacco Use   • Smoking status: Every Day     Packs/day: 1 00     Types: Cigarettes   • Smokeless tobacco: Never   Vaping Use   • Vaping Use: Never used   Substance and Sexual Activity   • Alcohol use: Not Currently   • Drug use: Not on file   • Sexual activity: Not on file   Other Topics Concern   • Not on file   Social History Narrative   • Not on file     Social Determinants of Health     Financial Resource Strain: Not on file   Food Insecurity: No Food Insecurity   • Worried About Running Out of Food in the Last Year: Never true   • Ran Out of Food in the Last Year: Never true   Transportation Needs: No Transportation Needs   • Lack of Transportation (Medical): No   • Lack of Transportation (Non-Medical):  No   Physical Activity: Not on file   Stress: Not on file   Social Connections: Not on file   Intimate Partner Violence: Not on file   Housing Stability: Low Risk    • Unable to Pay for Housing in the Last Year: No   • Number of Places Lived in the Last Year: 1   • Unstable Housing in the Last Year: No       No family history on file      Physical Exam:    Vitals: /58 (BP Location: Left arm, Patient Position: Sitting, Cuff Size: Standard)   Pulse 84   Wt 94 kg (207 lb 3 2 oz)   SpO2 94%   BMI 28 10 kg/m²     Wt Readings from Last 3 Encounters:   05/16/23 101 kg (222 lb 10 6 oz)   05/02/23 116 kg (254 lb 12 8 oz)   04/01/22 102 kg (224 lb 12 8 oz)       GEN: Ventura Hernandez appears well, alert and oriented x 3, pleasant and cooperative   HEENT: pupils equal, round, and reactive to light; extraocular muscles intact  NECK: supple, no carotid bruits   HEART: regular rhythm, normal S1 and S2, no murmurs, clicks, gallops or rubs, JVP is down    LUNGS: clear to auscultation bilaterally; no wheezes, rales, or rhonchi   ABDOMEN: normal bowel sounds, soft, no tenderness, no distention  EXTREMITIES: LE cool, peripheral pulses normal; no clubbing, cyanosis, or edema  NEURO: no focal findings   SKIN: normal without suspicious lesions on exposed skin    Labs & Results:    Chemistry        Component Value Date/Time     02/17/2015 0530    K 4 7 05/16/2023 1012    K 3 9 02/17/2015 0530    CL 99 05/16/2023 1012     02/17/2015 0530    CO2 32 05/16/2023 1012    CO2 30 02/17/2015 0530    BUN 16 05/16/2023 1012    BUN 13 02/17/2015 0530    CREATININE 0 77 05/16/2023 1012    CREATININE 0 72 02/17/2015 0530        Component Value Date/Time    CALCIUM 8 9 05/16/2023 1012    CALCIUM 9 0 02/17/2015 0530    ALKPHOS 42 (L) 05/03/2023 1641    ALKPHOS 57 02/17/2015 0530    AST 34 05/03/2023 1641    AST 27 02/17/2015 0530    ALT 45 05/03/2023 1641    ALT 27 02/17/2015 0530    BILITOT 0 6 02/17/2015 0530        Lab Results   Component Value Date    WBC 9 01 05/14/2023    HGB 15 1 05/14/2023    HCT 47 3 05/14/2023    MCV 84 05/14/2023     05/14/2023     Lab Results   Component Value Date    NTBNP 5,942 (H) 05/02/2023      Lab Results   Component Value Date    LDLCALC 72 05/07/2015     No results found for: [id number], TSH      EKG personally reviewed by CARLA Ellis  No results found for this visit on 05/22/23  Counseling / Coordination of Care  Total floor / unit time spent today 40 minutes  Greater than 50% of total time was spent with the patient and / or family counseling and / or coordination of care  A description of the counseling / coordination of care: 20  Thank you for the opportunity to participate in the care of this patient      Stephanie Santa

## 2023-05-21 VITALS
WEIGHT: 208.6 LBS | HEART RATE: 80 BPM | SYSTOLIC BLOOD PRESSURE: 94 MMHG | TEMPERATURE: 96.1 F | DIASTOLIC BLOOD PRESSURE: 62 MMHG | RESPIRATION RATE: 24 BRPM | BODY MASS INDEX: 28.29 KG/M2 | OXYGEN SATURATION: 96 %

## 2023-05-22 ENCOUNTER — HOME CARE VISIT (OUTPATIENT)
Dept: HOME HEALTH SERVICES | Facility: HOME HEALTHCARE | Age: 63
End: 2023-05-22

## 2023-05-22 ENCOUNTER — OFFICE VISIT (OUTPATIENT)
Dept: CARDIOLOGY CLINIC | Facility: CLINIC | Age: 63
End: 2023-05-22

## 2023-05-22 VITALS
DIASTOLIC BLOOD PRESSURE: 58 MMHG | WEIGHT: 207.2 LBS | HEART RATE: 84 BPM | BODY MASS INDEX: 28.1 KG/M2 | OXYGEN SATURATION: 94 % | SYSTOLIC BLOOD PRESSURE: 100 MMHG

## 2023-05-22 VITALS — OXYGEN SATURATION: 97 % | DIASTOLIC BLOOD PRESSURE: 68 MMHG | SYSTOLIC BLOOD PRESSURE: 114 MMHG | HEART RATE: 86 BPM

## 2023-05-22 DIAGNOSIS — I47.20 V-TACH (HCC): ICD-10-CM

## 2023-05-22 DIAGNOSIS — I50.23 ACUTE ON CHRONIC SYSTOLIC HEART FAILURE (HCC): ICD-10-CM

## 2023-05-22 DIAGNOSIS — I25.10 3-VESSEL CORONARY ARTERY DISEASE: ICD-10-CM

## 2023-05-22 RX ORDER — POTASSIUM CHLORIDE 20 MEQ/1
20 TABLET, EXTENDED RELEASE ORAL DAILY
Qty: 30 TABLET | Refills: 3 | Status: SHIPPED | OUTPATIENT
Start: 2023-05-22 | End: 2023-06-01

## 2023-05-22 RX ORDER — ATORVASTATIN CALCIUM 80 MG/1
80 TABLET, FILM COATED ORAL
Qty: 30 TABLET | Refills: 3 | Status: SHIPPED | OUTPATIENT
Start: 2023-05-22

## 2023-05-22 RX ORDER — FUROSEMIDE 40 MG/1
40 TABLET ORAL DAILY
Qty: 30 TABLET | Refills: 3 | Status: SHIPPED | OUTPATIENT
Start: 2023-05-22 | End: 2023-05-24

## 2023-05-22 RX ORDER — METOPROLOL SUCCINATE 25 MG/1
25 TABLET, EXTENDED RELEASE ORAL EVERY 12 HOURS
Qty: 60 TABLET | Refills: 3 | Status: SHIPPED | OUTPATIENT
Start: 2023-05-22

## 2023-05-22 RX ORDER — AMIODARONE HYDROCHLORIDE 200 MG/1
200 TABLET ORAL
Qty: 30 TABLET | Refills: 3 | Status: SHIPPED | OUTPATIENT
Start: 2023-05-22

## 2023-05-22 RX ORDER — SPIRONOLACTONE 25 MG/1
25 TABLET ORAL DAILY
Qty: 30 TABLET | Refills: 3 | Status: SHIPPED | OUTPATIENT
Start: 2023-05-22

## 2023-05-22 NOTE — PATIENT INSTRUCTIONS
Weight yourself daily  If you gain 3 lbs in one day or 5 lbs in one week, please call the office at [phone] and ask for a nurse or the heart failure nurse  Keep your sodium intake to <2 grams, (2000 mg) per day, and fluids <2 Liters (2000 ml) per day  This is around 6-7, 8 oz glasses of fluid per day    Continue your medications as you are  Have labs drawn this week

## 2023-05-23 ENCOUNTER — HOME CARE VISIT (OUTPATIENT)
Dept: HOME HEALTH SERVICES | Facility: HOME HEALTHCARE | Age: 63
End: 2023-05-23

## 2023-05-23 ENCOUNTER — LAB REQUISITION (OUTPATIENT)
Dept: LAB | Facility: HOSPITAL | Age: 63
End: 2023-05-23

## 2023-05-23 VITALS
TEMPERATURE: 95.8 F | DIASTOLIC BLOOD PRESSURE: 68 MMHG | SYSTOLIC BLOOD PRESSURE: 92 MMHG | HEART RATE: 88 BPM | OXYGEN SATURATION: 98 % | WEIGHT: 205.4 LBS | BODY MASS INDEX: 27.86 KG/M2 | RESPIRATION RATE: 22 BRPM

## 2023-05-23 DIAGNOSIS — I50.23 ACUTE ON CHRONIC SYSTOLIC (CONGESTIVE) HEART FAILURE (HCC): ICD-10-CM

## 2023-05-23 LAB
ALBUMIN SERPL BCP-MCNC: 3.7 G/DL (ref 3.5–5)
ALP SERPL-CCNC: 56 U/L (ref 46–116)
ALT SERPL W P-5'-P-CCNC: 48 U/L (ref 12–78)
ANION GAP SERPL CALCULATED.3IONS-SCNC: 1 MMOL/L (ref 4–13)
AST SERPL W P-5'-P-CCNC: 38 U/L (ref 5–45)
BILIRUB SERPL-MCNC: 0.3 MG/DL (ref 0.2–1)
BNP SERPL-MCNC: 532 PG/ML (ref 0–100)
BUN SERPL-MCNC: 46 MG/DL (ref 5–25)
CALCIUM SERPL-MCNC: 9.2 MG/DL (ref 8.3–10.1)
CHLORIDE SERPL-SCNC: 104 MMOL/L (ref 96–108)
CO2 SERPL-SCNC: 28 MMOL/L (ref 21–32)
CREAT SERPL-MCNC: 1.26 MG/DL (ref 0.6–1.3)
GFR SERPL CREATININE-BSD FRML MDRD: 60 ML/MIN/1.73SQ M
GLUCOSE SERPL-MCNC: 115 MG/DL (ref 65–140)
POTASSIUM SERPL-SCNC: 4.5 MMOL/L (ref 3.5–5.3)
PROT SERPL-MCNC: 8.1 G/DL (ref 6.4–8.4)
SODIUM SERPL-SCNC: 133 MMOL/L (ref 135–147)

## 2023-05-23 NOTE — CASE COMMUNICATION
Patien  evaluted  andfound  to  be  independent  with  HEP  and  home  safety    PAtient  reports  functioning  at s ed  level a s  prior  to  recent  hospitalzation  and  is  not  P T   candidate  at  this  time  Patient  is  agreeable with  DC

## 2023-05-23 NOTE — Clinical Note
Deacon Betters you, I will let him know   ----- Message -----  From: CARLA Serrano  Sent: 5/24/2023   8:01 AM EDT  To: Ryann Ludwig RN      Thank you for the update  Let's have him decrease his Lasix dose to 20 mg daily from 40 mg daily  Belia Jean  ----- Message -----  From: Ryann Ludwig RN  Sent: 5/23/2023   9:51 PM EDT  To: Jared Floyd    Just wanted to inform you of patient's BP readings since patient's case was opened to VNA service  Concern that past 2 readings were lower  BP on 5/17/23 L arm -126/78; 5/19/23 L arm -94/62 and today was L arm 92/68 and R arm was 90/62  I did see his BP was on low side when he was seen in your office on 5/22/23  Patient asymptomatic

## 2023-05-23 NOTE — Clinical Note
I saw patient today  BP was 100/70  HR 84  Denies any signs of HTN  I was just wondering if any more labs needed to be done before his next appointment on 6/6/23 to check a BMP since his Lasix was decreased to 20mg daily and patient still has been taking Potassium 20meq daily  Thank you   ----- Message -----  From: CARLA Bertrand  Sent: 5/24/2023   8:01 AM EDT  To: Dyana Colon RN      Thank you for the update  Let's have him decrease his Lasix dose to 20 mg daily from 40 mg daily  Michelle Edgar  ----- Message -----  From: Dyana Colon RN  Sent: 5/23/2023   9:51 PM EDT  To: Kirstie Harman    Just wanted to inform you of patient's BP readings since patient's case was opened to VNA service  Concern that past 2 readings were lower  BP on 5/17/23 L arm -126/78; 5/19/23 L arm -94/62 and today was L arm 92/68 and R arm was 90/62  I did see his BP was on low side when he was seen in your office on 5/22/23  Patient asymptomatic

## 2023-05-24 DIAGNOSIS — I50.23 ACUTE ON CHRONIC SYSTOLIC HEART FAILURE (HCC): ICD-10-CM

## 2023-05-24 RX ORDER — FUROSEMIDE 40 MG/1
20 TABLET ORAL DAILY
Qty: 30 TABLET | Refills: 3 | Status: SHIPPED | OUTPATIENT
Start: 2023-05-24 | End: 2023-05-31 | Stop reason: SDUPTHER

## 2023-05-24 NOTE — CASE COMMUNICATION
Just wanted to inform you of patient's BP readings since patient's case was opened to VNA service  Concern that past 2 readings were lower  BP on 5/17/23 L arm -126/78; 5/19/23 L arm -94/62 and today was L arm 92/68 and R arm was 90/62  I did see his BP was on low side when he was seen in your office on 5/22/23  Patient asymptomatic

## 2023-05-25 ENCOUNTER — HOME CARE VISIT (OUTPATIENT)
Dept: HOME HEALTH SERVICES | Facility: HOME HEALTHCARE | Age: 63
End: 2023-05-25

## 2023-05-25 VITALS
WEIGHT: 204.6 LBS | BODY MASS INDEX: 27.75 KG/M2 | TEMPERATURE: 96.1 F | DIASTOLIC BLOOD PRESSURE: 70 MMHG | SYSTOLIC BLOOD PRESSURE: 100 MMHG | OXYGEN SATURATION: 96 % | HEART RATE: 84 BPM | RESPIRATION RATE: 24 BRPM

## 2023-05-25 DIAGNOSIS — I50.9 CONGESTIVE HEART FAILURE, UNSPECIFIED HF CHRONICITY, UNSPECIFIED HEART FAILURE TYPE (HCC): Primary | ICD-10-CM

## 2023-05-26 ENCOUNTER — IN-CLINIC DEVICE VISIT (OUTPATIENT)
Dept: CARDIOLOGY CLINIC | Facility: CLINIC | Age: 63
End: 2023-05-26

## 2023-05-26 DIAGNOSIS — Z95.810 PRESENCE OF AUTOMATIC CARDIOVERTER/DEFIBRILLATOR (AICD): Primary | ICD-10-CM

## 2023-05-26 NOTE — PROGRESS NOTES
Results for orders placed or performed in visit on 05/26/23   Cardiac EP device report    Narrative    DEVICE INTERROGATED IN THE [place] OFFICE  BATTERY VOLTAGE ADEQUATE  (12 9 YRS) AP 3%  <1%  ALL LEAD PARAMETERS WITHIN NORMAL LIMITS  NO SIGNIFICANT HIGH RATE EPISODES  NORMAL DEVICE FUNCTION  WOUND CHECK: INCISION CLEAN AND DRY WITH EDGES APPROXIMATED;  WOUND CARE AND RESTRICTIONS REVIEWED WITH PATIENT  ---LANGFORD

## 2023-05-29 ENCOUNTER — APPOINTMENT (EMERGENCY)
Dept: RADIOLOGY | Facility: HOSPITAL | Age: 63
End: 2023-05-29

## 2023-05-29 ENCOUNTER — HOSPITAL ENCOUNTER (EMERGENCY)
Facility: HOSPITAL | Age: 63
Discharge: HOME | End: 2023-05-29
Attending: EMERGENCY MEDICINE

## 2023-05-29 ENCOUNTER — NURSE TRIAGE (OUTPATIENT)
Dept: OTHER | Facility: OTHER | Age: 63
End: 2023-05-29

## 2023-05-29 VITALS
SYSTOLIC BLOOD PRESSURE: 103 MMHG | OXYGEN SATURATION: 94 % | DIASTOLIC BLOOD PRESSURE: 65 MMHG | TEMPERATURE: 97.5 F | HEART RATE: 74 BPM | RESPIRATION RATE: 17 BRPM

## 2023-05-29 DIAGNOSIS — E86.0 DEHYDRATION: ICD-10-CM

## 2023-05-29 DIAGNOSIS — E87.1 HYPONATREMIA: ICD-10-CM
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
## 2023-05-29 LAB
2HR DELTA HS TROPONIN: -3 NG/L
ALBUMIN SERPL BCP-MCNC: 3.6 G/DL (ref 3.5–5)
ALP SERPL-CCNC: 51 U/L (ref 46–116)
ALT SERPL W P-5'-P-CCNC: 36 U/L (ref 12–78)
ANION GAP SERPL CALCULATED.3IONS-SCNC: 1 MMOL/L (ref 4–13)
AST SERPL W P-5'-P-CCNC: 26 U/L (ref 5–45)
ATRIAL RATE: 85 BPM
BASOPHILS # BLD AUTO: 0.06 THOUSANDS/ÂΜL (ref 0–0.1)
BASOPHILS NFR BLD AUTO: 1 % (ref 0–1)
BILIRUB SERPL-MCNC: 0.5 MG/DL (ref 0.2–1)
BUN SERPL-MCNC: 50 MG/DL (ref 5–25)
CALCIUM SERPL-MCNC: 9.5 MG/DL (ref 8.3–10.1)
CARDIAC TROPONIN I PNL SERPL HS: 11 NG/L
CARDIAC TROPONIN I PNL SERPL HS: 8 NG/L
CHLORIDE SERPL-SCNC: 101 MMOL/L (ref 96–108)
CO2 SERPL-SCNC: 26 MMOL/L (ref 21–32)
CREAT SERPL-MCNC: 1.46 MG/DL (ref 0.6–1.3)
EOSINOPHIL # BLD AUTO: 0.06 THOUSAND/ÂΜL (ref 0–0.61)
EOSINOPHIL NFR BLD AUTO: 1 % (ref 0–6)
ERYTHROCYTE [DISTWIDTH] IN BLOOD BY AUTOMATED COUNT: 14.5 % (ref 11.6–15.1)
GFR SERPL CREATININE-BSD FRML MDRD: 50 ML/MIN/1.73SQ M
GLUCOSE SERPL-MCNC: 258 MG/DL (ref 65–140)
HCT VFR BLD AUTO: 48.8 % (ref 36.5–49.3)
HGB BLD-MCNC: 16.3 G/DL (ref 12–17)
IMM GRANULOCYTES # BLD AUTO: 0.04 THOUSAND/UL (ref 0–0.2)
IMM GRANULOCYTES NFR BLD AUTO: 0 % (ref 0–2)
LYMPHOCYTES # BLD AUTO: 1.39 THOUSANDS/ÂΜL (ref 0.6–4.47)
LYMPHOCYTES NFR BLD AUTO: 14 % (ref 14–44)
MCH RBC QN AUTO: 27.4 PG (ref 26.8–34.3)
MCHC RBC AUTO-ENTMCNC: 33.4 G/DL (ref 31.4–37.4)
MCV RBC AUTO: 82 FL (ref 82–98)
MONOCYTES # BLD AUTO: 0.74 THOUSAND/ÂΜL (ref 0.17–1.22)
MONOCYTES NFR BLD AUTO: 7 % (ref 4–12)
NEUTROPHILS # BLD AUTO: 7.92 THOUSANDS/ÂΜL (ref 1.85–7.62)
NEUTS SEG NFR BLD AUTO: 77 % (ref 43–75)
NRBC BLD AUTO-RTO: 0 /100 WBCS
P AXIS: 41 DEGREES
PLATELET # BLD AUTO: 257 THOUSANDS/UL (ref 149–390)
PMV BLD AUTO: 9.9 FL (ref 8.9–12.7)
POTASSIUM SERPL-SCNC: 5.4 MMOL/L (ref 3.5–5.3)
PR INTERVAL: 176 MS
PROT SERPL-MCNC: 7.8 G/DL (ref 6.4–8.4)
QRS AXIS: 17 DEGREES
QRSD INTERVAL: 96 MS
QT INTERVAL: 380 MS
QTC INTERVAL: 452 MS
RBC # BLD AUTO: 5.95 MILLION/UL (ref 3.88–5.62)
SODIUM SERPL-SCNC: 128 MMOL/L (ref 135–147)
T WAVE AXIS: 60 DEGREES
VENTRICULAR RATE: 85 BPM
WBC # BLD AUTO: 10.21 THOUSAND/UL (ref 4.31–10.16)

## 2023-05-29 RX ADMIN — SODIUM CHLORIDE 250 ML: 0.9 INJECTION, SOLUTION INTRAVENOUS at 13:10

## 2023-05-29 RX ADMIN — SODIUM CHLORIDE 250 ML: 0.9 INJECTION, SOLUTION INTRAVENOUS at 11:30

## 2023-05-29 NOTE — ED PROVIDER NOTES
History  Chief Complaint   Patient presents with   • Fall     Pt reports feeling lightheaded fell forward landing onto the L side of his chest  Pt denies any loc, hs, pt takes asa daily  HPI    28-year-old male, past medical history significant for heart failure, last EF was 10 to 15%, status post AICD, presenting for evaluation of lightheaded and a fall  Patient states that for the past 3 days, he has felt lightheaded, they felt lightheadedness caused him to stumble, fall forwards and striking his chest near his AICD  Did not lose consciousness  Is on aspirin but did not strike his head  Not offer any complaints but states that he is concerned that he broke his AICD by landing on it, called his electrophysiologist, was advised to come to the emergency department to be evaluated  Admits to being lightheaded but denies any chest pain, shortness of breath, abdominal pain, neck pain, headache, back pain, injuries  Prior to Admission Medications   Prescriptions Last Dose Informant Patient Reported? Taking? Empagliflozin (Jardiance) 10 MG TABS tablet   No No   Sig: Take 1 tablet (10 mg total) by mouth every morning   JANUMET -1000 MG TB24  Self Yes No   Sig: daily    amiodarone 200 mg tablet   No No   Sig: Take 1 tablet (200 mg total) by mouth daily with breakfast   aspirin 81 MG tablet  Self Yes No   Sig: Take 1 tablet by mouth daily   atorvastatin (LIPITOR) 80 mg tablet   No No   Sig: Take 1 tablet (80 mg total) by mouth daily after dinner   furosemide (LASIX) 40 mg tablet   No No   Sig: Take 0 5 tablets (20 mg total) by mouth daily   glimepiride (AMARYL) 4 mg tablet  Self Yes No   Sig: Take 1 tablet by mouth daily NOT TAKING PER PCP   Patient not taking: Reported on 5/22/2023   insulin detemir (Levemir FlexPen) 100 Units/mL injection pen  Self Yes No   Sig: Inject 10 Units under the skin daily   Indications: Type 2 Diabetes   lidocaine (LIDODERM) 5 %   No No   Sig: Apply 1 patch topically over 12 hours daily for 3 days Remove & Discard patch within 12 hours or as directed by MD   metFORMIN (GLUCOPHAGE) 1000 MG tablet  Self Yes No   Sig: TAKE 1 TABLET BY MOUTH TWICE DAILY WITH A MEAL   metoprolol succinate (TOPROL-XL) 25 mg 24 hr tablet   No No   Sig: Take 1 tablet (25 mg total) by mouth every 12 (twelve) hours   nicotine (NICODERM CQ) 14 mg/24hr TD 24 hr patch  Self No No   Sig: Place 1 patch on the skin over 24 hours daily   Patient not taking: Reported on 5/22/2023   potassium chloride (K-DUR,KLOR-CON) 20 mEq tablet   No No   Sig: Take 1 tablet (20 mEq total) by mouth daily   sacubitril-valsartan (ENTRESTO) 49-51 MG TABS   No No   Sig: Take 1 tablet by mouth 2 (two) times a day   senna-docusate sodium (SENOKOT S) 8 6-50 mg per tablet  Self No No   Sig: Take 2 tablets by mouth daily at bedtime   spironolactone (ALDACTONE) 25 mg tablet   No No   Sig: Take 1 tablet (25 mg total) by mouth daily      Facility-Administered Medications: None       Past Medical History:   Diagnosis Date   • Myocardial infarction [facility])        Past Surgical History:   Procedure Laterality Date   • CARDIAC CATHETERIZATION N/A 5/3/2023    Procedure: Cardiac Coronary Angiogram;  Surgeon: Lisa Boggs MD;  Location: [facility] CARDIAC [facility]; Service: Cardiology   • CARDIAC CATHETERIZATION Left 5/3/2023    Procedure: Cardiac Left Heart Cath;  Surgeon: Lisa Boggs MD;  Location: [facility]; Service: Cardiology   • CARDIAC ELECTROPHYSIOLOGY PROCEDURE N/A 5/12/2023    Procedure: Cardiac icd implant;  Surgeon: Melissa Lockhart MD;  Location: [facility]; Service: Cardiology       History reviewed  No pertinent family history  I have reviewed and agree with the history as documented      E-Cigarette/Vaping   • E-Cigarette Use Never User      E-Cigarette/Vaping Substances     Social History     Tobacco Use   • Smoking status: Every Day     Packs/day: 1 00     Types: Cigarettes   • Smokeless tobacco: Never   Vaping Use • Vaping Use: Never used   Substance Use Topics   • Alcohol use: Not Currently        Review of Systems   Constitutional: Negative for chills and fever  HENT: Negative for sore throat  Respiratory: Negative for cough and shortness of breath  Cardiovascular: Negative for chest pain, palpitations and leg swelling  Gastrointestinal: Negative for abdominal pain, diarrhea, nausea and vomiting  Genitourinary: Negative for dysuria and frequency  Musculoskeletal: Negative for myalgias  Skin: Negative for rash and wound  Neurological: Negative for dizziness, light-headedness and headaches  All other systems reviewed and are negative  Physical Exam  ED Triage Vitals   Temperature Pulse Respirations Blood Pressure SpO2   05/29/23 1113 05/29/23 1110 05/29/23 1110 05/29/23 1110 05/29/23 1110   97 5 °F (36 4 °C) 85 20 (!) 86/50 96 %      Temp Source Heart Rate Source Patient Position - Orthostatic VS BP Location FiO2 (%)   05/29/23 1110 05/29/23 1110 05/29/23 1110 05/29/23 1110 --   Oral Monitor Sitting Left arm       Pain Score       05/29/23 1110       No Pain             Orthostatic Vital Signs  Vitals:    05/29/23 1110 05/29/23 1200 05/29/23 1512   BP: (!) 86/50 (!) 78/51 103/65   Pulse: 85 84 74   Patient Position - Orthostatic VS: Sitting         Physical Exam  Vitals and nursing note reviewed  Constitutional:       General: He is not in acute distress  Appearance: Normal appearance  He is not ill-appearing or toxic-appearing  HENT:      Head: Normocephalic and atraumatic  Right Ear: External ear normal       Left Ear: External ear normal       Nose: Nose normal       Mouth/Throat:      Mouth: Mucous membranes are moist       Pharynx: Oropharynx is clear  Eyes:      General: No scleral icterus  Extraocular Movements: Extraocular movements intact  Cardiovascular:      Rate and Rhythm: Normal rate and regular rhythm  Pulses: Normal pulses     Pulmonary:      Effort: Pulmonary effort is normal  No respiratory distress  Breath sounds: Normal breath sounds  Chest:      Chest wall: No tenderness  Abdominal:      Palpations: Abdomen is soft  Tenderness: There is no abdominal tenderness  There is no guarding or rebound  Musculoskeletal:         General: Normal range of motion  Cervical back: Normal range of motion and neck supple  Skin:     General: Skin is warm  Capillary Refill: Capillary refill takes less than 2 seconds  Neurological:      General: No focal deficit present  Mental Status: He is alert and oriented to person, place, and time           ED Medications  Medications   sodium chloride 0 9 % bolus 250 mL (has no administration in time range)   sodium chloride 0 9 % bolus 250 mL (0 mL Intravenous Stopped 5/29/23 1532)   sodium chloride 0 9 % bolus 250 mL (0 mL Intravenous Stopped 5/29/23 1532)       Diagnostic Studies  Results Reviewed     Procedure Component Value Units Date/Time    HS Troponin I 2hr [[id number]]  (Normal) Resulted: 05/29/23 1506    Lab Status: Final result Specimen: Blood Updated: 05/29/23 1506     hs TnI 2hr 8 ng/L      Delta 2hr hsTnI -3 ng/L     HS Troponin 0hr (reflex protocol) [[id number]]  (Normal) Collected: 05/29/23 1144    Lab Status: Final result Specimen: Blood from Arm, Right Updated: 05/29/23 1233     hs TnI 0hr 11 ng/L     HS Troponin I 4hr [[id number]]     Lab Status: No result Specimen: Blood     Comprehensive metabolic panel [[id number]]  (Abnormal) Collected: 05/29/23 1144    Lab Status: Final result Specimen: Blood from Arm, Right Updated: 05/29/23 1212     Sodium 128 mmol/L      Potassium 5 4 mmol/L      Chloride 101 mmol/L      CO2 26 mmol/L      ANION GAP 1 mmol/L      BUN 50 mg/dL      Creatinine 1 46 mg/dL      Glucose 258 mg/dL      Calcium 9 5 mg/dL      AST 26 U/L      ALT 36 U/L      Alkaline Phosphatase 51 U/L      Total Protein 7 8 g/dL      Albumin 3 6 g/dL      Total Bilirubin 0 50 mg/dL eGFR 50 ml/min/1 73sq m     Narrative:      [place] guidelines for Chronic Kidney Disease (CKD):   •  Stage 1 with normal or high GFR (GFR > 90 mL/min/1 73 square meters)  •  Stage 2 Mild CKD (GFR = 60-89 mL/min/1 73 square meters)  •  Stage 3A Moderate CKD (GFR = 45-59 mL/min/1 73 square meters)  •  Stage 3B Moderate CKD (GFR = 30-44 mL/min/1 73 square meters)  •  Stage 4 Severe CKD (GFR = 15-29 mL/min/1 73 square meters)  •  Stage 5 End Stage CKD (GFR <15 mL/min/1 73 square meters)  Note: GFR calculation is accurate only with a steady state creatinine    CBC and differential [[id number]]  (Abnormal) Collected: 05/29/23 1144    Lab Status: Final result Specimen: Blood from Arm, Right Updated: 05/29/23 1155     WBC 10 21 Thousand/uL      RBC 5 95 Million/uL      Hemoglobin 16 3 g/dL      Hematocrit 48 8 %      MCV 82 fL      MCH 27 4 pg      MCHC 33 4 g/dL      RDW 14 5 %      MPV 9 9 fL      Platelets 537 Thousands/uL      nRBC 0 /100 WBCs      Neutrophils Relative 77 %      Immat GRANS % 0 %      Lymphocytes Relative 14 %      Monocytes Relative 7 %      Eosinophils Relative 1 %      Basophils Relative 1 %      Neutrophils Absolute 7 92 Thousands/µL      Immature Grans Absolute 0 04 Thousand/uL      Lymphocytes Absolute 1 39 Thousands/µL      Monocytes Absolute 0 74 Thousand/µL      Eosinophils Absolute 0 06 Thousand/µL      Basophils Absolute 0 06 Thousands/µL                  XR chest 1 view portable    (Results Pending)         Procedures  POC Cardiac US    Date/Time: 5/29/2023 3:23 PM    Performed by: Sarah Vo DO  Authorized by: Sarah Vo DO    Patient location:  ED  Other Assisting Provider: Yes (comment) (Dr Patricia Vaca)    Procedure details:     Exam Type:  Diagnostic    Indications: hypotension      Assessment / Evaluation for: cardiac function, pericardial effusion and inferior vena cava for fluid responsiveness      Exam Type: initial exam      Image quality: diagnostic      Image availability:  Images available in PACS  Cardiac findings:     Echo technique: limited 2D      Views obtained: parasternal long axis, subcostal and apical      Pericardial effusion: absent      Tamponade physiology: absent      Wall motion: hypodynamic      LV systolic function: depressed    Pulmonary findings:     Left Lung Findings: left lung sliding      Right lung findings: right lung sliding      B-lines: no B-lines present    IVC findings:     IVC Size: small    Interpretation:     Fluid Status:  Hypovolemic          ED Course  ED Course as of 05/29/23 1618   Mon May 29, 2023   1506 Delta 2hr hsTnI: -3              HEART Risk Score    Flowsheet Row Most Recent Value   Heart Score Risk Calculator    History 0 Filed at: 05/29/2023 1618   ECG 1 Filed at: 05/29/2023 1618   Age 1 Filed at: 05/29/2023 1618   Risk Factors 2 Filed at: 05/29/2023 1618   Troponin 0 Filed at: 05/29/2023 1618   HEART Score 4 Filed at: 05/29/2023 1618                                Medical Decision Making  81-year-old male, past medical history significant for heart failure, recent AICD placement, presenting for evaluation of lightheadedness and a fall  Do not suspect any traumatic injuries from this fall  However patient is concerned that his AICD is not working and he is hypotensive  Differential diagnosis includes but is not limited to hypovolemia versus cardiogenic shock  Will interrogate AICD, chest x-ray to assess for lead fracture, check EKG, CBC, CMP, troponin  Gentle hydration as patient has heart failure  I reviewed the patient's chart including Care Everywhere including but not limited to prior evaluations, prior imaging, prior labs to the extent of the time constraints of the Emergency Department  I personally reviewed and interpreted all of the lab work, imaging, EKGs ordered  See ED course for further discussion  Significant for: CMP concerning for overdiuresis    Otherwise remainder of work-up was unremarkable  Interrogation reveals no events  Discussed the case with EP AP on-call, as the interrogation reveals no events, defibrillator appears to be working, they will arrange for close follow-up  After several small fluid boluses, patient's blood pressure is now normal, patient's lightheadedness has improved and patient is able to ambulate without difficulty  Discussed admission versus outpatient management in detail with the patient and his wife, shared decision making, decision was made to discharge the patient with close outpatient follow-up  Advised to cut his Lasix dose in half and gave strict return to ED precautions  I reviewed all testing with the patient: see above  I gave oral return precautions for what to return for in addition to the written return precautions  The patient (and any family present: wife) verbalized understanding of the discharge instructions and warnings that would necessitate return to the Emergency Department  I specifically highlighted areas of special concern regarding the written and verbal discharge instructions and return precautions  All questions were answered prior to discharge  Amount and/or Complexity of Data Reviewed  Labs: ordered  Decision-making details documented in ED Course  Radiology: ordered  Disposition  Final diagnoses:   Fall, initial encounter   Dehydration   Hyponatremia     Time reflects when diagnosis was documented in both MDM as applicable and the Disposition within this note     Time User Action Codes Description Comment    5/29/2023  3:14 PM Bender Halsted Add [G28  CSGB] Fall, initial encounter     5/29/2023  3:14 PM Bender Halsted Add [E86 0] Dehydration     5/29/2023  3:15 PM Bender Halsted Add [E87 1] Hyponatremia       ED Disposition     ED Disposition   Discharge    Condition   Stable    Date/Time   Mon May 29, 2023  3:14 PM    Comment   Delio Webb discharge to home/self care  Follow-up Information     Follow up With Specialties Details Why Contact Info Additional Information    Lay Dixon,  Internal Medicine  For Emergency Department Follow-up SPECIALTY [facility] [phone]       [facility] 34 [facility] Emergency Department Emergency Medicine  If symptoms worsen [address] [id number] [address] [facility] [address], [address], [place], [place], [address]    12[address] [facility] Cardiology  For Emergency Department Follow-up [address] [id number]  [address] Cardiology [place], [address] [facility], [place], [place], [address]          Discharge Medication List as of 5/29/2023  3:16 PM      CONTINUE these medications which have NOT CHANGED    Details   amiodarone 200 mg tablet Take 1 tablet (200 mg total) by mouth daily with breakfast, Starting Mon 5/22/2023, Normal      aspirin 81 MG tablet Take 1 tablet by mouth daily, Starting Fri 2/27/2015, Historical Med      atorvastatin (LIPITOR) 80 mg tablet Take 1 tablet (80 mg total) by mouth daily after dinner, Starting Mon 5/22/2023, Normal      Empagliflozin (Jardiance) 10 MG TABS tablet Take 1 tablet (10 mg total) by mouth every morning, Starting Mon 5/22/2023, Normal      furosemide (LASIX) 40 mg tablet Take 0 5 tablets (20 mg total) by mouth daily, Starting Wed 5/24/2023, Normal      glimepiride (AMARYL) 4 mg tablet Take 1 tablet by mouth daily NOT TAKING PER PCP, Starting Fri 2/27/2015, Historical Med      insulin detemir (Levemir FlexPen) 100 Units/mL injection pen Inject 10 Units under the skin daily   Indications: Type 2 Diabetes, Historical Med      JANUMET -1000 MG TB24 daily , Starting Mon 6/11/2018, Historical Med      lidocaine (LIDODERM) 5 % Apply 1 patch topically over 12 hours daily for 3 days Remove & Discard patch within 12 hours or as directed by MD, Starting Tue 5/16/2023, Until Fri 5/19/2023, Normal      metFORMIN (GLUCOPHAGE) 1000 MG tablet TAKE 1 TABLET BY MOUTH TWICE DAILY WITH A MEAL, Historical Med      metoprolol succinate (TOPROL-XL) 25 mg 24 hr tablet Take 1 tablet (25 mg total) by mouth every 12 (twelve) hours, Starting Mon 5/22/2023, Normal      nicotine (NICODERM CQ) 14 mg/24hr TD 24 hr patch Place 1 patch on the skin over 24 hours daily, Starting Tue 5/16/2023, Normal      potassium chloride (K-DUR,KLOR-CON) 20 mEq tablet Take 1 tablet (20 mEq total) by mouth daily, Starting Mon 5/22/2023, Normal      sacubitril-valsartan (ENTRESTO) 49-51 MG TABS Take 1 tablet by mouth 2 (two) times a day, Starting Mon 5/22/2023, Normal      senna-docusate sodium (SENOKOT S) 8 6-50 mg per tablet Take 2 tablets by mouth daily at bedtime, Starting Tue 5/16/2023, Until Thu 6/15/2023, Normal      spironolactone (ALDACTONE) 25 mg tablet Take 1 tablet (25 mg total) by mouth daily, Starting Mon 5/22/2023, Normal           No discharge procedures on file  PDMP Review     None           ED Provider  Attending physically available and evaluated Margo Ulmer  I managed the patient along with the ED Attending      Electronically Signed by         Анна Ramirez DO  05/29/23 9567

## 2023-05-29 NOTE — TELEPHONE ENCOUNTER
"  Reason for Disposition  • [1] MODERATE weakness (i e , interferes with work, school, normal activities) AND [2] new-onset or worsening    Answer Assessment - Initial Assessment Questions  1  MECHANISM: \"How did the fall happen? \"      Got lightheaded  2  DOMESTIC VIOLENCE AND ELDER ABUSE SCREENING: \"Did you fall because someone pushed you or tried to hurt you? \" If Yes, ask: \"Are you safe now?\"        3  ONSET: \"When did the fall happen? \" (e g , minutes, hours, or days ago)      3 am  4  LOCATION: \"What part of the body hit the ground? \" (e g , back, buttocks, head, hips, knees, hands, head, stomach)      Chest-at site of developmental  5  INJURY: \"Did you hurt (injure) yourself when you fell? \" If Yes, ask: \"What did you injure? Tell me more about this? \" (e g , body area; type of injury; pain severity)\"      Nupur Patel directly on new defibrillator site  6  PAIN: \"Is there any pain? \" If Yes, ask: \"How bad is the pain? \" (e g , Scale 1-10; or mild,   moderate, severe)    - NONE (0): no pain    - MILD (1-3): doesn't interfere with normal activities     - MODERATE (4-7): interferes with normal activities or awakens from sleep     - SEVERE (8-10): excruciating pain, unable to do any normal activities       Yes- pain at defibrillator site  7  SIZE: For cuts, bruises, or swelling, ask: \"How large is it? \" (e g , inches or centimeters)       Bruising on chest  8  OTHER SYMPTOMS: \"Do you have any other symptoms? \" (e g , dizziness, fever, weakness; new onset or worsening)  Denies dizziness/lightheadedness now  Daughter states he appears weak  9  CAUSE: \"What do you think caused the fall (or falling)? \" (e g , tripped, dizzy spell)        Dizzy spell    Protocols used: FALLS AND FALLING-ADULT-AH    Pt had a dizzy spell early this morning and fell  Hit new defibrillator site on railing  Concerned about defibrillator  He denies any dizziness or lightheadedness now  Daughter states he appears weak   Advised to go to [facility] for " urgent eval

## 2023-05-29 NOTE — DISCHARGE INSTRUCTIONS
Decrease your lasix dose again  Follow up with your cardiologist  Return to the ED if symptoms worsen

## 2023-05-29 NOTE — ED ATTENDING ATTESTATION
Final Diagnoses:     1  Fall, initial encounter    2  Dehydration    3  Hyponatremia      ED Course as of 05/30/23 0733   Mon May 29, 2023   1121 For 3 days, has felt LH  He hit the chest on the LEFT side near his AICD that is new  Called cards who sent him in for evaluation  No head strike  No LOC  Remembers events  No f/ch/n/v/cp/sob    1122 General: VSS, NAD, awake, alert  Well-nourished, well-developed  Appears stated age  Head: Normocephalic, atraumatic, nontender  Eyes: PERRL, EOM-I  No diplopia  No hyphema  No subconjunctival hemorrhages  Symmetrical lids  ENTAtraumatic external nose and ears  MMM  No stridor  Normal phonation  No drooling  Base of mouth is soft  No mastoid tenderness  Neck: Symmetric, trachea midline  No JVD  CV: Peripheral pulses +2 throughout  No chest wall tenderness  There's ecchymosis on the LEFT side (old per patient)  Maybe some new ecchymosis from under LEFT axilla  Some ecchymosis on his abdomen from previosu injections  Lungs:   Unlabored   No retractions  No crepitus  No tachypnea  No paradoxical motion  Abd: +BS, soft, NT/ND    MSK:   FROM   No lower extremity edema  Back:   No CVAT  Skin: Dry, intact  Neuro: AAOx3, GCS 15, CN II-XII grossly intact  Motor grossly intact  Psychiatric/Behavioral: Appropriate mood and affect   Exam: deferred     1132 Procedure Note: EKG  Date/Time: 05/29/23 11:32 AM   Interpreted by: Brenda Vidal   Indications / Diagnosis: fall  ECG reviewed by me, the ED Provider: yes   The EKG demonstrates:  Rhythm: 85 normal sinus; atrial hypertrophy  Intervals: normal intervals  Axis: normal axis  QRS/Blocks: normal QRS  ST Changes: No acute ST Changes, no STD/CONNIE  Slight CONNIE (old) anteriorly, unchanged  [address] + POCUS Lung:  - transthoracic echocardiogram was performed at bedside by myself and resident     - Images collected were adequate quality but low quality    - This was a technically difficult study due to lung interference  - Apical, parasternal, subcostal views were obtained/attempted  - The main purpose of this study was to r/o obvious pathology requiring emergent intervention as in summary    - Many views/images obtained for educational reasons    - Findings: There was no obvious pericardial effusion:   LV function reduced    RV function couldn't be evaluated; couldn't see RV/RA     IVC was IVC < 2 1cm; >50% compression w/ sniff likely RAP 3 mmHg     IVC Max: 1 6     CI: 56%   Lungs: There was no obvious pleural effusion  B-lines were not present anteriorly bilaterally at upper BLUE-point (3+ B-lines in 2+ fields w/ concern for HF/Cardiogenic pulmonary edema sensitivity 85-94%, specificity 92% LR+ 7 4-12; LR- 0 06-0 16)    Bilateral anterior lung sliding bilaterally present at upper BLUE-point (no pneumothorax, sen 91%; spec 98%)    Summary:   - IVC very compressible  Would support dehydration given absence of B-lines and small IVC  - There were no obvious B-lines  - There was no obvious anterior pneumothorax  - There was no large pericardial effusion  1219 Potassium(!): 5 4   1219 Creatinine(!): 1 46  Mild LINDEN  1219 Discussed reducing dose of Lasix from 20 to 10mg  Patient just had it reduced from 40 to 20 3 days ago  Hedda Binder still over-diuresed  1250 hs TnI 0hr: 11   1316 Blood Pressure(!): 78/51       I, Ronnie Medina MD, saw and evaluated the patient  All available labs and X-rays were ordered by me or the resident / non-physician and have been reviewed by myself  I discussed the patient with the resident / non-physician and agree with the resident's / non-physician practitioner's findings and plan as documented in the resident's / non-physician practicitioner's note, except where noted  At this point, I agree with the current assessment done in the ED  I was present during key portions of all procedures performed unless otherwise stated  Nursing Triage:     Chief Complaint   Patient presents with   • Fall     Pt reports feeling lightheaded fell forward landing onto the L side of his chest  Pt denies any loc, hs, pt takes asa daily  HPI:   As above     ASSESSMENT + PLAN:     - Given patient's concerns, will do a cardiac workup  - Will do an EKG for arrythmia, strain; troponin for same as per protocol for evaluation of ACS  - CBC for anemia; CMP for kidney function and electrolytes  - Will check CXR for pneumonia, PTX, fluid overload but more improtantly, lead fractures  - Interrogate pacer   - Will do POCUS Cardiac to evaluate for pericardial effusion    - Talk to EP  HEART score:  History 0=Slightly or non-suspicious   ECG 0=Normal   Age 1= > 45 - <65 years   Risk Factors 0= No risk factors known   Troponin 0= Less than or equal to 12 ng/L   Total 1   - Disposition per workup  - Hypotension: fluid bolus  Likely this is the reason for dizziness  Physical:   As above     Vitals:    05/29/23 1110 05/29/23 1113 05/29/23 1200 05/29/23 1512   BP: (!) 86/50  (!) 78/51 103/65   BP Location: Left arm      Pulse: 85  84 74   Resp: 20  (!) 25 17   Temp:  97 5 °F (36 4 °C)     TempSrc: Oral Oral     SpO2: 96%  94% 94%     - There are no obvious limitations to social determinants of care  - Nursing note reviewed  - Vitals reviewed  - Orders placed by myself and/or advanced practitioner / resident     - Previous chart was reviewed  - No language barrier    - History obtained from patient  - There are no limitations to the history obtained:     Critical Care Time:   - Critical care time: 33 minutes  - Critical care time was exclusive of seperately bilable procedures and treating other patients as well as teaching time     - Critical care was necessary to treat or prevent imminent or life-threatening deterioration of the following condition: hypotension  - Critical care time was spent personally by me on the following activities as well as the above as per the ED course and rest of chart: blood draw for specimens, obtaining history from patient / surrogate, developement of a treatment plan, discussions with consultants, evaluation of patient's response to the treatment, examination of the patient, ordering/performing treatements and interventions, re-evaluation of the patient's condition, review of old charts, ordering/reviewing laboratory studies and ordering/reviewing of radiographic studies    Past Medical:    has a past medical history of Myocardial infarction ([facility] [address] )  Past Surgical:    has a past surgical history that includes Cardiac catheterization (N/A, 5/3/2023); Cardiac catheterization (Left, 5/3/2023); and Cardiac electrophysiology procedure (N/A, 2023)      Social:     Social History     Substance and Sexual Activity   Alcohol Use Not Currently     Social History     Tobacco Use   Smoking Status Every Day   • Packs/day: 1 00   • Types: Cigarettes   Smokeless Tobacco Never     Social History     Substance and Sexual Activity   Drug Use Not on file       Echo:   Results for orders placed during the hospital encounter of 17    Echo complete with contrast if indicated    [address]  [phone]    Transthoracic Echocardiogram  2D, M-mode, Doppler, and Color Doppler    Study date:  26-Sep-2017    Patient: Juan M Montenegro  MR number: [id number]  Account number: [de-identified]  : 1960  Age: 65 years  Gender: Male  Status: Outpatient  Location: [address] [place] [address] [facility] [place]  Height: 72 in  Weight: 263 3 lb  BP: 142/ 88 mmHg    Indications: CAD    Diagnoses: I25 10 - Atherosclerotic heart disease of native coronary artery without angina pectoris    Sonographer:  OLIVE Eng  Primary Physician:  Tricia Wallace MD  Referring Physician:  Jose Moyer MD  Group:  [facility]  Interpreting Physician: eKnny Yu MD    SUMMARY    LEFT VENTRICLE:  Size was normal   Systolic function was normal  Ejection fraction was estimated to be 65 %  There was mild concentric hypertrophy  Doppler parameters were consistent with abnormal left ventricular relaxation (grade 1 diastolic dysfunction)  RIGHT VENTRICLE:  The size was normal   Systolic function was normal     TRICUSPID VALVE:  There was trace regurgitation  HISTORY: PRIOR HISTORY: Hypertension, CAD s/p PCI, smoker, diabetes, high cholesterol    PROCEDURE: The study was performed in the [address]  This was a routine study  The transthoracic approach was used  The study included complete 2D imaging, M-mode, complete spectral Doppler, and color Doppler  The  heart rate was 112 bpm, at the start of the study  Images were obtained from the parasternal, apical, subcostal, and suprasternal notch acoustic windows  Echocardiographic views were limited due to decreased penetration  This was a  technically difficult study  LEFT VENTRICLE: Size was normal  Systolic function was normal  Ejection fraction was estimated to be 65 %  There were no regional wall motion abnormalities  Wall thickness was mildly increased  There was mild concentric hypertrophy  DOPPLER: Doppler parameters were consistent with abnormal left ventricular relaxation (grade 1 diastolic dysfunction)  RIGHT VENTRICLE: The size was normal  Systolic function was normal  Wall thickness was normal     LEFT ATRIUM: Size was normal     RIGHT ATRIUM: Size was normal     MITRAL VALVE: Valve structure was normal  There was normal leaflet separation  DOPPLER: The transmitral velocity was within the normal range  There was no evidence for stenosis  There was no regurgitation  AORTIC VALVE: The valve was trileaflet  Leaflets exhibited normal thickness and normal cuspal separation  DOPPLER: Transaortic velocity was within the normal range  There was no evidence for stenosis  There was no regurgitation  TRICUSPID VALVE: The valve structure was normal  There was normal leaflet separation  DOPPLER: The transtricuspid velocity was within the normal range  There was no evidence for stenosis  There was trace regurgitation  The tricuspid jet  envelope definition was inadequate for estimation of RV systolic pressure  There are no indirect findings suggestive of moderate or severe pulmonary hypertension  PULMONIC VALVE: Leaflets exhibited normal thickness, no calcification, and normal cuspal separation  DOPPLER: The transpulmonic velocity was within the normal range  There was no regurgitation  PERICARDIUM: There was no pericardial effusion  The pericardium was normal in appearance  AORTA: The root exhibited normal size  SYSTEMIC VEINS: IVC: The inferior vena cava was normal in size and course  Respirophasic changes were normal     SYSTEM MEASUREMENT TABLES    2D  %FS: 47 6 %  AV Diam: 3 34 cm  EDV(Teich): 101 71 ml  EF Biplane: 67 76 %  EF(Cube): 85 61 %  EF(Teich): 79 %  ESV(Cube): 14 82 ml  ESV(Teich): 21 36 ml  IVSd: 1 58 cm  LA Area: 12 78 cm2  LA Diam: 3 37 cm  LVEDV MOD A2C: 49 73 ml  LVEDV MOD A4C: 61 78 ml  LVEDV MOD BP: 58 1 ml  LVEF MOD A2C: 65 64 %  LVEF MOD A4C: 71 2 %  LVESV MOD A2C: 17 09 ml  LVESV MOD A4C: 17 79 ml  LVESV MOD BP: 18 73 ml  LVIDd: 4 69 cm  LVIDs: 2 46 cm  LVLd A2C: 6 97 cm  LVLd A4C: 7 74 cm  LVLs A2C: 5 23 cm  LVLs A4C: 6 13 cm  LVPWd: 1 23 cm  RA Area: 14 52 cm2  RV Diam : 2 86 cm  SI(Cube): 36 73 ml/m2  SI(Teich): 33 48 ml/m2  SV MOD A2C: 32 64 ml  SV MOD A4C: 43 98 ml  SV(Cube): 88 15 ml  SV(Teich): 80 35 ml    MM  TAPSE: 3 26 cm    PW  E': 0 08 m/s    [employer] Accredited Echocardiography Laboratory    Prepared and electronically signed by    Reece Fajardo MD  Signed 26-Sep-2017 12:55:39    No results found for this or any previous visit  Cath:    No results found for this or any previous visit        Code Status: Prior  Advance Directive and Living Will:      Power of :    POLST:    Medications   sodium chloride 0 9 % bolus 250 mL (0 mL Intravenous Stopped 5/29/23 1532)   sodium chloride 0 9 % bolus 250 mL (0 mL Intravenous Stopped 5/29/23 1532)     XR chest 1 view portable    (Results Pending)     Orders Placed This Encounter   Procedures   • POC Cardiac US   • XR chest 1 view portable   • CBC and differential   • Comprehensive metabolic panel   • HS Troponin 0hr (reflex protocol)   • HS Troponin I 2hr   • ECG 12 lead     Labs Reviewed   CBC AND DIFFERENTIAL - Abnormal       Result Value Ref Range Status    WBC 10 21 (*) 4 31 - 10 16 Thousand/uL Final    RBC 5 95 (*) 3 88 - 5 62 Million/uL Final    Hemoglobin 16 3  12 0 - 17 0 g/dL Final    Hematocrit 48 8  36 5 - 49 3 % Final    MCV 82  82 - 98 fL Final    MCH 27 4  26 8 - 34 3 pg Final    MCHC 33 4  31 4 - 37 4 g/dL Final    RDW 14 5  11 6 - 15 1 % Final    MPV 9 9  8 9 - 12 7 fL Final    Platelets 521  148 - 390 Thousands/uL Final    nRBC 0  /100 WBCs Final    Neutrophils Relative 77 (*) 43 - 75 % Final    Immat GRANS % 0  0 - 2 % Final    Lymphocytes Relative 14  14 - 44 % Final    Monocytes Relative 7  4 - 12 % Final    Eosinophils Relative 1  0 - 6 % Final    Basophils Relative 1  0 - 1 % Final    Neutrophils Absolute 7 92 (*) 1 85 - 7 62 Thousands/µL Final    Immature Grans Absolute 0 04  0 00 - 0 20 Thousand/uL Final    Lymphocytes Absolute 1 39  0 60 - 4 47 Thousands/µL Final    Monocytes Absolute 0 74  0 17 - 1 22 Thousand/µL Final    Eosinophils Absolute 0 06  0 00 - 0 61 Thousand/µL Final    Basophils Absolute 0 06  0 00 - 0 10 Thousands/µL Final   COMPREHENSIVE METABOLIC PANEL - Abnormal    Sodium 128 (*) 135 - 147 mmol/L Final    Potassium 5 4 (*) 3 5 - 5 3 mmol/L Final    Chloride 101  96 - 108 mmol/L Final    CO2 26  21 - 32 mmol/L Final    ANION GAP 1 (*) 4 - 13 mmol/L Final    BUN 50 (*) 5 - 25 mg/dL Final    Creatinine 1 46 (*) 0 60 - 1 30 mg/dL "Final    Comment: Standardized to IDMS reference method    Glucose 258 (*) 65 - 140 mg/dL Final    Comment: If the patient is fasting, the ADA then defines impaired fasting glucose as > 100 mg/dL and diabetes as > or equal to 123 mg/dL  Specimen collection should occur prior to Sulfasalazine administration due to the potential for falsely depressed results  Specimen collection should occur prior to Sulfapyridine administration due to the potential for falsely elevated results  Calcium 9 5  8 3 - 10 1 mg/dL Final    AST 26  5 - 45 U/L Final    Comment: Specimen collection should occur prior to Sulfasalazine administration due to the potential for falsely depressed results  ALT 36  12 - 78 U/L Final    Comment: Specimen collection should occur prior to Sulfasalazine and/or Sulfapyridine administration due to the potential for falsely depressed results  Alkaline Phosphatase 51  46 - 116 U/L Final    Total Protein 7 8  6 4 - 8 4 g/dL Final    Albumin 3 6  3 5 - 5 0 g/dL Final    Total Bilirubin 0 50  0 20 - 1 00 mg/dL Final    Comment: Use of this assay is not recommended for patients undergoing treatment with eltrombopag due to the potential for falsely elevated results      eGFR 50  ml/min/1 73sq m Final    Narrative:     [place] guidelines for Chronic Kidney Disease (CKD):   •  Stage 1 with normal or high GFR (GFR > 90 mL/min/1 73 square meters)  •  Stage 2 Mild CKD (GFR = 60-89 mL/min/1 73 square meters)  •  Stage 3A Moderate CKD (GFR = 45-59 mL/min/1 73 square meters)  •  Stage 3B Moderate CKD (GFR = 30-44 mL/min/1 73 square meters)  •  Stage 4 Severe CKD (GFR = 15-29 mL/min/1 73 square meters)  •  Stage 5 End Stage CKD (GFR <15 mL/min/1 73 square meters)  Note: GFR calculation is accurate only with a steady state creatinine   HS TROPONIN I 0HR - Normal    hs TnI 0hr 11  \"Refer to ACS Flowchart\"- see link ng/L Final    Comment:                                        " "      Initial (time 0) result  If >=50 ng/L, Myocardial injury suggested ;  Type of myocardial injury and treatment strategy  to be determined  If 5-49 ng/L, a delta result at 2 hours and or 4 hours will be needed to further evaluate  If <4 ng/L, and chest pain has been >3 hours since onset, patient may qualify for discharge based on the HEART score in the ED  If <5 ng/L and <3hours since onset of chest pain, a delta result at 2 hours will be needed to further evaluate  HS Troponin 99th Percentile URL of a Health Population=12 ng/L with a 95% Confidence Interval of 8-18 ng/L  Second Troponin (time 2 hours)  If calculated delta >= 20 ng/L,  Myocardial injury suggested ; Type of myocardial injury and treatment strategy to be determined  If 5-49 ng/L and the calculated delta is 5-19 ng/L, consult medical service for evaluation  Continue evaluation for ischemia on ecg and other possible etiology and repeat hs troponin at 4 hours  If delta is <5 ng/L at 2 hours, consider discharge based on risk stratification via the HEART score (if in ED), or NEERU risk score in IP/Observation  HS Troponin 99th Percentile URL of a Health Population=12 ng/L with a 95% Confidence Interval of 8-18 ng/L    HS TROPONIN I 2HR - Normal    hs TnI 2hr 8  \"Refer to ACS Flowchart\"- see link ng/L Final    Comment:                                              Initial (time 0) result  If >=50 ng/L, Myocardial injury suggested ;  Type of myocardial injury and treatment strategy  to be determined  If 5-49 ng/L, a delta result at 2 hours and or 4 hours will be needed to further evaluate  If <4 ng/L, and chest pain has been >3 hours since onset, patient may qualify for discharge based on the HEART score in the ED  If <5 ng/L and <3hours since onset of chest pain, a delta result at 2 hours will be needed to further evaluate      HS Troponin 99th Percentile URL of a Health Population=12 ng/L with a 95% Confidence Interval of 8-18 " ng/L     Second Troponin (time 2 hours)  If calculated delta >= 20 ng/L,  Myocardial injury suggested ; Type of myocardial injury and treatment strategy to be determined  If 5-49 ng/L and the calculated delta is 5-19 ng/L, consult medical service for evaluation  Continue evaluation for ischemia on ecg and other possible etiology and repeat hs troponin at 4 hours  If delta is <5 ng/L at 2 hours, consider discharge based on risk stratification via the HEART score (if in ED), or NEERU risk score in IP/Observation  HS Troponin 99th Percentile URL of a Health Population=12 ng/L with a 95% Confidence Interval of 8-18 ng/L  Delta 2hr hsTnI -3  <20 ng/L Final     Time reflects when diagnosis was documented in both MDM as applicable and the Disposition within this note     Time User Action Codes Description Comment    5/29/2023  3:14 PM Ana Darlene Add [P81  ATDT] Fall, initial encounter     5/29/2023  3:14 PM Ana Darlene Add [E86 0] Dehydration     5/29/2023  3:15 PM Ana Darlene Add [E87 1] Hyponatremia       ED Disposition     ED Disposition   Discharge    Condition   Stable    Date/Time   Mon May 29, 2023 1514    [facility] discharge to home/self care                 Follow-up Information     Follow up With Specialties Details Why Contact Info Additional Information    Asad Cohen DO Internal Medicine  For Emergency Department Follow-up SPECIALTY [facility] [phone]       [facility] 34 [facility] Emergency Department Emergency Medicine  If symptoms worsen [address] [phone]  7 [facility] [address], [address], [place], [place], [address] [facility]    1282 [facility] Cardiology  For Emergency Department Follow-up [address] [facility] [address] [place] [address], [address] [place], [place], [address]        Discharge Medication List as of 5/29/2023  3:16 PM      CONTINUE these medications which have NOT CHANGED    Details   amiodarone 200 mg tablet Take 1 tablet (200 mg total) by mouth daily with breakfast, Starting Mon 5/22/2023, Normal      aspirin 81 MG tablet Take 1 tablet by mouth daily, Starting Fri 2/27/2015, Historical Med      atorvastatin (LIPITOR) 80 mg tablet Take 1 tablet (80 mg total) by mouth daily after dinner, Starting Mon 5/22/2023, Normal      Empagliflozin (Jardiance) 10 MG TABS tablet Take 1 tablet (10 mg total) by mouth every morning, Starting Mon 5/22/2023, Normal      furosemide (LASIX) 40 mg tablet Take 0 5 tablets (20 mg total) by mouth daily, Starting Wed 5/24/2023, Normal      glimepiride (AMARYL) 4 mg tablet Take 1 tablet by mouth daily NOT TAKING PER PCP, Starting Fri 2/27/2015, Historical Med      insulin detemir (Levemir FlexPen) 100 Units/mL injection pen Inject 10 Units under the skin daily   Indications: Type 2 Diabetes, Historical Med      JANUMET -1000 MG TB24 daily , Starting Mon 6/11/2018, Historical Med      lidocaine (LIDODERM) 5 % Apply 1 patch topically over 12 hours daily for 3 days Remove & Discard patch within 12 hours or as directed by MD, Starting Tue 5/16/2023, Until Fri 5/19/2023, Normal      metFORMIN (GLUCOPHAGE) 1000 MG tablet TAKE 1 TABLET BY MOUTH TWICE DAILY WITH A MEAL, Historical Med      metoprolol succinate (TOPROL-XL) 25 mg 24 hr tablet Take 1 tablet (25 mg total) by mouth every 12 (twelve) hours, Starting Mon 5/22/2023, Normal      nicotine (NICODERM CQ) 14 mg/24hr TD 24 hr patch Place 1 patch on the skin over 24 hours daily, Starting Tue 5/16/2023, Normal      potassium chloride (K-DUR,KLOR-CON) 20 mEq tablet Take 1 tablet (20 mEq total) by mouth daily, Starting Mon 5/22/2023, Normal      sacubitril-valsartan (ENTRESTO) 49-51 MG TABS Take 1 tablet by mouth 2 (two) times a day, Starting Mon 5/22/2023, Normal      senna-docusate sodium (SENOKOT S) 8 6-50 mg per tablet Take 2 tablets by mouth daily at bedtime, Starting Tue 5/16/2023, Until Thu 6/15/2023, Normal      spironolactone (ALDACTONE) 25 mg tablet Take 1 tablet (25 mg total) by mouth daily, Starting Mon 5/22/2023, Normal           No discharge procedures on file  Prior to Admission Medications   Prescriptions Last Dose Informant Patient Reported? Taking? Empagliflozin (Jardiance) 10 MG TABS tablet   No No   Sig: Take 1 tablet (10 mg total) by mouth every morning   JANUMET -1000 MG TB24  Self Yes No   Sig: daily    amiodarone 200 mg tablet   No No   Sig: Take 1 tablet (200 mg total) by mouth daily with breakfast   aspirin 81 MG tablet  Self Yes No   Sig: Take 1 tablet by mouth daily   atorvastatin (LIPITOR) 80 mg tablet   No No   Sig: Take 1 tablet (80 mg total) by mouth daily after dinner   furosemide (LASIX) 40 mg tablet   No No   Sig: Take 0 5 tablets (20 mg total) by mouth daily   glimepiride (AMARYL) 4 mg tablet  Self Yes No   Sig: Take 1 tablet by mouth daily NOT TAKING PER PCP   Patient not taking: Reported on 5/22/2023   insulin detemir (Levemir FlexPen) 100 Units/mL injection pen  Self Yes No   Sig: Inject 10 Units under the skin daily   Indications: Type 2 Diabetes   lidocaine (LIDODERM) 5 %   No No   Sig: Apply 1 patch topically over 12 hours daily for 3 days Remove & Discard patch within 12 hours or as directed by MD   metFORMIN (GLUCOPHAGE) 1000 MG tablet  Self Yes No   Sig: TAKE 1 TABLET BY MOUTH TWICE DAILY WITH A MEAL   metoprolol succinate (TOPROL-XL) 25 mg 24 hr tablet   No No   Sig: Take 1 tablet (25 mg total) by mouth every 12 (twelve) hours   nicotine (NICODERM CQ) 14 mg/24hr TD 24 hr patch  Self No No   Sig: Place 1 patch on the skin over 24 hours daily   Patient not taking: Reported on 5/22/2023   potassium chloride (K-DUR,KLOR-CON) 20 mEq tablet   No No   Sig: Take 1 tablet (20 mEq total) by mouth daily "  sacubitril-valsartan (ENTRESTO) 49-51 MG TABS   No No   Sig: Take 1 tablet by mouth 2 (two) times a day   senna-docusate sodium (SENOKOT S) 8 6-50 mg per tablet  Self No No   Sig: Take 2 tablets by mouth daily at bedtime   spironolactone (ALDACTONE) 25 mg tablet   No No   Sig: Take 1 tablet (25 mg total) by mouth daily      Facility-Administered Medications: None     HEART Risk Score    Flowsheet Row Most Recent Value   Heart Score Risk Calculator    History 0 Filed at: 05/29/2023 1618   ECG 1 Filed at: 05/29/2023 1618   Age 1 Filed at: 05/29/2023 1618   Risk Factors 2 Filed at: 05/29/2023 1618   Troponin 0 Filed at: 05/29/2023 1618   HEART Score 4 Filed at: 05/29/2023 1618                         Portions of the record may have been created with voice recognition software  Occasional wrong word or \"sound a like\" substitutions may have occurred due to the inherent limitations of voice recognition software  Read the chart carefully and recognize, using context, where substitutions have occurred      Electronically signed by:  Xena Lopez  "

## 2023-05-29 NOTE — TELEPHONE ENCOUNTER
"Regarding: Lightheaded/ fell and hit defibrillator  ----- Message from Willie Richards sent at 5/29/2023 10:09 AM EDT -----  \"My dad has a defibrillator  He got lightheaded, fell, and hit the defibrillator  \"    "

## 2023-05-31 ENCOUNTER — TELEPHONE (OUTPATIENT)
Dept: CARDIOLOGY CLINIC | Facility: CLINIC | Age: 63
End: 2023-05-31

## 2023-05-31 ENCOUNTER — HOME CARE VISIT (OUTPATIENT)
Dept: HOME HEALTH SERVICES | Facility: HOME HEALTHCARE | Age: 63
End: 2023-05-31
Payer: COMMERCIAL

## 2023-05-31 VITALS
RESPIRATION RATE: 20 BRPM | DIASTOLIC BLOOD PRESSURE: 60 MMHG | SYSTOLIC BLOOD PRESSURE: 86 MMHG | HEART RATE: 78 BPM | TEMPERATURE: 96.4 F | OXYGEN SATURATION: 98 % | BODY MASS INDEX: 27.99 KG/M2 | WEIGHT: 206.4 LBS

## 2023-05-31 DIAGNOSIS — I50.23 ACUTE ON CHRONIC SYSTOLIC HEART FAILURE (HCC): ICD-10-CM

## 2023-05-31 PROCEDURE — G0299 HHS/HOSPICE OF RN EA 15 MIN: HCPCS

## 2023-05-31 RX ORDER — FUROSEMIDE 40 MG/1
20 TABLET ORAL DAILY
Qty: 45 TABLET | Refills: 1 | Status: SHIPPED | OUTPATIENT
Start: 2023-05-31 | End: 2023-06-01 | Stop reason: SDUPTHER

## 2023-05-31 NOTE — TELEPHONE ENCOUNTER
Mr Merlinda Schultze is scheduled for a follow up with Mary Kay Watson next week  He was just in the ER after a fall at home, told he was dehydrated, hyponatremic  Per the [facility] nurse, his dose of lasix was cut in half further, from 20 mg daily to 10 mg daily  I do not see that specifically stated in the ER documentation but the home care nurse said he was instructed to take 10 mg lasix daily  His SBP at the nurse visit today is high 80s-90  He is asymptomatic  I was going to offer him a sooner visit tomorrow as both you and Quyen have openings  Or please advise

## 2023-05-31 NOTE — TELEPHONE ENCOUNTER
Pedro Luis Baum spoke with Mr Darell Mcmahon on the phone, He verified he was instructed by the ER doctor to decrease his lasix to 10 mg daily, which means he is cutting a 40 mg tablet into quarters  He is unable to change his ov to tomorrow and wants to keep the visit next week with Quyen

## 2023-06-01 DIAGNOSIS — I50.23 ACUTE ON CHRONIC SYSTOLIC HEART FAILURE (HCC): ICD-10-CM

## 2023-06-01 DIAGNOSIS — I50.20 SYSTOLIC CONGESTIVE HEART FAILURE, UNSPECIFIED HF CHRONICITY (HCC): Primary | ICD-10-CM

## 2023-06-01 RX ORDER — POTASSIUM CHLORIDE 750 MG/1
10 TABLET, EXTENDED RELEASE ORAL DAILY
Qty: 30 TABLET | Refills: 2 | Status: SHIPPED | OUTPATIENT
Start: 2023-06-01 | End: 2023-06-01

## 2023-06-01 RX ORDER — FUROSEMIDE 20 MG/1
10 TABLET ORAL DAILY
Qty: 30 TABLET | Refills: 2 | Status: SHIPPED | OUTPATIENT
Start: 2023-06-01 | End: 2023-06-03

## 2023-06-02 ENCOUNTER — HOME CARE VISIT (OUTPATIENT)
Dept: HOME HEALTH SERVICES | Facility: HOME HEALTHCARE | Age: 63
End: 2023-06-02
Payer: COMMERCIAL

## 2023-06-02 ENCOUNTER — APPOINTMENT (OUTPATIENT)
Dept: LAB | Facility: IMAGING CENTER | Age: 63
End: 2023-06-02
Payer: COMMERCIAL

## 2023-06-02 ENCOUNTER — HOME CARE VISIT (OUTPATIENT)
Dept: HOME HEALTH SERVICES | Facility: HOME HEALTHCARE | Age: 63
End: 2023-06-02

## 2023-06-02 VITALS
SYSTOLIC BLOOD PRESSURE: 84 MMHG | DIASTOLIC BLOOD PRESSURE: 58 MMHG | BODY MASS INDEX: 27.75 KG/M2 | WEIGHT: 204.6 LBS | OXYGEN SATURATION: 94 % | TEMPERATURE: 95.3 F | RESPIRATION RATE: 24 BRPM | HEART RATE: 82 BPM

## 2023-06-02 DIAGNOSIS — I50.23 ACUTE ON CHRONIC SYSTOLIC HEART FAILURE (HCC): ICD-10-CM

## 2023-06-02 LAB
ALBUMIN SERPL BCP-MCNC: 4 G/DL (ref 3.5–5)
ALP SERPL-CCNC: 51 U/L (ref 46–116)
ALT SERPL W P-5'-P-CCNC: 38 U/L (ref 12–78)
ANION GAP SERPL CALCULATED.3IONS-SCNC: 4 MMOL/L (ref 4–13)
AST SERPL W P-5'-P-CCNC: 29 U/L (ref 5–45)
BILIRUB SERPL-MCNC: 0.47 MG/DL (ref 0.2–1)
BNP SERPL-MCNC: 279 PG/ML (ref 0–100)
BUN SERPL-MCNC: 42 MG/DL (ref 5–25)
CALCIUM SERPL-MCNC: 9.8 MG/DL (ref 8.3–10.1)
CHLORIDE SERPL-SCNC: 103 MMOL/L (ref 96–108)
CO2 SERPL-SCNC: 23 MMOL/L (ref 21–32)
CREAT SERPL-MCNC: 1.32 MG/DL (ref 0.6–1.3)
GFR SERPL CREATININE-BSD FRML MDRD: 57 ML/MIN/1.73SQ M
GLUCOSE P FAST SERPL-MCNC: 151 MG/DL (ref 65–99)
POTASSIUM SERPL-SCNC: 5.3 MMOL/L (ref 3.5–5.3)
PROT SERPL-MCNC: 8.2 G/DL (ref 6.4–8.4)
SODIUM SERPL-SCNC: 130 MMOL/L (ref 135–147)

## 2023-06-02 PROCEDURE — G0299 HHS/HOSPICE OF RN EA 15 MIN: HCPCS

## 2023-06-02 PROCEDURE — 83880 ASSAY OF NATRIURETIC PEPTIDE: CPT

## 2023-06-02 NOTE — Clinical Note
Patient was callled on Sun 6/4/23 and instructed to stop Lasix and insttucted to continue to assess for signs of fluid retention, continue to assess weights and when to call with seight gain and to call cardiology if any sympotms noted   ----- Message -----  From: CARLA Ferraro  Sent: 6/3/2023   7:40 AM EDT  To: Bayron Penn RN      Stop Lasix completely for now and monitor  Thanks    ----- Message -----  From: Bayron Penn RN  Sent: 6/2/2023   3:25 PM EDT  To: CARLA Ferraro    Wanted to update you on patient's BP readings from today during my home visit  R arm - 84/58 sitting, standing 74/56  L arm - 86/58  Patient asymptomatic during my visit and denies any recent lightheadedenss/dizziness since seen in ED this week  Please adivse if any med changes needed or any new orders placed   Patient did have bw done today

## 2023-06-02 NOTE — PROGRESS NOTES
Advanced Heart Failure / Pulmonary Hypertension Outpatient Visit    Nacho Vela 59 y o  male   MRN: [id number]  Encounter: [id number]    Assessment:  Patient Active Problem List    Diagnosis Date Noted   • Localized swelling on left hand 05/13/2023   • Hyponatremia 05/06/2023   • Acidosis 05/06/2023   • V-tach ([facility] ) 05/04/2023   • Ischemic cardiomyopathy 05/03/2023   • Essential (primary) hypertension 05/02/2023   • Acute HFrEF 05/02/2023   • A-fib ([facility] ) 05/02/2023   • Benign prostatic hyperplasia without lower urinary tract symptoms 04/11/2018   • 3-vessel CAD 02/27/2015   • Type 2 diabetes mellitus, with long-term current use of insulin ([place] [id number] ) 02/27/2015   • GERD (gastroesophageal reflux disease) 06/28/2013   • Tobacco dependence syndrome 01/18/2012       Today's Plan:  • Euvolemic to slightly dry on exam  Recently in the ED; Lasix cut down to 10 mg daily and then stopped completely with reports of hypotension from home health on 6/2  • Feels well with the exception of mild orthostatic dizziness; BP soft in the office today  Entresto reduced to 24-26 mg BID  Low threshold to reduce spironolactone dosing  Would prefer to avoid discontinuing Jardiance if possible  • Cardiac rehab referral placed  • BMP 5-7 days  • Stop potassium supplements  • 2 week follow up in office  Plan:  HFrEF, Stage D  Impression: iCM with large apical aneurysm with progression of CAD/late presenting MI with occluded mid LAD and LPDA  LV mildly dilated  Started on milrinone 0 25mcg/kg/min 5/6/23 due to rising creatinine and worsening volume status, stopped 5/10/23  Lower extremities cool to the touch  Feeling well  Appetite is excellent  Weight is down      Weight at discharge 222 lbs, 207 lbs, today 204 lbs       [facility] 5/9/23: 61%   Hb 14 3 Brii CI 1 9 - on milrinone 0 25  [facility] 5/10/23: 72 6% on milrinone 0 13  V[facility] 5/11/23: 65% - off milrinone  VBG 5/12/23 - 55%     Studies  EKG- narrow QRS with bigeminy; inferior infarct, anterolateral infarct     [facility] 5/3/23: chronically occluded mid LAD and LPDA c/w echo findings of apical aneurysm, not amenable to PCI  Patent mid LCx stent   LVEDP 30 mmHg     Echocardiogram 5/2/23  LVEF:  10-15%, apical aneurysm  LVIDd: 5 6 cm, mildly increased wall thickness  RV: normal size, moderately reduced systolic function  MR: mild  PASP: 49 mmHg, estimated RAP 15mmHg  RVOT: no notching     Echo 9/26/17:  LVEF: 65%  [facility] 2/17/2015: The coronary circulation is left dominant  LAD: Angiography showed minor luminal irregularities  Mid LAD: There was a 40 % stenosis  Distal LAD: There was a 40 % stenosis  Mid circumflex: There was a 99 % stenosis  Left AV groove artery: The vessel was small sized  There was a 50 % stenosis  RCA: Angiography showed minor luminal irregularities       Neurohormonal Blockade:  --Beta-Blocker: metoprolol succinate 25 mg BID  --ACEi, ARB or ARNi: entresto 49-51mg BID, decreased to 24-26 mg BID    (or SVR reduction)  --Aldosterone Receptor Blocker: spironolactone 25 mg QD  --SGLT2-I: Not On  Jardiance 10 mg daily  --Diuretic: Lasix 40 mg QD, now 10 mg QD, now off      Sudden Cardiac Death Risk Reduction:  --ICD: s/p ICD placement 5/12/2023     Electrical Resynchronization:  Narrow QRS     Advanced Therapies (If appropriate): --Inotrope: milrinone 0 25mcg/kg/min titrated down to off   --LVAD/Transplant Candidacy: Current tobacco use prior to admission precludes heart transplant  Moderately reduced RV function and mildly dilated LV concerning for LVAD   Mildly dilated LV also concerning given VT       New Q wave MI, later presenting or silent LAD territory infarction  Hyperlipidemia  Current tobacco use  Ventricular tachycardia  Scar mediated, s/p ICD placement with EP 5/12/23   Rx: amiodarone 200 mg daily  CKD  Hyponatremia  Ascites  Small volume on US 5/6/23     HPI:  59year old male with known CAD with [facility] in 2015 showing obstructive mid-LCx disease treated with PTCA/stening and residual non-obstructive disease in the LAD, now presented with 1-2 weeks of shortness of breath, leg swelling, and >20 lb weight gain in 1 week  He was suspected during a cardiology visit to be in rapid atrial fibrillation, and sent to the ED  He converted to sinus rhythm shortly following IV beta blocker administration       ECG in office demonstrated wide-complex tachycardia at ~150 bpm with evidence of fusion beats which was sustained over 30 seconds       Patient was taken for [facility] which showed chronically occluded mid LAD and LPDA c/w echo findings of apical aneurysm, not amenable to PCI  There was a patent mid LCx stent  Echocardiogram showed severely reduced LV function with EF 10-15%, apical aneurysm, and LVIDd of 5 6 cm  VT was felt to be scar mediated  A MDT dual chamber AICD was placed  Heart failure specific GDMT was initiated and optimized       5/22/23  Patient presents for hospital follow up  Feeling well since discharge  Taking all medications as prescribed  He is still confused about sodium and fluid restrictions  He is losing weight due to making major dietary changes  Feels hungry all the time  No CP or SOB      6/6/23: presents today for follow up  Recently seen in the ED on 5/29 after an episode of lightheadedness and a fall  AICD was interrogated in the ED with no events  Troponins negative  Patient received small fluid boluses with improvement in BP and lightheadedness, and Lasix dose was decreased further (decreased to 20mg daily at office visit, 10 mg daily after ER visit)  It was then stopped completely with reports of hypotension from home health on 6/2  Feeling well, +orthostatic dizziness/lightheadedness  No syncope or presyncope since being seen in the ED  No LINDSEY, SOB, PND, orthopnea, leg swelling  Drinking mostly water, just under 2L per day  Eating minimal sodium  Eating well       Past Medical History:   Diagnosis Date   • Myocardial infarction [facility])      Review of Systems   Constitutional: Negative for chills and fever  HENT: Negative for ear pain and sore throat  Eyes: Negative for pain and visual disturbance  Respiratory: Negative for cough and shortness of breath  Cardiovascular: Negative for chest pain, palpitations and leg swelling  Gastrointestinal: Negative for abdominal pain and vomiting  Genitourinary: Negative for dysuria and hematuria  Musculoskeletal: Negative for arthralgias and back pain  Skin: Negative for color change and rash  Neurological: Positive for dizziness and light-headedness  Negative for seizures and syncope  All other systems reviewed and are negative  14-point ROS completed and negative except as stated above and/or in the HPI  No Known Allergies    Current Outpatient Medications:   •  amiodarone 200 mg tablet, Take 1 tablet (200 mg total) by mouth daily with breakfast, Disp: 30 tablet, Rfl: 3  •  aspirin 81 MG tablet, Take 1 tablet by mouth daily, Disp: , Rfl:   •  atorvastatin (LIPITOR) 80 mg tablet, Take 1 tablet (80 mg total) by mouth daily after dinner, Disp: 30 tablet, Rfl: 3  •  Empagliflozin (Jardiance) 10 MG TABS tablet, Take 1 tablet (10 mg total) by mouth every morning, Disp: 30 tablet, Rfl: 3  •  furosemide (LASIX) 20 mg tablet, Take 0 5 tablets (10 mg total) by mouth daily, Disp: 30 tablet, Rfl: 2  •  glimepiride (AMARYL) 4 mg tablet, Take 1 tablet by mouth daily NOT TAKING PER PCP (Patient not taking: Reported on 5/22/2023), Disp: , Rfl:   •  insulin detemir (Levemir FlexPen) 100 Units/mL injection pen, Inject 10 Units under the skin daily   Indications: Type 2 Diabetes, Disp: , Rfl:   •  JANUMET -1000 MG TB24, daily , Disp: , Rfl:   •  lidocaine (LIDODERM) 5 %, Apply 1 patch topically over 12 hours daily for 3 days Remove & Discard patch within 12 hours or as directed by MD, Disp: 3 patch, Rfl: 0  •  metFORMIN (GLUCOPHAGE) 1000 MG tablet, TAKE 1 TABLET BY MOUTH TWICE DAILY WITH A MEAL, Disp: , Rfl: •  metoprolol succinate (TOPROL-XL) 25 mg 24 hr tablet, Take 1 tablet (25 mg total) by mouth every 12 (twelve) hours, Disp: 60 tablet, Rfl: 3  •  nicotine (NICODERM CQ) 14 mg/24hr TD 24 hr patch, Place 1 patch on the skin over 24 hours daily (Patient not taking: Reported on 5/22/2023), Disp: 28 patch, Rfl: 0  •  sacubitril-valsartan (ENTRESTO) 49-51 MG TABS, Take 1 tablet by mouth 2 (two) times a day, Disp: 60 tablet, Rfl: 3  •  senna-docusate sodium (SENOKOT S) 8 6-50 mg per tablet, Take 2 tablets by mouth daily at bedtime, Disp: 60 tablet, Rfl: 0  •  spironolactone (ALDACTONE) 25 mg tablet, Take 1 tablet (25 mg total) by mouth daily, Disp: 30 tablet, Rfl: 3    Social History     Socioeconomic History   • Marital status: /Civil Union     Spouse name: Not on file   • Number of children: Not on file   • Years of education: Not on file   • Highest education level: Not on file   Occupational History   • Not on file   Tobacco Use   • Smoking status: Every Day     Packs/day: 1 00     Types: Cigarettes   • Smokeless tobacco: Never   Vaping Use   • Vaping Use: Never used   Substance and Sexual Activity   • Alcohol use: Not Currently   • Drug use: Not on file   • Sexual activity: Not on file   Other Topics Concern   • Not on file   Social History Narrative   • Not on file     Social Determinants of Health     Financial Resource Strain: Not on file   Food Insecurity: No Food Insecurity (5/4/2023)    Hunger Vital Sign    • Worried About Running Out of Food in the Last Year: Never true    • Ran Out of Food in the Last Year: Never true   Transportation Needs: No Transportation Needs (5/4/2023)    PRAPARE - Transportation    • Lack of Transportation (Medical): No    • Lack of Transportation (Non-Medical):  No   Physical Activity: Not on file   Stress: Not on file   Social Connections: Not on file   Intimate Partner Violence: Not on file   Housing Stability: Low Risk  (5/4/2023)    Housing Stability Vital Sign    • Unable to Pay for Housing in the Last Year: No    • Number of Places Lived in the Last Year: 1    • Unstable Housing in the Last Year: No     No family history on file  Vitals: There were no vitals taken for this visit  There is no height or weight on file to calculate BMI  Wt Readings from Last 10 Encounters:   06/02/23 92 8 kg (204 lb 9 6 oz)   05/31/23 93 6 kg (206 lb 6 4 oz)   05/25/23 92 8 kg (204 lb 9 6 oz)   05/23/23 93 2 kg (205 lb 6 4 oz)   05/22/23 94 kg (207 lb 3 2 oz)   05/19/23 94 6 kg (208 lb 9 6 oz)   05/16/23 101 kg (222 lb 10 6 oz)   05/02/23 116 kg (254 lb 12 8 oz)   04/01/22 102 kg (224 lb 12 8 oz)   01/13/21 103 kg (228 lb)     There were no vitals filed for this visit  Physical Exam  Constitutional:       Appearance: Normal appearance  HENT:      Head: Normocephalic  Nose: Nose normal       Mouth/Throat:      Mouth: Mucous membranes are moist    Eyes:      Conjunctiva/sclera: Conjunctivae normal    Neck:      Comments: JVP is flat  Cardiovascular:      Rate and Rhythm: Normal rate and regular rhythm  Pulmonary:      Effort: Pulmonary effort is normal       Breath sounds: Normal breath sounds  Musculoskeletal:      Cervical back: Neck supple  Right lower leg: No edema  Left lower leg: No edema  Skin:     General: Skin is dry  Neurological:      General: No focal deficit present  Mental Status: He is alert and oriented to person, place, and time     Psychiatric:         Mood and Affect: Mood normal          Behavior: Behavior normal          Labs & Results:  Lab Results   Component Value Date    HCT 48 8 05/29/2023    HGB 16 3 05/29/2023    MCV 82 05/29/2023     05/29/2023    WBC 10 21 (H) 05/29/2023     Lab Results   Component Value Date    BUN 50 (H) 05/29/2023    CALCIUM 9 5 05/29/2023     05/29/2023    CO2 26 05/29/2023    CREATININE 1 46 (H) 05/29/2023    GLUC 258 (H) 05/29/2023    K 5 4 (H) 05/29/2023    SODIUM 128 (L) 05/29/2023     Lab Results Component Value Date    INR 1 03 05/12/2023    INR 1 13 05/02/2023    INR 0 95 02/16/2015    PROTIME 13 8 05/12/2023    PROTIME 14 7 (H) 05/02/2023    PROTIME 12 9 02/16/2015     Lab Results   Component Value Date     (H) 05/23/2023      Lab Results   Component Value Date    NTBNP 5,942 (H) 05/02/2023          Thank you for the opportunity to participate in the care of this patient      Ana Denny PA-C

## 2023-06-02 NOTE — CASE COMMUNICATION
Wanted to update you on patient's BP readings from today during my home visit  R arm - 84/58 sitting, standing 74/56  L arm - 86/58  Patient asymptomatic during my visit and denies any recent lightheadedenss/dizziness since seen in ED this week  Please adivse if any med changes needed or any new orders placed   Patient did have bw done today

## 2023-06-04 NOTE — CASE COMMUNICATION
Please disreguard previous message - Leighton Guzman was not completed before sync was done  This is to inform you that a dc oasis was started in error and patient not discharged on 6/2/23

## 2023-06-06 ENCOUNTER — OFFICE VISIT (OUTPATIENT)
Dept: CARDIOLOGY CLINIC | Facility: CLINIC | Age: 63
End: 2023-06-06
Payer: COMMERCIAL

## 2023-06-06 VITALS
BODY MASS INDEX: 27.63 KG/M2 | HEIGHT: 72 IN | DIASTOLIC BLOOD PRESSURE: 54 MMHG | OXYGEN SATURATION: 93 % | SYSTOLIC BLOOD PRESSURE: 80 MMHG | HEART RATE: 85 BPM | WEIGHT: 204 LBS

## 2023-06-06 DIAGNOSIS — R42 DIZZINESS: Primary | ICD-10-CM

## 2023-06-06 DIAGNOSIS — I50.23 ACUTE ON CHRONIC SYSTOLIC HEART FAILURE (HCC): ICD-10-CM

## 2023-06-06 DIAGNOSIS — I50.20 HFREF (HEART FAILURE WITH REDUCED EJECTION FRACTION) (HCC): ICD-10-CM

## 2023-06-06 PROCEDURE — 99214 OFFICE O/P EST MOD 30 MIN: CPT | Performed by: PHYSICIAN ASSISTANT

## 2023-06-06 RX ORDER — BLOOD SUGAR DIAGNOSTIC
STRIP MISCELLANEOUS
COMMUNITY
Start: 2023-06-05

## 2023-06-06 RX ORDER — SACUBITRIL AND VALSARTAN 24; 26 MG/1; MG/1
1 TABLET, FILM COATED ORAL 2 TIMES DAILY
Qty: 60 TABLET | Refills: 2 | Status: SHIPPED | OUTPATIENT
Start: 2023-06-06

## 2023-06-06 RX ORDER — PEN NEEDLE, DIABETIC 32 GX 1/4"
NEEDLE, DISPOSABLE MISCELLANEOUS
COMMUNITY
Start: 2023-05-23

## 2023-06-06 NOTE — PATIENT INSTRUCTIONS
Start taking Entresto 24-26 mg twice daily  This prescription has been sent to your pharmacy  Please continue taking your blood pressure every day - if it is still low and you still have dizziness when standing, we may need to cut down your spironolactone  Hold off on the Lasix for now  Stop taking the potassium supplements  A referral has been sent for cardiac rehab  Please weigh yourself every day (after emptying your bladder) and keep a detailed log of weights  Contact the Heart Failure program at [phone] if you gain 3 lbs overnight or 5 lbs in 5-7 days  Limit daily sodium/salt intake to 2000 mg daily to prevent fluid retention  Avoid canned foods, fast food/Chinese food, and processed meats (hot dogs, lunch meat, and sausage etc )  Caution with condiments  Limit fluid intake to 2000 mL or 2 liters (about 60-65 ounces) daily  Avoid electrolyte replacement drinks (such as Gatorade, Pedialyte, Propel, Liquid IV, etc )  Bring complete list of medications and log of daily weights to your follow-up appointment

## 2023-06-07 VITALS
WEIGHT: 204.6 LBS | RESPIRATION RATE: 24 BRPM | SYSTOLIC BLOOD PRESSURE: 84 MMHG | DIASTOLIC BLOOD PRESSURE: 58 MMHG | HEART RATE: 82 BPM | BODY MASS INDEX: 27.75 KG/M2 | OXYGEN SATURATION: 94 % | TEMPERATURE: 95.3 F

## 2023-06-08 ENCOUNTER — HOME CARE VISIT (OUTPATIENT)
Dept: HOME HEALTH SERVICES | Facility: HOME HEALTHCARE | Age: 63
End: 2023-06-08
Payer: COMMERCIAL

## 2023-06-08 VITALS
WEIGHT: 206.1 LBS | HEART RATE: 82 BPM | OXYGEN SATURATION: 97 % | SYSTOLIC BLOOD PRESSURE: 88 MMHG | TEMPERATURE: 96.3 F | DIASTOLIC BLOOD PRESSURE: 62 MMHG | BODY MASS INDEX: 27.95 KG/M2 | RESPIRATION RATE: 20 BRPM

## 2023-06-08 PROCEDURE — G0299 HHS/HOSPICE OF RN EA 15 MIN: HCPCS

## 2023-06-09 ENCOUNTER — TELEPHONE (OUTPATIENT)
Dept: CARDIOLOGY CLINIC | Facility: CLINIC | Age: 63
End: 2023-06-09

## 2023-06-09 NOTE — TELEPHONE ENCOUNTER
Please call Polly Cox Thursday or Friday of this week - has been hypotensive and orthostatic  Lasix and potassium were stopped and I reduced his entresto to 24-26 BID  If his pressures are still low (under 10U systolic) and he's still having orthostatic dizziness, would cut spironolactone in half to 12 5 daily  Also ordered a BMP for him to get done in the next 5-7 days  Thanks!    G

## 2023-06-09 NOTE — TELEPHONE ENCOUNTER
bp today 94/60, last 3 days 110//72  Denies dizziness or lightheadedness  Advised to get bmp next week and if SBP< 90 consistently , he needs to call the office  Verbally understood

## 2023-06-12 ENCOUNTER — APPOINTMENT (OUTPATIENT)
Dept: LAB | Facility: IMAGING CENTER | Age: 63
End: 2023-06-12
Payer: COMMERCIAL

## 2023-06-12 DIAGNOSIS — I50.23 ACUTE ON CHRONIC SYSTOLIC HEART FAILURE (HCC): ICD-10-CM

## 2023-06-12 LAB
ANION GAP SERPL CALCULATED.3IONS-SCNC: 4 MMOL/L (ref 4–13)
BUN SERPL-MCNC: 32 MG/DL (ref 5–25)
CALCIUM SERPL-MCNC: 9.3 MG/DL (ref 8.3–10.1)
CHLORIDE SERPL-SCNC: 108 MMOL/L (ref 96–108)
CO2 SERPL-SCNC: 24 MMOL/L (ref 21–32)
CREAT SERPL-MCNC: 1.08 MG/DL (ref 0.6–1.3)
GFR SERPL CREATININE-BSD FRML MDRD: 73 ML/MIN/1.73SQ M
GLUCOSE SERPL-MCNC: 195 MG/DL (ref 65–140)
POTASSIUM SERPL-SCNC: 4.6 MMOL/L (ref 3.5–5.3)
SODIUM SERPL-SCNC: 136 MMOL/L (ref 135–147)

## 2023-06-12 PROCEDURE — 80048 BASIC METABOLIC PNL TOTAL CA: CPT

## 2023-06-12 PROCEDURE — 36415 COLL VENOUS BLD VENIPUNCTURE: CPT

## 2023-06-13 ENCOUNTER — HOME CARE VISIT (OUTPATIENT)
Dept: HOME HEALTH SERVICES | Facility: HOME HEALTHCARE | Age: 63
End: 2023-06-13
Payer: COMMERCIAL

## 2023-06-13 ENCOUNTER — TELEPHONE (OUTPATIENT)
Dept: CARDIOLOGY CLINIC | Facility: CLINIC | Age: 63
End: 2023-06-13

## 2023-06-13 PROCEDURE — G0299 HHS/HOSPICE OF RN EA 15 MIN: HCPCS

## 2023-06-13 NOTE — TELEPHONE ENCOUNTER
Spoke to pt and advise Please call him and tell him to stop the spironolactone for now  Ask him to continue daily weights and checking his BP daily  Please call him in 2 days (Thursday) and let me know how he's doing  Thanks!     Pt verbally understood

## 2023-06-14 VITALS
SYSTOLIC BLOOD PRESSURE: 80 MMHG | DIASTOLIC BLOOD PRESSURE: 56 MMHG | WEIGHT: 201.6 LBS | BODY MASS INDEX: 27.34 KG/M2 | OXYGEN SATURATION: 96 % | TEMPERATURE: 96.7 F | RESPIRATION RATE: 22 BRPM | HEART RATE: 86 BPM

## 2023-06-15 NOTE — TELEPHONE ENCOUNTER
Spoke with pt, wt today is 203 lbs  Bp last 2 days 100/70 and 90/60  He did loss balance when getting up to go to bathroom  Advised to sit at side of bed for 1-2 mins, then stand slowly and wait a min until you walk  Verbally understood  Advised I will call him tomorrow to get another update  Verbally understood

## 2023-06-16 ENCOUNTER — TELEPHONE (OUTPATIENT)
Dept: CARDIOLOGY CLINIC | Facility: CLINIC | Age: 63
End: 2023-06-16

## 2023-06-16 NOTE — TELEPHONE ENCOUNTER
Adrian Shaikh from [facility] called to inform pt had dried blood on incision line & picture recorded in media  No fever or active bleeding when she saw pt  Pt denied bumping site or having any pain  Pt has appt w/ G  Arturo La PA-C on 6/20/23  Adrian Shaikh will have last appt w/ pt next week & will recheck site

## 2023-06-19 ENCOUNTER — TELEPHONE (OUTPATIENT)
Dept: CARDIOLOGY CLINIC | Facility: CLINIC | Age: 63
End: 2023-06-19

## 2023-06-19 NOTE — TELEPHONE ENCOUNTER
Spoke with pt and he is not home now  He thinks he stopped it, but he will let me know when he gets home  If he did not stop Aldactone, he will call me back      Thank you

## 2023-06-19 NOTE — TELEPHONE ENCOUNTER
Spoke with pt  He said he is doing ok  Still has the dizziness with standing  BP 82/56  He will see you tomorrow  Taking: Entresto 24-26 mg bid                Aldactone 25 mg qd                 Toprol-xl 25 mg bid               Jardiance 10 mg qd                Amiodarone 200 mg qd    Any changes?

## 2023-06-19 NOTE — PROGRESS NOTES
Advanced Heart Failure / Pulmonary Hypertension Outpatient Visit    Chico Hylton 59 y o  male   MRN: [id number]  Encounter: [id number]    Assessment:  Patient Active Problem List    Diagnosis Date Noted   • Localized swelling on left hand 05/13/2023   • Hyponatremia 05/06/2023   • Acidosis 05/06/2023   • [facility]-tach ([facility] ) 05/04/2023   • Ischemic cardiomyopathy 05/03/2023   • Essential (primary) hypertension 05/02/2023   • Acute HFrEF 05/02/2023   • A-fib ([facility] ) 05/02/2023   • Benign prostatic hyperplasia without lower urinary tract symptoms 04/11/2018   • 3-vessel CAD 02/27/2015   • Type 2 diabetes mellitus, with long-term current use of insulin ([place] [address] ) 02/27/2015   • GERD (gastroesophageal reflux disease) 06/28/2013   • Tobacco dependence syndrome 01/18/2012       Today's Plan:  • Entresto dose recently reduced to 24-26 mg BID  Lasix has been stopped and aldactone recently stopped  Blood pressures soft today; warm and well perfused on exam  Repeat BP 82/54  Stop Entresto for now  • Cardiac rehab referral placed last visit  • Same day device site check; will go up to EP after this appointment  Wife noted some dried blood/scab around his site a few days ago, denied pain or swelling around the site  • Appointment scheduled with Dr Marcello Montes De Oca 7/6  BMP to be done prior to appointment  • Advised to increase fluid intake up to 80 oz daily for now  • Weights stable  Has been regularly checking blood pressures at home; typically 90s/60s range  Has a good appetite and has been eating well  • Repeat echo 8/2023  Plan:  HFrEF, Stage D  Impression: iCM with large apical aneurysm with progression of CAD/late presenting MI with occluded mid LAD and LPDA  LV mildly dilated  Started on milrinone 0 25mcg/kg/min 5/6/23 due to rising creatinine and worsening volume status, stopped 5/10/23       Weight at discharge 222 lbs, 207 lbs, 204 lbs, today 205 lbs     Most recent BMP from 6/12/23: sodium 136, potassium 4 6, creatinine 1 08, BUN 32, eGFR 73       VBG 5/9/23: 61%  Hb 14 3 Brii CI 1 9 - on milrinone 0 25  VBG 5/10/23: 72 6% on milrinone 0 13  VBG 5/11/23: 65% - off milrinone  VBG 5/12/23 - 55%     Studies  EKG- narrow QRS with bigeminy; inferior infarct, anterolateral infarct     [facility] 5/3/23: chronically occluded mid LAD and LPDA c/w echo findings of apical aneurysm, not amenable to PCI  Patent mid LCx stent   LVEDP 30 mmHg     Echocardiogram 5/2/23  LVEF:  10-15%, apical aneurysm  LVIDd: 5 6 cm, mildly increased wall thickness  RV: normal size, moderately reduced systolic function  MR: mild  PASP: 49 mmHg, estimated RAP 15mmHg  RVOT: no notching     Echo 9/26/17:  LVEF: 65%  [facility] 2/17/2015: The coronary circulation is left dominant  LAD: Angiography showed minor luminal irregularities  Mid LAD: There was a 40 % stenosis  Distal LAD: There was a 40 % stenosis  Mid circumflex: There was a 99 % stenosis  Left AV groove artery: The vessel was small sized  There was a 50 % stenosis  RCA: Angiography showed minor luminal irregularities       Neurohormonal Blockade:  --Beta-Blocker: metoprolol succinate 25 mg BID  --ACEi, ARB or ARNi: entresto 49-51mg BID, decreased to 24-26 mg BID, now stopped due to symptomatic hypotension    (or SVR reduction)  --Aldosterone Receptor Blocker: spironolactone 25 mg QD, now stopped   --SGLT2-I: Jardiance 10 mg daily  --Diuretic: Lasix 40 mg QD, now 10 mg QD, now off      Sudden Cardiac Death Risk Reduction:  --ICD: s/p ICD placement 5/12/2023  --repeat TTE 8/2023     Electrical Resynchronization:  Narrow QRS     Advanced Therapies (If appropriate): --Inotrope: milrinone 0 25mcg/kg/min titrated down to off   --LVAD/Transplant Candidacy: Current tobacco use prior to admission precludes heart transplant at this time  Moderately reduced RV function and mildly dilated LV concerning for LVAD   Mildly dilated LV also concerning given VT       New Q wave MI, later presenting or silent LAD territory infarction  Hyperlipidemia  Current tobacco use  Ventricular tachycardia  Scar mediated, s/p ICD placement with EP 5/12/23   Rx: amiodarone 200 mg daily  CKD  Hyponatremia  Ascites  Small volume on US 5/6/23     HPI:  59year old male with known CAD with LHC in 2015 showing obstructive mid-LCx disease treated with PTCA/stening and residual non-obstructive disease in the LAD, now presented with 1-2 weeks of shortness of breath, leg swelling, and >20 lb weight gain in 1 week  He was suspected during a cardiology visit to be in rapid atrial fibrillation, and sent to the ED  He converted to sinus rhythm shortly following IV beta blocker administration       ECG in office demonstrated wide-complex tachycardia at ~150 bpm with evidence of fusion beats which was sustained over 30 seconds       Patient was taken for [facility] which showed chronically occluded mid LAD and LPDA c/w echo findings of apical aneurysm, not amenable to PCI  There was a patent mid LCx stent  Echocardiogram showed severely reduced LV function with EF 10-15%, apical aneurysm, and LVIDd of 5 6 cm  VT was felt to be scar mediated  A MDT dual chamber AICD was placed  Heart failure specific GDMT was initiated and optimized       5/22/23  Patient presents for hospital follow up  Feeling well since discharge  Taking all medications as prescribed  He is still confused about sodium and fluid restrictions  He is losing weight due to making major dietary changes  Feels hungry all the time  No CP or SOB      6/6/23: presents today for follow up  Recently seen in the ED on 5/29 after an episode of lightheadedness and a fall  AICD was interrogated in the ED with no events  Troponins negative  Patient received small fluid boluses with improvement in BP and lightheadedness, and Lasix dose was decreased further (decreased to 20mg daily at office visit, 10 mg daily after ER visit)   It was then stopped completely with reports of hypotension from home health on 6/2     Feeling well, +orthostatic dizziness/lightheadedness  No syncope or presyncope since being seen in the ED  No LINDSEY, SOB, PND, orthopnea, leg swelling  Drinking mostly water, just under 2L per day  Eating minimal sodium  Eating well  6/19/23: presents today for follow up  Aldactone stopped after last visit due to continuing hypotension  Still notes lightheadedness after getting up and walking about 10-15 feet, no syncope or further falls  Denies SOB, LINDSEY, orthopnea, PND, leg swelling  No chest pain or palpitations  Wife noted some dried blood/scab around his ICD site a few days ago, home health nurse alerted EP  Going for device site check after this appointment  He denies any pain or swelling around the site  Weights stable at home  Daughter has been checking his BP every day; mostly 80X/97G, some systolics in the 13P to 251K  Has a great appetite, denies abdominal pain or nausea  Eating fish, eggs, meat, salads, potatoes  Wife has been very diligent about his diet and fluid intake  Drinks about 2L per day, mostly flavored water  Past Medical History:   Diagnosis Date   • Myocardial infarction [facility])      Review of Systems   Constitutional: Negative for chills, fever and unexpected weight change  HENT: Negative for ear pain and sore throat  Eyes: Negative for pain and visual disturbance  Respiratory: Negative for cough and shortness of breath  Cardiovascular: Negative for chest pain, palpitations and leg swelling  Gastrointestinal: Negative for abdominal pain and vomiting  Genitourinary: Negative for dysuria and hematuria  Musculoskeletal: Negative for arthralgias and back pain  Skin: Negative for color change and rash  Neurological: Positive for dizziness and light-headedness  Negative for seizures and syncope  All other systems reviewed and are negative  14-point ROS completed and negative except as stated above and/or in the HPI        No Known Allergies    Current Outpatient Medications:   •  amiodarone 200 mg tablet, Take 1 tablet (200 mg total) by mouth daily with breakfast, Disp: 30 tablet, Rfl: 3  •  aspirin 81 MG tablet, Take 1 tablet by mouth daily, Disp: , Rfl:   •  atorvastatin (LIPITOR) 80 mg tablet, Take 1 tablet (80 mg total) by mouth daily after dinner, Disp: 30 tablet, Rfl: 3  •  BD Pen Needle Micro U/F 32G X 6 MM MISC, USE 1 PEN EACH DAILY, Disp: , Rfl:   •  Empagliflozin (Jardiance) 10 MG TABS tablet, Take 1 tablet (10 mg total) by mouth every morning, Disp: 30 tablet, Rfl: 3  •  insulin detemir (Levemir FlexPen) 100 Units/mL injection pen, Inject 30 Units under the skin daily  Patient to take 30 units daily - if fasting bs >150 for 3 days to increase dose by 5 units   Once bs < 150 can go back to 30 units  Indications: Type 2 Diabetes, Disp: , Rfl:   •  JANUMET -1000 MG TB24, daily , Disp: , Rfl:   •  metFORMIN (GLUCOPHAGE) 1000 MG tablet, TAKE 1 TABLET BY MOUTH TWICE DAILY WITH A MEAL, Disp: , Rfl:   •  metoprolol succinate (TOPROL-XL) 25 mg 24 hr tablet, Take 1 tablet (25 mg total) by mouth every 12 (twelve) hours, Disp: 60 tablet, Rfl: 3  •  OneTouch Verio test strip, , Disp: , Rfl:   •  lidocaine (LIDODERM) 5 %, Apply 1 patch topically over 12 hours daily for 3 days Remove & Discard patch within 12 hours or as directed by MD, Disp: 3 patch, Rfl: 0  •  nicotine (NICODERM CQ) 14 mg/24hr TD 24 hr patch, Place 1 patch on the skin over 24 hours daily (Patient not taking: Reported on 5/22/2023), Disp: 28 patch, Rfl: 0  •  senna-docusate sodium (SENOKOT S) 8 6-50 mg per tablet, Take 2 tablets by mouth daily at bedtime (Patient not taking: Reported on 6/6/2023), Disp: 60 tablet, Rfl: 0    Social History     Socioeconomic History   • Marital status: /Civil Union     Spouse name: Not on file   • Number of children: Not on file   • Years of education: Not on file   • Highest education level: Not on file   Occupational History   • Not on file   Tobacco Use   • Smoking status: Former     Packs/day: 1 00     Types: Cigarettes     Quit date: 2023     Years since quittin 1   • Smokeless tobacco: Never   Vaping Use   • Vaping Use: Never used   Substance and Sexual Activity   • Alcohol use: Not Currently   • Drug use: Not on file   • Sexual activity: Not on file   Other Topics Concern   • Not on file   Social History Narrative   • Not on file     Social Determinants of Health     Financial Resource Strain: Not on file   Food Insecurity: No Food Insecurity (2023)    Hunger Vital Sign    • Worried About Running Out of Food in the Last Year: Never true    • Ran Out of Food in the Last Year: Never true   Transportation Needs: No Transportation Needs (2023)    PRAPARE - Transportation    • Lack of Transportation (Medical): No    • Lack of Transportation (Non-Medical): No   Physical Activity: Not on file   Stress: Not on file   Social Connections: Not on file   Intimate Partner Violence: Not on file   Housing Stability: Low Risk  (2023)    Housing Stability Vital Sign    • Unable to Pay for Housing in the Last Year: No    • Number of Places Lived in the Last Year: 1    • Unstable Housing in the Last Year: No     History reviewed  No pertinent family history  Vitals:  Blood pressure (!) 70/42, pulse 63, height 6' (1 829 m), weight 93 kg (205 lb), SpO2 94 %  Body mass index is 27 8 kg/m²    Wt Readings from Last 10 Encounters:   23 93 kg (205 lb)   23 91 4 kg (201 lb 9 6 oz)   23 93 5 kg (206 lb 1 6 oz)   23 92 5 kg (204 lb)   23 92 8 kg (204 lb 9 6 oz)   23 92 8 kg (204 lb 9 6 oz)   23 93 6 kg (206 lb 6 4 oz)   23 92 8 kg (204 lb 9 6 oz)   23 93 2 kg (205 lb 6 4 oz)   23 94 kg (207 lb 3 2 oz)     Vitals:    23 0848   BP: (!) 70/42   BP Location: Left arm   Patient Position: Sitting   Cuff Size: Standard   Pulse: 63   SpO2: 94%   Weight: 93 kg (205 lb)   Height: 6' (1 829 m)       Physical Exam  Constitutional:       Appearance: Normal appearance  HENT:      Head: Normocephalic  Nose: Nose normal       Mouth/Throat:      Mouth: Mucous membranes are moist    Eyes:      Conjunctiva/sclera: Conjunctivae normal    Neck:      Comments: JVP is flat  Cardiovascular:      Rate and Rhythm: Normal rate and regular rhythm  Pulmonary:      Effort: Pulmonary effort is normal       Breath sounds: Normal breath sounds  Musculoskeletal:      Cervical back: Neck supple  Right lower leg: No edema  Left lower leg: No edema  Skin:     General: Skin is warm and dry  Neurological:      General: No focal deficit present  Mental Status: He is alert and oriented to person, place, and time  Psychiatric:         Mood and Affect: Mood normal          Behavior: Behavior normal          Labs & Results:  Lab Results   Component Value Date    WBC 10 21 (H) 05/29/2023    HGB 16 3 05/29/2023    HCT 48 8 05/29/2023    MCV 82 05/29/2023     05/29/2023     Lab Results   Component Value Date    SODIUM 136 06/12/2023    K 4 6 06/12/2023     06/12/2023    CO2 24 06/12/2023    BUN 32 (H) 06/12/2023    CREATININE 1 08 06/12/2023    GLUC 195 (H) 06/12/2023    CALCIUM 9 3 06/12/2023     Lab Results   Component Value Date    INR 1 03 05/12/2023    INR 1 13 05/02/2023    INR 0 95 02/16/2015    PROTIME 13 8 05/12/2023    PROTIME 14 7 (H) 05/02/2023    PROTIME 12 9 02/16/2015     Lab Results   Component Value Date     (H) 06/02/2023      Lab Results   Component Value Date    NTBNP 5,942 (H) 05/02/2023          Thank you for the opportunity to participate in the care of this patient      Jeff Ascencio PA-C

## 2023-06-20 ENCOUNTER — OFFICE VISIT (OUTPATIENT)
Dept: CARDIOLOGY CLINIC | Facility: CLINIC | Age: 63
End: 2023-06-20

## 2023-06-20 ENCOUNTER — CLINICAL SUPPORT (OUTPATIENT)
Dept: CARDIOLOGY CLINIC | Facility: CLINIC | Age: 63
End: 2023-06-20

## 2023-06-20 VITALS
BODY MASS INDEX: 27.77 KG/M2 | OXYGEN SATURATION: 94 % | SYSTOLIC BLOOD PRESSURE: 70 MMHG | HEART RATE: 63 BPM | HEIGHT: 72 IN | DIASTOLIC BLOOD PRESSURE: 42 MMHG | WEIGHT: 205 LBS

## 2023-06-20 DIAGNOSIS — Z48.89 ENCOUNTER FOR POST SURGICAL WOUND CHECK: ICD-10-CM

## 2023-06-20 DIAGNOSIS — I50.20 HFREF (HEART FAILURE WITH REDUCED EJECTION FRACTION) (HCC): Primary | ICD-10-CM

## 2023-06-20 DIAGNOSIS — I95.9 SYMPTOMATIC HYPOTENSION: ICD-10-CM

## 2023-06-20 PROCEDURE — RECHECK: Performed by: PHYSICIAN ASSISTANT

## 2023-06-20 PROCEDURE — 99024 POSTOP FOLLOW-UP VISIT: CPT | Performed by: PHYSICIAN ASSISTANT

## 2023-06-20 NOTE — PATIENT INSTRUCTIONS
Stop taking the [facility] for now  Keep checking your blood pressure every day  Get labs as ordered before your next visit  Increase your fluid intake up to 80 oz for the next few days  Please weigh yourself every day (after emptying your bladder) and keep a detailed log of weights  Contact the Heart Failure program at [phone] if you gain 3 lbs overnight or 5 lbs in 5-7 days  Limit daily sodium/salt intake to 2000 mg daily to prevent fluid retention  Avoid canned foods, fast food/Chinese food, and processed meats (hot dogs, lunch meat, and sausage etc )  Caution with condiments  Limit fluid intake to 2000 mL or 2 liters (about 60-65 ounces) daily  Avoid electrolyte replacement drinks (such as Gatorade, Pedialyte, Propel, Liquid IV, etc )  Bring complete list of medications and log of daily weights to your follow-up appointment

## 2023-06-20 NOTE — PROGRESS NOTES
Patient presents after CHF OV for a site check  S/P ICD implant 5/12/23  About 1-2 weeks ago patient's wife noticed a scabbed area at the medial edge of his incision  Denies any fever, chills, pain or drainage  Lakshmi Briggs PA-C assessed and cleaned incision area  Found to be no drainage/bleeding from the incision  No ABX prescribed  Patient to come back in two weeks for device to assess the site again  He was asked to call the office with any new issues  Patient did admit to a mechanical fall about 2 weeks ago and this scabbing seems to have presented after that  Patient was seen in the ER at that time

## 2023-06-22 ENCOUNTER — HOME CARE VISIT (OUTPATIENT)
Dept: HOME HEALTH SERVICES | Facility: HOME HEALTHCARE | Age: 63
End: 2023-06-22
Payer: COMMERCIAL

## 2023-06-22 ENCOUNTER — TELEPHONE (OUTPATIENT)
Dept: CARDIOLOGY CLINIC | Facility: CLINIC | Age: 63
End: 2023-06-22

## 2023-06-22 VITALS
HEART RATE: 80 BPM | BODY MASS INDEX: 27.91 KG/M2 | SYSTOLIC BLOOD PRESSURE: 88 MMHG | RESPIRATION RATE: 20 BRPM | WEIGHT: 205.8 LBS | OXYGEN SATURATION: 97 % | DIASTOLIC BLOOD PRESSURE: 62 MMHG | TEMPERATURE: 96.1 F

## 2023-06-22 PROCEDURE — G0299 HHS/HOSPICE OF RN EA 15 MIN: HCPCS

## 2023-06-22 NOTE — TELEPHONE ENCOUNTER
Spoke with pt  Wt today 205 lbs  Bp is still running low at 84/60 and 88/62  He does get lightheaded sometimes when standing up  He is drinking more water, about 72 oz a day  Are you ok with that amount? He breathing issues or edema noted presently  I will call him Monday to check in on him      Please advise

## 2023-06-22 NOTE — TELEPHONE ENCOUNTER
DENNIS Busby RN  Hi Major Waite,   Would you call Mayelin Mcdonnell and see how he's feeling/how his BP has been? I stopped his Entresto earlier this week and asked him to increase his water intake  Has a follow up with Page Median on 7/6  Thanks!

## 2023-06-26 ENCOUNTER — TELEPHONE (OUTPATIENT)
Dept: CARDIOLOGY CLINIC | Facility: CLINIC | Age: 63
End: 2023-06-26

## 2023-06-26 NOTE — TELEPHONE ENCOUNTER
Spoke with pt  Wt is 208  4  he said he ate a lot over the weekend  Denies edema , sob, cp/ tightness or any other symptom of CHF  Bp is improving today 100/64 and no dizziness or lightheaded  Advised pt to call office if any of the above symptoms occur or wt continues to increase  Verbally understood

## 2023-06-28 ENCOUNTER — TELEPHONE (OUTPATIENT)
Dept: CARDIOLOGY CLINIC | Facility: CLINIC | Age: 63
End: 2023-06-28

## 2023-06-28 NOTE — TELEPHONE ENCOUNTER
P/c'd , bp today is 120/90  Pt here today for OB follow up  Reports +FM  WT: 214 lb  BP: 106/68   Pt states no complications today  Good # [phone]     SUBJECTIVE:  Pt is a 34 y.o.   at 29w6d  gestation. Presents today for follow-up prenatal care. Reports no issues at this time.  Reports good  fetal movement. Denies cramping/contractions, bleeding or leaking of fluid. Denies dysuria, headaches, N/V, or other issues at this time. Generally feels well today. Supportive FOB present and involved.     OBJECTIVE:  - See prenatal vitals flow  -   Vitals:    17 1047   BP: 106/68   Weight: 97.1 kg (214 lb)      - Pertinent Labs:Normal prenatal panel, normal glucose. No AFP done.   - Pertinent ultrasound: Normal fetal survey            ASSESSMENT:   - IUP at 29w6d    - S=D   -   Patient Active Problem List    Diagnosis Date Noted   • Supervision of normal pregnancy 2011         PLAN:  - S/sx pregnancy and labor warning signs vs general discomforts discussed  - Fetal movements and kick counts reviewed   - Adequate hydration reinforced  - Nutrition/exercise/vitamin education: continued PNV  BTL papers signed.    Statement Selected

## 2023-06-30 ENCOUNTER — TELEPHONE (OUTPATIENT)
Dept: CARDIOLOGY CLINIC | Facility: CLINIC | Age: 63
End: 2023-06-30

## 2023-06-30 NOTE — TELEPHONE ENCOUNTER
Spoke with pt today  Wt is 208 2 on 6/23/23 wt was 201 4   /82, 120/90  He denies any edema, sob, abd distention, Cp/tightness, increased fatigue, cough   Lasix 10 mg was stopped due to low BP        Please advise

## 2023-07-03 ENCOUNTER — APPOINTMENT (OUTPATIENT)
Dept: LAB | Facility: IMAGING CENTER | Age: 63
End: 2023-07-03
Payer: COMMERCIAL

## 2023-07-03 ENCOUNTER — TELEPHONE (OUTPATIENT)
Dept: CARDIOLOGY CLINIC | Facility: CLINIC | Age: 63
End: 2023-07-03

## 2023-07-03 DIAGNOSIS — I50.20 HFREF (HEART FAILURE WITH REDUCED EJECTION FRACTION) (HCC): ICD-10-CM

## 2023-07-03 LAB
ANION GAP SERPL CALCULATED.3IONS-SCNC: 2 MMOL/L
BUN SERPL-MCNC: 19 MG/DL (ref 5–25)
CALCIUM SERPL-MCNC: 9.2 MG/DL (ref 8.3–10.1)
CHLORIDE SERPL-SCNC: 106 MMOL/L (ref 96–108)
CO2 SERPL-SCNC: 29 MMOL/L (ref 21–32)
CREAT SERPL-MCNC: 1.05 MG/DL (ref 0.6–1.3)
GFR SERPL CREATININE-BSD FRML MDRD: 75 ML/MIN/1.73SQ M
GLUCOSE P FAST SERPL-MCNC: 190 MG/DL (ref 65–99)
POTASSIUM SERPL-SCNC: 4.7 MMOL/L (ref 3.5–5.3)
SODIUM SERPL-SCNC: 137 MMOL/L (ref 135–147)

## 2023-07-03 PROCEDURE — 80048 BASIC METABOLIC PNL TOTAL CA: CPT

## 2023-07-03 PROCEDURE — 36415 COLL VENOUS BLD VENIPUNCTURE: CPT

## 2023-07-03 NOTE — TELEPHONE ENCOUNTER
Spoke with pt. Wt today is 206. 2. denies any CHF symptoms presently. Had been doing about 85 oz of fluid daily. Appetite good and following the low sodium diet. Will stop the Entresto.     Pleas advise

## 2023-07-03 NOTE — TELEPHONE ENCOUNTER
Spoke with pt. He restarted the Entresto 24-26 mg bid. bp 100/60 and he is lightheaded with standing .     Please advise

## 2023-07-04 NOTE — PROGRESS NOTES
Heart Failure Outpatient Progress Note - Stoney Smith 59 y.o. male MRN: [id number]    @ Encounter: [id number]      Assessment/Plan:    Patient Active Problem List    Diagnosis Date Noted   • Localized swelling on left hand 05/13/2023   • Hyponatremia 05/06/2023   • Acidosis 05/06/2023   • V-tach ([address]) 05/04/2023   • Ischemic cardiomyopathy 05/03/2023   • Essential (primary) hypertension 05/02/2023   • Acute HFrEF 05/02/2023   • A-fib ([address]) 05/02/2023   • Benign prostatic hyperplasia without lower urinary tract symptoms 04/11/2018   • 3-vessel CAD 02/27/2015   • Type 2 diabetes mellitus, with long-term current use of insulin ([address]) 02/27/2015   • GERD (gastroesophageal reflux disease) 06/28/2013   • Tobacco dependence syndrome 01/18/2012     # Chronic HFrEF, Stage D,   Impression: iCM with large apical aneurysm with progression of CAD/late presenting MI with occluded mid LAD and LPDA. LV mildly dilated.  Started on milrinone 0.25mcg/kg/min 5/6/23 due to rising creatinine and worsening volume status, stopped 5/10/23. ,       Weight: 205 lbs on 6/5  BNP 6/2/23: 279     Studies- personally reviewed by me  EKG- narrow QRS with bigeminy; inferior infarct, anterolateral infarct     [facility] 5/3/23: chronically occluded mid LAD and LPDA c/w echo findings of apical aneurysm, not amenable to PCI  Patent mid LCx stent   LVEDP 30 mmHg     Echocardiogram 5/2/23  LVEF:  10-15%, apical aneurysm  LVIDd: 5.6 cm  RV: normal  MR: mild  PASP: 49 mmHg     Echo 9/26/17:  LVEF: 65%     Neurohormonal Blockade:  --Beta-Blocker: metoprolol tartrate 25 mg BID  --ACEi, ARB or ARNi: Entresto stopped due to hypotension    (or SVR reduction)   --Aldosterone Receptor Blocker: spironolactone 12.5 mg daily  --SGLT2-I: Jardiance 10 mg daily  --Diuretic: stopped     Sudden Cardiac Death Risk Reduction:  MDT ICD 5/12/23 for secondary prevention  Interrogation 5/26/23:  < 1%, no events     Electrical Resynchronization:  Narrow QRS     Advanced Therapies (If appropriate): --Inotrope: was on milrinone 0.25 mcg/kg/min, titrated off  --LVAD/Transplant Candidacy: Current tobacco use prior to admission precludes heart transplant at this time- since quit. Moderately reduced RV function and mildly dilated LV concerning for LVAD. Mildly dilated LV also concerning given VT. Has not smoked since May 2     # New Q wave MI, later presenting or silent LAD territory infarction  # hyperlipidemia-atorvastatin 80 mg  # current tobacco use- quit since hospital  # Ventricular tachycardia- scar mediated  Rx: amiodarone 200 mg daily  # CKD, Cr improved to 1.05 on 7/3/23 labs  # hyponatremia- due to HF, , now up to 137 on 7/3/23       TODAY'S PLAN:  Will stay off entresto due to orthostatic hypotension  Continue metoprolol, spironolactone and and Jardiance  Limited echo in one month  Referral again to cardiac rehab  --2g sodium diet  - Daily weights    HPI:   59year old male with known CAD with [facility] in 2015 showing obstructive mid-LCx disease treated with PTCA/stening and residual non-obstructive disease in the LAD, now presented with 1-2 weeks of shortness of breath, leg swelling, and >20 lb weight gain in 1 week. He was suspected during a cardiology visit to be in rapid atrial fibrillation, and sent to the ED. He converted to sinus rhythm shortly following IV beta blocker administration. Patient was taken for [facility] which showed chronically occluded mid LAD and LPDA c/w echo findings of apical aneurysm, not amenable to PCI. There was a patent mid LCx stent. Echocardiogram showed severely reduced LV function with EF 10-15%, apical aneurysm, and LVIDd of 5.6 cm. VT was felt to be scar mediated. A MDT dual chamber AICD was placed. Heart failure specific GDMT was initiated and optimized. He did require transient milrinone support during his hospitalization and this was weaned off     Since discharge he has had orthostatic hypotension, lightheadedness and dizziness. No syncope. Entresto was cut back to 24/26 mg BID, spironolactone stopped and lasix stopped. He is on [place]. Interval History:  Low BP  Stopped entresto, no longer orthostatic    Review of Systems   Constitutional: Negative for activity change, appetite change, fatigue and unexpected weight change. HENT: Negative for congestion and nosebleeds. Eyes: Negative. Respiratory: Negative for cough, chest tightness and shortness of breath. Cardiovascular: Negative for chest pain, palpitations and leg swelling. Gastrointestinal: Negative for abdominal distention. Endocrine: Negative. Genitourinary: Negative. Musculoskeletal: Negative. Skin: Negative. Neurological: Negative for dizziness, syncope and weakness. Hematological: Negative. Psychiatric/Behavioral: Negative. Past Medical History:   Diagnosis Date   • Myocardial infarction ([address])          No Known Allergies  . Current Outpatient Medications:   •  amiodarone 200 mg tablet, Take 1 tablet (200 mg total) by mouth daily with breakfast, Disp: 30 tablet, Rfl: 3  •  aspirin 81 MG tablet, Take 1 tablet by mouth daily, Disp: , Rfl:   •  atorvastatin (LIPITOR) 80 mg tablet, Take 1 tablet (80 mg total) by mouth daily after dinner, Disp: 30 tablet, Rfl: 3  •  BD Pen Needle Micro U/F 32G X 6 MM MISC, USE 1 PEN EACH DAILY, Disp: , Rfl:   •  Empagliflozin (Jardiance) 10 MG TABS tablet, Take 1 tablet (10 mg total) by mouth every morning, Disp: 30 tablet, Rfl: 3  •  insulin detemir (Levemir FlexPen) 100 Units/mL injection pen, Inject 30 Units under the skin daily. Patient to take 30 units daily - if fasting bs >150 for 3 days to increase dose by 5 units.  Once bs < 150 can go back to 30 units  Indications: Type 2 Diabetes, Disp: , Rfl:   •  JANUMET -1000 MG TB24, daily , Disp: , Rfl:   •  metFORMIN (GLUCOPHAGE) 1000 MG tablet, TAKE 1 TABLET BY MOUTH TWICE DAILY WITH A MEAL, Disp: , Rfl:   •  metoprolol succinate (TOPROL-XL) 25 mg 24 hr tablet, Take 1 tablet (25 mg total) by mouth every 12 (twelve) hours, Disp: 60 tablet, Rfl: 3  •  OneTouch Verio test strip, , Disp: , Rfl:   •  spironolactone (ALDACTONE) 25 mg tablet, Take 0.5 tablets (12.5 mg total) by mouth daily Start on 23 per Frances Fatima PA-C, Disp: 45 tablet, Rfl: 3  •  lidocaine (LIDODERM) 5 %, Apply 1 patch topically over 12 hours daily for 3 days Remove & Discard patch within 12 hours or as directed by MD, Disp: 3 patch, Rfl: 0  •  nicotine (NICODERM CQ) 14 mg/24hr TD 24 hr patch, Place 1 patch on the skin over 24 hours daily (Patient not taking: Reported on 2023), Disp: 28 patch, Rfl: 0  •  senna-docusate sodium (SENOKOT S) 8.6-50 mg per tablet, Take 2 tablets by mouth daily at bedtime (Patient not taking: Reported on 2023), Disp: 60 tablet, Rfl: 0    Social History     Socioeconomic History   • Marital status: /Civil Union     Spouse name: Not on file   • Number of children: Not on file   • Years of education: Not on file   • Highest education level: Not on file   Occupational History   • Not on file   Tobacco Use   • Smoking status: Former     Packs/day: 1.00     Types: Cigarettes     Quit date: 2023     Years since quittin.1   • Smokeless tobacco: Never   Vaping Use   • Vaping Use: Never used   Substance and Sexual Activity   • Alcohol use: Not Currently   • Drug use: Not on file   • Sexual activity: Not on file   Other Topics Concern   • Not on file   Social History Narrative   • Not on file     Social Determinants of Health     Financial Resource Strain: Not on file   Food Insecurity: No Food Insecurity (2023)    Hunger Vital Sign    • Worried About Running Out of Food in the Last Year: Never true    • Ran Out of Food in the Last Year: Never true   Transportation Needs: No Transportation Needs (2023)    PRAPARE - Transportation    • Lack of Transportation (Medical): No    • Lack of Transportation (Non-Medical):  No   Physical Activity: Not on file   Stress: Not on file   Social Connections: Not on file   Intimate Partner Violence: Not on file   Housing Stability: Low Risk  (5/4/2023)    Housing Stability Vital Sign    • Unable to Pay for Housing in the Last Year: No    • Number of Places Lived in the Last Year: 1    • Unstable Housing in the Last Year: No       No family history on file. Physical Exam:    Vitals:   Vitals:    07/06/23 0951   BP: 90/58   Pulse: 80   SpO2: 97%       Physical Exam  Constitutional:       Appearance: He is well-developed. HENT:      Head: Normocephalic and atraumatic. Eyes:      Pupils: Pupils are equal, round, and reactive to light. Neck:      Vascular: No JVD. Cardiovascular:      Rate and Rhythm: Normal rate and regular rhythm. Heart sounds: No murmur heard. Pulmonary:      Effort: Pulmonary effort is normal. No respiratory distress. Breath sounds: Normal breath sounds. Abdominal:      General: There is no distension. Palpations: Abdomen is soft. Tenderness: There is no abdominal tenderness. Musculoskeletal:         General: Normal range of motion. Cervical back: Normal range of motion. Skin:     General: Skin is warm and dry. Findings: No rash. Neurological:      Mental Status: He is alert and oriented to person, place, and time.          Labs & Results:    Lab Results   Component Value Date    SODIUM 137 07/03/2023    K 4.7 07/03/2023     07/03/2023    CO2 29 07/03/2023    BUN 19 07/03/2023    CREATININE 1.05 07/03/2023    GLUC 195 (H) 06/12/2023    CALCIUM 9.2 07/03/2023     Lab Results   Component Value Date    WBC 10.21 (H) 05/29/2023    HGB 16.3 05/29/2023    HCT 48.8 05/29/2023    MCV 82 05/29/2023     05/29/2023     Lab Results   Component Value Date    NTBNP 5,942 (H) 05/02/2023      No results found for: "CHOLESTEROL"  Lab Results   Component Value Date    HDL 34 05/07/2015    HDL 43 02/17/2015     Lab Results   Component Value Date    TRIG 122 05/07/2015    TRIG 128 02/17/2015     No results found for: "[address]"    EKG personally reviewed by Sarah Martinez DO. Counseling / Coordination of Care  Time spent today 25 minutes. Greater than 50% of total time was spent with the patient and / or family counseling and / or coordination of care. We went over current diagnosis, most recent studies and any changes in treatment. Thank you for the opportunity to participate in the care of this patient.     Katrina Jose PULMONARY HYPERTENSION  MEDICAL DIRECTOR OF [address] [place] [address]

## 2023-07-05 DIAGNOSIS — I50.20 HFREF (HEART FAILURE WITH REDUCED EJECTION FRACTION) (HCC): Primary | ICD-10-CM

## 2023-07-05 RX ORDER — SPIRONOLACTONE 25 MG/1
12.5 TABLET ORAL DAILY
Qty: 45 TABLET | Refills: 3 | Status: SHIPPED | OUTPATIENT
Start: 2023-07-05 | End: 2023-07-06 | Stop reason: SDUPTHER

## 2023-07-06 ENCOUNTER — OFFICE VISIT (OUTPATIENT)
Dept: CARDIOLOGY CLINIC | Facility: CLINIC | Age: 63
End: 2023-07-06
Payer: COMMERCIAL

## 2023-07-06 ENCOUNTER — IN-CLINIC DEVICE VISIT (OUTPATIENT)
Dept: CARDIOLOGY CLINIC | Facility: CLINIC | Age: 63
End: 2023-07-06
Payer: COMMERCIAL

## 2023-07-06 VITALS
WEIGHT: 207 LBS | SYSTOLIC BLOOD PRESSURE: 90 MMHG | HEART RATE: 80 BPM | HEIGHT: 72 IN | BODY MASS INDEX: 28.04 KG/M2 | DIASTOLIC BLOOD PRESSURE: 58 MMHG | OXYGEN SATURATION: 97 %

## 2023-07-06 DIAGNOSIS — I50.23 ACUTE ON CHRONIC SYSTOLIC HEART FAILURE (HCC): ICD-10-CM

## 2023-07-06 DIAGNOSIS — I50.20 HFREF (HEART FAILURE WITH REDUCED EJECTION FRACTION) (HCC): ICD-10-CM

## 2023-07-06 DIAGNOSIS — I25.5 ISCHEMIC CARDIOMYOPATHY: ICD-10-CM

## 2023-07-06 DIAGNOSIS — Z95.0 PRESENCE OF PERMANENT CARDIAC PACEMAKER: Primary | ICD-10-CM

## 2023-07-06 DIAGNOSIS — I50.22 CHRONIC SYSTOLIC CHF (CONGESTIVE HEART FAILURE), NYHA CLASS 2 (HCC): ICD-10-CM

## 2023-07-06 DIAGNOSIS — I25.10 3-VESSEL CORONARY ARTERY DISEASE: Primary | ICD-10-CM

## 2023-07-06 PROCEDURE — RECHECK: Performed by: INTERNAL MEDICINE

## 2023-07-06 PROCEDURE — 99214 OFFICE O/P EST MOD 30 MIN: CPT | Performed by: INTERNAL MEDICINE

## 2023-07-06 RX ORDER — SPIRONOLACTONE 25 MG/1
12.5 TABLET ORAL DAILY
Qty: 45 TABLET | Refills: 3 | Status: SHIPPED | OUTPATIENT
Start: 2023-07-06

## 2023-07-06 RX ORDER — ATORVASTATIN CALCIUM 80 MG/1
80 TABLET, FILM COATED ORAL
Qty: 30 TABLET | Refills: 3 | Status: SHIPPED | OUTPATIENT
Start: 2023-07-06

## 2023-07-06 RX ORDER — METOPROLOL SUCCINATE 25 MG/1
25 TABLET, EXTENDED RELEASE ORAL EVERY 12 HOURS
Qty: 60 TABLET | Refills: 3 | Status: SHIPPED | OUTPATIENT
Start: 2023-07-06

## 2023-07-06 NOTE — PROGRESS NOTES
Results for orders placed or performed in visit on 07/06/23   Cardiac EP device report    Narrative    SITE CHECK PER DR GRAY---WOUND CHECK: INCISION CLEAN AND DRY WITH EDGES APPROXIMATED; WOUND CARE AND RESTRICTIONS REVIEWED WITH PATIENT. --LANGFORD

## 2023-07-06 NOTE — PATIENT INSTRUCTIONS
Echo in one month    I referred again to cardiac rehab    No longer have a limitation due to your device

## 2023-07-26 ENCOUNTER — TELEPHONE (OUTPATIENT)
Dept: CARDIOLOGY CLINIC | Facility: CLINIC | Age: 63
End: 2023-07-26

## 2023-07-26 NOTE — TELEPHONE ENCOUNTER
P/c'd , stated Bp 128//77. He was concerned. Advised those bp's are go, but call if consistently above 140/90. Verbally understood.

## 2023-08-02 ENCOUNTER — CLINICAL SUPPORT (OUTPATIENT)
Dept: CARDIAC REHAB | Age: 63
End: 2023-08-02
Payer: COMMERCIAL

## 2023-08-02 DIAGNOSIS — I50.22 CHRONIC SYSTOLIC CHF (CONGESTIVE HEART FAILURE), NYHA CLASS 2 (HCC): ICD-10-CM

## 2023-08-02 PROCEDURE — 93798 PHYS/QHP OP CAR RHAB W/ECG: CPT

## 2023-08-02 NOTE — PROGRESS NOTES
CARDIAC REHAB ASSESSMENT    Today's date: 2023  Patient name: Bernardo Corcoran     : 1960       MRN: [id number]  PCP: Felix Estrella DO  Referring Physician: Jens Moore DO  Cardiologist: Darren Key MD  Surgeon:   Dx:   Encounter Diagnosis   Name Primary? • Chronic systolic CHF (congestive heart failure), NYHA class 2 ([address])        Date of onset: 23  Cultural needs: none    Weight    Wt Readings from Last 1 Encounters:   23 93.9 kg (207 lb)      Height:   Ht Readings from Last 1 Encounters:   23 6' (1.829 m)     Medical History:   Past Medical History:   Diagnosis Date   • Myocardial infarction [facility])          Physical Limitations: L knee needs replacement     Fall Risk: Low   Comments: Ambulates with a steady gait with no assist device and Denies a fall in the past 6 months    Anginal Equivalent: Dyspnea   NTG use: No prescription    Risk Factors   Cholesterol: Yes  Smoking: Has reduced number of cigarettes since event but continues to smoke 2 cigs/day  HTN: Yes  DM: Type 2   average FBG below 150  Obesity: No   Inactivity: Yes  Stress:  perceived  stress: 5/10   Stressors: doesn't feel stressed   Goals for Stress Management: working with hands, home projects    Family History:No family history on file. Allergies: Patient has no known allergies.   ETOH:   Social History     Substance and Sexual Activity   Alcohol Use Not Currently         Current Medications:   Current Outpatient Medications   Medication Sig Dispense Refill   • amiodarone 200 mg tablet Take 1 tablet (200 mg total) by mouth daily with breakfast 30 tablet 3   • aspirin 81 MG tablet Take 1 tablet by mouth daily     • atorvastatin (LIPITOR) 80 mg tablet Take 1 tablet (80 mg total) by mouth daily after dinner 30 tablet 3   • BD Pen Needle Micro U/F 32G X 6 MM MISC USE 1 PEN EACH DAILY     • Empagliflozin (Jardiance) 10 MG TABS tablet Take 1 tablet (10 mg total) by mouth every morning 30 tablet 3   • insulin detemir (Levemir FlexPen) 100 Units/mL injection pen Inject 30 Units under the skin daily. Patient to take 30 units daily - if fasting bs >150 for 3 days to increase dose by 5 units. Once bs < 150 can go back to 30 units  Indications: Type 2 Diabetes     • JANUMET -1000 MG TB24 daily      • lidocaine (LIDODERM) 5 % Apply 1 patch topically over 12 hours daily for 3 days Remove & Discard patch within 12 hours or as directed by MD 3 patch 0   • metFORMIN (GLUCOPHAGE) 1000 MG tablet TAKE 1 TABLET BY MOUTH TWICE DAILY WITH A MEAL     • metoprolol succinate (TOPROL-XL) 25 mg 24 hr tablet Take 1 tablet (25 mg total) by mouth every 12 (twelve) hours 60 tablet 3   • nicotine (NICODERM CQ) 14 mg/24hr TD 24 hr patch Place 1 patch on the skin over 24 hours daily (Patient not taking: Reported on 5/22/2023) 28 patch 0   • OneTouch Verio test strip      • senna-docusate sodium (SENOKOT S) 8.6-50 mg per tablet Take 2 tablets by mouth daily at bedtime (Patient not taking: Reported on 6/6/2023) 60 tablet 0   • spironolactone (ALDACTONE) 25 mg tablet Take 0.5 tablets (12.5 mg total) by mouth daily Start on 7/5/23 per Opal De Oliveira PA-C 45 tablet 3     No current facility-administered medications for this visit.          Functional Status Prior to Diagnosis for Treatment   Occupation: unemployed, disabled  Recreation: none  ADL’s: resumed all ADLs  [place]: No limitations  Exercise: none  Other:     Current Functional Status  Occupation: unemployed, disabled  Recreation: sedentary  ADL’s:Capable of performing light to moderate ADLs  [place]: No limitations  Exercise: none, walk the property 10 mins,  2x/day  Other: no stairs    Patient Specific Goals:      Short Term Program Goals: dietary modifications increased strength improved energy/stamina with ADLs exercise 120-150 mins/wk improved BG control    Long Term Goals: Improved [place] Activity Status score  Improved functional capacity  Improved Quality of Life - [place] score reduced  Improved lipid profile  Smoking cessation  Improved A1c  improved Rate Your Plate Score    Ability to reach goals/rehabilitation potential:  Good    Projected return to function: 12 weeks  Objective tests: 6 MWT      Nutritional   Reviewed details of Rate your Plate. Discussed key elements of heart healthy eating. Reviewed patient goals for dietary modifications and their clinical implications. Reviewed most recent lipid profile.    Hamburgers  No processed meats  occ sausages  Ice cream  Diet soda      Goals for dietary modification based on Rate Your Plate Dietary Assessment:  choose lean cuts of meat  poultry without the skin  low fat ground meat and poultry  eliminate processed meats  reduce portions of meat to 3 oz  increase fish intake  more meatless meals  low fat dairy   reduced fat cheese  increase whole grains  increase fruits and vegetables  eliminate butter  low sodium  improved snack choices  more nuts/seeds  reduce sweets/frozen desserts  heathier choices while dining out      Emotional/Social  Patient reports he/she is coping well with good social support and denies depression or anxiety  Reports sufficient emotional support    Marital status: significant other    Domestic Violence Screening: No    Comments: for 2 weeks the wife noticed his feet looked swollen, went to PCP who saw ECG changes sent home- May 2 appt with cardiologist who sent him to ED via EMS,   MI/stent  2015  Steady weight  No sxs  84 oz water

## 2023-08-02 NOTE — PROGRESS NOTES
Cardiac Rehabilitation Plan of Care   Initial Care Plan          Today's date: 2023   # of Exercise Sessions Completed: Initial Evaluation Today  Patient name: Ana Lilia Young      : 1960  Age: 59 y.o. MRN: [id number]  Referring Physician: Edgardo Trotter DO  Cardiologist: Irving Chavez MD  Provider: Sanjay Deal  Clinician: Zita Edwards, MS, CEP, CCRP      Dx:   Encounter Diagnosis   Name Primary? • Chronic systolic CHF (congestive heart failure), NYHA class 2 ([address])      Date of onset: 23      SUMMARY OF PROGRESS:  Today is Majo Morning initial evaluation to begin Cardiac Rehab post echo on 72 Chronic systolic CHF (congestive heart failure), NYHA class 2 ([address]) [I50.22 with EF 10-15%. The patient does not currently follow a formal exercise program at home. He has resumed light to moderate ADLs without limitations. Majo Morning monitors daily weight which has been steady and strictly follows low sodium under 2g. Depression screening using the PHQ-9 interprets the patient's score of  4  as  1-4 = Minimal Depression. JOSSIE-7 screening tool for anxiety suggests 2  0-4  = Not anxious. When addressed, the patient denies having depression/anxiety. Patient reports excellent social/emotional support from his wife and daughter. Information to utilize [employer] was provided as well as contact information for counseling through [employer]. PHQ-9 score will be reassessed in 30 days due to an initial score revealing concern for depression. They rate stress 5/10. Stress management will be reviewed. The patient is a current smoker but has reduced the number of cigarettes to 2 per day. He wants to quit, was given materials on tobacco cessation programs/services, was counseled on effects of smoking, is struggling to quit and has reduced intake but is struggling to abstain from smoking. Patient admits to 100% medication compliance.  They report the following physical limitations: left knee pain - in need of replacement. The patient completed an initial 6MWT. The patient walked  890 feet/2. 2METs. Resting  /50 with Normal response to exercise reaching 110/60. Blood Pressure will be monitored throughout the program and cardiologist will be notified of elevated trends. Patient reported no symptoms with exercise. . Telemetry revealed NSR, BBB occ PACs. Onofre Castle was counseled on exercise guidelines to achieve a minimum of 150 mins/wk of moderate intensity (RPE 4-6) exercise and encouraged to add 1-2 days of exercise on opposite days of cardiac rehab as tolerated. We discussed current dietary habits and goals of heart healthy eating for lipid management and diabetes management. The patient has T2D, Patient reported fasting BS below 150. Patient's personal goals include: improved heart function, regular exercise, improved diet. The patient's CAD risk factors include:  inactivity, stress, hypertension, hyperlipidemia, diabetes and smoking. His education will focus on lifestyle modification/education specific to His needs. Patient will attend group education classes on heart healthy eating, reading food labels, stress management, risk factor reduction, understanding heart disease and common heart medications. Patient will attend 35 monitored exercise sessions, 3x/wk for 12-18 weeks beginning August 8, 2023.        Medication compliance: Yes   Comments: Pt reports to be compliant with medications  Fall Risk: Low   Comments: Ambulates with a steady gait with no assist device and Denies a fall in the past 6 months    EKG Interpretation: NSR, occ PACs      EXERCISE ASSESSMENT and PLAN    Exercise Prescription:      Frequency: 3 days/week   Supplement with home exercise 2+ days/wk as tolerated       Minutes: 20-40         METS: 2.2-3.2            HR: RHR+30   RPE: 3-4         Modalities: Treadmill, Airdyne bike, UBE, NuStep and Recumbent bike      30 Day Goals for Exercise Progression:    Frequency: 3 days/week of cardiac rehab       Supplement with home exercise 2+ days/wk as tolerated    Minutes: 30-40                              >150 mins/wk of moderate intensity exercise   METS: 2.5-3.8   HR: RHR+30    RPE: 4-5   Modalities: Treadmill, Airdyne bike, UBE, Lifecycle, NuStep and Recumbent bike    Strength trainin-3 days / week  12-15 repetitions  1-2 sets per modality   Will be added following 2-3 weeks of monitored exercise sessions   Modalities: Leg Press, Chest Press, Pull Downs and Lateral Raise    Home Exercise: none    Goals: improved DASI score by 10%, Increase in exercise capacity by 40% - based on peak METs tolerated in cardiac rehab exercise session, Exercise 5 days/wk, >150mins/wk of moderate intensity exercise, Improved 6MWT distance by 10%, Resume ADLs with increased strength, Attend Rehab regularly, Decrease sitting time and start a home exercise program    Progression Toward Goals:  Reviewed Pt goals and determined plan of care, Will continue to educate and progress as tolerated.     Education: benefit of exercise for CAD risk factors, AHA guidelines to achieve >150 mins/wk of moderate exercise and RPE scale   Plan:education on home exercise guidelines, home exercise 30+ mins 2 days opposite CR and Education class: Risk Factors for Heart Disease  Readiness to change: Preparation:  (Getting ready to change)       NUTRITION ASSESSMENT AND PLAN    Weight control:    Starting weight: 207.2   Current weight:       Diabetes: T2D  A1c: 12    last measured: 23    Lipid management: Discussed diet and lipid management and Last lipid profile 23  Chol 236    HDL 42      Goals:LDL <100, CHOL <200, Improved Rate Your Plate score  >98, choose lean meat (93-95%), reduce portion sizes of meat to 3oz or less, increase intake of meatless meals, use low fat dairy, use fat-free dressings/bennett or seldom use, choose healthy snacks: light popcorn, plain pretzels, Increase intake of nuts and seeds, seldom eat or choose low fat ice-cream, fruit juice bars or frozen yogurt , eliminate or choose low-fat sweets and daily saturated fat intake <7%/13g    Measurable goals were based Rate Your Plate Dietary Self-Assessment. These are the areas in which the patient could score higher on the assessment. Goals include recommendations for a heart healthy diet based on American Heart Association. Progression Toward Goals: Reviewed Pt goals and determined plan of care, Will continue to educate and progress as tolerated.     Education: heart healthy eating  low sodium diet  hydration  nutrition for  lipid management  nutrition for Improved BG control  target goal for A1c <7.0  exercise and diabetes management   Plan: Education class: Reading Food Labels, Education Class: Heart Healthy Eating, switch to low fat cheeses, replace butter with soft spreads made with olive oil, canola or yogurt, replace refined grain bread with whole grain bread, reduce red meat 1x/wk, switch to skim or 1% milk, eat fewer desserts and sweets, avoid processed foods, monitor home blood glucose, drink more water, learn how to read food labels, replace sugar with stevia or truvia and keep added daily sugar <25g/day  Readiness to change: Preparation:  (Getting ready to change)       PSYCHOSOCIAL ASSESSMENT AND PLAN    Emotional:  Depression assessment:  PHQ-9 = 4  1-4 = Minimal Depression            Anxiety measure:  JOSSIE-7 = 2  0-4  = Not anxious  Self-reported stress level:  1  Social support: Excellent and Patient reports excellent emotional/social support from his wife and daughter    Goals:  Physical Fitness in [facility] Score < 3, Overall Health in [facility] Score < 3, Quality of Life in [facility] Score < 3 , Increased interest in doing things, improved concentration, improved positive thoughts of well being and take time to relax    Progression Toward Goals: Reviewed Pt goals and determined plan of care, Will continue to educate and progress as tolerated. Education: benefits of a positive support system and stress management techniques  Plan: Class: Stress and Your Health, Class: Relaxation, Enjoy family and keep busy around the house  Readiness to change: Preparation:  (Getting ready to change)       OTHER CORE COMPONENTS     Tobacco:   Social History     Tobacco Use   Smoking Status Former   • Packs/day: 1.00   • Types: Cigarettes   • Quit date: 2023   • Years since quittin.2   Smokeless Tobacco Never       Tobacco Use Intervention:   Brief counseling by cardiac rehab professional  Date: 23  Discussed barriers to quitting and steps to work toward a quit date  [place] Quit Line 800-QUIT-NOW  Pt continues to smoke 2 cigs/day   Pt is ready to quit    Anginal Symptoms:  SOB and edema   NTG use: No prescription    Blood pressure:    Restin/50   Exercise: 110/60    Goals: moderate intensity exercise >150 mins/wk, medication compliance, set a target quit date and Abstain from smoking    Progression Toward Goals: Reviewed Pt goals and determined plan of care, Will continue to educate and progress as tolerated.     Education:  low sodium diet and HTN  Plan: Class: Understanding Heart Disease, Class: Common Heart Medications, Avoid Processed foods, engage in regular exercise, check labels for sodium content and monitor home BP  Readiness to change: Action:  (Changing behavior)

## 2023-08-04 ENCOUNTER — TELEPHONE (OUTPATIENT)
Dept: CARDIOLOGY CLINIC | Facility: CLINIC | Age: 63
End: 2023-08-04

## 2023-08-04 NOTE — TELEPHONE ENCOUNTER
P/c'd , he said he did not know he had a blocked artery. Did let him know it is chronic block LAD. If he has and symptoms of MI he should be checked out. Verbally understood.

## 2023-08-07 ENCOUNTER — HOSPITAL ENCOUNTER (OUTPATIENT)
Dept: NON INVASIVE DIAGNOSTICS | Facility: CLINIC | Age: 63
Discharge: HOME | End: 2023-08-07
Payer: COMMERCIAL

## 2023-08-07 VITALS
HEIGHT: 72 IN | WEIGHT: 207 LBS | BODY MASS INDEX: 28.04 KG/M2 | SYSTOLIC BLOOD PRESSURE: 90 MMHG | DIASTOLIC BLOOD PRESSURE: 58 MMHG | HEART RATE: 70 BPM

## 2023-08-07 DIAGNOSIS — I25.5 ISCHEMIC CARDIOMYOPATHY: ICD-10-CM

## 2023-08-07 DIAGNOSIS — I50.22 CHRONIC SYSTOLIC CHF (CONGESTIVE HEART FAILURE), NYHA CLASS 2 (HCC): ICD-10-CM

## 2023-08-07 LAB
AORTIC ROOT: 2.7 CM
APICAL FOUR CHAMBER EJECTION FRACTION: 51 %
E WAVE DECELERATION TIME: 241 MS
FRACTIONAL SHORTENING: 20 % (ref 28–44)
INTERVENTRICULAR SEPTUM IN DIASTOLE (PARASTERNAL SHORT AXIS VIEW): 1.5 CM
INTERVENTRICULAR SEPTUM: 1.5 CM (ref 0.6–1.1)
LAAS-AP2: 9.1 CM2
LAAS-AP4: 14.4 CM2
LEFT ATRIUM AREA SYSTOLE SINGLE PLANE A4C: 12.5 CM2
LEFT ATRIUM SIZE: 3.3 CM
LEFT ATRIUM VOLUME (MOD BIPLANE): 29 ML
LEFT INTERNAL DIMENSION IN SYSTOLE: 3.3 CM (ref 2.1–4)
LEFT VENTRICULAR INTERNAL DIMENSION IN DIASTOLE: 4.1 CM (ref 3.5–6)
LEFT VENTRICULAR POSTERIOR WALL IN END DIASTOLE: 1.5 CM
LEFT VENTRICULAR STROKE VOLUME: 31 ML
LVSV (TEICH): 31 ML
MV E'TISSUE VEL-SEP: 6 CM/S
MV PEAK A VEL: 1.06 M/S
MV PEAK E VEL: 54 CM/S
MV STENOSIS PRESSURE HALF TIME: 70 MS
MV VALVE AREA P 1/2 METHOD: 3.14 CM2
RA PRESSURE ESTIMATED: 3 MMHG
RIGHT ATRIUM AREA SYSTOLE A4C: 14 CM2
RIGHT VENTRICLE ID DIMENSION: 3.5 CM
SL CV LEFT ATRIUM LENGTH A2C: 3.7 CM
SL CV LV EF: 40
SL CV PED ECHO LEFT VENTRICLE DIASTOLIC VOLUME (MOD BIPLANE) 2D: 74 ML
SL CV PED ECHO LEFT VENTRICLE SYSTOLIC VOLUME (MOD BIPLANE) 2D: 43 ML
TRICUSPID ANNULAR PLANE SYSTOLIC EXCURSION: 1.9 CM

## 2023-08-07 PROCEDURE — 93308 TTE F-UP OR LMTD: CPT

## 2023-08-07 PROCEDURE — 93321 DOPPLER ECHO F-UP/LMTD STD: CPT | Performed by: INTERNAL MEDICINE

## 2023-08-07 PROCEDURE — 93325 DOPPLER ECHO COLOR FLOW MAPG: CPT | Performed by: INTERNAL MEDICINE

## 2023-08-07 PROCEDURE — 93308 TTE F-UP OR LMTD: CPT | Performed by: INTERNAL MEDICINE

## 2023-08-07 RX ADMIN — PERFLUTREN 0.4 ML/MIN: 6.52 INJECTION, SUSPENSION INTRAVENOUS at 10:01

## 2023-08-08 ENCOUNTER — CLINICAL SUPPORT (OUTPATIENT)
Dept: CARDIAC REHAB | Age: 63
End: 2023-08-08
Payer: COMMERCIAL

## 2023-08-08 DIAGNOSIS — I50.22 CHRONIC SYSTOLIC CHF (CONGESTIVE HEART FAILURE), NYHA CLASS 2 (HCC): Primary | ICD-10-CM

## 2023-08-08 PROCEDURE — 93798 PHYS/QHP OP CAR RHAB W/ECG: CPT

## 2023-08-10 ENCOUNTER — CLINICAL SUPPORT (OUTPATIENT)
Dept: CARDIAC REHAB | Age: 63
End: 2023-08-10
Payer: COMMERCIAL

## 2023-08-10 DIAGNOSIS — I50.22 CHRONIC SYSTOLIC CHF (CONGESTIVE HEART FAILURE), NYHA CLASS 2 (HCC): Primary | ICD-10-CM

## 2023-08-10 PROCEDURE — 93798 PHYS/QHP OP CAR RHAB W/ECG: CPT

## 2023-08-11 ENCOUNTER — TELEPHONE (OUTPATIENT)
Dept: CARDIOLOGY CLINIC | Facility: CLINIC | Age: 63
End: 2023-08-11

## 2023-08-11 DIAGNOSIS — I50.23 ACUTE ON CHRONIC SYSTOLIC HEART FAILURE (HCC): ICD-10-CM

## 2023-08-15 ENCOUNTER — CLINICAL SUPPORT (OUTPATIENT)
Dept: CARDIAC REHAB | Age: 63
End: 2023-08-15
Payer: COMMERCIAL

## 2023-08-15 DIAGNOSIS — I50.22 CHRONIC SYSTOLIC CHF (CONGESTIVE HEART FAILURE), NYHA CLASS 2 (HCC): Primary | ICD-10-CM

## 2023-08-15 PROCEDURE — 93798 PHYS/QHP OP CAR RHAB W/ECG: CPT

## 2023-08-17 ENCOUNTER — CLINICAL SUPPORT (OUTPATIENT)
Dept: CARDIAC REHAB | Age: 63
End: 2023-08-17
Payer: COMMERCIAL

## 2023-08-17 DIAGNOSIS — I50.22 CHRONIC SYSTOLIC CHF (CONGESTIVE HEART FAILURE), NYHA CLASS 2 (HCC): Primary | ICD-10-CM

## 2023-08-17 PROCEDURE — 93798 PHYS/QHP OP CAR RHAB W/ECG: CPT

## 2023-08-22 ENCOUNTER — CLINICAL SUPPORT (OUTPATIENT)
Dept: CARDIAC REHAB | Age: 63
End: 2023-08-22
Payer: COMMERCIAL

## 2023-08-22 DIAGNOSIS — I50.22 CHRONIC SYSTOLIC CHF (CONGESTIVE HEART FAILURE), NYHA CLASS 2 (HCC): Primary | ICD-10-CM

## 2023-08-22 PROCEDURE — 93798 PHYS/QHP OP CAR RHAB W/ECG: CPT

## 2023-08-24 ENCOUNTER — APPOINTMENT (OUTPATIENT)
Dept: CARDIAC REHAB | Age: 63
End: 2023-08-24
Payer: COMMERCIAL

## 2023-08-28 ENCOUNTER — CLINICAL SUPPORT (OUTPATIENT)
Dept: CARDIAC REHAB | Age: 63
End: 2023-08-28
Payer: COMMERCIAL

## 2023-08-28 ENCOUNTER — IN-CLINIC DEVICE VISIT (OUTPATIENT)
Dept: CARDIOLOGY CLINIC | Facility: CLINIC | Age: 63
End: 2023-08-28
Payer: COMMERCIAL

## 2023-08-28 DIAGNOSIS — I50.22 CHRONIC SYSTOLIC CHF (CONGESTIVE HEART FAILURE), NYHA CLASS 2 (HCC): Primary | ICD-10-CM

## 2023-08-28 DIAGNOSIS — Z95.810 AICD (AUTOMATIC CARDIOVERTER/DEFIBRILLATOR) PRESENT: Primary | ICD-10-CM

## 2023-08-28 PROCEDURE — 93798 PHYS/QHP OP CAR RHAB W/ECG: CPT

## 2023-08-28 PROCEDURE — 93283 PRGRMG EVAL IMPLANTABLE DFB: CPT | Performed by: INTERNAL MEDICINE

## 2023-08-28 NOTE — PROGRESS NOTES
Results for orders placed or performed in visit on 08/28/23   Cardiac EP device report    Narrative    MDT DUAL ICD/ [employer] INTERROGATED IN THE [facility] OFFICE. BATTERY VOLTAGE ADEQUATE (12.6 YRS). AP-8%, <0.1%. ALL LEAD PARAMETERS WITHIN NORMAL LIMITS. NO SIGNIFICANT HIGH RATE EPISODES. OPTI-VOL WITHIN NORMAL LIMITS. NORMAL DEVICE FUNCTION.  GV

## 2023-08-28 NOTE — PROGRESS NOTES
Cardiac Rehabilitation Plan of Care   30 Day Reassessment          Today's date: 2023   # of Exercise Sessions Completed: 7  Patient name: Zara Barrera      : 1960  Age: 59 y.o. MRN: [id number]  Referring Physician: No ref. provider found  Cardiologist: Jitendra Breen MD  Provider: Edgar García  Clinician: Audrey Olivares MS, CEP      Dx:   Encounter Diagnosis   Name Primary? • Chronic systolic CHF (congestive heart failure), NYHA class 2 ([address]) Yes     Date of onset: 23      SUMMARY OF PROGRESS:   23: David Huff is compliant attending cardiac rehab exercise sessions 3x/wk. He tolerates 33-35 mins at 2.2-2.8 METs. A light strength training component will be added in a future exercise session. He is tolerating progression of intensity levels to maintain RPE 4-6. Resting BP  102/66 -90/58 with Normal / flat response to exercise reaching 110/62- 128/60. Paced on tele with BBB observed. RHR 55 - 82  with Normal response to exercise reaching 75 - 90. He has added home exercise 3 days/wk which includes walking, aproximately 1/2 mile. No cardiac complaints. He is not progressing toward wt loss goals with a gain of 2 pounds. He is compliant with daily weights at home and states they have been steady. Patient has been working on  dietary modifications with the goal of rare red/processed meats, low fat dairy, reduced added sugars and refined flours. The patient has T2D and monitors BG few times per day reporting an avg. FBG of 130, an improvement. The patient is a recent user, quit 23, and is doing well abstaining. Depression was not reassessed using the PHQ-9d/t low initial score revealing no concern for depression. When addressed, the patient denies   depression/anxiety. Patient reports excellent  social/emotional support from his wife and daughter. They were encouraged to discuss options for medical therapy and counseling with their PCP. David Huff rates their stress as 3/10.  Stress management techniques were reinforced. Patient attends group educational classes on cardiac risk factor modification. His exercise program will be progressed as tolerated to maintain RPE 4-6. The patient has the following personal goals he hopes to achieved by discharge: improved heart function, regular exercise, improved diet. They will continue to be educated on lifestyle modification and encouraged to supplement with a home exercise program as tolerated to reach the following goals in the next 30 days: initiate strength training, be creative with difference recipes, smoking cessation.       Medication compliance: Yes   Comments: Pt reports to be compliant with medications  Fall Risk: Low   Comments: Ambulates with a steady gait with no assist device and Denies a fall in the past 6 months    EKG Interpretation: NSR, occ PACs      EXERCISE ASSESSMENT and PLAN    Exercise Prescription:      Frequency: 3 days/week   Supplement with home exercise 2+ days/wk as tolerated       Minutes: 33-35        METS: 2.2-2.8           HR: RHR+30   RPE: 3-4         Modalities: Treadmill, UBE, NuStep and Recumbent bike      30 Day Goals for Exercise Progression:    Frequency: 3 days/week of cardiac rehab       Supplement with home exercise 2+ days/wk as tolerated    Minutes: 35-40                             >150 mins/wk of moderate intensity exercise   METS: 2.5-3.2   HR: RHR+30    RPE: 4-5   Modalities: Treadmill, Airdyne bike, UBE, Lifecycle, NuStep and Recumbent bike    Strength trainin-3 days / week  12-15 repetitions  1-2 sets per modality   Will be added following 2-3 weeks of monitored exercise sessions   Modalities: Leg Press, Chest Press, Pull Downs and Lateral Raise    Home Exercise: Type: walking, Frequency: 3 days/week, Distance 1/2 mile    Goals: improved DASI score by 10%, Increase in exercise capacity by 40% - based on peak METs tolerated in cardiac rehab exercise session, Exercise 5 days/wk, >150mins/wk of moderate intensity exercise, Improved 6MWT distance by 10%, Resume ADLs with increased strength, Attend Rehab regularly, Decrease sitting time and start a home exercise program    Progression Toward Goals:  Pt is progressing and showing improvement  toward the following goals:  attending CR regularly, home exercise, walking is easier, increased endurance/stamina, improved shoulder mobility, no issues with ADLs. , Will continue to educate and progress as tolerated. Education: benefit of exercise for CAD risk factors, AHA guidelines to achieve >150 mins/wk of moderate exercise, RPE scale and class: Risk Factors for Heart Disease   Plan:education on home exercise guidelines and home exercise 30+ mins 2 days opposite CR  Readiness to change: Action:  (Changing behavior)      NUTRITION ASSESSMENT AND PLAN    Weight control:    Starting weight: 207.2   Current weight:   209    Diabetes: T2D  A1c: 12    last measured: 4/14/23    Lipid management: Discussed diet and lipid management and Last lipid profile 2/1/23  Chol 236    HDL 42      Goals:LDL <100, CHOL <200, Improved Rate Your Plate score  >17, choose lean meat (93-95%), reduce portion sizes of meat to 3oz or less, increase intake of meatless meals, use low fat dairy, use fat-free dressings/bennett or seldom use, choose healthy snacks: light popcorn, plain pretzels, Increase intake of nuts and seeds, seldom eat or choose low fat ice-cream, fruit juice bars or frozen yogurt , eliminate or choose low-fat sweets and daily saturated fat intake <7%/13g    Measurable goals were based Rate Your Plate Dietary Self-Assessment. These are the areas in which the patient could score higher on the assessment. Goals include recommendations for a heart healthy diet based on American Heart Association.     Progression Toward Goals: Pt is progressing and showing improvement  toward the following goals:  avoiding excess salt, compliant with daily wts, hydration, avoiding processed meats, high fat meats. , Patient will be creative with different recipes in the next 30 days, Will continue to educate and progress as tolerated. Education: heart healthy eating  low sodium diet  hydration  nutrition for  lipid management  nutrition for Improved BG control  target goal for A1c <7.0  exercise and diabetes management   Plan: Education class: Reading Food Labels, Education Class: Heart Healthy Eating, switch to low fat cheeses, replace butter with soft spreads made with olive oil, canola or yogurt, replace refined grain bread with whole grain bread, reduce red meat 1x/wk, switch to skim or 1% milk, eat fewer desserts and sweets, avoid processed foods, monitor home blood glucose, drink more water, learn how to read food labels, replace sugar with stevia or truvia and keep added daily sugar <25g/day  Readiness to change: Preparation:  (Getting ready to change)  and Action:  (Changing behavior)      PSYCHOSOCIAL ASSESSMENT AND PLAN    Emotional:  Depression assessment:  PHQ-9 = 4  1-4 = Minimal Depression            Anxiety measure:  JOSSIE-7 = 2  0-4  = Not anxious  Self-reported stress level:  1  Social support: Excellent and Patient reports excellent emotional/social support from his wife and daughter    Goals:  Physical Fitness in [facility] Score < 3, Overall Health in [facility] Score < 3, Quality of Life in [facility] Score < 3 , Increased interest in doing things, improved concentration, improved positive thoughts of well being and take time to relax    Progression Toward Goals: Pt is progressing and showing improvement  toward the following goals:  denies depression/axniety, stress reducing.  , Will continue to educate and progress as tolerated.     Education: benefits of a positive support system and stress management techniques  Plan: Class: Stress and Your Health, Class: Relaxation, Enjoy family and keep busy around the house  Readiness to change: Action:  (Changing behavior)      OTHER CORE COMPONENTS     Tobacco:   Social History     Tobacco Use   Smoking Status Former   • Packs/day: 1.00   • Types: Cigarettes   • Quit date: 2023   • Years since quittin.3   Smokeless Tobacco Never       Tobacco Use Intervention:   Brief counseling by cardiac rehab professional  Date: 23  Discussed barriers to quitting and steps to work toward a quit date  [place] Q[place] 1-800-QUIT-NOW  Pt continues to smoke 2 cigs/day   Pt is ready to quit    Anginal Symptoms:  SOB and edema   NTG use: No prescription    Blood pressure:    Restin//66   Exercise: 110//60    Goals: moderate intensity exercise >150 mins/wk, medication compliance, set a target quit date and Abstain from smoking    Progression Toward Goals: Pt is progressing and showing improvement  toward the following goals:  smoking cessation, medication compliance and exercise.  , Will continue to educate and progress as tolerated.     Education:  low sodium diet and HTN  Plan: Class: Understanding Heart Disease, Class: Common Heart Medications, Avoid Processed foods, engage in regular exercise, check labels for sodium content and monitor home BP  Readiness to change: Action:  (Changing behavior)

## 2023-08-30 ENCOUNTER — APPOINTMENT (OUTPATIENT)
Dept: CARDIAC REHAB | Age: 63
End: 2023-08-30
Payer: COMMERCIAL

## 2023-08-31 ENCOUNTER — CLINICAL SUPPORT (OUTPATIENT)
Dept: CARDIAC REHAB | Age: 63
End: 2023-08-31
Payer: COMMERCIAL

## 2023-08-31 DIAGNOSIS — I50.22 CHRONIC SYSTOLIC CHF (CONGESTIVE HEART FAILURE), NYHA CLASS 2 (HCC): Primary | ICD-10-CM

## 2023-08-31 PROCEDURE — 93798 PHYS/QHP OP CAR RHAB W/ECG: CPT

## 2023-09-05 ENCOUNTER — CLINICAL SUPPORT (OUTPATIENT)
Dept: CARDIAC REHAB | Age: 63
End: 2023-09-05
Payer: COMMERCIAL

## 2023-09-05 DIAGNOSIS — I50.22 CHRONIC SYSTOLIC CHF (CONGESTIVE HEART FAILURE), NYHA CLASS 2 (HCC): ICD-10-CM

## 2023-09-05 PROCEDURE — 93798 PHYS/QHP OP CAR RHAB W/ECG: CPT

## 2023-09-07 ENCOUNTER — APPOINTMENT (OUTPATIENT)
Dept: CARDIAC REHAB | Age: 63
End: 2023-09-07
Payer: COMMERCIAL

## 2023-09-07 ENCOUNTER — CLINICAL SUPPORT (OUTPATIENT)
Dept: CARDIAC REHAB | Age: 63
End: 2023-09-07
Payer: COMMERCIAL

## 2023-09-07 DIAGNOSIS — I50.22 CHRONIC SYSTOLIC HEART FAILURE (HCC): ICD-10-CM

## 2023-09-07 PROCEDURE — 93798 PHYS/QHP OP CAR RHAB W/ECG: CPT

## 2023-09-11 ENCOUNTER — CLINICAL SUPPORT (OUTPATIENT)
Dept: CARDIAC REHAB | Age: 63
End: 2023-09-11
Payer: COMMERCIAL

## 2023-09-11 DIAGNOSIS — I50.22 CHRONIC SYSTOLIC HEART FAILURE (HCC): ICD-10-CM

## 2023-09-11 PROCEDURE — 93798 PHYS/QHP OP CAR RHAB W/ECG: CPT

## 2023-09-12 ENCOUNTER — CLINICAL SUPPORT (OUTPATIENT)
Dept: CARDIAC REHAB | Age: 63
End: 2023-09-12
Payer: COMMERCIAL

## 2023-09-12 DIAGNOSIS — I50.22 CHRONIC SYSTOLIC HEART FAILURE (HCC): Primary | ICD-10-CM

## 2023-09-12 PROCEDURE — 93798 PHYS/QHP OP CAR RHAB W/ECG: CPT

## 2023-09-18 ENCOUNTER — CLINICAL SUPPORT (OUTPATIENT)
Dept: CARDIAC REHAB | Age: 63
End: 2023-09-18
Payer: COMMERCIAL

## 2023-09-18 DIAGNOSIS — I50.22 CHRONIC SYSTOLIC CHF (CONGESTIVE HEART FAILURE), NYHA CLASS 2 (HCC): ICD-10-CM

## 2023-09-18 PROCEDURE — 93798 PHYS/QHP OP CAR RHAB W/ECG: CPT

## 2023-09-20 NOTE — PROGRESS NOTES
Cardiac Rehabilitation Plan of Care   60 Day Reassessment          Today's date: 2023   # of Exercise Sessions Completed: 13  Patient name: Isabela Mcgovern      : 1960  Age: 59 y.o. MRN: [id number]  Referring Physician: Kevin Ashby DO  Cardiologist: Mehran Llamas MD  Provider: Danielle Wakefield  Clinician: Senait Pereira, MS, CEP, CCRP      Dx:   Encounter Diagnosis   Name Primary? • Chronic systolic CHF (congestive heart failure), NYHA class 2 ([address])      Date of onset: 23      SUMMARY OF PROGRESS:   23:  Rene Calabrese has been less compliant attending cardiac rehab exercise sessions 3x/wk. He has attended only 6 sessions in the past 30 days. He tolerates 38-40 mins at 2.5-3.2 METs. He reports he feels better/stronger. A light strength training component will be added in a future exercise session. His duration has been increased to 40 mins. Slow progression of intensity levels. Rene Calabrese works at a moderate (4/10) intensity. Resting BP  88/62 - 108/56 with Normal / flat response to exercise reaching 110/66 - 134/64. Paced on tele with BBB observed. RHR 64-80 with Normal response to exercise reaching 77-91. He continues to walk at home but less consistently. He walks aproximately 10 mins which includes an incline. No difficulty walking the incline. No cardiac complaints. He is not progressing toward wt loss goals with a gain of 2 pounds. He is compliant with daily weights at home and states they have been steady. Patient has been working on  dietary modifications with the goal of rare red/processed meats, low fat dairy, reduced added sugars and refined flours. The patient has T2D and monitors BG few times per day reporting an avg. FBG of under 150. Rene Calabrese  quit smoking 23 and is doing well abstaining. Depression was not reassessed using the PHQ-9 d/t low initial score revealing no concern for depression. When addressed, the patient denies   depression/anxiety.   Patient reports excellent social/emotional support from his wife and daughter. Zita Goltz rates their stress as 3/10. Stress management techniques were reinforced. Patient attends group educational classes on cardiac risk factor modification. His exercise program will be progressed as tolerated to maintain RPE 4-6. The patient has the following personal goals he hopes to achieved by discharge: improved heart function, regular exercise, improved diet. They will continue to be educated on lifestyle modification and encouraged to supplement with a home exercise program as tolerated to reach the following goals in the next 30 days: initiate strength training, more consistent home walking increasing duration as tolerated, reduce/eliminate diet soda and increase water intake, switch from processed lunch meat to chicken breast for sandwiches. Zita Goltz has an ov with Dr. Lizeth Serrano on October 16.    8/28/23: Zita Goltz is compliant attending cardiac rehab exercise sessions 3x/wk. He tolerates 33-35 mins at 2.2-2.8 METs. A light strength training component will be added in a future exercise session. He is tolerating progression of intensity levels to maintain RPE 4-6. Resting BP  102/66 -90/58 with Normal / flat response to exercise reaching 110/62- 128/60. Paced on tele with BBB observed. RHR 55 - 82  with Normal response to exercise reaching 75 - 90. He has added home exercise 3 days/wk which includes walking, aproximately 1/2 mile. No cardiac complaints. He is not progressing toward wt loss goals with a gain of 2 pounds. He is compliant with daily weights at home and states they have been steady. Patient has been working on  dietary modifications with the goal of rare red/processed meats, low fat dairy, reduced added sugars and refined flours. The patient has T2D and monitors BG few times per day reporting an avg. FBG of 130, an improvement. The patient is a recent user, quit 8/2/23, and is doing well abstaining.  Depression was not reassessed using the PHQ-9d/t low initial score revealing no concern for depression. When addressed, the patient denies   depression/anxiety. Patient reports excellent  social/emotional support from his wife and daughter. They were encouraged to discuss options for medical therapy and counseling with their PCP. Avani Gan rates their stress as 3/10. Stress management techniques were reinforced. Patient attends group educational classes on cardiac risk factor modification. His exercise program will be progressed as tolerated to maintain RPE 4-6. The patient has the following personal goals he hopes to achieved by discharge: improved heart function, regular exercise, improved diet. They will continue to be educated on lifestyle modification and encouraged to supplement with a home exercise program as tolerated to reach the following goals in the next 30 days: initiate strength training, be creative with difference recipes, smoking cessation.       Medication compliance: Yes   Comments: Pt reports to be compliant with medications  Fall Risk: Low   Comments: Ambulates with a steady gait with no assist device and Denies a fall in the past 6 months    EKG Interpretation: NSR, occ PACs      EXERCISE ASSESSMENT and PLAN    Exercise Prescription:      Frequency: 3 days/week   Supplement with home exercise 2+ days/wk as tolerated       Minutes: 35-40       METS: 2.5-3.2          HR: RHR+30   RPE: 3-4         Modalities: Treadmill, UBE, NuStep and Recumbent bike      30 Day Goals for Exercise Progression:    Frequency: 3 days/week of cardiac rehab       Supplement with home exercise 2+ days/wk as tolerated    Minutes: 35-40                             >150 mins/wk of moderate intensity exercise   METS: 2.5-3.6   HR: RHR+30    RPE: 4-5   Modalities: Treadmill, Airdyne bike, UBE, Lifecycle, NuStep and Recumbent bike    Strength trainin-3 days / week  12-15 repetitions  1-2 sets per modality   Will be added following 2-3 weeks of monitored exercise sessions   Modalities: Leg Press, Chest Press, Pull Downs and Lateral Raise    Home Exercise: Type: walking, Frequency: occasional, 10 mins    Goals: improved DASI score by 10%, Increase in exercise capacity by 40% - based on peak METs tolerated in cardiac rehab exercise session, Exercise 5 days/wk, >150mins/wk of moderate intensity exercise, Improved 6MWT distance by 10%, Resume ADLs with increased strength, Attend Rehab regularly, Decrease sitting time and start a home exercise program    Progression Toward Goals:  Pt is progressing and showing improvement  toward the following goals:  attending CR regularly, home exercise, walking is easier, increased endurance/stamina, improved shoulder mobility, no issues with ADLs. , Will continue to educate and progress as tolerated. , more consistent home exercise and attendance in CR.     Education: benefit of exercise for CAD risk factors, AHA guidelines to achieve >150 mins/wk of moderate exercise, RPE scale and class: Risk Factors for Heart Disease   Plan:education on home exercise guidelines and home exercise 30+ mins 2 days opposite CR  Readiness to change: Action:  (Changing behavior)      NUTRITION ASSESSMENT AND PLAN    Weight control:    Starting weight: 207.2   Current weight:   209    Diabetes: T2D  A1c: 12    last measured: 4/14/23    Lipid management: Discussed diet and lipid management and Last lipid profile 2/1/23  Chol 236    HDL 42      Goals:LDL <100, CHOL <200, Improved Rate Your Plate score  >84, choose lean meat (93-95%), reduce portion sizes of meat to 3oz or less, increase intake of meatless meals, use low fat dairy, use fat-free dressings/bennett or seldom use, choose healthy snacks: light popcorn, plain pretzels, Increase intake of nuts and seeds, seldom eat or choose low fat ice-cream, fruit juice bars or frozen yogurt , eliminate or choose low-fat sweets and daily saturated fat intake <7%/13g    Measurable goals were based Rate Your Plate Dietary Self-Assessment. These are the areas in which the patient could score higher on the assessment. Goals include recommendations for a heart healthy diet based on American Heart Association. Progression Toward Goals: Pt is progressing and showing improvement  toward the following goals:  avoiding excess salt, compliant with daily wts, hydration, avoiding processed meats, high fat meats. , Patient will be creative with different recipes in the next 30 days, Will continue to educate and progress as tolerated.     Education: heart healthy eating  low sodium diet  hydration  nutrition for  lipid management  nutrition for Improved BG control  target goal for A1c <7.0  exercise and diabetes management   education class: Heart Healthy Eating  education class:  Label Reading  Plan: switch to low fat cheeses, replace butter with soft spreads made with olive oil, canola or yogurt, replace refined grain bread with whole grain bread, reduce red meat 1x/wk, switch to skim or 1% milk, eat fewer desserts and sweets, avoid processed foods, monitor home blood glucose, drink more water, learn how to read food labels, replace sugar with stevia or truvia and keep added daily sugar <25g/day  Readiness to change: Preparation:  (Getting ready to change)  and Action:  (Changing behavior)      PSYCHOSOCIAL ASSESSMENT AND PLAN    Emotional:  Depression assessment:  PHQ-9 = 4  1-4 = Minimal Depression            Anxiety measure:  JOSSIE-7 = 2  0-4  = Not anxious  Self-reported stress level:  1  Social support: Excellent and Patient reports excellent emotional/social support from his wife and daughter    Goals:  Physical Fitness in [facility] Score < 3, Overall Health in [facility] Score < 3, Quality of Life in [facility] Score < 3 , Increased interest in doing things, improved concentration, improved positive thoughts of well being and take time to relax    Progression Toward Goals: Pt is progressing and showing improvement toward the following goals:  denies depression/axniety, stress reducing.  , Will continue to educate and progress as tolerated. Education: benefits of a positive support system and stress management techniques  Plan: Class: Stress and Your Health, Class: Relaxation, Enjoy family and keep busy around the house  Readiness to change: Action:  (Changing behavior)      OTHER CORE COMPONENTS     Tobacco:   Social History     Tobacco Use   Smoking Status Former   • Packs/day: 1.00   • Types: Cigarettes   • Quit date: 2023   • Years since quittin.3   Smokeless Tobacco Never       Tobacco Use Intervention:   Brief counseling by cardiac rehab professional  Date: 23  Discussed barriers to quitting and steps to work toward a quit date  [place] Quit Line 800-QUIT-NOW  Pt continues to smoke 2 cigs/day   Pt is ready to quit    Anginal Symptoms:  SOB and edema   NTG use: No prescription    Blood pressure:    Restin/62 - 108/56   Exercise: 110/66 - 134/64    Goals: moderate intensity exercise >150 mins/wk, medication compliance, set a target quit date and Abstain from smoking    Progression Toward Goals: Pt is progressing and showing improvement  toward the following goals:  smoking cessation, medication compliance and exercise.  , Will continue to educate and progress as tolerated.     Education:  low sodium diet and HTN  Plan: Class: Understanding Heart Disease, Class: Common Heart Medications, Avoid Processed foods, engage in regular exercise, check labels for sodium content and monitor home BP, avoid processed meats  Readiness to change: Action:  (Changing behavior)

## 2023-09-21 ENCOUNTER — APPOINTMENT (OUTPATIENT)
Dept: CARDIAC REHAB | Age: 63
End: 2023-09-21
Payer: COMMERCIAL

## 2023-09-25 ENCOUNTER — CLINICAL SUPPORT (OUTPATIENT)
Dept: CARDIAC REHAB | Age: 63
End: 2023-09-25
Payer: COMMERCIAL

## 2023-09-25 DIAGNOSIS — I50.22 CHRONIC SYSTOLIC HEART FAILURE (HCC): ICD-10-CM

## 2023-09-25 PROCEDURE — 93798 PHYS/QHP OP CAR RHAB W/ECG: CPT

## 2023-09-28 ENCOUNTER — CLINICAL SUPPORT (OUTPATIENT)
Dept: CARDIAC REHAB | Age: 63
End: 2023-09-28
Payer: COMMERCIAL

## 2023-09-28 DIAGNOSIS — I50.22 CHRONIC SYSTOLIC HEART FAILURE (HCC): Primary | ICD-10-CM

## 2023-09-28 PROCEDURE — 93798 PHYS/QHP OP CAR RHAB W/ECG: CPT

## 2023-10-02 ENCOUNTER — CLINICAL SUPPORT (OUTPATIENT)
Dept: CARDIAC REHAB | Age: 63
End: 2023-10-02
Payer: COMMERCIAL

## 2023-10-02 DIAGNOSIS — I50.22 CHRONIC SYSTOLIC CHF (CONGESTIVE HEART FAILURE), NYHA CLASS 2 (HCC): ICD-10-CM

## 2023-10-02 PROCEDURE — 93798 PHYS/QHP OP CAR RHAB W/ECG: CPT

## 2023-10-03 ENCOUNTER — CLINICAL SUPPORT (OUTPATIENT)
Dept: CARDIAC REHAB | Age: 63
End: 2023-10-03
Payer: COMMERCIAL

## 2023-10-03 DIAGNOSIS — I50.22 CHRONIC SYSTOLIC CHF (CONGESTIVE HEART FAILURE), NYHA CLASS 2 (HCC): Primary | ICD-10-CM

## 2023-10-03 PROCEDURE — 93798 PHYS/QHP OP CAR RHAB W/ECG: CPT

## 2023-10-05 ENCOUNTER — CLINICAL SUPPORT (OUTPATIENT)
Dept: CARDIAC REHAB | Age: 63
End: 2023-10-05
Payer: COMMERCIAL

## 2023-10-05 DIAGNOSIS — I50.22 CHRONIC SYSTOLIC HEART FAILURE (HCC): ICD-10-CM

## 2023-10-05 PROCEDURE — 93798 PHYS/QHP OP CAR RHAB W/ECG: CPT

## 2023-10-09 ENCOUNTER — CLINICAL SUPPORT (OUTPATIENT)
Dept: CARDIAC REHAB | Age: 63
End: 2023-10-09
Payer: COMMERCIAL

## 2023-10-09 DIAGNOSIS — I50.22 CHRONIC SYSTOLIC HEART FAILURE (HCC): ICD-10-CM

## 2023-10-09 PROCEDURE — 93798 PHYS/QHP OP CAR RHAB W/ECG: CPT

## 2023-10-13 NOTE — PROGRESS NOTES
Heart Failure Outpatient Progress Note - Trudy Taylor 59 y.o. male MRN: [id number]    @ Encounter: [id number]      Assessment/Plan:    Patient Active Problem List    Diagnosis Date Noted    Chronic systolic CHF (congestive heart failure), NYHA class 2 ([address]) 08/02/2023    Localized swelling on left hand 05/13/2023    Hyponatremia 05/06/2023    Acidosis 05/06/2023    V-tach ([address]) 05/04/2023    Ischemic cardiomyopathy 05/03/2023    Essential (primary) hypertension 05/02/2023    Acute HFrEF 05/02/2023    A-fib ([address]) 05/02/2023    Benign prostatic hyperplasia without lower urinary tract symptoms 04/11/2018    3-vessel CAD 02/27/2015    Type 2 diabetes mellitus, with long-term current use of insulin ([address]) 02/27/2015    GERD (gastroesophageal reflux disease) 06/28/2013    Tobacco dependence syndrome 01/18/2012     # Chronic HFimpEF, Stage C,   Impression: iCM with large apical aneurysm with progression of CAD/late presenting MI with occluded mid LAD and LPDA. LV mildly dilated.  Started on milrinone 0.25mcg/kg/min 5/6/23 due to rising creatinine and worsening volume status, stopped 5/10/23. ,       Weight: 205 lbs on 6/5, 218 lbs today  BNP 6/2/23: 279     Studies- personally reviewed by me  EKG- narrow QRS with bigeminy; inferior infarct, anterolateral infarct    Echo 8/7/23:  LVEF: 40%  LVEDd: 4.1 cm  LV apex aneurysmal, no thrombus  RV: normal    LHC 5/3/23: chronically occluded mid LAD and LPDA c/w echo findings of apical aneurysm, not amenable to PCI  Patent mid LCx stent   LVEDP 30 mmHg     Echocardiogram 5/2/23  LVEF:  10-15%, apical aneurysm  LVIDd: 5.6 cm  RV: normal  MR: mild  PASP: 49 mmHg     Echo 9/26/17:  LVEF: 65%     Neurohormonal Blockade:  --Beta-Blocker: metoprolol tartrate 25 mg BID  --ACEi, ARB or ARNi: Entresto stopped due to hypotension    (or SVR reduction)   --Aldosterone Receptor Blocker: spironolactone 12.5 mg daily  --SGLT2-I: Jardiance 10 mg daily  --Diuretic: stopped     Sudden Cardiac Death Risk Reduction:  MDT ICD 5/12/23 for secondary prevention  Interrogation 8/28/23: no events, optivol normal  Interrogation 5/26/23:  < 1%, no events     Electrical Resynchronization:  Narrow QRS     Advanced Therapies (If appropriate): --Inotrope: was on milrinone 0.25 mcg/kg/min, titrated off  --LVAD/Transplant Candidacy: Current tobacco use prior to admission precludes heart transplant at this time- since quit. Moderately reduced RV function and mildly dilated LV concerning for LVAD. Mildly dilated LV also concerning given VT. Has not smoked since May 2     # New Q wave MI, later presenting or silent LAD territory infarction  # hyperlipidemia-atorvastatin 80 mg  9/16/23: LDL 51, HDL 68  # current tobacco use- quit since hospital  # Ventricular tachycardia- scar mediated  Rx: amiodarone 200 mg daily  # CKD, Cr improved to 1.05 on 7/3/23 labs  # hyponatremia- due to HF, , now up to 137 on 7/3/23  # DM2, HgA1C 7.1% on 9/16/23    TODAY'S PLAN:  Will stay off entresto due to orthostatic hypotension  EF improved on most recent echo- up to 40%  Continue metoprolol, spironolactone and and Jardiance  --2g sodium diet  - Daily weights    HPI:   59year old male with known CAD with [facility] in 2015 showing obstructive mid-LCx disease treated with PTCA/stening and residual non-obstructive disease in the LAD, now presented with 1-2 weeks of shortness of breath, leg swelling, and >20 lb weight gain in 1 week. He was suspected during a cardiology visit to be in rapid atrial fibrillation, and sent to the ED. He converted to sinus rhythm shortly following IV beta blocker administration. Patient was taken for [facility] which showed chronically occluded mid LAD and LPDA c/w echo findings of apical aneurysm, not amenable to PCI. There was a patent mid LCx stent. Echocardiogram showed severely reduced LV function with EF 10-15%, apical aneurysm, and LVIDd of 5.6 cm. VT was felt to be scar mediated.  A MDT dual chamber AICD was placed. Heart failure specific GDMT was initiated and optimized. He did require transient milrinone support during his hospitalization and this was weaned off     Since discharge he has had orthostatic hypotension, lightheadedness and dizziness. No syncope. Entresto was cut back to 24/26 mg BID, spironolactone stopped and lasix stopped. He is on [place]. Interim:  Echo 8/7/23:  LVEF: 40%  LVEDd: 4.1 cm  LV apex aneurysmal, no thrombus  RV: normal    Interrogation 8/28/23: no events, optivol normal    Wt up 13 lbs    Doing rehab 3x a week    Review of Systems   Constitutional:  Negative for activity change, appetite change, fatigue and unexpected weight change (wt up 13 lbs). HENT:  Negative for congestion and nosebleeds. Eyes: Negative. Respiratory:  Negative for cough, chest tightness and shortness of breath. Cardiovascular:  Negative for chest pain, palpitations and leg swelling. Gastrointestinal:  Negative for abdominal distention. Endocrine: Negative. Genitourinary: Negative. Musculoskeletal: Negative. Skin: Negative. Neurological:  Negative for dizziness, syncope and weakness. Hematological: Negative. Psychiatric/Behavioral: Negative. Past Medical History:   Diagnosis Date    Myocardial infarction ([address])          No Known Allergies  . Current Outpatient Medications:     amiodarone 200 mg tablet, TAKE 1 TABLET BY MOUTH DAILY WITH BREAKFAST., Disp: 30 tablet, Rfl: 0    aspirin 81 MG tablet, Take 1 tablet by mouth daily, Disp: , Rfl:     atorvastatin (LIPITOR) 80 mg tablet, Take 1 tablet (80 mg total) by mouth daily after dinner, Disp: 30 tablet, Rfl: 3    BD Pen Needle Micro U/F 32G X 6 MM MISC, USE 1 PEN EACH DAILY, Disp: , Rfl:     Empagliflozin (Jardiance) 10 MG TABS tablet, Take 1 tablet (10 mg total) by mouth every morning, Disp: 90 tablet, Rfl: 3    insulin detemir (Levemir FlexPen) 100 Units/mL injection pen, Inject 30 Units under the skin daily.  Patient to take 30 units daily - if fasting bs >150 for 3 days to increase dose by 5 units.  Once bs < 150 can go back to 30 units  Indications: Type 2 Diabetes, Disp: , Rfl:     JANUMET -1000 MG TB24, daily , Disp: , Rfl:     lidocaine (LIDODERM) 5 %, Apply 1 patch topically over 12 hours daily for 3 days Remove & Discard patch within 12 hours or as directed by MD, Disp: 3 patch, Rfl: 0    metFORMIN (GLUCOPHAGE) 1000 MG tablet, TAKE 1 TABLET BY MOUTH TWICE DAILY WITH A MEAL, Disp: , Rfl:     metoprolol succinate (TOPROL-XL) 25 mg 24 hr tablet, Take 1 tablet (25 mg total) by mouth every 12 (twelve) hours, Disp: 60 tablet, Rfl: 3    nicotine (NICODERM CQ) 14 mg/24hr TD 24 hr patch, Place 1 patch on the skin over 24 hours daily (Patient not taking: Reported on 2023), Disp: 28 patch, Rfl: 0    OneTouch Verio test strip, , Disp: , Rfl:     senna-docusate sodium (SENOKOT S) 8.6-50 mg per tablet, Take 2 tablets by mouth daily at bedtime (Patient not taking: Reported on 2023), Disp: 60 tablet, Rfl: 0    spironolactone (ALDACTONE) 25 mg tablet, Take 0.5 tablets (12.5 mg total) by mouth daily Start on 23 per [place] [facility] PA-C, Disp: 45 tablet, Rfl: 3    Social History     Socioeconomic History    Marital status: /Civil Union     Spouse name: Not on file    Number of children: Not on file    Years of education: Not on file    Highest education level: Not on file   Occupational History    Not on file   Tobacco Use    Smoking status: Former     Packs/day: 1.00     Types: Cigarettes     Quit date: 2023     Years since quittin.4    Smokeless tobacco: Never   Vaping Use    Vaping Use: Never used   Substance and Sexual Activity    Alcohol use: Not Currently    Drug use: Not on file    Sexual activity: Not on file   Other Topics Concern    Not on file   Social History Narrative    Not on file     Social Determinants of Health     Financial Resource Strain: Not on file   Food Insecurity: No Food Insecurity (5/4/2023)    Hunger Vital Sign     Worried About Running Out of Food in the Last Year: Never true     Ran Out of Food in the Last Year: Never true   Transportation Needs: No Transportation Needs (5/4/2023)    PRAPARE - Transportation     Lack of Transportation (Medical): No     Lack of Transportation (Non-Medical): No   Physical Activity: Not on file   Stress: Not on file   Social Connections: Not on file   Intimate Partner Violence: Not on file   Housing Stability: Low Risk  (5/4/2023)    Housing Stability Vital Sign     Unable to Pay for Housing in the Last Year: No     Number of Places Lived in the Last Year: 1     Unstable Housing in the Last Year: No       No family history on file. Physical Exam:    Vitals: There were no vitals filed for this visit. Physical Exam  Constitutional:       Appearance: He is well-developed. HENT:      Head: Normocephalic and atraumatic. Eyes:      Pupils: Pupils are equal, round, and reactive to light. Neck:      Vascular: No JVD. Cardiovascular:      Rate and Rhythm: Normal rate and regular rhythm. Heart sounds: No murmur heard. Pulmonary:      Effort: Pulmonary effort is normal. No respiratory distress. Breath sounds: Normal breath sounds. Abdominal:      General: There is no distension. Palpations: Abdomen is soft. Tenderness: There is no abdominal tenderness. Musculoskeletal:         General: Normal range of motion. Cervical back: Normal range of motion. Skin:     General: Skin is warm and dry. Findings: No rash. Neurological:      Mental Status: He is alert and oriented to person, place, and time.          Labs & Results:    Lab Results   Component Value Date    SODIUM 137 07/03/2023    K 4.7 07/03/2023     07/03/2023    CO2 29 07/03/2023    BUN 19 07/03/2023    CREATININE 1.05 07/03/2023    GLUC 195 (H) 06/12/2023    CALCIUM 9.2 07/03/2023     Lab Results   Component Value Date    WBC 10.21 (H) 05/29/2023    HGB 16.3 05/29/2023    HCT 48.8 05/29/2023    MCV 82 05/29/2023     05/29/2023     Lab Results   Component Value Date    NTBNP 5,942 (H) 05/02/2023      No results found for: "CHOLESTEROL"  Lab Results   Component Value Date    HDL 34 05/07/2015    HDL 43 02/17/2015     Lab Results   Component Value Date    TRIG 122 05/07/2015    TRIG 128 02/17/2015     No results found for: "[address]"    EKG personally reviewed by Jace Tierney DO. Counseling / Coordination of Care  Time spent today 25 minutes. Greater than 50% of total time was spent with the patient and / or family counseling and / or coordination of care. We went over current diagnosis, most recent studies and any changes in treatment. Thank you for the opportunity to participate in the care of this patient.     Katrina Jose PULMON[facility]ENSION  MEDICAL DIRECTOR OF [address] [place] [address]

## 2023-10-16 ENCOUNTER — OFFICE VISIT (OUTPATIENT)
Dept: CARDIOLOGY CLINIC | Facility: CLINIC | Age: 63
End: 2023-10-16
Payer: COMMERCIAL

## 2023-10-16 VITALS
HEIGHT: 72 IN | BODY MASS INDEX: 29.53 KG/M2 | SYSTOLIC BLOOD PRESSURE: 102 MMHG | DIASTOLIC BLOOD PRESSURE: 68 MMHG | WEIGHT: 218 LBS | HEART RATE: 82 BPM | OXYGEN SATURATION: 97 %

## 2023-10-16 DIAGNOSIS — I50.22 CHRONIC SYSTOLIC CHF (CONGESTIVE HEART FAILURE), NYHA CLASS 2 (HCC): Primary | ICD-10-CM

## 2023-10-16 DIAGNOSIS — I25.10 3-VESSEL CORONARY ARTERY DISEASE: ICD-10-CM

## 2023-10-16 DIAGNOSIS — I25.5 ISCHEMIC CARDIOMYOPATHY: ICD-10-CM

## 2023-10-16 DIAGNOSIS — I10 ESSENTIAL (PRIMARY) HYPERTENSION: ICD-10-CM

## 2023-10-16 PROCEDURE — 99214 OFFICE O/P EST MOD 30 MIN: CPT | Performed by: INTERNAL MEDICINE

## 2023-10-16 NOTE — PATIENT INSTRUCTIONS
No changes    Below are the common symptoms of a cold and the medications which are safe to use to treat these symptoms. Remember, not all of the over the counter medicines available in stores are safe for patients with heart disease. Do not take products containing pseudoephedrine/neosynephrine. Nasal congestion/rhinitis - runny nose    Nonprescription/Over the counter products    Claritin (loratidine)  Chlor-Trimeton (chlorpheniramine)  Benadryl (diphenhydramine)  Ocean nasal mist or any saline nasal spray  Allegra (fexofenadine)  Zyrtec (cetirizine)    Sore throat or cough    Robitussin/Robitussin DM (guaifenesin/guaifenesin with dextromethorphan)  Throat lozenges  Throat sprays    Headache/aching    Acetaminophen (Tylenol) - Please note that acetaminophen may mask a fever and you should check your temperature before taking acetaminophen.

## 2023-10-17 ENCOUNTER — CLINICAL SUPPORT (OUTPATIENT)
Dept: CARDIAC REHAB | Age: 63
End: 2023-10-17
Payer: COMMERCIAL

## 2023-10-17 DIAGNOSIS — I50.22 CHRONIC SYSTOLIC HEART FAILURE (HCC): Primary | ICD-10-CM

## 2023-10-17 PROCEDURE — 93798 PHYS/QHP OP CAR RHAB W/ECG: CPT

## 2023-10-17 NOTE — PROGRESS NOTES
Cardiac Rehabilitation Plan of Care   90 Day Reassessment          Today's date: 10/17/2023   # of Exercise Sessions Completed: 20  Patient name: Don Dotson      : 1960  Age: 59 y.o. MRN: [id number]  Referring Physician: Hawa Harding DO  Cardiologist: Marshall Stein MD  Provider: Radha Arcos  Clinician: Giovanna Santillan, MS, CEP, CCRP      Dx:   Encounter Diagnosis   Name Primary? Chronic systolic heart failure ([address]) Yes     Date of onset: 23      SUMMARY OF PROGRESS:   10/17/23:  Aubrey Morales is not compliant attending cardiac rehab exercise sessions 3x/wk. He has attended only 5 sessions in the past 30 days. He tolerates 38-40 mins at 2.5-3.2 METs. He reports he feels better/stronger with reduced SOB during exercise and physical activity. A light strength training component will be added in a future exercise session. Slow progression of intensity levels. Aubrey Morales works at a moderate (4/10) intensity. Resting BP  96/54 - 108/60 with Normal / flat response to exercise reaching 118/70 - 120/78. Paced on tele with BBB observed. RHR 64-80 with Normal response to exercise reaching 77-91. He walks at home to supplement CR approximately 20 mins which includes an incline. No difficulty walking the incline. No cardiac complaints. He is not progressing toward wt loss goals. He  has gained 10lbs since his initial evaluation on . He is compliant with daily weights at home and states they have been steady. His weight today was up by 6lbs since last week. In discussion with Aubrey Morales, he reports he had an ov with Dr. Hilary Booker yesterday who inquired about his increased wt and checked his edema status. Aubrey Morales admits dietary indiscretion in the past couple weeks. Aubrey Morales was advised to resume dietary modifications with the goal of rare red/processed meats, low fat dairy, reduced added sugars and refined flours. The patient has T2D and monitors BG few times per day reporting an avg. FBG of under 150.   Aubrey Morales  has returned to smoking 3 cigs/day. He is struggling to abstain because everyone around him smokes including his wife. He was counseled on the smoking and heart disease and encouraged he set a quit date and stick to it. Depression was not reassessed using the PHQ-9 d/t low initial score revealing no concern for depression. When addressed, the patient denies   depression/anxiety. Patient reports excellent  social/emotional support from his wife and daughter. Nick Haque rates their stress as 3/10. Stress management techniques were reinforced. Patient attends group educational classes on cardiac risk factor modification. His exercise program will be progressed as tolerated to maintain RPE 4-6. The patient has the following personal goals he hopes to achieved by discharge: improved heart function, regular exercise, improved diet. They will continue to be educated on lifestyle modification and encouraged to supplement with a home exercise program as tolerated to reach the following goals in the next 30 days: initiate strength training, more consistent home walking increasing duration as tolerated, reduce/eliminate diet soda and increase water intake, switch from processed lunch meat to chicken breast for sandwiches. Nick Haque continues to drink diet sodas but is aiming for 70 oz of fluid daily. 9/20/23:  Nick Haque has been less compliant attending cardiac rehab exercise sessions 3x/wk. He has attended only 6 sessions in the past 30 days. He tolerates 38-40 mins at 2.5-3.2 METs. He reports he feels better/stronger. A light strength training component will be added in a future exercise session. His duration has been increased to 40 mins. Slow progression of intensity levels. Nick Haque works at a moderate (4/10) intensity. Resting BP  88/62 - 108/56 with Normal / flat response to exercise reaching 110/66 - 134/64. Paced on tele with BBB observed. RHR 64-80 with Normal response to exercise reaching 77-91.  He continues to walk at home but less consistently. He walks aproximately 10 mins which includes an incline. No difficulty walking the incline. No cardiac complaints. He is not progressing toward wt loss goals with a gain of 2 pounds. He is compliant with daily weights at home and states they have been steady. Patient has been working on  dietary modifications with the goal of rare red/processed meats, low fat dairy, reduced added sugars and refined flours. The patient has T2D and monitors BG few times per day reporting an avg. FBG of under 150. Nick Haque  quit smoking 8/2/23 and is doing well abstaining. Depression was not reassessed using the PHQ-9 d/t low initial score revealing no concern for depression. When addressed, the patient denies   depression/anxiety. Patient reports excellent  social/emotional support from his wife and daughter. Nick Haque rates their stress as 3/10. Stress management techniques were reinforced. Patient attends group educational classes on cardiac risk factor modification. His exercise program will be progressed as tolerated to maintain RPE 4-6. The patient has the following personal goals he hopes to achieved by discharge: improved heart function, regular exercise, improved diet. They will continue to be educated on lifestyle modification and encouraged to supplement with a home exercise program as tolerated to reach the following goals in the next 30 days: initiate strength training, more consistent home walking increasing duration as tolerated, reduce/eliminate diet soda and increase water intake, switch from processed lunch meat to chicken breast for sandwiches. Nick Hqaue has an ov with Dr. Rommel Hernandez on October 16.    8/28/23: Nick Haque is compliant attending cardiac rehab exercise sessions 3x/wk. He tolerates 33-35 mins at 2.2-2.8 METs. A light strength training component will be added in a future exercise session. He is tolerating progression of intensity levels to maintain RPE 4-6.    Resting BP  102/66 -90/58 with Normal / flat response to exercise reaching 110/62- 128/60. Paced on tele with BBB observed. RHR 55 - 82  with Normal response to exercise reaching 75 - 90. He has added home exercise 3 days/wk which includes walking, aproximately 1/2 mile. No cardiac complaints. He is not progressing toward wt loss goals with a gain of 2 pounds. He is compliant with daily weights at home and states they have been steady. Patient has been working on  dietary modifications with the goal of rare red/processed meats, low fat dairy, reduced added sugars and refined flours. The patient has T2D and monitors BG few times per day reporting an avg. FBG of 130, an improvement. The patient is a recent user, quit 8/2/23, and is doing well abstaining. Depression was not reassessed using the PHQ-9d/t low initial score revealing no concern for depression. When addressed, the patient denies   depression/anxiety. Patient reports excellent  social/emotional support from his wife and daughter. They were encouraged to discuss options for medical therapy and counseling with their PCP. Zita Goltz rates their stress as 3/10. Stress management techniques were reinforced. Patient attends group educational classes on cardiac risk factor modification. His exercise program will be progressed as tolerated to maintain RPE 4-6. The patient has the following personal goals he hopes to achieved by discharge: improved heart function, regular exercise, improved diet. They will continue to be educated on lifestyle modification and encouraged to supplement with a home exercise program as tolerated to reach the following goals in the next 30 days: initiate strength training, be creative with difference recipes, smoking cessation.       Medication compliance: Yes   Comments: Pt reports to be compliant with medications  Fall Risk: Low   Comments: Ambulates with a steady gait with no assist device and Denies a fall in the past 6 months    EKG Interpretation: NSR, occ PACs      EXERCISE ASSESSMENT and PLAN    Exercise Prescription:      Frequency: 3 days/week   Supplement with home exercise 2+ days/wk as tolerated       Minutes: 40      METS: 2.5-3.2          HR: RHR+30   RPE: 3-4         Modalities: Treadmill, UBE, NuStep and Recumbent bike      30 Day Goals for Exercise Progression:    Frequency: 3 days/week of cardiac rehab       Supplement with home exercise 2+ days/wk as tolerated    Minutes: 40                            >150 mins/wk of moderate intensity exercise   METS: 2.5-3.6   HR: RHR+30    RPE: 4-5   Modalities: Treadmill, Airdyne bike, UBE, Lifecycle, NuStep and Recumbent bike    Strength trainin-3 days / week  12-15 repetitions  1-2 sets per modality   Will be added next session   Modalities: Leg Press, Chest Press, Pull Downs and Lateral Raise    Home Exercise: Type: walking, Frequency: occasional, 20 mins    Goals: improved DASI score by 10%, Increase in exercise capacity by 40% - based on peak METs tolerated in cardiac rehab exercise session, Exercise 5 days/wk, >150mins/wk of moderate intensity exercise, Improved 6MWT distance by 10%, Resume ADLs with increased strength, Attend Rehab regularly, Decrease sitting time and start a home exercise program    Progression Toward Goals:  Pt is progressing and showing improvement  toward the following goals:  attending CR regularly, home exercise, walking is easier, increased endurance/stamina, improved shoulder mobility, no issues with ADLs. , Will continue to educate and progress as tolerated. , more consistent home exercise and attendance in CR.     Education: benefit of exercise for CAD risk factors, AHA guidelines to achieve >150 mins/wk of moderate exercise, RPE scale and class: Risk Factors for Heart Disease   Plan:education on home exercise guidelines and home exercise 30+ mins 2 days opposite CR  Readiness to change: Action:  (Changing behavior)      NUTRITION ASSESSMENT AND PLAN    Weight control:    Starting weight: 207.2   Current weight:   217    Diabetes: T2D  A1c: 12    last measured: 4/14/23    Lipid management: Discussed diet and lipid management and Last lipid profile 2/1/23  Chol 236    HDL 42      Goals:LDL <100, CHOL <200, Improved Rate Your Plate score  >58, choose lean meat (93-95%), reduce portion sizes of meat to 3oz or less, increase intake of meatless meals, use low fat dairy, use fat-free dressings/bennett or seldom use, choose healthy snacks: light popcorn, plain pretzels, Increase intake of nuts and seeds, seldom eat or choose low fat ice-cream, fruit juice bars or frozen yogurt , eliminate or choose low-fat sweets and daily saturated fat intake <7%/13g    Measurable goals were based Rate Your Plate Dietary Self-Assessment. These are the areas in which the patient could score higher on the assessment. Goals include recommendations for a heart healthy diet based on American Heart Association. Progression Toward Goals: Pt is progressing and showing improvement  toward the following goals:  avoiding excess salt, compliant with daily wts, hydration, avoiding processed meats, high fat meats. , Patient will be creative with different recipes in the next 30 days, Will continue to educate and progress as tolerated.     Education: heart healthy eating  low sodium diet  hydration  nutrition for  lipid management  nutrition for Improved BG control  target goal for A1c <7.0  exercise and diabetes management   education class: Heart Healthy Eating  education class:  Label Reading  Plan: switch to low fat cheeses, replace butter with soft spreads made with olive oil, canola or yogurt, replace refined grain bread with whole grain bread, reduce red meat 1x/wk, switch to skim or 1% milk, eat fewer desserts and sweets, avoid processed foods, monitor home blood glucose, drink more water, learn how to read food labels, replace sugar with stevia or truvia and keep added daily sugar <25g/day  Readiness to change: Preparation:  (Getting ready to change)  and Action:  (Changing behavior)      PSYCHOSOCIAL ASSESSMENT AND PLAN    Emotional:  Depression assessment:  PHQ-9 = 4  1-4 = Minimal Depression            Anxiety measure:  JOSSIE-7 = 2  0-4  = Not anxious  Self-reported stress level:  1  Social support: Excellent and Patient reports excellent emotional/social support from his wife and daughter    Goals:  Physical Fitness in [facility] Score < 3, Overall Health in [facility] Score < 3, Quality of Life in [facility] Score < 3 , Increased interest in doing things, improved concentration, improved positive thoughts of well being and take time to relax    Progression Toward Goals: Pt is progressing and showing improvement  toward the following goals:  denies depression/axniety, stress reducing.  , Will continue to educate and progress as tolerated. Education: benefits of a positive support system and stress management techniques  Plan: Class: Stress and Your Health, Class: Relaxation, Enjoy family and keep busy around the house  Readiness to change: Action:  (Changing behavior)      OTHER CORE COMPONENTS     Tobacco:   Social History     Tobacco Use   Smoking Status Former    Packs/day: 1.00    Types: Cigarettes    Quit date: 2023    Years since quittin.4   Smokeless Tobacco Never       Tobacco Use Intervention:   Brief counseling by cardiac rehab professional  Date: 23  Discussed barriers to quitting and steps to work toward a quit date  [place] Quit Line 1-800-QUIT-NOW  Pt continues to smoke 2 cigs/day   Pt is ready to quit  Counseling by CR professional:  10/17/23    Anginal Symptoms:  SOB and edema   NTG use: No prescription    Blood pressure:    Restin/54 - 108/60   Exercise: 118/70 - 120/78.     Goals: moderate intensity exercise >150 mins/wk, medication compliance, set a target quit date and Abstain from smoking    Progression Toward Goals: Pt is progressing and showing improvement  toward the following goals:  smoking cessation, medication compliance and exercise.  , Will continue to educate and progress as tolerated. , Cuate Huddleston has returned to smoking 3 cigs//day.      Education:  low sodium diet and HTN  Plan: Class: Understanding Heart Disease, Class: Common Heart Medications, Avoid Processed foods, engage in regular exercise, check labels for sodium content and monitor home BP, avoid processed meats, consider a smoking quit date   Readiness to change: Action:  (Changing behavior)

## 2023-10-18 ENCOUNTER — CLINICAL SUPPORT (OUTPATIENT)
Dept: CARDIAC REHAB | Age: 63
End: 2023-10-18
Payer: COMMERCIAL

## 2023-10-18 DIAGNOSIS — I50.22 CHRONIC SYSTOLIC CHF (CONGESTIVE HEART FAILURE), NYHA CLASS 2 (HCC): Primary | ICD-10-CM

## 2023-10-18 PROCEDURE — 93798 PHYS/QHP OP CAR RHAB W/ECG: CPT

## 2023-10-23 ENCOUNTER — APPOINTMENT (OUTPATIENT)
Dept: CARDIAC REHAB | Age: 63
End: 2023-10-23
Payer: COMMERCIAL

## 2023-10-24 ENCOUNTER — APPOINTMENT (OUTPATIENT)
Dept: CARDIAC REHAB | Age: 63
End: 2023-10-24
Payer: COMMERCIAL

## 2023-10-25 ENCOUNTER — CLINICAL SUPPORT (OUTPATIENT)
Dept: CARDIAC REHAB | Age: 63
End: 2023-10-25
Payer: COMMERCIAL

## 2023-10-25 DIAGNOSIS — I50.22 CHRONIC SYSTOLIC CHF (CONGESTIVE HEART FAILURE), NYHA CLASS 2 (HCC): Primary | ICD-10-CM

## 2023-10-25 PROCEDURE — 93798 PHYS/QHP OP CAR RHAB W/ECG: CPT

## 2023-10-30 ENCOUNTER — CLINICAL SUPPORT (OUTPATIENT)
Dept: CARDIAC REHAB | Age: 63
End: 2023-10-30
Payer: COMMERCIAL

## 2023-10-30 DIAGNOSIS — I50.22 CHRONIC SYSTOLIC CHF (CONGESTIVE HEART FAILURE), NYHA CLASS 2 (HCC): Primary | ICD-10-CM

## 2023-10-30 PROCEDURE — 93798 PHYS/QHP OP CAR RHAB W/ECG: CPT

## 2023-10-31 ENCOUNTER — CLINICAL SUPPORT (OUTPATIENT)
Dept: CARDIAC REHAB | Age: 63
End: 2023-10-31
Payer: COMMERCIAL

## 2023-10-31 DIAGNOSIS — I50.22 CHRONIC SYSTOLIC CHF (CONGESTIVE HEART FAILURE), NYHA CLASS 2 (HCC): Primary | ICD-10-CM

## 2023-10-31 PROCEDURE — 93798 PHYS/QHP OP CAR RHAB W/ECG: CPT

## 2023-11-02 ENCOUNTER — CLINICAL SUPPORT (OUTPATIENT)
Dept: CARDIAC REHAB | Age: 63
End: 2023-11-02
Payer: COMMERCIAL

## 2023-11-02 DIAGNOSIS — I50.22 CHRONIC SYSTOLIC CHF (CONGESTIVE HEART FAILURE), NYHA CLASS 2 (HCC): Primary | ICD-10-CM

## 2023-11-02 PROCEDURE — 93798 PHYS/QHP OP CAR RHAB W/ECG: CPT

## 2023-11-06 ENCOUNTER — CLINICAL SUPPORT (OUTPATIENT)
Dept: CARDIAC REHAB | Age: 63
End: 2023-11-06
Payer: COMMERCIAL

## 2023-11-06 DIAGNOSIS — I50.22 CHRONIC SYSTOLIC HEART FAILURE (HCC): Primary | ICD-10-CM

## 2023-11-06 PROCEDURE — 93798 PHYS/QHP OP CAR RHAB W/ECG: CPT

## 2023-11-07 ENCOUNTER — CLINICAL SUPPORT (OUTPATIENT)
Dept: CARDIAC REHAB | Age: 63
End: 2023-11-07
Payer: COMMERCIAL

## 2023-11-07 DIAGNOSIS — I50.22 CHRONIC SYSTOLIC HEART FAILURE (HCC): Primary | ICD-10-CM

## 2023-11-07 DIAGNOSIS — I47.20 V-TACH (HCC): ICD-10-CM

## 2023-11-07 DIAGNOSIS — I50.23 ACUTE ON CHRONIC SYSTOLIC HEART FAILURE (HCC): ICD-10-CM

## 2023-11-07 PROCEDURE — 93798 PHYS/QHP OP CAR RHAB W/ECG: CPT

## 2023-11-08 RX ORDER — AMIODARONE HYDROCHLORIDE 200 MG/1
200 TABLET ORAL
Qty: 30 TABLET | Refills: 11 | Status: SHIPPED | OUTPATIENT
Start: 2023-11-08

## 2023-11-08 RX ORDER — METOPROLOL SUCCINATE 25 MG/1
25 TABLET, EXTENDED RELEASE ORAL EVERY 12 HOURS
Qty: 60 TABLET | Refills: 11 | Status: SHIPPED | OUTPATIENT
Start: 2023-11-08

## 2023-11-08 NOTE — TELEPHONE ENCOUNTER
Requested medication(s) are due for refill today: Yes  Patient has already received a courtesy refill: No  Other reason request has been forwarded to provider:
Coronary artery disease involving native coronary artery of native heart without angina pectoris    Essential hypertension    Gastroesophageal reflux disease, esophagitis presence not specified    Lower motor neuron disease  with paraplegia  Shortness of breath    Type 2 diabetes mellitus without complication, without long-term current use of insulin

## 2023-11-09 ENCOUNTER — APPOINTMENT (OUTPATIENT)
Dept: CARDIAC REHAB | Age: 63
End: 2023-11-09
Payer: COMMERCIAL

## 2023-11-14 ENCOUNTER — CLINICAL SUPPORT (OUTPATIENT)
Dept: CARDIAC REHAB | Age: 63
End: 2023-11-14
Payer: COMMERCIAL

## 2023-11-14 DIAGNOSIS — I50.22 CHRONIC SYSTOLIC CHF (CONGESTIVE HEART FAILURE), NYHA CLASS 2 (HCC): Primary | ICD-10-CM

## 2023-11-14 PROCEDURE — 93798 PHYS/QHP OP CAR RHAB W/ECG: CPT

## 2023-11-16 ENCOUNTER — CLINICAL SUPPORT (OUTPATIENT)
Dept: CARDIAC REHAB | Age: 63
End: 2023-11-16
Payer: COMMERCIAL

## 2023-11-16 DIAGNOSIS — I50.22 CHRONIC SYSTOLIC CHF (CONGESTIVE HEART FAILURE), NYHA CLASS 2 (HCC): Primary | ICD-10-CM

## 2023-11-16 PROCEDURE — 93798 PHYS/QHP OP CAR RHAB W/ECG: CPT

## 2023-11-16 NOTE — PROGRESS NOTES
Cardiac Rehabilitation Plan of Care   120 Day Reassessment          Today's date: 2023   # of Exercise Sessions Completed: 29  Patient name: Lashay Dodd      : 1960  Age: 59 y.o. MRN: [id number]  Referring Physician: Alondra Thomas DO  Cardiologist: Gemini Nino MD  Provider: Jalyn Kim  Clinician: Darwin Fisher MS      Dx:   Encounter Diagnosis   Name Primary? Chronic systolic CHF (congestive heart failure), NYHA class 2 ([address]) [I50.22] Yes       Date of onset: 23      SUMMARY OF PROGRESS:   Maria E East has been more compliant attending cardiac rehab exercise sessions 3x/wk. He has attended 8 sessions in the last 30 days. He tolerates 40-50 mins at 2.6 - 4.4 METs. A light strength training component has been added to their exercise program.   He is tolerating progression of intensity levels to maintain RPE 4-6. Resting BP  100/70 - 140/82 with Normal response to exercise reaching 110/64 - 130/72. Intermittent pacing on tele with BBB observed. RHR 64 - 95  with Normal response to exercise reaching 78 - 105. He has added home exercise 5-7 days/wk which includes walking 40 min/day. No cardiac complaints. Pt reports discomfort at site of ICD, no redness, swelling, or irritation reported. He has been instructed to monitor it and report any changes. He is not progressing toward wt loss goals with a gain of 10 pounds. Patient has been working on  dietary modifications with the goal of rare red/processed meats, low fat dairy, reduced added sugars and refined flours. The patient has T2D and monitors BG a few times per day reporting an avg. FBG of under 150. The patient is a  current smoker and smoked 4 cigarettes/day and he is struggling to abstain because everyone around him smokes including his wife . Depression was not reassessed using the PHQ-9 due to low initial score indicating no concern for depression.  Anxiety was not reassessed using the JOSSIE-7 due to low initial score indicating no concern for anxiety. When addressed, the patient denies   depression/anxiety. Patient reports excellent  social/emotional support from his wife and daughter. Abena Justice rates their stress as 1/10. Stress management techniques were reinforced. Patient attends group educational classes on cardiac risk factor modification. His exercise program will be progressed as tolerated to maintain RPE 4-6. The patient has the following personal goals he hopes to achieved by discharge: continue to increase strength and energy, increase water intake. They will continue to be educated on lifestyle modification and encouraged to supplement with a home exercise program as tolerated to reach the following goals in the next 30 days: continue to reduce soda intake. Abena Justice has cut down to half a bottle/day of soda and has increased his water intake to 60-80 oz/day.     Medication compliance: Yes   Comments: Pt reports to be compliant with medications  Fall Risk: Low   Comments: Ambulates with a steady gait with no assist device and Denies a fall in the past 6 months    EKG Interpretation: NSR, occ PACs      EXERCISE ASSESSMENT and PLAN    Exercise Prescription:      Frequency: 3 days/week   Supplement with home exercise 2+ days/wk as tolerated       Minutes: 40-50      METS: 2.6-4.4          HR: 20-30 BPM above RHR   RPE: 4-6         Modalities: Treadmill, Airdyne bike, UBE, Rower, NuStep, and Recumbent bike      30 Day Goals for Exercise Progression:    Frequency: 3 days/week of cardiac rehab       Supplement with home exercise 2+ days/wk as tolerated    Minutes: 40-50                            >150 mins/wk of moderate intensity exercise   METS: 3.0-4.8   HR: RHR+30    RPE: 4-6   Modalities: Treadmill, Airdyne bike, UBE, Lifecycle, Rower, NuStep, and Recumbent bike    Strength trainin-3 days / week  12-15 repetitions  1-2 sets per modality    Modalities: Leg Press, Chest Press, Pull Downs and Lateral Raise    Home Exercise: Type: walking, Frequency: 5-7 days/wk, 40 mins    Goals: improved DASI score by 10%, Increase in exercise capacity by 40% - based on peak METs tolerated in cardiac rehab exercise session, Exercise 5 days/wk, >150mins/wk of moderate intensity exercise, Improved 6MWT distance by 10%, Resume ADLs with increased strength, Attend Rehab regularly, Decrease sitting time and start a home exercise program    Progression Toward Goals:  Pt is progressing and showing improvement  toward the following goals:  attending CR regularly, home exercise, walking is easier, increased endurance/stamina, improved shoulder mobility, no issues with ADLs, >150 min/wk of moderate intensity exercise.  , Will continue to educate and progress as tolerated. , more consistent home exercise and attendance in CR. Education: benefit of exercise for CAD risk factors, AHA guidelines to achieve >150 mins/wk of moderate exercise, RPE scale and class: Risk Factors for Heart Disease   Plan:education on home exercise guidelines and home exercise 30+ mins 2 days opposite CR  Readiness to change: Action:  (Changing behavior)      NUTRITION ASSESSMENT AND PLAN    Weight control:    Starting weight: 207.2   Current weight:   217    Diabetes: T2D  A1c: 7.1    last measured: 9/16/23    Lipid management: Discussed diet and lipid management and Last lipid profile 9/16/23  Chol 107  TRG 84  HDL 39  LDL 51    Goals:LDL <100, CHOL <200, Improved Rate Your Plate score  >95, choose lean meat (93-95%), reduce portion sizes of meat to 3oz or less, increase intake of meatless meals, use low fat dairy, use fat-free dressings/bennett or seldom use, choose healthy snacks: light popcorn, plain pretzels, Increase intake of nuts and seeds, seldom eat or choose low fat ice-cream, fruit juice bars or frozen yogurt , eliminate or choose low-fat sweets and daily saturated fat intake <7%/13g    Measurable goals were based Rate Your Plate Dietary Self-Assessment.  These are the areas in which the patient could score higher on the assessment. Goals include recommendations for a heart healthy diet based on American Heart Association. Progression Toward Goals: Pt is progressing and showing improvement  toward the following goals:  avoiding excess salt, compliant with daily wts, hydration, avoiding processed meats, high fat meats, increasing fish intake.  , Pt has not made progress toward the following goals: low fat dairy. , Patient will be creative with different recipes in the next 30 days, Will continue to educate and progress as tolerated., Goals met: reduced cholesterol levels.     Education: heart healthy eating  low sodium diet  hydration  nutrition for  lipid management  nutrition for Improved BG control  target goal for A1c <7.0  exercise and diabetes management   education class: Heart Healthy Eating  education class:  Label Reading  Plan: switch to low fat cheeses, replace butter with soft spreads made with olive oil, canola or yogurt, replace refined grain bread with whole grain bread, reduce red meat 1x/wk, switch to skim or 1% milk, eat fewer desserts and sweets, avoid processed foods, monitor home blood glucose, drink more water, learn how to read food labels, replace sugar with stevia or truvia and keep added daily sugar <25g/day  Readiness to change: Preparation:  (Getting ready to change)  and Action:  (Changing behavior)      PSYCHOSOCIAL ASSESSMENT AND PLAN    Emotional:  Depression assessment:  PHQ-9 = 4  1-4 = Minimal Depression            Anxiety measure:  JOSSIE-7 = 2  0-4  = Not anxious  Self-reported stress level:  1  Social support: Excellent and Patient reports excellent emotional/social support from his wife and daughter    Goals:  Physical Fitness in [facility] Score < 3, Overall Health in [facility] Score < 3, Quality of Life in [facility] Score < 3 , Increased interest in doing things, improved concentration, improved positive thoughts of well being and take time to relax    Progression Toward Goals: Pt is progressing and showing improvement  toward the following goals:  denies depression/axniety, stress reducing.  , Will continue to educate and progress as tolerated. Education: benefits of a positive support system and stress management techniques  Plan: Class: Stress and Your Health, Class: Relaxation, Enjoy family and keep busy around the house  Readiness to change: Action:  (Changing behavior)      OTHER CORE COMPONENTS     Tobacco:   Social History     Tobacco Use   Smoking Status Former    Packs/day: 1.00    Types: Cigarettes    Quit date: 2023    Years since quittin.5   Smokeless Tobacco Never       Tobacco Use Intervention:   Brief counseling by cardiac rehab professional  Date: 23  Discussed barriers to quitting and steps to work toward a quit date  [place] Quit Line -800-QUIT-NOW  Pt continues to smoke 2 cigs/day   Pt is ready to quit  Counseling by CR professional:  10/17/23, 23    Anginal Symptoms:  SOB and edema   NTG use: No prescription    Blood pressure:    Restin//82   Exercise: 110//72    Goals: moderate intensity exercise >150 mins/wk, medication compliance, set a target quit date and Abstain from smoking    Progression Toward Goals: Pt is progressing and showing improvement  toward the following goals:  smoking cessation, medication compliance and exercise.  , Will continue to educate and progress as tolerated. , Jailyn Busby has returned to smoking 4 cigs//day.      Education:  low sodium diet and HTN  Plan: Class: Understanding Heart Disease, Class: Common Heart Medications, Avoid Processed foods, engage in regular exercise, check labels for sodium content and monitor home BP, avoid processed meats, consider a smoking quit date   Readiness to change: Action:  (Changing behavior)

## 2023-11-20 ENCOUNTER — CLINICAL SUPPORT (OUTPATIENT)
Dept: CARDIAC REHAB | Age: 63
End: 2023-11-20
Payer: COMMERCIAL

## 2023-11-20 DIAGNOSIS — I50.22 CHRONIC SYSTOLIC CHF (CONGESTIVE HEART FAILURE), NYHA CLASS 2 (HCC): Primary | ICD-10-CM

## 2023-11-20 PROCEDURE — 93798 PHYS/QHP OP CAR RHAB W/ECG: CPT

## 2023-11-21 ENCOUNTER — CLINICAL SUPPORT (OUTPATIENT)
Dept: CARDIAC REHAB | Age: 63
End: 2023-11-21
Payer: COMMERCIAL

## 2023-11-21 DIAGNOSIS — I50.22 CHRONIC SYSTOLIC CHF (CONGESTIVE HEART FAILURE), NYHA CLASS 2 (HCC): Primary | ICD-10-CM

## 2023-11-21 PROCEDURE — 93798 PHYS/QHP OP CAR RHAB W/ECG: CPT

## 2023-11-27 ENCOUNTER — CLINICAL SUPPORT (OUTPATIENT)
Dept: CARDIAC REHAB | Age: 63
End: 2023-11-27
Payer: COMMERCIAL

## 2023-11-27 DIAGNOSIS — I50.22 CHRONIC SYSTOLIC CHF (CONGESTIVE HEART FAILURE), NYHA CLASS 2 (HCC): Primary | ICD-10-CM

## 2023-11-27 PROCEDURE — 93798 PHYS/QHP OP CAR RHAB W/ECG: CPT

## 2023-11-28 ENCOUNTER — CLINICAL SUPPORT (OUTPATIENT)
Dept: CARDIAC REHAB | Age: 63
End: 2023-11-28
Payer: COMMERCIAL

## 2023-11-28 DIAGNOSIS — I50.22 CHRONIC SYSTOLIC CHF (CONGESTIVE HEART FAILURE), NYHA CLASS 2 (HCC): Primary | ICD-10-CM

## 2023-11-28 PROCEDURE — 93798 PHYS/QHP OP CAR RHAB W/ECG: CPT

## 2023-11-29 ENCOUNTER — REMOTE DEVICE CLINIC VISIT (OUTPATIENT)
Dept: CARDIOLOGY CLINIC | Facility: CLINIC | Age: 63
End: 2023-11-29
Payer: COMMERCIAL

## 2023-11-29 DIAGNOSIS — Z95.810 PRESENCE OF AUTOMATIC CARDIOVERTER/DEFIBRILLATOR (AICD): Primary | ICD-10-CM

## 2023-11-29 PROCEDURE — 93296 REM INTERROG EVL PM/IDS: CPT | Performed by: INTERNAL MEDICINE

## 2023-11-29 PROCEDURE — 93295 DEV INTERROG REMOTE 1/2/MLT: CPT | Performed by: INTERNAL MEDICINE

## 2023-11-29 NOTE — PROGRESS NOTES
Results for orders placed or performed in visit on 11/29/23   Cardiac EP device report    Narrative    MDT DUAL ICD/ ACTIVE SYSTEM IS MRI CONDITIONAL  CARELINK TRANSMISSION: BATTERY VOLTAGE ADEQUATE (12.3 YRS). AP: 8.6%. : <0.1% (MVP-ON). ALL AVAILABLE LEAD PARAMETERS WITHIN NORMAL LIMITS. NO SIGNIFICANT HIGH RATE EPISODES. OPTI-VOL WITHIN NORMAL LIMITS. NORMAL DEVICE FUNCTION.  [facility]

## 2023-11-30 ENCOUNTER — CLINICAL SUPPORT (OUTPATIENT)
Dept: CARDIAC REHAB | Age: 63
End: 2023-11-30
Payer: COMMERCIAL

## 2023-11-30 DIAGNOSIS — I50.22 CHRONIC SYSTOLIC HEART FAILURE (HCC): Primary | ICD-10-CM

## 2023-11-30 PROCEDURE — 93798 PHYS/QHP OP CAR RHAB W/ECG: CPT

## 2023-12-04 ENCOUNTER — CLINICAL SUPPORT (OUTPATIENT)
Dept: CARDIAC REHAB | Age: 63
End: 2023-12-04
Payer: COMMERCIAL

## 2023-12-04 DIAGNOSIS — I50.22 CHRONIC SYSTOLIC HEART FAILURE (HCC): Primary | ICD-10-CM

## 2023-12-04 PROCEDURE — 93798 PHYS/QHP OP CAR RHAB W/ECG: CPT

## 2023-12-05 ENCOUNTER — CLINICAL SUPPORT (OUTPATIENT)
Dept: CARDIAC REHAB | Age: 63
End: 2023-12-05
Payer: COMMERCIAL

## 2023-12-05 DIAGNOSIS — I50.22 CHRONIC SYSTOLIC HEART FAILURE (HCC): Primary | ICD-10-CM

## 2023-12-05 PROCEDURE — 93798 PHYS/QHP OP CAR RHAB W/ECG: CPT

## 2023-12-05 NOTE — PROGRESS NOTES
Cardiac Rehabilitation Plan of Care   Discharge          Today's date: 2023   # of Exercise Sessions Completed: 35  Patient name: Rickey Osman      : 1960  Age: 59 y.o. MRN: [id number]  Referring Physician: Keara Henry DO  Cardiologist: Seb Medina MD  Provider: Jose  Clinician: German Doss MS, CEP      Dx:   Encounter Diagnosis   Name Primary? Chronic systolic heart failure ([address]) [I50.22] Yes       Date of onset: 23      SUMMARY OF PROGRESS:    : Discharge note for Erin Delacruz. 6 MWT distance by 11.2% walking 990ft. His exercise tolerance (max METs in tolerated in cardiac rehab) increased by 3.6%. He had a 10.4% decrease in the [employer] activity estimated MET level with ADLs and physical activity. His [facility] QOL decreased by 1 pt. PHQ-9 score decreased from 4 to 3. JOSSIE-7 remained the same at 2. His weight increased by 10.8 pounds. Rate Your Plate score improved from 53 to 63. Silvia Rios has been supplementing CR sessions with home exercise which includes [employer]. He reports increased stamina, strength and reduced SOB with activity. He continues to smoke 3 cigs a day and is struggling to quit. He has cut out soda and is increasing his water intake. Silvia Rios still reports discomfort at ICD site however denies swelling, redness or irritation. Average FBG ~ 130. Stress rating 2/10. Vinod tolerates 40 mins at 2.6 - 4.4 METs plus wt training. Int pacing with BBB on telemetry. RHR 64 - 95, ExHR 78 - 105. Resting /70 - 140/82 with appropriate response to exercise reaching 110/64 - 130/70. Discharge plans include continuing to reduce # of cigs per day with overall goal of smoking cessation and continuing exercise at [employer]. Encouraged Pt to continue exercise. Frequency: 4-6 days/wk, Intensity: RPE 4-5, Time: 40-50 mins daily, 150-200 mins/wk. Pt was encouraged to continue eating heart healthy.   Pt was encouraged to remain compliant with medications and f/u with cardiologist with any cardiac symptoms, medication management and updated lipid profile. Medication compliance: Yes   Comments: Pt reports to be compliant with medications  Fall Risk: Low   Comments: Ambulates with a steady gait with no assist device and Denies a fall in the past 6 months    EKG Interpretation: NSR, occ PACs      EXERCISE ASSESSMENT and PLAN    Exercise Prescription:      Frequency: 3 days/week   Supplement with home exercise 2+ days/wk as tolerated       Minutes: 40-50      METS: 2.6-4.4          HR: 20-30 BPM above RHR   RPE: 4-6         Modalities: Treadmill, Airdyne bike, UBE, Rower, NuStep, and Recumbent bike        Strength trainin-3 days / week  12-15 repetitions  1-2 sets per modality    Modalities: Leg Press, Chest Press, Pull Downs and Lateral Raise    Home Exercise: Type: walking, Frequency: 5-7 days/wk, 40 mins    Goals: improved DASI score by 10%, Increase in exercise capacity by 40% - based on peak METs tolerated in cardiac rehab exercise session, Exercise 5 days/wk, >150mins/wk of moderate intensity exercise, Improved 6MWT distance by 10%, Resume ADLs with increased strength, Attend Rehab regularly, Decrease sitting time and start a home exercise program    Progression Toward Goals:  Goals not met:  improved DASI score by 10%, Increase in exercise capacity by 40% - based on peak METs tolerated in cardiac rehab exercise session,.  , Goals met: Exercise 5 days/wk, >150mins/wk of moderate intensity exercise, Improved 6MWT distance by 10%, Resume ADLs with increased strength, Attend Rehab regularly, Decrease sitting time and start a home exercise program., Patient will be encouraged to focus on lifestyle modifications following discharge. , more consistent home exercise and attendance in CR.     Education: benefit of exercise for CAD risk factors, AHA guidelines to achieve >150 mins/wk of moderate exercise, RPE scale and class: Risk Factors for Heart Disease   Plan:education on home exercise guidelines and home exercise 30+ mins 2 days opposite CR  Readiness to change: Action:  (Changing behavior) and Maintenance: (Maintaining the behavior change)      NUTRITION ASSESSMENT AND PLAN    Weight control:    Starting weight: 207.2   Current weight:   217    Diabetes: T2D  A1c: 7.1    last measured: 9/16/23    Lipid management: Discussed diet and lipid management and Last lipid profile 9/16/23  Chol 107  TRG 84  HDL 39  LDL 51    Goals:LDL <100, CHOL <200, Improved Rate Your Plate score  >88, choose lean meat (93-95%), reduce portion sizes of meat to 3oz or less, increase intake of meatless meals, use low fat dairy, use fat-free dressings/bennett or seldom use, choose healthy snacks: light popcorn, plain pretzels, Increase intake of nuts and seeds, seldom eat or choose low fat ice-cream, fruit juice bars or frozen yogurt , eliminate or choose low-fat sweets and daily saturated fat intake <7%/13g    Measurable goals were based Rate Your Plate Dietary Self-Assessment. These are the areas in which the patient could score higher on the assessment. Goals include recommendations for a heart healthy diet based on American Heart Association. Progression Toward Goals: Goals met: improved lipid profile, FBG and A1C, improved RYP score., Patient will be encouraged to focus on lifestyle modifications following discharge.     Education: heart healthy eating  low sodium diet  hydration  nutrition for  lipid management  nutrition for Improved BG control  target goal for A1c <7.0  exercise and diabetes management   education class: Heart Healthy Eating  education class:  Label Reading  Plan: switch to low fat cheeses, replace butter with soft spreads made with olive oil, canola or yogurt, replace refined grain bread with whole grain bread, reduce red meat 1x/wk, switch to skim or 1% milk, eat fewer desserts and sweets, avoid processed foods, monitor home blood glucose, drink more water, learn how to read food labels, replace sugar with stevia or truvia and keep added daily sugar <25g/day  Readiness to change: Action:  (Changing behavior) and Maintenance: (Maintaining the behavior change)      PSYCHOSOCIAL ASSESSMENT AND PLAN    Emotional:  Depression assessment:  PHQ-9 = 4  1-4 = Minimal Depression            Anxiety measure:  JOSSIE-7 = 2  0-4  = Not anxious  Self-reported stress level:  1  Social support: Excellent and Patient reports excellent emotional/social support from his wife and daughter    Goals:  [facility] F[facility] in [facility] Score < 3, Overall Health in [facility] Score < 3, Quality of Life in [place] Score < 3 , Increased interest in doing things, improved concentration, improved positive thoughts of well being and take time to relax    Progression Toward Goals: Goals not met: [place] score.  , Goals met: denies depression/anxiety/stress. , Patient will be encouraged to focus on lifestyle modifications following discharge.     Education: benefits of a positive support system and stress management techniques  Plan: Class: Stress and Your Health, Class: Relaxation, Enjoy family and keep busy around the house  Readiness to change: Action:  (Changing behavior)      OTHER CORE COMPONENTS     Tobacco:   Social History     Tobacco Use   Smoking Status Former    Packs/day: 1.00    Types: Cigarettes    Quit date: 2023    Years since quittin.5   Smokeless Tobacco Never       Tobacco Use Intervention:   Brief counseling by cardiac rehab professional  Date: 23  Discussed barriers to quitting and steps to work toward a quit date  [place] Quit Line 1-800-QUIT-NOW  Pt continues to smoke 2 cigs/day   Pt is ready to quit  Counseling by CR professional:  10/17/23, 23    Anginal Symptoms:  SOB and edema   NTG use: No prescription    Blood pressure:    Restin//82   Exercise: 110//72    Goals: moderate intensity exercise >150 mins/wk, medication compliance, set a target quit date and Abstain from smoking    Progression Toward Goals: Goals not met: smoking cessation. , Goals met: reducing # of cigs a day, moderate intensity exercise >150 mins/wk, medication compliance., Patient will be encouraged to focus on lifestyle modifications following discharge.     Education:  low sodium diet and HTN, Education class:  Common Heart Medications, and Education class: Understanding Heart Disease  Plan: Avoid Processed foods, engage in regular exercise, check labels for sodium content, and monitor home BP, avoid processed meats, consider a smoking quit date   Readiness to change: Action:  (Changing behavior)

## 2024-01-15 ENCOUNTER — APPOINTMENT (EMERGENCY)
Dept: RADIOLOGY | Facility: HOSPITAL | Age: 64
DRG: 253 | End: 2024-01-15
Payer: COMMERCIAL

## 2024-01-15 ENCOUNTER — HOSPITAL ENCOUNTER (INPATIENT)
Facility: HOSPITAL | Age: 64
LOS: 5 days | Discharge: HOME | DRG: 253 | End: 2024-01-20
Attending: EMERGENCY MEDICINE | Admitting: INTERNAL MEDICINE
Payer: COMMERCIAL

## 2024-01-15 DIAGNOSIS — I25.10 CORONARY ARTERY DISEASE INVOLVING NATIVE CORONARY ARTERY OF NATIVE HEART WITHOUT ANGINA PECTORIS: ICD-10-CM

## 2024-01-15 DIAGNOSIS — L97.509 FOOT ULCER (HCC): Primary | ICD-10-CM

## 2024-01-15 LAB
ALBUMIN SERPL BCP-MCNC: 4.4 G/DL (ref 3.5–5)
ALP SERPL-CCNC: 61 U/L (ref 34–104)
ALT SERPL W P-5'-P-CCNC: 15 U/L (ref 7–52)
ANION GAP SERPL CALCULATED.3IONS-SCNC: 7 MMOL/L
AST SERPL W P-5'-P-CCNC: 12 U/L (ref 13–39)
BASOPHILS # BLD AUTO: 0.05 THOUSANDS/ÂΜL (ref 0–0.1)
BASOPHILS NFR BLD AUTO: 0 % (ref 0–1)
BILIRUB SERPL-MCNC: 0.43 MG/DL (ref 0.2–1)
BUN SERPL-MCNC: 23 MG/DL (ref 5–25)
CALCIUM SERPL-MCNC: 10.3 MG/DL (ref 8.4–10.2)
CHLORIDE SERPL-SCNC: 102 MMOL/L (ref 96–108)
CO2 SERPL-SCNC: 28 MMOL/L (ref 21–32)
CREAT SERPL-MCNC: 0.91 MG/DL (ref 0.6–1.3)
CRP SERPL QL: 6.9 MG/L
EOSINOPHIL # BLD AUTO: 0.06 THOUSAND/ÂΜL (ref 0–0.61)
EOSINOPHIL NFR BLD AUTO: 1 % (ref 0–6)
ERYTHROCYTE [DISTWIDTH] IN BLOOD BY AUTOMATED COUNT: 13.7 % (ref 11.6–15.1)
ERYTHROCYTE [SEDIMENTATION RATE] IN BLOOD: 34 MM/HOUR (ref 0–19)
GFR SERPL CREATININE-BSD FRML MDRD: 90 ML/MIN/1.73SQ M
GLUCOSE SERPL-MCNC: 174 MG/DL (ref 65–140)
GLUCOSE SERPL-MCNC: 263 MG/DL (ref 65–140)
HCT VFR BLD AUTO: 49.2 % (ref 36.5–49.3)
HGB BLD-MCNC: 15.8 G/DL (ref 12–17)
IMM GRANULOCYTES # BLD AUTO: 0.04 THOUSAND/UL (ref 0–0.2)
IMM GRANULOCYTES NFR BLD AUTO: 0 % (ref 0–2)
LYMPHOCYTES # BLD AUTO: 2.18 THOUSANDS/ÂΜL (ref 0.6–4.47)
LYMPHOCYTES NFR BLD AUTO: 18 % (ref 14–44)
MCH RBC QN AUTO: 28.6 PG (ref 26.8–34.3)
MCHC RBC AUTO-ENTMCNC: 32.1 G/DL (ref 31.4–37.4)
MCV RBC AUTO: 89 FL (ref 82–98)
MONOCYTES # BLD AUTO: 0.76 THOUSAND/ÂΜL (ref 0.17–1.22)
MONOCYTES NFR BLD AUTO: 6 % (ref 4–12)
NEUTROPHILS # BLD AUTO: 8.82 THOUSANDS/ÂΜL (ref 1.85–7.62)
NEUTS SEG NFR BLD AUTO: 75 % (ref 43–75)
NRBC BLD AUTO-RTO: 0 /100 WBCS
PLATELET # BLD AUTO: 244 THOUSANDS/UL (ref 149–390)
PMV BLD AUTO: 9.9 FL (ref 8.9–12.7)
POTASSIUM SERPL-SCNC: 4.3 MMOL/L (ref 3.5–5.3)
PROT SERPL-MCNC: 7.7 G/DL (ref 6.4–8.4)
RBC # BLD AUTO: 5.53 MILLION/UL (ref 3.88–5.62)
SODIUM SERPL-SCNC: 137 MMOL/L (ref 135–147)
WBC # BLD AUTO: 11.91 THOUSAND/UL (ref 4.31–10.16)

## 2024-01-15 PROCEDURE — 96374 THER/PROPH/DIAG INJ IV PUSH: CPT

## 2024-01-15 PROCEDURE — 36415 COLL VENOUS BLD VENIPUNCTURE: CPT

## 2024-01-15 PROCEDURE — 82948 REAGENT STRIP/BLOOD GLUCOSE: CPT

## 2024-01-15 PROCEDURE — 99285 EMERGENCY DEPT VISIT HI MDM: CPT

## 2024-01-15 PROCEDURE — 85025 COMPLETE CBC W/AUTO DIFF WBC: CPT

## 2024-01-15 PROCEDURE — 87205 SMEAR GRAM STAIN: CPT

## 2024-01-15 PROCEDURE — 73630 X-RAY EXAM OF FOOT: CPT

## 2024-01-15 PROCEDURE — 87070 CULTURE OTHR SPECIMN AEROBIC: CPT

## 2024-01-15 PROCEDURE — 87147 CULTURE TYPE IMMUNOLOGIC: CPT

## 2024-01-15 PROCEDURE — 99285 EMERGENCY DEPT VISIT HI MDM: CPT | Performed by: EMERGENCY MEDICINE

## 2024-01-15 PROCEDURE — 86140 C-REACTIVE PROTEIN: CPT

## 2024-01-15 PROCEDURE — 85652 RBC SED RATE AUTOMATED: CPT

## 2024-01-15 PROCEDURE — NC001 PR NO CHARGE: Performed by: PODIATRIST

## 2024-01-15 PROCEDURE — 87186 SC STD MICRODIL/AGAR DIL: CPT

## 2024-01-15 PROCEDURE — 80053 COMPREHEN METABOLIC PANEL: CPT

## 2024-01-15 PROCEDURE — 99223 1ST HOSP IP/OBS HIGH 75: CPT | Performed by: INTERNAL MEDICINE

## 2024-01-15 PROCEDURE — 96361 HYDRATE IV INFUSION ADD-ON: CPT

## 2024-01-15 RX ORDER — CEFAZOLIN SODIUM 2 G/50ML
2000 SOLUTION INTRAVENOUS ONCE
Status: COMPLETED | OUTPATIENT
Start: 2024-01-15 | End: 2024-01-15

## 2024-01-15 RX ORDER — ONDANSETRON 2 MG/ML
4 INJECTION INTRAMUSCULAR; INTRAVENOUS EVERY 6 HOURS PRN
Status: DISCONTINUED | OUTPATIENT
Start: 2024-01-15 | End: 2024-01-20 | Stop reason: HOSPADM

## 2024-01-15 RX ORDER — SPIRONOLACTONE 25 MG/1
12.5 TABLET ORAL DAILY
Status: DISCONTINUED | OUTPATIENT
Start: 2024-01-16 | End: 2024-01-20 | Stop reason: HOSPADM

## 2024-01-15 RX ORDER — ENOXAPARIN SODIUM 100 MG/ML
40 INJECTION SUBCUTANEOUS DAILY
Status: DISCONTINUED | OUTPATIENT
Start: 2024-01-16 | End: 2024-01-20 | Stop reason: HOSPADM

## 2024-01-15 RX ORDER — INSULIN LISPRO 100 [IU]/ML
1-6 INJECTION, SOLUTION INTRAVENOUS; SUBCUTANEOUS
Status: DISCONTINUED | OUTPATIENT
Start: 2024-01-16 | End: 2024-01-20 | Stop reason: HOSPADM

## 2024-01-15 RX ORDER — METRONIDAZOLE 500 MG/1
500 TABLET ORAL EVERY 8 HOURS
Status: DISCONTINUED | OUTPATIENT
Start: 2024-01-15 | End: 2024-01-19

## 2024-01-15 RX ORDER — METOPROLOL SUCCINATE 25 MG/1
25 TABLET, EXTENDED RELEASE ORAL EVERY 12 HOURS
Status: DISCONTINUED | OUTPATIENT
Start: 2024-01-15 | End: 2024-01-20 | Stop reason: HOSPADM

## 2024-01-15 RX ORDER — AMIODARONE HYDROCHLORIDE 200 MG/1
200 TABLET ORAL
Status: DISCONTINUED | OUTPATIENT
Start: 2024-01-16 | End: 2024-01-20 | Stop reason: HOSPADM

## 2024-01-15 RX ORDER — CEFAZOLIN SODIUM 2 G/50ML
2000 SOLUTION INTRAVENOUS EVERY 8 HOURS
Status: DISCONTINUED | OUTPATIENT
Start: 2024-01-16 | End: 2024-01-20 | Stop reason: HOSPADM

## 2024-01-15 RX ORDER — ATORVASTATIN CALCIUM 80 MG/1
80 TABLET, FILM COATED ORAL
Status: DISCONTINUED | OUTPATIENT
Start: 2024-01-16 | End: 2024-01-20 | Stop reason: HOSPADM

## 2024-01-15 RX ORDER — ACETAMINOPHEN 325 MG/1
650 TABLET ORAL EVERY 6 HOURS PRN
Status: DISCONTINUED | OUTPATIENT
Start: 2024-01-15 | End: 2024-01-20 | Stop reason: HOSPADM

## 2024-01-15 RX ORDER — INSULIN LISPRO 100 [IU]/ML
1-5 INJECTION, SOLUTION INTRAVENOUS; SUBCUTANEOUS
Status: DISCONTINUED | OUTPATIENT
Start: 2024-01-15 | End: 2024-01-20 | Stop reason: HOSPADM

## 2024-01-15 RX ADMIN — METRONIDAZOLE 500 MG: 500 TABLET ORAL at 23:46

## 2024-01-15 RX ADMIN — SODIUM CHLORIDE 1000 ML: 0.9 INJECTION, SOLUTION INTRAVENOUS at 21:23

## 2024-01-15 RX ADMIN — INSULIN LISPRO 1 UNITS: 100 INJECTION, SOLUTION INTRAVENOUS; SUBCUTANEOUS at 23:46

## 2024-01-15 RX ADMIN — CEFAZOLIN SODIUM 2000 MG: 2 SOLUTION INTRAVENOUS at 22:20

## 2024-01-16 ENCOUNTER — APPOINTMENT (INPATIENT)
Dept: NON INVASIVE DIAGNOSTICS | Facility: HOSPITAL | Age: 64
DRG: 253 | End: 2024-01-16
Payer: COMMERCIAL

## 2024-01-16 ENCOUNTER — TELEPHONE (OUTPATIENT)
Dept: CARDIOLOGY CLINIC | Facility: CLINIC | Age: 64
End: 2024-01-16

## 2024-01-16 PROBLEM — I25.10 CORONARY ARTERY DISEASE INVOLVING NATIVE CORONARY ARTERY OF NATIVE HEART WITHOUT ANGINA PECTORIS: Status: ACTIVE | Noted: 2024-01-16

## 2024-01-16 PROBLEM — E11.621 TYPE 2 DIABETES MELLITUS WITH FOOT ULCER, WITH LONG-TERM CURRENT USE OF INSULIN (HCC): Status: ACTIVE | Noted: 2024-01-16

## 2024-01-16 PROBLEM — I73.9 PAD (PERIPHERAL ARTERY DISEASE) (HCC): Status: ACTIVE | Noted: 2024-01-16

## 2024-01-16 PROBLEM — Z79.4 TYPE 2 DIABETES MELLITUS WITH FOOT ULCER, WITH LONG-TERM CURRENT USE OF INSULIN (HCC): Status: ACTIVE | Noted: 2024-01-16

## 2024-01-16 PROBLEM — L97.509 TYPE 2 DIABETES MELLITUS WITH FOOT ULCER, WITH LONG-TERM CURRENT USE OF INSULIN (HCC): Status: ACTIVE | Noted: 2024-01-16

## 2024-01-16 PROBLEM — L03.116 CELLULITIS OF LEFT FOOT: Status: ACTIVE | Noted: 2024-01-16

## 2024-01-16 LAB
ANION GAP SERPL CALCULATED.3IONS-SCNC: 8 MMOL/L
BUN SERPL-MCNC: 20 MG/DL (ref 5–25)
CALCIUM SERPL-MCNC: 9.2 MG/DL (ref 8.4–10.2)
CHLORIDE SERPL-SCNC: 106 MMOL/L (ref 96–108)
CO2 SERPL-SCNC: 24 MMOL/L (ref 21–32)
CREAT SERPL-MCNC: 0.81 MG/DL (ref 0.6–1.3)
ERYTHROCYTE [DISTWIDTH] IN BLOOD BY AUTOMATED COUNT: 13.9 % (ref 11.6–15.1)
EST. AVERAGE GLUCOSE BLD GHB EST-MCNC: 214 MG/DL
GFR SERPL CREATININE-BSD FRML MDRD: 95 ML/MIN/1.73SQ M
GLUCOSE SERPL-MCNC: 137 MG/DL (ref 65–140)
GLUCOSE SERPL-MCNC: 163 MG/DL (ref 65–140)
GLUCOSE SERPL-MCNC: 170 MG/DL (ref 65–140)
GLUCOSE SERPL-MCNC: 211 MG/DL (ref 65–140)
GLUCOSE SERPL-MCNC: 252 MG/DL (ref 65–140)
HBA1C MFR BLD: 9.1 %
HCT VFR BLD AUTO: 43 % (ref 36.5–49.3)
HGB BLD-MCNC: 14.2 G/DL (ref 12–17)
MCH RBC QN AUTO: 29.5 PG (ref 26.8–34.3)
MCHC RBC AUTO-ENTMCNC: 33 G/DL (ref 31.4–37.4)
MCV RBC AUTO: 89 FL (ref 82–98)
PLATELET # BLD AUTO: 205 THOUSANDS/UL (ref 149–390)
PMV BLD AUTO: 9.9 FL (ref 8.9–12.7)
POTASSIUM SERPL-SCNC: 4.1 MMOL/L (ref 3.5–5.3)
RBC # BLD AUTO: 4.81 MILLION/UL (ref 3.88–5.62)
SODIUM SERPL-SCNC: 138 MMOL/L (ref 135–147)
WBC # BLD AUTO: 9.57 THOUSAND/UL (ref 4.31–10.16)

## 2024-01-16 PROCEDURE — 83036 HEMOGLOBIN GLYCOSYLATED A1C: CPT | Performed by: INTERNAL MEDICINE

## 2024-01-16 PROCEDURE — 93925 LOWER EXTREMITY STUDY: CPT

## 2024-01-16 PROCEDURE — 85027 COMPLETE CBC AUTOMATED: CPT | Performed by: INTERNAL MEDICINE

## 2024-01-16 PROCEDURE — 36415 COLL VENOUS BLD VENIPUNCTURE: CPT | Performed by: INTERNAL MEDICINE

## 2024-01-16 PROCEDURE — 99232 SBSQ HOSP IP/OBS MODERATE 35: CPT | Performed by: PHYSICIAN ASSISTANT

## 2024-01-16 PROCEDURE — 99222 1ST HOSP IP/OBS MODERATE 55: CPT | Performed by: SURGERY

## 2024-01-16 PROCEDURE — 93923 UPR/LXTR ART STDY 3+ LVLS: CPT

## 2024-01-16 PROCEDURE — 80048 BASIC METABOLIC PNL TOTAL CA: CPT | Performed by: INTERNAL MEDICINE

## 2024-01-16 PROCEDURE — 93922 UPR/L XTREMITY ART 2 LEVELS: CPT | Performed by: SURGERY

## 2024-01-16 PROCEDURE — 93925 LOWER EXTREMITY STUDY: CPT | Performed by: SURGERY

## 2024-01-16 PROCEDURE — 82948 REAGENT STRIP/BLOOD GLUCOSE: CPT

## 2024-01-16 RX ADMIN — METOPROLOL SUCCINATE 25 MG: 25 TABLET, EXTENDED RELEASE ORAL at 22:48

## 2024-01-16 RX ADMIN — METRONIDAZOLE 500 MG: 500 TABLET ORAL at 22:48

## 2024-01-16 RX ADMIN — CEFAZOLIN SODIUM 2000 MG: 2 SOLUTION INTRAVENOUS at 22:48

## 2024-01-16 RX ADMIN — AMIODARONE HYDROCHLORIDE 200 MG: 200 TABLET ORAL at 08:35

## 2024-01-16 RX ADMIN — INSULIN LISPRO 2 UNITS: 100 INJECTION, SOLUTION INTRAVENOUS; SUBCUTANEOUS at 11:48

## 2024-01-16 RX ADMIN — ATORVASTATIN CALCIUM 80 MG: 80 TABLET, FILM COATED ORAL at 18:30

## 2024-01-16 RX ADMIN — METRONIDAZOLE 500 MG: 500 TABLET ORAL at 08:35

## 2024-01-16 RX ADMIN — ENOXAPARIN SODIUM 40 MG: 40 INJECTION SUBCUTANEOUS at 08:34

## 2024-01-16 RX ADMIN — METOPROLOL SUCCINATE 25 MG: 25 TABLET, EXTENDED RELEASE ORAL at 11:47

## 2024-01-16 RX ADMIN — INSULIN LISPRO 2 UNITS: 100 INJECTION, SOLUTION INTRAVENOUS; SUBCUTANEOUS at 22:47

## 2024-01-16 RX ADMIN — INSULIN LISPRO 1 UNITS: 100 INJECTION, SOLUTION INTRAVENOUS; SUBCUTANEOUS at 08:39

## 2024-01-16 RX ADMIN — METRONIDAZOLE 500 MG: 500 TABLET ORAL at 16:59

## 2024-01-16 RX ADMIN — CEFAZOLIN SODIUM 2000 MG: 2 SOLUTION INTRAVENOUS at 16:58

## 2024-01-16 RX ADMIN — INSULIN DETEMIR 20 UNITS: 100 INJECTION, SOLUTION SUBCUTANEOUS at 08:38

## 2024-01-16 RX ADMIN — ASPIRIN 81 MG: 81 TABLET, COATED ORAL at 08:34

## 2024-01-16 RX ADMIN — SPIRONOLACTONE 12.5 MG: 25 TABLET, FILM COATED ORAL at 08:35

## 2024-01-16 RX ADMIN — CEFAZOLIN SODIUM 2000 MG: 2 SOLUTION INTRAVENOUS at 05:44

## 2024-01-16 NOTE — ASSESSMENT & PLAN NOTE
Lab Results   Component Value Date    HGBA1C 9.1 (H) 01/16/2024       Recent Labs     01/15/24  2343 01/16/24  0807   POCGLU 174* 170*         Blood Sugar Average: Last 72 hrs:  (P) 172  Uncontrolled.  Patient admits to dietary indiscretion last few months.  Continue Levemir 20 units every morning and add correctional insulin coverage with Accu-Cheks while admitted.  Hold home oral medications.  Adjust inpatient insulin regimen as needed.  Initiate hypoglycemia protocol   Carb controlled diet reviewed with patient  Initiate hypoglycemia protocol

## 2024-01-16 NOTE — ASSESSMENT & PLAN NOTE
Presenting with worsening of wound of left fourth toe with surrounding erythema.  Patient otherwise afebrile and nontoxic-appearing.  Concern for surrounding cellulitis given appearance  Seen by podiatry during ED evaluation, local wound care provided with cultures obtained with IV antibiotics advised.  No acute operative intervention but to undergo LEADs, may need intervention this admission.  Appreciate ongoing podiatry recommendations  Received IV cefazolin during ED evaluation, continue and will add on Flagyl given appearance of wound  Local wound care as per podiatry  Follow up LEADs, if significant abnormality will need vascular surgery consult  Trend WBC, temperature curve, hemodynamics

## 2024-01-16 NOTE — ED NOTES
Pt had home pill box at bedside. Taken and walked down to pharmacy.      Kiran Deal RN  01/16/24 0320

## 2024-01-16 NOTE — UTILIZATION REVIEW
Initial Clinical Review    Admission: Date/Time/Statement:   Admission Orders (From admission, onward)       Ordered        01/15/24 2232  INPATIENT ADMISSION  Once                          Orders Placed This Encounter   Procedures    INPATIENT ADMISSION     Standing Status:   Standing     Number of Occurrences:   1     Order Specific Question:   Level of Care     Answer:   Med Surg [16]     Order Specific Question:   Estimated length of stay     Answer:   More than 2 Midnights     Order Specific Question:   Certification     Answer:   I certify that inpatient services are medically necessary for this patient for a duration of greater than two midnights. See H&P and MD Progress Notes for additional information about the patient's course of treatment.     ED Arrival Information       Expected   -    Arrival   1/15/2024 17:53    Acuity   Urgent              Means of arrival   Walk-In    Escorted by   Self    Service   Hospitalist    Admission type   Emergency              Arrival complaint   ulcer on foot/foot pain             Chief Complaint   Patient presents with    Foot Ulcer     Pt reports having open ulcer between 4th and 5th toe on his left foot. Hx of DM, denies having neuropathy. Referred to ED by PCP for further evaluation       Initial Presentation: 63 y.o. male to ED presents for Left foot wound and concern for cellulitis per PCP advice. Per pt, approx 2 days prior to ED pt noticed development of a wound to the lateral portion of his left fourth toe with increasing redness and erythema noted. In ED, wound care and local cultures obtained by Podiatry. PMH for CAD s/p prior stenting, ischemic cardiomyopathy EF 40% with chronic HFrEF, ventricular tachycardia, type 2 diabetes on insulin therapy and oral hypoglycemics.  Admit Inpatient level of care for Cellulitis of left foot. Given Iv antibiotics in ED and continue. F/u LEADs. Local wound care. On exam; Left foot with approximately 1.5 cm x 2 cm wound to the  lateral edge of fourth toe with surrounding erythema and edema.    1/15  Podiatry cons; Left diabetic foot ulceration, lateral 4th digit. Left foot cellulitis. Continue Iv antibiotics. Vascular studies with possible vascular consult pending results. No indication for surgical intervention at this time.   Left diabetic foot ulceration noted to lateral 4th digit that probes deep with malodor and surrounding erythema consistent with cellulitis.     Date: 1/16   Day 2:   Progress notes; PAD, noted in LLE with LEADs. Borderline healing pressure. Vascular surgery consult. Continue Iv antibiotics. Uncontrolled bld surgar.     Vascular Surgery cons; Left fourth toe ulceration, cellulitis. DM with Hba1c 9.1, great toe pressures marginally within healing range.   IR consult for arteriogram possible intervention. Continue Iv antibiotics and local wound care. Podiatry consult pending.    Date: 1/17    Day 3: Has surpassed a 2nd midnight with active treatments and services, which include Cellulitis continued on Iv antibiotics.    ED Triage Vitals [01/15/24 1812]   Temperature Pulse Respirations Blood Pressure SpO2   (!) 97 °F (36.1 °C) 89 16 155/93 97 %      Temp Source Heart Rate Source Patient Position - Orthostatic VS BP Location FiO2 (%)   Tympanic Monitor Sitting Left arm --      Pain Score       1          Wt Readings from Last 1 Encounters:   11/13/23 101 kg (222 lb)     Additional Vital Signs:   01/16/24 0715 -- 78 18 95/51 65 95 % None (Room air) --   01/16/24 0600 -- 78 17 -- -- 93 % None (Room air) --   01/16/24 0500 -- 78 18 -- -- 94 % None (Room air) --   01/16/24 0400 -- 80 17 -- -- 93 % None (Room air) --   01/16/24 0300 -- 80 18 -- -- 92 % None (Room air) --   01/16/24 0130 -- 82 -- -- -- 93 % None (Room air)      Pertinent Labs/Diagnostic Test Results:   XR foot 3+ views LEFT   ED Interpretation by Rosalie Tavares MD (01/15 2204)   No gas or osseous abnormality      VAS ARTERIAL DUPLEX- LOWER LIMB BILATERAL   (1/16) -  RIGHT LOWER LIMB:  Diffuse disease noted throughout the femoral-popliteal arteries without  significant focal stenosis.  There is evidence of tibio-peroneal disease.  Ankle/Brachial index:  0.95, which is in the normal disease category.  PVR/ PPG tracings are dampened.  Metatarsal pressure:  112  mmHg.  Great toe pressure:  58 mmHg, within the healing range for a diabetic.     LEFT LOWER LIMB:  Evaluation shows a 50 - 75% stenosis in the mid superficial femoral artery.  Diffuse disease noted throughout the remaining femoral-popliteal arteries  without significant focal stenosis.  There is evidence of tibio-peroneal disease.  Ankle/Brachial index:  0.95, which is in the normal disease category.  PVR/ PPG tracings are dampened.  Metatarsal pressure:  100 mmHg.  Great toe pressure: 52 mmHg, marginially within the healing range for a  diabetic.         Results from last 7 days   Lab Units 01/16/24  0544 01/15/24  2042   WBC Thousand/uL 9.57 11.91*   HEMOGLOBIN g/dL 14.2 15.8   HEMATOCRIT % 43.0 49.2   PLATELETS Thousands/uL 205 244   NEUTROS ABS Thousands/µL  --  8.82*         Results from last 7 days   Lab Units 01/16/24  0544 01/15/24  2042   SODIUM mmol/L 138 137   POTASSIUM mmol/L 4.1 4.3   CHLORIDE mmol/L 106 102   CO2 mmol/L 24 28   ANION GAP mmol/L 8 7   BUN mg/dL 20 23   CREATININE mg/dL 0.81 0.91   EGFR ml/min/1.73sq m 95 90   CALCIUM mg/dL 9.2 10.3*     Results from last 7 days   Lab Units 01/15/24  2042   AST U/L 12*   ALT U/L 15   ALK PHOS U/L 61   TOTAL PROTEIN g/dL 7.7   ALBUMIN g/dL 4.4   TOTAL BILIRUBIN mg/dL 0.43     Results from last 7 days   Lab Units 01/16/24  0807 01/15/24  2343   POC GLUCOSE mg/dl 170* 174*     Results from last 7 days   Lab Units 01/16/24  0544 01/15/24  2042   GLUCOSE RANDOM mg/dL 163* 263*         Results from last 7 days   Lab Units 01/16/24  0544   HEMOGLOBIN A1C % 9.1*   EAG mg/dl 214       Results from last 7 days   Lab Units 01/15/24  2042   CRP mg/L 6.9*   SED  RATE mm/hour 34*       Results from last 7 days   Lab Units 01/15/24  2247   GRAM STAIN RESULT  No polys seen*  3+ Gram positive cocci in clusters*  1+ Gram Positive Rods Resembling Diphtheroids*       ED Treatment:   Medication Administration from 01/15/2024 1753 to 01/16/2024 1006         Date/Time Order Dose Route Action     01/15/2024 2123 EST sodium chloride 0.9 % bolus 1,000 mL 1,000 mL Intravenous New Bag     01/15/2024 2220 EST ceFAZolin (ANCEF) IVPB (premix in dextrose) 2,000 mg 50 mL 2,000 mg Intravenous New Bag     01/16/2024 0835 EST amiodarone tablet 200 mg 200 mg Oral Given     01/16/2024 0834 EST aspirin (ECOTRIN LOW STRENGTH) EC tablet 81 mg 81 mg Oral Given     01/16/2024 0838 EST insulin detemir (LEVEMIR) subcutaneous injection 20 Units 20 Units Subcutaneous Given     01/16/2024 0835 EST spironolactone (ALDACTONE) tablet 12.5 mg 12.5 mg Oral Given     01/16/2024 0834 EST enoxaparin (LOVENOX) subcutaneous injection 40 mg 40 mg Subcutaneous Given     01/16/2024 0839 EST insulin lispro (HumaLOG) 100 units/mL subcutaneous injection 1-6 Units 1 Units Subcutaneous Given     01/15/2024 2346 EST insulin lispro (HumaLOG) 100 units/mL subcutaneous injection 1-5 Units 1 Units Subcutaneous Given     01/16/2024 0544 EST ceFAZolin (ANCEF) IVPB (premix in dextrose) 2,000 mg 50 mL 2,000 mg Intravenous New Bag     01/16/2024 0835 EST metroNIDAZOLE (FLAGYL) tablet 500 mg 500 mg Oral Given     01/15/2024 2346 EST metroNIDAZOLE (FLAGYL) tablet 500 mg 500 mg Oral Given          Past Medical History:   Diagnosis Date    Diabetes mellitus (HCC)     Myocardial infarction (HCC)      Present on Admission:   Chronic systolic CHF (congestive heart failure), NYHA class 2 (HCC)   V-tach (HCC)   Cellulitis of left foot   Coronary artery disease involving native coronary artery of native heart without angina pectoris      Admitting Diagnosis: Foot ulcer (HCC) [L97.509]  Age/Sex: 63 y.o. male    Admission Orders:  Scheduled  Medications:  amiodarone, 200 mg, Oral, Daily With Breakfast  aspirin, 81 mg, Oral, Daily  atorvastatin, 80 mg, Oral, After Dinner  cefazolin, 2,000 mg, Intravenous, Q8H  enoxaparin, 40 mg, Subcutaneous, Daily  insulin detemir, 20 Units, Subcutaneous, Daily  insulin lispro, 1-5 Units, Subcutaneous, HS  insulin lispro, 1-6 Units, Subcutaneous, TID AC  metoprolol succinate, 25 mg, Oral, Q12H  metroNIDAZOLE, 500 mg, Oral, Q8H  spironolactone, 12.5 mg, Oral, Daily      Continuous IV Infusions: None     PRN Meds:  acetaminophen, 650 mg, Oral, Q6H PRN  ondansetron, 4 mg, Intravenous, Q6H PRN      1/16 Vascular Surgery consult  IP CONSULT TO PODIATRY    Network Utilization Review Department  ATTENTION: Please call with any questions or concerns to [phone] and carefully listen to the prompts so that you are directed to the right person. All voicemails are confidential.   For Discharge needs, contact Care Management DC Support Team at [phone] opt. [phone]  Send all requests for admission clinical reviews, approved or denied determinations and any other requests to dedicated fax number below belonging to the campus where the patient is receiving treatment. List of dedicated fax numbers for the Facilities:  FACILITY NAME UR FAX NUMBER   ADMISSION DENIALS (Administrative/Medical Necessity) [phone]   DISCHARGE SUPPORT TEAM (NETWORK) [phone]   PARENT CHILD HEALTH (Maternity/NICU/Pediatrics) [phone]   [facility] [phone]   [facility] [phone]   [facility] [phone]   [facility] [phone]   Critical access hospital [phone]   [facility] [phone]   [facility] [phone]   [facility] [phone]   [facility] [phone]   [facility]  [place] [phone]   [facility] [phone]

## 2024-01-16 NOTE — CONSULTS
Consultation - Vascular Surgery  : Vascular Surgery Resident role on [employer]  Nadir Myers 63 y.o. male MRN: [id number]  Unit/Bed#: ED 25 Encounter: [id number]    Assessment:  63 y.o. male with left fourth toe ulceration, cellulitis. DM with Hba1c 9.1, great toe pressures marginally within healing range.    Plan:  - IR consult for arteriogram, possible intervention  - Follow up podiatry plan for foot ulcer  - Continue local wound care   - Continue IV antibiotics for cellulitis   - Encourage ambulation, IS  - DVT ppx  - Remainder of care per primary    HPI:  Nadir Myers is a 63 y.o. male with a history of DM (A1c 9.1, insulin dependent), CAD s/p PCI 7-8 years ago, ischemic cardiomyopathy (EF 40) with ICD, HTN, Hep C, tobacco dependence. Presented to ED with left fourth toe ulcer, cellulitis. LEAD 1/16/24 demonstrating great toe pressure of 52 on left, 50-75% stenosis of mid superficial femoral artery. Vascular surgery was consulted to assess for possible intervention.     Patient reports first seeing toe lesion three days ago. He was sent to ED by his PCP. No known prior ulcers. Denies pain in foot. Denies rest pain, claudication (reports he walks a quarter mile without issues), numbness in legs or feet. Endorses tingling in bilateral feet for past few months, present 90% of the time. No previous peripheral artery interventions, no previous vascular surgery visits. Denies additional symptoms including fever, chills, nausea, vomiting, shortness of breath, chest pain.     Reports checking blood glucose once every morning, takes all medications daily as prescribed, including ASA 81, statin, BB.    Physical Exam  Vitals reviewed.   Constitutional:       General: He is not in acute distress.     Appearance: He is not toxic-appearing.   HENT:      Head: Normocephalic and atraumatic.      Mouth/Throat:      Mouth: Mucous membranes are moist.   Eyes:      Extraocular Movements: Extraocular movements  intact.      Conjunctiva/sclera: Conjunctivae normal.   Cardiovascular:      Rate and Rhythm: Normal rate.      Comments:   Bilateral radial pulses palpable.   Bilateral femorals palpable.   Bilateral DP and PT pulses detected with doppler.   Pulmonary:      Effort: Pulmonary effort is normal. No respiratory distress.   Abdominal:      General: There is no distension.      Palpations: Abdomen is soft.      Tenderness: There is no abdominal tenderness. There is no guarding or rebound.   Musculoskeletal:      Comments: Bilateral lower extremity motor intact, sensation grossly intact  No pretibial edema   Skin:     Comments: Ulcer left fourth toe  Erythema on left dorsal foot   Neurological:      Mental Status: He is alert.        Review of Systems negative unless previously noted above. Patient denies additional symptoms.    Objective       No intake or output data in the 24 hours ending 01/16/24 1147    First Vitals:   Blood Pressure: 155/93 (01/15/24 1812)  Pulse: 89 (01/15/24 1812)  Temperature: (!) 97 °F (36.1 °C) (01/15/24 1812)  Temp Source: Tympanic (01/15/24 1812)  Respirations: 16 (01/15/24 1812)  SpO2: 97 % (01/15/24 1812)    Current Vitals:   Blood Pressure: 103/66 (01/16/24 1115)  Pulse: 78 (01/16/24 1115)  Temperature: (!) 97 °F (36.1 °C) (01/15/24 1812)  Temp Source: Tympanic (01/15/24 1812)  Respirations: 18 (01/16/24 1115)  SpO2: 96 % (01/16/24 1115)    Invasive Devices       Peripheral Intravenous Line  Duration             Peripheral IV 01/15/24 Left Antecubital <1 day                    Imaging: I have personally reviewed pertinent reports.      XR foot 3+ views LEFT    Result Date: 1/16/2024  Impression: Poor cortical definition of the tuft of the fifth distal phalanx. Diagnostic considerations include fracture and osteomyelitis. The study was marked in EPIC for immediate notification. Workstation performed: [id number]       EKG, Pathology, and Other Studies: I have personally reviewed pertinent  reports.    VTE Pharmacologic Prophylaxis: Enoxaparin (Lovenox)  VTE Mechanical Prophylaxis: sequential compression device    Historical Information   Past Medical History:   Diagnosis Date    Diabetes mellitus (HCC)     Myocardial infarction (HCC)      Past Surgical History:   Procedure Laterality Date    CARDIAC CATHETERIZATION N/A 2023    Procedure: Cardiac Coronary Angiogram;  Surgeon: Valeriy Shore MD;  Location: [facility];  Service: Cardiology    CARDIAC CATHETERIZATION Left 2023    Procedure: Cardiac Left Heart Cath;  Surgeon: Valeriy Shore MD;  Location: [facility];  Service: Cardiology    CARDIAC DEFIBRILLATOR PLACEMENT  2023    CARDIAC ELECTROPHYSIOLOGY PROCEDURE N/A 2023    Procedure: Cardiac icd implant;  Surgeon: Urbano Maria MD;  Location: [facility];  Service: Cardiology     Social History   Social History     Substance and Sexual Activity   Alcohol Use Not Currently     Social History     Substance and Sexual Activity   Drug Use Not on file     Social History     Tobacco Use   Smoking Status Former    Current packs/day: 0.00    Types: Cigarettes    Quit date: 2023    Years since quittin.7   Smokeless Tobacco Never     History reviewed. No pertinent family history.    Meds/Allergies   all current active meds have been reviewed, current meds:   Current Facility-Administered Medications   Medication Dose Route Frequency    acetaminophen (TYLENOL) tablet 650 mg  650 mg Oral Q6H PRN    amiodarone tablet 200 mg  200 mg Oral Daily With Breakfast    aspirin (ECOTRIN LOW STRENGTH) EC tablet 81 mg  81 mg Oral Daily    atorvastatin (LIPITOR) tablet 80 mg  80 mg Oral After Dinner    ceFAZolin (ANCEF) IVPB (premix in dextrose) 2,000 mg 50 mL  2,000 mg Intravenous Q8H    enoxaparin (LOVENOX) subcutaneous injection 40 mg  40 mg Subcutaneous Daily    insulin detemir (LEVEMIR) subcutaneous injection 20 Units  20 Units Subcutaneous Daily    insulin  lispro (HumaLOG) 100 units/mL subcutaneous injection 1-5 Units  1-5 Units Subcutaneous HS    insulin lispro (HumaLOG) 100 units/mL subcutaneous injection 1-6 Units  1-6 Units Subcutaneous TID AC    metoprolol succinate (TOPROL-XL) 24 hr tablet 25 mg  25 mg Oral Q12H    metroNIDAZOLE (FLAGYL) tablet 500 mg  500 mg Oral Q8H    ondansetron (ZOFRAN) injection 4 mg  4 mg Intravenous Q6H PRN    spironolactone (ALDACTONE) tablet 12.5 mg  12.5 mg Oral Daily    and PTA meds:   Prior to Admission Medications   Prescriptions Last Dose Informant Patient Reported? Taking?   BD Pen Needle Micro U/F 32G X 6 MM MISC  Self Yes No   Sig: USE 1 PEN EACH DAILY   Empagliflozin (Jardiance) 10 MG TABS tablet   No No   Sig: Take 1 tablet (10 mg total) by mouth every morning   JANUMET -1000 MG TB24  Self Yes No   Sig: daily    OneTouch Verio test strip  Self Yes No   amiodarone 200 mg tablet   No No   Sig: TAKE 1 TABLET BY MOUTH EVERY DAY WITH BREAKFAST   aspirin 81 MG tablet  Self Yes No   Sig: Take 1 tablet by mouth daily   atorvastatin (LIPITOR) 80 mg tablet   No No   Sig: Take 1 tablet (80 mg total) by mouth daily after dinner   insulin detemir (Levemir FlexPen) 100 Units/mL injection pen  Self Yes No   Sig: Inject 20 Units under the skin daily Patient to take 30 units daily - if fasting bs >150 for 3 days to increase dose by 5 units. Once bs < 150 can go back to 30 units   metFORMIN (GLUCOPHAGE) 1000 MG tablet  Self Yes No   Sig: Take 1,000 mg by mouth daily with breakfast   metoprolol succinate (TOPROL-XL) 25 mg 24 hr tablet   No No   Sig: TAKE 1 TABLET (25 MG TOTAL) BY MOUTH EVERY 12 (TWELVE) HOURS   senna-docusate sodium (SENOKOT S) 8.6-50 mg per tablet  Self No No   Sig: Take 2 tablets by mouth daily at bedtime   Patient not taking: Reported on 6/6/2023   spironolactone (ALDACTONE) 25 mg tablet   No No   Sig: Take 0.5 tablets (12.5 mg total) by mouth daily Start on 7/5/23 per Quyen Hinds PA-C      Facility-Administered  Medications: None     No Known Allergies    Lab Results: I have personally reviewed pertinent lab results.  , CBC:   Lab Results   Component Value Date    WBC 9.57 01/16/2024    HGB 14.2 01/16/2024    HCT 43.0 01/16/2024    MCV 89 01/16/2024     01/16/2024    RBC 4.81 01/16/2024    MCH 29.5 01/16/2024    MCHC 33.0 01/16/2024    RDW 13.9 01/16/2024    MPV 9.9 01/16/2024    NRBC 0 01/15/2024   , CMP:   Lab Results   Component Value Date    SODIUM 138 01/16/2024    K 4.1 01/16/2024     01/16/2024    CO2 24 01/16/2024    BUN 20 01/16/2024    CREATININE 0.81 01/16/2024    CALCIUM 9.2 01/16/2024    AST 12 (L) 01/15/2024    ALT 15 01/15/2024    ALKPHOS 61 01/15/2024    EGFR 95 01/16/2024       Counseling / Coordination of Care  Total floor / unit time spent today 25 minutes.  Greater than 50% of total time was spent with the patient and / or family counseling and / or coordination of care.    Inpatient consult to Vascular Surgery  Consult performed by: Dayanara Martino MD  Consult ordered by: Toyin Lindsay PA-C      ---  Dayanara Martino MD  General Surgery PGY-I

## 2024-01-16 NOTE — H&P
[facility]  H&P  Name: Nadir Myers 63 y.o. male I MRN: [id number]  Unit/Bed#: ED 25 I Date of Admission: 1/15/2024   Date of Service: 1/16/2024 I Hospital Day: 1      Assessment/Plan   * Cellulitis of left foot  Assessment & Plan  Presenting with worsening of wound of left fourth toe with surrounding erythema.  Patient otherwise afebrile and nontoxic-appearing.  Concern for surrounding cellulitis given appearance  Seen by podiatry during ED evaluation, local wound care provided with cultures obtained with IV antibiotics advised.  No acute operative intervention but to undergo LEADs, may need intervention this admission.  Appreciate ongoing podiatry recommendations  Received IV cefazolin during ED evaluation, continue and will add on Flagyl given appearance of wound  Local wound care as per podiatry  Follow up LEADs, if significant abnormality will need vascular surgery consult  Trend WBC, temperature curve, hemodynamics    Type 2 diabetes mellitus with foot ulcer, with long-term current use of insulin ([facility])  Assessment & Plan  Lab Results   Component Value Date    HGBA1C 7.1 (H) 09/16/2023       Recent Labs     01/15/24  2343   POCGLU 174*       Blood Sugar Average: Last 72 hrs:  (P) 174  Diabetes well-controlled as outpatient, with left fourth toe foot ulcer with concern for surrounding cellulitis with plan as per primary problem  Continue Levemir 20 units every morning and add correctional insulin coverage with Accu-Cheks while admitted.  Hold home oral medications.  Adjust inpatient insulin regimen as needed.  Initiate hypoglycemia protocol   Carb controlled diet  Initiate hypoglycemia protocol    Coronary artery disease involving native coronary artery of native heart without angina pectoris  Assessment & Plan  S/p prior stenting   Currently chest pain-free, continue ASA, beta-blocker, statin      Chronic systolic CHF (congestive heart failure), NYHA class 2  ([facility])  Assessment & Plan  Wt Readings from Last 3 Encounters:   11/13/23 101 kg (222 lb)   10/16/23 98.9 kg (218 lb)   08/07/23 93.9 kg (207 lb)     History of ischemic cardiomyopathy recent EF 40%  Does not appear grossly overloaded, continue cardiac medications and monitor volume status closely          V-tach ([facility])  Assessment & Plan  Maintained on amiodarone 200 mg daily and metoprolol succinate 25 mg twice daily, continue           VTE Prophylaxis: Enoxaparin (Lovenox)  / sequential compression device   Code Status: Level 1 - Full Code  POLST: POLST form is not discussed and not completed at this time.  Discussion with family: Patient declines at this time    Anticipated Length of Stay:  Patient will be admitted on an Inpatient basis with an anticipated length of stay of greater than 2 midnights.   Justification for Hospital Stay: Please see detailed plans noted above.    Chief Complaint:     Left fourth toe wound concern for cellulitis  History of Present Illness:  Nadir Myers is a 63 y.o. male who has a past medical history significant for CAD s/p prior stenting, ischemic cardiomyopathy EF 40% with chronic HFrEF, ventricular tachycardia, type 2 diabetes on insulin therapy and oral hypoglycemics presenting at the advice of his PCP with left foot wound and concern for cellulitis.  Patient states that approximately 2 days prior to his presentation he noticed development of a wound to the lateral portion of his left fourth toe with increasing redness and erythema noted although without fever/chills, numbness/tingling/paresthesias, or other systemic symptoms.  He is unsure when he developed the wound, but denies any recent trauma to his foot, stepping on objects, or falls.  Today he was seen by his PCP who expressed apparent concern for possible wound infection and advised him to present here for evaluation.    During ED evaluation he was seen by podiatry team, who performed local wound care and obtain local  cultures with plan to obtain LEADs to assess for potential PAD and advised admission under medicine service for IV antibiotics and optimization before any potential intervention.  Currently patient is lying in bed in no acute distress and comfortable appearing, offering no other acute complaints at this time.    Review of Systems:    Constitutional:  Denies fever or chills   Eyes:  Denies change in visual acuity   HENT:  Denies nasal congestion or sore throat   Respiratory:  Denies cough or shortness of breath   Cardiovascular:  Denies chest pain or edema   GI:  Denies abdominal pain, nausea, vomiting, bloody stools or diarrhea   :  Denies dysuria   Musculoskeletal:  Denies back pain or joint pain   Integument:  Denies rash but reports wound  Neurologic:  Denies headache, focal weakness or sensory changes   Endocrine:  Denies polyuria or polydipsia   Lymphatic:  Denies swollen glands   Psychiatric:  Denies depression or anxiety     Past Medical and Surgical History:   Past Medical History:   Diagnosis Date    Diabetes mellitus (HCC)     Myocardial infarction (HCC)      Past Surgical History:   Procedure Laterality Date    CARDIAC CATHETERIZATION N/A 05/03/2023    Procedure: Cardiac Coronary Angiogram;  Surgeon: Valeriy Shore MD;  Location: [facility];  Service: Cardiology    CARDIAC CATHETERIZATION Left 05/03/2023    Procedure: Cardiac Left Heart Cath;  Surgeon: Valeriy Shore MD;  Location: [facility];  Service: Cardiology    CARDIAC DEFIBRILLATOR PLACEMENT  05/2023    CARDIAC ELECTROPHYSIOLOGY PROCEDURE N/A 05/12/2023    Procedure: Cardiac icd implant;  Surgeon: Urbano Maria MD;  Location: [facility];  Service: Cardiology       Meds/Allergies:    Current Facility-Administered Medications:     acetaminophen (TYLENOL) tablet 650 mg, 650 mg, Oral, Q6H PRN, Harley Sneed DO    amiodarone tablet 200 mg, 200 mg, Oral, Daily With Breakfast, Harley Sneed DO    aspirin (ECOTRIN LOW  STRENGTH) EC tablet 81 mg, 81 mg, Oral, Daily, Harley Sneed DO    atorvastatin (LIPITOR) tablet 80 mg, 80 mg, Oral, After Dinner, Harley Sneed DO    ceFAZolin (ANCEF) IVPB (premix in dextrose) 2,000 mg 50 mL, 2,000 mg, Intravenous, Q8H, Harley Sneed DO    enoxaparin (LOVENOX) subcutaneous injection 40 mg, 40 mg, Subcutaneous, Daily, Harley Sneed DO    insulin detemir (LEVEMIR) subcutaneous injection 20 Units, 20 Units, Subcutaneous, Daily, Harley Sneed DO    insulin lispro (HumaLOG) 100 units/mL subcutaneous injection 1-5 Units, 1-5 Units, Subcutaneous, HS, Harley Sneed DO, 1 Units at 01/15/24 2346    insulin lispro (HumaLOG) 100 units/mL subcutaneous injection 1-6 Units, 1-6 Units, Subcutaneous, TID AC **AND** Fingerstick Glucose (POCT), , , TID AC, Harley Sneed DO    metoprolol succinate (TOPROL-XL) 24 hr tablet 25 mg, 25 mg, Oral, Q12H, Harley Sneed DO    metroNIDAZOLE (FLAGYL) tablet 500 mg, 500 mg, Oral, Q8H, Harley Sneed DO, 500 mg at 01/15/24 2346    ondansetron (ZOFRAN) injection 4 mg, 4 mg, Intravenous, Q6H PRN, Harley Sneed DO    spironolactone (ALDACTONE) tablet 12.5 mg, 12.5 mg, Oral, Daily, Harley Sneed DO    Current Outpatient Medications:     amiodarone 200 mg tablet, TAKE 1 TABLET BY MOUTH EVERY DAY WITH BREAKFAST, Disp: 30 tablet, Rfl: 11    aspirin 81 MG tablet, Take 1 tablet by mouth daily, Disp: , Rfl:     atorvastatin (LIPITOR) 80 mg tablet, Take 1 tablet (80 mg total) by mouth daily after dinner, Disp: 30 tablet, Rfl: 3    BD Pen Needle Micro U/F 32G X 6 MM MISC, USE 1 PEN EACH DAILY, Disp: , Rfl:     Empagliflozin (Jardiance) 10 MG TABS tablet, Take 1 tablet (10 mg total) by mouth every morning, Disp: 90 tablet, Rfl: 3    insulin detemir (Levemir FlexPen) 100 Units/mL injection pen, Inject 20 Units under the skin daily Patient to take 30 units daily - if fasting bs >150 for 3 days to increase dose by 5 units. Once bs < 150 can go back to 30 units, Disp: , Rfl:      JANUMET -1000 MG TB24, daily , Disp: , Rfl:     metFORMIN (GLUCOPHAGE) 1000 MG tablet, Take 1,000 mg by mouth daily with breakfast, Disp: , Rfl:     metoprolol succinate (TOPROL-XL) 25 mg 24 hr tablet, TAKE 1 TABLET (25 MG TOTAL) BY MOUTH EVERY 12 (TWELVE) HOURS, Disp: 60 tablet, Rfl: 11    OneTouch Verio test strip, , Disp: , Rfl:     senna-docusate sodium (SENOKOT S) 8.6-50 mg per tablet, Take 2 tablets by mouth daily at bedtime (Patient not taking: Reported on 2023), Disp: 60 tablet, Rfl: 0    spironolactone (ALDACTONE) 25 mg tablet, Take 0.5 tablets (12.5 mg total) by mouth daily Start on 23 per Quyen Hinds PA-C, Disp: 45 tablet, Rfl: 3        Allergies: No Known Allergies  History:  Marital Status: /Civil Union     Substance Use History:   Social History     Substance and Sexual Activity   Alcohol Use Not Currently     Social History     Tobacco Use   Smoking Status Former    Current packs/day: 0.00    Types: Cigarettes    Quit date: 2023    Years since quittin.7   Smokeless Tobacco Never     Social History     Substance and Sexual Activity   Drug Use Not on file       Family History:  Noncontributory    Physical Exam:     Vitals:   Blood Pressure: 98/56 (24)  Pulse: 83 (24)  Temperature: (!) 97 °F (36.1 °C) (01/15/24 1812)  Temp Source: Tympanic (01/15/24 1812)  Respirations: 17 (24)  SpO2: 97 % (24)    Constitutional:  Well developed, well nourished, no acute distress, non-toxic appearance   Eyes:  PERRL, conjunctiva normal   HENT:  Atraumatic, external ears normal, nose normal, oropharynx moist, no pharyngeal exudates. Neck- normal range of motion, no tenderness, supple   Respiratory:  No respiratory distress, normal breath sounds, no rales, no wheezing   Cardiovascular:  Normal rate, normal rhythm, no murmurs, no gallops, no rubs   GI:  Soft, nondistended, normal bowel sounds, nontender, no organomegaly, no mass, no rebound, no  "guarding   :  No costovertebral angle tenderness   Musculoskeletal:  No edema, no tenderness, no deformities. Back- no tenderness  Integument:  Well hydrated, images under \"media\" tab reviewed.  Left foot with approximately 1.5 cm x 2 cm wound to the lateral edge of fourth toe with surrounding erythema and edema without apparent tenderness to palpation, crepitus, or fluctuance  Lymphatic:  No lymphadenopathy noted   Neurologic:  Alert &awake, communicative, CN 2-12 normal, normal motor function, normal sensory function, no focal deficits noted   Psychiatric:  Speech and behavior appropriate       Lab Results: I have personally reviewed pertinent reports.      Results from last 7 days   Lab Units 01/15/24  2042   WBC Thousand/uL 11.91*   HEMOGLOBIN g/dL 15.8   HEMATOCRIT % 49.2   PLATELETS Thousands/uL 244   NEUTROS PCT % 75   LYMPHS PCT % 18   MONOS PCT % 6   EOS PCT % 1     Results from last 7 days   Lab Units 01/15/24  2042   POTASSIUM mmol/L 4.3   CHLORIDE mmol/L 102   CO2 mmol/L 28   BUN mg/dL 23   CREATININE mg/dL 0.91   CALCIUM mg/dL 10.3*   ALK PHOS U/L 61   ALT U/L 15   AST U/L 12*           Imaging: I have personally reviewed pertinent films in PACS    Xray left foot: Personally reviewed, no acute osseous abnormality visualized, and no soft tissue emphysema visualized either.  Final radiologist read is pending.      ** Please Note: Dragon 360 Dictation voice to text software was used in the creation of this document. **      "

## 2024-01-16 NOTE — ASSESSMENT & PLAN NOTE
Wt Readings from Last 3 Encounters:   11/13/23 101 kg (222 lb)   10/16/23 98.9 kg (218 lb)   08/07/23 93.9 kg (207 lb)     History of ischemic cardiomyopathy recent EF 40%  Does not appear grossly overloaded, continue cardiac medications and monitor volume status closely

## 2024-01-16 NOTE — PROGRESS NOTES
[facility]  Progress Note  Name: Nadir Myers I  MRN: [id number]  Unit/Bed#: ED 25 I Date of Admission: 1/15/2024   Date of Service: 1/16/2024 I Hospital Day: 1    Assessment/Plan   * Cellulitis of left foot  Assessment & Plan  Presenting with worsening of wound of left fourth toe with surrounding erythema.  Patient otherwise afebrile and nontoxic-appearing.  Concern for surrounding cellulitis given appearance  Seen by podiatry during ED evaluation, local wound care provided with cultures obtained with IV antibiotics advised.  No acute operative intervention but to undergo LEADs, may need intervention this admission.  Appreciate ongoing podiatry recommendations  Received IV cefazolin during ED evaluation, continue and will add on Flagyl given appearance of wound  Local wound care as per podiatry  Follow up LEADs - will need vascular surgery consult  Trend WBC, temperature curve, hemodynamics    PAD (peripheral artery disease) ([facility])  Assessment & Plan  Noted in LLE with LEADS  Boarderline healing pressure  Vascular surgery consulted    Type 2 diabetes mellitus with foot ulcer, with long-term current use of insulin ([facility])  Assessment & Plan  Lab Results   Component Value Date    HGBA1C 9.1 (H) 01/16/2024       Recent Labs     01/15/24  2343 01/16/24  0807   POCGLU 174* 170*         Blood Sugar Average: Last 72 hrs:  (P) 172  Uncontrolled.  Patient admits to dietary indiscretion last few months.  Continue Levemir 20 units every morning and add correctional insulin coverage with Accu-Cheks while admitted.  Hold home oral medications.  Adjust inpatient insulin regimen as needed.  Initiate hypoglycemia protocol   Carb controlled diet reviewed with patient  Initiate hypoglycemia protocol    Chronic systolic CHF (congestive heart failure), NYHA class 2 ([facility])  Assessment & Plan  Wt Readings from Last 3 Encounters:   11/13/23 101 kg (222 lb)   10/16/23 98.9 kg (218 lb)   08/07/23 93.9  kg (207 lb)     History of ischemic cardiomyopathy recent EF 40%  Does not appear grossly overloaded, continue cardiac medications and monitor volume status closely          Coronary artery disease involving native coronary artery of native heart without angina pectoris  Assessment & Plan  S/p prior stenting   Currently chest pain-free, continue ASA, beta-blocker, statin      V-tach (HCC)  Assessment & Plan  Maintained on amiodarone 200 mg daily and metoprolol succinate 25 mg twice daily, continue             VTE Pharmacologic Prophylaxis: VTE Score: 4 Moderate Risk (Score 3-4) - Pharmacological DVT Prophylaxis Ordered: enoxaparin (Lovenox).    Mobility:      HLM Goal achieved. Continue to encourage appropriate mobility.    Patient Centered Rounds: I performed bedside rounds with nursing staff today.   Discussions with Specialists or Other Care Team Provider: Podiatry, Vascular surgery    Education and Discussions with Family / Patient: Patient declined call to .     Total Time Spent on Date of Encounter in care of patient: 35 mins. This time was spent on one or more of the following: performing physical exam; counseling and coordination of care; obtaining or reviewing history; documenting in the medical record; reviewing/ordering tests, medications or procedures; communicating with other healthcare professionals and discussing with patient's family/caregivers.    Current Length of Stay: 1 day(s)  Current Patient Status: Inpatient   Certification Statement: The patient will continue to require additional inpatient hospital stay due to IV antibiotics and vascular surgery evaluation  Discharge Plan:  pending plan by vascular surgery    Code Status: Level 1 - Full Code    Subjective:   Offers no specific complaints. Foot looks about the same    Objective:     Vitals:   Temp (24hrs), Av °F (36.1 °C), Min:97 °F (36.1 °C), Max:97 °F (36.1 °C)    Temp:  [97 °F (36.1 °C)] 97 °F (36.1 °C)  HR:  [78-89]  78  Resp:  [16-18] 18  BP: ()/(51-93) 95/51  SpO2:  [92 %-97 %] 95 %  There is no height or weight on file to calculate BMI.     Input and Output Summary (last 24 hours):   No intake or output data in the 24 hours ending 01/16/24 1040    Physical Exam:   Physical Exam  Vitals and nursing note reviewed.   Constitutional:       General: He is not in acute distress.     Appearance: He is well-developed.   HENT:      Head: Normocephalic and atraumatic.   Eyes:      Conjunctiva/sclera: Conjunctivae normal.   Cardiovascular:      Rate and Rhythm: Normal rate and regular rhythm.      Heart sounds: No murmur heard.  Pulmonary:      Effort: Pulmonary effort is normal. No respiratory distress.      Breath sounds: Normal breath sounds.   Abdominal:      Palpations: Abdomen is soft.      Tenderness: There is no abdominal tenderness.   Musculoskeletal:         General: No swelling.      Cervical back: Neck supple.   Skin:     General: Skin is warm and dry.      Comments: Left foot dressing in place   Neurological:      Mental Status: He is alert.   Psychiatric:         Mood and Affect: Mood normal.          Additional Data:     Labs:  Results from last 7 days   Lab Units 01/16/24  0544 01/15/24  2042   WBC Thousand/uL 9.57 11.91*   HEMOGLOBIN g/dL 14.2 15.8   HEMATOCRIT % 43.0 49.2   PLATELETS Thousands/uL 205 244   NEUTROS PCT %  --  75   LYMPHS PCT %  --  18   MONOS PCT %  --  6   EOS PCT %  --  1     Results from last 7 days   Lab Units 01/16/24  0544 01/15/24  2042   SODIUM mmol/L 138 137   POTASSIUM mmol/L 4.1 4.3   CHLORIDE mmol/L 106 102   CO2 mmol/L 24 28   BUN mg/dL 20 23   CREATININE mg/dL 0.81 0.91   ANION GAP mmol/L 8 7   CALCIUM mg/dL 9.2 10.3*   ALBUMIN g/dL  --  4.4   TOTAL BILIRUBIN mg/dL  --  0.43   ALK PHOS U/L  --  61   ALT U/L  --  15   AST U/L  --  12*   GLUCOSE RANDOM mg/dL 163* 263*         Results from last 7 days   Lab Units 01/16/24  0807 01/15/24  2343   POC GLUCOSE mg/dl 170* 174*     Results  from last 7 days   Lab Units 01/16/24  0544   HEMOGLOBIN A1C % 9.1*           Lines/Drains:  Invasive Devices       Peripheral Intravenous Line  Duration             Peripheral IV 01/15/24 Left Antecubital <1 day                          Imaging: Reviewed radiology reports from this admission including: ultrasound(s)    Recent Cultures (last 7 days):   Results from last 7 days   Lab Units 01/15/24  9657   GRAM STAIN RESULT  No polys seen*  3+ Gram positive cocci in clusters*  1+ Gram Positive Rods Resembling Diphtheroids*       Last 24 Hours Medication List:   Current Facility-Administered Medications   Medication Dose Route Frequency Provider Last Rate    acetaminophen  650 mg Oral Q6H PRN Harley Sneed, DO      amiodarone  200 mg Oral Daily With Breakfast Harley Sneed, DO      aspirin  81 mg Oral Daily Harley Sneed, DO      atorvastatin  80 mg Oral After Dinner Harley Sneed, DO      cefazolin  2,000 mg Intravenous Q8H Harley Sneed, DO Stopped (01/16/24 0705)    enoxaparin  40 mg Subcutaneous Daily Harley Sneed, DO      insulin detemir  20 Units Subcutaneous Daily Harley Sneed, DO      insulin lispro  1-5 Units Subcutaneous HS Harley Sneed, DO      insulin lispro  1-6 Units Subcutaneous TID AC Harley Sneed, DO      metoprolol succinate  25 mg Oral Q12H Harley Sneed, DO      metroNIDAZOLE  500 mg Oral Q8H Harley Sneed, DO      ondansetron  4 mg Intravenous Q6H PRN Harley Sneed, DO      spironolactone  12.5 mg Oral Daily Harley Sneed, DO          Today, Patient Was Seen By: Toyin Lindsay PA-C    **Please Note: This note may have been constructed using a voice recognition system.**

## 2024-01-16 NOTE — ASSESSMENT & PLAN NOTE
Presenting with worsening of wound of left fourth toe with surrounding erythema.  Patient otherwise afebrile and nontoxic-appearing.  Concern for surrounding cellulitis given appearance  Seen by podiatry during ED evaluation, local wound care provided with cultures obtained with IV antibiotics advised.  No acute operative intervention but to undergo LEADs, may need intervention this admission.  Appreciate ongoing podiatry recommendations  Received IV cefazolin during ED evaluation, continue and will add on Flagyl given appearance of wound  Local wound care as per podiatry  Follow up LEADs - will need vascular surgery consult  Trend WBC, temperature curve, hemodynamics

## 2024-01-16 NOTE — CONSULTS
Podiatry - Consultation    Patient Information:   Nadir Myers 63 y.o. male MRN: [id number]  Unit/Bed#: Z6HC Encounter: [id number]  PCP: Tigre Hare DO  Date of Admission:  1/15/2024  Date of Consultation: 01/15/24  Requesting Physician: Jose Carlos Tran MD      ASSESSMENT:    Nadir Myers is a 63 y.o. male with:    Left diabetic foot ulceration, lateral 4th digit  Left foot cellulitis  T2DM   - last A1c 7.1% on 9/16/23, previously 12% on 4/14/23  Tobacco dependence    PLAN:    Patient was seen and evaluated in the ED. Left diabetic foot ulceration noted to lateral 4th digit that probes deep with malodor and surrounding erythema consistent with cellulitis.  Recommend admission for IV antibiotics, vascular studies with possible vascular consult pending results, and close monitoring of left foot cellulitis and left 4th digit wound. No indication for surgical intervention at this time.  Reviewed left foot radiographs, pending final read. Per my personal read, no soft tissue emphysema, no definitive cortical erosion to suggest osteomyelitis.  Reviewed labs/vitals. Patient is stable and afebrile with mild leukocytosis of 11.91. Mild elevation in inflammatory markers, ESR at 34 and CRP at 6.9.  Recommend local wound care consisting of betadine, adaptic, DSD to left foot wound. Wound care instructions placed.  Elevation on green foam wedges or pillows when non-ambulatory  Rest of care per primary team.  Will discuss this plan with my attending and update as needed.    Weightbearing status: Weightbearing as tolerated in a surgical shoe    SUBJECTIVE:    History of Present Illness:    Nadir Myers is a 63 y.o. male who is originally presented to the ED 1/15/2024 due to a wound to his left 4th digit. His PCP advised he present to the ED for concern for possible wound infection. Patient has a past medical history of HTN, BPH, 3 vessel CAD, T2DM, GERD, Chronic HFrEF, Afib, ischemic cardiomyopathy.    We are  consulted for a left foot ulceration to lateral aspect of the 4th digit.  The patient states approximately 2 days ago he noticed that he had a wound to the lateral aspect of his left fourth digit.  He is unsure of how the wound occurred, but he states when he went to shower he noticed that there was some drainage and redness to his left fourth toe.  He presented to his PCP today with concern of possible wound infection to left fourth toe, his PCP recommended he come to the ED.  He denies any history of seeing a podiatrist.  Denies any history of wounds to the feet. He states the wound looks worse and there is more redness in his left foot since Saturday.    Patient denies nausea, vomiting, SOB, chest pain, fevers, chills    Review of Systems:    Constitutional: Negative.    HENT: Negative.    Eyes: Negative.    Respiratory: Negative.    Cardiovascular: Negative.    Gastrointestinal: Negative.    Musculoskeletal: Negative   Skin: left 4th toe wound   Neurological: numbness in feet bilaterally   Psych: Negative.     Past Medical and Surgical History:     Past Medical History:   Diagnosis Date    Diabetes mellitus (HCC)     Myocardial infarction (HCC)        Past Surgical History:   Procedure Laterality Date    CARDIAC CATHETERIZATION N/A 05/03/2023    Procedure: Cardiac Coronary Angiogram;  Surgeon: Valeriy Shore MD;  [facility]: [facility];  Service: Cardiology    CARDIAC CATHETERIZATION Left 05/03/2023    Procedure: Cardiac Left Heart Cath;  Surgeon: Valeriy Shore MD;  [facility]: [facility];  Service: Cardiology    CARDIAC DEFIBRILLATOR PLACEMENT  05/2023    CARDIAC ELECTROPHYSIOLOGY PROCEDURE N/A 05/12/2023    Procedure: Cardiac icd implant;  Surgeon: Urbano Maria MD;  Location: [facility];  Service: Cardiology       Meds/Allergies:    (Not in a hospital admission)      No Known Allergies    Social History:     Marital Status: /Civil Union    Substance Use History:   Social  History     Substance and Sexual Activity   Alcohol Use Not Currently     Social History     Tobacco Use   Smoking Status Former    Current packs/day: 0.00    Types: Cigarettes    Quit date: 2023    Years since quittin.7   Smokeless Tobacco Never     Social History     Substance and Sexual Activity   Drug Use Not on file       Family History:    History reviewed. No pertinent family history.      OBJECTIVE:    Vitals:   Blood Pressure: 155/93 (01/15/24 1812)  Pulse: 89 (01/15/24 1812)  Temperature: (!) 97 °F (36.1 °C) (01/15/24 1812)  Temp Source: Tympanic (01/15/24 1812)  Respirations: 16 (01/15/24 1812)  SpO2: 97 % (01/15/24 1812)    Physical Exam:    General Appearance: Alert, cooperative, no distress.  HEENT: Head normocephalic, atraumatic, without obvious abnormality.  Heart: Normal rate and rhythm.  Lungs: Non-labored breathing. No respiratory distress.  Abdomen: Without distension.  Psychiatric: AAOx3  Lower Extremity:  Vascular:   Right DP and PT pulses are biphasic by Doppler. Left DP and PT pulses are biphasic by Doppler. CRT < 3 seconds at the digits. +1/4 edema noted at bilateral lower extremities. Pedal hair is absent. Skin temperature is cool bilaterally.    Musculoskeletal:  MMT is 5/5 in all muscle compartments bilaterally. ROM at the 1st MPJ and ankle joint are limited bilaterally with the leg extended. No Pain on palpation of left foot or left 4th digit. No gross deformities noted.     Dermatological:  Lower extremity wound(s) as noted below:    Wound #: 1  Location: left   Length 1.5cm: Width 2cm: Depth 0.3cm:   Deepest Tissue Noted in Base: capsule  Probe to Bone: No  Peripheral Skin Description: Attached  Granulation: 10% Fibrotic Tissue: 40% Necrotic Tissue: 50%   Drainage Amount: minimal, serous  Signs of Infection: Yes  - malodor, erythema, edema. Surrounding ecchymosis and erythema of the entirety of left 4th digit with proximal ascent of erythema to dorsal midfoot. No crepitus, no  "fluctuance.      Neurological:  Gross sensation is intact. Protective sensation is diminished. Patient Reports numbness and/or paresthesias.    Clinical Images 01/15/24:            Additional data:     Lab Results: I have personally reviewed pertinent labs including:    Results from last 7 days   Lab Units 01/15/24  2042   WBC Thousand/uL 11.91*   HEMOGLOBIN g/dL 15.8   HEMATOCRIT % 49.2   PLATELETS Thousands/uL 244   NEUTROS PCT % 75   LYMPHS PCT % 18   MONOS PCT % 6   EOS PCT % 1           Invalid input(s): \"LABALBU\"        Cultures: I have personally reviewed pertinent cultures including:              Imaging: I have personally reviewed pertinent reports in PACS.  EKG, Pathology, and Other Studies: I have personally reviewed pertinent reports.        ** Please Note: Portions of the record may have been created with voice recognition software. Occasional wrong word or \"sound a like\" substitutions may have occurred due to the inherent limitations of voice recognition software. Read the chart carefully and recognize, using context, where substitutions have occurred. **    "

## 2024-01-16 NOTE — TELEPHONE ENCOUNTER
Daughter called to [facility] to Dr Samuels that her father is in ED at [facility] and is in need to stent to leg due to bloodflow....She wanted to know if cardiilolgy was being consulted...told her up the attending physician if they consult cardiology.  Told her she could request through attending MD.  We cannot just join in care without consult.   She verbalized understanding.

## 2024-01-16 NOTE — ASSESSMENT & PLAN NOTE
Lab Results   Component Value Date    HGBA1C 7.1 (H) 09/16/2023       Recent Labs     01/15/24  2343   POCGLU 174*       Blood Sugar Average: Last 72 hrs:  (P) 174  Diabetes well-controlled as outpatient, with left fourth toe foot ulcer with concern for surrounding cellulitis with plan as per primary problem  Continue Levemir 20 units every morning and add correctional insulin coverage with Accu-Cheks while admitted.  Hold home oral medications.  Adjust inpatient insulin regimen as needed.  Initiate hypoglycemia protocol   Carb controlled diet  Initiate hypoglycemia protocol

## 2024-01-16 NOTE — ED PROVIDER NOTES
History  Chief Complaint   Patient presents with    Foot Ulcer     Pt reports having open ulcer between 4th and 5th toe on his left foot. Hx of DM, denies having neuropathy. Referred to ED by PCP for further evaluation     Patient is a 63-year-old male, past medical history of diabetes presenting today for evaluation of a wound.  Patient sent in by his PCP for evaluation.  Patient states that the wound presented yesterday and has been getting increasingly worse since.  Patient denies pain to the ulcer itself, but does have pain on the dorsum of his foot which is also red.  Patient denies history of foot ulcers.  Reports compliance with his diabetic medications.  Patient denies any systemic symptoms including chills, fever, weakness, fatigue.  Patient denies any chest pain, shortness of breath, abdominal pain, nausea, or vomiting.  Patient reports some sensation in the foot.          Prior to Admission Medications   Prescriptions Last Dose Informant Patient Reported? Taking?   BD Pen Needle Micro U/F 32G X 6 MM MISC  Self Yes No   Sig: USE 1 PEN EACH DAILY   Empagliflozin (Jardiance) 10 MG TABS tablet   No No   Sig: Take 1 tablet (10 mg total) by mouth every morning   JANUMET -1000 MG TB24  Self Yes No   Sig: daily    OneTouch Verio test strip  Self Yes No   amiodarone 200 mg tablet   No No   Sig: TAKE 1 TABLET BY MOUTH EVERY DAY WITH BREAKFAST   aspirin 81 MG tablet  Self Yes No   Sig: Take 1 tablet by mouth daily   atorvastatin (LIPITOR) 80 mg tablet   No No   Sig: Take 1 tablet (80 mg total) by mouth daily after dinner   insulin detemir (Levemir FlexPen) 100 Units/mL injection pen  Self Yes No   Sig: Inject 20 Units under the skin daily Patient to take 30 units daily - if fasting bs >150 for 3 days to increase dose by 5 units. Once bs < 150 can go back to 30 units   metFORMIN (GLUCOPHAGE) 1000 MG tablet  Self Yes No   Sig: Take 1,000 mg by mouth daily with breakfast   metoprolol succinate (TOPROL-XL) 25 mg  24 hr tablet   No No   Sig: TAKE 1 TABLET (25 MG TOTAL) BY MOUTH EVERY 12 (TWELVE) HOURS   senna-docusate sodium (SENOKOT S) 8.6-50 mg per tablet  Self No No   Sig: Take 2 tablets by mouth daily at bedtime   Patient not taking: Reported on 2023   spironolactone (ALDACTONE) 25 mg tablet   No No   Sig: Take 0.5 tablets (12.5 mg total) by mouth daily Start on 23 per Quyen Hinds PA-C      Facility-Administered Medications: None       Past Medical History:   Diagnosis Date    Diabetes mellitus (HCC)     Myocardial infarction (HCC)        Past Surgical History:   Procedure Laterality Date    CARDIAC CATHETERIZATION N/A 2023    Procedure: Cardiac Coronary Angiogram;  Surgeon: Valeriy Shore MD;  [facility]: [facility];  Service: Cardiology    CARDIAC CATHETERIZATION Left 2023    Procedure: Cardiac Left Heart Cath;  Surgeon: Valeriy Shore MD;  [facility];  Service: Cardiology    CARDIAC DEFIBRILLATOR PLACEMENT  2023    CARDIAC ELECTROPHYSIOLOGY PROCEDURE N/A 2023    Procedure: Cardiac icd implant;  Surgeon: Urbano Maria MD;  [facility]: [facility];  Service: Cardiology       History reviewed. No pertinent family history.  I have reviewed and agree with the history as documented.    E-Cigarette/Vaping    E-Cigarette Use Never User      E-Cigarette/Vaping Substances    Nicotine No     THC No     CBD No     Flavoring No     Other No     Unknown No      Social History     Tobacco Use    Smoking status: Former     Current packs/day: 0.00     Types: Cigarettes     Quit date: 2023     Years since quittin.7    Smokeless tobacco: Never   Vaping Use    Vaping status: Never Used   Substance Use Topics    Alcohol use: Not Currently        Review of Systems   Constitutional:  Negative for chills, fatigue and fever.   HENT:  Negative for congestion.    Respiratory:  Negative for cough, chest tightness and shortness of breath.    Cardiovascular:  Negative for  chest pain.   Gastrointestinal:  Negative for abdominal pain, diarrhea, nausea and vomiting.   Genitourinary:  Negative for difficulty urinating.   Skin:  Positive for wound. Negative for color change.   Neurological:  Negative for dizziness, syncope, weakness, numbness and headaches.       Physical Exam  ED Triage Vitals [01/15/24 1812]   Temperature Pulse Respirations Blood Pressure SpO2   (!) 97 °F (36.1 °C) 89 16 155/93 97 %      Temp Source Heart Rate Source Patient Position - Orthostatic VS BP Location FiO2 (%)   Tympanic Monitor Sitting Left arm --      Pain Score       1             Orthostatic Vital Signs  Vitals:    01/15/24 1812   BP: 155/93   Pulse: 89   Patient Position - Orthostatic VS: Sitting       Physical Exam  Vitals and nursing note reviewed.   Constitutional:       General: He is not in acute distress.     Appearance: Normal appearance. He is not ill-appearing or toxic-appearing.   HENT:      Head: Normocephalic and atraumatic.   Eyes:      General: No scleral icterus.     Extraocular Movements: Extraocular movements intact.   Cardiovascular:      Rate and Rhythm: Normal rate and regular rhythm.      Pulses: Normal pulses.           Dorsalis pedis pulses are 2+ on the right side and 2+ on the left side.        Posterior tibial pulses are 2+ on the right side and 2+ on the left side.      Heart sounds: Normal heart sounds. No murmur heard.  Pulmonary:      Effort: Pulmonary effort is normal. No respiratory distress.      Breath sounds: Normal breath sounds. No wheezing or rhonchi.   Abdominal:      General: Abdomen is flat. There is no distension.      Palpations: Abdomen is soft.      Tenderness: There is no abdominal tenderness.   Musculoskeletal:         General: Normal range of motion.      Cervical back: Normal range of motion.      Right foot: Normal range of motion. No deformity.      Left foot: Normal range of motion. No deformity.   Feet:      Left foot:      Skin integrity: Ulcer (Ulcer  to the lateral aspect of the 4th toe. No active drainage. Small area of necrosis. Please see attached media below.), erythema (Erythema to fourth toe as well as dorsum of foot) and warmth present.      Comments: Normal capillary refill.  Skin:     Capillary Refill: Capillary refill takes less than 2 seconds.   Neurological:      General: No focal deficit present.      Mental Status: He is alert.      Cranial Nerves: No cranial nerve deficit.      Sensory: No sensory deficit.      Motor: No weakness.      Gait: Gait normal.   Psychiatric:         Mood and Affect: Mood normal.         Behavior: Behavior normal.          Media Information           Media Information            ED Medications  Medications   sodium chloride 0.9 % bolus 1,000 mL (1,000 mL Intravenous New Bag 1/15/24 2123)   ceFAZolin (ANCEF) IVPB (premix in dextrose) 2,000 mg 50 mL (0 mg Intravenous Stopped 1/15/24 2234)       Diagnostic Studies  Results Reviewed       Procedure Component Value Units Date/Time    Wound culture and Gram stain [[id number]] Collected: 01/15/24 2247    Lab Status: In process Specimen: Wound from Foot, Left Updated: 01/15/24 2257    Comprehensive metabolic panel [[id number]]  (Abnormal) Collected: 01/15/24 2042    Lab Status: Final result Specimen: Blood from Arm, Left Updated: 01/15/24 2112     Sodium 137 mmol/L      Potassium 4.3 mmol/L      Chloride 102 mmol/L      CO2 28 mmol/L      ANION GAP 7 mmol/L      BUN 23 mg/dL      Creatinine 0.91 mg/dL      Glucose 263 mg/dL      Calcium 10.3 mg/dL      AST 12 U/L      ALT 15 U/L      Alkaline Phosphatase 61 U/L      Total Protein 7.7 g/dL      Albumin 4.4 g/dL      Total Bilirubin 0.43 mg/dL      eGFR 90 ml/min/1.73sq m     Narrative:      Verified by repeat analysis.  National Kidney Disease Foundation guidelines for Chronic Kidney Disease (CKD):     Stage 1 with normal or high GFR (GFR > 90 mL/min/1.73 square meters)    Stage 2 Mild CKD (GFR = 60-89 mL/min/1.73 square meters)     Stage 3A Moderate CKD (GFR = 45-59 mL/min/1.73 square meters)    Stage 3B Moderate CKD (GFR = 30-44 mL/min/1.73 square meters)    Stage 4 Severe CKD (GFR = 15-29 mL/min/1.73 square meters)    Stage 5 End Stage CKD (GFR <15 mL/min/1.73 square meters)  Note: GFR calculation is accurate only with a steady state creatinine    C-reactive protein [[id number]]  (Abnormal) Collected: 01/15/24 2042    Lab Status: Final result Specimen: Blood from Arm, Left Updated: 01/15/24 2112     CRP 6.9 mg/L     Narrative:      Verified by repeat analysis.    Sedimentation rate, automated [[id number]]  (Abnormal) Collected: 01/15/24 2042    Lab Status: Final result Specimen: Blood from Arm, Left Updated: 01/15/24 2056     Sed Rate 34 mm/hour     CBC and differential [[id number]]  (Abnormal) Collected: 01/15/24 2042    Lab Status: Final result Specimen: Blood from Arm, Left Updated: 01/15/24 2051     WBC 11.91 Thousand/uL      RBC 5.53 Million/uL      Hemoglobin 15.8 g/dL      Hematocrit 49.2 %      MCV 89 fL      MCH 28.6 pg      MCHC 32.1 g/dL      RDW 13.7 %      MPV 9.9 fL      Platelets 244 Thousands/uL      nRBC 0 /100 WBCs      Neutrophils Relative 75 %      Immat GRANS % 0 %      Lymphocytes Relative 18 %      Monocytes Relative 6 %      Eosinophils Relative 1 %      Basophils Relative 0 %      Neutrophils Absolute 8.82 Thousands/µL      Immature Grans Absolute 0.04 Thousand/uL      Lymphocytes Absolute 2.18 Thousands/µL      Monocytes Absolute 0.76 Thousand/µL      Eosinophils Absolute 0.06 Thousand/µL      Basophils Absolute 0.05 Thousands/µL                    XR foot 3+ views LEFT   ED Interpretation by Rosalie Tavares MD (01/15 2204)   No gas or osseous abnormality      VAS ARTERIAL DUPLEX- LOWER LIMB BILATERAL    (Results Pending)         Procedures  Procedures      ED Course  ED Course as of 01/15/24 2324   Mon Lalito 15, 2024   2015 Pt seen and evaluated by me  DDx: Diabetic foot ulcer, venous stasis ulcer, necrosis,  osteomyelitis, doubt sepsis as patient has no systemic symptoms or abnormal vital signs  Workup and plan: CBC, CMP, CRP, ESR, x-ray.   2107 Sed Rate(!): 34   2108 WBC(!): 11.91   2113 C-REACTIVE PROTEIN(!): 6.9   2113 Comprehensive metabolic panel(!)  Hyperglycemia, will give fluids   2134 Podiatry to see patient   2204 XR foot 3+ views LEFT  Normal on my interpretation   2208 Podiatry recommending inpatient admission for IV antibiotics and vascular studies.   2229 Patient admitted to  IM                             SBIRT 20yo+      Flowsheet Row Most Recent Value   Initial Alcohol Screen: US AUDIT-C     1. How often do you have a drink containing alcohol? 0 Filed at: 01/15/2024 1813   2. How many drinks containing alcohol do you have on a typical day you are drinking?  0 Filed at: 01/15/2024 1813   3a. Male UNDER 65: How often do you have five or more drinks on one occasion? 0 Filed at: 01/15/2024 1813   3b. FEMALE Any Age, or MALE 65+: How often do you have 4 or more drinks on one occassion? 0 Filed at: 01/15/2024 1813   Audit-C Score 0 Filed at: 01/15/2024 1813   [place]: How many times in the past year have you...    Used an illegal drug or used a prescription medication for non-medical reasons? Never Filed at: 01/15/2024 1813                  Medical Decision Making  Amount and/or Complexity of Data Reviewed  Labs: ordered. Decision-making details documented in ED Course.  Radiology: ordered and independent interpretation performed. Decision-making details documented in ED Course.    Risk  Prescription drug management.  Decision regarding hospitalization.          Disposition  Final diagnoses:   Foot ulcer (HCC)     Time reflects when diagnosis was documented in both MDM as applicable and the Disposition within this note       Time User Action Codes Description Comment    1/15/2024  8:22 PM Rosalie Tavares Add [L97.509] Foot ulcer (HCC)           ED Disposition       ED Disposition   Admit    Condition   Stable     Date/Time   Mon Lalito 15, 2024 2658    Comment   --             Follow-up Information    None         Patient's Medications   Discharge Prescriptions    No medications on file     No discharge procedures on file.    PDMP Review       None             ED Provider  Attending physically available and evaluated Nadir Myers. I managed the patient along with the ED Attending.    Electronically Signed by           Rosalie Tavares MD  01/15/24 0012

## 2024-01-17 LAB
ANION GAP SERPL CALCULATED.3IONS-SCNC: 7 MMOL/L
BUN SERPL-MCNC: 18 MG/DL (ref 5–25)
CALCIUM SERPL-MCNC: 9.3 MG/DL (ref 8.4–10.2)
CHLORIDE SERPL-SCNC: 106 MMOL/L (ref 96–108)
CO2 SERPL-SCNC: 24 MMOL/L (ref 21–32)
CREAT SERPL-MCNC: 0.81 MG/DL (ref 0.6–1.3)
ERYTHROCYTE [DISTWIDTH] IN BLOOD BY AUTOMATED COUNT: 13.6 % (ref 11.6–15.1)
GFR SERPL CREATININE-BSD FRML MDRD: 95 ML/MIN/1.73SQ M
GLUCOSE SERPL-MCNC: 169 MG/DL (ref 65–140)
GLUCOSE SERPL-MCNC: 184 MG/DL (ref 65–140)
GLUCOSE SERPL-MCNC: 199 MG/DL (ref 65–140)
GLUCOSE SERPL-MCNC: 258 MG/DL (ref 65–140)
GLUCOSE SERPL-MCNC: 332 MG/DL (ref 65–140)
HCT VFR BLD AUTO: 44.2 % (ref 36.5–49.3)
HGB BLD-MCNC: 14.3 G/DL (ref 12–17)
MCH RBC QN AUTO: 29.1 PG (ref 26.8–34.3)
MCHC RBC AUTO-ENTMCNC: 32.4 G/DL (ref 31.4–37.4)
MCV RBC AUTO: 90 FL (ref 82–98)
PLATELET # BLD AUTO: 203 THOUSANDS/UL (ref 149–390)
PMV BLD AUTO: 9.7 FL (ref 8.9–12.7)
POTASSIUM SERPL-SCNC: 4 MMOL/L (ref 3.5–5.3)
RBC # BLD AUTO: 4.92 MILLION/UL (ref 3.88–5.62)
SODIUM SERPL-SCNC: 137 MMOL/L (ref 135–147)
WBC # BLD AUTO: 8.06 THOUSAND/UL (ref 4.31–10.16)

## 2024-01-17 PROCEDURE — 80048 BASIC METABOLIC PNL TOTAL CA: CPT | Performed by: PHYSICIAN ASSISTANT

## 2024-01-17 PROCEDURE — B41GYZZ FLUOROSCOPY OF LEFT LOWER EXTREMITY ARTERIES USING OTHER CONTRAST: ICD-10-PCS | Performed by: RADIOLOGY

## 2024-01-17 PROCEDURE — 82948 REAGENT STRIP/BLOOD GLUCOSE: CPT

## 2024-01-17 PROCEDURE — 99223 1ST HOSP IP/OBS HIGH 75: CPT | Performed by: STUDENT IN AN ORGANIZED HEALTH CARE EDUCATION/TRAINING PROGRAM

## 2024-01-17 PROCEDURE — NC001 PR NO CHARGE: Performed by: PHYSICIAN ASSISTANT

## 2024-01-17 PROCEDURE — 85027 COMPLETE CBC AUTOMATED: CPT | Performed by: PHYSICIAN ASSISTANT

## 2024-01-17 PROCEDURE — 99232 SBSQ HOSP IP/OBS MODERATE 35: CPT | Performed by: PHYSICIAN ASSISTANT

## 2024-01-17 PROCEDURE — 047L3Z1 DILATION OF LEFT FEMORAL ARTERY USING DRUG-COATED BALLOON, PERCUTANEOUS APPROACH: ICD-10-PCS | Performed by: RADIOLOGY

## 2024-01-17 RX ADMIN — METRONIDAZOLE 500 MG: 500 TABLET ORAL at 15:45

## 2024-01-17 RX ADMIN — METRONIDAZOLE 500 MG: 500 TABLET ORAL at 22:49

## 2024-01-17 RX ADMIN — ATORVASTATIN CALCIUM 80 MG: 80 TABLET, FILM COATED ORAL at 18:21

## 2024-01-17 RX ADMIN — AMIODARONE HYDROCHLORIDE 200 MG: 200 TABLET ORAL at 09:36

## 2024-01-17 RX ADMIN — CEFAZOLIN SODIUM 2000 MG: 2 SOLUTION INTRAVENOUS at 13:35

## 2024-01-17 RX ADMIN — METRONIDAZOLE 500 MG: 500 TABLET ORAL at 09:36

## 2024-01-17 RX ADMIN — ENOXAPARIN SODIUM 40 MG: 40 INJECTION SUBCUTANEOUS at 09:35

## 2024-01-17 RX ADMIN — CEFAZOLIN SODIUM 2000 MG: 2 SOLUTION INTRAVENOUS at 22:16

## 2024-01-17 RX ADMIN — CEFAZOLIN SODIUM 2000 MG: 2 SOLUTION INTRAVENOUS at 05:23

## 2024-01-17 RX ADMIN — METOPROLOL SUCCINATE 25 MG: 25 TABLET, EXTENDED RELEASE ORAL at 12:11

## 2024-01-17 RX ADMIN — INSULIN DETEMIR 20 UNITS: 100 INJECTION, SOLUTION SUBCUTANEOUS at 09:35

## 2024-01-17 RX ADMIN — ASPIRIN 81 MG: 81 TABLET, COATED ORAL at 09:36

## 2024-01-17 RX ADMIN — SPIRONOLACTONE 12.5 MG: 25 TABLET, FILM COATED ORAL at 09:36

## 2024-01-17 RX ADMIN — INSULIN LISPRO 1 UNITS: 100 INJECTION, SOLUTION INTRAVENOUS; SUBCUTANEOUS at 18:20

## 2024-01-17 RX ADMIN — INSULIN LISPRO 4 UNITS: 100 INJECTION, SOLUTION INTRAVENOUS; SUBCUTANEOUS at 22:17

## 2024-01-17 RX ADMIN — INSULIN LISPRO 2 UNITS: 100 INJECTION, SOLUTION INTRAVENOUS; SUBCUTANEOUS at 09:35

## 2024-01-17 NOTE — PLAN OF CARE
Problem: Potential for Falls  Goal: Patient will remain free of falls  Description: INTERVENTIONS:  - Educate patient/family on patient safety including physical limitations  - Instruct patient to call for assistance with activity   - Consult OT/PT to assist with strengthening/mobility   - Keep Call bell within reach  - Keep bed low and locked with side rails adjusted as appropriate  - Keep care items and personal belongings within reach  - Initiate and maintain comfort rounds  - Make Fall Risk Sign visible to staff  - Offer Toileting every 2 Hours, in advance of need  - Initiate/Maintain alarm  - Apply yellow socks and bracelet for high fall risk patients  - Consider moving patient to room near nurses station  Outcome: Progressing     Problem: INFECTION - ADULT  Goal: Absence or prevention of progression during hospitalization  Description: INTERVENTIONS:  - Assess and monitor for signs and symptoms of infection  - Monitor lab/diagnostic results  - Monitor all insertion sites, i.e. indwelling lines, tubes, and drains  - Monitor endotracheal if appropriate and nasal secretions for changes in amount and color  - [place] appropriate cooling/warming therapies per order  - Administer medications as ordered  - Instruct and encourage patient and family to use good hand hygiene technique  - Identify and instruct in appropriate isolation precautions for identified infection/condition  Outcome: Progressing     Problem: SAFETY ADULT  Goal: Patient will remain free of falls  Description: INTERVENTIONS:  - Educate patient/family on patient safety including physical limitations  - Instruct patient to call for assistance with activity   - Consult OT/PT to assist with strengthening/mobility   - Keep Call bell within reach  - Keep bed low and locked with side rails adjusted as appropriate  - Keep care items and personal belongings within reach  - Initiate and maintain comfort rounds  - Apply yellow socks and bracelet for high  fall risk patients  - Consider moving patient to room near nurses station  Outcome: Progressing

## 2024-01-17 NOTE — CONSULTS
Consultation - Advanced Heart Failure Team  Nadir Myers 63 y.o. male MRN: [id number]  Unit/Bed#: NW8 878-01 Encounter: [id number]        History of Present Illness   Physician Requesting Consult: Gilbert Quigley MD  Reason for Consult / Principal Problem: CHF, preoperative eval    HPI: Nadir Myers is a 63 y.o. year old male who presented with left foot wound care for cellulitis.  He has a past medical history significant for CAD s/p HANNA to LCx 2015, repeat cath 2023 with  mid LAD and L PDA, HFrEF (EF 40% 8/7/2023, recovered from 10-15%) 2/2 ischemic cardiomyopathy, VT s/p ICD, A-fib, PAD, T2DM, HTN, Hx tobacco use.  He noticed a wound that developed on the lateral portion of his left fourth toe with increasing redness and erythema around the area that developed 2 days prior to presentation.  He saw his PCP who recommended he come in for evaluation.  He was evaluated by podiatry who performed local wound care to obtain cultures plan for leads to assess severity of PAD.  He was admitted to medicine for treatment of cellulitis.  Leads were significant for tibioperoneal disease vascular surgery was consulted who recommended left lower extremity angiogram with possible intervention to optimize arterial perfusion for wound healing.  IR has been consulted for angiogram.  Cardiology has been consulted for cardiac evaluation if he requires more invasive procedure.      Review of Systems  Review of system was conducted and was negative except for as stated in the HPI.      Historical Information   Past Medical History:   Diagnosis Date    Diabetes mellitus (HCC)     Myocardial infarction (HCC)      Past Surgical History:   Procedure Laterality Date    CARDIAC CATHETERIZATION N/A 05/03/2023    Procedure: Cardiac Coronary Angiogram;  Surgeon: Valeriy Shore MD;  Location: [facility];  Service: Cardiology    CARDIAC CATHETERIZATION Left 05/03/2023    Procedure: Cardiac Left Heart Cath;  Surgeon: Valeriy  MD Mone;  Location: [facility];  Service: Cardiology    CARDIAC DEFIBRILLATOR PLACEMENT  2023    CARDIAC ELECTROPHYSIOLOGY PROCEDURE N/A 2023    Procedure: Cardiac icd implant;  Surgeon: Urbano Maria MD;  Location: [facility];  Service: Cardiology     Social History     Substance and Sexual Activity   Alcohol Use Not Currently     Social History     Substance and Sexual Activity   Drug Use Not on file     Social History     Tobacco Use   Smoking Status Former    Current packs/day: 0.00    Types: Cigarettes    Quit date: 2023    Years since quittin.7   Smokeless Tobacco Never     Family History: non-contributory    Meds/Allergies   Hospital Medications:   Current Facility-Administered Medications   Medication Dose Route Frequency    acetaminophen (TYLENOL) tablet 650 mg  650 mg Oral Q6H PRN    amiodarone tablet 200 mg  200 mg Oral Daily With Breakfast    aspirin (ECOTRIN LOW STRENGTH) EC tablet 81 mg  81 mg Oral Daily    atorvastatin (LIPITOR) tablet 80 mg  80 mg Oral After Dinner    ceFAZolin (ANCEF) IVPB (premix in dextrose) 2,000 mg 50 mL  2,000 mg Intravenous Q8H    enoxaparin (LOVENOX) subcutaneous injection 40 mg  40 mg Subcutaneous Daily    insulin detemir (LEVEMIR) subcutaneous injection 20 Units  20 Units Subcutaneous Daily    insulin lispro (HumaLOG) 100 units/mL subcutaneous injection 1-5 Units  1-5 Units Subcutaneous HS    insulin lispro (HumaLOG) 100 units/mL subcutaneous injection 1-6 Units  1-6 Units Subcutaneous TID AC    metoprolol succinate (TOPROL-XL) 24 hr tablet 25 mg  25 mg Oral Q12H    metroNIDAZOLE (FLAGYL) tablet 500 mg  500 mg Oral Q8H    ondansetron (ZOFRAN) injection 4 mg  4 mg Intravenous Q6H PRN    spironolactone (ALDACTONE) tablet 12.5 mg  12.5 mg Oral Daily     Home Medications:   Medications Prior to Admission   Medication    amiodarone 200 mg tablet    aspirin 81 MG tablet    atorvastatin (LIPITOR) 80 mg tablet    BD Pen Needle Micro U/F 32G  X 6 MM MISC    Empagliflozin (Jardiance) 10 MG TABS tablet    insulin detemir (Levemir FlexPen) 100 Units/mL injection pen    JANUMET -1000 MG TB24    metFORMIN (GLUCOPHAGE) 1000 MG tablet    metoprolol succinate (TOPROL-XL) 25 mg 24 hr tablet    OneTouch Verio test strip    senna-docusate sodium (SENOKOT S) 8.6-50 mg per tablet    spironolactone (ALDACTONE) 25 mg tablet       No Known Allergies    Objective   Vitals: Blood pressure 132/74, pulse 76, temperature 98.2 °F (36.8 °C), temperature source Oral, resp. rate 18, SpO2 97%.  Orthostatic Blood Pressures      Flowsheet Row Most Recent Value   Blood Pressure 132/74 filed at 01/17/2024 1211   Patient Position - Orthostatic VS Sitting filed at 01/17/2024 1209              Invasive Devices       Peripheral Intravenous Line  Duration             Peripheral IV 01/15/24 Left Antecubital 1 day                    Physical Exam  GEN: Nadir Myers appears well, alert and oriented x 3, pleasant and cooperative   HEENT:  Normocephalic, atraumatic, anicteric, moist mucous membranes  NECK: No JVD or carotid bruits   HEART: normal sinus rhythm, regular rate, normal S1 and S2, no murmurs, clicks, gallops or rubs   LUNGS: Clear to auscultation bilaterally; no wheezes, rales, or rhonchi; respiration nonlabored   ABDOMEN:  Normoactive bowel sounds, soft, no tenderness, no distention  EXTREMITIES: no edema b/l. Left foot wrapped in bandage.  NEURO: no gross focal findings; cranial nerves grossly intact   SKIN:  Dry, intact, warm to touch    Lab Results: I have personally reviewed pertinent lab results.            Results from last 7 days   Lab Units 01/17/24  0455 01/16/24  0544 01/15/24  2042   POTASSIUM mmol/L 4.0 4.1 4.3   CO2 mmol/L 24 24 28   CHLORIDE mmol/L 106 106 102   BUN mg/dL 18 20 23   CREATININE mg/dL 0.81 0.81 0.91     Results from last 7 days   Lab Units 01/17/24  0455 01/16/24  0544 01/15/24  2042   HEMOGLOBIN g/dL 14.3 14.2 15.8   HEMATOCRIT % 44.2 43.0  49.2   PLATELETS Thousands/uL 203 205 244     Results from last 7 days   Lab Units 24  0544   HEMOGLOBIN A1C % 9.1*             Imaging: I have personally reviewed pertinent reports.      EKG:   Date: 2023  Interpretation: normal sinus rhythm, left atrial enlargement    ECHO:  Results for orders placed during the hospital encounter of 17    Echo complete with contrast if indicated    [place] [address]  [phone]    Transthoracic Echocardiogram  2D, M-mode, Doppler, and Color Doppler    Study date:  26-Sep-2017    Patient: EAGLE REBOLLAR  MR number: [id number]  Account number: [id number]  : 1960  Age: 57 years  Gender: Male  Status: Outpatient  Location: [address] [place] [address]  Height: 72 in  Weight: 263.3 lb  BP: 142/ 88 mmHg    Indications: CAD    Diagnoses: I25.10 - Atherosclerotic heart disease of native coronary artery without angina pectoris    Sonographer:  OLIVE Benoit  Primary Physician:  Paty Ortiz MD  Referring Physician:  Eagle Chavez MD  Group:  [facility]  Interpreting Physician:  Torrey Duenas MD    SUMMARY    LEFT VENTRICLE:  Size was normal.  Systolic function was normal. Ejection fraction was estimated to be 65 %.  There was mild concentric hypertrophy.  Doppler parameters were consistent with abnormal left ventricular relaxation (grade 1 diastolic dysfunction).    RIGHT VENTRICLE:  The size was normal.  Systolic function was normal.    TRICUSPID VALVE:  There was trace regurgitation.    HISTORY: PRIOR HISTORY: Hypertension, CAD s/p PCI, smoker, diabetes, high cholesterol    PROCEDURE: The study was performed in the [address] [place] [address]. This was a routine study. The transthoracic approach was used. The study included complete 2D imaging, M-mode, complete spectral Doppler, and color Doppler. The  heart rate was 112 bpm, at  the start of the study. Images were obtained from the parasternal, apical, subcostal, and suprasternal notch acoustic windows. Echocardiographic views were limited due to decreased penetration. This was a  technically difficult study.    LEFT VENTRICLE: Size was normal. Systolic function was normal. Ejection fraction was estimated to be 65 %. There were no regional wall motion abnormalities. Wall thickness was mildly increased. There was mild concentric hypertrophy.  DOPPLER: Doppler parameters were consistent with abnormal left ventricular relaxation (grade 1 diastolic dysfunction).    RIGHT VENTRICLE: The size was normal. Systolic function was normal. Wall thickness was normal.    LEFT ATRIUM: Size was normal.    RIGHT ATRIUM: Size was normal.    MITRAL VALVE: Valve structure was normal. There was normal leaflet separation. DOPPLER: The transmitral velocity was within the normal range. There was no evidence for stenosis. There was no regurgitation.    AORTIC VALVE: The valve was trileaflet. Leaflets exhibited normal thickness and normal cuspal separation. DOPPLER: Transaortic velocity was within the normal range. There was no evidence for stenosis. There was no regurgitation.    TRICUSPID VALVE: The valve structure was normal. There was normal leaflet separation. DOPPLER: The transtricuspid velocity was within the normal range. There was no evidence for stenosis. There was trace regurgitation. The tricuspid jet  envelope definition was inadequate for estimation of RV systolic pressure. There are no indirect findings suggestive of moderate or severe pulmonary hypertension.    PULMONIC VALVE: Leaflets exhibited normal thickness, no calcification, and normal cuspal separation. DOPPLER: The transpulmonic velocity was within the normal range. There was no regurgitation.    PERICARDIUM: There was no pericardial effusion. The pericardium was normal in appearance.    AORTA: The root exhibited normal size.    SYSTEMIC  VEINS: IVC: The inferior vena cava was normal in size and course. Respirophasic changes were normal.    SYSTEM MEASUREMENT TABLES    2D  %FS: 47.6 %  AV Diam: 3.34 cm  EDV(Teich): 101.71 ml  EF Biplane: 67.76 %  EF(Cube): 85.61 %  EF(Teich): 79 %  ESV(Cube): 14.82 ml  ESV(Teich): 21.36 ml  IVSd: 1.58 cm  LA Area: 12.78 cm2  LA Diam: 3.37 cm  LVEDV MOD A2C: 49.73 ml  LVEDV MOD A4C: 61.78 ml  LVEDV MOD BP: 58.1 ml  LVEF MOD A2C: 65.64 %  LVEF MOD A4C: 71.2 %  LVESV MOD A2C: 17.09 ml  LVESV MOD A4C: 17.79 ml  LVESV MOD BP: 18.73 ml  LVIDd: 4.69 cm  LVIDs: 2.46 cm  LVLd A2C: 6.97 cm  LVLd A4C: 7.74 cm  LVLs A2C: 5.23 cm  LVLs A4C: 6.13 cm  LVPWd: 1.23 cm  RA Area: 14.52 cm2  RV Diam.: 2.86 cm  SI(Cube): 36.73 ml/m2  SI(Teich): 33.48 ml/m2  SV MOD A2C: 32.64 ml  SV MOD A4C: 43.98 ml  SV(Cube): 88.15 ml  SV(Teich): 80.35 ml    MM  TAPSE: 3.26 cm    PW  E': 0.08 m/s    [facility] [place] [facility] Accredited Echocardiography Laboratory    Prepared and electronically signed by    Torrey Duenas MD  Signed 26-Sep-2017 12:55:39    Results for orders placed during the hospital encounter of 05/02/23    Echo complete w/ contrast if indicated    Interpretation Summary    Left Ventricle: Left ventricular cavity size is mildly dilated. Wall thickness is mildly increased. The left ventricular ejection fraction is 10-15%. Systolic function is severely reduced. There is severe global hypokinesis, with akinesis of the apex with aneurysm formation. There is no thrombus.    Right Ventricle: Right ventricular cavity size is normal. Systolic function is moderately to severely reduced.    Mitral Valve: There is mild regurgitation.    Tricuspid Valve: There is mild regurgitation. The right ventricular systolic pressure is mildly elevated. The estimated right ventricular systolic pressure is 49.00 mmHg.    Prior TTE study available for comparison. Prior study date: 9/26/2017. Changes noted when compared to prior study. Changes include: LVEF is  now severely reduced. .        Assessment/Plan     Assessment:    Cardiac risk stratification  Patient is well compensated and euvolemic from heart failure standpoint.  Given his history of coronary artery disease and HFrEF he will be at moderate cardiac risk for surgery.  His recent echocardiogram on 8/7/2023 showed EF 40% with aneurysmal LV apex without thrombus.  -Patient is able to perform for 4 METS however does not exert himself hard at home  -No further preoperative cardiac workup required    LLE wound  Evaluated and wound care supplied by podiatry.  States positive for tibioperoneal disease, plan for left lower extremity angiogram tomorrow.    CAD s/p HANNA to LCx 2015, repeat cath 2023 with  mid LAD and L PDA  Stable coronary disease at this point, no chest pain or signs of decompensation.  No revascularization done of LAD or LPDA and patient has recovered EF while on GDMT.  On aspirin and statin.    HFrEF (EF 40% 8/7/2023, recovered from 10-15%) 2/2 ischemic cardiomyopathy  Thought to be ischemic secondary to coronary disease above.  No signs of decompensated heart failure at this time and appears euvolemic.    VT s/p ICD  Scar mediated VT on presentation in May 2023.  On amiodarone 200 mg daily and status post dual-chamber ICD.    A-fib-sinus rhythm  PAD -management as above  T2DM-management as per primary team  HTN-GDMT as above  Hx tobacco use-no longer smoking      Plan:  - no further preoperative cardiac workup required he is euvolemic and well compensated he would remain moderate cardiac risk for surgery given his history and would recommend cardiac anesthesia and minimal sedation as noted by vascular surgery. Patient has dual chamber ICD in place.  - continue GDMT with toprol 25mg PO BID, spironalactone 12.5mg Daily  - okay to hold jardiance for procedure, ensure this is restarted at discharge  - not on diuretic at home, can dose PRN if needed  - continue aspirin 81mg daily and atorvastatin 80mg  daily for his CAD  - continue amiodarone 200mg daily with Hx of VT  - antibiotics as per primary team  - PAD intervention as per vascular surgery      Case discussed and reviewed with Dr. Xavier who agrees with my assessment and plan.    Thank you for involving us in the care of your patient.      Albaro Obrien,   Cardiology Fellow   PGY-4      ==========================================================================================    Epic/ Allscripts/Care Everywhere records reviewed    ** Please Note: Fluency DirectDictation voice to text software may have been used in the creation of this document. **

## 2024-01-17 NOTE — TELEMEDICINE
e-Consult (IPC)  - Interventional Radiology  Nadir Myers 63 y.o. male MRN: [id number]  Unit/Bed#: NW8 878-01 Encounter: [id number]          Interventional Radiology has been consulted to evaluate Nadir Myers    We were consulted by vascular surgery concerning this patient with nonhealing wounds of the left lower extremity.  Patient is a 63-year-old male with a history of smoking diabetes hypertension MI coronary artery disease hep C.  Prior history of cardiomyopathy as well.  He was admitted to the hospital yesterday due to an open wound of the left foot between the fourth and fifth digits.  Arterial duplex of the left lower extremity demonstrates diffuse disease in the femoropopliteal arteries as well as stenosis of the SFA.  Great toe pressure is marginal for healing.    Interventional radiology has been asked to evaluate the patient for left lower extremity angiogram with intervention.    Inpatient Consult to IR  Consult performed by: Major Weinstein PA-C  Consult ordered by: Dayanara Martino MD        01/17/24    Assessment/Recommendation:     Peripheral Artery Disease LLE, Non Healing Wounds  -Plan for left lower extremity angiogram pending IR scheduling and availability, tentatively tomorrow  -Anticipate diffuse disease, tibial artery calcification seen on x-ray imaging  -N.p.o. midnight  -Reviewed with Dr. Adamson        11-20 minutes, >50% of the total time devoted to medical consultative verbal/EMR discussion between providers. Written report will be generated in the EMR.     Thank you for allowing Interventional Radiology to participate in the care of Nadir Myers. Please don't hesitate to call or TigerText us with any questions.     Major Weinstein PA-C

## 2024-01-17 NOTE — ASSESSMENT & PLAN NOTE
Presenting with worsening of wound of left fourth toe with surrounding erythema.  Patient otherwise afebrile and nontoxic-appearing.  Concern for surrounding cellulitis given appearance  Seen by podiatry during ED evaluation, local wound care provided with cultures obtained with IV antibiotics advised.  No acute operative intervention but to undergo LEADs, may need intervention this admission.  Appreciate ongoing podiatry recommendations  Received IV cefazolin during ED evaluation, continue and will add on Flagyl given appearance of wound. Follow up wound culture  Local wound care as per podiatry  LEADs demonstrating PAD on left side. Vascular surgery was consulted. IR for tentative a gram 1/18  Given his significant cardiac history, patient does not feel comfortable undergoing any procedure without the clearance of his Cardiologist. Cardiology consult placed.   Trend WBC, temperature curve, hemodynamics

## 2024-01-17 NOTE — CASE MANAGEMENT
Case Management Assessment & Discharge Planning Note    Patient name Nadir Myers  Location NW8 878/NW8 878-01 MRN [id number]  : 1960 Date 2024       Current Admission Date: 1/15/2024  Current Admission Diagnosis:Cellulitis of left foot   Patient Active Problem List    Diagnosis Date Noted    Cellulitis of left foot 2024    Type 2 diabetes mellitus with foot ulcer, with long-term current use of insulin ([facility]) 2024    Coronary artery disease involving native coronary artery of native heart without angina pectoris 2024    PAD (peripheral artery disease) ([facility]) 2024    Chronic systolic CHF (congestive heart failure), NYHA class 2 ([facility]) 2023    Localized swelling on left hand 2023    Hyponatremia 2023    Acidosis 2023    V-tach ([facility]) 2023    Ischemic cardiomyopathy 2023    Essential (primary) hypertension 2023    Acute HFrEF 2023    A-fib ([facility]) 2023    Benign prostatic hyperplasia without lower urinary tract symptoms 2018    3-vessel CAD 2015    Type 2 diabetes mellitus, with long-term current use of insulin ([facility]) 2015    GERD (gastroesophageal reflux disease) 2013    Tobacco dependence syndrome 2012      LOS (days): 2  Geometric Mean LOS (GMLOS) (days): 3  Days to GMLOS:1.5     OBJECTIVE:    Risk of Unplanned Readmission Score: 11.2         Current admission status: Inpatient       Preferred Pharmacy:   [facility] [place] [facility] [address]  [address] [facility] [address]  Phone: [phone] Fax: [phone]    [facility]  [place], [place] - [address]  [address]  [place] [address]  Phone: [phone] Fax: [phone]    Primary Care Provider: Tigre Hare DO    Primary Insurance: [facility] REP  Secondary Insurance:     ASSESSMENT:  Active Health Care Proxies       Rhoda Myers Health Care Representative -  Spouse   Primary Phone: [phone] (Mobile)                 Advance Directives  Does patient have a Health Care POA?: No  Was patient offered paperwork?: Yes (THEY ARE WORKING ON POA  ALREADY)  Does patient have Advance Directives?: No  Was patient offered paperwork?: Yes (THEY ARE WORKING ON POA ALREADY)  Primary Contact: Rhoda Myers (Spouse)         Readmission Root Cause  30 Day Readmission: No    Patient Information  Admitted from:: Home  Mental Status: Alert  During Assessment patient was accompanied by: Not accompanied during assessment  Assessment information provided by:: Patient  Primary Caregiver: Self  Support Systems: Self, Spouse/significant other, Family members  County of Residence: [place]  What city do you live in?: [place]  Home entry access options. Select all that apply.: Stairs  Number of steps to enter home.: 3  Do the steps have railings?: Yes  Type of Current Residence: 2 story home  Upon entering residence, is there a bedroom on the main floor (no further steps)?: No  A bedroom is located on the following floor levels of residence (select all that apply):: 2nd Floor  Upon entering residence, is there a bathroom on the main floor (no further steps)?: Yes  Living Arrangements: Lives w/ Spouse/significant other  Is patient a ?: No    Activities of Daily Living Prior to Admission  Functional Status: Independent  Completes ADLs independently?: Yes  Ambulates independently?: Yes  Does patient use assisted devices?: No  Does patient currently own DME?: Yes  What DME does the patient currently own?: [employer]  Does patient have a history of Outpatient Therapy (PT/OT)?: Yes ([facility])  Does the patient have a history of Short-Term Rehab?: No  Does patient have a history of HHC?: Yes ()  Does patient currently have HHC?: No         Patient Information Continued  Income Source: Employed  Does patient have prescription coverage?: Yes  Does patient receive dialysis treatments?: No  Does  patient have a history of substance abuse?: No  Does patient have a history of Mental Health Diagnosis?: No         Means of Transportation  Means of Transport to Appts:: Drives Self      Housing Stability: Low Risk  (1/17/2024)    Housing Stability Vital Sign     Unable to Pay for Housing in the Last Year: No     Number of Places Lived in the Last Year: 1     Unstable Housing in the Last Year: No   Food Insecurity: No Food Insecurity (1/17/2024)    Hunger Vital Sign     Worried About Running Out of Food in the Last Year: Never true     Ran Out of Food in the Last Year: Never true   Transportation Needs: No Transportation Needs (1/17/2024)    PRAPARE - Transportation     Lack of Transportation (Medical): No     Lack of Transportation (Non-Medical): No   Utilities: Not At Risk (1/17/2024)    [facility] Utilities     Threatened with loss of utilities: No       DISCHARGE DETAILS:    Discharge planning discussed with:: Patient  Freedom of Choice: Yes  Comments - Freedom of Choice: pending any needs  CM contacted family/caregiver?: No- see comments  Were Treatment Team discharge recommendations reviewed with patient/caregiver?: Yes  Did patient/caregiver verbalize understanding of patient care needs?: Yes  Were patient/caregiver advised of the risks associated with not following Treatment Team discharge recommendations?: Yes    Contacts  Patient Contacts: wife Rhoda Myers  Relationship to Patient:: Family  Contact Method: Phone  Phone Number: [phone]  Reason/Outcome: Continuity of Care, Emergency Contact, Discharge Planning     ..CM reviewed d/c planning process including the following: identifying help at home, patient preference for d/c planning needs, Discharge Lounge, [facility] Med[facility] to Bed program, availability of treatment team to discuss questions or concerns patient and/or family may have regarding understanding medications and recognizing signs and symptoms once discharged.  CM also encouraged patient to follow  up with all recommended appointments after discharge. Patient advised of importance for patient and family to participate in managing patient’s medical well being.

## 2024-01-17 NOTE — ASSESSMENT & PLAN NOTE
Lab Results   Component Value Date    HGBA1C 9.1 (H) 01/16/2024       Recent Labs     01/16/24  1148 01/16/24  1621 01/16/24  2103 01/17/24  0821   POCGLU 211* 137 252* 199*         Blood Sugar Average: Last 72 hrs:  (P) 190.5  Uncontrolled.  Patient admits to dietary indiscretion last few months.  Continue Levemir 20 units every morning and add correctional insulin coverage with Accu-Cheks while admitted.  Hold home oral medications.  Adjust inpatient insulin regimen as needed.  Initiate hypoglycemia protocol   Carb controlled diet reviewed with patient  Initiate hypoglycemia protocol

## 2024-01-17 NOTE — PLAN OF CARE

## 2024-01-17 NOTE — ASSESSMENT & PLAN NOTE
Noted in LLE with LEADS  Boarderline healing pressure  Vascular surgery consulted  Tentative a gram in IR 1/18

## 2024-01-17 NOTE — ED ATTENDING ATTESTATION
1/15/2024  I, Jose Carlos Tran MD, saw and evaluated the patient. I have discussed the patient with the resident/non-physician practitioner and agree with the resident's/non-physician practitioner's findings, Plan of Care, and MDM as documented in the resident's/non-physician practitioner's note, except where noted. All available labs and Radiology studies were reviewed.  I was present for key portions of any procedure(s) performed by the resident/non-physician practitioner and I was immediately available to provide assistance.       At this point I agree with the current assessment done in the Emergency Department.  I have conducted an independent evaluation of this patient a history and physical is as follows:    ED Course       63-year-old male presenting with open ulcer between fourth and fifth toe on left foot.  Referred in by PCP for evaluation.  Patient states wound presented yesterday has been increasing worse over since denies pain directly to the ulcer itself but does have pain over dorsum of his foot with erythema present.    Left foot: Ulcer present lateral aspect of fourth toe no active drainage small area of necrosis please refer to attached media on chart.  Erythema present over fourth toe as well as dorsum of foot with tenderness and warmth.    Impression: Left foot ulcer  Differential diagnosis: Infected ulcer, cellulitis, osteomyelitis    Plan to check CMP CBC sed rate CRP x-ray of foot will consult podiatry anticipate admission    Labs reviewed CBC remarkable for mild leukocytosis sed rate elevated CRP elevated CMP unremarkable    X-rays of left foot independently interpreted by me no acute fracture    Case discussed with podiatry recommends admission to medicine service for further evaluation management    Critical Care Time  Procedures

## 2024-01-17 NOTE — PROGRESS NOTES
[facility]  Progress Note  Name: Nadir Myers I  MRN: [id number]  Unit/Bed#: NW8 878-01 I Date of Admission: 1/15/2024   Date of Service: 1/17/2024 I Hospital Day: 2    Assessment/Plan   * Cellulitis of left foot  Assessment & Plan  Presenting with worsening of wound of left fourth toe with surrounding erythema.  Patient otherwise afebrile and nontoxic-appearing.  Concern for surrounding cellulitis given appearance  Seen by podiatry during ED evaluation, local wound care provided with cultures obtained with IV antibiotics advised.  No acute operative intervention but to undergo LEADs, may need intervention this admission.  Appreciate ongoing podiatry recommendations  Received IV cefazolin during ED evaluation, continue and will add on Flagyl given appearance of wound. Follow up wound culture  Local wound care as per podiatry  LEADs demonstrating PAD on left side. Vascular surgery was consulted. IR for tentative a gram 1/18  Given his significant cardiac history, patient does not feel comfortable undergoing any procedure without the clearance of his Cardiologist. Cardiology consult placed.   Trend WBC, temperature curve, hemodynamics    PAD (peripheral artery disease) (HCC)  Assessment & Plan  Noted in LLE with LEADS  Boarderline healing pressure  Vascular surgery consulted  Tentative a gram in IR 1/18    Type 2 diabetes mellitus with foot ulcer, with long-term current use of insulin (HCC)  Assessment & Plan  Lab Results   Component Value Date    HGBA1C 9.1 (H) 01/16/2024       Recent Labs     01/16/24  1148 01/16/24  1621 01/16/24  2103 01/17/24  0821   POCGLU 211* 137 252* 199*         Blood Sugar Average: Last 72 hrs:  (P) 190.5  Uncontrolled.  Patient admits to dietary indiscretion last few months.  Continue Levemir 20 units every morning and add correctional insulin coverage with Accu-Cheks while admitted.  Hold home oral medications.  Adjust inpatient insulin regimen as  needed.  Initiate hypoglycemia protocol   Carb controlled diet reviewed with patient  Initiate hypoglycemia protocol    Chronic systolic CHF (congestive heart failure), NYHA class 2 ([facility])  Assessment & Plan  Wt Readings from Last 3 Encounters:   11/13/23 101 kg (222 lb)   10/16/23 98.9 kg (218 lb)   08/07/23 93.9 kg (207 lb)     History of ischemic cardiomyopathy recent EF 40%  Does not appear grossly overloaded, continue cardiac medications and monitor volume status closely          Coronary artery disease involving native coronary artery of native heart without angina pectoris  Assessment & Plan  S/p prior stenting   Currently chest pain-free, continue ASA, beta-blocker, statin      V-tach ([facility])  Assessment & Plan  Maintained on amiodarone 200 mg daily and metoprolol succinate 25 mg twice daily, continue             VTE Pharmacologic Prophylaxis: VTE Score: 4 Moderate Risk (Score 3-4) - Pharmacological DVT Prophylaxis Ordered: enoxaparin (Lovenox).    Mobility:   Basic Mobility Inpatient Raw Score: 24  JH-HLM Goal: 8: Walk 250 feet or more  JH-HLM Achieved: 8: Walk 250 feet ot more  HLM Goal achieved. Continue to encourage appropriate mobility.    Patient Centered Rounds: I performed bedside rounds with nursing staff today.   Discussions with Specialists or Other Care Team Provider: Cardiology    Education and Discussions with Family / Patient: Updated  (wife) at bedside.    Total Time Spent on Date of Encounter in care of patient: 35 mins. This time was spent on one or more of the following: performing physical exam; counseling and coordination of care; obtaining or reviewing history; documenting in the medical record; reviewing/ordering tests, medications or procedures; communicating with other healthcare professionals and discussing with patient's family/caregivers.    Current Length of Stay: 2 day(s)  Current Patient Status: Inpatient   Certification Statement: The patient will continue to  require additional inpatient hospital stay due to a gram  Discharge Plan: Anticipate discharge in >72 hrs to home.    Code Status: Level 1 - Full Code    Subjective:   Offers no specific complaints. Foot looks about the same. Denies pain.    Objective:     Vitals:   Temp (24hrs), Av.1 °F (36.7 °C), Min:98 °F (36.7 °C), Max:98.2 °F (36.8 °C)    Temp:  [98 °F (36.7 °C)-98.2 °F (36.8 °C)] 98.2 °F (36.8 °C)  HR:  [75-83] 75  Resp:  [18-20] 18  BP: (103-120)/(63-77) 120/68  SpO2:  [91 %-97 %] 97 %  There is no height or weight on file to calculate BMI.     Input and Output Summary (last 24 hours):     Intake/Output Summary (Last 24 hours) at 2024 1052  Last data filed at 2024 0930  Gross per 24 hour   Intake 480 ml   Output --   Net 480 ml       Physical Exam:   Physical Exam  Vitals and nursing note reviewed.   Constitutional:       General: He is not in acute distress.     Appearance: He is well-developed.   HENT:      Head: Normocephalic and atraumatic.   Eyes:      Conjunctiva/sclera: Conjunctivae normal.   Cardiovascular:      Rate and Rhythm: Normal rate and regular rhythm.      Heart sounds: No murmur heard.  Pulmonary:      Effort: Pulmonary effort is normal. No respiratory distress.      Breath sounds: Normal breath sounds.   Abdominal:      Palpations: Abdomen is soft.      Tenderness: There is no abdominal tenderness.   Musculoskeletal:         General: No swelling.      Cervical back: Neck supple.   Skin:     General: Skin is warm and dry.   Neurological:      Mental Status: He is alert.   Psychiatric:         Mood and Affect: Mood normal.          Additional Data:     Labs:  Results from last 7 days   Lab Units 24  0455 24  0544 01/15/24  2042   WBC Thousand/uL 8.06   < > 11.91*   HEMOGLOBIN g/dL 14.3   < > 15.8   HEMATOCRIT % 44.2   < > 49.2   PLATELETS Thousands/uL 203   < > 244   NEUTROS PCT %  --   --  75   LYMPHS PCT %  --   --  18   MONOS PCT %  --   --  6   EOS PCT %  --   --   1    < > = values in this interval not displayed.     Results from last 7 days   Lab Units 01/17/24  0455 01/16/24  0544 01/15/24  2042   SODIUM mmol/L 137   < > 137   POTASSIUM mmol/L 4.0   < > 4.3   CHLORIDE mmol/L 106   < > 102   CO2 mmol/L 24   < > 28   BUN mg/dL 18   < > 23   CREATININE mg/dL 0.81   < > 0.91   ANION GAP mmol/L 7   < > 7   CALCIUM mg/dL 9.3   < > 10.3*   ALBUMIN g/dL  --   --  4.4   TOTAL BILIRUBIN mg/dL  --   --  0.43   ALK PHOS U/L  --   --  61   ALT U/L  --   --  15   AST U/L  --   --  12*   GLUCOSE RANDOM mg/dL 169*   < > 263*    < > = values in this interval not displayed.         Results from last 7 days   Lab Units 01/17/24  0821 01/16/24  2103 01/16/24  1621 01/16/24  1148 01/16/24  0807 01/15/24  2343   POC GLUCOSE mg/dl 199* 252* 137 211* 170* 174*     Results from last 7 days   Lab Units 01/16/24  0544   HEMOGLOBIN A1C % 9.1*           Lines/Drains:  Invasive Devices       Peripheral Intravenous Line  Duration             Peripheral IV 01/15/24 Left Antecubital 1 day                          Imaging: Reviewed radiology reports from this admission including: ultrasound(s)    Recent Cultures (last 7 days):   Results from last 7 days   Lab Units 01/15/24  2247   GRAM STAIN RESULT  No polys seen*  3+ Gram positive cocci in clusters*  1+ Gram Positive Rods Resembling Diphtheroids*       Last 24 Hours Medication List:   Current Facility-Administered Medications   Medication Dose Route Frequency Provider Last Rate    acetaminophen  650 mg Oral Q6H PRN Harley Sneed, DO      amiodarone  200 mg Oral Daily With Breakfast Harley Sneed, DO      aspirin  81 mg Oral Daily Harley Sneed, DO      atorvastatin  80 mg Oral After Dinner Harley Sneed, DO      cefazolin  2,000 mg Intravenous Q8H Harley Sneed DO 2,000 mg (01/17/24 0523)    enoxaparin  40 mg Subcutaneous Daily Harley Sneed, DO      insulin detemir  20 Units Subcutaneous Daily Harley Sneed, DO      insulin lispro  1-5 Units  Subcutaneous HS Harley Sneed,       insulin lispro  1-6 Units Subcutaneous TID AC Harley Sneed,       metoprolol succinate  25 mg Oral Q12H Harley Sneed DO      metroNIDAZOLE  500 mg Oral Q8H Harley Sneed DO      ondansetron  4 mg Intravenous Q6H PRN Harley Sneed DO      spironolactone  12.5 mg Oral Daily Harley Sneed DO          Today, Patient Was Seen By: Toyin Lindsay PA-C    **Please Note: This note may have been constructed using a voice recognition system.**

## 2024-01-18 ENCOUNTER — APPOINTMENT (INPATIENT)
Dept: RADIOLOGY | Facility: HOSPITAL | Age: 64
DRG: 253 | End: 2024-01-18
Payer: COMMERCIAL

## 2024-01-18 ENCOUNTER — PATIENT OUTREACH (OUTPATIENT)
Dept: CARDIOLOGY CLINIC | Facility: CLINIC | Age: 64
End: 2024-01-18

## 2024-01-18 LAB
ANION GAP SERPL CALCULATED.3IONS-SCNC: 10 MMOL/L
BACTERIA WND AEROBE CULT: ABNORMAL
BACTERIA WND AEROBE CULT: ABNORMAL
BUN SERPL-MCNC: 17 MG/DL (ref 5–25)
CALCIUM SERPL-MCNC: 9.3 MG/DL (ref 8.4–10.2)
CHLORIDE SERPL-SCNC: 104 MMOL/L (ref 96–108)
CO2 SERPL-SCNC: 24 MMOL/L (ref 21–32)
CREAT SERPL-MCNC: 0.82 MG/DL (ref 0.6–1.3)
ERYTHROCYTE [DISTWIDTH] IN BLOOD BY AUTOMATED COUNT: 13.6 % (ref 11.6–15.1)
GFR SERPL CREATININE-BSD FRML MDRD: 94 ML/MIN/1.73SQ M
GLUCOSE SERPL-MCNC: 156 MG/DL (ref 65–140)
GLUCOSE SERPL-MCNC: 175 MG/DL (ref 65–140)
GLUCOSE SERPL-MCNC: 203 MG/DL (ref 65–140)
GLUCOSE SERPL-MCNC: 291 MG/DL (ref 65–140)
GLUCOSE SERPL-MCNC: 87 MG/DL (ref 65–140)
GRAM STN SPEC: ABNORMAL
HCT VFR BLD AUTO: 46.5 % (ref 36.5–49.3)
HGB BLD-MCNC: 15 G/DL (ref 12–17)
INR PPP: 1.07 (ref 0.84–1.19)
MCH RBC QN AUTO: 28.9 PG (ref 26.8–34.3)
MCHC RBC AUTO-ENTMCNC: 32.3 G/DL (ref 31.4–37.4)
MCV RBC AUTO: 90 FL (ref 82–98)
PLATELET # BLD AUTO: 212 THOUSANDS/UL (ref 149–390)
PMV BLD AUTO: 9.9 FL (ref 8.9–12.7)
POTASSIUM SERPL-SCNC: 4.2 MMOL/L (ref 3.5–5.3)
PROTHROMBIN TIME: 13.8 SECONDS (ref 11.6–14.5)
RBC # BLD AUTO: 5.19 MILLION/UL (ref 3.88–5.62)
SODIUM SERPL-SCNC: 138 MMOL/L (ref 135–147)
WBC # BLD AUTO: 8.03 THOUSAND/UL (ref 4.31–10.16)

## 2024-01-18 PROCEDURE — C1894 INTRO/SHEATH, NON-LASER: HCPCS

## 2024-01-18 PROCEDURE — C1887 CATHETER, GUIDING: HCPCS

## 2024-01-18 PROCEDURE — 99153 MOD SED SAME PHYS/QHP EA: CPT

## 2024-01-18 PROCEDURE — C1725 CATH, TRANSLUMIN NON-LASER: HCPCS

## 2024-01-18 PROCEDURE — 99152 MOD SED SAME PHYS/QHP 5/>YRS: CPT | Performed by: RADIOLOGY

## 2024-01-18 PROCEDURE — 75625 CONTRAST EXAM ABDOMINL AORTA: CPT

## 2024-01-18 PROCEDURE — 80048 BASIC METABOLIC PNL TOTAL CA: CPT | Performed by: PHYSICIAN ASSISTANT

## 2024-01-18 PROCEDURE — 75630 X-RAY AORTA LEG ARTERIES: CPT

## 2024-01-18 PROCEDURE — 04JY3ZZ INSPECTION OF LOWER ARTERY, PERCUTANEOUS APPROACH: ICD-10-PCS | Performed by: RADIOLOGY

## 2024-01-18 PROCEDURE — C1769 GUIDE WIRE: HCPCS

## 2024-01-18 PROCEDURE — 36140 INTRO NDL ICATH UPR/LXTR ART: CPT

## 2024-01-18 PROCEDURE — 75625 CONTRAST EXAM ABDOMINL AORTA: CPT | Performed by: RADIOLOGY

## 2024-01-18 PROCEDURE — 37228 PR REVSC OPN/PRQ TIB/PERO W/ANGIOPLASTY UNI: CPT | Performed by: RADIOLOGY

## 2024-01-18 PROCEDURE — C1760 CLOSURE DEV, VASC: HCPCS

## 2024-01-18 PROCEDURE — 76937 US GUIDE VASCULAR ACCESS: CPT

## 2024-01-18 PROCEDURE — 85027 COMPLETE CBC AUTOMATED: CPT | Performed by: PHYSICIAN ASSISTANT

## 2024-01-18 PROCEDURE — 99232 SBSQ HOSP IP/OBS MODERATE 35: CPT | Performed by: NURSE PRACTITIONER

## 2024-01-18 PROCEDURE — 85610 PROTHROMBIN TIME: CPT | Performed by: RADIOLOGY

## 2024-01-18 PROCEDURE — 75710 ARTERY X-RAYS ARM/LEG: CPT | Performed by: RADIOLOGY

## 2024-01-18 PROCEDURE — 99152 MOD SED SAME PHYS/QHP 5/>YRS: CPT

## 2024-01-18 PROCEDURE — 37232 PR REVSC OPN/PRQ TIB/PERO W/ANGIOPLASTY UNI EA VSL: CPT | Performed by: RADIOLOGY

## 2024-01-18 PROCEDURE — 37224 PR REVSC OPN/PRG FEM/POP W/ANGIOPLASTY UNI: CPT | Performed by: RADIOLOGY

## 2024-01-18 PROCEDURE — 82948 REAGENT STRIP/BLOOD GLUCOSE: CPT

## 2024-01-18 PROCEDURE — C2623 CATH, TRANSLUMIN, DRUG-COAT: HCPCS

## 2024-01-18 RX ORDER — LIDOCAINE WITH 8.4% SOD BICARB 0.9%(10ML)
SYRINGE (ML) INJECTION AS NEEDED
Status: COMPLETED | OUTPATIENT
Start: 2024-01-18 | End: 2024-01-18

## 2024-01-18 RX ORDER — HEPARIN SODIUM 1000 [USP'U]/ML
INJECTION, SOLUTION INTRAVENOUS; SUBCUTANEOUS AS NEEDED
Status: COMPLETED | OUTPATIENT
Start: 2024-01-18 | End: 2024-01-18

## 2024-01-18 RX ORDER — MIDAZOLAM HYDROCHLORIDE 2 MG/2ML
INJECTION, SOLUTION INTRAMUSCULAR; INTRAVENOUS AS NEEDED
Status: COMPLETED | OUTPATIENT
Start: 2024-01-18 | End: 2024-01-18

## 2024-01-18 RX ORDER — FENTANYL CITRATE 50 UG/ML
INJECTION, SOLUTION INTRAMUSCULAR; INTRAVENOUS AS NEEDED
Status: COMPLETED | OUTPATIENT
Start: 2024-01-18 | End: 2024-01-18

## 2024-01-18 RX ORDER — IODIXANOL 320 MG/ML
200 INJECTION, SOLUTION INTRAVASCULAR
Status: COMPLETED | OUTPATIENT
Start: 2024-01-18 | End: 2024-01-18

## 2024-01-18 RX ADMIN — ATORVASTATIN CALCIUM 80 MG: 80 TABLET, FILM COATED ORAL at 17:31

## 2024-01-18 RX ADMIN — INSULIN LISPRO 3 UNITS: 100 INJECTION, SOLUTION INTRAVENOUS; SUBCUTANEOUS at 20:49

## 2024-01-18 RX ADMIN — MIDAZOLAM 0.5 MG: 1 INJECTION INTRAMUSCULAR; INTRAVENOUS at 15:28

## 2024-01-18 RX ADMIN — IODIXANOL 88 ML: 320 INJECTION, SOLUTION INTRAVASCULAR at 17:05

## 2024-01-18 RX ADMIN — METRONIDAZOLE 500 MG: 500 TABLET ORAL at 09:35

## 2024-01-18 RX ADMIN — HEPARIN SODIUM 5000 UNITS: 1000 INJECTION INTRAVENOUS; SUBCUTANEOUS at 15:12

## 2024-01-18 RX ADMIN — MIDAZOLAM 1 MG: 1 INJECTION INTRAMUSCULAR; INTRAVENOUS at 15:06

## 2024-01-18 RX ADMIN — HEPARIN SODIUM 2000 UNITS: 1000 INJECTION INTRAVENOUS; SUBCUTANEOUS at 16:12

## 2024-01-18 RX ADMIN — CEFAZOLIN SODIUM 2000 MG: 2 SOLUTION INTRAVENOUS at 17:53

## 2024-01-18 RX ADMIN — FENTANYL CITRATE 25 MCG: 50 INJECTION INTRAMUSCULAR; INTRAVENOUS at 15:58

## 2024-01-18 RX ADMIN — Medication 10 ML: at 15:08

## 2024-01-18 RX ADMIN — MIDAZOLAM 0.5 MG: 1 INJECTION INTRAMUSCULAR; INTRAVENOUS at 15:58

## 2024-01-18 RX ADMIN — FENTANYL CITRATE 25 MCG: 50 INJECTION INTRAMUSCULAR; INTRAVENOUS at 15:28

## 2024-01-18 RX ADMIN — METOPROLOL SUCCINATE 25 MG: 25 TABLET, EXTENDED RELEASE ORAL at 23:31

## 2024-01-18 RX ADMIN — METOPROLOL SUCCINATE 25 MG: 25 TABLET, EXTENDED RELEASE ORAL at 12:18

## 2024-01-18 RX ADMIN — METRONIDAZOLE 500 MG: 500 TABLET ORAL at 17:31

## 2024-01-18 RX ADMIN — AMIODARONE HYDROCHLORIDE 200 MG: 200 TABLET ORAL at 09:36

## 2024-01-18 RX ADMIN — INSULIN DETEMIR 20 UNITS: 100 INJECTION, SOLUTION SUBCUTANEOUS at 12:18

## 2024-01-18 RX ADMIN — CEFAZOLIN SODIUM 2000 MG: 2 SOLUTION INTRAVENOUS at 05:24

## 2024-01-18 RX ADMIN — METRONIDAZOLE 500 MG: 500 TABLET ORAL at 23:31

## 2024-01-18 RX ADMIN — FENTANYL CITRATE 50 MCG: 50 INJECTION INTRAMUSCULAR; INTRAVENOUS at 15:05

## 2024-01-18 NOTE — ASSESSMENT & PLAN NOTE
Presented with worsening left fourth toe wound with surrounding erythema.  Patient was otherwise afebrile and nontoxic-appearing.  Concern for surrounding cellulitis given appearance  Seen by podiatry during ED evaluation, local wound care provided with cultures obtained and IV antibiotics advised.  No acute operative intervention but to undergo LEADs, may need intervention this admission.  Appreciate ongoing podiatry recommendations  Wound culture + 3 staph- c/w cefazolin and flagyl   Local wound care as per podiatry  LEADs demonstrating PAD on left side. Vascular surgery was consulted. IR for a gram 1/18  Given his significant cardiac history, patient does not feel comfortable undergoing any procedure without the clearance of his Cardiologist. Cardiology consult appreciated  Trend WBC, temperature curve, hemodynamics

## 2024-01-18 NOTE — ASSESSMENT & PLAN NOTE
S/p prior stenting   Currently chest pain-free, continue ASA, beta-blocker, statin   cross mid abd. Stool LLQ.

## 2024-01-18 NOTE — ASSESSMENT & PLAN NOTE
Noted in LLE with LEADS  Borderline healing pressure  Vascular surgery consulted  a gram in IR 1/18

## 2024-01-18 NOTE — PROGRESS NOTES
[facility]  Progress Note  Name: Nadir Myers I  MRN: [id number]  Unit/Bed#: NW8 878-01 I Date of Admission: 1/15/2024   Date of Service: 1/18/2024 I Hospital Day: 3    Assessment/Plan   * Cellulitis of left foot  Assessment & Plan  Presented with worsening left fourth toe wound with surrounding erythema.  Patient was otherwise afebrile and nontoxic-appearing.  Concern for surrounding cellulitis given appearance  Seen by podiatry during ED evaluation, local wound care provided with cultures obtained and IV antibiotics advised.  No acute operative intervention but to undergo LEADs, may need intervention this admission.  Appreciate ongoing podiatry recommendations  Wound culture + 3 staph- c/w cefazolin and flagyl   Local wound care as per podiatry  LEADs demonstrating PAD on left side. Vascular surgery was consulted. IR for a gram 1/18  Given his significant cardiac history, patient does not feel comfortable undergoing any procedure without the clearance of his Cardiologist. Cardiology consult appreciated  Trend WBC, temperature curve, hemodynamics    PAD (peripheral artery disease) (HCC)  Assessment & Plan  Noted in LLE with LEADS  Borderline healing pressure  Vascular surgery consulted  a gram in IR 1/18    Coronary artery disease involving native coronary artery of native heart without angina pectoris  Assessment & Plan  S/p prior stenting   Currently chest pain-free, continue ASA, beta-blocker, statin    Type 2 diabetes mellitus with foot ulcer, with long-term current use of insulin (HCC)  Assessment & Plan  Lab Results   Component Value Date    HGBA1C 9.1 (H) 01/16/2024       Recent Labs     01/17/24  1146 01/17/24  1647 01/17/24  2101 01/18/24  0753   POCGLU 258* 184* 332* 175*       Blood Sugar Average: Last 72 hrs:  (P) 209.2  Uncontrolled.  Patient admits to dietary indiscretion last few months.  Continue Levemir 20 units every morning and add correctional insulin  coverage with Accu-Cheks while admitted.  Hold home oral medications.  Adjust inpatient insulin regimen as needed.  Initiate hypoglycemia protocol   Carb controlled diet reviewed with patient  Initiate hypoglycemia protocol    Chronic systolic CHF (congestive heart failure), NYHA class 2 ([facility])  Assessment & Plan  Wt Readings from Last 3 Encounters:   11/13/23 101 kg (222 lb)   10/16/23 98.9 kg (218 lb)   08/07/23 93.9 kg (207 lb)     History of ischemic cardiomyopathy recent EF 40%  Does not appear grossly overloaded, continue cardiac medications and monitor volume status closely    V-tach ([facility])  Assessment & Plan  Maintained on amiodarone 200 mg daily and metoprolol succinate 25 mg twice daily, continue           VTE Pharmacologic Prophylaxis: VTE Score: 4 Moderate Risk (Score 3-4) - Pharmacological DVT Prophylaxis Ordered: enoxaparin (Lovenox).    Mobility:   Basic Mobility Inpatient Raw Score: 24  JH-HLM Goal: 8: Walk 250 feet or more  JH-HLM Achieved: 8: Walk 250 feet ot more  HLM Goal achieved. Continue to encourage appropriate mobility.    Patient Centered Rounds: I performed bedside rounds with nursing staff today.   Discussions with Specialists or Other Care Team Provider: d/w RN     Education and Discussions with Family / Patient: Patient declined call to .     Total Time Spent on Date of Encounter in care of patient: 35 mins. This time was spent on one or more of the following: performing physical exam; counseling and coordination of care; obtaining or reviewing history; documenting in the medical record; reviewing/ordering tests, medications or procedures; communicating with other healthcare professionals and discussing with patient's family/caregivers.    Current Length of Stay: 3 day(s)  Current Patient Status: Inpatient   Certification Statement: The patient will continue to require additional inpatient hospital stay due to agram  Discharge Plan: Anticipate discharge in 24-48 hrs to  home.    Code Status: Level 1 - Full Code    Subjective:   Patient lying in bed, reports he is waiting for his upcoming a gram today.  He denies any complaints of pain in his left foot or toe.  He denies all other medical concerns at this time.  No events per RN    Objective:     Vitals:   Temp (24hrs), Av °F (36.7 °C), Min:97.5 °F (36.4 °C), Max:98.3 °F (36.8 °C)    Temp:  [97.5 °F (36.4 °C)-98.3 °F (36.8 °C)] 97.5 °F (36.4 °C)  HR:  [71-81] 72  Resp:  [14-20] 18  BP: (108-132)/(56-74) 109/65  SpO2:  [95 %-97 %] 95 %  There is no height or weight on file to calculate BMI.     Input and Output Summary (last 24 hours):     Intake/Output Summary (Last 24 hours) at 2024 1052  Last data filed at 2024 0555  Gross per 24 hour   Intake 300 ml   Output --   Net 300 ml       Physical Exam:   Physical Exam  Constitutional:       General: He is not in acute distress.     Appearance: He is not ill-appearing.   Cardiovascular:      Rate and Rhythm: Normal rate and regular rhythm.      Pulses: Normal pulses.      Heart sounds: Normal heart sounds. No murmur heard.  Pulmonary:      Effort: No respiratory distress.      Breath sounds: Normal breath sounds. No wheezing or rales.   Abdominal:      General: Bowel sounds are normal. There is no distension.      Palpations: Abdomen is soft.      Tenderness: There is no abdominal tenderness.   Musculoskeletal:         General: No swelling or tenderness.   Skin:     General: Skin is warm and dry.      Findings: No erythema or rash.      Comments: Left foot dressing dry and intact   Neurological:      General: No focal deficit present.      Mental Status: He is alert. Mental status is at baseline.   Psychiatric:         Attention and Perception: Attention normal.         Mood and Affect: Mood normal.          Additional Data:     Labs:  Results from last 7 days   Lab Units 24  0545 24  0544 01/15/24  2042   WBC Thousand/uL 8.03   < > 11.91*   HEMOGLOBIN g/dL 15.0    < > 15.8   HEMATOCRIT % 46.5   < > 49.2   PLATELETS Thousands/uL 212   < > 244   NEUTROS PCT %  --   --  75   LYMPHS PCT %  --   --  18   MONOS PCT %  --   --  6   EOS PCT %  --   --  1    < > = values in this interval not displayed.     Results from last 7 days   Lab Units 01/18/24  0545 01/16/24  0544 01/15/24  2042   SODIUM mmol/L 138   < > 137   POTASSIUM mmol/L 4.2   < > 4.3   CHLORIDE mmol/L 104   < > 102   CO2 mmol/L 24   < > 28   BUN mg/dL 17   < > 23   CREATININE mg/dL 0.82   < > 0.91   ANION GAP mmol/L 10   < > 7   CALCIUM mg/dL 9.3   < > 10.3*   ALBUMIN g/dL  --   --  4.4   TOTAL BILIRUBIN mg/dL  --   --  0.43   ALK PHOS U/L  --   --  61   ALT U/L  --   --  15   AST U/L  --   --  12*   GLUCOSE RANDOM mg/dL 156*   < > 263*    < > = values in this interval not displayed.         Results from last 7 days   Lab Units 01/18/24  0753 01/17/24  2101 01/17/24  1647 01/17/24  1146 01/17/24  0821 01/16/24  2103 01/16/24  1621 01/16/24  1148 01/16/24  0807 01/15/24  2343   POC GLUCOSE mg/dl 175* 332* 184* 258* 199* 252* 137 211* 170* 174*     Results from last 7 days   Lab Units 01/16/24  0544   HEMOGLOBIN A1C % 9.1*           Lines/Drains:  Invasive Devices       Peripheral Intravenous Line  Duration             Peripheral IV 01/15/24 Left Antecubital 2 days                          Imaging: Reviewed radiology reports from this admission including: xray(s)    Recent Cultures (last 7 days):   Results from last 7 days   Lab Units 01/15/24  2247   GRAM STAIN RESULT  No polys seen*  3+ Gram positive cocci in clusters*  1+ Gram Positive Rods Resembling Diphtheroids*   WOUND CULTURE  3+ Growth of Staphylococcus aureus*  2+ Growth of       Last 24 Hours Medication List:   Current Facility-Administered Medications   Medication Dose Route Frequency Provider Last Rate    acetaminophen  650 mg Oral Q6H PRN Harley Naren, DO      amiodarone  200 mg Oral Daily With Breakfast Harley Sneed,       aspirin  81 mg Oral  Daily Harley Sneed, DO      atorvastatin  80 mg Oral After Dinner Harley Sneed, DO      cefazolin  2,000 mg Intravenous Q8H Harley Sneed DO Stopped (01/18/24 0555)    enoxaparin  40 mg Subcutaneous Daily Harley Sneed, DO      insulin detemir  20 Units Subcutaneous Daily Harley Sneed, DO      insulin lispro  1-5 Units Subcutaneous HS Harley Sneed, DO      insulin lispro  1-6 Units Subcutaneous TID AC Harley Sneed, DO      metoprolol succinate  25 mg Oral Q12H Harley Sneed, DO      metroNIDAZOLE  500 mg Oral Q8H Harley Sneed, DO      ondansetron  4 mg Intravenous Q6H PRN Harley Sneed, DO      spironolactone  12.5 mg Oral Daily Harley Sneed DO          Today, Patient Was Seen By: CARLA Reyes    **Please Note: This note may have been constructed using a voice recognition system.**

## 2024-01-18 NOTE — BRIEF OP NOTE (RAD/CATH)
INTERVENTIONAL RADIOLOGY PROCEDURE NOTE    Date: 1/18/2024    Procedure: Left lower extremity angiogram with angioplasty  Procedure Summary       Date:  Room / Location:     Anesthesia Start:  Anesthesia Stop:     Procedure:  Diagnosis:     Scheduled Providers:  Responsible Provider:     Anesthesia Type: Not recorded ASA Status: Not recorded            Preoperative diagnosis:   1. Foot ulcer (HCC)    2. Coronary artery disease involving native coronary artery of native heart without angina pectoris         Postoperative diagnosis: Same.    Surgeon: Jayro Sood MD     Assistant: None. No qualified resident was available.    Blood loss: 20 mL    Specimens: None     Findings:   Abdominal aorta and bilateral iliofemoral arteries showed non flow limiting diffuse atherosclerotic disease.  Left deep femoral artery was patent.  Mild to moderate stenoses in the left SFA treated with 5 mm balloon angioplasty.  High grade stenoses in the tibioperoneal trunk, proximal PT and peroneal arteries treated with 3 mm balloon angioplasty.  Unsuccessful attempt at recanalization of occluded left AT.    Complications: None immediate.    Anesthesia: conscious sedation and local                              Vascular Quality Initiative - Peripheral Vascular Intervention     Urgency: Urgent    Functional Status:  Restricted in physically strenuous activity but ambulatory and able to carry out work of a light or sedentary nature.   Ambulation: Amb = independently ambulatory    Leg Symptoms    Right: Asymptomatic:  documented peripheral arterial disease without symptoms of claudication or ischemic pain      Treatment of Native Artery to Maintain Bypass Patency?:  No  Left: Ulcer/necrosis (gangrene): de abby tissue loss due to peripheral arterial disease, not due to non-healing prior amputation       Tissue Loss Severity: Grade 1, Shallow = small shallow ulcer(s) on distal leg or foot, any exposed bone is only limited to distal phalanx (ie,  minor tissue loss: limb salvage possible with simple digital amputation [1 or 2 digits], or skin coverage).     Infection: Grade 1, Mild = Infection is present and at least two of the following are present: local swelling or induration, erythema >0.5 to 2 cm around ulcer, local tenderness or pain, local warmth, or purulent discharge. Other causes of an inflammatory response of the skin have been excluded (eg, gout, fracture).    COVID Information  COVID Symptoms Pre-Procedure: Asymptomatic    Treatment Delayed by Pandemic: None    Access   Number of Sites: 1     Access Site 1:     Side 1: Right    Site 1: Femoral Retro to Antegrade    Access Guidance 1:U/S    Largest Sheath Size 1: 6 Fr.    Closure Device 1: Perclose      Number of Closure Devices: 1     Closure Device Outcome: Closure device successful         Procedure  Fluoro Time: 26.5 minutes  Contrast Volume: Visipaque 88 ml  DAP: 73 Gy.cm2  CO2: no  Anticoagulant: Heparin  Protamine: No  If Creatinine is > 1.2 or missing, BOBY Prophylaxis none     Treatment Details  Indication: Occlusive Disease,    Completion Assessment  Artery 1 treated: SFA        Left               Outflow: AT,PT,Peroneal: 2                  Was this Site previously treated?: No          TASC Grade: A          Total Treated Length: 20 cm          Total Occluded Length: 0 cm          Calcification: Moderate (calcification on both sides of artery < half length of lesion)          Number of Treatment types (Devices):   2           Device 1          Treatment Type: Plain Balloon         Device 2          Treatment Type: Special Balloon,  Drug Coated Balloon                Diameter: 6 mm          Length: 200 mm          Concomitant: None          Technical result: Successful (stenosis <=30%)      Artery 2 treated: TP Trunk  Left                  Was this Site previously treated?: No          TASC Grade: A          Total Treated Length: 5 cm           Total Occluded Length: 0 cm           Calcification: Mild (calcification on one side of artery > half length of lesion)          Number of Treatment types (Devices):   1           Device 1          Treatment Type: Plain Balloon          Concomitant: None          Technical result: Successful (stenosis <=30%)      Artery 3 treated: Peroneal  Left                     Was this Site previously treated?: No          TASC Grade: A          Total Treated Length: 10 cm           Total Occluded Length: 0 cm          Calcification: Mild (calcification on one side of artery > half length of lesion)          Number of Treatment types (Devices):   1           Device 1          Treatment Type: Plain Balloon          Concomitant: None          Technical result: Successful (stenosis <=30%)      Artery 4 treated: Post Tibial  Left                     Was this Site previously treated?: No          TASC Grade: A          Total Treated Length: 10 cm           Total Occluded Length: 0 cm          Calcification: Mild (calcification on one side of artery > half length of lesion)          Number of Treatment types (Devices):   1           Device 1          Treatment Type: Plain Balloon          Concomitant: None          Technical result: Successful (stenosis <=30%)      None     Post Procedure  Patient currently taking: Statin, Yes      Antiplatelet Medication, Yes    Procedure Complications: No

## 2024-01-18 NOTE — ASSESSMENT & PLAN NOTE
Lab Results   Component Value Date    HGBA1C 9.1 (H) 01/16/2024       Recent Labs     01/17/24  1146 01/17/24  1647 01/17/24  2101 01/18/24  0753   POCGLU 258* 184* 332* 175*       Blood Sugar Average: Last 72 hrs:  (P) 209.2  Uncontrolled.  Patient admits to dietary indiscretion last few months.  Continue Levemir 20 units every morning and add correctional insulin coverage with Accu-Cheks while admitted.  Hold home oral medications.  Adjust inpatient insulin regimen as needed.  Initiate hypoglycemia protocol   Carb controlled diet reviewed with patient  Initiate hypoglycemia protocol

## 2024-01-18 NOTE — PROGRESS NOTES
Patient admitted to [facility]; Advanced Heart Failure Census.     Outpatient Advanced Heart Failure LCSW completed electronic chart review to prepare for rounds with the HF Team.    Referral for outpatient social work not entered at this time.   Please enter referral if outpatient resources are needed in the future.

## 2024-01-18 NOTE — DISCHARGE INSTRUCTIONS
FOOT WOUND DRESSING and CARE:     Please apply Betadine to left foot, fourth toe wound and cover with Adaptic, gauze and secure with Jackie wrap and tape.,  Daily.  Please wear a surgical shoe when up out of bed and walking.  Be sure to keep foot warm and cold weather  And follow-up with podiatry appointment, call to schedule within the next week          ARTERIOGRAM    WHAT YOU SHOULD KNOW:   An angiogram is a procedure to look at arteries in your body. Arteries are the blood vessels that carry blood from your heart to your body.     AFTER YOU LEAVE:     Self-care:   Limit activity: Rest for the remainder of the day of your procedure.Have some one with you until the next morning. Keep your arm or leg straight as much as possible.Rest as much as possible, sitting lying or reclining. Walk only to go to the bathroom, to bed or to eat. If the angiogram catheter was put in your leg, use the stairs as little as possible. No driving for 24-48 hours. No heavy lifting, >10 lbs. Or strenuous activity for 48 hours.      Keep your wound clean and dry. Remove band aid/ dressing tomorrow. You may shower 24 hours after your procedure. Shower and wash groin area or wrist area gently with soap and water: beginning tomorrow. Rinse and pat Dry. Apply new water seal band aid. Repeat this process for 5 days.  If there is any drainage from the puncture site, you should put on a clean bandage. No Powders, creams, lotions or antibiotic ointments for 5 days.  No tub baths, hot tubs or swimming for 5 days.    Watch for bleeding and bruising: It is normal to have a bruise and soreness where the angiogram catheter went in.  Medication: If your angiogram was performed to treat blockages in your leg arteries, it is strongly recommended that you take both an antiplatelet medication (like aspirin or Plavix) to prevent clotting AND a statin drug (like Lipitor or Crestor), even if you have normal cholesterol. If these drugs are not ordered for you  please contact either your Vascular Surgery office or the Interventional Radiology Dept during normal daytime working hours. See Interventional Radiology telephone numbers below.  You Should Have Follow up with the vascular surgeon   call [phone] with questions  Diet:   You may resume your regular diet, Sips of flat soda will help with mild nausea.  Drink more liquids than usual for the next 24 hours      IMMEDIATELY Contact Interventional Radiology at [phone]  if any of the following occur:  If your bruise gets larger or if you notice any active bleeding. APPLY DIRECT PRESSURE TO THE BLEEDING SITE.   If you notice increased swelling or have increased pain at the puncture site   If you have any numbness or pain in the extremity of the puncture site   If that extremity seems cold or pale.    You have fever greater than 101  Persistent nausea or vomitting    Follow up with your primary healthcare provider  as directed: Write down your questions so you remember to ask them during your visits.       Procedural Sedation   WHAT YOU NEED TO KNOW:   Procedural sedation is medicine used during procedures to help you feel relaxed and calm. You will remember little to none of the procedure. After sedation you may feel tired, weak, or unsteady on your feet. You may also have trouble concentrating or short-term memory loss. These symptoms should go away in 24 hours or less.   DISCHARGE INSTRUCTIONS:     Call 911 or have someone else call for any of the following:   You have sudden trouble breathing.     You cannot be woken.      Contact Interventional Radiology at [phone]   (WILDER PATIENTS: Contact Interventional Radiology at [phone]) (BRODY PATIENTS: Contact Interventional Radiology at [phone]) if any of the following occur:     You have a severe headache or dizziness.     Your heart is beating faster than usual.    You have a fever or chills.     Your skin is itchy, swollen, or you have a rash.      You have nausea or are vomiting for more than 8 hours after the procedure.      You have questions or concerns about your condition or care.  Self-care:   Have someone stay with you for 24 hours. This person can drive you to errands and help you do things around the house. This person can also watch for problems.      Rest and do quiet activities for 24 hours. Do not exercise, ride a bike, or play sports. Stand up slowly to prevent dizziness and falls. Take short walks around the house with another person. Slowly return to your usual activities the next day.      Do not drive or use dangerous machines or tools for 24 hours. You may injure yourself or others. Examples include a lawnmower, saw, or drill. Do not return to work for 24 hours if you use dangerous machines or tools for work.      Do not make important decisions for 24 hours. For example, do not sign important papers or invest money.      Drink liquids as directed. Liquids help flush the sedation medicine out of your body. Ask how much liquid to drink each day and which liquids are best for you.      Eat small, frequent meals to prevent nausea and vomiting. Start with clear liquids such as juice or broth. If you do not vomit after clear liquids, you can eat your usual foods.      Do not drink alcohol or take medicines that make you drowsy. This includes medicines that help you sleep and anxiety medicines. Ask your healthcare provider if it is safe for you to take pain medicine.  Follow up with your healthcare provider as directed: Write down your questions so you remember to ask them during your visits.

## 2024-01-18 NOTE — SEDATION DOCUMENTATION
LLE arteriogram successfully completed by Dr. Sood without complications. Tolerated well. Bedrest for 2hrs. Report called to Maritza Duong RN

## 2024-01-18 NOTE — PLAN OF CARE

## 2024-01-19 ENCOUNTER — APPOINTMENT (INPATIENT)
Dept: RADIOLOGY | Facility: HOSPITAL | Age: 64
DRG: 253 | End: 2024-01-19
Payer: COMMERCIAL

## 2024-01-19 ENCOUNTER — APPOINTMENT (INPATIENT)
Dept: NON INVASIVE DIAGNOSTICS | Facility: HOSPITAL | Age: 64
DRG: 253 | End: 2024-01-19
Payer: COMMERCIAL

## 2024-01-19 ENCOUNTER — APPOINTMENT (OUTPATIENT)
Dept: RADIOLOGY | Facility: HOSPITAL | Age: 64
DRG: 253 | End: 2024-01-19
Payer: COMMERCIAL

## 2024-01-19 LAB
ANION GAP SERPL CALCULATED.3IONS-SCNC: 10 MMOL/L
BUN SERPL-MCNC: 18 MG/DL (ref 5–25)
CALCIUM SERPL-MCNC: 9.1 MG/DL (ref 8.4–10.2)
CHLORIDE SERPL-SCNC: 103 MMOL/L (ref 96–108)
CO2 SERPL-SCNC: 24 MMOL/L (ref 21–32)
CREAT SERPL-MCNC: 0.8 MG/DL (ref 0.6–1.3)
ERYTHROCYTE [DISTWIDTH] IN BLOOD BY AUTOMATED COUNT: 13.6 % (ref 11.6–15.1)
GFR SERPL CREATININE-BSD FRML MDRD: 95 ML/MIN/1.73SQ M
GLUCOSE SERPL-MCNC: 167 MG/DL (ref 65–140)
GLUCOSE SERPL-MCNC: 209 MG/DL (ref 65–140)
GLUCOSE SERPL-MCNC: 235 MG/DL (ref 65–140)
GLUCOSE SERPL-MCNC: 257 MG/DL (ref 65–140)
GLUCOSE SERPL-MCNC: 298 MG/DL (ref 65–140)
HCT VFR BLD AUTO: 46.4 % (ref 36.5–49.3)
HGB BLD-MCNC: 14.8 G/DL (ref 12–17)
MCH RBC QN AUTO: 29.2 PG (ref 26.8–34.3)
MCHC RBC AUTO-ENTMCNC: 31.9 G/DL (ref 31.4–37.4)
MCV RBC AUTO: 92 FL (ref 82–98)
PLATELET # BLD AUTO: 231 THOUSANDS/UL (ref 149–390)
PMV BLD AUTO: 10.1 FL (ref 8.9–12.7)
POTASSIUM SERPL-SCNC: 4.5 MMOL/L (ref 3.5–5.3)
RBC # BLD AUTO: 5.06 MILLION/UL (ref 3.88–5.62)
SODIUM SERPL-SCNC: 137 MMOL/L (ref 135–147)
WBC # BLD AUTO: 9.41 THOUSAND/UL (ref 4.31–10.16)

## 2024-01-19 PROCEDURE — 71045 X-RAY EXAM CHEST 1 VIEW: CPT

## 2024-01-19 PROCEDURE — 80048 BASIC METABOLIC PNL TOTAL CA: CPT | Performed by: NURSE PRACTITIONER

## 2024-01-19 PROCEDURE — 82948 REAGENT STRIP/BLOOD GLUCOSE: CPT

## 2024-01-19 PROCEDURE — 93922 UPR/L XTREMITY ART 2 LEVELS: CPT | Performed by: SURGERY

## 2024-01-19 PROCEDURE — 99232 SBSQ HOSP IP/OBS MODERATE 35: CPT | Performed by: SURGERY

## 2024-01-19 PROCEDURE — 99232 SBSQ HOSP IP/OBS MODERATE 35: CPT | Performed by: STUDENT IN AN ORGANIZED HEALTH CARE EDUCATION/TRAINING PROGRAM

## 2024-01-19 PROCEDURE — 99232 SBSQ HOSP IP/OBS MODERATE 35: CPT | Performed by: NURSE PRACTITIONER

## 2024-01-19 PROCEDURE — A9585 GADOBUTROL INJECTION: HCPCS | Performed by: FAMILY MEDICINE

## 2024-01-19 PROCEDURE — 73720 MRI LWR EXTREMITY W/O&W/DYE: CPT

## 2024-01-19 PROCEDURE — 99232 SBSQ HOSP IP/OBS MODERATE 35: CPT | Performed by: PODIATRIST

## 2024-01-19 PROCEDURE — 85027 COMPLETE CBC AUTOMATED: CPT | Performed by: NURSE PRACTITIONER

## 2024-01-19 PROCEDURE — 93923 UPR/LXTR ART STDY 3+ LVLS: CPT

## 2024-01-19 RX ORDER — GADOBUTROL 604.72 MG/ML
10 INJECTION INTRAVENOUS
Status: COMPLETED | OUTPATIENT
Start: 2024-01-19 | End: 2024-01-19

## 2024-01-19 RX ORDER — LOSARTAN POTASSIUM 25 MG/1
12.5 TABLET ORAL DAILY
Status: DISCONTINUED | OUTPATIENT
Start: 2024-01-19 | End: 2024-01-20 | Stop reason: HOSPADM

## 2024-01-19 RX ORDER — CLOPIDOGREL BISULFATE 75 MG/1
75 TABLET ORAL DAILY
Status: DISCONTINUED | OUTPATIENT
Start: 2024-01-19 | End: 2024-01-20 | Stop reason: HOSPADM

## 2024-01-19 RX ORDER — CLOPIDOGREL BISULFATE 75 MG/1
75 TABLET ORAL DAILY
Status: DISCONTINUED | OUTPATIENT
Start: 2024-01-20 | End: 2024-01-19

## 2024-01-19 RX ADMIN — CEFAZOLIN SODIUM 2000 MG: 2 SOLUTION INTRAVENOUS at 22:44

## 2024-01-19 RX ADMIN — LOSARTAN POTASSIUM 12.5 MG: 25 TABLET, FILM COATED ORAL at 12:21

## 2024-01-19 RX ADMIN — METOPROLOL SUCCINATE 25 MG: 25 TABLET, EXTENDED RELEASE ORAL at 22:44

## 2024-01-19 RX ADMIN — INSULIN LISPRO 3 UNITS: 100 INJECTION, SOLUTION INTRAVENOUS; SUBCUTANEOUS at 17:08

## 2024-01-19 RX ADMIN — INSULIN DETEMIR 20 UNITS: 100 INJECTION, SOLUTION SUBCUTANEOUS at 08:48

## 2024-01-19 RX ADMIN — GADOBUTROL 10 ML: 604.72 INJECTION INTRAVENOUS at 14:48

## 2024-01-19 RX ADMIN — INSULIN LISPRO 3 UNITS: 100 INJECTION, SOLUTION INTRAVENOUS; SUBCUTANEOUS at 22:44

## 2024-01-19 RX ADMIN — CEFAZOLIN SODIUM 2000 MG: 2 SOLUTION INTRAVENOUS at 12:22

## 2024-01-19 RX ADMIN — METOPROLOL SUCCINATE 25 MG: 25 TABLET, EXTENDED RELEASE ORAL at 12:21

## 2024-01-19 RX ADMIN — INSULIN LISPRO 2 UNITS: 100 INJECTION, SOLUTION INTRAVENOUS; SUBCUTANEOUS at 08:48

## 2024-01-19 RX ADMIN — ASPIRIN 81 MG: 81 TABLET, COATED ORAL at 08:47

## 2024-01-19 RX ADMIN — AMIODARONE HYDROCHLORIDE 200 MG: 200 TABLET ORAL at 08:48

## 2024-01-19 RX ADMIN — ATORVASTATIN CALCIUM 80 MG: 80 TABLET, FILM COATED ORAL at 17:08

## 2024-01-19 RX ADMIN — SPIRONOLACTONE 12.5 MG: 25 TABLET, FILM COATED ORAL at 08:48

## 2024-01-19 RX ADMIN — ENOXAPARIN SODIUM 40 MG: 40 INJECTION SUBCUTANEOUS at 08:47

## 2024-01-19 RX ADMIN — CEFAZOLIN SODIUM 2000 MG: 2 SOLUTION INTRAVENOUS at 05:04

## 2024-01-19 RX ADMIN — INSULIN LISPRO 3 UNITS: 100 INJECTION, SOLUTION INTRAVENOUS; SUBCUTANEOUS at 12:21

## 2024-01-19 RX ADMIN — CLOPIDOGREL BISULFATE 75 MG: 75 TABLET ORAL at 08:47

## 2024-01-19 NOTE — DISCHARGE INSTR - AVS FIRST PAGE
DISCHARGE INSTRUCTIONS  ARTERIOGRAM/ANGIOPLASTY/STENT    ACTIVITY: On the evening following the procedure, you should be mostly resting.  Someone should remain with you during the evening and overnight following the procedure.     On the day after your procedure, limit your activity to walking.  Avoid heavy lifting (no more than 15 lbs) for the first three days. Walking up steps and normal activities may be resumed as you feel ready.   You should not drive a car for at least two days following discharge from the hospital. You may ride in a car.   If you have any questions regarding a particular activity, please discuss with your doctor or nurse before you are discharged.    DIET:  Resume your normal diet.  Drink more water than usual for the next 24 hours.    PROCEDURE SITE: You may have a procedure site in your groin, arm, or foot.  You may have surgical glue at your procedure site.  The glue is used to cover the procedure site, assist in closure, and prevent contamination. This adhesive will darken and peel away on its own within one to two weeks. Do not pick at it.    You should shower daily.  Wash incision daily with soap and water, but do not rub or scrub the incision; rinse thoroughly and pat dry.  Do not bathe in a tub or swim for the first 2 week following your procedure or if you have any open wounds.  It is normal to have some bruising, swelling or discoloration around the procedure site.  IF increasing redness, pain, or a bulge develops, call our office immediately.    If present, you may remove the band-aid or “steri-strips” over your procedure site after two days.   If you notice any active bleeding at the site, apply pressure to the site and call our office ([phone]) or [phone].    FOLLOW UP STUDIES:  Your doctor will discuss whether further treatments or follow-up studies are necessary at your first post procedure visit.    FOLLOW UP APPOINTMENTS:  Making and keeping follow up appointments and  ultrasound tests are important to your recovery.  If you have difficulty making it to or keeping your follow up appointments, call the office.    If you have increased pain, fever >101.5, increased drainage, redness or a bad smell at your surgery site, new coldness/numbness of your arm or leg, please call us immediately and GO directly to the ER.    PLEASE CALL THE OFFICE IF YOU HAVE ANY QUESTIONS  [phone]  [phone] [address], [address], [place] [address]  [address] [facility] [email or web address] [address], [place] [address]  [address] [place] [address]  [address] [facility], [address], [place] [address]  [address] [facility], [address]st [address], [place] [address]  [address] [facility], [address], [address], [place] [address]  [address] [facility], [address], [place] [address]  [address] [facility], 1st Floor, [address], [place] [address]  [address] [place] [address]  [address] [address]     Discharge Instructions - Podiatry    Weight Bearing Status: Weight bearing as tolerated in a left foot surgical shoe                   Pain: Continue analgesics as needed    Follow-up appointment instructions: Please make an appointment within one week of discharge with Dr. Natarajan . Contact sooner if any increase in pain, or signs of infection occur    Wound Care: Leave dressings clean, dry, and intact between professional dressing changes    Nursing Instructions: Please apply  betadine, adaptic, DSD . Then cover with Gauze and secure with Kerlix and tape. Please change dressing every Monday, Wednesday, and Friday .

## 2024-01-19 NOTE — PROGRESS NOTES
Progress Note - Vascular Surgery   Nadir Myers 63 y.o. male MRN: [id number]  Unit/Bed#: NW8 878-01 Encounter: [id number]    Assessment:  Nadir Myers is a 63 y.o. male who is status post 1/18 LLE angiogram with SFA balloon angioplasty, TP trunk, proximal PT and peroneal arteries treated with balloon angioplasty, unable to recannulate left AT     Plan:  - Diet as tolerated  - Follow up podiatry plan, continue local wound care  - Short interval outpatient follow up with vascular surgery  - Continue medical management: ASA, statin, add plavix   - Remainder of care per primary    Subjective:  No acute events overnight. Denies pain or additional complaints at this time.     Objective:    Vitals:   Temp:  [97.5 °F (36.4 °C)-98.2 °F (36.8 °C)] 97.9 °F (36.6 °C)  HR:  [68-77] 77  Resp:  [16-18] 16  BP: (109-149)/(65-88) 135/79  There is no height or weight on file to calculate BMI.    Physical Exam:   Gen: NAD, Resting in bed  Neuro: A&O, No focal deficits  Head: Normal Cephalic, Atraumatic  Eye: EOMI, No scleral icterus  CV: Regular rate; Palpable left PT  Pulm: Normal work of breathing, No respiratory distress on RA  Ext: No edema bilateral lower extremities, Non-tender. Left 4th toe lateral ulcer, necrotic appearing.   Skin: Warm, Dry, Intact. Right groin site clean, dry, intact    I/O:  UO x3    Intake/Output Summary (Last 24 hours) at 1/19/2024 0645  Last data filed at 1/19/2024 0504  Gross per 24 hour   Intake 1460 ml   Output --   Net 1460 ml       Lab Results:  Recent Labs     01/18/24  0545 01/19/24  0506   WBC 8.03 9.41   HGB 15.0 14.8    231   SODIUM 138 137   K 4.2 4.5    103   CO2 24 24   BUN 17 18   CREATININE 0.82 0.80   GLUC 156* 167*   CALCIUM 9.3 9.1       ---    Dayanara Martino MD  General Surgery PGY-I

## 2024-01-19 NOTE — PROGRESS NOTES
Did not perform dressing change to foot; per arelis mckinney, podiatry is going to do dressing change

## 2024-01-19 NOTE — ASSESSMENT & PLAN NOTE
Presented with worsening left fourth toe wound with surrounding erythema.  Patient was otherwise afebrile and nontoxic-appearing.  Concern for surrounding cellulitis given appearance  Podiatry following- seen at bedside with podiatry today- wound still appears to have pus present. Podiatry recommending MRI, discussed with MRI department, MD DC ICD is MRI conditional and can be done with a [employer] rep present today at 1pm. MRI department is requesting stat portable xray for lead placement verification  Wound culture + 3 staph- c/w cefazolin dc flagyl   Local wound care as per podiatry  LEADs demonstrating PAD on left side. Vascular surgery was consulted. s/p a-gram 1/18 with SFA balloon angioplasty, TP trunk, proximal PT and peroneal arteries treated with balloon angioplasty, unable to recannulate left AT    Cardiology was consulted for possible OR clearance   C/w serial monitoring

## 2024-01-19 NOTE — PROGRESS NOTES
Advanced Heart Failure Team Progress Note - Nadir Myers 63 y.o. male MRN: [id number]    Unit/Bed#: NW8 878-01 Encounter: [id number]          Subjective:   Patient seen and examined.  No acute events overnight. He is feeling well today with no acute complaints. He denies chest pain, SOB, palpitations, lower extremity swelling.    Hospital Course:   Nadir Myers is a 63 y.o. year old male with a history of CAD s/p HANNA to LCx 2015, repeat cath 2023 with  mid LAD and L PDA, HFrEF (EF 40% 8/7/2023, recovered from 10-15%) 2/2 ischemic cardiomyopathy, VT s/p ICD, A-fib, PAD, T2DM, HTN, Hx tobacco use.  He noticed a wound that developed on the lateral portion of his left fourth toe with increasing redness and erythema around the area that developed 2 days prior to presentation.  He saw his PCP who recommended he come in for evaluation.  He was evaluated by podiatry who performed local wound care to obtain cultures plan for leads to assess severity of PAD.  He was admitted to medicine for treatment of cellulitis.  Leads were significant for tibioperoneal disease vascular surgery was consulted who recommended left lower extremity angiogram with possible intervention to optimize arterial perfusion for wound healing.  IR has been consulted for angiogram.  Cardiology has been consulted for cardiac evaluation if he requires more invasive procedure.       Vitals: Blood pressure 112/60, pulse 78, temperature 98.1 °F (36.7 °C), temperature source Oral, resp. rate 16, SpO2 98%., There is no height or weight on file to calculate BMI.,   Orthostatic Blood Pressures      Flowsheet Row Most Recent Value   Blood Pressure 112/60 filed at 01/19/2024 0901   Patient Position - Orthostatic VS Sitting filed at 01/19/2024 0901              Intake/Output Summary (Last 24 hours) at 1/19/2024 1254  Last data filed at 1/19/2024 0504  Gross per 24 hour   Intake 1460 ml   Output --   Net 1460 ml       Review of System:  Review of system was  conducted and was negative except for as stated in the subjective course.      Physical Exam:  GEN: Nadir Myers appears well, alert and oriented x 3, pleasant and cooperative   HEENT:  Normocephalic, atraumatic, anicteric, moist mucous membranes  NECK: no elevation of JVD; no carotid bruits   HEART: regular rhythm, regular rate, normal S1 and S2, no murmur, no clicks, gallops or rubs   LUNGS: Clear to auscultation bilaterally; no wheezes, rales, or rhonchi; respiration nonlabored on room air  ABDOMEN:  Normoactive bowel sounds, soft, no distention  EXTREMITIES: peripheral pulses palpable; no edema  NEURO: no gross focal findings; cranial nerves grossly intact   SKIN:  Dry, intact, dry to touch. Left 4th toe ulcer.      Current Facility-Administered Medications:     acetaminophen (TYLENOL) tablet 650 mg, 650 mg, Oral, Q6H PRN, Harley Sneed DO    amiodarone tablet 200 mg, 200 mg, Oral, Daily With Breakfast, Harley Sneed DO, 200 mg at 01/19/24 0848    aspirin (ECOTRIN LOW STRENGTH) EC tablet 81 mg, 81 mg, Oral, Daily, Harley Sneed DO, 81 mg at 01/19/24 0847    atorvastatin (LIPITOR) tablet 80 mg, 80 mg, Oral, After Dinner, Harley Sneed DO, 80 mg at 01/18/24 1731    ceFAZolin (ANCEF) IVPB (premix in dextrose) 2,000 mg 50 mL, 2,000 mg, Intravenous, Q8H, Harley Sneed DO, Last Rate: 100 mL/hr at 01/19/24 1222, 2,000 mg at 01/19/24 1222    clopidogrel (PLAVIX) tablet 75 mg, 75 mg, Oral, Daily, Killian Petit DO, 75 mg at 01/19/24 0847    enoxaparin (LOVENOX) subcutaneous injection 40 mg, 40 mg, Subcutaneous, Daily, Harley Sneed DO, 40 mg at 01/19/24 0847    [START ON 1/20/2024] insulin detemir (LEVEMIR) subcutaneous injection 25 Units, 25 Units, Subcutaneous, Daily, CARLA Reyes    insulin lispro (HumaLOG) 100 units/mL subcutaneous injection 1-5 Units, 1-5 Units, Subcutaneous, HS, Harley Sneed DO, 3 Units at 01/18/24 2049    insulin lispro (HumaLOG) 100 units/mL subcutaneous injection  1-6 Units, 1-6 Units, Subcutaneous, TID AC, 3 Units at 01/19/24 1221 **AND** Fingerstick Glucose (POCT), , , TID AC, Harley Sneed DO    losartan (COZAAR) tablet 12.5 mg, 12.5 mg, Oral, Daily, [facility], DO, 12.5 mg at 01/19/24 1221    metoprolol succinate (TOPROL-XL) 24 hr tablet 25 mg, 25 mg, Oral, Q12H, Harley Sneed DO, 25 mg at 01/19/24 1221    ondansetron (ZOFRAN) injection 4 mg, 4 mg, Intravenous, Q6H PRN, Harley Sneed DO    spironolactone (ALDACTONE) tablet 12.5 mg, 12.5 mg, Oral, Daily, Harley Sneed DO, 12.5 mg at 01/19/24 0848    Labs & Results:          Results from last 7 days   Lab Units 01/19/24  0506 01/18/24  0545 01/17/24  0455   POTASSIUM mmol/L 4.5 4.2 4.0   CO2 mmol/L 24 24 24   CHLORIDE mmol/L 103 104 106   BUN mg/dL 18 17 18   CREATININE mg/dL 0.80 0.82 0.81     Results from last 7 days   Lab Units 01/19/24  0506 01/18/24  0545 01/17/24  0455   HEMOGLOBIN g/dL 14.8 15.0 14.3   HEMATOCRIT % 46.4 46.5 44.2   PLATELETS Thousands/uL 231 212 203     Results from last 7 days   Lab Units 01/16/24  0544   HEMOGLOBIN A1C % 9.1*     Results from last 7 days   Lab Units 01/18/24  1427   INR  1.07     ==========================================================================================    Assessment:  Principal Problem:    Cellulitis of left foot  Active Problems:    V-tach (HCC)    Chronic systolic CHF (congestive heart failure), NYHA class 2 ([facility])    Type 2 diabetes mellitus with foot ulcer, with long-term current use of insulin ([facility])    Coronary artery disease involving native coronary artery of native heart without angina pectoris    PAD (peripheral artery disease) ([facility])     LLE wound  Evaluated and wound care supplied by podiatry.  States positive for tibioperoneal disease, plan for left lower extremity angiogram tomorrow.     2. CAD s/p HANNA to LCx 2015, repeat cath 2023 with  mid LAD and L PDA  Stable coronary disease at this point, no chest pain or signs of decompensation.   No revascularization done of LAD or LPDA and patient has recovered EF while on GDMT.  On aspirin and statin.     3. HFrEF (EF 40% 8/7/2023, recovered from 10-15%) 2/2 ischemic cardiomyopathy  Thought to be ischemic secondary to coronary disease above.  No signs of decompensated heart failure at this time and appears euvolemic.     4. VT s/p ICD  Scar mediated VT on presentation in May 2023.  On amiodarone 200 mg daily and status post dual-chamber ICD.     5. A-fib-sinus rhythm  6. PAD -management as above  7. T2DM-management as per primary team  8. HTN-GDMT as above  9. Hx tobacco use-no longer smoking        Plan:  - continue GDMT with toprol 25mg PO BID, spironalactone 12.5mg Daily  - Discontinue jardiance due to the potential risk of lower limb complications form SGLT2 class  - will initiate low dose losartan 12.5mg daily to further increased GDMT  - He will need BMP 5 days after discharge to be sure potassium remains normal while on ARB and aldactone  - not on diuretic at home, can dose PRN if needed  - continue aspirin 81mg daily, plavix 75 mg daily, and atorvastatin 80mg daily for his CAD  - continue amiodarone 200mg daily with Hx of VT  - antibiotics and wound care as per primary team and podiatry  - PAD intervention as per vascular surgery    Case discussed and reviewed with Dr. Xavier who agrees with my assessment and plan.      Albaro Obrien DO  Cardiology Fellow   PGY-4    ==========================================================================================    Epic/ Allscripts/Care Everywhere records reviewed    ** Please Note: Fluency DirectDictation voice to text software may have been used in the creation of this document. **

## 2024-01-19 NOTE — ASSESSMENT & PLAN NOTE
Lab Results   Component Value Date    HGBA1C 9.1 (H) 01/16/2024       Recent Labs     01/18/24  1732 01/18/24  2038 01/19/24  0846 01/19/24  1140   POCGLU 87 291* 209* 257*       Blood Sugar Average: Last 72 hrs:  (P) [id number]  Uncontrolled.  Patient admits to dietary indiscretion last few months.  increase Levemir to 25 units every morning and add correctional insulin coverage with Accu-Cheks while admitted.  Hold home oral medications.    hypoglycemia protocol   Carb controlled diet

## 2024-01-19 NOTE — PROGRESS NOTES
[facility] Podiatry - Progress Note  Patient: Nadir Myers 63 y.o. male   MRN: [id number]  PCP: Tigre Hare DO  Unit/Bed#: NW8 878-01 Encounter: [id number]  Date Of Visit: 01/19/24    ASSESSMENT:    Nadir Myers is a 63 y.o. male with:    Left diabetic foot ulceration, lateral 4th digit  Left foot cellulitis  T2DM   - last A1c 7.1% on 9/16/23, previously 12% on 4/14/23  Tobacco dependence      PLAN:    Patient was seen and evaluated at bedside today.  Left lateral fourth toe ulceration is stable at this time with overlying eschar with noted erythema and edema to the right fourth toe  Reviewed post intervention unilateral LEADs.  Left lower extremity: Ankle-brachial index: 1.36, prior 0.95, PPG/PVR tracings are dampened with biphasic waveforms at the ankle, metatarsal pressure: 150 mmHg, prior 100 mm per mercury, great toe pressure: 85 mmHg, prior 52 mmHg  Left lower extremity angiogram on 1/18/2024.  Impression: Abdominal aorta and bilateral iliofemoral arteries show diffuse arthrosclerotic calcification resulting in mid luminal stenosis.  Left deep femoral artery was patent.  Mild to moderate stenosis along the left superficial femoral artery was treated using 5 mm balloon angioplasty.  High-grade stenosis in the tibioperoneal trunk, proximal posterior tibial and peroneal arteries were treated using 3 mm balloon angioplasty resulting in two-vessel runoff to the left lower extremity.  Unsuccessful attempt to reach the cannulize of chronically occluded anterior tibial artery.  Left foot MRI final read: Lateral skin ulceration fourth toe with mild marrow edema in this region but no T1 replacement signal at this time.  Findings remain low suspicion for osteomyelitis.  Marrow signal is otherwise unremarkable.  Of the fifth distal tuft is unremarkable without T1 replacement signal to indicate infection as was suspected on recent plain film study  Vascular recommending continue aspirin and statin with monitoring of  left fourth toe ulceration healing with local wound care.  Plan to continue local wound care at this time.  Outpatient follow-up instructions placed for patient in discharge information.  No podiatric surgical intervention indicated at this time. Patient is stable for discharge from podiatry standpoint  Continue local wound care consisting of Adaptic, Betadine, DSD to left fourth toe ulceration, appreciate nursing assistance with dressing changes.  Elevation on green foam wedges or pillows when non-ambulatory.  Rest of care per primary team.    Weight bearing status: Weightbearing as tolerated in a surgical shoe      SUBJECTIVE:     The patient was seen, evaluated, and assessed at bedside today. The patient was awake, alert, and in no acute distress. No acute events overnight. The patient reports no pain at this time to his left fourth toe. Patient denies N/V/F/chills/SOB/CP.      OBJECTIVE:     Vitals:   /79 (BP Location: Right arm)   Pulse 77   Temp 97.9 °F (36.6 °C) (Oral)   Resp 16   SpO2 97%     Temp (24hrs), Av.9 °F (36.6 °C), Min:97.5 °F (36.4 °C), Max:98.2 °F (36.8 °C)      Physical Exam:     General:  Alert, cooperative, and in no distress.  Lower extremity exam:  Cardiovascular status at baseline.  Neurological status at baseline.  Musculoskeletal status at baseline. No calf tenderness noted.     Lower extremity wound(s) as noted below:  Wound #: 1  Location: Left lateral fourth toe ulceration  Length 1.5 cm: Width 2.0 cm: Depth 0.3 cm:   Deepest Tissue Noted in Base: Capsule and overlying eschar  Probe to Bone: No  Peripheral Skin Description: Attached  Granulation: 0% Fibrotic Tissue: 20% Necrotic Tissue: 80%   Drainage Amount: minimal, serous  Signs of Infection: Yes, erythema and edema    Clinical Images 24:                Additional Data:     Labs:    Results from last 7 days   Lab Units 24  0506 24  0544 01/15/24  2042   WBC Thousand/uL 9.41   < > 11.91*   HEMOGLOBIN  "g/dL 14.8   < > 15.8   HEMATOCRIT % 46.4   < > 49.2   PLATELETS Thousands/uL 231   < > 244   NEUTROS PCT %  --   --  75   LYMPHS PCT %  --   --  18   MONOS PCT %  --   --  6   EOS PCT %  --   --  1    < > = values in this interval not displayed.     Results from last 7 days   Lab Units 01/19/24  0506 01/16/24  0544 01/15/24  2042   POTASSIUM mmol/L 4.5   < > 4.3   CHLORIDE mmol/L 103   < > 102   CO2 mmol/L 24   < > 28   BUN mg/dL 18   < > 23   CREATININE mg/dL 0.80   < > 0.91   CALCIUM mg/dL 9.1   < > 10.3*   ALK PHOS U/L  --   --  61   ALT U/L  --   --  15   AST U/L  --   --  12*    < > = values in this interval not displayed.     Results from last 7 days   Lab Units 01/18/24  1427   INR  1.07       * I Have Reviewed All Lab Data Listed Above.    Recent Cultures (last 7 days):     Results from last 7 days   Lab Units 01/15/24  2247   GRAM STAIN RESULT  No polys seen*  3+ Gram positive cocci in clusters*  1+ Gram Positive Rods Resembling Diphtheroids*   WOUND CULTURE  3+ Growth of Staphylococcus aureus*  2+ Growth of           Imaging: I have personally reviewed pertinent films in PACS  EKG, Pathology, and Other Studies: I have personally reviewed pertinent reports.      ** Please Note: Portions of the record may have been created with voice recognition software. Occasional wrong word or \"sound a like\" substitutions may have occurred due to the inherent limitations of voice recognition software. Read the chart carefully and recognize, using context, where substitutions have occurred. **      "

## 2024-01-19 NOTE — ASSESSMENT & PLAN NOTE
Noted in LLE with LEADS  Borderline healing pressure  Vascular surgery consulted  S/p agram 1/18 with SFA balloon angioplasty, TP trunk, proximal PT and peroneal arteries treated with balloon angioplasty, unable to recannulate left AT

## 2024-01-19 NOTE — QUICK NOTE
Post Op Check Note   Nadir Myers 63 y.o. male MRN: [id number]  Unit/Bed#: NW8 878-01 Encounter: [id number]    ASSESSMENT:  Nadir Myers is a 63 y.o. male who is status post LLE angiogram with SFA balloon angioplasty, TP trunk, proximal PT and peroneal arteries treated with balloon angioplasty, unable to recannulate left AT    Subjective: Patient comfortable in bed, denies pain at this time    Physical Exam:  GEN: NAD  CV: RRR  Lung: Normal effort  Ab: Soft, NT/ND  Neuro: A+Ox3  Incisions: Right groin puncture site dressing c/d/I  Pulses: LLE +triphasic PT, absent DP    Blood pressure 135/79, pulse 77, temperature 97.9 °F (36.6 °C), temperature source Oral, resp. rate 16, SpO2 97%.,There is no height or weight on file to calculate BMI.      Intake/Output Summary (Last 24 hours) at 1/18/2024 2320  Last data filed at 1/18/2024 2001  Gross per 24 hour   Intake 630 ml   Output --   Net 630 ml       Invasive Devices       Peripheral Intravenous Line  Duration             Peripheral IV 01/18/24 Right Antecubital <1 day                    VTE Pharmacologic Prophylaxis: lovenox

## 2024-01-19 NOTE — ASSESSMENT & PLAN NOTE
Wt Readings from Last 3 Encounters:   11/13/23 101 kg (222 lb)   10/16/23 98.9 kg (218 lb)   08/07/23 93.9 kg (207 lb)     History of ischemic cardiomyopathy recent EF 40%  Does not appear grossly overloaded, continue cardiac medications and monitor volume status closely  Cardiology following   Follow daily weights  Cardiac diet

## 2024-01-19 NOTE — PLAN OF CARE
Problem: PAIN - ADULT  Goal: Verbalizes/displays adequate comfort level or baseline comfort level  Description: Interventions:  - Encourage patient to monitor pain and request assistance  - Assess pain using appropriate pain scale  - Administer analgesics based on type and severity of pain and evaluate response  - Implement non-pharmacological measures as appropriate and evaluate response  - Consider cultural and social influences on pain and pain management  - Notify physician/advanced practitioner if interventions unsuccessful or patient reports new pain  Outcome: Progressing     Problem: INFECTION - ADULT  Goal: Absence or prevention of progression during hospitalization  Description: INTERVENTIONS:  - Assess and monitor for signs and symptoms of infection  - Monitor lab/diagnostic results  - Monitor all insertion sites, i.e. indwelling lines, tubes, and drains  - Monitor endotracheal if appropriate and nasal secretions for changes in amount and color  - [place] appropriate cooling/warming therapies per order  - Administer medications as ordered  - Instruct and encourage patient and family to use good hand hygiene technique  - Identify and instruct in appropriate isolation precautions for identified infection/condition  Outcome: Progressing     Problem: DISCHARGE PLANNING  Goal: Discharge to home or other facility with appropriate resources  Description: INTERVENTIONS:  - Identify barriers to discharge w/patient and caregiver  - Arrange for needed discharge resources and transportation as appropriate  - Identify discharge learning needs (meds, wound care, etc.)  - Arrange for interpretive services to assist at discharge as needed  - Refer to Case Management Department for coordinating discharge planning if the patient needs post-hospital services based on physician/advanced practitioner order or complex needs related to functional status, cognitive ability, or social support system  Outcome: Progressing      Problem: Knowledge Deficit  Goal: Patient/family/caregiver demonstrates understanding of disease process, treatment plan, medications, and discharge instructions  Description: Complete learning assessment and assess knowledge base.  Interventions:  - Provide teaching at level of understanding  - Provide teaching via preferred learning methods  Outcome: Progressing     Problem: CARDIOVASCULAR - ADULT  Goal: Maintains optimal cardiac output and hemodynamic stability  Description: INTERVENTIONS:  - Monitor I/O, vital signs and rhythm  - Monitor for S/S and trends of decreased cardiac output  - Administer and titrate ordered vasoactive medications to optimize hemodynamic stability  - Assess quality of pulses, skin color and temperature  - Assess for signs of decreased coronary artery perfusion  - Instruct patient to report change in severity of symptoms  Outcome: Progressing     Problem: CARDIOVASCULAR - ADULT  Goal: Absence of cardiac dysrhythmias or at baseline rhythm  Description: INTERVENTIONS:  - Continuous cardiac monitoring, vital signs, obtain 12 lead EKG if ordered  - Administer antiarrhythmic and heart rate control medications as ordered  - Monitor electrolytes and administer replacement therapy as ordered  Outcome: Progressing     Problem: METABOLIC, FLUID AND ELECTROLYTES - ADULT  Goal: Electrolytes maintained within normal limits  Description: INTERVENTIONS:  - Monitor labs and assess patient for signs and symptoms of electrolyte imbalances  - Administer electrolyte replacement as ordered  - Monitor response to electrolyte replacements, including repeat lab results as appropriate  - Instruct patient on fluid and nutrition as appropriate  Outcome: Progressing     Problem: SKIN/TISSUE INTEGRITY - ADULT  Goal: Incision(s), wounds(s) or drain site(s) healing without S/S of infection  Description: INTERVENTIONS  - Assess and document dressing, incision, wound bed, drain sites and surrounding tissue  - Provide  patient and family education    Outcome: Progressing     Problem: HEMATOLOGIC - ADULT  Goal: Maintains hematologic stability  Description: INTERVENTIONS  - Assess for signs and symptoms of bleeding or hemorrhage  - Monitor labs  - Administer supportive blood products/factors as ordered and appropriate  Outcome: Progressing     Problem: MUSCULOSKELETAL - ADULT  Goal: Maintain or return mobility to safest level of function  Description: INTERVENTIONS:  - Assess patient's ability to carry out ADLs; assess patient's baseline for ADL function and identify physical deficits which impact ability to perform ADLs (bathing, care of mouth/teeth, toileting, grooming, dressing, etc.)  - Assess/evaluate cause of self-care deficits   - Assess range of motion  - Assess patient's mobility  - Assess patient's need for assistive devices and provide as appropriate  - Encourage maximum independence but intervene and supervise when necessary  - Involve family in performance of ADLs  - Assess for home care needs following discharge   - Consider OT consult to assist with ADL evaluation and planning for discharge  - Provide patient education as appropriate  Outcome: Progressing     Problem: MUSCULOSKELETAL - ADULT  Goal: Maintain proper alignment of affected body part  Description: INTERVENTIONS:  - Support, maintain and protect limb and body alignment  - Provide patient/ family with appropriate education  Outcome: Progressing

## 2024-01-19 NOTE — PLAN OF CARE
Problem: PAIN - ADULT  Goal: Verbalizes/displays adequate comfort level or baseline comfort level  Description: Interventions:  - Encourage patient to monitor pain and request assistance  - Assess pain using appropriate pain scale  - Administer analgesics based on type and severity of pain and evaluate response  - Implement non-pharmacological measures as appropriate and evaluate response  - Consider cultural and social influences on pain and pain management  - Notify physician/advanced practitioner if interventions unsuccessful or patient reports new pain  Outcome: Progressing     Problem: INFECTION - ADULT  Goal: Absence or prevention of progression during hospitalization  Description: INTERVENTIONS:  - Assess and monitor for signs and symptoms of infection  - Monitor lab/diagnostic results  - Monitor all insertion sites, i.e. indwelling lines, tubes, and drains  - Monitor endotracheal if appropriate and nasal secretions for changes in amount and color  - [place] appropriate cooling/warming therapies per order  - Administer medications as ordered  - Instruct and encourage patient and family to use good hand hygiene technique  - Identify and instruct in appropriate isolation precautions for identified infection/condition  Outcome: Progressing

## 2024-01-19 NOTE — PROGRESS NOTES
[facility]  Progress Note  Name: Nadir Myers I  MRN: [id number]  Unit/Bed#: NW8 878-01 I Date of Admission: 1/15/2024   Date of Service: 1/19/2024 I Hospital Day: 4    Assessment/Plan   * Cellulitis of left foot  Assessment & Plan  Presented with worsening left fourth toe wound with surrounding erythema.  Patient was otherwise afebrile and nontoxic-appearing.  Concern for surrounding cellulitis given appearance  Podiatry following- seen at bedside with podiatry today- wound still appears to have pus present. Podiatry recommending MRI, discussed with MRI department, MD DC ICD is MRI conditional and can be done with a [employer] rep present today at 1pm. MRI department is requesting stat portable xray for lead placement verification  Wound culture + 3 staph- c/w cefazolin dc flagyl   Local wound care as per podiatry  LEADs demonstrating PAD on left side. Vascular surgery was consulted. s/p a-gram 1/18 with SFA balloon angioplasty, TP trunk, proximal PT and peroneal arteries treated with balloon angioplasty, unable to recannulate left AT    Cardiology was consulted for possible OR clearance   C/w serial monitoring     PAD (peripheral artery disease) (HCC)  Assessment & Plan  Noted in LLE with LEADS  Borderline healing pressure  Vascular surgery consulted  S/p agram 1/18 with SFA balloon angioplasty, TP trunk, proximal PT and peroneal arteries treated with balloon angioplasty, unable to recannulate left AT      Coronary artery disease involving native coronary artery of native heart without angina pectoris  Assessment & Plan  S/p prior stenting   Currently chest pain-free, continue ASA, beta-blocker, statin    Type 2 diabetes mellitus with foot ulcer, with long-term current use of insulin (HCC)  Assessment & Plan  Lab Results   Component Value Date    HGBA1C 9.1 (H) 01/16/2024       Recent Labs     01/18/24  1732 01/18/24  2038 01/19/24  0846 01/19/24  1140   POCGLU 87 291* 209*  257*       Blood Sugar Average: Last 72 hrs:  (P) 211.2484347537002024  Uncontrolled.  Patient admits to dietary indiscretion last few months.  increase Levemir to 25 units every morning and add correctional insulin coverage with Accu-Cheks while admitted.  Hold home oral medications.    hypoglycemia protocol   Carb controlled diet      Chronic systolic CHF (congestive heart failure), NYHA class 2 ([facility])  Assessment & Plan  Wt Readings from Last 3 Encounters:   11/13/23 101 kg (222 lb)   10/16/23 98.9 kg (218 lb)   08/07/23 93.9 kg (207 lb)     History of ischemic cardiomyopathy recent EF 40%  Does not appear grossly overloaded, continue cardiac medications and monitor volume status closely  Cardiology following   Follow daily weights  Cardiac diet     V-tach ([facility])  Assessment & Plan  Maintained on amiodarone 200 mg daily and metoprolol succinate 25 mg twice daily, continue               VTE Pharmacologic Prophylaxis: VTE Score: 4 Moderate Risk (Score 3-4) - Pharmacological DVT Prophylaxis Ordered: enoxaparin (Lovenox).    Mobility:   Basic Mobility Inpatient Raw Score: 24  JH-HLM Goal: 8: Walk 250 feet or more  JH-HLM Achieved: 8: Walk 250 feet ot more  HLM Goal achieved. Continue to encourage appropriate mobility.    Patient Centered Rounds: I performed bedside rounds with nursing staff today.   Discussions with Specialists or Other Care Team Provider: d/w Rn     Education and Discussions with Family / Patient: Patient declined call to .     Total Time Spent on Date of Encounter in care of patient: 35 mins. This time was spent on one or more of the following: performing physical exam; counseling and coordination of care; obtaining or reviewing history; documenting in the medical record; reviewing/ordering tests, medications or procedures; communicating with other healthcare professionals and discussing with patient's family/caregivers.    Current Length of Stay: 4 day(s)  Current Patient Status:  Inpatient   Certification Statement: The patient will continue to require additional inpatient hospital stay due to pending MRI  Discharge Plan: Anticipate discharge in 24-48 hrs to home.    Code Status: Level 1 - Full Code    Subjective:   Pt denies any complaints. Has been up walking the halls waiting for providers to see him.     Objective:     Vitals:   Temp (24hrs), Av.9 °F (36.6 °C), Min:97.8 °F (36.6 °C), Max:98.1 °F (36.7 °C)    Temp:  [97.8 °F (36.6 °C)-98.1 °F (36.7 °C)] 98.1 °F (36.7 °C)  HR:  [68-78] 78  Resp:  [16] 16  BP: (112-149)/(60-88) 112/60  SpO2:  [91 %-98 %] 98 %  There is no height or weight on file to calculate BMI.     Input and Output Summary (last 24 hours):     Intake/Output Summary (Last 24 hours) at 2024 1212  Last data filed at 2024 0504  Gross per 24 hour   Intake 1460 ml   Output --   Net 1460 ml       Physical Exam:   Physical Exam  Constitutional:       General: He is not in acute distress.     Appearance: He is not ill-appearing.   Cardiovascular:      Rate and Rhythm: Normal rate and regular rhythm.      Pulses: Normal pulses.      Heart sounds: Normal heart sounds. No murmur heard.  Pulmonary:      Effort: No respiratory distress.      Breath sounds: Normal breath sounds. No wheezing or rales.   Abdominal:      General: Bowel sounds are normal. There is no distension.      Palpations: Abdomen is soft.      Tenderness: There is no abdominal tenderness.   Musculoskeletal:         General: No swelling or tenderness.   Skin:     General: Skin is warm and dry.      Findings: Erythema (left 4th digit with son pus present and eschar) present.   Neurological:      General: No focal deficit present.      Mental Status: He is alert. Mental status is at baseline.   Psychiatric:         Attention and Perception: Attention normal.         Mood and Affect: Mood normal.                Additional Data:     Labs:  Results from last 7 days   Lab Units 24  0506 24  0544  01/15/24  2042   WBC Thousand/uL 9.41   < > 11.91*   HEMOGLOBIN g/dL 14.8   < > 15.8   HEMATOCRIT % 46.4   < > 49.2   PLATELETS Thousands/uL 231   < > 244   NEUTROS PCT %  --   --  75   LYMPHS PCT %  --   --  18   MONOS PCT %  --   --  6   EOS PCT %  --   --  1    < > = values in this interval not displayed.     Results from last 7 days   Lab Units 01/19/24  0506 01/16/24  0544 01/15/24  2042   SODIUM mmol/L 137   < > 137   POTASSIUM mmol/L 4.5   < > 4.3   CHLORIDE mmol/L 103   < > 102   CO2 mmol/L 24   < > 28   BUN mg/dL 18   < > 23   CREATININE mg/dL 0.80   < > 0.91   ANION GAP mmol/L 10   < > 7   CALCIUM mg/dL 9.1   < > 10.3*   ALBUMIN g/dL  --   --  4.4   TOTAL BILIRUBIN mg/dL  --   --  0.43   ALK PHOS U/L  --   --  61   ALT U/L  --   --  15   AST U/L  --   --  12*   GLUCOSE RANDOM mg/dL 167*   < > 263*    < > = values in this interval not displayed.     Results from last 7 days   Lab Units 01/18/24  1427   INR  1.07     Results from last 7 days   Lab Units 01/19/24  1140 01/19/24  0846 01/18/24  2038 01/18/24  1732 01/18/24  1136 01/18/24  0753 01/17/24  2101 01/17/24  1647 01/17/24  1146 01/17/24  0821 01/16/24  2103 01/16/24  1621   POC GLUCOSE mg/dl 257* 209* 291* 87 203* 175* 332* 184* 258* 199* 252* 137     Results from last 7 days   Lab Units 01/16/24  0544   HEMOGLOBIN A1C % 9.1*           Lines/Drains:  Invasive Devices       Peripheral Intravenous Line  Duration             Peripheral IV 01/18/24 Right Antecubital 1 day                          Imaging: Reviewed radiology reports from this admission including: xray(s)    Recent Cultures (last 7 days):   Results from last 7 days   Lab Units 01/15/24  2247   GRAM STAIN RESULT  No polys seen*  3+ Gram positive cocci in clusters*  1+ Gram Positive Rods Resembling Diphtheroids*   WOUND CULTURE  3+ Growth of Staphylococcus aureus*  2+ Growth of       Last 24 Hours Medication List:   Current Facility-Administered Medications   Medication Dose Route  Frequency Provider Last Rate    acetaminophen  650 mg Oral Q6H PRN Harley Sneed, DO      amiodarone  200 mg Oral Daily With Breakfast Harley Sneed, DO      aspirin  81 mg Oral Daily Harley Sneed, DO      atorvastatin  80 mg Oral After Dinner Harley Sneed, DO      cefazolin  2,000 mg Intravenous Q8H Harley Sneed, DO 2,000 mg (01/19/24 0504)    clopidogrel  75 mg Oral Daily Killian Petit, DO      enoxaparin  40 mg Subcutaneous Daily Harley Sneed, DO      insulin detemir  20 Units Subcutaneous Daily Harley Sneed, DO      insulin lispro  1-5 Units Subcutaneous HS Harley Sneed, DO      insulin lispro  1-6 Units Subcutaneous TID AC Harley Sneed, DO      losartan  12.5 mg Oral Daily Camille Irvin, DO      metoprolol succinate  25 mg Oral Q12H Harley Sneed, DO      ondansetron  4 mg Intravenous Q6H PRN Harley Sneed, DO      spironolactone  12.5 mg Oral Daily Harley Sneed, DO          Today, Patient Was Seen By: CARLA Reyes    **Please Note: This note may have been constructed using a voice recognition system.**

## 2024-01-20 VITALS
RESPIRATION RATE: 12 BRPM | SYSTOLIC BLOOD PRESSURE: 127 MMHG | HEART RATE: 77 BPM | WEIGHT: 223.77 LBS | OXYGEN SATURATION: 94 % | BODY MASS INDEX: 30.35 KG/M2 | DIASTOLIC BLOOD PRESSURE: 73 MMHG | TEMPERATURE: 98.2 F

## 2024-01-20 LAB
ANION GAP SERPL CALCULATED.3IONS-SCNC: 7 MMOL/L
BUN SERPL-MCNC: 15 MG/DL (ref 5–25)
CALCIUM SERPL-MCNC: 8.9 MG/DL (ref 8.4–10.2)
CHLORIDE SERPL-SCNC: 103 MMOL/L (ref 96–108)
CO2 SERPL-SCNC: 26 MMOL/L (ref 21–32)
CREAT SERPL-MCNC: 0.73 MG/DL (ref 0.6–1.3)
ERYTHROCYTE [DISTWIDTH] IN BLOOD BY AUTOMATED COUNT: 13.7 % (ref 11.6–15.1)
GFR SERPL CREATININE-BSD FRML MDRD: 99 ML/MIN/1.73SQ M
GLUCOSE SERPL-MCNC: 176 MG/DL (ref 65–140)
GLUCOSE SERPL-MCNC: 208 MG/DL (ref 65–140)
GLUCOSE SERPL-MCNC: 228 MG/DL (ref 65–140)
HCT VFR BLD AUTO: 45.1 % (ref 36.5–49.3)
HGB BLD-MCNC: 14.5 G/DL (ref 12–17)
MCH RBC QN AUTO: 29.1 PG (ref 26.8–34.3)
MCHC RBC AUTO-ENTMCNC: 32.2 G/DL (ref 31.4–37.4)
MCV RBC AUTO: 91 FL (ref 82–98)
PLATELET # BLD AUTO: 187 THOUSANDS/UL (ref 149–390)
PMV BLD AUTO: 9.9 FL (ref 8.9–12.7)
POTASSIUM SERPL-SCNC: 4.3 MMOL/L (ref 3.5–5.3)
RBC # BLD AUTO: 4.98 MILLION/UL (ref 3.88–5.62)
SODIUM SERPL-SCNC: 136 MMOL/L (ref 135–147)
WBC # BLD AUTO: 8.08 THOUSAND/UL (ref 4.31–10.16)

## 2024-01-20 PROCEDURE — 82948 REAGENT STRIP/BLOOD GLUCOSE: CPT

## 2024-01-20 PROCEDURE — 80048 BASIC METABOLIC PNL TOTAL CA: CPT | Performed by: NURSE PRACTITIONER

## 2024-01-20 PROCEDURE — 85027 COMPLETE CBC AUTOMATED: CPT | Performed by: NURSE PRACTITIONER

## 2024-01-20 PROCEDURE — 99239 HOSP IP/OBS DSCHRG MGMT >30: CPT | Performed by: NURSE PRACTITIONER

## 2024-01-20 RX ORDER — SULFAMETHOXAZOLE AND TRIMETHOPRIM 800; 160 MG/1; MG/1
1 TABLET ORAL EVERY 12 HOURS SCHEDULED
Qty: 10 TABLET | Refills: 0 | Status: SHIPPED | OUTPATIENT
Start: 2024-01-20 | End: 2024-01-25

## 2024-01-20 RX ORDER — CLOPIDOGREL BISULFATE 75 MG/1
75 TABLET ORAL DAILY
Qty: 30 TABLET | Refills: 0 | Status: ON HOLD | OUTPATIENT
Start: 2024-01-21

## 2024-01-20 RX ORDER — LOSARTAN POTASSIUM 25 MG/1
12.5 TABLET ORAL DAILY
Qty: 30 TABLET | Refills: 0 | Status: ON HOLD | OUTPATIENT
Start: 2024-01-21

## 2024-01-20 RX ADMIN — SPIRONOLACTONE 12.5 MG: 25 TABLET, FILM COATED ORAL at 11:55

## 2024-01-20 RX ADMIN — CEFAZOLIN SODIUM 2000 MG: 2 SOLUTION INTRAVENOUS at 14:53

## 2024-01-20 RX ADMIN — CEFAZOLIN SODIUM 2000 MG: 2 SOLUTION INTRAVENOUS at 05:26

## 2024-01-20 RX ADMIN — METOPROLOL SUCCINATE 25 MG: 25 TABLET, EXTENDED RELEASE ORAL at 11:56

## 2024-01-20 RX ADMIN — ACETAMINOPHEN 650 MG: 325 TABLET, FILM COATED ORAL at 11:58

## 2024-01-20 RX ADMIN — AMIODARONE HYDROCHLORIDE 200 MG: 200 TABLET ORAL at 11:55

## 2024-01-20 RX ADMIN — INSULIN DETEMIR 25 UNITS: 100 INJECTION, SOLUTION SUBCUTANEOUS at 11:54

## 2024-01-20 RX ADMIN — INSULIN LISPRO 2 UNITS: 100 INJECTION, SOLUTION INTRAVENOUS; SUBCUTANEOUS at 09:00

## 2024-01-20 RX ADMIN — CLOPIDOGREL BISULFATE 75 MG: 75 TABLET ORAL at 11:55

## 2024-01-20 RX ADMIN — LOSARTAN POTASSIUM 12.5 MG: 25 TABLET, FILM COATED ORAL at 11:56

## 2024-01-20 RX ADMIN — INSULIN LISPRO 2 UNITS: 100 INJECTION, SOLUTION INTRAVENOUS; SUBCUTANEOUS at 14:53

## 2024-01-20 RX ADMIN — ENOXAPARIN SODIUM 40 MG: 40 INJECTION SUBCUTANEOUS at 11:54

## 2024-01-20 RX ADMIN — ASPIRIN 81 MG: 81 TABLET, COATED ORAL at 11:54

## 2024-01-20 NOTE — ASSESSMENT & PLAN NOTE
Presented with worsening left fourth toe wound with surrounding erythema.  Patient was otherwise afebrile and nontoxic-appearing.  Concern for surrounding cellulitis given appearance  Podiatry following- seen at bedside with podiatry today- wound still appears to have pus present. Podiatry recommending MRI, discussed with MRI department, MD DC ICD is MRI conditional and can be done with a [employer] rep present. MRI department is requesting stat portable xray for lead placement verification  Wound culture + 3 staph- Dc cefazolin 1/20 days dc flagyl   Will dc on Bactrim 4 more days for total of 10 days   Wound culture with staph aureus   Local wound care as per podiatry  LEADs demonstrating PAD on left side. Vascular surgery was consulted. s/p a-gram 1/18 with SFA balloon angioplasty, TP trunk, proximal PT and peroneal arteries treated with balloon angioplasty, unable to recannulate left AT    Cardiology was consulted for possible OR clearance  C/w serial monitoring

## 2024-01-20 NOTE — ASSESSMENT & PLAN NOTE
Maintained on amiodarone 200 mg daily and metoprolol succinate 25 mg twice daily, continue   appt was made for tomorrow. Attempted call back.  No voice mail set up

## 2024-01-20 NOTE — ASSESSMENT & PLAN NOTE
Lab Results   Component Value Date    HGBA1C 9.1 (H) 01/16/2024       Recent Labs     01/19/24  1635 01/19/24  2042 01/20/24  0806 01/20/24  1205   POCGLU 235* 298* 208* 228*         Blood Sugar Average: Last 72 hrs:  (P) 226  Uncontrolled.  Patient admits to dietary indiscretion last few months.  increase Levemir to 25 units every morning and add correctional insulin coverage with Accu-Cheks while admitted.  Hold home oral medications.    hypoglycemia protocol   Carb controlled diet

## 2024-01-20 NOTE — PLAN OF CARE
Problem: Potential for Falls  Goal: Patient will remain free of falls  Description: INTERVENTIONS:  - Educate patient/family on patient safety including physical limitations  - Instruct patient to call for assistance with activity   - Consult OT/PT to assist with strengthening/mobility   - Keep Call bell within reach  - Keep bed low and locked with side rails adjusted as appropriate  - Keep care items and personal belongings within reach  - Initiate and maintain comfort rounds  - Make Fall Risk Sign visible to staff  - Offer Toileting every 2   Hours, in advance of need  - Initiate/Maintain bed  alarm  - Obtain necessary fall risk management equipment:   - Apply yellow socks and bracelet for high fall risk patients  - Consider moving patient to room near nurses station  Outcome: Progressing     Problem: PAIN - ADULT  Goal: Verbalizes/displays adequate comfort level or baseline comfort level  Description: Interventions:  - Encourage patient to monitor pain and request assistance  - Assess pain using appropriate pain scale  - Administer analgesics based on type and severity of pain and evaluate response  - Implement non-pharmacological measures as appropriate and evaluate response  - Consider cultural and social influences on pain and pain management  - Notify physician/advanced practitioner if interventions unsuccessful or patient reports new pain  Outcome: Progressing     Problem: INFECTION - ADULT  Goal: Absence or prevention of progression during hospitalization  Description: INTERVENTIONS:  - Assess and monitor for signs and symptoms of infection  - Monitor lab/diagnostic results  - Monitor all insertion sites, i.e. indwelling lines, tubes, and drains  - Monitor endotracheal if appropriate and nasal secretions for changes in amount and color  - Douglas appropriate cooling/warming therapies per order  - Administer medications as ordered  - Instruct and encourage patient and family to use good hand hygiene  technique  - Identify and instruct in appropriate isolation precautions for identified infection/condition  Outcome: Progressing     Problem: SAFETY ADULT  Goal: Patient will remain free of falls  Description: INTERVENTIONS:  - Educate patient/family on patient safety including physical limitations  - Instruct patient to call for assistance with activity   - Consult OT/PT to assist with strengthening/mobility   - Keep Call bell within reach  - Keep bed low and locked with side rails adjusted as appropriate  - Keep care items and personal belongings within reach  - Initiate and maintain comfort rounds  - Make Fall Risk Sign visible to staff  - Offer Toileting every 2 Hours, in advance of need  - Initiate/Maintain  bed/chair alarm  - Obtain necessary fall risk management equipment:   - Apply yellow socks and bracelet for high fall risk patients  - Consider moving patient to room near nurses station  Outcome: Progressing  Goal: Maintain or return to baseline ADL function  Description: INTERVENTIONS:  -  Assess patient's ability to carry out ADLs; assess patient's baseline for ADL function and identify physical deficits which impact ability to perform ADLs (bathing, care of mouth/teeth, toileting, grooming, dressing, etc.)  - Assess/evaluate cause of self-care deficits   - Assess range of motion  - Assess patient's mobility; develop plan if impaired  - Assess patient's need for assistive devices and provide as appropriate  - Encourage maximum independence but intervene and supervise when necessary  - Involve family in performance of ADLs  - Assess for home care needs following discharge   - Consider OT consult to assist with ADL evaluation and planning for discharge  - Provide patient education as appropriate  Outcome: Progressing  Goal: Maintains/Returns to pre admission functional level  Description: INTERVENTIONS:  - Perform AM-PAC 6 Click Basic Mobility/ Daily Activity assessment daily.  - Set and communicate daily  mobility goal to care team and patient/family/caregiver.   - Collaborate with rehabilitation services on mobility goals if consulted  - Perform Range of Motion 3 times a day.  - Reposition patient every 2 hours.  - Dangle patient 3 times a day  - Stand patient 3 times a day  - Ambulate patient 3 times a day  - Out of bed to chair 3 times a day   - Out of bed for meals 3 times a day  - Out of bed for toileting  - Record patient progress and toleration of activity level   Outcome: Progressing     Problem: DISCHARGE PLANNING  Goal: Discharge to home or other facility with appropriate resources  Description: INTERVENTIONS:  - Identify barriers to discharge w/patient and caregiver  - Arrange for needed discharge resources and transportation as appropriate  - Identify discharge learning needs (meds, wound care, etc.)  - Arrange for interpretive services to assist at discharge as needed  - Refer to Case Management Department for coordinating discharge planning if the patient needs post-hospital services based on physician/advanced practitioner order or complex needs related to functional status, cognitive ability, or social support system  Outcome: Progressing     Problem: Knowledge Deficit  Goal: Patient/family/caregiver demonstrates understanding of disease process, treatment plan, medications, and discharge instructions  Description: Complete learning assessment and assess knowledge base.  Interventions:  - Provide teaching at level of understanding  - Provide teaching via preferred learning methods  Outcome: Progressing     Problem: CARDIOVASCULAR - ADULT  Goal: Maintains optimal cardiac output and hemodynamic stability  Description: INTERVENTIONS:  - Monitor I/O, vital signs and rhythm  - Monitor for S/S and trends of decreased cardiac output  - Administer and titrate ordered vasoactive medications to optimize hemodynamic stability  - Assess quality of pulses, skin color and temperature  - Assess for signs of  decreased coronary artery perfusion  - Instruct patient to report change in severity of symptoms  Outcome: Progressing  Goal: Absence of cardiac dysrhythmias or at baseline rhythm  Description: INTERVENTIONS:  - Continuous cardiac monitoring, vital signs, obtain 12 lead EKG if ordered  - Administer antiarrhythmic and heart rate control medications as ordered  - Monitor electrolytes and administer replacement therapy as ordered  Outcome: Progressing     Problem: METABOLIC, FLUID AND ELECTROLYTES - ADULT  Goal: Electrolytes maintained within normal limits  Description: INTERVENTIONS:  - Monitor labs and assess patient for signs and symptoms of electrolyte imbalances  - Administer electrolyte replacement as ordered  - Monitor response to electrolyte replacements, including repeat lab results as appropriate  - Instruct patient on fluid and nutrition as appropriate  Outcome: Progressing     Problem: SKIN/TISSUE INTEGRITY - ADULT  Goal: Incision(s), wounds(s) or drain site(s) healing without S/S of infection  Description: INTERVENTIONS  - Assess and document dressing, incision, wound bed, drain sites and surrounding tissue  - Provide patient and family education  - Perform skin care/dressing changes every 2  Outcome: Progressing     Problem: HEMATOLOGIC - ADULT  Goal: Maintains hematologic stability  Description: INTERVENTIONS  - Assess for signs and symptoms of bleeding or hemorrhage  - Monitor labs  - Administer supportive blood products/factors as ordered and appropriate  Outcome: Progressing     Problem: MUSCULOSKELETAL - ADULT  Goal: Maintain or return mobility to safest level of function  Description: INTERVENTIONS:  - Assess patient's ability to carry out ADLs; assess patient's baseline for ADL function and identify physical deficits which impact ability to perform ADLs (bathing, care of mouth/teeth, toileting, grooming, dressing, etc.)  - Assess/evaluate cause of self-care deficits   - Assess range of motion  -  Assess patient's mobility  - Assess patient's need for assistive devices and provide as appropriate  - Encourage maximum independence but intervene and supervise when necessary  - Involve family in performance of ADLs  - Assess for home care needs following discharge   - Consider OT consult to assist with ADL evaluation and planning for discharge  - Provide patient education as appropriate  Outcome: Progressing  Goal: Maintain proper alignment of affected body part  Description: INTERVENTIONS:  - Support, maintain and protect limb and body alignment  - Provide patient/ family with appropriate education  Outcome: Progressing

## 2024-01-20 NOTE — ASSESSMENT & PLAN NOTE
Wt Readings from Last 3 Encounters:   01/20/24 101 kg (223 lb 12.3 oz)   11/13/23 101 kg (222 lb)   10/16/23 98.9 kg (218 lb)     History of ischemic cardiomyopathy recent EF 40%  Does not appear grossly overloaded, continue cardiac medications and monitor volume status closely  Cardiology following   Follow daily weights  Cardiac diet

## 2024-01-20 NOTE — PROGRESS NOTES
Pt's dressing on his left 2 nd toe was changed. IV catheter was removed and D/C instruction was given.

## 2024-01-20 NOTE — DISCHARGE SUMMARY
[facility]  Discharge- Nadir Myers 1960, 63 y.o. male MRN: [id number]  Unit/Bed#: NW8 878-01 Encounter: [id number]  Primary Care Provider: Tigre Hare DO   Date and time admitted to hospital: 1/15/2024  8:03 PM    * Cellulitis of left foot  Assessment & Plan  Presented with worsening left fourth toe wound with surrounding erythema.  Patient was otherwise afebrile and nontoxic-appearing.  Concern for surrounding cellulitis given appearance  Podiatry following- seen at bedside with podiatry today- wound still appears to have pus present. Podiatry recommending MRI, discussed with MRI department, MD DC ICD is MRI conditional and can be done with a [employer] rep present. MRI department is requesting stat portable xray for lead placement verification  Wound culture + 3 staph- Dc cefazolin 1/20 days dc flagyl   Will dc on Bactrim 4 more days for total of 10 days   Wound culture with staph aureus   Local wound care as per podiatry  LEADs demonstrating PAD on left side. Vascular surgery was consulted. s/p a-gram 1/18 with SFA balloon angioplasty, TP trunk, proximal PT and peroneal arteries treated with balloon angioplasty, unable to recannulate left AT    Cardiology was consulted for possible OR clearance  C/w serial monitoring     PAD (peripheral artery disease) (HCC)  Assessment & Plan  Noted in LLE with LEADS  Borderline healing pressure  Vascular surgery consulted  S/p agram 1/18 with SFA balloon angioplasty, TP trunk, proximal PT and peroneal arteries treated with balloon angioplasty, unable to recannulate left AT      Coronary artery disease involving native coronary artery of native heart without angina pectoris  Assessment & Plan  S/p prior stenting   Currently chest pain-free, continue ASA, beta-blocker, statin    Type 2 diabetes mellitus with foot ulcer, with long-term current use of insulin (HCC)  Assessment & Plan  Lab Results   Component Value Date    HGBA1C 9.1  (H) 01/16/2024       Recent Labs     01/19/24  1635 01/19/24  2042 01/20/24  0806 01/20/24  1205   POCGLU 235* 298* 208* 228*         Blood Sugar Average: Last 72 hrs:  (P) 226  Uncontrolled.  Patient admits to dietary indiscretion last few months.  increase Levemir to 25 units every morning and add correctional insulin coverage with Accu-Cheks while admitted.  Hold home oral medications.    hypoglycemia protocol   Carb controlled diet      Chronic systolic CHF (congestive heart failure), NYHA class 2 ([facility])  Assessment & Plan  Wt Readings from Last 3 Encounters:   01/20/24 101 kg (223 lb 12.3 oz)   11/13/23 101 kg (222 lb)   10/16/23 98.9 kg (218 lb)     History of ischemic cardiomyopathy recent EF 40%  Does not appear grossly overloaded, continue cardiac medications and monitor volume status closely  Cardiology following   Follow daily weights  Cardiac diet     V-tach ([facility])  Assessment & Plan  Maintained on amiodarone 200 mg daily and metoprolol succinate 25 mg twice daily, continue        Medical Problems       Resolved Problems  Date Reviewed: 1/20/2024   None       Discharging Physician / Practitioner: CARLA Rivera  PCP: Tigre Hare DO  Admission Date:   Admission Orders (From admission, onward)       Ordered        01/15/24 2232  INPATIENT ADMISSION  Once                          Discharge Date: 01/20/24    Consultations During Hospital Stay:  Podiatry - Dr Cunningham   Vascular - Dr Jonas  Cardiology - Dr Xavier     Procedures Performed:   Left foot x-ray on 1/16/2024: Poor cortical definition of the tuft of the fifth distal phalanx. Diagnostic considerations include fracture and osteomyelitis.   IR lower extremity angiogram on 1/18/2024:1. Abdominal aorta and bilateral iliofemoral arteries show diffuse atherosclerotic calcification resulting in mild luminal stenosis.   2. Left deep femoral artery was patent.   3. Mild to moderate stenoses along the left superficial femoral artery was treated using 5 mm  balloon angioplasty.   4. High-grade stenoses in the tibioperoneal trunk, proximal posterior tibial and peroneal arteries were treated using 3 mm balloon angioplasty resulting in two-vessel runoff of the left lower extremity.   5. Unsuccessful attempt at recanalization of chronically occluded left anterior tibial artery.   X-ray chest portable on 1/19/2024:No acute cardiopulmonary disease.   Pacer leads appear intact without evidence for fragmentation or abandonment.   MRI foot left with without contrast on 1/19/2024:Lateral skin ulceration fourth toe with mild marrow edema in this region but no T1 replacement signal at this time. Findings remain low suspicion for osteomyelitis. Marrow signal is otherwise unremarkable. The fifth distal tuft is   unremarkable without T1 replacement signal to indicate infection as was suspected on recent plain film study.   Wound cultures positive for staph aureus sensitive to Bactrim  Vascular JOSHUA on 1/19/2024:Impression  RIGHT LOWER LIMB:  Not evaluated.  LEFT LOWER LIMB  Ankle/Brachial Index: 1.36,  which is in the normal range.  Prior:  0.95.  PPG/PVR Tracings are dampened.  Biphasic waveform's at the ankle.  Metatarsal Pressure: 150 mmHg.  Prior: 100 mmHg.  Great Toe Pressure: 85 mmHg, within the healing range for a diabetic. Prior: 52  mmHg.In comparison to the study of 01/16/2024, there is improvement in the disease process post intervention.    Significant Findings / Test Results:   See above     Incidental Findings:   None      Test Results Pending at Discharge (will require follow up):   None     Outpatient Tests Requested:  Call to schedule follow-up with PCP within the next week  Follow-up with podiatry call to schedule follow-up within the next week  Patient has vascular appointment on 2/9/2024 at 1:30 PM    Complications: None    Reason for Admission: Left fourth toe wound concern for cellulitis.    Hospital Course:   Nadir Myers is a 63 y.o. male patient who  originally presented to the hospital on 1/15/2024 due to left fourth toe wound concern for cellulitis.  Patient has past medical history significant for CAD status post prior stent, ischemic cardiomyopathy with an ejection fraction of 40% ventricular tachycardia, type 2 diabetes on insulin and oral hypoglycemics.  Patient presented to the ED at the advice of his PCP with left foot wound and concern for cellulitis.  Patient reported that 2 days prior to admission he noticed development of a wound in the lateral portion of his left fourth toe which was increasing in regard to erythema although patient did not note any fevers or chills.  He denied any numbness tingling paresthesias or other systemic symptoms patient was unsure of weight he developed the wound but denied any recurrent trauma to the foot or stepping on any objects or falls.  Patient was seen by his primary care physician who expressed apparent concern for possible wound infection advised him to present for evaluation.  During his ED eval he was seen by the podiatry team who performed local wound care and obtain local cultures with plan to obtain leads patient was started on IV antibiotics.   Patient was seen by podiatry on review of uterine lateral leads, patient also underwent left lower extremity angiogram on 1/18/2024.  He also underwent MRI of the left foot.  Plan was to continue local wound care this was discussed with the patient wife and daughter at bedside.  He would need to follow-up with podiatry at soonest available appointment preferably within the week.  Patient also given surgical shoe and discussed the need to not walk around barefoot.  Patient's wound care consisted of Adaptic Betadine dry sterile dressing to the left fourth toe ulceration.  Dressing was changed prior to discharge.  Patient was discharged on oral Bactrim for 5 days status post cefazolin IV for 5 days.  Patient was originally seen by cardiology for cardiac clearance should  he need any procedure they recommended stopping patient's SGLT2i given his lower extremity wounds.  Recommended starting a gentle ARB low-dose.  Patient's blood pressures remained stable at discharge and patient scripts were sent to the pharmacy.  Patient was also initiated by the vascular surgery team on Plavix recommended to be on aspirin and a statin with that.        Please see above list of diagnoses and related plan for additional information.     Condition at Discharge: stable    Discharge Day Visit / Exam:   Subjective: Patient doing well today.  Very eager to return home.  Reports that pain is controlled.  We discussed plan for follow-up and monitoring and tight blood sugar control  Vitals: Blood Pressure: 127/73 (01/20/24 0802)  Pulse: 77 (01/20/24 0802)  Temperature: 98.2 °F (36.8 °C) (01/20/24 0802)  Temp Source: Oral (01/20/24 0802)  Respirations: 12 (01/20/24 0802)  Weight - Scale: 101 kg (223 lb 12.3 oz) (01/20/24 0600)  SpO2: 94 % (01/20/24 0802)  Exam:   Physical Exam  Constitutional:       General: He is not in acute distress.     Appearance: He is not ill-appearing or toxic-appearing.   HENT:      Head: Normocephalic.      Nose: No congestion.   Eyes:      General:         Right eye: No discharge.         Left eye: No discharge.   Cardiovascular:      Rate and Rhythm: Normal rate.      Heart sounds: No murmur heard.     No friction rub. No gallop.   Pulmonary:      Effort: No respiratory distress.      Breath sounds: No stridor. No wheezing, rhonchi or rales.   Chest:      Chest wall: No tenderness.   Abdominal:      General: There is no distension.      Palpations: There is no mass.      Tenderness: There is no abdominal tenderness. There is no guarding or rebound.      Hernia: No hernia is present.   Musculoskeletal:         General: No swelling, tenderness, deformity or signs of injury.      Right lower leg: No edema.      Left lower leg: No edema.      Comments: Left lower extremity wound    Skin:     Coloration: Skin is not jaundiced or pale.      Findings: No bruising, erythema, lesion or rash.   Neurological:      Mental Status: He is alert and oriented to person, place, and time.   Psychiatric:         Behavior: Behavior normal.          Discussion with Family: Updated  (wife and daughter) at bedside.    Discharge instructions/Information to patient and family:   See after visit summary for information provided to patient and family.      Provisions for Follow-Up Care:  See after visit summary for information related to follow-up care and any pertinent home health orders.      Mobility at time of Discharge:   Basic Mobility Inpatient Raw Score: 24  JH-HLM Goal: 8: Walk 250 feet or more  JH-HLM Achieved: 8: Walk 250 feet ot more  HLM Goal achieved. Continue to encourage appropriate mobility.     Disposition:   Home    Planned Readmission: no plan for readmission      Discharge Statement:  I spent 40 minutes discharging the patient. This time was spent on the day of discharge. I had direct contact with the patient on the day of discharge. Greater than 50% of the total time was spent examining patient, answering all patient questions, arranging and discussing plan of care with patient as well as directly providing post-discharge instructions.  Additional time then spent on discharge activities.    Discharge Medications:  See after visit summary for reconciled discharge medications provided to patient and/or family.      **Please Note: This note may have been constructed using a voice recognition system**

## 2024-01-22 ENCOUNTER — TELEPHONE (OUTPATIENT)
Dept: CARDIOLOGY CLINIC | Facility: CLINIC | Age: 64
End: 2024-01-22

## 2024-01-22 NOTE — TELEPHONE ENCOUNTER
P/c'd and [facility] pharmacy told him to call the cardiologist about med interactions.  He was told losartan and the Batrim would make potassium level increase.    1/20/24- k- 4.3      Please advise

## 2024-01-22 NOTE — UTILIZATION REVIEW
NOTIFICATION OF ADMISSION DISCHARGE   This is a Notification of Discharge from [facility]. Please be advised that this patient has been discharge from our facility. Below you will find the admission and discharge date and time including the patient’s disposition.   UTILIZATION REVIEW CONTACT:  Giacomo Wallace  Utilization   Network Utilization Review Department  Phone: [phone] x carefully listen to the prompts. All voicemails are confidential.  Email: [email or web address]     ADMISSION INFORMATION  PRESENTATION DATE: 1/15/2024  8:03 PM  OBERVATION ADMISSION DATE:   INPATIENT ADMISSION DATE: 1/15/24 10:32 PM   DISCHARGE DATE: 1/20/2024  5:27 PM   DISPOSITION:Home/Self Care    Network Utilization Review Department  ATTENTION: Please call with any questions or concerns to [phone] and carefully listen to the prompts so that you are directed to the right person. All voicemails are confidential.   For Discharge needs, contact Care Management DC Support Team at [phone] opt. 2  Send all requests for admission clinical reviews, approved or denied determinations and any other requests to dedicated fax number below belonging to the campus where the patient is receiving treatment. List of dedicated fax numbers for the Facilities:  FACILITY NAME UR FAX NUMBER   ADMISSION DENIALS (Administrative/Medical Necessity) [phone]   DISCHARGE SUPPORT TEAM ([facility]) [phone]   PARENT CHILD HEALTH (Maternity/NICU/Pediatrics) [phone]   [facility] [phone]   [facility] [phone]   [facility] [phone]   [facility] [phone]   [facility] [phone]   [facility] [phone]   [facility] [phone]   [facility] [phone]   [facility]  [facility] [phone]   [facility] [phone]   [facility] [phone]

## 2024-01-24 ENCOUNTER — OFFICE VISIT (OUTPATIENT)
Dept: PODIATRY | Facility: CLINIC | Age: 64
End: 2024-01-24
Payer: COMMERCIAL

## 2024-01-24 VITALS
RESPIRATION RATE: 18 BRPM | HEART RATE: 89 BPM | SYSTOLIC BLOOD PRESSURE: 118 MMHG | HEIGHT: 72 IN | BODY MASS INDEX: 30.35 KG/M2 | DIASTOLIC BLOOD PRESSURE: 76 MMHG

## 2024-01-24 DIAGNOSIS — I96 GANGRENE OF TOE OF LEFT FOOT (HCC): ICD-10-CM

## 2024-01-24 DIAGNOSIS — L97.509 TYPE 2 DIABETES MELLITUS WITH FOOT ULCER, WITH LONG-TERM CURRENT USE OF INSULIN (HCC): Primary | ICD-10-CM

## 2024-01-24 DIAGNOSIS — E11.621 TYPE 2 DIABETES MELLITUS WITH FOOT ULCER, WITH LONG-TERM CURRENT USE OF INSULIN (HCC): Primary | ICD-10-CM

## 2024-01-24 DIAGNOSIS — Z79.4 TYPE 2 DIABETES MELLITUS WITH FOOT ULCER, WITH LONG-TERM CURRENT USE OF INSULIN (HCC): Primary | ICD-10-CM

## 2024-01-24 DIAGNOSIS — I73.9 PAD (PERIPHERAL ARTERY DISEASE) (HCC): ICD-10-CM

## 2024-01-24 PROCEDURE — 99214 OFFICE O/P EST MOD 30 MIN: CPT | Performed by: PODIATRIST

## 2024-01-24 NOTE — PROGRESS NOTES
Assessment/Plan:         Diagnoses and all orders for this visit:    Type 2 diabetes mellitus with foot ulcer, with long-term current use of insulin (HCC)    PAD (peripheral artery disease) (HCC)    Gangrene of toe of left foot (HCC)        Diagnosis and options discussed with patient  Patient agreeable to the plan as stated below  Extensive chart review, see below. Visit took 45 minutes.    Dry gangrene to left 4th toe. His MRI was negative for OM but he is very high risk for this toe requiring amputation. Betadyne wet to dry dressing daily and surgical shoe. Rest the foot as much as possible. Limit walking to bedroom, bathroom, couch.     His toe pressure did improve with the agra but his ALEXA is occluded and his peroneal is only fed by collaterals. Given he is still smoking heavily. I told him and his wife there is little chance of saving his toe unless he quits immediately    Will keep close eye on the toe. He has appt with Dr. Natarajan next week. I will see in 4 weeks.     A1C 9.1. A heavy smoker with uncontrolled BG often results in amputation. He must quit immediately. We discussed the relationship between cigarette smoking, atherosclerotic disease, and its effects on the lower extremity. I emphasized the importance of smoking cessation       Reviewed recent inpatient H&P and DC summary  Reviewed podiatry progress notes and vascular notes as well as agram.  Reviewed imaging, no sing of bone infection on imaging while inpatient, wound stable today        Subjective:      Patient ID: Nadir Myers is a 63 y.o. male.    Patient was hospitalized for acute limb ischemia of his left lower extremity.  He had a sore in his left fourth toe.  Imaging did not show a deeper infection and he has been painting with Betadine and wearing a surgical shoe.  He underwent an angiogram with balloon angioplasty of his posterior tibial and peroneal arteries.  His anterior tibial is occluded.  He is unsure if the toe was getting better.   He is still smoking heavily.  His A1c is over 9.        The following portions of the patient's history were reviewed and updated as appropriate: allergies, current medications, past family history, past medical history, past social history, past surgical history, and problem list.    Review of Systems    As stated in HPI, otherwise normal      Objective:      /76   Pulse 89   Resp 18   Ht 6' (1.829 m)   BMI 30.35 kg/m²          Physical Exam  Vitals reviewed.   Constitutional:       Appearance: He is not ill-appearing or diaphoretic.   Cardiovascular:      Rate and Rhythm: Normal rate.      Pulses: Pulses are weak.           Dorsalis pedis pulses are 0 on the left side.        Posterior tibial pulses are 0 on the left side.   Pulmonary:      Effort: Pulmonary effort is normal. No respiratory distress.   Feet:      Left foot:      Skin integrity: Ulcer (black eschar 4th toe. No drainage or malodor. No cellulitis) and skin breakdown present.      Toenail Condition: Left toenails are abnormally thick.   Skin:     Findings: Erythema (dependent rubor left forefoot. No cellulitis) present.   Neurological:      Mental Status: He is alert and oriented to person, place, and time.   Psychiatric:         Mood and Affect: Mood normal.       Diabetic Foot Exam    Patient's shoes and socks removed.    Right Foot/Ankle   Right Foot Inspection  Skin Exam: skin intact.     Toe Exam: right toe deformity.     Sensory   Vibration: absent  Monofilament testing: diminished    Vascular  Capillary refills: elevated      Left Foot/Ankle  Left Foot Inspection  Skin Exam: maceration and ulcer (black eschar 4th toe. No drainage or malodor. No cellulitis). Skin not intact.     Toe Exam: swelling and left toe deformity. No erythema.     Sensory   Vibration: absent  Monofilament testing: diminished    Vascular  Capillary refills: elevated  The left DP pulse is 0. The left PT pulse is 0.     Assign Risk Category  Deformity  present  Loss of protective sensation  Weak pulses  Risk: 3

## 2024-01-26 ENCOUNTER — TELEPHONE (OUTPATIENT)
Dept: CARDIOLOGY CLINIC | Facility: CLINIC | Age: 64
End: 2024-01-26

## 2024-01-26 NOTE — TELEPHONE ENCOUNTER
Patient called with discharge medication questions. Discharge med list reviewed, and confirmed current meds.

## 2024-01-31 ENCOUNTER — APPOINTMENT (OUTPATIENT)
Dept: RADIOLOGY | Facility: HOSPITAL | Age: 64
DRG: 853 | End: 2024-01-31
Payer: COMMERCIAL

## 2024-01-31 ENCOUNTER — APPOINTMENT (EMERGENCY)
Dept: RADIOLOGY | Facility: HOSPITAL | Age: 64
DRG: 853 | End: 2024-01-31
Payer: COMMERCIAL

## 2024-01-31 ENCOUNTER — HOSPITAL ENCOUNTER (INPATIENT)
Facility: HOSPITAL | Age: 64
LOS: 30 days | Discharge: HOME HEALTH | DRG: 853 | End: 2024-03-01
Attending: EMERGENCY MEDICINE | Admitting: INTERNAL MEDICINE
Payer: COMMERCIAL

## 2024-01-31 DIAGNOSIS — E11.621 TYPE 2 DIABETES MELLITUS WITH FOOT ULCER, WITH LONG-TERM CURRENT USE OF INSULIN (HCC): ICD-10-CM

## 2024-01-31 DIAGNOSIS — I50.9 ACUTE EXACERBATION OF CHF (CONGESTIVE HEART FAILURE) (HCC): ICD-10-CM

## 2024-01-31 DIAGNOSIS — T36.8X5A ADVERSE EFFECT OF VANCOMYCIN, INITIAL ENCOUNTER: ICD-10-CM

## 2024-01-31 DIAGNOSIS — K59.00 CONSTIPATION, UNSPECIFIED CONSTIPATION TYPE: ICD-10-CM

## 2024-01-31 DIAGNOSIS — I25.10 CORONARY ARTERY DISEASE INVOLVING NATIVE CORONARY ARTERY OF NATIVE HEART WITHOUT ANGINA PECTORIS: ICD-10-CM

## 2024-01-31 DIAGNOSIS — N17.9 AKI (ACUTE KIDNEY INJURY) (HCC): ICD-10-CM

## 2024-01-31 DIAGNOSIS — I26.99 PULMONARY INFARCT (HCC): ICD-10-CM

## 2024-01-31 DIAGNOSIS — R65.21 SEPTIC SHOCK (HCC): ICD-10-CM

## 2024-01-31 DIAGNOSIS — I25.10 3-VESSEL CORONARY ARTERY DISEASE: ICD-10-CM

## 2024-01-31 DIAGNOSIS — I48.0 PAROXYSMAL ATRIAL FIBRILLATION (HCC): ICD-10-CM

## 2024-01-31 DIAGNOSIS — I25.5 ISCHEMIC CARDIOMYOPATHY: ICD-10-CM

## 2024-01-31 DIAGNOSIS — L97.509 TYPE 2 DIABETES MELLITUS WITH FOOT ULCER, WITH LONG-TERM CURRENT USE OF INSULIN (HCC): ICD-10-CM

## 2024-01-31 DIAGNOSIS — F32.A DEPRESSION: ICD-10-CM

## 2024-01-31 DIAGNOSIS — B95.61 MSSA BACTEREMIA: ICD-10-CM

## 2024-01-31 DIAGNOSIS — Z95.810 ICD (IMPLANTABLE CARDIOVERTER-DEFIBRILLATOR) IN PLACE: ICD-10-CM

## 2024-01-31 DIAGNOSIS — A41.9 SEPTIC SHOCK (HCC): ICD-10-CM

## 2024-01-31 DIAGNOSIS — Z79.4 TYPE 2 DIABETES MELLITUS WITH FOOT ULCER, WITH LONG-TERM CURRENT USE OF INSULIN (HCC): ICD-10-CM

## 2024-01-31 DIAGNOSIS — R78.81 MSSA BACTEREMIA: ICD-10-CM

## 2024-01-31 DIAGNOSIS — R29.90 STROKE-LIKE SYMPTOMS: ICD-10-CM

## 2024-01-31 DIAGNOSIS — L08.9 FOOT INFECTION: ICD-10-CM

## 2024-01-31 DIAGNOSIS — I47.20 V-TACH (HCC): ICD-10-CM

## 2024-01-31 DIAGNOSIS — R53.1 WEAKNESS: Primary | ICD-10-CM

## 2024-01-31 DIAGNOSIS — I73.9 PAD (PERIPHERAL ARTERY DISEASE) (HCC): ICD-10-CM

## 2024-01-31 PROBLEM — R29.898 LUE WEAKNESS: Status: ACTIVE | Noted: 2024-01-31

## 2024-01-31 LAB
2HR DELTA HS TROPONIN: 2 NG/L
4HR DELTA HS TROPONIN: -9 NG/L
ALBUMIN SERPL BCP-MCNC: 3.7 G/DL (ref 3.5–5)
ALP SERPL-CCNC: 58 U/L (ref 34–104)
ALT SERPL W P-5'-P-CCNC: 13 U/L (ref 7–52)
ANION GAP SERPL CALCULATED.3IONS-SCNC: 10 MMOL/L
APTT PPP: 29 SECONDS (ref 23–37)
AST SERPL W P-5'-P-CCNC: 16 U/L (ref 13–39)
ATRIAL RATE: 102 BPM
BETA-HYDROXYBUTYRATE: 0.3 MMOL/L
BILIRUB DIRECT SERPL-MCNC: 0.14 MG/DL (ref 0–0.2)
BILIRUB SERPL-MCNC: 0.82 MG/DL (ref 0.2–1)
BUN SERPL-MCNC: 37 MG/DL (ref 5–25)
CALCIUM SERPL-MCNC: 9 MG/DL (ref 8.4–10.2)
CARDIAC TROPONIN I PNL SERPL HS: 101 NG/L
CARDIAC TROPONIN I PNL SERPL HS: 110 NG/L
CARDIAC TROPONIN I PNL SERPL HS: 112 NG/L
CHLORIDE SERPL-SCNC: 95 MMOL/L (ref 96–108)
CO2 SERPL-SCNC: 24 MMOL/L (ref 21–32)
CREAT SERPL-MCNC: 1.41 MG/DL (ref 0.6–1.3)
ERYTHROCYTE [DISTWIDTH] IN BLOOD BY AUTOMATED COUNT: 13.5 % (ref 11.6–15.1)
EST. AVERAGE GLUCOSE BLD GHB EST-MCNC: 235 MG/DL
FLUAV RNA RESP QL NAA+PROBE: NEGATIVE
FLUBV RNA RESP QL NAA+PROBE: NEGATIVE
GFR SERPL CREATININE-BSD FRML MDRD: 53 ML/MIN/1.73SQ M
GLUCOSE SERPL-MCNC: 234 MG/DL (ref 65–140)
GLUCOSE SERPL-MCNC: 267 MG/DL (ref 65–140)
GLUCOSE SERPL-MCNC: 288 MG/DL (ref 65–140)
GLUCOSE SERPL-MCNC: 293 MG/DL (ref 65–140)
GLUCOSE SERPL-MCNC: 357 MG/DL (ref 65–140)
GLUCOSE SERPL-MCNC: 365 MG/DL (ref 65–140)
GLUCOSE SERPL-MCNC: 373 MG/DL (ref 65–140)
GLUCOSE SERPL-MCNC: 382 MG/DL (ref 65–140)
HBA1C MFR BLD: 9.8 %
HCT VFR BLD AUTO: 43.7 % (ref 36.5–49.3)
HGB BLD-MCNC: 14.2 G/DL (ref 12–17)
INR PPP: 1.13 (ref 0.84–1.19)
LACTATE SERPL-SCNC: 1.9 MMOL/L (ref 0.5–2)
LACTATE SERPL-SCNC: 3 MMOL/L (ref 0.5–2)
MCH RBC QN AUTO: 28.7 PG (ref 26.8–34.3)
MCHC RBC AUTO-ENTMCNC: 32.5 G/DL (ref 31.4–37.4)
MCV RBC AUTO: 88 FL (ref 82–98)
P AXIS: 90 DEGREES
PLATELET # BLD AUTO: 227 THOUSANDS/UL (ref 149–390)
PMV BLD AUTO: 9.5 FL (ref 8.9–12.7)
POTASSIUM SERPL-SCNC: 4.4 MMOL/L (ref 3.5–5.3)
PR INTERVAL: 172 MS
PROCALCITONIN SERPL-MCNC: 0.73 NG/ML
PROT SERPL-MCNC: 7.5 G/DL (ref 6.4–8.4)
PROTHROMBIN TIME: 14.4 SECONDS (ref 11.6–14.5)
QRS AXIS: 60 DEGREES
QRSD INTERVAL: 110 MS
QT INTERVAL: 366 MS
QTC INTERVAL: 477 MS
RBC # BLD AUTO: 4.95 MILLION/UL (ref 3.88–5.62)
RSV RNA RESP QL NAA+PROBE: NEGATIVE
SARS-COV-2 RNA RESP QL NAA+PROBE: NEGATIVE
SODIUM SERPL-SCNC: 129 MMOL/L (ref 135–147)
T WAVE AXIS: 92 DEGREES
VENTRICULAR RATE: 102 BPM
WBC # BLD AUTO: 21.58 THOUSAND/UL (ref 4.31–10.16)

## 2024-01-31 PROCEDURE — 83605 ASSAY OF LACTIC ACID: CPT

## 2024-01-31 PROCEDURE — 96367 TX/PROPH/DG ADDL SEQ IV INF: CPT

## 2024-01-31 PROCEDURE — 96365 THER/PROPH/DIAG IV INF INIT: CPT

## 2024-01-31 PROCEDURE — 85027 COMPLETE CBC AUTOMATED: CPT | Performed by: EMERGENCY MEDICINE

## 2024-01-31 PROCEDURE — 87147 CULTURE TYPE IMMUNOLOGIC: CPT

## 2024-01-31 PROCEDURE — 99291 CRITICAL CARE FIRST HOUR: CPT | Performed by: EMERGENCY MEDICINE

## 2024-01-31 PROCEDURE — 80076 HEPATIC FUNCTION PANEL: CPT

## 2024-01-31 PROCEDURE — 99291 CRITICAL CARE FIRST HOUR: CPT | Performed by: INTERNAL MEDICINE

## 2024-01-31 PROCEDURE — 87186 SC STD MICRODIL/AGAR DIL: CPT

## 2024-01-31 PROCEDURE — 99223 1ST HOSP IP/OBS HIGH 75: CPT | Performed by: PSYCHIATRY & NEUROLOGY

## 2024-01-31 PROCEDURE — 36415 COLL VENOUS BLD VENIPUNCTURE: CPT

## 2024-01-31 PROCEDURE — 82010 KETONE BODYS QUAN: CPT

## 2024-01-31 PROCEDURE — 80048 BASIC METABOLIC PNL TOTAL CA: CPT | Performed by: EMERGENCY MEDICINE

## 2024-01-31 PROCEDURE — 0241U HB NFCT DS VIR RESP RNA 4 TRGT: CPT | Performed by: EMERGENCY MEDICINE

## 2024-01-31 PROCEDURE — 82948 REAGENT STRIP/BLOOD GLUCOSE: CPT

## 2024-01-31 PROCEDURE — 0Y6W0Z0 DETACHMENT AT LEFT 4TH TOE, COMPLETE, OPEN APPROACH: ICD-10-PCS | Performed by: PODIATRIST

## 2024-01-31 PROCEDURE — 99285 EMERGENCY DEPT VISIT HI MDM: CPT

## 2024-01-31 PROCEDURE — 70496 CT ANGIOGRAPHY HEAD: CPT

## 2024-01-31 PROCEDURE — 87205 SMEAR GRAM STAIN: CPT

## 2024-01-31 PROCEDURE — 96368 THER/DIAG CONCURRENT INF: CPT

## 2024-01-31 PROCEDURE — 28820 AMPUTATION OF TOE: CPT | Performed by: PODIATRIST

## 2024-01-31 PROCEDURE — 84145 PROCALCITONIN (PCT): CPT

## 2024-01-31 PROCEDURE — 87154 CUL TYP ID BLD PTHGN 6+ TRGT: CPT

## 2024-01-31 PROCEDURE — 87040 BLOOD CULTURE FOR BACTERIA: CPT

## 2024-01-31 PROCEDURE — 99223 1ST HOSP IP/OBS HIGH 75: CPT | Performed by: PODIATRIST

## 2024-01-31 PROCEDURE — 93010 ELECTROCARDIOGRAM REPORT: CPT | Performed by: INTERNAL MEDICINE

## 2024-01-31 PROCEDURE — 96361 HYDRATE IV INFUSION ADD-ON: CPT

## 2024-01-31 PROCEDURE — 87070 CULTURE OTHR SPECIMN AEROBIC: CPT

## 2024-01-31 PROCEDURE — 93005 ELECTROCARDIOGRAM TRACING: CPT

## 2024-01-31 PROCEDURE — 85730 THROMBOPLASTIN TIME PARTIAL: CPT | Performed by: EMERGENCY MEDICINE

## 2024-01-31 PROCEDURE — 87075 CULTR BACTERIA EXCEPT BLOOD: CPT

## 2024-01-31 PROCEDURE — 84484 ASSAY OF TROPONIN QUANT: CPT | Performed by: EMERGENCY MEDICINE

## 2024-01-31 PROCEDURE — 84484 ASSAY OF TROPONIN QUANT: CPT

## 2024-01-31 PROCEDURE — 83036 HEMOGLOBIN GLYCOSYLATED A1C: CPT

## 2024-01-31 PROCEDURE — 87076 CULTURE ANAEROBE IDENT EACH: CPT

## 2024-01-31 PROCEDURE — 85610 PROTHROMBIN TIME: CPT | Performed by: EMERGENCY MEDICINE

## 2024-01-31 PROCEDURE — 70498 CT ANGIOGRAPHY NECK: CPT

## 2024-01-31 PROCEDURE — 73630 X-RAY EXAM OF FOOT: CPT

## 2024-01-31 PROCEDURE — 71046 X-RAY EXAM CHEST 2 VIEWS: CPT

## 2024-01-31 RX ORDER — ATORVASTATIN CALCIUM 80 MG/1
80 TABLET, FILM COATED ORAL
Status: DISCONTINUED | OUTPATIENT
Start: 2024-01-31 | End: 2024-03-01 | Stop reason: HOSPADM

## 2024-01-31 RX ORDER — ASPIRIN 81 MG/1
81 TABLET, CHEWABLE ORAL DAILY
Status: DISCONTINUED | OUTPATIENT
Start: 2024-01-31 | End: 2024-03-01 | Stop reason: HOSPADM

## 2024-01-31 RX ORDER — CLOPIDOGREL BISULFATE 75 MG/1
75 TABLET ORAL DAILY
Status: DISCONTINUED | OUTPATIENT
Start: 2024-01-31 | End: 2024-03-01 | Stop reason: HOSPADM

## 2024-01-31 RX ORDER — VANCOMYCIN HYDROCHLORIDE 1 G/200ML
10 INJECTION, SOLUTION INTRAVENOUS DAILY PRN
Status: DISCONTINUED | OUTPATIENT
Start: 2024-02-01 | End: 2024-02-01

## 2024-01-31 RX ORDER — ENOXAPARIN SODIUM 100 MG/ML
40 INJECTION SUBCUTANEOUS 2 TIMES DAILY
Status: CANCELLED | OUTPATIENT
Start: 2024-01-31

## 2024-01-31 RX ORDER — LIDOCAINE HYDROCHLORIDE 10 MG/ML
10 INJECTION, SOLUTION EPIDURAL; INFILTRATION; INTRACAUDAL; PERINEURAL ONCE
Status: COMPLETED | OUTPATIENT
Start: 2024-01-31 | End: 2024-01-31

## 2024-01-31 RX ORDER — ONDANSETRON 2 MG/ML
4 INJECTION INTRAMUSCULAR; INTRAVENOUS ONCE
Status: DISCONTINUED | OUTPATIENT
Start: 2024-02-01 | End: 2024-02-08

## 2024-01-31 RX ORDER — CHLORHEXIDINE GLUCONATE ORAL RINSE 1.2 MG/ML
15 SOLUTION DENTAL EVERY 12 HOURS SCHEDULED
Status: DISCONTINUED | OUTPATIENT
Start: 2024-01-31 | End: 2024-02-09

## 2024-01-31 RX ORDER — AMOXICILLIN 250 MG
2 CAPSULE ORAL 2 TIMES DAILY PRN
Status: DISCONTINUED | OUTPATIENT
Start: 2024-01-31 | End: 2024-02-17

## 2024-01-31 RX ORDER — POLYETHYLENE GLYCOL 3350 17 G/17G
17 POWDER, FOR SOLUTION ORAL DAILY PRN
Status: DISCONTINUED | OUTPATIENT
Start: 2024-01-31 | End: 2024-02-16

## 2024-01-31 RX ORDER — FAMOTIDINE 10 MG/ML
40 INJECTION, SOLUTION INTRAVENOUS ONCE
Status: COMPLETED | OUTPATIENT
Start: 2024-01-31 | End: 2024-01-31

## 2024-01-31 RX ORDER — SODIUM CHLORIDE, SODIUM GLUCONATE, SODIUM ACETATE, POTASSIUM CHLORIDE, MAGNESIUM CHLORIDE, SODIUM PHOSPHATE, DIBASIC, AND POTASSIUM PHOSPHATE .53; .5; .37; .037; .03; .012; .00082 G/100ML; G/100ML; G/100ML; G/100ML; G/100ML; G/100ML; G/100ML
500 INJECTION, SOLUTION INTRAVENOUS ONCE
Status: COMPLETED | OUTPATIENT
Start: 2024-01-31 | End: 2024-02-01

## 2024-01-31 RX ORDER — VANCOMYCIN HYDROCHLORIDE 750 MG/150ML
750 INJECTION, SOLUTION INTRAVENOUS ONCE
Status: COMPLETED | OUTPATIENT
Start: 2024-01-31 | End: 2024-02-01

## 2024-01-31 RX ORDER — LOSARTAN POTASSIUM 25 MG/1
12.5 TABLET ORAL DAILY
Status: DISCONTINUED | OUTPATIENT
Start: 2024-02-01 | End: 2024-01-31

## 2024-01-31 RX ORDER — NOREPINEPHRINE BITARTRATE 1 MG/ML
INJECTION, SOLUTION INTRAVENOUS
Status: DISPENSED
Start: 2024-01-31 | End: 2024-01-31

## 2024-01-31 RX ORDER — AMIODARONE HYDROCHLORIDE 200 MG/1
200 TABLET ORAL
Status: DISCONTINUED | OUTPATIENT
Start: 2024-01-31 | End: 2024-03-01 | Stop reason: HOSPADM

## 2024-01-31 RX ORDER — HEPARIN SODIUM 5000 [USP'U]/ML
5000 INJECTION, SOLUTION INTRAVENOUS; SUBCUTANEOUS EVERY 8 HOURS SCHEDULED
Status: DISCONTINUED | OUTPATIENT
Start: 2024-01-31 | End: 2024-02-01

## 2024-01-31 RX ORDER — ACETAMINOPHEN 325 MG/1
650 TABLET ORAL ONCE
Status: COMPLETED | OUTPATIENT
Start: 2024-01-31 | End: 2024-01-31

## 2024-01-31 RX ORDER — SODIUM CHLORIDE, SODIUM GLUCONATE, SODIUM ACETATE, POTASSIUM CHLORIDE, MAGNESIUM CHLORIDE, SODIUM PHOSPHATE, DIBASIC, AND POTASSIUM PHOSPHATE .53; .5; .37; .037; .03; .012; .00082 G/100ML; G/100ML; G/100ML; G/100ML; G/100ML; G/100ML; G/100ML
1000 INJECTION, SOLUTION INTRAVENOUS ONCE
Status: COMPLETED | OUTPATIENT
Start: 2024-01-31 | End: 2024-01-31

## 2024-01-31 RX ORDER — SPIRONOLACTONE 25 MG/1
12.5 TABLET ORAL DAILY
Status: DISCONTINUED | OUTPATIENT
Start: 2024-02-01 | End: 2024-01-31

## 2024-01-31 RX ORDER — INSULIN LISPRO 100 [IU]/ML
1-6 INJECTION, SOLUTION INTRAVENOUS; SUBCUTANEOUS EVERY 6 HOURS SCHEDULED
Status: DISCONTINUED | OUTPATIENT
Start: 2024-01-31 | End: 2024-01-31

## 2024-01-31 RX ADMIN — CHLORHEXIDINE GLUCONATE 15 ML: 1.2 SOLUTION ORAL at 21:07

## 2024-01-31 RX ADMIN — FAMOTIDINE 40 MG: 10 INJECTION INTRAVENOUS at 15:46

## 2024-01-31 RX ADMIN — LIDOCAINE HYDROCHLORIDE 10 ML: 10 INJECTION, SOLUTION EPIDURAL; INFILTRATION; INTRACAUDAL; PERINEURAL at 17:00

## 2024-01-31 RX ADMIN — CEFEPIME 2000 MG: 2 INJECTION, POWDER, FOR SOLUTION INTRAVENOUS at 11:42

## 2024-01-31 RX ADMIN — VANCOMYCIN HYDROCHLORIDE 1250 MG: 5 INJECTION, POWDER, LYOPHILIZED, FOR SOLUTION INTRAVENOUS at 12:29

## 2024-01-31 RX ADMIN — CHLORHEXIDINE GLUCONATE 15 ML: 1.2 SOLUTION ORAL at 15:51

## 2024-01-31 RX ADMIN — AMIODARONE HYDROCHLORIDE 200 MG: 200 TABLET ORAL at 18:28

## 2024-01-31 RX ADMIN — ACETAMINOPHEN 650 MG: 325 TABLET, FILM COATED ORAL at 10:35

## 2024-01-31 RX ADMIN — SODIUM CHLORIDE 1000 ML: 0.9 INJECTION, SOLUTION INTRAVENOUS at 10:29

## 2024-01-31 RX ADMIN — HEPARIN SODIUM 5000 UNITS: 5000 INJECTION INTRAVENOUS; SUBCUTANEOUS at 21:07

## 2024-01-31 RX ADMIN — NOREPINEPHRINE BITARTRATE 5 MCG/MIN: 1 SOLUTION INTRAVENOUS at 11:48

## 2024-01-31 RX ADMIN — CLOPIDOGREL BISULFATE 75 MG: 75 TABLET ORAL at 18:29

## 2024-01-31 RX ADMIN — ASPIRIN 81 MG CHEWABLE TABLET 81 MG: 81 TABLET CHEWABLE at 18:29

## 2024-01-31 RX ADMIN — SODIUM CHLORIDE, SODIUM GLUCONATE, SODIUM ACETATE, POTASSIUM CHLORIDE, MAGNESIUM CHLORIDE, SODIUM PHOSPHATE, DIBASIC, AND POTASSIUM PHOSPHATE 1000 ML: .53; .5; .37; .037; .03; .012; .00082 INJECTION, SOLUTION INTRAVENOUS at 12:02

## 2024-01-31 RX ADMIN — ATORVASTATIN CALCIUM 80 MG: 80 TABLET, FILM COATED ORAL at 17:51

## 2024-01-31 RX ADMIN — VANCOMYCIN HYDROCHLORIDE 750 MG: 750 INJECTION, SOLUTION INTRAVENOUS at 16:05

## 2024-01-31 RX ADMIN — SODIUM CHLORIDE 8 UNITS/HR: 9 INJECTION, SOLUTION INTRAVENOUS at 15:18

## 2024-01-31 RX ADMIN — IOHEXOL 85 ML: 350 INJECTION, SOLUTION INTRAVENOUS at 09:49

## 2024-01-31 RX ADMIN — SODIUM CHLORIDE, SODIUM GLUCONATE, SODIUM ACETATE, POTASSIUM CHLORIDE, MAGNESIUM CHLORIDE, SODIUM PHOSPHATE, DIBASIC, AND POTASSIUM PHOSPHATE 500 ML: .53; .5; .37; .037; .03; .012; .00082 INJECTION, SOLUTION INTRAVENOUS at 16:11

## 2024-01-31 RX ADMIN — HEPARIN SODIUM 5000 UNITS: 5000 INJECTION INTRAVENOUS; SUBCUTANEOUS at 17:52

## 2024-01-31 NOTE — PROCEDURES
"Procedure:  Amputation left 4th toe at MTPJ  Surgeon:  Dr. Neno Pardo, DPM   Assistant: Kamila Bailey, PGY 2  Preop diagnosis:  gangrene left 4th toe  Postop diagnosis:  Same   Anesthesia:  10 cc 1% lidocaine plain   Estimated blood loss:  5 mL   Material: 4-0 nylon  Complications: None    Consent was reviewed and signed for partial versus total amputation left 4th toe.  The toe was marked and a time-out was performed and witnessed by nursing  staff.      Attention was directed to the left foot.  10 cc of 1% lidocaine plain was infiltrated in a digit block fashion.  The foot was then scrubbed, prepped, draped in the usual aseptic manner.  A time-out was again performed with the patient and office staff in presence.    Using a 15 blade the toe was disarticulated at the MTP joint and discarded.  There was no sign of infection at the amputation margin.  It was rinsed with normal sterile saline.  TThere was no pus tracking into the foot. There was minimal bleeding. The surgical wound was packed with 1/4\"packing (1 piece). The foot was cleaned and dressed with dry sterile dressing and compression wrap.  At no point during the procedure or afterwards did the patient experience any pain or difficulty.    "

## 2024-01-31 NOTE — CONSULTS
Podiatry - Consultation    Patient Information:   Nadir Myers 63 y.o. male MRN: [id number]  Unit/Bed#: MICU 12 Encounter: [id number]  PCP: Tigre Hare DO  Date of Admission:  1/31/2024  Date of Consultation: 01/31/24  Requesting Physician: Rylee Wood DO      ASSESSMENT:    Nadir Myers is a 63 y.o. male with:    Septic shock  Left fourth toe gangrene  Left foot cellulitis  Type 2 diabetes mellitus  Three-vessel coronary artery disease, heart failure with reduced ejection fraction.  Tobacco dependence  Peripheral arterial disease    WBC: 21.58  Lactic 3.0  Glucose: 365    PLAN:    Left fourth toe amputated at the [facility] at bedside given patient's septic shock and significant cardiac risk factors. See procedure note by Dr. Hartman.  Consent signed at bedside with patient  Will plan for eventual revisional procedures pending patient's stability, continued IV antibiotics and serial dressing changes  Continue broad-spectrum antibiotics per primary.  Follow up deep surgical cultures to tailor antibiotic  Dressings consisted of packing DSD to right foot.  Podiatry to do all dressing changes  Trend WBC, vitals   Xray left foot: no noted CONNIE. Some cortical destruction noted at the distal and middle phalanx of the 4th toe, suspicious for OM   Elevation and offloading on green foam wedges or pillows when non-ambulatory.  Rest of care per primary team.  Will discuss this plan with my attending and update as needed.        Weightbearing status: Weightbearing as tolerated     SUBJECTIVE:    History of Present Illness:    Nadir Myers is a 63 y.o. male who is originally admitted 1/31/2024 due to septic shock. Patient has a past medical history of tobacco abuse, three-vessel coronary artery disease, tobacco dependence, ischemic cardiomyopathy, atrial fibrillation, history of left foot wounds, and peripheral arterial disease.    We are consulted for left fourth toe gangrene with cellulitis.  Patient presented  to the emergency department today due to concern from his wife and daughter for altered mental status.  Patient is has been previously admitted to the hospital due to peripheral arterial disease and newly discovered left fourth lateral toe wound.  During admission patient had plain film radiographs and MRI conducted on his left foot which indicated low suspicion of osteomyelitis.  Patient was here to return to home and was discharged due to low suspicion of underlying infection.  Patient was instructed to present to podiatry office for wound care.  Patient returns today with wet gangrene of left fourth toe.  Patient also reports that he continues to smoke.  Patient's wife and daughter were present for entire visit.  Patient reports that he was experiencing fever, chills, and shortness of breath.    Review of Systems:    Constitutional: Negative.    HENT: Negative.    Eyes: Negative.    Respiratory: Negative.    Cardiovascular: Negative.    Gastrointestinal: Negative.    Musculoskeletal: left 4th toe gangrene   Skin:gangrene, cellulitis    Neurological: neuropathy   Psych: Negative.     Past Medical and Surgical History:     Past Medical History:   Diagnosis Date    Diabetes mellitus (HCC)     Myocardial infarction (HCC)        Past Surgical History:   Procedure Laterality Date    CARDIAC CATHETERIZATION N/A 05/03/2023    Procedure: Cardiac Coronary Angiogram;  Surgeon: Valeriy Shore MD;  Location: [facility];  Service: Cardiology    CARDIAC CATHETERIZATION Left 05/03/2023    Procedure: Cardiac Left Heart Cath;  Surgeon: Valeriy Shore MD;  Location: [facility];  Service: Cardiology    CARDIAC DEFIBRILLATOR PLACEMENT  05/2023    CARDIAC ELECTROPHYSIOLOGY PROCEDURE N/A 05/12/2023    Procedure: Cardiac icd implant;  Surgeon: Urbano Maria MD;  Location: [facility];  Service: Cardiology    IR LOWER EXTREMITY ANGIOGRAM  1/18/2024       Meds/Allergies:    Medications Prior to Admission    Medication    amiodarone 200 mg tablet    aspirin 81 MG tablet    atorvastatin (LIPITOR) 80 mg tablet    BD Pen Needle Micro U/F 32G X 6 MM MISC    clopidogrel (PLAVIX) 75 mg tablet    insulin detemir (Levemir FlexPen) 100 Units/mL injection pen    JANUMET -1000 MG TB24    losartan (COZAAR) 25 mg tablet    metFORMIN (GLUCOPHAGE) 1000 MG tablet    metoprolol succinate (TOPROL-XL) 25 mg 24 hr tablet    spironolactone (ALDACTONE) 25 mg tablet       No Known Allergies    Social History:     Marital Status: /Civil Union    Substance Use History:   Social History     Substance and Sexual Activity   Alcohol Use Not Currently     Social History     Tobacco Use   Smoking Status Every Day    Types: Cigarettes   Smokeless Tobacco Never   Tobacco Comments    X2 cigarettes/day     Social History     Substance and Sexual Activity   Drug Use Never       Family History:    History reviewed. No pertinent family history.      OBJECTIVE:    Vitals:   Blood Pressure: 92/54 (01/31/24 1510)  Pulse: 99 (01/31/24 1510)  Temperature: (!) 102.3 °F (39.1 °C) (01/31/24 1358)  Temp Source: Rectal (01/31/24 1358)  Respirations: (!) 32 (01/31/24 1510)  Height: 6' (182.9 cm) (01/31/24 1358)  Weight - Scale: 105 kg (231 lb 11.3 oz) (01/31/24 1358)  SpO2: 95 % (01/31/24 1510)    Physical Exam:    General Appearance: Alert, cooperative, no distress.  HEENT: Head normocephalic, atraumatic, without obvious abnormality.  Heart: Normal rate and rhythm.  Lungs: Non-labored breathing. No respiratory distress.  Abdomen: Without distension.  Psychiatric: AAOx3  Lower Extremity:    Vascular:   DP: Right: doppler signal Left: doppler signal  PT: Right: doppler signal Left: doppler signal  There is no capillary refill noted to the left 4th toe       Musculoskeletal:  No gross deformity noted  No pain on palpation of the left 4th toe     Dermatological:  Lower extremity wound(s) as noted below:    There is left fourth toe gangrene noted.  There is  "cellulitis extending onto the dorsal midfoot.  There is no fluctuance or crepitus noted proximal to the metatarsal phalangeal joint.                  Neurological:  Gross sensation is diminished.   Light touch is diminished.   Protective sensation is diminished.    Additional data:     Lab Results: I have personally reviewed pertinent labs including:    Results from last 7 days   Lab Units 01/31/24  0941   WBC Thousand/uL 21.58*   HEMOGLOBIN g/dL 14.2   HEMATOCRIT % 43.7   PLATELETS Thousands/uL 227     Results from last 7 days   Lab Units 01/31/24  0951 01/31/24  0941   POTASSIUM mmol/L  --  4.4   CHLORIDE mmol/L  --  95*   CO2 mmol/L  --  24   BUN mg/dL  --  37*   CREATININE mg/dL  --  1.41*   CALCIUM mg/dL  --  9.0   ALK PHOS U/L 58  --    ALT U/L 13  --    AST U/L 16  --      Results from last 7 days   Lab Units 01/31/24  0941   INR  1.13       Cultures: I have personally reviewed pertinent cultures including:              Imaging: I have personally reviewed pertinent reports in PACS.  EKG, Pathology, and Other Studies: I have personally reviewed pertinent reports.    Time Spent for Care: 30 minutes.  More than 50% of total time spent on counseling and coordination of care as described above.      ** Please Note: Portions of the record may have been created with voice recognition software. Occasional wrong word or \"sound a like\" substitutions may have occurred due to the inherent limitations of voice recognition software. Read the chart carefully and recognize, using context, where substitutions have occurred. **    "

## 2024-01-31 NOTE — CONSULTS
Consultation - Stroke   Nadir Myers 63 y.o. male MRN: [id number]  Unit/Bed#: ED 06 Encounter: [id number]      Assessment/Plan     LUE weakness  Assessment & Plan  63-year-old male with apparent history of afib not on AC, diabetes, history of MI with stenting, CHF, status post MRI conditional ICD, history of V. tach, PAD, with left foot ulcer, presents with left upper extremity weakness.  NIHSS 1 for LUE dysmetria.  CT head demonstrated no acute changes.  CTA head/neck demonstrated no LVO but moderate bilateral supraclinoid ICA and moderate to severe left cavernous ICA stenosis.  Patient is on aspirin, Plavix, and high-dose atorvastatin at baseline.  /71 on presentation.  Last known well unclear; possibly sometime the evening of 1/30/2024.  Out of window for TNK no target for thrombectomy.    Multiple derangements noted on presentation, including hyperglycemia, hypothermia, hypoxia.    Plan:  -Admit to stroke pathway.  -MRI brain without contrast.  -2D echocardiogram with bubble study.  -Check hemoglobin A1c, lipid panel, TSH.  -Continue 81 mg aspirin and Plavix 75mg daily. OK ASA if unable to take PO.  -Continue atorvastatin 80 mg.  -Telemetry.  -PT/OT/speech therapy.  -Allow permissive hypertension, up to 200 systolic.  -Frequent neuro checks.  -Continue to monitor and notify with changes.  -Defer correction of infection/metabolic derangements to primary service.          Thrombolytic Decision: Patient not a candidate. Unclear time of onset outside appropriate time window. and Symptoms resolved/clearly non disabling.      Recommendations for outpatient neurological follow up have yet to be determined.    History of Present Illness     Reason for Consult / Principal Problem: LUE weakness  Hx and PE limited by: n/a  Patient last known well: unclear; possibly sometime the evening of 1/30/24  Stroke alert called: 0928 on 1/31/24, prehospital  Neurology time of arrival: 0935  HPI: Nadir Myers is a 63 y.o.  "right handed male with apparent history of afib not on AC, diabetes, history of MI with stenting, CHF, status post MRI conditional ICD, history of V. tach, PAD, with left foot ulcer, presents with left upper extremity weakness.    Per discussion with patient's wife at bedside, patient was \"not acting himself\" as of 2300 on 1/30/2024.  This morning around 0730, she noticed that he was not using his left upper extremity is much as he previously was, but is unsure if he was using it normally last evening.  As a result, she called EMS.  Per EMS, patient had 102 degree tympanic temperature, glucose was over 500, patient was hypoxic in the high 80s, and left foot wound appeared necrotic.  Patient reported feeling nauseated.  Blood pressure on presentation 130/71.    CT head demonstrated no acute changes.  CTA head/neck demonstrated no LVO but moderate bilateral supraclinoid ICA and moderate to severe left cavernous ICA stenosis.  Patient is on aspirin, Plavix, and high-dose atorvastatin at baseline.    On exam, patient has left upper extremity dysmetria which appears to be due to weakness of the left shoulder.  There is no drift, however.  Neurologic exam otherwise nonfocal.    Inpatient consult to Neurology  Consult performed by: Re Garcia PA-C  Consult ordered by: Everardo Keenan MD          Review of Systems   Constitutional: Negative.    HENT: Negative.     Eyes: Negative.    Respiratory: Negative.     Cardiovascular: Negative.    Gastrointestinal:  Positive for nausea.   Endocrine: Negative.    Genitourinary: Negative.    Musculoskeletal: Negative.    Skin:  Negative for rash.   Allergic/Immunologic: Negative.    Neurological:  Positive for weakness.        As above.   Hematological: Negative.    Psychiatric/Behavioral: Negative.         Historical Information   Past Medical History:   Diagnosis Date    Diabetes mellitus (HCC)     Myocardial infarction (HCC)      Past Surgical History:   Procedure " Laterality Date    CARDIAC CATHETERIZATION N/A 2023    Procedure: Cardiac Coronary Angiogram;  Surgeon: Valeriy Shore MD;  [facility]: [facility];  Service: Cardiology    CARDIAC CATHETERIZATION Left 2023    Procedure: Cardiac Left Heart Cath;  Surgeon: Valeriy Shore MD;  [facility]: [facility];  Service: Cardiology    CARDIAC DEFIBRILLATOR PLACEMENT  2023    CARDIAC ELECTROPHYSIOLOGY PROCEDURE N/A 2023    Procedure: Cardiac icd implant;  Surgeon: Urbano Maria MD;  Location: [facility];  Service: Cardiology    IR LOWER EXTREMITY ANGIOGRAM  2024     Social History   Social History     Substance and Sexual Activity   Alcohol Use Not Currently     Social History     Substance and Sexual Activity   Drug Use Not on file     E-Cigarette/Vaping    E-Cigarette Use Never User      E-Cigarette/Vaping Substances    Nicotine No     THC No     CBD No     Flavoring No     Other No     Unknown No      Social History     Tobacco Use   Smoking Status Former    Current packs/day: 0.00    Types: Cigarettes    Quit date: 2023    Years since quittin.7   Smokeless Tobacco Never     Family History: History reviewed. No pertinent family history.    Review of previous medical records was completed.     Meds/Allergies   PTA meds:   Prior to Admission Medications   Prescriptions Last Dose Informant Patient Reported? Taking?   BD Pen Needle Micro U/F 32G X 6 MM MISC  Self Yes No   Sig: USE 1 PEN EACH DAILY   JANUMET -1000 MG TB24  Self Yes No   Sig: daily    amiodarone 200 mg tablet  Self No No   Sig: TAKE 1 TABLET BY MOUTH EVERY DAY WITH BREAKFAST   aspirin 81 MG tablet  Self Yes No   Sig: Take 1 tablet by mouth daily   atorvastatin (LIPITOR) 80 mg tablet  Self No No   Sig: Take 1 tablet (80 mg total) by mouth daily after dinner   clopidogrel (PLAVIX) 75 mg tablet  Self No No   Sig: Take 1 tablet (75 mg total) by mouth daily   insulin detemir (Levemir FlexPen) 100  Units/mL injection pen  Self Yes No   Sig: Inject 20 Units under the skin daily Patient to take 30 units daily - if fasting bs >150 for 3 days to increase dose by 5 units. Once bs < 150 can go back to 30 units   losartan (COZAAR) 25 mg tablet  Self No No   Sig: Take 0.5 tablets (12.5 mg total) by mouth daily   metFORMIN (GLUCOPHAGE) 1000 MG tablet  Self Yes No   Sig: Take 1,000 mg by mouth daily with breakfast   metoprolol succinate (TOPROL-XL) 25 mg 24 hr tablet  Self No No   Sig: TAKE 1 TABLET (25 MG TOTAL) BY MOUTH EVERY 12 (TWELVE) HOURS   spironolactone (ALDACTONE) 25 mg tablet  Self No No   Sig: Take 0.5 tablets (12.5 mg total) by mouth daily Start on 7/5/23 per Quyen Hinds PA-C      Facility-Administered Medications: None       No Known Allergies    Objective   Vitals:Blood pressure 120/74, pulse 104, temperature 98.8 °F (37.1 °C), temperature source Oral, resp. rate 22, SpO2 96%.,There is no height or weight on file to calculate BMI.  No intake or output data in the 24 hours ending 01/31/24 1117    Invasive Devices:   Invasive Devices       Peripheral Intravenous Line  Duration             Peripheral IV 01/31/24 Right Antecubital <1 day                    Physical Exam  General:  Patient is well-developed, obese BMI 30.35, and in no acute distress.  HEENT:  Head normocephalic.  Eyes anicteric.  Dry mucous membranes.  Cardiovascular:  With regular rhythm.  Lungs:  Normal effort. Nonlabored breathing.  Extremities:  With no significant edema.  Boot noted on left lower extremity.  Skin: No rashes.    Neurologic Exam  Neurologic:  Patient is alert, correctly stated current month and his age.  Able to follow simple midline and appendicular commands.    Gait deferred for safety.    Cranial Nerves:   II: Visual fields full to confrontation. Cannot visualize optic fundi.  III,IV,VI: extraocular movements intact with no nystagmus.   V: Sensation in the V1 through V3 distributions intact to light touch bilaterally.    VII: Face is symmetric with no weakness noted.   XII: Tongue midline with no atrophy or fasciculations with appropriate movement.     Coordination:  Accurate with heel-to-shin maneuvers bilaterally.  Dysmetria noted with left finger-to-nose maneuver but accurate on right.  Dysmetria appears in proportion to proximal left upper extremity weakness.    Motor testing with no upper or lower extremity drift.  Patient did have 4-/5 left shoulder abduction to confrontation.  4-4+/5 throughout remainder of left upper extremity.  No significant weakness noted in right upper extremity or either lower extremity.    Sensory testing grossly intact to light touch throughout.  No extinction with double simultaneous stimulation.    Toe responses are downgoing bilaterally.    NIHSS:  1a.Level of Consciousness: 0 = Alert   1b. LOC Questions: 0 = Answers both correctly   1c. LOC Commands: 0 = Obeys both correctly   2. Best Gaze: 0 = Normal   3. Visual: 0 = No visual field loss   4. Facial Palsy: 0=Normal symmetric movement   5a. Motor Right Arm: 0=No drift, limb holds 90 (or 45) degrees for full 10 seconds   5b. Motor Left Arm: 0=No drift, limb holds 90 (or 45) degrees for full 10 seconds   6a. Motor Right Le=No drift, limb holds 90 (or 45) degrees for full 10 seconds   6b. Motor Left Le=No drift, limb holds 90 (or 45) degrees for full 10 seconds   7. Limb Ataxia:  1=Present in one limb   8. Sensory: 0=Normal; no sensory loss   9. Best Language:  0=No aphasia, normal   10. Dysarthria: 0=Normal articulation   11. Extinction and Inattention (formerly Neglect): 0=No abnormality   Total Score: 1     Time NIHSS was completed: 0940    Modified [place] Score:  0 (No baseline symptoms/disability)    Lab Results: CBC:   Results from last 7 days   Lab Units 24  0941   WBC Thousand/uL 21.58*   RBC Million/uL 4.95   HEMOGLOBIN g/dL 14.2   HEMATOCRIT % 43.7   MCV fL 88   PLATELETS Thousands/uL 227   , BMP/CMP:   Results from last 7  days   Lab Units 01/31/24  0941   SODIUM mmol/L 129*   POTASSIUM mmol/L 4.4   CHLORIDE mmol/L 95*   CO2 mmol/L 24   BUN mg/dL 37*   CREATININE mg/dL 1.41*   CALCIUM mg/dL 9.0   EGFR ml/min/1.73sq m 53     Imaging Studies: I have personally reviewed pertinent reports.   and I have personally reviewed pertinent films in PACS CTH, CTA h/n  EKG, Pathology, and Other Studies: I have personally reviewed pertinent reports.    VTE Prophylaxis: Sequential compression device (Venodyne)     Code Status: Prior  Advance Directive and Living Will:      Power of :    POLST:

## 2024-01-31 NOTE — H&P
[facility]  H&P: Critical Care  Name: Nadir Myers 63 y.o. male I MRN: [id number]  Unit/Bed#: MICU 12 I Date of Admission: 1/31/2024   Date of Service: 1/31/2024 I Hospital Day: 0      Assessment/Plan   Neuro:   Diagnosis: Left Sided Weakness  Plan:   Patient initially presented to the ED as a stroke alert.   CT Stroke Alert: No acute intracranial abnormality.   CTA Stroke Alert: 1. CTA head: Negative for large vessel intracranial occlusion. Moderate bilateral supraclinoid ICA and moderate to severe left cavernous ICA segment stenosis. 2. CTA neck:  No extracranial carotid stenosis.  The cervical vertebral arteries are patent.  Per neuro, admit to stroke pathway, MRI brain, echocardiogram with bubble study, continue ASA/Plavix.   Q4 neuro checks.   Neuro following, appreciate recs.    CV:   Diagnosis: Congestive Heart Failure  Plan:   Last EF 40%.  Does not appear grossly volume overloaded on admission.  Monitor I/O.  Will restart home Losartan, Spironolactone tomorrow given pressor requirements.    Diagnosis: Shock  Plan:   Patient briefly requiring pressors in the ED on Levophed 5.  Pressors held on arrival to the MICU with stable blood pressures.    Diagnosis: Coronary Artery Disease  Plan:   Continue home Amiodarone, ASA, Lipitor, Plavix.    Pulm:  Diagnosis: Shortness of Breath  Plan:   On 2L NC, not on oxygen at baseline.  Appears comfortable on exam.  CXR read pending.  Wean as tolerated.    GI:   No active issues    :   Diagnosis: LINDEN  Plan:   Cr 1.41 on admission.  S/P 1.5L fluids in the ED.  Trend Cr    F/E/N:    F: S/P ~1.5L fluids in the ED  E: Replete as needed.  N: NPO pending beside swallow eval    Heme/Onc:   Diagnosis: Hyponatremia  Plan:   Na 129 on admission, corrected 134 for hyperglycemia.  Monitor.    Endo:   Diagnosis: Type 2 Diabetes Mellitus  Plan:   Patient presenting hyperglycemic at glucose of 382  Patient takes levemir 24 units daily.  Will  start insulin drip.  Per patient, his glucose is frequently in the 500s at home.    ID:   Diagnosis: Septic Shock  Plan:   Patient meeting SIRS criteria, S/P ~1.5L fluid resuscitation.   Febrile 102.3 on arrival to MICU.  WBC 21.58  Lactic Acid 3.0 -> 1.9  Procal 0.73  Covid negative.  Blood cultures pending.  Wound cultures pending.  Previous culture 1/15 grew MSSA.  Started on Cefepime and Vancomycin in the ED. Will continue, narrow as able pending cultures.  Potentially in the setting of previous left foot cellulitis.  Trend WBC, Fever curve.   Podiatry consulted, appreciate recs.    MSK/Skin:   Diagnosis: Cellulitis of the Left Foot  Plan:   With recent hospitalization discharged 1/20/24.  Likely in the setting of previous left foot cellulitis - see above A&P.  Podiatry consulted, appreciate recs.    Disposition: Critical care       History of Present Illness     HPI: Nadir Myers is a 63 y.o. with PMH including GERD, T2DM, CAD s/p HANNA to the Lcx in 2015, CHF last EF 40%, ICD placement for VT, who presents with left sided weakness. Of note, patient had a recent hospitalization for cellulitis of a wound on his left 4th toe. Patient complete his antibiotic course after recent admission. Per patient and family, weakness was first noted on his left side at around 11 PM last night when patient tried to get out of bed to use the bathroom and had difficulty doing so. Patient went back to sleep until about 4 AM this morning. At that time, patient noted pain in his left toe, which was unwrapped and found to be red and swollen but similar to prior. He had persistent weakness at this time and an ambulance was called around 6 AM. Patient was brought to the ED as a pre-hospital stroke alert.     In the ED, patient initially had stable vitals, labs notable for Na 129, Cr 1.41, Glucose 382, WBC 21.58. He underwent CT / CTA stroke alert with no acute intracranial occlusion. CXR / XR foot read pending. Patient's toe wounded  was noted to be worse appearing. On return from imaging patient was noted to have low blood pressures and was started on levophed. Per ED, patient was unable to be weaned from pressors without dropping his blood pressure. Patient was accepted by critical care for vasopressor requirement. Code status was discussed with the patient. He is level 1 full code.     History obtained from chart review and the patient.  Review of Systems   Constitutional:  Positive for fatigue. Negative for fever.   HENT:  Positive for sore throat. Negative for hearing loss.    Eyes:  Negative for visual disturbance.   Respiratory:  Negative for cough and shortness of breath.    Cardiovascular:  Negative for chest pain, palpitations and leg swelling.   Gastrointestinal:  Negative for abdominal distention, diarrhea, nausea and vomiting.   Endocrine: Negative for polyuria.   Genitourinary:  Negative for hematuria.   Musculoskeletal:  Negative for arthralgias and back pain.   Skin:  Positive for wound. Negative for rash.   Neurological:  Positive for weakness. Negative for dizziness, seizures, light-headedness and headaches.   Psychiatric/Behavioral:  The patient is not nervous/anxious.       Historical Information   Past Medical History:  No date: Diabetes mellitus (HCC)  No date: Myocardial infarction (HCC) Past Surgical History:  05/03/2023: CARDIAC CATHETERIZATION; N/A      Comment:  Procedure: Cardiac Coronary Angiogram;  Surgeon: Valeriy Shore MD;  Location: [facility] CARD[facility]AC [facility];  Service:                Cardiology  05/03/2023: CARDIAC CATHETERIZATION; Left      Comment:  Procedure: Cardiac Left Heart Cath;  Surgeon: Valeriy Shore MD;  Location: [facility]ARD[facility];  Service:                Cardiology  05/2023: CARDIAC DEFIBRILLATOR PLACEMENT  05/12/2023: CARDIAC ELECTROPHYSIOLOGY PROCEDURE; N/A      Comment:  Procedure: Cardiac icd implant;  Surgeon: Urbano Maria MD;  Location: [facility]  CARDIAC CATH LAB;  Service:                Cardiology  2024: IR LOWER EXTREMITY ANGIOGRAM   Current Outpatient Medications   Medication Instructions    amiodarone 200 mg, Oral, Daily with breakfast    aspirin 81 MG tablet 1 tablet, Oral, Daily    atorvastatin (LIPITOR) 80 mg, Oral, Daily after dinner    BD Pen Needle Micro U/F 32G X 6 MM MISC USE 1 PEN EACH DAILY    clopidogrel (PLAVIX) 75 mg, Oral, Daily    JANUMET -1000 MG TB24 Daily    Levemir FlexPen 20 Units, Subcutaneous, Daily, Patient to take 30 units daily - if fasting bs >150 for 3 days to increase dose by 5 units. Once bs < 150 can go back to 30 units    losartan (COZAAR) 12.5 mg, Oral, Daily    metFORMIN (GLUCOPHAGE) 1,000 mg, Oral, Daily with breakfast    metoprolol succinate (TOPROL-XL) 25 mg, Oral, Every 12 hours    spironolactone (ALDACTONE) 12.5 mg, Oral, Daily, Start on 23 per Quyen Hinds PA-C    No Known Allergies   Social History     Tobacco Use    Smoking status: Former     Current packs/day: 0.00     Types: Cigarettes     Quit date: 2023     Years since quittin.7    Smokeless tobacco: Never   Vaping Use    Vaping status: Never Used   Substance Use Topics    Alcohol use: Not Currently    History reviewed. No pertinent family history.       Objective                            Vitals I/O      Most Recent Min/Max in 24hrs   Temp (!) 102.3 °F (39.1 °C) Temp  Min: 98.8 °F (37.1 °C)  Max: 102.3 °F (39.1 °C)   Pulse 105 Pulse  Min: 85  Max: 110   Resp (!) 29 Resp  Min: 18  Max: 29   /75 BP  Min: 68/40  Max: 152/77   O2 Sat 96 % SpO2  Min: 90 %  Max: 98 %      Intake/Output Summary (Last 24 hours) at 2024 1409  Last data filed at 2024 1250  Gross per 24 hour   Intake 1650 ml   Output --   Net 1650 ml       Diet NPO    Invasive Monitoring           Physical Exam   Physical Exam  Vitals reviewed.   Eyes:      Extraocular Movements: Extraocular movements intact.      Pupils: Pupils are equal, round, and reactive  to light.   Skin:     General: Skin is warm.      Capillary Refill: Capillary refill takes less than 2 seconds.      Findings: Lesion present.      Comments: Left 4th toe wrapped and dressed   HENT:      Head: Normocephalic.      Mouth/Throat:      Mouth: Mucous membranes are moist.   Neck:   no midline tenderness Cardiovascular:      Rate and Rhythm: Normal rate and regular rhythm.      Pulses: Normal pulses.   Musculoskeletal:         General: No swelling.      Right lower leg: Trace Edema present.      Left lower leg: Trace Edema present.   Abdominal:      Palpations: Abdomen is soft.      Tenderness: There is no abdominal tenderness.   Constitutional:       Appearance: He is well-developed.   Pulmonary:      Effort: Pulmonary effort is normal.      Comments: Slightly decreased breath sounds B/L  Neurological:      Mental Status: He is alert and oriented to person, place and time. Mental status is at baseline.      Sensory: Sensation is intact.      Motor: Motor deficit.      Comments: LUE 2/5 against gravity, able to provide good effort against resistance. 5/5 in all other extremities.            Diagnostic Studies      EKG: Reviewed  Imaging: Reviewed I have personally reviewed pertinent reports.       Medications:  Scheduled PRN   amiodarone, 200 mg, Daily With Breakfast  aspirin, 81 mg, Daily  atorvastatin, 80 mg, After Dinner  chlorhexidine, 15 mL, Q12H TEJAS  clopidogrel, 75 mg, Daily  Famotidine (PF), 40 mg, Once  insulin lispro, 1-6 Units, Q6H TEJAS  norepinephrine, ,       norepinephrine, ,   polyethylene glycol, 17 g, Daily PRN  senna-docusate sodium, 2 tablet, BID PRN       Continuous    norepinephrine, 1-30 mcg/min, Last Rate: Stopped (01/31/24 1355)         Labs:    CBC    Recent Labs     01/31/24  0941   WBC 21.58*   HGB 14.2   HCT 43.7        BMP    Recent Labs     01/31/24  0941   SODIUM 129*   K 4.4   CL 95*   CO2 24   AGAP 10   BUN 37*   CREATININE 1.41*   CALCIUM 9.0       Coags    Recent  Labs     01/31/24  0941   INR 1.13   PTT 29        Additional Electrolytes  No recent results       Blood Gas    No recent results  No recent results LFTs  Recent Labs     01/31/24  0951   ALT 13   AST 16   ALKPHOS 58   ALB 3.7   TBILI 0.82       Infectious  Recent Labs     01/31/24  1112   PROCALCITONI 0.73*     Glucose  Recent Labs     01/31/24  0941   GLUC 382*               Markus Griffith MD

## 2024-01-31 NOTE — ASSESSMENT & PLAN NOTE
63-year-old male with apparent history of afib not on AC, DM, history of MI with stenting, CHF, status post MRI conditional ICD, history of V. tach, PAD, with left foot ulcer, who presented with left upper extremity weakness.  Patient is on aspirin, Plavix, and high-dose atorvastatin at baseline. /71 on presentation.  Last known well unclear; possibly sometime the evening of 1/30/2024.    NIHSS 1 for LUE dysmetria.   CT head demonstrated no acute changes.  CTA head/neck demonstrated no LVO but moderate bilateral supraclinoid ICA and moderate to severe left cavernous ICA stenosis.  Out of window for TNK no target for thrombectomy.    MRI brain now completed and negative for acute ischemia.  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 There is no thrombus. Bilateral atria normal in size.  A1C 9.8    Patient's L facial asymmetry appears to be near resolved and LUE weakness and discomfort much improved. Suspect etiology to have been a combination of metabolic derangements (including hyperglycemia, hypothermia, hypoxia) and musculoskeletal/orthopaedic. No evidence of central vascular process. No further neuroimaging/inpatient neurologic reocmmendations.     Plan:  - Continue home meds of Aspirin and high dose statin  - PT/OT  - If LUE persist, can undergo further evaluation with shoulder imaging/orthopaedics  - Goal of normotension  - Notify neurology with any worsening of symptoms or any neurologic concerns.  - Defer correction of infection/metabolic derangements to primary service.

## 2024-01-31 NOTE — PROGRESS NOTES
Nadir Myers is a 63 y.o. male who is currently ordered Vancomycin IV with management by the Pharmacy Consult service.  Relevant clinical data and objective / subjective history reviewed.  Vancomycin Assessment:  Indication and Goal AUC/Trough: Soft tissue (goal -600, trough >10)  Clinical Status: critically ill  Micro:     Renal Function:  SCr: 1.41 mg/dL  CrCl: 68.1 mL/min  Renal replacement: Not on dialysis  Days of Therapy: 1  Current Dose: 1250 mg (15 mg/kg) loading dose  Vancomycin Plan:  New Dosing: give 750 mg dose now as predicted vanc level < 10, start pulse dosing 1000 mg IV daily PRN for vanc level < 15; please extend infusion times due to infusion reaction   Estimated AUC: pulse dosing  Estimated Trough: pulse dosing  Next Level: 2/1 with AM labs  Renal Function Monitoring: Daily BMP and UOP  Pharmacy will continue to follow closely for s/sx of nephrotoxicity, infusion reactions and appropriateness of therapy.  BMP and CBC will be ordered per protocol. We will continue to follow the patient’s culture results and clinical progress daily.    Luz Rust, Pharmacist

## 2024-01-31 NOTE — CASE MANAGEMENT
Case Management Assessment & Discharge Planning Note    Patient name Nadir Myers  Location ED 06/ED 06 MRN [id number]  : 1960 Date 2024       Current Admission Date: 2024  Current Admission Diagnosis:LUE weakness  Patient Active Problem List    Diagnosis Date Noted    LUE weakness 2024    Cellulitis of left foot 2024    Type 2 diabetes mellitus with foot ulcer, with long-term current use of insulin ([facility]) 2024    Coronary artery disease involving native coronary artery of native heart without angina pectoris 2024    PAD (peripheral artery disease) ([facility]) 2024    Chronic systolic CHF (congestive heart failure), NYHA class 2 ([facility]) 2023    Localized swelling on left hand 2023    Hyponatremia 2023    Acidosis 2023    V-tach ([facility]) 2023    Ischemic cardiomyopathy 2023    Essential (primary) hypertension 2023    Acute HFrEF 2023    A-fib ([facility]) 2023    Benign prostatic hyperplasia without lower urinary tract symptoms 2018    3-vessel CAD 2015    Type 2 diabetes mellitus, with long-term current use of insulin ([facility]) 2015    GERD (gastroesophageal reflux disease) 2013    Tobacco dependence syndrome 2012      LOS (days): 0  Geometric Mean LOS (GMLOS) (days):   Days to GMLOS:     OBJECTIVE:  PATIENT READMITTED TO HOSPITAL            Current admission status: Inpatient       Preferred Pharmacy:   [id number] [place] [facility] [place] - [address]  [address]  [place] [address]  Phone: [phone] Fax: [phone]    [facility] [place] - [address]  [address]  [place] [address]  Phone: [phone] Fax: [phone]    Primary Care Provider: Tigre Hare DO    Primary Insurance: [facility] REP  Secondary Insurance:     ASSESSMENT:  Active Health Care Proxies       Rhoda Myers Health Care Representative - Spouse    Primary Phone: [phone] (Mobile)                   Readmission Root Cause  30 Day Readmission: Yes  Who directed you to return to the hospital?: Self  Did you understand whom to contact if you had questions or problems?: Yes  Did you get your prescriptions before you left the hospital?: Yes  Were you able to get your prescriptions filled when you left the hospital?: Yes  Did you take your medications as prescribed?: Yes  Were you able to get to your follow-up appointments?: Yes  During previous admission, was a post-acute recommendation made?: No  Patient was readmitted due to: stroke  Action Plan: TBD    Patient Information  Admitted from:: Home  Mental Status: Alert  During Assessment patient was accompanied by: Not accompanied during assessment  Assessment information provided by:: Patient  Primary Caregiver: Self  Support Systems: Self, Spouse/significant other, Family members  County of Residence: [place]  What city do you live in?: [place]  Home entry access options. Select all that apply.: Stairs  Number of steps to enter home.: 3  Do the steps have railings?: Yes  Type of Current Residence: [facility]  Living Arrangements: Lives w/ Spouse/significant other, Lives w/ Son  Is patient a ?: No    Activities of Daily Living Prior to Admission  Functional Status: Independent  Completes ADLs independently?: Yes  Ambulates independently?: Yes  Does patient use assisted devices?: Yes  Assisted Devices (DME) used: Straight Cane  Does patient currently own DME?: Yes  What DME does the patient currently own?: Straight Cane  Does patient have a history of Outpatient Therapy (PT/OT)?: Yes ([facility])  Does the patient have a history of Short-Term Rehab?: No  Does patient have a history of HHC?: Yes ([facility])  Does patient currently have HHC?: No    Patient Information Continued  Income Source: Pension/skilled nursing  Does patient have prescription coverage?: Yes  Does patient receive dialysis treatments?: No  Does  patient have a history of substance abuse?: No  Does patient have a history of Mental Health Diagnosis?: No    Means of Transportation  Means of Transport to Appts:: Drives Self      Housing Stability: Low Risk  (1/31/2024)    Housing Stability Vital Sign     Unable to Pay for Housing in the Last Year: No     Number of Places Lived in the Last Year: 1     Unstable Housing in the Last Year: No   Food Insecurity: No Food Insecurity (1/31/2024)    Hunger Vital Sign     Worried About Running Out of Food in the Last Year: Never true     Ran Out of Food in the Last Year: Never true   Transportation Needs: No Transportation Needs (1/31/2024)    PRAPARE - Transportation     Lack of Transportation (Medical): No     Lack of Transportation (Non-Medical): No   Utilities: Not At Risk (1/31/2024)    [facility] Utilities     Threatened with loss of utilities: No       DISCHARGE DETAILS:    Discharge planning discussed with:: Patient at bedside  Freedom of Choice: Yes     CM contacted family/caregiver?: No- see comments (Patient is independent)       Additional Comments: CM Student met with patient to complete CM assessment and introdule role.  Patient lives in w/ his wife and son in a mobile home w/ [address] [facility] [facility].  Patient is independent with ADLs and IADLs at baseline, drives, and uses a cane occasionally when leaving the house.  Patient had VNA in the past.  Patient has been to [facility] for OP PT/OT.  Patient states he would be agreeable to going to rehab but has no preference for rehab placements if it is recommended at d/c.  CM and CM Student to be available.

## 2024-01-31 NOTE — SEPSIS NOTE
"  Sepsis Note   Nadir Myers 63 y.o. male MRN: [id number]  Unit/Bed#: [facility] 12 Encounter: [id number]       Initial Sepsis Screening       Row Name 01/31/24 1425                Is the patient's history suggestive of a new or worsening infection? Yes (Proceed)  -CS        Suspected source of infection wound infection  -CS        Indicate SIRS criteria Tachycardia > 90 bpm;Tachypnea > 20 resp per min;Leukocytosis (WBC > 98062 IJL) OR Leukopenia (WBC <4000 IJL) OR Bandemia (WBC >10% bands)  -CS        Are two or more of the above signs & symptoms of infection both present and new to the patient? Yes (Proceed)  -CS        Assess for evidence of organ dysfunction: Are any of the below criteria present within 6 hours of suspected infection and SIRS criteria that are NOT considered to be chronic conditions? SBP < 90  -CS        Date of presentation of septic shock 01/31/24  -CS        Time of presentation of septic shock 1130  -CS        Fluid Resuscitation: A lesser volume than 30 ml/kg IV fluid will be given  -CS        The 30 mL/kg fluid bolus was not given to the patient despite having hypotension, a lactate of >= 4 mmol/L, or documentation of septic shock secondary to: Heart Failure  -CS        Instead of the 30 ml/kg fluid bolus, the following volume of crystalloid fluid will be ordered: 1L  -CS        Of the following fluid type: NSS  -CS        Is the patient is persistently hypotensive in the hour after fluid bolus administration? If yes, patient meets criteria for vasopressor use. YES  -CS        Sepsis Note: Click \"NEXT\" below (NOT \"close\") to generate sepsis note based on above information. YES (proceed by clicking \"NEXT\")  -CS                  User Key  (r) = Recorded By, (t) = Taken By, (c) = Cosigned By      Initials Name Provider Type    CS Yuniel Zhu DO Fellow                        Body mass index is 31.42 kg/m².  Wt Readings from Last 1 Encounters:   01/31/24 105 kg (231 lb 11.3 oz)     IBW " (Ideal Body Weight): 77.6 kg    Ideal body weight: 77.6 kg (171 lb 1.2 oz)  Adjusted ideal body weight: 88.6 kg (195 lb 5.2 oz)

## 2024-01-31 NOTE — STROKE DOCUMENTATION
Levophed gtt started at this time at 5 mcg/min with Dr. Carbajal at bedside - BP 68/40. Double checked by Juana MONTERO RN.

## 2024-01-31 NOTE — PLAN OF CARE
Problem: METABOLIC, FLUID AND ELECTROLYTES - ADULT  Goal: Electrolytes maintained within normal limits  Description: INTERVENTIONS:  - Monitor labs and assess patient for signs and symptoms of electrolyte imbalances  - Administer electrolyte replacement as ordered  - Monitor response to electrolyte replacements, including repeat lab results as appropriate  - Instruct patient on fluid and nutrition as appropriate  Outcome: Progressing  Goal: Fluid balance maintained  Description: INTERVENTIONS:  - Monitor labs   - Monitor I/O and WT  - Instruct patient on fluid and nutrition as appropriate  - Assess for signs & symptoms of volume excess or deficit  Outcome: Progressing  Goal: Glucose maintained within target range  Description: INTERVENTIONS:  - Monitor Blood Glucose as ordered  - Assess for signs and symptoms of hyperglycemia and hypoglycemia  - Administer ordered medications to maintain glucose within target range  - Assess nutritional intake and initiate nutrition service referral as needed  Outcome: Not Progressing

## 2024-01-31 NOTE — ED PROVIDER NOTES
"History  Chief Complaint   Patient presents with    STROKE Alert     Pt's wife called EMS, LKW last night, new onset of LUE weakness this morning.      63-year-old male with history of diabetic foot wound arrives as a prehospital stroke alert for new left upper and lower extremity weakness.  History provided by patient's wife who states that he began behaving \"odd\" around 11 PM last night.  She noted that he was sitting on the edge of the bed awake.  This morning around 7 she noted that he was not using his left hand.  No prior history of strokes.  Per EMS prehospital glucose was greater than 400.     Per chart review recent hospitalization for cellulitis of left foot.  Wife states that the wound has looked significantly worse over the past 24 hours.        Prior to Admission Medications   Prescriptions Last Dose Informant Patient Reported? Taking?   BD Pen Needle Micro U/F 32G X 6 MM MISC 1/30/2024 Self Yes Yes   Sig: USE 1 PEN EACH DAILY   JANUMET -1000 MG TB24 1/30/2024 Self Yes Yes   Sig: daily    amiodarone 200 mg tablet 1/30/2024 Self No Yes   Sig: TAKE 1 TABLET BY MOUTH EVERY DAY WITH BREAKFAST   aspirin 81 MG tablet 1/30/2024 Self Yes Yes   Sig: Take 1 tablet by mouth daily   atorvastatin (LIPITOR) 80 mg tablet 1/30/2024 Self No Yes   Sig: Take 1 tablet (80 mg total) by mouth daily after dinner   clopidogrel (PLAVIX) 75 mg tablet Past Week Self No Yes   Sig: Take 1 tablet (75 mg total) by mouth daily   insulin detemir (Levemir FlexPen) 100 Units/mL injection pen 1/30/2024 Self Yes Yes   Sig: Inject 20 Units under the skin daily Patient to take 30 units daily - if fasting bs >150 for 3 days to increase dose by 5 units. Once bs < 150 can go back to 30 units   losartan (COZAAR) 25 mg tablet 1/30/2024 Self No Yes   Sig: Take 0.5 tablets (12.5 mg total) by mouth daily   metFORMIN (GLUCOPHAGE) 1000 MG tablet 1/30/2024 Self Yes Yes   Sig: Take 1,000 mg by mouth daily with breakfast   metoprolol succinate " (TOPROL-XL) 25 mg 24 hr tablet 1/30/2024 Self No Yes   Sig: TAKE 1 TABLET (25 MG TOTAL) BY MOUTH EVERY 12 (TWELVE) HOURS   spironolactone (ALDACTONE) 25 mg tablet 1/30/2024 Self No Yes   Sig: Take 0.5 tablets (12.5 mg total) by mouth daily Start on 7/5/23 per Quyen Hinds PA-C      Facility-Administered Medications: None       Past Medical History:   Diagnosis Date    Diabetes mellitus (HCC)     Myocardial infarction (HCC)        Past Surgical History:   Procedure Laterality Date    CARDIAC CATHETERIZATION N/A 05/03/2023    Procedure: Cardiac Coronary Angiogram;  Surgeon: Valeriy Shore MD;  Location: [facility];  Service: Cardiology    CARDIAC CATHETERIZATION Left 05/03/2023    Procedure: Cardiac Left Heart Cath;  Surgeon: Valeriy Shore MD;  Location: [facility];  Service: Cardiology    CARDIAC DEFIBRILLATOR PLACEMENT  05/2023    CARDIAC ELECTROPHYSIOLOGY PROCEDURE N/A 05/12/2023    Procedure: Cardiac icd implant;  Surgeon: Urbano Maria MD;  Location: [facility];  Service: Cardiology    IR LOWER EXTREMITY ANGIOGRAM  1/18/2024       History reviewed. No pertinent family history.  I have reviewed and agree with the history as documented.    E-Cigarette/Vaping    E-Cigarette Use Never User      E-Cigarette/Vaping Substances    Nicotine No     THC No     CBD No     Flavoring No     Other No     Unknown No      Social History     Tobacco Use    Smoking status: Every Day     Types: Cigarettes    Smokeless tobacco: Never    Tobacco comments:     X2 cigarettes/day   Vaping Use    Vaping status: Never Used   Substance Use Topics    Alcohol use: Not Currently    Drug use: Never        Review of Systems   Unable to perform ROS: Acuity of condition       Physical Exam  ED Triage Vitals   Temperature Pulse Respirations Blood Pressure SpO2   01/31/24 0940 01/31/24 0940 01/31/24 0940 01/31/24 0940 01/31/24 0940   98.8 °F (37.1 °C) 101 20 130/71 90 %      Temp Source Heart Rate Source Patient  Position - Orthostatic VS BP Location FiO2 (%)   01/31/24 0940 01/31/24 0940 -- -- --   Oral Monitor         Pain Score       01/31/24 1459       No Pain             Orthostatic Vital Signs  Vitals:    01/31/24 1358 01/31/24 1400 01/31/24 1420 01/31/24 1510   BP: 143/75 124/67 118/66 92/54   Pulse: 105 105 105 99       Physical Exam  Vitals and nursing note reviewed.   Constitutional:       General: He is in acute distress.      Appearance: He is well-developed and normal weight. He is ill-appearing, toxic-appearing and diaphoretic.   HENT:      Head: Atraumatic.      Right Ear: External ear normal.      Left Ear: External ear normal.      Nose: Nose normal.      Mouth/Throat:      Mouth: Mucous membranes are moist.   Eyes:      General: No scleral icterus.        Right eye: No discharge.         Left eye: No discharge.      Extraocular Movements: Extraocular movements intact.      Conjunctiva/sclera: Conjunctivae normal.      Pupils: Pupils are equal, round, and reactive to light.   Cardiovascular:      Rate and Rhythm: Normal rate and regular rhythm.      Pulses: Normal pulses.      Heart sounds: No murmur heard.  Pulmonary:      Effort: Pulmonary effort is normal. No respiratory distress.      Breath sounds: Normal breath sounds. No stridor. No wheezing, rhonchi or rales.   Abdominal:      General: Abdomen is flat. There is no distension.      Palpations: Abdomen is soft.      Tenderness: There is no abdominal tenderness. There is no right CVA tenderness, left CVA tenderness or guarding.   Musculoskeletal:      Cervical back: Normal range of motion and neck supple. No rigidity or tenderness.   Skin:     General: Skin is warm.      Capillary Refill: Capillary refill takes less than 2 seconds.      Findings: Erythema and lesion present.      Comments: Fourth toe of left foot with necrotic skin changes, purulence, and erythema.  No exposed bone.   Neurological:      Mental Status: He is alert.      Comments: 5/5  strength bilateral upper and lower extremities.  Sensation intact throughout.  Cranial nerves intact.  Abnormal finger-to-nose left hand.   Psychiatric:         Mood and Affect: Mood normal.         ED Medications  Medications   norepinephrine (LEVOPHED) 1 mg/mL injection **ADS Override Pull** (has no administration in time range)   norepinephrine (LEVOPHED) 4 mg (STANDARD CONCENTRATION) IV in sodium chloride 0.9% 250 mL (0 mcg/min Intravenous Stopped 1/31/24 1355)   amiodarone tablet 200 mg (has no administration in time range)   aspirin chewable tablet 81 mg (has no administration in time range)   atorvastatin (LIPITOR) tablet 80 mg (has no administration in time range)   clopidogrel (PLAVIX) tablet 75 mg (has no administration in time range)   chlorhexidine (PERIDEX) 0.12 % oral rinse 15 mL (15 mL Mouth/Throat Given 1/31/24 0831)   senna-docusate sodium (SENOKOT S) 8.6-50 mg per tablet 2 tablet (has no administration in time range)   polyethylene glycol (MIRALAX) packet 17 g (has no administration in time range)   losartan (COZAAR) tablet 12.5 mg (has no administration in time range)   spironolactone (ALDACTONE) tablet 12.5 mg (has no administration in time range)   ceFEPime (MAXIPIME) 2,000 mg in dextrose 5 % 50 mL IVPB (has no administration in time range)   vancomycin (VANCOCIN) 1500 mg in sodium chloride 0.9% 250 mL IVPB (has no administration in time range)   insulin regular (HumuLIN R,NovoLIN R) 1 Units/mL in sodium chloride 0.9 % 100 mL infusion (8 Units/hr Intravenous New Bag 1/31/24 1518)   vancomycin (VANCOCIN) IVPB (premix in dextrose) 750 mg 150 mL (has no administration in time range)   vancomycin (VANCOCIN) IVPB (premix in dextrose) 1,000 mg 200 mL (has no administration in time range)   lidocaine (PF) (XYLOCAINE-MPF) 1 % injection 10 mL (has no administration in time range)   sodium chloride 0.9 % bolus 1,000 mL (0 mL Intravenous Stopped 1/31/24 1250)   iohexol (OMNIPAQUE) 350 MG/ML injection  (MULTI-DOSE) 85 mL (85 mL Intravenous Given 1/31/24 0949)   acetaminophen (TYLENOL) tablet 650 mg (650 mg Oral Given 1/31/24 1035)   cefepime (MAXIPIME) 2 g/50 mL dextrose IVPB (0 mg Intravenous Stopped 1/31/24 1220)   vancomycin (VANCOCIN) 1,250 mg in sodium chloride 0.9 % 250 mL IVPB ( Intravenous Restarted 1/31/24 1430)   multi-electrolyte (ISOLYTE-S PH 7.4) bolus 1,000 mL (0 mL Intravenous Stopped 1/31/24 1250)   Famotidine (PF) (PEPCID) injection 40 mg (40 mg Intravenous Given 1/31/24 1546)       Diagnostic Studies  Results Reviewed       Procedure Component Value Units Date/Time    HS Troponin I 2hr [[id number]] Collected: 01/31/24 1225    Lab Status: In process Specimen: Blood from Arm, Left Updated: 01/31/24 1545    HS Troponin I 4hr [[id number]]  (Abnormal) Collected: 01/31/24 1419    Lab Status: Final result Specimen: Blood from Arm, Left Updated: 01/31/24 1501     hs TnI 4hr 101 ng/L      Delta 4hr hsTnI -9 ng/L     Lactic acid 2 Hours [[id number]]  (Normal) Collected: 01/31/24 1225    Lab Status: Final result Specimen: Blood from Arm, Left Updated: 01/31/24 1259     LACTIC ACID 1.9 mmol/L     Narrative:      Result may be elevated if tourniquet was used during collection.    Anaerobic culture and Gram stain [[id number]] Collected: 01/31/24 1225    Lab Status: In process Specimen: Wound Updated: 01/31/24 1231    Wound culture and Gram stain [[id number]] Collected: 01/31/24 1225    Lab Status: In process Specimen: Wound from Foot, Left Updated: 01/31/24 1231    Blood culture #1 [[id number]] Collected: 01/31/24 1135    Lab Status: In process Specimen: Blood from Line, Venous Updated: 01/31/24 1207    Lactic acid [[id number]]  (Abnormal) Collected: 01/31/24 0951    Lab Status: Final result Specimen: Blood Updated: 01/31/24 1123     LACTIC ACID 3.0 mmol/L     Narrative:      Result may be elevated if tourniquet was used during collection.    Hepatic function panel [[id number]]  (Normal) Collected: 01/31/24 0985     Lab Status: Final result Specimen: Blood Updated: 01/31/24 1118     Total Bilirubin 0.82 mg/dL      Bilirubin, Direct 0.14 mg/dL      Alkaline Phosphatase 58 U/L      AST 16 U/L      ALT 13 U/L      Total Protein 7.5 g/dL      Albumin 3.7 g/dL     Procalcitonin [[id number]]  (Abnormal) Resulted: 01/31/24 1112    Lab Status: Final result Specimen: Blood Updated: 01/31/24 1112     Procalcitonin 0.73 ng/ml     Blood culture #2 [[id number]] Collected: 01/31/24 1041    Lab Status: In process Specimen: Blood from Line, Venous Updated: 01/31/24 1052    Beta Hydroxybutyrate [[id number]]  (Normal) Resulted: 01/31/24 1049    Lab Status: Final result Specimen: Blood Updated: 01/31/24 1049     BETA-HYDROXYBUTYRATE 0.3 mmol/L     FLU/RSV/COVID - if FLU/RSV clinically relevant [[id number]]  (Normal) Collected: 01/31/24 0957    Lab Status: Final result Specimen: Nares from Nose Updated: 01/31/24 1047     SARS-CoV-2 Negative     INFLUENZA A PCR Negative     INFLUENZA B PCR Negative     RSV PCR Negative    Narrative:      FOR PEDIATRIC PATIENTS - copy/paste COVID Guidelines URL to browser: [email or web address]    SARS-CoV-2 assay is a Nucleic Acid Amplification assay intended for the  qualitative detection of nucleic acid from SARS-CoV-2 in nasopharyngeal  swabs. Results are for the presumptive identification of SARS-CoV-2 RNA.    Positive results are indicative of infection with SARS-CoV-2, the virus  causing COVID-19, but do not rule out bacterial infection or co-infection  with other viruses. Laboratories within the [place] and its  territories are required to report all positive results to the appropriate  public health authorities. Negative results do not preclude SARS-CoV-2  infection and should not be used as the sole basis for treatment or other  patient management decisions. Negative results must be combined with  clinical observations, patient history, and  epidemiological information.  This test has not been FDA cleared or approved.    This test has been authorized by FDA under an Emergency Use Authorization  (EUA). This test is only authorized for the duration of time the  declaration that circumstances exist justifying the authorization of the  emergency use of an in vitro diagnostic tests for detection of SARS-CoV-2  virus and/or diagnosis of COVID-19 infection under section 564(b)(1) of  the Act, 21 U.S.C. 360bbb-3(b)(1), unless the authorization is terminated  or revoked sooner. The test has been validated but independent review by FDA  and CLIA is pending.    Test performed using [employer] GeneXpert: This RT-PCR assay targets N2,  a region unique to SARS-CoV-2. A conserved region in the E-gene was chosen  for pan-Sarbecovirus detection which includes SARS-CoV-2.    According to CMS-2020-01-R, this platform meets the definition of high-throughput technology.    HS Troponin 0hr (reflex protocol) [[id number]]  (Abnormal) Collected: 01/31/24 0941    Lab Status: Final result Specimen: Blood from Arm, Right Updated: 01/31/24 1019     hs TnI 0hr 110 ng/L     Basic metabolic panel [[id number]]  (Abnormal) Collected: 01/31/24 0941    Lab Status: Final result Specimen: Blood from Arm, Right Updated: 01/31/24 1012     Sodium 129 mmol/L      Potassium 4.4 mmol/L      Chloride 95 mmol/L      CO2 24 mmol/L      ANION GAP 10 mmol/L      BUN 37 mg/dL      Creatinine 1.41 mg/dL      Glucose 382 mg/dL      Calcium 9.0 mg/dL      eGFR 53 ml/min/1.73sq m     Narrative:      National Kidney Disease Foundation guidelines for Chronic Kidney Disease (CKD):     Stage 1 with normal or high GFR (GFR > 90 mL/min/1.73 square meters)    Stage 2 Mild CKD (GFR = 60-89 mL/min/1.73 square meters)    Stage 3A Moderate CKD (GFR = 45-59 mL/min/1.73 square meters)    Stage 3B Moderate CKD (GFR = 30-44 mL/min/1.73 square meters)    Stage 4 Severe CKD (GFR = 15-29 mL/min/1.73 square meters)    Stage 5 End  Stage CKD (GFR <15 mL/min/1.73 square meters)  Note: GFR calculation is accurate only with a steady state creatinine    Protime-INR [[id number]]  (Normal) Collected: 01/31/24 0941    Lab Status: Final result Specimen: Blood from Arm, Right Updated: 01/31/24 1008     Protime 14.4 seconds      INR 1.13    APTT [[id number]]  (Normal) Collected: 01/31/24 0941    Lab Status: Final result Specimen: Blood from Arm, Right Updated: 01/31/24 1008     PTT 29 seconds     UA w Reflex to Microscopic w Reflex to Culture [[id number]]     Lab Status: No result Specimen: Urine     CBC and Platelet [[id number]]  (Abnormal) Collected: 01/31/24 0941    Lab Status: Final result Specimen: Blood from Arm, Right Updated: 01/31/24 0949     WBC 21.58 Thousand/uL      RBC 4.95 Million/uL      Hemoglobin 14.2 g/dL      Hematocrit 43.7 %      MCV 88 fL      MCH 28.7 pg      MCHC 32.5 g/dL      RDW 13.5 %      Platelets 227 Thousands/uL      MPV 9.5 fL     Fingerstick Glucose (POCT) [[id number]]  (Abnormal) Collected: 01/31/24 0939    Lab Status: Final result Updated: 01/31/24 0940     POC Glucose 365 mg/dl                    CTA stroke alert (head/neck)   Final Result by Mahesh Jimenes MD (01/31 1015)      1. CTA head: Negative for large vessel intracranial occlusion. Moderate bilateral supraclinoid ICA and moderate to severe left cavernous ICA segment stenosis.      2. CTA neck:  No extracranial carotid stenosis.  The cervical vertebral arteries are patent.      Findings were directly discussed with Dr. Maria Juan  at 10:03 a.m.                           Workstation performed: [id number]         CT stroke alert brain   Final Result by Mahesh Jimenes MD (01/31 1016)      No acute intracranial abnormality.      Chronic microangiopathic ischemic changes.      Findings were directly discussed with Dr. Maria Juan  at 10:03 a.m.         Workstation performed: [id number]         XR chest 2 views    (Results Pending)   XR foot 3+ views  LEFT    (Results Pending)         Procedures  US Guided Peripheral IV    Date/Time: 1/31/2024 2:06 PM    Performed by: Alejandro Carbajal MD  Authorized by: Alejandro Carbajal MD    Patient location:  ED  Performed by:  Resident  Other Assisting Provider: No    Indications:     Indications: difficulty obtaining IV access    Procedure details:     Patient evaluated for contraindications to access (i.e. fistula, thrombosis, etc): Yes      Standard clean technique used for ultrasound access: Yes      Location:  Left antecubital    Catheter size:  18 gauge    Number of attempts:  1    Successful placement: yes    Post-procedure details:     Post-procedure:  Dressing applied    Assessment: free fluid flow and no signs of infiltration      Post-procedure complications: none      Patient tolerance of procedure:  Tolerated well, no immediate complications        ED Course                          Initial Sepsis Screening       Row Name 01/31/24 1425                Is the patient's history suggestive of a new or worsening infection? Yes (Proceed)  -CS        Suspected source of infection wound infection  -CS        Indicate SIRS criteria Tachycardia > 90 bpm;Tachypnea > 20 resp per min;Leukocytosis (WBC > 99117 IJL) OR Leukopenia (WBC <4000 IJL) OR Bandemia (WBC >10% bands)  -CS        Are two or more of the above signs & symptoms of infection both present and new to the patient? Yes (Proceed)  -CS        Assess for evidence of organ dysfunction: Are any of the below criteria present within 6 hours of suspected infection and SIRS criteria that are NOT considered to be chronic conditions? SBP < 90  -CS        Date of presentation of septic shock 01/31/24  -CS        Time of presentation of septic shock 1130  -CS        Fluid Resuscitation: A lesser volume than 30 ml/kg IV fluid will be given  -CS        The 30 mL/kg fluid bolus was not given to the patient despite having hypotension, a lactate of >= 4 mmol/L, or  "documentation of septic shock secondary to: Heart Failure  -CS        Instead of the 30 ml/kg fluid bolus, the following volume of crystalloid fluid will be ordered: 1L  -CS        Of the following fluid type: NSS  -CS        Is the patient is persistently hypotensive in the hour after fluid bolus administration? If yes, patient meets criteria for vasopressor use. YES  -CS        Sepsis Note: Click \"NEXT\" below (NOT \"close\") to generate sepsis note based on above information. YES (proceed by clicking \"NEXT\")  -CS                  User Key  (r) = Recorded By, (t) = Taken By, (c) = Cosigned By      Initials Name Provider Type    CS Yuniel Zhu,  Fellow                    SBIRT 20yo+      Flowsheet Row Most Recent Value   Initial Alcohol Screen: US AUDIT-C     1. How often do you have a drink containing alcohol? 0 Filed at: 01/31/2024 1012   2. How many drinks containing alcohol do you have on a typical day you are drinking?  0 Filed at: 01/31/2024 1012   3a. Male UNDER 65: How often do you have five or more drinks on one occasion? 0 Filed at: 01/31/2024 1012   Audit-C Score 0 Filed at: 01/31/2024 1012                  Medical Decision Making  63-year-old male arrives as a prehospital stroke alert.  On arrival no significant isolated neurodeficits noted.  Patient is febrile and hyperglycemic.  Differential diagnosis includes CVA, sepsis, DKA, HHS    On return from radiology patient's blood pressure was 80 systolic.  Second IV placed and second liter of fluids started.  Repeat blood pressure 60 systolic.  Norepinephrine drip started starting at 5 and critical care consulted.     Amount and/or Complexity of Data Reviewed  Independent Historian: spouse  Labs: ordered.  Radiology: ordered.    Risk  OTC drugs.  Prescription drug management.  Decision regarding hospitalization.          Disposition  Final diagnoses:   Weakness   Foot infection   Septic shock (HCC)   Stroke-like symptoms   Adverse effect of " vancomycin, initial encounter     Time reflects when diagnosis was documented in both MDM as applicable and the Disposition within this note       Time User Action Codes Description Comment    1/31/2024  9:23 AM Matthieu Roy Add [R53.1] Weakness     1/31/2024 11:20 AM Alejandro Carbajal [L08.9] Foot infection     1/31/2024  1:15 PM Alejandro Carbajal [A41.9,  R65.21] Septic shock (HCC)     1/31/2024  1:15 PM Alejandro Carbajal [R29.90] Stroke-like symptoms     1/31/2024  1:15 PM Alejandro Carbajal [T36.8X5A] Adverse effect of vancomycin, initial encounter           ED Disposition       ED Disposition   Admit    Condition   Stable    Date/Time   Wed Jan 31, 2024 1315    Comment   Case was discussed with MICU and the patient's admission status was agreed to be Admission Status: inpatient status to the service of Dr. Wood   .               Follow-up Information    None         Current Discharge Medication List        CONTINUE these medications which have NOT CHANGED    Details   amiodarone 200 mg tablet TAKE 1 TABLET BY MOUTH EVERY DAY WITH BREAKFAST  Qty: 30 tablet, Refills: 11    Associated Diagnoses: V-tach (HCC)      aspirin 81 MG tablet Take 1 tablet by mouth daily      atorvastatin (LIPITOR) 80 mg tablet Take 1 tablet (80 mg total) by mouth daily after dinner  Qty: 30 tablet, Refills: 3    Associated Diagnoses: 3-vessel coronary artery disease      BD Pen Needle Micro U/F 32G X 6 MM MISC USE 1 PEN EACH DAILY      clopidogrel (PLAVIX) 75 mg tablet Take 1 tablet (75 mg total) by mouth daily  Qty: 30 tablet, Refills: 0    Associated Diagnoses: Foot ulcer (HCC)      insulin detemir (Levemir FlexPen) 100 Units/mL injection pen Inject 20 Units under the skin daily Patient to take 30 units daily - if fasting bs >150 for 3 days to increase dose by 5 units. Once bs < 150 can go back to 30 units      JANUMET -1000 MG TB24 daily       losartan (COZAAR) 25 mg tablet Take 0.5 tablets (12.5 mg total) by mouth daily  Qty:  30 tablet, Refills: 0    Associated Diagnoses: Foot ulcer (HCC)      metFORMIN (GLUCOPHAGE) 1000 MG tablet Take 1,000 mg by mouth daily with breakfast      metoprolol succinate (TOPROL-XL) 25 mg 24 hr tablet TAKE 1 TABLET (25 MG TOTAL) BY MOUTH EVERY 12 (TWELVE) HOURS  Qty: 60 tablet, Refills: 11    Associated Diagnoses: Acute on chronic systolic heart failure (HCC)      spironolactone (ALDACTONE) 25 mg tablet Take 0.5 tablets (12.5 mg total) by mouth daily Start on 7/5/23 per Quyen Hinds PA-C  Qty: 45 tablet, Refills: 3    Associated Diagnoses: HFrEF (heart failure with reduced ejection fraction) ([facility])           No discharge procedures on file.    PDMP Review       None             ED Provider  Attending physically available and evaluated Nadir Myers. I managed the patient along with the ED Attending.    Electronically Signed by           Alejandro Carbajal MD  01/31/24 9134

## 2024-01-31 NOTE — STROKE DOCUMENTATION
RN unsuccessful x4 to obtain second PIV and ordered blood cultures. Dr. Carbajal made aware, to bedside shortly with US.

## 2024-01-31 NOTE — ED ATTENDING ATTESTATION
"1/31/2024  I, Everardo Keenan MD, saw and evaluated the patient. I have discussed the patient with the resident and agree with the resident's findings, Plan of Care, and MDM as documented in the resident's note, except where noted. All available labs and Radiology studies were reviewed.  I was present for key portions of any procedure(s) performed by the resident and I was immediately available to provide assistance.    At this point I agree with the current assessment done in the Emergency Department.  I have conducted an independent evaluation of this patient a history and physical is as follows:    62 yo obese male with a complicated past medical history including HTN, CAD, BPH, atrial fibrillation, CHF, DM, and PAD brought to the ED by EMS as a pre-hospital Stroke Alert. Per EMS the patient has been \"not right\" since 2300 last night. Medics noted \"obvious\" left sided weakness and some confusion in the field. (+) Fever to 102 at home. (+) Chronic left foot wound. Wife says the patient starting acting strange last night around 2300, sitting on the edge of the bed and \"staring at nothing\". This morning she noticed that he has not using his left hand so she called 9-1-1. Pre-hospital fingerstick glucose was >400. (+) Recent hospitalization for a cellulitis of the foot. Wife says the chronic foot wound looks \"much worse\" x 24 hours. No other specific complaints.    ROS: per resident physician note    Gen: ill appearing, alert and cooperative  HEENT: PERRL, EOMI  Neck: supple  CV: (+) mild tachycardia  Lungs: CTA B/L  Abdomen: soft, NT/ND  Left Foot: (+) necrotic 4th toe with surrounding erythema, (+) purulent fluid draining from wound, no exposed bone  Neuro: 5/5 strength all extremities, sensation grossly intact  Skin: (+) diaphoresis    ED Course  The patient is very ill appearing but with stable vital signs on arrival. No obvious neurologic deficits. Stroke Alert was activated prior to arrival in the ED. " Presentation seems more consistent with sepsis/septic shock. Left foot is likely source of infection. Neurology at bedside. Will check CTA head/neck, CXR, left foot x-ray, basic labs, lactate, and procal. Blood cultures sent to the lab. Likely plan for broad spectrum antibiotics and admission to B. ICU vs step down? Will continue to monitor closely in the ED.    Critical Care Time  CriticalCare Time    Date/Time: 1/31/2024 9:42 AM    Performed by: Everardo Keenan MD  Authorized by: Everardo Keenan MD    Critical care provider statement:     Critical care time (minutes):  60    Critical care start time:  1/31/2024 9:42 AM    Critical care end time:  1/31/2024 10:42 AM    Critical care time was exclusive of:  Separately billable procedures and treating other patients and teaching time    Critical care was necessary to treat or prevent imminent or life-threatening deterioration of the following conditions:  Sepsis, CNS failure or compromise, dehydration, endocrine crisis and shock    Critical care was time spent personally by me on the following activities:  Obtaining history from patient or surrogate, development of treatment plan with patient or surrogate, discussions with consultants, discussions with primary provider, evaluation of patient's response to treatment, interpretation of cardiac output measurements, ordering and performing treatments and interventions, examination of patient, ordering and review of laboratory studies, ordering and review of radiographic studies, review of old charts and re-evaluation of patient's condition    I assumed direction of critical care for this patient from another provider in my specialty: no

## 2024-01-31 NOTE — Clinical Note
Defib pad site: left lower flank and right upper scapula.Defib pad site assessment: skin integrity intact. normal...

## 2024-02-01 ENCOUNTER — APPOINTMENT (OUTPATIENT)
Dept: RADIOLOGY | Facility: HOSPITAL | Age: 64
DRG: 853 | End: 2024-02-01
Payer: COMMERCIAL

## 2024-02-01 ENCOUNTER — APPOINTMENT (INPATIENT)
Dept: NON INVASIVE DIAGNOSTICS | Facility: HOSPITAL | Age: 64
DRG: 853 | End: 2024-02-01
Payer: COMMERCIAL

## 2024-02-01 LAB
ALBUMIN SERPL BCP-MCNC: 3.4 G/DL (ref 3.5–5)
ALP SERPL-CCNC: 48 U/L (ref 34–104)
ALT SERPL W P-5'-P-CCNC: 15 U/L (ref 7–52)
AMORPH URATE CRY URNS QL MICRO: ABNORMAL
ANION GAP SERPL CALCULATED.3IONS-SCNC: 9 MMOL/L
AORTIC ROOT: 3.2 CM
APICAL FOUR CHAMBER EJECTION FRACTION: 26 %
ASCENDING AORTA: 3.2 CM
AST SERPL W P-5'-P-CCNC: 21 U/L (ref 13–39)
BACTERIA UR QL AUTO: ABNORMAL /HPF
BASOPHILS # BLD AUTO: 0.05 THOUSANDS/ÂΜL (ref 0–0.1)
BASOPHILS NFR BLD AUTO: 0 % (ref 0–1)
BILIRUB SERPL-MCNC: 0.71 MG/DL (ref 0.2–1)
BILIRUB UR QL STRIP: NEGATIVE
BSA FOR ECHO PROCEDURE: 2.27 M2
BUN SERPL-MCNC: 26 MG/DL (ref 5–25)
CALCIUM ALBUM COR SERPL-MCNC: 8.8 MG/DL (ref 8.3–10.1)
CALCIUM SERPL-MCNC: 8.3 MG/DL (ref 8.4–10.2)
CHLORIDE SERPL-SCNC: 100 MMOL/L (ref 96–108)
CLARITY UR: ABNORMAL
CO2 SERPL-SCNC: 23 MMOL/L (ref 21–32)
COLOR UR: ABNORMAL
CREAT SERPL-MCNC: 0.85 MG/DL (ref 0.6–1.3)
E WAVE DECELERATION TIME: 206 MS
E/A RATIO: 0.71
EOSINOPHIL # BLD AUTO: 0 THOUSAND/ÂΜL (ref 0–0.61)
EOSINOPHIL NFR BLD AUTO: 0 % (ref 0–6)
ERYTHROCYTE [DISTWIDTH] IN BLOOD BY AUTOMATED COUNT: 13.8 % (ref 11.6–15.1)
FRACTIONAL SHORTENING: 24 (ref 28–44)
GFR SERPL CREATININE-BSD FRML MDRD: 93 ML/MIN/1.73SQ M
GLUCOSE SERPL-MCNC: 141 MG/DL (ref 65–140)
GLUCOSE SERPL-MCNC: 142 MG/DL (ref 65–140)
GLUCOSE SERPL-MCNC: 147 MG/DL (ref 65–140)
GLUCOSE SERPL-MCNC: 149 MG/DL (ref 65–140)
GLUCOSE SERPL-MCNC: 149 MG/DL (ref 65–140)
GLUCOSE SERPL-MCNC: 158 MG/DL (ref 65–140)
GLUCOSE SERPL-MCNC: 220 MG/DL (ref 65–140)
GLUCOSE SERPL-MCNC: 286 MG/DL (ref 65–140)
GLUCOSE SERPL-MCNC: 334 MG/DL (ref 65–140)
GLUCOSE UR STRIP-MCNC: ABNORMAL MG/DL
HCT VFR BLD AUTO: 41.2 % (ref 36.5–49.3)
HGB BLD-MCNC: 13.8 G/DL (ref 12–17)
HGB UR QL STRIP.AUTO: ABNORMAL
IMM GRANULOCYTES # BLD AUTO: 0.11 THOUSAND/UL (ref 0–0.2)
IMM GRANULOCYTES NFR BLD AUTO: 1 % (ref 0–2)
INTERVENTRICULAR SEPTUM IN DIASTOLE (PARASTERNAL SHORT AXIS VIEW): 1.5 CM
INTERVENTRICULAR SEPTUM: 1.5 CM (ref 0.6–1.1)
KETONES UR STRIP-MCNC: NEGATIVE MG/DL
LAAS-AP4: 18 CM2
LEFT ATRIUM SIZE: 3.2 CM
LEFT INTERNAL DIMENSION IN SYSTOLE: 3.9 CM (ref 2.1–4)
LEFT VENTRICULAR INTERNAL DIMENSION IN DIASTOLE: 5.1 CM (ref 3.5–6)
LEFT VENTRICULAR POSTERIOR WALL IN END DIASTOLE: 1.5 CM
LEFT VENTRICULAR STROKE VOLUME: 60 ML
LEUKOCYTE ESTERASE UR QL STRIP: NEGATIVE
LVSV (TEICH): 60 ML
LYMPHOCYTES # BLD AUTO: 0.87 THOUSANDS/ÂΜL (ref 0.6–4.47)
LYMPHOCYTES NFR BLD AUTO: 6 % (ref 14–44)
MAGNESIUM SERPL-MCNC: 2 MG/DL (ref 1.9–2.7)
MCH RBC QN AUTO: 29 PG (ref 26.8–34.3)
MCHC RBC AUTO-ENTMCNC: 33.5 G/DL (ref 31.4–37.4)
MCV RBC AUTO: 87 FL (ref 82–98)
MONOCYTES # BLD AUTO: 1.06 THOUSAND/ÂΜL (ref 0.17–1.22)
MONOCYTES NFR BLD AUTO: 7 % (ref 4–12)
MUCOUS THREADS UR QL AUTO: ABNORMAL
MV E'TISSUE VEL-SEP: 10 CM/S
MV PEAK A VEL: 1.06 M/S
MV PEAK E VEL: 75 CM/S
MV STENOSIS PRESSURE HALF TIME: 60 MS
MV VALVE AREA P 1/2 METHOD: 3.67
NEUTROPHILS # BLD AUTO: 13.31 THOUSANDS/ÂΜL (ref 1.85–7.62)
NEUTS SEG NFR BLD AUTO: 86 % (ref 43–75)
NITRITE UR QL STRIP: NEGATIVE
NON-SQ EPI CELLS URNS QL MICRO: ABNORMAL /HPF
NRBC BLD AUTO-RTO: 0 /100 WBCS
PH UR STRIP.AUTO: 5 [PH]
PHOSPHATE SERPL-MCNC: 2.1 MG/DL (ref 2.3–4.1)
PLATELET # BLD AUTO: 223 THOUSANDS/UL (ref 149–390)
PMV BLD AUTO: 10 FL (ref 8.9–12.7)
POTASSIUM SERPL-SCNC: 3.4 MMOL/L (ref 3.5–5.3)
PROT SERPL-MCNC: 6.6 G/DL (ref 6.4–8.4)
PROT UR STRIP-MCNC: ABNORMAL MG/DL
RBC # BLD AUTO: 4.76 MILLION/UL (ref 3.88–5.62)
RBC #/AREA URNS AUTO: ABNORMAL /HPF
RIGHT ATRIAL 2D VOLUME: 39 ML
RIGHT ATRIUM AREA SYSTOLE A4C: 15.9 CM2
RIGHT VENTRICLE ID DIMENSION: 3.6 CM
SL CV LV EF: 42
SL CV PED ECHO LEFT VENTRICLE DIASTOLIC VOLUME (MOD BIPLANE) 2D: 125 ML
SL CV PED ECHO LEFT VENTRICLE SYSTOLIC VOLUME (MOD BIPLANE) 2D: 65 ML
SODIUM SERPL-SCNC: 132 MMOL/L (ref 135–147)
SP GR UR STRIP.AUTO: 1.01 (ref 1–1.03)
TRICUSPID ANNULAR PLANE SYSTOLIC EXCURSION: 2 CM
UROBILINOGEN UR STRIP-ACNC: <2 MG/DL
VANCOMYCIN SERPL-MCNC: 8 UG/ML (ref 10–20)
WBC # BLD AUTO: 15.4 THOUSAND/UL (ref 4.31–10.16)
WBC #/AREA URNS AUTO: ABNORMAL /HPF

## 2024-02-01 PROCEDURE — 99233 SBSQ HOSP IP/OBS HIGH 50: CPT | Performed by: INTERNAL MEDICINE

## 2024-02-01 PROCEDURE — 81001 URINALYSIS AUTO W/SCOPE: CPT

## 2024-02-01 PROCEDURE — 80053 COMPREHEN METABOLIC PANEL: CPT

## 2024-02-01 PROCEDURE — 80202 ASSAY OF VANCOMYCIN: CPT | Performed by: INTERNAL MEDICINE

## 2024-02-01 PROCEDURE — C8929 TTE W OR WO FOL WCON,DOPPLER: HCPCS

## 2024-02-01 PROCEDURE — 99232 SBSQ HOSP IP/OBS MODERATE 35: CPT | Performed by: PSYCHIATRY & NEUROLOGY

## 2024-02-01 PROCEDURE — 99231 SBSQ HOSP IP/OBS SF/LOW 25: CPT | Performed by: PODIATRIST

## 2024-02-01 PROCEDURE — 85025 COMPLETE CBC W/AUTO DIFF WBC: CPT

## 2024-02-01 PROCEDURE — 70551 MRI BRAIN STEM W/O DYE: CPT

## 2024-02-01 PROCEDURE — 82948 REAGENT STRIP/BLOOD GLUCOSE: CPT

## 2024-02-01 PROCEDURE — 83735 ASSAY OF MAGNESIUM: CPT

## 2024-02-01 PROCEDURE — 84100 ASSAY OF PHOSPHORUS: CPT

## 2024-02-01 PROCEDURE — NC001 PR NO CHARGE: Performed by: INTERNAL MEDICINE

## 2024-02-01 PROCEDURE — 93306 TTE W/DOPPLER COMPLETE: CPT | Performed by: INTERNAL MEDICINE

## 2024-02-01 RX ORDER — ENOXAPARIN SODIUM 100 MG/ML
40 INJECTION SUBCUTANEOUS
Status: DISCONTINUED | OUTPATIENT
Start: 2024-02-02 | End: 2024-02-07

## 2024-02-01 RX ORDER — INSULIN GLARGINE 100 [IU]/ML
40 INJECTION, SOLUTION SUBCUTANEOUS
Status: DISCONTINUED | OUTPATIENT
Start: 2024-02-01 | End: 2024-02-02

## 2024-02-01 RX ORDER — CEFAZOLIN SODIUM 2 G/50ML
2000 SOLUTION INTRAVENOUS EVERY 6 HOURS
Status: DISCONTINUED | OUTPATIENT
Start: 2024-02-01 | End: 2024-02-23

## 2024-02-01 RX ORDER — INSULIN GLARGINE 100 [IU]/ML
12 INJECTION, SOLUTION SUBCUTANEOUS ONCE
Status: COMPLETED | OUTPATIENT
Start: 2024-02-01 | End: 2024-02-01

## 2024-02-01 RX ORDER — POTASSIUM CHLORIDE 20 MEQ/1
20 TABLET, EXTENDED RELEASE ORAL ONCE
Status: COMPLETED | OUTPATIENT
Start: 2024-02-01 | End: 2024-02-01

## 2024-02-01 RX ORDER — POTASSIUM CHLORIDE 14.9 MG/ML
20 INJECTION INTRAVENOUS
Qty: 200 ML | Refills: 0 | Status: COMPLETED | OUTPATIENT
Start: 2024-02-01 | End: 2024-02-01

## 2024-02-01 RX ORDER — INSULIN LISPRO 100 [IU]/ML
1-5 INJECTION, SOLUTION INTRAVENOUS; SUBCUTANEOUS
Status: DISCONTINUED | OUTPATIENT
Start: 2024-02-01 | End: 2024-03-01 | Stop reason: HOSPADM

## 2024-02-01 RX ADMIN — POTASSIUM CHLORIDE 20 MEQ: 14.9 INJECTION, SOLUTION INTRAVENOUS at 06:47

## 2024-02-01 RX ADMIN — CEFAZOLIN SODIUM 2000 MG: 2 SOLUTION INTRAVENOUS at 13:00

## 2024-02-01 RX ADMIN — CEFAZOLIN SODIUM 2000 MG: 2 SOLUTION INTRAVENOUS at 18:22

## 2024-02-01 RX ADMIN — PERFLUTREN 2 ML/MIN: 6.52 INJECTION, SUSPENSION INTRAVENOUS at 10:00

## 2024-02-01 RX ADMIN — ASPIRIN 81 MG CHEWABLE TABLET 81 MG: 81 TABLET CHEWABLE at 09:21

## 2024-02-01 RX ADMIN — CHLORHEXIDINE GLUCONATE 15 ML: 1.2 SOLUTION ORAL at 21:07

## 2024-02-01 RX ADMIN — INSULIN GLARGINE 12 UNITS: 100 INJECTION, SOLUTION SUBCUTANEOUS at 13:43

## 2024-02-01 RX ADMIN — CEFEPIME 2000 MG: 2 INJECTION, POWDER, FOR SOLUTION INTRAVENOUS at 00:15

## 2024-02-01 RX ADMIN — NOREPINEPHRINE BITARTRATE 5 MCG/MIN: 1 SOLUTION INTRAVENOUS at 05:59

## 2024-02-01 RX ADMIN — SODIUM PHOSPHATE, MONOBASIC, MONOHYDRATE AND SODIUM PHOSPHATE, DIBASIC, ANHYDROUS 21 MMOL: 142; 276 INJECTION, SOLUTION INTRAVENOUS at 09:21

## 2024-02-01 RX ADMIN — INSULIN LISPRO 3 UNITS: 100 INJECTION, SOLUTION INTRAVENOUS; SUBCUTANEOUS at 21:09

## 2024-02-01 RX ADMIN — POTASSIUM CHLORIDE 20 MEQ: 1500 TABLET, EXTENDED RELEASE ORAL at 06:46

## 2024-02-01 RX ADMIN — AMIODARONE HYDROCHLORIDE 200 MG: 200 TABLET ORAL at 06:46

## 2024-02-01 RX ADMIN — CLOPIDOGREL BISULFATE 75 MG: 75 TABLET ORAL at 09:21

## 2024-02-01 RX ADMIN — HEPARIN SODIUM 5000 UNITS: 5000 INJECTION INTRAVENOUS; SUBCUTANEOUS at 05:14

## 2024-02-01 RX ADMIN — VANCOMYCIN HYDROCHLORIDE 1250 MG: 10 INJECTION, POWDER, LYOPHILIZED, FOR SOLUTION INTRAVENOUS at 08:00

## 2024-02-01 RX ADMIN — SODIUM CHLORIDE 4 UNITS/HR: 9 INJECTION, SOLUTION INTRAVENOUS at 04:23

## 2024-02-01 RX ADMIN — CHLORHEXIDINE GLUCONATE 15 ML: 1.2 SOLUTION ORAL at 09:21

## 2024-02-01 RX ADMIN — POTASSIUM CHLORIDE 20 MEQ: 14.9 INJECTION, SOLUTION INTRAVENOUS at 10:08

## 2024-02-01 RX ADMIN — ATORVASTATIN CALCIUM 80 MG: 80 TABLET, FILM COATED ORAL at 18:22

## 2024-02-01 RX ADMIN — INSULIN GLARGINE 40 UNITS: 100 INJECTION, SOLUTION SUBCUTANEOUS at 21:10

## 2024-02-01 RX ADMIN — INSULIN LISPRO 2 UNITS: 100 INJECTION, SOLUTION INTRAVENOUS; SUBCUTANEOUS at 18:23

## 2024-02-01 NOTE — UTILIZATION REVIEW
Initial Clinical Review    Admission: Date/Time/Statement:   Admission Orders (From admission, onward)       Ordered        01/31/24 1316  INPATIENT ADMISSION  Once                          Orders Placed This Encounter   Procedures    INPATIENT ADMISSION     Standing Status:   Standing     Number of Occurrences:   1     Order Specific Question:   Level of Care     Answer:   Critical Care [15]     Order Specific Question:   Estimated length of stay     Answer:   More than 2 Midnights     Order Specific Question:   Certification     Answer:   I certify that inpatient services are medically necessary for this patient for a duration of greater than two midnights. See H&P and MD Progress Notes for additional information about the patient's course of treatment.     ED Arrival Information       Expected   -    Arrival   1/31/2024 09:37    Acuity   Emergent              Means of arrival   Ambulance    Escorted by   [facility]    Service   Critical Care/ICU    Admission type   Emergency              Arrival complaint   stroke alert             Chief Complaint   Patient presents with    STROKE Alert     Pt's wife called EMS, LKW last night, new onset of LUE weakness this morning.        Initial Presentation: 63 y.o. male w/  PMH type II DM, obesity, cad with HANNA to left Cx in 2015, CHFrEF 40%, vtach s/p AICD, GERD presented to the ED from home via EMS w/ L sided weakness.  Pt reports that he noted L sided weakness last nighet at 11pm, woke up w/ ongoing L sided weakness and presented to the ED.  In the ED, stroke alert was called, CT head and CTA unremarkable. Hypotensive post imaging, started on Norepinephrine. Not TNK candidate due to timing. Creatinine elevated. LA 3, procal 0.73. Placed on 2L NC.   Given also 2L IVF bolus, IV Vanco, IV Cefepime.  On exam, appears ill; Tm 102.3; HR 100s; cardiac tachy and regular; neuro nonfocal.     Admit as Inpatient for evaluation and treatment of severe sepsis w/ septic  shock, LINDEN, abnormal troponin, cellulitis of L foot.  Plan: Blood cultures pending. Wound cultures pending. Cont IV Cefepime and Vanco. Trend WBC, Fever curve. Podiatry consulted. Neuro consulted- admit to stroke pathway, MRI brain, echocardiogram with bubble study, continue ASA/Plavix. Q4 neuro checks. CXR read pending. Wean O2 as tolerated.    01/31   Neurology Consult:concern for acute lacunar stroke vs septic embolic ischemic infarction   CTA head/neck demonstrated no LVO but moderate bilateral supraclinoid ICA and moderate to severe left cavernous ICA stenosis.   On exam, mild left facial droop and LUE 3-/5 strength proximally which is new,  mildly lethargic however does have infection with necrotic toe, hyperglycemia BG > 500 as well as leukocytosis. NIHSS 4  Plan: Stroke protocol. SBP goal > 120 - he is currently needing pressors for normotension. Max  as on DUAP And if possible septic embolic infarction he is a bleed risk. Recommend ECHOcardiogram. Recommend MRI Brain no contrast. Continue stroke protocol. Telemetry. PT/OT/SLP evals    Podiatry Consult;Septic shock, Left fourth toe gangrene, Left foot cellulitis: Xray left foot: no noted CONNIE. Some cortical destruction noted at the distal and middle phalanx of the 4th toe, suspicious for OM   Left fourth toe amputated at the MPJ at bedside given patient's septic shock and significant cardiac risk factors. Will plan for eventual revisional procedures pending patient's stability, continued IV antibiotics and serial dressing changes.  Follow up deep surgical cultures to tailor antibiotic. Trend WBC, vitals     Date: 02/01   Day 2:   CCNotes: No acute ovn events. On exam, Tm 102.3; Tc 99.2. mild left-sided weakness present. 1/31/24 blood cultures: + MSSA. 1/31/24 foot wound: +MSSA. LINDEN improved. Pressors for goal -180, wean off as able; goal normotension; O/P Losartan and Spironolactone on hold, resume when needed; cont. O/P Amiodarone, statin.  Cont. ASA and Plavix; PT/OT; MRI brain pending. D/C Cefepime and Vancomycin. Start Ancef. Repeat cultures on February 3rd. start long-acting insulin 40 units HS; add Lispro 12 units with meals; dose long-acting insulin 12 units now and turn drip off      Podiatry Notes: POD1 s/p left fourth digit amputation. Wound base is stable with no evidence of purulence, no plantar/dorsal tracking, no further spread of gangrene. Trini-wound erythema consistent with post operative course. We believe to have obtained source control with removal of left 4th toe. Plan for eventual revisional procedure(s) pending patient's stability, continue IV antibiotics, and serial dressing changes. Follow up deep cultures for tailoring of ABX therapy. Continue local wound care     Date: 02/02    Day 3: Has surpassed a 2nd midnight with active treatments and services, which include: cont to mon labs, f/u cx results. Mon BP. Cont IV ancef. Rpt bld cxs tomorrow. Cont long acting insulin and listpro. Cont ASA, plavix. Con serial dressing changes.    ED Triage Vitals   Temperature Pulse Respirations Blood Pressure SpO2   01/31/24 0940 01/31/24 0940 01/31/24 0940 01/31/24 0940 01/31/24 0940   98.8 °F (37.1 °C) 101 20 130/71 90 %      Temp Source Heart Rate Source Patient Position - Orthostatic VS BP Location FiO2 (%)   01/31/24 0940 01/31/24 0940 01/31/24 2020 01/31/24 2020 --   Oral Monitor Lying Right arm       Pain Score       01/31/24 1459       No Pain          Wt Readings from Last 1 Encounters:   02/01/24 105 kg (232 lb)     Additional Vital Signs:   Date/Time Temp Pulse Resp BP MAP (mmHg) SpO2 Calculated FIO2 (%) - Nasal Cannula Nasal Cannula O2 Flow Rate (L/min) O2 Device Patient Position - Orthostatic VS   02/01/24 1300 -- 103 28 Abnormal  149/89 114 96 % -- -- -- --   02/01/24 1200 -- 101 22 160/89 117 98 % -- -- -- --   02/01/24 1000 -- 90 21 160/87 117 95 % -- -- -- --   02/01/24 0950 -- 90 -- 160/87 -- -- -- -- -- --   02/01/24 0900 --  97 25 Abnormal  142/80 104 96 % -- -- -- --   02/01/24 0800 99.2 °F (37.3 °C) 102 40 Abnormal  148/80 105 96 % -- -- -- --   02/01/24 0700 -- 89 27 Abnormal  152/83 110 96 % -- -- -- --   02/01/24 0600 -- 96 28 Abnormal  140/68 95 94 % -- -- -- --   02/01/24 0500 -- 92 22 121/76 94 96 % -- -- -- --   02/01/24 0400 99.1 °F (37.3 °C) 102 32 Abnormal  119/68 86 94 % -- -- None (Room air) Lying   02/01/24 0300 -- 104 33 Abnormal  119/67 87 93 % -- -- -- --   02/01/24 0230 -- 104 34 Abnormal  112/65 83 92 % -- -- -- --   02/01/24 0200 -- 107 Abnormal  -- 116/69 87 91 % -- -- -- --   02/01/24 0100 -- 107 Abnormal  -- 133/72 96 92 % -- -- -- --   02/01/24 0000 100.1 °F (37.8 °C) 120 Abnormal  26 Abnormal  93/63 74 96 % -- -- None (Room air) Lying   01/31/24 2300 -- 102 26 Abnormal  140/68 96 94 % -- -- -- --   01/31/24 2200 -- 91 31 Abnormal  126/60 86 93 % -- -- -- --   01/31/24 2100 -- 90 30 Abnormal  106/57 74 94 % -- -- -- --   01/31/24 2020 100.2 °F (37.9 °C) 93 29 Abnormal  96/53 69 94 % -- -- None (Room air) Lying   01/31/24 1900 -- 93 32 Abnormal  119/68 89 97 % -- -- -- --   01/31/24 1815 -- 89 23 Abnormal  121/69 89 98 % -- -- -- --   01/31/24 1800 -- 90 21 125/69 91 98 % -- -- -- --   01/31/24 1700 -- 93 31 Abnormal  128/68 90 97 % -- -- -- --   01/31/24 1510 -- 99 32 Abnormal  92/54 67 95 % -- -- Nasal cannula --   01/31/24 1420 -- 105 29 Abnormal  118/66 86 96 % -- -- -- --   01/31/24 1400 -- 105 30 Abnormal  124/67 90 94 % -- -- -- --   01/31/24 1358 102.3 °F (39.1 °C) Abnormal  105 29 Abnormal  143/75 104 96 % 28 2 L/min Nasal cannula --   01/31/24 1300 -- 91 23 Abnormal  112/63 -- 97 % -- -- Nasal cannula --   01/31/24 1250 -- 89 20 97/63 -- 98 % -- -- Nasal cannula --   01/31/24 1240 -- 93 28 Abnormal  87/54 Abnormal   -- 97 % -- -- Nasal cannula --   BP: map 66 at 01/31/24 1240   01/31/24 1215 -- 89 21 112/65 -- 96 % -- -- Nasal cannula --   01/31/24 1200 -- 85 20 115/58 -- 96 % -- -- Nasal cannula --    01/31/24 1145 -- 88 24 Abnormal  68/40 Abnormal  -- 94 % -- -- Nasal cannula --   01/31/24 1130 -- 93 26 Abnormal  80/51 Abnormal  -- 95 % -- -- Nasal cannula --   01/31/24 1115 -- 96 23 Abnormal  115/64 -- 95 % -- -- Nasal cannula --   01/31/24 1100 -- 105 22 119/70 -- 96 % -- -- Nasal cannula --   01/31/24 1045 -- 104 22 120/74 -- 96 % -- -- Nasal cannula --   01/31/24 1030 -- 100 25 Abnormal  117/70 -- 94 % -- -- Nasal cannula --   01/31/24 1015 -- 110 Abnormal  21 152/77 -- 96 % -- -- Nasal cannula --   01/31/24 1000 -- 101 21 152/77 -- 96 % -- -- Nasal cannula --   01/31/24 0945 -- 100 18 130/71 -- 93 % -- -- Nasal cannula --       Pertinent Labs/Diagnostic Test Results:   XR chest 2 views   Final Result by Kristine Townsend MD (01/31 2144)      No acute cardiopulmonary disease.               Workstation performed: [id number]         XR foot 3+ views LEFT   Final Result by Killian Watson MD (02/01 0753)      No radiographic evidence of osteomyelitis.            Workstation performed: [id number]         CTA stroke alert (head/neck)   Final Result by Mahesh Jimenes MD (01/31 1015)      1. CTA head: Negative for large vessel intracranial occlusion. Moderate bilateral supraclinoid ICA and moderate to severe left cavernous ICA segment stenosis.      2. CTA neck:  No extracranial carotid stenosis.  The cervical vertebral arteries are patent.      Findings were directly discussed with Dr. Maria Juan  at 10:03 a.m.                           Workstation performed: [id number]         CT stroke alert brain   Final Result by Mahesh Jimenes MD (01/31 1016)      No acute intracranial abnormality.      Chronic microangiopathic ischemic changes.      Findings were directly discussed with Dr. Maria Juan  at 10:03 a.m.         Workstation performed: [id number]         MRI brain wo contrast    (Results Pending)     01/31 EKG result:Sinus tachycardia  Inferior infarct (cited on or before  02-MAY-2023)  Anteroseptal infarct (cited on or before 02-MAY-2023)  ST elevation, consider anterior injury     02/01 ECHO:    Left Ventricle: Left ventricular cavity size is normal. Wall thickness is moderately increased. There is moderate concentric hypertrophy. The left ventricular ejection fraction is 42%. Systolic function is moderately reduced. Diastolic function is mildly abnormal, consistent with grade I (abnormal) relaxation. 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 There is no thrombus.    The following segments are akinetic: apical anterior, apical septal, apical inferior, apical lateral and apex.    All other segments are normal.    Tricuspid Valve: There is mild regurgitation.       Results from last 7 days   Lab Units 01/31/24  0957   SARS-COV-2  Negative     Results from last 7 days   Lab Units 02/01/24  0511 01/31/24  0941   WBC Thousand/uL 15.40* 21.58*   HEMOGLOBIN g/dL 13.8 14.2   HEMATOCRIT % 41.2 43.7   PLATELETS Thousands/uL 223 227   NEUTROS ABS Thousands/µL 13.31*  --          Results from last 7 days   Lab Units 02/01/24  0511 01/31/24  0941   SODIUM mmol/L 132* 129*   POTASSIUM mmol/L 3.4* 4.4   CHLORIDE mmol/L 100 95*   CO2 mmol/L 23 24   ANION GAP mmol/L 9 10   BUN mg/dL 26* 37*   CREATININE mg/dL 0.85 1.41*   EGFR ml/min/1.73sq m 93 53   CALCIUM mg/dL 8.3* 9.0   MAGNESIUM mg/dL 2.0  --    PHOSPHORUS mg/dL 2.1*  --      Results from last 7 days   Lab Units 02/01/24  0511 01/31/24  0951   AST U/L 21 16   ALT U/L 15 13   ALK PHOS U/L 48 58   TOTAL PROTEIN g/dL 6.6 7.5   ALBUMIN g/dL 3.4* 3.7   TOTAL BILIRUBIN mg/dL 0.71 0.82   BILIRUBIN DIRECT mg/dL  --  0.14     Results from last 7 days   Lab Units 02/01/24  1227 02/01/24  1004 02/01/24  0643 02/01/24  0424 02/01/24  0241 02/01/24  0012 01/31/24  2214 01/31/24  2111 01/31/24  2042 01/31/24  1809 01/31/24  1606 01/31/24  1428   POC GLUCOSE mg/dl 141* 334* 142* 149* 158* 147* 234* 267* 293* 288* 357* 373*     Results from last 7 days   Lab Units  02/01/24  0511 01/31/24  0941   GLUCOSE RANDOM mg/dL 149* 382*         Results from last 7 days   Lab Units 01/31/24  1516   HEMOGLOBIN A1C % 9.8*   EAG mg/dl 235     BETA-HYDROXYBUTYRATE   Date Value Ref Range Status   01/31/2024 0.3 <0.6 mmol/L Final            Results from last 7 days   Lab Units 01/31/24  1419 01/31/24  1225 01/31/24  0941   HS TNI 0HR ng/L  --   --  110*   HS TNI 2HR ng/L  --  112*  --    HSTNI D2 ng/L  --  2  --    HS TNI 4HR ng/L 101*  --   --    HSTNI D4 ng/L -9  --   --          Results from last 7 days   Lab Units 01/31/24  0941   PROTIME seconds 14.4   INR  1.13   PTT seconds 29         Results from last 7 days   Lab Units 01/31/24  1112   PROCALCITONIN ng/ml 0.73*     Results from last 7 days   Lab Units 01/31/24  1225 01/31/24  0951   LACTIC ACID mmol/L 1.9 3.0*                 Results from last 7 days   Lab Units 02/01/24  0921   CLARITY UA  Turbid   COLOR UA  Light Yellow   SPEC GRAV UA  1.014   PH UA  5.0   GLUCOSE UA mg/dl 500 (1/2%)*   KETONES UA mg/dl Negative   BLOOD UA  Small*   PROTEIN UA mg/dl 30 (1+)*   NITRITE UA  Negative   BILIRUBIN UA  Negative   UROBILINOGEN UA (BE) mg/dl <2.0   LEUKOCYTES UA  Negative   WBC UA /hpf 2-4*   RBC UA /hpf 1-2   BACTERIA UA /hpf Occasional   EPITHELIAL CELLS WET PREP /hpf None Seen   MUCUS THREADS  Occasional*     Results from last 7 days   Lab Units 01/31/24  0957   INFLUENZA A PCR  Negative   INFLUENZA B PCR  Negative   RSV PCR  Negative                             Results from last 7 days   Lab Units 01/31/24  1748 01/31/24  1225 01/31/24  1135 01/31/24  1041   BLOOD CULTURE   --   --  Staphylococcus aureus* Staphylococcus aureus*   GRAM STAIN RESULT  No polys seen*  2+ Gram positive cocci in pairs* No polys seen*  1+ Gram positive cocci in pairs* Gram positive cocci in clusters* Gram positive cocci in clusters*   WOUND CULTURE  3+ Growth of Staphylococcus aureus* 2+ Growth of Staphylococcus aureus*  --   --              Results from  last 7 days   Lab Units 02/01/24  0511   VANCOMYCIN RM ug/mL 8.0*       ED Treatment:   Medication Administration from 01/31/2024 0921 to 01/31/2024 1347         Date/Time Order Dose Route Action     01/31/2024 1250 EST sodium chloride 0.9 % bolus 1,000 mL 0 mL Intravenous Stopped     01/31/2024 1029 EST sodium chloride 0.9 % bolus 1,000 mL 1,000 mL Intravenous New Bag     01/31/2024 0949 EST iohexol (OMNIPAQUE) 350 MG/ML injection (MULTI-DOSE) 85 mL 85 mL Intravenous Given     01/31/2024 1035 EST acetaminophen (TYLENOL) tablet 650 mg 650 mg Oral Given     01/31/2024 1220 EST cefepime (MAXIPIME) 2 g/50 mL dextrose IVPB 0 mg Intravenous Stopped     01/31/2024 1142 EST cefepime (MAXIPIME) 2 g/50 mL dextrose IVPB 2,000 mg Intravenous New Bag     01/31/2024 1229 EST vancomycin (VANCOCIN) 1,250 mg in sodium chloride 0.9 % 250 mL IVPB 1,250 mg Intravenous New Bag     01/31/2024 1250 EST multi-electrolyte (ISOLYTE-S PH 7.4) bolus 1,000 mL 0 mL Intravenous Stopped     01/31/2024 1202 EST multi-electrolyte (ISOLYTE-S PH 7.4) bolus 1,000 mL 1,000 mL Intravenous New Bag     01/31/2024 1148 EST norepinephrine (LEVOPHED) 4 mg (STANDARD CONCENTRATION) IV in sodium chloride 0.9% 250 mL 5 mcg/min Intravenous New Bag          Past Medical History:   Diagnosis Date    Diabetes mellitus (HCC)     Myocardial infarction (HCC)      Present on Admission:   Septic shock (HCC)      Admitting Diagnosis: Weakness [R53.1]  Foot infection [L08.9]  Stroke-like symptoms [R29.90]  Septic shock (HCC) [A41.9, R65.21]  National Institutes of Health (NIH) Stroke Scale level of consciousness score 0, alert; keenly responsive [Z78.9]  Adverse effect of vancomycin, initial encounter [T36.8X5A]  Age/Sex: 63 y.o. male  Admission Orders:  SCD  Cardio-Pulmonary Monitoring, Neuro Checks, Nursing dysphagia screen, I/O, Daily weights, Vital signs      Scheduled Medications:  amiodarone, 200 mg, Oral, Daily With Breakfast  aspirin, 81 mg, Oral,  Daily  atorvastatin, 80 mg, Oral, After Dinner  cefazolin, 2,000 mg, Intravenous, Q6H  chlorhexidine, 15 mL, Mouth/Throat, Q12H TEJAS  clopidogrel, 75 mg, Oral, Daily  [START ON 2/2/2024] enoxaparin, 40 mg, Subcutaneous, Q24H TEJAS  insulin glargine, 40 Units, Subcutaneous, HS  insulin lispro, 1-5 Units, Subcutaneous, 4x Daily (AC & HS)  ondansetron, 4 mg, Intravenous, Once      Continuous IV Infusions:  norepinephrine, 1-30 mcg/min, Intravenous, Titrated  insulin regular (HumuLIN R,NovoLIN R) 1 Units/mL in sodium chloride 0.9 % 100 mL infusion  Rate: 0.3-21 mL/hr Dose: 0.3-21 Units/hr  Freq: Titrated Route: IV  Last Dose: Stopped (02/01/24 1230)  Start: 01/31/24 1445 End: 02/01/24 1158     PRN Meds:  polyethylene glycol, 17 g, Oral, Daily PRN  senna-docusate sodium, 2 tablet, Oral, BID PRN        IP CONSULT TO NEUROLOGY  IP CONSULT TO PODIATRY  IP CONSULT TO CASE MANAGEMENT  IP CONSULT TO PHARMACY    Network Utilization Review Department  ATTENTION: Please call with any questions or concerns to [phone] and carefully listen to the prompts so that you are directed to the right person. All voicemails are confidential.   For Discharge needs, contact Care Management DC Support Team at [phone] opt. 2  Send all requests for admission clinical reviews, approved or denied determinations and any other requests to dedicated fax number below belonging to the [facility] where the patient is receiving treatment. List of dedicated fax numbers for the Facilities:  FACILITY NAME UR FAX NUMBER   ADMISSION DENIALS (Administrative/Medical Necessity) [phone]   DISCHARGE SUPPORT TEAM (NETWORK) [phone]   PARENT CHILD HEALTH (Maternity/NICU/Pediatrics) [phone]   [facility] [phone]   [facility] [phone]   [facility] [phone]   [facility] [phone]   [facility] [phone]    [facility] [phone]   [facility] [phone]   [facility] [phone]   [facility] [phone]   [facility] [phone]   [facility] [phone]   [facility] [phone]

## 2024-02-01 NOTE — PROGRESS NOTES
"Critical Care Interval Transfer Note:    Please refer to progress note from earlier today for full details.     Barriers to discharge:   PT/OT, rehab evaluation  Treatment of Bacteremia  Stroke Pathway    Hospital Course: Per H&P: \"Patient is a 63 y.o. with PMH including GERD, T2DM, CAD s/p HANNA to the Lcx in 2015, CHF last EF 40%, ICD placement for VT, who presents with left sided weakness. Of note, patient had a recent hospitalization for cellulitis of a wound on his left 4th toe. Patient complete his antibiotic course after recent admission. Per patient and family, weakness was first noted on his left side at around 11 PM last night when patient tried to get out of bed to use the bathroom and had difficulty doing so. Patient went back to sleep until about 4 AM this morning. At that time, patient noted pain in his left toe, which was unwrapped and found to be red and swollen but similar to prior. He had persistent weakness at this time and an ambulance was called around 6 AM. Patient was brought to the ED as a pre-hospital stroke alert. In the ED, patient initially had stable vitals, labs notable for Na 129, Cr 1.41, Glucose 382, WBC 21.58. He underwent CT / CTA stroke alert with no acute intracranial occlusion. CXR / XR foot read pending. Patient's toe wounded was noted to be worse appearing. On return from imaging patient was noted to have low blood pressures and was started on levophed. Per ED, patient was unable to be weaned from pressors without dropping his blood pressure. Patient was accepted by critical care for vasopressor requirement.\"    Patient brought to MICU, pressures stable but soft without pressors however - started on levophed for MAP goal of 120-180 per neurology. Patient underwent bedside amputation of his left 4th toe. Per podiatry, believed to have source control from procedure. Patient was stable overnight. His blood cultures were positive for MSSA -> cefepime and vancomycin switched to " cefazolin. Patient initially started on insulin drip for hyperglycemia - switched to basal bolus insulin. Afternoon 2/1 patient was weaned off pressors with stable vitals in the 130s. Patient felt to be suitable for downgrade to med surg tele.     Consults: IP CONSULT TO NEUROLOGY  IP CONSULT TO PODIATRY  IP CONSULT TO CASE MANAGEMENT  IP CONSULT TO PHARMACY    Recommended to review admission imaging for incidental findings and document in discharge navigator: Chart reviewed, no known incidental findings noted at this time.      Discharge Plan: Anticipate discharge in 48-72 hrs to discharge location to be determined pending rehab evaluations.    PT Recommendations: Home with home health rehabilitation  OT Recommendations: No rehabilitation needs      Patient seen and evaluated by Critical Care today and deemed to be appropriate for transfer to Med Surg with Telemetry. Spoke to Dr. Jones from [facility] to accept transfer. Critical care can be contacted via [facility] with any questions or concerns.

## 2024-02-01 NOTE — PROGRESS NOTES
[facility]  Progress Note: Critical Care  Name: Nadir Myers 63 y.o. male I MRN: [id number]  Unit/Bed#: MICU 12 I Date of Admission: 1/31/2024   Date of Service: 2/1/2024 I Hospital Day: 1    Assessment/Plan   Neuro:   Diagnosis: Left Sided Weakness  Plan:   Patient initially presented to the ED as a stroke alert.   CT Stroke Alert: No acute intracranial abnormality.   CTA Stroke Alert: 1. CTA head: Negative for large vessel intracranial occlusion. Moderate bilateral supraclinoid ICA and moderate to severe left cavernous ICA segment stenosis. 2. CTA neck:  No extracranial carotid stenosis.  The cervical vertebral arteries are patent.  Per neuro, admit to stroke pathway, MRI brain, echocardiogram with bubble study, continue ASA/Plavix.   Q1 hour neuro checks.   Neuro following, appreciate recs.  MRI, echocardiogram ordered.     CV:   Diagnosis: Congestive Heart Failure  Plan:   Last EF 40%.  Does not appear grossly volume overloaded on admission.  Monitor I/O.  Consider restarting home Losartan, Spironolactone as able.  Echo pending.     Diagnosis: Shock  Plan:   Patient required pressors in the ED, MAPs above 65 on arrival to MICU, however pressors continued in setting of patient's BP goals per neuro.  Levo 5, wean as tolerated.     Diagnosis: Coronary Artery Disease  Plan:   Continue home Amiodarone, ASA, Lipitor, Plavix.     Pulm:  Diagnosis: Shortness of Breath  Plan:   Comfortable on room air.  CXR: No acute cardiopulmonary disease.     GI:   No active issues     :   Diagnosis: LINDEN  Plan:   Cr 1.41 on admission -> 0.85.  S/P 1.5L fluids in the ED, additional 0.5L on arrival.   Trend Cr     F/E/N:    F: As needed.  E: Replete as needed.  N: Cardiac     Heme/Onc:   Diagnosis: Hyponatremia  Plan:   Na 129 on admission, corrected 134 for hyperglycemia.  Na 132 this AM.  Monitor.     Endo:   Diagnosis: Type 2 Diabetes Mellitus  Plan:   Patient presenting hyperglycemic at  glucose of 382  Patient takes levemir 24 units daily.  Will start insulin drip.  Per patient, his glucose is frequently in the 500s at home.  Glucose in the 100s today, consider DC insulin drip.     ID:   Diagnosis: Septic Shock  Plan:   Patient meeting SIRS criteria, S/P ~1.5L fluid resuscitation in the ED.   Febrile 102.3 on arrival to MICU, afebrile since.  WBC 21.58 -> 15.4.  Lactic Acid 3.0 -> 1.9  Procal 0.73  Covid negative.  Bcx positive for GPC, speciation pending.  Wound cultures pending.  Previous culture 1/15 grew MSSA.  Cefepime/Vanc day 2, narrow as able.  Potentially in the setting of previous left foot cellulitis.  Trend WBC, Fever curve.   Podiatry consulted, appreciate recs.     MSK/Skin:   Diagnosis: Cellulitis of the Left Foot  Plan:   With recent hospitalization discharged 1/20/24.  Podiatry consulted, patient now S/P left 4th toe amputation for source control.  Podiatry following, appreciate recs.    Disposition: Critical care    ICU Core Measures     A: Assess, Prevent, and Manage Pain Has pain been assessed? Yes  Need for changes to pain regimen? No   B: Both SAT/SAT  N/A   C: Choice of Sedation RASS Goal: 0 Alert and Calm  Need for changes to sedation or analgesia regimen? No   D: Delirium CAM-ICU: Negative   E: Early Mobility  Plan for early mobility? Yes   F: Family Engagement Plan for family engagement today? Yes       Antibiotic Review: Patient on appropriate coverage based on culture data.       Prophylaxis:  VTE VTE covered by:  heparin (porcine), Subcutaneous, 5,000 Units at 02/01/24 0514       Stress Ulcer  not ordered        Significant 24hr Events     24hr events: No acute events overnight. Patient seen and examined. He feels well overall, continues to note fatigue and mild left arm weakness.     Subjective     Review of Systems   Constitutional:  Positive for fatigue. Negative for fever.   HENT:  Negative for hearing loss and sore throat.    Eyes:  Negative for visual disturbance.    Respiratory:  Negative for cough and shortness of breath.    Cardiovascular:  Negative for chest pain, palpitations and leg swelling.   Gastrointestinal:  Negative for abdominal distention, constipation, diarrhea, nausea and vomiting.   Endocrine: Negative for polyuria.   Genitourinary:  Negative for hematuria.   Musculoskeletal:  Negative for arthralgias.   Skin:  Negative for rash and wound.   Neurological:  Positive for weakness. Negative for light-headedness and headaches.   Psychiatric/Behavioral:  The patient is not nervous/anxious.         Objective                            Vitals I/O      Most Recent Min/Max in 24hrs   Temp 100.1 °F (37.8 °C) Temp  Min: 98.8 °F (37.1 °C)  Max: 102.3 °F (39.1 °C)   Pulse 102 Pulse  Min: 85  Max: 120   Resp (!) 32 Resp  Min: 18  Max: 34   /68 BP  Min: 68/40  Max: 152/77   O2 Sat 94 % SpO2  Min: 90 %  Max: 98 %      Intake/Output Summary (Last 24 hours) at 2/1/2024 0623  Last data filed at 2/1/2024 0511  Gross per 24 hour   Intake 1987.19 ml   Output 475 ml   Net 1512.19 ml       Diet Cardiovascular; Cardiac    Invasive Monitoring           Physical Exam   Physical Exam  Vitals reviewed.   Eyes:      Extraocular Movements: Extraocular movements intact.      Pupils: Pupils are equal, round, and reactive to light.   Skin:     General: Skin is warm.      Capillary Refill: Capillary refill takes less than 2 seconds.   HENT:      Head: Normocephalic.      Mouth/Throat:      Mouth: Mucous membranes are moist.   Neck:   no midline tenderness Cardiovascular:      Rate and Rhythm: Normal rate and regular rhythm.      Pulses: Normal pulses.   Musculoskeletal:         General: No swelling.      Right lower leg: No edema.      Left lower leg: No edema.   Abdominal:      Palpations: Abdomen is soft.   Constitutional:       Appearance: He is well-developed.   Pulmonary:      Effort: Pulmonary effort is normal.      Breath sounds: Normal breath sounds.   Neurological:      Mental  Status: He is alert and oriented to person, place and time. Mental status is at baseline.      Sensory: Sensation is intact.      Comments: 4/5 strength LUE, 5/5 strength all other extremities            Diagnostic Studies      EKG: Reviewed  Imaging: Reviewed I have personally reviewed pertinent reports.       Medications:  Scheduled PRN   amiodarone, 200 mg, Daily With Breakfast  aspirin, 81 mg, Daily  atorvastatin, 80 mg, After Dinner  cefepime, 2,000 mg, Q12H  chlorhexidine, 15 mL, Q12H TEJAS  clopidogrel, 75 mg, Daily  heparin (porcine), 5,000 Units, Q8H TEJAS  ondansetron, 4 mg, Once  potassium chloride, 20 mEq, Once  potassium chloride, 20 mEq, Q2H  sodium phosphate, 21 mmol, Once      polyethylene glycol, 17 g, Daily PRN  senna-docusate sodium, 2 tablet, BID PRN  vancomycin, 10 mg/kg (Adjusted), Daily PRN       Continuous    insulin regular (HumuLIN R,NovoLIN R) 1 Units/mL in sodium chloride 0.9 % 100 mL infusion, 0.3-21 Units/hr, Last Rate: 4 Units/hr (02/01/24 0423)  norepinephrine, 1-30 mcg/min, Last Rate: 5 mcg/min (02/01/24 0559)         Labs:    CBC    Recent Labs     01/31/24  0941 02/01/24  0511   WBC 21.58* 15.40*   HGB 14.2 13.8   HCT 43.7 41.2    223     BMP    Recent Labs     01/31/24  0941 02/01/24  0511   SODIUM 129* 132*   K 4.4 3.4*   CL 95* 100   CO2 24 23   AGAP 10 9   BUN 37* 26*   CREATININE 1.41* 0.85   CALCIUM 9.0 8.3*       Coags    Recent Labs     01/31/24  0941   INR 1.13   PTT 29        Additional Electrolytes  Recent Labs     02/01/24  0511   MG 2.0   PHOS 2.1*          Blood Gas    No recent results  No recent results LFTs  Recent Labs     01/31/24  0951 02/01/24  0511   ALT 13 15   AST 16 21   ALKPHOS 58 48   ALB 3.7 3.4*   TBILI 0.82 0.71       Infectious  Recent Labs     01/31/24  1112   PROCALCITONI 0.73*     Glucose  Recent Labs     01/31/24  0941 02/01/24  0511   GLUC 382* 149*             aMrkus Griffith MD

## 2024-02-01 NOTE — PLAN OF CARE
Problem: METABOLIC, FLUID AND ELECTROLYTES - ADULT  Goal: Electrolytes maintained within normal limits  Description: INTERVENTIONS:  - Monitor labs and assess patient for signs and symptoms of electrolyte imbalances  - Administer electrolyte replacement as ordered  - Monitor response to electrolyte replacements, including repeat lab results as appropriate  - Instruct patient on fluid and nutrition as appropriate  Outcome: Progressing  Goal: Fluid balance maintained  Description: INTERVENTIONS:  - Monitor labs   - Monitor I/O and WT  - Instruct patient on fluid and nutrition as appropriate  - Assess for signs & symptoms of volume excess or deficit  Outcome: Progressing  Goal: Glucose maintained within target range  Description: INTERVENTIONS:  - Monitor Blood Glucose as ordered  - Assess for signs and symptoms of hyperglycemia and hypoglycemia  - Administer ordered medications to maintain glucose within target range  - Assess nutritional intake and initiate nutrition service referral as needed  Outcome: Progressing     Problem: Prexisting or High Potential for Compromised Skin Integrity  Goal: Skin integrity is maintained or improved  Description: INTERVENTIONS:  - Identify patients at risk for skin breakdown  - Assess and monitor skin integrity  - Assess and monitor nutrition and hydration status  - Monitor labs   - Assess for incontinence   - Turn and reposition patient  - Assist with mobility/ambulation  - Relieve pressure over bony prominences  - Avoid friction and shearing  - Provide appropriate hygiene as needed including keeping skin clean and dry  - Evaluate need for skin moisturizer/barrier cream  - Collaborate with interdisciplinary team   - Patient/family teaching  - Consider wound care consult   Outcome: Progressing

## 2024-02-01 NOTE — PROGRESS NOTES
[facility] - Progress Note  Patient: Nadir Myers 63 y.o. male   MRN: [id number]  PCP: Tigre Hare, DO  [facility] [id number]  Date Of Visit: 02/01/24    ASSESSMENT:    Nadir Myers is a 63 y.o. male with:    Septic shock - POA  Left fourth toe gangrene  - s/p left 4th digit amputation (DOS: 1/31/24)  Left foot cellulitis  Bacteremia   Type 2 diabetes mellitus  Three-vessel coronary artery disease, heart failure with reduced ejection fraction.  Tobacco dependence  Peripheral arterial disease    WBC: 15.40  Glucose: 142 AM fasting dose  HbA1c: 9.8%    PLAN:    POD1 s/p left fourth digit amputation. Wound base is stable with no evidence of purulence, no plantar/dorsal tracking, no further spread of gangrene. Trini-wound erythema consistent with post operative course. We believe to have obtained source control with removal of left 4th toe.  Plan for eventual revisional procedure(s) pending patient's stability, continued IV antibiotics, and serial dressing changes   Reviewed labs/vitals. Mild persistent tachycardia, however leukocytosis is down trending to 15.40 today.  Continue broad spectrum ABX per primary team. Patient has positive blood cultures from admsision  Follow up deep cultures for tailoring of ABX therapy  Continue local wound care of packing, DSD to left foot, podiatry to do all dressing changes  Elevation on green foam wedges or pillows when non-ambulatory.  Rest of care per primary team.    Weight bearing status: Nonweightbearing to left foot    SUBJECTIVE:     The patient was seen, evaluated, and assessed at bedside today. The patient was awake, alert, and in no acute distress. No acute events overnight. The patient reports no pain in his left foot. Patient denies N/V/F/chills/SOB/CP.      OBJECTIVE:     Vitals:   /68   Pulse 96   Temp 99.1 °F (37.3 °C) (Oral)   Resp (!) 28   Ht 6' (1.829 m)   Wt 105 kg (232 lb 9.4 oz)   SpO2 94%   BMI 31.54 kg/m²     Temp  "(24hrs), Av.1 °F (37.8 °C), Min:98.8 °F (37.1 °C), Max:102.3 °F (39.1 °C)      Physical Exam:     General:  Alert, cooperative, and in no distress.  Lower extremity exam:  Cardiovascular status at baseline.  Neurological status at baseline.  Musculoskeletal status at baseline. No calf tenderness noted.     Lower extremity wound(s) as noted below:  Wound #: 1  Location: left open 4th digit amputation  Length 3cm: Width 1.5cm: Depth 1.2cm:   Deepest Tissue Noted in Base: bone  Probe to Bone: Yes  Peripheral Skin Description: Attached  Granulation: 80% Fibrotic Tissue: 0% Necrotic Tissue: 20%   Drainage Amount: minimal, serosanguinous  Signs of Infection: No    Clinical Images 24:            Additional Data:     Labs:    Results from last 7 days   Lab Units 24  0511   WBC Thousand/uL 15.40*   HEMOGLOBIN g/dL 13.8   HEMATOCRIT % 41.2   PLATELETS Thousands/uL 223   NEUTROS PCT % 86*   LYMPHS PCT % 6*   MONOS PCT % 7   EOS PCT % 0     Results from last 7 days   Lab Units 24  0511   POTASSIUM mmol/L 3.4*   CHLORIDE mmol/L 100   CO2 mmol/L 23   BUN mg/dL 26*   CREATININE mg/dL 0.85   CALCIUM mg/dL 8.3*   ALK PHOS U/L 48   ALT U/L 15   AST U/L 21     Results from last 7 days   Lab Units 24  0941   INR  1.13       * I Have Reviewed All Lab Data Listed Above.    Recent Cultures (last 7 days):     Results from last 7 days   Lab Units 24  1135 24  1041   GRAM STAIN RESULT  Gram positive cocci in clusters* Gram positive cocci in clusters*           Imaging: I have personally reviewed pertinent films in PACS  EKG, Pathology, and Other Studies: I have personally reviewed pertinent reports.      ** Please Note: Portions of the record may have been created with voice recognition software. Occasional wrong word or \"sound a like\" substitutions may have occurred due to the inherent limitations of voice recognition software. Read the chart carefully and recognize, using context, where " substitutions have occurred. **

## 2024-02-01 NOTE — CONSULTS
Vancomycin IV Pharmacy-to-Dose Consultation     Vancomycin has been discontinued.  Pharmacy will sign off.  Please contact or re-consult with questions.    Adithya Lopez, Pharmacist

## 2024-02-02 PROBLEM — E83.39 HYPOPHOSPHATEMIA: Status: ACTIVE | Noted: 2024-02-02

## 2024-02-02 PROBLEM — R78.81 MSSA BACTEREMIA: Status: ACTIVE | Noted: 2024-02-02

## 2024-02-02 PROBLEM — I96 GANGRENE OF TOE OF LEFT FOOT (HCC): Status: ACTIVE | Noted: 2024-02-02

## 2024-02-02 PROBLEM — B95.61 MSSA BACTEREMIA: Status: ACTIVE | Noted: 2024-02-02

## 2024-02-02 LAB
ANION GAP SERPL CALCULATED.3IONS-SCNC: 6 MMOL/L
BACTERIA WND AEROBE CULT: ABNORMAL
BACTERIA WND AEROBE CULT: ABNORMAL
BASOPHILS # BLD AUTO: 0.03 THOUSANDS/ÂΜL (ref 0–0.1)
BASOPHILS NFR BLD AUTO: 0 % (ref 0–1)
BUN SERPL-MCNC: 16 MG/DL (ref 5–25)
CALCIUM SERPL-MCNC: 8.2 MG/DL (ref 8.4–10.2)
CHLORIDE SERPL-SCNC: 102 MMOL/L (ref 96–108)
CO2 SERPL-SCNC: 23 MMOL/L (ref 21–32)
CREAT SERPL-MCNC: 0.64 MG/DL (ref 0.6–1.3)
EOSINOPHIL # BLD AUTO: 0.07 THOUSAND/ÂΜL (ref 0–0.61)
EOSINOPHIL NFR BLD AUTO: 1 % (ref 0–6)
ERYTHROCYTE [DISTWIDTH] IN BLOOD BY AUTOMATED COUNT: 13.8 % (ref 11.6–15.1)
GFR SERPL CREATININE-BSD FRML MDRD: 104 ML/MIN/1.73SQ M
GLUCOSE SERPL-MCNC: 213 MG/DL (ref 65–140)
GLUCOSE SERPL-MCNC: 225 MG/DL (ref 65–140)
GLUCOSE SERPL-MCNC: 229 MG/DL (ref 65–140)
GLUCOSE SERPL-MCNC: 241 MG/DL (ref 65–140)
GLUCOSE SERPL-MCNC: 255 MG/DL (ref 65–140)
GRAM STN SPEC: ABNORMAL
HCT VFR BLD AUTO: 36.3 % (ref 36.5–49.3)
HGB BLD-MCNC: 12.2 G/DL (ref 12–17)
IMM GRANULOCYTES # BLD AUTO: 0.06 THOUSAND/UL (ref 0–0.2)
IMM GRANULOCYTES NFR BLD AUTO: 1 % (ref 0–2)
LYMPHOCYTES # BLD AUTO: 1.02 THOUSANDS/ÂΜL (ref 0.6–4.47)
LYMPHOCYTES NFR BLD AUTO: 13 % (ref 14–44)
MAGNESIUM SERPL-MCNC: 2.1 MG/DL (ref 1.9–2.7)
MCH RBC QN AUTO: 29.6 PG (ref 26.8–34.3)
MCHC RBC AUTO-ENTMCNC: 33.6 G/DL (ref 31.4–37.4)
MCV RBC AUTO: 88 FL (ref 82–98)
MONOCYTES # BLD AUTO: 0.96 THOUSAND/ÂΜL (ref 0.17–1.22)
MONOCYTES NFR BLD AUTO: 12 % (ref 4–12)
NEUTROPHILS # BLD AUTO: 5.64 THOUSANDS/ÂΜL (ref 1.85–7.62)
NEUTS SEG NFR BLD AUTO: 73 % (ref 43–75)
NRBC BLD AUTO-RTO: 0 /100 WBCS
PHOSPHATE SERPL-MCNC: 2.1 MG/DL (ref 2.3–4.1)
PLATELET # BLD AUTO: 154 THOUSANDS/UL (ref 149–390)
PMV BLD AUTO: 10.3 FL (ref 8.9–12.7)
POTASSIUM SERPL-SCNC: 4.2 MMOL/L (ref 3.5–5.3)
RBC # BLD AUTO: 4.12 MILLION/UL (ref 3.88–5.62)
SODIUM SERPL-SCNC: 131 MMOL/L (ref 135–147)
WBC # BLD AUTO: 7.78 THOUSAND/UL (ref 4.31–10.16)

## 2024-02-02 PROCEDURE — 85025 COMPLETE CBC W/AUTO DIFF WBC: CPT

## 2024-02-02 PROCEDURE — 99232 SBSQ HOSP IP/OBS MODERATE 35: CPT | Performed by: INTERNAL MEDICINE

## 2024-02-02 PROCEDURE — 84100 ASSAY OF PHOSPHORUS: CPT

## 2024-02-02 PROCEDURE — 83735 ASSAY OF MAGNESIUM: CPT

## 2024-02-02 PROCEDURE — 99231 SBSQ HOSP IP/OBS SF/LOW 25: CPT | Performed by: PODIATRIST

## 2024-02-02 PROCEDURE — 97163 PT EVAL HIGH COMPLEX 45 MIN: CPT

## 2024-02-02 PROCEDURE — 99223 1ST HOSP IP/OBS HIGH 75: CPT | Performed by: PHYSICAL MEDICINE & REHABILITATION

## 2024-02-02 PROCEDURE — 82948 REAGENT STRIP/BLOOD GLUCOSE: CPT

## 2024-02-02 PROCEDURE — 97167 OT EVAL HIGH COMPLEX 60 MIN: CPT

## 2024-02-02 PROCEDURE — 80048 BASIC METABOLIC PNL TOTAL CA: CPT

## 2024-02-02 RX ORDER — ACETAMINOPHEN 325 MG/1
975 TABLET ORAL EVERY 6 HOURS PRN
Status: DISCONTINUED | OUTPATIENT
Start: 2024-02-02 | End: 2024-02-22

## 2024-02-02 RX ORDER — INSULIN GLARGINE 100 [IU]/ML
45 INJECTION, SOLUTION SUBCUTANEOUS
Status: DISCONTINUED | OUTPATIENT
Start: 2024-02-02 | End: 2024-02-04

## 2024-02-02 RX ORDER — INSULIN LISPRO 100 [IU]/ML
3 INJECTION, SOLUTION INTRAVENOUS; SUBCUTANEOUS
Status: DISCONTINUED | OUTPATIENT
Start: 2024-02-02 | End: 2024-02-03

## 2024-02-02 RX ADMIN — INSULIN LISPRO 3 UNITS: 100 INJECTION, SOLUTION INTRAVENOUS; SUBCUTANEOUS at 12:25

## 2024-02-02 RX ADMIN — CHLORHEXIDINE GLUCONATE 15 ML: 1.2 SOLUTION ORAL at 21:46

## 2024-02-02 RX ADMIN — INSULIN LISPRO 2 UNITS: 100 INJECTION, SOLUTION INTRAVENOUS; SUBCUTANEOUS at 12:25

## 2024-02-02 RX ADMIN — INSULIN LISPRO 2 UNITS: 100 INJECTION, SOLUTION INTRAVENOUS; SUBCUTANEOUS at 21:44

## 2024-02-02 RX ADMIN — INSULIN LISPRO 3 UNITS: 100 INJECTION, SOLUTION INTRAVENOUS; SUBCUTANEOUS at 18:43

## 2024-02-02 RX ADMIN — INSULIN GLARGINE 45 UNITS: 100 INJECTION, SOLUTION SUBCUTANEOUS at 21:43

## 2024-02-02 RX ADMIN — CEFAZOLIN SODIUM 2000 MG: 2 SOLUTION INTRAVENOUS at 06:01

## 2024-02-02 RX ADMIN — ENOXAPARIN SODIUM 40 MG: 40 INJECTION SUBCUTANEOUS at 08:54

## 2024-02-02 RX ADMIN — AMIODARONE HYDROCHLORIDE 200 MG: 200 TABLET ORAL at 08:54

## 2024-02-02 RX ADMIN — INSULIN LISPRO 2 UNITS: 100 INJECTION, SOLUTION INTRAVENOUS; SUBCUTANEOUS at 18:43

## 2024-02-02 RX ADMIN — CLOPIDOGREL BISULFATE 75 MG: 75 TABLET ORAL at 08:54

## 2024-02-02 RX ADMIN — CEFAZOLIN SODIUM 2000 MG: 2 SOLUTION INTRAVENOUS at 18:42

## 2024-02-02 RX ADMIN — CHLORHEXIDINE GLUCONATE 15 ML: 1.2 SOLUTION ORAL at 08:54

## 2024-02-02 RX ADMIN — ATORVASTATIN CALCIUM 80 MG: 80 TABLET, FILM COATED ORAL at 18:43

## 2024-02-02 RX ADMIN — INSULIN LISPRO 2 UNITS: 100 INJECTION, SOLUTION INTRAVENOUS; SUBCUTANEOUS at 08:56

## 2024-02-02 RX ADMIN — SODIUM PHOSPHATE, MONOBASIC, MONOHYDRATE AND SODIUM PHOSPHATE, DIBASIC, ANHYDROUS 30 MMOL: 142; 276 INJECTION, SOLUTION INTRAVENOUS at 13:15

## 2024-02-02 RX ADMIN — CEFAZOLIN SODIUM 2000 MG: 2 SOLUTION INTRAVENOUS at 12:26

## 2024-02-02 RX ADMIN — ASPIRIN 81 MG CHEWABLE TABLET 81 MG: 81 TABLET CHEWABLE at 08:54

## 2024-02-02 NOTE — ASSESSMENT & PLAN NOTE
"Presented w/ transient left-sided weakness and a facial droop now resolved  Appreciate neurology input deeming symptoms likely secondary to septic shock/infection and hyperglycemia, as neurologic workup including CT/MRI imaging negative for evidence of stroke or intracranial vessel occlusion, but noted chronic microangiopathic changes -> radiology report did however mention: \"Moderate bilateral supraclinoid ICA and moderate to severe left cavernous ICA segment stenosis.\"  Continue dual endplate therapy with ASA/Plavix - c/w statin  PT/OT as tolerated  "

## 2024-02-02 NOTE — PLAN OF CARE
Problem: PHYSICAL THERAPY ADULT  Goal: Performs mobility at highest level of function for planned discharge setting.  See evaluation for individualized goals.  Description: Treatment/Interventions: Functional transfer training, LE strengthening/ROM, Elevations, Therapeutic exercise, Endurance training, Patient/family training, Equipment eval/education, Bed mobility, Spoke to nursing, Gait training, OT, Spoke to case management  Equipment Recommended: Wheelchair, Walker       See flowsheet documentation for full assessment, interventions and recommendations.  Note: Prognosis: Good  Problem List: Decreased strength, Decreased endurance, Impaired balance, Decreased mobility, Pain  Assessment: Pt is a 63 y.o. male seen for PT evaluation s/p admit to [facility] on 1/31/2024. Pt was admitted with a primary dx of: septic shock now s/p L fourth digit amputation.  PT now consulted for assessment of mobility and d/c needs. Pt with OOB to chair orders.  Pts current comorbidities effecting treatment include: LUE weakness. Pt  has a past medical history of Diabetes mellitus ([facility]) and Myocardial infarction (HCC). Pts current clinical presentation is Unstable/ Unpredictable (high complexity) due to Ongoing medical management for primary dx, Increased reliance on more restrictive AD compared to baseline, Decreased activity tolerance compared to baseline, Fall risk, Increased assistance needed from caregiver at current time, Current WBS, Trending lab values, Continuous pulse oximetry monitoring . Prior to admission, pt was residing in mobile home with 3 CONNIE and was independent with PRN use of SPC. Upon evaluation, pt currently is requiring mod Ax2 for transfers; mod Ax1 for ambulation w/ RW. Pt presents at PT eval functioning below baseline and currently w/ overall mobility deficits 2* to: BLE weakness, decreased ROM, impaired balance, decreased endurance, gait deviations, pain, decreased activity tolerance compared to  baseline, decreased functional mobility tolerance compared to baseline, fall risk, orthopedic restrictions. Pt currently at a fall risk 2* to impairments listed above.  Pt will continue to benefit from skilled acute PT interventions to address stated impairments; to maximize functional mobility; for ongoing pt/ family training; and DME needs. At conclusion of PT session pt returned back in chair with phone and call bell within reach. Pt denies any further questions at this time. The patient's AM-PAC Basic Mobility Inpatient Short Form Raw Score is 11. A Raw score of less than or equal to 16 suggests the patient may benefit from discharge to post-acute rehabilitation services. Please also refer to the recommendation of the Physical Therapist for safe discharge planning. Recommend STR upon hospital D/C.  Barriers to Discharge: Inaccessible home environment     Rehab Resource Intensity Level, PT: I (Maximum Resource Intensity)    See flowsheet documentation for full assessment.

## 2024-02-02 NOTE — CONSULTS
PHYSICAL MEDICINE AND REHABILITATION CONSULT NOTE  Nadir Myers 63 y.o. male MRN: [id number]  Unit/Bed#: [facility] 902-01 Encounter: [id number]    Requested by (Physician/Service): Hilda Ashley MD  Reason for Consultation:  Assessment of rehabilitation needs    Assessment:  Rehabilitation Diagnosis:   Severe sepsis with septic shock in the setting of wet gangrene left 4th toe s/p amputation   Impaired mobility and self care    Recommendations:  Rehabilitation Plan:  Continue PT/OT (SLP) while on acute care.  The patient will likely be a candidate for acute inpatient rehabilitation once medically stable. OT evaluation is pending.     Medical Co-morbidities Plan:  MSSA bacteremia  LINDEN  Hyponatremia   Lactic acidosis   ?TIA with transient left sided weakness   CHF  CAD  Diabetes type 2  Bowel plan: continent LMB 2/1/2024  Bladder plan: continent   DVT ppx: Lovenox and SCD    Thank you for this consultation.  Do not hesitate to contact service with further questions.      CARLA Carrizales  PM&R    I have spent a total time of 30 minutes on 02/02/24 in caring for this patient including Patient and family education, Counseling / Coordination of care, Documenting in the medical record, Obtaining or reviewing history  , and Communicating with other healthcare professionals .        History of Present Illness:  Nadir Myers is a 63 y.o. male with a PMH of atrial fibrillation not on AC, diabetes, history of MI with stenting, CHF, ICD history of v tac, PAD and left foot ulcer who presented to the [facility] on 1/31/2024 with left upper extremity weakness. EMS was called and he was found to have a fever of 102, glucose > 500 with hypoxia and necrotic appearing left foot. CT head showed no acute changes. CTA demonstrated no LVO bur moderate bilateral supraclinoid ICA and moderate to severe left cavernous ICA stenosis. He is on ASA/plavix at baseline. There was concerned for bacteremia in the  setting of wet gangrene of the 4th toe left foot. Emergent open tow amputation was recommended by Podiatry. Blood cultures were positive for MSSA and antibiotics were changed to cefazolin. He was started on an insulin drip and transitioned to basal bolus insulin. He was weaned off of pressors and transferred out of ICU. MRI brain showed no acute infarcts, chronic ischemic microangiopathic changes noted. No other definite remote infarct. ECHO showed EF of 42%. PM&R are consulted for rehabilitation recommendations.    The patient was seen in his room with family at bedside. He reports good pain control currently. He states he was able to hop in the room with therapy and transfer to the commode. He is currently [facility] to the [facility].     Review of Systems: 10 point ROS negative except for what is noted in HPI    Function:  Prior level of function and living situation: The patient lives in a trailer home with 3 CONNIE. He lives with his son and spouse and was independent.       Current level of function:  Physical Therapy: Moderate to minimal assist for transfers, moderate assist for ambulation   Occupational Therapy: Pending   Speech Therapy:    Physical Exam:  BP 99/68   Pulse 91   Temp 98.1 °F (36.7 °C)   Resp (!) 10   Ht 6' (1.829 m)   Wt 105 kg (231 lb 14.8 oz)   SpO2 94%   BMI 31.45 kg/m²        Intake/Output Summary (Last 24 hours) at 2/2/2024 1043  Last data filed at 2/2/2024 0006  Gross per 24 hour   Intake 643.48 ml   Output 1100 ml   Net -456.52 ml       Body mass index is 31.45 kg/m².      Physical Exam  Constitutional:       General: He is not in acute distress.  HENT:      Head: Normocephalic and atraumatic.      Right Ear: External ear normal.      Left Ear: External ear normal.      Nose: Nose normal.      Mouth/Throat:      Mouth: Mucous membranes are moist.      Pharynx: Oropharynx is clear.   Eyes:      Extraocular Movements: Extraocular movements intact.   Pulmonary:      Effort: Pulmonary effort is  normal. No respiratory distress.   Abdominal:      General: There is no distension.   Musculoskeletal:      Comments: Left foot with dressing in place  LUE: SAB 4-/5, EE/EF 4/5, FF 4/5  RUE: 5/5 throughout      Skin:     General: Skin is warm and dry.   Neurological:      Mental Status: He is alert and oriented to person, place, and time.   Psychiatric:         Mood and Affect: Mood normal.              Social History:    Social History     Socioeconomic History    Marital status: /Civil Union     Spouse name: None    Number of children: None    Years of education: None    Highest education level: None   Occupational History    None   Tobacco Use    Smoking status: Every Day     Types: Cigarettes    Smokeless tobacco: Never    Tobacco comments:     X2 cigarettes/day   Vaping Use    Vaping status: Never Used   Substance and Sexual Activity    Alcohol use: Not Currently    Drug use: Never    Sexual activity: Not Currently     Partners: Female   Other Topics Concern    None   Social History Narrative    None     Social Determinants of Health     Financial Resource Strain: Not on file   Food Insecurity: No Food Insecurity (1/31/2024)    Hunger Vital Sign     Worried About Running Out of Food in the Last Year: Never true     Ran Out of Food in the Last Year: Never true   Transportation Needs: No Transportation Needs (1/31/2024)    PRAPARE - Transportation     Lack of Transportation (Medical): No     Lack of Transportation (Non-Medical): No   Physical Activity: Not on file   Stress: Not on file   Social Connections: Not on file   Intimate Partner Violence: Not on file   Housing Stability: Low Risk  (1/31/2024)    Housing Stability Vital Sign     Unable to Pay for Housing in the Last Year: No     Number of Places Lived in the Last Year: 1     Unstable Housing in the Last Year: No        Family History:    History reviewed. No pertinent family history.      Medications:     Current Facility-Administered Medications:      amiodarone tablet 200 mg, 200 mg, Oral, Daily With Breakfast, Markus Griffith MD, 200 mg at 02/02/24 0854    aspirin chewable tablet 81 mg, 81 mg, Oral, Daily, Markus Griffith MD, 81 mg at 02/02/24 0854    atorvastatin (LIPITOR) tablet 80 mg, 80 mg, Oral, After Dinner, Markus Griffith MD, 80 mg at 02/01/24 1822    ceFAZolin (ANCEF) IVPB (premix in dextrose) 2,000 mg 50 mL, 2,000 mg, Intravenous, Q6H, Markus Griffith MD, Last Rate: 100 mL/hr at 02/02/24 0601, 2,000 mg at 02/02/24 0601    chlorhexidine (PERIDEX) 0.12 % oral rinse 15 mL, 15 mL, Mouth/Throat, Q12H TEJAS, Markus Griffith MD, 15 mL at 02/02/24 0854    clopidogrel (PLAVIX) tablet 75 mg, 75 mg, Oral, Daily, Markus Griffith MD, 75 mg at 02/02/24 0854    enoxaparin (LOVENOX) subcutaneous injection 40 mg, 40 mg, Subcutaneous, Q24H TEJAS, Markus Griffith MD, 40 mg at 02/02/24 0854    insulin glargine (LANTUS) subcutaneous injection 45 Units 0.45 mL, 45 Units, Subcutaneous, HS, Hilda Ashley MD    insulin lispro (HumaLOG) 100 units/mL subcutaneous injection 1-5 Units, 1-5 Units, Subcutaneous, 4x Daily (AC & HS), 2 Units at 02/02/24 0856 **AND** Fingerstick Glucose (POCT), , , 4x Daily AC and at bedtime, Markus Griffith MD    insulin lispro (HumaLOG) 100 units/mL subcutaneous injection 3 Units, 3 Units, Subcutaneous, TID With Meals, Hilda Ashley MD    ondansetron (ZOFRAN) injection 4 mg, 4 mg, Intravenous, Once, Markus Griffith MD    polyethylene glycol (MIRALAX) packet 17 g, 17 g, Oral, Daily PRN, Markus Griffith MD    senna-docusate sodium (SENOKOT S) 8.6-50 mg per tablet 2 tablet, 2 tablet, Oral, BID PRN, Markus Griffith MD    sodium phosphate 30 mmol in dextrose 5 % 250 mL Infusion, 30 mmol, Intravenous, Once, Hilda Ashley MD    Past Medical History:     Past Medical History:   Diagnosis Date    Diabetes mellitus (HCC)     Myocardial infarction (HCC)         Past  Surgical History:     Past Surgical History:   Procedure Laterality Date    CARDIAC CATHETERIZATION N/A 05/03/2023    Procedure: Cardiac Coronary Angiogram;  Surgeon: Valeriy Shore MD;  [facility];  Service: Cardiology    CARDIAC CATHETERIZATION Left 05/03/2023    Procedure: Cardiac Left Heart Cath;  Surgeon: Valeriy Shore MD;  [facility]: [facility];  Service: Cardiology    CARDIAC DEFIBRILLATOR PLACEMENT  05/2023    CARDIAC ELECTROPHYSIOLOGY PROCEDURE N/A 05/12/2023    Procedure: Cardiac icd implant;  Surgeon: Urbano Maria MD;  Location: [facility];  Service: Cardiology    IR LOWER EXTREMITY ANGIOGRAM  1/18/2024         Allergies:     Allergies   Allergen Reactions    Vancomycin Rash     NOT AN ALLERGY - 1/31/24 patient experienced vancomycin infusion reaction, please extend duration of infusion time to prevent future infusion reactions           LABORATORY RESULTS:      Lab Results   Component Value Date    HGB 12.2 02/02/2024    HGB 16.5 02/17/2015    HCT 36.3 (L) 02/02/2024    HCT 48.4 02/17/2015    WBC 7.78 02/02/2024    WBC 7.33 02/17/2015     Lab Results   Component Value Date    BUN 16 02/02/2024    BUN 14 09/16/2023     02/17/2015    K 4.2 02/02/2024    K 5.1 09/16/2023     02/02/2024     09/16/2023    GLUCOSE 203 (H) 02/17/2015    CREATININE 0.64 02/02/2024    CREATININE 0.79 09/16/2023     Lab Results   Component Value Date    PROTIME 14.4 01/31/2024    PROTIME 12.9 02/16/2015    INR 1.13 01/31/2024    INR 0.95 02/16/2015        DIAGNOSTIC STUDIES: Reviewed  MRI brain wo contrast    Result Date: 2/1/2024  Impression: No acute infarction, intracranial hemorrhage or mass effect Workstation performed: [id number]     XR foot 3+ views LEFT    Result Date: 2/1/2024  Impression: No radiographic evidence of osteomyelitis. Workstation performed: [id number]     XR chest 2 views    Result Date: 1/31/2024  Impression: No acute cardiopulmonary disease.  Workstation performed: [id number]     CT stroke alert brain    Result Date: 1/31/2024  Impression: No acute intracranial abnormality. Chronic microangiopathic ischemic changes. Findings were directly discussed with Dr. Maria Juan  at 10:03 a.m. Workstation performed: [id number]     CTA stroke alert (head/neck)    Result Date: 1/31/2024  Impression: 1. CTA head: Negative for large vessel intracranial occlusion. Moderate bilateral supraclinoid ICA and moderate to severe left cavernous ICA segment stenosis. 2. CTA neck:  No extracranial carotid stenosis.  The cervical vertebral arteries are patent. Findings were directly discussed with Dr. Maria Juan  at 10:03 a.m. Workstation performed: [id number]

## 2024-02-02 NOTE — PHYSICAL THERAPY NOTE
Physical Therapy Evaluation     Patient's Name: Nadir Myers    Admitting Diagnosis  Weakness [R53.1]  Foot infection [L08.9]  Stroke-like symptoms [R29.90]  Septic shock (HCC) [A41.9, R65.21]  National Institutes of Health (NIH) Stroke Scale level of consciousness score 0, alert; keenly responsive [Z78.9]  Adverse effect of vancomycin, initial encounter [T36.8X5A]    Problem List  Patient Active Problem List   Diagnosis    Essential (primary) hypertension    Benign prostatic hyperplasia without lower urinary tract symptoms    3-vessel CAD    Type 2 diabetes mellitus, with long-term current use of insulin (HCC)    GERD (gastroesophageal reflux disease)    Tobacco dependence syndrome    Acute HFrEF    A-fib ([facility])    Ischemic cardiomyopathy    V-tach ([facility])    Hyponatremia    Acidosis    Localized swelling on left hand    Chronic systolic CHF (congestive heart failure), NYHA class 2 ([facility])    Cellulitis of left foot    Type 2 diabetes mellitus with foot ulcer, with long-term current use of insulin (HCC)    Coronary artery disease involving native coronary artery of native heart without angina pectoris    PAD (peripheral artery disease) (HCC)    LUE weakness    Septic shock (HCC)       Past Medical History  Past Medical History:   Diagnosis Date    Diabetes mellitus (HCC)     Myocardial infarction (HCC)        Past Surgical History  Past Surgical History:   Procedure Laterality Date    CARDIAC CATHETERIZATION N/A 05/03/2023    Procedure: Cardiac Coronary Angiogram;  Surgeon: Valeriy Shore MD;  [facility];  Service: Cardiology    CARDIAC CATHETERIZATION Left 05/03/2023    Procedure: Cardiac Left Heart Cath;  Surgeon: Valeriy Shore MD;  [facility];  Service: Cardiology    CARDIAC DEFIBRILLATOR PLACEMENT  05/2023    CARDIAC ELECTROPHYSIOLOGY PROCEDURE N/A 05/12/2023    Procedure: Cardiac icd implant;  Surgeon: Urbano Maria MD;  Location: [facility];  Service:  Cardiology    IR LOWER EXTREMITY ANGIOGRAM  1/18/2024 02/02/24 0848   PT Last Visit   PT Visit Date 02/02/24   Note Type   Note type Evaluation   Pain Assessment   Pain Assessment Tool 0-10   Pain Score No Pain   Restrictions/Precautions   Weight Bearing Precautions Per Order Yes   LLE Weight Bearing Per Order (S)  NWB   Other Precautions Multiple lines;Fall Risk;Pain;WBS   Home Living   Type of Home Mobile home   Home Layout One level;Performs ADLs on one level;Able to live on main level with bedroom/bathroom  (3 CONNIE)   Bathroom Shower/Tub Walk-in shower   Bathroom Toilet Standard   Bathroom Equipment Built-in shower seat   Home Equipment Cane  (used PRN)   Prior Function   Level of [place] Independent with ADLs;Independent with functional mobility;Independent with IADLS   Lives With Spouse;Son   Receives Help From Family   IADLs Independent with driving;Independent with meal prep;Independent with medication management   Falls in the last 6 months 0   General   Family/Caregiver Present No   Cognition   Overall Cognitive Status WFL   Arousal/Participation Cooperative   Orientation Level Oriented X4   Memory Within functional limits   Following Commands Follows all commands and directions without difficulty   Comments pt pleasant and cooperative   RLE Assessment   RLE Assessment   (functionally 3+/5)   LLE Assessment   LLE Assessment   (functionally 3+/5)   Bed Mobility   Supine to Sit Unable to assess   Sit to Supine Unable to assess   Additional Comments pt sitting EOB upon arrival. pt left sitting OOB in recliner with all needs within reach   Transfers   Sit to Stand 3  Moderate assistance   Additional items Assist x 2;Increased time required;Verbal cues   Stand to Sit 4  Minimal assistance   Additional items Assist x 2;Increased time required;Verbal cues   Stand pivot 3  Moderate assistance   Additional items Assist x 1;Increased time required;Verbal cues   Additional Comments transfers with RW, cues  for hand placement and sequencing   Ambulation/Elevation   Gait pattern Excessively slow;Short stride;Foward flexed;Decreased foot clearance   Gait Assistance 3  Moderate assist   Additional items Assist x 1;Verbal cues;Tactile cues   Assistive Device Rolling walker   Distance 1-2 hops   Balance   Static Sitting Fair +   Dynamic Sitting Fair   Static Standing Poor +   Dynamic Standing Poor   Ambulatory Poor   Endurance Deficit   Endurance Deficit Yes   Activity Tolerance   Activity Tolerance Patient limited by fatigue   Medical Staff Made Aware NAY Roberto; co-session completed this date 2* increased medical complexity and multiple co-morbidities   Nurse Made Aware RN cleared pt to participate in therapy session   Assessment   Prognosis Good   Problem List Decreased strength;Decreased endurance;Impaired balance;Decreased mobility;Pain   Assessment Pt is a 63 y.o. male seen for PT evaluation s/p admit to [facility] on 1/31/2024. Pt was admitted with a primary dx of: septic shock now s/p L fourth digit amputation.  PT now consulted for assessment of mobility and d/c needs. Pt with OOB to chair orders.  Pts current comorbidities effecting treatment include: LUE weakness. Pt  has a past medical history of Diabetes mellitus (HCC) and Myocardial infarction (HCC). Pts current clinical presentation is Unstable/ Unpredictable (high complexity) due to Ongoing medical management for primary dx, Increased reliance on more restrictive AD compared to baseline, Decreased activity tolerance compared to baseline, Fall risk, Increased assistance needed from caregiver at current time, Current WBS, Trending lab values, Continuous pulse oximetry monitoring . Prior to admission, pt was residing in mobile home with 3 CONNIE and was independent with PRN use of SPC. Upon evaluation, pt currently is requiring mod Ax2 for transfers; mod Ax1 for ambulation w/ RW. Pt presents at PT eval functioning below baseline and currently w/ overall  mobility deficits 2* to: BLE weakness, decreased ROM, impaired balance, decreased endurance, gait deviations, pain, decreased activity tolerance compared to baseline, decreased functional mobility tolerance compared to baseline, fall risk, orthopedic restrictions. Pt currently at a fall risk 2* to impairments listed above.  Pt will continue to benefit from skilled acute PT interventions to address stated impairments; to maximize functional mobility; for ongoing pt/ family training; and DME needs. At conclusion of PT session pt returned back in chair with phone and call bell within reach. Pt denies any further questions at this time. The patient's AM-PAC Basic Mobility Inpatient Short Form Raw Score is 11. A Raw score of less than or equal to 16 suggests the patient may benefit from discharge to post-acute rehabilitation services. Please also refer to the recommendation of the Physical Therapist for safe discharge planning. Recommend STR upon hospital D/C.   Barriers to Discharge Inaccessible home environment   Goals   Patient Goals to get OOB   STG Expiration Date 02/16/24   Short Term Goal #1 STG 1. Pt will be able to perform bed mobility tasks with mod I level in order to improve overall functional mobility and assist in safe d/c. STG 2. Pt with sit EOB for at least 25 minutes at mod I level in order to strengthen abdominal musculature and assist in future transfers/ ambulation. STG 3. Pt will be able to perform functional transfer with mod I level in order to improve overall functional mobility and assist in safe d/c. STG 4. Pt will be able to ambulate at least 25 feet with least restrictive device with mod I level A in order to improve overall functional mobility and assist in safe d/c. STG 5. Pt will improve sitting/standing static/dynamic balance 1/2 grade in order to improve functional mobility and assist in safe d/c. STG 6. Pt will improve LE strength by 1/2 grade in order to improve functional mobility and  assist in safe d/c. STG 7. Pt will be able to negotiate at least 3 stairs with least restrictive device with S level A in order to improve overall functional mobility and assist in safe d/c.   PT Treatment Day 0   Plan   Treatment/Interventions Functional transfer training;LE strengthening/ROM;Elevations;Therapeutic exercise;Endurance training;Patient/family training;Equipment eval/education;Bed mobility;Spoke to nursing;Gait training;OT;Spoke to case management   PT Frequency 3-5x/wk   Discharge Recommendation   Rehab Resource Intensity Level, PT I (Maximum Resource Intensity)   Equipment Recommended Wheelchair;Walker   AM-PAC Basic Mobility Inpatient   Turning in Flat Bed Without Bedrails 3   Lying on Back to Sitting on Edge of Flat Bed Without Bedrails 3   Moving Bed to Chair 2   Standing Up From Chair Using Arms 1   Walk in Room 1   Climb 3-5 Stairs With Railing 1   Basic Mobility Inpatient Raw Score 11   Basic Mobility Standardized Score 30.25   Highest Level Of Mobility   JH-HLM Goal 4: Move to chair/commode   JH-HLM Achieved 4: Move to chair/commode   Modified [place] Scale   Modified [place] Scale 4           Maritza Holm, PT DPT

## 2024-02-02 NOTE — ASSESSMENT & PLAN NOTE
Last EF of 42%  Holding diuresis (Aldactone) in the setting of relative hypotension and recovering LINDEN  Continue ASA/Plavix and statin for underlying CAD (associated ischemic cardiomyopathy)  Monitor/replete serum potassium/magnesium as necessary  Low sodium and fluid restriction enforced

## 2024-02-02 NOTE — ASSESSMENT & PLAN NOTE
Creatinine of approximately 0.6-0.8 -> currently stable at 0.64 from a prior peak of 1.41  Monitor renal function and urine output  Limit/avoid nephrotoxins and hypotension as possible  Aldactone remains held due to relative hypotension

## 2024-02-02 NOTE — ARC ADMISSION
Referral received for consideration of patient for Inpatient Acute Rehab, will review patients case and continue to follow patients functional progress until a determination can be made.

## 2024-02-02 NOTE — ASSESSMENT & PLAN NOTE
Initially admitted to the ICU requiring vasopressor support, now off, with maintenance of hemodynamic stability  Due to MSSA bacteremia from a gangrenous left fourth toe (see below)  Continue to monitor vitals and maintain hemodynamics

## 2024-02-02 NOTE — ASSESSMENT & PLAN NOTE
Lab Results   Component Value Date    HGBA1C 9.8 (H) 01/31/2024     Continue basal prandial insulin with additional SSI coverage per Accu-Cheks  Hypoglycemia protocol  Carbohydrate restriction

## 2024-02-02 NOTE — ASSESSMENT & PLAN NOTE
Appreciate podiatry input -> status post left fourth toe amputation on 1/31  Planning closure (timing to be determined)  Wound care per podiatry  PRN pain and blood sugar control

## 2024-02-02 NOTE — PLAN OF CARE
Problem: METABOLIC, FLUID AND ELECTROLYTES - ADULT  Goal: Electrolytes maintained within normal limits  Description: INTERVENTIONS:  - Monitor labs and assess patient for signs and symptoms of electrolyte imbalances  - Administer electrolyte replacement as ordered  - Monitor response to electrolyte replacements, including repeat lab results as appropriate  - Instruct patient on fluid and nutrition as appropriate  Outcome: Progressing     Problem: METABOLIC, FLUID AND ELECTROLYTES - ADULT  Goal: Glucose maintained within target range  Description: INTERVENTIONS:  - Monitor Blood Glucose as ordered  - Assess for signs and symptoms of hyperglycemia and hypoglycemia  - Administer ordered medications to maintain glucose within target range  - Assess nutritional intake and initiate nutrition service referral as needed  Outcome: Progressing     Problem: Prexisting or High Potential for Compromised Skin Integrity  Goal: Skin integrity is maintained or improved  Description: INTERVENTIONS:  - Identify patients at risk for skin breakdown  - Assess and monitor skin integrity  - Assess and monitor nutrition and hydration status  - Monitor labs   - Assess for incontinence   - Turn and reposition patient  - Assist with mobility/ambulation  - Relieve pressure over bony prominences  - Avoid friction and shearing  - Provide appropriate hygiene as needed including keeping skin clean and dry  - Evaluate need for skin moisturizer/barrier cream  - Collaborate with interdisciplinary team   - Patient/family teaching  - Consider wound care consult   Outcome: Progressing

## 2024-02-02 NOTE — CASE MANAGEMENT
Case Management Discharge Planning Note    Patient name Nadir Myers  Location [facility] 902/[facility] 902-01 MRN [id number]  : 1960 Date 2024       Current Admission Date: 2024  Current Admission Diagnosis:Septic shock (formerly [facility])   Patient Active Problem List    Diagnosis Date Noted    LUE weakness 2024    Septic shock (formerly [facility]) 2024    Cellulitis of left foot 2024    Type 2 diabetes mellitus with foot ulcer, with long-term current use of insulin (formerly [facility]) 2024    Coronary artery disease involving native coronary artery of native heart without angina pectoris 2024    PAD (peripheral artery disease) (formerly [facility]) 2024    Chronic systolic CHF (congestive heart failure), NYHA class 2 (formerly [facility]) 2023    Localized swelling on left hand 2023    Hyponatremia 2023    Acidosis 2023    V-tach (formerly [facility]) 2023    Ischemic cardiomyopathy 2023    Essential (primary) hypertension 2023    Acute HFrEF 2023    A-fib (formerly [facility]) 2023    Benign prostatic hyperplasia without lower urinary tract symptoms 2018    3-vessel CAD 2015    Type 2 diabetes mellitus, with long-term current use of insulin (formerly [facility]) 2015    GERD (gastroesophageal reflux disease) 2013    Tobacco dependence syndrome 2012      LOS (days): 2  Geometric Mean LOS (GMLOS) (days): 5.1  Days to GMLOS:3.1     OBJECTIVE:  Risk of Unplanned Readmission Score: 19.94         Current admission status: Inpatient   Preferred Pharmacy:   [id number] [place] - [address]  [address]  [place] [address]  Phone: [phone] Fax: [phone]    [facility] - [place] - [address]  [address]  [place] [address]  Phone: [phone] Fax: [phone]    Primary Care Provider: Tigre Hare DO    Primary Insurance: [employer] [facility] REP  Secondary Insurance:     DISCHARGE DETAILS:    Discharge planning discussed with::  Pt, pt's wife, pt's daughter  Freedom of Choice: Yes  Comments - Freedom of Choice: Pt and family agreeable to [facility] referral  CM contacted family/caregiver?: Yes  Were Treatment Team discharge recommendations reviewed with patient/caregiver?: Yes  Did patient/caregiver verbalize understanding of patient care needs?: N/A- going to facility  Were patient/caregiver advised of the risks associated with not following Treatment Team discharge recommendations?: Yes    Contacts  Patient Contacts: wife Rhoda Myers  Relationship to Patient:: Family  Contact Method: In Person  Reason/Outcome: Continuity of Care, Emergency Contact, Discharge Planning              Other Referral/Resources/Interventions Provided:  Interventions: Acute Rehab  Referral Comments: CM completed referral to [facility]         Treatment Team Recommendation: Acute Rehab  Discharge Destination Plan:: Acute Rehab

## 2024-02-02 NOTE — PLAN OF CARE
Problem: OCCUPATIONAL THERAPY ADULT  Goal: Performs self-care activities at highest level of function for planned discharge setting.  See evaluation for individualized goals.  Description: Treatment Interventions: ADL retraining, Functional transfer training, Endurance training, Patient/family training, Equipment evaluation/education, Compensatory technique education, Continued evaluation, Energy conservation, Activityengagement          See flowsheet documentation for full assessment, interventions and recommendations.   Note: Limitation: Decreased ADL status, Decreased endurance, Decreased self-care trans, Decreased high-level ADLs     Assessment: Pt is a 63 y.o. male seen for OT evaluation s/p admission to [facility] on 1/31/2024. Pt diagnosed with Septic shock (Formerly [facility]); s/p L fourth toe amputation on 1/31. Per podiatry, pt's L LE is NWB. Pt has a significant PMH impacting occupational performance including: Diabetes mellitus (Formerly [facility]) and Myocardial infarction (Formerly [facility]). Pt with active OT evaluation and treatment orders and activity orders. PTA, pt living with his spouse and son in a 1 SH w/ 3 CONNIE. Pt reports I w/ ADLs, IADLs, and fxnl mobility w/ SPC PRN at baseline; + driving. Pt agreeable and willing to participate in OT evaluation. During evaluation, pt was I for eating and grooming, S for UB ADLs, mod A for LB ADLs, and min A for toileting. Pt also required min-mod Ax2 for STS transfer and mod Ax1 for SPT from EOB to chair w/ RW for support. Performance deficits that affect the pt’s occupational performance during the initial evaluation include decreased ADL status, decreased activity tolerance, decreased endurance, decreased standing tolerance, decreased standing balance, decreased transfer skills, and decreased fxnl mobility. Based on pt’s functional performance and deficits the following occupations will be addressed in OT treatments in order to maximize pt’s independence and overall occupational  performance: bathing/showering, toileting and toilet hygiene, dressing, and functional mobility. Goals are listed below.  From OT perspective, recommend d/c to Post Acute Rehabilitation when pt medically stable to d/c from acute care. Will continue to follow.     Rehab Resource Intensity Level, OT: I (Maximum Resource Intensity)

## 2024-02-02 NOTE — PROGRESS NOTES
Progress Note - Neurology   Nadir Myers 63 y.o. male [id number]  Unit/Bed#: [facility] 902/[facility] 902-01    Assessment/Plan:  LUE weakness  Assessment & Plan  63-year-old male with apparent history of afib not on AC, DM, history of MI with stenting, CHF, status post MRI conditional ICD, history of V. tach, PAD, with left foot ulcer, who presented with left upper extremity weakness.  Patient is on aspirin, Plavix, and high-dose atorvastatin at baseline. /71 on presentation.  Last known well unclear; possibly sometime the evening of 1/30/2024.    NIHSS 1 for LUE dysmetria.   CT head demonstrated no acute changes.  CTA head/neck demonstrated no LVO but moderate bilateral supraclinoid ICA and moderate to severe left cavernous ICA stenosis.  Out of window for TNK no target for thrombectomy.    MRI brain now completed and negative for acute ischemia.  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 There is no thrombus. Bilateral atria normal in size.  A1C 9.8    Patient's L facial asymmetry appears to be near resolved and LUE weakness and discomfort much improved. Suspect etiology to have been a combination of metabolic derangements (including hyperglycemia, hypothermia, hypoxia) and musculoskeletal/orthopaedic. No evidence of central vascular process. No further neuroimaging/inpatient neurologic reocmmendations.     Plan:  - Continue home meds of Aspirin and high dose statin  - PT/OT  - If LUE persist, can undergo further evaluation with shoulder imaging/orthopaedics  - Goal of normotension  - Notify neurology with any worsening of symptoms or any neurologic concerns.  - Defer correction of infection/metabolic derangements to primary service.        Nadir Myers will not need outpatient follow up with Neurology. He will not require outpatient neurological testing.    Subjective:   Patient and significant other report patient to be doing well overall and note that his LUE weakness and pain has improved. No new  neurologic deficits.    Past Medical History:   Diagnosis Date    Diabetes mellitus (HCC)     Myocardial infarction (HCC)      Past Surgical History:   Procedure Laterality Date    CARDIAC CATHETERIZATION N/A 05/03/2023    Procedure: Cardiac Coronary Angiogram;  Surgeon: Valeriy Shore MD;  [facility];  Service: Cardiology    CARDIAC CATHETERIZATION Left 05/03/2023    Procedure: Cardiac Left Heart Cath;  Surgeon: Valeriy Shore MD;  [facility];  Service: Cardiology    CARDIAC DEFIBRILLATOR PLACEMENT  05/2023    CARDIAC ELECTROPHYSIOLOGY PROCEDURE N/A 05/12/2023    Procedure: Cardiac icd implant;  Surgeon: Urbano Maria MD;  [facility];  Service: Cardiology    IR LOWER EXTREMITY ANGIOGRAM  1/18/2024     History reviewed. No pertinent family history.  Social History     Socioeconomic History    Marital status: /Civil Union     Spouse name: None    Number of children: None    Years of education: None    Highest education level: None   Occupational History    None   Tobacco Use    Smoking status: Every Day     Types: Cigarettes    Smokeless tobacco: Never    Tobacco comments:     X2 cigarettes/day   Vaping Use    Vaping status: Never Used   Substance and Sexual Activity    Alcohol use: Not Currently    Drug use: Never    Sexual activity: Not Currently     Partners: Female   Other Topics Concern    None   Social History Narrative    None     Social Determinants of Health     Financial Resource Strain: Not on file   Food Insecurity: No Food Insecurity (1/31/2024)    Hunger Vital Sign     Worried About Running Out of Food in the Last Year: Never true     Ran Out of Food in the Last Year: Never true   Transportation Needs: No Transportation Needs (1/31/2024)    PRAPARE - Transportation     Lack of Transportation (Medical): No     Lack of Transportation (Non-Medical): No   Physical Activity: Not on file   Stress: Not on file   Social Connections: Not on  file   Intimate Partner Violence: Not on file   Housing Stability: Low Risk  (1/31/2024)    Housing Stability Vital Sign     Unable to Pay for Housing in the Last Year: No     Number of Places Lived in the Last Year: 1     Unstable Housing in the Last Year: No       Medications: Reviewed in detail by me.    ROS: Review of Systems A 12 point ROS was completed and aside from LUE discomfort and weakness which is reported to have improved, all remaining systems were negative.     Vitals: /54   Pulse 93   Temp 98.3 °F (36.8 °C)   Resp 18   Ht 6' (1.829 m)   Wt 105 kg (232 lb)   SpO2 94%   BMI 31.46 kg/m²     Physical Exam:   Physical Exam  Constitutional:       General: He is not in acute distress.     Appearance: Normal appearance. He is well-developed. He is ill-appearing (chronically). He is not toxic-appearing or diaphoretic.   HENT:      Head: Normocephalic and atraumatic.   Eyes:      General: No scleral icterus.        Right eye: No discharge.         Left eye: No discharge.      Extraocular Movements: Extraocular movements intact and EOM normal.      Conjunctiva/sclera: Conjunctivae normal.   Cardiovascular:      Rate and Rhythm: Normal rate and regular rhythm.   Pulmonary:      Effort: Pulmonary effort is normal. No respiratory distress.      Breath sounds: No stridor.   Musculoskeletal:         General: No tenderness or deformity. Normal range of motion.      Cervical back: Normal range of motion and neck supple.   Skin:     General: Skin is warm and dry.      Findings: No erythema or rash.   Neurological:      Mental Status: He is alert and oriented to person, place, and time.      Coordination: Finger-Nose-Finger Test normal.   Psychiatric:         Speech: Speech normal.       Neurologic Exam     Mental Status   Oriented to person, place, and time.   Follows 2 step commands.   Attention: normal. Concentration: normal.   Speech: speech is normal (No dysarthria or aphasia)  Level of consciousness:  alert  Knowledge: good.   Normal comprehension.     Cranial Nerves     CN II   Visual acuity: normal (grossly)  Right visual field deficit: none  Left visual field deficit: none     CN III, IV, VI   Extraocular motions are normal.   Conjugate gaze: present  Trace L facial asymmetry     Motor Exam   Muscle bulk: normal  Overall muscle tone: normal    Strength   Strength 5/5 except as noted.   L deltoid 5- with associated discomfort     Sensory Exam   Light touch normal.     Gait, Coordination, and Reflexes     Coordination   Finger to nose coordination: normal      Labs: Reviewed in detail by me.     Imaging: I have personally reviewed pertinent imaging and PACS reports.     VTE Prophylaxis: Enoxaparin (Lovenox)    Total time spent today 20 minutes. Greater than 50% of total time was spent with the patient and / or family counseling and / or coordination of care. A description of the counseling / coordination of care: reviewing imaging, examining patient, discussing etiology of symptoms and recommendations with patient and significant other and updating primary team.

## 2024-02-02 NOTE — OCCUPATIONAL THERAPY NOTE
Occupational Therapy Evaluation     Patient Name: Nadir Myers  Today's Date: 2/2/2024  Problem List  Principal Problem:    Septic shock (HCC)  Active Problems:    LUE weakness    Past Medical History  Past Medical History:   Diagnosis Date    Diabetes mellitus (HCC)     Myocardial infarction (HCC)      Past Surgical History  Past Surgical History:   Procedure Laterality Date    CARDIAC CATHETERIZATION N/A 05/03/2023    Procedure: Cardiac Coronary Angiogram;  Surgeon: Valeriy Shore MD;  [facility];  Service: Cardiology    CARDIAC CATHETERIZATION Left 05/03/2023    Procedure: Cardiac Left Heart Cath;  Surgeon: Valeriy Shore MD;  [facility];  Service: Cardiology    CARDIAC DEFIBRILLATOR PLACEMENT  05/2023    CARDIAC ELECTROPHYSIOLOGY PROCEDURE N/A 05/12/2023    Procedure: Cardiac icd implant;  Surgeon: Urbano Maria MD;  [facility];  Service: Cardiology    IR LOWER EXTREMITY ANGIOGRAM  1/18/2024 02/02/24 0849   OT Last Visit   OT Visit Date 02/02/24   Note Type   Note type Evaluation   Pain Assessment   Pain Assessment Tool 0-10   Pain Score No Pain   Restrictions/Precautions   Weight Bearing Precautions Per Order Yes   LLE Weight Bearing Per Order (S)  NWB   Other Precautions WBS;Multiple lines;Fall Risk;Pain   Home Living   Type of Home Mobile home   Home Layout One level;Performs ADLs on one level;Able to live on main level with bedroom/bathroom   Bathroom Shower/Tub Walk-in shower   Bathroom Toilet Standard   Bathroom Equipment Built-in shower seat   Home Equipment Cane  (used PRN PTA)   Prior Function   Level of [place] Independent with ADLs;Independent with functional mobility;Independent with IADLS   Lives With Spouse;Son   Receives Help From Family   IADLs Independent with driving;Independent with meal prep;Independent with medication management   Falls in the last 6 months 0   Lifestyle   Autonomy Pt reports I w/ ADLs, IADLs,  and fxnl mobility w/ SPC PRN at baseline; + driving.   Reciprocal Relationships Pt lives w/ her spouse and son.   Intrinsic Gratification Pt enjoys walking.   General   Family/Caregiver Present No   ADL   Where Assessed Edge of bed   Eating Assistance 7  Independent   Grooming Assistance 7  Independent   UB Bathing Assistance 5  Supervision/Setup   LB Bathing Assistance 3  Moderate Assistance   UB Dressing Assistance 5  Supervision/Setup   LB Dressing Assistance 3  Moderate Assistance   Toileting Assistance  4  Minimal Assistance   Bed Mobility   Supine to Sit Unable to assess   Sit to Supine Unable to assess   Additional Comments Pt seated EOB upon therapist's arrival and OOB in chair at end of OT evaluation w/ all needs within reach.   Transfers   Sit to Stand 3  Moderate assistance   Additional items Assist x 2;Increased time required;Verbal cues   Stand to Sit 4  Minimal assistance   Additional items Assist x 2;Increased time required;Verbal cues   Stand pivot 3  Moderate assistance   Additional items Assist x 1;Increased time required;Verbal cues   Additional Comments w/ RW for support   Functional Mobility   Additional Comments NT; will continue to assess as appropriate   Balance   Static Sitting Fair +   Dynamic Sitting Fair   Static Standing Poor +   Dynamic Standing Poor   Ambulatory Poor   Activity Tolerance   Activity Tolerance Patient limited by fatigue   Medical Staff Made Aware PTMaritza, due to pt's medical complexity and multiple comorbidities   Nurse Made Aware RN clearance prior to session   RUE Assessment   RUE Assessment WFL   LUE Assessment   LUE Assessment X  (~90 degrees of shoulder flexion)   Hand Function   Gross Motor Coordination Functional   Fine Motor Coordination Functional   Cognition   Overall Cognitive Status WFL   Arousal/Participation Alert;Responsive;Cooperative   Attention Within functional limits   Orientation Level Oriented X4   Memory Within functional limits   Following  Commands Follows all commands and directions without difficulty   Comments Pt was pleasant, cooperative, and willing to participate in OT evaluation.   Assessment   Limitation Decreased ADL status;Decreased endurance;Decreased self-care trans;Decreased high-level ADLs   Assessment Pt is a 63 y.o. male seen for OT evaluation s/p admission to [facility] on 1/31/2024. Pt diagnosed with Septic shock (HCC); s/p L fourth toe amputation on 1/31. Per podiatry, pt's L LE is NWB. Pt has a significant PMH impacting occupational performance including: Diabetes mellitus (HCC) and Myocardial infarction (Former[facility]). Pt with active OT evaluation and treatment orders and activity orders. PTA, pt living with his spouse and son in a 1 SH w/ 3 CONNIE. Pt reports I w/ ADLs, IADLs, and fxnl mobility w/ SPC PRN at baseline; + driving. Pt agreeable and willing to participate in OT evaluation. During evaluation, pt was I for eating and grooming, S for UB ADLs, mod A for LB ADLs, and min A for toileting. Pt also required min-mod Ax2 for STS transfer and mod Ax1 for SPT from EOB to chair w/ RW for support. Performance deficits that affect the pt’s occupational performance during the initial evaluation include decreased ADL status, decreased activity tolerance, decreased endurance, decreased standing tolerance, decreased standing balance, decreased transfer skills, and decreased fxnl mobility. Based on pt’s functional performance and deficits the following occupations will be addressed in OT treatments in order to maximize pt’s independence and overall occupational performance: bathing/showering, toileting and toilet hygiene, dressing, and functional mobility. Goals are listed below.  From OT perspective, recommend d/c to Post Acute Rehabilitation when pt medically stable to d/c from acute care. Will continue to follow.   Goals   Patient Goals to get OOB into chair   LTG Time Frame 10-14   Plan   Treatment Interventions ADL  retraining;Functional transfer training;Endurance training;Patient/family training;Equipment evaluation/education;Compensatory technique education;Continued evaluation;Energy conservation;Activityengagement   Goal Expiration Date 02/16/24   OT Treatment Day 0   OT Frequency 2-3x/wk   Discharge Recommendation   Rehab Resource Intensity Level, OT I (Maximum Resource Intensity)   AM-PAC Daily Activity Inpatient   Lower Body Dressing 2   Bathing 2   Toileting 3   Upper Body Dressing 3   Grooming 4   Eating 4   Daily Activity Raw Score 18   Daily Activity Standardized Score (Calc for Raw Score >=11) 38.66   AM-PAC Applied Cognition Inpatient   Following a Speech/Presentation 4   Understanding Ordinary Conversation 4   Taking Medications 4   Remembering Where Things Are Placed or Put Away 4   Remembering List of 4-5 Errands 4   Taking Care of Complicated Tasks 4   Applied Cognition Raw Score 24   Applied Cognition Standardized Score 62.21       The patient's raw score on the AM-PAC Daily Activity Inpatient Short Form is 18. A raw score of less than 19 suggests the patient may benefit from discharge to post-acute rehabilitation services. Please refer to the recommendation of the Occupational Therapist for safe discharge planning.    Goals:     - Pt will complete UB ADLs w/ mod I to maximize independence and return home.    - Pt will complete LB ADLs w/ S to maximize independence and return home.     - Pt will complete toileting routine (transfers, hygiene, and clothing management) w/ S to maximize independence and return to prior level of function.    - Pt will complete bed mobility supine >< sit w/ mod I to maximize independence and return home.    - Pt will transfer to bed, chair, and toilet w/ S using AD / DME as needed to maximize independence and reduce burden of care.     - Pt will ambulate household distances w/ S using least restrictive device to maximize independence and return home.     - Pt will increase  activity tolerance and sitting tolerance by eating all meals OOB in the chair.     - Pt will increase standing tolerance to 5-7 minutes to maximize independence w/ grooming tasks standing at the sink.     - Pt will tolerate therapeutic activities for greater than 30 minutes in order to increase tolerance for functional activities.     Felisa Lipscomb MS, OTR/L

## 2024-02-02 NOTE — PROGRESS NOTES
"[facility]  Progress Note  Name: Nadir Myers I  MRN: [id number]  Unit/Bed#: [facility] 902-01 I Date of Admission: 1/31/2024   Date of Service: 2/2/2024 I Hospital Day: 2      Assessment & Plan:    * Septic shock (HCC)  Assessment & Plan  Initially admitted to the ICU requiring vasopressor support, now off, with maintenance of hemodynamic stability  Due to MSSA bacteremia from a gangrenous left fourth toe (see below)  Continue to monitor vitals and maintain hemodynamics    LUE weakness  Assessment & Plan  Presented w/ transient left-sided weakness and a facial droop now resolved  Appreciate neurology input deeming symptoms likely secondary to septic shock/infection and hyperglycemia, as neurologic workup including CT/MRI imaging negative for evidence of stroke or intracranial vessel occlusion, but noted chronic microangiopathic changes -> radiology report did however mention: \"Moderate bilateral supraclinoid ICA and moderate to severe left cavernous ICA segment stenosis.\"  Continue dual endplate therapy with ASA/Plavix - c/w statin  PT/OT as tolerated    MSSA bacteremia  Assessment & Plan  As evidenced on blood cultures from 1/31  Likely source due to left fourth toe gangrenous wound -> see plan below  Continue IV Ancef  Echocardiogram negative for obvious evidence of endocarditis    Gangrene of toe of left foot (HCC)  Assessment & Plan  Appreciate podiatry input -> status post left fourth toe amputation on 1/31  Planning closure (timing to be determined)  Wound care per podiatry  PRN pain and blood sugar control    Chronic systolic CHF (HCC)  Assessment & Plan  Last EF of 42%  Holding diuresis (Aldactone) in the setting of relative hypotension and recovering LINDEN  Continue ASA/Plavix and statin for underlying CAD (associated ischemic cardiomyopathy)  Monitor/replete serum potassium/magnesium as necessary  Low sodium and fluid restriction enforced    PAD (peripheral artery " disease) ([facility])  Assessment & Plan  Continue dual antiplatelet therapy with ASA/Plavix - continue statin  Sequela complicated by gangrenous left toe (see above)    Type 2 diabetes mellitus, with long-term current use of insulin ([facility])  Assessment & Plan  Lab Results   Component Value Date    HGBA1C 9.8 (H) 01/31/2024     Continue basal prandial insulin with additional SSI coverage per Accu-Cheks  Hypoglycemia protocol  Carbohydrate restriction    LINDEN (acute kidney injury) ([facility])  Assessment & Plan  Creatinine of approximately 0.6-0.8 -> currently stable at 0.64 from a prior peak of 1.41  Monitor renal function and urine output  Limit/avoid nephrotoxins and hypotension as possible  Aldactone remains held due to relative hypotension    Hyponatremia  Assessment & Plan  Serum sodium 131 -> continue to monitor  Maintain fluid restriction    Hypophosphatemia  Assessment & Plan  Monitor/replete serum phosphate      DVT Prophylaxis:  Lovenox    AM-PAC Basic Mobility:  Basic Mobility Inpatient Raw Score: 18  -[facility] Achieved: 4: Move to chair/commode  -[facility] Goal: 6: Walk 10 steps or more    Patient Centered Rounds:  I have performed bedside rounds and discussed plan of care with nursing today.  Discussions with Specialists or Other Care Team Provider:  see above assessments if applicable    Education and Discussions with Family / Patient:  Patient, along with multiple for members, at bedside today    Time Spent for Care:  35 minutes. More than 50% of total time spent on counseling and coordination of care, on one or more of the following: performing physical exam; counseling and coordination of care, obtaining or reviewing history, documenting in the medical record, reviewing/ordering tests/medications/procedures, and communicating with other healthcare professionals.    Current Length of Stay: 2 day(s)  Current Patient Status: Inpatient   Certification Statement:  Patient will continue to require additional hospital stay due  to assessments as noted above.    Code Status: Level 1 - Full Code        Subjective:     Encountered earlier in the morning.  Sitting upright in bed without active complaints.  States pain in his foot is controlled at the moment.        Objective:     Vitals:   Temp (24hrs), Av.3 °F (36.8 °C), Min:97.9 °F (36.6 °C), Max:99 °F (37.2 °C)    Temp:  [97.9 °F (36.6 °C)-99 °F (37.2 °C)] 98.4 °F (36.9 °C)  HR:  [] 91  Resp:  [10-18] 10  BP: ()/(54-79) 114/79  SpO2:  [94 %-96 %] 94 %  Body mass index is 31.45 kg/m².     Input and Output Summary (last 24 hours):       Intake/Output Summary (Last 24 hours) at 2024 1712  Last data filed at 2024 0006  Gross per 24 hour   Intake --   Output 800 ml   Net -800 ml       Physical Exam:     GENERAL No immediate distress at rest   HEAD   Normocephalic - atraumatic   EYES   PERRL - EOMI    MOUTH   Mucosa moist   NECK   Supple - full range of motion   CARDIAC Intermittently tachycardic - S1/S2 positive   PULMONARY    Clear breath sounds bilaterally - nonlabored respirations   ABDOMEN   Soft - nontender/nondistended - active bowel sounds   MUSCULOSKELETAL   Motor strength/range of motion mildly deconditioned - left foot dressed/bandaged   NEUROLOGIC   Alert/oriented at baseline   PSYCHIATRIC   Mood/affect stable         Additional Data:     Labs & Recent Cultures:    Results from last 7 days   Lab Units 24  0502   WBC Thousand/uL 7.78   HEMOGLOBIN g/dL 12.2   HEMATOCRIT % 36.3*   PLATELETS Thousands/uL 154   NEUTROS PCT % 73   LYMPHS PCT % 13*   MONOS PCT % 12   EOS PCT % 1     Results from last 7 days   Lab Units 24  0502 24  0511   POTASSIUM mmol/L 4.2 3.4*   CHLORIDE mmol/L 102 100   CO2 mmol/L 23 23   BUN mg/dL 16 26*   CREATININE mg/dL 0.64 0.85   CALCIUM mg/dL 8.2* 8.3*   ALK PHOS U/L  --  48   ALT U/L  --  15   AST U/L  --  21     Results from last 7 days   Lab Units 24  0941   INR  1.13     Results from last 7 days   Lab Units  02/02/24  1557 02/02/24  1202 02/02/24  0659 02/01/24  2108 02/01/24  1617 02/01/24  1227 02/01/24  1004 02/01/24  0643 02/01/24  0424 02/01/24  0241 02/01/24  0012 01/31/24  2214   POC GLUCOSE mg/dl 229* 241* 225* 286* 220* 141* 334* 142* 149* 158* 147* 234*     Results from last 7 days   Lab Units 01/31/24  1516   HEMOGLOBIN A1C % 9.8*     Results from last 7 days   Lab Units 01/31/24  1225 01/31/24  1112 01/31/24  0951   LACTIC ACID mmol/L 1.9  --  3.0*   PROCALCITONIN ng/ml  --  0.73*  --          Results from last 7 days   Lab Units 01/31/24  1748 01/31/24  1225 01/31/24  1135 01/31/24  1041   BLOOD CULTURE   --   --  Staphylococcus aureus* Staphylococcus aureus*   GRAM STAIN RESULT  No polys seen*  2+ Gram positive cocci in pairs* No polys seen*  1+ Gram positive cocci in pairs* Gram positive cocci in clusters* Gram positive cocci in clusters*   WOUND CULTURE  3+ Growth of Staphylococcus aureus* 2+ Growth of Staphylococcus aureus*  --   --          Lines/Drains:  Invasive Devices       Peripheral Intravenous Line  Duration             Peripheral IV 01/31/24 Left;Ventral (anterior) Forearm 2 days    Peripheral IV 01/31/24 Left;Upper;Ventral (anterior) Arm 1 day                      Last 24 Hours Medication List:   Current Facility-Administered Medications   Medication Dose Route Frequency Provider Last Rate    amiodarone  200 mg Oral Daily With Breakfast Markus Griffith MD      aspirin  81 mg Oral Daily Markus Griffith MD      atorvastatin  80 mg Oral After Dinner Markus Griffith MD      cefazolin  2,000 mg Intravenous Q6H Markus Griffith MD 2,000 mg (02/02/24 1226)    chlorhexidine  15 mL Mouth/Throat Q12H [facility] Markus Griffith MD      clopidogrel  75 mg Oral Daily Markus Griffith MD      enoxaparin  40 mg Subcutaneous Q24H [facility] Markus Griffith MD      insulin glargine  45 Units Subcutaneous  Hilda Ashley MD      insulin lispro  1-5 Units Subcutaneous  4x Daily (AC & HS) Markus Griffith MD      insulin lispro  3 Units Subcutaneous TID With Meals Hilda Ashley MD      ondansetron  4 mg Intravenous Once Markus Griffith MD      polyethylene glycol  17 g Oral Daily PRN Markus Griffith MD      senna-docusate sodium  2 tablet Oral BID PRN Markus Griffith MD      sodium phosphate  30 mmol Intravenous Once Hilda Ashley MD 30 mmol (02/02/24 1315)              HILDA ASHLEY MD   Hospitalist - [facility] Internal Medicine        ** Please Note: This note is constructed using a voice recognition dictation system.  An occasional wrong word/phrase or “sound-a-like” substitution may have been picked up by dictation device due to the inherent limitations of voice recognition software.  Read the chart carefully and recognize, using reasonable context, where substitutions may have occurred.**

## 2024-02-02 NOTE — ARC ADMISSION
ARC  met with patient at bedside. Reviewed ARC program, acute rehab criteria and approval process, insurance authorization process, as well as ARC locations and preferences. Patient's preferred ARC location is [place] and second choice is [place].  ARC Rehab folder left with patient and all questions answered.  Patient was made aware that ARC Reviewer will keep their  updated regarding referral status.

## 2024-02-02 NOTE — ASSESSMENT & PLAN NOTE
As evidenced on blood cultures from 1/31  Likely source due to left fourth toe gangrenous wound -> see plan below  Continue IV Ancef  Echocardiogram negative for obvious evidence of endocarditis

## 2024-02-02 NOTE — PROGRESS NOTES
[facility] - Progress Note  Patient: Nadir Myers 63 y.o. male   MRN: [id number]  PCP: Tigre Hare DO  Unit/Bed#: [facility] 902-01 Encounter: [id number]  Date Of Visit: 02/02/24    ASSESSMENT:    Nadir Myers is a 63 y.o. male with:    Septic shock - POA  Left fourth toe gangrene  - s/p left 4th digit amputation (DOS: 1/31/24)  Left foot cellulitis  Bacteremia   Type 2 diabetes mellitus  Three-vessel coronary artery disease, heart failure with reduced ejection fraction.  Tobacco dependence  Peripheral arterial disease      PLAN:    POD 2 s/p left fourth toe amputation.  Left fourth toe amputation surgical site with no acute clinical signs of infection and no purulent drainage.  Discussed surgical options with patient and family at bedside today.  Recommend a washout to remove any remaining nonviable tissue and promote pinpoint bleeding with delayed primary closure at minimum.  Discussed with patient possible need for partial fourth ray amputation or transmetatarsal amputation due to peripheral arterial disease.  Reviewed patient's recent angiogram with intact PT artery and plantar arch.  Plan to continue local wound care at this time until patient is medically optimized for formal washout and delayed primary closure in the OR with podiatry.  No concrete or plans at this time pending medical clearance, OR and surgeon availability  Reviewed labs and vitals today, resolution of leukocytosis  Continue IV antibiotics per primary team.  Blood cultures positive for Staphylococcus aureus on admission  Final wound culture results: 3+ growth of Staphylococcus aureus and 2+ positive cocci in pairs  Continue local wound care consisting of packing and DSD to the left fourth toe amputation site.  Podiatry to do all dressing changes at this time.  Elevation on green foam wedges or pillows when non-ambulatory.  Rest of care per primary team.    Weight bearing status: Nonweightbearing to left lower  extremity      SUBJECTIVE:     The patient was seen, evaluated, and assessed at bedside today. The patient was awake, alert, and in no acute distress. No acute events overnight. The patient reports no pain or discomfort to his left foot. Patient denies N/V/F/chills/SOB/CP.      OBJECTIVE:     Vitals:   BP 99/68   Pulse 91   Temp 98.1 °F (36.7 °C)   Resp (!) 10   Ht 6' (1.829 m)   Wt 105 kg (231 lb 14.8 oz)   SpO2 94%   BMI 31.45 kg/m²     Temp (24hrs), Av.6 °F (37 °C), Min:97.9 °F (36.6 °C), Max:99.2 °F (37.3 °C)      Physical Exam:     General:  Alert, cooperative, and in no distress.  Lower extremity exam:  Cardiovascular status at baseline.  Neurological status at baseline.  Musculoskeletal status at baseline. No calf tenderness noted.     Lower extremity wound(s) as noted below:  Wound #: 1  Location: Left open fourth toe amputation site  Length 3.0 cm: Width 1.5 cm: Depth 1.2 cm:   Deepest Tissue Noted in Base: Surgical bone  Probe to Bone: No  Peripheral Skin Description: Attached  Granulation: 80% Fibrotic Tissue: 0% Necrotic Tissue: 20%   Drainage Amount: minimal, serosanguinous  Signs of Infection: No    Clinical Images 24:      Additional Data:     Labs:    Results from last 7 days   Lab Units 24  0502   WBC Thousand/uL 7.78   HEMOGLOBIN g/dL 12.2   HEMATOCRIT % 36.3*   PLATELETS Thousands/uL 154   NEUTROS PCT % 73   LYMPHS PCT % 13*   MONOS PCT % 12   EOS PCT % 1     Results from last 7 days   Lab Units 24  0502 24  0511   POTASSIUM mmol/L 4.2 3.4*   CHLORIDE mmol/L 102 100   CO2 mmol/L 23 23   BUN mg/dL 16 26*   CREATININE mg/dL 0.64 0.85   CALCIUM mg/dL 8.2* 8.3*   ALK PHOS U/L  --  48   ALT U/L  --  15   AST U/L  --  21     Results from last 7 days   Lab Units 24  0941   INR  1.13       * I Have Reviewed All Lab Data Listed Above.    Recent Cultures (last 7 days):     Results from last 7 days   Lab Units 24  1748 24  1225 24  1135  "01/31/24  1041   BLOOD CULTURE   --   --  Staphylococcus aureus* Staphylococcus aureus*   GRAM STAIN RESULT  No polys seen*  2+ Gram positive cocci in pairs* No polys seen*  1+ Gram positive cocci in pairs* Gram positive cocci in clusters* Gram positive cocci in clusters*   WOUND CULTURE  3+ Growth of Staphylococcus aureus* 2+ Growth of Staphylococcus aureus*  --   --      Results from last 7 days   Lab Units 01/31/24  1748 01/31/24  1225   ANAEROBIC CULTURE  Culture results to follow. Culture results to follow.       Imaging: I have personally reviewed pertinent films in PACS  EKG, Pathology, and Other Studies: I have personally reviewed pertinent reports.      ** Please Note: Portions of the record may have been created with voice recognition software. Occasional wrong word or \"sound a like\" substitutions may have occurred due to the inherent limitations of voice recognition software. Read the chart carefully and recognize, using context, where substitutions have occurred. **      "

## 2024-02-03 LAB
ANION GAP SERPL CALCULATED.3IONS-SCNC: 9 MMOL/L
BACTERIA BLD CULT: ABNORMAL
BACTERIA BLD CULT: ABNORMAL
BACTERIA SPEC ANAEROBE CULT: ABNORMAL
BUN SERPL-MCNC: 11 MG/DL (ref 5–25)
CALCIUM SERPL-MCNC: 8.7 MG/DL (ref 8.4–10.2)
CHLORIDE SERPL-SCNC: 97 MMOL/L (ref 96–108)
CO2 SERPL-SCNC: 27 MMOL/L (ref 21–32)
CREAT SERPL-MCNC: 0.7 MG/DL (ref 0.6–1.3)
GFR SERPL CREATININE-BSD FRML MDRD: 101 ML/MIN/1.73SQ M
GLUCOSE SERPL-MCNC: 195 MG/DL (ref 65–140)
GLUCOSE SERPL-MCNC: 206 MG/DL (ref 65–140)
GLUCOSE SERPL-MCNC: 212 MG/DL (ref 65–140)
GLUCOSE SERPL-MCNC: 248 MG/DL (ref 65–140)
GLUCOSE SERPL-MCNC: 279 MG/DL (ref 65–140)
GRAM STN SPEC: ABNORMAL
GRAM STN SPEC: ABNORMAL
MAGNESIUM SERPL-MCNC: 2.1 MG/DL (ref 1.9–2.7)
PHOSPHATE SERPL-MCNC: 2.8 MG/DL (ref 2.3–4.1)
POTASSIUM SERPL-SCNC: 4.4 MMOL/L (ref 3.5–5.3)
S AUREUS DNA BLD POS QL NAA+NON-PROBE: DETECTED
SODIUM SERPL-SCNC: 133 MMOL/L (ref 135–147)

## 2024-02-03 PROCEDURE — 99232 SBSQ HOSP IP/OBS MODERATE 35: CPT | Performed by: INTERNAL MEDICINE

## 2024-02-03 PROCEDURE — 87077 CULTURE AEROBIC IDENTIFY: CPT

## 2024-02-03 PROCEDURE — 80048 BASIC METABOLIC PNL TOTAL CA: CPT | Performed by: INTERNAL MEDICINE

## 2024-02-03 PROCEDURE — 83735 ASSAY OF MAGNESIUM: CPT | Performed by: INTERNAL MEDICINE

## 2024-02-03 PROCEDURE — 87147 CULTURE TYPE IMMUNOLOGIC: CPT

## 2024-02-03 PROCEDURE — 87186 SC STD MICRODIL/AGAR DIL: CPT

## 2024-02-03 PROCEDURE — NC001 PR NO CHARGE: Performed by: PODIATRIST

## 2024-02-03 PROCEDURE — 84100 ASSAY OF PHOSPHORUS: CPT | Performed by: INTERNAL MEDICINE

## 2024-02-03 PROCEDURE — 87040 BLOOD CULTURE FOR BACTERIA: CPT

## 2024-02-03 PROCEDURE — 82948 REAGENT STRIP/BLOOD GLUCOSE: CPT

## 2024-02-03 RX ORDER — INSULIN LISPRO 100 [IU]/ML
5 INJECTION, SOLUTION INTRAVENOUS; SUBCUTANEOUS
Status: DISCONTINUED | OUTPATIENT
Start: 2024-02-04 | End: 2024-02-04

## 2024-02-03 RX ADMIN — INSULIN GLARGINE 45 UNITS: 100 INJECTION, SOLUTION SUBCUTANEOUS at 21:57

## 2024-02-03 RX ADMIN — CEFAZOLIN SODIUM 2000 MG: 2 SOLUTION INTRAVENOUS at 12:05

## 2024-02-03 RX ADMIN — CHLORHEXIDINE GLUCONATE 15 ML: 1.2 SOLUTION ORAL at 08:35

## 2024-02-03 RX ADMIN — INSULIN LISPRO 3 UNITS: 100 INJECTION, SOLUTION INTRAVENOUS; SUBCUTANEOUS at 12:04

## 2024-02-03 RX ADMIN — ACETAMINOPHEN 975 MG: 325 TABLET, FILM COATED ORAL at 00:12

## 2024-02-03 RX ADMIN — CEFAZOLIN SODIUM 2000 MG: 2 SOLUTION INTRAVENOUS at 06:21

## 2024-02-03 RX ADMIN — INSULIN LISPRO 1 UNITS: 100 INJECTION, SOLUTION INTRAVENOUS; SUBCUTANEOUS at 21:57

## 2024-02-03 RX ADMIN — INSULIN LISPRO 2 UNITS: 100 INJECTION, SOLUTION INTRAVENOUS; SUBCUTANEOUS at 08:34

## 2024-02-03 RX ADMIN — SENNOSIDES, DOCUSATE SODIUM 2 TABLET: 8.6; 5 TABLET ORAL at 21:59

## 2024-02-03 RX ADMIN — AMIODARONE HYDROCHLORIDE 200 MG: 200 TABLET ORAL at 08:35

## 2024-02-03 RX ADMIN — CHLORHEXIDINE GLUCONATE 15 ML: 1.2 SOLUTION ORAL at 21:58

## 2024-02-03 RX ADMIN — INSULIN LISPRO 3 UNITS: 100 INJECTION, SOLUTION INTRAVENOUS; SUBCUTANEOUS at 17:24

## 2024-02-03 RX ADMIN — INSULIN LISPRO 3 UNITS: 100 INJECTION, SOLUTION INTRAVENOUS; SUBCUTANEOUS at 17:25

## 2024-02-03 RX ADMIN — ATORVASTATIN CALCIUM 80 MG: 80 TABLET, FILM COATED ORAL at 17:24

## 2024-02-03 RX ADMIN — ACETAMINOPHEN 975 MG: 325 TABLET, FILM COATED ORAL at 10:29

## 2024-02-03 RX ADMIN — CEFAZOLIN SODIUM 2000 MG: 2 SOLUTION INTRAVENOUS at 00:12

## 2024-02-03 RX ADMIN — CEFAZOLIN SODIUM 2000 MG: 2 SOLUTION INTRAVENOUS at 17:24

## 2024-02-03 RX ADMIN — INSULIN LISPRO 2 UNITS: 100 INJECTION, SOLUTION INTRAVENOUS; SUBCUTANEOUS at 12:03

## 2024-02-03 NOTE — PROGRESS NOTES
"[facility]  Progress Note  Name: Nadir Myers I  MRN: [id number]  Unit/Bed#: [facility]PHP 902-01 I Date of Admission: 1/31/2024   Date of Service: 2/3/2024 I Hospital Day: 3      Assessment & Plan:    * Septic shock (HCC)  Assessment & Plan  Initially admitted to the ICU requiring vasopressor support, now off, with maintenance of hemodynamic stability  Due to MSSA bacteremia from a gangrenous left fourth toe (see below)  Continue to monitor vitals and maintain hemodynamics    LUE weakness  Assessment & Plan  Presented w/ transient left-sided weakness and a facial droop now resolved  Appreciate neurology input deeming symptoms likely secondary to septic shock/infection and hyperglycemia, as neurologic workup including CT/MRI imaging negative for evidence of stroke or intracranial vessel occlusion, but noted chronic microangiopathic changes -> radiology report did however mention: \"Moderate bilateral supraclinoid ICA and moderate to severe left cavernous ICA segment stenosis.\"  Continue dual endplate therapy with ASA/Plavix - c/w statin  PT/OT as tolerated    MSSA bacteremia  Assessment & Plan  As evidenced on blood cultures from 1/31  Likely source due to left fourth toe gangrenous wound -> see plan below  Continue IV Ancef  Echocardiogram negative for obvious evidence of endocarditis    Gangrene of toe of left foot (HCC)  Assessment & Plan  Appreciate podiatry input -> status post left fourth toe amputation on 1/31  Tentative plan for closure now postponed until tomorrow  Wound care per podiatry  PRN pain and blood sugar control    Chronic systolic CHF (HCC)  Assessment & Plan  Last EF of 42%  Holding diuresis (Aldactone) in the setting of relative hypotension and recovering LINDEN  Continue ASA/Plavix and statin for underlying CAD (associated ischemic cardiomyopathy)  Monitor/replete serum potassium/magnesium as necessary  Low sodium and fluid restriction enforced    PAD (peripheral " artery disease) ([facility])  Assessment & Plan  Continue dual antiplatelet therapy with ASA/Plavix - continue statin  Sequela complicated by gangrenous left toe (see above)    Type 2 diabetes mellitus, with long-term current use of insulin ([facility])  Assessment & Plan  Lab Results   Component Value Date    HGBA1C 9.8 (H) 01/31/2024     Continue basal prandial insulin with additional SSI coverage per Accu-Cheks  Hypoglycemia protocol  Carbohydrate restriction    LINDEN (acute kidney injury) ([facility])  Assessment & Plan  Creatinine of approximately 0.6-0.8 -> currently stable at 0.7 from a prior peak of 1.41  Monitor renal function and urine output  Limit/avoid nephrotoxins and hypotension as possible  Aldactone remains held due to relative hypotension    Hyponatremia  Assessment & Plan  Serum sodium at 133 -> continue to monitor  Maintain fluid restriction    Hypophosphatemia  Assessment & Plan  Normalized status post repletion      DVT Prophylaxis:  Lovenox    AM-PAC Basic Mobility:  Basic Mobility Inpatient Raw Score: 20  J[facility]-HLM Achieved: 6: Walk 10 steps or more  [facility]-HLM Goal: 6: Walk 10 steps or more    Patient Centered Rounds:  I have performed bedside rounds and discussed plan of care with nursing today.  Discussions with Specialists or Other Care Team Provider:  see above assessments if applicable    Education and Discussions with Family / Patient:  Patient at bedside, who will self update family today    Time Spent for Care:  35 minutes. More than 50% of total time spent on counseling and coordination of care, on one or more of the following: performing physical exam; counseling and coordination of care, obtaining or reviewing history, documenting in the medical record, reviewing/ordering tests/medications/procedures, and communicating with other healthcare professionals.    Current Length of Stay: 3 day(s)  Current Patient Status: Inpatient   Certification Statement:  Patient will continue to require additional hospital stay  due to assessments as noted above.    Code Status: Level 1 - Full Code        Subjective:     Seen and examined earlier in the morning.  Patient notes some frustration over his wound site closure now being delayed until tomorrow.        Objective:     Vitals:   Temp (24hrs), Av.9 °F (37.2 °C), Min:98 °F (36.7 °C), Max:101.4 °F (38.6 °C)    Temp:  [98 °F (36.7 °C)-101.4 °F (38.6 °C)] 98.1 °F (36.7 °C)  HR:  [] 104  Resp:  [12-18] 16  BP: (100-150)/(66-93) 145/93  SpO2:  [93 %-96 %] 93 %  Body mass index is 31.45 kg/m².     Input and Output Summary (last 24 hours):       Intake/Output Summary (Last 24 hours) at 2/3/2024 1718  Last data filed at 2/3/2024 1401  Gross per 24 hour   Intake 900 ml   Output 1350 ml   Net -450 ml       Physical Exam:     GENERAL No acute distress at rest   HEAD   Normocephalic - atraumatic   EYES   PERRL - EOMI    MOUTH   Mucosa moist   NECK   Supple - full range of motion   CARDIAC Occasionally tachycardic - S1/S2 positive   PULMONARY Fairly clear to auscultation - nonlabored respirations at rest   ABDOMEN   Soft - nontender/nondistended - active bowel sounds   MUSCULOSKELETAL   Motor strength/range of motion mildly deconditioned - left foot dressed/bandaged   NEUROLOGIC   Alert/oriented at baseline   PSYCHIATRIC   Mood/affect stable         Additional Data:     Labs & Recent Cultures:    Results from last 7 days   Lab Units 24  0502   WBC Thousand/uL 7.78   HEMOGLOBIN g/dL 12.2   HEMATOCRIT % 36.3*   PLATELETS Thousands/uL 154   NEUTROS PCT % 73   LYMPHS PCT % 13*   MONOS PCT % 12   EOS PCT % 1     Results from last 7 days   Lab Units 24  0615 24  0502 24  0511   POTASSIUM mmol/L 4.4   < > 3.4*   CHLORIDE mmol/L 97   < > 100   CO2 mmol/L 27   < > 23   BUN mg/dL 11   < > 26*   CREATININE mg/dL 0.70   < > 0.85   CALCIUM mg/dL 8.7   < > 8.3*   ALK PHOS U/L  --   --  48   ALT U/L  --   --  15   AST U/L  --   --  21    < > = values in this interval not  displayed.     Results from last 7 days   Lab Units 01/31/24  0941   INR  1.13     Results from last 7 days   Lab Units 02/03/24  1114 02/03/24  0741 02/02/24  2142 02/02/24  1557 02/02/24  1202 02/02/24  0659 02/01/24  2108 02/01/24  1617 02/01/24  1227 02/01/24  1004 02/01/24  0643 02/01/24  0424   POC GLUCOSE mg/dl 248* 212* 255* 229* 241* 225* 286* 220* 141* 334* 142* 149*     Results from last 7 days   Lab Units 01/31/24  1516   HEMOGLOBIN A1C % 9.8*     Results from last 7 days   Lab Units 01/31/24  1225 01/31/24  1112 01/31/24  0951   LACTIC ACID mmol/L 1.9  --  3.0*   PROCALCITONIN ng/ml  --  0.73*  --          Results from last 7 days   Lab Units 02/03/24  0614 01/31/24  1748 01/31/24  1225 01/31/24  1135 01/31/24  1041   BLOOD CULTURE  Received in Microbiology Lab. Culture in Progress.  Received in Microbiology Lab. Culture in Progress.  --   --  Staphylococcus aureus* Staphylococcus aureus*   GRAM STAIN RESULT   --  No polys seen*  2+ Gram positive cocci in pairs* No polys seen*  1+ Gram positive cocci in pairs* Gram positive cocci in clusters* Gram positive cocci in clusters*   WOUND CULTURE   --  3+ Growth of Staphylococcus aureus* 2+ Growth of Staphylococcus aureus*  --   --          Lines/Drains:  Invasive Devices       Peripheral Intravenous Line  Duration             Peripheral IV 01/31/24 Left;Ventral (anterior) Forearm 3 days    Peripheral IV 01/31/24 Left;Upper;Ventral (anterior) Arm 2 days                      Last 24 Hours Medication List:   Current Facility-Administered Medications   Medication Dose Route Frequency Provider Last Rate    acetaminophen  975 mg Oral Q6H PRN Kandi Huff PA-C      amiodarone  200 mg Oral Daily With Breakfast Markus Griffith MD      aspirin  81 mg Oral Daily Markus Griffith MD      atorvastatin  80 mg Oral After Dinner Markus Griffith MD      cefazolin  2,000 mg Intravenous Q6H Markus Griffith MD 2,000 mg (02/03/24 1205)     chlorhexidine  15 mL Mouth/Throat Q12H [facility] Markus Griffith MD      clopidogrel  75 mg Oral Daily Markus Griffith MD      enoxaparin  40 mg Subcutaneous Q24H [facility] Markus Griffith MD      insulin glargine  45 Units Subcutaneous HS Hilda Ashley MD      insulin lispro  1-5 Units Subcutaneous 4x Daily (AC & HS) Markus Griffith MD      [START ON 2/4/2024] insulin lispro  5 Units Subcutaneous TID With Meals Hilda Ashley MD      ondansetron  4 mg Intravenous Once Markus Griffith MD      polyethylene glycol  17 g Oral Daily PRN Markus Griffith MD      senna-docusate sodium  2 tablet Oral BID PRN MD HILDA Muniz MD   Hospitalist - [facility] Internal Medicine        ** Please Note: This note is constructed using a voice recognition dictation system.  An occasional wrong word/phrase or “sound-a-like” substitution may have been picked up by dictation device due to the inherent limitations of voice recognition software.  Read the chart carefully and recognize, using reasonable context, where substitutions may have occurred.**

## 2024-02-03 NOTE — PLAN OF CARE
Problem: METABOLIC, FLUID AND ELECTROLYTES - ADULT  Goal: Electrolytes maintained within normal limits  Description: INTERVENTIONS:  - Monitor labs and assess patient for signs and symptoms of electrolyte imbalances  - Administer electrolyte replacement as ordered  - Monitor response to electrolyte replacements, including repeat lab results as appropriate  - Instruct patient on fluid and nutrition as appropriate  Outcome: Progressing  Goal: Fluid balance maintained  Description: INTERVENTIONS:  - Monitor labs   - Monitor I/O and WT  - Instruct patient on fluid and nutrition as appropriate  - Assess for signs & symptoms of volume excess or deficit  Outcome: Progressing  Goal: Glucose maintained within target range  Description: INTERVENTIONS:  - Monitor Blood Glucose as ordered  - Assess for signs and symptoms of hyperglycemia and hypoglycemia  - Administer ordered medications to maintain glucose within target range  - Assess nutritional intake and initiate nutrition service referral as needed  Outcome: Progressing     Problem: Prexisting or High Potential for Compromised Skin Integrity  Goal: Skin integrity is maintained or improved  Description: INTERVENTIONS:  - Identify patients at risk for skin breakdown  - Assess and monitor skin integrity  - Assess and monitor nutrition and hydration status  - Monitor labs   - Assess for incontinence   - Turn and reposition patient  - Assist with mobility/ambulation  - Relieve pressure over bony prominences  - Avoid friction and shearing  - Provide appropriate hygiene as needed including keeping skin clean and dry  - Evaluate need for skin moisturizer/barrier cream  - Collaborate with interdisciplinary team   - Patient/family teaching  - Consider wound care consult   Outcome: Progressing     Problem: PAIN - ADULT  Goal: Verbalizes/displays adequate comfort level or baseline comfort level  Description: Interventions:  - Encourage patient to monitor pain and request  assistance  - Assess pain using appropriate pain scale  - Administer analgesics based on type and severity of pain and evaluate response  - Implement non-pharmacological measures as appropriate and evaluate response  - Consider cultural and social influences on pain and pain management  - Notify physician/advanced practitioner if interventions unsuccessful or patient reports new pain  Outcome: Progressing     Problem: INFECTION - ADULT  Goal: Absence or prevention of progression during hospitalization  Description: INTERVENTIONS:  - Assess and monitor for signs and symptoms of infection  - Monitor lab/diagnostic results  - Monitor all insertion sites, i.e. indwelling lines, tubes, and drains  - Monitor endotracheal if appropriate and nasal secretions for changes in amount and color  - [place] appropriate cooling/warming therapies per order  - Administer medications as ordered  - Instruct and encourage patient and family to use good hand hygiene technique  - Identify and instruct in appropriate isolation precautions for identified infection/condition  Outcome: Progressing  Goal: Absence of fever/infection during neutropenic period  Description: INTERVENTIONS:  - Monitor WBC    Outcome: Progressing     Problem: SAFETY ADULT  Goal: Patient will remain free of falls  Description: INTERVENTIONS:  - Educate patient/family on patient safety including physical limitations  - Instruct patient to call for assistance with activity   - Consult OT/PT to assist with strengthening/mobility   - Keep Call bell within reach  - Keep bed low and locked with side rails adjusted as appropriate  - Keep care items and personal belongings within reach  - Initiate and maintain comfort rounds  - Make Fall Risk Sign visible to staff  - Apply yellow socks and bracelet for high fall risk patients  - Consider moving patient to room near nurses station  Outcome: Progressing  Goal: Maintain or return to baseline ADL function  Description:  INTERVENTIONS:  -  Assess patient's ability to carry out ADLs; assess patient's baseline for ADL function and identify physical deficits which impact ability to perform ADLs (bathing, care of mouth/teeth, toileting, grooming, dressing, etc.)  - Assess/evaluate cause of self-care deficits   - Assess range of motion  - Assess patient's mobility; develop plan if impaired  - Assess patient's need for assistive devices and provide as appropriate  - Encourage maximum independence but intervene and supervise when necessary  - Involve family in performance of ADLs  - Assess for home care needs following discharge   - Consider OT consult to assist with ADL evaluation and planning for discharge  - Provide patient education as appropriate  Outcome: Progressing  Goal: Maintains/Returns to pre admission functional level  Description: INTERVENTIONS:  - Perform AM-PAC 6 Click Basic Mobility/ Daily Activity assessment daily.  - Set and communicate daily mobility goal to care team and patient/family/caregiver.   - Collaborate with rehabilitation services on mobility goals if consulted  - Record patient progress and toleration of activity level   Outcome: Progressing     Problem: DISCHARGE PLANNING  Goal: Discharge to home or other facility with appropriate resources  Description: INTERVENTIONS:  - Identify barriers to discharge w/patient and caregiver  - Arrange for needed discharge resources and transportation as appropriate  - Identify discharge learning needs (meds, wound care, etc.)  - Arrange for interpretive services to assist at discharge as needed  - Refer to Case Management Department for coordinating discharge planning if the patient needs post-hospital services based on physician/advanced practitioner order or complex needs related to functional status, cognitive ability, or social support system  Outcome: Progressing     Problem: Knowledge Deficit  Goal: Patient/family/caregiver demonstrates understanding of disease  process, treatment plan, medications, and discharge instructions  Description: Complete learning assessment and assess knowledge base.  Interventions:  - Provide teaching at level of understanding  - Provide teaching via preferred learning methods  Outcome: Progressing

## 2024-02-03 NOTE — ASSESSMENT & PLAN NOTE
Lab Results   Component Value Date    HGBA1C 9.8 (H) 01/31/2024     Continue basal/prandial insulin with additional SSI coverage per Accu-Cheks  Hypoglycemia protocol  Carbohydrate restriction

## 2024-02-03 NOTE — ASSESSMENT & PLAN NOTE
"Maintain strict fluid restriction  Optimize blood sugar control as a degree of \"pseudohyponatremia\" is likely contributory  Serum sodium improved to 132 today  "

## 2024-02-03 NOTE — PROGRESS NOTES
Podiatry - Progress Note  Patient: Nadir Myers 63 y.o. male   MRN: [id number]  PCP: Tigre Hare DO  Unit/Bed#: [facility] 902-01 Encounter: [id number]  Date Of Visit: 24    ASSESSMENT:    Nadir Myers is a 63 y.o. 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  Tobacco dependence  Peripheral arterial disease      PLAN:    Patient to go to OR today,24, for left foot delayed primary closure with Dr. Hopper.  Consent signed with surgeon prior to procedure.  Confirmed NPO status.  H&P, vitals, and current labs reviewed.  No acute changes noted.  Alternatives, risks, and complications discussed with patient.  All questions answered.  No guarantees given of outcome  Rest of medical care per primary team.       SUBJECTIVE:     The patient was seen, evaluated, and assessed at bedside today. The patient was awake, alert, and in no acute distress. Patient confirmed NPO status. All questions and concerns regarding the surgical procedure addressed. Patient understands risks vs benefits of procedure and remains amenable with plan for surgery today. Patient denies N/V/F/chills/SOB/CP.      OBJECTIVE:     Vitals:   /66   Pulse 79   Temp 98.2 °F (36.8 °C)   Resp 18   Ht 6' (1.829 m)   Wt 105 kg (231 lb 14.8 oz)   SpO2 94%   BMI 31.45 kg/m²     Temp (24hrs), Av.9 °F (37.2 °C), Min:98.1 °F (36.7 °C), Max:101.4 °F (38.6 °C)      Physical Exam:     General:  Alert, cooperative, and in no distress.  Lower extremity exam:  Cardiovascular status at baseline.  Neurological status at baseline.  Musculoskeletal status at baseline. No calf tenderness noted bilaterally.     Dressing left intact to the Operating Room.     Additional Data:     Labs:    Results from last 7 days   Lab Units 24  0502   WBC  "Thousand/uL 7.78   HEMOGLOBIN g/dL 12.2   HEMATOCRIT % 36.3*   PLATELETS Thousands/uL 154   NEUTROS PCT % 73   LYMPHS PCT % 13*   MONOS PCT % 12   EOS PCT % 1     Results from last 7 days   Lab Units 02/02/24  0502 02/01/24  0511   POTASSIUM mmol/L 4.2 3.4*   CHLORIDE mmol/L 102 100   CO2 mmol/L 23 23   BUN mg/dL 16 26*   CREATININE mg/dL 0.64 0.85   CALCIUM mg/dL 8.2* 8.3*   ALK PHOS U/L  --  48   ALT U/L  --  15   AST U/L  --  21     Results from last 7 days   Lab Units 01/31/24  0941   INR  1.13       * I Have Reviewed All Lab Data Listed Above.    Recent Cultures (last 7 days):     Results from last 7 days   Lab Units 01/31/24  1748 01/31/24  1225 01/31/24  1135 01/31/24  1041   BLOOD CULTURE   --   --  Staphylococcus aureus* Staphylococcus aureus*   GRAM STAIN RESULT  No polys seen*  2+ Gram positive cocci in pairs* No polys seen*  1+ Gram positive cocci in pairs* Gram positive cocci in clusters* Gram positive cocci in clusters*   WOUND CULTURE  3+ Growth of Staphylococcus aureus* 2+ Growth of Staphylococcus aureus*  --   --      Results from last 7 days   Lab Units 01/31/24  1748 01/31/24  1225   ANAEROBIC CULTURE  Culture results to follow. Culture results to follow.       Imaging: I have personally reviewed pertinent films in PACS  EKG, Pathology, and Other Studies: I have personally reviewed pertinent reports.    ** Please Note: Portions of the record may have been created with voice recognition software. Occasional wrong word or \"sound a like\" substitutions may have occurred due to the inherent limitations of voice recognition software. Read the chart carefully and recognize, using context, where substitutions have occurred. **      "

## 2024-02-03 NOTE — ASSESSMENT & PLAN NOTE
Continue dual antiplatelet therapy with ASA/Plavix - continue statin  Sequela complicated by gangrenous left toe (see above)

## 2024-02-03 NOTE — ASSESSMENT & PLAN NOTE
Last EF of 42%  Holding diuresis (Aldactone) in the setting of relative hypotension and recovering LINDEN  Continue ASA/Plavix and statin for underlying CAD (associated ischemic cardiomyopathy)  Monitor/replete serum potassium/magnesium as necessary  Low sodium and fluid restriction enforced  Plan for a transient course of IV fluids due to mild hypotension and tachycardia -> monitor for iatrogenic fluid overload

## 2024-02-03 NOTE — ASSESSMENT & PLAN NOTE
Creatinine of approximately 0.6-0.8 -> currently stable at 0.65 from a prior peak of 1.41  Monitor renal function and urine output  Limit/avoid nephrotoxins and hypotension as possible  Aldactone remains held due to relative hypotension

## 2024-02-03 NOTE — QUICK NOTE
Podiatry Quick Note    Due to OR and surgeon scheduling, surgery is cancelled today    Patient will now go to the OR tomorrow, 2/4/24 with Dr. Hopper for left foot delayed primary closure    Diet placed now, patient will be NPO at midnight    Discussed with primary team.

## 2024-02-03 NOTE — ASSESSMENT & PLAN NOTE
Initially admitted to the ICU requiring vasopressor support, now off, with maintenance of hemodynamic stability  Due to MSSA bacteremia from a gangrenous left fourth toe (see below)  Continue to monitor vitals and maintain hemodynamics including temperature curve

## 2024-02-04 ENCOUNTER — APPOINTMENT (INPATIENT)
Dept: RADIOLOGY | Facility: HOSPITAL | Age: 64
DRG: 853 | End: 2024-02-04
Payer: COMMERCIAL

## 2024-02-04 ENCOUNTER — ANESTHESIA EVENT (INPATIENT)
Dept: PERIOP | Facility: HOSPITAL | Age: 64
End: 2024-02-04
Payer: COMMERCIAL

## 2024-02-04 ENCOUNTER — ANESTHESIA (INPATIENT)
Dept: PERIOP | Facility: HOSPITAL | Age: 64
End: 2024-02-04
Payer: COMMERCIAL

## 2024-02-04 DIAGNOSIS — I50.23 ACUTE ON CHRONIC SYSTOLIC HEART FAILURE (HCC): ICD-10-CM

## 2024-02-04 DIAGNOSIS — I47.20 V-TACH (HCC): ICD-10-CM

## 2024-02-04 DIAGNOSIS — I25.10 3-VESSEL CORONARY ARTERY DISEASE: ICD-10-CM

## 2024-02-04 PROBLEM — E87.8 ELECTROLYTE ABNORMALITY: Status: ACTIVE | Noted: 2024-02-02

## 2024-02-04 LAB
ANION GAP SERPL CALCULATED.3IONS-SCNC: 6 MMOL/L
BACTERIA SPEC ANAEROBE CULT: ABNORMAL
BASOPHILS # BLD AUTO: 0.08 THOUSANDS/ÂΜL (ref 0–0.1)
BASOPHILS NFR BLD AUTO: 1 % (ref 0–1)
BUN SERPL-MCNC: 12 MG/DL (ref 5–25)
CALCIUM SERPL-MCNC: 8.8 MG/DL (ref 8.4–10.2)
CHLORIDE SERPL-SCNC: 93 MMOL/L (ref 96–108)
CO2 SERPL-SCNC: 29 MMOL/L (ref 21–32)
CREAT SERPL-MCNC: 0.79 MG/DL (ref 0.6–1.3)
EOSINOPHIL # BLD AUTO: 0.01 THOUSAND/ÂΜL (ref 0–0.61)
EOSINOPHIL NFR BLD AUTO: 0 % (ref 0–6)
ERYTHROCYTE [DISTWIDTH] IN BLOOD BY AUTOMATED COUNT: 13.8 % (ref 11.6–15.1)
GFR SERPL CREATININE-BSD FRML MDRD: 96 ML/MIN/1.73SQ M
GLUCOSE SERPL-MCNC: 168 MG/DL (ref 65–140)
GLUCOSE SERPL-MCNC: 177 MG/DL (ref 65–140)
GLUCOSE SERPL-MCNC: 195 MG/DL (ref 65–140)
GLUCOSE SERPL-MCNC: 201 MG/DL (ref 65–140)
GLUCOSE SERPL-MCNC: 256 MG/DL (ref 65–140)
HCT VFR BLD AUTO: 41.5 % (ref 36.5–49.3)
HGB BLD-MCNC: 13.5 G/DL (ref 12–17)
IMM GRANULOCYTES # BLD AUTO: 0.11 THOUSAND/UL (ref 0–0.2)
IMM GRANULOCYTES NFR BLD AUTO: 1 % (ref 0–2)
LYMPHOCYTES # BLD AUTO: 1.64 THOUSANDS/ÂΜL (ref 0.6–4.47)
LYMPHOCYTES NFR BLD AUTO: 10 % (ref 14–44)
MAGNESIUM SERPL-MCNC: 1.8 MG/DL (ref 1.9–2.7)
MCH RBC QN AUTO: 28.8 PG (ref 26.8–34.3)
MCHC RBC AUTO-ENTMCNC: 32.5 G/DL (ref 31.4–37.4)
MCV RBC AUTO: 89 FL (ref 82–98)
MONOCYTES # BLD AUTO: 2 THOUSAND/ÂΜL (ref 0.17–1.22)
MONOCYTES NFR BLD AUTO: 12 % (ref 4–12)
NEUTROPHILS # BLD AUTO: 13.08 THOUSANDS/ÂΜL (ref 1.85–7.62)
NEUTS SEG NFR BLD AUTO: 76 % (ref 43–75)
NRBC BLD AUTO-RTO: 0 /100 WBCS
PHOSPHATE SERPL-MCNC: 2.9 MG/DL (ref 2.3–4.1)
PLATELET # BLD AUTO: 189 THOUSANDS/UL (ref 149–390)
PMV BLD AUTO: 10.6 FL (ref 8.9–12.7)
POTASSIUM SERPL-SCNC: 4.2 MMOL/L (ref 3.5–5.3)
RBC # BLD AUTO: 4.68 MILLION/UL (ref 3.88–5.62)
SODIUM SERPL-SCNC: 128 MMOL/L (ref 135–147)
WBC # BLD AUTO: 16.92 THOUSAND/UL (ref 4.31–10.16)

## 2024-02-04 PROCEDURE — 99232 SBSQ HOSP IP/OBS MODERATE 35: CPT | Performed by: INTERNAL MEDICINE

## 2024-02-04 PROCEDURE — 80048 BASIC METABOLIC PNL TOTAL CA: CPT

## 2024-02-04 PROCEDURE — 73630 X-RAY EXAM OF FOOT: CPT

## 2024-02-04 PROCEDURE — 85025 COMPLETE CBC W/AUTO DIFF WBC: CPT | Performed by: INTERNAL MEDICINE

## 2024-02-04 PROCEDURE — 28122 PARTIAL REMOVAL OF FOOT BONE: CPT | Performed by: PODIATRIST

## 2024-02-04 PROCEDURE — NC001 PR NO CHARGE: Performed by: PODIATRIST

## 2024-02-04 PROCEDURE — 0Y6N0ZD DETACHMENT AT LEFT FOOT, PARTIAL 4TH RAY, OPEN APPROACH: ICD-10-PCS | Performed by: PODIATRIST

## 2024-02-04 PROCEDURE — 83735 ASSAY OF MAGNESIUM: CPT

## 2024-02-04 PROCEDURE — 84100 ASSAY OF PHOSPHORUS: CPT

## 2024-02-04 PROCEDURE — 82948 REAGENT STRIP/BLOOD GLUCOSE: CPT

## 2024-02-04 RX ORDER — FENTANYL CITRATE 50 UG/ML
INJECTION, SOLUTION INTRAMUSCULAR; INTRAVENOUS AS NEEDED
Status: DISCONTINUED | OUTPATIENT
Start: 2024-02-04 | End: 2024-02-04

## 2024-02-04 RX ORDER — LABETALOL HYDROCHLORIDE 5 MG/ML
10 INJECTION, SOLUTION INTRAVENOUS ONCE
Status: COMPLETED | OUTPATIENT
Start: 2024-02-04 | End: 2024-02-04

## 2024-02-04 RX ORDER — MAGNESIUM SULFATE HEPTAHYDRATE 40 MG/ML
2 INJECTION, SOLUTION INTRAVENOUS ONCE
Status: COMPLETED | OUTPATIENT
Start: 2024-02-04 | End: 2024-02-04

## 2024-02-04 RX ORDER — ONDANSETRON 2 MG/ML
4 INJECTION INTRAMUSCULAR; INTRAVENOUS ONCE AS NEEDED
Status: DISCONTINUED | OUTPATIENT
Start: 2024-02-04 | End: 2024-02-04 | Stop reason: HOSPADM

## 2024-02-04 RX ORDER — PROPOFOL 10 MG/ML
INJECTION, EMULSION INTRAVENOUS CONTINUOUS PRN
Status: DISCONTINUED | OUTPATIENT
Start: 2024-02-04 | End: 2024-02-04

## 2024-02-04 RX ORDER — FENTANYL CITRATE/PF 50 MCG/ML
25 SYRINGE (ML) INJECTION
Status: DISCONTINUED | OUTPATIENT
Start: 2024-02-04 | End: 2024-02-04 | Stop reason: HOSPADM

## 2024-02-04 RX ORDER — SODIUM CHLORIDE, SODIUM LACTATE, POTASSIUM CHLORIDE, CALCIUM CHLORIDE 600; 310; 30; 20 MG/100ML; MG/100ML; MG/100ML; MG/100ML
INJECTION, SOLUTION INTRAVENOUS CONTINUOUS PRN
Status: DISCONTINUED | OUTPATIENT
Start: 2024-02-04 | End: 2024-02-04

## 2024-02-04 RX ORDER — MIDAZOLAM HYDROCHLORIDE 2 MG/2ML
INJECTION, SOLUTION INTRAMUSCULAR; INTRAVENOUS AS NEEDED
Status: DISCONTINUED | OUTPATIENT
Start: 2024-02-04 | End: 2024-02-04

## 2024-02-04 RX ORDER — INSULIN GLARGINE 100 [IU]/ML
50 INJECTION, SOLUTION SUBCUTANEOUS
Status: DISCONTINUED | OUTPATIENT
Start: 2024-02-04 | End: 2024-02-16

## 2024-02-04 RX ORDER — MAGNESIUM HYDROXIDE 1200 MG/15ML
LIQUID ORAL AS NEEDED
Status: DISCONTINUED | OUTPATIENT
Start: 2024-02-04 | End: 2024-02-04 | Stop reason: HOSPADM

## 2024-02-04 RX ORDER — INSULIN LISPRO 100 [IU]/ML
7 INJECTION, SOLUTION INTRAVENOUS; SUBCUTANEOUS
Status: DISCONTINUED | OUTPATIENT
Start: 2024-02-04 | End: 2024-02-05

## 2024-02-04 RX ADMIN — CHLORHEXIDINE GLUCONATE 15 ML: 1.2 SOLUTION ORAL at 08:16

## 2024-02-04 RX ADMIN — MAGNESIUM SULFATE HEPTAHYDRATE 2 G: 40 INJECTION, SOLUTION INTRAVENOUS at 13:47

## 2024-02-04 RX ADMIN — INSULIN LISPRO 1 UNITS: 100 INJECTION, SOLUTION INTRAVENOUS; SUBCUTANEOUS at 17:26

## 2024-02-04 RX ADMIN — SODIUM CHLORIDE, SODIUM LACTATE, POTASSIUM CHLORIDE, AND CALCIUM CHLORIDE: .6; .31; .03; .02 INJECTION, SOLUTION INTRAVENOUS at 09:34

## 2024-02-04 RX ADMIN — MIDAZOLAM 2 MG: 1 INJECTION INTRAMUSCULAR; INTRAVENOUS at 09:34

## 2024-02-04 RX ADMIN — FENTANYL CITRATE 25 MCG: 50 INJECTION INTRAMUSCULAR; INTRAVENOUS at 09:44

## 2024-02-04 RX ADMIN — CEFAZOLIN SODIUM 2000 MG: 2 SOLUTION INTRAVENOUS at 11:55

## 2024-02-04 RX ADMIN — CEFAZOLIN SODIUM 2000 MG: 2 SOLUTION INTRAVENOUS at 00:05

## 2024-02-04 RX ADMIN — AMIODARONE HYDROCHLORIDE 200 MG: 200 TABLET ORAL at 08:17

## 2024-02-04 RX ADMIN — ATORVASTATIN CALCIUM 80 MG: 80 TABLET, FILM COATED ORAL at 17:26

## 2024-02-04 RX ADMIN — Medication 10 MG: at 13:46

## 2024-02-04 RX ADMIN — INSULIN LISPRO 2 UNITS: 100 INJECTION, SOLUTION INTRAVENOUS; SUBCUTANEOUS at 22:51

## 2024-02-04 RX ADMIN — INSULIN LISPRO 5 UNITS: 100 INJECTION, SOLUTION INTRAVENOUS; SUBCUTANEOUS at 11:56

## 2024-02-04 RX ADMIN — CEFAZOLIN SODIUM 2000 MG: 2 SOLUTION INTRAVENOUS at 17:27

## 2024-02-04 RX ADMIN — PROPOFOL 60 MCG/KG/MIN: 10 INJECTION, EMULSION INTRAVENOUS at 09:36

## 2024-02-04 RX ADMIN — INSULIN LISPRO 7 UNITS: 100 INJECTION, SOLUTION INTRAVENOUS; SUBCUTANEOUS at 17:27

## 2024-02-04 RX ADMIN — CEFAZOLIN SODIUM 2000 MG: 2 SOLUTION INTRAVENOUS at 06:32

## 2024-02-04 RX ADMIN — INSULIN LISPRO 1 UNITS: 100 INJECTION, SOLUTION INTRAVENOUS; SUBCUTANEOUS at 11:56

## 2024-02-04 RX ADMIN — FENTANYL CITRATE 25 MCG: 50 INJECTION INTRAMUSCULAR; INTRAVENOUS at 09:50

## 2024-02-04 RX ADMIN — INSULIN GLARGINE 50 UNITS: 100 INJECTION, SOLUTION SUBCUTANEOUS at 22:51

## 2024-02-04 RX ADMIN — FENTANYL CITRATE 50 MCG: 50 INJECTION INTRAMUSCULAR; INTRAVENOUS at 09:36

## 2024-02-04 NOTE — PROGRESS NOTES
"[facility]  Progress Note  Name: Nadir Myers I  MRN: [id number]  Unit/Bed#: [facility] 902-01 I Date of Admission: 1/31/2024   Date of Service: 2/4/2024 I Hospital Day: 4      Assessment & Plan:    * Septic shock (HCC)  Assessment & Plan  Initially admitted to the ICU requiring vasopressor support, now off, with maintenance of hemodynamic stability  Due to MSSA bacteremia from a gangrenous left fourth toe (see below)  Continue to monitor vitals and maintain hemodynamics including temperature curve    LUE weakness  Assessment & Plan  Presented w/ transient left-sided weakness and a facial droop now resolved  Appreciate neurology input deeming symptoms likely secondary to septic shock/infection and hyperglycemia, as neurologic workup including CT/MRI imaging negative for evidence of stroke or intracranial vessel occlusion, but noted chronic microangiopathic changes -> radiology report did however mention: \"Moderate bilateral supraclinoid ICA and moderate to severe left cavernous ICA segment stenosis.\"  Continue dual endplate therapy with ASA/Plavix - c/w statin  PT/OT as tolerated    MSSA bacteremia  Assessment & Plan  As evidenced on blood cultures from 1/31  Likely source due to left fourth toe gangrenous wound -> see plan below  Continue IV Ancef  Echocardiogram negative for obvious evidence of endocarditis    Gangrene of toe of left foot (HCC)  Assessment & Plan  Appreciate podiatry input -> status post left fourth toe amputation on 1/31  Status post wound closure earlier today  Wound care per podiatry  PRN pain and blood sugar control    Chronic systolic CHF (HCC)  Assessment & Plan  Last EF of 42%  Holding diuresis (Aldactone) in the setting of relative hypotension and recovering LINDEN  Continue ASA/Plavix and statin for underlying CAD (associated ischemic cardiomyopathy)  Monitor/replete serum potassium/magnesium as necessary  Low sodium and fluid restriction enforced    PAD " "(peripheral artery disease) ([facility])  Assessment & Plan  Continue dual antiplatelet therapy with ASA/Plavix - continue statin  Sequela complicated by gangrenous left toe (see above)    Type 2 diabetes mellitus, with long-term current use of insulin ([facility])  Assessment & Plan  Lab Results   Component Value Date    HGBA1C 9.8 (H) 01/31/2024     Continue basal/prandial insulin with additional SSI coverage per Accu-Cheks  Hypoglycemia protocol  Carbohydrate restriction    LINDEN (acute kidney injury) ([facility])  Assessment & Plan  Creatinine of approximately 0.6-0.8 -> currently stable at 0.79 from a prior peak of 1.41  Monitor renal function and urine output  Limit/avoid nephrotoxins and hypotension as possible  Aldactone remains held due to relative hypotension    Hyponatremia  Assessment & Plan  Maintain strict fluid restriction  Optimize blood sugar control as a degree of \"pseudohyponatremia\" is likely contributory    Electrolyte abnormalities  Assessment & Plan  Monitor/replete serum potassium/magnesium/phosphate as necessary      DVT Prophylaxis:  Lovenox    AM-PAC Basic Mobility:  Basic Mobility Inpatient Raw Score: 19  -[facility] Achieved: 4: Move to chair/commode  -[facility] Goal: 6: Walk 10 steps or more    Patient Centered Rounds:  I have performed bedside rounds and discussed plan of care with nursing today.  Discussions with Specialists or Other Care Team Provider:  see above assessments if applicable    Education and Discussions with Family / Patient:  Patient at bedside, along w/ multiple family members present    Time Spent for Care:  35 minutes. More than 50% of total time spent on counseling and coordination of care, on one or more of the following: performing physical exam; counseling and coordination of care, obtaining or reviewing history, documenting in the medical record, reviewing/ordering tests/medications/procedures, and communicating with other healthcare professionals.    Current Length of Stay: 4 day(s)  Current " Patient Status: Inpatient   Certification Statement:  Patient will continue to require additional hospital stay due to assessments as noted above.    Code Status: Level 1 - Full Code        Subjective:     Encountered after returning to the medical floor post wound closure earlier in the morning.  Tolerated procedure well.  Denies uncontrolled pain at this time.        Objective:     Vitals:   Temp (24hrs), Av.8 °F (37.1 °C), Min:97.6 °F (36.4 °C), Max:100.3 °F (37.9 °C)    Temp:  [97.6 °F (36.4 °C)-100.3 °F (37.9 °C)] 100.3 °F (37.9 °C)  HR:  [] 99  Resp:  [16-23] 16  BP: (109-163)/() 115/72  SpO2:  [91 %-96 %] 94 %  Body mass index is 32.23 kg/m².     Input and Output Summary (last 24 hours):       Intake/Output Summary (Last 24 hours) at 2024 1659  Last data filed at 2024 1332  Gross per 24 hour   Intake 540 ml   Output 675 ml   Net -135 ml       Physical Exam:     GENERAL No immediate distress at rest   HEAD   Normocephalic - atraumatic   EYES Nonicteric   MOUTH   Mucosa moist   NECK   Supple - full range of motion   CARDIAC Intermittent tachycardic - S1/S2 positive   PULMONARY Clear bilateral breath sounds   ABDOMEN   Soft - nontender/nondistended - active bowel sounds   MUSCULOSKELETAL   Motor strength/range of motion mildly deconditioned - left foot dressed/bandaged   NEUROLOGIC   Alert/oriented at baseline   PSYCHIATRIC   Mood/affect pleasant         Additional Data:     Labs & Recent Cultures:    Results from last 7 days   Lab Units 24  0624   WBC Thousand/uL 16.92*   HEMOGLOBIN g/dL 13.5   HEMATOCRIT % 41.5   PLATELETS Thousands/uL 189   NEUTROS PCT % 76*   LYMPHS PCT % 10*   MONOS PCT % 12   EOS PCT % 0     Results from last 7 days   Lab Units 24  1221 24  0502 24  0511   POTASSIUM mmol/L 4.2   < > 3.4*   CHLORIDE mmol/L 93*   < > 100   CO2 mmol/L 29   < > 23   BUN mg/dL 12   < > 26*   CREATININE mg/dL 0.79   < > 0.85   CALCIUM mg/dL 8.8   < > 8.3*   ALK  PHOS U/L  --   --  48   ALT U/L  --   --  15   AST U/L  --   --  21    < > = values in this interval not displayed.     Results from last 7 days   Lab Units 01/31/24  0941   INR  1.13     Results from last 7 days   Lab Units 02/04/24  1612 02/04/24  1027 02/04/24  0712 02/03/24  2048 02/03/24  1606 02/03/24  1114 02/03/24  0741 02/02/24  2142 02/02/24  1557 02/02/24  1202 02/02/24  0659 02/01/24  2108   POC GLUCOSE mg/dl 201* 177* 168* 206* 279* 248* 212* 255* 229* 241* 225* 286*     Results from last 7 days   Lab Units 01/31/24  1516   HEMOGLOBIN A1C % 9.8*     Results from last 7 days   Lab Units 01/31/24  1225 01/31/24  1112 01/31/24  0951   LACTIC ACID mmol/L 1.9  --  3.0*   PROCALCITONIN ng/ml  --  0.73*  --          Results from last 7 days   Lab Units 02/03/24  0614 01/31/24  1748 01/31/24  1225 01/31/24  1135 01/31/24  1041   BLOOD CULTURE   --   --   --  Staphylococcus aureus* Staphylococcus aureus*   GRAM STAIN RESULT  Gram positive cocci in clusters*  Gram positive cocci in clusters* No polys seen*  2+ Gram positive cocci in pairs* No polys seen*  1+ Gram positive cocci in pairs* Gram positive cocci in clusters* Gram positive cocci in clusters*   WOUND CULTURE   --  3+ Growth of Staphylococcus aureus* 2+ Growth of Staphylococcus aureus*  --   --          Lines/Drains:  Invasive Devices       Peripheral Intravenous Line  Duration             Peripheral IV 02/04/24 Distal;Right;Upper;Ventral (anterior) Arm <1 day                      Last 24 Hours Medication List:   Current Facility-Administered Medications   Medication Dose Route Frequency Provider Last Rate    acetaminophen  975 mg Oral Q6H PRN Mague Cunningham DPM      amiodarone  200 mg Oral Daily With Breakfast Mague Cunningham DPM      aspirin  81 mg Oral Daily Mague Cunningham DPM      atorvastatin  80 mg Oral After Dinner Mague Cziraky, DPM      cefazolin  2,000 mg Intravenous Q6H Mague Cunningham DPM 2,000 mg (02/04/24 7453)    chlorhexidine  15 mL  Mouth/Throat Q12H TEJAS Mague Cunningham DPM      clopidogrel  75 mg Oral Daily Mague Cunningham DPM      enoxaparin  40 mg Subcutaneous Q24H TEJAS Mague Cunningham DPM      insulin glargine  50 Units Subcutaneous HS Hilda Ashley MD      insulin lispro  1-5 Units Subcutaneous 4x Daily (AC & HS) Mague Cunningham DPM      insulin lispro  7 Units Subcutaneous TID With Meals Hilda Ashley MD      ondansetron  4 mg Intravenous Once Mague Cunningham DPM      polyethylene glycol  17 g Oral Daily PRN Mague Cunningham DPM      senna-docusate sodium  2 tablet Oral BID PRN ELIZABETH Capps MD   Hospitalist - [facility]        ** Please Note: This note is constructed using a voice recognition dictation system.  An occasional wrong word/phrase or “sound-a-like” substitution may have been picked up by dictation device due to the inherent limitations of voice recognition software.  Read the chart carefully and recognize, using reasonable context, where substitutions may have occurred.**

## 2024-02-04 NOTE — PLAN OF CARE
Problem: METABOLIC, FLUID AND ELECTROLYTES - ADULT  Goal: Electrolytes maintained within normal limits  Description: INTERVENTIONS:  - Monitor labs and assess patient for signs and symptoms of electrolyte imbalances  - Administer electrolyte replacement as ordered  - Monitor response to electrolyte replacements, including repeat lab results as appropriate  - Instruct patient on fluid and nutrition as appropriate  Outcome: Progressing  Goal: Fluid balance maintained  Description: INTERVENTIONS:  - Monitor labs   - Monitor I/O and WT  - Instruct patient on fluid and nutrition as appropriate  - Assess for signs & symptoms of volume excess or deficit  Outcome: Progressing  Goal: Glucose maintained within target range  Description: INTERVENTIONS:  - Monitor Blood Glucose as ordered  - Assess for signs and symptoms of hyperglycemia and hypoglycemia  - Administer ordered medications to maintain glucose within target range  - Assess nutritional intake and initiate nutrition service referral as needed  Outcome: Progressing     Problem: Prexisting or High Potential for Compromised Skin Integrity  Goal: Skin integrity is maintained or improved  Description: INTERVENTIONS:  - Identify patients at risk for skin breakdown  - Assess and monitor skin integrity  - Assess and monitor nutrition and hydration status  - Monitor labs   - Assess for incontinence   - Turn and reposition patient  - Assist with mobility/ambulation  - Relieve pressure over bony prominences  - Avoid friction and shearing  - Provide appropriate hygiene as needed including keeping skin clean and dry  - Evaluate need for skin moisturizer/barrier cream  - Collaborate with interdisciplinary team   - Patient/family teaching  - Consider wound care consult   Outcome: Progressing     Problem: PAIN - ADULT  Goal: Verbalizes/displays adequate comfort level or baseline comfort level  Description: Interventions:  - Encourage patient to monitor pain and request  assistance  - Assess pain using appropriate pain scale  - Administer analgesics based on type and severity of pain and evaluate response  - Implement non-pharmacological measures as appropriate and evaluate response  - Consider cultural and social influences on pain and pain management  - Notify physician/advanced practitioner if interventions unsuccessful or patient reports new pain  Outcome: Progressing     Problem: INFECTION - ADULT  Goal: Absence or prevention of progression during hospitalization  Description: INTERVENTIONS:  - Assess and monitor for signs and symptoms of infection  - Monitor lab/diagnostic results  - Monitor all insertion sites, i.e. indwelling lines, tubes, and drains  - Monitor endotracheal if appropriate and nasal secretions for changes in amount and color  - [place] appropriate cooling/warming therapies per order  - Administer medications as ordered  - Instruct and encourage patient and family to use good hand hygiene technique  - Identify and instruct in appropriate isolation precautions for identified infection/condition  Outcome: Progressing  Goal: Absence of fever/infection during neutropenic period  Description: INTERVENTIONS:  - Monitor WBC    Outcome: Progressing     Problem: SAFETY ADULT  Goal: Patient will remain free of falls  Description: INTERVENTIONS:  - Educate patient/family on patient safety including physical limitations  - Instruct patient to call for assistance with activity   - Consult OT/PT to assist with strengthening/mobility   - Keep Call bell within reach  - Keep bed low and locked with side rails adjusted as appropriate  - Keep care items and personal belongings within reach  - Initiate and maintain comfort rounds  - Make Fall Risk Sign visible to staff  - Apply yellow socks and bracelet for high fall risk patients  - Consider moving patient to room near nurses station  Outcome: Progressing  Goal: Maintain or return to baseline ADL function  Description:  INTERVENTIONS:  -  Assess patient's ability to carry out ADLs; assess patient's baseline for ADL function and identify physical deficits which impact ability to perform ADLs (bathing, care of mouth/teeth, toileting, grooming, dressing, etc.)  - Assess/evaluate cause of self-care deficits   - Assess range of motion  - Assess patient's mobility; develop plan if impaired  - Assess patient's need for assistive devices and provide as appropriate  - Encourage maximum independence but intervene and supervise when necessary  - Involve family in performance of ADLs  - Assess for home care needs following discharge   - Consider OT consult to assist with ADL evaluation and planning for discharge  - Provide patient education as appropriate  Outcome: Progressing  Goal: Maintains/Returns to pre admission functional level  Description: INTERVENTIONS:  - Perform AM-PAC 6 Click Basic Mobility/ Daily Activity assessment daily.  - Set and communicate daily mobility goal to care team and patient/family/caregiver.   - Collaborate with rehabilitation services on mobility goals if consulted  - Record patient progress and toleration of activity level   Outcome: Progressing     Problem: DISCHARGE PLANNING  Goal: Discharge to home or other facility with appropriate resources  Description: INTERVENTIONS:  - Identify barriers to discharge w/patient and caregiver  - Arrange for needed discharge resources and transportation as appropriate  - Identify discharge learning needs (meds, wound care, etc.)  - Arrange for interpretive services to assist at discharge as needed  - Refer to Case Management Department for coordinating discharge planning if the patient needs post-hospital services based on physician/advanced practitioner order or complex needs related to functional status, cognitive ability, or social support system  Outcome: Progressing     Problem: Knowledge Deficit  Goal: Patient/family/caregiver demonstrates understanding of disease  process, treatment plan, medications, and discharge instructions  Description: Complete learning assessment and assess knowledge base.  Interventions:  - Provide teaching at level of understanding  - Provide teaching via preferred learning methods  Outcome: Progressing

## 2024-02-04 NOTE — ANESTHESIA PREPROCEDURE EVALUATION
Procedure:  LEFT FOURTH TOE AMPUTATION SURGICAL WOUND DEBRIDEMENT FOOT/TOE (WASH OUT) WITH DELAYED PRIMARY CLOSURE (Left: Foot)    Relevant Problems   CARDIO   (+) 3-vessel CAD   (+) A-fib (HCC)   (+) Coronary artery disease involving native coronary artery of native heart without angina pectoris   (+) Essential (primary) hypertension      ENDO   (+) Type 2 diabetes mellitus with foot ulcer, with long-term current use of insulin (HCC)   (+) Type 2 diabetes mellitus, with long-term current use of insulin (HCC)      GI/HEPATIC   (+) GERD (gastroesophageal reflux disease)      /RENAL   (+) LINDEN (acute kidney injury) (HCC)   (+) Benign prostatic hyperplasia without lower urinary tract symptoms      NEURO/PSYCH   (+) LUE weakness        Physical Exam    Airway    Mallampati score: II  TM Distance: >3 FB  Neck ROM: full     Dental        Cardiovascular      Pulmonary      Other Findings        Anesthesia Plan  ASA Score- 3 Emergent    Anesthesia Type- IV sedation with anesthesia with ASA Monitors.         Additional Monitors:     Airway Plan: LMA.    Comment: [facility]  [address] [address]  [phone]  Name: Nadir Myers                       : 1960  MRN: [id number]                       Age: 63 y.o.  Patient Status: Inpatient          Gender: male  Echo complete w/ contrast    Height: 6' (1.829 m)  Weight: 105 kg (232 lb)  BSA: 2.27 m²  Blood Pressure: 160/87     Date of Study: 24  Ordering Provider: Luz Slaughter DO  Clinical Indications: Heart Failure     Interpreting Physicians  Performing Staff  Ede Mcfarland MD   Tech: Veronica Evans      Vitals    Height Weight BSA (Calculated - m2) BP Pulse  6' (1.829 m) 105 kg (232 lb) 2.27 sq meters 160/87 90    PACS Images     Show images for Echo complete w/ contrast if indicated  Study Details    This transthoracic echocardiogram was performed at bedside. This was a routine, inpatient study. Study quality  was adequate. This was a technically difficult study due to decreased penetration and lung interference. A complete 2D, color flow Doppler, spectral Doppler, 2D, color flow Doppler and spectral Doppler transthoracic echocardiogram was performed.  The apical, parasternal, subcostal and suprasternal views were obtained. 2.0 ml of Definity ultrasound enhancing agent was used due to opacify LV and apex.    History    CAD;AF;ICM;CHF;HTN;DM2;GERD;HFrEF.    Interpretation Summary       ·  Left Ventricle: Left ventricular cavity size is normal. Wall thickness is moderately increased. There is moderate concentric hypertrophy. The left ventricular ejection fraction is 42%. Systolic function is moderately reduced. Diastolic function is mildly abnormal, consistent with grade I (abnormal) relaxation. 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 There is no thrombus.  ·  The following segments are akinetic: apical anterior, apical septal, apical inferior, apical lateral and apex.  ·  All other segments are normal.  ·  Tricuspid Valve: There is mild regurgitation.     Findings    Left Ventricle Left ventricular cavity size is normal. Wall thickness is moderately increased. There is moderate concentric hypertrophy. The left ventricular ejection fraction is 42%. Systolic function is moderately reduced. Diastolic function is mildly abnormal, consistent with grade I (abnormal) relaxation.  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 There is no thrombus.  Wall Scoring   Baseline    The following segments are akinetic: apical anterior, apical septal, apical inferior, apical lateral and apex.  All other segments are normal.        Right Ventricle Right ventricular cavity size is normal. Systolic function is normal. An ICD wire is present.  Left Atrium The atrium is normal in size.  Right Atrium The atrium is normal in size.  Aortic Valve The aortic valve is trileaflet. The leaflets are not thickened. The leaflets are not calcified. The leaflets exhibit normal mobility. There is  no evidence of regurgitation. The aortic valve has no significant stenosis.  Mitral Valve There is no evidence of regurgitation. There is no evidence of stenosis. The mitral valve has normal structure and normal function.  Tricuspid Valve Tricuspid valve structure is normal. There is mild regurgitation. There is no evidence of stenosis.  Pulmonic Valve The pulmonic valve was not well visualized. Pulmonic valve structure is normal. There is no evidence of regurgitation. There is no evidence of stenosis.  Ascending Aorta The aortic root is normal in size.  IVC/SVC The inferior vena cava is normal in size. Respirophasic changes were normal.  Pericardium There is no pericardial effusion.    Left Ventricle Measurements    Function/Volumes  A4C EF   26 %      Dimensions  LVIDd   5.1 cm      LVIDS   3.9 cm      IVSd   1.5 cm      LVPWd   1.5 cm      FS   24      Diastolic Filling  MV E' Tissue Velocity Septal   10 cm/s      E/A ratio   0.71      E wave deceleration time   206 ms      MV Peak E [place]   75 cm/s      MV Peak A [place]   1.06 m/s           Right Ventricle Measurements    Dimensions  RVID d   3.6 cm      Tricuspid annular plane systolic excursion   2 cm           Left Atrium Measurements    Dimensions  LA size   3.2 cm           Right Atrium Measurements    Dimensions  RA 2D Volume   39 mL      RAA A4C   15.9 cm2           Mitral Valve Measurements    Stenosis  MV stenosis pressure 1/2 time   60 ms      MV valve area p 1/2 method   3.67           Aorta Measurements    Aortic Dimensions  Ao root   3.2 cm      Asc Ao   3.2 cm           Wall Scoring    Baseline Score Index: 1.59                      Exam Details    Performed Procedure Technologist Supporting Staff Performing Physician  Echo complete w/ contrast Veronica Evans         Appointment Date/Status Modality Department   2/1/2024     Arrived BE ECHO PORT 5 BE CAR NON INV        Begin Exam End Exam  End Exam Questionnaires  2/1/2024  9:30 AM 2/1/2024 10:30  AM  PATIENT EDUCATION         Signed    Electronically signed by Ede Mcfarland MD on 2/1/24 at 1335 EST    .       Plan Factors-Exercise tolerance (METS): <4 METS.    Chart reviewed. EKG reviewed. Imaging results reviewed. Existing labs reviewed. Patient summary reviewed.    Patient is not a current smoker. Patient not instructed to abstain from smoking on day of procedure. Patient did not smoke on day of surgery.            Induction- intravenous.    Postoperative Plan-     Informed Consent- Anesthetic plan and risks discussed with patient.  I personally reviewed this patient with the CRNA. Discussed and agreed on the Anesthesia Plan with the CRNA..

## 2024-02-04 NOTE — ANESTHESIA POSTPROCEDURE EVALUATION
Post-Op Assessment Note    CV Status:  Stable  Pain Score: 0    Pain management: adequate       Mental Status:  Arousable   PONV Controlled:  Controlled   Airway Patency:  Patent     Post Op Vitals Reviewed: Yes    No anethesia notable event occurred.    Staff: CRNA               BP   109/69   Temp   99.7   Pulse  100   Resp   18   SpO2   96

## 2024-02-04 NOTE — PLAN OF CARE
Problem: METABOLIC, FLUID AND ELECTROLYTES - ADULT  Goal: Electrolytes maintained within normal limits  Description: INTERVENTIONS:  - Monitor labs and assess patient for signs and symptoms of electrolyte imbalances  - Administer electrolyte replacement as ordered  - Monitor response to electrolyte replacements, including repeat lab results as appropriate  - Instruct patient on fluid and nutrition as appropriate  Outcome: Progressing  Goal: Fluid balance maintained  Description: INTERVENTIONS:  - Monitor labs   - Monitor I/O and WT  - Instruct patient on fluid and nutrition as appropriate  - Assess for signs & symptoms of volume excess or deficit  Outcome: Progressing  Goal: Glucose maintained within target range  Description: INTERVENTIONS:  - Monitor Blood Glucose as ordered  - Assess for signs and symptoms of hyperglycemia and hypoglycemia  - Administer ordered medications to maintain glucose within target range  - Assess nutritional intake and initiate nutrition service referral as needed  Outcome: Progressing     Problem: Prexisting or High Potential for Compromised Skin Integrity  Goal: Skin integrity is maintained or improved  Description: INTERVENTIONS:  - Identify patients at risk for skin breakdown  - Assess and monitor skin integrity  - Assess and monitor nutrition and hydration status  - Monitor labs   - Assess for incontinence   - Turn and reposition patient  - Assist with mobility/ambulation  - Relieve pressure over bony prominences  - Avoid friction and shearing  - Provide appropriate hygiene as needed including keeping skin clean and dry  - Evaluate need for skin moisturizer/barrier cream  - Collaborate with interdisciplinary team   - Patient/family teaching  - Consider wound care consult   Outcome: Progressing     Problem: SAFETY ADULT  Goal: Patient will remain free of falls  Description: INTERVENTIONS:  - Educate patient/family on patient safety including physical limitations  - Instruct patient  to call for assistance with activity   - Consult OT/PT to assist with strengthening/mobility   - Keep Call bell within reach  - Keep bed low and locked with side rails adjusted as appropriate  - Keep care items and personal belongings within reach  - Initiate and maintain comfort rounds  - Make Fall Risk Sign visible to staff  - Apply yellow socks and bracelet for high fall risk patients  - Consider moving patient to room near nurses station  Outcome: Progressing  Goal: Maintain or return to baseline ADL function  Description: INTERVENTIONS:  -  Assess patient's ability to carry out ADLs; assess patient's baseline for ADL function and identify physical deficits which impact ability to perform ADLs (bathing, care of mouth/teeth, toileting, grooming, dressing, etc.)  - Assess/evaluate cause of self-care deficits   - Assess range of motion  - Assess patient's mobility; develop plan if impaired  - Assess patient's need for assistive devices and provide as appropriate  - Encourage maximum independence but intervene and supervise when necessary  - Involve family in performance of ADLs  - Assess for home care needs following discharge   - Consider OT consult to assist with ADL evaluation and planning for discharge  - Provide patient education as appropriate  Outcome: Progressing  Goal: Maintains/Returns to pre admission functional level  Description: INTERVENTIONS:  - Perform AM-PAC 6 Click Basic Mobility/ Daily Activity assessment daily.  - Set and communicate daily mobility goal to care team and patient/family/caregiver.   - Collaborate with rehabilitation services on mobility goals if consulted  - Record patient progress and toleration of activity level   Outcome: Progressing

## 2024-02-04 NOTE — OP NOTE
OPERATIVE REPORT - Podiatry  PATIENT NAME: Nadir Myers    :  1960  MRN: [id number]  Pt Location:  OR ROOM 07    SURGERY DATE: 2024    Surgeons and Role:     * Bebo Hopper DPM - Primary     * Mague Cunningham DPM - Assisting    Pre-op Diagnosis:  Foot infection [L08.9]    Post-Op Diagnosis Codes:     * Foot infection [L08.9]    Procedure(s) (LRB):  LEFT FOURTH TOE AMPUTATION SURGICAL WOUND DEBRIDEMENT FOOT/TOE (WASH OUT) (Left)  Left partial fourth ray amputation    Specimen(s):  ID Type Source Tests Collected by Time Destination   1 : 4th metatarsal head Tissue Toe, Left TISSUE EXAM Bebo Hopper DPM 2024 1002        Estimated Blood Loss:   Minimal    Drains:  None    Anesthesia Type:   Choice with 10 ml of 1% Lidocaine and 0.5% Bupivacaine in a 1:1 mixture    Hemostasis:  Direct compression  Electrocautery    Materials:  3-0 Nylon    Injectables:  None    Operative Findings:  - Removal of nonviable/necrotic tissue at wound bed of open fourth digit amputation  - Healthy bleeding to tissue margins  - No purulence or sinus tracts appreciated  - Primary closure achieved after resection of fourth metatarsal head    Complications:   None    Procedure and Technique:     Under mild sedation, the patient was brought into the operating room on a stretcher in the supine position. IV sedation was achieved by anesthesia team and a universal timeout was performed where all parties are in agreement of correct patient, correct procedure and correct site. A proximal left 4th digital block was performed consisting of 10 ml of 1% Lidocaine and 0.5% Bupivacaine in a 1:1 mixture. The foot was then prepped and draped in the usual aseptic manner.     Attention was directly to the left foot at the open wound created after the previous left 4th digit amputation. The skin edges and wound bed were debrided with a #15 blade of all nonviable, devitalized, necrotic, and fibrotic tissue with excision of skin, fascia, subcutaneous  tissue, and soft tissue to the depth of surgical bone. The wound was granular, however the skin edges aligned with too much tension. It was decided to perform a partial fourth ray amputation to reduce tension on the incision line for closure. Attention was directed to the fourth metatarsal. A sagittal saw was then used to make a cut in a dorsal distal to plantar proximal fashion with a slight obliquity. The distal fourth ray was then removed from the foot and passed off. Any residual prominences were removed utilizing a rongeur. The remaining wound bed was then inspected. No remaining purulent sinus tracts were visualized. Any necrotic or nonviable soft tissues were sharply excised from the wound utilizing metzenbaum scissors. Any residual exposed tendons were excised proximally to prevent any infection from tracking proximally. Bone proximal to the amputation site was noted to be of hard viable quality. The remaining surgical wound was red granular with adequate bleeding noted.    The surgical incision was irrigated with copious amounts of normal sterile saline. Skin edges were reapproximated and closure was obtained utilizing 3-0 Nylon. The foot was then cleansed and dried.  The incision site was dressed with betadine soaked adaptic, gauze. This was then covered with a Kerlex wrap.    The patient tolerated the procedure and anesthesia well without immediate complications and transferred to PACU with vital signs stable.     As with many limb salvage procedures, we contemplate the possibility of performing further stages to this procedure. Procedures may include debridements, delayed closure, plastic surgery techniques, or more proximal amputations. This procedure may be considered part of a multi-staged limb salvage treatment plan.     He is to remain nonweightbearing to the left lower extremity    Dr. Hopper was present during the entire procedure and participated in all key aspects.    SIGNATURE: Mague Cunningham,  "[facility]  DATE: February 4, 2024  TIME: 10:30 AM      Portions of the record may have been created with voice recognition software. Occasional wrong word or \"sound a like\" substitutions may have occurred due to the inherent limitations of voice recognition software. Read the chart carefully and recognize, using context, where substitutions have occurred.-    "

## 2024-02-04 NOTE — PROGRESS NOTES
Podiatry - Progress Note  Patient: Nadir Myers 63 y.o. male   MRN: [id number]  PCP: Tigre Hare DO  [facility] Encounter: [id number]  Date Of Visit: 24    ASSESSMENT:    Nadir Myers is a 63 y.o. 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  Tobacco dependence  Peripheral arterial disease    PLAN:    Patient to go to OR today,24, for left foot delayed primary closure with Dr. Hopper.  Consent to be signed with surgeon prior to procedure.  Confirmed NPO status.  H&P, vitals, and current labs reviewed.  No acute changes noted.  Alternatives, risks, and complications discussed with patient.  All questions answered.  No guarantees given of outcome.  Appreciate medical clearance from internal medicine to proceed to the OR  Rest of medical care per primary team.       SUBJECTIVE:     The patient was seen, evaluated, and assessed at bedside today. The patient was awake, alert, and in no acute distress. Patient confirmed NPO status. All questions and concerns regarding the surgical procedure addressed. Patient understands risks vs benefits of procedure and remains amenable with plan for surgery today. Patient denies N/V/F/chills/SOB/CP.      OBJECTIVE:     Vitals:   /78   Pulse 99   Temp 99.3 °F (37.4 °C)   Resp 18   Ht 6' (1.829 m)   Wt 105 kg (231 lb 14.8 oz)   SpO2 92%   BMI 31.45 kg/m²     Temp (24hrs), Av.7 °F (37.1 °C), Min:98 °F (36.7 °C), Max:99.3 °F (37.4 °C)      Physical Exam:     General:  Alert, cooperative, and in no distress.  Lower extremity exam:  Cardiovascular status at baseline.  Neurological status at baseline.  Musculoskeletal status at baseline. No calf tenderness noted bilaterally.     Dressing left intact to the Operating Room.     Additional Data:  "    Labs:    Results from last 7 days   Lab Units 02/02/24  0502   WBC Thousand/uL 7.78   HEMOGLOBIN g/dL 12.2   HEMATOCRIT % 36.3*   PLATELETS Thousands/uL 154   NEUTROS PCT % 73   LYMPHS PCT % 13*   MONOS PCT % 12   EOS PCT % 1     Results from last 7 days   Lab Units 02/03/24  0615 02/02/24  0502 02/01/24  0511   POTASSIUM mmol/L 4.4   < > 3.4*   CHLORIDE mmol/L 97   < > 100   CO2 mmol/L 27   < > 23   BUN mg/dL 11   < > 26*   CREATININE mg/dL 0.70   < > 0.85   CALCIUM mg/dL 8.7   < > 8.3*   ALK PHOS U/L  --   --  48   ALT U/L  --   --  15   AST U/L  --   --  21    < > = values in this interval not displayed.     Results from last 7 days   Lab Units 01/31/24  0941   INR  1.13       * I Have Reviewed All Lab Data Listed Above.    Recent Cultures (last 7 days):     Results from last 7 days   Lab Units 02/03/24  0614 01/31/24  1748 01/31/24  1225 01/31/24  1135 01/31/24  1041   BLOOD CULTURE   --   --   --  Staphylococcus aureus* Staphylococcus aureus*   GRAM STAIN RESULT  Gram positive cocci in clusters*  Gram positive cocci in clusters* No polys seen*  2+ Gram positive cocci in pairs* No polys seen*  1+ Gram positive cocci in pairs* Gram positive cocci in clusters* Gram positive cocci in clusters*   WOUND CULTURE   --  3+ Growth of Staphylococcus aureus* 2+ Growth of Staphylococcus aureus*  --   --      Results from last 7 days   Lab Units 01/31/24  1748 01/31/24  1225   ANAEROBIC CULTURE  2+ Growth of Finegoldia magna* Culture results to follow.       Imaging: I have personally reviewed pertinent films in PACS  EKG, Pathology, and Other Studies: I have personally reviewed pertinent reports.    ** Please Note: Portions of the record may have been created with voice recognition software. Occasional wrong word or \"sound a like\" substitutions may have occurred due to the inherent limitations of voice recognition software. Read the chart carefully and recognize, using context, where substitutions have occurred. " **

## 2024-02-05 LAB
ANION GAP SERPL CALCULATED.3IONS-SCNC: 9 MMOL/L
BASOPHILS # BLD AUTO: 0.04 THOUSANDS/ÂΜL (ref 0–0.1)
BASOPHILS NFR BLD AUTO: 0 % (ref 0–1)
BUN SERPL-MCNC: 11 MG/DL (ref 5–25)
CALCIUM SERPL-MCNC: 8.4 MG/DL (ref 8.4–10.2)
CHLORIDE SERPL-SCNC: 96 MMOL/L (ref 96–108)
CO2 SERPL-SCNC: 27 MMOL/L (ref 21–32)
CREAT SERPL-MCNC: 0.65 MG/DL (ref 0.6–1.3)
EOSINOPHIL # BLD AUTO: 0.07 THOUSAND/ÂΜL (ref 0–0.61)
EOSINOPHIL NFR BLD AUTO: 1 % (ref 0–6)
ERYTHROCYTE [DISTWIDTH] IN BLOOD BY AUTOMATED COUNT: 13.9 % (ref 11.6–15.1)
GFR SERPL CREATININE-BSD FRML MDRD: 104 ML/MIN/1.73SQ M
GLUCOSE SERPL-MCNC: 155 MG/DL (ref 65–140)
GLUCOSE SERPL-MCNC: 162 MG/DL (ref 65–140)
GLUCOSE SERPL-MCNC: 195 MG/DL (ref 65–140)
GLUCOSE SERPL-MCNC: 246 MG/DL (ref 65–140)
GLUCOSE SERPL-MCNC: 275 MG/DL (ref 65–140)
HCT VFR BLD AUTO: 36.2 % (ref 36.5–49.3)
HGB BLD-MCNC: 12 G/DL (ref 12–17)
IMM GRANULOCYTES # BLD AUTO: 0.13 THOUSAND/UL (ref 0–0.2)
IMM GRANULOCYTES NFR BLD AUTO: 1 % (ref 0–2)
LYMPHOCYTES # BLD AUTO: 1.58 THOUSANDS/ÂΜL (ref 0.6–4.47)
LYMPHOCYTES NFR BLD AUTO: 11 % (ref 14–44)
MAGNESIUM SERPL-MCNC: 2.2 MG/DL (ref 1.9–2.7)
MCH RBC QN AUTO: 28.8 PG (ref 26.8–34.3)
MCHC RBC AUTO-ENTMCNC: 33.1 G/DL (ref 31.4–37.4)
MCV RBC AUTO: 87 FL (ref 82–98)
MONOCYTES # BLD AUTO: 1.54 THOUSAND/ÂΜL (ref 0.17–1.22)
MONOCYTES NFR BLD AUTO: 10 % (ref 4–12)
NEUTROPHILS # BLD AUTO: 11.75 THOUSANDS/ÂΜL (ref 1.85–7.62)
NEUTS SEG NFR BLD AUTO: 77 % (ref 43–75)
NRBC BLD AUTO-RTO: 0 /100 WBCS
PHOSPHATE SERPL-MCNC: 2.4 MG/DL (ref 2.3–4.1)
PLATELET # BLD AUTO: 204 THOUSANDS/UL (ref 149–390)
PMV BLD AUTO: 10.2 FL (ref 8.9–12.7)
POTASSIUM SERPL-SCNC: 4 MMOL/L (ref 3.5–5.3)
RBC # BLD AUTO: 4.17 MILLION/UL (ref 3.88–5.62)
SODIUM SERPL-SCNC: 132 MMOL/L (ref 135–147)
WBC # BLD AUTO: 15.11 THOUSAND/UL (ref 4.31–10.16)

## 2024-02-05 PROCEDURE — 99232 SBSQ HOSP IP/OBS MODERATE 35: CPT | Performed by: INTERNAL MEDICINE

## 2024-02-05 PROCEDURE — NC001 PR NO CHARGE: Performed by: PODIATRIST

## 2024-02-05 PROCEDURE — 85025 COMPLETE CBC W/AUTO DIFF WBC: CPT

## 2024-02-05 PROCEDURE — 80048 BASIC METABOLIC PNL TOTAL CA: CPT

## 2024-02-05 PROCEDURE — 82948 REAGENT STRIP/BLOOD GLUCOSE: CPT

## 2024-02-05 PROCEDURE — 83735 ASSAY OF MAGNESIUM: CPT

## 2024-02-05 PROCEDURE — 97530 THERAPEUTIC ACTIVITIES: CPT

## 2024-02-05 PROCEDURE — 84100 ASSAY OF PHOSPHORUS: CPT

## 2024-02-05 RX ORDER — INSULIN LISPRO 100 [IU]/ML
9 INJECTION, SOLUTION INTRAVENOUS; SUBCUTANEOUS
Status: DISCONTINUED | OUTPATIENT
Start: 2024-02-06 | End: 2024-02-16

## 2024-02-05 RX ORDER — SODIUM CHLORIDE 9 MG/ML
100 INJECTION, SOLUTION INTRAVENOUS CONTINUOUS
Status: DISPENSED | OUTPATIENT
Start: 2024-02-05 | End: 2024-02-06

## 2024-02-05 RX ORDER — AMIODARONE HYDROCHLORIDE 200 MG/1
200 TABLET ORAL
Qty: 90 TABLET | Refills: 4 | Status: SHIPPED | OUTPATIENT
Start: 2024-02-05

## 2024-02-05 RX ORDER — METOPROLOL SUCCINATE 25 MG/1
25 TABLET, EXTENDED RELEASE ORAL EVERY 12 HOURS
Qty: 180 TABLET | Refills: 4 | Status: SHIPPED | OUTPATIENT
Start: 2024-02-05

## 2024-02-05 RX ORDER — ATORVASTATIN CALCIUM 80 MG/1
80 TABLET, FILM COATED ORAL
Qty: 90 TABLET | Refills: 2 | Status: SHIPPED | OUTPATIENT
Start: 2024-02-05

## 2024-02-05 RX ADMIN — CHLORHEXIDINE GLUCONATE 15 ML: 1.2 SOLUTION ORAL at 22:01

## 2024-02-05 RX ADMIN — CLOPIDOGREL BISULFATE 75 MG: 75 TABLET ORAL at 08:20

## 2024-02-05 RX ADMIN — INSULIN LISPRO 1 UNITS: 100 INJECTION, SOLUTION INTRAVENOUS; SUBCUTANEOUS at 11:30

## 2024-02-05 RX ADMIN — INSULIN LISPRO 7 UNITS: 100 INJECTION, SOLUTION INTRAVENOUS; SUBCUTANEOUS at 08:21

## 2024-02-05 RX ADMIN — INSULIN LISPRO 2 UNITS: 100 INJECTION, SOLUTION INTRAVENOUS; SUBCUTANEOUS at 17:25

## 2024-02-05 RX ADMIN — INSULIN LISPRO 3 UNITS: 100 INJECTION, SOLUTION INTRAVENOUS; SUBCUTANEOUS at 22:01

## 2024-02-05 RX ADMIN — CEFAZOLIN SODIUM 2000 MG: 2 SOLUTION INTRAVENOUS at 17:23

## 2024-02-05 RX ADMIN — CEFAZOLIN SODIUM 2000 MG: 2 SOLUTION INTRAVENOUS at 11:27

## 2024-02-05 RX ADMIN — SODIUM CHLORIDE 100 ML/HR: 0.9 INJECTION, SOLUTION INTRAVENOUS at 17:22

## 2024-02-05 RX ADMIN — INSULIN GLARGINE 50 UNITS: 100 INJECTION, SOLUTION SUBCUTANEOUS at 22:01

## 2024-02-05 RX ADMIN — INSULIN LISPRO 1 UNITS: 100 INJECTION, SOLUTION INTRAVENOUS; SUBCUTANEOUS at 08:21

## 2024-02-05 RX ADMIN — AMIODARONE HYDROCHLORIDE 200 MG: 200 TABLET ORAL at 08:20

## 2024-02-05 RX ADMIN — ASPIRIN 81 MG CHEWABLE TABLET 81 MG: 81 TABLET CHEWABLE at 08:20

## 2024-02-05 RX ADMIN — CEFAZOLIN SODIUM 2000 MG: 2 SOLUTION INTRAVENOUS at 00:40

## 2024-02-05 RX ADMIN — SENNOSIDES, DOCUSATE SODIUM 2 TABLET: 8.6; 5 TABLET ORAL at 11:29

## 2024-02-05 RX ADMIN — ATORVASTATIN CALCIUM 80 MG: 80 TABLET, FILM COATED ORAL at 17:24

## 2024-02-05 RX ADMIN — CEFAZOLIN SODIUM 2000 MG: 2 SOLUTION INTRAVENOUS at 23:31

## 2024-02-05 RX ADMIN — POLYETHYLENE GLYCOL 3350 17 G: 17 POWDER, FOR SOLUTION ORAL at 11:27

## 2024-02-05 RX ADMIN — INSULIN LISPRO 7 UNITS: 100 INJECTION, SOLUTION INTRAVENOUS; SUBCUTANEOUS at 11:30

## 2024-02-05 RX ADMIN — CEFAZOLIN SODIUM 2000 MG: 2 SOLUTION INTRAVENOUS at 05:40

## 2024-02-05 RX ADMIN — CHLORHEXIDINE GLUCONATE 15 ML: 1.2 SOLUTION ORAL at 08:20

## 2024-02-05 RX ADMIN — ENOXAPARIN SODIUM 40 MG: 40 INJECTION SUBCUTANEOUS at 08:20

## 2024-02-05 NOTE — PLAN OF CARE
Problem: METABOLIC, FLUID AND ELECTROLYTES - ADULT  Goal: Electrolytes maintained within normal limits  Description: INTERVENTIONS:  - Monitor labs and assess patient for signs and symptoms of electrolyte imbalances  - Administer electrolyte replacement as ordered  - Monitor response to electrolyte replacements, including repeat lab results as appropriate  - Instruct patient on fluid and nutrition as appropriate  Outcome: Progressing     Problem: PAIN - ADULT  Goal: Verbalizes/displays adequate comfort level or baseline comfort level  Description: Interventions:  - Encourage patient to monitor pain and request assistance  - Assess pain using appropriate pain scale  - Administer analgesics based on type and severity of pain and evaluate response  - Implement non-pharmacological measures as appropriate and evaluate response  - Consider cultural and social influences on pain and pain management  - Notify physician/advanced practitioner if interventions unsuccessful or patient reports new pain  Outcome: Progressing     Problem: INFECTION - ADULT  Goal: Absence or prevention of progression during hospitalization  Description: INTERVENTIONS:  - Assess and monitor for signs and symptoms of infection  - Monitor lab/diagnostic results  - Monitor all insertion sites, i.e. indwelling lines, tubes, and drains  - Monitor endotracheal if appropriate and nasal secretions for changes in amount and color  - [place] appropriate cooling/warming therapies per order  - Administer medications as ordered  - Instruct and encourage patient and family to use good hand hygiene technique  - Identify and instruct in appropriate isolation precautions for identified infection/condition  Outcome: Progressing

## 2024-02-05 NOTE — PROGRESS NOTES
Podiatry - Progress Note  Patient: Nadir Myers 63 y.o. male   MRN: [id number]  PCP: Tigre Hare DO  Unit/Bed#: [facility] 902-01 Encounter: [id number]  Date Of Visit: 24    ASSESSMENT:    Nadir Myers is a 63 y.o. male with:    Septic shock - POA  Left fourth toe gangrene  - s/p left 4th digit amputation (DOS: 24)  -s/p left foot washout, partial 4th ray with closure (DOS 2024)  Left foot cellulitis  Bacteremia   Type 2 diabetes mellitus  Three-vessel coronary artery disease, heart failure with reduced ejection fraction.  Tobacco dependence  Peripheral arterial disease      PLAN:    Postop day 1 left partial 4th ray amputation.  Will plan for dressing change tomorrow 2024 to assess incision and any remaining cellulitis  Continue nonweightbearing left lower extremity  Continue IV antibiotics per ID.  Elevation and offloading on green foam wedges or pillows when non-ambulatory.  Rest of care per primary team.     Weightbearing status: Non-weightbearing left  foot    SUBJECTIVE:     The patient was seen, evaluated, and assessed at bedside today. The patient was awake, alert, and in no acute distress. No acute events overnight. The patient reports no pain to the left foot. Patient denies N/V/F/chills/SOB/CP.      OBJECTIVE:     Vitals:   /65   Pulse 90   Temp 98 °F (36.7 °C)   Resp 16   Ht 6' (1.829 m)   Wt 108 kg (237 lb 14 oz)   SpO2 92%   BMI 32.26 kg/m²     Temp (24hrs), Av.5 °F (36.9 °C), Min:97.6 °F (36.4 °C), Max:100.3 °F (37.9 °C)      Physical Exam:     Lungs: Non labored breathing  Abdomen: Soft, non-tender.  Lower Extremity:  Cardiovascular status at baseline from admission.  Neurological status at baseline from admission.  Musculoskeletal status at baseline from admission. No calf tenderness noted.  Dressings left intact at bedside, no strikethrough noted.  Additional Data:     Labs:    Results from last 7 days   Lab Units 24  0442   WBC Thousand/uL 15.11*  "  HEMOGLOBIN g/dL 12.0   HEMATOCRIT % 36.2*   PLATELETS Thousands/uL 204   NEUTROS PCT % 77*   LYMPHS PCT % 11*   MONOS PCT % 10   EOS PCT % 1     Results from last 7 days   Lab Units 02/05/24  0442 02/02/24  0502 02/01/24  0511   POTASSIUM mmol/L 4.0   < > 3.4*   CHLORIDE mmol/L 96   < > 100   CO2 mmol/L 27   < > 23   BUN mg/dL 11   < > 26*   CREATININE mg/dL 0.65   < > 0.85   CALCIUM mg/dL 8.4   < > 8.3*   ALK PHOS U/L  --   --  48   ALT U/L  --   --  15   AST U/L  --   --  21    < > = values in this interval not displayed.     Results from last 7 days   Lab Units 01/31/24  0941   INR  1.13       * I Have Reviewed All Lab Data Listed Above.    Recent Cultures (last 7 days):     Results from last 7 days   Lab Units 02/03/24  0614 01/31/24  1748 01/31/24  1225 01/31/24  1135 01/31/24  1041   BLOOD CULTURE   --   --   --  Staphylococcus aureus* Staphylococcus aureus*   GRAM STAIN RESULT  Gram positive cocci in clusters*  Gram positive cocci in clusters* No polys seen*  2+ Gram positive cocci in pairs* No polys seen*  1+ Gram positive cocci in pairs* Gram positive cocci in clusters* Gram positive cocci in clusters*   WOUND CULTURE   --  3+ Growth of Staphylococcus aureus* 2+ Growth of Staphylococcus aureus*  --   --      Results from last 7 days   Lab Units 01/31/24  1748 01/31/24  1225   ANAEROBIC CULTURE  2+ Growth of Finegoldia magna* 2+ Growth of Anaerococcus vaginalis*       Imaging: I have personally reviewed pertinent films in PACS  EKG, Pathology, and Other Studies: I have personally reviewed pertinent reports.    ** Please Note: Portions of the record may have been created with voice recognition software. Occasional wrong word or \"sound a like\" substitutions may have occurred due to the inherent limitations of voice recognition software. Read the chart carefully and recognize, using context, where substitutions have occurred. **      "

## 2024-02-05 NOTE — [facility] ADMISSION
Patients case reviewed with [facility] physician, patient is pre-approved for [facility] pending medical stability, LOF at discharge, bed availability and insurance authorization approval.  [facility] admissions will continue to follow patient for progression of care and discharge readiness.

## 2024-02-05 NOTE — QUICK NOTE
Patient c/o chills. Pt actively shaking and pale. Pt stated that this happened post-op. Oral temp 98.2F. Gave pt warm blankets and notified ABHISHEK Ashley

## 2024-02-05 NOTE — PROGRESS NOTES
Visited pt to see if they had any further questions regarding education from friday. Pt reports he feels good. Wife reports she found the information helpful, however would like a meal plan of what to make for the patient throughout the week. Explained to wife that it is difficult to create a meal plan in inpatient setting, recommend patient follow-up with OP dietitian. Informed wife and patients of reliable websites for recipes, and possibly purchasing a cookbook that is plant-based or mediterranean diet to help with meal ideas. Wife was appreciative of recommendations.     Recommend putting in referral for pt to be seen by outpatient dietitian.

## 2024-02-05 NOTE — PROGRESS NOTES
"[facility]  Progress Note  Name: Nadir Myers I  MRN: [id number]  Unit/Bed#: [facility] 902-01 I Date of Admission: 1/31/2024   Date of Service: 2/5/2024 I Hospital Day: 5      Assessment & Plan:    * Septic shock (HCC)  Assessment & Plan  Initially admitted to the ICU requiring vasopressor support, now off, with maintenance of hemodynamic stability  Due to MSSA bacteremia from a gangrenous left fourth toe (see below)  Continue to monitor vitals and maintain hemodynamics including temperature curve    LUE weakness  Assessment & Plan  Presented w/ transient left-sided weakness and a facial droop now resolved  Appreciate neurology input deeming symptoms likely secondary to septic shock/infection and hyperglycemia, as neurologic workup including CT/MRI imaging negative for evidence of stroke or intracranial vessel occlusion, but noted chronic microangiopathic changes -> radiology report did however mention: \"Moderate bilateral supraclinoid ICA and moderate to severe left cavernous ICA segment stenosis.\"  Continue dual endplate therapy with ASA/Plavix - c/w statin  PT/OT as tolerated    MSSA bacteremia  Assessment & Plan  As evidenced on blood cultures from 1/31  Likely source due to left fourth toe gangrenous wound -> see plan below  Continue IV Ancef  Echocardiogram negative for obvious evidence of endocarditis    Gangrene of toe of left foot (HCC)  Assessment & Plan  Appreciate podiatry input -> status post left fourth toe amputation on 1/31  Status post wound closure yesterday with plan for dressing change tomorrow per podiatry  Wound care per podiatry  PRN pain and blood sugar control    Chronic systolic CHF (HCC)  Assessment & Plan  Last EF of 42%  Holding diuresis (Aldactone) in the setting of relative hypotension and recovering LINDEN  Continue ASA/Plavix and statin for underlying CAD (associated ischemic cardiomyopathy)  Monitor/replete serum potassium/magnesium as " "necessary  Low sodium and fluid restriction enforced  Plan for a transient course of IV fluids due to mild hypotension and tachycardia -> monitor for iatrogenic fluid overload    PAD (peripheral artery disease) ([facility])  Assessment & Plan  Continue dual antiplatelet therapy with ASA/Plavix - continue statin  Sequela complicated by gangrenous left toe (see above)    Type 2 diabetes mellitus, with long-term current use of insulin ([facility])  Assessment & Plan  Lab Results   Component Value Date    HGBA1C 9.8 (H) 01/31/2024     Continue basal/prandial insulin with additional SSI coverage per Accu-Cheks  Hypoglycemia protocol  Carbohydrate restriction    LINDEN (acute kidney injury) ([facility])  Assessment & Plan  Creatinine of approximately 0.6-0.8 -> currently stable at 0.65 from a prior peak of 1.41  Monitor renal function and urine output  Limit/avoid nephrotoxins and hypotension as possible  Aldactone remains held due to relative hypotension    Hyponatremia  Assessment & Plan  Maintain strict fluid restriction  Optimize blood sugar control as a degree of \"pseudohyponatremia\" is likely contributory  Serum sodium improved to 132 today    Electrolyte abnormalities  Assessment & Plan  Monitor/replete serum potassium/magnesium/phosphate as necessary      DVT Prophylaxis:  Lovenox    AM-PAC Basic Mobility:  Basic Mobility Inpatient Raw Score: 13  -[facility] Achieved: 4: Move to chair/commode  -[facility] Goal: 4: Move to chair/commode    Patient Centered Rounds:  I have performed bedside rounds and discussed plan of care with nursing today.  Discussions with Specialists or Other Care Team Provider:  see above assessments if applicable    Education and Discussions with Family / Patient: Patient, along with family members, at bedside today     Time Spent for Care:  35 minutes. More than 50% of total time spent on counseling and coordination of care, on one or more of the following: performing physical exam; counseling and coordination of care, " obtaining or reviewing history, documenting in the medical record, reviewing/ordering tests/medications/procedures, and communicating with other healthcare professionals.    Current Length of Stay: 5 day(s)  Current Patient Status: Inpatient   Certification Statement:  Patient will continue to require additional hospital stay due to assessments as noted above.    Code Status: Level 1 - Full Code        Subjective:     Encountered earlier today.  Sitting upright in bed without complaints of uncontrolled pain at this time.        Objective:     Vitals:   Temp (24hrs), Av.2 °F (36.8 °C), Min:98 °F (36.7 °C), Max:98.2 °F (36.8 °C)    Temp:  [98 °F (36.7 °C)-98.2 °F (36.8 °C)] 98.2 °F (36.8 °C)  HR:  [] 101  Resp:  [16] 16  BP: (111-118)/(65-71) 118/71  SpO2:  [92 %-95 %] 95 %  Body mass index is 32.26 kg/m².     Input and Output Summary (last 24 hours):       Intake/Output Summary (Last 24 hours) at 2024 1700  Last data filed at 2024 1538  Gross per 24 hour   Intake 540 ml   Output 2050 ml   Net -1510 ml       Physical Exam:     GENERAL Well-developed/nourished - no immediate distress at rest   HEAD   Normocephalic - atraumatic   EYES Nonicteric   MOUTH   Mucosa moist   NECK   Supple - full range of motion   CARDIAC Intermittently tachycardic - S1/S2 positive   PULMONARY    Clear breath sounds bilaterally - nonlabored respirations   ABDOMEN   Soft - nontender/nondistended   MUSCULOSKELETAL   Motor strength/range of motion deconditioned with left foot dressed/bandaged   NEUROLOGIC   Alert/oriented at baseline   PSYCHIATRIC   Mood/affect stable         Additional Data:     Labs & Recent Cultures:    Results from last 7 days   Lab Units 24  0442   WBC Thousand/uL 15.11*   HEMOGLOBIN g/dL 12.0   HEMATOCRIT % 36.2*   PLATELETS Thousands/uL 204   NEUTROS PCT % 77*   LYMPHS PCT % 11*   MONOS PCT % 10   EOS PCT % 1     Results from last 7 days   Lab Units 24  0442 24  0502 24  0511    POTASSIUM mmol/L 4.0   < > 3.4*   CHLORIDE mmol/L 96   < > 100   CO2 mmol/L 27   < > 23   BUN mg/dL 11   < > 26*   CREATININE mg/dL 0.65   < > 0.85   CALCIUM mg/dL 8.4   < > 8.3*   ALK PHOS U/L  --   --  48   ALT U/L  --   --  15   AST U/L  --   --  21    < > = values in this interval not displayed.     Results from last 7 days   Lab Units 01/31/24  0941   INR  1.13     Results from last 7 days   Lab Units 02/05/24  1536 02/05/24  1052 02/05/24  0708 02/04/24  2048 02/04/24  1612 02/04/24  1027 02/04/24  0712 02/03/24  2048 02/03/24  1606 02/03/24  1114 02/03/24  0741 02/02/24  2142   POC GLUCOSE mg/dl 246* 195* 162* 256* 201* 177* 168* 206* 279* 248* 212* 255*     Results from last 7 days   Lab Units 01/31/24  1516   HEMOGLOBIN A1C % 9.8*     Results from last 7 days   Lab Units 01/31/24  1225 01/31/24  1112 01/31/24  0951   LACTIC ACID mmol/L 1.9  --  3.0*   PROCALCITONIN ng/ml  --  0.73*  --          Results from last 7 days   Lab Units 02/03/24  0614 01/31/24  1748 01/31/24  1225 01/31/24  1135 01/31/24  1041   BLOOD CULTURE  Staphylococcus aureus*  Staphylococcus aureus*  --   --  Staphylococcus aureus* Staphylococcus aureus*   GRAM STAIN RESULT  Gram positive cocci in clusters*  Gram positive cocci in clusters* No polys seen*  2+ Gram positive cocci in pairs* No polys seen*  1+ Gram positive cocci in pairs* Gram positive cocci in clusters* Gram positive cocci in clusters*   WOUND CULTURE   --  3+ Growth of Staphylococcus aureus* 2+ Growth of Staphylococcus aureus*  --   --          Lines/Drains:  Invasive Devices       Peripheral Intravenous Line  Duration             Peripheral IV 02/04/24 Distal;Right;Upper;Ventral (anterior) Arm 1 day                      Last 24 Hours Medication List:   Current Facility-Administered Medications   Medication Dose Route Frequency Provider Last Rate    acetaminophen  975 mg Oral Q6H PRN Mague Cunningham DPM      amiodarone  200 mg Oral Daily With Breakfast Mague Cunningham,  NORRISM      aspirin  81 mg Oral Daily Mague Cunningham DPM      atorvastatin  80 mg Oral After Dinner Mague Cunningham DPM      cefazolin  2,000 mg Intravenous Q6H Mague Cunningham DPM 2,000 mg (02/05/24 1127)    chlorhexidine  15 mL Mouth/Throat Q12H TEJAS Mague Cunningham DPM      clopidogrel  75 mg Oral Daily Mague Cunningham DPM      enoxaparin  40 mg Subcutaneous Q24H TEJAS Mague Cunningham DPM      insulin glargine  50 Units Subcutaneous HS Hilda Ashley MD      insulin lispro  1-5 Units Subcutaneous 4x Daily (AC & HS) Mague Cunningham DPM      [START ON 2/6/2024] insulin lispro  9 Units Subcutaneous TID With Meals Hilda Ashley MD      ondansetron  4 mg Intravenous Once Mague Cunningham DPM      polyethylene glycol  17 g Oral Daily PRN Mague Cunningham DPM      senna-docusate sodium  2 tablet Oral BID PRN Mague Cunningham DPM      sodium chloride  100 mL/hr Intravenous Continuous MD HILDA Dc MD   Hospitalist - [facility]        ** Please Note: This note is constructed using a voice recognition dictation system.  An occasional wrong word/phrase or “sound-a-like” substitution may have been picked up by dictation device due to the inherent limitations of voice recognition software.  Read the chart carefully and recognize, using reasonable context, where substitutions may have occurred.**

## 2024-02-05 NOTE — PLAN OF CARE
Problem: Prexisting or High Potential for Compromised Skin Integrity  Goal: Skin integrity is maintained or improved  Description: INTERVENTIONS:  - Identify patients at risk for skin breakdown  - Assess and monitor skin integrity  - Assess and monitor nutrition and hydration status  - Monitor labs   - Assess for incontinence   - Turn and reposition patient  - Assist with mobility/ambulation  - Relieve pressure over bony prominences  - Avoid friction and shearing  - Provide appropriate hygiene as needed including keeping skin clean and dry  - Evaluate need for skin moisturizer/barrier cream  - Collaborate with interdisciplinary team   - Patient/family teaching  - Consider wound care consult   Outcome: Progressing     Problem: METABOLIC, FLUID AND ELECTROLYTES - ADULT  Goal: Electrolytes maintained within normal limits  Description: INTERVENTIONS:  - Monitor labs and assess patient for signs and symptoms of electrolyte imbalances  - Administer electrolyte replacement as ordered  - Monitor response to electrolyte replacements, including repeat lab results as appropriate  - Instruct patient on fluid and nutrition as appropriate  Outcome: Progressing  Goal: Fluid balance maintained  Description: INTERVENTIONS:  - Monitor labs   - Monitor I/O and WT  - Instruct patient on fluid and nutrition as appropriate  - Assess for signs & symptoms of volume excess or deficit  Outcome: Progressing  Goal: Glucose maintained within target range  Description: INTERVENTIONS:  - Monitor Blood Glucose as ordered  - Assess for signs and symptoms of hyperglycemia and hypoglycemia  - Administer ordered medications to maintain glucose within target range  - Assess nutritional intake and initiate nutrition service referral as needed  Outcome: Progressing     Problem: INFECTION - ADULT  Goal: Absence or prevention of progression during hospitalization  Description: INTERVENTIONS:  - Assess and monitor for signs and symptoms of infection  -  Monitor lab/diagnostic results  - Monitor all insertion sites, i.e. indwelling lines, tubes, and drains  - Monitor endotracheal if appropriate and nasal secretions for changes in amount and color  - [place] appropriate cooling/warming therapies per order  - Administer medications as ordered  - Instruct and encourage patient and family to use good hand hygiene technique  - Identify and instruct in appropriate isolation precautions for identified infection/condition  Outcome: Progressing  Goal: Absence of fever/infection during neutropenic period  Description: INTERVENTIONS:  - Monitor WBC    Outcome: Progressing

## 2024-02-05 NOTE — PHYSICAL THERAPY NOTE
PHYSICAL THERAPY NOTE          Patient Name: Nadir Myers  Today's Date: 2/5/2024 02/05/24 1438   PT Last Visit   PT Visit Date 02/05/24   Note Type   Note Type Treatment   Pain Assessment   Pain Assessment Tool 0-10   Pain Score No Pain   Restrictions/Precautions   Weight Bearing Precautions Per Order Yes   LLE Weight Bearing Per Order (S)  NWB   Other Precautions WBS;Multiple lines;Fall Risk;Pain   General   Chart Reviewed Yes   Response to Previous Treatment Patient with no complaints from previous session.   Family/Caregiver Present No   Cognition   Overall Cognitive Status WFL   Arousal/Participation Alert;Responsive;Cooperative   Attention Within functional limits   Orientation Level Oriented X4   Memory Within functional limits   Following Commands Follows all commands and directions without difficulty   Comments pleasant and cooperative   Bed Mobility   Supine to Sit 5  Supervision   Additional items HOB elevated;Bedrails;Increased time required   Sit to Supine Unable to assess   Additional Comments pt supine in bed upon arrival. Pt left sitting OOB in recliner with all needs wihtin reach   Transfers   Sit to Stand 3  Moderate assistance   Additional items Assist x 1;Increased time required;Verbal cues   Stand to Sit 3  Moderate assistance   Additional items Assist x 1;Increased time required;Verbal cues   Sit pivot 5  Supervision   Additional items Increased time required;Verbal cues   Additional Comments transfers with RW to complete STS. Sit pivot performed from EOB > drop arm recliner   Ambulation/Elevation   Gait pattern Not tested   Balance   Static Sitting Fair +   Dynamic Sitting Fair   Static Standing Fair -   Dynamic Standing Poor +   Ambulatory Zero   Endurance Deficit   Endurance Deficit Yes   Activity Tolerance   Activity Tolerance Patient tolerated treatment well   Nurse Made Aware RN cleared pt to  participate in therapy session   Assessment   Prognosis Good   Problem List Decreased strength;Decreased endurance;Impaired balance;Decreased mobility;Pain   Assessment Pt seen for PT treatment session this date. Therapy session focused on bed mobility, functional transfers + endurance training in order to improve overall mobility and independence. Pt requires assist of 1 for all mobility performed this date. Pt making steady progress toward goals as demonstrated by requiring decreased physical assistance compared to previous session. Would like to trial WC in upcoming sessions to improve independence and functional mobility while maintaining WBS. Pt was left sitting OOB in recliner at the end of PT session with all needs in reach. Pt would benefit from continued PT services while in hospital to address remaining limitations. PT to continue to follow pt and recommends acute STR pending continued progress. [facility] Raw Score is 13. A Raw score of greater than 16 suggests the patient may benefit from discharge to home. Please also refer to the recommendation of the Physical Therapist for safe discharge planning.   Barriers to Discharge Inaccessible home environment   Goals   Patient Goals to get OOB   STG Expiration Date 02/16/24   PT Treatment Day 1   Plan   Treatment/Interventions Functional transfer training;LE strengthening/ROM;Elevations;Therapeutic exercise;Endurance training;Patient/family training;Equipment eval/education;Bed mobility;Gait training;Spoke to nursing;Spoke to case management;OT   Progress Progressing toward goals   PT Frequency 3-5x/wk   Discharge Recommendation   Rehab Resource Intensity Level, PT I (Maximum Resource Intensity)   Equipment Recommended Wheelchair;Walker   [facility]   Turning in Flat Bed Without Bedrails 3   Lying on Back to Sitting on Edge of Flat Bed Without Bedrails 3   Moving Bed to Chair 3   Standing Up  From Chair Using Arms 2   Walk in Room 1   Climb 3-5 Stairs With Railing 1   Basic Mobility Inpatient Raw Score 13   Basic Mobility Standardized Score 33.99   Highest Level Of Mobility   -HLM Goal 4: Move to chair/commode   -HLM Achieved 4: Move to chair/commode   Education   Education Provided Mobility training;Assistive device   Patient Demonstrates acceptance/verbal understanding   End of Consult   Patient Position at End of Consult Bedside chair;All needs within reach     Maritza Holm, PT, DPT

## 2024-02-05 NOTE — PLAN OF CARE
Problem: PHYSICAL THERAPY ADULT  Goal: Performs mobility at highest level of function for planned discharge setting.  See evaluation for individualized goals.  Description: Treatment/Interventions: Functional transfer training, LE strengthening/ROM, Elevations, Therapeutic exercise, Endurance training, Patient/family training, Equipment eval/education, Bed mobility, Spoke to nursing, Gait training, OT, Spoke to case management  Equipment Recommended: Wheelchair, Walker       See flowsheet documentation for full assessment, interventions and recommendations.  Outcome: Progressing  Note: Prognosis: Good  Problem List: Decreased strength, Decreased endurance, Impaired balance, Decreased mobility, Pain  Assessment: Pt seen for PT treatment session this date. Therapy session focused on bed mobility, functional transfers + endurance training in order to improve overall mobility and independence. Pt requires assist of 1 for all mobility performed this date. Pt making steady progress toward goals as demonstrated by requiring decreased physical assistance compared to previous session. Would like to trial WC in upcoming sessions to improve independence and functional mobility while maintaining WBS. Pt was left sitting OOB in recliner at the end of PT session with all needs in reach. Pt would benefit from continued PT services while in hospital to address remaining limitations. PT to continue to follow pt and recommends acute STR pending continued progress. The patient's AM-PAC Basic Mobility Inpatient Short Form Raw Score is 13. A Raw score of greater than 16 suggests the patient may benefit from discharge to home. Please also refer to the recommendation of the Physical Therapist for safe discharge planning.  Barriers to Discharge: Inaccessible home environment     Rehab Resource Intensity Level, PT: I (Maximum Resource Intensity)    See flowsheet documentation for full assessment.

## 2024-02-05 NOTE — PROGRESS NOTES
Patient:    MRN:  [id number]    [place] Request ID:  [id number]    Level of care reserved:  Inpatient Rehab Facility    Partner Reserved:  [facility] ([place]), [place] [id number] [phone]    Clinical needs requested:    Geography searched:  10 miles around [id number]    Start of Service:    Request sent:  2:15pm EST on 2/2/2024 by Ysabel Miller    Partner reserved:  11:34am EST on 2/5/2024 by Ysabel Miller    Choice list shared:  11:34am EST on 2/5/2024 by Ysabel Miller

## 2024-02-06 LAB
ANION GAP SERPL CALCULATED.3IONS-SCNC: 6 MMOL/L
BACTERIA BLD CULT: ABNORMAL
BASOPHILS # BLD MANUAL: 0 THOUSAND/UL (ref 0–0.1)
BASOPHILS NFR MAR MANUAL: 0 % (ref 0–1)
BUN SERPL-MCNC: 8 MG/DL (ref 5–25)
CALCIUM SERPL-MCNC: 8 MG/DL (ref 8.4–10.2)
CHLORIDE SERPL-SCNC: 100 MMOL/L (ref 96–108)
CO2 SERPL-SCNC: 28 MMOL/L (ref 21–32)
CREAT SERPL-MCNC: 0.61 MG/DL (ref 0.6–1.3)
EOSINOPHIL # BLD MANUAL: 0.22 THOUSAND/UL (ref 0–0.4)
EOSINOPHIL NFR BLD MANUAL: 2 % (ref 0–6)
ERYTHROCYTE [DISTWIDTH] IN BLOOD BY AUTOMATED COUNT: 14 % (ref 11.6–15.1)
GFR SERPL CREATININE-BSD FRML MDRD: 107 ML/MIN/1.73SQ M
GIANT PLATELETS BLD QL SMEAR: PRESENT
GLUCOSE SERPL-MCNC: 103 MG/DL (ref 65–140)
GLUCOSE SERPL-MCNC: 106 MG/DL (ref 65–140)
GLUCOSE SERPL-MCNC: 135 MG/DL (ref 65–140)
GLUCOSE SERPL-MCNC: 151 MG/DL (ref 65–140)
GLUCOSE SERPL-MCNC: 182 MG/DL (ref 65–140)
GRAM STN SPEC: ABNORMAL
GRAM STN SPEC: ABNORMAL
HCT VFR BLD AUTO: 34.3 % (ref 36.5–49.3)
HGB BLD-MCNC: 11.3 G/DL (ref 12–17)
LYMPHOCYTES # BLD AUTO: 1.43 THOUSAND/UL (ref 0.6–4.47)
LYMPHOCYTES # BLD AUTO: 9 % (ref 14–44)
MAGNESIUM SERPL-MCNC: 1.9 MG/DL (ref 1.9–2.7)
MCH RBC QN AUTO: 28.8 PG (ref 26.8–34.3)
MCHC RBC AUTO-ENTMCNC: 32.9 G/DL (ref 31.4–37.4)
MCV RBC AUTO: 88 FL (ref 82–98)
MONOCYTES # BLD AUTO: 0.99 THOUSAND/UL (ref 0–1.22)
MONOCYTES NFR BLD: 9 % (ref 4–12)
MYELOCYTES NFR BLD MANUAL: 1 % (ref 0–1)
NEUTROPHILS # BLD MANUAL: 8.27 THOUSAND/UL (ref 1.85–7.62)
NEUTS BAND NFR BLD MANUAL: 6 % (ref 0–8)
NEUTS SEG NFR BLD AUTO: 69 % (ref 43–75)
PHOSPHATE SERPL-MCNC: 3 MG/DL (ref 2.3–4.1)
PLATELET # BLD AUTO: 220 THOUSANDS/UL (ref 149–390)
PLATELET BLD QL SMEAR: ADEQUATE
PMV BLD AUTO: 9.9 FL (ref 8.9–12.7)
POLYCHROMASIA BLD QL SMEAR: PRESENT
POTASSIUM SERPL-SCNC: 3.5 MMOL/L (ref 3.5–5.3)
RBC # BLD AUTO: 3.92 MILLION/UL (ref 3.88–5.62)
RBC MORPH BLD: PRESENT
SODIUM SERPL-SCNC: 134 MMOL/L (ref 135–147)
VARIANT LYMPHS # BLD AUTO: 4 %
WBC # BLD AUTO: 11.02 THOUSAND/UL (ref 4.31–10.16)

## 2024-02-06 PROCEDURE — 87186 SC STD MICRODIL/AGAR DIL: CPT | Performed by: INTERNAL MEDICINE

## 2024-02-06 PROCEDURE — 85027 COMPLETE CBC AUTOMATED: CPT

## 2024-02-06 PROCEDURE — 80048 BASIC METABOLIC PNL TOTAL CA: CPT

## 2024-02-06 PROCEDURE — 87040 BLOOD CULTURE FOR BACTERIA: CPT | Performed by: INTERNAL MEDICINE

## 2024-02-06 PROCEDURE — 97530 THERAPEUTIC ACTIVITIES: CPT

## 2024-02-06 PROCEDURE — NC001 PR NO CHARGE: Performed by: PODIATRIST

## 2024-02-06 PROCEDURE — 83735 ASSAY OF MAGNESIUM: CPT

## 2024-02-06 PROCEDURE — 84100 ASSAY OF PHOSPHORUS: CPT

## 2024-02-06 PROCEDURE — 87147 CULTURE TYPE IMMUNOLOGIC: CPT | Performed by: INTERNAL MEDICINE

## 2024-02-06 PROCEDURE — 97542 WHEELCHAIR MNGMENT TRAINING: CPT

## 2024-02-06 PROCEDURE — 99223 1ST HOSP IP/OBS HIGH 75: CPT | Performed by: INTERNAL MEDICINE

## 2024-02-06 PROCEDURE — 99232 SBSQ HOSP IP/OBS MODERATE 35: CPT | Performed by: STUDENT IN AN ORGANIZED HEALTH CARE EDUCATION/TRAINING PROGRAM

## 2024-02-06 PROCEDURE — 82948 REAGENT STRIP/BLOOD GLUCOSE: CPT

## 2024-02-06 PROCEDURE — 97535 SELF CARE MNGMENT TRAINING: CPT

## 2024-02-06 PROCEDURE — 85007 BL SMEAR W/DIFF WBC COUNT: CPT

## 2024-02-06 RX ADMIN — ENOXAPARIN SODIUM 40 MG: 40 INJECTION SUBCUTANEOUS at 08:51

## 2024-02-06 RX ADMIN — INSULIN LISPRO 1 UNITS: 100 INJECTION, SOLUTION INTRAVENOUS; SUBCUTANEOUS at 22:23

## 2024-02-06 RX ADMIN — ASPIRIN 81 MG CHEWABLE TABLET 81 MG: 81 TABLET CHEWABLE at 08:51

## 2024-02-06 RX ADMIN — INSULIN LISPRO 1 UNITS: 100 INJECTION, SOLUTION INTRAVENOUS; SUBCUTANEOUS at 17:59

## 2024-02-06 RX ADMIN — CEFAZOLIN SODIUM 2000 MG: 2 SOLUTION INTRAVENOUS at 18:00

## 2024-02-06 RX ADMIN — INSULIN LISPRO 9 UNITS: 100 INJECTION, SOLUTION INTRAVENOUS; SUBCUTANEOUS at 12:58

## 2024-02-06 RX ADMIN — AMIODARONE HYDROCHLORIDE 200 MG: 200 TABLET ORAL at 08:51

## 2024-02-06 RX ADMIN — INSULIN GLARGINE 50 UNITS: 100 INJECTION, SOLUTION SUBCUTANEOUS at 22:23

## 2024-02-06 RX ADMIN — CEFAZOLIN SODIUM 2000 MG: 2 SOLUTION INTRAVENOUS at 05:38

## 2024-02-06 RX ADMIN — CEFAZOLIN SODIUM 2000 MG: 2 SOLUTION INTRAVENOUS at 12:57

## 2024-02-06 RX ADMIN — CHLORHEXIDINE GLUCONATE 15 ML: 1.2 SOLUTION ORAL at 08:51

## 2024-02-06 RX ADMIN — INSULIN LISPRO 9 UNITS: 100 INJECTION, SOLUTION INTRAVENOUS; SUBCUTANEOUS at 17:59

## 2024-02-06 RX ADMIN — CLOPIDOGREL BISULFATE 75 MG: 75 TABLET ORAL at 08:51

## 2024-02-06 RX ADMIN — ATORVASTATIN CALCIUM 80 MG: 80 TABLET, FILM COATED ORAL at 17:59

## 2024-02-06 RX ADMIN — CEFAZOLIN SODIUM 2000 MG: 2 SOLUTION INTRAVENOUS at 23:27

## 2024-02-06 RX ADMIN — INSULIN LISPRO 9 UNITS: 100 INJECTION, SOLUTION INTRAVENOUS; SUBCUTANEOUS at 08:56

## 2024-02-06 NOTE — PLAN OF CARE
Problem: PHYSICAL THERAPY ADULT  Goal: Performs mobility at highest level of function for planned discharge setting.  See evaluation for individualized goals.  Description: Treatment/Interventions: Functional transfer training, LE strengthening/ROM, Elevations, Therapeutic exercise, Endurance training, Patient/family training, Equipment eval/education, Bed mobility, Spoke to nursing, Gait training, OT, Spoke to case management  Equipment Recommended: Wheelchair, Walker       See flowsheet documentation for full assessment, interventions and recommendations.  2/6/2024 1502 by Maritza Holm PT  Outcome: Progressing  Note: Prognosis: Good  Problem List: Decreased strength, Decreased endurance, Impaired balance, Decreased mobility, Pain  Assessment: Pt seen for PT treatment session this date. Therapy session focused on bed mobility, functional transfers, WC mobility and endurance training in order to improve overall mobility and independence. Pt requires S level for bed mobility and sit pivot transfer as well as close S level for WC management with cues required for brake locking prior to transfer as well as obstacle negotiation. Per pt, WC mobility in home would not be feasible 2* inadequate space. During session pt reporting new WBS, will need to confirm with podiatry for upcoming sessions. Pt making steady progress toward goals. Pt was left supine in bed at the end of PT session with all needs in reach. Pt would benefit from continued PT services while in hospital to address remaining limitations. PT to continue to follow pt and recommends STR pending continued progress. The patient's AM-PAC Basic Mobility Inpatient Short Form Raw Score is 14. A Raw score of less than or equal to 16 suggests the patient may benefit from discharge to post-acute rehabilitation services. Please also refer to the recommendation of the Physical Therapist for safe discharge planning.  Barriers to Discharge: Inaccessible home environment  (per pt, WC would not be able to fit inside mobile home also has ~3 CONNIE)     Rehab Resource Intensity Level, PT: I (Maximum Resource Intensity)    See flowsheet documentation for full assessment.

## 2024-02-06 NOTE — ASSESSMENT & PLAN NOTE
Last EF of 42%  Holding diuresis (Aldactone) in the setting of relative hypotension and recovering LINDEN  Continue ASA/Plavix and statin for underlying CAD (associated ischemic cardiomyopathy)  Monitor/replete serum potassium/magnesium as necessary  Low sodium and fluid restriction enforced  Appears euvolemic

## 2024-02-06 NOTE — ASSESSMENT & PLAN NOTE
"Maintain strict fluid restriction  Optimize blood sugar control as a degree of \"pseudohyponatremia\" is likely contributory  Sodium improving to 134 today, continue to monitor  "

## 2024-02-06 NOTE — ASSESSMENT & PLAN NOTE
Creatinine of approximately 0.6-0.8 -> currently stable at 0.65 from a prior peak of 1.41  Monitor renal function and urine output  Limit/avoid nephrotoxins and hypotension as possible  Holding spironolactone

## 2024-02-06 NOTE — CONSULTS
Consultation - Infectious Disease   Nadir Myers 63 y.o. male MRN: [id number]  Unit/Bed#: [facility] 902-01 Encounter: [id number]      IMPRESSION & RECOMMENDATIONS:     Sepsis, present on arrival; fever, leukocytosis  -Source is bacteremia and patients foot infection as below. No other clear source. Fevers resolving on antibiotic, WBC down trending.   -Antibiotic as below  -Follow up blood cultures  -Trend fever curve  -Repeat CBC + diff tomorrow AM    MSSA bacteremia  -Likely source is patients foot wound as below. Blood cultures positive on 1/31 and now again on 2/03 despite being on Cefazolin. In presence of ICD, must consider endocarditis and/or cardiac device infection. TTE on 2/01 (adequate study) did not show a vegetation.   -Continue high dose IV Cefazolin 2g every 6 hours  -Repeat blood cultures X2 now for clearance  -Would recommend GILL to better evaluate for ICD infection/endocarditis  -Would discuss with EP if candidate for device extraction pending GILL  -Will need prolonged course of IV antibiotic, do not place PICC until blood cultures clear X 72 hours    Presence of ICD  - As above, in setting of persistent MSSA bacteremia must consider possibility of endocarditis and/or device infection.  -Would obtain GILL  -Antibiotic as above  -Discussion with EP    4. 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 Culture polymicrobial of tissue with MSSA, Finegoldia and Anaerococccus. Likely source of patient's bacteremia. Surgical cure felt to be obtained per Podiatry.  -Ongoing local wound care per Podiatry.    5. Staph epidermidis in 1 of 2 blood cultures  -1 set from 2/03 with Staph epi, did not grow in prior blood cultures from 1/31. Suspect this was skin contaminant and as above MSSA is true pathogen.  -Follow up repeat blood cultures and second culture from 2/03 to ensure no other growth of Staph epi    I have discussed the above management plan in detail with the primary service    I have  performed an extensive review of the medical records in Epic including review of the notes, radiographs, and laboratory results     HISTORY OF PRESENT ILLNESS:  Reason for Consult: MSSA bacteremia    HPI: Nadir Myers is a 63 y.o. year old male with T2DM, PAD status post SFA baloon angioplasty (1/18/23), CAD, history of Vtach on amiodarone, presence of ICD. Was just admitted 1/15-1/21 with left foot cellulitis in setting of fourth toe wound progression. Wound culture grew MSSA, treated with 10 days of antibiotic. LEADs noted PAD on left, underwent arteriogram on 1/18. MRI on 1/19 was low suspicion for osteomyelitis. Was readmitted now on 1/31 after EMS brought patient in for stroke alert. Patient reportedly started to act funny the night prior at home with staring spells. In the AM he was not moving his left hand and called EMS. His chronic foot wound also appeared to have worsened. Noted to have facial droop on arrival. He was hypotensive and started on NE, admitted to ICU. Podiatry consulted for wet gangrene to 4th toe on left foot. A toe amputation at [facility] was performed, amputation margin appeared clean. Taken back to OR 2/04 for delayed primary closure, no purulence or sinus tracts noted. Blood cultures on 1/31 had grown MSSA, repeat blood cultures 2/03 again with MSSA in both sets and Staph epi in one set.     Currently reports foot pain controlled. Denies back pain, neck pain, abdominal pain, nausea, emesis.     REVIEW OF SYSTEMS:  A complete review of systems is negative other than that noted in the HPI.    PAST MEDICAL HISTORY:  Past Medical History:   Diagnosis Date    Diabetes mellitus (HCC)     Myocardial infarction (HCC)      Past Surgical History:   Procedure Laterality Date    CARDIAC CATHETERIZATION N/A 05/03/2023    Procedure: Cardiac Coronary Angiogram;  Surgeon: Valeriy Shore MD;  Location: [facility];  Service: Cardiology    CARDIAC CATHETERIZATION Left 05/03/2023    Procedure:  Cardiac Left Heart Cath;  Surgeon: Valeriy Shore MD;  Location: [facility];  Service: Cardiology    CARDIAC DEFIBRILLATOR PLACEMENT  2023    CARDIAC ELECTROPHYSIOLOGY PROCEDURE N/A 2023    Procedure: Cardiac icd implant;  Surgeon: Urbano Maria MD;  Location:  CARDIAC CATH LAB;  Service: Cardiology    IR LOWER EXTREMITY ANGIOGRAM  2024    WOUND DEBRIDEMENT Left 2024    Procedure: LEFT FOURTH TOE AMPUTATION SURGICAL WOUND DEBRIDEMENT FOOT/TOE (WASH OUT);  Surgeon: Bebo Hopper DPM;  Location:  MAIN OR;  Service: Podiatry       FAMILY HISTORY:  Non-contributory    SOCIAL HISTORY:  Social History   Social History     Substance and Sexual Activity   Alcohol Use Not Currently     Social History     Substance and Sexual Activity   Drug Use Never     Social History     Tobacco Use   Smoking Status Every Day    Types: Cigarettes   Smokeless Tobacco Never   Tobacco Comments    X2 cigarettes/day       ALLERGIES:  Allergies   Allergen Reactions    Vancomycin Rash     NOT AN ALLERGY - 24 patient experienced vancomycin infusion reaction, please extend duration of infusion time to prevent future infusion reactions       MEDICATIONS:  All current active medications have been reviewed.      PHYSICAL EXAM:  Temp:  [98 °F (36.7 °C)-98.3 °F (36.8 °C)] 98.3 °F (36.8 °C)  HR:  [] 80  Resp:  [16-18] 18  BP: (117-124)/(71-74) 124/74  SpO2:  [90 %-95 %] 95 %  Temp (24hrs), Av.2 °F (36.8 °C), Min:98 °F (36.7 °C), Max:98.3 °F (36.8 °C)  Current: Temperature: 98.3 °F (36.8 °C)    Intake/Output Summary (Last 24 hours) at 2024 0947  Last data filed at 2024 0548  Gross per 24 hour   Intake 240 ml   Output 2100 ml   Net -1860 ml       General Appearance:  Appearing well, nontoxic, and in no distress   Head:  Normocephalic, without obvious abnormality, atraumatic   Eyes:  Conjunctiva pink and sclera anicteric, both eyes   Nose: Nares normal, mucosa normal, no drainage   Throat: Oropharynx  moist without lesions   Neck: Supple   Back:   Symmetric   Lungs:   Clear to auscultation bilaterally, respirations unlabored   Chest Wall:  No tenderness or deformity   Heart:  RRR   Abdomen:   Soft, non-tender    Extremities: Left foot with dressing intact, no proximal erythema   Skin: No rashes    Lymph nodes: Cervical, supraclavicular nodes normal   Neurologic: Alert and oriented       LABS, IMAGING, & OTHER STUDIES:  Lab Results:  I have personally reviewed pertinent labs.  Results from last 7 days   Lab Units 02/06/24  0633 02/05/24  0442 02/04/24  0624   WBC Thousand/uL 11.02* 15.11* 16.92*   HEMOGLOBIN g/dL 11.3* 12.0 13.5   PLATELETS Thousands/uL 220 204 189     Results from last 7 days   Lab Units 02/06/24  0633 02/05/24  0442 02/04/24  1221 02/02/24  0502 02/01/24  0511 01/31/24  0951   SODIUM mmol/L 134* 132* 128*   < > 132*  --    POTASSIUM mmol/L 3.5 4.0 4.2   < > 3.4*  --    CHLORIDE mmol/L 100 96 93*   < > 100  --    CO2 mmol/L 28 27 29   < > 23  --    BUN mg/dL 8 11 12   < > 26*  --    CREATININE mg/dL 0.61 0.65 0.79   < > 0.85  --    EGFR ml/min/1.73sq m 107 104 96   < > 93  --    CALCIUM mg/dL 8.0* 8.4 8.8   < > 8.3*  --    AST U/L  --   --   --   --  21 16   ALT U/L  --   --   --   --  15 13   ALK PHOS U/L  --   --   --   --  48 58    < > = values in this interval not displayed.     Results from last 7 days   Lab Units 02/03/24  0614 01/31/24  1748 01/31/24  1225 01/31/24  1135 01/31/24  1041   BLOOD CULTURE  Staphylococcus aureus*  Staphylococcus epidermidis*  Staphylococcus aureus*  --   --  Staphylococcus aureus* Staphylococcus aureus*   GRAM STAIN RESULT  Gram positive cocci in clusters*  Gram positive cocci in clusters* No polys seen*  2+ Gram positive cocci in pairs* No polys seen*  1+ Gram positive cocci in pairs* Gram positive cocci in clusters* Gram positive cocci in clusters*   WOUND CULTURE   --  3+ Growth of Staphylococcus aureus* 2+ Growth of Staphylococcus aureus*  --   --       Results from last 7 days   Lab Units 01/31/24  1112   PROCALCITONIN ng/ml 0.73*         Imaging Studies:   I have personally reviewed pertinent imaging study reports and images in PACS.      Other Studies:   I have personally reviewed pertinent reports.      Zach Medina MD  [facility]

## 2024-02-06 NOTE — PLAN OF CARE
Problem: METABOLIC, FLUID AND ELECTROLYTES - ADULT  Goal: Electrolytes maintained within normal limits  Description: INTERVENTIONS:  - Monitor labs and assess patient for signs and symptoms of electrolyte imbalances  - Administer electrolyte replacement as ordered  - Monitor response to electrolyte replacements, including repeat lab results as appropriate  - Instruct patient on fluid and nutrition as appropriate  Outcome: Progressing

## 2024-02-06 NOTE — PLAN OF CARE
Problem: PHYSICAL THERAPY ADULT  Goal: Performs mobility at highest level of function for planned discharge setting.  See evaluation for individualized goals.  Description: Treatment/Interventions: Functional transfer training, LE strengthening/ROM, Elevations, Therapeutic exercise, Endurance training, Patient/family training, Equipment eval/education, Bed mobility, Spoke to nursing, Gait training, OT, Spoke to case management  Equipment Recommended: Wheelchair, Walker       See flowsheet documentation for full assessment, interventions and recommendations.  Outcome: Progressing  Note: Prognosis: Good  Problem List: Decreased strength, Decreased endurance, Impaired balance, Decreased mobility, Pain  Assessment: Pt seen for PT treatment session this date. Therapy session focused on bed mobility, functional transfers, WC mobility and endurance training in order to improve overall mobility and independence. Pt requires S level for bed mobility and sit pivot transfer as well as close S level for WC management with cues required for brake locking prior to transfer as well as obstacle negotiation. Per pt, WC mobility in home would not be feasible 2* inadequate space. During session pt reporting new WBS, will need to confirm with podiatry for upcoming sessions. Pt making steady progress toward goals. Pt was left supine in bed at the end of PT session with all needs in reach. Pt would benefit from continued PT services while in hospital to address remaining limitations. PT to continue to follow pt and recommends STR pending continued progress. The patient's AM-PAC Basic Mobility Inpatient Short Form Raw Score is 14. A Raw score of less than or equal to 16 suggests the patient may benefit from discharge to post-acute rehabilitation services. Please also refer to the recommendation of the Physical Therapist for safe discharge planning.  Barriers to Discharge: Inaccessible home environment (per pt, WC would not be able to  fit inside mobile home also has ~3 CONNIE)     Rehab Resource Intensity Level, PT: I (Maximum Resource Intensity)    See flowsheet documentation for full assessment.

## 2024-02-06 NOTE — OCCUPATIONAL THERAPY NOTE
Occupational Therapy Progress Note     Patient Name: Nadir Myers  Today's Date: 2/6/2024  Problem List  Principal Problem:    Septic shock (HCC)  Active Problems:    Type 2 diabetes mellitus, with long-term current use of insulin (HCC)    LINDEN (acute kidney injury) (HCC)    Hyponatremia    Chronic systolic CHF (HCC)    PAD (peripheral artery disease) (HCC)    LUE weakness    MSSA bacteremia    Gangrene of toe of left foot (HCC)    Electrolyte abnormalities       02/06/24 1003   OT Last Visit   OT Visit Date 02/06/24   Note Type   Note Type Treatment   Pain Assessment   Pain Assessment Tool 0-10   Pain Score No Pain   Restrictions/Precautions   Weight Bearing Precautions Per Order Yes   LLE Weight Bearing Per Order (S)  NWB   Other Precautions WBS;Fall Risk;Pain   Lifestyle   Autonomy Pt reports I w/ ADLs, IADLs, and fxnl mobility w/ SPC PRN at baseline; + driving.   Reciprocal Relationships Pt lives w/ her spouse and son.   Intrinsic Gratification Pt enjoys walking.   ADL   Where Assessed Chair   Grooming Assistance 5  Supervision/Setup   Grooming Deficit Setup;Increased time to complete;Wash/dry hands;Wash/dry face;Teeth care;Brushing hair;Other (Comment)  (wash hair w/ shampoo cap)   UB Bathing Assistance 5  Supervision/Setup   UB Bathing Deficit Setup;Chest;Right arm;Left arm;Abdomen   LB Bathing Assistance 4  Minimal Assistance   LB Bathing Deficit Setup;Increased time to complete;Perineal area;Buttocks;Right upper leg;Left upper leg;Right lower leg including foot;Left lower leg including foot   LB Bathing Comments Pt required assist to wash buttocks while standing w/ Ax1.   UB Dressing Assistance 5  Supervision/Setup   UB Dressing Deficit Setup;Thread RUE;Thread LUE;Pull around back   LB Dressing Assistance 3  Moderate Assistance   LB Dressing Deficit Setup;Requires assistive device for steadying;Supervision/safety;Increased time to complete;Don/doff R sock;Don/doff L sock;Thread RLE into underwear;Thread  LLE into underwear;Pull up over hips   LB Dressing Comments Pt required assist to pull underwear over hips while standing w/ Ax1.   Bed Mobility   Supine to Sit 5  Supervision   Additional items HOB elevated;Bedrails   Sit to Supine Unable to assess   Additional Comments Pt seated OOB in chair at end of OT tx session w/ all needs within reach.   Transfers   Sit to Stand 4  Minimal assistance   Additional items Assist x 1;Armrests;Increased time required;Verbal cues   Stand to Sit 4  Minimal assistance   Additional items Armrests;Increased time required;Verbal cues   Sit pivot 5  Supervision   Additional items Increased time required;Verbal cues   Additional Comments STS transfer was completed w/ RW for support. Pt completed sit pivot transfer from EOB to drop arm recliner chair.   Functional Mobility   Additional Comments NT; will continue to assess as appropriate   Cognition   Overall Cognitive Status WFL   Arousal/Participation Alert;Responsive;Cooperative   Attention Within functional limits   Orientation Level Oriented X4   Memory Within functional limits   Following Commands Follows all commands and directions without difficulty   Comments Pt was pleasant, cooperative, and willing to participate in OT tx sesstion today.   Activity Tolerance   Activity Tolerance Patient tolerated treatment well   Medical Staff Made Aware spoke w/ PTMaritza   Assessment   Assessment Pt seen for skilled OT treatment session from 0924 to 1003 w/ interventions focusing on ADL participation, activity tolerance, sitting tolerance, sitting balance, standing tolerance, standing balance, bed mobility , and transfer skills. Pt was agreeable and willing to participate in session. Pt engaged in the following tasks: S for grooming, S for UB ADLs, and min-mod A for LB ADLs. Pt also required S for bed mobility, min Ax1 for STS transfer, and S for sit pivot transfer from EOB to drop arm recliner chair. In comparison to previous session, pt  demonstrated improvements in LB ADLs and transfer skills as he required less physical assistance to complete LB dressing and STS transfers. Pt was also able to complete sit pivot transfers w/o any physical assistance today. Pt required min to no vc to maintain NWB status on L LE t/o session. Pt continues to be functioning below baseline level as occupational performance remains limited by decreased ADL status, decreased activity tolerance, decreased endurance, decreased standing tolerance, decreased standing balance, decreased transfer skills, and decreased fxnl mobility. From OT standpoint, recommendation at time of d/c would be post acute rehab. Pt will benefit from continued OT treatment while in acute care to address deficits as defined above and maximize level of functional independence with ADLs and functional mobility. Pt seated OOB in chair w/ all needs met at end of session.   Plan   Treatment Interventions ADL retraining;Functional transfer training;Endurance training;Patient/family training;Equipment evaluation/education;Compensatory technique education;Continued evaluation;Energy conservation;Activityengagement   Goal Expiration Date 02/16/24   OT Treatment Day 1   OT Frequency 2-3x/wk   Discharge Recommendation   Rehab Resource Intensity Level, OT I (Maximum Resource Intensity)   AM-PAC Daily Activity Inpatient   Lower Body Dressing 2   Bathing 3   Toileting 3   Upper Body Dressing 3   Grooming 3   Eating 4   Daily Activity Raw Score 18   Daily Activity Standardized Score (Calc for Raw Score >=11) 38.66   AM-PAC Applied Cognition Inpatient   Following a Speech/Presentation 4   Understanding Ordinary Conversation 4   Taking Medications 4   Remembering Where Things Are Placed or Put Away 4   Remembering List of 4-5 Errands 4   Taking Care of Complicated Tasks 4   Applied Cognition Raw Score 24   Applied Cognition Standardized Score 62.21       The patient's raw score on the AM-PAC Daily Activity Inpatient  Short Form is 18. A raw score of less than 19 suggests the patient may benefit from discharge to post-acute rehabilitation services. Please refer to the recommendation of the Occupational Therapist for safe discharge planning.    Felisa Lipscomb MS, OTR/L

## 2024-02-06 NOTE — PROGRESS NOTES
Podiatry - Progress Note  Patient: Nadir Myers 63 y.o. male   MRN: [id number]  PCP: Tigre Hare DO  [facility]: [id number]  Date Of Visit: 24    ASSESSMENT:    Nadir Myers is a 63 y.o. male with:    Septic shock - POA  Left fourth toe gangrene  - s/p left 4th digit amputation (DOS: 24)  -s/p left foot washout, partial 4th ray with closure (DOS 2024)  Left foot cellulitis  Bacteremia   Type 2 diabetes mellitus  Three-vessel coronary artery disease, heart failure with reduced ejection fraction.  Tobacco dependence  Peripheral arterial disease      PLAN:    POD2 s/p left foot washout with partial fourth ray resection and delayed primary closure. Dressings changed at bedside. Incision site is stable, no acute clinical signs of infection.  Patient is stable for discharge from a podiatry standpoint. Patient will follow up outpatient for post-operative care, instructions placed with discharge information.  Dressings applied consisting of Betadine soaked adaptic, DSD.  Vitals signs stable. Patient afebrile with no leukocytosis. No erythema, edema, or lymphangitis noted either proximal or distal to the incision site.  Elevation and offloading on green foam wedges or pillows when non-ambulatory  Rest of medical care per primary team.     Weightbearing Status: Weightbearing as tolerated to the left heel in a surgical shoe    SUBJECTIVE:     The patient was seen, evaluated, and assessed at bedside today. The patient was awake, alert, and in no acute distress. The patient reports no pain at this time. Pain is well controlled with current pain management regimen. Patient reports normal appetite and using the restroom postoperatively. Patient denies N/V/F/chills/SOB/CP.    OBJECTIVE:     Vitals:   /74   Pulse 80   Temp 98.3 °F (36.8 °C)   Resp 18   Ht 6' (1.829 m)   Wt 108 kg (238 lb 5.1 oz)   SpO2 95%   BMI 32.32 kg/m²     Temp (24hrs), Av.2 °F (36.8 °C), Min:98 °F  (36.7 °C), Max:98.3 °F (36.8 °C)      Physical Exam:     General:  Alert, cooperative, and in no distress.  Lower extremity exam:  Cardiovascular status at baseline.  Neurological status at baseline.  Musculoskeletal status at baseline.     Left Lower Extremity: S/p left foot washout with partial fourth ray resection and delayed primary closure. Incision site stable with sutures intact and skin edges well aligned. Capillary refill to skin edges < 3 seconds. Skin temperature within normal limits. No evidence of dehiscence. No signs of active infection: no purulence, no malodor, no ascending erythema, no crepitus, no fluctuance. Residual erythema and edema consistent with post-operative healing course.    Clinical Images 02/06/24:      Additional Data:     Labs:    Results from last 7 days   Lab Units 02/06/24  0633 02/05/24  0442   WBC Thousand/uL 11.02* 15.11*   HEMOGLOBIN g/dL 11.3* 12.0   HEMATOCRIT % 34.3* 36.2*   PLATELETS Thousands/uL 220 204   NEUTROS PCT %  --  77*   LYMPHS PCT %  --  11*   MONOS PCT %  --  10   EOS PCT %  --  1     Results from last 7 days   Lab Units 02/06/24  0633 02/02/24  0502 02/01/24  0511   POTASSIUM mmol/L 3.5   < > 3.4*   CHLORIDE mmol/L 100   < > 100   CO2 mmol/L 28   < > 23   BUN mg/dL 8   < > 26*   CREATININE mg/dL 0.61   < > 0.85   CALCIUM mg/dL 8.0*   < > 8.3*   ALK PHOS U/L  --   --  48   ALT U/L  --   --  15   AST U/L  --   --  21    < > = values in this interval not displayed.     Results from last 7 days   Lab Units 01/31/24  0941   INR  1.13       * I Have Reviewed All Lab Data Listed Above.    Recent Cultures (last 7 days):     Results from last 7 days   Lab Units 02/03/24  0614 01/31/24  1748 01/31/24  1225 01/31/24  1135 01/31/24  1041   BLOOD CULTURE  Staphylococcus aureus*  Staphylococcus aureus*  --   --  Staphylococcus aureus* Staphylococcus aureus*   GRAM STAIN RESULT  Gram positive cocci in clusters*  Gram positive cocci in clusters* No polys seen*  2+ Gram  "positive cocci in pairs* No polys seen*  1+ Gram positive cocci in pairs* Gram positive cocci in clusters* Gram positive cocci in clusters*   WOUND CULTURE   --  3+ Growth of Staphylococcus aureus* 2+ Growth of Staphylococcus aureus*  --   --      Results from last 7 days   Lab Units 01/31/24  1748 01/31/24  1225   ANAEROBIC CULTURE  2+ Growth of Finegoldia magna* 2+ Growth of Anaerococcus vaginalis*       Imaging: I have personally reviewed pertinent films in PACS  EKG, Pathology, and Other Studies: I have personally reviewed pertinent reports.    ** Please Note: Portions of the record may have been created with voice recognition software. Occasional wrong word or \"sound a like\" substitutions may have occurred due to the inherent limitations of voice recognition software. Read the chart carefully and recognize, using context, where substitutions have occurred. **    "

## 2024-02-06 NOTE — PHYSICAL THERAPY NOTE
PHYSICAL THERAPY NOTE          Patient Name: Nadir Myers  Today's Date: 2/6/2024    UPDATE:     1503: spoke with Alan Kamara from Podiatry team- pt now WBAT to L heel in surgical shoe     02/06/24 1411   PT Last Visit   PT Visit Date 02/06/24   Note Type   Note Type Treatment   Pain Assessment   Pain Assessment Tool 0-10   Pain Score No Pain   Restrictions/Precautions   Weight Bearing Precautions Per Order Yes   LLE Weight Bearing Per Order (S)  NWB  (per pt, podiatry updated WBS to WBAT to heel- will need to confirm with podiatry team, kept NWB t/o session this afternoon until further clarfication.)   Other Precautions WBS;Multiple lines;Fall Risk   General   Chart Reviewed Yes   Response to Previous Treatment Patient with no complaints from previous session.   Family/Caregiver Present No   Cognition   Overall Cognitive Status WFL   Arousal/Participation Alert;Responsive;Cooperative   Attention Within functional limits   Orientation Level Oriented X4   Memory Within functional limits   Following Commands Follows all commands and directions without difficulty   Comments very pleasant and cooperative- motivated to work with therapy   Bed Mobility   Supine to Sit 5  Supervision   Additional items HOB elevated;Bedrails;Increased time required   Sit to Supine 5  Supervision   Additional items HOB elevated;Bedrails;Increased time required   Additional Comments pt supine in bed upon arrival. Pt left supine in bed with all needs wihtin reach   Transfers   Sit to Stand 4  Minimal assistance   Additional items Assist x 1;Increased time required;Verbal cues   Stand to Sit 4  Minimal assistance   Additional items Assist x 1;Increased time required;Verbal cues   Sit pivot 5  Supervision   Additional items Increased time required;Verbal cues   Additional Comments Performed sit pivot from EOB <>WC   Ambulation/Elevation   Gait pattern Not  tested   Wheelchair Activities   Wheelchair Cushion Standard   Pressure Relief Type Self adjusts   Level of Assistance for Pressure Relief Activities Close supervision   Wheelchair Parts Management Yes   Propulsion Yes   Propulsion Type 1 Manual   Level 1 Level tile   Method 1 Right upper extremity;Left upper extremity   Level of Assistance 1 Close supervision   Description/ Details 1 pt navigated 2 x 50' in WC with occasional cues for obstacle negotiation and UE placement for propulsion efficiency. Some UE fatigue noted at end of session   Balance   Static Sitting Fair +   Dynamic Sitting Fair   Endurance Deficit   Endurance Deficit Yes   Activity Tolerance   Activity Tolerance Patient tolerated treatment well   Nurse Made Aware RN cleared pt to participate in therapy session   Assessment   Prognosis Good   Problem List Decreased strength;Decreased endurance;Impaired balance;Decreased mobility;Pain   Assessment Pt seen for PT treatment session this date. Therapy session focused on bed mobility, functional transfers, WC mobility and endurance training in order to improve overall mobility and independence. Pt requires S level for bed mobility and sit pivot transfer as well as close S level for WC management with cues required for brake locking prior to transfer as well as obstacle negotiation. Per pt, WC mobility in home would not be feasible 2* inadequate space. During session pt reporting new WBS, will need to confirm with podiatry for upcoming sessions. Pt making steady progress toward goals. Pt was left supine in bed at the end of PT session with all needs in reach. Pt would benefit from continued PT services while in hospital to address remaining limitations. PT to continue to follow pt and recommends STR pending continued progress. The patient's AM-PAC Basic Mobility Inpatient Short Form Raw Score is 14. A Raw score of less than or equal to 16 suggests the patient may benefit from discharge to post-acute  rehabilitation services. Please also refer to the recommendation of the Physical Therapist for safe discharge planning.   Barriers to Discharge Inaccessible home environment  (per pt, WC would not be able to fit inside mobile home also has ~3 CONNIE)   Goals   Patient Goals to get out of room into hallway   STG Expiration Date 02/16/24   PT Treatment Day 2   Plan   Treatment/Interventions Functional transfer training;LE strengthening/ROM;Elevations;Therapeutic exercise;Endurance training;Patient/family training;Equipment eval/education;Bed mobility;Gait training;Spoke to nursing;Spoke to case management;OT   Progress Progressing toward goals   PT Frequency 3-5x/wk   Discharge Recommendation   Rehab Resource Intensity Level, PT I (Maximum Resource Intensity)   Equipment Recommended Wheelchair;Walker   AM-PAC Basic Mobility Inpatient   Turning in Flat Bed Without Bedrails 3   Lying on Back to Sitting on Edge of Flat Bed Without Bedrails 3   Moving Bed to Chair 3   Standing Up From Chair Using Arms 3   Walk in Room 1   Climb 3-5 Stairs With Railing 1   Basic Mobility Inpatient Raw Score 14   Basic Mobility Standardized Score 35.55   Highest Level Of Mobility   JH-HLM Goal 4: Move to chair/commode   JH-HLM Achieved 4: Move to chair/commode     UPDATE:     1503: spoke with Alan Kamara from Podiatry team- pt now WBAT to L heel in surgical shoe    Maritza Holm, PT, DPT

## 2024-02-06 NOTE — ASSESSMENT & PLAN NOTE
Initially admitted to the ICU requiring vasopressor support, now off, with maintenance of hemodynamic stability  Due to MSSA bacteremia from a gangrenous left fourth toe (see below)  Continue to monitor vitals and maintain hemodynamics including temperature curve  See additional management below

## 2024-02-06 NOTE — PLAN OF CARE
Problem: OCCUPATIONAL THERAPY ADULT  Goal: Performs self-care activities at highest level of function for planned discharge setting.  See evaluation for individualized goals.  Description: Treatment Interventions: ADL retraining, Functional transfer training, Endurance training, Patient/family training, Equipment evaluation/education, Compensatory technique education, Continued evaluation, Energy conservation, Activityengagement          See flowsheet documentation for full assessment, interventions and recommendations.   Outcome: Progressing  Note: Limitation: Decreased ADL status, Decreased endurance, Decreased self-care trans, Decreased high-level ADLs     Assessment: Pt seen for skilled OT treatment session from 0924 to 1003 w/ interventions focusing on ADL participation, activity tolerance, sitting tolerance, sitting balance, standing tolerance, standing balance, bed mobility , and transfer skills. Pt was agreeable and willing to participate in session. Pt engaged in the following tasks: S for grooming, S for UB ADLs, and min-mod A for LB ADLs. Pt also required S for bed mobility, min Ax1 for STS transfer, and S for sit pivot transfer from EOB to drop arm recliner chair. In comparison to previous session, pt demonstrated improvements in LB ADLs and transfer skills as he required less physical assistance to complete LB dressing and STS transfers. Pt was also able to complete sit pivot transfers w/o any physical assistance today. Pt required min to no vc to maintain NWB status on L LE t/o session. Pt continues to be functioning below baseline level as occupational performance remains limited by decreased ADL status, decreased activity tolerance, decreased endurance, decreased standing tolerance, decreased standing balance, decreased transfer skills, and decreased fxnl mobility. From OT standpoint, recommendation at time of d/c would be post acute rehab. Pt will benefit from continued OT treatment while in acute  care to address deficits as defined above and maximize level of functional independence with ADLs and functional mobility. Pt seated OOB in chair w/ all needs met at end of session.     Rehab Resource Intensity Level, OT: I (Maximum Resource Intensity)

## 2024-02-06 NOTE — PROGRESS NOTES
"[facility]  Progress Note  Name: Nadir Myers I  MRN: [id number]  Unit/Bed#: [facility] 902-01 I Date of Admission: 1/31/2024   Date of Service: 2/6/2024 I Hospital Day: 6    Assessment/Plan   * Septic shock (HCC)  Assessment & Plan  Initially admitted to the ICU requiring vasopressor support, now off, with maintenance of hemodynamic stability  Due to MSSA bacteremia from a gangrenous left fourth toe (see below)  Continue to monitor vitals and maintain hemodynamics including temperature curve  See additional management below    MSSA bacteremia  Assessment & Plan  As evidenced on blood cultures from 1/31 with repeat blood cultures positive on 2/3 despite being on Ancef  Likely source due to left fourth toe gangrenous wound -> see plan below  Continue IV Ancef  TTE negative for obvious evidence of endocarditis  Needs GILL, n.p.o. after midnight for tentative plan tomorrow  Will plan to discuss with EP pending GILL to determine need for ICD lead extraction  ID consulted, appreciate recommendations    Gangrene of toe of left foot (HCC)  Assessment & Plan  Appreciate podiatry input -> status post left fourth toe amputation on 1/31  [place] to be surgical cure per podiatry  Wound care per podiatry    LUE weakness  Assessment & Plan  Presented w/ transient left-sided weakness and a facial droop now resolved  Appreciate neurology input deeming symptoms likely secondary to septic shock/infection and hyperglycemia, as neurologic workup including CT/MRI imaging negative for evidence of stroke or intracranial vessel occlusion, but noted chronic microangiopathic changes -> radiology report did however mention: \"Moderate bilateral supraclinoid ICA and moderate to severe left cavernous ICA segment stenosis.\"  Continue dual endplate therapy with ASA/Plavix - c/w statin  PT/OT as tolerated    PAD (peripheral artery disease) ([facility])  Assessment & Plan  Continue dual antiplatelet therapy with ASA/Plavix - " "continue statin  Sequela complicated by gangrenous left toe (see above)    Chronic systolic CHF (HCC)  Assessment & Plan  Last EF of 42%  Holding diuresis (Aldactone) in the setting of relative hypotension and recovering LINDEN  Continue ASA/Plavix and statin for underlying CAD (associated ischemic cardiomyopathy)  Monitor/replete serum potassium/magnesium as necessary  Low sodium and fluid restriction enforced  Appears euvolemic    Hyponatremia  Assessment & Plan  Maintain strict fluid restriction  Optimize blood sugar control as a degree of \"pseudohyponatremia\" is likely contributory  Sodium improving to 134 today, continue to monitor    LINDEN (acute kidney injury) ([facility])  Assessment & Plan  Creatinine of approximately 0.6-0.8 -> currently stable at 0.65 from a prior peak of 1.41  Monitor renal function and urine output  Limit/avoid nephrotoxins and hypotension as possible  Holding spironolactone    Type 2 diabetes mellitus, with long-term current use of insulin ([facility])  Assessment & Plan  Lab Results   Component Value Date    HGBA1C 9.8 (H) 01/31/2024     Continue basal/prandial insulin with additional SSI coverage per Accu-Cheks  Hypoglycemia protocol  Carbohydrate restriction           VTE Pharmacologic Prophylaxis:   Moderate Risk (Score 3-4) - Pharmacological DVT Prophylaxis Ordered: enoxaparin (Lovenox).    Mobility:   Basic Mobility Inpatient Raw Score: 14  JH-HLM Goal: 4: Move to chair/commode  JH-HLM Achieved: 4: Move to chair/commode  HLM Goal achieved. Continue to encourage appropriate mobility.    Patient Centered Rounds: I performed bedside rounds with nursing staff today.   Discussions with Specialists or Other Care Team Provider: ID, primary RN    Education and Discussions with Family / Patient: Updated  (wife) at bedside.    Total Time Spent on Date of Encounter in care of patient: 25 mins. This time was spent on one or more of the following: performing physical exam; counseling and " coordination of care; obtaining or reviewing history; documenting in the medical record; reviewing/ordering tests, medications or procedures; communicating with other healthcare professionals and discussing with patient's family/caregivers.    Current Length of Stay: 6 day(s)  Current Patient Status: Inpatient   Certification Statement: The patient will continue to require additional inpatient hospital stay due to waiting GILL given MSSA bacteremia to rule out endocarditis/ICD lead infection  Discharge Plan: Anticipate discharge in 48-72 hrs to discharge location to be determined pending rehab evaluations.    Code Status: Level 1 - Full Code    Subjective:   Patient assessed at bedside.  Offers no complaints.    Objective:     Vitals:   Temp (24hrs), Av.1 °F (36.7 °C), Min:97.9 °F (36.6 °C), Max:98.3 °F (36.8 °C)    Temp:  [97.9 °F (36.6 °C)-98.3 °F (36.8 °C)] 97.9 °F (36.6 °C)  HR:  [] 85  Resp:  [16-18] 17  BP: (117-147)/(71-88) 147/88  SpO2:  [90 %-95 %] 93 %  Body mass index is 32.32 kg/m².     Input and Output Summary (last 24 hours):     Intake/Output Summary (Last 24 hours) at 2024 1804  Last data filed at 2024 1430  Gross per 24 hour   Intake 576 ml   Output 1400 ml   Net -824 ml       Physical Exam:   Physical Exam  Vitals reviewed.   Constitutional:       General: He is not in acute distress.     Appearance: Normal appearance. He is not ill-appearing.   HENT:      Head: Normocephalic and atraumatic.   Cardiovascular:      Rate and Rhythm: Normal rate and regular rhythm.      Heart sounds: Normal heart sounds. No murmur heard.  Pulmonary:      Effort: Pulmonary effort is normal. No respiratory distress.      Breath sounds: Normal breath sounds.   Abdominal:      General: Bowel sounds are normal.      Palpations: Abdomen is soft.      Tenderness: There is no abdominal tenderness.   Musculoskeletal:         General: Normal range of motion.      Right lower leg: No edema.      Left lower leg:  No edema.   Skin:     General: Skin is warm and dry.   Neurological:      Mental Status: He is alert and oriented to person, place, and time. Mental status is at baseline.   Psychiatric:         Mood and Affect: Mood normal.         Behavior: Behavior normal.        Additional Data:     Labs:  Results from last 7 days   Lab Units 02/06/24  0633 02/05/24  0442   WBC Thousand/uL 11.02* 15.11*   HEMOGLOBIN g/dL 11.3* 12.0   HEMATOCRIT % 34.3* 36.2*   PLATELETS Thousands/uL 220 204   BANDS PCT % 6  --    NEUTROS PCT %  --  77*   LYMPHS PCT %  --  11*   LYMPHO PCT % 9*  --    MONOS PCT %  --  10   MONO PCT % 9  --    EOS PCT % 2 1     Results from last 7 days   Lab Units 02/06/24  0633 02/02/24  0502 02/01/24  0511   SODIUM mmol/L 134*   < > 132*   POTASSIUM mmol/L 3.5   < > 3.4*   CHLORIDE mmol/L 100   < > 100   CO2 mmol/L 28   < > 23   BUN mg/dL 8   < > 26*   CREATININE mg/dL 0.61   < > 0.85   ANION GAP mmol/L 6   < > 9   CALCIUM mg/dL 8.0*   < > 8.3*   ALBUMIN g/dL  --   --  3.4*   TOTAL BILIRUBIN mg/dL  --   --  0.71   ALK PHOS U/L  --   --  48   ALT U/L  --   --  15   AST U/L  --   --  21   GLUCOSE RANDOM mg/dL 106   < > 149*    < > = values in this interval not displayed.     Results from last 7 days   Lab Units 01/31/24  0941   INR  1.13     Results from last 7 days   Lab Units 02/06/24  1607 02/06/24  1100 02/06/24  0717 02/05/24  2051 02/05/24  1536 02/05/24  1052 02/05/24  0708 02/04/24  2048 02/04/24  1612 02/04/24  1027 02/04/24  0712 02/03/24  2048   POC GLUCOSE mg/dl 151* 135 103 275* 246* 195* 162* 256* 201* 177* 168* 206*     Results from last 7 days   Lab Units 01/31/24  1516   HEMOGLOBIN A1C % 9.8*     Results from last 7 days   Lab Units 01/31/24  1225 01/31/24  1112 01/31/24  0951   LACTIC ACID mmol/L 1.9  --  3.0*   PROCALCITONIN ng/ml  --  0.73*  --        Lines/Drains:  Invasive Devices       Peripheral Intravenous Line  Duration             Peripheral IV 02/05/24 Distal;Dorsal (posterior);Left  Forearm <1 day                          Imaging: Reviewed radiology reports from this admission including: MRI brain, foot x-ray    Recent Cultures (last 7 days):   Results from last 7 days   Lab Units 02/06/24  1221 02/03/24  0614 01/31/24  1748 01/31/24  1225 01/31/24  1135 01/31/24  1041   BLOOD CULTURE  Received in Microbiology Lab. Culture in Progress.  Received in Microbiology Lab. Culture in Progress. Staphylococcus aureus*  Staphylococcus epidermidis*  Staphylococcus aureus*  --   --  Staphylococcus aureus* Staphylococcus aureus*   GRAM STAIN RESULT   --  Gram positive cocci in clusters*  Gram positive cocci in clusters* No polys seen*  2+ Gram positive cocci in pairs* No polys seen*  1+ Gram positive cocci in pairs* Gram positive cocci in clusters* Gram positive cocci in clusters*   WOUND CULTURE   --   --  3+ Growth of Staphylococcus aureus* 2+ Growth of Staphylococcus aureus*  --   --        Last 24 Hours Medication List:   Current Facility-Administered Medications   Medication Dose Route Frequency Provider Last Rate    acetaminophen  975 mg Oral Q6H PRN Mague Cunningham DPM      amiodarone  200 mg Oral Daily With Breakfast Mague Cunningham DPM      aspirin  81 mg Oral Daily Mague Cunningham DPM      atorvastatin  80 mg Oral After Dinner Mague Cunningham DPM      cefazolin  2,000 mg Intravenous Q6H Mague Cunningham DPM 2,000 mg (02/06/24 1800)    chlorhexidine  15 mL Mouth/Throat Q12H TEJAS Mague Cunningham DPM      clopidogrel  75 mg Oral Daily Mague Cunningham DPM      enoxaparin  40 mg Subcutaneous Q24H TEJAS Mague Cunningham DPM      insulin glargine  50 Units Subcutaneous HS Hilda Ashley MD      insulin lispro  1-5 Units Subcutaneous 4x Daily (AC & HS) Mague Cunningham DPM      insulin lispro  9 Units Subcutaneous TID With Meals Hilda Ashley MD      ondansetron  4 mg Intravenous Once Mague Cunningham DPM      polyethylene glycol  17 g Oral Daily PRN Mague Cunningham DPM      senna-docusate sodium  2 tablet Oral BID  REMA Cunningham DPM          Today, Patient Was Seen By: Robyn Feldman DO    **Please Note: This note may have been constructed using a voice recognition system.**

## 2024-02-06 NOTE — ASSESSMENT & PLAN NOTE
As evidenced on blood cultures from 1/31 with repeat blood cultures positive on 2/3 despite being on Ancef  Likely source due to left fourth toe gangrenous wound -> see plan below  Continue IV Ancef  TTE negative for obvious evidence of endocarditis  Needs GILL, n.p.o. after midnight for tentative plan tomorrow  Will plan to discuss with EP pending GILL to determine need for ICD lead extraction  ID consulted, appreciate recommendations

## 2024-02-06 NOTE — ASSESSMENT & PLAN NOTE
Appreciate podiatry input -> status post left fourth toe amputation on 1/31  [place] to be surgical cure per podiatry  Wound care per podiatry

## 2024-02-06 NOTE — PROGRESS NOTES
PHYSICAL MEDICINE AND REHABILITATION   PREADMISSION ASSESSMENT     Projected IGC and Rehabilitation Diagnoses:  Impairment of mobility, safety and Activities of Daily Living (ADLs) due to Debility:  16  Debility (Non-cardiac/Non-pulmonary)  Etiologic: Severe sepsis with septic shock in the setting of wet gangrene left 4th toe s/p amputation   Date of Onset: 1/31/24   Date of surgery: 1/31/24    PATIENT INFORMATION  Name: Nadir Myers Phone #: [phone] (home) [phone] (work)  Address: [address] [place] [id number]  YOB: 1960 Age: 63 y.o. SS#   Marital Status: /Civil Union  Ethnicity: White  Employment Status: currently employed  Extended Emergency Contact Information  Primary Emergency Contact: Rhoda Myers  Address: [address] [place] [address]           [place] [address] [place]  Mobile Phone: [phone]  Relation: Spouse  Advance Directive: Level 1 Full Code (no ACP docs)    INSURANCE/COVERAGE:     Primary Payor: [facility] REP / Plan: [facility] MEDICARE COMPLETE  REP / Product Type: Medicare HMO /   Secondary Payer:<NONE>   Payer Contact:  Payer Contact:   Contact Phone:  Contact Phone:     Authorization #: ***  Coverage Dates:  LCD:   MEDICARE #: [id number]   Medical Record #: [id number]    REFERRAL SOURCE:   Referring provider: Robyn Feldman DO  Referring facility: [facility]  Room: [facility] 902/[facility] 902-  PCP: Tigre Hare DO PCP phone number: [phone]    MEDICAL INFORMATION  HPI: Pt is a 63 y.o. male with a PMH of atrial fibrillation not on AC, diabetes, history of MI with stenting, CHF, ICD history of v tac, PAD and left foot ulcer who presented to the [facility] on 1/31/2024 with left upper extremity weakness. EMS was called and he was found to have a fever of 102, glucose > 500 with hypoxia and necrotic appearing left foot. CT head showed no acute changes. CTA demonstrated no LVO bur  moderate bilateral supraclinoid ICA and moderate to severe left cavernous ICA stenosis. He is on ASA/plavix at baseline. There was concerned for bacteremia in the setting of wet gangrene of the 4th toe left foot. Emergent open tow amputation was recommended by Podiatry. Blood cultures were positive for MSSA and antibiotics were changed to cefazolin. He was started on an insulin drip and transitioned to basal bolus insulin. He was weaned off of pressors and transferred out of ICU. MRI brain showed no acute infarcts, chronic ischemic microangiopathic changes noted. No other definite remote infarct. ECHO showed EF of 42%. Pt is s/p left foot washout, partial 4th ray with closure on 2/4/2024. Will plan for dressing change tomorrow 2/6/2024 to assess incision and any remaining cellulitis. Continue nonweightbearing left lower extremity. ***  PM&R consulted: patient will likely be a candidate for acute inpatient rehabilitation once medically stable. PT and OT have been consulted and are recommending post-acute rehab services.   Patient's case has been reviewed with [facility] medical director, patient meets medical criteria for acute rehab and has demonstrated the ability to tolerate three or more hours of therapy per day. Patient is medically stable and ready for discharge to [facility]        Past Medical History:   Past Surgical History:   Allergies:     Past Medical History:   Diagnosis Date    Diabetes mellitus (HCC)     Myocardial infarction (HCC)     Past Surgical History:   Procedure Laterality Date    CARDIAC CATHETERIZATION N/A 05/03/2023    Procedure: Cardiac Coronary Angiogram;  Surgeon: Valeriy Shore MD;  Location: [facility]ARD[facility];  Service: Cardiology    CARDIAC CATHETERIZATION Left 05/03/2023    Procedure: Cardiac Left Heart Cath;  Surgeon: Valeriy Shore MD;  Location: [facility];  Service: Cardiology    CARDIAC DEFIBRILLATOR PLACEMENT  05/2023    CARDIAC ELECTROPHYSIOLOGY PROCEDURE N/A 05/12/2023     Procedure: Cardiac icd implant;  Surgeon: Urbano Maria MD;  Location: [facility]ARDI[facility];  Service: Cardiology    IR LOWER EXTREMITY ANGIOGRAM  1/18/2024    WOUND DEBRIDEMENT Left 2/4/2024    Procedure: LEFT FOURTH TOE AMPUTATION SURGICAL WOUND DEBRIDEMENT FOOT/TOE (WASH OUT);  Surgeon: Bebo Hopper DPM;  Location: [facility] MAIN OR;  Service: Podiatry     Allergies   Allergen Reactions    Vancomycin Rash     NOT AN ALLERGY - 1/31/24 patient experienced vancomycin infusion reaction, please extend duration of infusion time to prevent future infusion reactions         Medical/functional conditions requiring inpatient rehabilitation: Severe sepsis, left 4th toe amp, impaired mobility and self care, decreased strength/endurance    Risk for medical/clinical complications: Risk for falls, Risk for skin breakdown secondary to decreased mobility, Risk for hyper/hypoglycemic episodes, Risk for infection or dehiscence, Risk for uncontrolled pain     Comorbidities:  MSSA bacteremia  LINDEN  Hyponatremia   Lactic acidosis   ?TIA with transient left sided weakness   CHF  CAD  Diabetes type 2  Bowel plan: continent LMB 2/1/2024  Bladder plan: continent   DVT ppx: Lovenox and SCD    Surgeries in the last 100 days:   s/p left 4th toe amputation with podiatry on 1/31/24   S/p wound debridement and closure 2/4    CURRENT VITAL SIGNS:   Temp:  [98 °F (36.7 °C)-98.3 °F (36.8 °C)] 98.3 °F (36.8 °C)  HR:  [] 80  Resp:  [16-18] 18  BP: (117-124)/(71-74) 124/74   Intake/Output Summary (Last 24 hours) at 2/6/2024 0903  Last data filed at 2/6/2024 0548  Gross per 24 hour   Intake 240 ml   Output 2100 ml   Net -1860 ml        LABORATORY RESULTS:      Lab Results   Component Value Date    HGB 11.3 (L) 02/06/2024    HGB 16.5 02/17/2015    HCT 34.3 (L) 02/06/2024    HCT 48.4 02/17/2015    WBC 11.02 (H) 02/06/2024    WBC 7.33 02/17/2015     Lab Results   Component Value Date    BUN 8 02/06/2024    BUN 14 09/16/2023     02/17/2015    K 3.5  02/06/2024    K 5.1 09/16/2023     02/06/2024     09/16/2023    GLUCOSE 203 (H) 02/17/2015    CREATININE 0.61 02/06/2024    CREATININE 0.79 09/16/2023     Lab Results   Component Value Date    PROTIME 14.4 01/31/2024    PROTIME 12.9 02/16/2015    INR 1.13 01/31/2024    INR 0.95 02/16/2015        DIAGNOSTIC STUDIES:  XR foot left 3+ views    Result Date: 2/6/2024  Impression: Postoperative change reflecting recent partial left fourth ray amputation as noted. Workstation performed: [id number]     MRI brain wo contrast    Result Date: 2/1/2024  Impression: No acute infarction, intracranial hemorrhage or mass effect Workstation performed: [id number]     XR foot 3+ views LEFT    Result Date: 2/1/2024  Impression: No radiographic evidence of osteomyelitis. Workstation performed: [id number]     XR chest 2 views    Result Date: 1/31/2024  Impression: No acute cardiopulmonary disease. Workstation performed: [id number]     CT stroke alert brain    Result Date: 1/31/2024  Impression: No acute intracranial abnormality. Chronic microangiopathic ischemic changes. Findings were directly discussed with Dr. Maria Juan  at 10:03 a.m. Workstation performed: [id number]     CTA stroke alert (head/neck)    Result Date: 1/31/2024  Impression: 1. CTA head: Negative for large vessel intracranial occlusion. Moderate bilateral supraclinoid ICA and moderate to severe left cavernous ICA segment stenosis. 2. CTA neck:  No extracranial carotid stenosis.  The cervical vertebral arteries are patent. Findings were directly discussed with Dr. Maria Juan  at 10:03 a.m. Workstation performed: [id number]       PRECAUTIONS/SPECIAL NEEDS:  Weight Bearing Precautions:  NWB L LE, Anticoagulation:  aspirin 81 mg orally every day, clopidogrel 75 mg orally every day, and Lovenox, Blood Sugar Management: Per MD orders, Edema Management, Safety Concerns, Pain Management, Bowel Incontinence: # of accidents 2, Dietary Restrictions: Bogdan/CHO  controlled, Fluid restriction 1500 ML, Sodium 2 GM, and Language Preference: English, Fall precautions    MEDICATIONS:     Current Facility-Administered Medications:     acetaminophen (TYLENOL) tablet 975 mg, 975 mg, Oral, Q6H PRN, Mague Cunningham DPM, 975 mg at 02/03/24 1029    amiodarone tablet 200 mg, 200 mg, Oral, Daily With Breakfast, Mague Cunningham DPM, 200 mg at 02/06/24 0851    aspirin chewable tablet 81 mg, 81 mg, Oral, Daily, Mague Cunningham DPM, 81 mg at 02/06/24 0851    atorvastatin (LIPITOR) tablet 80 mg, 80 mg, Oral, After Dinner, Mague Cunningham DPMELANIE, 80 mg at 02/05/24 1724    ceFAZolin (ANCEF) IVPB (premix in dextrose) 2,000 mg 50 mL, 2,000 mg, Intravenous, Q6H, Mague Cunningham DPM, Last Rate: 100 mL/hr at 02/06/24 0538, 2,000 mg at 02/06/24 0538    chlorhexidine (PERIDEX) 0.12 % oral rinse 15 mL, 15 mL, Mouth/Throat, Q12H TEJAS, Mague Cunningham DPM, 15 mL at 02/06/24 0851    clopidogrel (PLAVIX) tablet 75 mg, 75 mg, Oral, Daily, Mague Cunningham DPM, 75 mg at 02/06/24 0851    enoxaparin (LOVENOX) subcutaneous injection 40 mg, 40 mg, Subcutaneous, Q24H TEJAS, Mague Cunningham DPM, 40 mg at 02/06/24 0851    insulin glargine (LANTUS) subcutaneous injection 50 Units 0.5 mL, 50 Units, Subcutaneous, HS, Hilda Ashley MD, 50 Units at 02/05/24 2201    insulin lispro (HumaLOG) 100 units/mL subcutaneous injection 1-5 Units, 1-5 Units, Subcutaneous, 4x Daily (AC & HS), 3 Units at 02/05/24 2201 **AND** Fingerstick Glucose (POCT), , , 4x Daily AC and at bedtime, Mague Cunningham DPM    insulin lispro (HumaLOG) 100 units/mL subcutaneous injection 9 Units, 9 Units, Subcutaneous, TID With Meals, Hilda Ashley MD, 9 Units at 02/06/24 0856    ondansetron (ZOFRAN) injection 4 mg, 4 mg, Intravenous, Once, Mague Cunningham DPM    polyethylene glycol (MIRALAX) packet 17 g, 17 g, Oral, Daily PRN, Mague Cunningham DPM, 17 g at 02/05/24 1127    senna-docusate sodium (SENOKOT S) 8.6-50 mg per tablet 2 tablet, 2 tablet, Oral, BID PRN, Mague  ELIZABETH Cunningham, 2 tablet at 24 1129    SKIN INTEGRITY:   Left foot wound    PRIOR LEVEL OF FUNCTION:  He lives in a(n) mobile home  Nadir Myers is  and lives with their family.  Self Care: Independent, Indoor Mobility: Independent, Stairs (in/outdoor): Independent, and Cognition: Independent    FALLS IN THE LAST 6 MONTHS: 0    HOME ENVIRONMENT:  The living area: can live on one level  There are 3 steps to enter the home.    The patient will have 24 hour supervision/physical assistance available upon discharge.    PREVIOUS DME:  Equipment in home (previous DME): Single Point Cane    FUNCTIONAL STATUS:***  Physical Therapy Occupational Therapy Speech Therapy          CARE SCORES:  Self Care:  Eating: {ARC Pre Admission Screenin}  Oral hygiene: {ARC Pre Admission Screenin}  Toilet hygiene: {ARC Pre Admission Screenin}  Shower/bathing self: {ARC Pre Admission Screenin}  Upper body dressing: {ARC Pre Admission Screenin}  Lower body dressing: {ARC Pre Admission Screenin}  Putting on/taking off footwear: {ARC Pre Admission Screenin}  Transfers:  Roll left and right: {ARC Pre Admission Screenin}  Sit to lying: {ARC Pre Admission Screenin}  Lying to sitting on side of bed: {ARC Pre Admission Screenin}  Sit to stand: {ARC Pre Admission Screenin}  Chair/bed to chair transfer: {ARC Pre Admission Screenin}  Toilet transfer: {ARC Pre Admission Screenin}  Mobility:  Walk 10 ft: {ARC Pre Admission Screenin}  Walk 50 ft with two turns: {ARC Pre Admission Screenin}  Walk 150ft: {ARC Pre Admission Screenin}    CURRENT GAP IN FUNCTION  Prior to Admission: Functional Status: Patient was independent with mobility/ambulation, transfers, ADL's, IADL's.    Expected functional outcomes: It is expected that with skilled acute rehabilitation services the patient will progress to Independent for self care and  Independent for mobility     Estimated length of stay: 10 to 14 days    Anticipated Post-Discharge Disposition/Treatment  Disposition: Return to previous home/apartment.  Outpatient Services: Physical Therapy (PT) and Occupational Therapy (OT)    BARRIERS TO DISCHARGE  Weakness, Pain, Balance Difficulty, Fatigue, Home Accessibility, and Equipment Needs    INTERVENTIONS FOR DISCHARGE  Adaptive equipment, Patient/Family/Caregiver Education, Community Resources, Arrange DME needs, Therapy exercises, and Energy conservation education     REQUIRED THERAPY:  Patient will require PT and OT 90 minutes each per day, five days per week to achieve rehab goals.     REQUIRED FUNCTIONAL AND MEDICAL MANAGEMENT FOR INPATIENT REHABILITATION:  Skin:  Monitor skin for breakdown, Pain Management: Overall pain is moderately controlled, Deep Vein Thrombosis (DVT) Prophylaxis:  PER MD orders, Diabetes Management: continue sliding scale insulin, patient to do finger sticks as ordered, SLIM to continue to manage diabetes, further internal medicine management of additional medical conditions while on ARC, PT/OT intervention, patient/family education and training, and any needed consults PRN.    RECOMMENDED LEVEL OF CARE: ***    Pt was independent PTA with transfers, amb, and ADLs. Pt lives with spouse and son in a mobile home with 3 CONNIE. Pt is currently functioning below baseline needing *** A for ADLs and *** A for transfers/amb. Pt would benefit from ARC admission to have close medical management while participating in 3 hours of therapy per day that will include physical and occupational therapy. Physical and occupational therapists will address functional mobility deficits and assist patient in improving their strength, endurance, ROM, and self care. Pt will have 24/7 nursing care to monitor routine vitals, blood sugars, I/Os, skin integrity, and overall condition. The rehab nursing staff will follow therapy recommendations to have 24  hour follow through during non-therapy hours. PM&R to maximize function and provide medical oversight. The MD will monitor patient co-morbidities while on the unit as well as order any additional tests, labs, and consults needed. The [facility] specialized interdisciplinary team will meet weekly to discuss patient overall medical status and rehab goals in preparation for D/C home. Inpatient acute rehab is recommended for patient to maximize overall strength, endurance, self care, and mobility for a safe and timely transition back home.

## 2024-02-06 NOTE — PLAN OF CARE
Problem: METABOLIC, FLUID AND ELECTROLYTES - ADULT  Goal: Electrolytes maintained within normal limits  Description: INTERVENTIONS:  - Monitor labs and assess patient for signs and symptoms of electrolyte imbalances  - Administer electrolyte replacement as ordered  - Monitor response to electrolyte replacements, including repeat lab results as appropriate  - Instruct patient on fluid and nutrition as appropriate  Outcome: Progressing     Problem: Prexisting or High Potential for Compromised Skin Integrity  Goal: Skin integrity is maintained or improved  Description: INTERVENTIONS:  - Identify patients at risk for skin breakdown  - Assess and monitor skin integrity  - Assess and monitor nutrition and hydration status  - Monitor labs   - Assess for incontinence   - Turn and reposition patient  - Assist with mobility/ambulation  - Relieve pressure over bony prominences  - Avoid friction and shearing  - Provide appropriate hygiene as needed including keeping skin clean and dry  - Evaluate need for skin moisturizer/barrier cream  - Collaborate with interdisciplinary team   - Patient/family teaching  - Consider wound care consult   Outcome: Progressing     Problem: PAIN - ADULT  Goal: Verbalizes/displays adequate comfort level or baseline comfort level  Description: Interventions:  - Encourage patient to monitor pain and request assistance  - Assess pain using appropriate pain scale  - Administer analgesics based on type and severity of pain and evaluate response  - Implement non-pharmacological measures as appropriate and evaluate response  - Consider cultural and social influences on pain and pain management  - Notify physician/advanced practitioner if interventions unsuccessful or patient reports new pain  Outcome: Progressing     Problem: INFECTION - ADULT  Goal: Absence or prevention of progression during hospitalization  Description: INTERVENTIONS:  - Assess and monitor for signs and symptoms of infection  -  Monitor lab/diagnostic results  - Monitor all insertion sites, i.e. indwelling lines, tubes, and drains  - Monitor endotracheal if appropriate and nasal secretions for changes in amount and color  - Chadron appropriate cooling/warming therapies per order  - Administer medications as ordered  - Instruct and encourage patient and family to use good hand hygiene technique  - Identify and instruct in appropriate isolation precautions for identified infection/condition  Outcome: Progressing     Problem: SAFETY ADULT  Goal: Patient will remain free of falls  Description: INTERVENTIONS:  - Educate patient/family on patient safety including physical limitations  - Instruct patient to call for assistance with activity   - Consult OT/PT to assist with strengthening/mobility   - Keep Call bell within reach  - Keep bed low and locked with side rails adjusted as appropriate  - Keep care items and personal belongings within reach  - Initiate and maintain comfort rounds  - Make Fall Risk Sign visible to staff  - Apply yellow socks and bracelet for high fall risk patients  - Consider moving patient to room near nurses station  Outcome: Progressing     Problem: DISCHARGE PLANNING  Goal: Discharge to home or other facility with appropriate resources  Description: INTERVENTIONS:  - Identify barriers to discharge w/patient and caregiver  - Arrange for needed discharge resources and transportation as appropriate  - Identify discharge learning needs (meds, wound care, etc.)  - Arrange for interpretive services to assist at discharge as needed  - Refer to Case Management Department for coordinating discharge planning if the patient needs post-hospital services based on physician/advanced practitioner order or complex needs related to functional status, cognitive ability, or social support system  Outcome: Progressing

## 2024-02-07 ENCOUNTER — ANESTHESIA EVENT (INPATIENT)
Dept: NON INVASIVE DIAGNOSTICS | Facility: HOSPITAL | Age: 64
End: 2024-02-07
Payer: COMMERCIAL

## 2024-02-07 ENCOUNTER — APPOINTMENT (INPATIENT)
Dept: NON INVASIVE DIAGNOSTICS | Facility: HOSPITAL | Age: 64
DRG: 853 | End: 2024-02-07
Attending: STUDENT IN AN ORGANIZED HEALTH CARE EDUCATION/TRAINING PROGRAM
Payer: COMMERCIAL

## 2024-02-07 ENCOUNTER — APPOINTMENT (INPATIENT)
Dept: RADIOLOGY | Facility: HOSPITAL | Age: 64
DRG: 853 | End: 2024-02-07
Payer: COMMERCIAL

## 2024-02-07 ENCOUNTER — ANESTHESIA (INPATIENT)
Dept: NON INVASIVE DIAGNOSTICS | Facility: HOSPITAL | Age: 64
End: 2024-02-07
Payer: COMMERCIAL

## 2024-02-07 LAB
ANION GAP SERPL CALCULATED.3IONS-SCNC: 9 MMOL/L
BASOPHILS # BLD MANUAL: 0 THOUSAND/UL (ref 0–0.1)
BASOPHILS NFR MAR MANUAL: 0 % (ref 0–1)
BUN SERPL-MCNC: 7 MG/DL (ref 5–25)
CALCIUM SERPL-MCNC: 8.4 MG/DL (ref 8.4–10.2)
CHLORIDE SERPL-SCNC: 98 MMOL/L (ref 96–108)
CO2 SERPL-SCNC: 27 MMOL/L (ref 21–32)
CREAT SERPL-MCNC: 0.59 MG/DL (ref 0.6–1.3)
EOSINOPHIL # BLD MANUAL: 0 THOUSAND/UL (ref 0–0.4)
EOSINOPHIL NFR BLD MANUAL: 0 % (ref 0–6)
ERYTHROCYTE [DISTWIDTH] IN BLOOD BY AUTOMATED COUNT: 13.8 % (ref 11.6–15.1)
GFR SERPL CREATININE-BSD FRML MDRD: 108 ML/MIN/1.73SQ M
GLUCOSE SERPL-MCNC: 111 MG/DL (ref 65–140)
GLUCOSE SERPL-MCNC: 183 MG/DL (ref 65–140)
GLUCOSE SERPL-MCNC: 275 MG/DL (ref 65–140)
GLUCOSE SERPL-MCNC: 85 MG/DL (ref 65–140)
GLUCOSE SERPL-MCNC: 98 MG/DL (ref 65–140)
HCT VFR BLD AUTO: 35.6 % (ref 36.5–49.3)
HGB BLD-MCNC: 11.3 G/DL (ref 12–17)
LYMPHOCYTES # BLD AUTO: 1.47 THOUSAND/UL (ref 0.6–4.47)
LYMPHOCYTES # BLD AUTO: 7 % (ref 14–44)
MAGNESIUM SERPL-MCNC: 1.8 MG/DL (ref 1.9–2.7)
MCH RBC QN AUTO: 28.3 PG (ref 26.8–34.3)
MCHC RBC AUTO-ENTMCNC: 31.7 G/DL (ref 31.4–37.4)
MCV RBC AUTO: 89 FL (ref 82–98)
MONOCYTES # BLD AUTO: 1.36 THOUSAND/UL (ref 0–1.22)
MONOCYTES NFR BLD: 12 % (ref 4–12)
MYELOCYTES NFR BLD MANUAL: 3 % (ref 0–1)
NEUTROPHILS # BLD MANUAL: 8.16 THOUSAND/UL (ref 1.85–7.62)
NEUTS BAND NFR BLD MANUAL: 14 % (ref 0–8)
NEUTS SEG NFR BLD AUTO: 58 % (ref 43–75)
PHOSPHATE SERPL-MCNC: 3.7 MG/DL (ref 2.3–4.1)
PLATELET # BLD AUTO: 263 THOUSANDS/UL (ref 149–390)
PLATELET BLD QL SMEAR: ADEQUATE
PMV BLD AUTO: 10.3 FL (ref 8.9–12.7)
POTASSIUM SERPL-SCNC: 3.9 MMOL/L (ref 3.5–5.3)
RBC # BLD AUTO: 4 MILLION/UL (ref 3.88–5.62)
RBC MORPH BLD: NORMAL
SL CV LV EF: 45
SODIUM SERPL-SCNC: 134 MMOL/L (ref 135–147)
VARIANT LYMPHS # BLD AUTO: 6 %
WBC # BLD AUTO: 11.33 THOUSAND/UL (ref 4.31–10.16)

## 2024-02-07 PROCEDURE — 80048 BASIC METABOLIC PNL TOTAL CA: CPT

## 2024-02-07 PROCEDURE — 85007 BL SMEAR W/DIFF WBC COUNT: CPT

## 2024-02-07 PROCEDURE — 83735 ASSAY OF MAGNESIUM: CPT

## 2024-02-07 PROCEDURE — 97530 THERAPEUTIC ACTIVITIES: CPT

## 2024-02-07 PROCEDURE — 93312 ECHO TRANSESOPHAGEAL: CPT

## 2024-02-07 PROCEDURE — 99223 1ST HOSP IP/OBS HIGH 75: CPT | Performed by: INTERNAL MEDICINE

## 2024-02-07 PROCEDURE — 93320 DOPPLER ECHO COMPLETE: CPT | Performed by: INTERNAL MEDICINE

## 2024-02-07 PROCEDURE — 99233 SBSQ HOSP IP/OBS HIGH 50: CPT | Performed by: INTERNAL MEDICINE

## 2024-02-07 PROCEDURE — 85027 COMPLETE CBC AUTOMATED: CPT

## 2024-02-07 PROCEDURE — 93325 DOPPLER ECHO COLOR FLOW MAPG: CPT | Performed by: INTERNAL MEDICINE

## 2024-02-07 PROCEDURE — 97116 GAIT TRAINING THERAPY: CPT

## 2024-02-07 PROCEDURE — 99232 SBSQ HOSP IP/OBS MODERATE 35: CPT | Performed by: STUDENT IN AN ORGANIZED HEALTH CARE EDUCATION/TRAINING PROGRAM

## 2024-02-07 PROCEDURE — 82948 REAGENT STRIP/BLOOD GLUCOSE: CPT

## 2024-02-07 PROCEDURE — G1004 CDSM NDSC: HCPCS

## 2024-02-07 PROCEDURE — B24BZZ4 ULTRASONOGRAPHY OF HEART WITH AORTA, TRANSESOPHAGEAL: ICD-10-PCS | Performed by: FAMILY MEDICINE

## 2024-02-07 PROCEDURE — 93312 ECHO TRANSESOPHAGEAL: CPT | Performed by: INTERNAL MEDICINE

## 2024-02-07 PROCEDURE — 84100 ASSAY OF PHOSPHORUS: CPT

## 2024-02-07 PROCEDURE — 71260 CT THORAX DX C+: CPT

## 2024-02-07 PROCEDURE — 74177 CT ABD & PELVIS W/CONTRAST: CPT

## 2024-02-07 PROCEDURE — 76376 3D RENDER W/INTRP POSTPROCES: CPT

## 2024-02-07 RX ORDER — PROPOFOL 10 MG/ML
INJECTION, EMULSION INTRAVENOUS CONTINUOUS PRN
Status: DISCONTINUED | OUTPATIENT
Start: 2024-02-07 | End: 2024-02-07

## 2024-02-07 RX ORDER — SODIUM CHLORIDE, SODIUM GLUCONATE, SODIUM ACETATE, POTASSIUM CHLORIDE, MAGNESIUM CHLORIDE, SODIUM PHOSPHATE, DIBASIC, AND POTASSIUM PHOSPHATE .53; .5; .37; .037; .03; .012; .00082 G/100ML; G/100ML; G/100ML; G/100ML; G/100ML; G/100ML; G/100ML
50 INJECTION, SOLUTION INTRAVENOUS CONTINUOUS
Status: DISPENSED | OUTPATIENT
Start: 2024-02-07 | End: 2024-02-07

## 2024-02-07 RX ORDER — LIDOCAINE HYDROCHLORIDE 10 MG/ML
INJECTION, SOLUTION EPIDURAL; INFILTRATION; INTRACAUDAL; PERINEURAL AS NEEDED
Status: DISCONTINUED | OUTPATIENT
Start: 2024-02-07 | End: 2024-02-07

## 2024-02-07 RX ORDER — SODIUM CHLORIDE 9 MG/ML
INJECTION, SOLUTION INTRAVENOUS CONTINUOUS PRN
Status: DISCONTINUED | OUTPATIENT
Start: 2024-02-07 | End: 2024-02-07

## 2024-02-07 RX ORDER — PHENYLEPHRINE HCL IN 0.9% NACL 1 MG/10 ML
SYRINGE (ML) INTRAVENOUS AS NEEDED
Status: DISCONTINUED | OUTPATIENT
Start: 2024-02-07 | End: 2024-02-07

## 2024-02-07 RX ADMIN — Medication 100 MCG: at 10:42

## 2024-02-07 RX ADMIN — CHLORHEXIDINE GLUCONATE 15 ML: 1.2 SOLUTION ORAL at 08:45

## 2024-02-07 RX ADMIN — CLOPIDOGREL BISULFATE 75 MG: 75 TABLET ORAL at 08:45

## 2024-02-07 RX ADMIN — ASPIRIN 81 MG CHEWABLE TABLET 81 MG: 81 TABLET CHEWABLE at 08:45

## 2024-02-07 RX ADMIN — PROPOFOL 50 MG: 10 INJECTION, EMULSION INTRAVENOUS at 10:18

## 2024-02-07 RX ADMIN — CEFAZOLIN SODIUM 2000 MG: 2 SOLUTION INTRAVENOUS at 05:15

## 2024-02-07 RX ADMIN — PROPOFOL 100 MCG/KG/MIN: 10 INJECTION, EMULSION INTRAVENOUS at 10:20

## 2024-02-07 RX ADMIN — INSULIN LISPRO 1 UNITS: 100 INJECTION, SOLUTION INTRAVENOUS; SUBCUTANEOUS at 17:14

## 2024-02-07 RX ADMIN — INSULIN LISPRO 3 UNITS: 100 INJECTION, SOLUTION INTRAVENOUS; SUBCUTANEOUS at 22:09

## 2024-02-07 RX ADMIN — CEFAZOLIN SODIUM 2000 MG: 2 SOLUTION INTRAVENOUS at 22:09

## 2024-02-07 RX ADMIN — LIDOCAINE HYDROCHLORIDE 50 MG: 10 INJECTION, SOLUTION EPIDURAL; INFILTRATION; INTRACAUDAL; PERINEURAL at 10:17

## 2024-02-07 RX ADMIN — Medication 100 MCG: at 10:33

## 2024-02-07 RX ADMIN — INSULIN LISPRO 9 UNITS: 100 INJECTION, SOLUTION INTRAVENOUS; SUBCUTANEOUS at 17:13

## 2024-02-07 RX ADMIN — INSULIN GLARGINE 50 UNITS: 100 INJECTION, SOLUTION SUBCUTANEOUS at 22:08

## 2024-02-07 RX ADMIN — ATORVASTATIN CALCIUM 80 MG: 80 TABLET, FILM COATED ORAL at 17:14

## 2024-02-07 RX ADMIN — IOHEXOL 100 ML: 350 INJECTION, SOLUTION INTRAVENOUS at 15:40

## 2024-02-07 RX ADMIN — AMIODARONE HYDROCHLORIDE 200 MG: 200 TABLET ORAL at 08:45

## 2024-02-07 RX ADMIN — CEFAZOLIN SODIUM 2000 MG: 2 SOLUTION INTRAVENOUS at 16:09

## 2024-02-07 RX ADMIN — SODIUM CHLORIDE: 0.9 INJECTION, SOLUTION INTRAVENOUS at 10:11

## 2024-02-07 RX ADMIN — Medication 100 MCG: at 10:45

## 2024-02-07 RX ADMIN — SODIUM CHLORIDE, SODIUM GLUCONATE, SODIUM ACETATE, POTASSIUM CHLORIDE, MAGNESIUM CHLORIDE, SODIUM PHOSPHATE, DIBASIC, AND POTASSIUM PHOSPHATE 50 ML/HR: .53; .5; .37; .037; .03; .012; .00082 INJECTION, SOLUTION INTRAVENOUS at 11:59

## 2024-02-07 RX ADMIN — ENOXAPARIN SODIUM 40 MG: 40 INJECTION SUBCUTANEOUS at 08:45

## 2024-02-07 NOTE — PLAN OF CARE
Problem: PHYSICAL THERAPY ADULT  Goal: Performs mobility at highest level of function for planned discharge setting.  See evaluation for individualized goals.  Description: Treatment/Interventions: Functional transfer training, LE strengthening/ROM, Elevations, Therapeutic exercise, Endurance training, Patient/family training, Equipment eval/education, Bed mobility, Spoke to nursing, Gait training, OT, Spoke to case management  Equipment Recommended: Wheelchair, Walker       See flowsheet documentation for full assessment, interventions and recommendations.  Outcome: Progressing  Note: Prognosis: Good  Problem List: Decreased strength, Decreased endurance, Impaired balance, Decreased mobility, Pain  Assessment: Patient received in bed. Patient agreeable to therapy. Patient performs bed mobility with supervision. Patient performs functional transfers with minimal assistance x1 using rolling walker. Patient performs ambulation with minimal assistance x1 using rolling walker, 10'.  Patient left in stretcher with all needs met and call bell/personal items within reach. The patient's AM-PAC Basic Mobility Inpatient Short Form Raw Score is 15 showing further need for skilled PT services in order to improve functional mobility, decrease need for assistance, and return to PLOF. PT is recommending Level 1 - Maximum Resource Intensity on d/c from hospital.  Will continue to follow as able.  Barriers to Discharge: Inaccessible home environment     Rehab Resource Intensity Level, PT: I (Maximum Resource Intensity)    See flowsheet documentation for full assessment.

## 2024-02-07 NOTE — ASSESSMENT & PLAN NOTE
Appreciate podiatry input -> status post left fourth toe amputation on 1/31  Calion to be surgical cure per podiatry  Wound care per podiatry

## 2024-02-07 NOTE — ANESTHESIA PREPROCEDURE EVALUATION
Procedure:  GILL    Relevant Problems   CARDIO   (+) 3-vessel CAD   (+) A-fib (HCC)   (+) Coronary artery disease involving native coronary artery of native heart without angina pectoris   (+) Essential (primary) hypertension      ENDO   (+) Type 2 diabetes mellitus with foot ulcer, with long-term current use of insulin (HCC)   (+) Type 2 diabetes mellitus, with long-term current use of insulin (HCC)      GI/HEPATIC   (+) GERD (gastroesophageal reflux disease)      /RENAL   (+) LINDEN (acute kidney injury) (HCC)   (+) Benign prostatic hyperplasia without lower urinary tract symptoms      NEURO/PSYCH   (+) LUE weakness      Admitted with sepsis-probably from gangrenous toe.      Physical Exam    Airway    Mallampati score: II  TM Distance: >3 FB  Neck ROM: full     Dental   No notable dental hx     Cardiovascular  Rhythm: regular, Rate: normal    Pulmonary   Breath sounds clear to auscultation    Other Findings      Left Ventricle: Left ventricular cavity size is normal. Wall thickness is moderately increased. There is moderate concentric hypertrophy. The left ventricular ejection fraction is 42%. Systolic function is moderately reduced. Diastolic function is mildly abnormal, consistent with grade I (abnormal) relaxation. 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 There is no thrombus.    The following segments are akinetic: apical anterior, apical septal, apical inferior, apical lateral and apex.    All other segments are normal.    Tricuspid Valve: There is mild regurgitation.        Anesthesia Plan  ASA Score- 3     Anesthesia Type- IV sedation with anesthesia with ASA Monitors.         Additional Monitors:     Airway Plan:            Plan Factors-Exercise tolerance (METS): >4 METS.    Chart reviewed. EKG reviewed. Imaging results reviewed. Existing labs reviewed. Patient summary reviewed.    Patient is not a current smoker. Patient not instructed to abstain from smoking on day of procedure. Patient did not smoke on day of  surgery.    Obstructive sleep apnea risk education given perioperatively.        Induction- intravenous.    Postoperative Plan-     Informed Consent- Anesthetic plan and risks discussed with patient.  I personally reviewed this patient with the CRNA. Discussed and agreed on the Anesthesia Plan with the CRNA..

## 2024-02-07 NOTE — PROGRESS NOTES
Progress Note - Infectious Disease   Nadir Myers 63 y.o. male MRN: [id number]  Unit/Bed#: [facility] 902-01 Encounter: [id number]      Impression/Plan:    Sepsis, present on arrival; fever, leukocytosis  -Source is bacteremia and patients foot infection as below. No other clear source. Fevers resolving on antibiotic, WBC down trending.   -Antibiotic as below  -Follow up blood cultures  -Trend fever curve  -Repeat CBC + diff tomorrow AM     Persistent MSSA bacteremia with likely ICD infection  -Likely source is patients foot wound as below. Blood cultures positive on 1/31 and now again on 2/03 and 2/06 despite being on Cefazolin. In presence of ICD, must consider endocarditis and/or cardiac device infection. TTE on 2/01 (adequate study) did not show a vegetation. GILL 2/07 did show a mobile mass on device lead in right atrium, concerning for infection. No valve vegetations.  -Continue high dose IV Cefazolin 2g every 6 hours  -Repeat blood cultures X2 tomorrow AM for clearance  -Appreciate EP assistance regarding device extraction  -Per EP, plan to re-implant [employer] extra vascular ICD; would wait until surveillance blood cultures taken after device extraction are negative X 72 hours prior to reimplantation  -Follow up CT C/A/P to rule out hematogenous seeding  -Will need 6 weeks of IV antibiotic from first negative blood cultures and device removal; do not place PICC until blood cultures clear X 72 hours     3. 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 Culture polymicrobial of tissue with MSSA, Finegoldia and Anaerococccus. Likely source of patient's bacteremia. Surgical cure felt to be obtained per Podiatry.  -Ongoing local wound care per Podiatry.     4. Staph epidermidis in 1 of 2 blood cultures  -1 set from 2/03 with Staph epi, did not grow in prior blood cultures from 1/31. Suspect this was skin contaminant and as above MSSA is true pathogen.  -Follow up repeat blood cultures and second  culture from  to ensure no other growth of Staph epi     Plan and recommendations were discussed with primary team. They agree with continuing IV Cefazolin.    Antibiotics:  Cefazolin    24 Hour Events:  Tmax of 100.8 overnight, Afebrile this AM. WBC stable, 11.3. GILL today concerning for vegetation on device lead.     Subjective:  Patient this AM denies fever/chills, back pain, abdominal pain, cough.    Objective:  Vitals:  Temp:  [97.5 °F (36.4 °C)-100.8 °F (38.2 °C)] 97.5 °F (36.4 °C)  HR:  [83-91] 83  Resp:  [16-18] 16  BP: (141-148)/(73-88) 141/73  SpO2:  [93 %-94 %] 93 %  Temp (24hrs), Av.7 °F (37.1 °C), Min:97.5 °F (36.4 °C), Max:100.8 °F (38.2 °C)  Current: Temperature: 97.5 °F (36.4 °C)    Physical Exam:   General Appearance:  Alert, interactive, nontoxic, no acute distress.   Throat: Oropharynx moist without lesions.    Lungs:   Clear to auscultation bilaterally; no wheezes, rhonchi or rales; respirations unlabored   Heart:  RRR   Abdomen:   Soft, non-tender     Extremities: Left foot with dressing intact   Skin: No new rashes or lesions. No draining wounds noted.       Labs:   All pertinent labs and imaging studies were personally reviewed  Results from last 7 days   Lab Units 24  04424  0633 24  0442   WBC Thousand/uL 11.33* 11.02* 15.11*   HEMOGLOBIN g/dL 11.3* 11.3* 12.0   PLATELETS Thousands/uL 263 220 204     Results from last 7 days   Lab Units 24  0444 24  0633 24  0442 24  0502 24  0511 24  0951   SODIUM mmol/L 134* 134* 132*   < > 132*  --    POTASSIUM mmol/L 3.9 3.5 4.0   < > 3.4*  --    CHLORIDE mmol/L 98 100 96   < > 100  --    CO2 mmol/L 27 28 27   < > 23  --    BUN mg/dL 7 8 11   < > 26*  --    CREATININE mg/dL 0.59* 0.61 0.65   < > 0.85  --    EGFR ml/min/1.73sq m 108 107 104   < > 93  --    CALCIUM mg/dL 8.4 8.0* 8.4   < > 8.3*  --    AST U/L  --   --   --   --  21 16   ALT U/L  --   --   --   --  15 13   ALK PHOS U/L  --   --    --   --  48 58    < > = values in this interval not displayed.     Results from last 7 days   Lab Units 01/31/24  1112   PROCALCITONIN ng/ml 0.73*                   Micro:  Results from last 7 days   Lab Units 02/06/24  1221 02/03/24  0614 01/31/24  1748 01/31/24  1225 01/31/24  1135 01/31/24  1041   BLOOD CULTURE  Received in Microbiology Lab. Culture in Progress.  Received in Microbiology Lab. Culture in Progress. Staphylococcus aureus*  Staphylococcus epidermidis*  Staphylococcus aureus*  --   --  Staphylococcus aureus* Staphylococcus aureus*   GRAM STAIN RESULT   --  Gram positive cocci in clusters*  Gram positive cocci in clusters* No polys seen*  2+ Gram positive cocci in pairs* No polys seen*  1+ Gram positive cocci in pairs* Gram positive cocci in clusters* Gram positive cocci in clusters*   WOUND CULTURE   --   --  3+ Growth of Staphylococcus aureus* 2+ Growth of Staphylococcus aureus*  --   --        Imaging:          Zach Medina MD  [facility]

## 2024-02-07 NOTE — ASSESSMENT & PLAN NOTE
"Maintain strict fluid restriction  Optimize blood sugar control as a degree of \"pseudohyponatremia\" is likely contributory  Sodium improving to 134 stable, continue to monitor  "

## 2024-02-07 NOTE — PLAN OF CARE
Problem: METABOLIC, FLUID AND ELECTROLYTES - ADULT  Goal: Electrolytes maintained within normal limits  Description: INTERVENTIONS:  - Monitor labs and assess patient for signs and symptoms of electrolyte imbalances  - Administer electrolyte replacement as ordered  - Monitor response to electrolyte replacements, including repeat lab results as appropriate  - Instruct patient on fluid and nutrition as appropriate  Outcome: Progressing  Goal: Fluid balance maintained  Description: INTERVENTIONS:  - Monitor labs   - Monitor I/O and WT  - Instruct patient on fluid and nutrition as appropriate  - Assess for signs & symptoms of volume excess or deficit  Outcome: Progressing  Goal: Glucose maintained within target range  Description: INTERVENTIONS:  - Monitor Blood Glucose as ordered  - Assess for signs and symptoms of hyperglycemia and hypoglycemia  - Administer ordered medications to maintain glucose within target range  - Assess nutritional intake and initiate nutrition service referral as needed  Outcome: Progressing     Problem: Prexisting or High Potential for Compromised Skin Integrity  Goal: Skin integrity is maintained or improved  Description: INTERVENTIONS:  - Identify patients at risk for skin breakdown  - Assess and monitor skin integrity  - Assess and monitor nutrition and hydration status  - Monitor labs   - Assess for incontinence   - Turn and reposition patient  - Assist with mobility/ambulation  - Relieve pressure over bony prominences  - Avoid friction and shearing  - Provide appropriate hygiene as needed including keeping skin clean and dry  - Evaluate need for skin moisturizer/barrier cream  - Collaborate with interdisciplinary team   - Patient/family teaching  - Consider wound care consult   Outcome: Progressing     Problem: PAIN - ADULT  Goal: Verbalizes/displays adequate comfort level or baseline comfort level  Description: Interventions:  - Encourage patient to monitor pain and request  assistance  - Assess pain using appropriate pain scale  - Administer analgesics based on type and severity of pain and evaluate response  - Implement non-pharmacological measures as appropriate and evaluate response  - Consider cultural and social influences on pain and pain management  - Notify physician/advanced practitioner if interventions unsuccessful or patient reports new pain  Outcome: Progressing     Problem: INFECTION - ADULT  Goal: Absence or prevention of progression during hospitalization  Description: INTERVENTIONS:  - Assess and monitor for signs and symptoms of infection  - Monitor lab/diagnostic results  - Monitor all insertion sites, i.e. indwelling lines, tubes, and drains  - Monitor endotracheal if appropriate and nasal secretions for changes in amount and color  - [place] appropriate cooling/warming therapies per order  - Administer medications as ordered  - Instruct and encourage patient and family to use good hand hygiene technique  - Identify and instruct in appropriate isolation precautions for identified infection/condition  Outcome: Progressing  Goal: Absence of fever/infection during neutropenic period  Description: INTERVENTIONS:  - Monitor WBC    Outcome: Progressing     Problem: SAFETY ADULT  Goal: Patient will remain free of falls  Description: INTERVENTIONS:  - Educate patient/family on patient safety including physical limitations  - Instruct patient to call for assistance with activity   - Consult OT/PT to assist with strengthening/mobility   - Keep Call bell within reach  - Keep bed low and locked with side rails adjusted as appropriate  - Keep care items and personal belongings within reach  - Initiate and maintain comfort rounds  - Make Fall Risk Sign visible to staff  - Apply yellow socks and bracelet for high fall risk patients  - Consider moving patient to room near nurses station  Outcome: Progressing  Goal: Maintain or return to baseline ADL function  Description:  INTERVENTIONS:  -  Assess patient's ability to carry out ADLs; assess patient's baseline for ADL function and identify physical deficits which impact ability to perform ADLs (bathing, care of mouth/teeth, toileting, grooming, dressing, etc.)  - Assess/evaluate cause of self-care deficits   - Assess range of motion  - Assess patient's mobility; develop plan if impaired  - Assess patient's need for assistive devices and provide as appropriate  - Encourage maximum independence but intervene and supervise when necessary  - Involve family in performance of ADLs  - Assess for home care needs following discharge   - Consider OT consult to assist with ADL evaluation and planning for discharge  - Provide patient education as appropriate  Outcome: Progressing  Goal: Maintains/Returns to pre admission functional level  Description: INTERVENTIONS:  - Perform AM-PAC 6 Click Basic Mobility/ Daily Activity assessment daily.  - Set and communicate daily mobility goal to care team and patient/family/caregiver.   - Collaborate with rehabilitation services on mobility goals if consulted  - Record patient progress and toleration of activity level   Outcome: Progressing     Problem: DISCHARGE PLANNING  Goal: Discharge to home or other facility with appropriate resources  Description: INTERVENTIONS:  - Identify barriers to discharge w/patient and caregiver  - Arrange for needed discharge resources and transportation as appropriate  - Identify discharge learning needs (meds, wound care, etc.)  - Arrange for interpretive services to assist at discharge as needed  - Refer to Case Management Department for coordinating discharge planning if the patient needs post-hospital services based on physician/advanced practitioner order or complex needs related to functional status, cognitive ability, or social support system  Outcome: Progressing     Problem: Knowledge Deficit  Goal: Patient/family/caregiver demonstrates understanding of disease  process, treatment plan, medications, and discharge instructions  Description: Complete learning assessment and assess knowledge base.  Interventions:  - Provide teaching at level of understanding  - Provide teaching via preferred learning methods  Outcome: Progressing

## 2024-02-07 NOTE — PROGRESS NOTES
[facility]  Progress Note  Name: Nadir Myers I  MRN: [id number]  Unit/Bed#: [facility] 902-01 I Date of Admission: 1/31/2024   Date of Service: 2/7/2024 I Hospital Day: 7    Assessment/Plan   * Septic shock (HCC)  Assessment & Plan  Initially admitted to the ICU requiring vasopressor support, now off, with maintenance of hemodynamic stability  Due to MSSA bacteremia from a gangrenous left fourth toe (see below)  Continue to monitor vitals and maintain hemodynamics including temperature curve  See additional management below    MSSA bacteremia  Assessment & Plan  As evidenced on blood cultures from 1/31 with repeat blood cultures positive on 2/3 despite being on Ancef  Blood culture 1/2 positive for staph epi, likely contaminant, follow-up repeat blood cultures to confirm contaminant  Likely source due to left fourth toe gangrenous wound -> see plan below  Continue IV Ancef, will need 6 weeks of IV antibiotics from first negative blood cultures and device removal  GILL with evidence of mobile mass on ICD lead in the right atrium, concerning for infection, no valvular vegetation  Plan for lead extraction with EP tomorrow  ID following, appreciate recommendations  Follow-up CT CAP to rule out hematogenous seeding, DC IVF 4 hours after completion of CT    Electrolyte abnormalities  Assessment & Plan  Monitor/replete serum potassium/magnesium/phosphate as necessary    Gangrene of toe of left foot ([facility])  Assessment & Plan  Appreciate podiatry input -> status post left fourth toe amputation on 1/31  [place] to be surgical cure per podiatry  Wound care per podiatry    PAD (peripheral artery disease) ([facility])  Assessment & Plan  Continue dual antiplatelet therapy with ASA/Plavix - continue statin  Sequela complicated by gangrenous left toe (see above)    Chronic systolic CHF (HCC)  Assessment & Plan  Last EF of 42%  Holding diuresis (Aldactone) in the setting of relative hypotension and  "recovering LINDEN  Continue ASA/Plavix and statin for underlying CAD (associated ischemic cardiomyopathy)  Monitor/replete serum potassium/magnesium as necessary  Low sodium and fluid restriction enforced  Appears euvolemic  Received fluids 4 hours after obtaining CT CAP    Hyponatremia  Assessment & Plan  Maintain strict fluid restriction  Optimize blood sugar control as a degree of \"pseudohyponatremia\" is likely contributory  Sodium improving to 134 stable, continue to monitor    LINDEN (acute kidney injury) (HCC)  Assessment & Plan  Creatinine of approximately 0.6-0.8 -> currently stable at 0.65 from a prior peak of 1.41  Monitor renal function and urine output  Limit/avoid nephrotoxins and hypotension as possible  Holding spironolactone    Type 2 diabetes mellitus, with long-term current use of insulin (HCC)  Assessment & Plan  Lab Results   Component Value Date    HGBA1C 9.8 (H) 01/31/2024     Continue basal/prandial insulin with additional SSI coverage per Accu-Cheks  Hypoglycemia protocol  Carbohydrate restriction           VTE Pharmacologic Prophylaxis:   Moderate Risk (Score 3-4) - Pharmacological DVT Prophylaxis Contraindicated. Sequential Compression Devices Ordered.    Mobility:   Basic Mobility Inpatient Raw Score: 15  JH-HLM Goal: 4: Move to chair/commode  JH-HLM Achieved: 6: Walk 10 steps or more  HLM Goal achieved. Continue to encourage appropriate mobility.    Patient Centered Rounds: I performed bedside rounds with nursing staff today.   Discussions with Specialists or Other Care Team Provider: CAMPOS CERNA    Education and Discussions with Family / Patient: Attempted to update  (wife) via phone. Unable to contact.    Total Time Spent on Date of Encounter in care of patient: 25 mins. This time was spent on one or more of the following: performing physical exam; counseling and coordination of care; obtaining or reviewing history; documenting in the medical record; reviewing/ordering tests, " medications or procedures; communicating with other healthcare professionals and discussing with patient's family/caregivers.    Current Length of Stay: 7 day(s)  Current Patient Status: Inpatient   Certification Statement: The patient will continue to require additional inpatient hospital stay due to plan for ICD lead extraction tomorrow given concern for lead vegetation, continue IV antibiotics  Discharge Plan: Anticipate discharge in >72 hrs to discharge location to be determined pending rehab evaluations.    Code Status: Level 1 - Full Code    Subjective:   Patient assessed at bedside.  No complaints.  Patient evaluated post GILL.    Objective:     Vitals:   Temp (24hrs), Av.2 °F (37.3 °C), Min:97.5 °F (36.4 °C), Max:100.8 °F (38.2 °C)    Temp:  [97.5 °F (36.4 °C)-100.8 °F (38.2 °C)] 97.5 °F (36.4 °C)  HR:  [67-91] 67  Resp:  [16-18] 16  BP: (124-149)/(66-80) 124/66  SpO2:  [93 %-100 %] 97 %  Body mass index is 32.28 kg/m².     Input and Output Summary (last 24 hours):     Intake/Output Summary (Last 24 hours) at 2024 1738  Last data filed at 2024 1716  Gross per 24 hour   Intake 150 ml   Output 2575 ml   Net -2425 ml       Physical Exam:   Physical Exam  Vitals reviewed.   Constitutional:       General: He is not in acute distress.     Appearance: Normal appearance. He is not ill-appearing.   HENT:      Head: Normocephalic and atraumatic.   Cardiovascular:      Rate and Rhythm: Normal rate and regular rhythm.      Heart sounds: Normal heart sounds.   Pulmonary:      Effort: Pulmonary effort is normal. No respiratory distress.      Breath sounds: No wheezing or rales.   Abdominal:      General: Bowel sounds are normal.      Tenderness: There is no abdominal tenderness.   Musculoskeletal:      Right lower leg: No edema.      Left lower leg: No edema.   Skin:     General: Skin is warm and dry.   Neurological:      Mental Status: He is alert and oriented to person, place, and time. Mental status is at  baseline.   Psychiatric:         Mood and Affect: Mood normal.         Behavior: Behavior normal.        Additional Data:     Labs:  Results from last 7 days   Lab Units 02/07/24  0444 02/06/24  0633 02/05/24  0442   WBC Thousand/uL 11.33*   < > 15.11*   HEMOGLOBIN g/dL 11.3*   < > 12.0   HEMATOCRIT % 35.6*   < > 36.2*   PLATELETS Thousands/uL 263   < > 204   BANDS PCT % 14*   < >  --    NEUTROS PCT %  --   --  77*   LYMPHS PCT %  --   --  11*   LYMPHO PCT % 7*   < >  --    MONOS PCT %  --   --  10   MONO PCT % 12   < >  --    EOS PCT % 0   < > 1    < > = values in this interval not displayed.     Results from last 7 days   Lab Units 02/07/24  0444 02/02/24  0502 02/01/24  0511   SODIUM mmol/L 134*   < > 132*   POTASSIUM mmol/L 3.9   < > 3.4*   CHLORIDE mmol/L 98   < > 100   CO2 mmol/L 27   < > 23   BUN mg/dL 7   < > 26*   CREATININE mg/dL 0.59*   < > 0.85   ANION GAP mmol/L 9   < > 9   CALCIUM mg/dL 8.4   < > 8.3*   ALBUMIN g/dL  --   --  3.4*   TOTAL BILIRUBIN mg/dL  --   --  0.71   ALK PHOS U/L  --   --  48   ALT U/L  --   --  15   AST U/L  --   --  21   GLUCOSE RANDOM mg/dL 85   < > 149*    < > = values in this interval not displayed.         Results from last 7 days   Lab Units 02/07/24  1626 02/07/24  1132 02/07/24  0716 02/06/24  2102 02/06/24  1607 02/06/24  1100 02/06/24  0717 02/05/24  2051 02/05/24  1536 02/05/24  1052 02/05/24  0708 02/04/24  2048   POC GLUCOSE mg/dl 183* 98 111 182* 151* 135 103 275* 246* 195* 162* 256*               Lines/Drains:  Invasive Devices       Peripheral Intravenous Line  Duration             Peripheral IV 02/05/24 Distal;Dorsal (posterior);Left Forearm 1 day    Peripheral IV 02/07/24 Right Hand <1 day                          Imaging: Reviewed radiology reports from this admission including: ECHO    Recent Cultures (last 7 days):   Results from last 7 days   Lab Units 02/06/24  1221 02/03/24  0614 01/31/24  1748   BLOOD CULTURE   --  Staphylococcus aureus*  Staphylococcus  epidermidis*  Staphylococcus aureus*  --    GRAM STAIN RESULT  Gram positive cocci in clusters*  Gram positive cocci in clusters* Gram positive cocci in clusters*  Gram positive cocci in clusters* No polys seen*  2+ Gram positive cocci in pairs*   WOUND CULTURE   --   --  3+ Growth of Staphylococcus aureus*       Last 24 Hours Medication List:   Current Facility-Administered Medications   Medication Dose Route Frequency Provider Last Rate    acetaminophen  975 mg Oral Q6H PRN Mague Cunningham DPM      amiodarone  200 mg Oral Daily With Breakfast Mague Cunningham DPM      aspirin  81 mg Oral Daily Mague Cunningham DPM      atorvastatin  80 mg Oral After Dinner Mague Cunningham DPM      cefazolin  2,000 mg Intravenous Q6H Mague Cunningham DPM 2,000 mg (02/07/24 1609)    chlorhexidine  15 mL Mouth/Throat Q12H TEJAS Mague Cunningham DPM      clopidogrel  75 mg Oral Daily Mague Cunningham DPM      insulin glargine  50 Units Subcutaneous HS Hilda Ashley MD      insulin lispro  1-5 Units Subcutaneous 4x Daily (AC & HS) Mague Cunningham DPM      insulin lispro  9 Units Subcutaneous TID With Meals Hilda Ashley MD      multi-electrolyte  50 mL/hr Intravenous Continuous Robyn Feldman DO 50 mL/hr (02/07/24 1159)    ondansetron  4 mg Intravenous Once Mague Cunningham DPM      polyethylene glycol  17 g Oral Daily PRN Mague Cunningham DPM      senna-docusate sodium  2 tablet Oral BID PRN Mague Cunningham DPM          Today, Patient Was Seen By: Robyn Feldman DO    **Please Note: This note may have been constructed using a voice recognition system.**

## 2024-02-07 NOTE — ASSESSMENT & PLAN NOTE
As evidenced on blood cultures from 1/31 with repeat blood cultures positive on 2/3 despite being on Ancef  Blood culture 1/2 positive for staph epi, likely contaminant, follow-up repeat blood cultures to confirm contaminant  Likely source due to left fourth toe gangrenous wound -> see plan below  Continue IV Ancef, will need 6 weeks of IV antibiotics from first negative blood cultures and device removal  GILL with evidence of mobile mass on ICD lead in the right atrium, concerning for infection, no valvular vegetation  Plan for lead extraction with EP tomorrow  ID following, appreciate recommendations  Follow-up CT CAP to rule out hematogenous seeding, DC IVF 4 hours after completion of CT

## 2024-02-07 NOTE — ASSESSMENT & PLAN NOTE
Last EF of 42%  Holding diuresis (Aldactone) in the setting of relative hypotension and recovering LINDEN  Continue ASA/Plavix and statin for underlying CAD (associated ischemic cardiomyopathy)  Monitor/replete serum potassium/magnesium as necessary  Low sodium and fluid restriction enforced  Appears euvolemic  Received fluids 4 hours after obtaining CT CAP

## 2024-02-07 NOTE — CASE MANAGEMENT
Case Management Progress Note    Patient name Nadir Myers  Location [facility] 902/[facility] 902- MRN [id number]  : 1960 Date 2024       LOS (days): 7  Geometric Mean LOS (GMLOS) (days): 9.9  Days to GMLOS:2.8        OBJECTIVE:        Current admission status: Inpatient  Preferred Pharmacy:   [id number] [place], [place] - [address]  [address] [address]  Phone: [phone] Fax: [phone]    [facility]  [place], [place] - [address]  [address]  [place] [address]  Phone: [phone] Fax: [phone]    Primary Care Provider: Tigre Hare DO    [facility]  Secondary Insurance:     PROGRESS NOTE: CM consulted with medical provider, pt not medically cleared for discharge at this time.

## 2024-02-07 NOTE — CONSULTS
Consultation - Electrophysiology (EP)  Nadir Myers 63 y.o. 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    Blood cultures from 2/6 showing persistent GPC in clusters consistent with prior MSSA.    GILL performed today. I personally reviewed images and concur with interpretation of a mobile vegetation on the atrial lead. No significant vavlular pathology noted.     Based on persistent positive cultures despite tailored antibiotic therapy and toe amputation, together with GILL findings, patient has a very likely CIED infection for which device extraction would be indicated.   No evidence of intracardiac shunt was present, and there was no evidence of valvular endocarditis.    Review of device interrogations reveals 8% atrial pacing, minimal ventricular pacing, and no high rate episodes requiring ATP or shock therapy.     Plan:  Device extraction to be planned for tomorrow. NPO after midnight.     Will need re-implant of secondary prevention ICD. He would be an ideal candidate for the novel subcutaneous Medtronic EV-ICD, which will allow for pacing and ICD therapy without the  risk of device related bloodstream infection in the future. This will be arranged once the patient's infection clears and ID team feels this would be appropriate.     HPI: Nadir Myers is a 63 y.o. year old male who presented 1/31 with complaints of left sided weakness/numbness and was noted to be in septic shock with left 4th toe gangrene. He was admitted to the ICU with norepinephrine infusion and underwent amputation with podiatry. He was found to have MSSA positive blood cultures and has been treated with IV antibiotics. Due to persistent positive cultures and recurrent fevers after 72 hours, he underwent GILL today showing a vegetation on a device lead, prompting EP consultation. The patient currently is resting in bed with no complaints.       Review of Systems  Review of system was conducted and was negative except for as stated in the HPI.      Historical Information   Past Medical History:   Diagnosis Date    Diabetes mellitus (HCC)     Myocardial infarction (HCC)      Past Surgical History:   Procedure Laterality Date    CARDIAC CATHETERIZATION N/A 05/03/2023    Procedure: Cardiac Coronary Angiogram;  Surgeon: Valeriy Shore MD;  Location: [facility];  Service: Cardiology    CARDIAC CATHETERIZATION Left 05/03/2023    Procedure: Cardiac Left Heart Cath;  Surgeon: Valeriy Shore MD;  Location: [facility];  Service: Cardiology    CARDIAC DEFIBRILLATOR PLACEMENT  05/2023    CARDIAC ELECTROPHYSIOLOGY PROCEDURE N/A 05/12/2023    Procedure: Cardiac icd implant;  Surgeon: Urbano Maria MD;  Location: [facility];  Service: Cardiology    IR LOWER EXTREMITY ANGIOGRAM  1/18/2024    WOUND DEBRIDEMENT Left 2/4/2024    Procedure: LEFT FOURTH TOE AMPUTATION SURGICAL WOUND DEBRIDEMENT FOOT/TOE (WASH OUT);  Surgeon: Bebo Hopper DPM;  Location: [facility];  Service: Podiatry     Social History     Substance and Sexual Activity   Alcohol Use Not Currently     Social History     Substance and  Sexual Activity   Drug Use Never     Social History     Tobacco Use   Smoking Status Every Day    Types: Cigarettes   Smokeless Tobacco Never   Tobacco Comments    X2 cigarettes/day     Family History: non-contributory    Meds/Allergies   Hospital Medications:   Current Facility-Administered Medications   Medication Dose Route Frequency    acetaminophen (TYLENOL) tablet 975 mg  975 mg Oral Q6H PRN    amiodarone tablet 200 mg  200 mg Oral Daily With Breakfast    aspirin chewable tablet 81 mg  81 mg Oral Daily    atorvastatin (LIPITOR) tablet 80 mg  80 mg Oral After Dinner    ceFAZolin (ANCEF) IVPB (premix in dextrose) 2,000 mg 50 mL  2,000 mg Intravenous Q6H    chlorhexidine (PERIDEX) 0.12 % oral rinse 15 mL  15 mL Mouth/Throat Q12H TEJAS    clopidogrel (PLAVIX) tablet 75 mg  75 mg Oral Daily    enoxaparin (LOVENOX) subcutaneous injection 40 mg  40 mg Subcutaneous Q24H TEJAS    insulin glargine (LANTUS) subcutaneous injection 50 Units 0.5 mL  50 Units Subcutaneous HS    insulin lispro (HumaLOG) 100 units/mL subcutaneous injection 1-5 Units  1-5 Units Subcutaneous 4x Daily (AC & HS)    insulin lispro (HumaLOG) 100 units/mL subcutaneous injection 9 Units  9 Units Subcutaneous TID With Meals    multi-electrolyte (PLASMALYTE-A/ISOLYTE-S PH 7.4) IV solution  50 mL/hr Intravenous Continuous    ondansetron (ZOFRAN) injection 4 mg  4 mg Intravenous Once    polyethylene glycol (MIRALAX) packet 17 g  17 g Oral Daily PRN    senna-docusate sodium (SENOKOT S) 8.6-50 mg per tablet 2 tablet  2 tablet Oral BID PRN     Home Medications:   Medications Prior to Admission   Medication    aspirin 81 MG tablet    BD Pen Needle Micro U/F 32G X 6 MM MISC    clopidogrel (PLAVIX) 75 mg tablet    insulin detemir (Levemir FlexPen) 100 Units/mL injection pen    JANUMET -1000 MG TB24    losartan (COZAAR) 25 mg tablet    metFORMIN (GLUCOPHAGE) 1000 MG tablet    spironolactone (ALDACTONE) 25 mg tablet       Allergies   Allergen Reactions     Vancomycin Rash     NOT AN ALLERGY - 1/31/24 patient experienced vancomycin infusion reaction, please extend duration of infusion time to prevent future infusion reactions       Objective   Vitals: Blood pressure 124/66, pulse 67, temperature 97.5 °F (36.4 °C), temperature source Oral, resp. rate 16, height 6' (1.829 m), weight 108 kg (238 lb), SpO2 97%.  Orthostatic Blood Pressures      Flowsheet Row Most Recent Value   Blood Pressure 124/66 filed at 02/07/2024 1107   Patient Position - Orthostatic VS Lying filed at 02/07/2024 0717              Invasive Devices       Peripheral Intravenous Line  Duration             Peripheral IV 02/05/24 Distal;Dorsal (posterior);Left Forearm 1 day    Peripheral IV 02/07/24 Right Hand <1 day                    Physical Exam  Constitutional:       General: He is not in acute distress.  Cardiovascular:      Rate and Rhythm: Normal rate and regular rhythm.      Pulses: Normal pulses.      Heart sounds: No murmur heard.  Musculoskeletal:      Right lower leg: No edema.      Left lower leg: No edema.   Skin:     General: Skin is warm.   Neurological:      Mental Status: He is alert.          Lab Results: I have personally reviewed pertinent lab results.    Results from last 7 days   Lab Units 01/31/24  1419 01/31/24  1225   HS TNI 2HR ng/L  --  112*   HS TNI 4HR ng/L 101*  --          Results from last 7 days   Lab Units 02/07/24  0444 02/06/24  0633 02/05/24  0442   POTASSIUM mmol/L 3.9 3.5 4.0   CO2 mmol/L 27 28 27   CHLORIDE mmol/L 98 100 96   BUN mg/dL 7 8 11   CREATININE mg/dL 0.59* 0.61 0.65     Results from last 7 days   Lab Units 02/07/24  0444 02/06/24  0633 02/05/24  0442   HEMOGLOBIN g/dL 11.3* 11.3* 12.0   HEMATOCRIT % 35.6* 34.3* 36.2*   PLATELETS Thousands/uL 263 220 204     Results from last 7 days   Lab Units 01/31/24  1516   HEMOGLOBIN A1C % 9.8*       Imaging: I have personally reviewed pertinent reports.      Lamont Murcia MD  Cardiology Fellow

## 2024-02-07 NOTE — ANESTHESIA POSTPROCEDURE EVALUATION
Post-Op Assessment Note    CV Status:  Stable  Pain Score: 0    Pain management: adequate       Mental Status:  Alert and awake   Hydration Status:  Euvolemic   PONV Controlled:  Controlled   Airway Patency:  Patent     Post Op Vitals Reviewed: Yes    No anethesia notable event occurred.    Staff: CRNA               BP   149/70   Temp   99.1   Pulse  63   Resp   15   SpO2   100

## 2024-02-07 NOTE — PHYSICAL THERAPY NOTE
PHYSICAL THERAPY NOTE          Patient Name: Nadir Myers  Today's Date: 2/7/2024 02/07/24 1520   PT Last Visit   PT Visit Date 02/07/24   Note Type   Note Type Treatment   Pain Assessment   Pain Assessment Tool 0-10   Pain Score No Pain   Restrictions/Precautions   Weight Bearing Precautions Per Order (S)  Yes   LLE Weight Bearing Per Order (S)  WBAT  (to L heel in surgical shoe)   Braces or Orthoses Other (Comment)  (surgical shoe)   Other Precautions WBS;Multiple lines;Fall Risk   General   Chart Reviewed Yes   Family/Caregiver Present No   Cognition   Overall Cognitive Status WFL   Arousal/Participation Alert;Cooperative   Attention Within functional limits   Orientation Level Oriented X4   Memory Within functional limits   Following Commands Follows all commands and directions without difficulty   Comments Patient is pleasant and cooperative   Subjective   Subjective Patient agreeable to PT tx   Bed Mobility   Supine to Sit 5  Supervision   Additional items HOB elevated;Bedrails;Increased time required   Sit to Supine 5  Supervision   Additional items HOB elevated;Bedrails;Increased time required   Transfers   Sit to Stand 4  Minimal assistance   Additional items Assist x 1;Increased time required;Verbal cues   Stand to Sit 4  Minimal assistance   Additional items Assist x 1;Increased time required;Verbal cues   Additional Comments RW   Ambulation/Elevation   Gait pattern Improper Weight shift;Decreased foot clearance;Shuffling;Short stride;Excessively slow   Gait Assistance 4  Minimal assist   Additional items Assist x 1;Verbal cues   Assistive Device Rolling walker   Distance 10'   Balance   Static Sitting Fair +   Dynamic Sitting Fair   Static Standing Fair -   Dynamic Standing Fair -   Ambulatory Poor +   Endurance Deficit   Endurance Deficit Yes   Endurance Deficit Description fatigue, weakness   Activity Tolerance    Activity Tolerance Patient tolerated treatment well   Medical Staff Made Aware restorative   Nurse Made Aware RN cleared   Assessment   Prognosis Good   Problem List Decreased strength;Decreased endurance;Impaired balance;Decreased mobility;Pain   Assessment Patient received in bed. Patient agreeable to therapy. Patient performs bed mobility with supervision. Patient performs functional transfers with minimal assistance x1 using rolling walker. Patient performs ambulation with minimal assistance x1 using rolling walker, 10'.  Patient left in stretcher with all needs met and call bell/personal items within reach. The patient's AM-[facility] Basic Mobility Inpatient Short Form Raw Score is 15 showing further need for skilled PT services in order to improve functional mobility, decrease need for assistance, and return to PLOF. PT is recommending Level 1 - Maximum Resource Intensity on d/c from hospital.  Will continue to follow as able.   Barriers to Discharge Inaccessible home environment   Goals   Patient Goals to keep improving   PT Treatment Day 3   Plan   Treatment/Interventions Functional transfer training;LE strengthening/ROM;Elevations;Therapeutic exercise;Endurance training;Patient/family training;Equipment eval/education;Bed mobility;Gait training;Spoke to nursing;Spoke to case management;OT   Progress Progressing toward goals   PT Frequency 3-5x/wk   Discharge Recommendation   Rehab Resource Intensity Level, PT I (Maximum Resource Intensity)   AM-PAC Basic Mobility Inpatient   Turning in Flat Bed Without Bedrails 3   Lying on Back to Sitting on Edge of Flat Bed Without Bedrails 3   Moving Bed to Chair 3   Standing Up From Chair Using Arms 3   Walk in Room 2   Climb 3-5 Stairs With Railing 1   Basic Mobility Inpatient Raw Score 15   Basic Mobility Standardized Score 36.97   Highest Level Of Mobility   JH-HLM Goal 4: Move to chair/commode   JH-HLM Achieved 6: Walk 10 steps or more   End of Consult   Patient  Position at End of Consult All needs within reach;Other (comment)  (on stretcher with pt transport)     Alina Braun, PT, DPT

## 2024-02-07 NOTE — RESTORATIVE TECHNICIAN NOTE
Restorative Technician Note      Patient Name: Nadir Myers     Restorative Tech Visit Date: 02/07/24  Note Type: Mobility  Patient Position Upon Consult: Supine  Activity Performed: Transferred; Stood (SPT to stretcher;)  Patient Position at End of Consult: Supine; All needs within reach; Other (comment) (Supine on transport stretcher; Left in the care of the transport team)    Krishan Hearn, Restorative Technician

## 2024-02-08 ENCOUNTER — ANESTHESIA (INPATIENT)
Dept: NON INVASIVE DIAGNOSTICS | Facility: HOSPITAL | Age: 64
End: 2024-02-08
Payer: COMMERCIAL

## 2024-02-08 ENCOUNTER — APPOINTMENT (INPATIENT)
Dept: RADIOLOGY | Facility: HOSPITAL | Age: 64
DRG: 853 | End: 2024-02-08
Payer: COMMERCIAL

## 2024-02-08 LAB
ANION GAP SERPL CALCULATED.3IONS-SCNC: 6 MMOL/L
BASOPHILS # BLD AUTO: 0.05 THOUSANDS/ÂΜL (ref 0–0.1)
BASOPHILS NFR BLD AUTO: 0 % (ref 0–1)
BUN SERPL-MCNC: 7 MG/DL (ref 5–25)
CALCIUM SERPL-MCNC: 8.4 MG/DL (ref 8.4–10.2)
CHLORIDE SERPL-SCNC: 99 MMOL/L (ref 96–108)
CO2 SERPL-SCNC: 28 MMOL/L (ref 21–32)
CREAT SERPL-MCNC: 0.6 MG/DL (ref 0.6–1.3)
EOSINOPHIL # BLD AUTO: 0.1 THOUSAND/ÂΜL (ref 0–0.61)
EOSINOPHIL NFR BLD AUTO: 1 % (ref 0–6)
ERYTHROCYTE [DISTWIDTH] IN BLOOD BY AUTOMATED COUNT: 13.7 % (ref 11.6–15.1)
GFR SERPL CREATININE-BSD FRML MDRD: 107 ML/MIN/1.73SQ M
GLUCOSE SERPL-MCNC: 129 MG/DL (ref 65–140)
GLUCOSE SERPL-MCNC: 150 MG/DL (ref 65–140)
GLUCOSE SERPL-MCNC: 172 MG/DL (ref 65–140)
GLUCOSE SERPL-MCNC: 178 MG/DL (ref 65–140)
GLUCOSE SERPL-MCNC: 195 MG/DL (ref 65–140)
HCT VFR BLD AUTO: 35.2 % (ref 36.5–49.3)
HGB BLD-MCNC: 11.5 G/DL (ref 12–17)
IMM GRANULOCYTES # BLD AUTO: 0.2 THOUSAND/UL (ref 0–0.2)
IMM GRANULOCYTES NFR BLD AUTO: 2 % (ref 0–2)
LYMPHOCYTES # BLD AUTO: 1.67 THOUSANDS/ÂΜL (ref 0.6–4.47)
LYMPHOCYTES NFR BLD AUTO: 13 % (ref 14–44)
MAGNESIUM SERPL-MCNC: 1.9 MG/DL (ref 1.9–2.7)
MCH RBC QN AUTO: 28.9 PG (ref 26.8–34.3)
MCHC RBC AUTO-ENTMCNC: 32.7 G/DL (ref 31.4–37.4)
MCV RBC AUTO: 88 FL (ref 82–98)
MONOCYTES # BLD AUTO: 1.05 THOUSAND/ÂΜL (ref 0.17–1.22)
MONOCYTES NFR BLD AUTO: 8 % (ref 4–12)
NEUTROPHILS # BLD AUTO: 9.59 THOUSANDS/ÂΜL (ref 1.85–7.62)
NEUTS SEG NFR BLD AUTO: 76 % (ref 43–75)
NRBC BLD AUTO-RTO: 0 /100 WBCS
PHOSPHATE SERPL-MCNC: 3.5 MG/DL (ref 2.3–4.1)
PLATELET # BLD AUTO: 269 THOUSANDS/UL (ref 149–390)
PMV BLD AUTO: 9.5 FL (ref 8.9–12.7)
POTASSIUM SERPL-SCNC: 3.5 MMOL/L (ref 3.5–5.3)
RBC # BLD AUTO: 3.98 MILLION/UL (ref 3.88–5.62)
SODIUM SERPL-SCNC: 133 MMOL/L (ref 135–147)
WBC # BLD AUTO: 12.66 THOUSAND/UL (ref 4.31–10.16)

## 2024-02-08 PROCEDURE — 80048 BASIC METABOLIC PNL TOTAL CA: CPT

## 2024-02-08 PROCEDURE — 33244 REMOVE ELCTRD TRANSVENOUSLY: CPT | Performed by: INTERNAL MEDICINE

## 2024-02-08 PROCEDURE — 33241 REMOVE PULSE GENERATOR: CPT | Performed by: INTERNAL MEDICINE

## 2024-02-08 PROCEDURE — 87077 CULTURE AEROBIC IDENTIFY: CPT | Performed by: PHYSICIAN ASSISTANT

## 2024-02-08 PROCEDURE — 82948 REAGENT STRIP/BLOOD GLUCOSE: CPT

## 2024-02-08 PROCEDURE — 71045 X-RAY EXAM CHEST 1 VIEW: CPT

## 2024-02-08 PROCEDURE — 84100 ASSAY OF PHOSPHORUS: CPT

## 2024-02-08 PROCEDURE — 99233 SBSQ HOSP IP/OBS HIGH 50: CPT | Performed by: INTERNAL MEDICINE

## 2024-02-08 PROCEDURE — 87186 SC STD MICRODIL/AGAR DIL: CPT | Performed by: INTERNAL MEDICINE

## 2024-02-08 PROCEDURE — 87040 BLOOD CULTURE FOR BACTERIA: CPT | Performed by: INTERNAL MEDICINE

## 2024-02-08 PROCEDURE — 0JPT0PZ REMOVAL OF CARDIAC RHYTHM RELATED DEVICE FROM TRUNK SUBCUTANEOUS TISSUE AND FASCIA, OPEN APPROACH: ICD-10-PCS | Performed by: INTERNAL MEDICINE

## 2024-02-08 PROCEDURE — 93005 ELECTROCARDIOGRAM TRACING: CPT

## 2024-02-08 PROCEDURE — 83735 ASSAY OF MAGNESIUM: CPT

## 2024-02-08 PROCEDURE — 87154 CUL TYP ID BLD PTHGN 6+ TRGT: CPT | Performed by: INTERNAL MEDICINE

## 2024-02-08 PROCEDURE — 87070 CULTURE OTHR SPECIMN AEROBIC: CPT | Performed by: PHYSICIAN ASSISTANT

## 2024-02-08 PROCEDURE — 85025 COMPLETE CBC W/AUTO DIFF WBC: CPT

## 2024-02-08 PROCEDURE — 87186 SC STD MICRODIL/AGAR DIL: CPT | Performed by: PHYSICIAN ASSISTANT

## 2024-02-08 PROCEDURE — 99232 SBSQ HOSP IP/OBS MODERATE 35: CPT | Performed by: STUDENT IN AN ORGANIZED HEALTH CARE EDUCATION/TRAINING PROGRAM

## 2024-02-08 PROCEDURE — 02PA3MZ REMOVAL OF CARDIAC LEAD FROM HEART, PERCUTANEOUS APPROACH: ICD-10-PCS | Performed by: INTERNAL MEDICINE

## 2024-02-08 RX ORDER — SODIUM CHLORIDE 9 MG/ML
100 INJECTION, SOLUTION INTRAVENOUS CONTINUOUS
Status: DISCONTINUED | OUTPATIENT
Start: 2024-02-08 | End: 2024-02-09

## 2024-02-08 RX ORDER — MIDAZOLAM HYDROCHLORIDE 2 MG/2ML
INJECTION, SOLUTION INTRAMUSCULAR; INTRAVENOUS AS NEEDED
Status: DISCONTINUED | OUTPATIENT
Start: 2024-02-08 | End: 2024-02-08

## 2024-02-08 RX ORDER — SODIUM CHLORIDE 9 MG/ML
INJECTION, SOLUTION INTRAVENOUS CONTINUOUS PRN
Status: DISCONTINUED | OUTPATIENT
Start: 2024-02-08 | End: 2024-02-08

## 2024-02-08 RX ORDER — METOPROLOL TARTRATE 1 MG/ML
5 INJECTION, SOLUTION INTRAVENOUS EVERY 6 HOURS PRN
Status: DISCONTINUED | OUTPATIENT
Start: 2024-02-08 | End: 2024-02-22

## 2024-02-08 RX ORDER — CEFAZOLIN SODIUM 2 G/50ML
SOLUTION INTRAVENOUS AS NEEDED
Status: DISCONTINUED | OUTPATIENT
Start: 2024-02-08 | End: 2024-02-08

## 2024-02-08 RX ORDER — LIDOCAINE HYDROCHLORIDE 10 MG/ML
INJECTION, SOLUTION EPIDURAL; INFILTRATION; INTRACAUDAL; PERINEURAL CODE/TRAUMA/SEDATION MEDICATION
Status: DISCONTINUED | OUTPATIENT
Start: 2024-02-08 | End: 2024-02-08 | Stop reason: HOSPADM

## 2024-02-08 RX ORDER — POTASSIUM CHLORIDE 20 MEQ/1
40 TABLET, EXTENDED RELEASE ORAL ONCE
Status: COMPLETED | OUTPATIENT
Start: 2024-02-08 | End: 2024-02-08

## 2024-02-08 RX ORDER — GENTAMICIN SULFATE 40 MG/ML
INJECTION, SOLUTION INTRAMUSCULAR; INTRAVENOUS CODE/TRAUMA/SEDATION MEDICATION
Status: DISCONTINUED | OUTPATIENT
Start: 2024-02-08 | End: 2024-02-08 | Stop reason: HOSPADM

## 2024-02-08 RX ORDER — METOPROLOL SUCCINATE 25 MG/1
25 TABLET, EXTENDED RELEASE ORAL EVERY 12 HOURS
Status: DISCONTINUED | OUTPATIENT
Start: 2024-02-08 | End: 2024-03-01

## 2024-02-08 RX ORDER — MAGNESIUM SULFATE 1 G/100ML
1 INJECTION INTRAVENOUS ONCE
Qty: 100 ML | Refills: 0 | Status: COMPLETED | OUTPATIENT
Start: 2024-02-08 | End: 2024-02-08

## 2024-02-08 RX ORDER — FENTANYL CITRATE 50 UG/ML
INJECTION, SOLUTION INTRAMUSCULAR; INTRAVENOUS AS NEEDED
Status: DISCONTINUED | OUTPATIENT
Start: 2024-02-08 | End: 2024-02-08

## 2024-02-08 RX ORDER — PROPOFOL 10 MG/ML
INJECTION, EMULSION INTRAVENOUS CONTINUOUS PRN
Status: DISCONTINUED | OUTPATIENT
Start: 2024-02-08 | End: 2024-02-08

## 2024-02-08 RX ADMIN — CEFAZOLIN SODIUM 2000 MG: 2 SOLUTION INTRAVENOUS at 10:32

## 2024-02-08 RX ADMIN — FENTANYL CITRATE 50 MCG: 50 INJECTION INTRAMUSCULAR; INTRAVENOUS at 13:44

## 2024-02-08 RX ADMIN — INSULIN LISPRO 1 UNITS: 100 INJECTION, SOLUTION INTRAVENOUS; SUBCUTANEOUS at 22:38

## 2024-02-08 RX ADMIN — SODIUM CHLORIDE: 9 INJECTION, SOLUTION INTRAVENOUS at 13:56

## 2024-02-08 RX ADMIN — CEFAZOLIN SODIUM 2000 MG: 2 SOLUTION INTRAVENOUS at 05:09

## 2024-02-08 RX ADMIN — CEFAZOLIN SODIUM 2000 MG: 2 SOLUTION INTRAVENOUS at 19:18

## 2024-02-08 RX ADMIN — POTASSIUM CHLORIDE 40 MEQ: 1500 TABLET, EXTENDED RELEASE ORAL at 18:27

## 2024-02-08 RX ADMIN — PHENYLEPHRINE HYDROCHLORIDE 30 MCG/MIN: 10 INJECTION INTRAVENOUS at 14:12

## 2024-02-08 RX ADMIN — METOROPROLOL TARTRATE 5 MG: 5 INJECTION, SOLUTION INTRAVENOUS at 17:09

## 2024-02-08 RX ADMIN — AMIODARONE HYDROCHLORIDE 200 MG: 200 TABLET ORAL at 08:43

## 2024-02-08 RX ADMIN — SODIUM CHLORIDE 100 ML/HR: 0.9 INJECTION, SOLUTION INTRAVENOUS at 15:18

## 2024-02-08 RX ADMIN — INSULIN LISPRO 9 UNITS: 100 INJECTION, SOLUTION INTRAVENOUS; SUBCUTANEOUS at 18:26

## 2024-02-08 RX ADMIN — MIDAZOLAM 2 MG: 1 INJECTION INTRAMUSCULAR; INTRAVENOUS at 13:42

## 2024-02-08 RX ADMIN — CEFAZOLIN SODIUM 2000 MG: 2 SOLUTION INTRAVENOUS at 13:45

## 2024-02-08 RX ADMIN — FENTANYL CITRATE 50 MCG: 50 INJECTION INTRAMUSCULAR; INTRAVENOUS at 14:18

## 2024-02-08 RX ADMIN — PROPOFOL 100 MCG/KG/MIN: 10 INJECTION, EMULSION INTRAVENOUS at 13:44

## 2024-02-08 RX ADMIN — SODIUM CHLORIDE: 0.9 INJECTION, SOLUTION INTRAVENOUS at 13:00

## 2024-02-08 RX ADMIN — ACETAMINOPHEN 975 MG: 325 TABLET, FILM COATED ORAL at 18:27

## 2024-02-08 RX ADMIN — INSULIN GLARGINE 50 UNITS: 100 INJECTION, SOLUTION SUBCUTANEOUS at 22:42

## 2024-02-08 RX ADMIN — CHLORHEXIDINE GLUCONATE 15 ML: 1.2 SOLUTION ORAL at 08:43

## 2024-02-08 RX ADMIN — ATORVASTATIN CALCIUM 80 MG: 80 TABLET, FILM COATED ORAL at 18:27

## 2024-02-08 RX ADMIN — METOPROLOL SUCCINATE 25 MG: 25 TABLET, EXTENDED RELEASE ORAL at 18:27

## 2024-02-08 RX ADMIN — MAGNESIUM SULFATE HEPTAHYDRATE 1 G: 1 INJECTION, SOLUTION INTRAVENOUS at 22:38

## 2024-02-08 NOTE — OCCUPATIONAL THERAPY NOTE
Occupational Therapy Cancel Note         Patient Name: Nadir Myers  Today's Date: 2/8/2024 02/08/24 1449   OT Last Visit   OT Visit Date 02/08/24   Note Type   Note type Cancelled Session   Cancel Reasons Patient off floor/test       OT orders received. Chart reviewed. Pt to cardiac cath lab for cardiac laser lead extraction.  Will continue to follow and see pt as appropriate and able.     Mague Philip MS, OTR/L

## 2024-02-08 NOTE — PLAN OF CARE
Problem: METABOLIC, FLUID AND ELECTROLYTES - ADULT  Goal: Electrolytes maintained within normal limits  Description: INTERVENTIONS:  - Monitor labs and assess patient for signs and symptoms of electrolyte imbalances  - Administer electrolyte replacement as ordered  - Monitor response to electrolyte replacements, including repeat lab results as appropriate  - Instruct patient on fluid and nutrition as appropriate  Outcome: Progressing  Goal: Fluid balance maintained  Description: INTERVENTIONS:  - Monitor labs   - Monitor I/O and WT  - Instruct patient on fluid and nutrition as appropriate  - Assess for signs & symptoms of volume excess or deficit  Outcome: Progressing  Goal: Glucose maintained within target range  Description: INTERVENTIONS:  - Monitor Blood Glucose as ordered  - Assess for signs and symptoms of hyperglycemia and hypoglycemia  - Administer ordered medications to maintain glucose within target range  - Assess nutritional intake and initiate nutrition service referral as needed  Outcome: Progressing     Problem: Prexisting or High Potential for Compromised Skin Integrity  Goal: Skin integrity is maintained or improved  Description: INTERVENTIONS:  - Identify patients at risk for skin breakdown  - Assess and monitor skin integrity  - Assess and monitor nutrition and hydration status  - Monitor labs   - Assess for incontinence   - Turn and reposition patient  - Assist with mobility/ambulation  - Relieve pressure over bony prominences  - Avoid friction and shearing  - Provide appropriate hygiene as needed including keeping skin clean and dry  - Evaluate need for skin moisturizer/barrier cream  - Collaborate with interdisciplinary team   - Patient/family teaching  - Consider wound care consult   Outcome: Progressing     Problem: PAIN - ADULT  Goal: Verbalizes/displays adequate comfort level or baseline comfort level  Description: Interventions:  - Encourage patient to monitor pain and request  assistance  - Assess pain using appropriate pain scale  - Administer analgesics based on type and severity of pain and evaluate response  - Implement non-pharmacological measures as appropriate and evaluate response  - Consider cultural and social influences on pain and pain management  - Notify physician/advanced practitioner if interventions unsuccessful or patient reports new pain  Outcome: Progressing     Problem: INFECTION - ADULT  Goal: Absence or prevention of progression during hospitalization  Description: INTERVENTIONS:  - Assess and monitor for signs and symptoms of infection  - Monitor lab/diagnostic results  - Monitor all insertion sites, i.e. indwelling lines, tubes, and drains  - Monitor endotracheal if appropriate and nasal secretions for changes in amount and color  - Docena appropriate cooling/warming therapies per order  - Administer medications as ordered  - Instruct and encourage patient and family to use good hand hygiene technique  - Identify and instruct in appropriate isolation precautions for identified infection/condition  Outcome: Progressing  Goal: Absence of fever/infection during neutropenic period  Description: INTERVENTIONS:  - Monitor WBC    Outcome: Progressing     Problem: SAFETY ADULT  Goal: Patient will remain free of falls  Description: INTERVENTIONS:  - Educate patient/family on patient safety including physical limitations  - Instruct patient to call for assistance with activity   - Consult OT/PT to assist with strengthening/mobility   - Keep Call bell within reach  - Keep bed low and locked with side rails adjusted as appropriate  - Keep care items and personal belongings within reach  - Initiate and maintain comfort rounds  - Make Fall Risk Sign visible to staff  - Apply yellow socks and bracelet for high fall risk patients  - Consider moving patient to room near nurses station  Outcome: Progressing  Goal: Maintain or return to baseline ADL function  Description:  INTERVENTIONS:  -  Assess patient's ability to carry out ADLs; assess patient's baseline for ADL function and identify physical deficits which impact ability to perform ADLs (bathing, care of mouth/teeth, toileting, grooming, dressing, etc.)  - Assess/evaluate cause of self-care deficits   - Assess range of motion  - Assess patient's mobility; develop plan if impaired  - Assess patient's need for assistive devices and provide as appropriate  - Encourage maximum independence but intervene and supervise when necessary  - Involve family in performance of ADLs  - Assess for home care needs following discharge   - Consider OT consult to assist with ADL evaluation and planning for discharge  - Provide patient education as appropriate  Outcome: Progressing  Goal: Maintains/Returns to pre admission functional level  Description: INTERVENTIONS:  - Perform AM-PAC 6 Click Basic Mobility/ Daily Activity assessment daily.  - Set and communicate daily mobility goal to care team and patient/family/caregiver.   - Collaborate with rehabilitation services on mobility goals if consulted  - Record patient progress and toleration of activity level   Outcome: Progressing

## 2024-02-08 NOTE — ASSESSMENT & PLAN NOTE
As evidenced on blood cultures from 1/31 with repeat blood cultures positive on 2/3 despite being on Ancef  Blood culture 1/2 positive for staph epi, likely contaminant, follow-up repeat blood cultures to confirm contaminant  Likely source due to left fourth toe gangrenous wound -> see plan below  Continue IV Ancef, will need 6 weeks of IV antibiotics from first negative blood cultures and device removal  GILL with evidence of mobile mass on ICD lead in the right atrium, concerning for infection, no valvular vegetation  Plan for pacer removal today with EP  ID following, appreciate recommendations  CT chest abdomen pelvis: ?Multifocal pneumonia, no other signs of abscesses.

## 2024-02-08 NOTE — ASSESSMENT & PLAN NOTE
"Maintain strict fluid restriction  Optimize blood sugar control as a degree of \"pseudohyponatremia\" is likely contributory  Sodium improving to 133 stable, continue to monitor  "

## 2024-02-08 NOTE — PROGRESS NOTES
Progress Note - Infectious Disease   Nadir Myers 63 y.o. male MRN: [id number]  Unit/Bed#: [facility] 902-01 Encounter: [id number]      Impression/Plan:    Sepsis, present on arrival; fever, leukocytosis  -Source is bacteremia and patients foot infection as below. No other clear source. Fevers resolving on antibiotic, WBC down trending.   -Antibiotic as below  -Follow up blood cultures  -Trend fever curve  -Repeat CBC + diff tomorrow AM     Persistent MSSA bacteremia with likely ICD infection  -Likely source is patients foot wound as below. Blood cultures positive on 1/31 and now again on 2/03 and 2/06 despite being on Cefazolin. In presence of ICD, must consider endocarditis and/or cardiac device infection. TTE on 2/01 (adequate study) did not show a vegetation. GILL 2/07 did show a mobile mass on device lead in right atrium, concerning for infection. No valve vegetations. CT scans reviewed, no abscess. However consider if pulmonary findings may be embolic.  -Continue high dose IV Cefazolin 2g every 6 hours  -Follow up blood cultures 2/08  -Appreciate EP assistance regarding device extraction, planned for today  -Per EP, plan to re-implant Medtronic extra vascular ICD  -We will obtain blood cultures X2 tomorrow AM (day 1 from extraction); would wait until surveillance blood cultures taken after device extraction are negative X 72 hours prior to reimplantation  -Will need 6 weeks of IV antibiotic from first negative blood cultures and device removal; do not place PICC until blood cultures clear X 72 hours     3. Abnormal CT chest  -CT 2/07 noted small consolidations in upper lobes and left lower lobe, with enhancing lesion in perieprhal of right lower lobe. Consider if may be embolic lesions vs. Hematogenous seeding from high grade bacteremia.  -Plan for prolonged course of antibiotic as above  -Monitor for any development of respiratory symptoms  -Would repeat CT chest as outpatient in 2-3 months to ensure  resolve    4. 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 Culture polymicrobial of tissue with MSSA, Finegoldia and Anaerococccus. Likely source of patient's bacteremia. Surgical cure felt to be obtained per Podiatry.  -Ongoing local wound care per Podiatry.     5. Staph epidermidis in 1 of 2 blood cultures  -1 set from  with Staph epi, did not grow in prior blood cultures from . Suspect this was skin contaminant and as above MSSA is true pathogen.  -Follow up repeat blood cultures and second culture from  to ensure no other growth of Staph epi     Plan and recommendations were discussed with primary team. They agree with continuing IV Cefazolin.    Antibiotics:  Cefazolin    24 Hour Events:  Afebrile. WBC remains stable, 12.6 from 11.3.    Subjective:  Patient this AM denies fever/chills, back pain, abdominal pain, cough.    Objective:  Vitals:  Temp:  [97.9 °F (36.6 °C)-98.2 °F (36.8 °C)] 98 °F (36.7 °C)  HR:  [67-79] 79  Resp:  [16-20] 16  BP: (124-149)/(66-86) 142/86  SpO2:  [95 %-100 %] 95 %  Temp (24hrs), Av °F (36.7 °C), Min:97.9 °F (36.6 °C), Max:98.2 °F (36.8 °C)  Current: Temperature: 98 °F (36.7 °C)    Physical Exam:   General Appearance:  Alert, interactive, nontoxic, no acute distress.   Throat: Oropharynx moist without lesions.    Lungs:   Clear to auscultation bilaterally; no wheezes, rhonchi or rales; respirations unlabored   Heart:  RRR   Abdomen:   Soft, non-tender     Extremities: Left foot with dressing intact   Skin: No new rashes or lesions. No draining wounds noted.       Labs:   All pertinent labs and imaging studies were personally reviewed  Results from last 7 days   Lab Units 24  0640 24  0444 24  0633   WBC Thousand/uL 12.66* 11.33* 11.02*   HEMOGLOBIN g/dL 11.5* 11.3* 11.3*   PLATELETS Thousands/uL 269 263 220     Results from last 7 days   Lab Units 24  0640 24  0444 24  0633   SODIUM mmol/L 133* 134* 134*    POTASSIUM mmol/L 3.5 3.9 3.5   CHLORIDE mmol/L 99 98 100   CO2 mmol/L 28 27 28   BUN mg/dL 7 7 8   CREATININE mg/dL 0.60 0.59* 0.61   EGFR ml/min/1.73sq m 107 108 107   CALCIUM mg/dL 8.4 8.4 8.0*                         Micro:  Results from last 7 days   Lab Units 02/06/24  1221 02/03/24  0614   BLOOD CULTURE  Staphylococcus aureus*  Staphylococcus aureus* Staphylococcus aureus*  Staphylococcus epidermidis*  Staphylococcus aureus*   GRAM STAIN RESULT  Gram positive cocci in clusters*  Gram positive cocci in clusters* Gram positive cocci in clusters*  Gram positive cocci in clusters*       Imaging:          Zach Medina MD  [facility]

## 2024-02-08 NOTE — PLAN OF CARE
Problem: METABOLIC, FLUID AND ELECTROLYTES - ADULT  Goal: Electrolytes maintained within normal limits  Description: INTERVENTIONS:  - Monitor labs and assess patient for signs and symptoms of electrolyte imbalances  - Administer electrolyte replacement as ordered  - Monitor response to electrolyte replacements, including repeat lab results as appropriate  - Instruct patient on fluid and nutrition as appropriate  Outcome: Progressing  Goal: Fluid balance maintained  Description: INTERVENTIONS:  - Monitor labs   - Monitor I/O and WT  - Instruct patient on fluid and nutrition as appropriate  - Assess for signs & symptoms of volume excess or deficit  Outcome: Progressing  Goal: Glucose maintained within target range  Description: INTERVENTIONS:  - Monitor Blood Glucose as ordered  - Assess for signs and symptoms of hyperglycemia and hypoglycemia  - Administer ordered medications to maintain glucose within target range  - Assess nutritional intake and initiate nutrition service referral as needed  Outcome: Progressing     Problem: Prexisting or High Potential for Compromised Skin Integrity  Goal: Skin integrity is maintained or improved  Description: INTERVENTIONS:  - Identify patients at risk for skin breakdown  - Assess and monitor skin integrity  - Assess and monitor nutrition and hydration status  - Monitor labs   - Assess for incontinence   - Turn and reposition patient  - Assist with mobility/ambulation  - Relieve pressure over bony prominences  - Avoid friction and shearing  - Provide appropriate hygiene as needed including keeping skin clean and dry  - Evaluate need for skin moisturizer/barrier cream  - Collaborate with interdisciplinary team   - Patient/family teaching  - Consider wound care consult   Outcome: Progressing     Problem: PAIN - ADULT  Goal: Verbalizes/displays adequate comfort level or baseline comfort level  Description: Interventions:  - Encourage patient to monitor pain and request  assistance  - Assess pain using appropriate pain scale  - Administer analgesics based on type and severity of pain and evaluate response  - Implement non-pharmacological measures as appropriate and evaluate response  - Consider cultural and social influences on pain and pain management  - Notify physician/advanced practitioner if interventions unsuccessful or patient reports new pain  Outcome: Progressing     Problem: INFECTION - ADULT  Goal: Absence or prevention of progression during hospitalization  Description: INTERVENTIONS:  - Assess and monitor for signs and symptoms of infection  - Monitor lab/diagnostic results  - Monitor all insertion sites, i.e. indwelling lines, tubes, and drains  - Monitor endotracheal if appropriate and nasal secretions for changes in amount and color  - [place] appropriate cooling/warming therapies per order  - Administer medications as ordered  - Instruct and encourage patient and family to use good hand hygiene technique  - Identify and instruct in appropriate isolation precautions for identified infection/condition  Outcome: Progressing  Goal: Absence of fever/infection during neutropenic period  Description: INTERVENTIONS:  - Monitor WBC    Outcome: Progressing     Problem: SAFETY ADULT  Goal: Patient will remain free of falls  Description: INTERVENTIONS:  - Educate patient/family on patient safety including physical limitations  - Instruct patient to call for assistance with activity   - Consult OT/PT to assist with strengthening/mobility   - Keep Call bell within reach  - Keep bed low and locked with side rails adjusted as appropriate  - Keep care items and personal belongings within reach  - Initiate and maintain comfort rounds  - Make Fall Risk Sign visible to staff  - Apply yellow socks and bracelet for high fall risk patients  - Consider moving patient to room near nurses station  Outcome: Progressing  Goal: Maintain or return to baseline ADL function  Description:  INTERVENTIONS:  -  Assess patient's ability to carry out ADLs; assess patient's baseline for ADL function and identify physical deficits which impact ability to perform ADLs (bathing, care of mouth/teeth, toileting, grooming, dressing, etc.)  - Assess/evaluate cause of self-care deficits   - Assess range of motion  - Assess patient's mobility; develop plan if impaired  - Assess patient's need for assistive devices and provide as appropriate  - Encourage maximum independence but intervene and supervise when necessary  - Involve family in performance of ADLs  - Assess for home care needs following discharge   - Consider OT consult to assist with ADL evaluation and planning for discharge  - Provide patient education as appropriate  Outcome: Progressing  Goal: Maintains/Returns to pre admission functional level  Description: INTERVENTIONS:  - Perform AM-PAC 6 Click Basic Mobility/ Daily Activity assessment daily.  - Set and communicate daily mobility goal to care team and patient/family/caregiver.   - Collaborate with rehabilitation services on mobility goals if consulted  - Record patient progress and toleration of activity level   Outcome: Progressing     Problem: DISCHARGE PLANNING  Goal: Discharge to home or other facility with appropriate resources  Description: INTERVENTIONS:  - Identify barriers to discharge w/patient and caregiver  - Arrange for needed discharge resources and transportation as appropriate  - Identify discharge learning needs (meds, wound care, etc.)  - Arrange for interpretive services to assist at discharge as needed  - Refer to Case Management Department for coordinating discharge planning if the patient needs post-hospital services based on physician/advanced practitioner order or complex needs related to functional status, cognitive ability, or social support system  Outcome: Progressing     Problem: Knowledge Deficit  Goal: Patient/family/caregiver demonstrates understanding of disease  process, treatment plan, medications, and discharge instructions  Description: Complete learning assessment and assess knowledge base.  Interventions:  - Provide teaching at level of understanding  - Provide teaching via preferred learning methods  Outcome: Progressing

## 2024-02-08 NOTE — ANESTHESIA PREPROCEDURE EVALUATION
Procedure:  Cardiac laser lead extraction (Chest)    Relevant Problems   CARDIO   (+) 3-vessel CAD   (+) A-fib (HCC)   (+) Coronary artery disease involving native coronary artery of native heart without angina pectoris   (+) Essential (primary) hypertension      ENDO   (+) Type 2 diabetes mellitus with foot ulcer, with long-term current use of insulin (HCC)   (+) Type 2 diabetes mellitus, with long-term current use of insulin (HCC)      GI/HEPATIC   (+) GERD (gastroesophageal reflux disease)      /RENAL   (+) LINDEN (acute kidney injury) (HCC)   (+) Benign prostatic hyperplasia without lower urinary tract symptoms      NEURO/PSYCH   (+) LUE weakness      EF 45, normal RV systolic function, mass on lead in RA    Cr 0.6, hgb 11.5, ply 269     Anesthesia Plan  ASA Score- 3     Anesthesia Type- IV sedation with anesthesia with ASA Monitors.         Additional Monitors:     Airway Plan:     Comment: IV sedation, GA back up; standard ASA monitors. Risks and benefits discussed with patient; patient consented and agrees to proceed.    I saw and evaluated the patient. If seen with CRNA, we have discussed the anesthetic plan and I am in agreement that the plan is appropriate for the patient.  .       Plan Factors-    Chart reviewed.   Existing labs reviewed.                   Induction- intravenous.    Postoperative Plan-     Informed Consent- Anesthetic plan and risks discussed with patient.  I personally reviewed this patient with the CRNA. Discussed and agreed on the Anesthesia Plan with the CRNA..

## 2024-02-08 NOTE — PROGRESS NOTES
[facility]  Progress Note  Name: Nadir Myers I  MRN: [id number]  Unit/Bed#: [facility] 902-01 I Date of Admission: 1/31/2024   Date of Service: 2/8/2024 I Hospital Day: 8    Assessment/Plan   * Septic shock (HCC)  Assessment & Plan  Initially admitted to the ICU requiring vasopressor support, now off, with maintenance of hemodynamic stability  Due to MSSA bacteremia from a gangrenous left fourth toe (see below)  Continue to monitor vitals and maintain hemodynamics including temperature curve  See additional management below    MSSA bacteremia  Assessment & Plan  As evidenced on blood cultures from 1/31 with repeat blood cultures positive on 2/3 despite being on Ancef  Blood culture 1/2 positive for staph epi, likely contaminant, follow-up repeat blood cultures to confirm contaminant  Likely source due to left fourth toe gangrenous wound -> see plan below  Continue IV Ancef, will need 6 weeks of IV antibiotics from first negative blood cultures and device removal  GILL with evidence of mobile mass on ICD lead in the right atrium, concerning for infection, no valvular vegetation  Plan for pacer removal today with EP  ID following, appreciate recommendations  CT chest abdomen pelvis: ?Multifocal pneumonia, no other signs of abscesses.    Electrolyte abnormalities  Assessment & Plan  Monitor/replete serum potassium/magnesium/phosphate as necessary    Gangrene of toe of left foot ([facility])  Assessment & Plan  Appreciate podiatry input -> status post left fourth toe amputation on 1/31  [place] to be surgical cure per podiatry  Wound care per podiatry    PAD (peripheral artery disease) ([facility])  Assessment & Plan  Continue dual antiplatelet therapy with ASA/Plavix - continue statin  Sequela complicated by gangrenous left toe (see above)    Chronic systolic CHF ([facility])  Assessment & Plan  Last EF of 42%  Holding diuresis (Aldactone) in the setting of relative hypotension and recovering LINDEN  Continue  "ASA/Plavix and statin for underlying CAD (associated ischemic cardiomyopathy)  Monitor/replete serum potassium/magnesium as necessary  Low sodium and fluid restriction enforced  Appears euvolemic    Hyponatremia  Assessment & Plan  Maintain strict fluid restriction  Optimize blood sugar control as a degree of \"pseudohyponatremia\" is likely contributory  Sodium improving to 133 stable, continue to monitor    LINDEN (acute kidney injury) ([facility])  Assessment & Plan  Creatinine of approximately 0.6-0.8 -> currently stable at 0.65 from a prior peak of 1.41  Monitor renal function and urine output  Limit/avoid nephrotoxins and hypotension as possible  Holding spironolactone    Type 2 diabetes mellitus, with long-term current use of insulin (HCC)  Assessment & Plan  Lab Results   Component Value Date    HGBA1C 9.8 (H) 01/31/2024     Continue basal/prandial insulin with additional SSI coverage per Accu-Cheks  Hypoglycemia protocol  Carbohydrate restriction             VTE Pharmacologic Prophylaxis:   Moderate Risk (Score 3-4) - Pharmacological DVT Prophylaxis Contraindicated. Sequential Compression Devices Ordered.    Mobility:   Basic Mobility Inpatient Raw Score: 15  -HLM Goal: 4: Move to chair/commode  JH-HLM Achieved: 2: Bed activities/Dependent transfer  HLM Goal NOT achieved. Continue with multidisciplinary rounding and encourage appropriate mobility to improve upon HLM goals.    Patient Centered Rounds: I performed bedside rounds with nursing staff today.   Discussions with Specialists or Other Care Team Provider: primary RN, DONITA    Education and Discussions with Family / Patient: Updated  (wife) at bedside.    Total Time Spent on Date of Encounter in care of patient: 25 mins. This time was spent on one or more of the following: performing physical exam; counseling and coordination of care; obtaining or reviewing history; documenting in the medical record; reviewing/ordering tests, medications or " procedures; communicating with other healthcare professionals and discussing with patient's family/caregivers.    Current Length of Stay: 8 day(s)  Current Patient Status: Inpatient   Certification Statement: The patient will continue to require additional inpatient hospital stay due to pacer removal today  Discharge Plan: Anticipate discharge in >72 hrs to discharge location to be determined pending rehab evaluations.    Code Status: Level 1 - Full Code    Subjective:   No complaints this morning. Plan for pacer removal today.     Objective:     Vitals:   Temp (24hrs), Av °F (36.7 °C), Min:97.9 °F (36.6 °C), Max:98.2 °F (36.8 °C)    Temp:  [97.9 °F (36.6 °C)-98.2 °F (36.8 °C)] 98.1 °F (36.7 °C)  HR:  [] 136  Resp:  [16-20] 16  BP: (116-182)/() 182/117  SpO2:  [93 %-98 %] 93 %  Body mass index is 31.19 kg/m².     Input and Output Summary (last 24 hours):     Intake/Output Summary (Last 24 hours) at 2024 1654  Last data filed at 2024 1454  Gross per 24 hour   Intake 500 ml   Output 1400 ml   Net -900 ml       Physical Exam:   Physical Exam  Vitals reviewed.   Constitutional:       General: He is not in acute distress.     Appearance: Normal appearance. He is not ill-appearing.   HENT:      Head: Normocephalic and atraumatic.   Cardiovascular:      Rate and Rhythm: Normal rate and regular rhythm.      Heart sounds: Normal heart sounds.   Pulmonary:      Effort: Pulmonary effort is normal. No respiratory distress.      Breath sounds: No wheezing or rales.   Abdominal:      General: Bowel sounds are normal.      Tenderness: There is no abdominal tenderness.   Musculoskeletal:      Right lower leg: No edema.      Left lower leg: No edema.   Skin:     General: Skin is warm and dry.   Neurological:      Mental Status: He is alert and oriented to person, place, and time. Mental status is at baseline.   Psychiatric:         Mood and Affect: Mood normal.         Behavior: Behavior normal.           Additional Data:     Labs:  Results from last 7 days   Lab Units 02/08/24  0640 02/07/24  0444   WBC Thousand/uL 12.66* 11.33*   HEMOGLOBIN g/dL 11.5* 11.3*   HEMATOCRIT % 35.2* 35.6*   PLATELETS Thousands/uL 269 263   BANDS PCT %  --  14*   NEUTROS PCT % 76*  --    LYMPHS PCT % 13*  --    LYMPHO PCT %  --  7*   MONOS PCT % 8  --    MONO PCT %  --  12   EOS PCT % 1 0     Results from last 7 days   Lab Units 02/08/24  0640   SODIUM mmol/L 133*   POTASSIUM mmol/L 3.5   CHLORIDE mmol/L 99   CO2 mmol/L 28   BUN mg/dL 7   CREATININE mg/dL 0.60   ANION GAP mmol/L 6   CALCIUM mg/dL 8.4   GLUCOSE RANDOM mg/dL 178*         Results from last 7 days   Lab Units 02/08/24  1609 02/08/24  1104 02/08/24  0736 02/07/24  2041 02/07/24  1626 02/07/24  1132 02/07/24  0716 02/06/24  2102 02/06/24  1607 02/06/24  1100 02/06/24  0717 02/05/24  2051   POC GLUCOSE mg/dl 129 150* 172* 275* 183* 98 111 182* 151* 135 103 275*               Lines/Drains:  Invasive Devices       Peripheral Intravenous Line  Duration             Peripheral IV 02/05/24 Distal;Dorsal (posterior);Left Forearm 2 days    Peripheral IV 02/07/24 Right Hand 1 day                      Telemetry:  Telemetry Orders (From admission, onward)               24 Hour Telemetry Monitoring  Continuous x 24 Hours ([place])        Question:  Reason for 24 Hour Telemetry  Answer:  PCI/EP study (including pacer and ICD implementation), Cardiac surgery, MI, abnormal cardiac cath, and chest pain- rule out MI                     Telemetry Reviewed: Normal Sinus Rhythm  Indication for Continued Telemetry Use: Arrthymias requiring medical therapy             Imaging: No pertinent imaging reviewed.    Recent Cultures (last 7 days):   Results from last 7 days   Lab Units 02/08/24  1025 02/08/24  0640 02/06/24  1221 02/03/24  0614   BLOOD CULTURE  Received in Microbiology Lab. Culture in Progress. Received in Microbiology Lab. Culture in Progress. Staphylococcus aureus*  Staphylococcus  aureus* Staphylococcus aureus*  Staphylococcus epidermidis*  Staphylococcus aureus*   GRAM STAIN RESULT   --   --  Gram positive cocci in clusters*  Gram positive cocci in clusters* Gram positive cocci in clusters*  Gram positive cocci in clusters*       Last 24 Hours Medication List:   Current Facility-Administered Medications   Medication Dose Route Frequency Provider Last Rate    acetaminophen  975 mg Oral Q6H PRN Robyn Fedlman,       amiodarone  200 mg Oral Daily With Breakfast Robyn Feldman, DO      aspirin  81 mg Oral Daily Robyn Feldman, DO      atorvastatin  80 mg Oral After Dinner Robyn Feldman, DO      cefazolin  2,000 mg Intravenous Q6H Robyn Feldman, DO 2,000 mg (02/08/24 1032)    chlorhexidine  15 mL Mouth/Throat Q12H TEJAS Robyn Feldman, DO      clopidogrel  75 mg Oral Daily Robyn Feldman, DO      insulin glargine  50 Units Subcutaneous HS Robyn Feldman, DO      insulin lispro  1-5 Units Subcutaneous 4x Daily (AC & HS) Robyn Feldman DO      insulin lispro  9 Units Subcutaneous TID With Meals Robyn Feldman,       polyethylene glycol  17 g Oral Daily PRN Robyn Feldman,       senna-docusate sodium  2 tablet Oral BID PRN Robyn Feldman,       sodium chloride  100 mL/hr Intravenous Continuous Key Garcia CRNA 100 mL/hr (02/08/24 1518)        Today, Patient Was Seen By: Robyn Feldman DO    **Please Note: This note may have been constructed using a voice recognition system.**

## 2024-02-08 NOTE — ANESTHESIA POSTPROCEDURE EVALUATION
Post-Op Assessment Note    CV Status:  Stable    Pain management: adequate       Mental Status:  Alert and awake   Hydration Status:  Euvolemic   PONV Controlled:  Controlled   Airway Patency:  Patent     Post Op Vitals Reviewed: Yes    No anethesia notable event occurred.    Staff: CRNA               /63 (02/08/24 1459)    Temp      Pulse 90 (02/08/24 1459)   Resp 16 (02/08/24 1459)    SpO2 98 % (02/08/24 1459)

## 2024-02-08 NOTE — ASSESSMENT & PLAN NOTE
Appreciate podiatry input -> status post left fourth toe amputation on 1/31  [place] to be surgical cure per podiatry  Wound care per podiatry

## 2024-02-09 ENCOUNTER — PREP FOR PROCEDURE (OUTPATIENT)
Dept: CARDIAC SURGERY | Facility: CLINIC | Age: 64
End: 2024-02-09

## 2024-02-09 DIAGNOSIS — I25.5 ISCHEMIC CARDIOMYOPATHY: Primary | ICD-10-CM

## 2024-02-09 PROBLEM — R93.89 ABNORMAL CT OF THE CHEST: Status: ACTIVE | Noted: 2024-02-09

## 2024-02-09 LAB
ANION GAP SERPL CALCULATED.3IONS-SCNC: 8 MMOL/L
ATRIAL RATE: 108 BPM
ATRIAL RATE: 108 BPM
BACTERIA BLD CULT: ABNORMAL
BACTERIA BLD CULT: ABNORMAL
BUN SERPL-MCNC: 10 MG/DL (ref 5–25)
CALCIUM SERPL-MCNC: 8.2 MG/DL (ref 8.4–10.2)
CHLORIDE SERPL-SCNC: 100 MMOL/L (ref 96–108)
CO2 SERPL-SCNC: 26 MMOL/L (ref 21–32)
CREAT SERPL-MCNC: 0.66 MG/DL (ref 0.6–1.3)
ERYTHROCYTE [DISTWIDTH] IN BLOOD BY AUTOMATED COUNT: 13.8 % (ref 11.6–15.1)
GFR SERPL CREATININE-BSD FRML MDRD: 103 ML/MIN/1.73SQ M
GLUCOSE SERPL-MCNC: 160 MG/DL (ref 65–140)
GLUCOSE SERPL-MCNC: 177 MG/DL (ref 65–140)
GLUCOSE SERPL-MCNC: 191 MG/DL (ref 65–140)
GLUCOSE SERPL-MCNC: 229 MG/DL (ref 65–140)
GLUCOSE SERPL-MCNC: 267 MG/DL (ref 65–140)
GRAM STN SPEC: ABNORMAL
GRAM STN SPEC: ABNORMAL
HCT VFR BLD AUTO: 37.4 % (ref 36.5–49.3)
HGB BLD-MCNC: 11.8 G/DL (ref 12–17)
MCH RBC QN AUTO: 28.2 PG (ref 26.8–34.3)
MCHC RBC AUTO-ENTMCNC: 31.6 G/DL (ref 31.4–37.4)
MCV RBC AUTO: 90 FL (ref 82–98)
P AXIS: 63 DEGREES
P AXIS: 64 DEGREES
PLATELET # BLD AUTO: 239 THOUSANDS/UL (ref 149–390)
PMV BLD AUTO: 9.8 FL (ref 8.9–12.7)
POTASSIUM SERPL-SCNC: 4.3 MMOL/L (ref 3.5–5.3)
PR INTERVAL: 146 MS
PR INTERVAL: 146 MS
QRS AXIS: -81 DEGREES
QRS AXIS: -84 DEGREES
QRSD INTERVAL: 158 MS
QRSD INTERVAL: 160 MS
QT INTERVAL: 380 MS
QT INTERVAL: 392 MS
QTC INTERVAL: 509 MS
QTC INTERVAL: 525 MS
RBC # BLD AUTO: 4.18 MILLION/UL (ref 3.88–5.62)
SODIUM SERPL-SCNC: 134 MMOL/L (ref 135–147)
T WAVE AXIS: 38 DEGREES
T WAVE AXIS: 38 DEGREES
VENTRICULAR RATE: 108 BPM
VENTRICULAR RATE: 108 BPM
WBC # BLD AUTO: 21.13 THOUSAND/UL (ref 4.31–10.16)

## 2024-02-09 PROCEDURE — 99024 POSTOP FOLLOW-UP VISIT: CPT | Performed by: PHYSICIAN ASSISTANT

## 2024-02-09 PROCEDURE — 87040 BLOOD CULTURE FOR BACTERIA: CPT | Performed by: INTERNAL MEDICINE

## 2024-02-09 PROCEDURE — 99232 SBSQ HOSP IP/OBS MODERATE 35: CPT | Performed by: STUDENT IN AN ORGANIZED HEALTH CARE EDUCATION/TRAINING PROGRAM

## 2024-02-09 PROCEDURE — 97116 GAIT TRAINING THERAPY: CPT

## 2024-02-09 PROCEDURE — 87186 SC STD MICRODIL/AGAR DIL: CPT | Performed by: INTERNAL MEDICINE

## 2024-02-09 PROCEDURE — 82948 REAGENT STRIP/BLOOD GLUCOSE: CPT

## 2024-02-09 PROCEDURE — 87077 CULTURE AEROBIC IDENTIFY: CPT | Performed by: INTERNAL MEDICINE

## 2024-02-09 PROCEDURE — 85027 COMPLETE CBC AUTOMATED: CPT | Performed by: PHYSICIAN ASSISTANT

## 2024-02-09 PROCEDURE — 93010 ELECTROCARDIOGRAM REPORT: CPT | Performed by: INTERNAL MEDICINE

## 2024-02-09 PROCEDURE — 99233 SBSQ HOSP IP/OBS HIGH 50: CPT | Performed by: INTERNAL MEDICINE

## 2024-02-09 PROCEDURE — 80048 BASIC METABOLIC PNL TOTAL CA: CPT | Performed by: PHYSICIAN ASSISTANT

## 2024-02-09 RX ORDER — ENOXAPARIN SODIUM 100 MG/ML
40 INJECTION SUBCUTANEOUS
Status: DISCONTINUED | OUTPATIENT
Start: 2024-02-09 | End: 2024-02-22

## 2024-02-09 RX ADMIN — CEFAZOLIN SODIUM 2000 MG: 2 SOLUTION INTRAVENOUS at 11:20

## 2024-02-09 RX ADMIN — AMIODARONE HYDROCHLORIDE 200 MG: 200 TABLET ORAL at 08:44

## 2024-02-09 RX ADMIN — INSULIN LISPRO 2 UNITS: 100 INJECTION, SOLUTION INTRAVENOUS; SUBCUTANEOUS at 18:35

## 2024-02-09 RX ADMIN — CEFAZOLIN SODIUM 2000 MG: 2 SOLUTION INTRAVENOUS at 00:59

## 2024-02-09 RX ADMIN — INSULIN LISPRO 2 UNITS: 100 INJECTION, SOLUTION INTRAVENOUS; SUBCUTANEOUS at 21:28

## 2024-02-09 RX ADMIN — INSULIN LISPRO 9 UNITS: 100 INJECTION, SOLUTION INTRAVENOUS; SUBCUTANEOUS at 18:36

## 2024-02-09 RX ADMIN — INSULIN GLARGINE 50 UNITS: 100 INJECTION, SOLUTION SUBCUTANEOUS at 21:28

## 2024-02-09 RX ADMIN — INSULIN LISPRO 1 UNITS: 100 INJECTION, SOLUTION INTRAVENOUS; SUBCUTANEOUS at 08:46

## 2024-02-09 RX ADMIN — ATORVASTATIN CALCIUM 80 MG: 80 TABLET, FILM COATED ORAL at 18:38

## 2024-02-09 RX ADMIN — CEFAZOLIN SODIUM 2000 MG: 2 SOLUTION INTRAVENOUS at 18:37

## 2024-02-09 RX ADMIN — CLOPIDOGREL BISULFATE 75 MG: 75 TABLET ORAL at 08:44

## 2024-02-09 RX ADMIN — INSULIN LISPRO 9 UNITS: 100 INJECTION, SOLUTION INTRAVENOUS; SUBCUTANEOUS at 08:46

## 2024-02-09 RX ADMIN — INSULIN LISPRO 9 UNITS: 100 INJECTION, SOLUTION INTRAVENOUS; SUBCUTANEOUS at 12:20

## 2024-02-09 RX ADMIN — INSULIN LISPRO 1 UNITS: 100 INJECTION, SOLUTION INTRAVENOUS; SUBCUTANEOUS at 12:19

## 2024-02-09 RX ADMIN — METOPROLOL SUCCINATE 25 MG: 25 TABLET, EXTENDED RELEASE ORAL at 18:38

## 2024-02-09 RX ADMIN — ASPIRIN 81 MG CHEWABLE TABLET 81 MG: 81 TABLET CHEWABLE at 08:44

## 2024-02-09 RX ADMIN — CEFAZOLIN SODIUM 2000 MG: 2 SOLUTION INTRAVENOUS at 23:56

## 2024-02-09 RX ADMIN — ENOXAPARIN SODIUM 40 MG: 40 INJECTION SUBCUTANEOUS at 15:04

## 2024-02-09 RX ADMIN — CEFAZOLIN SODIUM 2000 MG: 2 SOLUTION INTRAVENOUS at 06:18

## 2024-02-09 RX ADMIN — SODIUM CHLORIDE 100 ML/HR: 0.9 INJECTION, SOLUTION INTRAVENOUS at 11:22

## 2024-02-09 RX ADMIN — METOPROLOL SUCCINATE 25 MG: 25 TABLET, EXTENDED RELEASE ORAL at 06:14

## 2024-02-09 RX ADMIN — CHLORHEXIDINE GLUCONATE 15 ML: 1.2 SOLUTION ORAL at 08:44

## 2024-02-09 NOTE — PROGRESS NOTES
Progress Note - Electrophysiology  Nadir Myers 63 y.o. male MRN: [id number]  Unit/Bed#: [facility] 902-01 Encounter: [id number]      Assessment:  Status post extraction of ICD  Sepsis with Staph aureus bacteremia  History of monomorphic ventricular tachycardia with secondary prevention ICD placed 5/2023  Cardiomyopathy  LV apical aneurysm    Plan:  Patient is status post extraction, Aquacel intact over top of incision.    Tachycardia has come down, could either have been from the anesthesia or bacteremia with the removal of the lead.    We discussed how we will need to await infectious disease approval to reimplant device and he was given literature about new [employer] EV ICD which we are hopeful to place next Wednesday.    Will continue to monitor blood cultures over the weekend and reevaluate Monday or Tuesday.    Subjective/Objective   Subjective: Patient is status post extraction of ICD that had been placed in May 2023 for monomorphic ventricular tachycardia.      Objective:  Vitals: /70   Pulse 83   Temp 98.3 °F (36.8 °C)   Resp 16   Ht 6' (1.829 m)   Wt 104 kg (229 lb 15 oz)   SpO2 93%   BMI 31.19 kg/m²     Vitals:    02/07/24 1036 02/08/24 1210   Weight: 108 kg (238 lb) 104 kg (229 lb 15 oz)     Orthostatic Blood Pressures      Flowsheet Row Most Recent Value   Blood Pressure 117/70 filed at 02/09/2024 1509   Patient Position - Orthostatic VS Lying filed at 02/07/2024 0717              Intake/Output Summary (Last 24 hours) at 2/9/2024 1710  Last data filed at 2/9/2024 1515  Gross per 24 hour   Intake 2097.66 ml   Output 900 ml   Net 1197.66 ml       Invasive Devices       Peripheral Intravenous Line  Duration             Peripheral IV 02/07/24 Right Hand 2 days                              Scheduled Meds:  Current Facility-Administered Medications   Medication Dose Route Frequency Provider Last Rate    acetaminophen  975 mg Oral Q6H PRN Robyn Feldman DO      amiodarone  200 mg Oral Daily With  Breakfast [facility]      aspirin  81 mg Oral Daily [facility], DO      atorvastatin  80 mg Oral After Dinner [facility], DO      cefazolin  2,000 mg Intravenous Q6H [facility], DO 2,000 mg (02/09/24 1120)    clopidogrel  75 mg Oral Daily Robyn Feldman, DO      enoxaparin  40 mg Subcutaneous Q24H TEJAS Robyn Feldman, DO      insulin glargine  50 Units Subcutaneous HS [facility], DO      insulin lispro  1-5 Units Subcutaneous 4x Daily (AC & HS) [facility], DO      insulin lispro  9 Units Subcutaneous TID With Meals [facility] Feld[facility], DO      metoprolol  5 mg Intravenous Q6H PRN Soledad Mari PA-C      metoprolol succinate  25 mg Oral Q12H [facility] Feld[facility], DO      polyethylene glycol  17 g Oral Daily PRN [facility] Feld[facility], DO      senna-docusate sodium  2 tablet Oral BID PRN [facility], [facility]       Continuous Infusions:   PRN Meds:.  acetaminophen    metoprolol    polyethylene glycol    senna-docusate sodium    Review of Systems:  ROS  ROS as noted above, otherwise 12 point review of systems was performed and is negative.     Physical Exam:   Physical Exam              Lab Results: I have personally reviewed pertinent lab results.    Results from last 7 days   Lab Units 02/09/24  0526 02/08/24  0640 02/07/24  0444   WBC Thousand/uL 21.13* 12.66* 11.33*   HEMOGLOBIN g/dL 11.8* 11.5* 11.3*   HEMATOCRIT % 37.4 35.2* 35.6*   PLATELETS Thousands/uL 239 269 263     Results from last 7 days   Lab Units 02/09/24  0526 02/08/24  0640 02/07/24  0444   POTASSIUM mmol/L 4.3 3.5 3.9   CHLORIDE mmol/L 100 99 98   CO2 mmol/L 26 28 27   BUN mg/dL 10 7 7   CREATININE mg/dL 0.66 0.60 0.59*   CALCIUM mg/dL 8.2* 8.4 8.4         Results from last 7 days   Lab Units 02/08/24  0640 02/07/24  0444 02/06/24  0633   MAGNESIUM mg/dL 1.9 1.8* 1.9       Imaging: I have personally reviewed pertinent reports.    Results for orders placed during the hospital encounter of  17    Echo complete with contrast if indicated    Narrative  [place] [id number] [phone]    Transthoracic Echocardiogram  2D, M-mode, Doppler, and Color Doppler    Study date:  26-Sep-2017    Patient: EAGLE REBOLLAR  MR number: [id number]  Account number: [id number]  : 1960  Age: 57 years  Gender: Male  Status: Outpatient  Location: [address] [place] [address]  Height: 72 in  Weight: 263.3 lb  BP: 142/ 88 mmHg    Indications: CAD    Diagnoses: I25.10 - Atherosclerotic heart disease of native coronary artery without angina pectoris    Sonographer:  OLIVE Benoit  Primary Physician:  Paty Ortiz MD  Referring Physician:  Eagle Chavez MD  Group:  [facility]  Interpreting Physician:  Torrey Duenas MD    SUMMARY    LEFT VENTRICLE:  Size was normal.  Systolic function was normal. Ejection fraction was estimated to be 65 %.  There was mild concentric hypertrophy.  Doppler parameters were consistent with abnormal left ventricular relaxation (grade 1 diastolic dysfunction).    RIGHT VENTRICLE:  The size was normal.  Systolic function was normal.    TRICUSPID VALVE:  There was trace regurgitation.    HISTORY: PRIOR HISTORY: Hypertension, CAD s/p PCI, smoker, diabetes, high cholesterol    PROCEDURE: The study was performed in the [address] [place] [address]. This was a routine study. The transthoracic approach was used. The study included complete 2D imaging, M-mode, complete spectral Doppler, and color Doppler. The  heart rate was 112 bpm, at the start of the study. Images were obtained from the parasternal, apical, subcostal, and suprasternal notch acoustic windows. Echocardiographic views were limited due to decreased penetration. This was a  technically difficult study.    LEFT VENTRICLE: Size was normal. Systolic function was normal. Ejection fraction was estimated to be 65 %. There  were no regional wall motion abnormalities. Wall thickness was mildly increased. There was mild concentric hypertrophy.  DOPPLER: Doppler parameters were consistent with abnormal left ventricular relaxation (grade 1 diastolic dysfunction).    RIGHT VENTRICLE: The size was normal. Systolic function was normal. Wall thickness was normal.    LEFT ATRIUM: Size was normal.    RIGHT ATRIUM: Size was normal.    MITRAL VALVE: Valve structure was normal. There was normal leaflet separation. DOPPLER: The transmitral velocity was within the normal range. There was no evidence for stenosis. There was no regurgitation.    AORTIC VALVE: The valve was trileaflet. Leaflets exhibited normal thickness and normal cuspal separation. DOPPLER: Transaortic velocity was within the normal range. There was no evidence for stenosis. There was no regurgitation.    TRICUSPID VALVE: The valve structure was normal. There was normal leaflet separation. DOPPLER: The transtricuspid velocity was within the normal range. There was no evidence for stenosis. There was trace regurgitation. The tricuspid jet  envelope definition was inadequate for estimation of RV systolic pressure. There are no indirect findings suggestive of moderate or severe pulmonary hypertension.    PULMONIC VALVE: Leaflets exhibited normal thickness, no calcification, and normal cuspal separation. DOPPLER: The transpulmonic velocity was within the normal range. There was no regurgitation.    PERICARDIUM: There was no pericardial effusion. The pericardium was normal in appearance.    AORTA: The root exhibited normal size.    SYSTEMIC VEINS: IVC: The inferior vena cava was normal in size and course. Respirophasic changes were normal.    SYSTEM MEASUREMENT TABLES    2D  %FS: 47.6 %  AV Diam: 3.34 cm  EDV(Teich): 101.71 ml  EF Biplane: 67.76 %  EF(Cube): 85.61 %  EF(Teich): 79 %  ESV(Cube): 14.82 ml  ESV(Teich): 21.36 ml  IVSd: 1.58 cm  LA Area: 12.78 cm2  LA Diam: 3.37 cm  LVEDV MOD  A2C: 49.73 ml  LVEDV MOD A4C: 61.78 ml  LVEDV MOD BP: 58.1 ml  LVEF MOD A2C: 65.64 %  LVEF MOD A4C: 71.2 %  LVESV MOD A2C: 17.09 ml  LVESV MOD A4C: 17.79 ml  LVESV MOD BP: 18.73 ml  LVIDd: 4.69 cm  LVIDs: 2.46 cm  LVLd A2C: 6.97 cm  LVLd A4C: 7.74 cm  LVLs A2C: 5.23 cm  LVLs A4C: 6.13 cm  LVPWd: 1.23 cm  RA Area: 14.52 cm2  RV Diam.: 2.86 cm  SI(Cube): 36.73 ml/m2  SI(Teich): 33.48 ml/m2  SV MOD A2C: 32.64 ml  SV MOD A4C: 43.98 ml  SV(Cube): 88.15 ml  SV(Teich): 80.35 ml    MM  TAPSE: 3.26 cm    PW  E': 0.08 m/s    [facility] Accredited [facility]    Prepared and electronically signed by    Torrey Duenas MD  Signed 26-Sep-2017 12:55:39      VTE Pharmacologic Prophylaxis: Sequential compression device (Venodyne)   VTE Mechanical Prophylaxis: sequential compression device

## 2024-02-09 NOTE — ASSESSMENT & PLAN NOTE
CT 2/7 shows small consolidation in the upper lobes and left lower lobe with enhancing lesion in the peripheral of right lower lobe, may be embolic versus hematogenous seeding from high-grade bacteremia.    Continue IV antibiotics as above  Monitor for any respiratory symptoms  Repeat CT chest in 2 to 3 months

## 2024-02-09 NOTE — ASSESSMENT & PLAN NOTE
"Maintain strict fluid restriction  Optimize blood sugar control as a degree of \"pseudohyponatremia\" is likely contributory  Sodium improving to 143, stable, continue to monitor  "

## 2024-02-09 NOTE — ASSESSMENT & PLAN NOTE
As evidenced on blood cultures from 1/31 with repeat blood cultures positive on 2/3 despite being on Ancef  Blood culture 1/2 positive for staph epi, likely contaminant, follow-up repeat blood cultures to confirm contaminant  Likely source due to left fourth toe gangrenous wound -> see plan below  Continue IV Ancef, will need 6 weeks of IV antibiotics from first negative blood cultures and device removal  GILL with evidence of mobile mass on ICD lead in the right atrium, concerning for infection, no valvular vegetation  S/p ICD removal with EP on 2/8  ID following, appreciate recommendations  CT chest abdomen pelvis: ?Multifocal pneumonia or ?hematogenous spread, no other signs of abscesses.  Follow-up repeat blood cultures

## 2024-02-09 NOTE — RESTORATIVE TECHNICIAN NOTE
Restorative Technician Note      Patient Name: Nadir Myers     Restorative Tech Visit Date: 02/09/24  Note Type: Mobility  Patient Position Upon Consult: Supine  Activity Performed: Transferred; Stood; Dangled  Assistive Device: Other (Comment) (None)  Patient Position at End of Consult: Bedside chair; All needs within reach    Krishan Hearn, Restorative Technician

## 2024-02-09 NOTE — ASSESSMENT & PLAN NOTE
Appreciate podiatry input -> status post left fourth toe amputation on 1/31  [place] to be surgical cure per podiatry  Wound care per podiatry

## 2024-02-09 NOTE — PLAN OF CARE
Problem: METABOLIC, FLUID AND ELECTROLYTES - ADULT  Goal: Electrolytes maintained within normal limits  Description: INTERVENTIONS:  - Monitor labs and assess patient for signs and symptoms of electrolyte imbalances  - Administer electrolyte replacement as ordered  - Monitor response to electrolyte replacements, including repeat lab results as appropriate  - Instruct patient on fluid and nutrition as appropriate  Outcome: Progressing  Goal: Fluid balance maintained  Description: INTERVENTIONS:  - Monitor labs   - Monitor I/O and WT  - Instruct patient on fluid and nutrition as appropriate  - Assess for signs & symptoms of volume excess or deficit  Outcome: Progressing  Goal: Glucose maintained within target range  Description: INTERVENTIONS:  - Monitor Blood Glucose as ordered  - Assess for signs and symptoms of hyperglycemia and hypoglycemia  - Administer ordered medications to maintain glucose within target range  - Assess nutritional intake and initiate nutrition service referral as needed  Outcome: Progressing

## 2024-02-09 NOTE — PROGRESS NOTES
[facility]  Progress Note  Name: Nadir Myers I  MRN: [id number]  Unit/Bed#: [facility] 902-01 I Date of Admission: 1/31/2024   Date of Service: 2/9/2024 I Hospital Day: 9    Assessment/Plan   * Septic shock (HCC)  Assessment & Plan  Initially admitted to the ICU requiring vasopressor support, now off, with maintenance of hemodynamic stability  Due to MSSA bacteremia from a gangrenous left fourth toe (see below)  Continue to monitor vitals and maintain hemodynamics including temperature curve  See additional management below  Shock resolved    MSSA bacteremia  Assessment & Plan  As evidenced on blood cultures from 1/31 with repeat blood cultures positive on 2/3 despite being on Ancef  Blood culture 1/2 positive for staph epi, likely contaminant, follow-up repeat blood cultures to confirm contaminant  Likely source due to left fourth toe gangrenous wound -> see plan below  Continue IV Ancef, will need 6 weeks of IV antibiotics from first negative blood cultures and device removal  GILL with evidence of mobile mass on ICD lead in the right atrium, concerning for infection, no valvular vegetation  S/p ICD removal with EP on 2/8  ID following, appreciate recommendations  CT chest abdomen pelvis: ?Multifocal pneumonia or ?hematogenous spread, no other signs of abscesses.  Follow-up repeat blood cultures    Abnormal CT of the chest  Assessment & Plan  CT 2/7 shows small consolidation in the upper lobes and left lower lobe with enhancing lesion in the peripheral of right lower lobe, may be embolic versus hematogenous seeding from high-grade bacteremia.    Continue IV antibiotics as above  Monitor for any respiratory symptoms  Repeat CT chest in 2 to 3 months    Electrolyte abnormalities  Assessment & Plan  Monitor/replete serum potassium/magnesium/phosphate as necessary    Gangrene of toe of left foot (HCC)  Assessment & Plan  Appreciate podiatry input -> status post left fourth toe  "amputation on 1/31  [place] to be surgical cure per podiatry  Wound care per podiatry    WILEY weakness  Assessment & Plan  Presented w/ transient left-sided weakness and a facial droop now resolved  Appreciate neurology input deeming symptoms likely secondary to septic shock/infection and hyperglycemia, as neurologic workup including CT/MRI imaging negative for evidence of stroke or intracranial vessel occlusion, but noted chronic microangiopathic changes -> radiology report did however mention: \"Moderate bilateral supraclinoid ICA and moderate to severe left cavernous ICA segment stenosis.\"  Continue dual endplate therapy with ASA/Plavix - c/w statin  PT/OT as tolerated    PAD (peripheral artery disease) ([facility])  Assessment & Plan  Continue dual antiplatelet therapy with ASA/Plavix - continue statin  Sequela complicated by gangrenous left toe (see above)    Chronic systolic CHF ([facility])  Assessment & Plan  Last EF of 42%  Holding diuresis (Aldactone) in the setting of relative hypotension and recovering LINDEN  Continue ASA/Plavix and statin for underlying CAD (associated ischemic cardiomyopathy)  Monitor/replete serum potassium/magnesium as necessary  Low sodium and fluid restriction enforced  Appears euvolemic    Hyponatremia  Assessment & Plan  Maintain strict fluid restriction  Optimize blood sugar control as a degree of \"pseudohyponatremia\" is likely contributory  Sodium improving to 143, stable, continue to monitor    LINDEN (acute kidney injury) ([facility])  Assessment & Plan  Creatinine of approximately 0.6-0.8 -> currently stable at 0.65 from a prior peak of 1.41  Monitor renal function and urine output  Limit/avoid nephrotoxins and hypotension as possible  Holding spironolactone    Type 2 diabetes mellitus, with long-term current use of insulin ([facility])  Assessment & Plan  Lab Results   Component Value Date    HGBA1C 9.8 (H) 01/31/2024     Continue basal/prandial insulin with additional SSI coverage per Accu-Cheks  Hypoglycemia " protocol  Carbohydrate restriction           VTE Pharmacologic Prophylaxis:   Moderate Risk (Score 3-4) - Pharmacological DVT Prophylaxis Ordered: enoxaparin (Lovenox).    Mobility:   Basic Mobility Inpatient Raw Score: 15  JH-HLM Goal: 4: Move to chair/commode  JH-HLM Achieved: 5: Stand (1 or more minutes)  HLM Goal achieved. Continue to encourage appropriate mobility.    Patient Centered Rounds: I performed bedside rounds with nursing staff today.   Discussions with Specialists or Other Care Team Provider: primary RN    Education and Discussions with Family / Patient: Updated  (wife) via phone.    Total Time Spent on Date of Encounter in care of patient: 25 mins. This time was spent on one or more of the following: performing physical exam; counseling and coordination of care; obtaining or reviewing history; documenting in the medical record; reviewing/ordering tests, medications or procedures; communicating with other healthcare professionals and discussing with patient's family/caregivers.    Current Length of Stay: 9 day(s)  Current Patient Status: Inpatient   Certification Statement: The patient will continue to require additional inpatient hospital stay due to IV antibiotics and negative bcx given MSSA bacteremia with ICD infection  Discharge Plan: Anticipate discharge in >72 hrs to rehab facility.    Code Status: Level 1 - Full Code    Subjective:   No complaints aside from some left chest soreness from incision site.     Objective:     Vitals:   Temp (24hrs), Av.1 °F (36.7 °C), Min:97.7 °F (36.5 °C), Max:98.4 °F (36.9 °C)    Temp:  [97.7 °F (36.5 °C)-98.4 °F (36.9 °C)] 97.8 °F (36.6 °C)  HR:  [] 77  Resp:  [16-18] 18  BP: ()/() 128/81  SpO2:  [90 %-98 %] 96 %  Body mass index is 31.19 kg/m².     Input and Output Summary (last 24 hours):     Intake/Output Summary (Last 24 hours) at 2024 1447  Last data filed at 2024 1312  Gross per 24 hour   Intake 2209.33 ml    Output 1300 ml   Net 909.33 ml       Physical Exam:   Physical Exam  Vitals reviewed.   Constitutional:       General: He is not in acute distress.     Appearance: Normal appearance. He is not ill-appearing.   HENT:      Head: Normocephalic and atraumatic.   Cardiovascular:      Rate and Rhythm: Normal rate and regular rhythm.      Heart sounds: Normal heart sounds.   Pulmonary:      Effort: Pulmonary effort is normal. No respiratory distress.      Breath sounds: No wheezing or rales.   Abdominal:      General: Bowel sounds are normal.      Tenderness: There is no abdominal tenderness.   Musculoskeletal:      Right lower leg: No edema.      Left lower leg: No edema.   Skin:     General: Skin is warm and dry.   Neurological:      Mental Status: He is alert and oriented to person, place, and time. Mental status is at baseline.   Psychiatric:         Mood and Affect: Mood normal.         Behavior: Behavior normal.        Additional Data:     Labs:  Results from last 7 days   Lab Units 02/09/24  0526 02/08/24  0640 02/07/24  0444   WBC Thousand/uL 21.13* 12.66* 11.33*   HEMOGLOBIN g/dL 11.8* 11.5* 11.3*   HEMATOCRIT % 37.4 35.2* 35.6*   PLATELETS Thousands/uL 239 269 263   BANDS PCT %  --   --  14*   NEUTROS PCT %  --  76*  --    LYMPHS PCT %  --  13*  --    LYMPHO PCT %  --   --  7*   MONOS PCT %  --  8  --    MONO PCT %  --   --  12   EOS PCT %  --  1 0     Results from last 7 days   Lab Units 02/09/24  0526   SODIUM mmol/L 134*   POTASSIUM mmol/L 4.3   CHLORIDE mmol/L 100   CO2 mmol/L 26   BUN mg/dL 10   CREATININE mg/dL 0.66   ANION GAP mmol/L 8   CALCIUM mg/dL 8.2*   GLUCOSE RANDOM mg/dL 177*         Results from last 7 days   Lab Units 02/09/24  1119 02/09/24  0731 02/08/24  2050 02/08/24  1609 02/08/24  1104 02/08/24  0736 02/07/24  2041 02/07/24  1626 02/07/24  1132 02/07/24  0716 02/06/24  2102 02/06/24  1607   POC GLUCOSE mg/dl 160* 191* 195* 129 150* 172* 275* 183* 98 111 182* 151*                Lines/Drains:  Invasive Devices       Peripheral Intravenous Line  Duration             Peripheral IV 02/07/24 Right Hand 2 days                      Telemetry:  Telemetry Orders (From admission, onward)               24 Hour Telemetry Monitoring  Continuous x 24 Hours (Telem)        Question:  Reason for 24 Hour Telemetry  Answer:  PCI/EP study (including pacer and ICD implementation), Cardiac surgery, MI, abnormal cardiac cath, and chest pain- rule out MI                     Telemetry Reviewed: Normal Sinus Rhythm  Indication for Continued Telemetry Use: Suepected PPM or ICD Malfunction             Imaging: No pertinent imaging reviewed.    Recent Cultures (last 7 days):   Results from last 7 days   Lab Units 02/09/24  1007 02/09/24  0523 02/08/24  1025 02/08/24  0640 02/06/24  1221 02/03/24  0614   BLOOD CULTURE  Received in Microbiology Lab. Culture in Progress. Received in Microbiology Lab. Culture in Progress.  --  Received in Microbiology Lab. Culture in Progress. Staphylococcus aureus*  Staphylococcus aureus* Staphylococcus aureus*  Staphylococcus epidermidis*  Staphylococcus aureus*   GRAM STAIN RESULT   --   --  Gram positive cocci in clusters*  --  Gram positive cocci in clusters*  Gram positive cocci in clusters* Gram positive cocci in clusters*  Gram positive cocci in clusters*       Last 24 Hours Medication List:   Current Facility-Administered Medications   Medication Dose Route Frequency Provider Last Rate    acetaminophen  975 mg Oral Q6H PRN Robyn Feldamn, DO      amiodarone  200 mg Oral Daily With Breakfast Robyn Feldman, DO      aspirin  81 mg Oral Daily Robyn Feldman, DO      atorvastatin  80 mg Oral After Dinner Robyn Feldman, DO      cefazolin  2,000 mg Intravenous Q6H Robyn Feldman, DO 2,000 mg (02/09/24 1120)    chlorhexidine  15 mL Mouth/Throat Q12H [facility] Robyn Feldman, DO      clopidogrel  75 mg Oral Daily Robyn Feldman, DO      insulin glargine   50 Units Subcutaneous HS Robyn Feldman,       insulin lispro  1-5 Units Subcutaneous 4x Daily (AC & HS) Robyn Feldman,       insulin lispro  9 Units Subcutaneous TID With Meals Robyn Feldman DO      metoprolol  5 mg Intravenous Q6H PRN MAURY Knox-MATT      metoprolol succinate  25 mg Oral Q12H Robyn Feldman,       polyethylene glycol  17 g Oral Daily PRN Robyn Feldman,       senna-docusate sodium  2 tablet Oral BID PRN Robyn Feldman, DO          Today, Patient Was Seen By: Robyn Feldman DO    **Please Note: This note may have been constructed using a voice recognition system.**

## 2024-02-09 NOTE — ASSESSMENT & PLAN NOTE
Initially admitted to the ICU requiring vasopressor support, now off, with maintenance of hemodynamic stability  Due to MSSA bacteremia from a gangrenous left fourth toe (see below)  Continue to monitor vitals and maintain hemodynamics including temperature curve  See additional management below  Shock resolved

## 2024-02-09 NOTE — PROGRESS NOTES
Progress Note - Infectious Disease   Nadir Myers 63 y.o. male MRN: [id number]  Unit/Bed#: [facility] 902-01 Encounter: [id number]      Impression/Recommendations:  Sepsis, present on admission, with fever and leukocytosis.  Source is most likely bacteremia.  Patient is clinically improved.  Temperature is down.  WBC increased over the last 24 hours, to be secondary to extraction of infected ICD.  Antibiotic plan as in below.  Monitor temperature/WBC.    Prolonged MSSA bacteremia, likely secondary to ICD infection.  ICD was extracted yesterday.  Bacteremia should be clearing now the ICD is out.  From prolonged bacteremia, ICD infection and likely septic emboli, patient will need long-term IV antibiotic.  Continue high-dose IV cefazolin.  Repeat blood cultures today.  Treat x 6 weeks after clearance of bacteremia.  ICD reimplantation should not be done until bacteremia clears.    Left fourth toe wet gangrene, likely source of MSSA bacteremia above.  Patient is status post toe amputation, for surgical cure.  No further antibiotic needed for this indication.  Wound care per podiatry.    Abnormal chest CT.  Given presence of prolonged bacteremia with ICD infection and lack of respiratory symptoms, consider septic pulmonary emboli.  Antibiotic plan as seen above.  Monitor for development of respiratory symptoms.    Staphylococcus epididymitis bacteremia in 1 out of 2 repeat blood culture sets but had no growth in initial admission blood cultures.  This is most likely contaminant.  No antibiotic needed for this indication.    Discussed with patient and his wife in detail regarding the above plan.    Antibiotics:  Cefazolin.  Post ICD extraction # 1    Subjective:  Patient is comfortable.  Mild discomfort at all ICD site.  Temperature is down.  No chills.  He is tolerating antibiotic well.  No nausea, vomiting or diarrhea.    Objective:  Vitals:  Temp:  [97.7 °F (36.5 °C)-98.4 °F (36.9 °C)] 98.3 °F (36.8 °C)  HR:  []  83  Resp:  [16-18] 16  BP: ()/() 117/70  SpO2:  [90 %-98 %] 93 %  Temp (24hrs), Av.1 °F (36.7 °C), Min:97.7 °F (36.5 °C), Max:98.4 °F (36.9 °C)  Current: Temperature: 98.3 °F (36.8 °C)    Physical Exam:     General: Awake, alert, cooperative, no distress.   Neck:  Supple. No mass.  No lymphadenopathy.   Chest:  Old ICD site with dressing in place.  No wounds.  No erythema/warmth.  Mild tenderness.   Lungs: Expansion symmetric, no rales, no wheezing, respirations unlabored.   Heart:  Regular rate and rhythm, S1 and S2 normal, no murmur.   Abdomen: Soft, nondistended, non-tender, bowel sounds active all four quadrants, no masses, no organomegaly.   Extremities: Trace leg edema.  Left foot with dressing in place.  Dressing is dry.  No erythema/warmth beyond dressing.  Mild tenderness.   Skin:  No rash.   Neuro: Moves all extremities.     Invasive Devices       Peripheral Intravenous Line  Duration             Peripheral IV 24 Right Hand 2 days                    Labs studies:   I have personally reviewed pertinent labs.  Results from last 7 days   Lab Units 24  0526 24  0640 24  0444   POTASSIUM mmol/L 4.3 3.5 3.9   CHLORIDE mmol/L 100 99 98   CO2 mmol/L 26 28 27   BUN mg/dL 10 7 7   CREATININE mg/dL 0.66 0.60 0.59*   EGFR ml/min/1.73sq m 103 107 108   CALCIUM mg/dL 8.2* 8.4 8.4     Results from last 7 days   Lab Units 24  0526 24  0640 24  0444   WBC Thousand/uL 21.13* 12.66* 11.33*   HEMOGLOBIN g/dL 11.8* 11.5* 11.3*   PLATELETS Thousands/uL 239 269 263     Results from last 7 days   Lab Units 24  1007 24  0523 24  1025 24  0640 24  1221 24  0614   BLOOD CULTURE  Received in Microbiology Lab. Culture in Progress. Received in Microbiology Lab. Culture in Progress.  --  No Growth at 24 hrs. Staphylococcus aureus*  Staphylococcus aureus* Staphylococcus aureus*  Staphylococcus epidermidis*  Staphylococcus aureus*   GRAM STAIN  RESULT   --   --  Gram positive cocci in clusters*  --  Gram positive cocci in clusters*  Gram positive cocci in clusters* Gram positive cocci in clusters*  Gram positive cocci in clusters*       Imaging Studies:   I have personally reviewed pertinent imaging study reports and images in PACS.    EKG, Pathology, and Other Studies:   I have personally reviewed pertinent reports.

## 2024-02-09 NOTE — PHYSICAL THERAPY NOTE
PHYSICAL THERAPY NOTE          Patient Name: Nadir Myers  Today's Date: 2/9/2024 02/09/24 1409   PT Last Visit   PT Visit Date 02/09/24   Note Type   Note Type Treatment   Pain Assessment   Pain Assessment Tool 0-10   Pain Score No Pain   Restrictions/Precautions   Weight Bearing Precautions Per Order Yes   LLE Weight Bearing Per Order (S)  WBAT  (to heel in surgical shoe)   Other Precautions Pain;Fall Risk;WBS;Multiple lines   General   Chart Reviewed Yes   Response to Previous Treatment Patient with no complaints from previous session.   Family/Caregiver Present No   Cognition   Overall Cognitive Status WFL   Arousal/Participation Alert;Cooperative   Attention Within functional limits   Orientation Level Oriented X4   Memory Within functional limits   Following Commands Follows all commands and directions without difficulty   Subjective   Subjective pt pleasant and cooperative throughout therapy session. pt received supine in bed   Bed Mobility   Supine to Sit 5  Supervision   Additional items Increased time required;Verbal cues   Sit to Supine 5  Supervision   Additional items Increased time required;Verbal cues   Transfers   Sit to Stand 5  Supervision   Additional items Increased time required;Verbal cues   Stand to Sit 5  Supervision   Additional items Increased time required;Verbal cues   Additional Comments transfers w RW   Ambulation/Elevation   Gait pattern Improper Weight shift;Forward Flexion;Decreased foot clearance;Shuffling;Inconsistent balaji;Short stride   Gait Assistance 4  Minimal assist  (CGA)   Additional items Assist x 1;Verbal cues;Tactile cues   Assistive Device Rolling walker   Distance 150'+150'   Balance   Static Sitting Fair +   Dynamic Sitting Fair +   Static Standing Fair   Dynamic Standing Fair -   Ambulatory Poor +   Endurance Deficit   Endurance Deficit Yes   Endurance Deficit Description  generalized weakness, decreased exercise tolerance   Activity Tolerance   Activity Tolerance Patient tolerated treatment well   Nurse Made Aware RN cleared and updated   Assessment   Prognosis Good   Problem List Decreased strength;Decreased endurance;Impaired balance;Decreased mobility;Pain   Assessment Patient was received supine in bed . Patient was agreeable to therapy services today. PT session focused on gait today in order to improve functional mobility and independence. Functional mobility was performed as described above.  Pt was eager to participate in therapy services today. Pt displayed markedly improved transfers and gait compared to previous therapy session. Pt displayed good adherence to WBS throughout ambulation today. Upon completing gait, pt reported increased fatigue and deferred further mobility or exercises. Pt was assisted back to supine in bed and made comfortable.  Patient will benefit from continued PT services while in hospital in order to address remaining limitations. The patients [facility] Basic Mobility Inpatient Short From Raw Score is 19 .  Based on AM-PAC scoring and patient presentation, PT currently recommending Level I (Maximum Resource Intensity). Please also refer to the recommendation of the Physical Therapist for safe discharge planning.   Barriers to Discharge Inaccessible home environment   Goals   Patient Goals to walk more   STG Expiration Date 02/16/24   PT Treatment Day 4   Plan   Treatment/Interventions ADL retraining;Functional transfer training;LE strengthening/ROM;Elevations;Therapeutic exercise;Endurance training;Bed mobility;Gait training;Spoke to nursing;OT   Progress Progressing toward goals   PT Frequency 3-5x/wk   Discharge Recommendation   Rehab Resource Intensity Level, PT I (Maximum Resource Intensity)   AM-PAC Basic Mobility Inpatient   Turning in Flat Bed Without Bedrails 4   Lying on Back to Sitting on Edge of Flat Bed Without Bedrails 4   Moving Bed to  Chair 3   Standing Up From Chair Using Arms 3   Walk in Room 3   Climb 3-5 Stairs With Railing 2   Basic Mobility Inpatient Raw Score 19   Basic Mobility Standardized Score 42.48   Highest Level Of Mobility   -HLM Goal 6: Walk 10 steps or more   JH-HLM Achieved 8: Walk 250 feet ot more   Education   Education Provided Mobility training;Assistive device   Patient Demonstrates acceptance/verbal understanding   End of Consult   Patient Position at End of Consult Supine;All needs within reach       Everardo Bird PT, DPT

## 2024-02-09 NOTE — PLAN OF CARE
Problem: PHYSICAL THERAPY ADULT  Goal: Performs mobility at highest level of function for planned discharge setting.  See evaluation for individualized goals.  Description: Treatment/Interventions: Functional transfer training, LE strengthening/ROM, Elevations, Therapeutic exercise, Endurance training, Patient/family training, Equipment eval/education, Bed mobility, Spoke to nursing, Gait training, OT, Spoke to case management  Equipment Recommended: Wheelchair, Walker       See flowsheet documentation for full assessment, interventions and recommendations.  Note: Prognosis: Good  Problem List: Decreased strength, Decreased endurance, Impaired balance, Decreased mobility, Pain  Assessment: Patient was received supine in bed . Patient was agreeable to therapy services today. PT session focused on gait today in order to improve functional mobility and independence. Functional mobility was performed as described above.  Pt was eager to participate in therapy services today. Pt displayed markedly improved transfers and gait compared to previous therapy session. Pt displayed good adherence to WBS throughout ambulation today. Upon completing gait, pt reported increased fatigue and deferred further mobility or exercises. Pt was assisted back to supine in bed and made comfortable.  Patient will benefit from continued PT services while in hospital in order to address remaining limitations. The patients AM-PAC Basic Mobility Inpatient Short From Raw Score is 19 .  Based on AM-PAC scoring and patient presentation, PT currently recommending Level I (Maximum Resource Intensity). Please also refer to the recommendation of the Physical Therapist for safe discharge planning.  Barriers to Discharge: Inaccessible home environment     Rehab Resource Intensity Level, PT: I (Maximum Resource Intensity)    See flowsheet documentation for full assessment.

## 2024-02-10 LAB
ANION GAP SERPL CALCULATED.3IONS-SCNC: 6 MMOL/L
BASOPHILS # BLD AUTO: 0.03 THOUSANDS/ÂΜL (ref 0–0.1)
BASOPHILS NFR BLD AUTO: 0 % (ref 0–1)
BUN SERPL-MCNC: 15 MG/DL (ref 5–25)
CALCIUM SERPL-MCNC: 8.4 MG/DL (ref 8.4–10.2)
CHLORIDE SERPL-SCNC: 99 MMOL/L (ref 96–108)
CO2 SERPL-SCNC: 24 MMOL/L (ref 21–32)
CREAT SERPL-MCNC: 0.78 MG/DL (ref 0.6–1.3)
EOSINOPHIL # BLD AUTO: 0.08 THOUSAND/ÂΜL (ref 0–0.61)
EOSINOPHIL NFR BLD AUTO: 0 % (ref 0–6)
ERYTHROCYTE [DISTWIDTH] IN BLOOD BY AUTOMATED COUNT: 13.9 % (ref 11.6–15.1)
GFR SERPL CREATININE-BSD FRML MDRD: 96 ML/MIN/1.73SQ M
GLUCOSE SERPL-MCNC: 128 MG/DL (ref 65–140)
GLUCOSE SERPL-MCNC: 155 MG/DL (ref 65–140)
GLUCOSE SERPL-MCNC: 183 MG/DL (ref 65–140)
GLUCOSE SERPL-MCNC: 183 MG/DL (ref 65–140)
GLUCOSE SERPL-MCNC: 236 MG/DL (ref 65–140)
HCT VFR BLD AUTO: 34.6 % (ref 36.5–49.3)
HGB BLD-MCNC: 11.2 G/DL (ref 12–17)
IMM GRANULOCYTES # BLD AUTO: 0.18 THOUSAND/UL (ref 0–0.2)
IMM GRANULOCYTES NFR BLD AUTO: 1 % (ref 0–2)
LYMPHOCYTES # BLD AUTO: 1.37 THOUSANDS/ÂΜL (ref 0.6–4.47)
LYMPHOCYTES NFR BLD AUTO: 7 % (ref 14–44)
MAGNESIUM SERPL-MCNC: 2.1 MG/DL (ref 1.9–2.7)
MCH RBC QN AUTO: 28.7 PG (ref 26.8–34.3)
MCHC RBC AUTO-ENTMCNC: 32.4 G/DL (ref 31.4–37.4)
MCV RBC AUTO: 89 FL (ref 82–98)
MONOCYTES # BLD AUTO: 1.15 THOUSAND/ÂΜL (ref 0.17–1.22)
MONOCYTES NFR BLD AUTO: 6 % (ref 4–12)
NEUTROPHILS # BLD AUTO: 17.28 THOUSANDS/ÂΜL (ref 1.85–7.62)
NEUTS SEG NFR BLD AUTO: 86 % (ref 43–75)
NRBC BLD AUTO-RTO: 0 /100 WBCS
PLATELET # BLD AUTO: 239 THOUSANDS/UL (ref 149–390)
PMV BLD AUTO: 9.3 FL (ref 8.9–12.7)
POTASSIUM SERPL-SCNC: 4.3 MMOL/L (ref 3.5–5.3)
RBC # BLD AUTO: 3.9 MILLION/UL (ref 3.88–5.62)
SODIUM SERPL-SCNC: 129 MMOL/L (ref 135–147)
WBC # BLD AUTO: 20.09 THOUSAND/UL (ref 4.31–10.16)

## 2024-02-10 PROCEDURE — 99232 SBSQ HOSP IP/OBS MODERATE 35: CPT | Performed by: STUDENT IN AN ORGANIZED HEALTH CARE EDUCATION/TRAINING PROGRAM

## 2024-02-10 PROCEDURE — 99233 SBSQ HOSP IP/OBS HIGH 50: CPT | Performed by: INTERNAL MEDICINE

## 2024-02-10 PROCEDURE — 82948 REAGENT STRIP/BLOOD GLUCOSE: CPT

## 2024-02-10 PROCEDURE — 85025 COMPLETE CBC W/AUTO DIFF WBC: CPT | Performed by: STUDENT IN AN ORGANIZED HEALTH CARE EDUCATION/TRAINING PROGRAM

## 2024-02-10 PROCEDURE — 83735 ASSAY OF MAGNESIUM: CPT | Performed by: STUDENT IN AN ORGANIZED HEALTH CARE EDUCATION/TRAINING PROGRAM

## 2024-02-10 PROCEDURE — 80048 BASIC METABOLIC PNL TOTAL CA: CPT | Performed by: STUDENT IN AN ORGANIZED HEALTH CARE EDUCATION/TRAINING PROGRAM

## 2024-02-10 RX ADMIN — ASPIRIN 81 MG CHEWABLE TABLET 81 MG: 81 TABLET CHEWABLE at 11:21

## 2024-02-10 RX ADMIN — ENOXAPARIN SODIUM 40 MG: 40 INJECTION SUBCUTANEOUS at 11:22

## 2024-02-10 RX ADMIN — INSULIN LISPRO 9 UNITS: 100 INJECTION, SOLUTION INTRAVENOUS; SUBCUTANEOUS at 18:36

## 2024-02-10 RX ADMIN — INSULIN LISPRO 2 UNITS: 100 INJECTION, SOLUTION INTRAVENOUS; SUBCUTANEOUS at 23:12

## 2024-02-10 RX ADMIN — AMIODARONE HYDROCHLORIDE 200 MG: 200 TABLET ORAL at 07:48

## 2024-02-10 RX ADMIN — METOPROLOL SUCCINATE 25 MG: 25 TABLET, EXTENDED RELEASE ORAL at 05:58

## 2024-02-10 RX ADMIN — INSULIN LISPRO 1 UNITS: 100 INJECTION, SOLUTION INTRAVENOUS; SUBCUTANEOUS at 18:36

## 2024-02-10 RX ADMIN — CEFAZOLIN SODIUM 2000 MG: 2 SOLUTION INTRAVENOUS at 18:37

## 2024-02-10 RX ADMIN — INSULIN LISPRO 1 UNITS: 100 INJECTION, SOLUTION INTRAVENOUS; SUBCUTANEOUS at 12:22

## 2024-02-10 RX ADMIN — METOPROLOL SUCCINATE 25 MG: 25 TABLET, EXTENDED RELEASE ORAL at 18:39

## 2024-02-10 RX ADMIN — INSULIN GLARGINE 50 UNITS: 100 INJECTION, SOLUTION SUBCUTANEOUS at 23:12

## 2024-02-10 RX ADMIN — CEFAZOLIN SODIUM 2000 MG: 2 SOLUTION INTRAVENOUS at 05:56

## 2024-02-10 RX ADMIN — CLOPIDOGREL BISULFATE 75 MG: 75 TABLET ORAL at 11:23

## 2024-02-10 RX ADMIN — CEFAZOLIN SODIUM 2000 MG: 2 SOLUTION INTRAVENOUS at 11:23

## 2024-02-10 RX ADMIN — INSULIN LISPRO 9 UNITS: 100 INJECTION, SOLUTION INTRAVENOUS; SUBCUTANEOUS at 12:23

## 2024-02-10 RX ADMIN — ACETAMINOPHEN 975 MG: 325 TABLET, FILM COATED ORAL at 19:59

## 2024-02-10 RX ADMIN — ATORVASTATIN CALCIUM 80 MG: 80 TABLET, FILM COATED ORAL at 18:38

## 2024-02-10 RX ADMIN — INSULIN LISPRO 1 UNITS: 100 INJECTION, SOLUTION INTRAVENOUS; SUBCUTANEOUS at 07:56

## 2024-02-10 RX ADMIN — INSULIN LISPRO 9 UNITS: 100 INJECTION, SOLUTION INTRAVENOUS; SUBCUTANEOUS at 07:56

## 2024-02-10 NOTE — ASSESSMENT & PLAN NOTE
Appreciate podiatry input -> status post left fourth toe amputation on 1/31  [place] to be surgical cure per podiatry  Wound care per podiatry

## 2024-02-10 NOTE — PROGRESS NOTES
[facility]  Progress Note  Name: Nadir Myers I  MRN: [id number]  Unit/Bed#: [facility] 902-01 I Date of Admission: 1/31/2024   Date of Service: 2/10/2024 I Hospital Day: 10    Assessment/Plan   * Septic shock (HCC)  Assessment & Plan  Initially admitted to the ICU requiring vasopressor support, now off, with maintenance of hemodynamic stability  Due to MSSA bacteremia from a gangrenous left fourth toe (see below)  Continue to monitor vitals and maintain hemodynamics including temperature curve  See additional management below  Shock resolved    MSSA bacteremia  Assessment & Plan  As evidenced on blood cultures from 1/31 with repeat blood cultures positive on 2/3 despite being on Ancef  Blood culture 1/2 positive for staph epi, likely contaminant, follow-up repeat blood cultures to confirm contaminant  Likely source due to left fourth toe gangrenous wound -> see plan below  GILL with evidence of mobile mass on ICD lead in the right atrium, concerning for infection, no valvular vegetation  S/p ICD removal with EP on 2/8  Plan for leadless ICD on 2/14 with EP once blood cultures have no growth  CT chest with possible septic pulmonary emboli, monitor closely  ID following, appreciate recommendations  Follow-up repeat blood cultures  Continue IV Ancef, with plan to treat 6 weeks after clearance of bacteremia      Abnormal CT of the chest  Assessment & Plan  CT 2/7 shows small consolidation in the upper lobes and left lower lobe with enhancing lesion in the peripheral of right lower lobe, may be embolic versus hematogenous seeding from high-grade bacteremia.    Continue IV antibiotics as above  Monitor for any respiratory symptoms  Repeat CT chest in 2 to 3 months    Electrolyte abnormalities  Assessment & Plan  Monitor/replete serum potassium/magnesium/phosphate as necessary    Gangrene of toe of left foot (HCC)  Assessment & Plan  Appreciate podiatry input -> status post left fourth  toe amputation on 1/31  [place] to be surgical cure per podiatry  Wound care per podiatry    PAD (peripheral artery disease) ([facility])  Assessment & Plan  Continue dual antiplatelet therapy with ASA/Plavix - continue statin  Sequela complicated by gangrenous left toe (see above)    Chronic systolic CHF ([facility])  Assessment & Plan  Last EF of 42%  Holding diuresis (Aldactone) in the setting of relative hypotension and recovering LINDEN  Continue ASA/Plavix and statin for underlying CAD (associated ischemic cardiomyopathy)  Monitor/replete serum potassium/magnesium as necessary  Low sodium and fluid restriction enforced  Appears euvolemic    LINDEN (acute kidney injury) ([facility])  Assessment & Plan  Creatinine of approximately 0.6-0.8 -> currently stable at 0.65 from a prior peak of 1.41  Monitor renal function and urine output  Limit/avoid nephrotoxins and hypotension as possible  Holding spironolactone    Type 2 diabetes mellitus, with long-term current use of insulin ([facility])  Assessment & Plan  Lab Results   Component Value Date    HGBA1C 9.8 (H) 01/31/2024     Continue basal/prandial insulin with additional SSI coverage per Accu-Cheks  Hypoglycemia protocol  Carbohydrate restriction           VTE Pharmacologic Prophylaxis:   Moderate Risk (Score 3-4) - Pharmacological DVT Prophylaxis Ordered: enoxaparin (Lovenox).    Mobility:   Basic Mobility Inpatient Raw Score: 19  JH-HLM Goal: 6: Walk 10 steps or more  JH-HLM Achieved: 6: Walk 10 steps or more  HLM Goal achieved. Continue to encourage appropriate mobility.    Patient Centered Rounds: I performed bedside rounds with nursing staff today.   Discussions with Specialists or Other Care Team Provider: primary RN    Education and Discussions with Family / Patient: Patient declined call to .     Total Time Spent on Date of Encounter in care of patient: 25 mins. This time was spent on one or more of the following: performing physical exam; counseling and coordination of care;  obtaining or reviewing history; documenting in the medical record; reviewing/ordering tests, medications or procedures; communicating with other healthcare professionals and discussing with patient's family/caregivers.    Current Length of Stay: 10 day(s)  Current Patient Status: Inpatient   Certification Statement: The patient will continue to require additional inpatient hospital stay due to iv antibiotics for infected ICD lead  Discharge Plan: Anticipate discharge in >72 hrs to rehab facility.    Code Status: Level 1 - Full Code    Subjective:   Patient assessed at bedside.  No complaints.    Objective:     Vitals:   Temp (24hrs), Av.8 °F (37.1 °C), Min:98.4 °F (36.9 °C), Max:99.2 °F (37.3 °C)    Temp:  [98.4 °F (36.9 °C)-99.2 °F (37.3 °C)] 99.2 °F (37.3 °C)  HR:  [81-88] 82  Resp:  [17-18] 18  BP: (119-149)/(69-90) 143/81  SpO2:  [92 %-95 %] 93 %  Body mass index is 31.19 kg/m².     Input and Output Summary (last 24 hours):     Intake/Output Summary (Last 24 hours) at 2/10/2024 1541  Last data filed at 2/10/2024 0900  Gross per 24 hour   Intake 596 ml   Output 625 ml   Net -29 ml       Physical Exam:   Physical Exam  Vitals reviewed.   Constitutional:       General: He is not in acute distress.     Appearance: Normal appearance. He is not ill-appearing.   HENT:      Head: Normocephalic and atraumatic.   Eyes:      General: No scleral icterus.     Conjunctiva/sclera: Conjunctivae normal.   Cardiovascular:      Rate and Rhythm: Normal rate and regular rhythm.      Heart sounds: Normal heart sounds.   Pulmonary:      Effort: Pulmonary effort is normal. No respiratory distress.      Breath sounds: Normal breath sounds.   Abdominal:      General: Bowel sounds are normal.      Palpations: Abdomen is soft.      Tenderness: There is no abdominal tenderness.   Musculoskeletal:         General: Normal range of motion.      Right lower leg: No edema.      Left lower leg: No edema.   Skin:     General: Skin is warm and  dry.   Neurological:      Mental Status: He is alert and oriented to person, place, and time. Mental status is at baseline.   Psychiatric:         Mood and Affect: Mood normal.         Behavior: Behavior normal.          Additional Data:     Labs:  Results from last 7 days   Lab Units 02/10/24  0745 24  0640 24  0444   WBC Thousand/uL 20.09*   < > 11.33*   HEMOGLOBIN g/dL 11.2*   < > 11.3*   HEMATOCRIT % 34.6*   < > 35.6*   PLATELETS Thousands/uL 239   < > 263   BANDS PCT %  --   --  14*   NEUTROS PCT % 86*   < >  --    LYMPHS PCT % 7*   < >  --    LYMPHO PCT %  --   --  7*   MONOS PCT % 6   < >  --    MONO PCT %  --   --  12   EOS PCT % 0   < > 0    < > = values in this interval not displayed.     Results from last 7 days   Lab Units 02/10/24  0554   SODIUM mmol/L 129*   POTASSIUM mmol/L 4.3   CHLORIDE mmol/L 99   CO2 mmol/L 24   BUN mg/dL 15   CREATININE mg/dL 0.78   ANION GAP mmol/L 6   CALCIUM mg/dL 8.4   GLUCOSE RANDOM mg/dL 128         Results from last 7 days   Lab Units 02/10/24  1101 02/10/24  0712 24  2058 24  1630 24  1119 24  0731 24  2050 24  1609 24  1104 24  0736 24  2041 24  1626   POC GLUCOSE mg/dl 183* 155* 229* 267* 160* 191* 195* 129 150* 172* 275* 183*               Lines/Drains:  Invasive Devices       Peripheral Intravenous Line  Duration             Peripheral IV 24 Right Hand 3 days                      Telemetry:  Telemetry Orders (From admission, onward)               24 Hour Telemetry Monitoring  Continuous x 24 Hours (Telem)           Question:  Reason for 24 Hour Telemetry  Answer:  PCI/EP study (including pacer and ICD implementation), Cardiac surgery, MI, abnormal cardiac cath, and chest pain- rule out MI                     Telemetry Reviewed: Normal Sinus Rhythm  Indication for Continued Telemetry Use: Suepected PPM or ICD Malfunction             Imaging: No pertinent imaging reviewed.    Recent  Cultures (last 7 days):   Results from last 7 days   Lab Units 02/09/24  1007 02/09/24  0523 02/08/24  1025 02/08/24  0640 02/06/24  1221   BLOOD CULTURE  No Growth at 24 hrs. No Growth at 24 hrs. Staphylococcus aureus* No Growth at 48 hrs. Staphylococcus aureus*  Staphylococcus aureus*   GRAM STAIN RESULT   --   --  Gram positive cocci in clusters*  --  Gram positive cocci in clusters*  Gram positive cocci in clusters*       Last 24 Hours Medication List:   Current Facility-Administered Medications   Medication Dose Route Frequency Provider Last Rate    acetaminophen  975 mg Oral Q6H PRN Robyn Feldman, DO      amiodarone  200 mg Oral Daily With Breakfast Robyn Feldman, DO      aspirin  81 mg Oral Daily Robyn Feldman, DO      atorvastatin  80 mg Oral After Dinner Robyn Feldman, DO      cefazolin  2,000 mg Intravenous Q6H Robyn Feldman, DO 2,000 mg (02/10/24 1123)    clopidogrel  75 mg Oral Daily Robyn Feldman, DO      enoxaparin  40 mg Subcutaneous Q24H TEJAS Robyn Feldman, DO      insulin glargine  50 Units Subcutaneous HS Robyn Feldman, DO      insulin lispro  1-5 Units Subcutaneous 4x Daily (AC & HS) Robyn Feldman, DO      insulin lispro  9 Units Subcutaneous TID With Meals Robyn Feldman,       metoprolol  5 mg Intravenous Q6H PRN Soledad Mari PA-C      metoprolol succinate  25 mg Oral Q12H Robyn Feldman,       polyethylene glycol  17 g Oral Daily PRN Robyn Feldman, DO      senna-docusate sodium  2 tablet Oral BID PRN Robyn Feldman,           Today, Patient Was Seen By: Robyn Feldman DO    **Please Note: This note may have been constructed using a voice recognition system.**

## 2024-02-10 NOTE — ASSESSMENT & PLAN NOTE
As evidenced on blood cultures from 1/31 with repeat blood cultures positive on 2/3 despite being on Ancef  Blood culture 1/2 positive for staph epi, likely contaminant, follow-up repeat blood cultures to confirm contaminant  Likely source due to left fourth toe gangrenous wound -> see plan below  GILL with evidence of mobile mass on ICD lead in the right atrium, concerning for infection, no valvular vegetation  S/p ICD removal with EP on 2/8  Plan for leadless ICD on 2/14 with EP once blood cultures have no growth  CT chest with possible septic pulmonary emboli, monitor closely  ID following, appreciate recommendations  Follow-up repeat blood cultures  Continue IV Ancef, with plan to treat 6 weeks after clearance of bacteremia

## 2024-02-10 NOTE — PROGRESS NOTES
Progress Note - Infectious Disease   Nadir Myers 63 y.o. male MRN: [id number]  Unit/Bed#: [facility] 902-01 Encounter: [id number]      Impression/Recommendations:  Sepsis, present on admission, with fever and leukocytosis.  Source is most likely bacteremia.  Patient is clinically improved.  Temperature is down.  WBC increased over the last 24 hours, to be secondary to extraction of infected ICD.  Antibiotic plan as in below.  Monitor temperature/WBC.     Prolonged MSSA bacteremia, likely secondary to ICD infection.  ICD was extracted 2/8.  Bacteremia is now clearing after ICD extraction.  Given prolonged bacteremia, ICD infection and likely septic emboli, patient will need long-term IV antibiotic.  Continue high-dose IV cefazolin.  Repeat blood cultures today.  Treat x 6 weeks after clearance of bacteremia.  ICD reimplantation when repeat blood cultures have no growth.     Left fourth toe wet gangrene, likely source of MSSA bacteremia above.  Patient is status post toe amputation, for surgical cure.  No further antibiotic needed for this indication.  Wound care per podiatry.     Abnormal chest CT.  Given presence of prolonged bacteremia with ICD infection and lack of respiratory symptoms, consider septic pulmonary emboli.  Antibiotic plan as seen above.  Monitor for development of respiratory symptoms.     Staphylococcus epididymitis bacteremia in 1 out of 2 repeat blood culture sets but had no growth in initial admission blood cultures.  This is most likely contaminant.  No antibiotic needed for this indication.     Discussed with patient in detail regarding the above plan.     Antibiotics:  Cefazolin.  Post ICD extraction # 2     Subjective:  Patient is comfortable.  Mild and improving discomfort at all ICD site.  Temperature is down.  No chills.  He is tolerating antibiotic well.  No nausea, vomiting or diarrhea.     Objective:  Vitals:  Temp:  [98.3 °F (36.8 °C)-99 °F (37.2 °C)] 98.4 °F (36.9 °C)  HR:  [81-88]  88  Resp:  [16-18] 18  BP: (117-149)/(69-90) 149/89  SpO2:  [92 %-95 %] 92 %  Temp (24hrs), Av.6 °F (37 °C), Min:98.3 °F (36.8 °C), Max:99 °F (37.2 °C)  Current: Temperature: 98.4 °F (36.9 °C)    Physical Exam:     General: Awake, alert, cooperative, no distress.   Neck:  Supple. No mass.  No lymphadenopathy.   Chest:  Old ICD site with dressing in place.  Mild serous staining.  No erythema/warmth.  Mild and improved tenderness.   Lungs: Expansion symmetric, no rales, no wheezing, respirations unlabored.   Heart:  Regular rate and rhythm, S1 and S2 normal, no murmur.   Abdomen: Soft, nondistended, non-tender, bowel sounds active all four quadrants, no masses, no organomegaly.   Extremities: No edema. No erythema/warmth. No ulcer. Nontender to palpation.   Skin:  No rash.   Neuro: Moves all extremities.     Invasive Devices       Peripheral Intravenous Line  Duration             Peripheral IV 24 Right Hand 3 days                    Labs studies:   I have personally reviewed pertinent labs.  Results from last 7 days   Lab Units 02/10/24  0554 24  0526 24  0640   POTASSIUM mmol/L 4.3 4.3 3.5   CHLORIDE mmol/L 99 100 99   CO2 mmol/L 24 26 28   BUN mg/dL 15 10 7   CREATININE mg/dL 0.78 0.66 0.60   EGFR ml/min/1.73sq m 96 103 107   CALCIUM mg/dL 8.4 8.2* 8.4     Results from last 7 days   Lab Units 02/10/24  0745 24  0526 24  0640   WBC Thousand/uL 20.09* 21.13* 12.66*   HEMOGLOBIN g/dL 11.2* 11.8* 11.5*   PLATELETS Thousands/uL 239 239 269     Results from last 7 days   Lab Units 24  1007 24  0523 24  1025 24  0640 24  1221   BLOOD CULTURE  No Growth at 24 hrs. No Growth at 24 hrs. Staphylococcus aureus* No Growth at 24 hrs. Staphylococcus aureus*  Staphylococcus aureus*   GRAM STAIN RESULT   --   --  Gram positive cocci in clusters*  --  Gram positive cocci in clusters*  Gram positive cocci in clusters*       Imaging Studies:   I have personally reviewed  pertinent imaging study reports and images in PACS.    EKG, Pathology, and Other Studies:   I have personally reviewed pertinent reports.

## 2024-02-11 PROBLEM — N17.9 AKI (ACUTE KIDNEY INJURY) (HCC): Status: RESOLVED | Noted: 2023-05-03 | Resolved: 2024-02-11

## 2024-02-11 LAB
ANION GAP SERPL CALCULATED.3IONS-SCNC: 8 MMOL/L
BACTERIA BLD CULT: ABNORMAL
BACTERIA CATH TIP CULT: ABNORMAL
BASOPHILS # BLD AUTO: 0.03 THOUSANDS/ÂΜL (ref 0–0.1)
BASOPHILS NFR BLD AUTO: 0 % (ref 0–1)
BUN SERPL-MCNC: 19 MG/DL (ref 5–25)
CALCIUM SERPL-MCNC: 8.2 MG/DL (ref 8.4–10.2)
CHLORIDE SERPL-SCNC: 98 MMOL/L (ref 96–108)
CO2 SERPL-SCNC: 28 MMOL/L (ref 21–32)
CREAT SERPL-MCNC: 1.06 MG/DL (ref 0.6–1.3)
EOSINOPHIL # BLD AUTO: 0.02 THOUSAND/ÂΜL (ref 0–0.61)
EOSINOPHIL NFR BLD AUTO: 0 % (ref 0–6)
ERYTHROCYTE [DISTWIDTH] IN BLOOD BY AUTOMATED COUNT: 13.9 % (ref 11.6–15.1)
GFR SERPL CREATININE-BSD FRML MDRD: 74 ML/MIN/1.73SQ M
GLUCOSE SERPL-MCNC: 117 MG/DL (ref 65–140)
GLUCOSE SERPL-MCNC: 164 MG/DL (ref 65–140)
GLUCOSE SERPL-MCNC: 186 MG/DL (ref 65–140)
GLUCOSE SERPL-MCNC: 197 MG/DL (ref 65–140)
GLUCOSE SERPL-MCNC: 87 MG/DL (ref 65–140)
GRAM STN SPEC: ABNORMAL
HCT VFR BLD AUTO: 34.5 % (ref 36.5–49.3)
HGB BLD-MCNC: 11 G/DL (ref 12–17)
IMM GRANULOCYTES # BLD AUTO: 0.19 THOUSAND/UL (ref 0–0.2)
IMM GRANULOCYTES NFR BLD AUTO: 1 % (ref 0–2)
LYMPHOCYTES # BLD AUTO: 1.52 THOUSANDS/ÂΜL (ref 0.6–4.47)
LYMPHOCYTES NFR BLD AUTO: 9 % (ref 14–44)
MCH RBC QN AUTO: 28.9 PG (ref 26.8–34.3)
MCHC RBC AUTO-ENTMCNC: 31.9 G/DL (ref 31.4–37.4)
MCV RBC AUTO: 91 FL (ref 82–98)
MONOCYTES # BLD AUTO: 1.12 THOUSAND/ÂΜL (ref 0.17–1.22)
MONOCYTES NFR BLD AUTO: 7 % (ref 4–12)
NEUTROPHILS # BLD AUTO: 13.36 THOUSANDS/ÂΜL (ref 1.85–7.62)
NEUTS SEG NFR BLD AUTO: 83 % (ref 43–75)
NRBC BLD AUTO-RTO: 0 /100 WBCS
PLATELET # BLD AUTO: 236 THOUSANDS/UL (ref 149–390)
PMV BLD AUTO: 9.9 FL (ref 8.9–12.7)
POTASSIUM SERPL-SCNC: 4.1 MMOL/L (ref 3.5–5.3)
RBC # BLD AUTO: 3.81 MILLION/UL (ref 3.88–5.62)
S AUREUS DNA BLD POS QL NAA+NON-PROBE: DETECTED
SODIUM SERPL-SCNC: 134 MMOL/L (ref 135–147)
WBC # BLD AUTO: 16.24 THOUSAND/UL (ref 4.31–10.16)

## 2024-02-11 PROCEDURE — 99233 SBSQ HOSP IP/OBS HIGH 50: CPT | Performed by: INTERNAL MEDICINE

## 2024-02-11 PROCEDURE — 99232 SBSQ HOSP IP/OBS MODERATE 35: CPT | Performed by: STUDENT IN AN ORGANIZED HEALTH CARE EDUCATION/TRAINING PROGRAM

## 2024-02-11 PROCEDURE — 99024 POSTOP FOLLOW-UP VISIT: CPT | Performed by: PODIATRIST

## 2024-02-11 PROCEDURE — 80048 BASIC METABOLIC PNL TOTAL CA: CPT | Performed by: STUDENT IN AN ORGANIZED HEALTH CARE EDUCATION/TRAINING PROGRAM

## 2024-02-11 PROCEDURE — 85025 COMPLETE CBC W/AUTO DIFF WBC: CPT | Performed by: STUDENT IN AN ORGANIZED HEALTH CARE EDUCATION/TRAINING PROGRAM

## 2024-02-11 PROCEDURE — 82948 REAGENT STRIP/BLOOD GLUCOSE: CPT

## 2024-02-11 RX ORDER — FUROSEMIDE 10 MG/ML
20 INJECTION INTRAMUSCULAR; INTRAVENOUS ONCE
Status: DISCONTINUED | OUTPATIENT
Start: 2024-02-11 | End: 2024-02-11

## 2024-02-11 RX ORDER — FUROSEMIDE 10 MG/ML
40 INJECTION INTRAMUSCULAR; INTRAVENOUS ONCE
Status: COMPLETED | OUTPATIENT
Start: 2024-02-11 | End: 2024-02-11

## 2024-02-11 RX ADMIN — INSULIN LISPRO 9 UNITS: 100 INJECTION, SOLUTION INTRAVENOUS; SUBCUTANEOUS at 17:58

## 2024-02-11 RX ADMIN — FUROSEMIDE 40 MG: 10 INJECTION, SOLUTION INTRAMUSCULAR; INTRAVENOUS at 11:21

## 2024-02-11 RX ADMIN — INSULIN LISPRO 1 UNITS: 100 INJECTION, SOLUTION INTRAVENOUS; SUBCUTANEOUS at 12:18

## 2024-02-11 RX ADMIN — AMIODARONE HYDROCHLORIDE 200 MG: 200 TABLET ORAL at 08:39

## 2024-02-11 RX ADMIN — ENOXAPARIN SODIUM 40 MG: 40 INJECTION SUBCUTANEOUS at 08:39

## 2024-02-11 RX ADMIN — ASPIRIN 81 MG CHEWABLE TABLET 81 MG: 81 TABLET CHEWABLE at 08:39

## 2024-02-11 RX ADMIN — CEFAZOLIN SODIUM 2000 MG: 2 SOLUTION INTRAVENOUS at 06:31

## 2024-02-11 RX ADMIN — INSULIN GLARGINE 50 UNITS: 100 INJECTION, SOLUTION SUBCUTANEOUS at 21:09

## 2024-02-11 RX ADMIN — INSULIN LISPRO 1 UNITS: 100 INJECTION, SOLUTION INTRAVENOUS; SUBCUTANEOUS at 17:58

## 2024-02-11 RX ADMIN — CEFAZOLIN SODIUM 2000 MG: 2 SOLUTION INTRAVENOUS at 17:58

## 2024-02-11 RX ADMIN — METOPROLOL SUCCINATE 25 MG: 25 TABLET, EXTENDED RELEASE ORAL at 17:57

## 2024-02-11 RX ADMIN — ACETAMINOPHEN 975 MG: 325 TABLET, FILM COATED ORAL at 19:48

## 2024-02-11 RX ADMIN — CEFAZOLIN SODIUM 2000 MG: 2 SOLUTION INTRAVENOUS at 23:01

## 2024-02-11 RX ADMIN — ATORVASTATIN CALCIUM 80 MG: 80 TABLET, FILM COATED ORAL at 17:56

## 2024-02-11 RX ADMIN — CLOPIDOGREL BISULFATE 75 MG: 75 TABLET ORAL at 08:39

## 2024-02-11 RX ADMIN — METOPROLOL SUCCINATE 25 MG: 25 TABLET, EXTENDED RELEASE ORAL at 06:31

## 2024-02-11 RX ADMIN — INSULIN LISPRO 1 UNITS: 100 INJECTION, SOLUTION INTRAVENOUS; SUBCUTANEOUS at 21:09

## 2024-02-11 RX ADMIN — INSULIN LISPRO 9 UNITS: 100 INJECTION, SOLUTION INTRAVENOUS; SUBCUTANEOUS at 08:40

## 2024-02-11 RX ADMIN — INSULIN LISPRO 9 UNITS: 100 INJECTION, SOLUTION INTRAVENOUS; SUBCUTANEOUS at 12:19

## 2024-02-11 RX ADMIN — CEFAZOLIN SODIUM 2000 MG: 2 SOLUTION INTRAVENOUS at 11:24

## 2024-02-11 RX ADMIN — CEFAZOLIN SODIUM 2000 MG: 2 SOLUTION INTRAVENOUS at 00:43

## 2024-02-11 NOTE — PLAN OF CARE
Problem: METABOLIC, FLUID AND ELECTROLYTES - ADULT  Goal: Electrolytes maintained within normal limits  Description: INTERVENTIONS:  - Monitor labs and assess patient for signs and symptoms of electrolyte imbalances  - Administer electrolyte replacement as ordered  - Monitor response to electrolyte replacements, including repeat lab results as appropriate  - Instruct patient on fluid and nutrition as appropriate  Outcome: Progressing     Problem: Prexisting or High Potential for Compromised Skin Integrity  Goal: Skin integrity is maintained or improved  Description: INTERVENTIONS:  - Identify patients at risk for skin breakdown  - Assess and monitor skin integrity  - Assess and monitor nutrition and hydration status  - Monitor labs   - Assess for incontinence   - Turn and reposition patient  - Assist with mobility/ambulation  - Relieve pressure over bony prominences  - Avoid friction and shearing  - Provide appropriate hygiene as needed including keeping skin clean and dry  - Evaluate need for skin moisturizer/barrier cream  - Collaborate with interdisciplinary team   - Patient/family teaching  - Consider wound care consult   Outcome: Progressing     Problem: PAIN - ADULT  Goal: Verbalizes/displays adequate comfort level or baseline comfort level  Description: Interventions:  - Encourage patient to monitor pain and request assistance  - Assess pain using appropriate pain scale  - Administer analgesics based on type and severity of pain and evaluate response  - Implement non-pharmacological measures as appropriate and evaluate response  - Consider cultural and social influences on pain and pain management  - Notify physician/advanced practitioner if interventions unsuccessful or patient reports new pain  Outcome: Progressing     Problem: INFECTION - ADULT  Goal: Absence or prevention of progression during hospitalization  Description: INTERVENTIONS:  - Assess and monitor for signs and symptoms of infection  -  Monitor lab/diagnostic results  - Monitor all insertion sites, i.e. indwelling lines, tubes, and drains  - Monitor endotracheal if appropriate and nasal secretions for changes in amount and color  - [place] appropriate cooling/warming therapies per order  - Administer medications as ordered  - Instruct and encourage patient and family to use good hand hygiene technique  - Identify and instruct in appropriate isolation precautions for identified infection/condition  Outcome: Progressing     Problem: SAFETY ADULT  Goal: Patient will remain free of falls  Description: INTERVENTIONS:  - Educate patient/family on patient safety including physical limitations  - Instruct patient to call for assistance with activity   - Consult OT/PT to assist with strengthening/mobility   - Keep Call bell within reach  - Keep bed low and locked with side rails adjusted as appropriate  - Keep care items and personal belongings within reach  - Initiate and maintain comfort rounds  - Make Fall Risk Sign visible to staff  - Apply yellow socks and bracelet for high fall risk patients  - Consider moving patient to room near nurses station  Outcome: Progressing

## 2024-02-11 NOTE — ASSESSMENT & PLAN NOTE
Last EF of 42%  Holding diuresis (Aldactone) in the setting of relative hypotension and recovering LINDEN  Continue ASA/Plavix and statin for underlying CAD (associated ischemic cardiomyopathy)  Monitor/replete serum potassium/magnesium as necessary  Low sodium and fluid restriction enforced  Some lower extremity edema and crackles in the bases, no exacerbation.  Will give IV Lasix 40 mg x 1 today.

## 2024-02-11 NOTE — PROGRESS NOTES
Progress Note - Infectious Disease   Nadir Myers 63 y.o. male MRN: [id number]  [facility] Encounter: [id number]      Impression/Recommendations:  Sepsis, present on admission, with fever and leukocytosis.  Source is most likely bacteremia.  Patient is clinically improved.  Temperature is down.  WBC increased immediately after extraction of infected ICD, but now trending down again.  Temperature up overnight, but trending down again.  Antibiotic plan as in below.  Monitor temperature/WBC.     Prolonged MSSA bacteremia, likely secondary to ICD infection.  ICD was extracted 2/8.  Bacteremia is now clearing after ICD extraction.  Given prolonged bacteremia, ICD infection and likely septic emboli, patient will need long-term IV antibiotic.  Continue high-dose IV cefazolin.  Follow-up repeat blood cultures.  Treat x 6 weeks after clearance of bacteremia.  ICD reimplantation when repeat blood cultures have no growth.  Okay for PICC placement after repeat blood cultures have no growth at 72 hours.     Left fourth toe wet gangrene, likely source of MSSA bacteremia above.  Patient is status post toe amputation, for surgical cure.  No further antibiotic needed for this indication.  Wound care per podiatry.     Abnormal chest CT.  Given presence of prolonged bacteremia with ICD infection and lack of respiratory symptoms, consider septic pulmonary emboli.  Antibiotic plan as seen above.  Monitor for development of respiratory symptoms.     Staphylococcus epididymitis bacteremia in 1 out of 2 repeat blood culture sets but had no growth in initial admission blood cultures.  This is most likely contaminant.  No antibiotic needed for this indication.     Discussed with patient and his family in detail regarding the above plan.  Discussed with Dr. Feldman from [facility] service regarding clearance for PICC placement tomorrow, if repeat blood cultures remain negative.     Antibiotics:  Cefazolin.  Post ICD extraction # 3      Subjective:  Patient feels well.  Minimal discomfort at old ICD site.  Temperature up overnight but now down.  No chills.  He is tolerating antibiotic well.  No nausea, vomiting or diarrhea.     Objective:  Vitals:  Temp:  [98 °F (36.7 °C)-102.5 °F (39.2 °C)] 98.3 °F (36.8 °C)  HR:  [67-88] 76  Resp:  [18-20] 18  BP: ()/(59-89) 149/81  SpO2:  [92 %-96 %] 95 %  Temp (24hrs), Av.4 °F (37.4 °C), Min:98 °F (36.7 °C), Max:102.5 °F (39.2 °C)  Current: Temperature: 98.3 °F (36.8 °C)    Physical Exam:     General: Awake, alert, cooperative, no distress.   Neck:  Supple. No mass.  No lymphadenopathy.   Chest:  Left chest wall old ICD wound with dressing in place.  Mild serous staining.  No erythema/warmth.  Minimal tenderness.   Lungs: Expansion symmetric, no rales, no wheezing, respirations unlabored.   Heart:  Regular rate and rhythm, S1 and S2 normal, no murmur.   Abdomen: Soft, nondistended, non-tender, bowel sounds active all four quadrants, no masses, no organomegaly.   Extremities: No edema.  Foot with dressing.  No purulence/erythema.  Nontender to palpation.   Skin:  No rash.   Neuro: Moves all extremities.     Invasive Devices       Peripheral Intravenous Line  Duration             Peripheral IV 24 Right Hand 3 days                    Labs studies:   I have personally reviewed pertinent labs.  Results from last 7 days   Lab Units 24  0531 02/10/24  0554 24  0526   POTASSIUM mmol/L 4.1 4.3 4.3   CHLORIDE mmol/L 98 99 100   CO2 mmol/L 28 24 26   BUN mg/dL 19 15 10   CREATININE mg/dL 1.06 0.78 0.66   EGFR ml/min/1.73sq m 74 96 103   CALCIUM mg/dL 8.2* 8.4 8.2*     Results from last 7 days   Lab Units 24  0531 02/10/24  0745 24  0526   WBC Thousand/uL 16.24* 20.09* 21.13*   HEMOGLOBIN g/dL 11.0* 11.2* 11.8*   PLATELETS Thousands/uL 236 239 239     Results from last 7 days   Lab Units 24  1007 24  0523 24  1025 24  0640 24  1221   BLOOD CULTURE  No  Growth at 24 hrs. No Growth at 48 hrs. Staphylococcus aureus* No Growth at 48 hrs. Staphylococcus aureus*  Staphylococcus aureus*   GRAM STAIN RESULT   --   --  Gram positive cocci in clusters*  --  Gram positive cocci in clusters*  Gram positive cocci in clusters*       Imaging Studies:   I have personally reviewed pertinent imaging study reports and images in PACS.    EKG, Pathology, and Other Studies:   I have personally reviewed pertinent reports.

## 2024-02-11 NOTE — PROGRESS NOTES
[facility]  Progress Note  Name: Nadir Myers I  MRN: [id number]  Unit/Bed#: [facility] 902-01 I Date of Admission: 1/31/2024   Date of Service: 2/11/2024 I Hospital Day: 11    Assessment/Plan   * Septic shock (HCC)  Assessment & Plan  Initially admitted to the ICU requiring vasopressor support, now off, with maintenance of hemodynamic stability  Due to MSSA bacteremia from a gangrenous left fourth toe (see below)  Continue to monitor vitals and maintain hemodynamics including temperature curve  See additional management below  Shock resolved    MSSA bacteremia  Assessment & Plan  As evidenced on blood cultures from 1/31 with repeat blood cultures positive on 2/3 despite being on Ancef  Blood culture 1/2 positive for staph epi, likely contaminant, follow-up repeat blood cultures to confirm contaminant  Likely source due to left fourth toe gangrenous wound -> see plan below  GILL with evidence of mobile mass on ICD lead in the right atrium, concerning for infection, no valvular vegetation  S/p ICD removal with EP on 2/8  CT chest with possible septic pulmonary emboli, monitor closely  ID following, appreciate recommendations  Blood cultures negative for 48 hours  Continue IV Ancef, with plan to treat 6 weeks after clearance of bacteremia  Plan for PICC line if blood cultures remain negative for 72 hours  Plan for leadless ICD on 2/14 with EP once blood cultures have no growth    Abnormal CT of the chest  Assessment & Plan  CT 2/7 shows small consolidation in the upper lobes and left lower lobe with enhancing lesion in the peripheral of right lower lobe, may be embolic versus hematogenous seeding from high-grade bacteremia.    Continue IV antibiotics as above  Monitor for any respiratory symptoms  Repeat CT chest in 2 to 3 months    Electrolyte abnormalities  Assessment & Plan  Monitor/replete serum potassium/magnesium/phosphate as necessary    Gangrene of toe of left foot  "([facility])  Assessment & Plan  Appreciate podiatry input -> status post left fourth toe amputation on 1/31  Raymond to be surgical cure per podiatry  Wound care    PAD (peripheral artery disease) ([facility])  Assessment & Plan  Continue dual antiplatelet therapy with ASA/Plavix - continue statin  Sequela complicated by gangrenous left toe (see above)    Chronic systolic CHF ([facility])  Assessment & Plan  Last EF of 42%  Holding diuresis (Aldactone) in the setting of relative hypotension and recovering LINDEN  Continue ASA/Plavix and statin for underlying CAD (associated ischemic cardiomyopathy)  Monitor/replete serum potassium/magnesium as necessary  Low sodium and fluid restriction enforced  Some lower extremity edema and crackles in the bases, no exacerbation.  Will give IV Lasix 40 mg x 1 today.    Hyponatremia  Assessment & Plan  Maintain strict fluid restriction  Optimize blood sugar control as a degree of \"pseudohyponatremia\" is likely contributory  Sodium improving to 143, stable, continue to monitor    Type 2 diabetes mellitus, with long-term current use of insulin ([facility])  Assessment & Plan  Lab Results   Component Value Date    HGBA1C 9.8 (H) 01/31/2024     Continue basal/prandial insulin with additional SSI coverage per Accu-Cheks  Hypoglycemia protocol  Carbohydrate restriction    LINDEN (acute kidney injury) ([facility])-resolved as of 2/11/2024  Assessment & Plan  Creatinine of approximately 0.6-0.8 -> currently stable at 0.65 from a prior peak of 1.41  Monitor renal function and urine output  Limit/avoid nephrotoxins and hypotension as possible  Holding spironolactone           VTE Pharmacologic Prophylaxis:   Moderate Risk (Score 3-4) - Pharmacological DVT Prophylaxis Ordered: enoxaparin (Lovenox).    Mobility:   Basic Mobility Inpatient Raw Score: 19  JH-HLM Goal: 6: Walk 10 steps or more  JH-HLM Achieved: 7: Walk 25 feet or more  HLM Goal achieved. Continue to encourage appropriate mobility.    Patient Centered Rounds: I performed " bedside rounds with nursing staff today.   Discussions with Specialists or Other Care Team Provider: ID    Education and Discussions with Family / Patient: Updated  (wife and daughter) at bedside.    Total Time Spent on Date of Encounter in care of patient: 25 mins. This time was spent on one or more of the following: performing physical exam; counseling and coordination of care; obtaining or reviewing history; documenting in the medical record; reviewing/ordering tests, medications or procedures; communicating with other healthcare professionals and discussing with patient's family/caregivers.    Current Length of Stay: 11 day(s)  Current Patient Status: Inpatient   Certification Statement: The patient will continue to require additional inpatient hospital stay due to IV antibiotics, plan for ICD next week  Discharge Plan: Anticipate discharge in >72 hrs to rehab facility.    Code Status: Level 1 - Full Code    Subjective:   Patient assessed at bedside.  Some edema in the lower extremities.  No shortness of breath.    Objective:     Vitals:   Temp (24hrs), Av.6 °F (37.6 °C), Min:98 °F (36.7 °C), Max:102.5 °F (39.2 °C)    Temp:  [98 °F (36.7 °C)-102.5 °F (39.2 °C)] 99.4 °F (37.4 °C)  HR:  [67-88] 83  Resp:  [16-20] 16  BP: ()/(59-87) 149/84  SpO2:  [93 %-96 %] 95 %  Body mass index is 31.19 kg/m².     Input and Output Summary (last 24 hours):     Intake/Output Summary (Last 24 hours) at 2024 1244  Last data filed at 2024 0900  Gross per 24 hour   Intake 416 ml   Output 1250 ml   Net -834 ml       Physical Exam:   Physical Exam  Vitals reviewed.   Constitutional:       General: He is not in acute distress.     Appearance: Normal appearance. He is not ill-appearing.   HENT:      Head: Normocephalic and atraumatic.   Cardiovascular:      Rate and Rhythm: Normal rate and regular rhythm.      Heart sounds: Normal heart sounds.   Pulmonary:      Effort: Pulmonary effort is normal. No  respiratory distress.      Comments: Mild crackles in the bases  Abdominal:      General: Bowel sounds are normal.      Tenderness: There is no abdominal tenderness.   Musculoskeletal:      Right lower leg: Edema present.      Left lower leg: Edema present.   Skin:     General: Skin is warm and dry.   Neurological:      Mental Status: He is alert and oriented to person, place, and time. Mental status is at baseline.   Psychiatric:         Mood and Affect: Mood normal.         Behavior: Behavior normal.        Additional Data:     Labs:  Results from last 7 days   Lab Units 02/11/24  0531 02/08/24  0640 02/07/24  0444   WBC Thousand/uL 16.24*   < > 11.33*   HEMOGLOBIN g/dL 11.0*   < > 11.3*   HEMATOCRIT % 34.5*   < > 35.6*   PLATELETS Thousands/uL 236   < > 263   BANDS PCT %  --   --  14*   NEUTROS PCT % 83*   < >  --    LYMPHS PCT % 9*   < >  --    LYMPHO PCT %  --   --  7*   MONOS PCT % 7   < >  --    MONO PCT %  --   --  12   EOS PCT % 0   < > 0    < > = values in this interval not displayed.     Results from last 7 days   Lab Units 02/11/24  0531   SODIUM mmol/L 134*   POTASSIUM mmol/L 4.1   CHLORIDE mmol/L 98   CO2 mmol/L 28   BUN mg/dL 19   CREATININE mg/dL 1.06   ANION GAP mmol/L 8   CALCIUM mg/dL 8.2*   GLUCOSE RANDOM mg/dL 87         Results from last 7 days   Lab Units 02/11/24  1121 02/11/24  0718 02/10/24  2026 02/10/24  1643 02/10/24  1101 02/10/24  0712 02/09/24  2058 02/09/24  1630 02/09/24  1119 02/09/24  0731 02/08/24  2050 02/08/24  1609   POC GLUCOSE mg/dl 186* 117 236* 183* 183* 155* 229* 267* 160* 191* 195* 129               Lines/Drains:  Invasive Devices       Peripheral Intravenous Line  Duration             Peripheral IV 02/07/24 Right Hand 4 days                      Telemetry:  Telemetry Orders (From admission, onward)               24 Hour Telemetry Monitoring  Continuous x 24 Hours ([place])        Question:  Reason for 24 Hour Telemetry  Answer:  PCI/EP study (including pacer and ICD  implementation), Cardiac surgery, MI, abnormal cardiac cath, and chest pain- rule out MI                     Telemetry Reviewed: Normal Sinus Rhythm  Indication for Continued Telemetry Use: Suepected PPM or ICD Malfunction             Imaging: No pertinent imaging reviewed.    Recent Cultures (last 7 days):   Results from last 7 days   Lab Units 02/09/24  1007 02/09/24  0523 02/08/24  1025 02/08/24  0640 02/06/24  1221   BLOOD CULTURE  No Growth at 24 hrs. No Growth at 48 hrs. Staphylococcus aureus* No Growth at 48 hrs. Staphylococcus aureus*  Staphylococcus aureus*   GRAM STAIN RESULT   --   --  Gram positive cocci in clusters*  --  Gram positive cocci in clusters*  Gram positive cocci in clusters*       Last 24 Hours Medication List:   Current Facility-Administered Medications   Medication Dose Route Frequency Provider Last Rate    acetaminophen  975 mg Oral Q6H PRN Robyn Feldman,       amiodarone  200 mg Oral Daily With Breakfast Robyn Feldman, DO      aspirin  81 mg Oral Daily Robyn Feldman, DO      atorvastatin  80 mg Oral After Dinner Robyn Feldman, DO      cefazolin  2,000 mg Intravenous Q6H Robyn Feldman, DO 2,000 mg (02/11/24 1124)    clopidogrel  75 mg Oral Daily Robyn Feldman, DO      enoxaparin  40 mg Subcutaneous Q24H [facility] Robyn Feldman, DO      insulin glargine  50 Units Subcutaneous HS Robyn Feldman, DO      insulin lispro  1-5 Units Subcutaneous 4x Daily (AC & HS) Robyn Feldman,       insulin lispro  9 Units Subcutaneous TID With Meals Robyn Feldman DO      metoprolol  5 mg Intravenous Q6H PRN Soledad Mari PA-C      metoprolol succinate  25 mg Oral Q12H Robyn Feldman,       polyethylene glycol  17 g Oral Daily PRN Robyn Feldman,       senna-docusate sodium  2 tablet Oral BID PRN Robyn Feldman DO          Today, Patient Was Seen By: Robyn Feldman DO    **Please Note: This note may have been constructed using a voice  recognition system.**

## 2024-02-11 NOTE — ASSESSMENT & PLAN NOTE
As evidenced on blood cultures from 1/31 with repeat blood cultures positive on 2/3 despite being on Ancef  Blood culture 1/2 positive for staph epi, likely contaminant, follow-up repeat blood cultures to confirm contaminant  Likely source due to left fourth toe gangrenous wound -> see plan below  GILL with evidence of mobile mass on ICD lead in the right atrium, concerning for infection, no valvular vegetation  S/p ICD removal with EP on 2/8  CT chest with possible septic pulmonary emboli, monitor closely  ID following, appreciate recommendations  Blood cultures negative for 48 hours  Continue IV Ancef, with plan to treat 6 weeks after clearance of bacteremia  Plan for PICC line if blood cultures remain negative for 72 hours  Plan for leadless ICD on 2/14 with EP once blood cultures have no growth

## 2024-02-11 NOTE — ASSESSMENT & PLAN NOTE
Appreciate podiatry input -> status post left fourth toe amputation on 1/31  Darwin to be surgical cure per podiatry  Wound care

## 2024-02-11 NOTE — PROGRESS NOTES
[facility] - Progress Note  Patient: Nadir Myers 63 y.o. male   MRN: [id number]  PCP: Tigre Hare DO  [facility] Encounter: [id number]  Date Of Visit: 24    ASSESSMENT:    Nadir Myers is a 63 y.o. 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  Tobacco dependence  Peripheral arterial disease    PLAN:    POD7 s/p left foot washout with partial fourth ray resection and delayed primary closure. Dressings changed at bedside. Incision site is stable, no acute clinical signs of infection, sutures are intact.  Patient remains stable for discharge from a podiatry standpoint. Patient will follow up outpatient for post-operative care, instructions placed with discharge information. Podiatry will follow peripherally while patient remains in house  Reviewed ID plan: continue long-term IV ABX x 6 weeks after clearance of bacteremia  Continue local wound care of betadine, adaptic, DSD. Appreciate nursing assistance with dressing changes.  Elevation on green foam wedges or pillows when non-ambulatory.  Rest of care per primary team.    Weight bearing status: Weightbearing as tolerated to left heel      SUBJECTIVE:     The patient was seen, evaluated, and assessed at bedside today. The patient was awake, alert, and in no acute distress. No acute events overnight. The patient reports no pain in his left foot at this time. Patient denies N/V/F/chills/SOB/CP.      OBJECTIVE:     Vitals:   /84   Pulse 83   Temp 99.4 °F (37.4 °C)   Resp 16   Ht 6' (1.829 m)   Wt 104 kg (229 lb 15 oz)   SpO2 95%   BMI 31.19 kg/m²     Temp (24hrs), Av.6 °F (37.6 °C), Min:98 °F (36.7 °C), Max:102.5 °F (39.2 °C)      Physical  "Exam:     General:  Alert, cooperative, and in no distress.  Lower extremity exam:  Cardiovascular status at baseline.  Neurological status at baseline.  Musculoskeletal status at baseline. No calf tenderness noted.     Left foot: S/p left partial 4th ray resection with DPC. Incision site stable with sutures intact and skin edges well aligned. Capillary refill to skin edges < 3 seconds. Skin temperature within normal limits. No evidence of dehiscence. No signs of active infection: no purulence, no malodor, no ascending erythema, no crepitus, no fluctuance.     Clinical Images 02/11/24:            Additional Data:     Labs:    Results from last 7 days   Lab Units 02/11/24  0531   WBC Thousand/uL 16.24*   HEMOGLOBIN g/dL 11.0*   HEMATOCRIT % 34.5*   PLATELETS Thousands/uL 236   NEUTROS PCT % 83*   LYMPHS PCT % 9*   MONOS PCT % 7   EOS PCT % 0     Results from last 7 days   Lab Units 02/11/24  0531   POTASSIUM mmol/L 4.1   CHLORIDE mmol/L 98   CO2 mmol/L 28   BUN mg/dL 19   CREATININE mg/dL 1.06   CALCIUM mg/dL 8.2*           * I Have Reviewed All Lab Data Listed Above.    Recent Cultures (last 7 days):     Results from last 7 days   Lab Units 02/09/24  1007 02/09/24  0523 02/08/24  1025 02/08/24  0640 02/06/24  1221   BLOOD CULTURE  No Growth at 24 hrs. No Growth at 48 hrs. Staphylococcus aureus* No Growth at 48 hrs. Staphylococcus aureus*  Staphylococcus aureus*   GRAM STAIN RESULT   --   --  Gram positive cocci in clusters*  --  Gram positive cocci in clusters*  Gram positive cocci in clusters*           Imaging: I have personally reviewed pertinent films in PACS  EKG, Pathology, and Other Studies: I have personally reviewed pertinent reports.      ** Please Note: Portions of the record may have been created with voice recognition software. Occasional wrong word or \"sound a like\" substitutions may have occurred due to the inherent limitations of voice recognition software. Read the chart carefully and recognize, " using context, where substitutions have occurred. **

## 2024-02-12 LAB
GLUCOSE SERPL-MCNC: 155 MG/DL (ref 65–140)
GLUCOSE SERPL-MCNC: 171 MG/DL (ref 65–140)
GLUCOSE SERPL-MCNC: 182 MG/DL (ref 65–140)
GLUCOSE SERPL-MCNC: 208 MG/DL (ref 65–140)

## 2024-02-12 PROCEDURE — 87040 BLOOD CULTURE FOR BACTERIA: CPT | Performed by: INTERNAL MEDICINE

## 2024-02-12 PROCEDURE — 97530 THERAPEUTIC ACTIVITIES: CPT

## 2024-02-12 PROCEDURE — 99233 SBSQ HOSP IP/OBS HIGH 50: CPT | Performed by: INTERNAL MEDICINE

## 2024-02-12 PROCEDURE — 99232 SBSQ HOSP IP/OBS MODERATE 35: CPT | Performed by: STUDENT IN AN ORGANIZED HEALTH CARE EDUCATION/TRAINING PROGRAM

## 2024-02-12 PROCEDURE — 99223 1ST HOSP IP/OBS HIGH 75: CPT | Performed by: THORACIC SURGERY (CARDIOTHORACIC VASCULAR SURGERY)

## 2024-02-12 PROCEDURE — 82948 REAGENT STRIP/BLOOD GLUCOSE: CPT

## 2024-02-12 PROCEDURE — 97535 SELF CARE MNGMENT TRAINING: CPT

## 2024-02-12 RX ORDER — SACCHAROMYCES BOULARDII 250 MG
250 CAPSULE ORAL 2 TIMES DAILY
Status: DISCONTINUED | OUTPATIENT
Start: 2024-02-12 | End: 2024-03-01 | Stop reason: HOSPADM

## 2024-02-12 RX ORDER — FUROSEMIDE 10 MG/ML
40 INJECTION INTRAMUSCULAR; INTRAVENOUS ONCE
Status: COMPLETED | OUTPATIENT
Start: 2024-02-12 | End: 2024-02-12

## 2024-02-12 RX ADMIN — INSULIN LISPRO 1 UNITS: 100 INJECTION, SOLUTION INTRAVENOUS; SUBCUTANEOUS at 11:21

## 2024-02-12 RX ADMIN — INSULIN LISPRO 9 UNITS: 100 INJECTION, SOLUTION INTRAVENOUS; SUBCUTANEOUS at 17:14

## 2024-02-12 RX ADMIN — INSULIN LISPRO 9 UNITS: 100 INJECTION, SOLUTION INTRAVENOUS; SUBCUTANEOUS at 07:33

## 2024-02-12 RX ADMIN — CLOPIDOGREL BISULFATE 75 MG: 75 TABLET ORAL at 07:32

## 2024-02-12 RX ADMIN — CEFAZOLIN SODIUM 2000 MG: 2 SOLUTION INTRAVENOUS at 23:34

## 2024-02-12 RX ADMIN — INSULIN LISPRO 9 UNITS: 100 INJECTION, SOLUTION INTRAVENOUS; SUBCUTANEOUS at 11:21

## 2024-02-12 RX ADMIN — AMIODARONE HYDROCHLORIDE 200 MG: 200 TABLET ORAL at 07:32

## 2024-02-12 RX ADMIN — INSULIN GLARGINE 50 UNITS: 100 INJECTION, SOLUTION SUBCUTANEOUS at 21:09

## 2024-02-12 RX ADMIN — METOPROLOL SUCCINATE 25 MG: 25 TABLET, EXTENDED RELEASE ORAL at 17:19

## 2024-02-12 RX ADMIN — INSULIN LISPRO 1 UNITS: 100 INJECTION, SOLUTION INTRAVENOUS; SUBCUTANEOUS at 21:10

## 2024-02-12 RX ADMIN — INSULIN LISPRO 1 UNITS: 100 INJECTION, SOLUTION INTRAVENOUS; SUBCUTANEOUS at 07:32

## 2024-02-12 RX ADMIN — ENOXAPARIN SODIUM 40 MG: 40 INJECTION SUBCUTANEOUS at 07:31

## 2024-02-12 RX ADMIN — ATORVASTATIN CALCIUM 80 MG: 80 TABLET, FILM COATED ORAL at 17:13

## 2024-02-12 RX ADMIN — ASPIRIN 81 MG CHEWABLE TABLET 81 MG: 81 TABLET CHEWABLE at 07:31

## 2024-02-12 RX ADMIN — CEFAZOLIN SODIUM 2000 MG: 2 SOLUTION INTRAVENOUS at 17:13

## 2024-02-12 RX ADMIN — CEFAZOLIN SODIUM 2000 MG: 2 SOLUTION INTRAVENOUS at 11:20

## 2024-02-12 RX ADMIN — FUROSEMIDE 40 MG: 10 INJECTION, SOLUTION INTRAMUSCULAR; INTRAVENOUS at 10:07

## 2024-02-12 RX ADMIN — METOPROLOL SUCCINATE 25 MG: 25 TABLET, EXTENDED RELEASE ORAL at 05:03

## 2024-02-12 RX ADMIN — INSULIN LISPRO 1 UNITS: 100 INJECTION, SOLUTION INTRAVENOUS; SUBCUTANEOUS at 17:14

## 2024-02-12 RX ADMIN — Medication 250 MG: at 17:13

## 2024-02-12 RX ADMIN — Medication 250 MG: at 10:07

## 2024-02-12 RX ADMIN — CEFAZOLIN SODIUM 2000 MG: 2 SOLUTION INTRAVENOUS at 05:03

## 2024-02-12 NOTE — PLAN OF CARE
Problem: PHYSICAL THERAPY ADULT  Goal: Performs mobility at highest level of function for planned discharge setting.  See evaluation for individualized goals.  Description: Treatment/Interventions: Functional transfer training, LE strengthening/ROM, Elevations, Therapeutic exercise, Endurance training, Patient/family training, Equipment eval/education, Bed mobility, Spoke to nursing, Gait training, OT, Spoke to case management  Equipment Recommended: Wheelchair, Walker       See flowsheet documentation for full assessment, interventions and recommendations.  Outcome: Progressing  Note: Prognosis: Good  Problem List: Decreased strength, Decreased endurance, Impaired balance, Decreased mobility, Pain  Assessment: Pt seen for PT treatment session this date. Therapy session focused on bed mobility, functional transfers, gait training and endurance training in order to improve overall mobility and independence. Pt requires mod I level for bed mobility tasks, mod I level for transfer tasks and S level for ambulation with RW. Reached out to podiatry via TT to inquire about toe offloading shoe for ambulation- would like to trial in upcoming sessions. Pt making progress toward goals. Pt was left supine in bed at the end of PT session with all needs in reach. Pt would benefit from continued PT services while in hospital to address remaining limitations. PT to continue to follow pt and recommends home with HHPT with increased social support. The patient's AM-PAC Basic Mobility Inpatient Short Form Raw Score is 20. A Raw score of greater than 16 suggests the patient may benefit from discharge to home. Please also refer to the recommendation of the Physical Therapist for safe discharge planning.  Barriers to Discharge: Inaccessible home environment     Rehab Resource Intensity Level, PT: III (Minimum Resource Intensity)    See flowsheet documentation for full assessment.

## 2024-02-12 NOTE — PHYSICAL THERAPY NOTE
PHYSICAL THERAPY NOTE          Patient Name: Nadir Myers  Today's Date: 2/12/2024 02/12/24 1350   PT Last Visit   PT Visit Date 02/12/24   Note Type   Note Type Treatment   Pain Assessment   Pain Assessment Tool 0-10   Pain Score No Pain   Restrictions/Precautions   Weight Bearing Precautions Per Order Yes   LLE Weight Bearing Per Order WBAT  (in sx shoe to heel)   Braces or Orthoses   (sx shoe)   Other Precautions Fall Risk;WBS   General   Chart Reviewed Yes   Response to Previous Treatment Patient with no complaints from previous session.   Family/Caregiver Present No   Cognition   Overall Cognitive Status WFL   Arousal/Participation Alert;Responsive;Cooperative   Attention Within functional limits   Orientation Level Oriented X4   Memory Within functional limits   Following Commands Follows all commands and directions without difficulty   Comments pleasant and cooperative   Bed Mobility   Supine to Sit 6  Modified independent   Additional items HOB elevated;Bedrails   Sit to Supine 6  Modified independent   Additional items HOB elevated;Bedrails   Additional Comments pt supine in bed upon arrival. Pt left supine in bed with all needs wihtin reach   Transfers   Sit to Stand 6  Modified independent   Stand to Sit 6  Modified independent   Additional Comments transfers with RW   Ambulation/Elevation   Gait pattern Excessively slow;Short stride;Decreased foot clearance   Gait Assistance 5  Supervision   Assistive Device Rolling walker   Distance 150'   Ambulation/Elevation Additional Comments (S)  some VC required to maintain WBing to heel only- reached on to podiatry via TT to get toe offloading shoe to trial in upcoming therapy sessions   Balance   Static Sitting Fair +   Dynamic Sitting Fair   Static Standing Fair   Dynamic Standing Fair -   Ambulatory Fair -  (RW)   Activity Tolerance   Activity Tolerance Patient tolerated  treatment well   Nurse Made Aware RN cleared pt to participate in therapy session   Assessment   Prognosis Good   Problem List Decreased strength;Decreased endurance;Impaired balance;Decreased mobility;Pain   Assessment Pt seen for PT treatment session this date. Therapy session focused on bed mobility, functional transfers, gait training and endurance training in order to improve overall mobility and independence. Pt requires mod I level for bed mobility tasks, mod I level for transfer tasks and S level for ambulation with RW. Reached out to podiatry via TT to inquire about toe offloading shoe for ambulation- would like to trial in upcoming sessions. Pt making progress toward goals. Pt was left supine in bed at the end of PT session with all needs in reach. Pt would benefit from continued PT services while in hospital to address remaining limitations. PT to continue to follow pt and recommends home with HHPT with increased social support. The patient's AM-[facility] Basic Mobility Inpatient Short Form Raw Score is 20. A Raw score of greater than 16 suggests the patient may benefit from discharge to home. Please also refer to the recommendation of the Physical Therapist for safe discharge planning.   Barriers to Discharge Inaccessible home environment   Goals   Patient Goals to rest   STG Expiration Date 02/16/24   PT Treatment Day 5   Plan   Treatment/Interventions Functional transfer training;LE strengthening/ROM;Elevations;Therapeutic exercise;Endurance training;Patient/family training;Equipment eval/education;Bed mobility;Gait training;Spoke to nursing;Spoke to case management;OT   Progress Progressing toward goals   PT Frequency 3-5x/wk   Discharge Recommendation   Rehab Resource Intensity Level, PT III (Minimum Resource Intensity)   Equipment Recommended Walker   Walker Package Recommended Wheeled walker   AM-PAC Basic Mobility Inpatient   Turning in Flat Bed Without Bedrails 4   Lying on Back to Sitting on Edge of  Flat Bed Without Bedrails 4   Moving Bed to Chair 3   Standing Up From Chair Using Arms 3   Walk in Room 3   Climb 3-5 Stairs With Railing 3   Basic Mobility Inpatient Raw Score 20   Basic Mobility Standardized Score 43.99   Highest Level Of Mobility   JH-HLM Goal 6: Walk 10 steps or more   JH-HLM Achieved 7: Walk 25 feet or more   Education   Education Provided Mobility training;Assistive device   Patient Demonstrates acceptance/verbal understanding   End of Consult   Patient Position at End of Consult Supine;All needs within reach     Maritza Holm, PT, DPT

## 2024-02-12 NOTE — PROGRESS NOTES
Progress Note - Infectious Disease   Nadir Myers 63 y.o. male MRN: [id number]  Unit/Bed#: [facility] 902-01 Encounter: [id number]      Impression/Plan:    Sepsis, present on arrival; fever, leukocytosis  -Source is bacteremia with ICD infection and patients foot infection as below. No other clear source. Now with intermittent fevers.  -Antibiotic as below  -Repeat blood cultures X2 today given ongoing fevers  -Trend fever curve  -Repeat CBC + diff tomorrow AM     Persistent MSSA bacteremia with ICD infection  -Likely initial source was patients foot wound as below with subsequent ICD infection. Blood cultures positive on 1/31-2/08 despite being on Cefazolin. GILL 2/07 did show a mobile mass on device lead in right atrium, concerning for infection. No valve vegetations. CT scans reviewed, no abscess. However consider if pulmonary findings may be embolic. Blood cultures now clear after ICD extracted on 2/08. The ICD lead did grow MSSA as well.  -Continue high dose IV Cefazolin 2g every 6 hours  -Follow up blood cultures 2/09, negative thus far  -Repeat blood cultures X2 today given low grade fevers  -If has persistent fevers, will need additional evaluation  -Will need 6 weeks of IV antibiotic from first negative blood cultures and device removal     3. Abnormal CT chest  -CT 2/07 noted small consolidations in upper lobes and left lower lobe, with enhancing lesion in perieprhal of right lower lobe. Consider if may be embolic lesions vs. Hematogenous seeding from high grade bacteremia.  -Plan for prolonged course of antibiotic as above  -Monitor for any development of respiratory symptoms  -Would repeat CT chest as outpatient in 2-3 months to ensure resolve    4. 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 Culture polymicrobial of tissue with MSSA, Finegoldia and Anaerococccus. Likely source of patient's bacteremia. Surgical cure felt to be obtained per Podiatry. Per follow up on 2/11, amputation  site stable with intact sutures and no evidence of dehiscence or ischemia.   -Ongoing local wound care per Podiatry.     5. Staph epidermidis in 1 of 2 blood cultures  -1 set from  with Staph epi, did not grow in prior blood cultures from . Suspect this was skin contaminant and as above MSSA is true pathogen.  -Follow up repeat blood cultures and second culture from  to ensure no other growth of Staph epi     Plan and recommendations were discussed with primary team. They agree with continuing IV Cefazolin.    Antibiotics:  Cefazolin      Subjective:  Patient this AM denies chills, back pain, cough.     Objective:  Vitals:  Temp:  [97.4 °F (36.3 °C)-101 °F (38.3 °C)] 97.4 °F (36.3 °C)  HR:  [76-91] 76  Resp:  [16-18] 18  BP: (136-149)/(76-84) 140/84  SpO2:  [91 %-95 %] 93 %  Temp (24hrs), Av.3 °F (37.4 °C), Min:97.4 °F (36.3 °C), Max:101 °F (38.3 °C)  Current: Temperature: (!) 97.4 °F (36.3 °C)    Physical Exam:   General Appearance:  Alert, interactive, nontoxic, no acute distress.   Throat: Oropharynx moist without lesions.    Lungs:   Clear to auscultation bilaterally; no wheezes, rhonchi or rales; respirations unlabored   Heart:  RRR   Abdomen:   Soft, non-tender     Extremities: Left foot with dressing intact   Skin: No new rashes or lesions. No draining wounds noted.       Labs:   All pertinent labs and imaging studies were personally reviewed  Results from last 7 days   Lab Units 24  0531 02/10/24  0745 24  0526   WBC Thousand/uL 16.24* 20.09* 21.13*   HEMOGLOBIN g/dL 11.0* 11.2* 11.8*   PLATELETS Thousands/uL 236 239 239     Results from last 7 days   Lab Units 24  0531 02/10/24  0554 24  0526   SODIUM mmol/L 134* 129* 134*   POTASSIUM mmol/L 4.1 4.3 4.3   CHLORIDE mmol/L 98 99 100   CO2 mmol/L 28 24 26   BUN mg/dL 19 15 10   CREATININE mg/dL 1.06 0.78 0.66   EGFR ml/min/1.73sq m 74 96 103   CALCIUM mg/dL 8.2* 8.4 8.2*                         Micro:  Results from last  7 days   Lab Units 02/09/24  1007 02/09/24  0523 02/08/24  1025 02/08/24  0640 02/06/24  1221   BLOOD CULTURE  No Growth at 48 hrs. No Growth at 48 hrs. Staphylococcus aureus* No Growth at 72 hrs. Staphylococcus aureus*  Staphylococcus aureus*   GRAM STAIN RESULT   --   --  Gram positive cocci in clusters*  --  Gram positive cocci in clusters*  Gram positive cocci in clusters*       Imaging:          Zach Medina MD  [facility]

## 2024-02-12 NOTE — PROGRESS NOTES
[facility]  Progress Note  Name: Nadir Myers I  MRN: [id number]  Unit/Bed#: [facility] 902-01 I Date of Admission: 1/31/2024   Date of Service: 2/12/2024 I Hospital Day: 12    Assessment/Plan   * Septic shock (HCC)  Assessment & Plan  Initially admitted to the ICU requiring vasopressor support, now off, with maintenance of hemodynamic stability  Due to MSSA bacteremia from a gangrenous left fourth toe (see below)  Continue to monitor vitals and maintain hemodynamics including temperature curve  See additional management below  Shock resolved    MSSA bacteremia  Assessment & Plan  As evidenced on blood cultures from 1/31 with repeat blood cultures positive on 2/3 despite being on Ancef  Blood culture 1/2 positive for staph epi, likely contaminant, follow-up repeat blood cultures to confirm contaminant  Likely source due to left fourth toe gangrenous wound -> see plan below  GILL with evidence of mobile mass on ICD lead in the right atrium, concerning for infection, no valvular vegetation  S/p ICD removal with EP on 2/8  CT chest with possible septic pulmonary emboli, monitor closely  ID following, appreciate recommendations  Blood cultures negative for 48 hours  Continue IV Ancef, with plan to treat 6 weeks after clearance of bacteremia  Plan for PICC line if blood cultures remain negative for 72 hours  Plan for leadless ICD on 2/14 with EP once blood cultures have no growth  Addendum: Fevers this afternoon.  Repeat blood cultures obtained.  Discussed with ID who recommends considering repeat imaging with CT in the event he has further fevers to evaluate for any new source of fevers.    Abnormal CT of the chest  Assessment & Plan  CT 2/7 shows small consolidation in the upper lobes and left lower lobe with enhancing lesion in the peripheral of right lower lobe, may be embolic versus hematogenous seeding from high-grade bacteremia.    Continue IV antibiotics as above  Monitor for  any respiratory symptoms  Repeat CT chest in 2 to 3 months    Gangrene of toe of left foot (formerly [facility])  Assessment & Plan  Appreciate podiatry input -> status post left fourth toe amputation on 1/31  [place] to be surgical cure per podiatry  Wound care    PAD (peripheral artery disease) (formerly [facility])  Assessment & Plan  Continue dual antiplatelet therapy with ASA/Plavix - continue statin  Sequela complicated by gangrenous left toe (see above)    Chronic systolic CHF (formerly [facility])  Assessment & Plan  Last EF of 42%  Holding diuresis (Aldactone) in the setting of relative hypotension and recovering LINDEN  Continue ASA/Plavix and statin for underlying CAD (associated ischemic cardiomyopathy)  Monitor/replete serum potassium/magnesium as necessary  Low sodium and fluid restriction enforced  Crackles improved, continues to have lower extremity edema.  Will give additional dose of IV Lasix today.    Type 2 diabetes mellitus, with long-term current use of insulin (formerly [facility])  Assessment & Plan  Lab Results   Component Value Date    HGBA1C 9.8 (H) 01/31/2024     Continue basal/prandial insulin with additional SSI coverage per Accu-Cheks  Hypoglycemia protocol  Carbohydrate restriction    LINDEN (acute kidney injury) (formerly [facility])-resolved as of 2/11/2024  Assessment & Plan  Baseline creatinine 0.6-1.0  Creatinine today 1.06, stable  Monitor renal function and urine output  Limit/avoid nephrotoxins and hypotension as possible  Holding spironolactone given plan for IV Lasix today           VTE Pharmacologic Prophylaxis:   Moderate Risk (Score 3-4) - Pharmacological DVT Prophylaxis Ordered: enoxaparin (Lovenox).    Mobility:   Basic Mobility Inpatient Raw Score: 19  JH-HLM Goal: 6: Walk 10 steps or more  JH-HLM Achieved: 6: Walk 10 steps or more  HLM Goal achieved. Continue to encourage appropriate mobility.    Patient Centered Rounds: I performed bedside rounds with nursing staff today.   Discussions with Specialists or Other Care Team Provider: primary RN    Education  and Discussions with Family / Patient: Updated  (wife) at bedside.    Total Time Spent on Date of Encounter in care of patient: 25 mins. This time was spent on one or more of the following: performing physical exam; counseling and coordination of care; obtaining or reviewing history; documenting in the medical record; reviewing/ordering tests, medications or procedures; communicating with other healthcare professionals and discussing with patient's family/caregivers.    Current Length of Stay: 12 day(s)  Current Patient Status: Inpatient   Certification Statement: The patient will continue to require additional inpatient hospital stay due to IV antibiotics for ICD infection with bacteremia, plan for ICD replacement on   Discharge Plan: Anticipate discharge in >72 hrs to rehab facility.    Code Status: Level 1 - Full Code    Subjective:   Patient assessed at bedside.  No complaints.  Continues to have lower extremity edema.    Objective:     Vitals:   Temp (24hrs), Av.3 °F (37.4 °C), Min:97.4 °F (36.3 °C), Max:101 °F (38.3 °C)    Temp:  [97.4 °F (36.3 °C)-101 °F (38.3 °C)] 97.4 °F (36.3 °C)  HR:  [76-91] 76  Resp:  [16-18] 18  BP: (136-141)/(76-84) 140/84  SpO2:  [91 %-94 %] 93 %  Body mass index is 31.19 kg/m².     Input and Output Summary (last 24 hours):     Intake/Output Summary (Last 24 hours) at 2024 1043  Last data filed at 2024 0524  Gross per 24 hour   Intake --   Output 1400 ml   Net -1400 ml       Physical Exam:   Physical Exam  Vitals reviewed.   Constitutional:       General: He is not in acute distress.     Appearance: Normal appearance. He is not ill-appearing.   HENT:      Head: Normocephalic and atraumatic.   Cardiovascular:      Rate and Rhythm: Normal rate and regular rhythm.      Heart sounds: Normal heart sounds.   Pulmonary:      Effort: Pulmonary effort is normal. No respiratory distress.      Breath sounds: No wheezing or rales.   Abdominal:      General: Bowel  sounds are normal.      Tenderness: There is no abdominal tenderness.   Musculoskeletal:      Right lower leg: Edema present.      Left lower leg: Edema present.   Skin:     General: Skin is warm and dry.   Neurological:      Mental Status: He is alert and oriented to person, place, and time. Mental status is at baseline.   Psychiatric:         Mood and Affect: Mood normal.         Behavior: Behavior normal.          Additional Data:     Labs:  Results from last 7 days   Lab Units 02/11/24  0531 02/08/24  0640 02/07/24  0444   WBC Thousand/uL 16.24*   < > 11.33*   HEMOGLOBIN g/dL 11.0*   < > 11.3*   HEMATOCRIT % 34.5*   < > 35.6*   PLATELETS Thousands/uL 236   < > 263   BANDS PCT %  --   --  14*   NEUTROS PCT % 83*   < >  --    LYMPHS PCT % 9*   < >  --    LYMPHO PCT %  --   --  7*   MONOS PCT % 7   < >  --    MONO PCT %  --   --  12   EOS PCT % 0   < > 0    < > = values in this interval not displayed.     Results from last 7 days   Lab Units 02/11/24  0531   SODIUM mmol/L 134*   POTASSIUM mmol/L 4.1   CHLORIDE mmol/L 98   CO2 mmol/L 28   BUN mg/dL 19   CREATININE mg/dL 1.06   ANION GAP mmol/L 8   CALCIUM mg/dL 8.2*   GLUCOSE RANDOM mg/dL 87         Results from last 7 days   Lab Units 02/12/24  0722 02/11/24  2047 02/11/24  1627 02/11/24  1121 02/11/24  0718 02/10/24  2026 02/10/24  1643 02/10/24  1101 02/10/24  0712 02/09/24  2058 02/09/24  1630 02/09/24  1119   POC GLUCOSE mg/dl 155* 197* 164* 186* 117 236* 183* 183* 155* 229* 267* 160*               Lines/Drains:  Invasive Devices       Peripheral Intravenous Line  Duration             Peripheral IV 02/08/24 Distal;Left;Upper;Ventral (anterior) Antecubital 4 days                      Telemetry:  Telemetry Orders (From admission, onward)               24 Hour Telemetry Monitoring  Continuous x 24 Hours (Telem)        Question:  Reason for 24 Hour Telemetry  Answer:  PCI/EP study (including pacer and ICD implementation), Cardiac surgery, MI, abnormal cardiac cath,  and chest pain- rule out MI                     Telemetry Reviewed: Normal Sinus Rhythm  Indication for Continued Telemetry Use: Suepected PPM or ICD Malfunction             Imaging: No pertinent imaging reviewed.    Recent Cultures (last 7 days):   Results from last 7 days   Lab Units 02/09/24  1007 02/09/24  0523 02/08/24  1025 02/08/24  0640 02/06/24  1221   BLOOD CULTURE  No Growth at 48 hrs. No Growth at 72 hrs. Staphylococcus aureus* No Growth at 72 hrs. Staphylococcus aureus*  Staphylococcus aureus*   GRAM STAIN RESULT   --   --  Gram positive cocci in clusters*  --  Gram positive cocci in clusters*  Gram positive cocci in clusters*       Last 24 Hours Medication List:   Current Facility-Administered Medications   Medication Dose Route Frequency Provider Last Rate    acetaminophen  975 mg Oral Q6H PRN Robyn Feldman, DO      amiodarone  200 mg Oral Daily With Breakfast Robyn Feldman, DO      aspirin  81 mg Oral Daily Robyn Feldman, DO      atorvastatin  80 mg Oral After Dinner Robyn Feldman, DO      cefazolin  2,000 mg Intravenous Q6H Robyn Feldman, DO 2,000 mg (02/12/24 0503)    clopidogrel  75 mg Oral Daily Robyn Feldman, DO      enoxaparin  40 mg Subcutaneous Q24H TEJAS Robyn Feldman, DO      insulin glargine  50 Units Subcutaneous HS Robyn Feldman, DO      insulin lispro  1-5 Units Subcutaneous 4x Daily (AC & HS) Robyn Feldman, DO      insulin lispro  9 Units Subcutaneous TID With Meals Robyn Feldman, DO      metoprolol  5 mg Intravenous Q6H PRN Soledad Mari PA-C      metoprolol succinate  25 mg Oral Q12H Robyn Feldman, DO      polyethylene glycol  17 g Oral Daily PRN Robyn Feldman,       saccharomyces boulardii  250 mg Oral BID Robyn Feldman, DO      senna-docusate sodium  2 tablet Oral BID PRN Robyn Feldman, DO          Today, Patient Was Seen By: Robyn Feldman DO    **Please Note: This note may have been constructed using a  voice recognition system.**

## 2024-02-12 NOTE — ASSESSMENT & PLAN NOTE
As evidenced on blood cultures from 1/31 with repeat blood cultures positive on 2/3 despite being on Ancef  Blood culture 1/2 positive for staph epi, likely contaminant, follow-up repeat blood cultures to confirm contaminant  Likely source due to left fourth toe gangrenous wound -> see plan below  GILL with evidence of mobile mass on ICD lead in the right atrium, concerning for infection, no valvular vegetation  S/p ICD removal with EP on 2/8  CT chest with possible septic pulmonary emboli, monitor closely  ID following, appreciate recommendations  Blood cultures negative for 48 hours  Continue IV Ancef, with plan to treat 6 weeks after clearance of bacteremia  PICC on hold.

## 2024-02-12 NOTE — ASSESSMENT & PLAN NOTE
Baseline creatinine 0.6-1.0  Creatinine today 1.06, stable  Monitor renal function and urine output  Limit/avoid nephrotoxins and hypotension as possible  Holding spironolactone given plan for IV Lasix today

## 2024-02-12 NOTE — RESTORATIVE TECHNICIAN NOTE
"       Restorative Technician Note      Patient Name: Nadir Myers     Restorative Tech Visit Date: 02/12/24  Note Type: Bracing, Initial consult  Patient Position Upon Consult: Supine  Brace Applied: Darco Wedge Shoe (Toe Unloading) (donned XL Left foot)  Patient Position When Brace Applied: Supine  Education Provided: Yes  Patient Position at End of Consult: Supine; All needs within reach  Nurse Communication: Nurse aware of consult, application of brace    Please contact Mobility Coordinator on Tiger text \"SLB-PT-Restorative Tech\" role in regards to bracing instruction and/or adjustment.  Krishan Hearn, Restorative Technician               "

## 2024-02-12 NOTE — ASSESSMENT & PLAN NOTE
Last EF of 42%  Holding diuresis (Aldactone) in the setting of relative hypotension and recovering LINDEN  Continue ASA/Plavix and statin for underlying CAD (associated ischemic cardiomyopathy)  Monitor/replete serum potassium/magnesium as necessary  Low sodium and fluid restriction enforced  Crackles improved, continues to have lower extremity edema.  Will give additional dose of IV Lasix today.

## 2024-02-12 NOTE — CASE MANAGEMENT
Case Management Progress Note    Patient name Nadir Myers  Location [facility] 902/[facility] 902-01 MRN [id number]  : 1960 Date 2024       LOS (days): 12  Geometric Mean LOS (GMLOS) (days): 9.9  Days to GMLOS:-2.2        OBJECTIVE:        Current admission status: Inpatient  Preferred Pharmacy:   [id number] [place], [place] - [address]  [address] [address]  Phone: [phone] Fax: [phone]    [facility]  [place], [place] - [address]  [address]  [place] [address]  Phone: [phone] Fax: [phone]    Primary Care Provider: Tigre Hare DO    Primary Insurance: [employer]  REP  Secondary Insurance:     PROGRESS NOTE: CM notified that pt no longer requires [facility] level of care, PT rec is home health.  CM will discuss with pt regarding such.

## 2024-02-12 NOTE — PLAN OF CARE
Problem: OCCUPATIONAL THERAPY ADULT  Goal: Performs self-care activities at highest level of function for planned discharge setting.  See evaluation for individualized goals.  Description: Treatment Interventions: ADL retraining, Functional transfer training, Endurance training, Patient/family training, Equipment evaluation/education, Compensatory technique education, Continued evaluation, Energy conservation, Activityengagement          See flowsheet documentation for full assessment, interventions and recommendations.   Outcome: Adequate for Discharge  Note: Limitation: Decreased ADL status, Decreased endurance, Decreased self-care trans, Decreased high-level ADLs     Assessment: Patient participated in Skilled OT session this date with interventions consisting of ADL re training with the use of correct body mechnaics, Energy Conservation techniques, safety awareness and fall prevention techniques,  therapeutic activities to: increase activity tolerance, increase dynamic sit/ stand balance during functional activity , and increase OOB/ sitting tolerance .Upon arrival patient was found supine in bed. Pt demonstrated the following tasks: independent sup to sit, MI STS, and distant S with RW for fnxl mobility. Pt doffed/donned socks and donned B/L sx shoes with S, also simulated pulling up pants over hips in stance w/o difficulty. Pt with no questions or concerns at this time. Educated on maintaining activity level with continued hospitalization - verbalized understanding. Good c/o of WBS. From OT standpoint, recommendation would be home with social support. No further acute OT needs. D/C OT. Please re-consult if needed. Thank you. Pt was left in chair after session with all current needs met. The patient's raw score on the AM-PAC Daily Activity Inpatient Short Form is 21. A raw score of greater than or equal to 19 suggests the patient may benefit from discharge to home. Please refer to the recommendation of the  Occupational Therapist for safe discharge planning.     Rehab Resource Intensity Level, OT: No post-acute rehabilitation needs

## 2024-02-12 NOTE — OCCUPATIONAL THERAPY NOTE
"  Occupational Therapy Progress Note     Patient Name: Nadir Myers  Today's Date: 2/12/2024  Problem List  Principal Problem:    Septic shock (HCC)  Active Problems:    Type 2 diabetes mellitus, with long-term current use of insulin (HCC)    Hyponatremia    Chronic systolic CHF (HCC)    PAD (peripheral artery disease) (HCC)    LUE weakness    MSSA bacteremia    Gangrene of toe of left foot (HCC)    Electrolyte abnormalities    Abnormal CT of the chest            02/12/24 1020   OT Last Visit   OT Visit Date 02/12/24   Note Type   Note Type Treatment   Pain Assessment   Pain Assessment Tool 0-10   Pain Score No Pain   Restrictions/Precautions   Weight Bearing Precautions Per Order Yes   LLE Weight Bearing Per Order (S)    (WBAT to heel in sx shoe)   Other Precautions WBS   Lifestyle   Autonomy Pt reports I w/ ADLs, IADLs, and fxnl mobility w/ SPC PRN at baseline; + driving.   Reciprocal Relationships Pt lives w/ his spouse and son   Intrinsic Gratification Pt enjoys walking and working on cars   ADL   Where Assessed Edge of bed   Grooming Assistance 7  Independent   Grooming Deficit Brushing hair  (+ washing hair with shampoo cap)   LB Dressing Assistance 5  Supervision/Setup   LB Dressing Deficit Don/doff R sock;Don/doff L sock;Don/doff R shoe;Don/doff L shoe  (B/L sx shoes)   LB Dressing Comments + simulated pulling up pants over hips in stance   Bed Mobility   Supine to Sit 7  Independent   Sit to Supine Unable to assess   Transfers   Sit to Stand 6  Modified independent   Additional items Increased time required   Stand to Sit 6  Modified independent   Additional Comments transfers with RW   Functional Mobility   Functional Mobility 5  Supervision   Additional Comments distant S, able to maintain WBS   Additional items Rolling walker   Subjective   Subjective \"Can we walk?\"   Cognition   Overall Cognitive Status WFL   Arousal/Participation Alert;Responsive;Cooperative   Attention Within functional limits "   Orientation Level Oriented X4   Memory Within functional limits   Following Commands Follows all commands and directions without difficulty   Comments Pt pleasant and cooperative t/o session   Activity Tolerance   Activity Tolerance Patient tolerated treatment well   Medical Staff Made Aware RN clearance for session   Assessment   Assessment Patient participated in Skilled OT session this date with interventions consisting of ADL re training with the use of correct body mechnaics, Energy Conservation techniques, safety awareness and fall prevention techniques,  therapeutic activities to: increase activity tolerance, increase dynamic sit/ stand balance during functional activity , and increase OOB/ sitting tolerance .Upon arrival patient was found supine in bed. Pt demonstrated the following tasks: independent sup to sit, MI STS, and distant S with RW for fnxl mobility. Pt doffed/donned socks and donned B/L sx shoes with S, also simulated pulling up pants over hips in stance w/o difficulty. Pt with no questions or concerns at this time. Educated on maintaining activity level with continued hospitalization - verbalized understanding. Good c/o of WBS. From OT standpoint, recommendation would be home with social support. No further acute OT needs. D/C OT. Please re-consult if needed. Thank you. Pt was left in chair after session with all current needs met. The patient's raw score on the AM-PAC Daily Activity Inpatient Short Form is 21. A raw score of greater than or equal to 19 suggests the patient may benefit from discharge to home. Please refer to the recommendation of the Occupational Therapist for safe discharge planning.   Plan   Treatment Interventions ADL retraining;Functional transfer training;Endurance training;Patient/family training;Compensatory technique education;Continued evaluation;Energy conservation;Activityengagement   Goal Expiration Date 02/16/24   OT Treatment Day 2   OT Frequency 2-3x/wk    Discharge Recommendation   Rehab Resource Intensity Level, OT No post-acute rehabilitation needs   AM-PAC Daily Activity Inpatient   Lower Body Dressing 3   Bathing 3   Toileting 3   Upper Body Dressing 4   Grooming 4   Eating 4   Daily Activity Raw Score 21   Daily Activity Standardized Score (Calc for Raw Score >=11) 44.27   AM-PAC Applied Cognition Inpatient   Following a Speech/Presentation 4   Understanding Ordinary Conversation 4   Taking Medications 4   Remembering Where Things Are Placed or Put Away 4   Remembering List of 4-5 Errands 4   Taking Care of Complicated Tasks 4   Applied Cognition Raw Score 24   Applied Cognition Standardized Score 62.21       Mague Philip MS, OTR/L

## 2024-02-12 NOTE — ASSESSMENT & PLAN NOTE
Cardiac Electrophysiology Outpatient Consult Note            [facility]     Consult Note     Ayan Pichardo  [id number]  05/22/2020       Primary Care Physician: Andrea Lundberg DO    Referred By: No ref. provider found    Subjective     Chief Complaint:   Chief Complaint   Patient presents with   • Atrial Fibrillation     Please note that this is a telephone health clinic.  The patient consented to have a tele-health visit due to the global viral pandemic.  The patient understands that the risk-benefit profile of coming into the office physically is quite unfavorable at this point, that patient safety concerns are foremost and that this tele-health visit is consistent with recommendations from the Centers for Disease Control ( CDC ).  We spent a total of 11 minutes and 16  Seconds in tele-clinic with the patient today.    History of Present Illness:   Mr. Ayan Pichardo is a 88 y.o. male who presents to my electrophysiology clinic for evaluation of paroxysmal A. fib.  This is a gentleman with a history of complete heart block with appropriate implant with a dual-chamber permanent pacemaker December of this past year.  Prior to that he had a long history of paroxysmal A. fib.  His A. fib is once again been documented on his pacemaker.  He is not presently taking a blood thinner.  In the past he had been on Xarelto and tolerated this reasonably well even before that had been on warfarin tolerated as well as distantly.    He never falls.  He has a history of some dementia but his family says that actually he is doing pretty well.  He gets around without too much help the is eating well and maintaining his body weight.  And again no recent falls.    He does not have any chest pain nausea vomiting fevers or chills syncope presyncope COVID-19 exposure and this visit today was performed remotely due to his concern over kristopher COVID-19 and he is socially  Appreciate podiatry input -> status post left fourth toe amputation on 1/31  [place] to be surgical cure per podiatry  Wound care   distancing.    Review of Systems:   Constitutional: No fevers or chills, no recent weight gain or weight loss or fatigue  Eyes: No visual loss, blurred vision, double vision, yellow sclerae.  ENT: No headaches, hearing loss, vertigo, congestion or sore throat.   Cardiovascular: Per HPI  Respiratory: No cough or wheezing, no sputum production, no hematemesis   Gastrointestinal: No abdominal pain, no nausea, vomiting, constipation, diarrhea, melena.   Genitourinary: No dysuria, hematuria or increased frequency.  Musculoskeletal:  No gait disturbance, weakness or joint pain or stiffness  Integumentary: No rashes, urticaria, ulcers or sores.   Neurological: No headache, dizziness, syncope, paralysis, ataxia, no prior CVA/TIA  Psychiatric: No anxiety, or depression  Endocrine: No diaphoresis, cold or heat intolerance. No polyuria or polydipsia.   Hematologic/Lymphatic: No anemia, abnormal bruising or bleeding. No history of DVT/PE.        Past Medical History:   Past Medical History:   Diagnosis Date   • Abnormal brain CT 6/2/2016    Overview:  Dilated lateral and 3rd ventricle. Possibly could be atrophy but cannot completely exclude hydrocephaly.   • Atrial fibrillation (CMS/HCC)    • Back pain    • Brain atrophy (CMS/HCC)    • CAD (coronary artery disease)    • Constipation    • Hyperlipidemia    • Hypersomnia    • Melanoma (CMS/HCC)     SCALP   • Memory deficit    • Neuropathy    • Radiculopathy        Past Surgical History:   Past Surgical History:   Procedure Laterality Date   • AV NODE ABLATION     • CARDIAC ELECTROPHYSIOLOGY PROCEDURE N/A 12/6/2019    Procedure: DEVICE IMPLANT;  Surgeon: Leighton Granda DO;  Location: [facility] INVASIVE LOCATION;  Service: Cardiology   • CATARACT EXTRACTION     • COLONOSCOPY      <5years, clear   • CORONARY ANGIOPLASTY WITH STENT PLACEMENT      paduca   • PACEMAKER IMPLANTATION     • PENILE PROSTHESIS IMPLANT     • SCALP/NECK TISSUE EXPANDER INSERTION N/A 9/11/2018    Procedure:  Procedure performed:     1) Excision malignant neoplasm of skin of scalp, 6 cm x 5.5 cm, subfascial    2) full-thickness skin graft closure of 6 cm x 5.5 cm    3) left selective neck dissection (level IIA and IIB)    4) right sentinel lymph node biopsy.    5) complex closure skin of right neck, 10 cm ;  Surgeon: Eric Silva MD;  Location: [facility];  Service: ENT       Family History:   Family History   Problem Relation Age of Onset   • Cancer Mother    • Hypertension Mother        Social History:   Social History     Socioeconomic History   • Marital status:      Spouse name: Not on file   • Number of children: Not on file   • Years of education: Not on file   • Highest education level: Not on file   Tobacco Use   • Smoking status: Former Smoker     Types: Cigarettes     Last attempt to quit: 1980     Years since quittin.0   • Smokeless tobacco: Never Used   Substance and Sexual Activity   • Alcohol use: No   • Drug use: No   • Sexual activity: Defer       Medications:     Current Outpatient Medications:   •  acetaminophen (TYLENOL) 325 MG tablet, Take 650 mg by mouth Every 4 (Four) Hours As Needed for Mild Pain ., Disp: , Rfl:   •  carvedilol (COREG) 12.5 MG tablet, Take 1 tablet by mouth 2 (Two) Times a Day With Meals., Disp: 180 tablet, Rfl: 2  •  cetirizine (zyrTEC) 10 MG tablet, Take 10 mg by mouth As Needed., Disp: , Rfl:   •  docusate sodium (COLACE) 100 MG capsule, Take 100 mg by mouth As Needed for Constipation., Disp: , Rfl:   •  donepezil (ARICEPT) 5 MG tablet, Take 1 tablet by mouth Daily. (Patient taking differently: Take 10 mg by mouth Daily.), Disp: 30 tablet, Rfl: 11  •  haloperidol (HALDOL) 0.5 MG tablet, As Needed., Disp: , Rfl:   •  ibuprofen (ADVIL,MOTRIN) 400 MG tablet, Take 400 mg by mouth Every 6 (Six) Hours As Needed for Mild Pain ., Disp: , Rfl:   •  memantine (NAMENDA) 10 MG tablet, Take 1 tablet by mouth Every Night. (Patient taking differently: Take 10 mg by mouth 2  "(Two) Times a Day.), Disp: , Rfl:   •  nitroglycerin (NITROSTAT) 0.4 MG SL tablet, Place 1 tablet under the tongue Every 5 (Five) Minutes As Needed for Chest Pain. Take no more than 3 doses in 15 minutes., Disp: , Rfl: 12  •  sertraline (ZOLOFT) 100 MG tablet, 100 mg Daily., Disp: , Rfl:   •  apixaban (Eliquis) 5 MG tablet tablet, Take 1 tablet by mouth Every 12 (Twelve) Hours., Disp: 180 tablet, Rfl: 3    Allergies:   No Known Allergies    Objective     Physical Exam:  Vital Signs:   Vitals:    05/22/20 1401   BP: 130/71   Pulse: 72   Temp: 97.9 °F (36.6 °C)   SpO2: 96%   Weight: 83.9 kg (185 lb)   Height: 188 cm (74\")       NEURO: No focal deficits, alert and oriented x 3  PSYCHIATRIC: Normal affect and mood, appropriate use of semantics and logic.    Lab Results   Component Value Date    GLUCOSE 102 (H) 12/09/2019    CALCIUM 9.7 12/09/2019     12/09/2019    K 4.5 12/09/2019    CO2 20.0 (L) 12/09/2019     (H) 12/09/2019    BUN 40 (H) 12/09/2019    CREATININE 1.13 12/09/2019    EGFRIFNONA 61 12/09/2019    BCR 35.4 (H) 12/09/2019    ANIONGAP 14.0 12/09/2019     Lab Results   Component Value Date    WBC 9.54 12/07/2019    HGB 14.3 12/07/2019    HCT 46.4 12/07/2019    .9 (H) 12/07/2019     (L) 12/07/2019     Lab Results   Component Value Date    INR 1.13 12/06/2019    INR 1.03 09/02/2018    PROTIME 14.0 12/06/2019    PROTIME 13.8 09/02/2018     Lab Results   Component Value Date    TSH 2.030 12/05/2019       Cardiac Testing:      I personally viewed and interpreted the patient's EKG/Telemetry/lab data    Procedures    Tobacco Cessation: N/A  Obstructive Sleep Apnea Screening: N/A    Assessment & Plan      88-year-old gentleman at the pleasure seeing a telephone visit today.  His pacemaker interrogation reveals normal device function.  He has paroxysmal A. fib which is asymptomatic.  His chads Vascor is elevated.  He is a good candidate for anticoagulation.  I reviewed the options of warfarin " versus Xarelto versus apixaban.  We will go with apixaban given the additional leeway with regard to renal function and is 88-year-old gentleman.  His creatinine is less than 1.5 at his body mass is greater than 60 kg.  Therefore the correct dose for this gentleman is 5 mg twice daily of apixaban.    His pacemaker is probably program.    We will follow-up with him in person in 6 months time.    There are no diagnoses linked to this encounter.      Follow Up:       Thank you for allowing me to participate in the care of your patient. Please to not hesitate to contact me with additional questions or concerns.        Leighton Granda, DO, FACC, [facility]  Cardiac Electrophysiologist

## 2024-02-12 NOTE — CASE MANAGEMENT
Case Management Progress Note    Patient name Nadir Myers  Location [facility] 902/[facility] 902-01 MRN [id number]  : 1960 Date 2024       LOS (days): 12  Geometric Mean LOS (GMLOS) (days): 9.9  Days to GMLOS:-1.9        OBJECTIVE:        Current admission status: Inpatient  Preferred Pharmacy:   [id number] [place], [place] - [address]  [address] [address]  Phone: [phone] Fax: [phone]    [facility]  [place], [place] - [address]  [address]  [place] [address]  Phone: [phone] Fax: [phone]    Primary Care Provider: Tigre Hare DO    Primary Insurance: [employer] [facility] REP  Secondary Insurance:     PROGRESS NOTE: CM completed chart review, pt not medically cleared for discharge at this time.  CM will continue to follow.

## 2024-02-12 NOTE — CONSULTS
Consultation - Cardiothoracic Surgery   Nadir Myers 63 y.o. male MRN: [id number]  Unit/Bed#: [facility] 902-01 Encounter: [id number]    Physician Requesting Consult: Robyn Feldman DO    Reason for Consult / Principal Problem: Lead extraction, need for ICD impant    Inpatient consult to Cardiothoracic Surgery  Consult performed by: Francia Tang PA-C  Consult ordered by: Camelia Zavaleta PA-C      Inpatient consult to Cardiothoracic Surgery  Consult performed by: Francia Tang PA-C  Consult ordered by: Arturo Wooten DO        History of Present Illness: Nadir Myers is a 63 y.o. male with a PMH of DMII, Obesity, CAD s/p HANNA Cx 2015, HFrEF 40%, Vtach s/p AICD, GERD, PAD s/p balloon angioplasty who was recently admitted in January for left foot cellulitis. Wound culture grew MSSA and he was treated with 10 days of antibiotics. LEADs noted PAD on left and he underwent angiogram on 1/18. MRI showed low suspicion for osteomyelitis. He was discharged 1/21. He was brought in 1/31 via EMS for stroke symptoms. He had left UE weakness and facial droop. His chronic foot wound appeared worse. HE was hypotensive and started on pressors and admitted to the ICU. Podiatry was consulted and he underwent toe amputation at [facility] for gangrene of 4th toe on left foot. Blood cultures on 1/31 grew MSSA and repeat blood cultures 2/3 again grew MSSA. ID was consulted for Abx management. He then underwent GILL which showed mobile vegetation on the atrial lead. And underwent Lead extraction by EP on 2/8. Once his BC are clear, we plan to place Medtronic EV ICD with EP.     Upon examination today, he reports feeling ok. Denies CP, palpitations, SOB, LINDSEY, LE edema b/l, orthopnea, PND, numbness/tinlging/paresthesias in UE or LE bilaterally, HA, lightheadeness, dizziness, presyncopal symptoms, hx syncopal events, N/V/D, hemoptysis, hematemesis, hematochezia, melena.     Denies ETOH, or drug use. Smokes 2 cigarettes a day.  Allergies include Vanco      Past Medical History:  Past Medical History:   Diagnosis Date    Diabetes mellitus (HCC)     Myocardial infarction (HCC)          Past Surgical History:   Past Surgical History:   Procedure Laterality Date    CARDIAC CATHETERIZATION N/A 05/03/2023    Procedure: Cardiac Coronary Angiogram;  Surgeon: Valeriy Shore MD;  Location: [facility];  Service: Cardiology    CARDIAC CATHETERIZATION Left 05/03/2023    Procedure: Cardiac Left Heart Cath;  Surgeon: Valeriy Shore MD;  Location: [facility];  Service: Cardiology    CARDIAC DEFIBRILLATOR PLACEMENT  05/2023    CARDIAC ELECTROPHYSIOLOGY PROCEDURE N/A 05/12/2023    Procedure: Cardiac icd implant;  Surgeon: Urbano Maria MD;  Location: [facility];  Service: Cardiology    IR LOWER EXTREMITY ANGIOGRAM  1/18/2024    WOUND DEBRIDEMENT Left 2/4/2024    Procedure: LEFT FOURTH TOE AMPUTATION SURGICAL WOUND DEBRIDEMENT FOOT/TOE (WASH OUT);  Surgeon: Bebo Hopper DPM;  Location: [facility]IN [facility];  Service: Podiatry         Family History:  History reviewed. No pertinent family history.      Social History:  Social History     Substance and Sexual Activity   Alcohol Use Not Currently     Social History     Substance and Sexual Activity   Drug Use Never     Social History     Tobacco Use   Smoking Status Every Day    Types: Cigarettes   Smokeless Tobacco Never   Tobacco Comments    X2 cigarettes/day     Marital Status: /Civil Union      Home Medications:   Prior to Admission medications    Medication Sig Start Date End Date Taking? Authorizing Provider   aspirin 81 MG tablet Take 1 tablet by mouth daily 2/27/15  Yes Historical Provider, MD   BD Pen Needle Micro U/F 32G X 6 MM MISC USE 1 PEN EACH DAILY 5/23/23  Yes Historical Provider, MD   clopidogrel (PLAVIX) 75 mg tablet Take 1 tablet (75 mg total) by mouth daily 1/21/24  Yes CARLA Rivera   insulin detemir (Levemir FlexPen) 100 Units/mL injection pen Inject 20  Units under the skin daily Patient to take 30 units daily - if fasting bs >150 for 3 days to increase dose by 5 units. Once bs < 150 can go back to 30 units   Yes Historical Provider, MD GRIER -1000 MG TB24 daily  6/11/18  Yes Historical Provider, MD   losartan (COZAAR) 25 mg tablet Take 0.5 tablets (12.5 mg total) by mouth daily 1/21/24  Yes CARLA Rivera   metFORMIN (GLUCOPHAGE) 1000 MG tablet Take 1,000 mg by mouth daily with breakfast 3/9/22  Yes Historical Provider, MD   spironolactone (ALDACTONE) 25 mg tablet Take 0.5 tablets (12.5 mg total) by mouth daily Start on 7/5/23 per Quyen Hinds PA-C 7/6/23  Yes Nash Samuels, DO   amiodarone 200 mg tablet TAKE 1 TABLET BY MOUTH EVERY DAY WITH BREAKFAST 2/5/24   Nash Samuels, DO   atorvastatin (LIPITOR) 80 mg tablet TAKE 1 TABLET (80 MG TOTAL) BY MOUTH DAILY AFTER DINNER 2/5/24   Nash Samuels, DO   metoprolol succinate (TOPROL-XL) 25 mg 24 hr tablet TAKE 1 TABLET (25 MG TOTAL) BY MOUTH EVERY 12 (TWELVE) HOURS 2/5/24   Nash Samuels, DO   glimepiride (AMARYL) 4 mg tablet Take 1 tablet by mouth daily NOT TAKING PER PCP  Patient not taking: Reported on 5/22/2023 2/27/15 6/2/23  Historical Provider, MD       Inpatient Medications:  Scheduled Meds:   Current Facility-Administered Medications   Medication Dose Route Frequency Provider Last Rate    acetaminophen  975 mg Oral Q6H PRN Robyn Feldman, DO      amiodarone  200 mg Oral Daily With Breakfast Robyn Feldman, DO      aspirin  81 mg Oral Daily Robyn Felmdan, DO      atorvastatin  80 mg Oral After Dinner Robyn Fledman, DO      cefazolin  2,000 mg Intravenous Q6H Robyn Feldman, DO 2,000 mg (02/12/24 0503)    clopidogrel  75 mg Oral Daily Montanaksavi Feldman, DO      enoxaparin  40 mg Subcutaneous Q24H [facility] Robyn Feldman, DO      insulin glargine  50 Units Subcutaneous HS Robyn Feldman, DO      insulin lispro  1-5 Units Subcutaneous 4x Daily (AC & HS) Robyn Feldman DO       insulin lispro  9 Units Subcutaneous TID With Meals Harnishkumar Feldman, DO      metoprolol  5 mg Intravenous Q6H PRN Soledad NancyomerDENNIS      metoprolol succinate  25 mg Oral Q12H Harnishkumar Feldman, DO      polyethylene glycol  17 g Oral Daily PRN Harnishkumar Feldman, DO      saccharomyces boulardii  250 mg Oral BID Harnishkumar Feldman, DO      senna-docusate sodium  2 tablet Oral BID PRN Harnishkumar Feldman, DO       Continuous Infusions:    PRN Meds:  acetaminophen, 975 mg, Q6H PRN  metoprolol, 5 mg, Q6H PRN  polyethylene glycol, 17 g, Daily PRN  senna-docusate sodium, 2 tablet, BID PRN        Allergies:  Allergies   Allergen Reactions    Vancomycin Rash     NOT AN ALLERGY - 1/31/24 patient experienced vancomycin infusion reaction, please extend duration of infusion time to prevent future infusion reactions       Review of Systems:  Review of Systems   Constitutional:  Negative for chills and fever.   HENT:  Negative for ear pain and sore throat.    Eyes:  Negative for pain and visual disturbance.   Respiratory:  Negative for cough and shortness of breath.    Cardiovascular:  Negative for chest pain and palpitations.   Gastrointestinal:  Negative for abdominal pain and vomiting.   Genitourinary:  Negative for dysuria and hematuria.   Musculoskeletal:  Negative for arthralgias and back pain.   Skin:  Negative for color change and rash.   Neurological:  Negative for seizures and syncope.   All other systems reviewed and are negative.      Vital Signs:     Vitals:    02/12/24 0315 02/12/24 0503 02/12/24 0503 02/12/24 0723   BP: 139/82 141/83 141/83 140/84   Pulse: 78 78 78 76   Resp: 18   18   Temp: 98.5 °F (36.9 °C)   (!) 97.4 °F (36.3 °C)   TempSrc:       SpO2: 94%  94% 93%   Weight:       Height:         Invasive Devices       Peripheral Intravenous Line  Duration             Peripheral IV 02/08/24 Distal;Left;Upper;Ventral (anterior) Antecubital 4 days                    Physical Exam:  Physical Exam  Vitals  reviewed.   Constitutional:       General: He is not in acute distress.     Appearance: Normal appearance. He is obese.   HENT:      Head: Normocephalic and atraumatic.      Mouth/Throat:      Mouth: Mucous membranes are moist.      Pharynx: Oropharynx is clear.   Eyes:      Extraocular Movements: Extraocular movements intact.      Conjunctiva/sclera: Conjunctivae normal.      Pupils: Pupils are equal, round, and reactive to light.   Cardiovascular:      Rate and Rhythm: Normal rate and regular rhythm.      Heart sounds: Normal heart sounds. No murmur heard.  Pulmonary:      Effort: Pulmonary effort is normal.      Breath sounds: Normal breath sounds.   Abdominal:      General: Abdomen is flat. Bowel sounds are normal.      Palpations: Abdomen is soft.   Musculoskeletal:         General: No swelling. Normal range of motion.      Cervical back: Normal range of motion and neck supple.      Right lower leg: Edema present.      Left lower leg: Edema present.   Skin:     General: Skin is warm and dry.   Neurological:      General: No focal deficit present.      Mental Status: He is alert and oriented to person, place, and time.   Psychiatric:         Mood and Affect: Mood normal.         Behavior: Behavior normal.         Lab Results:     Results from last 7 days   Lab Units 02/11/24  0531 02/10/24  0745 02/09/24  0526   WBC Thousand/uL 16.24* 20.09* 21.13*   HEMOGLOBIN g/dL 11.0* 11.2* 11.8*   HEMATOCRIT % 34.5* 34.6* 37.4   PLATELETS Thousands/uL 236 239 239     Results from last 7 days   Lab Units 02/11/24  0531 02/10/24  0554 02/09/24  0526   POTASSIUM mmol/L 4.1 4.3 4.3   CHLORIDE mmol/L 98 99 100   CO2 mmol/L 28 24 26   BUN mg/dL 19 15 10   CREATININE mg/dL 1.06 0.78 0.66   CALCIUM mg/dL 8.2* 8.4 8.2*         Lab Results   Component Value Date    HGBA1C 9.8 (H) 01/31/2024     Lab Results   Component Value Date    TROPONINI 2.79 (H) 02/16/2015       Imaging Studies:     CT CAP:  IMPRESSION:     Mild multifocal  pneumonia. Given smoking history, follow-up chest CT is advised in 3 months to confirm resolution. Enhancing consolidation with central groundglass in the periphery of the right lower lobe may be a combination of atelectasis and   pneumonia; there is no evident of pulmonary embolism (though the study was not tailored to assess the pulmonary arteries) to suggest that this is an infarct.     CXR:  IMPRESSION:     No acute cardiopulmonary disease.    I have personally reviewed pertinent reports.   and I have personally reviewed pertinent films in PACS    Assessment:  Principal Problem:    Septic shock ([facility])  Active Problems:    Type 2 diabetes mellitus, with long-term current use of insulin ([facility])    Hyponatremia    Chronic systolic CHF ([facility])    PAD (peripheral artery disease) ([facility])    LUE weakness    MSSA bacteremia    Gangrene of toe of left foot ([facility])    Electrolyte abnormalities    Abnormal CT of the chest    Septic Shock  MSSA Bacteremia  S/P lead extraction/ICD removal, need for reimplant of ICD  Gangrene of toe left foot  PAD      Plan:  Risks and benefits of Medtronic EV ICD were discussed in detail today with the patient.  They understand and wish to proceed with further workup and ultimately surgical intervention. They will be scheduled for surgery 2/14 with Dr. Abhilash Ferrera M.D. and Dr Wooten of     Nadir Myers was comfortable with our recommendations, and their questions were answered to their satisfaction.  We will continue to evaluate the patient daily with further recommendations as work up is completed.  Thank you for allowing us to participate in the care of this patient.     SIGNATURE: Francia Tang PA-C  DATE: February 12, 2024  TIME: 10:51 AM    * This note was completed in part utilizing [employer]ok[employer] direct voice recognition software.   Grammatical errors, random word insertion, spelling mistakes, and incomplete sentences may be an occasional consequence of the system secondary to  software limitations, ambient noise and hardware issues. At the time of dictation, efforts were made to edit, clarify and /or correct errors. Please read the chart carefully and recognize, using context, where substitutions have occurred.  If you have any questions or concerns about the context, text or information contained within the body of this dictation, please contact myself, the provider, for further clarification.

## 2024-02-12 NOTE — PLAN OF CARE
Problem: METABOLIC, FLUID AND ELECTROLYTES - ADULT  Goal: Electrolytes maintained within normal limits  Description: INTERVENTIONS:  - Monitor labs and assess patient for signs and symptoms of electrolyte imbalances  - Administer electrolyte replacement as ordered  - Monitor response to electrolyte replacements, including repeat lab results as appropriate  - Instruct patient on fluid and nutrition as appropriate  Outcome: Progressing  Goal: Fluid balance maintained  Description: INTERVENTIONS:  - Monitor labs   - Monitor I/O and WT  - Instruct patient on fluid and nutrition as appropriate  - Assess for signs & symptoms of volume excess or deficit  Outcome: Progressing  Goal: Glucose maintained within target range  Description: INTERVENTIONS:  - Monitor Blood Glucose as ordered  - Assess for signs and symptoms of hyperglycemia and hypoglycemia  - Administer ordered medications to maintain glucose within target range  - Assess nutritional intake and initiate nutrition service referral as needed  Outcome: Progressing     Problem: Prexisting or High Potential for Compromised Skin Integrity  Goal: Skin integrity is maintained or improved  Description: INTERVENTIONS:  - Identify patients at risk for skin breakdown  - Assess and monitor skin integrity  - Assess and monitor nutrition and hydration status  - Monitor labs   - Assess for incontinence   - Turn and reposition patient  - Assist with mobility/ambulation  - Relieve pressure over bony prominences  - Avoid friction and shearing  - Provide appropriate hygiene as needed including keeping skin clean and dry  - Evaluate need for skin moisturizer/barrier cream  - Collaborate with interdisciplinary team   - Patient/family teaching  - Consider wound care consult   Outcome: Progressing     Problem: PAIN - ADULT  Goal: Verbalizes/displays adequate comfort level or baseline comfort level  Description: Interventions:  - Encourage patient to monitor pain and request  assistance  - Assess pain using appropriate pain scale  - Administer analgesics based on type and severity of pain and evaluate response  - Implement non-pharmacological measures as appropriate and evaluate response  - Consider cultural and social influences on pain and pain management  - Notify physician/advanced practitioner if interventions unsuccessful or patient reports new pain  Outcome: Progressing     Problem: INFECTION - ADULT  Goal: Absence or prevention of progression during hospitalization  Description: INTERVENTIONS:  - Assess and monitor for signs and symptoms of infection  - Monitor lab/diagnostic results  - Monitor all insertion sites, i.e. indwelling lines, tubes, and drains  - Monitor endotracheal if appropriate and nasal secretions for changes in amount and color  - [place] appropriate cooling/warming therapies per order  - Administer medications as ordered  - Instruct and encourage patient and family to use good hand hygiene technique  - Identify and instruct in appropriate isolation precautions for identified infection/condition  Outcome: Progressing  Goal: Absence of fever/infection during neutropenic period  Description: INTERVENTIONS:  - Monitor WBC    Outcome: Progressing     Problem: SAFETY ADULT  Goal: Patient will remain free of falls  Description: INTERVENTIONS:  - Educate patient/family on patient safety including physical limitations  - Instruct patient to call for assistance with activity   - Consult OT/PT to assist with strengthening/mobility   - Keep Call bell within reach  - Keep bed low and locked with side rails adjusted as appropriate  - Keep care items and personal belongings within reach  - Initiate and maintain comfort rounds  - Make Fall Risk Sign visible to staff  - Apply yellow socks and bracelet for high fall risk patients  - Consider moving patient to room near nurses station  Outcome: Progressing  Goal: Maintain or return to baseline ADL function  Description:  INTERVENTIONS:  -  Assess patient's ability to carry out ADLs; assess patient's baseline for ADL function and identify physical deficits which impact ability to perform ADLs (bathing, care of mouth/teeth, toileting, grooming, dressing, etc.)  - Assess/evaluate cause of self-care deficits   - Assess range of motion  - Assess patient's mobility; develop plan if impaired  - Assess patient's need for assistive devices and provide as appropriate  - Encourage maximum independence but intervene and supervise when necessary  - Involve family in performance of ADLs  - Assess for home care needs following discharge   - Consider OT consult to assist with ADL evaluation and planning for discharge  - Provide patient education as appropriate  Outcome: Progressing  Goal: Maintains/Returns to pre admission functional level  Description: INTERVENTIONS:  - Perform AM-PAC 6 Click Basic Mobility/ Daily Activity assessment daily.  - Set and communicate daily mobility goal to care team and patient/family/caregiver.   - Collaborate with rehabilitation services on mobility goals if consulted  - Record patient progress and toleration of activity level   Outcome: Progressing     Problem: DISCHARGE PLANNING  Goal: Discharge to home or other facility with appropriate resources  Description: INTERVENTIONS:  - Identify barriers to discharge w/patient and caregiver  - Arrange for needed discharge resources and transportation as appropriate  - Identify discharge learning needs (meds, wound care, etc.)  - Arrange for interpretive services to assist at discharge as needed  - Refer to Case Management Department for coordinating discharge planning if the patient needs post-hospital services based on physician/advanced practitioner order or complex needs related to functional status, cognitive ability, or social support system  Outcome: Progressing     Problem: Knowledge Deficit  Goal: Patient/family/caregiver demonstrates understanding of disease  process, treatment plan, medications, and discharge instructions  Description: Complete learning assessment and assess knowledge base.  Interventions:  - Provide teaching at level of understanding  - Provide teaching via preferred learning methods  Outcome: Progressing

## 2024-02-13 LAB
BACTERIA BLD CULT: NORMAL
GLUCOSE SERPL-MCNC: 116 MG/DL (ref 65–140)
GLUCOSE SERPL-MCNC: 172 MG/DL (ref 65–140)
GLUCOSE SERPL-MCNC: 230 MG/DL (ref 65–140)
GLUCOSE SERPL-MCNC: 82 MG/DL (ref 65–140)

## 2024-02-13 PROCEDURE — 99232 SBSQ HOSP IP/OBS MODERATE 35: CPT | Performed by: INTERNAL MEDICINE

## 2024-02-13 PROCEDURE — 82948 REAGENT STRIP/BLOOD GLUCOSE: CPT

## 2024-02-13 PROCEDURE — 97116 GAIT TRAINING THERAPY: CPT

## 2024-02-13 PROCEDURE — 99233 SBSQ HOSP IP/OBS HIGH 50: CPT | Performed by: INTERNAL MEDICINE

## 2024-02-13 RX ADMIN — Medication 250 MG: at 08:00

## 2024-02-13 RX ADMIN — INSULIN LISPRO 1 UNITS: 100 INJECTION, SOLUTION INTRAVENOUS; SUBCUTANEOUS at 17:03

## 2024-02-13 RX ADMIN — ACETAMINOPHEN 975 MG: 325 TABLET, FILM COATED ORAL at 23:29

## 2024-02-13 RX ADMIN — CEFAZOLIN SODIUM 2000 MG: 2 SOLUTION INTRAVENOUS at 17:03

## 2024-02-13 RX ADMIN — SENNOSIDES, DOCUSATE SODIUM 2 TABLET: 8.6; 5 TABLET ORAL at 08:04

## 2024-02-13 RX ADMIN — INSULIN LISPRO 9 UNITS: 100 INJECTION, SOLUTION INTRAVENOUS; SUBCUTANEOUS at 17:03

## 2024-02-13 RX ADMIN — AMIODARONE HYDROCHLORIDE 200 MG: 200 TABLET ORAL at 08:00

## 2024-02-13 RX ADMIN — ATORVASTATIN CALCIUM 80 MG: 80 TABLET, FILM COATED ORAL at 17:04

## 2024-02-13 RX ADMIN — INSULIN GLARGINE 50 UNITS: 100 INJECTION, SOLUTION SUBCUTANEOUS at 22:43

## 2024-02-13 RX ADMIN — ASPIRIN 81 MG CHEWABLE TABLET 81 MG: 81 TABLET CHEWABLE at 08:00

## 2024-02-13 RX ADMIN — INSULIN LISPRO 9 UNITS: 100 INJECTION, SOLUTION INTRAVENOUS; SUBCUTANEOUS at 12:03

## 2024-02-13 RX ADMIN — POLYETHYLENE GLYCOL 3350 17 G: 17 POWDER, FOR SOLUTION ORAL at 08:04

## 2024-02-13 RX ADMIN — CEFAZOLIN SODIUM 2000 MG: 2 SOLUTION INTRAVENOUS at 12:03

## 2024-02-13 RX ADMIN — CEFAZOLIN SODIUM 2000 MG: 2 SOLUTION INTRAVENOUS at 05:28

## 2024-02-13 RX ADMIN — ENOXAPARIN SODIUM 40 MG: 40 INJECTION SUBCUTANEOUS at 08:00

## 2024-02-13 RX ADMIN — CEFAZOLIN SODIUM 2000 MG: 2 SOLUTION INTRAVENOUS at 23:29

## 2024-02-13 RX ADMIN — METOPROLOL SUCCINATE 25 MG: 25 TABLET, EXTENDED RELEASE ORAL at 05:28

## 2024-02-13 RX ADMIN — METOPROLOL SUCCINATE 25 MG: 25 TABLET, EXTENDED RELEASE ORAL at 17:04

## 2024-02-13 RX ADMIN — Medication 250 MG: at 17:04

## 2024-02-13 RX ADMIN — CLOPIDOGREL BISULFATE 75 MG: 75 TABLET ORAL at 08:00

## 2024-02-13 RX ADMIN — INSULIN LISPRO 2 UNITS: 100 INJECTION, SOLUTION INTRAVENOUS; SUBCUTANEOUS at 12:03

## 2024-02-13 NOTE — PROGRESS NOTES
Progress Note - Infectious Disease   Nadir Myers 63 y.o. male MRN: [id number]  Unit/Bed#: [facility] 902-01 Encounter: [id number]      Impression/Plan:    Sepsis, present on arrival; fever, leukocytosis  -Source is bacteremia with ICD infection and patients foot infection as below. No other clear source. Now with intermittent fevers since 2/10.  -Antibiotic as below  -Follow up repeat blood cultures 2/12  -If has more fever, OR if blood cultures from 2/09 end up being Staph aureus, then would repeat CT C/A/P with IV contrast  -Trend fever curve  -Repeat CBC + diff tomorrow AM     Persistent MSSA bacteremia with ICD infection  -Likely initial source was patients foot wound as below with subsequent ICD infection. Blood cultures positive on 1/31-2/08 despite being on Cefazolin. GILL 2/07 did show a mobile mass on device lead in right atrium, concerning for infection. No valve vegetations. CT scans reviewed, no abscess. However consider if pulmonary findings may be embolic. ICD extracted on 2/08 with lead culture that grew MSSA. Now 1 of 2 blood cultures from 2/09 with GPCs.   -Continue IV Cefazolin 2g every 6 hours  -Follow up blood culture 2/09;  -If is a coagulase negative Staph, favor contaminate  -If is Staph aureus again, then would recommend repeat CT C/A/P with IV contrast to rule out persistent source  -Do not place PICC until we know species ID from 2/09 blood culture  -Discussed with EP and Cardiac surgery, will need to ensure blood cultures are negative post device extraction before reimplanting device  -Follow up blood cultures 2/12  -Will need 6 weeks of IV antibiotic from first negative blood cultures and device removal     3. Abnormal CT chest  -CT 2/07 noted small consolidations in upper lobes and left lower lobe, with enhancing lesion in perieprhal of right lower lobe. Consider if may be embolic lesions vs. Hematogenous seeding from high grade bacteremia.  -Plan for prolonged course of antibiotic as  above  -Monitor for any development of respiratory symptoms  -If has more fever, or blood culture  is Staph aureus, then will repeat CT chest as above  -Otherwise would repeat CT chest as outpatient in 2-3 months to ensure resolution    4. 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 Culture polymicrobial of tissue with MSSA, Finegoldia and Anaerococccus. Likely source of patient's bacteremia. Surgical cure felt to be obtained per Podiatry. Per follow up on , amputation site stable with intact sutures and no evidence of dehiscence or ischemia.   -Ongoing local wound care per Podiatry.     5. Staph epidermidis in 1 of 2 blood cultures  -1 set from  with Staph epi, did not grow in prior blood cultures from  or subsequent blood cultures. Suspect this was skin contaminant and as above MSSA is true pathogen.  -No further workup at this time    Plan and recommendations were discussed with primary team. They agree with continuing IV Cefazolin, follow up blood cultures from  and .    Antibiotics:  Cefazolin    Subjective:  Denies fever, chills, cough, SOB, diarrhea, back pain, joint pain. Has some intermittent mid abdominal pain over last 48 hours, thinks related to constipation. Some pain at ICD extraction site.     Objective:  Vitals:  Temp:  [98 °F (36.7 °C)-100.7 °F (38.2 °C)] 98 °F (36.7 °C)  HR:  [76-92] 76  Resp:  [17-20] 20  BP: (131-156)/(68-98) 153/94  SpO2:  [91 %-94 %] 94 %  Temp (24hrs), Av.3 °F (37.4 °C), Min:98 °F (36.7 °C), Max:100.7 °F (38.2 °C)  Current: Temperature: 98 °F (36.7 °C)    Physical Exam:   General Appearance:  Alert, interactive, nontoxic, no acute distress.   Throat: Oropharynx moist without lesions.    Lungs:   Clear to auscultation bilaterally; no wheezes, rhonchi or rales; respirations unlabored   Heart:  RRR   Abdomen:   Soft, non-tender     Extremities: Left foot with dressing intact   Skin: No new rashes or lesions. Chest wall bandage at  ICD extraction site intact       Labs:   All pertinent labs and imaging studies were personally reviewed  Results from last 7 days   Lab Units 02/11/24  0531 02/10/24  0745 02/09/24  0526   WBC Thousand/uL 16.24* 20.09* 21.13*   HEMOGLOBIN g/dL 11.0* 11.2* 11.8*   PLATELETS Thousands/uL 236 239 239     Results from last 7 days   Lab Units 02/11/24  0531 02/10/24  0554 02/09/24  0526   SODIUM mmol/L 134* 129* 134*   POTASSIUM mmol/L 4.1 4.3 4.3   CHLORIDE mmol/L 98 99 100   CO2 mmol/L 28 24 26   BUN mg/dL 19 15 10   CREATININE mg/dL 1.06 0.78 0.66   EGFR ml/min/1.73sq m 74 96 103   CALCIUM mg/dL 8.2* 8.4 8.2*                         Micro:  Results from last 7 days   Lab Units 02/12/24  1632 02/09/24  1007 02/09/24  0523 02/08/24  1025 02/08/24  0640 02/06/24  1221   BLOOD CULTURE  Received in Microbiology Lab. Culture in Progress.  Received in Microbiology Lab. Culture in Progress. No Growth at 72 hrs.  --  Staphylococcus aureus* No Growth After 4 Days. Staphylococcus aureus*  Staphylococcus aureus*   GRAM STAIN RESULT   --   --  Gram positive cocci in clusters* Gram positive cocci in clusters*  --  Gram positive cocci in clusters*  Gram positive cocci in clusters*       Imaging:          Zach Medina MD  [facility]

## 2024-02-13 NOTE — PLAN OF CARE
Problem: PHYSICAL THERAPY ADULT  Goal: Performs mobility at highest level of function for planned discharge setting.  See evaluation for individualized goals.  Description: Treatment/Interventions: Functional transfer training, LE strengthening/ROM, Elevations, Therapeutic exercise, Endurance training, Patient/family training, Equipment eval/education, Bed mobility, Spoke to nursing, Gait training, OT, Spoke to case management  Equipment Recommended: Wheelchair, Walker       See flowsheet documentation for full assessment, interventions and recommendations.  Outcome: Progressing  Note: Prognosis: Good  Problem List: Decreased strength, Decreased endurance, Impaired balance, Decreased mobility, Pain  Assessment: Pt seen for PT treatment session w/ focus on gait training + stair training. Explained technique to ambulate w/ toe-offloading shoe and to avoid rocking forward onto toes. Pt demonstrated good ability to maintain weight on heel in toe-offloading shoe, although there were 2 instances w/ rocking forward near the end of gait training w/ fatigue. Pt able to complete stairs w/ MinAx1 and was able to maintain heel weight-bearing. Educated pt to have someone assist him on the stairs + pt confirmed that he can have someone do this. Continue to recommend HHPT for d/c.  Barriers to Discharge: Inaccessible home environment     Rehab Resource Intensity Level, PT: III (Minimum Resource Intensity)    See flowsheet documentation for full assessment.

## 2024-02-13 NOTE — ASSESSMENT & PLAN NOTE
Appreciate podiatry input -> status post left fourth toe amputation on 1/31  [place] to be surgical cure per podiatry  Wound care

## 2024-02-13 NOTE — PLAN OF CARE
Problem: METABOLIC, FLUID AND ELECTROLYTES - ADULT  Goal: Electrolytes maintained within normal limits  Description: INTERVENTIONS:  - Monitor labs and assess patient for signs and symptoms of electrolyte imbalances  - Administer electrolyte replacement as ordered  - Monitor response to electrolyte replacements, including repeat lab results as appropriate  - Instruct patient on fluid and nutrition as appropriate  Outcome: Progressing  Goal: Fluid balance maintained  Description: INTERVENTIONS:  - Monitor labs   - Monitor I/O and WT  - Instruct patient on fluid and nutrition as appropriate  - Assess for signs & symptoms of volume excess or deficit  Outcome: Progressing  Goal: Glucose maintained within target range  Description: INTERVENTIONS:  - Monitor Blood Glucose as ordered  - Assess for signs and symptoms of hyperglycemia and hypoglycemia  - Administer ordered medications to maintain glucose within target range  - Assess nutritional intake and initiate nutrition service referral as needed  Outcome: Progressing     Problem: Prexisting or High Potential for Compromised Skin Integrity  Goal: Skin integrity is maintained or improved  Description: INTERVENTIONS:  - Identify patients at risk for skin breakdown  - Assess and monitor skin integrity  - Assess and monitor nutrition and hydration status  - Monitor labs   - Assess for incontinence   - Turn and reposition patient  - Assist with mobility/ambulation  - Relieve pressure over bony prominences  - Avoid friction and shearing  - Provide appropriate hygiene as needed including keeping skin clean and dry  - Evaluate need for skin moisturizer/barrier cream  - Collaborate with interdisciplinary team   - Patient/family teaching  - Consider wound care consult   Outcome: Progressing     Problem: PAIN - ADULT  Goal: Verbalizes/displays adequate comfort level or baseline comfort level  Description: Interventions:  - Encourage patient to monitor pain and request  assistance  - Assess pain using appropriate pain scale  - Administer analgesics based on type and severity of pain and evaluate response  - Implement non-pharmacological measures as appropriate and evaluate response  - Consider cultural and social influences on pain and pain management  - Notify physician/advanced practitioner if interventions unsuccessful or patient reports new pain  Outcome: Progressing     Problem: INFECTION - ADULT  Goal: Absence or prevention of progression during hospitalization  Description: INTERVENTIONS:  - Assess and monitor for signs and symptoms of infection  - Monitor lab/diagnostic results  - Monitor all insertion sites, i.e. indwelling lines, tubes, and drains  - Monitor endotracheal if appropriate and nasal secretions for changes in amount and color  - [place] appropriate cooling/warming therapies per order  - Administer medications as ordered  - Instruct and encourage patient and family to use good hand hygiene technique  - Identify and instruct in appropriate isolation precautions for identified infection/condition  Outcome: Progressing  Goal: Absence of fever/infection during neutropenic period  Description: INTERVENTIONS:  - Monitor WBC    Outcome: Progressing     Problem: SAFETY ADULT  Goal: Patient will remain free of falls  Description: INTERVENTIONS:  - Educate patient/family on patient safety including physical limitations  - Instruct patient to call for assistance with activity   - Consult OT/PT to assist with strengthening/mobility   - Keep Call bell within reach  - Keep bed low and locked with side rails adjusted as appropriate  - Keep care items and personal belongings within reach  - Initiate and maintain comfort rounds  - Make Fall Risk Sign visible to staff  - Apply yellow socks and bracelet for high fall risk patients  - Consider moving patient to room near nurses station  Outcome: Progressing  Goal: Maintain or return to baseline ADL function  Description:  INTERVENTIONS:  -  Assess patient's ability to carry out ADLs; assess patient's baseline for ADL function and identify physical deficits which impact ability to perform ADLs (bathing, care of mouth/teeth, toileting, grooming, dressing, etc.)  - Assess/evaluate cause of self-care deficits   - Assess range of motion  - Assess patient's mobility; develop plan if impaired  - Assess patient's need for assistive devices and provide as appropriate  - Encourage maximum independence but intervene and supervise when necessary  - Involve family in performance of ADLs  - Assess for home care needs following discharge   - Consider OT consult to assist with ADL evaluation and planning for discharge  - Provide patient education as appropriate  Outcome: Progressing  Goal: Maintains/Returns to pre admission functional level  Description: INTERVENTIONS:  - Perform AM-PAC 6 Click Basic Mobility/ Daily Activity assessment daily.  - Set and communicate daily mobility goal to care team and patient/family/caregiver.   - Collaborate with rehabilitation services on mobility goals if consulted  - Record patient progress and toleration of activity level   Outcome: Progressing     Problem: DISCHARGE PLANNING  Goal: Discharge to home or other facility with appropriate resources  Description: INTERVENTIONS:  - Identify barriers to discharge w/patient and caregiver  - Arrange for needed discharge resources and transportation as appropriate  - Identify discharge learning needs (meds, wound care, etc.)  - Arrange for interpretive services to assist at discharge as needed  - Refer to Case Management Department for coordinating discharge planning if the patient needs post-hospital services based on physician/advanced practitioner order or complex needs related to functional status, cognitive ability, or social support system  Outcome: Progressing     Problem: Knowledge Deficit  Goal: Patient/family/caregiver demonstrates understanding of disease  process, treatment plan, medications, and discharge instructions  Description: Complete learning assessment and assess knowledge base.  Interventions:  - Provide teaching at level of understanding  - Provide teaching via preferred learning methods  Outcome: Progressing

## 2024-02-13 NOTE — NURSING NOTE
[facility] consult received for P[facility] placement. However, chart review notes that pt has been febrile and has blood cultures redrawn 2/12. Blood cultures from 2/9 positive. Discussed with ID, Dr Medina, who requests that PICC not be placed at this time. Updated primary team, Dr Villalpando, via TT who is aware. Will complete consult at this time. Please place new consult once pt is cleared for PICC placement.

## 2024-02-13 NOTE — PROGRESS NOTES
"  VTE Pharmacologic Prophylaxis:   Moderate Risk (Score 3-4) - Pharmacological DVT Prophylaxis Ordered: heparin.    Mobility:   Basic Mobility Inpatient Raw Score: 22  -HLM Goal: 7: Walk 25 feet or more  JH-HLM Achieved: 7: Walk 25 feet or more      Patient Centered Rounds: I performed bedside rounds with nursing staff today.   Discussions with Specialists or Other Care Team Provider: Discussed with ID.     Education and Discussions with Family / Patient:  see quick note. .     Total Time Spent on Date of Encounter in care of patient:30 mins. This time was spent on one or more of the following: performing physical exam; counseling and coordination of care; obtaining or reviewing history; documenting in the medical record; reviewing/ordering tests, medications or procedures; communicating with other healthcare professionals and discussing with patient's family/caregivers.    Current Length of Stay: 13 day(s)  Current Patient Status: Inpatient   Certification Statement: The patient will continue to require additional inpatient hospital stay due to IVABX.   Discharge Plan:  to be determined.     Code Status: Level 1 - Full Code    Subjective:   Patient seen and examined.   Feeling \"okay\"    Objective:     Vitals:   Temp (24hrs), Av.2 °F (37.3 °C), Min:98 °F (36.7 °C), Max:100.1 °F (37.8 °C)    Temp:  [98 °F (36.7 °C)-100.1 °F (37.8 °C)] 100.1 °F (37.8 °C)  HR:  [76-92] 83  Resp:  [17-20] 17  BP: (131-155)/(68-98) 151/88  SpO2:  [91 %-94 %] 91 %  Body mass index is 31.19 kg/m².     Input and Output Summary (last 24 hours):     Intake/Output Summary (Last 24 hours) at 2024 1709  Last data filed at 2024 1450  Gross per 24 hour   Intake 900 ml   Output 600 ml   Net 300 ml       Physical Exam:   Physical Exam  Constitutional:       General: He is not in acute distress.     Appearance: He is not ill-appearing, toxic-appearing or diaphoretic.   Eyes:      Pupils: Pupils are equal, round, and reactive to light. "   Cardiovascular:      Rate and Rhythm: Normal rate.   Pulmonary:      Effort: Pulmonary effort is normal.   Abdominal:      General: There is no distension.      Palpations: There is no mass.      Tenderness: There is no abdominal tenderness. There is no right CVA tenderness, left CVA tenderness, guarding or rebound.      Hernia: No hernia is present.   Musculoskeletal:         General: No swelling, tenderness, deformity or signs of injury.      Right lower leg: No edema.      Left lower leg: No edema.   Skin:     Capillary Refill: Capillary refill takes less than 2 seconds.      Coloration: Skin is not jaundiced or pale.      Findings: No bruising, erythema, lesion or rash.   Neurological:      Mental Status: He is alert.      Cranial Nerves: No cranial nerve deficit.      Sensory: No sensory deficit.      Motor: No weakness.      Coordination: Coordination normal.      Gait: Gait normal.      Deep Tendon Reflexes: Reflexes normal.   Psychiatric:         Mood and Affect: Mood normal.          Additional Data:     Labs:  Results from last 7 days   Lab Units 02/11/24  0531 02/08/24  0640 02/07/24  0444   WBC Thousand/uL 16.24*   < > 11.33*   HEMOGLOBIN g/dL 11.0*   < > 11.3*   HEMATOCRIT % 34.5*   < > 35.6*   PLATELETS Thousands/uL 236   < > 263   BANDS PCT %  --   --  14*   NEUTROS PCT % 83*   < >  --    LYMPHS PCT % 9*   < >  --    LYMPHO PCT %  --   --  7*   MONOS PCT % 7   < >  --    MONO PCT %  --   --  12   EOS PCT % 0   < > 0    < > = values in this interval not displayed.     Results from last 7 days   Lab Units 02/11/24  0531   SODIUM mmol/L 134*   POTASSIUM mmol/L 4.1   CHLORIDE mmol/L 98   CO2 mmol/L 28   BUN mg/dL 19   CREATININE mg/dL 1.06   ANION GAP mmol/L 8   CALCIUM mg/dL 8.2*   GLUCOSE RANDOM mg/dL 87         Results from last 7 days   Lab Units 02/13/24  1642 02/13/24  1100 02/13/24  0741 02/12/24  2054 02/12/24  1618 02/12/24  1100 02/12/24  0722 02/11/24  2047 02/11/24  1627 02/11/24  1121  02/11/24  0718 02/10/24  2026   POC GLUCOSE mg/dl 172* 230* 82 182* 171* 208* 155* 197* 164* 186* 117 236*               Lines/Drains:  Invasive Devices       Peripheral Intravenous Line  Duration             Peripheral IV 02/12/24 Left;Ventral (anterior) Forearm <1 day                      Telemetry:  Telemetry Orders (From admission, onward)               24 Hour Telemetry Monitoring  Continuous x 24 Hours (Telem)        Question:  Reason for 24 Hour Telemetry  Answer:  Arrhythmias requiring acute medical intervention / PPM or ICD malfunction                                  Imaging: No pertinent imaging reviewed.    Recent Cultures (last 7 days):   Results from last 7 days   Lab Units 02/12/24  1632 02/09/24  1007 02/09/24  0523 02/08/24  1025 02/08/24  0640   BLOOD CULTURE  Received in Microbiology Lab. Culture in Progress.  Received in Microbiology Lab. Culture in Progress. No Growth After 4 Days. Staphylococcus aureus* Staphylococcus aureus* No Growth After 5 Days.   GRAM STAIN RESULT   --   --  Gram positive cocci in clusters* Gram positive cocci in clusters*  --        Last 24 Hours Medication List:   Current Facility-Administered Medications   Medication Dose Route Frequency Provider Last Rate    acetaminophen  975 mg Oral Q6H PRN Robyn Feldman, DO      amiodarone  200 mg Oral Daily With Breakfast Robyn Feldman, DO      aspirin  81 mg Oral Daily Robyn Feldman, DO      atorvastatin  80 mg Oral After Dinner Robyn Feldman, DO      cefazolin  2,000 mg Intravenous Q6H Robyn Feldman, DO 2,000 mg (02/13/24 1703)    clopidogrel  75 mg Oral Daily Robyn Feldman, DO      enoxaparin  40 mg Subcutaneous Q24H [facility] Robyn Feldman, DO      insulin glargine  50 Units Subcutaneous HS Robyn Feldman, DO      insulin lispro  1-5 Units Subcutaneous 4x Daily (AC & HS) Robyn Feldman, DO      insulin lispro  9 Units Subcutaneous TID With Meals Robyn Feldman, DO      metoprolol  5 mg  Intravenous Q6H PRN Soledad Mari PA-C      metoprolol succinate  25 mg Oral Q12H Harnishkumar Feldman, DO      polyethylene glycol  17 g Oral Daily PRN Harnishkumar Feldman, DO      saccharomyces boulardii  250 mg Oral BID Harnishkumar Feldman, DO      senna-docusate sodium  2 tablet Oral BID PRN Harnishkumar Feldman, DO          Today, Patient Was Seen By: Katerine Villalpando MD    **Please Note: This note may have been constructed using a voice recognition system.**[facility]  Progress Note  Name: Nadir Myers I  MRN: [id number]  Unit/Bed#: [facility] 902-01 I Date of Admission: 1/31/2024   Date of Service: 2/13/2024 I Hospital Day: 13    Assessment/Plan   Abnormal CT of the chest  Assessment & Plan  CT 2/7 shows small consolidation in the upper lobes and left lower lobe with enhancing lesion in the peripheral of right lower lobe, may be embolic versus hematogenous seeding from high-grade bacteremia.    Continue IV antibiotics as above  Monitor for any respiratory symptoms  Repeat CT chest may be warranted depending on final C & S of BC from 2/9    Electrolyte abnormalities  Assessment & Plan  Monitor/replete serum potassium/magnesium/phosphate as necessary    Gangrene of toe of left foot (HCC)  Assessment & Plan  Appreciate podiatry input -> status post left fourth toe amputation on 1/31  [place] to be surgical cure per podiatry  Wound care    MSSA bacteremia  Assessment & Plan  As evidenced on blood cultures from 1/31 with repeat blood cultures positive on 2/3 despite being on Ancef  Blood culture 1/2 positive for staph epi, likely contaminant, follow-up repeat blood cultures to confirm contaminant  Likely source due to left fourth toe gangrenous wound -> see plan below  GILL with evidence of mobile mass on ICD lead in the right atrium, concerning for infection, no valvular vegetation  S/p ICD removal with EP on 2/8  CT chest with possible septic pulmonary emboli, monitor closely  ID  "following, appreciate recommendations  Blood cultures negative for 48 hours  Continue IV Ancef, with plan to treat 6 weeks after clearance of bacteremia  PICC on hold.      LUE weakness  Assessment & Plan  Presented w/ transient left-sided weakness and a facial droop now resolved  Appreciate neurology input deeming symptoms likely secondary to septic shock/infection and hyperglycemia, as neurologic workup including CT/MRI imaging negative for evidence of stroke or intracranial vessel occlusion, but noted chronic microangiopathic changes -> radiology report did however mention: \"Moderate bilateral supraclinoid ICA and moderate to severe left cavernous ICA segment stenosis.\"  Continue dual endplate therapy with ASA/Plavix - c/w statin  PT/OT as tolerated    PAD (peripheral artery disease) ([facility])  Assessment & Plan  Continue dual antiplatelet therapy with ASA/Plavix - continue statin  Sequela complicated by gangrenous left toe (see above)    Chronic systolic CHF ([facility])  Assessment & Plan  Last EF of 42%  Holding diuresis (Aldactone) in the setting of relative hypotension and recovering LINDEN  Continue ASA/Plavix and statin for underlying CAD (associated ischemic cardiomyopathy)  Monitor/replete serum potassium/magnesium as necessary  Low sodium and fluid restriction enforced      Hyponatremia  Assessment & Plan  Maintain strict fluid restriction  Optimize blood sugar control as a degree of \"pseudohyponatremia\" is likely contributory  Sodium improving to 143, stable, continue to monitor    Type 2 diabetes mellitus, with long-term current use of insulin ([facility])  Assessment & Plan  Lab Results   Component Value Date    HGBA1C 9.8 (H) 01/31/2024     Continue basal/prandial insulin with additional SSI coverage per Accu-Cheks  Hypoglycemia protocol  Carbohydrate restriction    * Septic shock ([facility])  Assessment & Plan  Initially admitted to the ICU requiring vasopressor support, now off, with maintenance of hemodynamic " stability  Due to MSSA bacteremia from a gangrenous left fourth toe (see below)  Continue to monitor vitals and maintain hemodynamics including temperature curve  See additional management below  Shock resolved

## 2024-02-13 NOTE — PHYSICAL THERAPY NOTE
Physical Therapy Treatment Note    Patient's Name: Nadir Myers  : 1960     02/13/24 0921   PT Last Visit   PT Visit Date 24   Note Type   Note Type Treatment   Pain Assessment   Pain Assessment Tool 0-10   Pain Score 4   Pain Location/Orientation Orientation: Left;Location: Shoulder   Restrictions/Precautions   Weight Bearing Precautions Per Order Yes   LLE Weight Bearing Per Order   (heel weight-bearing)   Braces or Orthoses   (LLE toe-offloading shoe, surgical shoe donned to RLE)   Other Precautions Fall Risk;WBS   General   Chart Reviewed Yes   Response to Previous Treatment Patient with no complaints from previous session.   Family/Caregiver Present No   Subjective   Subjective Agreeable to mobilize.   Bed Mobility   Supine to Sit 6  Modified independent   Sit to Supine 6  Modified independent   Additional Comments Pt greeted in supine.   Transfers   Sit to Stand 6  Modified independent   Stand to Sit 6  Modified independent   Additional Comments RW   Ambulation/Elevation   Gait pattern Excessively slow;Short stride   Gait Assistance 5  Supervision   Additional items Verbal cues   Assistive Device Rolling walker   Distance 120'x2   Stair Management Assistance 4  Minimal assist   Additional items Assist x 1;Verbal cues;Tactile cues   Stair Management Technique One rail L;Nonreciprocal   Number of Stairs 3   Balance   Static Sitting Good   Dynamic Sitting Fair +   Static Standing Fair   Dynamic Standing Fair -   Ambulatory Fair -  (RW, heel weight-bearing LLE)   Endurance Deficit   Endurance Deficit Yes   Endurance Deficit Description weakness, fatigue   Activity Tolerance   Activity Tolerance Patient limited by fatigue   Nurse Made Aware yes - cleared for therapy   Assessment   Prognosis Good   Problem List Decreased strength;Decreased endurance;Impaired balance;Decreased mobility;Pain   Assessment Pt seen for PT treatment session w/ focus on gait training + stair training. Explained  technique to ambulate w/ toe-offloading shoe and to avoid rocking forward onto toes. Pt demonstrated good ability to maintain weight on heel in toe-offloading shoe, although there were 2 instances w/ rocking forward near the end of gait training w/ fatigue. Pt able to complete stairs w/ MinAx1 and was able to maintain heel weight-bearing. Educated pt to have someone assist him on the stairs + pt confirmed that he can have someone do this. Continue to recommend HHPT for d/c.   Barriers to Discharge Inaccessible home environment   Goals   Patient Goals go home   PT Treatment Day 6   Plan   Treatment/Interventions LE strengthening/ROM;Elevations;Therapeutic exercise;Endurance training;Patient/family training;Equipment eval/education;Gait training;Compensatory technique education;Spoke to nursing   Progress Progressing toward goals   PT Frequency 3-5x/wk   Discharge Recommendation   Rehab Resource Intensity Level, PT III (Minimum Resource Intensity)   Equipment Recommended Walker   Walker Package Recommended Wheeled walker   Change/add to Walker Package? No   AM-PAC Basic Mobility Inpatient   Turning in Flat Bed Without Bedrails 4   Lying on Back to Sitting on Edge of Flat Bed Without Bedrails 4   Moving Bed to Chair 4   Standing Up From Chair Using Arms 4   Walk in Room 3   Climb 3-5 Stairs With Railing 3   Basic Mobility Inpatient Raw Score 22   Basic Mobility Standardized Score 47.4   Highest Level Of Mobility   JH-HLM Goal 7: Walk 25 feet or more   JH-HLM Achieved 7: Walk 25 feet or more   Education   Education Provided Mobility training;Assistive device   Patient Demonstrates acceptance/verbal understanding;Reinforcement needed   End of Consult   Patient Position at End of Consult Bedside chair;All needs within reach  (BLE elevated)     Adelaide Beavers, PT, DPT

## 2024-02-13 NOTE — ASSESSMENT & PLAN NOTE
CT 2/7 shows small consolidation in the upper lobes and left lower lobe with enhancing lesion in the peripheral of right lower lobe, may be embolic versus hematogenous seeding from high-grade bacteremia.    Continue IV antibiotics as above  Monitor for any respiratory symptoms  Repeat CT chest may be warranted depending on final C & S of BC from 2/9

## 2024-02-14 ENCOUNTER — HOME HEALTH ADMISSION (OUTPATIENT)
Dept: HOME HEALTH SERVICES | Facility: HOME HEALTHCARE | Age: 64
End: 2024-02-14
Payer: COMMERCIAL

## 2024-02-14 ENCOUNTER — APPOINTMENT (INPATIENT)
Dept: RADIOLOGY | Facility: HOSPITAL | Age: 64
DRG: 853 | End: 2024-02-14
Payer: COMMERCIAL

## 2024-02-14 LAB
ALBUMIN SERPL BCP-MCNC: 2.6 G/DL (ref 3.5–5)
ALP SERPL-CCNC: 46 U/L (ref 34–104)
ALT SERPL W P-5'-P-CCNC: 3 U/L (ref 7–52)
ANION GAP SERPL CALCULATED.3IONS-SCNC: 7 MMOL/L
AST SERPL W P-5'-P-CCNC: 24 U/L (ref 13–39)
BACTERIA BLD CULT: ABNORMAL
BACTERIA BLD CULT: NORMAL
BASOPHILS # BLD AUTO: 0.03 THOUSANDS/ÂΜL (ref 0–0.1)
BASOPHILS NFR BLD AUTO: 0 % (ref 0–1)
BILIRUB SERPL-MCNC: 0.36 MG/DL (ref 0.2–1)
BUN SERPL-MCNC: 29 MG/DL (ref 5–25)
CALCIUM ALBUM COR SERPL-MCNC: 9.1 MG/DL (ref 8.3–10.1)
CALCIUM SERPL-MCNC: 8 MG/DL (ref 8.4–10.2)
CHLORIDE SERPL-SCNC: 98 MMOL/L (ref 96–108)
CO2 SERPL-SCNC: 27 MMOL/L (ref 21–32)
CREAT SERPL-MCNC: 1.51 MG/DL (ref 0.6–1.3)
EOSINOPHIL # BLD AUTO: 0.01 THOUSAND/ÂΜL (ref 0–0.61)
EOSINOPHIL NFR BLD AUTO: 0 % (ref 0–6)
ERYTHROCYTE [DISTWIDTH] IN BLOOD BY AUTOMATED COUNT: 13.6 % (ref 11.6–15.1)
GFR SERPL CREATININE-BSD FRML MDRD: 48 ML/MIN/1.73SQ M
GLUCOSE SERPL-MCNC: 103 MG/DL (ref 65–140)
GLUCOSE SERPL-MCNC: 151 MG/DL (ref 65–140)
GLUCOSE SERPL-MCNC: 172 MG/DL (ref 65–140)
GLUCOSE SERPL-MCNC: 199 MG/DL (ref 65–140)
GLUCOSE SERPL-MCNC: 217 MG/DL (ref 65–140)
GRAM STN SPEC: ABNORMAL
HCT VFR BLD AUTO: 32.5 % (ref 36.5–49.3)
HGB BLD-MCNC: 10.6 G/DL (ref 12–17)
IMM GRANULOCYTES # BLD AUTO: 0.09 THOUSAND/UL (ref 0–0.2)
IMM GRANULOCYTES NFR BLD AUTO: 1 % (ref 0–2)
LYMPHOCYTES # BLD AUTO: 1.82 THOUSANDS/ÂΜL (ref 0.6–4.47)
LYMPHOCYTES NFR BLD AUTO: 13 % (ref 14–44)
MCH RBC QN AUTO: 28.8 PG (ref 26.8–34.3)
MCHC RBC AUTO-ENTMCNC: 32.6 G/DL (ref 31.4–37.4)
MCV RBC AUTO: 88 FL (ref 82–98)
MONOCYTES # BLD AUTO: 1.1 THOUSAND/ÂΜL (ref 0.17–1.22)
MONOCYTES NFR BLD AUTO: 8 % (ref 4–12)
NEUTROPHILS # BLD AUTO: 10.59 THOUSANDS/ÂΜL (ref 1.85–7.62)
NEUTS SEG NFR BLD AUTO: 78 % (ref 43–75)
NRBC BLD AUTO-RTO: 0 /100 WBCS
PLATELET # BLD AUTO: 331 THOUSANDS/UL (ref 149–390)
PMV BLD AUTO: 9.5 FL (ref 8.9–12.7)
POTASSIUM SERPL-SCNC: 4.3 MMOL/L (ref 3.5–5.3)
PROT SERPL-MCNC: 6.7 G/DL (ref 6.4–8.4)
RBC # BLD AUTO: 3.68 MILLION/UL (ref 3.88–5.62)
SODIUM SERPL-SCNC: 132 MMOL/L (ref 135–147)
WBC # BLD AUTO: 13.64 THOUSAND/UL (ref 4.31–10.16)

## 2024-02-14 PROCEDURE — 85025 COMPLETE CBC W/AUTO DIFF WBC: CPT | Performed by: INTERNAL MEDICINE

## 2024-02-14 PROCEDURE — 80053 COMPREHEN METABOLIC PANEL: CPT | Performed by: INTERNAL MEDICINE

## 2024-02-14 PROCEDURE — 99232 SBSQ HOSP IP/OBS MODERATE 35: CPT | Performed by: INTERNAL MEDICINE

## 2024-02-14 PROCEDURE — NC001 PR NO CHARGE: Performed by: PHYSICIAN ASSISTANT

## 2024-02-14 PROCEDURE — 82948 REAGENT STRIP/BLOOD GLUCOSE: CPT

## 2024-02-14 PROCEDURE — 99233 SBSQ HOSP IP/OBS HIGH 50: CPT | Performed by: INTERNAL MEDICINE

## 2024-02-14 RX ORDER — SODIUM CHLORIDE 9 MG/ML
50 INJECTION, SOLUTION INTRAVENOUS CONTINUOUS
Status: DISCONTINUED | OUTPATIENT
Start: 2024-02-14 | End: 2024-02-16

## 2024-02-14 RX ADMIN — INSULIN LISPRO 1 UNITS: 100 INJECTION, SOLUTION INTRAVENOUS; SUBCUTANEOUS at 11:56

## 2024-02-14 RX ADMIN — CLOPIDOGREL BISULFATE 75 MG: 75 TABLET ORAL at 08:25

## 2024-02-14 RX ADMIN — INSULIN LISPRO 1 UNITS: 100 INJECTION, SOLUTION INTRAVENOUS; SUBCUTANEOUS at 08:26

## 2024-02-14 RX ADMIN — ENOXAPARIN SODIUM 40 MG: 40 INJECTION SUBCUTANEOUS at 08:25

## 2024-02-14 RX ADMIN — METOPROLOL SUCCINATE 25 MG: 25 TABLET, EXTENDED RELEASE ORAL at 17:54

## 2024-02-14 RX ADMIN — INSULIN LISPRO 9 UNITS: 100 INJECTION, SOLUTION INTRAVENOUS; SUBCUTANEOUS at 08:26

## 2024-02-14 RX ADMIN — AMIODARONE HYDROCHLORIDE 200 MG: 200 TABLET ORAL at 08:25

## 2024-02-14 RX ADMIN — INSULIN LISPRO 9 UNITS: 100 INJECTION, SOLUTION INTRAVENOUS; SUBCUTANEOUS at 11:57

## 2024-02-14 RX ADMIN — CEFAZOLIN SODIUM 2000 MG: 2 SOLUTION INTRAVENOUS at 05:40

## 2024-02-14 RX ADMIN — ASPIRIN 81 MG CHEWABLE TABLET 81 MG: 81 TABLET CHEWABLE at 08:25

## 2024-02-14 RX ADMIN — INSULIN LISPRO 9 UNITS: 100 INJECTION, SOLUTION INTRAVENOUS; SUBCUTANEOUS at 17:05

## 2024-02-14 RX ADMIN — ATORVASTATIN CALCIUM 80 MG: 80 TABLET, FILM COATED ORAL at 17:05

## 2024-02-14 RX ADMIN — SODIUM CHLORIDE 50 ML/HR: 0.9 INJECTION, SOLUTION INTRAVENOUS at 17:54

## 2024-02-14 RX ADMIN — INSULIN GLARGINE 50 UNITS: 100 INJECTION, SOLUTION SUBCUTANEOUS at 21:50

## 2024-02-14 RX ADMIN — CEFAZOLIN SODIUM 2000 MG: 2 SOLUTION INTRAVENOUS at 11:54

## 2024-02-14 RX ADMIN — INSULIN LISPRO 2 UNITS: 100 INJECTION, SOLUTION INTRAVENOUS; SUBCUTANEOUS at 17:04

## 2024-02-14 RX ADMIN — INSULIN LISPRO 1 UNITS: 100 INJECTION, SOLUTION INTRAVENOUS; SUBCUTANEOUS at 21:50

## 2024-02-14 RX ADMIN — ACETAMINOPHEN 975 MG: 325 TABLET, FILM COATED ORAL at 20:06

## 2024-02-14 RX ADMIN — Medication 250 MG: at 17:05

## 2024-02-14 RX ADMIN — CEFAZOLIN SODIUM 2000 MG: 2 SOLUTION INTRAVENOUS at 17:54

## 2024-02-14 RX ADMIN — METOPROLOL SUCCINATE 25 MG: 25 TABLET, EXTENDED RELEASE ORAL at 05:40

## 2024-02-14 RX ADMIN — Medication 250 MG: at 08:25

## 2024-02-14 NOTE — RESTORATIVE TECHNICIAN NOTE
Restorative Technician Note      Patient Name: Nadir Myers     Restorative Tech Visit Date: 02/14/24  Note Type: Mobility  Patient Position Upon Consult: Supine  Activity Performed: Ambulated  Assistive Device: Roller walker  Patient Position at End of Consult: Supine; All needs within reach    Krishan Hearn, Restorative Technician

## 2024-02-14 NOTE — PLAN OF CARE
Problem: METABOLIC, FLUID AND ELECTROLYTES - ADULT  Goal: Electrolytes maintained within normal limits  Description: INTERVENTIONS:  - Monitor labs and assess patient for signs and symptoms of electrolyte imbalances  - Administer electrolyte replacement as ordered  - Monitor response to electrolyte replacements, including repeat lab results as appropriate  - Instruct patient on fluid and nutrition as appropriate  Outcome: Progressing  Goal: Fluid balance maintained  Description: INTERVENTIONS:  - Monitor labs   - Monitor I/O and WT  - Instruct patient on fluid and nutrition as appropriate  - Assess for signs & symptoms of volume excess or deficit  Outcome: Progressing  Goal: Glucose maintained within target range  Description: INTERVENTIONS:  - Monitor Blood Glucose as ordered  - Assess for signs and symptoms of hyperglycemia and hypoglycemia  - Administer ordered medications to maintain glucose within target range  - Assess nutritional intake and initiate nutrition service referral as needed  Outcome: Progressing     Problem: Prexisting or High Potential for Compromised Skin Integrity  Goal: Skin integrity is maintained or improved  Description: INTERVENTIONS:  - Identify patients at risk for skin breakdown  - Assess and monitor skin integrity  - Assess and monitor nutrition and hydration status  - Monitor labs   - Assess for incontinence   - Turn and reposition patient  - Assist with mobility/ambulation  - Relieve pressure over bony prominences  - Avoid friction and shearing  - Provide appropriate hygiene as needed including keeping skin clean and dry  - Evaluate need for skin moisturizer/barrier cream  - Collaborate with interdisciplinary team   - Patient/family teaching  - Consider wound care consult   Outcome: Progressing     Problem: PAIN - ADULT  Goal: Verbalizes/displays adequate comfort level or baseline comfort level  Description: Interventions:  - Encourage patient to monitor pain and request  assistance  - Assess pain using appropriate pain scale  - Administer analgesics based on type and severity of pain and evaluate response  - Implement non-pharmacological measures as appropriate and evaluate response  - Consider cultural and social influences on pain and pain management  - Notify physician/advanced practitioner if interventions unsuccessful or patient reports new pain  Outcome: Progressing     Problem: INFECTION - ADULT  Goal: Absence or prevention of progression during hospitalization  Description: INTERVENTIONS:  - Assess and monitor for signs and symptoms of infection  - Monitor lab/diagnostic results  - Monitor all insertion sites, i.e. indwelling lines, tubes, and drains  - Monitor endotracheal if appropriate and nasal secretions for changes in amount and color  - [place] appropriate cooling/warming therapies per order  - Administer medications as ordered  - Instruct and encourage patient and family to use good hand hygiene technique  - Identify and instruct in appropriate isolation precautions for identified infection/condition  Outcome: Progressing     Problem: SAFETY ADULT  Goal: Maintain or return to baseline ADL function  Description: INTERVENTIONS:  -  Assess patient's ability to carry out ADLs; assess patient's baseline for ADL function and identify physical deficits which impact ability to perform ADLs (bathing, care of mouth/teeth, toileting, grooming, dressing, etc.)  - Assess/evaluate cause of self-care deficits   - Assess range of motion  - Assess patient's mobility; develop plan if impaired  - Assess patient's need for assistive devices and provide as appropriate  - Encourage maximum independence but intervene and supervise when necessary  - Involve family in performance of ADLs  - Assess for home care needs following discharge   - Consider OT consult to assist with ADL evaluation and planning for discharge  - Provide patient education as appropriate  Outcome: Progressing

## 2024-02-14 NOTE — ASSESSMENT & PLAN NOTE
Appreciate podiatry input -> status post left fourth toe amputation on 1/31  [place] to be surgical cure per podiatry  Wound care

## 2024-02-14 NOTE — ASSESSMENT & PLAN NOTE
As evidenced on blood cultures from 1/31 with repeat blood cultures positive on 2/3 despite being on Ancef  Blood culture 1/2 positive for staph epi, likely contaminant, follow-up repeat blood cultures to confirm contaminant  Likely source due to left fourth toe gangrenous wound -> see plan below  GILL with evidence of mobile mass on ICD lead in the right atrium, concerning for infection, no valvular vegetation  S/p ICD removal with EP on 2/8  CT chest with possible septic pulmonary emboli, monitor closely  ID following, appreciate recommendations  Blood cultures negative for 48 hours  Continue IV Ancef, with plan to treat 6 weeks after clearance of bacteremia- also as per ID if 2/12 cultures flag positive they will likely add carbapenem  PICC on hold.

## 2024-02-14 NOTE — PROGRESS NOTES
Progress Note - Cardiothoracic Surgery   Nadir Myers 63 y.o. male MRN: [id number]  Unit/Bed#: [facility] 902-01 Encounter: [id number]      24 Hour Events: 1/2 positive BC from 2/9 from Staph Aureus, ICD implant cancelled until BC clear, c/o chills, hot and then cold    Medications:   Scheduled Meds:  Current Facility-Administered Medications   Medication Dose Route Frequency Provider Last Rate    acetaminophen  975 mg Oral Q6H PRN Harnishkumar Feldman, DO      amiodarone  200 mg Oral Daily With Breakfast Hargemaksavi Feldman, DO      aspirin  81 mg Oral Daily Harnishkumar Feldman, DO      atorvastatin  80 mg Oral After Dinner Harnishksavi Feldman, DO      cefazolin  2,000 mg Intravenous Q6H Robyn Feldman, DO 2,000 mg (02/14/24 0540)    clopidogrel  75 mg Oral Daily Brittonnishkumar Feldman, DO      enoxaparin  40 mg Subcutaneous Q24H TEJAS Robyn Feldman, DO      insulin glargine  50 Units Subcutaneous HS [facility]      insulin lispro  1-5 Units Subcutaneous 4x Daily (AC & HS) Montanaksavi Feldman, DO      insulin lispro  9 Units Subcutaneous TID With Meals Robyn Feldman, DO      metoprolol  5 mg Intravenous Q6H PRN Soledad Mari PA-C      metoprolol succinate  25 mg Oral Q12H Harnishkumar Feldman, DO      polyethylene glycol  17 g Oral Daily PRN Brittonnishkumar Feldman, DO      saccharomyces boulardii  250 mg Oral BID [facility], [facility]      senna-docusate sodium  2 tablet Oral BID PRN Montanakumar Feldman, DO       Continuous Infusions:     Results:   Results from last 7 days   Lab Units 02/14/24  0503 02/11/24  0531 02/10/24  0745   WBC Thousand/uL 13.64* 16.24* 20.09*   HEMOGLOBIN g/dL 10.6* 11.0* 11.2*   HEMATOCRIT % 32.5* 34.5* 34.6*   PLATELETS Thousands/uL 331 236 239     Results from last 7 days   Lab Units 02/14/24  0503 02/11/24  0531 02/10/24  0554   POTASSIUM mmol/L 4.3 4.1 4.3   CHLORIDE mmol/L 98 98 99   CO2 mmol/L 27 28 24   BUN mg/dL 29* 19 15   CREATININE mg/dL 1.51* 1.06 0.78   CALCIUM mg/dL 8.0*  8.2* 8.4           Studies:   GILL:     Left Ventricle: Left ventricular cavity size is normal. Wall thickness is normal. The left ventricular ejection fraction is 45%. Systolic function is mildly reduced. There is hypokinesis with akinesis of the aneurysmal portion of the apex.    Right Ventricle: Right ventricular cavity size is normal. Systolic function is normal.    Right Atrium: There is a 1.8 x 1.1 cm mobile mass affixed to the device lead as it passes through the right atrium.    Aortic Valve: The aortic valve is trileaflet. The leaflets are mildly thickened. The leaflets are not calcified. The leaflets exhibit normal mobility. There is Lambl's excrescence on the tip of the right coronary aortic valve leaflet.     There is a 1.8 x 1.1 cm mobile mass affixed to the cardiac device lead as is passes through the right atrium.  In the setting of bacteremia this is most likely represents infection.     CT CAP:  IMPRESSION:  Mild multifocal pneumonia. Given smoking history, follow-up chest CT is advised in 3 months to confirm resolution. Enhancing consolidation with central groundglass in the periphery of the right lower lobe may be a combination of atelectasis and   pneumonia; there is no evident of pulmonary embolism (though the study was not tailored to assess the pulmonary arteries) to suggest that this is an infarct.     CXR:  IMPRESSION:  No acute cardiopulmonary disease.     I have personally reviewed pertinent reports.   and I have personally reviewed pertinent films in PACS    Vitals:   Vitals:    02/14/24 0307 02/14/24 0540 02/14/24 0540 02/14/24 0544   BP: 144/83 128/83 128/83    Pulse: 64 71 71    Resp: 20      Temp: (!) 97.4 °F (36.3 °C)      TempSrc:       SpO2: 95%  94%    Weight:    105 kg (230 lb 9.6 oz)   Height:           Physical Exam:    General: No acute distress, Alert, and Normal appearance  HEENT/NECK:  Normocephalic. Atraumatic.    Cardiac: Regular rate and rhythm  Pulmonary:  Breath sounds  clear bilaterally  Neuro: Alert and oriented X 3, Sensation is grossly intact, and No focal deficits        Assessment:    MSSA Bacteremia  S/P lead extraction/ICD removal, need for reimplant of ICD  Gangrene of toe left foot  PAD       Plan:  Continue Abx per ID, BC 1/2 showing Staph Aureus, ID recommends CT C/A/P to rule out persistent source. Tmax 100.9, WBC 13 from 16. Otherwise hemodynamically stable. Once blood cultures clear will proceed with EV ICD    SIGNATURE: Francia Tang PA-C  DATE: February 14, 2024  TIME: 7:28 AM    * This note was completed in part utilizing [employer] direct voice recognition software.   Grammatical errors, random word insertion, spelling mistakes, and incomplete sentences may be an occasional consequence of the system secondary to software limitations, ambient noise and hardware issues. At the time of dictation, efforts were made to edit, clarify and /or correct errors. Please read the chart carefully and recognize, using context, where substitutions have occurred.  If you have any questions or concerns about the context, text or information contained within the body of this dictation, please contact myself, the provider, for further clarification.

## 2024-02-14 NOTE — ASSESSMENT & PLAN NOTE
CT 2/7 shows small consolidation in the upper lobes and left lower lobe with enhancing lesion in the peripheral of right lower lobe, may be embolic versus hematogenous seeding from high-grade bacteremia.    Continue IV antibiotics as above  Monitor for any respiratory symptoms  Given repeat cultures are now growing MSSA and not staph epi- discussed with ID will get repeat CT CAP with contrast .

## 2024-02-14 NOTE — PROGRESS NOTES
Progress Note - Infectious Disease   Nadir Myers 63 y.o. male MRN: [id number]  Unit/Bed#: [facility] 902-01 Encounter: [id number]      Impression/Plan:    Sepsis, present on arrival; fever, leukocytosis  -Source is bacteremia with ICD infection and patients foot infection as below. No other clear source. Now with intermittent fevers since 2/10.  -Antibiotic as below  -Follow up repeat blood cultures 2/12  -If has more fever, OR if blood cultures from 2/09 end up being Staph aureus, then would repeat CT C/A/P with IV contrast  -Trend fever curve  -Repeat CBC + diff tomorrow AM     Persistent MSSA bacteremia with ICD infection  -Likely initial source was patients foot wound as below with subsequent ICD infection. Blood cultures positive on 1/31-2/08 despite being on Cefazolin. GILL 2/07 did show a mobile mass on device lead in right atrium, concerning for infection. No valve vegetations. CT scans reviewed, no abscess. However consider if pulmonary findings may be embolic. ICD extracted on 2/08 with lead culture that grew MSSA. Now 1 of 2 blood cultures from 2/09 with MSSA.   -Possible blood cultures from 2/09 only transiently positive post extraction due to manipulation of hardware and will now clear  -Continue IV Cefazolin 2g every 6 hours  -Follow up blood culture 2/12; if positive then will add IV Ertapenem 1g daily as part of salvage therapy for persistent bacteremia  -Repeat CT Chest/Abd/Pelvis with IV contrast to rule out source of persistent bacteremia  -Do not place PICC until we know species ID from 2/09 blood culture  -Discussed with EP and Cardiac surgery, will need to ensure blood cultures are negative post device extraction before reimplanting device  -Will need 6 weeks of IV antibiotic from first negative blood cultures and device removal     3. Abnormal CT chest  -CT 2/07 noted small consolidations in upper lobes and left lower lobe, with enhancing lesion in perieprhal of right lower lobe. Consider if may  be embolic lesions vs. Hematogenous seeding from high grade bacteremia.  -Plan for prolonged course of antibiotic as above  -Monitor for any development of respiratory symptoms  -Repeat CT chest as above    4. 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 Culture polymicrobial of tissue with MSSA, Finegoldia and Anaerococccus. Likely source of patient's bacteremia. Surgical cure felt to be obtained per Podiatry. Per follow up on , amputation site stable with intact sutures and no evidence of dehiscence or ischemia.   -Ongoing local wound care per Podiatry.     5. Staph epidermidis in 1 of 2 blood cultures  -1 set from  with Staph epi, did not grow in prior blood cultures from  or subsequent blood cultures. Suspect this was skin contaminant and as above MSSA is true pathogen.  -No further workup at this time    Plan and recommendations were discussed with primary team. They agree with continuing IV Cefazolin, follow up blood cultures , repeat CT scans.  Discussed plan at bedside with patient, patient's wife and patient's daughter.     Antibiotics:  Cefazolin    Subjective:  Denies fever at the moment. No abdominal pain, nausea, cough, back pain, joint pain.\    Objective:  Vitals:  Temp:  [97.4 °F (36.3 °C)-100.9 °F (38.3 °C)] 97.5 °F (36.4 °C)  HR:  [64-83] 74  Resp:  [17-20] 20  BP: (128-154)/(83-97) 154/97  SpO2:  [91 %-95 %] 95 %  Temp (24hrs), Av.7 °F (37.1 °C), Min:97.4 °F (36.3 °C), Max:100.9 °F (38.3 °C)  Current: Temperature: 97.5 °F (36.4 °C)    Physical Exam:   General Appearance:  Alert, interactive, nontoxic, no acute distress.   Throat: Oropharynx moist without lesions.    Lungs:   Clear to auscultation bilaterally; no wheezes, rhonchi or rales; respirations unlabored   Heart:  RRR   Abdomen:   Soft, non-tender     Extremities: Left foot with dressing intact   Skin: No new rashes or lesions. Chest wall bandage at ICD extraction site intact       Labs:   All  pertinent labs and imaging studies were personally reviewed  Results from last 7 days   Lab Units 02/14/24  0503 02/11/24  0531 02/10/24  0745   WBC Thousand/uL 13.64* 16.24* 20.09*   HEMOGLOBIN g/dL 10.6* 11.0* 11.2*   PLATELETS Thousands/uL 331 236 239     Results from last 7 days   Lab Units 02/14/24  0503 02/11/24  0531 02/10/24  0554   SODIUM mmol/L 132* 134* 129*   POTASSIUM mmol/L 4.3 4.1 4.3   CHLORIDE mmol/L 98 98 99   CO2 mmol/L 27 28 24   BUN mg/dL 29* 19 15   CREATININE mg/dL 1.51* 1.06 0.78   EGFR ml/min/1.73sq m 48 74 96   CALCIUM mg/dL 8.0* 8.2* 8.4   AST U/L 24  --   --    ALT U/L 3*  --   --    ALK PHOS U/L 46  --   --                          Micro:  Results from last 7 days   Lab Units 02/12/24  1632 02/09/24  1007 02/09/24  0523 02/08/24  1025 02/08/24  0640   BLOOD CULTURE  No Growth at 24 hrs.  No Growth at 24 hrs. No Growth After 4 Days. Staphylococcus aureus* Staphylococcus aureus* No Growth After 5 Days.   GRAM STAIN RESULT   --   --  Gram positive cocci in clusters* Gram positive cocci in clusters*  --        Imaging:          Zach Medina MD  [facility]

## 2024-02-14 NOTE — PROGRESS NOTES
Patient:    MRN:  [id number]    Grecia Request ID:  [id number]    Level of care reserved:  Home Health Agency    Partner Reserved:  [facility], [place] [address] [phone]    Clinical needs requested:    Geography searched:  [id number]    Start of Service:    Request sent:  12:24pm EST on 2/14/2024 by Ysabel Miller    Partner reserved:  12:30pm EST on 2/14/2024 by Ysabel Miller    Choice list shared:  12:29pm EST on 2/14/2024 by Ysabel Miller

## 2024-02-14 NOTE — CASE MANAGEMENT
Case Management Discharge Planning Note    Patient name Nadir Myers  Location [facility] 902/[facility] 902-01 MRN [id number]  : 1960 Date 2024       Current Admission Date: 2024  Current Admission Diagnosis:Septic shock (Formerly [facility])   Patient Active Problem List    Diagnosis Date Noted    Abnormal CT of the chest 2024    MSSA bacteremia 2024    Gangrene of toe of left foot ([facility]ly [facility]) 2024    Electrolyte abnormalities 2024    LUE weakness 2024    Septic shock (Formerly [facility]) 2024    Cellulitis of left foot 2024    Type 2 diabetes mellitus with foot ulcer, with long-term current use of insulin (Formerly [facility]) 2024    Coronary artery disease involving native coronary artery of native heart without angina pectoris 2024    PAD (peripheral artery disease) (Formerly [facility]) 2024    Chronic systolic CHF ([facility]) 2023    Localized swelling on left hand 2023    Hyponatremia 2023    Acidosis 2023    V-tach (Formerly [facility]) 2023    Ischemic cardiomyopathy 2023    Essential (primary) hypertension 2023    Acute HFrEF 2023    A-fib ([facility]) 2023    Benign prostatic hyperplasia without lower urinary tract symptoms 2018    3-vessel CAD 2015    Type 2 diabetes mellitus, with long-term current use of insulin ([facility]) 2015    GERD (gastroesophageal reflux disease) 2013    Tobacco dependence syndrome 2012      LOS (days): 14  Geometric Mean LOS (GMLOS) (days): 9.9  Days to GMLOS:-4.1     OBJECTIVE:  Risk of Unplanned Readmission Score: 27.22         Current admission status: Inpatient   Preferred Pharmacy:   [id number] [place] [facility] [place] [facility] [address]  [address] [facility]  [place] [address]  Phone: [phone] Fax: [phone]    [facility] [place] - [address]  [address]  [place] [address]  Phone: [phone] Fax: [phone]    Primary Care Provider: Tigre Hare,  DO    Primary Insurance: [employer] [facility] REP  Secondary Insurance:     DISCHARGE DETAILS:    Discharge planning discussed with:: Pt, pt's wife, pt's daughter  Freedom of Choice: Yes  Comments - Freedom of Choice: Pt requesting [facility] for HHC  CM contacted family/caregiver?: Yes  Were Treatment Team discharge recommendations reviewed with patient/caregiver?: Yes  Did patient/caregiver verbalize understanding of patient care needs?: Yes  Were patient/caregiver advised of the risks associated with not following Treatment Team discharge recommendations?: Yes    Contacts  Patient Contacts: wife Rhoda Louis  Relationship to Patient:: Family  Contact Method: In Person  Reason/Outcome: Continuity of Care, Emergency Contact, Discharge Planning    Requested Home Health Care         Is the patient interested in HHC at discharge?: Yes  Home Health Discipline requested:: Nursing, Physical Therapy, Occupational Therapy  Home Health Agency Name:: [facility]  Home Health Follow-Up Provider:: PCP  Home Health Services Needed:: Administration of IV, IM or SC Medications, Gait/ADL Training, Evaluate Functional Status and Safety, Strengthening/Theraputic Exercises to Improve Function  Homebound Criteria Met:: Uses an Assist Device (i.e. cane, walker, etc)  Supporting Clincal Findings:: Fatigues Easliy in Short Distances         Other Referral/Resources/Interventions Provided:  Interventions: HHC  Referral Comments: CM completed HHC referral to [facility]         Treatment Team Recommendation: Home with Home Health Care  Discharge Destination Plan:: Home with Home Health Care

## 2024-02-14 NOTE — ASSESSMENT & PLAN NOTE
Last EF of 42%  Holding diuresis (Aldactone) in the setting of relative hypotension and recovering LINDEN  Continue ASA/Plavix and statin for underlying CAD (associated ischemic cardiomyopathy)  Monitor/replete serum potassium/magnesium as necessary  Low sodium and fluid restriction enforced  Gentle fluids for now given contrast today

## 2024-02-14 NOTE — PROGRESS NOTES
[facility]  Progress Note  Name: Nadir Myers I  MRN: [id number]  [facility] Date of Admission: 1/31/2024   Date of Service: 2/14/2024 I Hospital Day: 14    Assessment/Plan   Abnormal CT of the chest  Assessment & Plan  CT 2/7 shows small consolidation in the upper lobes and left lower lobe with enhancing lesion in the peripheral of right lower lobe, may be embolic versus hematogenous seeding from high-grade bacteremia.    Continue IV antibiotics as above  Monitor for any respiratory symptoms  Given repeat cultures are now growing MSSA and not staph epi- discussed with ID will get repeat CT CAP with contrast .    Electrolyte abnormalities  Assessment & Plan  Monitor/replete serum potassium/magnesium/phosphate as necessary    Gangrene of toe of left foot (HCC)  Assessment & Plan  Appreciate podiatry input -> status post left fourth toe amputation on 1/31  George to be surgical cure per podiatry  Wound care    MSSA bacteremia  Assessment & Plan  As evidenced on blood cultures from 1/31 with repeat blood cultures positive on 2/3 despite being on Ancef  Blood culture 1/2 positive for staph epi, likely contaminant, follow-up repeat blood cultures to confirm contaminant  Likely source due to left fourth toe gangrenous wound -> see plan below  GILL with evidence of mobile mass on ICD lead in the right atrium, concerning for infection, no valvular vegetation  S/p ICD removal with EP on 2/8  CT chest with possible septic pulmonary emboli, monitor closely  ID following, appreciate recommendations  Blood cultures negative for 48 hours  Continue IV Ancef, with plan to treat 6 weeks after clearance of bacteremia- also as per ID if 2/12 cultures flag positive they will likely add carbapenem  PICC on hold.      LUE weakness  Assessment & Plan  Presented w/ transient left-sided weakness and a facial droop now resolved  Appreciate neurology input deeming symptoms likely secondary  "to septic shock/infection and hyperglycemia, as neurologic workup including CT/MRI imaging negative for evidence of stroke or intracranial vessel occlusion, but noted chronic microangiopathic changes -> radiology report did however mention: \"Moderate bilateral supraclinoid ICA and moderate to severe left cavernous ICA segment stenosis.\"  Continue dual endplate therapy with ASA/Plavix - c/w statin  PT/OT as tolerated    PAD (peripheral artery disease) ([facility])  Assessment & Plan  Continue dual antiplatelet therapy with ASA/Plavix - continue statin  Sequela complicated by gangrenous left toe (see above)    Chronic systolic CHF ([facility])  Assessment & Plan  Last EF of 42%  Holding diuresis (Aldactone) in the setting of relative hypotension and recovering LINDEN  Continue ASA/Plavix and statin for underlying CAD (associated ischemic cardiomyopathy)  Monitor/replete serum potassium/magnesium as necessary  Low sodium and fluid restriction enforced  Gentle fluids for now given contrast today       Hyponatremia  Assessment & Plan  Maintain strict fluid restriction  Optimize blood sugar control as a degree of \"pseudohyponatremia\" is likely contributory  Sodium 132- will follow up tomorrow     Type 2 diabetes mellitus, with long-term current use of insulin ([facility])  Assessment & Plan  Lab Results   Component Value Date    HGBA1C 9.8 (H) 01/31/2024     Continue basal/prandial insulin with additional SSI coverage per Accu-Cheks  Hypoglycemia protocol  Carbohydrate restriction    * Septic shock ([facility])  Assessment & Plan  Initially admitted to the ICU requiring vasopressor support, now off, with maintenance of hemodynamic stability  Due to MSSA bacteremia from a gangrenous left fourth toe (see below)  Continue to monitor vitals and maintain hemodynamics including temperature curve  See additional management below  Shock resolved               VTE Pharmacologic Prophylaxis:   Moderate Risk (Score 3-4) - Pharmacological DVT Prophylaxis Ordered: " heparin.    Mobility:   Basic Mobility Inpatient Raw Score: 22  -[facility] Goal: 7: Walk 25 feet or more  -HLM Achieved: 7: Walk 25 feet or more      Patient Centered Rounds: I performed bedside rounds with nursing staff today.   Discussions with Specialists or Other Care Team Provider: Discussed with ID today and plan for CT scan.     Education and Discussions with Family / Patient:  called Rhoda- .     Total Time Spent on Date of Encounter in care of patient: 30 mins. This time was spent on one or more of the following: performing physical exam; counseling and coordination of care; obtaining or reviewing history; documenting in the medical record; reviewing/ordering tests, medications or procedures; communicating with other healthcare professionals and discussing with patient's family/caregivers.    Current Length of Stay: 14 day(s)  Current Patient Status: Inpatient   Certification Statement: The patient will continue to require additional inpatient hospital stay due to repeat imaging, also need repeat cultures from  to be negative .  Discharge Plan:  will depend on blood cultures.     Code Status: Level 1 - Full Code    Subjective:   Patient seen and examined   Feels constipated   No other complaints .    Objective:     Vitals:   Temp (24hrs), Av.4 °F (36.9 °C), Min:97.4 °F (36.3 °C), Max:100.9 °F (38.3 °C)    Temp:  [97.4 °F (36.3 °C)-100.9 °F (38.3 °C)] 98.7 °F (37.1 °C)  HR:  [64-84] 84  Resp:  [19-20] 20  BP: (128-154)/(78-97) 139/88  SpO2:  [93 %-95 %] 93 %  Body mass index is 31.28 kg/m².     Input and Output Summary (last 24 hours):     Intake/Output Summary (Last 24 hours) at 2024 1650  Last data filed at 2024 0730  Gross per 24 hour   Intake 480 ml   Output 375 ml   Net 105 ml       Physical Exam:   Physical Exam  Constitutional:       General: He is not in acute distress.     Appearance: He is not ill-appearing, toxic-appearing or diaphoretic.   HENT:      Head: Normocephalic.   Eyes:       Pupils: Pupils are equal, round, and reactive to light.   Cardiovascular:      Rate and Rhythm: Normal rate.      Heart sounds: No murmur heard.     No friction rub. No gallop.   Pulmonary:      Effort: No respiratory distress.      Breath sounds: No stridor. No wheezing, rhonchi or rales.   Chest:      Chest wall: No tenderness.   Abdominal:      General: Abdomen is flat. There is no distension.      Palpations: There is no mass.      Tenderness: There is no abdominal tenderness. There is no right CVA tenderness, left CVA tenderness, guarding or rebound.      Hernia: No hernia is present.   Musculoskeletal:      Right lower leg: No edema.      Left lower leg: No edema.   Skin:     Coloration: Skin is pale. Skin is not jaundiced.      Findings: No bruising, erythema, lesion or rash.   Neurological:      General: No focal deficit present.      Mental Status: He is alert.      Cranial Nerves: No cranial nerve deficit.      Sensory: No sensory deficit.      Motor: No weakness.      Coordination: Coordination normal.      Gait: Gait normal.      Deep Tendon Reflexes: Reflexes normal.   Psychiatric:         Mood and Affect: Mood normal.          Additional Data:     Labs:  Results from last 7 days   Lab Units 02/14/24  0503   WBC Thousand/uL 13.64*   HEMOGLOBIN g/dL 10.6*   HEMATOCRIT % 32.5*   PLATELETS Thousands/uL 331   NEUTROS PCT % 78*   LYMPHS PCT % 13*   MONOS PCT % 8   EOS PCT % 0     Results from last 7 days   Lab Units 02/14/24  0503   SODIUM mmol/L 132*   POTASSIUM mmol/L 4.3   CHLORIDE mmol/L 98   CO2 mmol/L 27   BUN mg/dL 29*   CREATININE mg/dL 1.51*   ANION GAP mmol/L 7   CALCIUM mg/dL 8.0*   ALBUMIN g/dL 2.6*   TOTAL BILIRUBIN mg/dL 0.36   ALK PHOS U/L 46   ALT U/L 3*   AST U/L 24   GLUCOSE RANDOM mg/dL 103         Results from last 7 days   Lab Units 02/14/24  1555 02/14/24  1107 02/14/24  0823 02/13/24  2047 02/13/24  1642 02/13/24  1100 02/13/24  0741 02/12/24  2054 02/12/24  1618 02/12/24  1100  24  0722 24  2047   POC GLUCOSE mg/dl 217* 199* 151* 116 172* 230* 82 182* 171* 208* 155* 197*               Lines/Drains:  Invasive Devices       Peripheral Intravenous Line  Duration             Peripheral IV 24 Left;Ventral (anterior) Forearm 1 day                      Telemetry:  Telemetry Orders (From admission, onward)               24 Hour Telemetry Monitoring  Continuous x 24 Hours (Telem)           Question:  Reason for 24 Hour Telemetry  Answer:  Arrhythmias requiring acute medical intervention / PPM or ICD malfunction                     Telemetry Reviewed: Normal Sinus Rhythm  Indication for Continued Telemetry Use: Arrthymias requiring medical therapy             Imaging: No pertinent imaging reviewed.    Recent Cultures (last 7 days):   Results from last 7 days   Lab Units 24  1632 24  1007 24  0523 24  1025 24  0640   BLOOD CULTURE  No Growth at 24 hrs.  No Growth at 24 hrs. No Growth After 5 Days. Staphylococcus aureus* Staphylococcus aureus* No Growth After 5 Days.   GRAM STAIN RESULT   --   --  Gram positive cocci in clusters* Gram positive cocci in clusters*  --        Last 24 Hours Medication List:   Current Facility-Administered Medications   Medication Dose Route Frequency Provider Last Rate    acetaminophen  975 mg Oral Q6H PRN Robyn Feldman, DO      amiodarone  200 mg Oral Daily With Breakfast Robyn Feldman, DO      aspirin  81 mg Oral Daily Robyn Feldman, DO      atorvastatin  80 mg Oral After Dinner Robyn Feldman, DO      cefazolin  2,000 mg Intravenous Q6H Robyn Feldman, DO 2,000 mg (24 1154)    clopidogrel  75 mg Oral Daily Robyn Feldman, DO      enoxaparin  40 mg Subcutaneous Q24H [facility] Robyn Feldman, DO      insulin glargine  50 Units Subcutaneous HS Robyn Feldman, DO      insulin lispro  1-5 Units Subcutaneous 4x Daily (AC & HS) Robyn Feldman, DO      insulin lispro  9 Units  Subcutaneous TID With Meals Haryael Canoel, DO      metoprolol  5 mg Intravenous Q6H PRN Soledad Mari PA-C      metoprolol succinate  25 mg Oral Q12H Hargemakumar Feldman, DO      polyethylene glycol  17 g Oral Daily PRN Hargemakumar Feldman, DO      saccharomyces boulardii  250 mg Oral BID Haryael Canoel, DO      senna-docusate sodium  2 tablet Oral BID PRN Robyn Feldman, DO      sodium chloride  100 mL/hr Intravenous Continuous Katerine Villalpando MD          Today, Patient Was Seen By: Katerine Villalpando MD    **Please Note: This note may have been constructed using a voice recognition system.**

## 2024-02-14 NOTE — PLAN OF CARE
Problem: METABOLIC, FLUID AND ELECTROLYTES - ADULT  Goal: Electrolytes maintained within normal limits  Description: INTERVENTIONS:  - Monitor labs and assess patient for signs and symptoms of electrolyte imbalances  - Administer electrolyte replacement as ordered  - Monitor response to electrolyte replacements, including repeat lab results as appropriate  - Instruct patient on fluid and nutrition as appropriate  Outcome: Progressing  Goal: Fluid balance maintained  Description: INTERVENTIONS:  - Monitor labs   - Monitor I/O and WT  - Instruct patient on fluid and nutrition as appropriate  - Assess for signs & symptoms of volume excess or deficit  Outcome: Progressing  Goal: Glucose maintained within target range  Description: INTERVENTIONS:  - Monitor Blood Glucose as ordered  - Assess for signs and symptoms of hyperglycemia and hypoglycemia  - Administer ordered medications to maintain glucose within target range  - Assess nutritional intake and initiate nutrition service referral as needed  Outcome: Progressing     Problem: Prexisting or High Potential for Compromised Skin Integrity  Goal: Skin integrity is maintained or improved  Description: INTERVENTIONS:  - Identify patients at risk for skin breakdown  - Assess and monitor skin integrity  - Assess and monitor nutrition and hydration status  - Monitor labs   - Assess for incontinence   - Turn and reposition patient  - Assist with mobility/ambulation  - Relieve pressure over bony prominences  - Avoid friction and shearing  - Provide appropriate hygiene as needed including keeping skin clean and dry  - Evaluate need for skin moisturizer/barrier cream  - Collaborate with interdisciplinary team   - Patient/family teaching  - Consider wound care consult   Outcome: Progressing     Problem: INFECTION - ADULT  Goal: Absence or prevention of progression during hospitalization  Description: INTERVENTIONS:  - Assess and monitor for signs and symptoms of infection  -  Monitor lab/diagnostic results  - Monitor all insertion sites, i.e. indwelling lines, tubes, and drains  - Monitor endotracheal if appropriate and nasal secretions for changes in amount and color  - [place] appropriate cooling/warming therapies per order  - Administer medications as ordered  - Instruct and encourage patient and family to use good hand hygiene technique  - Identify and instruct in appropriate isolation precautions for identified infection/condition  Outcome: Progressing  Goal: Absence of fever/infection during neutropenic period  Description: INTERVENTIONS:  - Monitor WBC    Outcome: Progressing     Problem: SAFETY ADULT  Goal: Patient will remain free of falls  Description: INTERVENTIONS:  - Educate patient/family on patient safety including physical limitations  - Instruct patient to call for assistance with activity   - Consult OT/PT to assist with strengthening/mobility   - Keep Call bell within reach  - Keep bed low and locked with side rails adjusted as appropriate  - Keep care items and personal belongings within reach  - Initiate and maintain comfort rounds  - Make Fall Risk Sign visible to staff  - Apply yellow socks and bracelet for high fall risk patients  - Consider moving patient to room near nurses station  Outcome: Progressing  Goal: Maintain or return to baseline ADL function  Description: INTERVENTIONS:  -  Assess patient's ability to carry out ADLs; assess patient's baseline for ADL function and identify physical deficits which impact ability to perform ADLs (bathing, care of mouth/teeth, toileting, grooming, dressing, etc.)  - Assess/evaluate cause of self-care deficits   - Assess range of motion  - Assess patient's mobility; develop plan if impaired  - Assess patient's need for assistive devices and provide as appropriate  - Encourage maximum independence but intervene and supervise when necessary  - Involve family in performance of ADLs  - Assess for home care needs following  discharge   - Consider OT consult to assist with ADL evaluation and planning for discharge  - Provide patient education as appropriate  Outcome: Progressing  Goal: Maintains/Returns to pre admission functional level  Description: INTERVENTIONS:  - Perform AM-PAC 6 Click Basic Mobility/ Daily Activity assessment daily.  - Set and communicate daily mobility goal to care team and patient/family/caregiver.   - Collaborate with rehabilitation services on mobility goals if consulted  - Record patient progress and toleration of activity level   Outcome: Progressing     Problem: DISCHARGE PLANNING  Goal: Discharge to home or other facility with appropriate resources  Description: INTERVENTIONS:  - Identify barriers to discharge w/patient and caregiver  - Arrange for needed discharge resources and transportation as appropriate  - Identify discharge learning needs (meds, wound care, etc.)  - Arrange for interpretive services to assist at discharge as needed  - Refer to Case Management Department for coordinating discharge planning if the patient needs post-hospital services based on physician/advanced practitioner order or complex needs related to functional status, cognitive ability, or social support system  Outcome: Progressing     Problem: Knowledge Deficit  Goal: Patient/family/caregiver demonstrates understanding of disease process, treatment plan, medications, and discharge instructions  Description: Complete learning assessment and assess knowledge base.  Interventions:  - Provide teaching at level of understanding  - Provide teaching via preferred learning methods  Outcome: Progressing

## 2024-02-14 NOTE — PROGRESS NOTES
Patient:    MRN:  [id number]    [place] Request ID:  [id number]    Level of care reserved:    Partner Reserved:    Clinical needs requested:    Geography searched:  [phone]    Start of Service:    Request sent:  12:24pm EST on 2/14/2024 by Ysabel Miller    Partner reserved:  by Ysabel Miller    Choice list shared:  12:29pm EST on 2/14/2024 by Ysabel Miller

## 2024-02-15 ENCOUNTER — APPOINTMENT (INPATIENT)
Dept: RADIOLOGY | Facility: HOSPITAL | Age: 64
DRG: 853 | End: 2024-02-15
Payer: COMMERCIAL

## 2024-02-15 LAB
ALBUMIN SERPL BCP-MCNC: 2.4 G/DL (ref 3.5–5)
ALP SERPL-CCNC: 46 U/L (ref 34–104)
ALT SERPL W P-5'-P-CCNC: 5 U/L (ref 7–52)
ANION GAP SERPL CALCULATED.3IONS-SCNC: 8 MMOL/L
AST SERPL W P-5'-P-CCNC: 25 U/L (ref 13–39)
BASOPHILS # BLD AUTO: 0.03 THOUSANDS/ÂΜL (ref 0–0.1)
BASOPHILS NFR BLD AUTO: 0 % (ref 0–1)
BILIRUB SERPL-MCNC: 0.31 MG/DL (ref 0.2–1)
BUN SERPL-MCNC: 31 MG/DL (ref 5–25)
CALCIUM ALBUM COR SERPL-MCNC: 9.2 MG/DL (ref 8.3–10.1)
CALCIUM SERPL-MCNC: 7.9 MG/DL (ref 8.4–10.2)
CHLORIDE SERPL-SCNC: 98 MMOL/L (ref 96–108)
CO2 SERPL-SCNC: 27 MMOL/L (ref 21–32)
CREAT SERPL-MCNC: 1.54 MG/DL (ref 0.6–1.3)
EOSINOPHIL # BLD AUTO: 0.02 THOUSAND/ÂΜL (ref 0–0.61)
EOSINOPHIL NFR BLD AUTO: 0 % (ref 0–6)
ERYTHROCYTE [DISTWIDTH] IN BLOOD BY AUTOMATED COUNT: 13.5 % (ref 11.6–15.1)
GFR SERPL CREATININE-BSD FRML MDRD: 47 ML/MIN/1.73SQ M
GLUCOSE SERPL-MCNC: 106 MG/DL (ref 65–140)
GLUCOSE SERPL-MCNC: 117 MG/DL (ref 65–140)
GLUCOSE SERPL-MCNC: 132 MG/DL (ref 65–140)
GLUCOSE SERPL-MCNC: 185 MG/DL (ref 65–140)
GLUCOSE SERPL-MCNC: 94 MG/DL (ref 65–140)
GLUCOSE SERPL-MCNC: 98 MG/DL (ref 65–140)
HCT VFR BLD AUTO: 33.5 % (ref 36.5–49.3)
HGB BLD-MCNC: 10.5 G/DL (ref 12–17)
IMM GRANULOCYTES # BLD AUTO: 0.04 THOUSAND/UL (ref 0–0.2)
IMM GRANULOCYTES NFR BLD AUTO: 0 % (ref 0–2)
LYMPHOCYTES # BLD AUTO: 1.27 THOUSANDS/ÂΜL (ref 0.6–4.47)
LYMPHOCYTES NFR BLD AUTO: 11 % (ref 14–44)
MCH RBC QN AUTO: 28.2 PG (ref 26.8–34.3)
MCHC RBC AUTO-ENTMCNC: 31.3 G/DL (ref 31.4–37.4)
MCV RBC AUTO: 90 FL (ref 82–98)
MONOCYTES # BLD AUTO: 0.78 THOUSAND/ÂΜL (ref 0.17–1.22)
MONOCYTES NFR BLD AUTO: 7 % (ref 4–12)
NEUTROPHILS # BLD AUTO: 9.06 THOUSANDS/ÂΜL (ref 1.85–7.62)
NEUTS SEG NFR BLD AUTO: 82 % (ref 43–75)
NRBC BLD AUTO-RTO: 0 /100 WBCS
PLATELET # BLD AUTO: 368 THOUSANDS/UL (ref 149–390)
PMV BLD AUTO: 9.6 FL (ref 8.9–12.7)
POTASSIUM SERPL-SCNC: 4.6 MMOL/L (ref 3.5–5.3)
PROT SERPL-MCNC: 6.5 G/DL (ref 6.4–8.4)
RBC # BLD AUTO: 3.73 MILLION/UL (ref 3.88–5.62)
SODIUM SERPL-SCNC: 133 MMOL/L (ref 135–147)
WBC # BLD AUTO: 11.2 THOUSAND/UL (ref 4.31–10.16)

## 2024-02-15 PROCEDURE — G1004 CDSM NDSC: HCPCS

## 2024-02-15 PROCEDURE — 99232 SBSQ HOSP IP/OBS MODERATE 35: CPT | Performed by: PHYSICIAN ASSISTANT

## 2024-02-15 PROCEDURE — 99024 POSTOP FOLLOW-UP VISIT: CPT | Performed by: PODIATRIST

## 2024-02-15 PROCEDURE — 99233 SBSQ HOSP IP/OBS HIGH 50: CPT | Performed by: INTERNAL MEDICINE

## 2024-02-15 PROCEDURE — 97530 THERAPEUTIC ACTIVITIES: CPT

## 2024-02-15 PROCEDURE — 80053 COMPREHEN METABOLIC PANEL: CPT | Performed by: INTERNAL MEDICINE

## 2024-02-15 PROCEDURE — 82948 REAGENT STRIP/BLOOD GLUCOSE: CPT

## 2024-02-15 PROCEDURE — 85025 COMPLETE CBC W/AUTO DIFF WBC: CPT | Performed by: INTERNAL MEDICINE

## 2024-02-15 PROCEDURE — 74177 CT ABD & PELVIS W/CONTRAST: CPT

## 2024-02-15 PROCEDURE — 97116 GAIT TRAINING THERAPY: CPT

## 2024-02-15 PROCEDURE — 71260 CT THORAX DX C+: CPT

## 2024-02-15 RX ADMIN — ASPIRIN 81 MG CHEWABLE TABLET 81 MG: 81 TABLET CHEWABLE at 08:51

## 2024-02-15 RX ADMIN — CLOPIDOGREL BISULFATE 75 MG: 75 TABLET ORAL at 08:51

## 2024-02-15 RX ADMIN — METOPROLOL SUCCINATE 25 MG: 25 TABLET, EXTENDED RELEASE ORAL at 17:29

## 2024-02-15 RX ADMIN — METOPROLOL SUCCINATE 25 MG: 25 TABLET, EXTENDED RELEASE ORAL at 05:54

## 2024-02-15 RX ADMIN — CEFAZOLIN SODIUM 2000 MG: 2 SOLUTION INTRAVENOUS at 17:30

## 2024-02-15 RX ADMIN — ATORVASTATIN CALCIUM 80 MG: 80 TABLET, FILM COATED ORAL at 17:29

## 2024-02-15 RX ADMIN — CEFAZOLIN SODIUM 2000 MG: 2 SOLUTION INTRAVENOUS at 11:37

## 2024-02-15 RX ADMIN — CEFAZOLIN SODIUM 2000 MG: 2 SOLUTION INTRAVENOUS at 00:07

## 2024-02-15 RX ADMIN — INSULIN GLARGINE 50 UNITS: 100 INJECTION, SOLUTION SUBCUTANEOUS at 21:43

## 2024-02-15 RX ADMIN — Medication 250 MG: at 17:29

## 2024-02-15 RX ADMIN — Medication 250 MG: at 08:51

## 2024-02-15 RX ADMIN — CEFAZOLIN SODIUM 2000 MG: 2 SOLUTION INTRAVENOUS at 05:54

## 2024-02-15 RX ADMIN — INSULIN LISPRO 9 UNITS: 100 INJECTION, SOLUTION INTRAVENOUS; SUBCUTANEOUS at 11:54

## 2024-02-15 RX ADMIN — INSULIN LISPRO 9 UNITS: 100 INJECTION, SOLUTION INTRAVENOUS; SUBCUTANEOUS at 17:34

## 2024-02-15 RX ADMIN — IOHEXOL 100 ML: 350 INJECTION, SOLUTION INTRAVENOUS at 08:03

## 2024-02-15 RX ADMIN — AMIODARONE HYDROCHLORIDE 200 MG: 200 TABLET ORAL at 08:51

## 2024-02-15 RX ADMIN — INSULIN LISPRO 1 UNITS: 100 INJECTION, SOLUTION INTRAVENOUS; SUBCUTANEOUS at 11:53

## 2024-02-15 RX ADMIN — INSULIN LISPRO 9 UNITS: 100 INJECTION, SOLUTION INTRAVENOUS; SUBCUTANEOUS at 08:57

## 2024-02-15 RX ADMIN — ENOXAPARIN SODIUM 40 MG: 40 INJECTION SUBCUTANEOUS at 08:50

## 2024-02-15 NOTE — PLAN OF CARE
Problem: METABOLIC, FLUID AND ELECTROLYTES - ADULT  Goal: Electrolytes maintained within normal limits  Description: INTERVENTIONS:  - Monitor labs and assess patient for signs and symptoms of electrolyte imbalances  - Administer electrolyte replacement as ordered  - Monitor response to electrolyte replacements, including repeat lab results as appropriate  - Instruct patient on fluid and nutrition as appropriate  Outcome: Progressing  Goal: Fluid balance maintained  Description: INTERVENTIONS:  - Monitor labs   - Monitor I/O and WT  - Instruct patient on fluid and nutrition as appropriate  - Assess for signs & symptoms of volume excess or deficit  Outcome: Progressing  Goal: Glucose maintained within target range  Description: INTERVENTIONS:  - Monitor Blood Glucose as ordered  - Assess for signs and symptoms of hyperglycemia and hypoglycemia  - Administer ordered medications to maintain glucose within target range  - Assess nutritional intake and initiate nutrition service referral as needed  Outcome: Progressing     Problem: Prexisting or High Potential for Compromised Skin Integrity  Goal: Skin integrity is maintained or improved  Description: INTERVENTIONS:  - Identify patients at risk for skin breakdown  - Assess and monitor skin integrity  - Assess and monitor nutrition and hydration status  - Monitor labs   - Assess for incontinence   - Turn and reposition patient  - Assist with mobility/ambulation  - Relieve pressure over bony prominences  - Avoid friction and shearing  - Provide appropriate hygiene as needed including keeping skin clean and dry  - Evaluate need for skin moisturizer/barrier cream  - Collaborate with interdisciplinary team   - Patient/family teaching  - Consider wound care consult   Outcome: Progressing     Problem: PAIN - ADULT  Goal: Verbalizes/displays adequate comfort level or baseline comfort level  Description: Interventions:  - Encourage patient to monitor pain and request  assistance  - Assess pain using appropriate pain scale  - Administer analgesics based on type and severity of pain and evaluate response  - Implement non-pharmacological measures as appropriate and evaluate response  - Consider cultural and social influences on pain and pain management  - Notify physician/advanced practitioner if interventions unsuccessful or patient reports new pain  Outcome: Progressing     Problem: INFECTION - ADULT  Goal: Absence or prevention of progression during hospitalization  Description: INTERVENTIONS:  - Assess and monitor for signs and symptoms of infection  - Monitor lab/diagnostic results  - Monitor all insertion sites, i.e. indwelling lines, tubes, and drains  - Monitor endotracheal if appropriate and nasal secretions for changes in amount and color  - [place] appropriate cooling/warming therapies per order  - Administer medications as ordered  - Instruct and encourage patient and family to use good hand hygiene technique  - Identify and instruct in appropriate isolation precautions for identified infection/condition  Outcome: Progressing  Goal: Absence of fever/infection during neutropenic period  Description: INTERVENTIONS:  - Monitor WBC    Outcome: Progressing     Problem: SAFETY ADULT  Goal: Patient will remain free of falls  Description: INTERVENTIONS:  - Educate patient/family on patient safety including physical limitations  - Instruct patient to call for assistance with activity   - Consult OT/PT to assist with strengthening/mobility   - Keep Call bell within reach  - Keep bed low and locked with side rails adjusted as appropriate  - Keep care items and personal belongings within reach  - Initiate and maintain comfort rounds  - Make Fall Risk Sign visible to staff  - Apply yellow socks and bracelet for high fall risk patients  - Consider moving patient to room near nurses station  Outcome: Progressing  Goal: Maintain or return to baseline ADL function  Description:  INTERVENTIONS:  -  Assess patient's ability to carry out ADLs; assess patient's baseline for ADL function and identify physical deficits which impact ability to perform ADLs (bathing, care of mouth/teeth, toileting, grooming, dressing, etc.)  - Assess/evaluate cause of self-care deficits   - Assess range of motion  - Assess patient's mobility; develop plan if impaired  - Assess patient's need for assistive devices and provide as appropriate  - Encourage maximum independence but intervene and supervise when necessary  - Involve family in performance of ADLs  - Assess for home care needs following discharge   - Consider OT consult to assist with ADL evaluation and planning for discharge  - Provide patient education as appropriate  Outcome: Progressing  Goal: Maintains/Returns to pre admission functional level  Description: INTERVENTIONS:  - Perform AM-PAC 6 Click Basic Mobility/ Daily Activity assessment daily.  - Set and communicate daily mobility goal to care team and patient/family/caregiver.   - Collaborate with rehabilitation services on mobility goals if consulted  - Record patient progress and toleration of activity level   Outcome: Progressing     Problem: DISCHARGE PLANNING  Goal: Discharge to home or other facility with appropriate resources  Description: INTERVENTIONS:  - Identify barriers to discharge w/patient and caregiver  - Arrange for needed discharge resources and transportation as appropriate  - Identify discharge learning needs (meds, wound care, etc.)  - Arrange for interpretive services to assist at discharge as needed  - Refer to Case Management Department for coordinating discharge planning if the patient needs post-hospital services based on physician/advanced practitioner order or complex needs related to functional status, cognitive ability, or social support system  Outcome: Progressing     Problem: Knowledge Deficit  Goal: Patient/family/caregiver demonstrates understanding of disease  process, treatment plan, medications, and discharge instructions  Description: Complete learning assessment and assess knowledge base.  Interventions:  - Provide teaching at level of understanding  - Provide teaching via preferred learning methods  Outcome: Progressing

## 2024-02-15 NOTE — ASSESSMENT & PLAN NOTE
As evidenced on blood cultures from 1/31 with repeat blood cultures positive despite being on Ancef  Likely source due to left fourth toe gangrenous wound -> see plan below  GILL with evidence of mobile mass on ICD lead in the right atrium, concerning for infection, no valvular vegetation  S/p ICD removal with EP on 2/8  CT chest with possible septic pulmonary emboli, monitor closely  ID following, appreciate recommendations  Most recent Blood cultures from 2/12 negative for 48 hours  Continue IV Ancef, with plan to treat 6 weeks after clearance of bacteremia- also as per ID if 2/12 cultures flag positive they will likely add carbapenem  PICC on hold.

## 2024-02-15 NOTE — PLAN OF CARE
Problem: METABOLIC, FLUID AND ELECTROLYTES - ADULT  Goal: Electrolytes maintained within normal limits  Description: INTERVENTIONS:  - Monitor labs and assess patient for signs and symptoms of electrolyte imbalances  - Administer electrolyte replacement as ordered  - Monitor response to electrolyte replacements, including repeat lab results as appropriate  - Instruct patient on fluid and nutrition as appropriate  Outcome: Progressing  Goal: Fluid balance maintained  Description: INTERVENTIONS:  - Monitor labs   - Monitor I/O and WT  - Instruct patient on fluid and nutrition as appropriate  - Assess for signs & symptoms of volume excess or deficit  Outcome: Progressing  Goal: Glucose maintained within target range  Description: INTERVENTIONS:  - Monitor Blood Glucose as ordered  - Assess for signs and symptoms of hyperglycemia and hypoglycemia  - Administer ordered medications to maintain glucose within target range  - Assess nutritional intake and initiate nutrition service referral as needed  Outcome: Progressing     Problem: Prexisting or High Potential for Compromised Skin Integrity  Goal: Skin integrity is maintained or improved  Description: INTERVENTIONS:  - Identify patients at risk for skin breakdown  - Assess and monitor skin integrity  - Assess and monitor nutrition and hydration status  - Monitor labs   - Assess for incontinence   - Turn and reposition patient  - Assist with mobility/ambulation  - Relieve pressure over bony prominences  - Avoid friction and shearing  - Provide appropriate hygiene as needed including keeping skin clean and dry  - Evaluate need for skin moisturizer/barrier cream  - Collaborate with interdisciplinary team   - Patient/family teaching  - Consider wound care consult   Outcome: Progressing     Problem: PAIN - ADULT  Goal: Verbalizes/displays adequate comfort level or baseline comfort level  Description: Interventions:  - Encourage patient to monitor pain and request  assistance  - Assess pain using appropriate pain scale  - Administer analgesics based on type and severity of pain and evaluate response  - Implement non-pharmacological measures as appropriate and evaluate response  - Consider cultural and social influences on pain and pain management  - Notify physician/advanced practitioner if interventions unsuccessful or patient reports new pain  Outcome: Progressing     Problem: INFECTION - ADULT  Goal: Absence or prevention of progression during hospitalization  Description: INTERVENTIONS:  - Assess and monitor for signs and symptoms of infection  - Monitor lab/diagnostic results  - Monitor all insertion sites, i.e. indwelling lines, tubes, and drains  - Monitor endotracheal if appropriate and nasal secretions for changes in amount and color  - [place] appropriate cooling/warming therapies per order  - Administer medications as ordered  - Instruct and encourage patient and family to use good hand hygiene technique  - Identify and instruct in appropriate isolation precautions for identified infection/condition  Outcome: Progressing     Problem: SAFETY ADULT  Goal: Maintain or return to baseline ADL function  Description: INTERVENTIONS:  -  Assess patient's ability to carry out ADLs; assess patient's baseline for ADL function and identify physical deficits which impact ability to perform ADLs (bathing, care of mouth/teeth, toileting, grooming, dressing, etc.)  - Assess/evaluate cause of self-care deficits   - Assess range of motion  - Assess patient's mobility; develop plan if impaired  - Assess patient's need for assistive devices and provide as appropriate  - Encourage maximum independence but intervene and supervise when necessary  - Involve family in performance of ADLs  - Assess for home care needs following discharge   - Consider OT consult to assist with ADL evaluation and planning for discharge  - Provide patient education as appropriate  Outcome: Progressing

## 2024-02-15 NOTE — PROGRESS NOTES
[facility] Podiatry - Progress Note  Patient: Nadir Myers 63 y.o. male   MRN: [id number]  PCP: Tigre Hare DO  Unit/Bed#: [facility] 902-01 Encounter: [id number]  Date Of Visit: 02/15/24    ASSESSMENT:    Nadir Myers is a 63 y.o. male with:    Septic shock - POA, resolved  Left fourth toe gangrene  - s/p left 4th digit amputation (DOS: 1/31/24)  - s/p left foot washout, partial 4th ray with closure (DOS 2/4/2024)  Left foot cellulitis  Bacteremia   Type 2 diabetes mellitus  Three-vessel coronary artery disease, heart failure with reduced ejection fraction.  Tobacco dependence  Peripheral arterial disease      PLAN:    Dressings changed at bedside today. Left 4th digit amputation site is noted to be stable at this time. Sutures intact with skin edges well coapted.   Patient remains stable for discharge from a podiatry standpoint. Patient will follow up outpatient for post-operative care, instructions placed with discharge information. Podiatry will follow peripherally while patient remains in house.  Will plan for possible suture removal at next visit if patient is still in house.   Continue local wound care of betadine, adaptic, DSD.   Wound care orders placed. Appreciate nursing assistance with dressing changes.  Per infectious disease, continue long-term IV antibiotics for 6 weeks after clearance of MSSA bacteremia.  Elevation on green foam wedges or pillows when non-ambulatory.  Rest of care per primary team.    Weight bearing status: Weightbearing as tolerated to left heel       SUBJECTIVE:     The patient was seen, evaluated, and assessed at bedside today. The patient was awake, alert, and in no acute distress. No acute events overnight. The patient reports he is doing well and is in no pain. Patient denies N/V/F/chills/SOB/CP.      OBJECTIVE:     Vitals:   /85   Pulse 78   Temp 97.7 °F (36.5 °C)   Resp 18   Ht 6' (1.829 m)   Wt 105 kg (230 lb 13.2 oz)   SpO2 94%   BMI 31.31 kg/m²     Temp  "(24hrs), Av.3 °F (36.8 °C), Min:97.3 °F (36.3 °C), Max:100.9 °F (38.3 °C)      Physical Exam:     General:  Alert, cooperative, and in no distress.  Lower extremity exam:  Cardiovascular status at baseline.  Neurological status at baseline.  S/p right 4th digit amputation. No calf tenderness noted.     Left foot: S/p left partial 4th ray resection with DPC. Incision site stable with sutures intact and skin edges well aligned. Capillary refill to skin edges < 3 seconds. Skin temperature within normal limits. No evidence of dehiscence. No signs of active infection: no purulence, no malodor, no ascending erythema, no crepitus, no fluctuance.      Clinical Images 02/15/24:      Additional Data:     Labs:    Results from last 7 days   Lab Units 02/15/24  0515   WBC Thousand/uL 11.20*   HEMOGLOBIN g/dL 10.5*   HEMATOCRIT % 33.5*   PLATELETS Thousands/uL 368   NEUTROS PCT % 82*   LYMPHS PCT % 11*   MONOS PCT % 7   EOS PCT % 0     Results from last 7 days   Lab Units 02/15/24  0515   POTASSIUM mmol/L 4.6   CHLORIDE mmol/L 98   CO2 mmol/L 27   BUN mg/dL 31*   CREATININE mg/dL 1.54*   CALCIUM mg/dL 7.9*   ALK PHOS U/L 46   ALT U/L 5*   AST U/L 25           * I Have Reviewed All Lab Data Listed Above.    Recent Cultures (last 7 days):     Results from last 7 days   Lab Units 24  1632 24  1007 24  0523   BLOOD CULTURE  No Growth at 48 hrs.  No Growth at 48 hrs. No Growth After 5 Days. Staphylococcus aureus*   GRAM STAIN RESULT   --   --  Gram positive cocci in clusters*           Imaging: I have personally reviewed pertinent films in PACS  EKG, Pathology, and Other Studies: I have personally reviewed pertinent reports.      ** Please Note: Portions of the record may have been created with voice recognition software. Occasional wrong word or \"sound a like\" substitutions may have occurred due to the inherent limitations of voice recognition software. Read the chart carefully and recognize, using context, " where substitutions have occurred. **

## 2024-02-15 NOTE — PLAN OF CARE
Problem: PHYSICAL THERAPY ADULT  Goal: Performs mobility at highest level of function for planned discharge setting.  See evaluation for individualized goals.  Description: Treatment/Interventions: Functional transfer training, LE strengthening/ROM, Elevations, Therapeutic exercise, Endurance training, Patient/family training, Equipment eval/education, Bed mobility, Spoke to nursing, Gait training, OT, Spoke to case management  Equipment Recommended: Wheelchair, Walker       See flowsheet documentation for full assessment, interventions and recommendations.  Outcome: Progressing  Note: Prognosis: Good  Problem List: Decreased strength, Decreased endurance, Impaired balance, Decreased mobility, Pain  Assessment: Pt seen for PT treatment session this date. Therapy session focused on bed mobility, functional transfers, gait training, stair training and endurance training in order to improve overall mobility and independence. Pt requires S level for all mobility performed this date. Some unsteadiness noted 2* toe offloading shoe however able to correct with RW use. Stair training continued this date with sideways technique trialed- pt with improved success. Pt making steady progress toward goals. Pt was left sitting OOB at the end of PT session with all needs in reach. Pt would benefit from continued PT services while in hospital to address remaining limitations. PT to continue to follow pt and recommends home with OPPT. The patient's AM-PAC Basic Mobility Inpatient Short Form Raw Score is 18. A Raw score of greater than 16 suggests the patient may benefit from discharge to home. Please also refer to the recommendation of the Physical Therapist for safe discharge planning.  Barriers to Discharge: None     Rehab Resource Intensity Level, PT: III (Minimum Resource Intensity)    See flowsheet documentation for full assessment.

## 2024-02-15 NOTE — PROGRESS NOTES
"[facility]  Progress Note  Name: Nadir Myers I  MRN: [id number]  Unit/Bed#: [facility] 902-01 I Date of Admission: 1/31/2024   Date of Service: 2/15/2024 I Hospital Day: 15    Assessment/Plan   * Septic shock (HCC)  Assessment & Plan  Initially admitted to the ICU requiring vasopressor support, now off, with maintenance of hemodynamic stability  Due to MSSA bacteremia from a gangrenous left fourth toe (see below)  Continue to monitor vitals and maintain hemodynamics including temperature curve  See additional management below  Shock resolved    MSSA bacteremia  Assessment & Plan  As evidenced on blood cultures from 1/31 with repeat blood cultures positive despite being on Ancef  Likely source due to left fourth toe gangrenous wound -> see plan below  GILL with evidence of mobile mass on ICD lead in the right atrium, concerning for infection, no valvular vegetation  S/p ICD removal with EP on 2/8  CT chest with possible septic pulmonary emboli, monitor closely  ID following, appreciate recommendations  Most recent Blood cultures from 2/12 negative for 48 hours  Continue IV Ancef, with plan to treat 6 weeks after clearance of bacteremia- also as per ID if 2/12 cultures flag positive they will likely add carbapenem  PICC on hold.      Abnormal CT of the chest  Assessment & Plan  CT 2/7 shows small consolidation in the upper lobes and left lower lobe with enhancing lesion in the peripheral of right lower lobe, may be embolic versus hematogenous seeding from high-grade bacteremia.    Repeat CT 2/15 revealed \"Development of large infiltrate in the left lower lobe, and a few new peripherally oriented scattered densities. Improvement in some other densities. No cavitations but peripheral location is worrisome for septic emboli. Interval pacemaker removal. Correlate with lab findings. 1.3 x 4 cm gas and fluid subcutaneous collection at the pacemaker bed, likely postsurgical but correlate " "to exclude symptoms of an abscess. Small pleural effusions. Mild mediastinal lymphadenopathy, likely reactive.\"  Asked EP to eval given findings at the pacemaker bed   Continue IV antibiotics as above  Monitor for any respiratory symptoms    Gangrene of toe of left foot ([facility])  Assessment & Plan  Appreciate podiatry input -> status post left fourth toe amputation on 1/31  [place] to be surgical cure per podiatry  Wound care    LUE weakness  Assessment & Plan  Presented w/ transient left-sided weakness and a facial droop now resolved  Appreciate neurology input deeming symptoms likely secondary to septic shock/infection and hyperglycemia, as neurologic workup including CT/MRI imaging negative for evidence of stroke or intracranial vessel occlusion, but noted chronic microangiopathic changes -> radiology report did however mention: \"Moderate bilateral supraclinoid ICA and moderate to severe left cavernous ICA segment stenosis.\"  Continue dual endplate therapy with ASA/Plavix - c/w statin  PT/OT as tolerated    Chronic systolic CHF ([facility])  Assessment & Plan  Last EF of 42%  Holding diuresis (Aldactone) in the setting of relative hypotension and recovering LINDEN  Continue ASA/Plavix and statin for underlying CAD (associated ischemic cardiomyopathy)  Monitor/replete serum potassium/magnesium as necessary  Low sodium and fluid restriction enforced  Gentle fluids for now given contrast today       PAD (peripheral artery disease) ([facility])  Assessment & Plan  Continue dual antiplatelet therapy with ASA/Plavix - continue statin  Sequela complicated by gangrenous left toe (see above)    Type 2 diabetes mellitus, with long-term current use of insulin ([facility])  Assessment & Plan  Lab Results   Component Value Date    HGBA1C 9.8 (H) 01/31/2024     Continue basal/prandial insulin with additional SSI coverage per Accu-Cheks  Hypoglycemia protocol  Carbohydrate restriction    Hyponatremia  Assessment & Plan  Maintain strict fluid " "restriction  Optimize blood sugar control as a degree of \"pseudohyponatremia\" is likely contributory  Sodium 133- will follow up tomorrow     Electrolyte abnormalities  Assessment & Plan  Monitor/replete serum potassium/magnesium/phosphate as necessary           VTE Pharmacologic Prophylaxis:    lovenox    Mobility:   Basic Mobility Inpatient Raw Score: 22  JH-HLM Goal: 7: Walk 25 feet or more  JH-HLM Achieved: 7: Walk 25 feet or more  HLM Goal achieved. Continue to encourage appropriate mobility.    Patient Centered Rounds: I performed bedside rounds with nursing staff today.  Ayla  Discussions with Specialists or Other Care Team Provider: Dr. Carol GASCA, EP AP,      Education and Discussions with Family / Patient: Updated  (son) at bedside.    Total Time Spent on Date of Encounter in care of patient: 40 mins. This time was spent on one or more of the following: performing physical exam; counseling and coordination of care; obtaining or reviewing history; documenting in the medical record; reviewing/ordering tests, medications or procedures; communicating with other healthcare professionals and discussing with patient's family/caregivers.    Current Length of Stay: 15 day(s)  Current Patient Status: Inpatient   Certification Statement: The patient will continue to require additional inpatient hospital stay due to bacteremia, IV abx  Discharge Plan: Anticipate discharge in >72 hrs to home with home services.    Code Status: Level 1 - Full Code    Subjective:   Mr. Myers reports feeling OK today, reports not having BM in 3 days. Reports getting sweaty and chills when he has fever    Objective:     Vitals:   Temp (24hrs), Av.4 °F (36.9 °C), Min:97.3 °F (36.3 °C), Max:100.9 °F (38.3 °C)    Temp:  [97.3 °F (36.3 °C)-100.9 °F (38.3 °C)] 97.7 °F (36.5 °C)  HR:  [77-87] 78  Resp:  [18-20] 18  BP: (132-149)/(81-88) 149/85  SpO2:  [92 %-96 %] 94 %  Body mass index is 31.31 kg/m².     Input " and Output Summary (last 24 hours):     Intake/Output Summary (Last 24 hours) at 2/15/2024 1253  Last data filed at 2/15/2024 1137  Gross per 24 hour   Intake 558 ml   Output 1925 ml   Net -1367 ml       Physical Exam:   Physical Exam  Vitals reviewed.   Constitutional:       Appearance: He is obese.      Comments: Patient seen sitting in bed, NAD   Cardiovascular:      Rate and Rhythm: Normal rate and regular rhythm.   Pulmonary:      Effort: Pulmonary effort is normal. No respiratory distress.      Breath sounds: Normal breath sounds.   Abdominal:      General: Bowel sounds are normal.      Palpations: Abdomen is soft.      Tenderness: There is no abdominal tenderness.   Musculoskeletal:      Right lower leg: Edema present.      Left lower leg: Edema present.   Skin:     General: Skin is warm.   Neurological:      Mental Status: He is alert and oriented to person, place, and time.   Psychiatric:         Mood and Affect: Mood normal.         Behavior: Behavior normal.          Additional Data:     Labs:  Results from last 7 days   Lab Units 02/15/24  0515   WBC Thousand/uL 11.20*   HEMOGLOBIN g/dL 10.5*   HEMATOCRIT % 33.5*   PLATELETS Thousands/uL 368   NEUTROS PCT % 82*   LYMPHS PCT % 11*   MONOS PCT % 7   EOS PCT % 0     Results from last 7 days   Lab Units 02/15/24  0515   SODIUM mmol/L 133*   POTASSIUM mmol/L 4.6   CHLORIDE mmol/L 98   CO2 mmol/L 27   BUN mg/dL 31*   CREATININE mg/dL 1.54*   ANION GAP mmol/L 8   CALCIUM mg/dL 7.9*   ALBUMIN g/dL 2.4*   TOTAL BILIRUBIN mg/dL 0.31   ALK PHOS U/L 46   ALT U/L 5*   AST U/L 25   GLUCOSE RANDOM mg/dL 94         Results from last 7 days   Lab Units 02/15/24  1119 02/15/24  0829 02/15/24  0730 02/14/24  2047 02/14/24  1555 02/14/24  1107 02/14/24  0823 02/13/24  2047 02/13/24  1642 02/13/24  1100 02/13/24  0741 02/12/24  2054   POC GLUCOSE mg/dl 185* 106 98 172* 217* 199* 151* 116 172* 230* 82 182*               Lines/Drains:  Invasive Devices       Peripheral  Intravenous Line  Duration             Peripheral IV 24 Left;Ventral (anterior) Forearm 2 days                      Telemetry:  Telemetry Orders (From admission, onward)               24 Hour Telemetry Monitoring  Continuous x 24 Hours ([place])           Question:  Reason for 24 Hour Telemetry  Answer:  Arrhythmias requiring acute medical intervention / PPM or ICD malfunction                     Telemetry Reviewed: Normal Sinus Rhythm  Indication for Continued Telemetry Use: Arrthymias requiring medical therapy             Imaging: Reviewed radiology reports from this admission including: chest CT scan and abdominal/pelvic CT    Recent Cultures (last 7 days):   Results from last 7 days   Lab Units 24  1632 24  1007 24  0523   BLOOD CULTURE  No Growth at 48 hrs.  No Growth at 48 hrs. No Growth After 5 Days. Staphylococcus aureus*   GRAM STAIN RESULT   --   --  Gram positive cocci in clusters*       Last 24 Hours Medication List:   Current Facility-Administered Medications   Medication Dose Route Frequency Provider Last Rate    acetaminophen  975 mg Oral Q6H PRN Robyn Feldman, DO      amiodarone  200 mg Oral Daily With Breakfast Robyn Feldman, DO      aspirin  81 mg [facility]      atorvastatin  80 mg Oral After Dinner Robyn Feldman, DO      cefazolin  2,000 mg Intravenous Q6H [facility] 2,000 mg (02/15/24 1137)    clopidogrel  75 mg Oral Daily [facility]      enoxaparin  40 mg Subcutaneous Q24H [facility] Robyn Feldman, DO      insulin glargine  50 Units Subcutaneous HS [facility], DO      insulin lispro  1-5 Units Subcutaneous 4x Daily (AC & HS) Robyn Feldman, DO      insulin lispro  9 Units Subcutaneous TID With Meals Robyn Feldman, DO      metoprolol  5 mg Intravenous Q6H PRN Soledad Mari PA-C      metoprolol succinate  25 mg Oral Q12H Robyn Canoel, DO      polyethylene glycol  17 g Oral Daily PRN  Robyn Feldman,       saccharomyces boulardii  250 mg Oral BID Robyn Feldman DO      senna-docusate sodium  2 tablet Oral BID PRN Robyn Feldman DO      sodium chloride  50 mL/hr Intravenous Continuous Katerine Villalpando MD 50 mL/hr (02/14/24 9740)        Today, Patient Was Seen By: Krishan Lemus PA-C    **Please Note: This note may have been constructed using a voice recognition system.**

## 2024-02-15 NOTE — ASSESSMENT & PLAN NOTE
Appreciate podiatry input -> status post left fourth toe amputation on 1/31  [place] to be surgical cure per podiatry  Wound care

## 2024-02-15 NOTE — PROGRESS NOTES
Progress Note - Infectious Disease   Nadir Myers 63 y.o. male MRN: [id number]  Unit/Bed#: [facility] 902-01 Encounter: [id number]      Impression/Plan:    Sepsis, present on arrival; fever, leukocytosis  -Source is bacteremia with ICD infection and patients foot infection as below. No other clear source. Now with intermittent fevers since 2/10. Blood cultures seem to have cleared. CT C/A/P 2/15 notable for pulmonary septic emboli and gas/fluid collection at site of ICD removal, though no other infectious source. Podiatry feels foot is healing well. Patient without any localizing symptoms, otherwise clinically stable.   -Antibiotic as below  -Follow up repeat blood cultures 2/12  -Would discuss findings on CT at ICD extraction site with EP to ensure no concerns  -Consider if fevers may be reactive to pulmonary findings, though patient without clinical signs of pneumonia  -Consider if may be drug fever to Cefazolin  -Trend fever curve  -Repeat CBC + diff tomorrow AM     Persistent MSSA bacteremia with ICD infection  -Likely initial source was patients foot wound as below with subsequent ICD infection. Blood cultures positive on 1/31-2/08 despite being on Cefazolin, suspect due to ICD infection. GILL 2/07 did show a mobile mass on device lead in right atrium, concerning for infection. No valve vegetations. CT scans from 2/15 and 2/07 negative for abscess though suspicious for pulmonary septic emboli. ICD extracted on 2/08 with lead culture that grew MSSA. Only 1 of 2 blood cultures positive on 2/09, blood cultures 2/12 currently negative.  -Continue IV Cefazolin 2g every 6 hours  -Follow up blood culture 2/12 to ensure remain negative  -Would discuss with EP to ensure no concern regarding ICD extraction site subcutaneous gas/fluid on CT scan  -Do not place PICC until we know species ID from 2/09 blood culture  -Discussed with EP and Cardiac surgery, will need to ensure blood cultures are negative post device extraction  before reimplanting device  -Will need 6 weeks of IV antibiotic from first negative blood cultures and device removal     3. Abnormal CT chest  -CT  noted small consolidations in upper lobes and left lower lobe, with enhancing lesion in perieprhal of right lower lobe. Repeat CT 2/15 with progressive left lower lobe infiltrate, new subpleural nodular airspace focus, with resolving foci at anterior left upper lobe and inferior right lower lobe. Suspect due to septic emboli. Patient denies any cough or SOB and is on room air.  -Plan for prolonged course of antibiotic as above  -Monitor for any development of respiratory symptoms  -Would obtain follow up CT as outpatient to ensure resolution    4. 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 Culture polymicrobial of tissue with MSSA, Finegoldia and Anaerococccus. Likely source of patient's bacteremia. Surgical cure felt to be obtained per Podiatry. Per follow up on 2/15, amputation site stable with intact sutures and no evidence of dehiscence or ischemia.   -Ongoing local wound care per Podiatry.     5. Staph epidermidis in 1 of 2 blood cultures  -1 set from  with Staph epi, did not grow in prior blood cultures from  or subsequent blood cultures. Suspect this was skin contaminant and as above MSSA is true pathogen.  -No further workup at this time    Plan and recommendations were discussed with primary team. They agree with continuing IV Cefazolin, follow up blood cultures .     Antibiotics:  Cefazolin    Subjective:  Denies fever, neck pain, back pain, cough, SOB. No pain at ICD extraction site.    Objective:  Vitals:  Temp:  [97.3 °F (36.3 °C)-100.9 °F (38.3 °C)] 99.1 °F (37.3 °C)  HR:  [77-87] 78  Resp:  [14-19] 14  BP: (132-156)/(81-95) 156/95  SpO2:  [92 %-96 %] 94 %  Temp (24hrs), Av.5 °F (36.9 °C), Min:97.3 °F (36.3 °C), Max:100.9 °F (38.3 °C)  Current: Temperature: 99.1 °F (37.3 °C)    Physical Exam:   General Appearance:   Alert, interactive, nontoxic, no acute distress.   Throat: Oropharynx moist without lesions.    Lungs:   Clear to auscultation bilaterally; no wheezes, rhonchi or rales; respirations unlabored   Heart:  RRR   Abdomen:   Soft, non-tender     Extremities: Left foot with dressing intact   Skin: No new rashes or lesions. Chest wall bandage at ICD extraction site intact       Labs:   All pertinent labs and imaging studies were personally reviewed  Results from last 7 days   Lab Units 02/15/24  0515 02/14/24  0503 02/11/24  0531   WBC Thousand/uL 11.20* 13.64* 16.24*   HEMOGLOBIN g/dL 10.5* 10.6* 11.0*   PLATELETS Thousands/uL 368 331 236     Results from last 7 days   Lab Units 02/15/24  0515 02/14/24  0503 02/11/24  0531 02/11/24  0531   SODIUM mmol/L 133* 132*  --  134*   POTASSIUM mmol/L 4.6 4.3  --  4.1   CHLORIDE mmol/L 98 98  --  98   CO2 mmol/L 27 27  --  28   BUN mg/dL 31* 29*  --  19   CREATININE mg/dL 1.54* 1.51*  --  1.06   EGFR ml/min/1.73sq m 47 48  --  74   CALCIUM mg/dL 7.9* 8.0*  --  8.2*   AST U/L 25 24  --   --    ALT U/L 5* 3*   < >  --    ALK PHOS U/L 46 46   < >  --     < > = values in this interval not displayed.                         Micro:  Results from last 7 days   Lab Units 02/12/24  1632 02/09/24  1007 02/09/24  0523   BLOOD CULTURE  No Growth at 48 hrs.  No Growth at 48 hrs. No Growth After 5 Days. Staphylococcus aureus*   GRAM STAIN RESULT   --   --  Gram positive cocci in clusters*       Imaging:          Zach Medina MD  [facility]

## 2024-02-15 NOTE — PHYSICAL THERAPY NOTE
PHYSICAL THERAPY NOTE          Patient Name: Nadir Myers  Today's Date: 2/15/2024       02/15/24 1202   PT Last Visit   PT Visit Date 02/15/24   Note Type   Note Type Treatment   Pain Assessment   Pain Assessment Tool 0-10   Pain Score No Pain   Restrictions/Precautions   Weight Bearing Precautions Per Order Yes   LLE Weight Bearing Per Order WBAT  (to heel)   Braces or Orthoses Other (Comment)  (LLE toe off loading shoe, sx shoe RLE donned)   Other Precautions WBS;Fall Risk   General   Chart Reviewed Yes   Response to Previous Treatment Patient with no complaints from previous session.   Family/Caregiver Present No   Cognition   Overall Cognitive Status WFL   Arousal/Participation Alert;Responsive;Cooperative   Attention Within functional limits   Orientation Level Oriented X4   Memory Within functional limits   Following Commands Follows all commands and directions without difficulty   Comments pleasant and cooperative   Bed Mobility   Supine to Sit 5  Supervision   Additional items HOB elevated;Bedrails   Sit to Supine Unable to assess   Additional Comments pt left sitting OOB in recliner with all needs within reach   Transfers   Sit to Stand 5  Supervision   Stand to Sit 5  Supervision   Additional Comments RW; performed 3x STS t/o session   Ambulation/Elevation   Gait pattern Excessively slow;Short stride;Decreased foot clearance   Gait Assistance 5  Supervision   Assistive Device Rolling walker   Distance 100' x 2   Stair Management Assistance 5  Supervision   Additional items Verbal cues   Stair Management Technique One rail L;Sideways;Step to pattern   Number of Stairs 3   Balance   Static Sitting Fair +   Dynamic Sitting Fair +   Static Standing Fair   Dynamic Standing Fair   Ambulatory Fair -  (with RW)   Endurance Deficit   Endurance Deficit Yes   Activity Tolerance   Activity Tolerance Patient tolerated treatment well    Nurse Made Aware RN cleared to participate   Assessment   Prognosis Good   Problem List Decreased strength;Decreased endurance;Impaired balance;Decreased mobility;Pain   Assessment Pt seen for PT treatment session this date. Therapy session focused on bed mobility, functional transfers, gait training, stair training and endurance training in order to improve overall mobility and independence. Pt requires S level for all mobility performed this date. Some unsteadiness noted 2* toe offloading shoe however able to correct with RW use. Stair training continued this date with sideways technique trialed- pt with improved success. Pt making steady progress toward goals. Pt was left sitting OOB at the end of PT session with all needs in reach. Pt would benefit from continued PT services while in hospital to address remaining limitations. PT to continue to follow pt and recommends home with OPPT. The patient's AM-[facility] Basic Mobility Inpatient Short Form Raw Score is 18. A Raw score of greater than 16 suggests the patient may benefit from discharge to home. Please also refer to the recommendation of the Physical Therapist for safe discharge planning.   Barriers to Discharge None   Goals   Patient Goals to go home   STG Expiration Date 02/29/24  (extend POC, goals remain appropriate)   PT Treatment Day 7   Plan   Treatment/Interventions Functional transfer training;LE strengthening/ROM;Elevations;Therapeutic exercise;Endurance training;Patient/family training;Equipment eval/education;Bed mobility;Gait training;Spoke to case management;Spoke to nursing;OT   Progress Progressing toward goals   PT Frequency 3-5x/wk  (1-2 more f/u for LRAD + continued stair training)   Discharge Recommendation   Rehab Resource Intensity Level, PT III (Minimum Resource Intensity)   Equipment Recommended Walker   Walker Package Recommended Wheeled walker   AM-[facility] Basic Mobility Inpatient   Turning in Flat Bed Without Bedrails 3   Lying on Back to  Sitting on Edge of Flat Bed Without Bedrails 3   Moving Bed to Chair 3   Standing Up From Chair Using Arms 3   Walk in Room 3   Climb 3-5 Stairs With Railing 3   Basic Mobility Inpatient Raw Score 18   Basic Mobility Standardized Score 41.05   Highest Level Of Mobility   JH-HLM Goal 6: Walk 10 steps or more   JH-HLM Achieved 7: Walk 25 feet or more   Education   Education Provided Mobility training;Assistive device   Patient Demonstrates acceptance/verbal understanding   End of Consult   Patient Position at End of Consult Bedside chair;All needs within reach     Maritza Holm, PT, DPT

## 2024-02-15 NOTE — ASSESSMENT & PLAN NOTE
"Maintain strict fluid restriction  Optimize blood sugar control as a degree of \"pseudohyponatremia\" is likely contributory  Sodium 133- will follow up tomorrow   "

## 2024-02-15 NOTE — ASSESSMENT & PLAN NOTE
"CT 2/7 shows small consolidation in the upper lobes and left lower lobe with enhancing lesion in the peripheral of right lower lobe, may be embolic versus hematogenous seeding from high-grade bacteremia.    Repeat CT 2/15 revealed \"Development of large infiltrate in the left lower lobe, and a few new peripherally oriented scattered densities. Improvement in some other densities. No cavitations but peripheral location is worrisome for septic emboli. Interval pacemaker removal. Correlate with lab findings. 1.3 x 4 cm gas and fluid subcutaneous collection at the pacemaker bed, likely postsurgical but correlate to exclude symptoms of an abscess. Small pleural effusions. Mild mediastinal lymphadenopathy, likely reactive.\"  Asked EP to eval given findings at the pacemaker bed   Continue IV antibiotics as above  Monitor for any respiratory symptoms  "

## 2024-02-16 LAB
ANION GAP SERPL CALCULATED.3IONS-SCNC: 8 MMOL/L
BUN SERPL-MCNC: 29 MG/DL (ref 5–25)
CALCIUM SERPL-MCNC: 7.7 MG/DL (ref 8.4–10.2)
CHLORIDE SERPL-SCNC: 100 MMOL/L (ref 96–108)
CO2 SERPL-SCNC: 24 MMOL/L (ref 21–32)
CREAT SERPL-MCNC: 1.4 MG/DL (ref 0.6–1.3)
ERYTHROCYTE [DISTWIDTH] IN BLOOD BY AUTOMATED COUNT: 13.5 % (ref 11.6–15.1)
GFR SERPL CREATININE-BSD FRML MDRD: 53 ML/MIN/1.73SQ M
GLUCOSE SERPL-MCNC: 131 MG/DL (ref 65–140)
GLUCOSE SERPL-MCNC: 134 MG/DL (ref 65–140)
GLUCOSE SERPL-MCNC: 163 MG/DL (ref 65–140)
GLUCOSE SERPL-MCNC: 184 MG/DL (ref 65–140)
GLUCOSE SERPL-MCNC: 186 MG/DL (ref 65–140)
GLUCOSE SERPL-MCNC: 267 MG/DL (ref 65–140)
GLUCOSE SERPL-MCNC: 74 MG/DL (ref 65–140)
HCT VFR BLD AUTO: 32.5 % (ref 36.5–49.3)
HGB BLD-MCNC: 10.1 G/DL (ref 12–17)
MCH RBC QN AUTO: 28.1 PG (ref 26.8–34.3)
MCHC RBC AUTO-ENTMCNC: 31.1 G/DL (ref 31.4–37.4)
MCV RBC AUTO: 91 FL (ref 82–98)
PLATELET # BLD AUTO: 390 THOUSANDS/UL (ref 149–390)
PMV BLD AUTO: 9.4 FL (ref 8.9–12.7)
POTASSIUM SERPL-SCNC: 4.8 MMOL/L (ref 3.5–5.3)
RBC # BLD AUTO: 3.59 MILLION/UL (ref 3.88–5.62)
SODIUM SERPL-SCNC: 132 MMOL/L (ref 135–147)
WBC # BLD AUTO: 11.93 THOUSAND/UL (ref 4.31–10.16)

## 2024-02-16 PROCEDURE — 87040 BLOOD CULTURE FOR BACTERIA: CPT | Performed by: INTERNAL MEDICINE

## 2024-02-16 PROCEDURE — 99233 SBSQ HOSP IP/OBS HIGH 50: CPT | Performed by: INTERNAL MEDICINE

## 2024-02-16 PROCEDURE — 82948 REAGENT STRIP/BLOOD GLUCOSE: CPT

## 2024-02-16 PROCEDURE — 99232 SBSQ HOSP IP/OBS MODERATE 35: CPT | Performed by: PHYSICIAN ASSISTANT

## 2024-02-16 PROCEDURE — 80048 BASIC METABOLIC PNL TOTAL CA: CPT | Performed by: PHYSICIAN ASSISTANT

## 2024-02-16 PROCEDURE — 85027 COMPLETE CBC AUTOMATED: CPT | Performed by: PHYSICIAN ASSISTANT

## 2024-02-16 RX ORDER — POLYETHYLENE GLYCOL 3350 17 G/17G
17 POWDER, FOR SOLUTION ORAL DAILY
Status: DISCONTINUED | OUTPATIENT
Start: 2024-02-17 | End: 2024-03-01 | Stop reason: HOSPADM

## 2024-02-16 RX ORDER — INSULIN GLARGINE 100 [IU]/ML
42 INJECTION, SOLUTION SUBCUTANEOUS
Status: DISCONTINUED | OUTPATIENT
Start: 2024-02-16 | End: 2024-02-18

## 2024-02-16 RX ORDER — INSULIN LISPRO 100 [IU]/ML
3 INJECTION, SOLUTION INTRAVENOUS; SUBCUTANEOUS
Status: DISCONTINUED | OUTPATIENT
Start: 2024-02-16 | End: 2024-02-18

## 2024-02-16 RX ADMIN — CEFAZOLIN SODIUM 2000 MG: 2 SOLUTION INTRAVENOUS at 17:48

## 2024-02-16 RX ADMIN — AMIODARONE HYDROCHLORIDE 200 MG: 200 TABLET ORAL at 07:49

## 2024-02-16 RX ADMIN — INSULIN GLARGINE 42 UNITS: 100 INJECTION, SOLUTION SUBCUTANEOUS at 22:21

## 2024-02-16 RX ADMIN — CEFAZOLIN SODIUM 2000 MG: 2 SOLUTION INTRAVENOUS at 06:02

## 2024-02-16 RX ADMIN — INSULIN LISPRO 1 UNITS: 100 INJECTION, SOLUTION INTRAVENOUS; SUBCUTANEOUS at 17:47

## 2024-02-16 RX ADMIN — Medication 250 MG: at 07:49

## 2024-02-16 RX ADMIN — CLOPIDOGREL BISULFATE 75 MG: 75 TABLET ORAL at 07:49

## 2024-02-16 RX ADMIN — METOPROLOL SUCCINATE 25 MG: 25 TABLET, EXTENDED RELEASE ORAL at 06:02

## 2024-02-16 RX ADMIN — METOPROLOL SUCCINATE 25 MG: 25 TABLET, EXTENDED RELEASE ORAL at 17:48

## 2024-02-16 RX ADMIN — INSULIN LISPRO 3 UNITS: 100 INJECTION, SOLUTION INTRAVENOUS; SUBCUTANEOUS at 12:12

## 2024-02-16 RX ADMIN — INSULIN LISPRO 2 UNITS: 100 INJECTION, SOLUTION INTRAVENOUS; SUBCUTANEOUS at 12:12

## 2024-02-16 RX ADMIN — ENOXAPARIN SODIUM 40 MG: 40 INJECTION SUBCUTANEOUS at 07:49

## 2024-02-16 RX ADMIN — INSULIN LISPRO 1 UNITS: 100 INJECTION, SOLUTION INTRAVENOUS; SUBCUTANEOUS at 22:22

## 2024-02-16 RX ADMIN — CEFAZOLIN SODIUM 2000 MG: 2 SOLUTION INTRAVENOUS at 12:12

## 2024-02-16 RX ADMIN — ATORVASTATIN CALCIUM 80 MG: 80 TABLET, FILM COATED ORAL at 17:48

## 2024-02-16 RX ADMIN — CEFAZOLIN SODIUM 2000 MG: 2 SOLUTION INTRAVENOUS at 00:03

## 2024-02-16 RX ADMIN — INSULIN LISPRO 3 UNITS: 100 INJECTION, SOLUTION INTRAVENOUS; SUBCUTANEOUS at 17:48

## 2024-02-16 RX ADMIN — Medication 250 MG: at 17:48

## 2024-02-16 RX ADMIN — POLYETHYLENE GLYCOL 3350 17 G: 17 POWDER, FOR SOLUTION ORAL at 08:32

## 2024-02-16 RX ADMIN — ASPIRIN 81 MG CHEWABLE TABLET 81 MG: 81 TABLET CHEWABLE at 07:49

## 2024-02-16 NOTE — PROGRESS NOTES
"[facility]  Progress Note  Name: Nadir Myers I  MRN: [id number]  Unit/Bed#: [facility] 902-01 I Date of Admission: 1/31/2024   Date of Service: 2/16/2024 I Hospital Day: 16    Assessment/Plan   * Septic shock (HCC)  Assessment & Plan  Initially admitted to the ICU requiring vasopressor support, now off, with maintenance of hemodynamic stability  Due to MSSA bacteremia from a gangrenous left fourth toe (see below)  Continue to monitor vitals and maintain hemodynamics including temperature curve  See additional management below  Shock resolved    MSSA bacteremia  Assessment & Plan  As evidenced on blood cultures from 1/31 with repeat blood cultures positive despite being on Ancef  Likely source due to left fourth toe gangrenous wound -> see plan below  GILL with evidence of mobile mass on ICD lead in the right atrium, concerning for infection, no valvular vegetation  S/p ICD removal with EP on 2/8  CT chest with possible septic pulmonary emboli, monitor closely  ID following, appreciate recommendations  Repeat Blood cultures from 2/12 negative for 72 hours and ID repeating blood cultures again today  Continue IV Ancef, with plan to treat 6 weeks after clearance of bacteremia- also as per ID if 2/12 cultures flag positive they will likely add carbapenem  PICC on hold.      Abnormal CT of the chest  Assessment & Plan  CT 2/7 shows small consolidation in the upper lobes and left lower lobe with enhancing lesion in the peripheral of right lower lobe, may be embolic versus hematogenous seeding from high-grade bacteremia.    Repeat CT 2/15 revealed \"Development of large infiltrate in the left lower lobe, and a few new peripherally oriented scattered densities. Improvement in some other densities. No cavitations but peripheral location is worrisome for septic emboli. Interval pacemaker removal. Correlate with lab findings. 1.3 x 4 cm gas and fluid subcutaneous collection at the pacemaker " "bed, likely postsurgical but correlate to exclude symptoms of an abscess. Small pleural effusions. Mild mediastinal lymphadenopathy, likely reactive.\"  Asked EP to eval again today given findings at the pacemaker bed   Continue IV antibiotics as above  Monitor for any respiratory symptoms    Gangrene of toe of left foot ([facility])  Assessment & Plan  Appreciate podiatry input -> status post left fourth toe amputation on 1/31  [place] to be surgical cure per podiatry  Wound care    LUE weakness  Assessment & Plan  Presented w/ transient left-sided weakness and a facial droop now resolved  Appreciate neurology input deeming symptoms likely secondary to septic shock/infection and hyperglycemia, as neurologic workup including CT/MRI imaging negative for evidence of stroke or intracranial vessel occlusion, but noted chronic microangiopathic changes -> radiology report did however mention: \"Moderate bilateral supraclinoid ICA and moderate to severe left cavernous ICA segment stenosis.\"  Continue dual endplate therapy with ASA/Plavix - c/w statin  PT/OT as tolerated    Chronic systolic CHF ([facility])  Assessment & Plan  Last EF of 42%  Holding diuresis (Aldactone) in the setting of hypotension and LINDEN  Continue ASA/Plavix and statin for underlying CAD (associated ischemic cardiomyopathy)  Monitor/replete serum potassium/magnesium as necessary  Low sodium and fluid restriction enforced  Was placed on gentle fluids for given contrast CT 2/15. With UE and LE swelling, will discontinue IVF. AM BMP results pending. If creatinine worsened, will consult Nephrology        PAD (peripheral artery disease) ([facility])  Assessment & Plan  Continue dual antiplatelet therapy with ASA/Plavix - continue statin  Sequela complicated by gangrenous left toe (see above)    Type 2 diabetes mellitus, with long-term current use of insulin ([facility])  Assessment & Plan  Lab Results   Component Value Date    HGBA1C 9.8 (H) 01/31/2024     Continue basal/prandial insulin " "with additional SSI coverage per Accu-Cheks  AM dose of HumaLog was held given relative hypoglycemia. Will decrease bedtime Lantus from 50 to 42 units and mealtime HumaLog from 9 to 3 units   Hypoglycemia protocol  Carbohydrate restriction    Hyponatremia  Assessment & Plan  Maintain strict fluid restriction  Optimize blood sugar control as a degree of \"pseudohyponatremia\" is likely contributory  AM BMP pending    Electrolyte abnormalities  Assessment & Plan  Monitor/replete serum potassium/magnesium/phosphate as necessary             VTE Pharmacologic Prophylaxis:    lovenox    Mobility:   Basic Mobility Inpatient Raw Score: 18  JH-HLM Goal: 6: Walk 10 steps or more  JH-HLM Achieved: 6: Walk 10 steps or more  HLM Goal achieved. Continue to encourage appropriate mobility.    Patient Centered Rounds: I performed bedside rounds with nursing staff today.  Olamide  Discussions with Specialists or Other Care Team Provider: Dr. Medina with ID and TT'd EP AP    Education and Discussions with Family / Patient: Updated  (wife) at bedside.    Total Time Spent on Date of Encounter in care of patient: 36 mins. This time was spent on one or more of the following: performing physical exam; counseling and coordination of care; obtaining or reviewing history; documenting in the medical record; reviewing/ordering tests, medications or procedures; communicating with other healthcare professionals and discussing with patient's family/caregivers.    Current Length of Stay: 16 day(s)  Current Patient Status: Inpatient   Certification Statement: The patient will continue to require additional inpatient hospital stay due to IV abx, LINDEN, blood cx  Discharge Plan: Anticipate discharge in >72 hrs to home with home services.    Code Status: Level 1 - Full Code    Subjective:   Mr. Myers reports swelling in his hands and feet. Otherwise denies complaint. He asks if his most recent blood cultures were negative. Reports no BM in 3 " days but drinking miralax this AM    Objective:     Vitals:   Temp (24hrs), Av.6 °F (37 °C), Min:98.3 °F (36.8 °C), Max:99.1 °F (37.3 °C)    Temp:  [98.3 °F (36.8 °C)-99.1 °F (37.3 °C)] 98.3 °F (36.8 °C)  HR:  [75-79] 79  Resp:  [14-18] 18  BP: (129-156)/(80-95) 141/82  SpO2:  [93 %] 93 %  Body mass index is 33.61 kg/m².     Input and Output Summary (last 24 hours):     Intake/Output Summary (Last 24 hours) at 2024 1003  Last data filed at 2024 0608  Gross per 24 hour   Intake 120 ml   Output 1225 ml   Net -1105 ml       Physical Exam:   Physical Exam  Vitals reviewed.   Constitutional:       Appearance: He is obese.      Comments: Patient seen sitting in bed, NAD, wife at bedside   Cardiovascular:      Rate and Rhythm: Normal rate and regular rhythm.   Pulmonary:      Effort: Pulmonary effort is normal. No respiratory distress.      Breath sounds: Normal breath sounds.   Abdominal:      General: Bowel sounds are normal.      Palpations: Abdomen is soft.      Tenderness: There is no abdominal tenderness.   Musculoskeletal:      Right lower leg: Edema present.      Left lower leg: Edema present.   Skin:     General: Skin is warm.   Neurological:      Mental Status: He is alert and oriented to person, place, and time.   Psychiatric:         Mood and Affect: Mood normal.         Behavior: Behavior normal.          Additional Data:     Labs:  Results from last 7 days   Lab Units 24  0851 02/15/24  0515   WBC Thousand/uL 11.93* 11.20*   HEMOGLOBIN g/dL 10.1* 10.5*   HEMATOCRIT % 32.5* 33.5*   PLATELETS Thousands/uL 390 368   NEUTROS PCT %  --  82*   LYMPHS PCT %  --  11*   MONOS PCT %  --  7   EOS PCT %  --  0     Results from last 7 days   Lab Units 02/15/24  0515   SODIUM mmol/L 133*   POTASSIUM mmol/L 4.6   CHLORIDE mmol/L 98   CO2 mmol/L 27   BUN mg/dL 31*   CREATININE mg/dL 1.54*   ANION GAP mmol/L 8   CALCIUM mg/dL 7.9*   ALBUMIN g/dL 2.4*   TOTAL BILIRUBIN mg/dL 0.31   ALK PHOS U/L 46   ALT U/L  5*   AST U/L 25   GLUCOSE RANDOM mg/dL 94         Results from last 7 days   Lab Units 02/16/24  0817 02/16/24  0746 02/16/24  0714 02/15/24  2135 02/15/24  1615 02/15/24  1119 02/15/24  0829 02/15/24  0730 02/14/24  2047 02/14/24  1555 02/14/24  1107 02/14/24  0823   POC GLUCOSE mg/dl 131 134 74 132 117 185* 106 98 172* 217* 199* 151*               Lines/Drains:  Invasive Devices       Peripheral Intravenous Line  Duration             Peripheral IV 02/12/24 Left;Ventral (anterior) Forearm 3 days                      Telemetry:  Telemetry Orders (From admission, onward)               24 Hour Telemetry Monitoring  Continuous x 24 Hours (Telem)        Question:  Reason for 24 Hour Telemetry  Answer:  Arrhythmias requiring acute medical intervention / PPM or ICD malfunction                     Telemetry Reviewed: Normal Sinus Rhythm  Indication for Continued Telemetry Use: Arrthymias requiring medical therapy             Imaging: Reviewed radiology reports from this admission including: chest CT scan and abdominal/pelvic CT    Recent Cultures (last 7 days):   Results from last 7 days   Lab Units 02/12/24  1632 02/09/24  1007   BLOOD CULTURE  No Growth at 72 hrs.  No Growth at 72 hrs. No Growth After 5 Days.       Last 24 Hours Medication List:   Current Facility-Administered Medications   Medication Dose Route Frequency Provider Last Rate    acetaminophen  975 mg Oral Q6H PRN Harnishkumar Feldman, DO      amiodarone  200 mg Oral Daily With Breakfast Hargemaksavi Canoel, DO      aspirin  81 mg Oral Daily Harnishkumar Feldman, DO      atorvastatin  80 mg Oral After Dinner Harnishkumar Feldman, DO      cefazolin  2,000 mg Intravenous Q6H Montanakumar Feldman, DO 2,000 mg (02/16/24 0602)    clopidogrel  75 mg Oral Daily Harnishkumar Feldman, DO      enoxaparin  40 mg Subcutaneous Q24H [facility] Robyn Feldman, DO      insulin glargine  42 Units Subcutaneous HS Krishan Lemus PA-C      insulin lispro  1-5 Units Subcutaneous 4x Daily (AC  & HS) Robyn Feldman, DO      insulin lispro  3 Units Subcutaneous TID With Meals Krishan Lemus PA-C      metoprolol  5 mg Intravenous Q6H PRN Soledad Mari PA-C      metoprolol succinate  25 mg Oral Q12H Robyn Feldman,       polyethylene glycol  17 g Oral Daily PRN Robyn Feldman,       saccharomyces boulardii  250 mg Oral BID Robyn Feldman, DO      senna-docusate sodium  2 tablet Oral BID PRN Robyn Feldman, DO          Today, Patient Was Seen By: Krishan Lemus PA-C    **Please Note: This note may have been constructed using a voice recognition system.**

## 2024-02-16 NOTE — ASSESSMENT & PLAN NOTE
Lab Results   Component Value Date    HGBA1C 9.8 (H) 01/31/2024     Continue basal/prandial insulin with additional SSI coverage per Accu-Cheks  AM dose of HumaLog was held given relative hypoglycemia. Will decrease bedtime Lantus from 50 to 42 units and mealtime HumaLog from 9 to 3 units   Hypoglycemia protocol  Carbohydrate restriction

## 2024-02-16 NOTE — ASSESSMENT & PLAN NOTE
"CT 2/7 shows small consolidation in the upper lobes and left lower lobe with enhancing lesion in the peripheral of right lower lobe, may be embolic versus hematogenous seeding from high-grade bacteremia.    Repeat CT 2/15 revealed \"Development of large infiltrate in the left lower lobe, and a few new peripherally oriented scattered densities. Improvement in some other densities. No cavitations but peripheral location is worrisome for septic emboli. Interval pacemaker removal. Correlate with lab findings. 1.3 x 4 cm gas and fluid subcutaneous collection at the pacemaker bed, likely postsurgical but correlate to exclude symptoms of an abscess. Small pleural effusions. Mild mediastinal lymphadenopathy, likely reactive.\"  Asked EP to eval again today given findings at the pacemaker bed   Continue IV antibiotics as above  Monitor for any respiratory symptoms  "

## 2024-02-16 NOTE — DISCHARGE INSTR - AVS FIRST PAGE
Discharge Instructions - Podiatry    Weight Bearing Status: Weight bearing as tolerated to both feet    Pain: Continue analgesics as directed    Follow-up appointment instructions: Please make an appointment within one week of discharge with Dr. Hopper. Contact sooner if any increase in pain, or signs of infection occur

## 2024-02-16 NOTE — ASSESSMENT & PLAN NOTE
As evidenced on blood cultures from 1/31 with repeat blood cultures positive despite being on Ancef  Likely source due to left fourth toe gangrenous wound -> see plan below  GILL with evidence of mobile mass on ICD lead in the right atrium, concerning for infection, no valvular vegetation  S/p ICD removal with EP on 2/8  CT chest with possible septic pulmonary emboli, monitor closely  ID following, appreciate recommendations  Repeat Blood cultures from 2/12 negative for 72 hours and ID repeating blood cultures again today  Continue IV Ancef, with plan to treat 6 weeks after clearance of bacteremia- also as per ID if 2/12 cultures flag positive they will likely add carbapenem  PICC on hold.

## 2024-02-16 NOTE — PLAN OF CARE
Problem: METABOLIC, FLUID AND ELECTROLYTES - ADULT  Goal: Electrolytes maintained within normal limits  Description: INTERVENTIONS:  - Monitor labs and assess patient for signs and symptoms of electrolyte imbalances  - Administer electrolyte replacement as ordered  - Monitor response to electrolyte replacements, including repeat lab results as appropriate  - Instruct patient on fluid and nutrition as appropriate  Outcome: Progressing  Goal: Fluid balance maintained  Description: INTERVENTIONS:  - Monitor labs   - Monitor I/O and WT  - Instruct patient on fluid and nutrition as appropriate  - Assess for signs & symptoms of volume excess or deficit  Outcome: Progressing  Goal: Glucose maintained within target range  Description: INTERVENTIONS:  - Monitor Blood Glucose as ordered  - Assess for signs and symptoms of hyperglycemia and hypoglycemia  - Administer ordered medications to maintain glucose within target range  - Assess nutritional intake and initiate nutrition service referral as needed  Outcome: Progressing     Problem: Prexisting or High Potential for Compromised Skin Integrity  Goal: Skin integrity is maintained or improved  Description: INTERVENTIONS:  - Identify patients at risk for skin breakdown  - Assess and monitor skin integrity  - Assess and monitor nutrition and hydration status  - Monitor labs   - Assess for incontinence   - Turn and reposition patient  - Assist with mobility/ambulation  - Relieve pressure over bony prominences  - Avoid friction and shearing  - Provide appropriate hygiene as needed including keeping skin clean and dry  - Evaluate need for skin moisturizer/barrier cream  - Collaborate with interdisciplinary team   - Patient/family teaching  - Consider wound care consult   Outcome: Progressing     Problem: PAIN - ADULT  Goal: Verbalizes/displays adequate comfort level or baseline comfort level  Description: Interventions:  - Encourage patient to monitor pain and request  assistance  - Assess pain using appropriate pain scale  - Administer analgesics based on type and severity of pain and evaluate response  - Implement non-pharmacological measures as appropriate and evaluate response  - Consider cultural and social influences on pain and pain management  - Notify physician/advanced practitioner if interventions unsuccessful or patient reports new pain  Outcome: Progressing     Problem: INFECTION - ADULT  Goal: Absence or prevention of progression during hospitalization  Description: INTERVENTIONS:  - Assess and monitor for signs and symptoms of infection  - Monitor lab/diagnostic results  - Monitor all insertion sites, i.e. indwelling lines, tubes, and drains  - Monitor endotracheal if appropriate and nasal secretions for changes in amount and color  - [place] appropriate cooling/warming therapies per order  - Administer medications as ordered  - Instruct and encourage patient and family to use good hand hygiene technique  - Identify and instruct in appropriate isolation precautions for identified infection/condition  Outcome: Progressing  Goal: Absence of fever/infection during neutropenic period  Description: INTERVENTIONS:  - Monitor WBC    Outcome: Progressing     Problem: SAFETY ADULT  Goal: Patient will remain free of falls  Description: INTERVENTIONS:  - Educate patient/family on patient safety including physical limitations  - Instruct patient to call for assistance with activity   - Consult OT/PT to assist with strengthening/mobility   - Keep Call bell within reach  - Keep bed low and locked with side rails adjusted as appropriate  - Keep care items and personal belongings within reach  - Initiate and maintain comfort rounds  - Make Fall Risk Sign visible to staff  - Apply yellow socks and bracelet for high fall risk patients  - Consider moving patient to room near nurses station  Outcome: Progressing  Goal: Maintain or return to baseline ADL function  Description:  INTERVENTIONS:  -  Assess patient's ability to carry out ADLs; assess patient's baseline for ADL function and identify physical deficits which impact ability to perform ADLs (bathing, care of mouth/teeth, toileting, grooming, dressing, etc.)  - Assess/evaluate cause of self-care deficits   - Assess range of motion  - Assess patient's mobility; develop plan if impaired  - Assess patient's need for assistive devices and provide as appropriate  - Encourage maximum independence but intervene and supervise when necessary  - Involve family in performance of ADLs  - Assess for home care needs following discharge   - Consider OT consult to assist with ADL evaluation and planning for discharge  - Provide patient education as appropriate  Outcome: Progressing  Goal: Maintains/Returns to pre admission functional level  Description: INTERVENTIONS:  - Perform AM-PAC 6 Click Basic Mobility/ Daily Activity assessment daily.  - Set and communicate daily mobility goal to care team and patient/family/caregiver.   - Collaborate with rehabilitation services on mobility goals if consulted  - Record patient progress and toleration of activity level   Outcome: Progressing     Problem: DISCHARGE PLANNING  Goal: Discharge to home or other facility with appropriate resources  Description: INTERVENTIONS:  - Identify barriers to discharge w/patient and caregiver  - Arrange for needed discharge resources and transportation as appropriate  - Identify discharge learning needs (meds, wound care, etc.)  - Arrange for interpretive services to assist at discharge as needed  - Refer to Case Management Department for coordinating discharge planning if the patient needs post-hospital services based on physician/advanced practitioner order or complex needs related to functional status, cognitive ability, or social support system  Outcome: Progressing     Problem: Knowledge Deficit  Goal: Patient/family/caregiver demonstrates understanding of disease  process, treatment plan, medications, and discharge instructions  Description: Complete learning assessment and assess knowledge base.  Interventions:  - Provide teaching at level of understanding  - Provide teaching via preferred learning methods  Outcome: Progressing

## 2024-02-16 NOTE — PROGRESS NOTES
Progress Note - Infectious Disease   Nadir Myers 63 y.o. male MRN: [id number]  Unit/Bed#: [facility] 902-01 Encounter: [id number]      Impression/Plan:    Sepsis, present on arrival; fever, leukocytosis  -Source is bacteremia with ICD infection and patients foot infection as below. No other clear source. Now with intermittent fevers since 2/10. Blood cultures seem to have cleared. CT C/A/P 2/15 notable for pulmonary septic emboli and gas/fluid collection at site of ICD removal, though no other infectious source. Podiatry feels foot is healing well. Patient without any localizing symptoms, otherwise clinically stable.  Fever curve improving, WBC stable.  -Antibiotic as below  -Follow up repeat blood cultures 2/12 and 2/16  -Would discuss findings on CT at ICD extraction site with EP to ensure no concerns  -Consider if fevers may be reactive to pulmonary findings, though patient without clinical signs of pneumonia  -Consider if may be drug fever to Cefazolin  -Trend fever curve  -Repeat CBC + diff tomorrow AM     Persistent MSSA bacteremia with ICD infection  -Likely initial source was patients foot wound as below with subsequent ICD infection. Blood cultures positive on 1/31-2/08 despite being on Cefazolin, suspect due to ICD infection. GILL 2/07 did show a mobile mass on device lead in right atrium, concerning for infection. No valve vegetations. CT scans from 2/15 and 2/07 negative for abscess though suspicious for pulmonary septic emboli. ICD extracted on 2/08 with lead culture that grew MSSA. Only 1 of 2 blood cultures positive on 2/09, blood cultures 2/12 currently negative.  -Continue IV Cefazolin 2g every 6 hours  -Follow up blood culture 2/12 and 2/16 to ensure remain negative  -Would discuss with EP to ensure no concern regarding ICD extraction site subcutaneous gas/fluid on CT scan  -Do not place PICC until blood cultures from 2/12 finalized negative  -Regarding reimplantation of new device, would wait at  least 72 hours from first negative blood cultures; although there was no valvular vegetation on GILL, given patient's prolonged bacteremia and pulmonary septic emboli, may be best to wait closer to 14 days from first negative blood cultures if new device not needed more urgently  -Will need 6 weeks of IV antibiotic from first negative blood cultures and device removal, through 3/25/2024  -Will need weekly CBC + diff, serum Cr as outpatient and ID follow up     3. Abnormal CT chest  -CT 2/07 noted small consolidations in upper lobes and left lower lobe, with enhancing lesion in perieprhal of right lower lobe. Repeat CT 2/15 with progressive left lower lobe infiltrate, new subpleural nodular airspace focus, with resolving foci at anterior left upper lobe and inferior right lower lobe. Suspect due to septic emboli. Patient denies any cough or SOB and is on room air.  -Plan for prolonged course of antibiotic as above  -Monitor for any development of respiratory symptoms  -Would obtain follow up CT as outpatient to ensure resolution    4. 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 Culture polymicrobial of tissue with MSSA, Finegoldia and Anaerococccus. Likely source of patient's bacteremia. Surgical cure felt to be obtained per Podiatry. Per follow up on 2/15, amputation site stable with intact sutures and no evidence of dehiscence or ischemia.   -Ongoing local wound care per Podiatry.     5. Staph epidermidis in 1 of 2 blood cultures  -1 set from 2/03 with Staph epi, did not grow in prior blood cultures from 1/31 or subsequent blood cultures. Suspect this was skin contaminant and as above MSSA is true pathogen.  -No further workup at this time    Plan and recommendations were discussed with primary team. They agree with continuing IV Cefazolin, follow up blood cultures 2/12 and 2/16.     Antibiotics:  Cefazolin    Subjective:  Denies fever, chills, nausea, emesis, diarrhea, cough, back pain.      Objective:  Vitals:  Temp:  [98.3 °F (36.8 °C)-99.1 °F (37.3 °C)] 98.3 °F (36.8 °C)  HR:  [75-79] 79  Resp:  [14-18] 18  BP: (129-156)/(80-95) 141/82  SpO2:  [93 %] 93 %  Temp (24hrs), Av.6 °F (37 °C), Min:98.3 °F (36.8 °C), Max:99.1 °F (37.3 °C)  Current: Temperature: 98.3 °F (36.8 °C)    Physical Exam:   General Appearance:  Alert, interactive, nontoxic, no acute distress.   Throat: Oropharynx moist without lesions.    Lungs:   Clear to auscultation bilaterally; no wheezes, rhonchi or rales; respirations unlabored   Heart:  RRR   Abdomen:   Soft, non-tender     Extremities: Left foot with dressing intact   Skin: No new rashes or lesions. Chest wall bandage at ICD extraction site intact       Labs:   All pertinent labs and imaging studies were personally reviewed  Results from last 7 days   Lab Units 02/15/24  0515 24  0503 24  0531   WBC Thousand/uL 11.20* 13.64* 16.24*   HEMOGLOBIN g/dL 10.5* 10.6* 11.0*   PLATELETS Thousands/uL 368 331 236     Results from last 7 days   Lab Units 02/15/24  0515 24  0503 24  0531 24  0531   SODIUM mmol/L 133* 132*  --  134*   POTASSIUM mmol/L 4.6 4.3  --  4.1   CHLORIDE mmol/L 98 98  --  98   CO2 mmol/L 27 27  --  28   BUN mg/dL 31* 29*  --  19   CREATININE mg/dL 1.54* 1.51*  --  1.06   EGFR ml/min/1.73sq m 47 48  --  74   CALCIUM mg/dL 7.9* 8.0*  --  8.2*   AST U/L 25 24  --   --    ALT U/L 5* 3*   < >  --    ALK PHOS U/L 46 46   < >  --     < > = values in this interval not displayed.                         Micro:  Results from last 7 days   Lab Units 24  1632 24  1007   BLOOD CULTURE  No Growth at 72 hrs.  No Growth at 72 hrs. No Growth After 5 Days.       Imaging:          Zach Medina MD  [facility]

## 2024-02-16 NOTE — ASSESSMENT & PLAN NOTE
Appreciate podiatry input -> status post left fourth toe amputation on 1/31  [place] to be surgical cure per podiatry  Wound care

## 2024-02-16 NOTE — ASSESSMENT & PLAN NOTE
"Maintain strict fluid restriction  Optimize blood sugar control as a degree of \"pseudohyponatremia\" is likely contributory  AM BMP pending  "

## 2024-02-16 NOTE — PLAN OF CARE
Problem: PAIN - ADULT  Goal: Verbalizes/displays adequate comfort level or baseline comfort level  Description: Interventions:  - Encourage patient to monitor pain and request assistance  - Assess pain using appropriate pain scale  - Administer analgesics based on type and severity of pain and evaluate response  - Implement non-pharmacological measures as appropriate and evaluate response  - Consider cultural and social influences on pain and pain management  - Notify physician/advanced practitioner if interventions unsuccessful or patient reports new pain  Outcome: Progressing     Problem: INFECTION - ADULT  Goal: Absence or prevention of progression during hospitalization  Description: INTERVENTIONS:  - Assess and monitor for signs and symptoms of infection  - Monitor lab/diagnostic results  - Monitor all insertion sites, i.e. indwelling lines, tubes, and drains  - Monitor endotracheal if appropriate and nasal secretions for changes in amount and color  - [place] appropriate cooling/warming therapies per order  - Administer medications as ordered  - Instruct and encourage patient and family to use good hand hygiene technique  - Identify and instruct in appropriate isolation precautions for identified infection/condition  Outcome: Progressing     Problem: SAFETY ADULT  Goal: Patient will remain free of falls  Description: INTERVENTIONS:  - Educate patient/family on patient safety including physical limitations  - Instruct patient to call for assistance with activity   - Consult OT/PT to assist with strengthening/mobility   - Keep Call bell within reach  - Keep bed low and locked with side rails adjusted as appropriate  - Keep care items and personal belongings within reach  - Initiate and maintain comfort rounds  - Make Fall Risk Sign visible to staff  - Apply yellow socks and bracelet for high fall risk patients  - Consider moving patient to room near nurses station  Outcome: Progressing

## 2024-02-16 NOTE — ASSESSMENT & PLAN NOTE
Last EF of 42%  Holding diuresis (Aldactone) in the setting of hypotension and LINDEN  Continue ASA/Plavix and statin for underlying CAD (associated ischemic cardiomyopathy)  Monitor/replete serum potassium/magnesium as necessary  Low sodium and fluid restriction enforced  Was placed on gentle fluids for given contrast CT 2/15. With UE and LE swelling, will discontinue IVF. AM BMP results pending. If creatinine worsened, will consult Nephrology

## 2024-02-17 PROBLEM — K59.00 CONSTIPATION: Status: ACTIVE | Noted: 2024-02-17

## 2024-02-17 PROBLEM — N17.9 AKI (ACUTE KIDNEY INJURY) (HCC): Status: ACTIVE | Noted: 2024-02-17

## 2024-02-17 LAB
ANION GAP SERPL CALCULATED.3IONS-SCNC: 7 MMOL/L
BACTERIA BLD CULT: NORMAL
BACTERIA BLD CULT: NORMAL
BUN SERPL-MCNC: 33 MG/DL (ref 5–25)
CALCIUM SERPL-MCNC: 7.8 MG/DL (ref 8.4–10.2)
CHLORIDE SERPL-SCNC: 100 MMOL/L (ref 96–108)
CO2 SERPL-SCNC: 25 MMOL/L (ref 21–32)
CREAT SERPL-MCNC: 1.55 MG/DL (ref 0.6–1.3)
ERYTHROCYTE [DISTWIDTH] IN BLOOD BY AUTOMATED COUNT: 13.7 % (ref 11.6–15.1)
GFR SERPL CREATININE-BSD FRML MDRD: 47 ML/MIN/1.73SQ M
GLUCOSE SERPL-MCNC: 101 MG/DL (ref 65–140)
GLUCOSE SERPL-MCNC: 186 MG/DL (ref 65–140)
GLUCOSE SERPL-MCNC: 232 MG/DL (ref 65–140)
GLUCOSE SERPL-MCNC: 256 MG/DL (ref 65–140)
GLUCOSE SERPL-MCNC: 95 MG/DL (ref 65–140)
HCT VFR BLD AUTO: 28.9 % (ref 36.5–49.3)
HGB BLD-MCNC: 9.2 G/DL (ref 12–17)
MCH RBC QN AUTO: 28.3 PG (ref 26.8–34.3)
MCHC RBC AUTO-ENTMCNC: 31.8 G/DL (ref 31.4–37.4)
MCV RBC AUTO: 89 FL (ref 82–98)
PLATELET # BLD AUTO: 403 THOUSANDS/UL (ref 149–390)
PMV BLD AUTO: 9.6 FL (ref 8.9–12.7)
POTASSIUM SERPL-SCNC: 4.9 MMOL/L (ref 3.5–5.3)
RBC # BLD AUTO: 3.25 MILLION/UL (ref 3.88–5.62)
SODIUM SERPL-SCNC: 132 MMOL/L (ref 135–147)
WBC # BLD AUTO: 11.06 THOUSAND/UL (ref 4.31–10.16)

## 2024-02-17 PROCEDURE — 99232 SBSQ HOSP IP/OBS MODERATE 35: CPT | Performed by: INTERNAL MEDICINE

## 2024-02-17 PROCEDURE — 80048 BASIC METABOLIC PNL TOTAL CA: CPT | Performed by: PHYSICIAN ASSISTANT

## 2024-02-17 PROCEDURE — 82948 REAGENT STRIP/BLOOD GLUCOSE: CPT

## 2024-02-17 PROCEDURE — 85027 COMPLETE CBC AUTOMATED: CPT | Performed by: PHYSICIAN ASSISTANT

## 2024-02-17 RX ORDER — AMOXICILLIN 250 MG
2 CAPSULE ORAL
Status: DISCONTINUED | OUTPATIENT
Start: 2024-02-17 | End: 2024-02-21

## 2024-02-17 RX ADMIN — CEFAZOLIN SODIUM 2000 MG: 2 SOLUTION INTRAVENOUS at 17:45

## 2024-02-17 RX ADMIN — INSULIN LISPRO 1 UNITS: 100 INJECTION, SOLUTION INTRAVENOUS; SUBCUTANEOUS at 12:41

## 2024-02-17 RX ADMIN — INSULIN LISPRO 3 UNITS: 100 INJECTION, SOLUTION INTRAVENOUS; SUBCUTANEOUS at 08:45

## 2024-02-17 RX ADMIN — AMIODARONE HYDROCHLORIDE 200 MG: 200 TABLET ORAL at 08:50

## 2024-02-17 RX ADMIN — METOPROLOL SUCCINATE 25 MG: 25 TABLET, EXTENDED RELEASE ORAL at 17:51

## 2024-02-17 RX ADMIN — INSULIN LISPRO 3 UNITS: 100 INJECTION, SOLUTION INTRAVENOUS; SUBCUTANEOUS at 12:42

## 2024-02-17 RX ADMIN — INSULIN GLARGINE 42 UNITS: 100 INJECTION, SOLUTION SUBCUTANEOUS at 21:35

## 2024-02-17 RX ADMIN — Medication 250 MG: at 17:51

## 2024-02-17 RX ADMIN — SENNOSIDES, DOCUSATE SODIUM 2 TABLET: 8.6; 5 TABLET ORAL at 05:26

## 2024-02-17 RX ADMIN — ATORVASTATIN CALCIUM 80 MG: 80 TABLET, FILM COATED ORAL at 17:51

## 2024-02-17 RX ADMIN — CLOPIDOGREL BISULFATE 75 MG: 75 TABLET ORAL at 08:49

## 2024-02-17 RX ADMIN — INSULIN LISPRO 2 UNITS: 100 INJECTION, SOLUTION INTRAVENOUS; SUBCUTANEOUS at 17:50

## 2024-02-17 RX ADMIN — POLYETHYLENE GLYCOL 3350 17 G: 17 POWDER, FOR SOLUTION ORAL at 08:44

## 2024-02-17 RX ADMIN — ENOXAPARIN SODIUM 40 MG: 40 INJECTION SUBCUTANEOUS at 08:46

## 2024-02-17 RX ADMIN — METOPROLOL SUCCINATE 25 MG: 25 TABLET, EXTENDED RELEASE ORAL at 05:26

## 2024-02-17 RX ADMIN — Medication 250 MG: at 08:49

## 2024-02-17 RX ADMIN — CEFAZOLIN SODIUM 2000 MG: 2 SOLUTION INTRAVENOUS at 12:41

## 2024-02-17 RX ADMIN — CEFAZOLIN SODIUM 2000 MG: 2 SOLUTION INTRAVENOUS at 00:25

## 2024-02-17 RX ADMIN — CEFAZOLIN SODIUM 2000 MG: 2 SOLUTION INTRAVENOUS at 05:26

## 2024-02-17 RX ADMIN — INSULIN LISPRO 3 UNITS: 100 INJECTION, SOLUTION INTRAVENOUS; SUBCUTANEOUS at 17:50

## 2024-02-17 RX ADMIN — INSULIN LISPRO 2 UNITS: 100 INJECTION, SOLUTION INTRAVENOUS; SUBCUTANEOUS at 21:36

## 2024-02-17 RX ADMIN — SENNOSIDES, DOCUSATE SODIUM 2 TABLET: 8.6; 5 TABLET ORAL at 21:35

## 2024-02-17 RX ADMIN — ASPIRIN 81 MG CHEWABLE TABLET 81 MG: 81 TABLET CHEWABLE at 08:50

## 2024-02-17 NOTE — ASSESSMENT & PLAN NOTE
CT 2/7 shows small consolidation in the upper lobes and left lower lobe with enhancing lesion in the peripheral of right lower lobe, may be embolic versus hematogenous seeding from high-grade bacteremia.    Repeat CT 2/15 revealed possible septic emboli, gas and fluid subcutaneous collection in the pacemaker bed likely postsurgical, reactive mild mediastinal lymphadenopathy   Continue IV antibiotics as above  Monitor for any respiratory symptoms

## 2024-02-17 NOTE — ASSESSMENT & PLAN NOTE
Appreciate podiatry input -> status post left fourth toe amputation on 1/31  [place] to be surgical cure per podiatry  Wound care

## 2024-02-17 NOTE — ASSESSMENT & PLAN NOTE
Persistent MSSA bacteremia most likely source foot wound, complicated with ICD infection  Blood cultures from 1/31-2/9  positive despite being on Ancef suspect due to ICD infection  GILL with evidence of mobile mass on ICD lead in the right atrium, concerning for infection, no valvular vegetation  S/p ICD removal with EP on 2/8  CT chest 2/7 and 2/15 with possible septic pulmonary emboli  Repeat Blood cultures from 2/12 negative, repeat blood cultures 2/16 pending   Continue IV Ancef, with plan to treat 6 weeks until 3/25/2024   Discussed with infectious disease today, due to prolonged bacteremia and pulmonary septic emboli although no valvular vegetations the best option might be to continue antibiotics closer to 14 days from the negative blood cultures before EP places the new device  PICC on hold.

## 2024-02-17 NOTE — PROGRESS NOTES
[facility]  Progress Note  Name: Nadir Myers I  MRN: [id number]  Unit/Bed#: [facility]PHP 902-01 I Date of Admission: 1/31/2024   Date of Service: 2/17/2024 I Hospital Day: 17    Assessment/Plan   * Septic shock (HCC)  Assessment & Plan  Initially admitted to the ICU requiring vasopressor support, now off, with maintenance of hemodynamic stability  Due to MSSA bacteremia from a gangrenous left fourth toe (see below) complicated by ICD infection  Continue to monitor vitals and maintain hemodynamics including temperature curve  See additional management below  Shock resolved    MSSA bacteremia  Assessment & Plan  Persistent MSSA bacteremia most likely source foot wound, complicated with ICD infection  Blood cultures from 1/31-2/9  positive despite being on Ancef suspect due to ICD infection  GILL with evidence of mobile mass on ICD lead in the right atrium, concerning for infection, no valvular vegetation  S/p ICD removal with EP on 2/8  CT chest 2/7 and 2/15 with possible septic pulmonary emboli  Repeat Blood cultures from 2/12 negative, repeat blood cultures 2/16 pending   Continue IV Ancef, with plan to treat 6 weeks until 3/25/2024   Discussed with infectious disease today, due to prolonged bacteremia and pulmonary septic emboli although no valvular vegetations the best option might be to continue antibiotics closer to 14 days from the negative blood cultures before EP places the new device  PICC on hold.      Abnormal CT of the chest  Assessment & Plan  CT 2/7 shows small consolidation in the upper lobes and left lower lobe with enhancing lesion in the peripheral of right lower lobe, may be embolic versus hematogenous seeding from high-grade bacteremia.    Repeat CT 2/15 revealed possible septic emboli, gas and fluid subcutaneous collection in the pacemaker bed likely postsurgical, reactive mild mediastinal lymphadenopathy   Continue IV antibiotics as above  Monitor for any  "respiratory symptoms    Gangrene of toe of left foot ([facility])  Assessment & Plan  Appreciate podiatry input -> status post left fourth toe amputation on 1/31  [place] to be surgical cure per podiatry  Wound care    LINDEN (acute kidney injury) ([facility])  Assessment & Plan  Had acute kidney injury earlier on admission, suspect due to septic shock  Developed again acute kidney injury 2/14, had CT with contrast 2/15  On exam patient seems euvolemic, his spironolactone was held earlier on admission due to LINDEN  Voiding well, no diarrhea  Check pvr  Could be a component of contrast-induced 48 hours ago  Monitor bmp    Chronic systolic CHF ([facility])  Assessment & Plan  Last EF of 42%  Holding diuresis (Aldactone) in the setting of hypotension and LINDEN.  Potential resolved, LINDEN persisted  Continue ASA/Plavix and statin for underlying CAD (associated ischemic cardiomyopathy)  Monitor/replete serum potassium/magnesium as necessary  Low sodium and fluid restriction enforced  Was placed on gentle fluids for given contrast CT 2/15. With UE and LE swelling, IV fluids discontinued.     Electrolyte abnormalities  Assessment & Plan  Monitor/replete serum potassium/magnesium/phosphate as necessary    Hyponatremia  Assessment & Plan  Hyponatremic since admission, mild, improved in the past few days  Blood glucose mostly at goal  If worsens will order urine studies  Ordered [facility] tomorrow morning    LUE weakness  Assessment & Plan  Presented w/ transient left-sided weakness and a facial droop now resolved  Appreciate neurology input deeming symptoms likely secondary to septic shock/infection and hyperglycemia, as neurologic workup including CT/MRI imaging negative for evidence of stroke or intracranial vessel occlusion, but noted chronic microangiopathic changes -> radiology report did however mention: \"Moderate bilateral supraclinoid ICA and moderate to severe left cavernous ICA segment stenosis.\"  Continue dual endplate therapy with ASA/Plavix - c/w " statin  PT/OT as tolerated    PAD (peripheral artery disease) ([facility])  Assessment & Plan  Continue dual antiplatelet therapy with ASA/Plavix - continue statin      Type 2 diabetes mellitus, with long-term current use of insulin ([facility])  Assessment & Plan  Lab Results   Component Value Date    HGBA1C 9.8 (H) 01/31/2024     Continue Lantus 42 units at bedtime, Humalog 3 units 3 times daily with meals with sliding scale  Hypoglycemia protocol  Carbohydrate restriction  Continue to adjust    Constipation  Assessment & Plan  No BM for 3 days  Received Senokot and MiraLAX earlier today  Try prune juice today  Schedule Senokot 2 tablet at bedtime, continue scheduled MiraLAX daily    Coronary artery disease involving native coronary artery of native heart without angina pectoris  Assessment & Plan  History of coronary artery disease status post HANNA to left circumflex 2015, repeat cath 2023 with  of mid LAD and L PDA.  Ischemic cardiomyopathy  Continue aspirin/Plavix/statin/metoprolol    V-tach ([facility])  Assessment & Plan  History of monomorphic VT status post secondary prevention with Medtronic dual-chamber ICD 5/2023  Status post ICD extraction 2/8/2024  Plan to place another device once cleared by ID             VTE Pharmacologic Prophylaxis:   Moderate Risk (Score 3-4) - Pharmacological DVT Prophylaxis Ordered: enoxaparin (Lovenox).    Mobility:   Basic Mobility Inpatient Raw Score: 18  JH-HLM Goal: 6: Walk 10 steps or more  JH-HLM Achieved: 6: Walk 10 steps or more  HLM Goal achieved. Continue to encourage appropriate mobility.    Patient Centered Rounds: I performed bedside rounds with nursing staff today.   Discussions with Specialists or Other Care Team Provider: Nursing, infectious disease    Education and Discussions with Family / Patient: Updated  (daughter) via phone.    Total Time Spent on Date of Encounter in care of patient:  mins. This time was spent on one or more of the following: performing  physical exam; counseling and coordination of care; obtaining or reviewing history; documenting in the medical record; reviewing/ordering tests, medications or procedures; communicating with other healthcare professionals and discussing with patient's family/caregivers.    Current Length of Stay: 17 day(s)  Current Patient Status: Inpatient   Certification Statement: The patient will continue to require additional inpatient hospital stay due to above  Discharge Plan: Anticipate discharge in >72 hrs to home with home services.    Code Status: Level 1 - Full Code    Subjective:   Patient was seen evaluated bedside.  Denies chest pain palpitation shortness of breath, complaining of constipation    Objective:     Vitals:   Temp (24hrs), Av.9 °F (37.2 °C), Min:98.4 °F (36.9 °C), Max:99.9 °F (37.7 °C)    Temp:  [98.4 °F (36.9 °C)-99.9 °F (37.7 °C)] 98.7 °F (37.1 °C)  HR:  [74-87] 80  Resp:  [15-18] 15  BP: (137-161)/(63-99) 154/82  SpO2:  [93 %-96 %] 95 %  Body mass index is 34.5 kg/m².     Input and Output Summary (last 24 hours):     Intake/Output Summary (Last 24 hours) at 2024 1702  Last data filed at 2024 0801  Gross per 24 hour   Intake 240 ml   Output 1300 ml   Net -1060 ml       Physical Exam:   Physical Exam  Constitutional:       General: He is not in acute distress.  HENT:      Head: Atraumatic.   Cardiovascular:      Rate and Rhythm: Normal rate and regular rhythm.      Heart sounds: No murmur heard.  Pulmonary:      Effort: Pulmonary effort is normal.      Breath sounds: Normal breath sounds.   Abdominal:      General: Bowel sounds are normal. There is no distension.      Palpations: Abdomen is soft.      Tenderness: There is no abdominal tenderness.   Musculoskeletal:         General: No swelling.      Cervical back: Neck supple.   Skin:     General: Skin is warm and dry.   Neurological:      General: No focal deficit present.      Mental Status: He is alert.          Additional Data:      Labs:  Results from last 7 days   Lab Units 02/17/24 0444 02/16/24  0851 02/15/24  0515   WBC Thousand/uL 11.06*   < > 11.20*   HEMOGLOBIN g/dL 9.2*   < > 10.5*   HEMATOCRIT % 28.9*   < > 33.5*   PLATELETS Thousands/uL 403*   < > 368   NEUTROS PCT %  --   --  82*   LYMPHS PCT %  --   --  11*   MONOS PCT %  --   --  7   EOS PCT %  --   --  0    < > = values in this interval not displayed.     Results from last 7 days   Lab Units 02/17/24  0444 02/16/24  0851 02/15/24  0515   SODIUM mmol/L 132*   < > 133*   POTASSIUM mmol/L 4.9   < > 4.6   CHLORIDE mmol/L 100   < > 98   CO2 mmol/L 25   < > 27   BUN mg/dL 33*   < > 31*   CREATININE mg/dL 1.55*   < > 1.54*   ANION GAP mmol/L 7   < > 8   CALCIUM mg/dL 7.8*   < > 7.9*   ALBUMIN g/dL  --   --  2.4*   TOTAL BILIRUBIN mg/dL  --   --  0.31   ALK PHOS U/L  --   --  46   ALT U/L  --   --  5*   AST U/L  --   --  25   GLUCOSE RANDOM mg/dL 95   < > 94    < > = values in this interval not displayed.         Results from last 7 days   Lab Units 02/17/24  1654 02/17/24  1143 02/17/24  0806 02/16/24  2213 02/16/24  1648 02/16/24  1122 02/16/24  0817 02/16/24  0746 02/16/24  0714 02/15/24  2135 02/15/24  1615 02/15/24  1119   POC GLUCOSE mg/dl 256* 186* 101 184* 186* 267* 131 134 74 132 117 185*               Lines/Drains:  Invasive Devices       Peripheral Intravenous Line  Duration             Peripheral IV 02/16/24 Right;Ventral (anterior) Forearm <1 day                      Telemetry:  Telemetry Orders (From admission, onward)               24 Hour Telemetry Monitoring  Continuous x 24 Hours (Telem)        Question:  Reason for 24 Hour Telemetry  Answer:  Arrhythmias requiring acute medical intervention / PPM or ICD malfunction                     Telemetry Reviewed: Normal Sinus Rhythm  Indication for Continued Telemetry Use: Suepected PPM or ICD Malfunction             Imaging: Reviewed radiology reports from this admission including: chest CT scan and abdominal/pelvic  CT    Recent Cultures (last 7 days):   Results from last 7 days   Lab Units 02/16/24  0851 02/12/24  1632   BLOOD CULTURE  No Growth at 24 hrs.  No Growth at 24 hrs. No Growth After 4 Days.  No Growth After 4 Days.       Last 24 Hours Medication List:   Current Facility-Administered Medications   Medication Dose Route Frequency Provider Last Rate    acetaminophen  975 mg Oral Q6H PRN Harnishkumar Feldman, DO      amiodarone  200 mg Oral Daily With Breakfast Haryael Feldman, DO      aspirin  81 mg Oral Daily Harnishkumar Feldman, DO      atorvastatin  80 mg Oral After Dinner Harnishkumar Feldman, DO      cefazolin  2,000 mg Intravenous Q6H Montanaksavi Canoel, DO 2,000 mg (02/17/24 1241)    clopidogrel  75 mg Oral Daily Hargemaksavi Canoel, DO      enoxaparin  40 mg Subcutaneous Q24H TEJAS Robyn Feldman, DO      insulin glargine  42 Units Subcutaneous HS Krishan Lemus PA-C      insulin lispro  1-5 Units Subcutaneous 4x Daily (AC & HS) Robyn Feldman, DO      insulin lispro  3 Units Subcutaneous TID With Meals Krishan Lemus PA-C      metoprolol  5 mg Intravenous Q6H PRN Soledad Mari PA-C      metoprolol succinate  25 mg Oral Q12H Robyn Canoel, DO      polyethylene glycol  17 g Oral Daily Krishan Lemus PA-C      saccharomyces boulardii  250 mg Oral BID Robyn Feldman, DO      senna-docusate sodium  2 tablet Oral HS Evita Casey MD          Today, Patient Was Seen By: Evita Casey MD    **Please Note: This note may have been constructed using a voice recognition system.**

## 2024-02-17 NOTE — ASSESSMENT & PLAN NOTE
Had acute kidney injury earlier on admission, suspect due to septic shock  Developed again acute kidney injury 2/14, had CT with contrast 2/15  On exam patient seems euvolemic, his spironolactone was held earlier on admission due to LINDEN  Voiding well, no diarrhea  Check pvr  Could be a component of contrast-induced 48 hours ago  Monitor bmp

## 2024-02-17 NOTE — PLAN OF CARE
Problem: METABOLIC, FLUID AND ELECTROLYTES - ADULT  Goal: Electrolytes maintained within normal limits  Description: INTERVENTIONS:  - Monitor labs and assess patient for signs and symptoms of electrolyte imbalances  - Administer electrolyte replacement as ordered  - Monitor response to electrolyte replacements, including repeat lab results as appropriate  - Instruct patient on fluid and nutrition as appropriate  Outcome: Progressing  Goal: Fluid balance maintained  Description: INTERVENTIONS:  - Monitor labs   - Monitor I/O and WT  - Instruct patient on fluid and nutrition as appropriate  - Assess for signs & symptoms of volume excess or deficit  Outcome: Progressing  Goal: Glucose maintained within target range  Description: INTERVENTIONS:  - Monitor Blood Glucose as ordered  - Assess for signs and symptoms of hyperglycemia and hypoglycemia  - Administer ordered medications to maintain glucose within target range  - Assess nutritional intake and initiate nutrition service referral as needed  Outcome: Progressing     Problem: Prexisting or High Potential for Compromised Skin Integrity  Goal: Skin integrity is maintained or improved  Description: INTERVENTIONS:  - Identify patients at risk for skin breakdown  - Assess and monitor skin integrity  - Assess and monitor nutrition and hydration status  - Monitor labs   - Assess for incontinence   - Turn and reposition patient  - Assist with mobility/ambulation  - Relieve pressure over bony prominences  - Avoid friction and shearing  - Provide appropriate hygiene as needed including keeping skin clean and dry  - Evaluate need for skin moisturizer/barrier cream  - Collaborate with interdisciplinary team   - Patient/family teaching  - Consider wound care consult   Outcome: Progressing     Problem: PAIN - ADULT  Goal: Verbalizes/displays adequate comfort level or baseline comfort level  Description: Interventions:  - Encourage patient to monitor pain and request  assistance  - Assess pain using appropriate pain scale  - Administer analgesics based on type and severity of pain and evaluate response  - Implement non-pharmacological measures as appropriate and evaluate response  - Consider cultural and social influences on pain and pain management  - Notify physician/advanced practitioner if interventions unsuccessful or patient reports new pain  Outcome: Progressing     Problem: INFECTION - ADULT  Goal: Absence or prevention of progression during hospitalization  Description: INTERVENTIONS:  - Assess and monitor for signs and symptoms of infection  - Monitor lab/diagnostic results  - Monitor all insertion sites, i.e. indwelling lines, tubes, and drains  - Monitor endotracheal if appropriate and nasal secretions for changes in amount and color  - [place] appropriate cooling/warming therapies per order  - Administer medications as ordered  - Instruct and encourage patient and family to use good hand hygiene technique  - Identify and instruct in appropriate isolation precautions for identified infection/condition  Outcome: Progressing  Goal: Absence of fever/infection during neutropenic period  Description: INTERVENTIONS:  - Monitor WBC    Outcome: Progressing

## 2024-02-17 NOTE — ASSESSMENT & PLAN NOTE
Last EF of 42%  Holding diuresis (Aldactone) in the setting of hypotension and LINDEN.  Potential resolved, LINDEN persisted  Continue ASA/Plavix and statin for underlying CAD (associated ischemic cardiomyopathy)  Monitor/replete serum potassium/magnesium as necessary  Low sodium and fluid restriction enforced  Was placed on gentle fluids for given contrast CT 2/15. With UE and LE swelling, IV fluids discontinued.

## 2024-02-17 NOTE — ASSESSMENT & PLAN NOTE
History of coronary artery disease status post HANNA to left circumflex 2015, repeat cath 2023 with  of mid LAD and L PDA.  Ischemic cardiomyopathy  Continue aspirin/Plavix/statin/metoprolol

## 2024-02-17 NOTE — ASSESSMENT & PLAN NOTE
No BM for 3 days  Received Senokot and MiraLAX earlier today  Try prune juice today  Schedule Senokot 2 tablet at bedtime, continue scheduled MiraLAX daily

## 2024-02-17 NOTE — ASSESSMENT & PLAN NOTE
Initially admitted to the ICU requiring vasopressor support, now off, with maintenance of hemodynamic stability  Due to MSSA bacteremia from a gangrenous left fourth toe (see below) complicated by ICD infection  Continue to monitor vitals and maintain hemodynamics including temperature curve  See additional management below  Shock resolved

## 2024-02-17 NOTE — ASSESSMENT & PLAN NOTE
Lab Results   Component Value Date    HGBA1C 9.8 (H) 01/31/2024     Continue Lantus 42 units at bedtime, Humalog 3 units 3 times daily with meals with sliding scale  Hypoglycemia protocol  Carbohydrate restriction  Continue to adjust

## 2024-02-17 NOTE — ASSESSMENT & PLAN NOTE
Hyponatremic since admission, mild, improved in the past few days  Blood glucose mostly at goal  If worsens will order urine studies  Ordered BMP tomorrow morning

## 2024-02-17 NOTE — ASSESSMENT & PLAN NOTE
History of monomorphic VT status post secondary prevention with Medtronic dual-chamber ICD 5/2023  Status post ICD extraction 2/8/2024  Plan to place another device once cleared by ID

## 2024-02-18 LAB
ANION GAP SERPL CALCULATED.3IONS-SCNC: 4 MMOL/L
ANION GAP SERPL CALCULATED.3IONS-SCNC: 6 MMOL/L
BUN SERPL-MCNC: 31 MG/DL (ref 5–25)
BUN SERPL-MCNC: 31 MG/DL (ref 5–25)
CALCIUM SERPL-MCNC: 8.1 MG/DL (ref 8.4–10.2)
CALCIUM SERPL-MCNC: 8.2 MG/DL (ref 8.4–10.2)
CHLORIDE SERPL-SCNC: 101 MMOL/L (ref 96–108)
CHLORIDE SERPL-SCNC: 99 MMOL/L (ref 96–108)
CO2 SERPL-SCNC: 22 MMOL/L (ref 21–32)
CO2 SERPL-SCNC: 26 MMOL/L (ref 21–32)
CREAT SERPL-MCNC: 1.42 MG/DL (ref 0.6–1.3)
CREAT SERPL-MCNC: 1.44 MG/DL (ref 0.6–1.3)
ERYTHROCYTE [DISTWIDTH] IN BLOOD BY AUTOMATED COUNT: 13.5 % (ref 11.6–15.1)
GFR SERPL CREATININE-BSD FRML MDRD: 51 ML/MIN/1.73SQ M
GFR SERPL CREATININE-BSD FRML MDRD: 52 ML/MIN/1.73SQ M
GLUCOSE SERPL-MCNC: 132 MG/DL (ref 65–140)
GLUCOSE SERPL-MCNC: 183 MG/DL (ref 65–140)
GLUCOSE SERPL-MCNC: 231 MG/DL (ref 65–140)
GLUCOSE SERPL-MCNC: 255 MG/DL (ref 65–140)
GLUCOSE SERPL-MCNC: 262 MG/DL (ref 65–140)
GLUCOSE SERPL-MCNC: 85 MG/DL (ref 65–140)
HCT VFR BLD AUTO: 30.6 % (ref 36.5–49.3)
HGB BLD-MCNC: 9.5 G/DL (ref 12–17)
MCH RBC QN AUTO: 28.2 PG (ref 26.8–34.3)
MCHC RBC AUTO-ENTMCNC: 31 G/DL (ref 31.4–37.4)
MCV RBC AUTO: 91 FL (ref 82–98)
OSMOLALITY UR: 368 MMOL/KG
PLATELET # BLD AUTO: 409 THOUSANDS/UL (ref 149–390)
PMV BLD AUTO: 9.4 FL (ref 8.9–12.7)
POTASSIUM SERPL-SCNC: 5.3 MMOL/L (ref 3.5–5.3)
POTASSIUM SERPL-SCNC: 6 MMOL/L (ref 3.5–5.3)
RBC # BLD AUTO: 3.37 MILLION/UL (ref 3.88–5.62)
SODIUM 24H UR-SCNC: 35 MOL/L
SODIUM SERPL-SCNC: 129 MMOL/L (ref 135–147)
SODIUM SERPL-SCNC: 129 MMOL/L (ref 135–147)
WBC # BLD AUTO: 12.14 THOUSAND/UL (ref 4.31–10.16)

## 2024-02-18 PROCEDURE — 82948 REAGENT STRIP/BLOOD GLUCOSE: CPT

## 2024-02-18 PROCEDURE — 83935 ASSAY OF URINE OSMOLALITY: CPT | Performed by: INTERNAL MEDICINE

## 2024-02-18 PROCEDURE — 99232 SBSQ HOSP IP/OBS MODERATE 35: CPT | Performed by: INTERNAL MEDICINE

## 2024-02-18 PROCEDURE — 85027 COMPLETE CBC AUTOMATED: CPT | Performed by: INTERNAL MEDICINE

## 2024-02-18 PROCEDURE — 80048 BASIC METABOLIC PNL TOTAL CA: CPT | Performed by: INTERNAL MEDICINE

## 2024-02-18 PROCEDURE — 84300 ASSAY OF URINE SODIUM: CPT | Performed by: INTERNAL MEDICINE

## 2024-02-18 RX ORDER — FUROSEMIDE 10 MG/ML
40 INJECTION INTRAMUSCULAR; INTRAVENOUS ONCE
Status: COMPLETED | OUTPATIENT
Start: 2024-02-18 | End: 2024-02-18

## 2024-02-18 RX ORDER — INSULIN GLARGINE 100 [IU]/ML
40 INJECTION, SOLUTION SUBCUTANEOUS
Status: DISCONTINUED | OUTPATIENT
Start: 2024-02-18 | End: 2024-02-19

## 2024-02-18 RX ORDER — INSULIN LISPRO 100 [IU]/ML
5 INJECTION, SOLUTION INTRAVENOUS; SUBCUTANEOUS
Status: DISCONTINUED | OUTPATIENT
Start: 2024-02-18 | End: 2024-02-19

## 2024-02-18 RX ADMIN — ACETAMINOPHEN 975 MG: 325 TABLET, FILM COATED ORAL at 21:29

## 2024-02-18 RX ADMIN — INSULIN LISPRO 2 UNITS: 100 INJECTION, SOLUTION INTRAVENOUS; SUBCUTANEOUS at 21:29

## 2024-02-18 RX ADMIN — Medication 250 MG: at 17:33

## 2024-02-18 RX ADMIN — Medication 250 MG: at 09:29

## 2024-02-18 RX ADMIN — FUROSEMIDE 40 MG: 10 INJECTION, SOLUTION INTRAMUSCULAR; INTRAVENOUS at 16:40

## 2024-02-18 RX ADMIN — METOPROLOL SUCCINATE 25 MG: 25 TABLET, EXTENDED RELEASE ORAL at 17:33

## 2024-02-18 RX ADMIN — INSULIN LISPRO 5 UNITS: 100 INJECTION, SOLUTION INTRAVENOUS; SUBCUTANEOUS at 18:43

## 2024-02-18 RX ADMIN — INSULIN LISPRO 1 UNITS: 100 INJECTION, SOLUTION INTRAVENOUS; SUBCUTANEOUS at 09:29

## 2024-02-18 RX ADMIN — ASPIRIN 81 MG CHEWABLE TABLET 81 MG: 81 TABLET CHEWABLE at 09:29

## 2024-02-18 RX ADMIN — INSULIN LISPRO 3 UNITS: 100 INJECTION, SOLUTION INTRAVENOUS; SUBCUTANEOUS at 09:30

## 2024-02-18 RX ADMIN — CEFAZOLIN SODIUM 2000 MG: 2 SOLUTION INTRAVENOUS at 17:37

## 2024-02-18 RX ADMIN — ENOXAPARIN SODIUM 40 MG: 40 INJECTION SUBCUTANEOUS at 09:29

## 2024-02-18 RX ADMIN — CEFAZOLIN SODIUM 2000 MG: 2 SOLUTION INTRAVENOUS at 00:46

## 2024-02-18 RX ADMIN — CEFAZOLIN SODIUM 2000 MG: 2 SOLUTION INTRAVENOUS at 05:32

## 2024-02-18 RX ADMIN — INSULIN LISPRO 2 UNITS: 100 INJECTION, SOLUTION INTRAVENOUS; SUBCUTANEOUS at 11:50

## 2024-02-18 RX ADMIN — ATORVASTATIN CALCIUM 80 MG: 80 TABLET, FILM COATED ORAL at 17:33

## 2024-02-18 RX ADMIN — METOPROLOL SUCCINATE 25 MG: 25 TABLET, EXTENDED RELEASE ORAL at 05:32

## 2024-02-18 RX ADMIN — CEFAZOLIN SODIUM 2000 MG: 2 SOLUTION INTRAVENOUS at 12:16

## 2024-02-18 RX ADMIN — POLYETHYLENE GLYCOL 3350 17 G: 17 POWDER, FOR SOLUTION ORAL at 09:29

## 2024-02-18 RX ADMIN — AMIODARONE HYDROCHLORIDE 200 MG: 200 TABLET ORAL at 09:29

## 2024-02-18 RX ADMIN — CLOPIDOGREL BISULFATE 75 MG: 75 TABLET ORAL at 09:29

## 2024-02-18 RX ADMIN — SENNOSIDES, DOCUSATE SODIUM 2 TABLET: 8.6; 5 TABLET ORAL at 21:29

## 2024-02-18 RX ADMIN — INSULIN GLARGINE 40 UNITS: 100 INJECTION, SOLUTION SUBCUTANEOUS at 21:31

## 2024-02-18 RX ADMIN — INSULIN LISPRO 3 UNITS: 100 INJECTION, SOLUTION INTRAVENOUS; SUBCUTANEOUS at 11:50

## 2024-02-18 NOTE — ASSESSMENT & PLAN NOTE
Had acute kidney injury earlier on admission, suspect due to septic shock  Developed again acute kidney injury 2/14, had CT with contrast 2/15, could be due to underlying sepsis, contrast-induced, now with lower extremity edema, fluid overload, cardiorenal, reviewing chart he gained about 15 pounds in the past 2 weeks  Creatinine stable at 1.5 today  No PVR, good urine output  Received a dose of Lasix yesterday, will redose Lasix today  Monitor BMP

## 2024-02-18 NOTE — ASSESSMENT & PLAN NOTE
Had acute kidney injury earlier on admission, suspect due to septic shock  Developed again acute kidney injury 2/14, had CT with contrast 2/15, could be due to underlying sepsis, contrast-induced, now with lower extremity edema  His spironolactone was held earlier on admission due to LINDEN  Voiding well, no PVR, no diarrhea  Has lower extremity swelling, worsening  Will give a dose of Lasix 40 mg IV once, he has been intermittently on Lasix since admission  Monitor bmp

## 2024-02-18 NOTE — ASSESSMENT & PLAN NOTE
Hyponatremic since admission, mild, improved in the past few days, then worsening, component of pseudohyponatremia from hyperglycemia, also volume overload  Urine studies ordered  Improved to 134 today after Lasix  BMP tomorrow

## 2024-02-18 NOTE — ASSESSMENT & PLAN NOTE
Appreciate podiatry input -> status post left fourth toe amputation on 1/31  [place] to be surgical cure per podiatry  Wound care

## 2024-02-18 NOTE — ASSESSMENT & PLAN NOTE
Hyponatremic since admission, mild, improved in the past few days, now 129 today--corrected to glucose 131  Urine studies ordered  Ordered BMP tomorrow morning

## 2024-02-18 NOTE — ASSESSMENT & PLAN NOTE
Last EF of 42%  Holding diuresis (Aldactone) in the setting of hypotension and LINDEN. Resolved, LINDEN persisted  Continue ASA/Plavix and statin for underlying CAD (associated ischemic cardiomyopathy)  Monitor/replete serum potassium/magnesium as necessary  Low sodium and fluid restriction enforced  Was placed on gentle fluids for given contrast CT 2/15. With UE and LE swelling, IV fluids discontinued  He has been getting intermittent doses of IV Lasix since admission  Reviewing chart he gained about 15 pounds in the past 2 weeks  Seems Lasix 40 mg IV yesterday with significant improvement in lower extremity swelling  Lost about 2 pounds  Start Lasix 40 mg IV daily  Daily weights  Intake and output

## 2024-02-18 NOTE — ASSESSMENT & PLAN NOTE
No BM for 3 days  Received Senokot, MiraLAX and prune juice 2/17 and had a BM   continue bowel regimen

## 2024-02-18 NOTE — PLAN OF CARE
Problem: Prexisting or High Potential for Compromised Skin Integrity  Goal: Skin integrity is maintained or improved  Description: INTERVENTIONS:  - Identify patients at risk for skin breakdown  - Assess and monitor skin integrity  - Assess and monitor nutrition and hydration status  - Monitor labs   - Assess for incontinence   - Turn and reposition patient  - Assist with mobility/ambulation  - Relieve pressure over bony prominences  - Avoid friction and shearing  - Provide appropriate hygiene as needed including keeping skin clean and dry  - Evaluate need for skin moisturizer/barrier cream  - Collaborate with interdisciplinary team   - Patient/family teaching  - Consider wound care consult   Outcome: Progressing     Problem: INFECTION - ADULT  Goal: Absence or prevention of progression during hospitalization  Description: INTERVENTIONS:  - Assess and monitor for signs and symptoms of infection  - Monitor lab/diagnostic results  - Monitor all insertion sites, i.e. indwelling lines, tubes, and drains  - Monitor endotracheal if appropriate and nasal secretions for changes in amount and color  - [place] appropriate cooling/warming therapies per order  - Administer medications as ordered  - Instruct and encourage patient and family to use good hand hygiene technique  - Identify and instruct in appropriate isolation precautions for identified infection/condition  Outcome: Progressing  Goal: Absence of fever/infection during neutropenic period  Description: INTERVENTIONS:  - Monitor WBC    Outcome: Progressing

## 2024-02-18 NOTE — PLAN OF CARE
Problem: METABOLIC, FLUID AND ELECTROLYTES - ADULT  Goal: Electrolytes maintained within normal limits  Description: INTERVENTIONS:  - Monitor labs and assess patient for signs and symptoms of electrolyte imbalances  - Administer electrolyte replacement as ordered  - Monitor response to electrolyte replacements, including repeat lab results as appropriate  - Instruct patient on fluid and nutrition as appropriate  Outcome: Progressing  Goal: Fluid balance maintained  Description: INTERVENTIONS:  - Monitor labs   - Monitor I/O and WT  - Instruct patient on fluid and nutrition as appropriate  - Assess for signs & symptoms of volume excess or deficit  Outcome: Progressing  Goal: Glucose maintained within target range  Description: INTERVENTIONS:  - Monitor Blood Glucose as ordered  - Assess for signs and symptoms of hyperglycemia and hypoglycemia  - Administer ordered medications to maintain glucose within target range  - Assess nutritional intake and initiate nutrition service referral as needed  Outcome: Progressing     Problem: Prexisting or High Potential for Compromised Skin Integrity  Goal: Skin integrity is maintained or improved  Description: INTERVENTIONS:  - Identify patients at risk for skin breakdown  - Assess and monitor skin integrity  - Assess and monitor nutrition and hydration status  - Monitor labs   - Assess for incontinence   - Turn and reposition patient  - Assist with mobility/ambulation  - Relieve pressure over bony prominences  - Avoid friction and shearing  - Provide appropriate hygiene as needed including keeping skin clean and dry  - Evaluate need for skin moisturizer/barrier cream  - Collaborate with interdisciplinary team   - Patient/family teaching  - Consider wound care consult   Outcome: Progressing     Problem: PAIN - ADULT  Goal: Verbalizes/displays adequate comfort level or baseline comfort level  Description: Interventions:  - Encourage patient to monitor pain and request  assistance  - Assess pain using appropriate pain scale  - Administer analgesics based on type and severity of pain and evaluate response  - Implement non-pharmacological measures as appropriate and evaluate response  - Consider cultural and social influences on pain and pain management  - Notify physician/advanced practitioner if interventions unsuccessful or patient reports new pain  Outcome: Progressing     Problem: INFECTION - ADULT  Goal: Absence or prevention of progression during hospitalization  Description: INTERVENTIONS:  - Assess and monitor for signs and symptoms of infection  - Monitor lab/diagnostic results  - Monitor all insertion sites, i.e. indwelling lines, tubes, and drains  - Monitor endotracheal if appropriate and nasal secretions for changes in amount and color  - [place] appropriate cooling/warming therapies per order  - Administer medications as ordered  - Instruct and encourage patient and family to use good hand hygiene technique  - Identify and instruct in appropriate isolation precautions for identified infection/condition  Outcome: Progressing  Goal: Absence of fever/infection during neutropenic period  Description: INTERVENTIONS:  - Monitor WBC    Outcome: Progressing     Problem: SAFETY ADULT  Goal: Patient will remain free of falls  Description: INTERVENTIONS:  - Educate patient/family on patient safety including physical limitations  - Instruct patient to call for assistance with activity   - Consult OT/PT to assist with strengthening/mobility   - Keep Call bell within reach  - Keep bed low and locked with side rails adjusted as appropriate  - Keep care items and personal belongings within reach  - Initiate and maintain comfort rounds  - Make Fall Risk Sign visible to staff  - Apply yellow socks and bracelet for high fall risk patients  - Consider moving patient to room near nurses station  Outcome: Progressing  Goal: Maintain or return to baseline ADL function  Description:  INTERVENTIONS:  -  Assess patient's ability to carry out ADLs; assess patient's baseline for ADL function and identify physical deficits which impact ability to perform ADLs (bathing, care of mouth/teeth, toileting, grooming, dressing, etc.)  - Assess/evaluate cause of self-care deficits   - Assess range of motion  - Assess patient's mobility; develop plan if impaired  - Assess patient's need for assistive devices and provide as appropriate  - Encourage maximum independence but intervene and supervise when necessary  - Involve family in performance of ADLs  - Assess for home care needs following discharge   - Consider OT consult to assist with ADL evaluation and planning for discharge  - Provide patient education as appropriate  Outcome: Progressing  Goal: Maintains/Returns to pre admission functional level  Description: INTERVENTIONS:  - Perform AM-PAC 6 Click Basic Mobility/ Daily Activity assessment daily.  - Set and communicate daily mobility goal to care team and patient/family/caregiver.   - Collaborate with rehabilitation services on mobility goals if consulted  - Record patient progress and toleration of activity level   Outcome: Progressing     Problem: DISCHARGE PLANNING  Goal: Discharge to home or other facility with appropriate resources  Description: INTERVENTIONS:  - Identify barriers to discharge w/patient and caregiver  - Arrange for needed discharge resources and transportation as appropriate  - Identify discharge learning needs (meds, wound care, etc.)  - Arrange for interpretive services to assist at discharge as needed  - Refer to Case Management Department for coordinating discharge planning if the patient needs post-hospital services based on physician/advanced practitioner order or complex needs related to functional status, cognitive ability, or social support system  Outcome: Progressing     Problem: Knowledge Deficit  Goal: Patient/family/caregiver demonstrates understanding of disease  process, treatment plan, medications, and discharge instructions  Description: Complete learning assessment and assess knowledge base.  Interventions:  - Provide teaching at level of understanding  - Provide teaching via preferred learning methods  Outcome: Progressing

## 2024-02-18 NOTE — ASSESSMENT & PLAN NOTE
Persistent MSSA bacteremia most likely source foot wound, complicated with ICD infection  Blood cultures from 1/31-2/9  positive despite being on Ancef suspect due to ICD infection  GILL with evidence of mobile mass on ICD lead in the right atrium, concerning for infection, no valvular vegetation  S/p ICD removal with EP on 2/8  CT chest 2/7 and 2/15 with possible septic pulmonary emboli  Repeat Blood cultures from 2/12 negative, repeat blood cultures 2/16 negative at 48 hours  Continue IV Ancef, with plan to treat 6 weeks until 3/25/2024   Discussed with infectious disease, due to prolonged bacteremia and pulmonary septic emboli although no valvular vegetations the best option might be to continue antibiotics closer to 14 days from the negative blood cultures before EP places the new device  PICC on hold.

## 2024-02-18 NOTE — PROGRESS NOTES
[facility]  Progress Note  Name: Nadir Myers I  MRN: [id number]  Unit/Bed#: [facility] 902-01 I Date of Admission: 1/31/2024   Date of Service: 2/18/2024 I Hospital Day: 18    Assessment/Plan   * Septic shock (HCC)  Assessment & Plan  Initially admitted to the ICU requiring vasopressor support, now off, with maintenance of hemodynamic stability  Due to MSSA bacteremia from a gangrenous left fourth toe (see below) complicated by ICD infection  Continue to monitor vitals and maintain hemodynamics including temperature curve  See additional management below  Shock resolved    MSSA bacteremia  Assessment & Plan  Persistent MSSA bacteremia most likely source foot wound, complicated with ICD infection  Blood cultures from 1/31-2/9  positive despite being on Ancef suspect due to ICD infection  GILL with evidence of mobile mass on ICD lead in the right atrium, concerning for infection, no valvular vegetation  S/p ICD removal with EP on 2/8  CT chest 2/7 and 2/15 with possible septic pulmonary emboli  Repeat Blood cultures from 2/12 negative, repeat blood cultures 2/16 negative at 48 hours  Continue IV Ancef, with plan to treat 6 weeks until 3/25/2024   Discussed with infectious disease, due to prolonged bacteremia and pulmonary septic emboli although no valvular vegetations the best option might be to continue antibiotics closer to 14 days from the negative blood cultures before EP places the new device  PICC on hold.      Abnormal CT of the chest  Assessment & Plan  CT 2/7 shows small consolidation in the upper lobes and left lower lobe with enhancing lesion in the peripheral of right lower lobe, may be embolic versus hematogenous seeding from high-grade bacteremia.    Repeat CT 2/15 revealed possible septic emboli, gas and fluid subcutaneous collection in the pacemaker bed likely postsurgical, reactive mild mediastinal lymphadenopathy   Continue IV antibiotics as above  Monitor for any  respiratory symptoms    Gangrene of toe of left foot ([facility])  Assessment & Plan  Appreciate podiatry input -> status post left fourth toe amputation on 1/31  [place] to be surgical cure per podiatry  Wound care    LINDEN (acute kidney injury) ([facility])  Assessment & Plan  Had acute kidney injury earlier on admission, suspect due to septic shock  Developed again acute kidney injury 2/14, had CT with contrast 2/15, could be due to underlying sepsis, contrast-induced, now with lower extremity edema, and overload, reviewing chart he gained about 10 pounds in the past 2 weeks  His spironolactone was held earlier on admission due to LINDEN  Voiding well, no PVR, no diarrhea  Has lower extremity swelling, worsening  Will give a dose of Lasix 40 mg IV once, reassess tomorrow he might need standing dose of diuretics in the next few days  Monitor bmp    Chronic systolic CHF ([facility])  Assessment & Plan  Last EF of 42%  Holding diuresis (Aldactone) in the setting of hypotension and LINDEN. Resolved, LINDEN persisted  Continue ASA/Plavix and statin for underlying CAD (associated ischemic cardiomyopathy)  Monitor/replete serum potassium/magnesium as necessary  Low sodium and fluid restriction enforced  Was placed on gentle fluids for given contrast CT 2/15. With UE and LE swelling, IV fluids discontinued  He has been getting intermittent doses of IV Lasix since admission  Reviewing chart he gained about 15 pounds in the past 2 weeks  Will give Lasix 40 mg IV     Electrolyte abnormalities  Assessment & Plan  Monitor/replete serum potassium/magnesium/phosphate as necessary    Hyponatremia  Assessment & Plan  Hyponatremic since admission, mild, improved in the past few days, now 129 today--corrected to glucose 131, could be volume overload  Urine studies ordered  Ordered [facility] tomorrow morning    LUE weakness  Assessment & Plan  Presented w/ transient left-sided weakness and a facial droop now resolved  Appreciate neurology input deeming symptoms likely  "secondary to septic shock/infection and hyperglycemia, as neurologic workup including CT/MRI imaging negative for evidence of stroke or intracranial vessel occlusion, but noted chronic microangiopathic changes -> radiology report did however mention: \"Moderate bilateral supraclinoid ICA and moderate to severe left cavernous ICA segment stenosis.\"  Continue dual endplate therapy with ASA/Plavix - c/w statin  PT/OT as tolerated    PAD (peripheral artery disease) ([facility])  Assessment & Plan  Continue dual antiplatelet therapy with ASA/Plavix - continue statin      Type 2 diabetes mellitus, with long-term current use of insulin ([facility])  Assessment & Plan  Lab Results   Component Value Date    HGBA1C 9.8 (H) 01/31/2024     Decrease Lantus 42 to 40 units at bedtime, increase Humalog from 3 units to 5 units 3 times daily with meals with sliding scale  Hypoglycemia protocol  Carbohydrate restriction  Continue to adjust    Constipation  Assessment & Plan  No BM for 3 days  Received Senokot, MiraLAX and prune juice 2/17 and had a BM   continue bowel regimen    Coronary artery disease involving native coronary artery of native heart without angina pectoris  Assessment & Plan  History of coronary artery disease status post HANNA to left circumflex 2015, repeat cath 2023 with  of mid LAD and L PDA.  Ischemic cardiomyopathy  Continue aspirin/Plavix/statin/metoprolol    V-tach ([facility])  Assessment & Plan  History of monomorphic VT status post secondary prevention with Medtronic dual-chamber ICD 5/2023  Status post ICD extraction 2/8/2024  Plan to place another device once cleared by ID               VTE Pharmacologic Prophylaxis:   Moderate Risk (Score 3-4) - Pharmacological DVT Prophylaxis Ordered: enoxaparin (Lovenox).    Mobility:   Basic Mobility Inpatient Raw Score: 18  JH-HLM Goal: 6: Walk 10 steps or more  JH-HLM Achieved: 6: Walk 10 steps or more  HLM Goal achieved. Continue to encourage appropriate mobility.    Patient Centered " Rounds: I performed bedside rounds with nursing staff today.   Discussions with Specialists or Other Care Team Provider: nursing, infectious disease    Education and Discussions with Family / Patient: Updated  (daughter) via phone.    Total Time Spent on Date of Encounter in care of patient:  mins. This time was spent on one or more of the following: performing physical exam; counseling and coordination of care; obtaining or reviewing history; documenting in the medical record; reviewing/ordering tests, medications or procedures; communicating with other healthcare professionals and discussing with patient's family/caregivers.    Current Length of Stay: 18 day(s)  Current Patient Status: Inpatient   Certification Statement: The patient will continue to require additional inpatient hospital stay due to MSSA bacteremia  Discharge Plan: Anticipate discharge in >72 hrs to home with home services.    Code Status: Level 1 - Full Code    Subjective:   Patient was seen and evaluated bedside.  Is feeling well, denies chest palpitation shortness of breath.  Has bilateral lower extremity swelling.    Objective:     Vitals:   Temp (24hrs), Av.7 °F (37.1 °C), Min:98 °F (36.7 °C), Max:99.5 °F (37.5 °C)    Temp:  [98 °F (36.7 °C)-99.5 °F (37.5 °C)] 99.5 °F (37.5 °C)  HR:  [78-86] 80  Resp:  [16-18] 16  BP: (151-155)/(77-84) 151/78  SpO2:  [93 %-96 %] 96 %  Body mass index is 34.27 kg/m².     Input and Output Summary (last 24 hours):     Intake/Output Summary (Last 24 hours) at 2024 1538  Last data filed at 2024 0550  Gross per 24 hour   Intake 290 ml   Output 1200 ml   Net -910 ml       Physical Exam:   Physical Exam  Constitutional:       General: He is not in acute distress.  HENT:      Head: Atraumatic.   Cardiovascular:      Rate and Rhythm: Normal rate and regular rhythm.      Heart sounds: No murmur heard.  Pulmonary:      Effort: Pulmonary effort is normal. No respiratory distress.      Breath  sounds: Normal breath sounds. No wheezing.   Abdominal:      General: Bowel sounds are normal. There is no distension.      Palpations: Abdomen is soft.      Tenderness: There is no abdominal tenderness.   Musculoskeletal:      Cervical back: Neck supple.      Right lower leg: Edema present.      Left lower leg: Edema present.   Skin:     General: Skin is warm and dry.   Neurological:      General: No focal deficit present.      Mental Status: He is alert.   Psychiatric:         Mood and Affect: Mood normal.          Additional Data:     Labs:  Results from last 7 days   Lab Units 02/18/24  0500 02/16/24  0851 02/15/24  0515   WBC Thousand/uL 12.14*   < > 11.20*   HEMOGLOBIN g/dL 9.5*   < > 10.5*   HEMATOCRIT % 30.6*   < > 33.5*   PLATELETS Thousands/uL 409*   < > 368   NEUTROS PCT %  --   --  82*   LYMPHS PCT %  --   --  11*   MONOS PCT %  --   --  7   EOS PCT %  --   --  0    < > = values in this interval not displayed.     Results from last 7 days   Lab Units 02/18/24  1126 02/16/24  0851 02/15/24  0515   SODIUM mmol/L 129*   < > 133*   POTASSIUM mmol/L 5.3   < > 4.6   CHLORIDE mmol/L 99   < > 98   CO2 mmol/L 26   < > 27   BUN mg/dL 31*   < > 31*   CREATININE mg/dL 1.44*   < > 1.54*   ANION GAP mmol/L 4   < > 8   CALCIUM mg/dL 8.2*   < > 7.9*   ALBUMIN g/dL  --   --  2.4*   TOTAL BILIRUBIN mg/dL  --   --  0.31   ALK PHOS U/L  --   --  46   ALT U/L  --   --  5*   AST U/L  --   --  25   GLUCOSE RANDOM mg/dL 255*   < > 94    < > = values in this interval not displayed.         Results from last 7 days   Lab Units 02/18/24  1115 02/18/24  0726 02/17/24  2042 02/17/24  1654 02/17/24  1143 02/17/24  0806 02/16/24  2213 02/16/24  1648 02/16/24  1122 02/16/24  0817 02/16/24  0746 02/16/24  0714   POC GLUCOSE mg/dl 262* 183* 232* 256* 186* 101 184* 186* 267* 131 134 74               Lines/Drains:  Invasive Devices       Peripheral Intravenous Line  Duration             Peripheral IV 02/16/24 Right;Ventral (anterior)  Forearm 1 day                      Telemetry:  Telemetry Orders (From admission, onward)               24 Hour Telemetry Monitoring  Continuous x 24 Hours ([place])        Question:  Reason for 24 Hour Telemetry  Answer:  Arrhythmias requiring acute medical intervention / PPM or ICD malfunction                     Telemetry Reviewed: Normal Sinus Rhythm  Indication for Continued Telemetry Use: Suepected PPM or ICD Malfunction             Imaging: Reviewed radiology reports from this admission including: chest CT scan    Recent Cultures (last 7 days):   Results from last 7 days   Lab Units 02/16/24  0851 02/12/24  1632   BLOOD CULTURE  No Growth at 48 hrs.  No Growth at 48 hrs. No Growth After 5 Days.  No Growth After 5 Days.       Last 24 Hours Medication List:   Current Facility-Administered Medications   Medication Dose Route Frequency Provider Last Rate    acetaminophen  975 mg Oral Q6H PRN Robyn Feldman, DO      amiodarone  200 mg Oral Daily With Breakfast Robyn Feldman, DO      aspirin  81 mg Oral Daily Robyn Feldman, DO      atorvastatin  80 mg Oral After Dinner Robyn Feldman, DO      cefazolin  2,000 mg Intravenous Q6H Robyn Feldman, DO 2,000 mg (02/18/24 1216)    clopidogrel  75 mg Oral Daily Robyn Feldman, DO      enoxaparin  40 mg Subcutaneous Q24H [facility] Robyn Feldman, DO      furosemide  40 mg Intravenous Once Evita Casey MD      insulin glargine  40 Units Subcutaneous HS Evita Casey MD      insulin lispro  1-5 Units Subcutaneous 4x Daily (AC & HS) Robyn Feldman, DO      insulin lispro  5 Units Subcutaneous TID With Meals Evita Casey MD      metoprolol  5 mg Intravenous Q6H PRN Soledad Mari PA-C      metoprolol succinate  25 mg Oral Q12H Robyn Feldman, DO      polyethylene glycol  17 g Oral Daily Krishan Lemus PA-C      saccharomyces boulardii  250 mg Oral BID Robyn Feldman, DO      senna-docusate sodium  2 tablet Oral HS Evita  MD Jacinto          Today, Patient Was Seen By: Evita Casey MD    **Please Note: This note may have been constructed using a voice recognition system.**

## 2024-02-18 NOTE — ASSESSMENT & PLAN NOTE
Lab Results   Component Value Date    HGBA1C 9.8 (H) 01/31/2024     Decrease Lantus 42 to 40 units at bedtime, increase Humalog from 3 units to 5 units 3 times daily with meals with sliding scale  Hypoglycemia protocol  Carbohydrate restriction  Continue to adjust

## 2024-02-18 NOTE — ASSESSMENT & PLAN NOTE
Lab Results   Component Value Date    HGBA1C 9.8 (H) 01/31/2024     Decrease Lantus from 40 to 35 units at bedtime (morning sugar in the 60s), increase Humalog from 5 units to 7 units 3 times daily (daytime sugars higher) with meals with sliding scale  Hypoglycemia protocol  Carbohydrate restriction  Continue to adjust

## 2024-02-18 NOTE — ASSESSMENT & PLAN NOTE
History of monomorphic VT status post secondary prevention with Medtronic dual-chamber ICD 5/2023  Status post ICD extraction 2/8/2024  Plan to place another device once cleared by ID   none

## 2024-02-18 NOTE — ASSESSMENT & PLAN NOTE
Persistent MSSA bacteremia most likely source foot wound, complicated with ICD infection  Blood cultures from 1/31-2/9  positive despite being on Ancef suspect due to ICD infection  GILL with evidence of mobile mass on ICD lead in the right atrium, concerning for infection, no valvular vegetation  S/p ICD removal with EP on 2/8  CT chest 2/7 and 2/15 with possible septic pulmonary emboli  Repeat Blood cultures from 2/12 negative, repeat blood cultures 2/16 negative at 72 hours  Continue IV Ancef, with plan to treat 6 weeks until 3/25/2024   Per ID okay to place a PICC line  Patient received over a week of antibiotics from negative cultures.  Per ID okay to place a new device, cardiology plans to place extravascular device

## 2024-02-18 NOTE — ASSESSMENT & PLAN NOTE
Appreciate podiatry input -> status post left fourth toe amputation on 1/31  [place] to be surgical cure per podiatry  Wound care

## 2024-02-18 NOTE — ASSESSMENT & PLAN NOTE
Last EF of 42%  Holding diuresis (Aldactone) in the setting of hypotension and LINDEN. Resolved, LINDEN persisted  Continue ASA/Plavix and statin for underlying CAD (associated ischemic cardiomyopathy)  Monitor/replete serum potassium/magnesium as necessary  Low sodium and fluid restriction enforced  Was placed on gentle fluids for given contrast CT 2/15. With UE and LE swelling, IV fluids discontinued.   Will give a dose of Lasix 40 mg IV once

## 2024-02-19 ENCOUNTER — PREP FOR PROCEDURE (OUTPATIENT)
Dept: CARDIAC SURGERY | Facility: CLINIC | Age: 64
End: 2024-02-19

## 2024-02-19 DIAGNOSIS — I48.0 PAROXYSMAL ATRIAL FIBRILLATION (HCC): Primary | ICD-10-CM

## 2024-02-19 LAB
ABO GROUP BLD: NORMAL
ABO GROUP BLD: NORMAL
ANION GAP SERPL CALCULATED.3IONS-SCNC: 7 MMOL/L
APTT PPP: 30 SECONDS (ref 23–37)
BLD GP AB SCN SERPL QL: NEGATIVE
BUN SERPL-MCNC: 27 MG/DL (ref 5–25)
CALCIUM SERPL-MCNC: 8.3 MG/DL (ref 8.4–10.2)
CHLORIDE SERPL-SCNC: 101 MMOL/L (ref 96–108)
CO2 SERPL-SCNC: 26 MMOL/L (ref 21–32)
CREAT SERPL-MCNC: 1.51 MG/DL (ref 0.6–1.3)
ERYTHROCYTE [DISTWIDTH] IN BLOOD BY AUTOMATED COUNT: 13.4 % (ref 11.6–15.1)
GFR SERPL CREATININE-BSD FRML MDRD: 48 ML/MIN/1.73SQ M
GLUCOSE SERPL-MCNC: 143 MG/DL (ref 65–140)
GLUCOSE SERPL-MCNC: 180 MG/DL (ref 65–140)
GLUCOSE SERPL-MCNC: 198 MG/DL (ref 65–140)
GLUCOSE SERPL-MCNC: 219 MG/DL (ref 65–140)
GLUCOSE SERPL-MCNC: 67 MG/DL (ref 65–140)
HCT VFR BLD AUTO: 33.1 % (ref 36.5–49.3)
HGB BLD-MCNC: 10.1 G/DL (ref 12–17)
INR PPP: 1.15 (ref 0.84–1.19)
MAGNESIUM SERPL-MCNC: 2.2 MG/DL (ref 1.9–2.7)
MCH RBC QN AUTO: 27.8 PG (ref 26.8–34.3)
MCHC RBC AUTO-ENTMCNC: 30.5 G/DL (ref 31.4–37.4)
MCV RBC AUTO: 91 FL (ref 82–98)
PLATELET # BLD AUTO: 445 THOUSANDS/UL (ref 149–390)
PMV BLD AUTO: 9.3 FL (ref 8.9–12.7)
POTASSIUM SERPL-SCNC: 5 MMOL/L (ref 3.5–5.3)
PROTHROMBIN TIME: 14.6 SECONDS (ref 11.6–14.5)
RBC # BLD AUTO: 3.63 MILLION/UL (ref 3.88–5.62)
RH BLD: POSITIVE
RH BLD: POSITIVE
SODIUM SERPL-SCNC: 134 MMOL/L (ref 135–147)
SPECIMEN EXPIRATION DATE: NORMAL
WBC # BLD AUTO: 11.34 THOUSAND/UL (ref 4.31–10.16)

## 2024-02-19 PROCEDURE — 85730 THROMBOPLASTIN TIME PARTIAL: CPT | Performed by: PHYSICIAN ASSISTANT

## 2024-02-19 PROCEDURE — 86920 COMPATIBILITY TEST SPIN: CPT

## 2024-02-19 PROCEDURE — 85610 PROTHROMBIN TIME: CPT | Performed by: PHYSICIAN ASSISTANT

## 2024-02-19 PROCEDURE — 82948 REAGENT STRIP/BLOOD GLUCOSE: CPT

## 2024-02-19 PROCEDURE — 80048 BASIC METABOLIC PNL TOTAL CA: CPT | Performed by: INTERNAL MEDICINE

## 2024-02-19 PROCEDURE — 83735 ASSAY OF MAGNESIUM: CPT | Performed by: INTERNAL MEDICINE

## 2024-02-19 PROCEDURE — 86850 RBC ANTIBODY SCREEN: CPT | Performed by: PHYSICIAN ASSISTANT

## 2024-02-19 PROCEDURE — 99233 SBSQ HOSP IP/OBS HIGH 50: CPT | Performed by: INTERNAL MEDICINE

## 2024-02-19 PROCEDURE — 85027 COMPLETE CBC AUTOMATED: CPT | Performed by: INTERNAL MEDICINE

## 2024-02-19 PROCEDURE — 99232 SBSQ HOSP IP/OBS MODERATE 35: CPT | Performed by: INTERNAL MEDICINE

## 2024-02-19 PROCEDURE — NC001 PR NO CHARGE: Performed by: PHYSICIAN ASSISTANT

## 2024-02-19 PROCEDURE — 86901 BLOOD TYPING SEROLOGIC RH(D): CPT | Performed by: PHYSICIAN ASSISTANT

## 2024-02-19 PROCEDURE — 86900 BLOOD TYPING SEROLOGIC ABO: CPT | Performed by: PHYSICIAN ASSISTANT

## 2024-02-19 RX ORDER — FUROSEMIDE 10 MG/ML
40 INJECTION INTRAMUSCULAR; INTRAVENOUS
Status: DISCONTINUED | OUTPATIENT
Start: 2024-02-19 | End: 2024-02-19

## 2024-02-19 RX ORDER — INSULIN LISPRO 100 [IU]/ML
7 INJECTION, SOLUTION INTRAVENOUS; SUBCUTANEOUS
Status: DISCONTINUED | OUTPATIENT
Start: 2024-02-19 | End: 2024-03-01 | Stop reason: HOSPADM

## 2024-02-19 RX ORDER — INSULIN GLARGINE 100 [IU]/ML
35 INJECTION, SOLUTION SUBCUTANEOUS
Status: DISCONTINUED | OUTPATIENT
Start: 2024-02-19 | End: 2024-02-25

## 2024-02-19 RX ORDER — FUROSEMIDE 10 MG/ML
40 INJECTION INTRAMUSCULAR; INTRAVENOUS DAILY
Status: DISCONTINUED | OUTPATIENT
Start: 2024-02-20 | End: 2024-02-20

## 2024-02-19 RX ORDER — INSULIN GLARGINE 100 [IU]/ML
38 INJECTION, SOLUTION SUBCUTANEOUS
Status: DISCONTINUED | OUTPATIENT
Start: 2024-02-19 | End: 2024-02-19

## 2024-02-19 RX ADMIN — INSULIN LISPRO 1 UNITS: 100 INJECTION, SOLUTION INTRAVENOUS; SUBCUTANEOUS at 22:06

## 2024-02-19 RX ADMIN — INSULIN LISPRO 2 UNITS: 100 INJECTION, SOLUTION INTRAVENOUS; SUBCUTANEOUS at 13:01

## 2024-02-19 RX ADMIN — FUROSEMIDE 40 MG: 10 INJECTION, SOLUTION INTRAMUSCULAR; INTRAVENOUS at 09:34

## 2024-02-19 RX ADMIN — CEFAZOLIN SODIUM 2000 MG: 2 SOLUTION INTRAVENOUS at 00:48

## 2024-02-19 RX ADMIN — Medication 250 MG: at 09:34

## 2024-02-19 RX ADMIN — AMIODARONE HYDROCHLORIDE 200 MG: 200 TABLET ORAL at 09:34

## 2024-02-19 RX ADMIN — ENOXAPARIN SODIUM 40 MG: 40 INJECTION SUBCUTANEOUS at 09:34

## 2024-02-19 RX ADMIN — INSULIN GLARGINE 35 UNITS: 100 INJECTION, SOLUTION SUBCUTANEOUS at 22:06

## 2024-02-19 RX ADMIN — CLOPIDOGREL BISULFATE 75 MG: 75 TABLET ORAL at 09:34

## 2024-02-19 RX ADMIN — INSULIN LISPRO 1 UNITS: 100 INJECTION, SOLUTION INTRAVENOUS; SUBCUTANEOUS at 17:30

## 2024-02-19 RX ADMIN — Medication 250 MG: at 17:30

## 2024-02-19 RX ADMIN — INSULIN LISPRO 7 UNITS: 100 INJECTION, SOLUTION INTRAVENOUS; SUBCUTANEOUS at 17:31

## 2024-02-19 RX ADMIN — METOPROLOL SUCCINATE 25 MG: 25 TABLET, EXTENDED RELEASE ORAL at 05:17

## 2024-02-19 RX ADMIN — INSULIN LISPRO 5 UNITS: 100 INJECTION, SOLUTION INTRAVENOUS; SUBCUTANEOUS at 09:34

## 2024-02-19 RX ADMIN — POLYETHYLENE GLYCOL 3350 17 G: 17 POWDER, FOR SOLUTION ORAL at 09:34

## 2024-02-19 RX ADMIN — ATORVASTATIN CALCIUM 80 MG: 80 TABLET, FILM COATED ORAL at 17:30

## 2024-02-19 RX ADMIN — CEFAZOLIN SODIUM 2000 MG: 2 SOLUTION INTRAVENOUS at 17:30

## 2024-02-19 RX ADMIN — ACETAMINOPHEN 975 MG: 325 TABLET, FILM COATED ORAL at 22:06

## 2024-02-19 RX ADMIN — SENNOSIDES, DOCUSATE SODIUM 2 TABLET: 8.6; 5 TABLET ORAL at 22:05

## 2024-02-19 RX ADMIN — ASPIRIN 81 MG CHEWABLE TABLET 81 MG: 81 TABLET CHEWABLE at 09:34

## 2024-02-19 RX ADMIN — CEFAZOLIN SODIUM 2000 MG: 2 SOLUTION INTRAVENOUS at 05:17

## 2024-02-19 RX ADMIN — METOPROLOL SUCCINATE 25 MG: 25 TABLET, EXTENDED RELEASE ORAL at 17:30

## 2024-02-19 RX ADMIN — CEFAZOLIN SODIUM 2000 MG: 2 SOLUTION INTRAVENOUS at 13:01

## 2024-02-19 RX ADMIN — INSULIN LISPRO 5 UNITS: 100 INJECTION, SOLUTION INTRAVENOUS; SUBCUTANEOUS at 13:01

## 2024-02-19 NOTE — PROGRESS NOTES
[facility]  Progress Note  Name: Nadir Myers I  MRN: [id number]  Unit/Bed#: [facility] 902-01 I Date of Admission: 1/31/2024   Date of Service: 2/19/2024 I Hospital Day: 19    Assessment/Plan   * Septic shock (HCC)  Assessment & Plan  Initially admitted to the ICU requiring vasopressor support, now off, with maintenance of hemodynamic stability  Due to MSSA bacteremia from a gangrenous left fourth toe (see below) complicated by ICD infection  Continue to monitor vitals and maintain hemodynamics including temperature curve  See additional management below  Shock resolved    MSSA bacteremia  Assessment & Plan  Persistent MSSA bacteremia most likely source foot wound, complicated with ICD infection  Blood cultures from 1/31-2/9  positive despite being on Ancef suspect due to ICD infection  GILL with evidence of mobile mass on ICD lead in the right atrium, concerning for infection, no valvular vegetation  S/p ICD removal with EP on 2/8  CT chest 2/7 and 2/15 with possible septic pulmonary emboli  Repeat Blood cultures from 2/12 negative, repeat blood cultures 2/16 negative at 72 hours  Continue IV Ancef, with plan to treat 6 weeks until 3/25/2024   Per ID okay to place a PICC line  Patient received over a week of antibiotics from negative cultures.  Per ID okay to place a new device, cardiology plans to place extravascular device    Abnormal CT of the chest  Assessment & Plan  CT 2/7 shows small consolidation in the upper lobes and left lower lobe with enhancing lesion in the peripheral of right lower lobe, may be embolic versus hematogenous seeding from high-grade bacteremia.    Repeat CT 2/15 revealed possible septic emboli, gas and fluid subcutaneous collection in the pacemaker bed likely postsurgical, reactive mild mediastinal lymphadenopathy   Continue IV antibiotics as above  Monitor for any respiratory symptoms    Gangrene of toe of left foot (HCC)  Assessment &  Plan  Appreciate podiatry input -> status post left fourth toe amputation on 1/31  [place] to be surgical cure per podiatry  Wound care    LINDEN (acute kidney injury) (HCC)  Assessment & Plan  Had acute kidney injury earlier on admission, suspect due to septic shock  Developed again acute kidney injury 2/14, had CT with contrast 2/15, could be due to underlying sepsis, contrast-induced, now with lower extremity edema, fluid overload, cardiorenal, reviewing chart he gained about 15 pounds in the past 2 weeks  Creatinine stable at 1.5 today  No PVR, good urine output  Received a dose of Lasix yesterday, will redose Lasix today  Monitor BMP    Chronic systolic CHF (HCC)  Assessment & Plan  Last EF of 42%  Holding diuresis (Aldactone) in the setting of hypotension and LINDEN. Resolved, LINDEN persisted  Continue ASA/Plavix and statin for underlying CAD (associated ischemic cardiomyopathy)  Monitor/replete serum potassium/magnesium as necessary  Low sodium and fluid restriction enforced  Was placed on gentle fluids for given contrast CT 2/15. With UE and LE swelling, IV fluids discontinued  He has been getting intermittent doses of IV Lasix since admission  Reviewing chart he gained about 15 pounds in the past 2 weeks  Seems Lasix 40 mg IV yesterday with significant improvement in lower extremity swelling  Lost about 2 pounds  Start Lasix 40 mg IV daily  Daily weights  Intake and output    Electrolyte abnormalities  Assessment & Plan  Monitor/replete serum potassium/magnesium/phosphate as necessary    Hyponatremia  Assessment & Plan  Hyponatremic since admission, mild, improved in the past few days, then worsening, component of pseudohyponatremia from hyperglycemia, also volume overload  Urine studies ordered  Improved to 134 today after Lasix  BMP tomorrow    LUE weakness  Assessment & Plan  Presented w/ transient left-sided weakness and a facial droop now resolved  Appreciate neurology input deeming symptoms likely secondary to  "septic shock/infection and hyperglycemia, as neurologic workup including CT/MRI imaging negative for evidence of stroke or intracranial vessel occlusion, but noted chronic microangiopathic changes -> radiology report did however mention: \"Moderate bilateral supraclinoid ICA and moderate to severe left cavernous ICA segment stenosis.\"  Continue dual endplate therapy with ASA/Plavix - c/w statin  PT/OT as tolerated    PAD (peripheral artery disease) ([facility])  Assessment & Plan  Continue dual antiplatelet therapy with ASA/Plavix - continue statin      Type 2 diabetes mellitus, with long-term current use of insulin ([facility])  Assessment & Plan  Lab Results   Component Value Date    HGBA1C 9.8 (H) 01/31/2024     Decrease Lantus from 40 to 35 units at bedtime (morning sugar in the 60s), increase Humalog from 5 units to 7 units 3 times daily (daytime sugars higher) with meals with sliding scale  Hypoglycemia protocol  Carbohydrate restriction  Continue to adjust    Constipation  Assessment & Plan  Received Senokot, MiraLAX and prune juice 2/17 and had a BM   continue bowel regimen    Coronary artery disease involving native coronary artery of native heart without angina pectoris  Assessment & Plan  History of coronary artery disease status post HANNA to left circumflex 2015, repeat cath 2023 with  of mid LAD and L PDA.  Ischemic cardiomyopathy  Continue aspirin/Plavix/statin/metoprolol    V-tach ([facility])  Assessment & Plan  History of monomorphic VT status post secondary prevention with [employer] dual-chamber ICD 5/2023  Status post ICD extraction 2/8/2024  Plan to place another device once cleared by ID               VTE Pharmacologic Prophylaxis:   Moderate Risk (Score 3-4) - Pharmacological DVT Prophylaxis Ordered: enoxaparin (Lovenox).    Mobility:   Basic Mobility Inpatient Raw Score: 18  JH-HLM Goal: 6: Walk 10 steps or more  JH-HLM Achieved: 6: Walk 10 steps or more  HLM Goal achieved. Continue to encourage appropriate " mobility.    Patient Centered Rounds: I performed bedside rounds with nursing staff today.   Discussions with Specialists or Other Care Team Provider: Nursing, case management, infectious disease    Education and Discussions with Family / Patient: Updated  (daughter) via phone.    Total Time Spent on Date of Encounter in care of patient:  mins. This time was spent on one or more of the following: performing physical exam; counseling and coordination of care; obtaining or reviewing history; documenting in the medical record; reviewing/ordering tests, medications or procedures; communicating with other healthcare professionals and discussing with patient's family/caregivers.    Current Length of Stay: 19 day(s)  Current Patient Status: Inpatient   Certification Statement: The patient will continue to require additional inpatient hospital stay due to bacteremia, needs ICD  Discharge Plan: Anticipate discharge in >72 hrs to discharge location to be determined pending rehab evaluations.    Code Status: Level 1 - Full Code    Subjective:   Patient was seen and evaluated bedside.  He is feeling well his lower extremity edema significantly improved, urinating well.  Left arm edema significantly improved    Objective:     Vitals:   Temp (24hrs), Av.7 °F (37.6 °C), Min:98 °F (36.7 °C), Max:100.8 °F (38.2 °C)    Temp:  [98 °F (36.7 °C)-100.8 °F (38.2 °C)] 98 °F (36.7 °C)  HR:  [79-95] 85  Resp:  [16-19] 19  BP: (141-164)/(78-95) 158/94  SpO2:  [91 %-96 %] 95 %  Body mass index is 34.03 kg/m².     Input and Output Summary (last 24 hours):     Intake/Output Summary (Last 24 hours) at 2024 1435  Last data filed at 2024 1127  Gross per 24 hour   Intake --   Output 1975 ml   Net -1975 ml       Physical Exam:   Physical Exam  Constitutional:       General: He is not in acute distress.  HENT:      Head: Atraumatic.   Cardiovascular:      Rate and Rhythm: Normal rate and regular rhythm.      Heart sounds:  No murmur heard.  Pulmonary:      Effort: Pulmonary effort is normal. No respiratory distress.      Breath sounds: Normal breath sounds. No wheezing.   Abdominal:      General: Bowel sounds are normal. There is no distension.      Palpations: Abdomen is soft.      Tenderness: There is no abdominal tenderness.   Musculoskeletal:      Cervical back: Neck supple.      Right lower leg: Edema present.      Left lower leg: Edema present.   Skin:     General: Skin is warm and dry.   Neurological:      General: No focal deficit present.      Mental Status: He is alert.   Psychiatric:         Mood and Affect: Mood normal.          Additional Data:     Labs:  Results from last 7 days   Lab Units 02/19/24  0451 02/16/24  0851 02/15/24  0515   WBC Thousand/uL 11.34*   < > 11.20*   HEMOGLOBIN g/dL 10.1*   < > 10.5*   HEMATOCRIT % 33.1*   < > 33.5*   PLATELETS Thousands/uL 445*   < > 368   NEUTROS PCT %  --   --  82*   LYMPHS PCT %  --   --  11*   MONOS PCT %  --   --  7   EOS PCT %  --   --  0    < > = values in this interval not displayed.     Results from last 7 days   Lab Units 02/19/24  0451 02/16/24  0851 02/15/24  0515   SODIUM mmol/L 134*   < > 133*   POTASSIUM mmol/L 5.0   < > 4.6   CHLORIDE mmol/L 101   < > 98   CO2 mmol/L 26   < > 27   BUN mg/dL 27*   < > 31*   CREATININE mg/dL 1.51*   < > 1.54*   ANION GAP mmol/L 7   < > 8   CALCIUM mg/dL 8.3*   < > 7.9*   ALBUMIN g/dL  --   --  2.4*   TOTAL BILIRUBIN mg/dL  --   --  0.31   ALK PHOS U/L  --   --  46   ALT U/L  --   --  5*   AST U/L  --   --  25   GLUCOSE RANDOM mg/dL 67   < > 94    < > = values in this interval not displayed.     Results from last 7 days   Lab Units 02/19/24  1345   INR  1.15     Results from last 7 days   Lab Units 02/19/24  1113 02/19/24  0722 02/18/24  2035 02/18/24  1702 02/18/24  1115 02/18/24  0726 02/17/24 2042 02/17/24  1654 02/17/24  1143 02/17/24  0806 02/16/24  2213 02/16/24  1648   POC GLUCOSE mg/dl 219* 143* 231* 132 262* 183* 232* 256*  186* 101 184* 186*               Lines/Drains:  Invasive Devices       Peripheral Intravenous Line  Duration             Peripheral IV 02/16/24 Right;Ventral (anterior) Forearm 2 days                      Telemetry:  Telemetry Orders (From admission, onward)               24 Hour Telemetry Monitoring  Continuous x 24 Hours (Telem)        Question:  Reason for 24 Hour Telemetry  Answer:  Arrhythmias requiring acute medical intervention / PPM or ICD malfunction                     Telemetry Reviewed: Normal Sinus Rhythm  Indication for Continued Telemetry Use: Suepected PPM or ICD Malfunction             Imaging: Reviewed radiology reports from this admission including: chest CT scan    Recent Cultures (last 7 days):   Results from last 7 days   Lab Units 02/16/24  0851 02/12/24  1632   BLOOD CULTURE  No Growth at 72 hrs.  No Growth at 72 hrs. No Growth After 5 Days.  No Growth After 5 Days.       Last 24 Hours Medication List:   Current Facility-Administered Medications   Medication Dose Route Frequency Provider Last Rate    acetaminophen  975 mg Oral Q6H PRN Robyn Feldman, DO      amiodarone  200 mg Oral Daily With Breakfast Robyn Feldman, DO      aspirin  81 mg Oral Daily Robyn Feldman, DO      atorvastatin  80 mg Oral After Dinner Robyn Feldman, DO      cefazolin  2,000 mg Intravenous Q6H Robyn Feldman, DO 2,000 mg (02/19/24 1301)    clopidogrel  75 mg Oral Daily Robyn Feldman, DO      enoxaparin  40 mg Subcutaneous Q24H [facility] Robyn Feldman DO      [START ON 2/20/2024] furosemide  40 mg Intravenous Daily Evita Casey MD      insulin glargine  35 Units Subcutaneous HS Evita Casey MD      insulin lispro  1-5 Units Subcutaneous 4x Daily (AC & HS) Robyn Feldman DO      insulin lispro  7 Units Subcutaneous TID With Meals Evita Casey MD      metoprolol  5 mg Intravenous Q6H PRN Soledad Mari PA-C      metoprolol succinate  25 mg Oral Q12H Robyn Feldman,  DO      polyethylene glycol  17 g Oral Daily Krishan Lemus PA-C      saccharomyces boulardii  250 mg Oral BID Robyn Feldman DO      senna-docusate sodium  2 tablet Oral HS Evita Casey MD          Today, Patient Was Seen By: Evita Casey MD    **Please Note: This note may have been constructed using a voice recognition system.**

## 2024-02-19 NOTE — PROGRESS NOTES
Progress Note - Infectious Disease   Nadir Myers 63 y.o. male MRN: [id number]  Unit/Bed#: [facility] 902-01 Encounter: [id number]      Impression/Plan:    Sepsis, present on arrival; fever, leukocytosis  -Source is bacteremia with ICD infection and patients foot infection as below. No other clear source. Now with intermittent fevers since 2/10. Blood cultures seem to have cleared. CT C/A/P 2/15 notable for pulmonary septic emboli and gas/fluid collection at site of ICD removal, though no other infectious source. Podiatry feels foot is healing well. Patient without any localizing symptoms, otherwise clinically stable.  Fever curve improving, WBC stable.  -Antibiotic as below  -Follow up repeat blood cultures 2/16  -With bacteremia cleared and no major symptoms or abscess on CT, no obvious infectious cause for ongoing low grade fevers; consider if fevers may be reactive to embolic lesions in lungs, vs. drug fever to Cefazolin  -Trend fever curve  -Repeat CBC + diff tomorrow AM     Persistent MSSA bacteremia with ICD infection  -Likely initial source was patients foot wound as below with subsequent ICD infection. Blood cultures positive on 1/31-2/08 despite being on Cefazolin, suspect due to ICD infection. GILL 2/07 found a mobile mass on device lead in right atrium, concerning for infection. No valve vegetations. CT scans from 2/15 and 2/07 negative for abscess though suspicious for pulmonary septic emboli. ICD extracted on 2/08 with lead culture that grew MSSA. Only 1 of 2 blood cultures positive on 2/09, blood cultures 2/12 currently negative.  -Continue IV Cefazolin 2g every 6 hours  -Follow up blood culture 2/16 to ensure remain negative  -Regarding reimplantation of new device, patient is now more than 72 hours from first negative blood cultures, has received over a week of antibiotic from negative cultures, there was no valvular vegetation on GILL and with lower risk device being placed (extravascular), would be ok  with placing new device tomorrow   -Will need 6 weeks of IV antibiotic from first negative blood cultures and device removal, through 3/25/2024  -Ok for PICC line from ID standpoint  -Will need weekly CBC + diff, serum Cr as outpatient and ID follow up     3. Abnormal CT chest  -CT  noted small consolidations in upper lobes and left lower lobe, with enhancing lesion in perieprhal of right lower lobe. Repeat CT 2/15 with progressive left lower lobe infiltrate, new subpleural nodular airspace focus, with resolving foci at anterior left upper lobe and inferior right lower lobe. Suspect due to septic emboli. Patient denies any cough or SOB and is on room air.  -Plan for prolonged course of antibiotic as above  -Monitor for any development of respiratory symptoms  -Would obtain follow up CT as outpatient to ensure resolution    4. 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 Culture polymicrobial of tissue with MSSA, Finegoldia and Anaerococccus. Likely source of patient's bacteremia. Surgical cure felt to be obtained per Podiatry. Per follow up on 2/15, amputation site stable with intact sutures and no evidence of dehiscence or ischemia.   -Ongoing local wound care per Podiatry.     5. Staph epidermidis in 1 of 2 blood cultures  -1 set from  with Staph epi, did not grow in prior blood cultures from  or subsequent blood cultures. Suspect this was skin contaminant and as above MSSA is true pathogen.  -No further workup at this time    Plan and recommendations were discussed with primary team. They agree with continuing IV Cefazolin, follow up blood cultures  and .     Antibiotics:  Cefazolin    Subjective:  Denies feeling his fever, chills, nausea, emesis, cough.     Objective:  Vitals:  Temp:  [98 °F (36.7 °C)-100.8 °F (38.2 °C)] 98 °F (36.7 °C)  HR:  [79-95] 85  Resp:  [16-19] 19  BP: (141-164)/(78-95) 158/94  SpO2:  [91 %-96 %] 95 %  Temp (24hrs), Av.7 °F (37.6 °C), Min:98  °F (36.7 °C), Max:100.8 °F (38.2 °C)  Current: Temperature: 98 °F (36.7 °C)    Physical Exam:   General Appearance:  Alert, interactive, nontoxic, no acute distress.   Throat: Oropharynx moist without lesions.    Lungs:   Clear to auscultation bilaterally; no wheezes, rhonchi or rales; respirations unlabored   Heart:  RRR   Abdomen:   Soft, non-tender     Extremities: Left foot with dressing intact   Skin: No new rashes or lesions. Chest wall bandage at ICD extraction site intact       Labs:   All pertinent labs and imaging studies were personally reviewed  Results from last 7 days   Lab Units 02/19/24  0451 02/18/24  0500 02/17/24  0444   WBC Thousand/uL 11.34* 12.14* 11.06*   HEMOGLOBIN g/dL 10.1* 9.5* 9.2*   PLATELETS Thousands/uL 445* 409* 403*     Results from last 7 days   Lab Units 02/19/24  0451 02/18/24  1126 02/18/24  0502 02/16/24  0851 02/15/24  0515 02/14/24  0503   SODIUM mmol/L 134* 129* 129*   < > 133* 132*   POTASSIUM mmol/L 5.0 5.3 6.0*   < > 4.6 4.3   CHLORIDE mmol/L 101 99 101   < > 98 98   CO2 mmol/L 26 26 22   < > 27 27   BUN mg/dL 27* 31* 31*   < > 31* 29*   CREATININE mg/dL 1.51* 1.44* 1.42*   < > 1.54* 1.51*   EGFR ml/min/1.73sq m 48 51 52   < > 47 48   CALCIUM mg/dL 8.3* 8.2* 8.1*   < > 7.9* 8.0*   AST U/L  --   --   --   --  25 24   ALT U/L  --   --   --   --  5* 3*   ALK PHOS U/L  --   --   --   --  46 46    < > = values in this interval not displayed.                         Micro:  Results from last 7 days   Lab Units 02/16/24  0851 02/12/24  1632   BLOOD CULTURE  No Growth at 48 hrs.  No Growth at 48 hrs. No Growth After 5 Days.  No Growth After 5 Days.       Imaging:          Zach Medina MD  [facility]

## 2024-02-19 NOTE — PROGRESS NOTES
"Progress Note - Cardiothoracic Surgery   Nadir Myers 63 y.o. male MRN: [id number]  [facility] Encounter: [id number]      24 Hour Events:  Per ID- \"would wait at least 72 hours from first negative blood cultures (2/9 vs 2/12); may be best to wait closer to 14 days from first negative blood cultures if new device not needed more urgently.\"    Tmax (24hrs)- 100.8  2/12: NG at 5 days  2/16: BC 2/16 with no growth X 48 hours     No other events or complaints. Cr increasing 1.51 (1.44, 1.42, 1.55)    Medications:   Scheduled Meds:  Current Facility-Administered Medications   Medication Dose Route Frequency Provider Last Rate    acetaminophen  975 mg Oral Q6H PRN Robyn Feldman, DO      amiodarone  200 mg Oral Daily With Breakfast Robyn Feldman, DO      aspirin  81 mg Oral Daily Robyn Feldman, DO      atorvastatin  80 mg Oral After Dinner Robyn Feldman, DO      cefazolin  2,000 mg Intravenous Q6H Robyn Feldman, DO 2,000 mg (02/19/24 0517)    clopidogrel  75 mg Oral Daily Robyn Feldman, DO      enoxaparin  40 mg Subcutaneous Q24H [facility] Robyn Feldman, DO      furosemide  40 mg Intravenous BID (diuretic) Evita Casey MD      insulin glargine  40 Units Subcutaneous HS Evita Casey MD      insulin lispro  1-5 Units Subcutaneous 4x Daily (AC & HS) Robyn Feldman,       insulin lispro  5 Units Subcutaneous TID With Meals Evita Casey MD      metoprolol  5 mg Intravenous Q6H PRN Soledad Mari PA-C      metoprolol succinate  25 mg Oral Q12H Robyn Feldman, DO      polyethylene glycol  17 g Oral Daily Krishan Lemus PA-C      saccharomyces boulardii  250 mg Oral BID Robyn Feldman, DO      senna-docusate sodium  2 tablet Oral HS Evita Casey MD       Continuous Infusions:     Results:   Results from last 7 days   Lab Units 02/19/24  0451 02/18/24  0500 02/17/24  0444   WBC Thousand/uL 11.34* 12.14* 11.06*   HEMOGLOBIN g/dL 10.1* 9.5* 9.2*   HEMATOCRIT " % 33.1* 30.6* 28.9*   PLATELETS Thousands/uL 445* 409* 403*     Results from last 7 days   Lab Units 02/19/24  0451 02/18/24  1126 02/18/24  0502   POTASSIUM mmol/L 5.0 5.3 6.0*   CHLORIDE mmol/L 101 99 101   CO2 mmol/L 26 26 22   BUN mg/dL 27* 31* 31*   CREATININE mg/dL 1.51* 1.44* 1.42*   CALCIUM mg/dL 8.3* 8.2* 8.1*           Studies:   GILL:     Left Ventricle: Left ventricular cavity size is normal. Wall thickness is normal. The left ventricular ejection fraction is 45%. Systolic function is mildly reduced. There is hypokinesis with akinesis of the aneurysmal portion of the apex.    Right Ventricle: Right ventricular cavity size is normal. Systolic function is normal.    Right Atrium: There is a 1.8 x 1.1 cm mobile mass affixed to the device lead as it passes through the right atrium.    Aortic Valve: The aortic valve is trileaflet. The leaflets are mildly thickened. The leaflets are not calcified. The leaflets exhibit normal mobility. There is Lambl's excrescence on the tip of the right coronary aortic valve leaflet.     There is a 1.8 x 1.1 cm mobile mass affixed to the cardiac device lead as is passes through the right atrium.  In the setting of bacteremia this is most likely represents infection.     CT CAP:  IMPRESSION:  Mild multifocal pneumonia. Given smoking history, follow-up chest CT is advised in 3 months to confirm resolution. Enhancing consolidation with central groundglass in the periphery of the right lower lobe may be a combination of atelectasis and   pneumonia; there is no evident of pulmonary embolism (though the study was not tailored to assess the pulmonary arteries) to suggest that this is an infarct.     CXR:  IMPRESSION:  No acute cardiopulmonary disease.     I have personally reviewed pertinent reports.   and I have personally reviewed pertinent films in PACS    Vitals:   Vitals:    02/19/24 0208 02/19/24 0517 02/19/24 0518 02/19/24 0813   BP: 141/85 164/92 164/92    BP Location:        Pulse:  79 79 83   Resp:       Temp: 98 °F (36.7 °C)      TempSrc:       SpO2:   95% 96%   Weight:   114 kg (250 lb 14.1 oz)    Height:         Physical Exam:    General: No acute distress and Normal appearance  HEENT/NECK:  Normocephalic. Atraumatic.  No jugular venous distention.    Cardiac: Regular rate and rhythm and No murmurs/rubs/gallops  Pulmonary:  Breath sounds clear bilaterally and No rales/rhonchi/wheezes  Abdomen:  Non-tender and Non-distended  Extremities: Extremities warm/dry and 1+ edema B/L  Neuro: Alert and oriented X 3 and No focal deficits  Skin: Warm/Dry, without rashes or lesions.        Assessment:    MSSA Bacteremia  S/P lead extraction/ICD removal, need for reimplant of ICD  Gangrene of toe left foot  PAD       Plan:  Continue Abx per ID, BC 1/2 showing Staph Aureus, ID recommending 14 days from first clear BC, 2/16 with no growth at 48 hrs.  Tmax 100.8, WBC 11   -Once cleared by ID will proceed with EV ICD    SIGNATURE: Maritza Landeros PA-C  DATE: February 19, 2024  TIME: 8:20 AM    * This note was completed in part utilizing [employer] direct voice recognition software.   Grammatical errors, random word insertion, spelling mistakes, and incomplete sentences may be an occasional consequence of the system secondary to software limitations, ambient noise and hardware issues. At the time of dictation, efforts were made to edit, clarify and /or correct errors. Please read the chart carefully and recognize, using context, where substitutions have occurred.  If you have any questions or concerns about the context, text or information contained within the body of this dictation, please contact myself, the provider, for further clarification.

## 2024-02-19 NOTE — PLAN OF CARE
Problem: Prexisting or High Potential for Compromised Skin Integrity  Goal: Skin integrity is maintained or improved  Description: INTERVENTIONS:  - Identify patients at risk for skin breakdown  - Assess and monitor skin integrity  - Assess and monitor nutrition and hydration status  - Monitor labs   - Assess for incontinence   - Turn and reposition patient  - Assist with mobility/ambulation  - Relieve pressure over bony prominences  - Avoid friction and shearing  - Provide appropriate hygiene as needed including keeping skin clean and dry  - Evaluate need for skin moisturizer/barrier cream  - Collaborate with interdisciplinary team   - Patient/family teaching  - Consider wound care consult   Outcome: Progressing     Problem: INFECTION - ADULT  Goal: Absence or prevention of progression during hospitalization  Description: INTERVENTIONS:  - Assess and monitor for signs and symptoms of infection  - Monitor lab/diagnostic results  - Monitor all insertion sites, i.e. indwelling lines, tubes, and drains  - Monitor endotracheal if appropriate and nasal secretions for changes in amount and color  - [place] appropriate cooling/warming therapies per order  - Administer medications as ordered  - Instruct and encourage patient and family to use good hand hygiene technique  - Identify and instruct in appropriate isolation precautions for identified infection/condition  Outcome: Progressing  Goal: Absence of fever/infection during neutropenic period  Description: INTERVENTIONS:  - Monitor WBC    Outcome: Progressing     Problem: METABOLIC, FLUID AND ELECTROLYTES - ADULT  Goal: Electrolytes maintained within normal limits  Description: INTERVENTIONS:  - Monitor labs and assess patient for signs and symptoms of electrolyte imbalances  - Administer electrolyte replacement as ordered  - Monitor response to electrolyte replacements, including repeat lab results as appropriate  - Instruct patient on fluid and nutrition as  appropriate  Outcome: Progressing  Goal: Fluid balance maintained  Description: INTERVENTIONS:  - Monitor labs   - Monitor I/O and WT  - Instruct patient on fluid and nutrition as appropriate  - Assess for signs & symptoms of volume excess or deficit  Outcome: Progressing  Goal: Glucose maintained within target range  Description: INTERVENTIONS:  - Monitor Blood Glucose as ordered  - Assess for signs and symptoms of hyperglycemia and hypoglycemia  - Administer ordered medications to maintain glucose within target range  - Assess nutritional intake and initiate nutrition service referral as needed  Outcome: Progressing

## 2024-02-19 NOTE — RESTORATIVE TECHNICIAN NOTE
Restorative Technician Note      Patient Name: Nadir Myers     Restorative Tech Visit Date: 02/19/24  Note Type: Mobility  Patient Position Upon Consult: Other (Comment) (Ambulating in hallway)  Activity Performed: Ambulated  Assistive Device: Roller walker  Patient Position at End of Consult: All needs within reach; Bedside chair    Krishan Hearn, Restorative Technician

## 2024-02-20 ENCOUNTER — DOCUMENTATION (OUTPATIENT)
Dept: OTHER | Facility: HOSPITAL | Age: 64
End: 2024-02-20

## 2024-02-20 ENCOUNTER — APPOINTMENT (INPATIENT)
Dept: RADIOLOGY | Facility: HOSPITAL | Age: 64
DRG: 853 | End: 2024-02-20
Payer: COMMERCIAL

## 2024-02-20 ENCOUNTER — ANESTHESIA (INPATIENT)
Dept: PERIOP | Facility: HOSPITAL | Age: 64
DRG: 871 | End: 2024-02-20
Payer: COMMERCIAL

## 2024-02-20 ENCOUNTER — APPOINTMENT (INPATIENT)
Dept: NON INVASIVE DIAGNOSTICS | Facility: HOSPITAL | Age: 64
DRG: 853 | End: 2024-02-20
Payer: COMMERCIAL

## 2024-02-20 ENCOUNTER — ANESTHESIA EVENT (INPATIENT)
Dept: PERIOP | Facility: HOSPITAL | Age: 64
DRG: 871 | End: 2024-02-20
Payer: COMMERCIAL

## 2024-02-20 LAB
ANION GAP SERPL CALCULATED.3IONS-SCNC: 5 MMOL/L
BNP SERPL-MCNC: 1809 PG/ML (ref 0–100)
BUN SERPL-MCNC: 29 MG/DL (ref 5–25)
CALCIUM SERPL-MCNC: 8.3 MG/DL (ref 8.4–10.2)
CHLORIDE SERPL-SCNC: 101 MMOL/L (ref 96–108)
CO2 SERPL-SCNC: 29 MMOL/L (ref 21–32)
CREAT SERPL-MCNC: 1.52 MG/DL (ref 0.6–1.3)
ERYTHROCYTE [DISTWIDTH] IN BLOOD BY AUTOMATED COUNT: 13.5 % (ref 11.6–15.1)
GFR SERPL CREATININE-BSD FRML MDRD: 48 ML/MIN/1.73SQ M
GLUCOSE SERPL-MCNC: 206 MG/DL (ref 65–140)
GLUCOSE SERPL-MCNC: 87 MG/DL (ref 65–140)
GLUCOSE SERPL-MCNC: 96 MG/DL (ref 65–140)
HCT VFR BLD AUTO: 32.6 % (ref 36.5–49.3)
HGB BLD-MCNC: 10 G/DL (ref 12–17)
MCH RBC QN AUTO: 28 PG (ref 26.8–34.3)
MCHC RBC AUTO-ENTMCNC: 30.7 G/DL (ref 31.4–37.4)
MCV RBC AUTO: 91 FL (ref 82–98)
PLATELET # BLD AUTO: 423 THOUSANDS/UL (ref 149–390)
PMV BLD AUTO: 9.4 FL (ref 8.9–12.7)
POTASSIUM SERPL-SCNC: 4.8 MMOL/L (ref 3.5–5.3)
RBC # BLD AUTO: 3.57 MILLION/UL (ref 3.88–5.62)
SODIUM SERPL-SCNC: 135 MMOL/L (ref 135–147)
WBC # BLD AUTO: 11.08 THOUSAND/UL (ref 4.31–10.16)

## 2024-02-20 PROCEDURE — 82948 REAGENT STRIP/BLOOD GLUCOSE: CPT

## 2024-02-20 PROCEDURE — 93005 ELECTROCARDIOGRAM TRACING: CPT

## 2024-02-20 PROCEDURE — 99223 1ST HOSP IP/OBS HIGH 75: CPT | Performed by: INTERNAL MEDICINE

## 2024-02-20 PROCEDURE — 80048 BASIC METABOLIC PNL TOTAL CA: CPT | Performed by: INTERNAL MEDICINE

## 2024-02-20 PROCEDURE — 0571T INSJ/RPLCMT ICDS SS ELTRD: CPT | Performed by: THORACIC SURGERY (CARDIOTHORACIC VASCULAR SURGERY)

## 2024-02-20 PROCEDURE — C1894 INTRO/SHEATH, NON-LASER: HCPCS | Performed by: THORACIC SURGERY (CARDIOTHORACIC VASCULAR SURGERY)

## 2024-02-20 PROCEDURE — C1722 AICD, SINGLE CHAMBER: HCPCS | Performed by: THORACIC SURGERY (CARDIOTHORACIC VASCULAR SURGERY)

## 2024-02-20 PROCEDURE — 85027 COMPLETE CBC AUTOMATED: CPT | Performed by: INTERNAL MEDICINE

## 2024-02-20 PROCEDURE — 0JH608Z INSERTION OF DEFIBRILLATOR GENERATOR INTO CHEST SUBCUTANEOUS TISSUE AND FASCIA, OPEN APPROACH: ICD-10-PCS | Performed by: INTERNAL MEDICINE

## 2024-02-20 PROCEDURE — 71045 X-RAY EXAM CHEST 1 VIEW: CPT

## 2024-02-20 PROCEDURE — 93355 ECHO TRANSESOPHAGEAL (TEE): CPT

## 2024-02-20 PROCEDURE — 83880 ASSAY OF NATRIURETIC PEPTIDE: CPT | Performed by: INTERNAL MEDICINE

## 2024-02-20 PROCEDURE — 0571T INSJ/RPLCMT ICDS SS ELTRD: CPT | Performed by: INTERNAL MEDICINE

## 2024-02-20 PROCEDURE — 0WHC3GZ INSERTION OF DEFIBRILLATOR LEAD INTO MEDIASTINUM, PERCUTANEOUS APPROACH: ICD-10-PCS | Performed by: INTERNAL MEDICINE

## 2024-02-20 PROCEDURE — C1896 LEAD, AICD, NON SING/DUAL: HCPCS | Performed by: THORACIC SURGERY (CARDIOTHORACIC VASCULAR SURGERY)

## 2024-02-20 DEVICE — ICD-VR DVEA3E4 EV ICD EV4 US
Type: IMPLANTABLE DEVICE | Site: CHEST  WALL | Status: NON-FUNCTIONAL
Brand: AURORA EV-ICD™ MRI SURESCAN™
Removed: 2024-02-21

## 2024-02-20 DEVICE — ENVELOPE CMRM6133 ABSORB LRG MR
Type: IMPLANTABLE DEVICE | Site: CHEST  WALL | Status: NON-FUNCTIONAL
Brand: TYRX™
Removed: 2024-02-21

## 2024-02-20 DEVICE — LEAD EV240163 SUBSTERNAL LEAD 63CM
Type: IMPLANTABLE DEVICE | Site: HEART | Status: NON-FUNCTIONAL
Brand: EPSILA EV™ MRI SURESCAN™
Removed: 2024-02-21

## 2024-02-20 RX ORDER — LIDOCAINE HYDROCHLORIDE 10 MG/ML
INJECTION, SOLUTION INFILTRATION; PERINEURAL AS NEEDED
Status: DISCONTINUED | OUTPATIENT
Start: 2024-02-20 | End: 2024-02-20

## 2024-02-20 RX ORDER — SPIRONOLACTONE 25 MG/1
25 TABLET ORAL DAILY
Status: DISCONTINUED | OUTPATIENT
Start: 2024-02-21 | End: 2024-02-21

## 2024-02-20 RX ORDER — CALCIUM CHLORIDE 100 MG/ML
INJECTION INTRAVENOUS; INTRAVENTRICULAR AS NEEDED
Status: DISCONTINUED | OUTPATIENT
Start: 2024-02-20 | End: 2024-02-20

## 2024-02-20 RX ORDER — SODIUM CHLORIDE, SODIUM LACTATE, POTASSIUM CHLORIDE, CALCIUM CHLORIDE 600; 310; 30; 20 MG/100ML; MG/100ML; MG/100ML; MG/100ML
50 INJECTION, SOLUTION INTRAVENOUS CONTINUOUS
Status: DISCONTINUED | OUTPATIENT
Start: 2024-02-20 | End: 2024-02-21

## 2024-02-20 RX ORDER — LIDOCAINE HYDROCHLORIDE 10 MG/ML
INJECTION, SOLUTION EPIDURAL; INFILTRATION; INTRACAUDAL; PERINEURAL AS NEEDED
Status: DISCONTINUED | OUTPATIENT
Start: 2024-02-20 | End: 2024-02-20 | Stop reason: HOSPADM

## 2024-02-20 RX ORDER — ROCURONIUM BROMIDE 10 MG/ML
INJECTION, SOLUTION INTRAVENOUS AS NEEDED
Status: DISCONTINUED | OUTPATIENT
Start: 2024-02-20 | End: 2024-02-20

## 2024-02-20 RX ORDER — ACETAMINOPHEN 325 MG/1
650 TABLET ORAL EVERY 4 HOURS PRN
Status: DISCONTINUED | OUTPATIENT
Start: 2024-02-20 | End: 2024-02-21

## 2024-02-20 RX ORDER — FUROSEMIDE 10 MG/ML
40 INJECTION INTRAMUSCULAR; INTRAVENOUS
Status: DISCONTINUED | OUTPATIENT
Start: 2024-02-20 | End: 2024-02-23

## 2024-02-20 RX ORDER — PROPOFOL 10 MG/ML
INJECTION, EMULSION INTRAVENOUS AS NEEDED
Status: DISCONTINUED | OUTPATIENT
Start: 2024-02-20 | End: 2024-02-20

## 2024-02-20 RX ORDER — MIDAZOLAM HYDROCHLORIDE 2 MG/2ML
INJECTION, SOLUTION INTRAMUSCULAR; INTRAVENOUS AS NEEDED
Status: DISCONTINUED | OUTPATIENT
Start: 2024-02-20 | End: 2024-02-20

## 2024-02-20 RX ORDER — FENTANYL CITRATE/PF 50 MCG/ML
25 SYRINGE (ML) INJECTION
Status: DISCONTINUED | OUTPATIENT
Start: 2024-02-20 | End: 2024-02-20 | Stop reason: HOSPADM

## 2024-02-20 RX ORDER — OXYCODONE HYDROCHLORIDE 5 MG/1
5 TABLET ORAL EVERY 6 HOURS PRN
Status: DISCONTINUED | OUTPATIENT
Start: 2024-02-20 | End: 2024-03-01 | Stop reason: HOSPADM

## 2024-02-20 RX ORDER — FUROSEMIDE 10 MG/ML
40 INJECTION INTRAMUSCULAR; INTRAVENOUS
Status: DISCONTINUED | OUTPATIENT
Start: 2024-02-20 | End: 2024-02-20

## 2024-02-20 RX ORDER — ONDANSETRON 2 MG/ML
4 INJECTION INTRAMUSCULAR; INTRAVENOUS ONCE AS NEEDED
Status: DISCONTINUED | OUTPATIENT
Start: 2024-02-20 | End: 2024-02-20 | Stop reason: HOSPADM

## 2024-02-20 RX ORDER — ONDANSETRON 2 MG/ML
INJECTION INTRAMUSCULAR; INTRAVENOUS AS NEEDED
Status: DISCONTINUED | OUTPATIENT
Start: 2024-02-20 | End: 2024-02-20

## 2024-02-20 RX ORDER — SODIUM CHLORIDE 9 MG/ML
INJECTION, SOLUTION INTRAVENOUS CONTINUOUS PRN
Status: DISCONTINUED | OUTPATIENT
Start: 2024-02-20 | End: 2024-02-20

## 2024-02-20 RX ORDER — FENTANYL CITRATE 50 UG/ML
INJECTION, SOLUTION INTRAMUSCULAR; INTRAVENOUS AS NEEDED
Status: DISCONTINUED | OUTPATIENT
Start: 2024-02-20 | End: 2024-02-20

## 2024-02-20 RX ORDER — LIDOCAINE HYDROCHLORIDE 10 MG/ML
INJECTION, SOLUTION EPIDURAL; INFILTRATION; INTRACAUDAL; PERINEURAL
Status: COMPLETED | OUTPATIENT
Start: 2024-02-20 | End: 2024-02-20

## 2024-02-20 RX ORDER — SODIUM CHLORIDE, SODIUM LACTATE, POTASSIUM CHLORIDE, CALCIUM CHLORIDE 600; 310; 30; 20 MG/100ML; MG/100ML; MG/100ML; MG/100ML
INJECTION, SOLUTION INTRAVENOUS CONTINUOUS PRN
Status: DISCONTINUED | OUTPATIENT
Start: 2024-02-20 | End: 2024-02-20

## 2024-02-20 RX ORDER — CEFAZOLIN SODIUM 2 G/50ML
2000 SOLUTION INTRAVENOUS ONCE
Status: DISCONTINUED | OUTPATIENT
Start: 2024-02-20 | End: 2024-02-20 | Stop reason: HOSPADM

## 2024-02-20 RX ORDER — HYDROMORPHONE HCL/PF 1 MG/ML
SYRINGE (ML) INJECTION AS NEEDED
Status: DISCONTINUED | OUTPATIENT
Start: 2024-02-20 | End: 2024-02-20

## 2024-02-20 RX ORDER — DEXAMETHASONE SODIUM PHOSPHATE 10 MG/ML
INJECTION, SOLUTION INTRAMUSCULAR; INTRAVENOUS AS NEEDED
Status: DISCONTINUED | OUTPATIENT
Start: 2024-02-20 | End: 2024-02-20

## 2024-02-20 RX ORDER — EPHEDRINE SULFATE 50 MG/ML
INJECTION INTRAVENOUS AS NEEDED
Status: DISCONTINUED | OUTPATIENT
Start: 2024-02-20 | End: 2024-02-20

## 2024-02-20 RX ADMIN — FENTANYL CITRATE 25 MCG: 50 INJECTION INTRAMUSCULAR; INTRAVENOUS at 16:25

## 2024-02-20 RX ADMIN — CALCIUM CHLORIDE 0.5 G: 100 INJECTION INTRAVENOUS; INTRAVENTRICULAR at 12:20

## 2024-02-20 RX ADMIN — CEFAZOLIN SODIUM 2000 MG: 2 SOLUTION INTRAVENOUS at 05:38

## 2024-02-20 RX ADMIN — DEXAMETHASONE SODIUM PHOSPHATE 10 MG: 10 INJECTION, SOLUTION INTRAMUSCULAR; INTRAVENOUS at 12:30

## 2024-02-20 RX ADMIN — FENTANYL CITRATE 50 MCG: 50 INJECTION INTRAMUSCULAR; INTRAVENOUS at 12:14

## 2024-02-20 RX ADMIN — ROCURONIUM BROMIDE 50 MG: 10 INJECTION, SOLUTION INTRAVENOUS at 12:20

## 2024-02-20 RX ADMIN — FENTANYL CITRATE 50 MCG: 50 INJECTION INTRAMUSCULAR; INTRAVENOUS at 12:21

## 2024-02-20 RX ADMIN — CEFAZOLIN SODIUM 2000 MG: 2 SOLUTION INTRAVENOUS at 00:00

## 2024-02-20 RX ADMIN — HYDROMORPHONE HYDROCHLORIDE 0.5 MG: 1 INJECTION, SOLUTION INTRAMUSCULAR; INTRAVENOUS; SUBCUTANEOUS at 12:40

## 2024-02-20 RX ADMIN — FUROSEMIDE 40 MG: 10 INJECTION, SOLUTION INTRAMUSCULAR; INTRAVENOUS at 09:10

## 2024-02-20 RX ADMIN — PROPOFOL 150 MG: 10 INJECTION, EMULSION INTRAVENOUS at 12:20

## 2024-02-20 RX ADMIN — METOPROLOL SUCCINATE 25 MG: 25 TABLET, EXTENDED RELEASE ORAL at 18:10

## 2024-02-20 RX ADMIN — INSULIN GLARGINE 35 UNITS: 100 INJECTION, SOLUTION SUBCUTANEOUS at 22:44

## 2024-02-20 RX ADMIN — FUROSEMIDE 40 MG: 10 INJECTION, SOLUTION INTRAMUSCULAR; INTRAVENOUS at 18:32

## 2024-02-20 RX ADMIN — CEFAZOLIN SODIUM 2000 MG: 2 SOLUTION INTRAVENOUS at 12:40

## 2024-02-20 RX ADMIN — FENTANYL CITRATE 25 MCG: 50 INJECTION INTRAMUSCULAR; INTRAVENOUS at 16:20

## 2024-02-20 RX ADMIN — AMIODARONE HYDROCHLORIDE 200 MG: 200 TABLET ORAL at 09:10

## 2024-02-20 RX ADMIN — MIDAZOLAM 2 MG: 1 INJECTION INTRAMUSCULAR; INTRAVENOUS at 12:13

## 2024-02-20 RX ADMIN — SODIUM CHLORIDE, SODIUM LACTATE, POTASSIUM CHLORIDE, AND CALCIUM CHLORIDE: .6; .31; .03; .02 INJECTION, SOLUTION INTRAVENOUS at 12:02

## 2024-02-20 RX ADMIN — ONDANSETRON 4 MG: 2 INJECTION INTRAMUSCULAR; INTRAVENOUS at 12:30

## 2024-02-20 RX ADMIN — PHENYLEPHRINE HYDROCHLORIDE 40 MCG/MIN: 10 INJECTION INTRAVENOUS at 12:25

## 2024-02-20 RX ADMIN — EMPAGLIFLOZIN 10 MG: 10 TABLET, FILM COATED ORAL at 18:42

## 2024-02-20 RX ADMIN — INSULIN LISPRO 1 UNITS: 100 INJECTION, SOLUTION INTRAVENOUS; SUBCUTANEOUS at 22:45

## 2024-02-20 RX ADMIN — SODIUM CHLORIDE: 0.9 INJECTION, SOLUTION INTRAVENOUS at 12:25

## 2024-02-20 RX ADMIN — LIDOCAINE HYDROCHLORIDE 100 MG: 10 INJECTION, SOLUTION INFILTRATION; PERINEURAL at 12:20

## 2024-02-20 RX ADMIN — SENNOSIDES, DOCUSATE SODIUM 2 TABLET: 8.6; 5 TABLET ORAL at 22:44

## 2024-02-20 RX ADMIN — LIDOCAINE HYDROCHLORIDE 0.5 ML: 10 INJECTION, SOLUTION EPIDURAL; INFILTRATION; INTRACAUDAL; PERINEURAL at 12:15

## 2024-02-20 RX ADMIN — METOPROLOL SUCCINATE 25 MG: 25 TABLET, EXTENDED RELEASE ORAL at 05:38

## 2024-02-20 RX ADMIN — ROCURONIUM BROMIDE 10 MG: 10 INJECTION, SOLUTION INTRAVENOUS at 14:11

## 2024-02-20 RX ADMIN — EPHEDRINE SULFATE 10 MG: 50 INJECTION, SOLUTION INTRAVENOUS at 12:20

## 2024-02-20 RX ADMIN — SUGAMMADEX 200 MG: 100 INJECTION, SOLUTION INTRAVENOUS at 14:48

## 2024-02-20 NOTE — UTILIZATION REVIEW
Continued Stay Review    Date: 2-20-24                           Current Patient Class: inpatient  Current Level of Care: med surg    HPI:63 y.o. male initially admitted on 1-31-24      Assessment/Plan:     Bacteremia cleared.  Anesthesia GILL today for reimplantation of new ICD.            Vital Signs:     Patient Vitals for the past 24 hrs:   BP Temp Pulse Resp SpO2 Weight   02/20/24 0821 -- -- -- -- -- 112 kg (247 lb 12.8 oz)   02/20/24 0725 160/92 98 °F (36.7 °C) 80 18 94 % --   02/20/24 0537 156/89 -- 78 -- 95 % --   02/20/24 0359 149/74 98.5 °F (36.9 °C) 79 -- 94 % --   02/19/24 2151 142/90 100.1 °F (37.8 °C) 87 18 93 % --        Pertinent Labs/Diagnostic Results:       Results from last 7 days   Lab Units 02/20/24  0504 02/19/24  0451 02/18/24  0500 02/17/24  0444 02/16/24  0851 02/15/24  0515 02/14/24  0503   WBC Thousand/uL 11.08* 11.34* 12.14* 11.06* 11.93* 11.20* 13.64*   HEMOGLOBIN g/dL 10.0* 10.1* 9.5* 9.2* 10.1* 10.5* 10.6*   HEMATOCRIT % 32.6* 33.1* 30.6* 28.9* 32.5* 33.5* 32.5*   PLATELETS Thousands/uL 423* 445* 409* 403* 390 368 331   NEUTROS ABS Thousands/µL  --   --   --   --   --  9.06* 10.59*         Results from last 7 days   Lab Units 02/20/24  0504 02/19/24  0451 02/18/24  1126 02/18/24  0502 02/17/24  0444   SODIUM mmol/L 135 134* 129* 129* 132*   POTASSIUM mmol/L 4.8 5.0 5.3 6.0* 4.9   CHLORIDE mmol/L 101 101 99 101 100   CO2 mmol/L 29 26 26 22 25   ANION GAP mmol/L 5 7 4 6 7   BUN mg/dL 29* 27* 31* 31* 33*   CREATININE mg/dL 1.52* 1.51* 1.44* 1.42* 1.55*   EGFR ml/min/1.73sq m 48 48 51 52 47   CALCIUM mg/dL 8.3* 8.3* 8.2* 8.1* 7.8*   MAGNESIUM mg/dL  --  2.2  --   --   --      Results from last 7 days   Lab Units 02/15/24  0515 02/14/24  0503   AST U/L 25 24   ALT U/L 5* 3*   ALK PHOS U/L 46 46   TOTAL PROTEIN g/dL 6.5 6.7   ALBUMIN g/dL 2.4* 2.6*   TOTAL BILIRUBIN mg/dL 0.31 0.36     Results from last 7 days   Lab Units 02/20/24  0724 02/19/24  2038 02/19/24  1555 02/19/24  1113  02/19/24  0722 02/18/24  2035 02/18/24  1702 02/18/24  1115 02/18/24  0726 02/17/24  2042 02/17/24  1654 02/17/24  1143   POC GLUCOSE mg/dl 96 198* 180* 219* 143* 231* 132 262* 183* 232* 256* 186*     Results from last 7 days   Lab Units 02/20/24  0504 02/19/24  0451 02/18/24  1126 02/18/24  0502 02/17/24  0444 02/16/24  0851 02/15/24  0515 02/14/24  0503   GLUCOSE RANDOM mg/dL 87 67 255* 85 95 163* 94 103             BETA-HYDROXYBUTYRATE   Date Value Ref Range Status   01/31/2024 0.3 <0.6 mmol/L Final     Results from last 7 days   Lab Units 02/19/24  1345   PROTIME seconds 14.6*   INR  1.15   PTT seconds 30     Results from last 7 days   Lab Units 02/20/24  0504   BNP pg/mL 1,809*     Results from last 7 days   Lab Units 02/20/24  1158   UNIT PRODUCT CODE  [id number]  B6801X17  S0725H31  R0717X27   UNIT NUMBER  [id number]  [id number]  [id number]  [id number]   UNITABO  O  O  O  O   UNITRH  POS  POS  POS  POS   CROSSMATCH  Compatible  Compatible  Compatible  Compatible   UNIT DISPENSE STATUS  Issued  Issued  Issued  Issued   UNIT PRODUCT VOL ml 350  300  300  300       Results from last 7 days   Lab Units 02/18/24  1240   OSMO UR mmol/     Results from last 7 days   Lab Units 02/18/24  1240   SODIUM UR  35     Results from last 7 days   Lab Units 02/16/24  0851   BLOOD CULTURE  No Growth After 4 Days.  No Growth After 4 Days.       Medications:   Scheduled Medications:  [Transfer Hold] amiodarone, 200 mg, Oral, Daily With Breakfast  [Transfer Hold] aspirin, 81 mg, Oral, Daily  [Transfer Hold] atorvastatin, 80 mg, Oral, After Dinner  [Transfer Hold] cefazolin, 2,000 mg, Intravenous, Q6H  cefazolin, 2,000 mg, Intravenous, Once  [Transfer Hold] clopidogrel, 75 mg, Oral, Daily  [Transfer Hold] enoxaparin, 40 mg, Subcutaneous, Q24H TEJAS  [Transfer Hold] furosemide, 40 mg, Intravenous, BID (diuretic)  [Transfer Hold] insulin glargine, 35 Units, Subcutaneous, HS  [Transfer Hold]  insulin lispro, 1-5 Units, Subcutaneous, 4x Daily (AC & HS)  [Transfer Hold] insulin lispro, 7 Units, Subcutaneous, TID With Meals  [Transfer Hold] metoprolol succinate, 25 mg, Oral, Q12H  [Transfer Hold] polyethylene glycol, 17 g, Oral, Daily  [Transfer Hold] saccharomyces boulardii, 250 mg, Oral, BID  [Transfer Hold] senna-docusate sodium, 2 tablet, Oral, HS      Continuous IV Infusions:     PRN Meds:  [Transfer Hold] acetaminophen, 975 mg, Oral, Q6H PRN  [Transfer Hold] metoprolol, 5 mg, Intravenous, Q6H PRN        Discharge Plan: to be determined     Network Utilization Review Department  ATTENTION: Please call with any questions or concerns to [phone] and carefully listen to the prompts so that you are directed to the right person. All voicemails are confidential.   For Discharge needs, contact Care Management DC Support Team at [phone] opt. 2  Send all requests for admission clinical reviews, approved or denied determinations and any other requests to dedicated fax number below belonging to the [place] where the patient is receiving treatment. List of dedicated fax numbers for the Facilities:  FACILITY NAME UR FAX NUMBER   ADMISSION DENIALS (Administrative/Medical Necessity) [phone]   DISCHARGE SUPPORT TEAM (NETWORK) [phone]   PARENT CHILD HEALTH (Maternity/NICU/Pediatrics) [phone]   [facility] [phone]   [facility] [phone]   [facility] [phone]   [facility] [phone]   [facility] [phone]   [facility] [phone]   [facility] [phone]   [facility] [phone]   [facility] [phone]   [facility] [phone]   [facility] [phone]   [facility]  [facility] [phone]

## 2024-02-20 NOTE — ANESTHESIA PROCEDURE NOTES
Procedure Performed: GILL Anesthesia  Start Time:  2/20/2024 12:32 PM        Preanesthesia Checklist    Patient identified, IV assessed, risks and benefits discussed, monitors and equipment assessed, procedure being performed at surgeon's request and anesthesia consent obtained.      Procedure    Diagnostic Indications for GILL:  assessment of surgical repair, hemodynamic monitoring and suspected pericardial effusion. Type of GILL: complete GILL with interpretation. Images Saved: ultrasound permanent image saved. Physician Requesting Echo: Abhilash Ferrera MD.  Location performed: OR. Intubated. Bite block not placed.   Heart visualized. Insertion of GILL Probe:  Atraumatic. Probe Type:  Multiplane. Modalities:  3D, color flow mapping, pulse wave Doppler and continuous wave Doppler.      Echocardiographic and Doppler Measurements    PREPROCEDURE    LEFT VENTRICLE:  Systolic Function: moderately impaired. Ejection Fraction: 40%.    Regional Wall Motion Abnormalities: global HK, worse, anterorseptal; LV apical aneurysm.    RIGHT VENTRICLE:  Systolic Function: normal.  Cavity size normal.              AORTIC VALVE:   Leaflet motions normal and normal. Stenosis: none.     Regurgitation: none.      MITRAL VALVE:  Leaflets: normal. Leaflet Motions: normal. Regurgitation: trace.   Stenosis: none.       TRICUSPID VALVE:  Leaflets: normal. Leaflet Motions: normal.  Regurgitation: trace.              ASCENDING AORTA:    Dissection not present.      AORTIC ARCH:    dissection not present. Grade 3: atheroma protruding < 0.5 cm into lumen.    DESCENDING AORTA:    Dissection not present. Grade 3: atheroma protruding < 0.5 cm into lumen.                    OTHER ATRIAL FINDINGS:  Long, linear mass in RA; appears to be attached to SVC, posterior wall; may represent cast from a lead extraction- discussed with surgeon and cardiologist pre-procedure.                POSTPROCEDURE

## 2024-02-20 NOTE — QUICK NOTE
Patient was not able to be seen today.  Was taken to the OR earlier today.  Patient will be transferred to P5 after the procedure

## 2024-02-20 NOTE — PHYSICAL THERAPY NOTE
PHYSICAL THERAPY NOTE          Patient Name: Nadir Myers  Today's Date: 2/20/2024 02/20/24 1047   PT Last Visit   PT Visit Date 02/20/24   Note Type   Note Type Cancelled Session   Cancel Reasons Patient to operating room  (ICD placement)       Maritza Holm, PT, DPT

## 2024-02-20 NOTE — OP NOTE
OPERATIVE REPORT  PATIENT NAME: Nadir Myers    :  1960  MRN: [id number]  Pt Location: [facility] OR ROOM 02    SURGERY DATE: 2024    SURGEON: Abhilash Ferrera MD    ASSISTANT SURGEON: Arturo Wooten DO    ADDITIONAL ASSISTANT: Maritza Landeros PA-C    PREOPERATIVE DIAGNOSIS:  Cardiomyopathy    POSTOPERATIVE DIAGNOSIS:  Cardiomyopathy    NYHA: 3  CCS: 1    PROCEDURE:   EV ICD implant    INDICATIONS:  The patient is a 63 y.o. year-old male with history of cardiomyopathy and a dual-chamber ICD, he presented with bacteremia, he required a ICD extraction, he is in need of a new device in order to decrease the risk of reinfection EP has recommended an extravascular ICD.    ANESTHESIA: GET. Dr. Lion Miller    FINDINGS:  Preoperative fluoroscopy was used to yris the anatomic landmarks for lead insertion, also to identify appropriate location for the generator placement.  Fluoroscopy reveals adequate positioning of the ICD lead and ICD generator.    OPERATIVE TECHNIQUE:  Patient was brought into the operative room, he was properly identified and general anesthesia was induced.  He was prepped and draped in the standard sterile fashion and a complete timeout was performed.    An incision was done on lateral chest wall at the location previously identified with fluoroscopy, and dissection was taken down to the chest wall muscle fascia.  A generator pocket was created over the chest wall muscle but on the all the subcutaneous fat.    An incision was made between the xiphoid and the left costal margin, dissection was taken down to the rectus fascia, the rectus fascia was opened and using blunt dissection we created a tract into the retrosternal space, this tract was under the fascia but above the rectus muscle.   Once in the retrosternal space we use the lead dissector under fluoroscopy until we reached the moreno level, the dissector was removed, the ICD lead was introduced over the sheath, the sheath was removed  and all the lead parameters where check by the EP team, they were all satisfactory.  The lead was secured to the rectus fascia and tunnel into the lateral pocket, this was connected to the generator, the generator was secured to the rectus muscle fascia, then both incisions were closed in layers with absorbable suture.    COMPLICATIONS: None.    PACKS/TUBES/DRAINS: None.     EBL: 10 cc .    TRANSFUSION: None.     SPECIMENS: Non3.     As the attending surgeon, I was present and scrubbed for all critical portions of this procedure. There was no qualified surgical resident available. The assistance of a PA was required to complete this case, specifically for assistance with wound closure    .                   SIGNATURE: Abhilash Ferrera MD  DATE: February 20, 2024  TIME: 4:48 PM

## 2024-02-20 NOTE — ANESTHESIA POSTPROCEDURE EVALUATION
Post-Op Assessment Note    CV Status:  Stable  Pain Score: 0    Pain management: adequate       Mental Status:  Alert and awake   Hydration Status:  Euvolemic   PONV Controlled:  Controlled   Airway Patency:  Patent     Post Op Vitals Reviewed: Yes    No anethesia notable event occurred.    Staff: CRNA               /84 (02/20/24 1513)    Temp 98.5 °F (36.9 °C) (02/20/24 1513)    Pulse 82 (02/20/24 1513)   Resp 16 (02/20/24 1513)    SpO2 94 % (02/20/24 1513)

## 2024-02-20 NOTE — CONSULTS
Consultation - Advanced Heart Failure Team  Nadir Myers 63 y.o. male MRN: [id number]  Unit/Bed#: [facility] Encounter: [id number]      Inpatient consult to Heart Failure Service  Consult performed by: Lamont Murcia MD  Consult ordered by: Evita Casey MD        PCP: Tigre Hare DO  Outpatient Cardiologist: Nash Samuels DO      History of Present Illness   Physician Requesting Consult: Tiffany Daily MD  Reason for Consult / Principal Problem: heart failure    HPI: Nadir Myers is a 63 y.o. year old male who presented 1/31 with complaints of left sided weakness/numbness and was noted to be in septic shock with left 4th toe gangrene. He was admitted to the ICU with norepinephrine infusion and underwent amputation with podiatry. He was found to have MSSA positive blood cultures and has been treated with IV antibiotics. He has come off pressors and was downgraded to the medical floor. His GDMT / diuretics have been held due to LINDEN.     Due to persistent positive cultures and recurrent fevers after 72 hours, he underwent GILL showing a vegetation on a device lead, prompting EP consultation with ICD extraction. The patient currently is resting in bed with no complaints. Hospitalist service noted increasing weight gain and leg swelling since hospitalization, and IV diuretics have been initiated.      Review of Systems  Review of system was conducted and was negative except for as stated in the HPI.      Historical Information   Past Medical History:   Diagnosis Date    Diabetes mellitus (HCC)     Myocardial infarction (HCC)      Past Surgical History:   Procedure Laterality Date    CARDIAC CATHETERIZATION N/A 05/03/2023    Procedure: Cardiac Coronary Angiogram;  Surgeon: Valeriy Shore MD;  Location: [facility];  Service: Cardiology    CARDIAC CATHETERIZATION Left 05/03/2023    Procedure: Cardiac Left Heart Cath;  Surgeon: Valeriy Shore MD;  Location: [facility];  Service: Cardiology    CARDIAC  DEFIBRILLATOR PLACEMENT  05/2023    CARDIAC ELECTROPHYSIOLOGY PROCEDURE N/A 05/12/2023    Procedure: Cardiac icd implant;  Surgeon: Urbano Maria MD;  Location: [facility];  Service: Cardiology    IR LOWER EXTREMITY ANGIOGRAM  1/18/2024    WOUND DEBRIDEMENT Left 2/4/2024    Procedure: LEFT FOURTH TOE AMPUTATION SURGICAL WOUND DEBRIDEMENT FOOT/TOE (WASH OUT);  Surgeon: Bebo Hopper DPM;  Location: [facility]IN [facility];  Service: Podiatry     Social History     Substance and Sexual Activity   Alcohol Use Not Currently     Social History     Substance and Sexual Activity   Drug Use Never     Social History     Tobacco Use   Smoking Status Every Day    Types: Cigarettes   Smokeless Tobacco Never   Tobacco Comments    X2 cigarettes/day     Family History: non-contributory    Meds/Allergies   Hospital Medications:   Current Facility-Administered Medications   Medication Dose Route Frequency    [Transfer Hold] acetaminophen (TYLENOL) tablet 975 mg  975 mg Oral Q6H PRN    [Transfer Hold] amiodarone tablet 200 mg  200 mg Oral Daily With Breakfast    [Transfer Hold] aspirin chewable tablet 81 mg  81 mg Oral Daily    [Transfer Hold] atorvastatin (LIPITOR) tablet 80 mg  80 mg Oral After Dinner    [Transfer Hold] ceFAZolin (ANCEF) IVPB (premix in dextrose) 2,000 mg 50 mL  2,000 mg Intravenous Q6H    ceFAZolin (ANCEF) IVPB (premix in dextrose) 2,000 mg 50 mL  2,000 mg Intravenous Once    [Transfer Hold] clopidogrel (PLAVIX) tablet 75 mg  75 mg Oral Daily    [Transfer Hold] enoxaparin (LOVENOX) subcutaneous injection 40 mg  40 mg Subcutaneous Q24H TEJAS    [Transfer Hold] furosemide (LASIX) injection 40 mg  40 mg Intravenous BID (diuretic)    [Transfer Hold] insulin glargine (LANTUS) subcutaneous injection 35 Units 0.35 mL  35 Units Subcutaneous HS    [Transfer Hold] insulin lispro (HumaLOG) 100 units/mL subcutaneous injection 1-5 Units  1-5 Units Subcutaneous 4x Daily (AC & HS)    [Transfer Hold] insulin lispro (HumaLOG) 100  units/mL subcutaneous injection 7 Units  7 Units Subcutaneous TID With Meals    lidocaine (PF) (XYLOCAINE-MPF) 1 % injection    PRN    [Transfer Hold] metoprolol (LOPRESSOR) injection 5 mg  5 mg Intravenous Q6H PRN    [Transfer Hold] metoprolol succinate (TOPROL-XL) 24 hr tablet 25 mg  25 mg Oral Q12H    [Transfer Hold] polyethylene glycol (MIRALAX) packet 17 g  17 g Oral Daily    [Transfer Hold] saccharomyces boulardii (FLORASTOR) capsule 250 mg  250 mg Oral BID    [Transfer Hold] senna-docusate sodium (SENOKOT S) 8.6-50 mg per tablet 2 tablet  2 tablet Oral HS     Facility-Administered Medications Ordered in Other Encounters   Medication Dose Route Frequency    calcium chloride 10 % injection   Intravenous PRN    dexamethasone (PF) (DECADRON) injection   Intravenous PRN    ePHEDrine injection   Intravenous PRN    fentanyl citrate (PF) 100 MCG/2ML   Intravenous PRN    HYDROmorphone (DILAUDID) injection   Intravenous PRN    lactated ringers infusion   Intravenous Continuous PRN    lidocaine (XYLOCAINE) 1 % injection   Intravenous PRN    midazolam (VERSED) injection   Intravenous PRN    ondansetron (ZOFRAN) injection   Intravenous PRN    phenylephrine (JHONATHAN-SYNEPHRINE) 50 mg (STANDARD CONCENTRATION) in sodium chloride 0.9% 250 mL   Intravenous Continuous PRN    propofol (DIPRIVAN) 200 MG/20ML bolus injection   Intravenous PRN    ROCuronium (ZEMURON) injection   Intravenous PRN    sodium chloride 0.9 % infusion   Intravenous Continuous PRN    Sugammadex Sodium (BRIDION)   Intravenous PRN     Home Medications:   Medications Prior to Admission   Medication    aspirin 81 MG tablet    BD Pen Needle Micro U/F 32G X 6 MM MISC    clopidogrel (PLAVIX) 75 mg tablet    insulin detemir (Levemir FlexPen) 100 Units/mL injection pen    JANUMET -1000 MG TB24    losartan (COZAAR) 25 mg tablet    metFORMIN (GLUCOPHAGE) 1000 MG tablet    spironolactone (ALDACTONE) 25 mg tablet       Allergies   Allergen Reactions    Vancomycin  Rash     NOT AN ALLERGY - 1/31/24 patient experienced vancomycin infusion reaction, please extend duration of infusion time to prevent future infusion reactions       Objective   Vitals: Blood pressure 160/92, pulse 80, temperature 98 °F (36.7 °C), resp. rate 18, height 6' (1.829 m), weight 112 kg (247 lb 12.8 oz), SpO2 94%.  Orthostatic Blood Pressures      Flowsheet Row Most Recent Value   Blood Pressure 160/92 filed at 02/20/2024 0725   Patient Position - Orthostatic VS Lying filed at 02/18/2024 2124              Invasive Devices       Peripheral Intravenous Line  Duration             Peripheral IV 02/20/24 Distal;Right;Upper;Ventral (anterior) Arm <1 day    Peripheral IV 02/20/24 Left Arm <1 day    Peripheral IV 02/20/24 Right Hand <1 day              Arterial Line  Duration             Arterial Line 02/20/24 Right Radial <1 day              Drain  Duration             Urethral Catheter Latex 16 Fr. <1 day              Airway  Duration             ETT  Cuffed;Oral;Hi-Lo 8 mm <1 day                    Physical Exam  Constitutional:       General: He is not in acute distress.  Cardiovascular:      Rate and Rhythm: Normal rate and regular rhythm.   Pulmonary:      Effort: Pulmonary effort is normal.   Musculoskeletal:      Right lower leg: Edema present.      Left lower leg: Edema present.   Skin:     General: Skin is warm.   Neurological:      Mental Status: He is alert.         Lab Results: I have personally reviewed pertinent lab results.            Results from last 7 days   Lab Units 02/20/24  0504 02/19/24  0451 02/18/24  1126   POTASSIUM mmol/L 4.8 5.0 5.3   CO2 mmol/L 29 26 26   CHLORIDE mmol/L 101 101 99   BUN mg/dL 29* 27* 31*   CREATININE mg/dL 1.52* 1.51* 1.44*     Results from last 7 days   Lab Units 02/20/24  0504 02/19/24  0451 02/18/24  0500   HEMOGLOBIN g/dL 10.0* 10.1* 9.5*   HEMATOCRIT % 32.6* 33.1* 30.6*   PLATELETS Thousands/uL 423* 445* 409*         Results from last 7 days   Lab Units  24  1345   INR  1.15         Imaging: I have personally reviewed pertinent reports.    ECHO:  Results for orders placed during the hospital encounter of 17    Echo complete with contrast if indicated    Narrative  [address] [id number] [phone]    Transthoracic Echocardiogram  2D, M-mode, Doppler, and Color Doppler    Study date:  26-Sep-2017    Patient: EAGLE REBOLLAR  MR number: [id number]  Account number: [id number]  : 1960  Age: 57 years  Gender: Male  Status: Outpatient  Location: [address] [place] [address]  Height: 72 in  Weight: 263.3 lb  BP: 142/ 88 mmHg    Indications: CAD    Diagnoses: I25.10 - Atherosclerotic heart disease of native coronary artery without angina pectoris    Sonographer:  OLIVE Benoit  Primary Physician:  Paty Ortiz MD  Referring Physician:  Eagle Chavez MD  Group:  [facility]  Interpreting Physician:  Torrey Duenas MD    SUMMARY    LEFT VENTRICLE:  Size was normal.  Systolic function was normal. Ejection fraction was estimated to be 65 %.  There was mild concentric hypertrophy.  Doppler parameters were consistent with abnormal left ventricular relaxation (grade 1 diastolic dysfunction).    RIGHT VENTRICLE:  The size was normal.  Systolic function was normal.    TRICUSPID VALVE:  There was trace regurgitation.    HISTORY: PRIOR HISTORY: Hypertension, CAD s/p PCI, smoker, diabetes, high cholesterol    PROCEDURE: The study was performed in the [address] [place] [address]. This was a routine study. The transthoracic approach was used. The study included complete 2D imaging, M-mode, complete spectral Doppler, and color Doppler. The  heart rate was 112 bpm, at the start of the study. Images were obtained from the parasternal, apical, subcostal, and suprasternal notch acoustic windows. Echocardiographic views were limited due to decreased  penetration. This was a  technically difficult study.    LEFT VENTRICLE: Size was normal. Systolic function was normal. Ejection fraction was estimated to be 65 %. There were no regional wall motion abnormalities. Wall thickness was mildly increased. There was mild concentric hypertrophy.  DOPPLER: Doppler parameters were consistent with abnormal left ventricular relaxation (grade 1 diastolic dysfunction).    RIGHT VENTRICLE: The size was normal. Systolic function was normal. Wall thickness was normal.    LEFT ATRIUM: Size was normal.    RIGHT ATRIUM: Size was normal.    MITRAL VALVE: Valve structure was normal. There was normal leaflet separation. DOPPLER: The transmitral velocity was within the normal range. There was no evidence for stenosis. There was no regurgitation.    AORTIC VALVE: The valve was trileaflet. Leaflets exhibited normal thickness and normal cuspal separation. DOPPLER: Transaortic velocity was within the normal range. There was no evidence for stenosis. There was no regurgitation.    TRICUSPID VALVE: The valve structure was normal. There was normal leaflet separation. DOPPLER: The transtricuspid velocity was within the normal range. There was no evidence for stenosis. There was trace regurgitation. The tricuspid jet  envelope definition was inadequate for estimation of RV systolic pressure. There are no indirect findings suggestive of moderate or severe pulmonary hypertension.    PULMONIC VALVE: Leaflets exhibited normal thickness, no calcification, and normal cuspal separation. DOPPLER: The transpulmonic velocity was within the normal range. There was no regurgitation.    PERICARDIUM: There was no pericardial effusion. The pericardium was normal in appearance.    AORTA: The root exhibited normal size.    SYSTEMIC VEINS: IVC: The inferior vena cava was normal in size and course. Respirophasic changes were normal.    SYSTEM MEASUREMENT TABLES    2D  %FS: 47.6 %  AV Diam: 3.34 cm  EDV(Teich):  101.71 ml  EF Biplane: 67.76 %  EF(Cube): 85.61 %  EF(Teich): 79 %  ESV(Cube): 14.82 ml  ESV(Teich): 21.36 ml  IVSd: 1.58 cm  LA Area: 12.78 cm2  LA Diam: 3.37 cm  LVEDV MOD A2C: 49.73 ml  LVEDV MOD A4C: 61.78 ml  LVEDV MOD BP: 58.1 ml  LVEF MOD A2C: 65.64 %  LVEF MOD A4C: 71.2 %  LVESV MOD A2C: 17.09 ml  LVESV MOD A4C: 17.79 ml  LVESV MOD BP: 18.73 ml  LVIDd: 4.69 cm  LVIDs: 2.46 cm  LVLd A2C: 6.97 cm  LVLd A4C: 7.74 cm  LVLs A2C: 5.23 cm  LVLs A4C: 6.13 cm  LVPWd: 1.23 cm  RA Area: 14.52 cm2  RV Diam.: 2.86 cm  SI(Cube): 36.73 ml/m2  SI(Teich): 33.48 ml/m2  SV MOD A2C: 32.64 ml  SV MOD A4C: 43.98 ml  SV(Cube): 88.15 ml  SV(Teich): 80.35 ml    MM  TAPSE: 3.26 cm    PW  E': 0.08 m/s    Inters\[facility]\"" Commission Accredited Echocardiography Laboratory    Prepared and electronically signed by    Torrey Duenas MD  Signed 26-Sep-2017 12:55:39    Results for orders placed during the hospital encounter of 01/31/24    Echo complete w/ contrast if indicated    Interpretation Summary    Left Ventricle: Left ventricular cavity size is normal. Wall thickness is moderately increased. There is moderate concentric hypertrophy. The left ventricular ejection fraction is 42%. Systolic function is moderately reduced. Diastolic function is mildly abnormal, consistent with grade I (abnormal) relaxation. 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 There is no thrombus.    The following segments are akinetic: apical anterior, apical septal, apical inferior, apical lateral and apex.    All other segments are normal.    Tricuspid Valve: There is mild regurgitation.      GILL:  No results found for this or any previous visit.    Results for orders placed during the hospital encounter of 01/31/24    GILL    Interpretation Summary  Images from the original result were not included.      Left Ventricle: Left ventricular cavity size is normal. Wall thickness is normal. The left ventricular ejection fraction is 45%. Systolic function is mildly reduced. There is  hypokinesis with akinesis of the aneurysmal portion of the apex.    Right Ventricle: Right ventricular cavity size is normal. Systolic function is normal.    Right Atrium: There is a 1.8 x 1.1 cm mobile mass affixed to the device lead as it passes through the right atrium.    Aortic Valve: The aortic valve is trileaflet. The leaflets are mildly thickened. The leaflets are not calcified. The leaflets exhibit normal mobility. There is Lambl's excrescence on the tip of the right coronary aortic valve leaflet.    There is a 1.8 x 1.1 cm mobile mass affixed to the cardiac device lead as is passes through the right atrium.  In the setting of bacteremia this is most likely represents infection.    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 prevention [employer] dual chamber ICD (5/2023) complicated by device infection requiring explant and re-implant of subcutaneous EV-ICD    Septic shock (resolved) with left toe gangrene / staph aureus bacteremia    LINDEN likely 2/2 to sepsis-related ATN, stable    Coronary artery disease with  mLAD and LPDA     LUE weakness / facial droop     LV apical aneurysm     Peripheral arterial disease     Hypertension    History of orthostatic hypotension     DM Type 2     63 year old male with chronic heart failure with mid-range EF being followed by heart failure outpatient presented with Staph bacteremia and septic shock complicating a lower extremity wound infection. Vegetation noted on ICD lead, prompting lead extraction. Now stable from infectious standpoint with  reimplantation of subcutaneous ICD.     HF consulted for management of volume status.   Weight trends on EMR indicate office weight of 216 lb during last HF clinic visit in 10/2023 with current weight of 247 lb (~20 lb up during this admission).    Serum creatinine stable at 1.5 mg/dL without oliguria.      Likely component of progressive volume overload due to hold of medical therapy and fluids.   Will continue diuresis with Furosemide 40mg BID IV and target net negative fluid balance of 2-3 L over the next 24 hours.   Maintain potassium 4-5 mEq/L and Mg > 2.     MAPs elevated at present. Would reintroduce Empaglifozin 10mg daily, spironolactone 25mg QD.   Continue Metoprolol 25mg daily.   Likely would not tolerate ARNi due to history of orthostatic hypotension.     Continue dual antiplatelet therapy with high intensity statin.  Continue amiodarone for VT suppression.    Lamont Murcia MD  Cardiology Fellow

## 2024-02-20 NOTE — QUICK NOTE
Patient is status post EV ICD placement.  Did discuss with nursing staff that given the protocol after this newer defibrillator is implanted, he will not have tachy therapies of his device turned on tonight.  Given postprocedure course, this is usually turned on the following day.      Did discuss that pads will be placed on the patient overnight.  Patient will have device interrogated in the morning along with chest x-ray at which point therapies can be turned on.

## 2024-02-20 NOTE — ANESTHESIA PREPROCEDURE EVALUATION
Procedure:  REMOVAL PACEMAKER/AICD LEADS W LASER / EV ICD IMPLANTATION (Chest)  Cardiac laser lead extraction (Chest)    Relevant Problems   CARDIO   (+) 3-vessel CAD   (+) A-fib (HCC)   (+) Coronary artery disease involving native coronary artery of native heart without angina pectoris   (+) Essential (primary) hypertension      ENDO   (+) Type 2 diabetes mellitus with foot ulcer, with long-term current use of insulin (HCC)   (+) Type 2 diabetes mellitus, with long-term current use of insulin (HCC)      GI/HEPATIC   (+) GERD (gastroesophageal reflux disease)      /RENAL   (+) LINDEN (acute kidney injury) (HCC)   (+) Benign prostatic hyperplasia without lower urinary tract symptoms      NEURO/PSYCH   (+) LUE weakness      LV EF 45 (apical HK, aneurysmal), normal RV systolic function    Hgb 10, plt 423, cr 1.5       Anesthesia Plan  ASA Score- 4     Anesthesia Type- general with ASA Monitors.         Additional Monitors: arterial line.    Airway Plan:     Comment: General anesthesia, endotracheal tube; standard ASA monitors. Risks and benefits discussed with patient; patient consented and agrees to proceed.    I saw and evaluated the patient. If seen with CRNA, we have discussed the anesthetic plan and I am in agreement that the plan is appropriate for the patient.  GILL, rasheeda.       Plan Factors-    Chart reviewed.   Existing labs reviewed.                   Induction- intravenous.    Postoperative Plan- Plan for postoperative opioid use. Planned trial extubation    Informed Consent- Anesthetic plan and risks discussed with patient.  I personally reviewed this patient with the CRNA. Discussed and agreed on the Anesthesia Plan with the CRNA..

## 2024-02-20 NOTE — ANESTHESIA PROCEDURE NOTES
Arterial Line Insertion    Performed by: Lion Miller MD  Authorized by: Lion Miller MD  Consent: Verbal consent obtained. Written consent obtained.  Risks and benefits: risks, benefits and alternatives were discussed  Consent given by: patient  Patient understanding: patient states understanding of the procedure being performed  Patient consent: the patient's understanding of the procedure matches consent given  Procedure consent: procedure consent matches procedure scheduled  Relevant documents: relevant documents present and verified  Required items: required blood products, implants, devices, and special equipment available  Patient identity confirmed: arm band  Preparation: Patient was prepped and draped in the usual sterile fashion.  Indications: multiple ABGs and hemodynamic monitoring  Orientation:  Right  Location: radial arterylidocaine (PF) (XYLOCAINE-MPF) 1 % - Infiltration   0.5 mL - 2/20/2024 12:15:00 PM  Procedure Details:  Needle gauge: 20  Seldinger technique: Seldinger technique used  Number of attempts: 1    Post-procedure:  Post-procedure: dressing applied  Waveform: good waveform and waveform confirmed  Post-procedure CNS: normal and unchanged  Patient tolerance: patient tolerated the procedure well with no immediate complications  Comments: Pre-induction

## 2024-02-21 ENCOUNTER — APPOINTMENT (INPATIENT)
Dept: NON INVASIVE DIAGNOSTICS | Facility: HOSPITAL | Age: 64
DRG: 853 | End: 2024-02-21
Payer: COMMERCIAL

## 2024-02-21 ENCOUNTER — APPOINTMENT (INPATIENT)
Dept: RADIOLOGY | Facility: HOSPITAL | Age: 64
DRG: 853 | End: 2024-02-21
Payer: COMMERCIAL

## 2024-02-21 PROBLEM — Z95.810 ICD (IMPLANTABLE CARDIOVERTER-DEFIBRILLATOR) IN PLACE: Status: ACTIVE | Noted: 2024-02-21

## 2024-02-21 LAB
ANION GAP SERPL CALCULATED.3IONS-SCNC: 8 MMOL/L
AORTIC ROOT: 3.3 CM
APICAL FOUR CHAMBER EJECTION FRACTION: 53 %
ASCENDING AORTA: 3.2 CM
BACTERIA BLD CULT: NORMAL
BACTERIA BLD CULT: NORMAL
BSA FOR ECHO PROCEDURE: 2.34 M2
BUN SERPL-MCNC: 38 MG/DL (ref 5–25)
CALCIUM SERPL-MCNC: 8.4 MG/DL (ref 8.4–10.2)
CHLORIDE SERPL-SCNC: 100 MMOL/L (ref 96–108)
CO2 SERPL-SCNC: 24 MMOL/L (ref 21–32)
CREAT SERPL-MCNC: 1.76 MG/DL (ref 0.6–1.3)
E WAVE DECELERATION TIME: 148 MS
E/A RATIO: 0.66
ERYTHROCYTE [DISTWIDTH] IN BLOOD BY AUTOMATED COUNT: 13.4 % (ref 11.6–15.1)
FRACTIONAL SHORTENING: 29 (ref 28–44)
GFR SERPL CREATININE-BSD FRML MDRD: 40 ML/MIN/1.73SQ M
GLUCOSE SERPL-MCNC: 116 MG/DL (ref 65–140)
GLUCOSE SERPL-MCNC: 146 MG/DL (ref 65–140)
GLUCOSE SERPL-MCNC: 158 MG/DL (ref 65–140)
GLUCOSE SERPL-MCNC: 199 MG/DL (ref 65–140)
GLUCOSE SERPL-MCNC: 82 MG/DL (ref 65–140)
HCT VFR BLD AUTO: 33.5 % (ref 36.5–49.3)
HGB BLD-MCNC: 10.5 G/DL (ref 12–17)
INTERVENTRICULAR SEPTUM IN DIASTOLE (PARASTERNAL SHORT AXIS VIEW): 1.5 CM
INTERVENTRICULAR SEPTUM: 1.5 CM (ref 0.6–1.1)
LAAS-AP2: 18.4 CM2
LAAS-AP4: 19.3 CM2
LEFT ATRIUM SIZE: 3.1 CM
LEFT ATRIUM VOLUME (MOD BIPLANE): 56 ML
LEFT ATRIUM VOLUME INDEX (MOD BIPLANE): 23.9 ML/M2
LEFT INTERNAL DIMENSION IN SYSTOLE: 3.5 CM (ref 2.1–4)
LEFT VENTRICULAR INTERNAL DIMENSION IN DIASTOLE: 4.9 CM (ref 3.5–6)
LEFT VENTRICULAR POSTERIOR WALL IN END DIASTOLE: 1.5 CM
LEFT VENTRICULAR STROKE VOLUME: 61 ML
LVSV (TEICH): 61 ML
MCH RBC QN AUTO: 28.2 PG (ref 26.8–34.3)
MCHC RBC AUTO-ENTMCNC: 31.3 G/DL (ref 31.4–37.4)
MCV RBC AUTO: 90 FL (ref 82–98)
MV PEAK A VEL: 1.21 M/S
MV PEAK E VEL: 80 CM/S
MV STENOSIS PRESSURE HALF TIME: 43 MS
MV VALVE AREA P 1/2 METHOD: 5.12
PLATELET # BLD AUTO: 425 THOUSANDS/UL (ref 149–390)
PMV BLD AUTO: 9 FL (ref 8.9–12.7)
POTASSIUM SERPL-SCNC: 5.1 MMOL/L (ref 3.5–5.3)
POTASSIUM SERPL-SCNC: 5.8 MMOL/L (ref 3.5–5.3)
RBC # BLD AUTO: 3.72 MILLION/UL (ref 3.88–5.62)
RIGHT ATRIUM AREA SYSTOLE A4C: 16.3 CM2
RIGHT VENTRICLE ID DIMENSION: 3.1 CM
SL CV LEFT ATRIUM LENGTH A2C: 4.8 CM
SL CV LV EF: 45
SL CV PED ECHO LEFT VENTRICLE DIASTOLIC VOLUME (MOD BIPLANE) 2D: 112 ML
SL CV PED ECHO LEFT VENTRICLE SYSTOLIC VOLUME (MOD BIPLANE) 2D: 50 ML
SODIUM SERPL-SCNC: 132 MMOL/L (ref 135–147)
WBC # BLD AUTO: 12.19 THOUSAND/UL (ref 4.31–10.16)

## 2024-02-21 PROCEDURE — 71045 X-RAY EXAM CHEST 1 VIEW: CPT

## 2024-02-21 PROCEDURE — 93308 TTE F-UP OR LMTD: CPT | Performed by: INTERNAL MEDICINE

## 2024-02-21 PROCEDURE — 85027 COMPLETE CBC AUTOMATED: CPT | Performed by: INTERNAL MEDICINE

## 2024-02-21 PROCEDURE — 0573T REMOVAL SS DFB ELECTRODE: CPT | Performed by: PHYSICIAN ASSISTANT

## 2024-02-21 PROCEDURE — 93325 DOPPLER ECHO COLOR FLOW MAPG: CPT | Performed by: INTERNAL MEDICINE

## 2024-02-21 PROCEDURE — 0580T RMVL SS IMPL DFB PG ONLY: CPT | Performed by: THORACIC SURGERY (CARDIOTHORACIC VASCULAR SURGERY)

## 2024-02-21 PROCEDURE — 93325 DOPPLER ECHO COLOR FLOW MAPG: CPT

## 2024-02-21 PROCEDURE — 93321 DOPPLER ECHO F-UP/LMTD STD: CPT | Performed by: INTERNAL MEDICINE

## 2024-02-21 PROCEDURE — 71250 CT THORAX DX C-: CPT

## 2024-02-21 PROCEDURE — 0580T RMVL SS IMPL DFB PG ONLY: CPT | Performed by: PHYSICIAN ASSISTANT

## 2024-02-21 PROCEDURE — 99233 SBSQ HOSP IP/OBS HIGH 50: CPT | Performed by: INTERNAL MEDICINE

## 2024-02-21 PROCEDURE — NC001 PR NO CHARGE: Performed by: THORACIC SURGERY (CARDIOTHORACIC VASCULAR SURGERY)

## 2024-02-21 PROCEDURE — 80048 BASIC METABOLIC PNL TOTAL CA: CPT | Performed by: INTERNAL MEDICINE

## 2024-02-21 PROCEDURE — 99232 SBSQ HOSP IP/OBS MODERATE 35: CPT | Performed by: INTERNAL MEDICINE

## 2024-02-21 PROCEDURE — 71046 X-RAY EXAM CHEST 2 VIEWS: CPT

## 2024-02-21 PROCEDURE — 82948 REAGENT STRIP/BLOOD GLUCOSE: CPT

## 2024-02-21 PROCEDURE — 93308 TTE F-UP OR LMTD: CPT

## 2024-02-21 PROCEDURE — G1004 CDSM NDSC: HCPCS

## 2024-02-21 PROCEDURE — 84132 ASSAY OF SERUM POTASSIUM: CPT | Performed by: INTERNAL MEDICINE

## 2024-02-21 PROCEDURE — 93970 EXTREMITY STUDY: CPT | Performed by: SURGERY

## 2024-02-21 PROCEDURE — 93970 EXTREMITY STUDY: CPT

## 2024-02-21 PROCEDURE — 93321 DOPPLER ECHO F-UP/LMTD STD: CPT

## 2024-02-21 PROCEDURE — 0573T REMOVAL SS DFB ELECTRODE: CPT | Performed by: THORACIC SURGERY (CARDIOTHORACIC VASCULAR SURGERY)

## 2024-02-21 PROCEDURE — 0WWC0GZ REVISION OF DEFIBRILLATOR LEAD IN MEDIASTINUM, OPEN APPROACH: ICD-10-PCS | Performed by: THORACIC SURGERY (CARDIOTHORACIC VASCULAR SURGERY)

## 2024-02-21 RX ORDER — ROCURONIUM BROMIDE 10 MG/ML
INJECTION, SOLUTION INTRAVENOUS AS NEEDED
Status: DISCONTINUED | OUTPATIENT
Start: 2024-02-21 | End: 2024-02-21

## 2024-02-21 RX ORDER — PROPOFOL 10 MG/ML
INJECTION, EMULSION INTRAVENOUS AS NEEDED
Status: DISCONTINUED | OUTPATIENT
Start: 2024-02-21 | End: 2024-02-21

## 2024-02-21 RX ORDER — ONDANSETRON 2 MG/ML
INJECTION INTRAMUSCULAR; INTRAVENOUS AS NEEDED
Status: DISCONTINUED | OUTPATIENT
Start: 2024-02-21 | End: 2024-02-21

## 2024-02-21 RX ORDER — CEFAZOLIN SODIUM 2 G/50ML
2000 SOLUTION INTRAVENOUS ONCE
Status: CANCELLED | OUTPATIENT
Start: 2024-02-21 | End: 2024-02-21

## 2024-02-21 RX ORDER — HYDROMORPHONE HCL/PF 1 MG/ML
0.5 SYRINGE (ML) INJECTION
Status: DISCONTINUED | OUTPATIENT
Start: 2024-02-21 | End: 2024-02-21 | Stop reason: HOSPADM

## 2024-02-21 RX ORDER — ALPRAZOLAM 0.5 MG/1
0.5 TABLET ORAL ONCE
Status: COMPLETED | OUTPATIENT
Start: 2024-02-21 | End: 2024-02-21

## 2024-02-21 RX ORDER — LIDOCAINE HYDROCHLORIDE 10 MG/ML
INJECTION, SOLUTION EPIDURAL; INFILTRATION; INTRACAUDAL; PERINEURAL AS NEEDED
Status: DISCONTINUED | OUTPATIENT
Start: 2024-02-21 | End: 2024-02-21

## 2024-02-21 RX ORDER — FENTANYL CITRATE/PF 50 MCG/ML
50 SYRINGE (ML) INJECTION
Status: DISCONTINUED | OUTPATIENT
Start: 2024-02-21 | End: 2024-02-21 | Stop reason: HOSPADM

## 2024-02-21 RX ORDER — CALCIUM GLUCONATE 20 MG/ML
1 INJECTION, SOLUTION INTRAVENOUS ONCE
Status: COMPLETED | OUTPATIENT
Start: 2024-02-21 | End: 2024-02-21

## 2024-02-21 RX ORDER — AMOXICILLIN 250 MG
2 CAPSULE ORAL 2 TIMES DAILY
Status: DISCONTINUED | OUTPATIENT
Start: 2024-02-21 | End: 2024-03-01 | Stop reason: HOSPADM

## 2024-02-21 RX ORDER — ONDANSETRON 2 MG/ML
4 INJECTION INTRAMUSCULAR; INTRAVENOUS ONCE AS NEEDED
Status: DISCONTINUED | OUTPATIENT
Start: 2024-02-21 | End: 2024-02-21 | Stop reason: HOSPADM

## 2024-02-21 RX ORDER — FENTANYL CITRATE 50 UG/ML
INJECTION, SOLUTION INTRAMUSCULAR; INTRAVENOUS AS NEEDED
Status: DISCONTINUED | OUTPATIENT
Start: 2024-02-21 | End: 2024-02-21

## 2024-02-21 RX ORDER — HYDRALAZINE HYDROCHLORIDE 50 MG/1
50 TABLET, FILM COATED ORAL EVERY 8 HOURS SCHEDULED
Status: DISCONTINUED | OUTPATIENT
Start: 2024-02-21 | End: 2024-02-21

## 2024-02-21 RX ORDER — CEFAZOLIN SODIUM 1 G/3ML
INJECTION, POWDER, FOR SOLUTION INTRAMUSCULAR; INTRAVENOUS AS NEEDED
Status: DISCONTINUED | OUTPATIENT
Start: 2024-02-21 | End: 2024-02-21

## 2024-02-21 RX ADMIN — NOREPINEPHRINE BITARTRATE 3 MCG/MIN: 1 INJECTION, SOLUTION, CONCENTRATE INTRAVENOUS at 15:30

## 2024-02-21 RX ADMIN — FUROSEMIDE 40 MG: 10 INJECTION, SOLUTION INTRAMUSCULAR; INTRAVENOUS at 21:28

## 2024-02-21 RX ADMIN — OXYCODONE HYDROCHLORIDE 5 MG: 5 TABLET ORAL at 08:39

## 2024-02-21 RX ADMIN — INSULIN LISPRO 1 UNITS: 100 INJECTION, SOLUTION INTRAVENOUS; SUBCUTANEOUS at 21:32

## 2024-02-21 RX ADMIN — ROCURONIUM BROMIDE 50 MG: 10 INJECTION, SOLUTION INTRAVENOUS at 15:04

## 2024-02-21 RX ADMIN — FENTANYL CITRATE 50 MCG: 50 INJECTION INTRAMUSCULAR; INTRAVENOUS at 16:06

## 2024-02-21 RX ADMIN — GENTAMICIN SULFATE: 40 INJECTION, SOLUTION INTRAMUSCULAR; INTRAVENOUS at 19:17

## 2024-02-21 RX ADMIN — FENTANYL CITRATE 50 MCG: 50 INJECTION INTRAMUSCULAR; INTRAVENOUS at 15:44

## 2024-02-21 RX ADMIN — INSULIN GLARGINE 35 UNITS: 100 INJECTION, SOLUTION SUBCUTANEOUS at 21:28

## 2024-02-21 RX ADMIN — CEFAZOLIN SODIUM 2000 MG: 2 SOLUTION INTRAVENOUS at 18:50

## 2024-02-21 RX ADMIN — ONDANSETRON 4 MG: 2 INJECTION INTRAMUSCULAR; INTRAVENOUS at 15:04

## 2024-02-21 RX ADMIN — PROPOFOL 150 MG: 10 INJECTION, EMULSION INTRAVENOUS at 15:04

## 2024-02-21 RX ADMIN — METOPROLOL SUCCINATE 25 MG: 25 TABLET, EXTENDED RELEASE ORAL at 05:54

## 2024-02-21 RX ADMIN — SUGAMMADEX 200 MG: 100 INJECTION, SOLUTION INTRAVENOUS at 16:57

## 2024-02-21 RX ADMIN — ATORVASTATIN CALCIUM 80 MG: 80 TABLET, FILM COATED ORAL at 18:47

## 2024-02-21 RX ADMIN — EMPAGLIFLOZIN 10 MG: 10 TABLET, FILM COATED ORAL at 08:20

## 2024-02-21 RX ADMIN — LIDOCAINE HYDROCHLORIDE 50 MG: 10 INJECTION, SOLUTION EPIDURAL; INFILTRATION; INTRACAUDAL; PERINEURAL at 15:04

## 2024-02-21 RX ADMIN — CEFAZOLIN 2000 MG: 1 INJECTION, POWDER, FOR SOLUTION INTRAMUSCULAR; INTRAVENOUS at 15:25

## 2024-02-21 RX ADMIN — CALCIUM GLUCONATE 1 G: 20 INJECTION, SOLUTION INTRAVENOUS at 10:44

## 2024-02-21 RX ADMIN — SPIRONOLACTONE 25 MG: 25 TABLET ORAL at 08:20

## 2024-02-21 RX ADMIN — FUROSEMIDE 40 MG: 10 INJECTION, SOLUTION INTRAMUSCULAR; INTRAVENOUS at 08:20

## 2024-02-21 RX ADMIN — FENTANYL CITRATE 50 MCG: 50 INJECTION INTRAMUSCULAR; INTRAVENOUS at 15:04

## 2024-02-21 RX ADMIN — Medication 250 MG: at 08:20

## 2024-02-21 RX ADMIN — FENTANYL CITRATE 50 MCG: 50 INJECTION INTRAMUSCULAR; INTRAVENOUS at 16:18

## 2024-02-21 RX ADMIN — METOPROLOL SUCCINATE 25 MG: 25 TABLET, EXTENDED RELEASE ORAL at 18:47

## 2024-02-21 RX ADMIN — AMIODARONE HYDROCHLORIDE 200 MG: 200 TABLET ORAL at 08:20

## 2024-02-21 RX ADMIN — CEFAZOLIN SODIUM 2000 MG: 2 SOLUTION INTRAVENOUS at 05:54

## 2024-02-21 RX ADMIN — ALPRAZOLAM 0.5 MG: 0.5 TABLET ORAL at 18:54

## 2024-02-21 RX ADMIN — SENNOSIDES, DOCUSATE SODIUM 2 TABLET: 8.6; 5 TABLET ORAL at 18:47

## 2024-02-21 RX ADMIN — ROCURONIUM BROMIDE 40 MG: 10 INJECTION, SOLUTION INTRAVENOUS at 15:44

## 2024-02-21 RX ADMIN — Medication 250 MG: at 18:47

## 2024-02-21 RX ADMIN — ACETAMINOPHEN 975 MG: 325 TABLET, FILM COATED ORAL at 00:09

## 2024-02-21 RX ADMIN — CEFAZOLIN SODIUM 2000 MG: 2 SOLUTION INTRAVENOUS at 12:49

## 2024-02-21 RX ADMIN — CEFAZOLIN SODIUM 2000 MG: 2 SOLUTION INTRAVENOUS at 00:06

## 2024-02-21 RX ADMIN — CEFAZOLIN SODIUM 2000 MG: 2 SOLUTION INTRAVENOUS at 23:09

## 2024-02-21 RX ADMIN — OXYCODONE HYDROCHLORIDE 5 MG: 5 TABLET ORAL at 19:49

## 2024-02-21 NOTE — PHYSICAL THERAPY NOTE
Physical Therapy Cancellation Note         02/21/24 1417   PT Last Visit   PT Visit Date 02/21/24   Note Type   Note Type Cancelled Session   Cancel Reasons Patient to operating room  (PT will continue to follow)         BRADLY LEYVA PT, DPT

## 2024-02-21 NOTE — PROGRESS NOTES
Progress Note - Advanced Heart Failure Team  Nadir Myers 63 y.o. male MRN: [id number]  Unit/Bed#: [facility] 519-01 Encounter: [id number]    Subjective    EV-ICD implant yesterday complicated by concern for lead misplacement. Received test ICD shock yesterday which caused residual chest pain. Plan for redo today. Patient reports breathing well at rest and with ambulation.    Objective   Vitals: Blood pressure 154/91, pulse 78, temperature 98.1 °F (36.7 °C), temperature source Oral, resp. rate 18, height 6' (1.829 m), weight 113 kg (249 lb 1.9 oz), SpO2 93%.  Orthostatic Blood Pressures      Flowsheet Row Most Recent Value   Blood Pressure 154/91 filed at 02/21/2024 1133   Patient Position - Orthostatic VS Lying filed at 02/21/2024 1133              Invasive Devices       Peripheral Intravenous Line  Duration             Peripheral IV 02/20/24 Distal;Right;Upper;Ventral (anterior) Arm 1 day    Peripheral IV 02/20/24 Left Arm 1 day    Peripheral IV 02/20/24 Right Hand <1 day              Drain  Duration             Urethral Catheter Latex 16 Fr. <1 day                    Physical Exam  Constitutional:       General: He is not in acute distress.  Cardiovascular:      Rate and Rhythm: Normal rate and regular rhythm.   Pulmonary:      Effort: Pulmonary effort is normal.   Musculoskeletal:      Right lower leg: Edema present.      Left lower leg: Edema present.   Skin:     General: Skin is warm.   Neurological:      Mental Status: He is alert.         Lab Results: I have personally reviewed pertinent lab results.            Results from last 7 days   Lab Units 02/21/24  1100 02/21/24  0443 02/20/24  0504 02/19/24  0451   POTASSIUM mmol/L 5.1 5.8* 4.8 5.0   CO2 mmol/L  --  24 29 26   CHLORIDE mmol/L  --  100 101 101   BUN mg/dL  --  38* 29* 27*   CREATININE mg/dL  --  1.76* 1.52* 1.51*     Results from last 7 days   Lab Units 02/21/24  0443 02/20/24  0504 02/19/24  0451   HEMOGLOBIN g/dL 10.5* 10.0* 10.1*   HEMATOCRIT %  33.5* 32.6* 33.1*   PLATELETS Thousands/uL 425* 423* 445*         Results from last 7 days   Lab Units 24  1345   INR  1.15         Imaging: I have personally reviewed pertinent reports.    ECHO:  Results for orders placed during the hospital encounter of 17    Echo complete with contrast if indicated    [place] [address]  [phone]    Transthoracic Echocardiogram  2D, M-mode, Doppler, and Color Doppler    Study date:  26-Sep-2017    Patient: EAGLE REBOLLAR  MR number: [id number]  Account number: [id number]  : 1960  Age: 57 years  Gender: Male  Status: Outpatient  Location: [address] [place] [address]  Height: 72 in  Weight: 263.3 lb  BP: 142/ 88 mmHg    Indications: CAD    Diagnoses: I25.10 - Atherosclerotic heart disease of native coronary artery without angina pectoris    Sonographer:  OLIVE Benoit  Primary Physician:  Paty Ortiz MD  Referring Physician:  Eagle Chavez MD  Group:  [facility]  Interpreting Physician:  Torrey Duenas MD    SUMMARY    LEFT VENTRICLE:  Size was normal.  Systolic function was normal. Ejection fraction was estimated to be 65 %.  There was mild concentric hypertrophy.  Doppler parameters were consistent with abnormal left ventricular relaxation (grade 1 diastolic dysfunction).    RIGHT VENTRICLE:  The size was normal.  Systolic function was normal.    TRICUSPID VALVE:  There was trace regurgitation.    HISTORY: PRIOR HISTORY: Hypertension, CAD s/p PCI, smoker, diabetes, high cholesterol    PROCEDURE: The study was performed in the [address] [place] [address]. This was a routine study. The transthoracic approach was used. The study included complete 2D imaging, M-mode, complete spectral Doppler, and color Doppler. The  heart rate was 112 bpm, at the start of the study. Images were obtained from the parasternal, apical, subcostal,  and suprasternal notch acoustic windows. Echocardiographic views were limited due to decreased penetration. This was a  technically difficult study.    LEFT VENTRICLE: Size was normal. Systolic function was normal. Ejection fraction was estimated to be 65 %. There were no regional wall motion abnormalities. Wall thickness was mildly increased. There was mild concentric hypertrophy.  DOPPLER: Doppler parameters were consistent with abnormal left ventricular relaxation (grade 1 diastolic dysfunction).    RIGHT VENTRICLE: The size was normal. Systolic function was normal. Wall thickness was normal.    LEFT ATRIUM: Size was normal.    RIGHT ATRIUM: Size was normal.    MITRAL VALVE: Valve structure was normal. There was normal leaflet separation. DOPPLER: The transmitral velocity was within the normal range. There was no evidence for stenosis. There was no regurgitation.    AORTIC VALVE: The valve was trileaflet. Leaflets exhibited normal thickness and normal cuspal separation. DOPPLER: Transaortic velocity was within the normal range. There was no evidence for stenosis. There was no regurgitation.    TRICUSPID VALVE: The valve structure was normal. There was normal leaflet separation. DOPPLER: The transtricuspid velocity was within the normal range. There was no evidence for stenosis. There was trace regurgitation. The tricuspid jet  envelope definition was inadequate for estimation of RV systolic pressure. There are no indirect findings suggestive of moderate or severe pulmonary hypertension.    PULMONIC VALVE: Leaflets exhibited normal thickness, no calcification, and normal cuspal separation. DOPPLER: The transpulmonic velocity was within the normal range. There was no regurgitation.    PERICARDIUM: There was no pericardial effusion. The pericardium was normal in appearance.    AORTA: The root exhibited normal size.    SYSTEMIC VEINS: IVC: The inferior vena cava was normal in size and course. Respirophasic changes  were normal.    SYSTEM MEASUREMENT TABLES    2D  %FS: 47.6 %  AV Diam: 3.34 cm  EDV(Teich): 101.71 ml  EF Biplane: 67.76 %  EF(Cube): 85.61 %  EF(Teich): 79 %  ESV(Cube): 14.82 ml  ESV(Teich): 21.36 ml  IVSd: 1.58 cm  LA Area: 12.78 cm2  LA Diam: 3.37 cm  LVEDV MOD A2C: 49.73 ml  LVEDV MOD A4C: 61.78 ml  LVEDV MOD BP: 58.1 ml  LVEF MOD A2C: 65.64 %  LVEF MOD A4C: 71.2 %  LVESV MOD A2C: 17.09 ml  LVESV MOD A4C: 17.79 ml  LVESV MOD BP: 18.73 ml  LVIDd: 4.69 cm  LVIDs: 2.46 cm  LVLd A2C: 6.97 cm  LVLd A4C: 7.74 cm  LVLs A2C: 5.23 cm  LVLs A4C: 6.13 cm  LVPWd: 1.23 cm  RA Area: 14.52 cm2  RV Diam.: 2.86 cm  SI(Cube): 36.73 ml/m2  SI(Teich): 33.48 ml/m2  SV MOD A2C: 32.64 ml  SV MOD A4C: 43.98 ml  SV(Cube): 88.15 ml  SV(Teich): 80.35 ml    MM  TAPSE: 3.26 cm    PW  E': 0.08 m/s    [facility]    Prepared and electronically signed by    Torrey Duenas MD  Signed 26-Sep-2017 12:55:39    Results for orders placed during the hospital encounter of 01/31/24    Echo complete w/ contrast if indicated    Interpretation Summary    Left Ventricle: Left ventricular cavity size is normal. Wall thickness is moderately increased. There is moderate concentric hypertrophy. The left ventricular ejection fraction is 42%. Systolic function is moderately reduced. Diastolic function is mildly abnormal, consistent with grade I (abnormal) relaxation. 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 There is no thrombus.    The following segments are akinetic: apical anterior, apical septal, apical inferior, apical lateral and apex.    All other segments are normal.    Tricuspid Valve: There is mild regurgitation.      GILL:  No results found for this or any previous visit.    Results for orders placed during the hospital encounter of 01/31/24    GILL    Interpretation Summary  Images from the original result were not included.      Left Ventricle: Left ventricular cavity size is normal. Wall thickness is normal. The  left ventricular ejection fraction is 45%. Systolic function is mildly reduced. There is hypokinesis with akinesis of the aneurysmal portion of the apex.    Right Ventricle: Right ventricular cavity size is normal. Systolic function is normal.    Right Atrium: There is a 1.8 x 1.1 cm mobile mass affixed to the device lead as it passes through the right atrium.    Aortic Valve: The aortic valve is trileaflet. The leaflets are mildly thickened. The leaflets are not calcified. The leaflets exhibit normal mobility. There is Lambl's excrescence on the tip of the right coronary aortic valve leaflet.    There is a 1.8 x 1.1 cm mobile mass affixed to the cardiac device lead as is passes through the right atrium.  In the setting of bacteremia this is most likely represents infection.    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 prevention [employer] dual chamber ICD (5/2023) complicated by device infection requiring explant and re-implant of subcutaneous EV-ICD    Septic shock (resolved) with left toe gangrene / staph aureus bacteremia    LINDEN likely 2/2 to sepsis-related ATN, stable    Coronary artery disease with  mLAD and LPDA     LUE weakness / facial droop     LV apical aneurysm     Peripheral arterial disease     Hypertension    History of orthostatic hypotension     DM Type 2     63 year old male with chronic heart failure with mid-range EF being followed by heart failure outpatient presented with Staph bacteremia and septic shock complicating a lower extremity wound infection. Vegetation noted on ICD lead, prompting lead extraction. Now stable from infectious standpoint with  reimplantation of subcutaneous ICD, however with concern for lead misplacement.     HF consulted for management of volume status.   Weight trends on EMR indicate office weight of 216 lb during last HF clinic visit in  10/2023 with current weight of 247 lb (~20 lb up during this admission).    Likely component of progressive volume overload due to hold of medical therapy and fluids.     Received IV Furosemide 40mg BID yesterday with good urine output, though offset by IV fluids periprocedurally.     Cr. 1.5 -> 1.7 likely in setting of contrast exposure.     Will continue diuresis with Furosemide 40mg BID IV and target net negative fluid balance of 2 L over the next 24 hours.   Maintain potassium 4-5 mEq/L and Mg > 2.     MAPs elevated at present.   Continue Empaglifozin 10mg Daily for diuretic effect and for renal protection.   Will hold spironolactone for hyperkalemia and worsening renal function.     Continue dual antiplatelet therapy with high intensity statin.  Continue amiodarone for VT suppression.    Lamont Murcia MD  Cardiology Fellow

## 2024-02-21 NOTE — PROGRESS NOTES
Progress Note - Infectious Disease   Nadir Myers 63 y.o. male MRN: [id number]  Unit/Bed#: [facility] 519-01 Encounter: [id number]      Impression/Plan:    Sepsis, present on arrival; fever, leukocytosis  -Source is bacteremia with ICD infection and patients foot infection as below. No other clear source. Now with intermittent fevers since 2/10. Blood cultures seem to have cleared. CT C/A/P 2/15 notable for pulmonary septic emboli and gas/fluid collection at site of ICD removal, though no other infectious source. Podiatry feels foot is healing well. Patient without any localizing symptoms, otherwise clinically stable.  Fever curve improving, WBC stable.  -Antibiotic as below  -Follow up repeat blood cultures 2/16  -With bacteremia cleared and no major symptoms or abscess on CT, no obvious infectious cause for ongoing low grade fevers; consider if fevers may be reactive to embolic lesions in lungs, vs. drug fever to Cefazolin  -Trend fever curve  -Repeat CBC + diff tomorrow AM     Persistent MSSA bacteremia with ICD infection  -Likely initial source was patients foot wound as below with subsequent ICD infection. Blood cultures positive on 1/31-2/08 despite being on Cefazolin, suspect due to ICD infection. GILL 2/07 found a mobile mass on device lead in right atrium, concerning for infection. No valve vegetations. CT scans from 2/15 and 2/07 negative for abscess though suspicious for pulmonary septic emboli. ICD extracted on 2/08 with lead culture that grew MSSA. Only 1 of 2 blood cultures positive on 2/09, blood cultures 2/12 and 2/16 negative. Extravascular ICD placed 2/20, though lead in left pleural space.   -Continue IV Cefazolin 2g every 6 hours  -Monitor renal function; if CrCl drops to <30 then will need to adjust dose  -Revision of extravascular ICD lead per Cardiac surgery and EP  -Will need 6 weeks of IV antibiotic from first negative blood cultures, consideration for extension of course pending updated CT  "chest findings and with revision of ICD lead  -Will need weekly CBC + diff, serum Cr as outpatient and ID follow up     3. Abnormal CT chest  -CT 2/07 noted small consolidations in upper lobes and left lower lobe, with enhancing lesion in perieprhal of right lower lobe. Repeat CT 2/15 with progressive left lower lobe infiltrate, new subpleural nodular airspace focus, with resolving foci at anterior left upper lobe and inferior right lower lobe. Suspect due to septic emboli. Patient denies any cough or SOB and is on room air.  -Plan for prolonged course of antibiotic as above  -Monitor for any development of respiratory symptoms  -Follow up CT chest 2/21    4. 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 Culture polymicrobial of tissue with MSSA, Finegoldia and Anaerococccus. Likely source of patient's bacteremia. Surgical cure felt to be obtained per Podiatry. Per follow up on 2/15, amputation site stable with intact sutures and no evidence of dehiscence or ischemia.   -Ongoing local wound care per Podiatry.     5. Staph epidermidis in 1 of 2 blood cultures  -1 set from 2/03 with Staph epi, did not grow in prior blood cultures from 1/31 or subsequent blood cultures. Suspect this was skin contaminant and as above MSSA is true pathogen.  -No further workup at this time    6. LINDEN  -Developing over admission. May be related to IV contrast, cardiorenal. Consideration for Staphylococcus related GN. CrCl stable above 30.  -Renally adjust Cefazolin if CrCl drops to < 30  -Monitor BMP and urine output    Plan and recommendations were discussed with primary team. They agree with continuing IV Cefazolin.    Antibiotics:  Cefazolin    Subjective:  Denies fever, chills, nausea, emesis, cough. He does have some new chest pain, thinks was after he was \"shocked\". Mostly mid-sternal. No back pain, abdominal pain, joint pain.    Objective:  Vitals:  Temp:  [97.4 °F (36.3 °C)-98.5 °F (36.9 °C)] 98.1 °F (36.7 " °C)  HR:  [74-94] 78  Resp:  [16-22] 18  BP: (143-164)/(84-98) 154/91  SpO2:  [93 %-97 %] 93 %  Temp (24hrs), Av °F (36.7 °C), Min:97.4 °F (36.3 °C), Max:98.5 °F (36.9 °C)  Current: Temperature: 98.1 °F (36.7 °C)    Physical Exam:   General Appearance:  Alert, interactive, nontoxic, no acute distress.   Throat: Oropharynx moist without lesions.    Lungs:   Clear to auscultation bilaterally; no wheezes, rhonchi or rales; respirations unlabored   Heart:  RRR   Abdomen:   Soft, non-tender     Extremities: Left foot with dressing intact   Skin: No new rashes or lesions. Chest wall bandage at ICD extraction site intact       Labs:   All pertinent labs and imaging studies were personally reviewed  Results from last 7 days   Lab Units 24  0443 24  0504 24  0451   WBC Thousand/uL 12.19* 11.08* 11.34*   HEMOGLOBIN g/dL 10.5* 10.0* 10.1*   PLATELETS Thousands/uL 425* 423* 445*     Results from last 7 days   Lab Units 24  1100 24  0443 24  0504 24  0451 24  0851 02/15/24  0515   SODIUM mmol/L  --  132* 135 134*   < > 133*   POTASSIUM mmol/L 5.1 5.8* 4.8 5.0   < > 4.6   CHLORIDE mmol/L  --  100 101 101   < > 98   CO2 mmol/L  --  24 29 26   < > 27   BUN mg/dL  --  38* 29* 27*   < > 31*   CREATININE mg/dL  --  1.76* 1.52* 1.51*   < > 1.54*   EGFR ml/min/1.73sq m  --  40 48 48   < > 47   CALCIUM mg/dL  --  8.4 8.3* 8.3*   < > 7.9*   AST U/L  --   --   --   --   --  25   ALT U/L  --   --   --   --   --  5*   ALK PHOS U/L  --   --   --   --   --  46    < > = values in this interval not displayed.                         Micro:  Results from last 7 days   Lab Units 24  0851   BLOOD CULTURE  No Growth After 5 Days.  No Growth After 5 Days.       Imaging:          Zach Medina MD  [facility]

## 2024-02-21 NOTE — ASSESSMENT & PLAN NOTE
Received Senokot, MiraLAX and prune juice 2/17 and had a BM   continue bowel regimen. Increase senekot

## 2024-02-21 NOTE — ASSESSMENT & PLAN NOTE
Lab Results   Component Value Date    HGBA1C 9.8 (H) 01/31/2024     Continue basal/prandial insulin. BG goal 140-180  Hypoglycemia protocol  Carbohydrate restriction  Continue to adjust

## 2024-02-21 NOTE — OP NOTE
OPERATIVE REPORT  PATIENT NAME: Nadir Myers    :  1960  MRN: [id number]  Pt Location: BE OR ROOM 10    SURGERY DATE: 2024    SURGEON: Abhilash Ferrera MD    ASSISTANT: Graham Reid PA-C    ADDITIONAL ASSISTANT: Estrellita Chase PA-C    PREOPERATIVE DIAGNOSIS:  Dislodged EV IVD lead    POSTOPERATIVE DIAGNOSIS:  Dislodged EV ICD lead    PROCEDURE:   ICD lead revision and extraction    INDICATIONS:  The patient is a 63 y.o. year-old male with history of ischemic cardiomyopathy and an ICD, he presented with bacteremia, his ICD was removed, yesterday we placed an extravascular ICD, chest x-ray this morning reveals that the lead has dislodged and is now and the pleural space, he is here today for the procedure described previously..    ANESTHESIA: GET. Dr. Derrick Guzman    FINDINGS:  Lead in pleural space  Unable to get good lead contact     OPERATIVE TECHNIQUE:  The patient was brought into the operative room, he was properly identified and general anesthesia was induced.  He was prepped and drapped in the standard sterile fashion and a complete timeout was performed.    The generator pocket was opened, the generator was removed and the lead was disconnected from the generator.  The subxiphoid incision was opened, the lead was freed from the fascia and then completely removed.  Using fluoroscopy we got access to the retrosternal space using the track from yesterday, under fluoroscopy we advance the lead dissector to the top of the heart, the dissector was removed keeping the sheath in place, the lead was advanced and then the sheath was removed, we were not able to get good parameters, we repeated this procedure 5 times without any success obtaining good lead sensing therefore we aborted the procedure.  All the hardware was completely removed.  Both incisions were then closed in layers with absorbable suture.    COMPLICATIONS: None.    PACKS/TUBES/DRAINS: None.     EBL: 20 cc.    TRANSFUSION: None.      SPECIMENS: None.     As the attending surgeon, I was present and scrubbed for all critical portions of this procedure. There was no qualified surgical resident available. The assistance of a PA was required to complete this case, specifically for assistance with exposure during dissection and closing incisions.           SIGNATURE: Abhilash Ferrera MD  DATE: February 21, 2024  TIME: 5:18 PM

## 2024-02-21 NOTE — ASSESSMENT & PLAN NOTE
Had acute kidney injury earlier on admission, suspect due to septic shock  Developed again acute kidney injury 2/14, had CT with contrast 2/15, could be due to underlying sepsis, contrast-induced, now with lower extremity edema, fluid overload, cardiorenal, reviewing chart he gained about 15 pounds in the past 2 weeks  Creatinine fluctuating 1.5-1.7  No PVR, good urine output  Monitor with diuresis.

## 2024-02-21 NOTE — PROGRESS NOTES
[facility]  Progress Note  Name: Nadir Myers I  MRN: [id number]  Unit/Bed#: OR POOL I Date of Admission: 1/31/2024   Date of Service: 2/21/2024 I Hospital Day: 21    Assessment/Plan   Abnormal CT of the chest  Assessment & Plan  CT 2/7 shows small consolidation in the upper lobes and left lower lobe with enhancing lesion in the peripheral of right lower lobe, may be embolic versus hematogenous seeding from high-grade bacteremia.    Repeat CT 2/15 revealed possible septic emboli, gas and fluid subcutaneous collection in the pacemaker bed likely postsurgical, reactive mild mediastinal lymphadenopathy.  CT chest 2/21: Peripheral left lower lobe opacity with reverse halo sign appearance. This can be seen in the setting of pulmonary infarct   Suspicion that this indicated septic emboli. Continue IV antibiotics as above  Monitor for any respiratory symptoms. Currently on RA  Will obtain doppler US to rule out DVT. Unable to obtain CT PE study given rising creatinine. Remains on room air with no respiratory distress. Would hold off AC for now as the suspicion is more toward septic emboli and patient is scheduled for procedure today. If patient start to develop significant hypoxia/respiratory distress then may start empiric IV heparin.     ICD (implantable cardioverter-defibrillator) in place  Assessment & Plan  Left abdominal wall pacemaker device with lead tip extending into the left pleural space.   Going to OR today for revision.     Constipation  Assessment & Plan  Received Senokot, MiraLAX and prune juice 2/17 and had a BM   continue bowel regimen. Increase senekot     LINDEN (acute kidney injury) (HCC)  Assessment & Plan  Had acute kidney injury earlier on admission, suspect due to septic shock  Developed again acute kidney injury 2/14, had CT with contrast 2/15, could be due to underlying sepsis, contrast-induced, now with lower extremity edema, fluid overload, cardiorenal,  "reviewing chart he gained about 15 pounds in the past 2 weeks  Creatinine fluctuating 1.5-1.7  No PVR, good urine output  Monitor with diuresis.     Gangrene of toe of left foot ([facility])  Assessment & Plan  Appreciate podiatry input -> status post left fourth toe amputation on 1/31  [place] to be surgical cure per podiatry  Wound care  Antibiotics per ID    MSSA bacteremia  Assessment & Plan  Persistent MSSA bacteremia most likely source foot wound, complicated with ICD infection  Blood cultures from 1/31-2/9  positive despite being on Ancef suspect due to ICD infection  GILL with evidence of mobile mass on ICD lead in the right atrium, concerning for infection, no valvular vegetation  S/p ICD removal with EP on 2/8  CT chest 2/7 and 2/15 with possible septic pulmonary emboli  Repeat Blood cultures from 2/12 negative, repeat blood cultures 2/16 negative at 72 hours  Continue IV Ancef, with plan to treat 6 weeks until 3/25/2024   Per ID okay to place a PICC line  Patient received over a week of antibiotics from negative cultures.  Per ID okay to place a new device, cardiology plans to place extravascular device    LUE weakness  Assessment & Plan  Presented w/ transient left-sided weakness and a facial droop now resolved  Appreciate neurology input deeming symptoms likely secondary to septic shock/infection and hyperglycemia, as neurologic workup including CT/MRI imaging negative for evidence of stroke or intracranial vessel occlusion, but noted chronic microangiopathic changes -> radiology report did however mention: \"Moderate bilateral supraclinoid ICA and moderate to severe left cavernous ICA segment stenosis.\"  Continue dual endplate therapy with ASA/Plavix - c/w statin  PT/OT as tolerated    PAD (peripheral artery disease) ([facility])  Assessment & Plan  Continue dual antiplatelet therapy with ASA/Plavix - continue statin      Chronic systolic CHF ([facility])  Assessment & Plan  Diuretics resumed per HF team.     Type 2 diabetes " mellitus, with long-term current use of insulin ([facility])  Assessment & Plan  Lab Results   Component Value Date    HGBA1C 9.8 (H) 2024     Continue basal/prandial insulin. BG goal 140-180  Hypoglycemia protocol  Carbohydrate restriction  Continue to adjust             VTE Pharmacologic Prophylaxis:   Moderate Risk (Score 3-4) - Pharmacological DVT Prophylaxis Ordered: enoxaparin (Lovenox).    Mobility:   Basic Mobility Inpatient Raw Score: 18  JH-HLM Goal: 6: Walk 10 steps or more  JH-HLM Achieved: 6: Walk 10 steps or more  HLM Goal achieved. Continue to encourage appropriate mobility.    Patient Centered Rounds: I performed bedside rounds with nursing staff today.   Discussions with Specialists or Other Care Team Provider: Cardiology     Education and Discussions with Family / Patient: Updated  (wife) at bedside.    Total Time Spent on Date of Encounter in care of patient: 50 mins. This time was spent on one or more of the following: performing physical exam; counseling and coordination of care; obtaining or reviewing history; documenting in the medical record; reviewing/ordering tests, medications or procedures; communicating with other healthcare professionals and discussing with patient's family/caregivers.    Current Length of Stay: 21 day(s)  Current Patient Status: Inpatient   Certification Statement: The patient will continue to require additional inpatient hospital stay due to above  Discharge Plan: Anticipate discharge in >72 hrs to discharge location to be determined pending rehab evaluations.    Code Status: Level 1 - Full Code    Subjective:   Tired and depressed from long hospitalization. Reports sob, improving, on RA    Objective:     Vitals:   Temp (24hrs), Av.9 °F (36.6 °C), Min:97.4 °F (36.3 °C), Max:98.4 °F (36.9 °C)    Temp:  [97.4 °F (36.3 °C)-98.4 °F (36.9 °C)] 98.1 °F (36.7 °C)  HR:  [74-94] 78  Resp:  [18-22] 18  BP: (149-160)/(90-96) 154/91  SpO2:  [93 %-97 %] 93 %  Body  mass index is 33.79 kg/m².     Input and Output Summary (last 24 hours):     Intake/Output Summary (Last 24 hours) at 2/21/2024 1607  Last data filed at 2/21/2024 1001  Gross per 24 hour   Intake 978.33 ml   Output 1900 ml   Net -921.67 ml       Physical Exam:   Physical Exam  Vitals and nursing note reviewed.   Constitutional:       General: He is not in acute distress.     Appearance: He is well-developed.   HENT:      Head: Normocephalic and atraumatic.   Eyes:      Conjunctiva/sclera: Conjunctivae normal.   Cardiovascular:      Rate and Rhythm: Normal rate and regular rhythm.      Heart sounds: No murmur heard.  Pulmonary:      Effort: Pulmonary effort is normal. No respiratory distress.      Breath sounds: Rales present.   Abdominal:      Palpations: Abdomen is soft.      Tenderness: There is no abdominal tenderness.   Musculoskeletal:         General: No swelling.      Cervical back: Neck supple.      Right lower leg: Edema present.      Left lower leg: Edema present.   Skin:     General: Skin is warm and dry.      Capillary Refill: Capillary refill takes less than 2 seconds.   Neurological:      Mental Status: He is alert.   Psychiatric:         Mood and Affect: Mood normal.          Additional Data:     Labs:  Results from last 7 days   Lab Units 02/21/24  0443 02/16/24  0851 02/15/24  0515   WBC Thousand/uL 12.19*   < > 11.20*   HEMOGLOBIN g/dL 10.5*   < > 10.5*   HEMATOCRIT % 33.5*   < > 33.5*   PLATELETS Thousands/uL 425*   < > 368   NEUTROS PCT %  --   --  82*   LYMPHS PCT %  --   --  11*   MONOS PCT %  --   --  7   EOS PCT %  --   --  0    < > = values in this interval not displayed.     Results from last 7 days   Lab Units 02/21/24  1100 02/21/24  0443 02/16/24  0851 02/15/24  0515   SODIUM mmol/L  --  132*   < > 133*   POTASSIUM mmol/L 5.1 5.8*   < > 4.6   CHLORIDE mmol/L  --  100   < > 98   CO2 mmol/L  --  24   < > 27   BUN mg/dL  --  38*   < > 31*   CREATININE mg/dL  --  1.76*   < > 1.54*   ANION  GAP mmol/L  --  8   < > 8   CALCIUM mg/dL  --  8.4   < > 7.9*   ALBUMIN g/dL  --   --   --  2.4*   TOTAL BILIRUBIN mg/dL  --   --   --  0.31   ALK PHOS U/L  --   --   --  46   ALT U/L  --   --   --  5*   AST U/L  --   --   --  25   GLUCOSE RANDOM mg/dL  --  158*   < > 94    < > = values in this interval not displayed.     Results from last 7 days   Lab Units 02/19/24  1345   INR  1.15     Results from last 7 days   Lab Units 02/21/24  1132 02/21/24  0652 02/20/24  2035 02/20/24  0724 02/19/24  2038 02/19/24  1555 02/19/24  1113 02/19/24  0722 02/18/24 2035 02/18/24  1702 02/18/24  1115 02/18/24  0726   POC GLUCOSE mg/dl 116 146* 206* 96 198* 180* 219* 143* 231* 132 262* 183*               Lines/Drains:  Invasive Devices       Peripheral Intravenous Line  Duration             Peripheral IV 02/20/24 Distal;Right;Upper;Ventral (anterior) Arm 1 day    Peripheral IV 02/20/24 Left Arm 1 day    Peripheral IV 02/20/24 Right Hand 1 day              Drain  Duration             Urethral Catheter Latex 16 Fr. 1 day              Airway  Duration             ETT  Cuffed;Oral;Inflated 8 mm <1 day                  Urinary Catheter:  Goal for removal: Voiding trial when ambulation improves           Telemetry:  Telemetry Orders (From admission, onward)               24 Hour Telemetry Monitoring  Continuous x 24 Hours ([place]lem)        Question:  Reason for 24 Hour Telemetry  Answer:  PCI/EP study (including pacer and ICD implementation), Cardiac surgery, MI, abnormal cardiac cath, and chest pain- rule out MI                                Imaging: Reviewed radiology reports from this admission including: chest xray    Recent Cultures (last 7 days):   Results from last 7 days   Lab Units 02/16/24  0851   BLOOD CULTURE  No Growth After 5 Days.  No Growth After 5 Days.       Last 24 Hours Medication List:   Current Facility-Administered Medications   Medication Dose Route Frequency Provider Last Rate    acetaminophen  650 mg Oral Q4H  PRN Kip Cabello MD      acetaminophen  975 mg Oral Q6H PRN Kip Cabello MD      amiodarone  200 mg Oral Daily With Breakfast Kip Cabello MD      aspirin  81 mg Oral Daily Kip Cabello MD      atorvastatin  80 mg Oral After Dinner Kip Cabello MD      cefazolin  2,000 mg Intravenous Q6H Kip Cabello MD 2,000 mg (02/21/24 1249)    clopidogrel  75 mg Oral Daily Kip Cabello MD      Empagliflozin  10 mg Oral Daily Kip Cabello MD      enoxaparin  40 mg Subcutaneous Q24H [facility] Kip Cabello MD      [Transfer Hold] furosemide  40 mg Intravenous BID (diuretic) Lamont Murcia MD      [Transfer Hold] gentamicin (GARAMYCIN) 160 mg in sodium chloride 0.9 % 1,000 mL irrigation bottle   Irrigation Once Abhilash Ferrera MD      insulin glargine  35 Units Subcutaneous HS Kip Cabello MD      insulin lispro  1-5 Units Subcutaneous 4x Daily (AC & HS) Kip Cabello MD      insulin lispro  7 Units Subcutaneous TID With Meals Kip Cabello MD      lactated ringers  50 mL/hr Intravenous Continuous Kip Cabello MD 50 mL/hr (02/21/24 1500)    metoprolol  5 mg Intravenous Q6H PRN Kip Cabello MD      metoprolol succinate  25 mg Oral Q12H Kip Cabello MD      oxyCODONE  5 mg Oral Q6H PRN Kip Cabello MD      polyethylene glycol  17 g Oral Daily Kip Cabello MD      saccharomyces boulardii  250 mg Oral BID Kip Cabello MD      senna-docusate sodium  2 tablet Oral BID Kip Cabello MD       Facility-Administered Medications Ordered in Other Encounters   Medication Dose Route Frequency Provider Last Rate    ceFAZolin   Intravenous PRN Dipti Fernandez CRNA      fentanyl citrate (PF)   Intravenous PRN Dipti Fernandez CRNA      lidocaine (PF)   Intravenous PRN Dipti Fernandez CRNA      norepinephrine (LEVOPHED) 4 mg (STANDARD CONCENTRATION) IV in sodium chloride 0.9% 250 mL   Intravenous Continuous PRN Dipti Fernandez CRNA 3 mcg/min (02/21/24 1530)    ondansetron   Intravenous PRN Dipti Fernnadez CRNA      propofol    Intravenous PRN Dipti Fernandez CRNA      ROCuronium   Intravenous PRN Dipti Fernandez CRNA          Today, Patient Was Seen By: Kip Cabello MD    **Please Note: This note may have been constructed using a voice recognition system.**

## 2024-02-21 NOTE — PLAN OF CARE
Problem: METABOLIC, FLUID AND ELECTROLYTES - ADULT  Goal: Electrolytes maintained within normal limits  Description: INTERVENTIONS:  - Monitor labs and assess patient for signs and symptoms of electrolyte imbalances  - Administer electrolyte replacement as ordered  - Monitor response to electrolyte replacements, including repeat lab results as appropriate  - Instruct patient on fluid and nutrition as appropriate  Outcome: Progressing  Goal: Fluid balance maintained  Description: INTERVENTIONS:  - Monitor labs   - Monitor I/O and WT  - Instruct patient on fluid and nutrition as appropriate  - Assess for signs & symptoms of volume excess or deficit  Outcome: Progressing  Goal: Glucose maintained within target range  Description: INTERVENTIONS:  - Monitor Blood Glucose as ordered  - Assess for signs and symptoms of hyperglycemia and hypoglycemia  - Administer ordered medications to maintain glucose within target range  - Assess nutritional intake and initiate nutrition service referral as needed  Outcome: Progressing     Problem: Prexisting or High Potential for Compromised Skin Integrity  Goal: Skin integrity is maintained or improved  Description: INTERVENTIONS:  - Identify patients at risk for skin breakdown  - Assess and monitor skin integrity  - Assess and monitor nutrition and hydration status  - Monitor labs   - Assess for incontinence   - Turn and reposition patient  - Assist with mobility/ambulation  - Relieve pressure over bony prominences  - Avoid friction and shearing  - Provide appropriate hygiene as needed including keeping skin clean and dry  - Evaluate need for skin moisturizer/barrier cream  - Collaborate with interdisciplinary team   - Patient/family teaching  - Consider wound care consult   Outcome: Progressing     Problem: PAIN - ADULT  Goal: Verbalizes/displays adequate comfort level or baseline comfort level  Description: Interventions:  - Encourage patient to monitor pain and request  assistance  - Assess pain using appropriate pain scale  - Administer analgesics based on type and severity of pain and evaluate response  - Implement non-pharmacological measures as appropriate and evaluate response  - Consider cultural and social influences on pain and pain management  - Notify physician/advanced practitioner if interventions unsuccessful or patient reports new pain  Outcome: Progressing     Problem: INFECTION - ADULT  Goal: Absence or prevention of progression during hospitalization  Description: INTERVENTIONS:  - Assess and monitor for signs and symptoms of infection  - Monitor lab/diagnostic results  - Monitor all insertion sites, i.e. indwelling lines, tubes, and drains  - Monitor endotracheal if appropriate and nasal secretions for changes in amount and color  - [place] appropriate cooling/warming therapies per order  - Administer medications as ordered  - Instruct and encourage patient and family to use good hand hygiene technique  - Identify and instruct in appropriate isolation precautions for identified infection/condition  Outcome: Progressing  Goal: Absence of fever/infection during neutropenic period  Description: INTERVENTIONS:  - Monitor WBC    Outcome: Progressing     Problem: SAFETY ADULT  Goal: Patient will remain free of falls  Description: INTERVENTIONS:  - Educate patient/family on patient safety including physical limitations  - Instruct patient to call for assistance with activity   - Consult OT/PT to assist with strengthening/mobility   - Keep Call bell within reach  - Keep bed low and locked with side rails adjusted as appropriate  - Keep care items and personal belongings within reach  - Initiate and maintain comfort rounds  - Make Fall Risk Sign visible to staff  - Apply yellow socks and bracelet for high fall risk patients  - Consider moving patient to room near nurses station  Outcome: Progressing  Goal: Maintain or return to baseline ADL function  Description:  INTERVENTIONS:  -  Assess patient's ability to carry out ADLs; assess patient's baseline for ADL function and identify physical deficits which impact ability to perform ADLs (bathing, care of mouth/teeth, toileting, grooming, dressing, etc.)  - Assess/evaluate cause of self-care deficits   - Assess range of motion  - Assess patient's mobility; develop plan if impaired  - Assess patient's need for assistive devices and provide as appropriate  - Encourage maximum independence but intervene and supervise when necessary  - Involve family in performance of ADLs  - Assess for home care needs following discharge   - Consider OT consult to assist with ADL evaluation and planning for discharge  - Provide patient education as appropriate  Outcome: Progressing  Goal: Maintains/Returns to pre admission functional level  Description: INTERVENTIONS:  - Perform AM-PAC 6 Click Basic Mobility/ Daily Activity assessment daily.  - Set and communicate daily mobility goal to care team and patient/family/caregiver.   - Collaborate with rehabilitation services on mobility goals if consulted  - Record patient progress and toleration of activity level   Outcome: Progressing     Problem: DISCHARGE PLANNING  Goal: Discharge to home or other facility with appropriate resources  Description: INTERVENTIONS:  - Identify barriers to discharge w/patient and caregiver  - Arrange for needed discharge resources and transportation as appropriate  - Identify discharge learning needs (meds, wound care, etc.)  - Arrange for interpretive services to assist at discharge as needed  - Refer to Case Management Department for coordinating discharge planning if the patient needs post-hospital services based on physician/advanced practitioner order or complex needs related to functional status, cognitive ability, or social support system  Outcome: Progressing     Problem: Knowledge Deficit  Goal: Patient/family/caregiver demonstrates understanding of disease  process, treatment plan, medications, and discharge instructions  Description: Complete learning assessment and assess knowledge base.  Interventions:  - Provide teaching at level of understanding  - Provide teaching via preferred learning methods  Outcome: Progressing

## 2024-02-21 NOTE — ASSESSMENT & PLAN NOTE
CT 2/7 shows small consolidation in the upper lobes and left lower lobe with enhancing lesion in the peripheral of right lower lobe, may be embolic versus hematogenous seeding from high-grade bacteremia.    Repeat CT 2/15 revealed possible septic emboli, gas and fluid subcutaneous collection in the pacemaker bed likely postsurgical, reactive mild mediastinal lymphadenopathy.  CT chest 2/21: Peripheral left lower lobe opacity with reverse halo sign appearance. This can be seen in the setting of pulmonary infarct   Suspicion that this indicated septic emboli. Continue IV antibiotics as above  Monitor for any respiratory symptoms. Currently on RA  Will obtain doppler US to rule out DVT. Unable to obtain CT PE study given rising creatinine. Remains on room air with no respiratory distress. Would hold off AC for now as the suspicion is more toward septic emboli and patient is scheduled for procedure today. If patient start to develop significant hypoxia/respiratory distress then may start empiric IV heparin.

## 2024-02-21 NOTE — ASSESSMENT & PLAN NOTE
Left abdominal wall pacemaker device with lead tip extending into the left pleural space.   Going to OR today for revision.

## 2024-02-21 NOTE — PROGRESS NOTES
Chest x-ray last night revealed ICD lead was probably in the left pleural space, repeat chest x-ray this morning shows further migration of the lead into the left pleural space confirming the diagnosis of intrapleural placement.  I had a conversation with the EP team and [employer], we all agreed it would be best to proceed with lead revision to replace it in the anterior mediastinum.  I explained the findings to the patient and the indication for procedure, I explained the benefits, risk and possible complications.  He understands and agrees to proceed with surgery.  This will be performed later today.

## 2024-02-21 NOTE — ASSESSMENT & PLAN NOTE
Appreciate podiatry input -> status post left fourth toe amputation on 1/31  [place] to be surgical cure per podiatry  Wound care  Antibiotics per ID

## 2024-02-21 NOTE — ANESTHESIA POSTPROCEDURE EVALUATION
Post-Op Assessment Note    CV Status:  Stable  Pain Score: 0    Pain management: adequate       Mental Status:  Sleepy   Hydration Status:  Stable   PONV Controlled:  None   Airway Patency:  Patent     Post Op Vitals Reviewed: Yes    No anethesia notable event occurred.    Staff: Anesthesiologist, CRNA               /83 (02/21/24 1731)    Temp 97.5 °F (36.4 °C) (02/21/24 1731)    Pulse 78 (02/21/24 1731)   Resp 18 (02/21/24 1731)    SpO2 97 % (02/21/24 1731)

## 2024-02-22 ENCOUNTER — APPOINTMENT (INPATIENT)
Dept: RADIOLOGY | Facility: HOSPITAL | Age: 64
DRG: 853 | End: 2024-02-22
Payer: COMMERCIAL

## 2024-02-22 ENCOUNTER — PATIENT OUTREACH (OUTPATIENT)
Dept: CARDIOLOGY CLINIC | Facility: CLINIC | Age: 64
End: 2024-02-22

## 2024-02-22 ENCOUNTER — APPOINTMENT (OUTPATIENT)
Dept: RADIOLOGY | Facility: HOSPITAL | Age: 64
DRG: 853 | End: 2024-02-22
Payer: COMMERCIAL

## 2024-02-22 PROBLEM — R07.81 PLEURITIC CHEST PAIN: Status: ACTIVE | Noted: 2024-02-22

## 2024-02-22 LAB
ABO GROUP BLD BPU: NORMAL
AMORPH URATE CRY URNS QL MICRO: ABNORMAL
ANION GAP SERPL CALCULATED.3IONS-SCNC: 4 MMOL/L
ATRIAL RATE: 83 BPM
ATRIAL RATE: 89 BPM
BACTERIA UR QL AUTO: ABNORMAL /HPF
BILIRUB UR QL STRIP: NEGATIVE
BPU ID: NORMAL
BUDDING YEAST: PRESENT
BUN SERPL-MCNC: 42 MG/DL (ref 5–25)
CALCIUM SERPL-MCNC: 7.7 MG/DL (ref 8.4–10.2)
CHLORIDE SERPL-SCNC: 100 MMOL/L (ref 96–108)
CHLORIDE UR-SCNC: 37 MMOL/L
CLARITY UR: CLEAR
CO2 SERPL-SCNC: 29 MMOL/L (ref 21–32)
COLOR UR: ABNORMAL
CREAT SERPL-MCNC: 2.01 MG/DL (ref 0.6–1.3)
CREAT UR-MCNC: 43.5 MG/DL
CROSSMATCH: NORMAL
ERYTHROCYTE [DISTWIDTH] IN BLOOD BY AUTOMATED COUNT: 13.8 % (ref 11.6–15.1)
GFR SERPL CREATININE-BSD FRML MDRD: 34 ML/MIN/1.73SQ M
GLUCOSE SERPL-MCNC: 100 MG/DL (ref 65–140)
GLUCOSE SERPL-MCNC: 108 MG/DL (ref 65–140)
GLUCOSE SERPL-MCNC: 113 MG/DL (ref 65–140)
GLUCOSE SERPL-MCNC: 160 MG/DL (ref 65–140)
GLUCOSE SERPL-MCNC: 171 MG/DL (ref 65–140)
GLUCOSE SERPL-MCNC: 173 MG/DL (ref 65–140)
GLUCOSE UR STRIP-MCNC: ABNORMAL MG/DL
HCT VFR BLD AUTO: 30.9 % (ref 36.5–49.3)
HGB BLD-MCNC: 9.7 G/DL (ref 12–17)
HGB UR QL STRIP.AUTO: ABNORMAL
HYALINE CASTS #/AREA URNS LPF: ABNORMAL /LPF
KETONES UR STRIP-MCNC: NEGATIVE MG/DL
LEUKOCYTE ESTERASE UR QL STRIP: ABNORMAL
MCH RBC QN AUTO: 28.4 PG (ref 26.8–34.3)
MCHC RBC AUTO-ENTMCNC: 31.4 G/DL (ref 31.4–37.4)
MCV RBC AUTO: 91 FL (ref 82–98)
MUCOUS THREADS UR QL AUTO: ABNORMAL
NITRITE UR QL STRIP: NEGATIVE
NON-SQ EPI CELLS URNS QL MICRO: ABNORMAL /HPF
P AXIS: 50 DEGREES
P AXIS: 53 DEGREES
PH UR STRIP.AUTO: 6 [PH]
PLATELET # BLD AUTO: 378 THOUSANDS/UL (ref 149–390)
PMV BLD AUTO: 8.7 FL (ref 8.9–12.7)
POTASSIUM SERPL-SCNC: 5 MMOL/L (ref 3.5–5.3)
PR INTERVAL: 154 MS
PR INTERVAL: 154 MS
PROT UR STRIP-MCNC: ABNORMAL MG/DL
QRS AXIS: -40 DEGREES
QRS AXIS: -42 DEGREES
QRSD INTERVAL: 150 MS
QRSD INTERVAL: 154 MS
QT INTERVAL: 444 MS
QT INTERVAL: 492 MS
QTC INTERVAL: 540 MS
QTC INTERVAL: 579 MS
RBC # BLD AUTO: 3.41 MILLION/UL (ref 3.88–5.62)
RBC #/AREA URNS AUTO: ABNORMAL /HPF
SODIUM 24H UR-SCNC: 39 MOL/L
SODIUM SERPL-SCNC: 133 MMOL/L (ref 135–147)
SP GR UR STRIP.AUTO: 1.01 (ref 1–1.03)
T WAVE AXIS: 42 DEGREES
T WAVE AXIS: 46 DEGREES
UNIT DISPENSE STATUS: NORMAL
UNIT PRODUCT CODE: NORMAL
UNIT PRODUCT VOLUME: 300 ML
UNIT PRODUCT VOLUME: 350 ML
UNIT PRODUCT VOLUME: 350 ML
UNIT RH: NORMAL
UROBILINOGEN UR STRIP-ACNC: <2 MG/DL
UUN 24H UR-MCNC: 382 MG/DL
VENTRICULAR RATE: 83 BPM
VENTRICULAR RATE: 89 BPM
WBC # BLD AUTO: 14.46 THOUSAND/UL (ref 4.31–10.16)
WBC #/AREA URNS AUTO: ABNORMAL /HPF

## 2024-02-22 PROCEDURE — 99232 SBSQ HOSP IP/OBS MODERATE 35: CPT | Performed by: INTERNAL MEDICINE

## 2024-02-22 PROCEDURE — 82570 ASSAY OF URINE CREATININE: CPT | Performed by: PHYSICIAN ASSISTANT

## 2024-02-22 PROCEDURE — 82436 ASSAY OF URINE CHLORIDE: CPT | Performed by: PHYSICIAN ASSISTANT

## 2024-02-22 PROCEDURE — 99233 SBSQ HOSP IP/OBS HIGH 50: CPT | Performed by: INTERNAL MEDICINE

## 2024-02-22 PROCEDURE — A9540 TC99M MAA: HCPCS

## 2024-02-22 PROCEDURE — A9558 XE133 XENON 10MCI: HCPCS

## 2024-02-22 PROCEDURE — 78582 LUNG VENTILAT&PERFUS IMAGING: CPT

## 2024-02-22 PROCEDURE — 81001 URINALYSIS AUTO W/SCOPE: CPT | Performed by: PHYSICIAN ASSISTANT

## 2024-02-22 PROCEDURE — 80048 BASIC METABOLIC PNL TOTAL CA: CPT

## 2024-02-22 PROCEDURE — 93010 ELECTROCARDIOGRAM REPORT: CPT | Performed by: INTERNAL MEDICINE

## 2024-02-22 PROCEDURE — 36569 INSJ PICC 5 YR+ W/O IMAGING: CPT

## 2024-02-22 PROCEDURE — 99024 POSTOP FOLLOW-UP VISIT: CPT | Performed by: INTERNAL MEDICINE

## 2024-02-22 PROCEDURE — 71045 X-RAY EXAM CHEST 1 VIEW: CPT

## 2024-02-22 PROCEDURE — 97112 NEUROMUSCULAR REEDUCATION: CPT

## 2024-02-22 PROCEDURE — NC001 PR NO CHARGE: Performed by: PODIATRIST

## 2024-02-22 PROCEDURE — 05HB33Z INSERTION OF INFUSION DEVICE INTO RIGHT BASILIC VEIN, PERCUTANEOUS APPROACH: ICD-10-PCS | Performed by: INTERNAL MEDICINE

## 2024-02-22 PROCEDURE — 84540 ASSAY OF URINE/UREA-N: CPT | Performed by: PHYSICIAN ASSISTANT

## 2024-02-22 PROCEDURE — 85027 COMPLETE CBC AUTOMATED: CPT

## 2024-02-22 PROCEDURE — 99232 SBSQ HOSP IP/OBS MODERATE 35: CPT | Performed by: PHYSICIAN ASSISTANT

## 2024-02-22 PROCEDURE — C1751 CATH, INF, PER/CENT/MIDLINE: HCPCS

## 2024-02-22 PROCEDURE — 99223 1ST HOSP IP/OBS HIGH 75: CPT | Performed by: INTERNAL MEDICINE

## 2024-02-22 PROCEDURE — 93005 ELECTROCARDIOGRAM TRACING: CPT

## 2024-02-22 PROCEDURE — G1004 CDSM NDSC: HCPCS

## 2024-02-22 PROCEDURE — 82948 REAGENT STRIP/BLOOD GLUCOSE: CPT

## 2024-02-22 PROCEDURE — 84300 ASSAY OF URINE SODIUM: CPT | Performed by: PHYSICIAN ASSISTANT

## 2024-02-22 PROCEDURE — 97530 THERAPEUTIC ACTIVITIES: CPT

## 2024-02-22 RX ORDER — HYDROMORPHONE HCL/PF 1 MG/ML
0.5 SYRINGE (ML) INJECTION EVERY 6 HOURS PRN
Status: DISCONTINUED | OUTPATIENT
Start: 2024-02-22 | End: 2024-02-25

## 2024-02-22 RX ORDER — HEPARIN SODIUM 5000 [USP'U]/ML
5000 INJECTION, SOLUTION INTRAVENOUS; SUBCUTANEOUS EVERY 8 HOURS SCHEDULED
Status: DISCONTINUED | OUTPATIENT
Start: 2024-02-23 | End: 2024-03-01 | Stop reason: HOSPADM

## 2024-02-22 RX ORDER — ACETAMINOPHEN 325 MG/1
650 TABLET ORAL EVERY 8 HOURS SCHEDULED
Status: DISCONTINUED | OUTPATIENT
Start: 2024-02-22 | End: 2024-03-01 | Stop reason: HOSPADM

## 2024-02-22 RX ORDER — LIDOCAINE 50 MG/G
2 PATCH TOPICAL ONCE
Status: COMPLETED | OUTPATIENT
Start: 2024-02-22 | End: 2024-02-22

## 2024-02-22 RX ORDER — HYDROMORPHONE HCL IN WATER/PF 6 MG/30 ML
0.2 PATIENT CONTROLLED ANALGESIA SYRINGE INTRAVENOUS ONCE
Status: COMPLETED | OUTPATIENT
Start: 2024-02-22 | End: 2024-02-22

## 2024-02-22 RX ORDER — ISOSORBIDE DINITRATE 10 MG/1
10 TABLET ORAL
Status: DISCONTINUED | OUTPATIENT
Start: 2024-02-22 | End: 2024-02-23

## 2024-02-22 RX ORDER — LANOLIN ALCOHOL/MO/W.PET/CERES
3 CREAM (GRAM) TOPICAL
Status: DISCONTINUED | OUTPATIENT
Start: 2024-02-22 | End: 2024-03-01 | Stop reason: HOSPADM

## 2024-02-22 RX ORDER — ALPRAZOLAM 0.5 MG/1
0.5 TABLET ORAL
Status: DISCONTINUED | OUTPATIENT
Start: 2024-02-22 | End: 2024-03-01 | Stop reason: HOSPADM

## 2024-02-22 RX ADMIN — INSULIN LISPRO 7 UNITS: 100 INJECTION, SOLUTION INTRAVENOUS; SUBCUTANEOUS at 11:43

## 2024-02-22 RX ADMIN — ACETAMINOPHEN 650 MG: 325 TABLET, FILM COATED ORAL at 21:06

## 2024-02-22 RX ADMIN — FUROSEMIDE 40 MG: 10 INJECTION, SOLUTION INTRAMUSCULAR; INTRAVENOUS at 17:21

## 2024-02-22 RX ADMIN — INSULIN LISPRO 1 UNITS: 100 INJECTION, SOLUTION INTRAVENOUS; SUBCUTANEOUS at 17:10

## 2024-02-22 RX ADMIN — CEFAZOLIN SODIUM 2000 MG: 2 SOLUTION INTRAVENOUS at 11:39

## 2024-02-22 RX ADMIN — ENOXAPARIN SODIUM 40 MG: 40 INJECTION SUBCUTANEOUS at 08:12

## 2024-02-22 RX ADMIN — OXYCODONE HYDROCHLORIDE 5 MG: 5 TABLET ORAL at 05:12

## 2024-02-22 RX ADMIN — CEFAZOLIN SODIUM 2000 MG: 2 SOLUTION INTRAVENOUS at 05:26

## 2024-02-22 RX ADMIN — METOPROLOL SUCCINATE 25 MG: 25 TABLET, EXTENDED RELEASE ORAL at 05:25

## 2024-02-22 RX ADMIN — Medication 3 MG: at 21:07

## 2024-02-22 RX ADMIN — CEFAZOLIN SODIUM 2000 MG: 2 SOLUTION INTRAVENOUS at 17:58

## 2024-02-22 RX ADMIN — SENNOSIDES, DOCUSATE SODIUM 2 TABLET: 8.6; 5 TABLET ORAL at 08:12

## 2024-02-22 RX ADMIN — EMPAGLIFLOZIN 10 MG: 10 TABLET, FILM COATED ORAL at 08:13

## 2024-02-22 RX ADMIN — AMIODARONE HYDROCHLORIDE 200 MG: 200 TABLET ORAL at 08:12

## 2024-02-22 RX ADMIN — Medication 2 G: at 21:06

## 2024-02-22 RX ADMIN — HYDROMORPHONE HYDROCHLORIDE 0.2 MG: 0.2 INJECTION, SOLUTION INTRAMUSCULAR; INTRAVENOUS; SUBCUTANEOUS at 17:22

## 2024-02-22 RX ADMIN — FUROSEMIDE 40 MG: 10 INJECTION, SOLUTION INTRAMUSCULAR; INTRAVENOUS at 08:12

## 2024-02-22 RX ADMIN — Medication 250 MG: at 17:11

## 2024-02-22 RX ADMIN — CEFAZOLIN SODIUM 2000 MG: 2 SOLUTION INTRAVENOUS at 23:04

## 2024-02-22 RX ADMIN — HYDROMORPHONE HYDROCHLORIDE 0.2 MG: 0.2 INJECTION, SOLUTION INTRAMUSCULAR; INTRAVENOUS; SUBCUTANEOUS at 08:37

## 2024-02-22 RX ADMIN — CLOPIDOGREL BISULFATE 75 MG: 75 TABLET ORAL at 08:12

## 2024-02-22 RX ADMIN — Medication 250 MG: at 08:12

## 2024-02-22 RX ADMIN — METOPROLOL SUCCINATE 25 MG: 25 TABLET, EXTENDED RELEASE ORAL at 17:11

## 2024-02-22 RX ADMIN — ASPIRIN 81 MG CHEWABLE TABLET 81 MG: 81 TABLET CHEWABLE at 08:12

## 2024-02-22 RX ADMIN — ATORVASTATIN CALCIUM 80 MG: 80 TABLET, FILM COATED ORAL at 17:11

## 2024-02-22 RX ADMIN — OXYCODONE HYDROCHLORIDE 5 MG: 5 TABLET ORAL at 12:41

## 2024-02-22 RX ADMIN — INSULIN GLARGINE 35 UNITS: 100 INJECTION, SOLUTION SUBCUTANEOUS at 21:07

## 2024-02-22 RX ADMIN — LIDOCAINE 5% 2 PATCH: 700 PATCH TOPICAL at 08:37

## 2024-02-22 RX ADMIN — INSULIN LISPRO 7 UNITS: 100 INJECTION, SOLUTION INTRAVENOUS; SUBCUTANEOUS at 08:14

## 2024-02-22 RX ADMIN — INSULIN LISPRO 7 UNITS: 100 INJECTION, SOLUTION INTRAVENOUS; SUBCUTANEOUS at 17:10

## 2024-02-22 RX ADMIN — INSULIN LISPRO 1 UNITS: 100 INJECTION, SOLUTION INTRAVENOUS; SUBCUTANEOUS at 11:42

## 2024-02-22 RX ADMIN — ISOSORBIDE DINITRATE 10 MG: 10 TABLET ORAL at 17:19

## 2024-02-22 NOTE — PROCEDURES
Insert Complex Venous Access Line    Date/Time: 2/22/2024 9:59 AM    Performed by: Yolie Pino RN  Authorized by: Kip Cabello MD    Patient location:  Bedside  Other Assisting Provider: Yes (comment)    Consent:     Consent obtained:  Written (Provider obtained)    Consent given by:  Patient    Risks discussed:  Arterial puncture, bleeding, pneumothorax, incorrect placement, infection and nerve damage    Alternatives discussed:  No treatment, delayed treatment, alternative treatment and observation  Universal protocol:     Procedure explained and questions answered to patient or proxy's satisfaction: yes      Relevant documents present and verified: yes      Test results available and properly labeled: yes      Radiology Images displayed and confirmed.  If images not available, report reviewed: yes      Required blood products, implants, devices, and special equipment available: yes      Site/side marked: yes      Immediately prior to procedure, a time out was called: yes      Patient identity confirmed:  Verbally with patient, arm band, provided demographic data and hospital-assigned identification number  Pre-procedure details:     Hand hygiene: Hand hygiene performed prior to insertion      Sterile barrier technique: All elements of maximal sterile technique followed      Skin preparation:  ChloraPrep    Skin preparation agent: Skin preparation agent completely dried prior to procedure    Indications:     PICC line indications: long term antibiotics    Sedation:     Sedation type: None.  Anesthesia (see MAR for exact dosages):     Anesthesia method:  Local infiltration    Local anesthetic:  Lidocaine 1% w/o epi (3 mL administered)  Procedure details:     Location:  [place]    Vessel type: vein      Laterality:  Right    Site selection rationale:  Largest, most patent vein    Approach: percutaneous technique used      Patient position:  Trendelenburg (30 degrees)    Procedural supplies:  Single lumen     Catheter size:  4 Fr    Landmarks identified: yes      Ultrasound guidance: yes      Ultrasound image availability:  Not saved    Sterile ultrasound techniques: Sterile gel and sterile probe covers were used      Number of attempts:  1    Successful placement: yes      Vessel of catheter tip end:  [place] 3CG confirmed (Ok to use. [place] 3CG confirmed placement. Results saved to chart.)    Total catheter length (cm):  42    Catheter out on skin (cm):  0    Max flow rate:  999 mL/hr    Arm circumference:  36  Post-procedure details:     Post-procedure:  Dressing applied and securement device placed    Assessment:  Blood return through all ports and free fluid flow    Post-procedure complications: none      Patient tolerance of procedure:  Tolerated well, no immediate complications    Observer: Yes

## 2024-02-22 NOTE — ASSESSMENT & PLAN NOTE
History of monomorphic VT status post secondary prevention with [employer] dual-chamber ICD 5/2023  Status post ICD extraction 2/8/2024  [employer]vest on dc

## 2024-02-22 NOTE — ASSESSMENT & PLAN NOTE
Pleuritic pain in left lower chest wall since 2/20. Tender to touch. Suspect secondary to ICD manipulation.   EKG with no acute ischemic changes.  Voltaren gel/lidocaine patch/prn pain meds.

## 2024-02-22 NOTE — CONSULTS
Consultation - Nephrology   Nadir Myers 63 y.o. male MRN: [id number]  Unit/Bed#: [facility] 519-01 Encounter: [id number]    ASSESSMENT/PLAN:   LINDEN: Suspect ATN in the setting of surgery and infection.  Head CT with contrast 2/15 which could be slowing recovery but initial LINDEN was prior to contrast.  Baseline creatinine less than 1  Initially admitted with creatinine 1.4 which quickly improved to 0.6-0.8  On 2/14 creatinine increased to 1.5 and was stable at 1.4-1.5 until yesterday   Today worsening to 2.01   Check UA and urine electrolytes   CT: No hydronephrosis  Continue to hold spironolactone (had 1 dose yesterday)  Consider holding jardiance   Continue Lasix 40 mg IV twice daily  Sepsis due to MSSA bacteremia with ICD infection  Status post ICD extraction 2/8, extravascular ICD placement 2/20 and revision 2/21  On cefazolin 2 g every 6 hours per ID  Chronic CHF with EF 45%   Weight up about 20lb this admission   Currently on lasix 40mg IV bid per heart failure team   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 year old male who was admitted to [facility] 1/31 with left-sided weakness/numbness and found to be in septic shock due to left fourth toe gangrene.  Admitted to the ICU on pressors and underwent amputation.  Found to have MSSA positive blood cultures and has been on IV antibiotics.  Underwent GILL showing vegetation on device lead and underwent ICD extraction on 2/8.  Extravascular ICD placed 2/20 but then went to ER yesterday for readmission.   Creatinine worsening the past few days and nephrology was consulted.    Patient currently having shortness of breath with exertion but none at rest.  Has some chest discomfort related to surgery.  No vomiting or diarrhea.  Urine output is okay.        PAST MEDICAL HISTORY:  Past Medical History:   Diagnosis Date    Diabetes mellitus (HCC)     Myocardial infarction (HCC)        PAST SURGICAL HISTORY:  Past Surgical History:   Procedure Laterality Date    CARDIAC CATHETERIZATION N/A 05/03/2023    Procedure: Cardiac Coronary Angiogram;  Surgeon: Valeriy Shore MD;  Location: [facility] CARD[facility]AC CATH [facility];  Service: Cardiology    CARDIAC CATHETERIZATION Left 05/03/2023    Procedure: Cardiac Left Heart Cath;  Surgeon: Valeriy Shore MD;  Location: [facility] CARDIAC CATH [facility];  Service: Cardiology    CARDIAC DEFIBRILLATOR PLACEMENT  05/2023    CARDIAC ELECTROPHYSIOLOGY PROCEDURE N/A 05/12/2023    Procedure: Cardiac icd implant;  Surgeon: Urbano Maria MD;  Location: [facility] CARDIAC CATH [facility];  Service: Cardiology    CARDIAC ELECTROPHYSIOLOGY PROCEDURE N/A 2/20/2024    Procedure: EV ICD IMPLANTATION;  Surgeon: Arturo Wooten DO;  Location: [facility] MAIN OR;  Service: Cardiology    IR LOWER EXTREMITY ANGIOGRAM  1/18/2024    VT RMVL TRANSVNS PM ELTRD DUAL LEAD SYS N/A 2/20/2024    Procedure: EV ICD IMPLANTATION;  Surgeon: Abhilash Ferrera MD;  Location: [facility] MAIN [facility];  Service: Cardiac Surgery    WOUND DEBRIDEMENT Left 2/4/2024    Procedure: LEFT FOURTH TOE AMPUTATION SURGICAL WOUND DEBRIDEMENT FOOT/TOE (WASH OUT);  Surgeon: Bebo Hopper DPM;  Location: [facility] MAIN OR;  Service: Podiatry       ALLERGIES:  Allergies   Allergen Reactions    Vancomycin Rash     NOT AN ALLERGY - 1/31/24 patient experienced vancomycin infusion reaction, please extend duration of infusion time to prevent future infusion reactions       SOCIAL HISTORY:  Social History     Substance and Sexual Activity   Alcohol Use Not Currently     Social History     Substance and Sexual Activity    Drug Use Never     Social History     Tobacco Use   Smoking Status Every Day    Types: Cigarettes   Smokeless Tobacco Never   Tobacco Comments    X2 cigarettes/day       FAMILY HISTORY:  History reviewed. No pertinent family history.    MEDICATIONS:  Scheduled Meds:  Current Facility-Administered Medications   Medication Dose Route Frequency Provider Last Rate    acetaminophen  975 mg Oral Q6H PRN Yuniel Mcgee PA-C      amiodarone  200 mg Oral Daily With Breakfast Yuniel Mcgee PA-C      aspirin  81 mg Oral Daily Yuniel Mcgee PA-C      atorvastatin  80 mg Oral After Dinner Yuniel Mcgee PA-C      cefazolin  2,000 mg Intravenous Q6H Yuniel Mcgee PA-C 2,000 mg (02/22/24 1139)    clopidogrel  75 mg Oral Daily Yuniel Mcgee PA-C      Empagliflozin  10 mg Oral Daily Yuniel Mcgee PA-C      enoxaparin  40 mg Subcutaneous Q24H TEJAS Yuniel Mcgee PA-C      furosemide  40 mg Intravenous BID (diuretic) Yuniel Mcgee PA-C      insulin glargine  35 Units Subcutaneous HS Yuniel Mcgee PA-C      insulin lispro  1-5 Units Subcutaneous 4x Daily (AC & HS) Yuniel Mcgee PA-C      insulin lispro  7 Units Subcutaneous TID With Meals Yuniel Mcgee PA-C      lidocaine  2 patch Topical Once Kip Cabello MD      metoprolol  5 mg Intravenous Q6H PRN Yuniel Mcgee PA-C      metoprolol succinate  25 mg Oral Q12H Yuniel Mcgee PA-C      oxyCODONE  5 mg Oral Q6H PRN Yuniel Mcgee PA-C      polyethylene glycol  17 g Oral Daily Yuniel Mcgee PA-C      saccharomyces boulardii  250 mg Oral BID Yuniel Mcgee PA-C      senna-docusate sodium  2 tablet Oral BID Yuniel Mcgee PA-C         PRN Meds:.  acetaminophen    metoprolol    oxyCODONE      REVIEW OF SYSTEMS:  A complete review of systems was done. Pertinent positives and negatives noted in the HPI but otherwise the review of systems is  negative.    PHYSICAL EXAM:  Current Weight: Weight - Scale: 114 kg (251 lb)  First Weight: Weight - Scale: 105 kg (231 lb 11.3 oz)  Vitals:    02/22/24 1057   BP: 147/92   Pulse: 86   Resp:    Temp: (!) 97.3 °F (36.3 °C)   SpO2: 94%       Intake/Output Summary (Last 24 hours) at 2/22/2024 1214  Last data filed at 2/22/2024 0900  Gross per 24 hour   Intake 1590 ml   Output 2750 ml   Net -1160 ml     General:  appears comfortable and in no acute distress   Skin:  No rash  Eyes:  Sclerae anicteric, no periorbital edema   ENT:  Moist mucous membranes  Neck:  Trachea midline, symmetric   Chest:  Clear to auscultation bilaterally with no wheezes, rales or rhonchi  CVS:  Regular rate and rhythm  Abdomen:  Soft, nontender, nondistended  Neuro:  Awake and alert  Psych:  Appropriate affect  Extremities: no lower extremity edema     Lab Results:   Results from last 7 days   Lab Units 02/22/24  1031 02/21/24  1100 02/21/24  0443 02/20/24  0504 02/19/24  0451 02/18/24  1126 02/18/24  0502 02/18/24  0500 02/17/24  0444 02/16/24  0851   WBC Thousand/uL 14.46*  --  12.19* 11.08* 11.34*  --   --  12.14* 11.06* 11.93*   HEMOGLOBIN g/dL 9.7*  --  10.5* 10.0* 10.1*  --   --  9.5* 9.2* 10.1*   HEMATOCRIT % 30.9*  --  33.5* 32.6* 33.1*  --   --  30.6* 28.9* 32.5*   PLATELETS Thousands/uL 378  --  425* 423* 445*  --   --  409* 403* 390   SODIUM mmol/L 133*  --  132* 135 134* 129* 129*  --  132* 132*   POTASSIUM mmol/L 5.0 5.1 5.8* 4.8 5.0 5.3 6.0*  --  4.9 4.8   CHLORIDE mmol/L 100  --  100 101 101 99 101  --  100 100   CO2 mmol/L 29  --  24 29 26 26 22  --  25 24   BUN mg/dL 42*  --  38* 29* 27* 31* 31*  --  33* 29*   CREATININE mg/dL 2.01*  --  1.76* 1.52* 1.51* 1.44* 1.42*  --  1.55* 1.40*   CALCIUM mg/dL 7.7*  --  8.4 8.3* 8.3* 8.2* 8.1*  --  7.8* 7.7*   MAGNESIUM mg/dL  --   --   --   --  2.2  --   --   --   --   --        Radiology Results:    VAS VENOUS DUPLEX - LOWER LIMB BILATERAL   Final Result by Nila Tomas MD (02/21  2150)      XR chest portable   Final Result by Shay Gonzalez MD (02/22 0957)   Stable cardiomegaly. Small left basilar pleural effusion.      Workstation performed: [id number]         XR chest 1 view   Final Result by Killian Wolff DO (02/22 0941)      Fluoroscopic guidance provided for procedure guidance.  Please refer to the separate procedure notes for additional details.         Workstation performed: [id number]         CT chest wo contrast   Final Result by Graham Coffey MD (02/21 1219)      Left abdominal wall pacemaker device with lead tip extending into the left pleural space.      Peripheral left lower lobe opacity with reverse halo sign appearance. This can be seen in the setting of pulmonary infarct and consideration for dedicated PE protocol CT suggested.      Left lung nodules again identified, given the rapid appearance these are likely infectious in etiology despite the absence of cavitation septa emboli cannot be entirely excluded.      Small bilateral pleural effusions.            I personally discussed this study with Dr. Cabello on 2/21/2024 12:18 PM.               Workstation performed: [id number]         XR chest 2 views   Final Result by Gilbert Carpenter MD (02/21 0844)      Pulmonary edema has resolved.   Small bilateral pleural effusions are similar to prior study.   No pneumothorax.            Workstation performed: [id number]         XR chest portable   Final Result by Gilbert Carpenter MD (02/21 0818)      New asymmetric left-sided pulmonary edema with bilateral pleural effusions.            Workstation performed: [id number]         CT chest abdomen pelvis w contrast   Final Result by Nikki Luna MD (02/15 0915)      Development of large infiltrate in the left lower lobe, and a few new peripherally oriented scattered densities.   Improvement in some other densities.   No cavitations but peripheral location is worrisome for septic emboli.   Interval pacemaker removal.  Correlate with lab findings. 1.3 x 4 cm gas and fluid subcutaneous collection at the pacemaker bed, likely postsurgical but correlate to exclude symptoms of an abscess.   Small pleural effusions.   Mild mediastinal lymphadenopathy, likely reactive.   Chronic findings, as per the body of the report.               Workstation performed: [id number]         XR chest portable   Final Result by Gregory Morin MD (02/09 0947)      No acute cardiopulmonary disease.            Resident: SANTHOSH ALLEN I, the attending radiologist, have reviewed the images and agree with the final report above.      Workstation performed: [id number]         CT chest abdomen pelvis w contrast   Final Result by Gilbert Carpenter MD (02/08 0531)      Mild multifocal pneumonia. Given smoking history, follow-up chest CT is advised in 3 months to confirm resolution. Enhancing consolidation with central groundglass in the periphery of the right lower lobe may be a combination of atelectasis and    pneumonia; there is no evident of pulmonary embolism (though the study was not tailored to assess the pulmonary arteries) to suggest that this is an infarct.                  Workstation performed: [id number]         XR foot left 3+ views   Final Result by Juan Carlos Webber MD (02/06 0848)      Postoperative change reflecting recent partial left fourth ray amputation as noted.            Workstation performed: [id number]         MRI brain wo contrast   Final Result by Graham Israel MD (02/01 1605)      No acute infarction, intracranial hemorrhage or mass effect      Workstation performed: [id number]         XR chest 2 views   Final Result by Kristine Townsend MD (01/31 2144)      No acute cardiopulmonary disease.               Workstation performed: [id number]         XR foot 3+ views LEFT   Final Result by Killian Watson MD (02/01 7653)      No radiographic evidence of osteomyelitis.            Workstation performed:  [id number]         CTA stroke alert (head/neck)   Final Result by Mahesh Jimenes MD (01/31 1015)      1. CTA head: Negative for large vessel intracranial occlusion. Moderate bilateral supraclinoid ICA and moderate to severe left cavernous ICA segment stenosis.      2. CTA neck:  No extracranial carotid stenosis.  The cervical vertebral arteries are patent.      Findings were directly discussed with Dr. Maria Juan  at 10:03 a.m.                           Workstation performed: [id number]         CT stroke alert brain   Final Result by Mahesh Jimenes MD (01/31 1016)      No acute intracranial abnormality.      Chronic microangiopathic ischemic changes.      Findings were directly discussed with Dr. Maria Juan  at 10:03 a.m.         Workstation performed: [id number]         XR chest portable    (Results Pending)   NM lung ventilation / perfusion    (Results Pending)

## 2024-02-22 NOTE — PROGRESS NOTES
Progress Note - Infectious Disease   Nadir Myers 63 y.o. male MRN: [id number]  Unit/Bed#: [facility] 519-01 Encounter: [id number]      Impression/Plan:    Sepsis, present on arrival; fever, leukocytosis  -Source is bacteremia with ICD infection and patients foot infection as below. No other clear source. Had intermittent fevers since 2/10, though blood cultures clear on 2/12 and 2/16. CT C/A/P 2/15 notable for pulmonary septic emboli and gas/fluid collection at site of ICD removal, though no other infectious source and EP felt ICD site findings were post operative. Podiatry feels foot is healing well. Patient without any localizing symptoms. Now afebrile since 2/18. Persistent fevers may have been reactive to lung findings as below.   -Antibiotic as below  -Trend fever curve  -Repeat CBC + diff tomorrow AM     Persistent MSSA bacteremia with ICD infection  -Likely initial source was patients foot wound as below with subsequent ICD infection. Blood cultures positive on 1/31-2/08 despite being on Cefazolin, suspect due to ICD infection. GILL 2/07 found a mobile mass on device lead in right atrium, concerning for infection. No valve vegetations. CT scans from 2/15 and 2/07 negative for abscess though suspicious for pulmonary septic emboli. ICD extracted on 2/08 with lead culture that grew MSSA. Only 1 of 2 blood cultures positive on 2/09, blood cultures 2/12 and 2/16 negative. Extravascular ICD placed 2/20, though lead was in left pleural space and repositioning attempts on 2/21 unsuccessful. Plan now is to reimplant at later date after patient has healed and discharge with Life Vest.  -Continue IV Cefazolin 2g every 6 hours  -Monitor renal function; if CrCl drops to <30 then will need to adjust dose  -Will need 6 weeks of IV antibiotic from first negative blood cultures  -Will need weekly CBC + diff, serum Cr as outpatient and ID follow up  -Follow up with Cardiac surgery and EP to determine future date for ICD  reimplantation    3. Abnormal CT chest  -CT 2/07 noted small consolidations in upper lobes and left lower lobe, with enhancing lesion in perieprhal of right lower lobe. Repeat CT 2/15 with progressive left lower lobe infiltrate, new subpleural nodular airspace focus, with resolving foci at anterior left upper lobe and inferior right lower lobe. Suspect due to septic emboli. CT 2/21 with peripheral left lower lobe opacity showing reverse halo sign now, read as possible pulmonary infarct.   -Plan for prolonged course of antibiotic as above  -Monitor for any development of respiratory symptoms  -Follow up VQ scan results  -Would plan to repeat CT chest as outpatient in 4 weeks to ensure resolution    4. 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 Culture polymicrobial of tissue with MSSA, Finegoldia and Anaerococccus. Likely source of patient's bacteremia. Surgical cure felt to be obtained per Podiatry. Per follow up on 2/15, amputation site stable with intact sutures and no evidence of dehiscence or ischemia.   -Ongoing local wound care per Podiatry.     5. Staph epidermidis in 1 of 2 blood cultures  -1 set from 2/03 with Staph epi, did not grow in prior blood cultures from 1/31 or subsequent blood cultures. Suspect this was skin contaminant and as above MSSA is true pathogen.  -No further workup at this time    6. LINDEN  -Developing over admission. May be related to IV contrast, cardiorenal. Consideration for Staphylococcus related GN. CrCl stable above 30 at the moment. Nephrology following now.  -Renally adjust Cefazolin if CrCl drops to < 30  -Monitor BMP and urine output    Plan and recommendations were discussed with primary team. They agree with continuing IV Cefazolin.    Antibiotics:  Cefazolin    Subjective:  Denies fever, chills, nausea. Having some chest wall pain. No cough.   Objective:  Vitals:  Temp:  [97.3 °F (36.3 °C)-98.7 °F (37.1 °C)] 98.7 °F (37.1 °C)  HR:  [72-88] 88  Resp:   [16-21] 16  BP: (103-168)/(66-95) 153/95  SpO2:  [91 %-100 %] 95 %  Temp (24hrs), Av.8 °F (36.6 °C), Min:97.3 °F (36.3 °C), Max:98.7 °F (37.1 °C)  Current: Temperature: 98.7 °F (37.1 °C)    Physical Exam:   General Appearance:  Alert, interactive, nontoxic, no acute distress.   Throat: Oropharynx moist without lesions.    Lungs:   Clear to auscultation bilaterally; no wheezes, rhonchi or rales; respirations unlabored   Heart:  RRR   Abdomen:   Soft, non-tender     Extremities: Left foot with dressing intact   Skin: No new rashes or lesions. Chest wall bandage at ICD extraction site intact       Labs:   All pertinent labs and imaging studies were personally reviewed  Results from last 7 days   Lab Units 24  1031 24  0443 24  0504   WBC Thousand/uL 14.46* 12.19* 11.08*   HEMOGLOBIN g/dL 9.7* 10.5* 10.0*   PLATELETS Thousands/uL 378 425* 423*     Results from last 7 days   Lab Units 24  1031 24  1100 24  0443 24  0504   SODIUM mmol/L 133*  --  132* 135   POTASSIUM mmol/L 5.0   < > 5.8* 4.8   CHLORIDE mmol/L 100  --  100 101   CO2 mmol/L 29  --  24 29   BUN mg/dL 42*  --  38* 29*   CREATININE mg/dL 2.01*  --  1.76* 1.52*   EGFR ml/min/1.73sq m 34  --  40 48   CALCIUM mg/dL 7.7*  --  8.4 8.3*    < > = values in this interval not displayed.                         Micro:  Results from last 7 days   Lab Units 24  0851   BLOOD CULTURE  No Growth After 5 Days.  No Growth After 5 Days.       Imaging:          Zach Medina MD  [facility]

## 2024-02-22 NOTE — ASSESSMENT & PLAN NOTE
Patient had extraction of ICD  Patient underwent AV ICD placement on February 20, 2024  Patient had lead dislodgment leading to repeat procedure on February 21, 2024  During the procedure patient's lead had poor sensing and therefore could not be positioned under the sternum and pocket was closed.  Multiple location under the sternum where attempted.   Will plan for LifeVest on discharge and then discuss S-ICD in office.

## 2024-02-22 NOTE — ASSESSMENT & PLAN NOTE
Persistent MSSA bacteremia most likely source foot wound, complicated with ICD infection  Blood cultures from 1/31-2/9  positive despite being on Ancef suspect due to ICD infection  GILL with evidence of mobile mass on ICD lead in the right atrium, concerning for infection, no valvular vegetation  S/p ICD removal with EP on 2/8  CT chest 2/7 and 2/15 with possible septic pulmonary emboli  Repeat Blood cultures from 2/12 negative, repeat blood cultures 2/16 negative at 72 hours  Continue IV Ancef, with plan to treat 6 weeks until 3/25/2024   PICC placed 2/22

## 2024-02-22 NOTE — PROGRESS NOTES
Progress Note - Cardiothoracic Surgery   Nadir Myers 63 y.o. male MRN: [id number]  Unit/Bed#: [facility] 519-01 Encounter: [id number]      24 Hour Events:  POD2 from EV ICD placement  POD 1 from EV ICD removal, unsuccessful attempts at lead placement  CXR with small B/l effusions  C/o chest/incisional tenderness, hard to take a deep breath    Medications:   Scheduled Meds:  Current Facility-Administered Medications   Medication Dose Route Frequency Provider Last Rate    acetaminophen  975 mg Oral Q6H PRN Yuniel Mcgee PA-C      amiodarone  200 mg Oral Daily With Breakfast Yuniel Mcgee PA-C      aspirin  81 mg Oral Daily Yuniel Mcgee PA-C      atorvastatin  80 mg Oral After Dinner Yuniel Mcgee PA-C      cefazolin  2,000 mg Intravenous Q6H Yuniel Mcgee PA-C 2,000 mg (02/22/24 0526)    clopidogrel  75 mg Oral Daily Yuniel Mcgee PA-C      Empagliflozin  10 mg Oral Daily Yuniel Mcgee PA-C      enoxaparin  40 mg Subcutaneous Q24H TEJAS Yuniel Mcgee PA-C      furosemide  40 mg Intravenous BID (diuretic) Yuniel Mcgee PA-C      insulin glargine  35 Units Subcutaneous HS Yuniel Mcgee PA-C      insulin lispro  1-5 Units Subcutaneous 4x Daily (AC & HS) Yuniel Mcgee PA-C      insulin lispro  7 Units Subcutaneous TID With Meals Yuniel Mcgee PA-C      metoprolol  5 mg Intravenous Q6H PRN Yuniel Mcgee PA-C      metoprolol succinate  25 mg Oral Q12H Yuniel Mcgee PA-C      oxyCODONE  5 mg Oral Q6H PRN Yuniel Mcgee PA-C      polyethylene glycol  17 g Oral Daily Yuniel Mcgee PA-C      saccharomyces boulardii  250 mg Oral BID Yuniel Mcgee PA-C      senna-docusate sodium  2 tablet Oral BID Yuniel Mcgee PA-C       Continuous Infusions:     Results:   Results from last 7 days   Lab Units 02/21/24  0443 02/20/24  0504 02/19/24  0451   WBC Thousand/uL 12.19* 11.08* 11.34*   HEMOGLOBIN  g/dL 10.5* 10.0* 10.1*   HEMATOCRIT % 33.5* 32.6* 33.1*   PLATELETS Thousands/uL 425* 423* 445*     Results from last 7 days   Lab Units 02/21/24  1100 02/21/24  0443 02/20/24  0504 02/19/24  0451   POTASSIUM mmol/L 5.1 5.8* 4.8 5.0   CHLORIDE mmol/L  --  100 101 101   CO2 mmol/L  --  24 29 26   BUN mg/dL  --  38* 29* 27*   CREATININE mg/dL  --  1.76* 1.52* 1.51*   CALCIUM mg/dL  --  8.4 8.3* 8.3*     Results from last 7 days   Lab Units 02/19/24  1345   INR  1.15   PTT seconds 30       Studies:   CXR 2/22:    B/l pleural eff, R>L    GILL:     Left Ventricle: Left ventricular cavity size is normal. Wall thickness is normal. The left ventricular ejection fraction is 45%. Systolic function is mildly reduced. There is hypokinesis with akinesis of the aneurysmal portion of the apex.    Right Ventricle: Right ventricular cavity size is normal. Systolic function is normal.    Right Atrium: There is a 1.8 x 1.1 cm mobile mass affixed to the device lead as it passes through the right atrium.    Aortic Valve: The aortic valve is trileaflet. The leaflets are mildly thickened. The leaflets are not calcified. The leaflets exhibit normal mobility. There is Lambl's excrescence on the tip of the right coronary aortic valve leaflet.     There is a 1.8 x 1.1 cm mobile mass affixed to the cardiac device lead as is passes through the right atrium.  In the setting of bacteremia this is most likely represents infection.     CT CAP:  IMPRESSION:  Mild multifocal pneumonia. Given smoking history, follow-up chest CT is advised in 3 months to confirm resolution. Enhancing consolidation with central groundglass in the periphery of the right lower lobe may be a combination of atelectasis and   pneumonia; there is no evident of pulmonary embolism (though the study was not tailored to assess the pulmonary arteries) to suggest that this is an infarct.     CXR:  IMPRESSION:  No acute cardiopulmonary disease.     I have personally reviewed  pertinent reports.   and I have personally reviewed pertinent films in PACS    Vitals:   Vitals:    02/21/24 2234 02/22/24 0238 02/22/24 0512 02/22/24 0655   BP: 103/66 139/92  143/91   BP Location: Right arm Right arm     Pulse: 73 82  82   Resp: 19 18  16   Temp: 97.7 °F (36.5 °C) 97.8 °F (36.6 °C)  97.7 °F (36.5 °C)   TempSrc: Oral Oral     SpO2: 97% 95%  96%   Weight:   114 kg (251 lb)    Height:         Physical Exam:    General: No acute distress, Alert, and Normal appearance  HEENT/NECK:  Normocephalic. Atraumatic.  no jugular venous distention.    Cardiac: Regular rate and rhythm and No murmurs/rubs/gallops  Pulmonary:  Breath sounds diminished in the bases bilaterally  and Poor inspiratory effort  Abdomen:  Non-tender, Non-distended, and Normal bowel sounds  Incisions: C/D/I dressed with acticoat  Extremities: Extremities warm/dry and No edema B/L  Neuro: Alert and oriented X 3, Sensation is grossly intact, and No focal deficits  Skin: Warm/Dry, without rashes or lesions.           Assessment:  POD 2 from EV ICD placement  POD 1 from EV ICD removal, unsuccessful attempts at lead placement  MSSA Bacteremia s/p removal of ICD  Gangrene of toe left foot  PAD       Plan:  CXR with small b/l eff, low lung volumes  Incision c/d/I, c/o pain, on oxycodone and lidoderm patches ordered  Labs pending  Further management regarding ICD per EP  Please call with questions    SIGNATURE: Francia Tang PA-C  DATE: February 22, 2024  TIME: 7:43 AM    * This note was completed in part utilizing [employer] direct voice recognition software.   Grammatical errors, random word insertion, spelling mistakes, and incomplete sentences may be an occasional consequence of the system secondary to software limitations, ambient noise and hardware issues. At the time of dictation, efforts were made to edit, clarify and /or correct errors. Please read the chart carefully and recognize, using context, where substitutions have  occurred.  If you have any questions or concerns about the context, text or information contained within the body of this dictation, please contact myself, the provider, for further clarification.

## 2024-02-22 NOTE — PROGRESS NOTES
[facility]iat[facility] - Progress Note  Patient: Nadir Myers 63 y.o. male   MRN: [id number]  PCP: Tigre Hare DO  Unit/Bed#: [facility] 519-01 Encounter: [id number]  Date Of Visit: 02/22/24    ASSESSMENT:    Nadir Myers is a 63 y.o. male with:    Septic shock - POA, resolved  Left fourth toe gangrene  - s/p left 4th digit amputation (DOS: 1/31/24)  - s/p left foot washout, partial 4th ray with closure (DOS 2/4/2024)  Left foot cellulitis  Bacteremia   Type 2 diabetes mellitus  Three-vessel coronary artery disease, heart failure with reduced ejection fraction.  Tobacco dependence  Peripheral arterial disease    PLAN:    Dressings changed at bedside today. Left 4th digit amputation site is noted to be stable at this time. Sutures intact with skin edges well coapted.   Patient was not amenable to suture removal today secondary to significant pain s/p EV ICD removal yesterday 2/21/2024 with cardiothoracic surgery. Will reassess at a later date.   Patient remains stable for discharge from a podiatry standpoint.  Patient will follow up outpatient for post-operative care, instructions placed with discharge information.   Podiatry will follow peripherally while patient remains in house.   Continue local wound care of betadine, adaptic, DSD.  Wound care orders placed. Appreciate nursing assistance with dressing changes.  Per infectious disease, continue long-term IV antibiotics for 6 weeks after clearance of MSSA bacteremia.  Elevation on green foam wedges or pillows when non-ambulatory.  Rest of care per primary team.    Weight bearing status: Weightbearing as tolerated to left heel       SUBJECTIVE:     The patient was seen, evaluated, and assessed at bedside today. The patient was awake, alert, and in no acute distress. No acute events overnight. The patient reports he is in significant pain after his heart surgery yesterday. Patient denies N/V/F/chills/SOB/CP.      OBJECTIVE:     Vitals:   /92   Pulse 86   Temp  "(!) 97.3 °F (36.3 °C)   Resp 16   Ht 6' (1.829 m)   Wt 114 kg (251 lb)   SpO2 94%   BMI 34.04 kg/m²     Temp (24hrs), Av.8 °F (36.6 °C), Min:97.3 °F (36.3 °C), Max:98.1 °F (36.7 °C)      Physical Exam:     General:  Alert, cooperative, and in no distress.  Lower extremity exam:  Cardiovascular status at baseline.  Neurological status at baseline.  S/p left 4th ray amputation. No calf tenderness noted.     Left foot: S/p left partial 4th ray resection with DPC. Incision site stable with sutures intact and skin edges well aligned. Capillary refill to skin edges < 3 seconds. Skin temperature within normal limits. No evidence of dehiscence. No signs of active infection: no purulence, no malodor, no ascending erythema, no crepitus, no fluctuance.      Clinical Images 24:      Additional Data:     Labs:    Results from last 7 days   Lab Units 24  1031   WBC Thousand/uL 14.46*   HEMOGLOBIN g/dL 9.7*   HEMATOCRIT % 30.9*   PLATELETS Thousands/uL 378     Results from last 7 days   Lab Units 24  1031   POTASSIUM mmol/L 5.0   CHLORIDE mmol/L 100   CO2 mmol/L 29   BUN mg/dL 42*   CREATININE mg/dL 2.01*   CALCIUM mg/dL 7.7*     Results from last 7 days   Lab Units 24  1345   INR  1.15       * I Have Reviewed All Lab Data Listed Above.    Recent Cultures (last 7 days):     Results from last 7 days   Lab Units 24  0851   BLOOD CULTURE  No Growth After 5 Days.  No Growth After 5 Days.           Imaging: I have personally reviewed pertinent films in PACS  EKG, Pathology, and Other Studies: I have personally reviewed pertinent reports.      ** Please Note: Portions of the record may have been created with voice recognition software. Occasional wrong word or \"sound a like\" substitutions may have occurred due to the inherent limitations of voice recognition software. Read the chart carefully and recognize, using context, where substitutions have occurred. **      "

## 2024-02-22 NOTE — PROGRESS NOTES
Progress Note - Electrophysiology  Nadir Myers 63 y.o. male MRN: [id number]  Unit/Bed#: [facility] 519-01 Encounter: [id number]      Assessment:  Extraction of ICD's  - had Medtronic dual-chamber ICD placed 5/12/2023 for secondary prevention  - given bacteremia, underwent extraction of this device 2/9/2024  - underwent Medtronic EV ICD implant 2/20/2024 with frequent subs all lead dislodgment into the pleural space  - attempted revision 2/21/2024 abandoned given inability to sense with lead and complete extraction done instead  Persistent MSSA bacteremia  - source felt to be left foot fourth toe wet gangrene status post amputation 1/31  - despite IV cefazolin blood cultures remain positive leading to suspicion of ICD infection as well (GILL showing lead vegetation)  - repeat blood cultures immediately postprocedure 2/9 was still positive, then repeat cultures 2/12 and 2/16 negative  Chronic systolic congestive heart failure  Ischemic cardiomyopathy  - maintained on GDMT of metoprolol succinate only (off Entresto due to orthostatic hypotension), also on spironolactone and Jardiance  - EF of 45% per echo 2/21/2024 /had been as low as 10 to 15% in 5/2023 with recovery  History of CAD   - with  of LAD and HANNA to mid circumflex 2/2015  History of ventricular tachycardia, felt to be scar mediated  History of LV apical aneurysm  Essential hypertension  Hyperlipidemia  Diabetes mellitus    Plan:  Patient is being evaluated by electrophysiology and is recommended that he undergo LifeVest implantation.  Briefly, patient had prior transvenous ICD placed in May of last year but unfortunately has had bacteremia and this was therefore removed earlier this month.  Attempt was made for new EV ICD to be implanted but unfortunately there was lead dislodgment with this 1 as well and at this time, would like patient to heal and discuss reimplantation of likely a [employer] ICD implantation as an outpatient.    Patient was  advised that we would like him to wear LifeVest until he is seen back in the office in 4 to 6 weeks.  He is in agreement with this and will therefore start the paperwork for this.    Transvenous extraction site has healed well.  He has Aquacel bandages over top of the EV ICD implantation/extraction sites, he has a 2-week site check for those to ensure that they heal appropriately.      Subjective/Objective   Subjective: Patient is a pleasant 63-year-old male with past medical history as stated above, electrophysiology following him given bacteremia.    Briefly, patient has been followed by general cardiology in the outpatient setting for years.  More recently in May 2023, he had presented to his general cardiologist office and what was felt to be atrial fibrillation.  However, after he was sent to the emergency room and further evaluated, it was felt that he had actually had sustained scar mediated monomorphic ventricular tachycardia and was also diagnosed with apical aneurysm.  EF had also dropped to 10% and he was managed by the heart failure team.  He did decompensate and did require milrinone and IV diuresis.  From an arrhythmia standpoint, he was placed on amiodarone.  Eventually he was deemed euvolemic and underwent dual-chamber ICD implantation on 5/12/2023.  He was then discharged shortly afterwards and continue to follow with heart failure as an outpatient.  In the outpatient setting, it does appear that his ejection fraction had improved to 40%.  He did have an admission for which left lower extremity wound in January 2024, heart failure had seen him and optimized him from their standpoint.      He then unfortunately presented the following month, this month, with septic shock and left toe gangrene and Staph aureus bacteremia for which he did undergo extraction of his device 2/9/2023.  He tolerated the procedure well and electrophysiology did await clearance from an EP standpoint for reimplantation.   Reimplantation of EV ICD had been tentatively scheduled for 2/14 but as cultures had not cleared, this was held off until 2/20.  He underwent Medtronic EV ICD implantation but unfortunately the substernal lead had dislodged and was felt to be in the pleural space.  Revision was attempted the following day on 2/21 but unfortunately lead was unable to sense and good contact was not able to be obtained and therefore he underwent full extraction of the device.    He does report that he is in pain near the xiphoid area.    EKG:         Objective:  Vitals: /92   Pulse 86   Temp (!) 97.3 °F (36.3 °C)   Resp 16   Ht 6' (1.829 m)   Wt 114 kg (251 lb)   SpO2 94%   BMI 34.04 kg/m²     Vitals:    02/21/24 0947 02/22/24 0512   Weight: 113 kg (249 lb 1.9 oz) 114 kg (251 lb)     Orthostatic Blood Pressures      Flowsheet Row Most Recent Value   Blood Pressure 147/92 filed at 02/22/2024 1057   Patient Position - Orthostatic VS Lying filed at 02/22/2024 0238              Intake/Output Summary (Last 24 hours) at 2/22/2024 1536  Last data filed at 2/22/2024 1501  Gross per 24 hour   Intake 1945 ml   Output 3950 ml   Net -2005 ml       Invasive Devices       Peripherally Inserted Central Catheter Line  Duration             PICC Line 02/22/24 Right Basilic <1 day              Drain  Duration             Urethral Catheter Latex 16 Fr. 2 days                              Scheduled Meds:  Current Facility-Administered Medications   Medication Dose Route Frequency Provider Last Rate    acetaminophen  975 mg Oral Q6H PRN Yuniel Mcgee PA-C      amiodarone  200 mg Oral Daily With Breakfast Yuniel Mcgee PA-C      aspirin  81 mg Oral Daily Yuniel Mcgee PA-C      atorvastatin  80 mg Oral After Dinner Yuniel Mcgee PA-C      cefazolin  2,000 mg Intravenous Q6H Yuniel Mcgee PA-C Stopped (02/22/24 1212)    clopidogrel  75 mg Oral Daily Yuniel Mcgee PA-C      Empagliflozin  10 mg Oral  Daily Yuniel Mcgee PA-C      enoxaparin  40 mg Subcutaneous Q24H TEJAS Yuniel Mcgee PA-C      furosemide  40 mg Intravenous BID (diuretic) Yuniel Mcgee PA-C      insulin glargine  35 Units Subcutaneous HS Yuinel Mcgee PA-C      insulin lispro  1-5 Units Subcutaneous 4x Daily (AC & HS) Yuniel Mcgee PA-C      insulin lispro  7 Units Subcutaneous TID With Meals Yuniel Mcgee PA-C      isosorbide dinitrate  10 mg Oral TID after meals Nash Samuels DO      lidocaine  2 patch Topical Once Kip Cabello MD      metoprolol  5 mg Intravenous Q6H PRN Yuniel Mcgee PA-C      metoprolol succinate  25 mg Oral Q12H Yuniel Mcgee PA-C      oxyCODONE  5 mg Oral Q6H PRN Yuniel Mcgee PA-C      polyethylene glycol  17 g Oral Daily Yuniel Mcgee PA-C      saccharomyces boulardii  250 mg Oral BID Yuniel Mcgee PA-C      senna-docusate sodium  2 tablet Oral BID Yuniel Mcgee PA-C       Continuous Infusions:   PRN Meds:.  acetaminophen    metoprolol    oxyCODONE    Review of Systems:  ROS  ROS as noted above, otherwise 12 point review of systems was performed and is negative.     Physical Exam:   Physical Exam              Lab Results: I have personally reviewed pertinent lab results.    Results from last 7 days   Lab Units 02/22/24  1031 02/21/24  0443 02/20/24  0504   WBC Thousand/uL 14.46* 12.19* 11.08*   HEMOGLOBIN g/dL 9.7* 10.5* 10.0*   HEMATOCRIT % 30.9* 33.5* 32.6*   PLATELETS Thousands/uL 378 425* 423*     Results from last 7 days   Lab Units 02/22/24  1031 02/21/24  1100 02/21/24  0443 02/20/24  0504   POTASSIUM mmol/L 5.0 5.1 5.8* 4.8   CHLORIDE mmol/L 100  --  100 101   CO2 mmol/L 29  --  24 29   BUN mg/dL 42*  --  38* 29*   CREATININE mg/dL 2.01*  --  1.76* 1.52*   CALCIUM mg/dL 7.7*  --  8.4 8.3*     Results from last 7 days   Lab Units 02/19/24  1345   INR  1.15   PTT seconds 30     Results from last 7 days   Lab Units  24  0451   MAGNESIUM mg/dL 2.2       Imaging: I have personally reviewed pertinent reports.    Results for orders placed during the hospital encounter of 17    Echo complete with contrast if indicated    Narrative  [address] [id number] [phone]    Transthoracic Echocardiogram  2D, M-mode, Doppler, and Color Doppler    Study date:  26-Sep-2017    Patient: EAGLE REBOLLAR  MR number: [id number]  Account number: [id number]  : 1960  Age: 57 years  Gender: Male  Status: Outpatient  Location: [address] [place] [address]  Height: 72 in  Weight: 263.3 lb  BP: 142/ 88 mmHg    Indications: CAD    Diagnoses: I25.10 - Atherosclerotic heart disease of native coronary artery without angina pectoris    Sonographer:  OLIVE Benoit  Primary Physician:  Paty Ortiz MD  Referring Physician:  Eagle Chavez MD  Group:  [facility]  Interpreting Physician:  Torrey Duenas MD    SUMMARY    LEFT VENTRICLE:  Size was normal.  Systolic function was normal. Ejection fraction was estimated to be 65 %.  There was mild concentric hypertrophy.  Doppler parameters were consistent with abnormal left ventricular relaxation (grade 1 diastolic dysfunction).    RIGHT VENTRICLE:  The size was normal.  Systolic function was normal.    TRICUSPID VALVE:  There was trace regurgitation.    HISTORY: PRIOR HISTORY: Hypertension, CAD s/p PCI, smoker, diabetes, high cholesterol    PROCEDURE: The study was performed in the [address] [place] [address]. This was a routine study. The transthoracic approach was used. The study included complete 2D imaging, M-mode, complete spectral Doppler, and color Doppler. The  heart rate was 112 bpm, at the start of the study. Images were obtained from the parasternal, apical, subcostal, and suprasternal notch acoustic windows. Echocardiographic views were limited due to decreased  penetration. This was a  technically difficult study.    LEFT VENTRICLE: Size was normal. Systolic function was normal. Ejection fraction was estimated to be 65 %. There were no regional wall motion abnormalities. Wall thickness was mildly increased. There was mild concentric hypertrophy.  DOPPLER: Doppler parameters were consistent with abnormal left ventricular relaxation (grade 1 diastolic dysfunction).    RIGHT VENTRICLE: The size was normal. Systolic function was normal. Wall thickness was normal.    LEFT ATRIUM: Size was normal.    RIGHT ATRIUM: Size was normal.    MITRAL VALVE: Valve structure was normal. There was normal leaflet separation. DOPPLER: The transmitral velocity was within the normal range. There was no evidence for stenosis. There was no regurgitation.    AORTIC VALVE: The valve was trileaflet. Leaflets exhibited normal thickness and normal cuspal separation. DOPPLER: Transaortic velocity was within the normal range. There was no evidence for stenosis. There was no regurgitation.    TRICUSPID VALVE: The valve structure was normal. There was normal leaflet separation. DOPPLER: The transtricuspid velocity was within the normal range. There was no evidence for stenosis. There was trace regurgitation. The tricuspid jet  envelope definition was inadequate for estimation of RV systolic pressure. There are no indirect findings suggestive of moderate or severe pulmonary hypertension.    PULMONIC VALVE: Leaflets exhibited normal thickness, no calcification, and normal cuspal separation. DOPPLER: The transpulmonic velocity was within the normal range. There was no regurgitation.    PERICARDIUM: There was no pericardial effusion. The pericardium was normal in appearance.    AORTA: The root exhibited normal size.    SYSTEMIC VEINS: IVC: The inferior vena cava was normal in size and course. Respirophasic changes were normal.    SYSTEM MEASUREMENT TABLES    2D  %FS: 47.6 %  AV Diam: 3.34 cm  EDV(Teich):  101.71 ml  EF Biplane: 67.76 %  EF(Cube): 85.61 %  EF(Teich): 79 %  ESV(Cube): 14.82 ml  ESV(Teich): 21.36 ml  IVSd: 1.58 cm  LA Area: 12.78 cm2  LA Diam: 3.37 cm  LVEDV MOD A2C: 49.73 ml  LVEDV MOD A4C: 61.78 ml  LVEDV MOD BP: 58.1 ml  LVEF MOD A2C: 65.64 %  LVEF MOD A4C: 71.2 %  LVESV MOD A2C: 17.09 ml  LVESV MOD A4C: 17.79 ml  LVESV MOD BP: 18.73 ml  LVIDd: 4.69 cm  LVIDs: 2.46 cm  LVLd A2C: 6.97 cm  LVLd A4C: 7.74 cm  LVLs A2C: 5.23 cm  LVLs A4C: 6.13 cm  LVPWd: 1.23 cm  RA Area: 14.52 cm2  RV Diam.: 2.86 cm  SI(Cube): 36.73 ml/m2  SI(Teich): 33.48 ml/m2  SV MOD A2C: 32.64 ml  SV MOD A4C: 43.98 ml  SV(Cube): 88.15 ml  SV(Teich): 80.35 ml    MM  TAPSE: 3.26 cm    PW  E': 0.08 m/s    [employer] [employer] Accredited Echocardiography Laboratory    Prepared and electronically signed by    Torrey Duenas MD  Signed 26-Sep-2017 12:55:39      VTE Pharmacologic Prophylaxis: Sequential compression device (Venodyne)   VTE Mechanical Prophylaxis: sequential compression device

## 2024-02-22 NOTE — PROGRESS NOTES
Heart Failure/ Pulmonary Hypertension Progress Note - Nadir Myers 63 y.o. male MRN: [id number]    Unit/Bed#: [facility] 519-01 Encounter: [id number]      Assessment:    Principal Problem:    Septic shock ([facility])  Active Problems:    Chronic systolic CHF ([facility])    Type 2 diabetes mellitus, with long-term current use of insulin ([facility])    V-tach ([facility])    Hyponatremia    Coronary artery disease involving native coronary artery of native heart without angina pectoris    PAD (peripheral artery disease) ([facility])    LUE weakness    MSSA bacteremia    Gangrene of toe of left foot ([facility])    Electrolyte abnormalities    Abnormal CT of the chest    LINDEN (acute kidney injury) ([facility])    Constipation    ICD (implantable cardioverter-defibrillator) in place    Interim:  Ins/outs: 740/2250: -1510  Weight: 248 lbs  Tele:  MAPs: 110 mmHg  K 5.0  Cr 2.01 ( baseline around 1.5)    Lead placement on EV ICD failed     # Chronic HFimpEF, Stage C, w/ decompensation due to medication hold  Impression: iCM with large apical aneurysm with progression of CAD/late presenting MI with occluded mid LAD and LPDA. LV mildly dilated. Started on milrinone 0.25mcg/kg/min 5/6/23 due to rising creatinine and worsening volume status, stopped 5/10/23. ,       Weight: 205 lbs on 6/5, 218 lbs today  BNP 2/20/24: 1809  6/2/23: 279     Studies- personally reviewed by me  EKG- narrow QRS with bigeminy; inferior infarct, anterolateral infarct     Echo 2/1/24:  LVEF: 40%, akinetic segments  LVEDd: 5.1 cm  RV: normal     Echo 8/7/23:  LVEF: 40%  LVEDd: 4.1 cm  LV apex aneurysmal, no thrombus  RV: normal     LHC 5/3/23: chronically occluded mid LAD and LPDA c/w echo findings of apical aneurysm, not amenable to PCI  Patent mid LCx stent   LVEDP 30 mmHg     Echocardiogram 5/2/23  LVEF:  10-15%, apical aneurysm  LVIDd: 5.6 cm  RV: normal  MR: mild  PASP: 49 mmHg     Echo 9/26/17:  LVEF: 65%     Neurohormonal Blockade:  --Beta-Blocker: metoprolol tartrate 25 mg BID  --ACEi, ARB  ·  Continue Norvasc 5 mg, Lasix and metoprolol or ARNi: Entresto stopped due to hypotension    (or SVR reduction)   --Aldosterone Receptor Blocker: spironolactone 12.5 mg daily- onhold due to hyperkalemia  --SGLT2-I: Jardiance 10 mg daily  --Diuretic: stopped  Inpt: lasix 40 mg IV BID     Sudden Cardiac Death Risk Reduction:  MDT ICD 5/12/23 for secondary prevention  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 Moderately reduced RV function and mildly dilated LV concerning for LVAD. Mildly dilated LV also concerning given VT.   Has not smoked since May 2     # Staph bacteremia  GILL 2/7/24: There is a 1.8 x 1.1 cm mobile mass affixed to the cardiac device lead as is passes through the right atrium. In the setting of bacteremia this is most likely represents infection.   # New Q wave MI, later presenting or silent LAD territory infarction  # hyperlipidemia-atorvastatin 80 mg  9/16/23: LDL 51, HDL 68  # current tobacco use- quit since hospital  # Ventricular tachycardia- scar mediated  Rx: amiodarone 200 mg daily  # CKD, Cr improved to 1.05 on 7/3/23 labs, Cr 1.76 today  # hyponatremia- due to HF, , now up to 137 on 7/3/23  # DM2, HgA1C 7.1% on 9/16/23     TODAY'S PLAN:  Continue  lasix 40 mg IV BID  Holding on spironolactone due to hyperkalemia  Restarted Empagliflozin 10 mg daily  Continue metoprolol  BP up but volume up, will see where he levels out, may be able to add ARNI or ARB later- cr too high to start today  Will temporarily add isordil 10 mg TID  Will need S- ICD as outpt  Home on [employer]   --2g sodium diet  - Daily weights    Review of Systems     Central Line (day, reason):  Escobar catheter (day, reason):    Vitals: Blood pressure 147/92, pulse 86, temperature (!)  97.3 °F (36.3 °C), resp. rate 16, height 6' (1.829 m), weight 114 kg (251 lb), SpO2 94%., Body mass index is 34.04 kg/m²., I/O last 3 completed shifts:  In: 1718.3 [P.O.:630; I.V.:938.3; IV Piggyback:150]  Out: 3425 [Urine:3425]  I/O this shift:  In: 1205 [P.O.:1020; IV Piggyback:185]  Out: 1500 [Urine:1500]  Wt Readings from Last 3 Encounters:   02/22/24 114 kg (251 lb)   01/20/24 101 kg (223 lb 12.3 oz)   11/13/23 101 kg (222 lb)       Intake/Output Summary (Last 24 hours) at 2/22/2024 1321  Last data filed at 2/22/2024 1212  Gross per 24 hour   Intake 1945 ml   Output 3150 ml   Net -1205 ml     I/O last 3 completed shifts:  In: 1718.3 [P.O.:630; I.V.:938.3; IV Piggyback:150]  Out: 3425 [Urine:3425]    No significant arrhythmias seen on telemetry review.       Physical Exam:  Vitals:    02/22/24 0238 02/22/24 0512 02/22/24 0655 02/22/24 1057   BP: 139/92  143/91 147/92   BP Location: Right arm      Pulse: 82  82 86   Resp: 18  16    Temp: 97.8 °F (36.6 °C)  97.7 °F (36.5 °C) (!) 97.3 °F (36.3 °C)   TempSrc: Oral      SpO2: 95%  96% 94%   Weight:  114 kg (251 lb)     Height:           GEN: Nadir Myers appears well, alert and oriented x 3, pleasant and cooperative   HEENT: pupils equal, round, and reactive to light; extraocular muscles intact  NECK: supple, no carotid bruits   HEART: regular rhythm, normal S1 and S2, no murmurs, clicks, gallops or rubs, JVP is    LUNGS: clear to auscultation bilaterally; no wheezes, rales, or rhonchi   ABDOMEN: normal bowel sounds, soft, no tenderness, no distention  EXTREMITIES: peripheral pulses normal; no clubbing, cyanosis, or edema  NEURO: no focal findings   SKIN: normal without suspicious lesions on exposed skin      Current Facility-Administered Medications:     acetaminophen (TYLENOL) tablet 975 mg, 975 mg, Oral, Q6H PRN, Yuniel Mcgee PA-C, 975 mg at 02/21/24 0009    amiodarone tablet 200 mg, 200 mg, Oral, Daily With Breakfast, Yuniel Mcgee PA-C, 200  mg at 02/22/24 0812    aspirin chewable tablet 81 mg, 81 mg, Oral, Daily, Yuniel Mcgee PA-C, 81 mg at 02/22/24 0812    atorvastatin (LIPITOR) tablet 80 mg, 80 mg, Oral, After Dinner, Yuniel Mcgee PA-C, 80 mg at 02/21/24 1847    ceFAZolin (ANCEF) IVPB (premix in dextrose) 2,000 mg 50 mL, 2,000 mg, Intravenous, Q6H, Yuniel Mcgee PA-C, Stopped at 02/22/24 1212    clopidogrel (PLAVIX) tablet 75 mg, 75 mg, Oral, Daily, Yuniel Mcgee PA-C, 75 mg at 02/22/24 0812    Empagliflozin (JARDIANCE) tablet 10 mg, 10 mg, Oral, Daily, Yuniel Mcgee PA-C, 10 mg at 02/22/24 0813    enoxaparin (LOVENOX) subcutaneous injection 40 mg, 40 mg, Subcutaneous, Q24H TEJAS, Yuniel Mcgee PA-C, 40 mg at 02/22/24 0812    furosemide (LASIX) injection 40 mg, 40 mg, Intravenous, BID (diuretic), Yuniel Mcgee PA-C, 40 mg at 02/22/24 0812    insulin glargine (LANTUS) subcutaneous injection 35 Units 0.35 mL, 35 Units, Subcutaneous, HS, Yuniel Mcgee PA-C, 35 Units at 02/21/24 2128    insulin lispro (HumaLOG) 100 units/mL subcutaneous injection 1-5 Units, 1-5 Units, Subcutaneous, 4x Daily (AC & HS), 1 Units at 02/22/24 1142 **AND** Fingerstick Glucose (POCT), , , 4x Daily AC and at bedtime, Yuniel Mcgee PA-C    insulin lispro (HumaLOG) 100 units/mL subcutaneous injection 7 Units, 7 Units, Subcutaneous, TID With Meals, Yuniel Mcgee PA-C, 7 Units at 02/22/24 1143    lidocaine (LIDODERM) 5 % patch 2 patch, 2 patch, Topical, Once, Kip Cabello MD, 2 patch at 02/22/24 0837    metoprolol (LOPRESSOR) injection 5 mg, 5 mg, Intravenous, Q6H PRN, Yuniel Mcgee PA-C, 5 mg at 02/08/24 1709    metoprolol succinate (TOPROL-XL) 24 hr tablet 25 mg, 25 mg, Oral, Q12H, Yuniel Mcgee PA-C, 25 mg at 02/22/24 0525    oxyCODONE (ROXICODONE) IR tablet 5 mg, 5 mg, Oral, Q6H PRN, Yuniel Mcgee PA-C, 5 mg at 02/22/24 1241    polyethylene glycol (MIRALAX) packet 17 g, 17 g,  Oral, Daily, Yuniel Mcgee PA-C, 17 g at 02/19/24 0934    saccharomyces boulardii (FLORASTOR) capsule 250 mg, 250 mg, Oral, BID, Yuniel Mcgee PA-C, 250 mg at 02/22/24 0812    senna-docusate sodium (SENOKOT S) 8.6-50 mg per tablet 2 tablet, 2 tablet, Oral, BID, Yuniel Mcgee PA-C, 2 tablet at 02/22/24 0812      Labs & Results:        Results from last 7 days   Lab Units 02/22/24  1031 02/21/24  0443 02/20/24  0504   WBC Thousand/uL 14.46* 12.19* 11.08*   HEMOGLOBIN g/dL 9.7* 10.5* 10.0*   HEMATOCRIT % 30.9* 33.5* 32.6*   PLATELETS Thousands/uL 378 425* 423*         Results from last 7 days   Lab Units 02/22/24  1031 02/21/24  1100 02/21/24  0443 02/20/24  0504   POTASSIUM mmol/L 5.0 5.1 5.8* 4.8   CHLORIDE mmol/L 100  --  100 101   CO2 mmol/L 29  --  24 29   BUN mg/dL 42*  --  38* 29*   CREATININE mg/dL 2.01*  --  1.76* 1.52*   CALCIUM mg/dL 7.7*  --  8.4 8.3*     Results from last 7 days   Lab Units 02/19/24  1345   INR  1.15         Counseling / Coordination of Care  Total floor / unit time spent today 25 minutes.  Greater than 50% of total time was spent with the patient and / or family counseling and / or coordination of care.  A description of the counseling / coordination of care: 15.    15  Thank you for the opportunity to participate in the care of this patient.  TOMMIE HERNANDEZ D.O.  DIRECTOR OF HEART FAILURE/ PULMONARY HYPERTENSION  MEDICAL DIRECTOR OF LVAD PROGRAM  [facility]

## 2024-02-22 NOTE — PHYSICAL THERAPY NOTE
PHYSICAL THERAPY NOTE          Patient Name: Nadir Myers  Today's Date: 2/22/2024 02/22/24 1052   PT Last Visit   PT Visit Date 02/22/24   Note Type   Note Type Treatment   Pain Assessment   Pain Assessment Tool 0-10   Pain Score 9   Pain Location/Orientation Location: Incision;Location: Chest   Pain Onset/Description Onset: Ongoing;Frequency: Constant/Continuous;Descriptor: Discomfort   Effect of Pain on Daily Activities limits comfort and mobility   Patient's Stated Pain Goal No pain   Hospital Pain Intervention(s) Repositioned;Ambulation/increased activity;Emotional support   Restrictions/Precautions   Weight Bearing Precautions Per Order Yes   LLE Weight Bearing Per Order WBAT  (to heel)   Braces or Orthoses Other (Comment)  (LLE toe off loading shoe, sx shoe RLE)   Other Precautions Fall Risk;WBS;O2;Telemetry;Multiple lines;Pain   General   Chart Reviewed Yes   Family/Caregiver Present No   Cognition   Overall Cognitive Status WFL   Arousal/Participation Alert;Cooperative   Attention Within functional limits   Orientation Level Oriented X4   Memory Within functional limits   Following Commands Follows all commands and directions without difficulty   Comments Patient in pain, but cooperative with PT.   Subjective   Subjective Patient agreeable to PT tx   Bed Mobility   Rolling R 3  Moderate assistance   Additional items Assist x 1;Increased time required;Verbal cues   Rolling L 3  Moderate assistance   Additional items Assist x 1;Increased time required;Verbal cues   Supine to Sit 2  Maximal assistance   Additional items Assist x 1;Increased time required;Verbal cues;LE management;HOB elevated;Bedrails   Sit to Supine 3  Moderate assistance   Additional items Assist x 1;HOB elevated;Increased time required;Verbal cues   Additional Comments EOB sitting with fair balance - increased pain noted in sitting   Transfers    Additional Comments unable to transfer/ambulate at this time d/t pain   Balance   Static Sitting Fair +   Dynamic Sitting Fair   Endurance Deficit   Endurance Deficit Yes   Endurance Deficit Description fatigue, weakness, pain   Activity Tolerance   Activity Tolerance Patient limited by fatigue;Patient limited by pain   Medical Staff Made Aware PCA   Nurse Made Aware RN cleared   Exercises   Balance training  patient sits EOB 3-5 mins with fair balance - requiring UE support to maintain balance. limited by pain from further sitting/mobility   Assessment   Prognosis Good   Problem List Decreased strength;Decreased endurance;Impaired balance;Decreased mobility;Pain   Assessment Patient received in bed. Patient agreeable to therapy. Patient performs rolling with moderate assistance x1, to R and L. Patient also performs supine > sit with Max Ax1. Patient performs static/dynamic sitting balance for improved strength core musculature and improve upright stability. See flowsheet for balance activities performed. Unable to transfer or ambulate at this time d/t high pain level.  Patient returns to supine positioning with Mod Ax1. Patient left in bed with all needs met and call bell/personal items within reach. The patient's AM-PAC Basic Mobility Inpatient Short Form Raw Score is 12 showing further need for skilled PT services in order to improve functional mobility, decrease need for assistance, and return to PLOF. PT is anticipating Level 3 - Minimum Resource Intensity on d/c from hospital.  Will continue to follow as able.   Barriers to Discharge None   Goals   Patient Goals to not have pain   PT Treatment Day 8   Plan   Treatment/Interventions Functional transfer training;LE strengthening/ROM;Elevations;Therapeutic exercise;Endurance training;Patient/family training;Equipment eval/education;Bed mobility;Gait training;Spoke to case management;Spoke to nursing;OT   Progress Slow progress, decreased activity tolerance   PT  Frequency 3-5x/wk   Discharge Recommendation   Rehab Resource Intensity Level, PT III (Minimum Resource Intensity)  (pending progress)   Equipment Recommended Walker   AM-PAC Basic Mobility Inpatient   Turning in Flat Bed Without Bedrails 2   Lying on Back to Sitting on Edge of Flat Bed Without Bedrails 2   Moving Bed to Chair 2   Standing Up From Chair Using Arms 2   Walk in Room 2   Climb 3-5 Stairs With Railing 2   Basic Mobility Inpatient Raw Score 12   Basic Mobility Standardized Score 32.23   Highest Level Of Mobility   JH-HLM Goal 4: Move to chair/commode   JH-HLM Achieved 3: Sit at edge of bed   End of Consult   Patient Position at End of Consult All needs within reach;Supine     Alina Braun, PT, DPT

## 2024-02-22 NOTE — ASSESSMENT & PLAN NOTE
Appreciate podiatry input -> status post left fourth toe amputation on 1/31  [place] to be surgical cure per podiatry  Wound care  Antibiotics per ID

## 2024-02-22 NOTE — PROGRESS NOTES
[facility]  Progress Note  Name: Nadir Myers I  MRN: [id number]  Unit/Bed#: [facility] 519-01 I Date of Admission: 1/31/2024   Date of Service: 2/22/2024 I Hospital Day: 22    Assessment/Plan   Abnormal CT of the chest  Assessment & Plan  CT 2/7 shows small consolidation in the upper lobes and left lower lobe with enhancing lesion in the peripheral of right lower lobe, may be embolic versus hematogenous seeding from high-grade bacteremia.  there is no evident of pulmonary embolism to suggest that this is an infarct.   Repeat CT 2/15 Development of large infiltrate in the left lower lobe, and a few new peripherally oriented scattered densities. No cavitations but peripheral location is worrisome for septic emboli.  CT chest without contrast  2/21: Peripheral left lower lobe opacity with reverse halo sign appearance. This can be seen in the setting of pulmonary infarct. Per discussion with radiology, PE study recommended.  Doppler US is negative for DVT  VQ Scan is intermediate for PE  TTE 2/21 with normal right ventricle function  Unable to obtain PE study to confirm/rule out true PE given LINDEN. Suspicion remains higher for septic embolism for which patient is currently on IV abx. Given hemodynamic stability with only 1L NC oxygen requirement due to volume overload, would hold off on AC for now.     Pleuritic chest pain  Assessment & Plan  Pleuritic pain in left lower chest wall since 2/20. Reproducible. Suspect secondary to ICD manipulation.   EKG with no acute ischemic changes.  Voltaren gel/lidocaine patch/prn pain meds.    ICD (implantable cardioverter-defibrillator) in place  Assessment & Plan  Patient had extraction of ICD then underwent AV ICD placement on 2/20  Had lead dislodgment leading to repeat procedure on 2/21  During the procedure patient's lead had poor sensing and therefore could not be positioned under the sternum and pocket was closed.  Multiple location under  "the sternum where attempted.   Per EP: Will plan for LifeVest on discharge and then discuss S-ICDs     Constipation  Assessment & Plan  Received Senokot, MiraLAX and prune juice 2/17 and had a BM   continue bowel regimen. Increase senekot     LINDEN (acute kidney injury) (formerly [facility])  Assessment & Plan  Likely ATN  Monitor with diuersis  Hold jardiance  Nephrology consult     Electrolyte abnormalities  Assessment & Plan  Monitor/replete serum potassium/magnesium/phosphate as necessary    Gangrene of toe of left foot (formerly [facility])  Assessment & Plan  Appreciate podiatry input -> status post left fourth toe amputation on 1/31  [place] to be surgical cure per podiatry  Wound care  Antibiotics per ID    MSSA bacteremia  Assessment & Plan  Persistent MSSA bacteremia most likely source foot wound, complicated with ICD infection  Blood cultures from 1/31-2/9  positive despite being on Ancef suspect due to ICD infection  GILL with evidence of mobile mass on ICD lead in the right atrium, concerning for infection, no valvular vegetation  S/p ICD removal with EP on 2/8  CT chest 2/7 and 2/15 with possible septic pulmonary emboli  Repeat Blood cultures from 2/12 negative, repeat blood cultures 2/16 negative at 72 hours  Continue IV Ancef, with plan to treat 6 weeks until 3/25/2024   PICC placed 2/22    LUE weakness  Assessment & Plan  Presented w/ transient left-sided weakness and a facial droop now resolved  Appreciate neurology input deeming symptoms likely secondary to septic shock/infection and hyperglycemia, as neurologic workup including CT/MRI imaging negative for evidence of stroke or intracranial vessel occlusion, but noted chronic microangiopathic changes -> radiology report did however mention: \"Moderate bilateral supraclinoid ICA and moderate to severe left cavernous ICA segment stenosis.\"  Continue dual endplate therapy with ASA/Plavix - c/w statin  PT/OT as tolerated    PAD (peripheral artery disease) (formerly [facility])  Assessment & Plan  Continue " dual antiplatelet therapy with ASA/Plavix - continue statin      Coronary artery disease involving native coronary artery of native heart without angina pectoris  Assessment & Plan  History of coronary artery disease status post HANNA to left circumflex 2015, repeat cath 2023 with  of mid LAD and L PDA.  Ischemic cardiomyopathy  Continue aspirin/Plavix/statin/metoprolol    Chronic systolic CHF (HCC)  Assessment & Plan  Diuretics resumed per HF team.     Hyponatremia  Assessment & Plan  Mild in setting of LINDEN  Monitor     V-tach ([facility])  Assessment & Plan  History of monomorphic VT status post secondary prevention with [employer] dual-chamber ICD 5/2023  Status post ICD extraction 2/8/2024  Lifevest on dc    Type 2 diabetes mellitus, with long-term current use of insulin ([facility])  Assessment & Plan  Lab Results   Component Value Date    HGBA1C 9.8 (H) 01/31/2024     Continue basal/prandial insulin. BG goal 140-180  Hypoglycemia protocol  Carbohydrate restriction  Continue to adjust    * Septic shock (HCC)  Assessment & Plan  Initially admitted to the ICU requiring vasopressor support, now off, with maintenance of hemodynamic stability  Due to MSSA bacteremia from a gangrenous left fourth toe (see below) complicated by ICD infection  Continue to monitor vitals and maintain hemodynamics including temperature curve  See additional management below  Shock resolved             VTE Pharmacologic Prophylaxis:   Moderate Risk (Score 3-4) - Pharmacological DVT Prophylaxis Ordered: heparin.    Mobility:   Basic Mobility Inpatient Raw Score: 12  -HLM Goal: 4: Move to chair/commode  JH-HLM Achieved: 3: Sit at edge of bed  HLM Goal NOT achieved. Continue with multidisciplinary rounding and encourage appropriate mobility to improve upon HLM goals.    Patient Centered Rounds: I performed bedside rounds with nursing staff today.   Discussions with Specialists or Other Care Team Provider: EP, nephrolopgy     Education and Discussions  with Family / Patient: Updated  (daughter) via phone.    Total Time Spent on Date of Encounter in care of patient: 50 mins. This time was spent on one or more of the following: performing physical exam; counseling and coordination of care; obtaining or reviewing history; documenting in the medical record; reviewing/ordering tests, medications or procedures; communicating with other healthcare professionals and discussing with patient's family/caregivers.    Current Length of Stay: 22 day(s)  Current Patient Status: Inpatient   Certification Statement: The patient will continue to require additional inpatient hospital stay due to above  Discharge Plan: Anticipate discharge in >72 hrs to discharge location to be determined pending rehab evaluations.    Code Status: Level 1 - Full Code    Subjective:   Persistent pleuritic chest pain improves with pain medications, reproducible. Breathing stable on 1L NC    Objective:     Vitals:   Temp (24hrs), Av.8 °F (36.6 °C), Min:97.3 °F (36.3 °C), Max:98.7 °F (37.1 °C)    Temp:  [97.3 °F (36.3 °C)-98.7 °F (37.1 °C)] 98.7 °F (37.1 °C)  HR:  [73-94] 94  Resp:  [16-19] 16  BP: (103-165)/(66-98) 165/98  SpO2:  [91 %-97 %] 95 %  Body mass index is 34.04 kg/m².     Input and Output Summary (last 24 hours):     Intake/Output Summary (Last 24 hours) at 2024 1833  Last data filed at 2024 1501  Gross per 24 hour   Intake 1545 ml   Output 2950 ml   Net -1405 ml       Physical Exam:   Physical Exam  Vitals and nursing note reviewed.   Constitutional:       General: He is not in acute distress.     Appearance: He is well-developed. He is ill-appearing.   HENT:      Head: Normocephalic and atraumatic.   Eyes:      Conjunctiva/sclera: Conjunctivae normal.   Cardiovascular:      Rate and Rhythm: Normal rate and regular rhythm.      Heart sounds: No murmur heard.  Pulmonary:      Effort: Pulmonary effort is normal. No respiratory distress.      Breath sounds: Normal  breath sounds.   Chest:      Chest wall: Tenderness present.   Abdominal:      Palpations: Abdomen is soft.      Tenderness: There is no abdominal tenderness.   Musculoskeletal:         General: No swelling.      Cervical back: Neck supple.   Skin:     General: Skin is warm and dry.      Capillary Refill: Capillary refill takes less than 2 seconds.   Neurological:      Mental Status: He is alert.   Psychiatric:         Mood and Affect: Mood normal.          Additional Data:     Labs:  Results from last 7 days   Lab Units 02/22/24  1031   WBC Thousand/uL 14.46*   HEMOGLOBIN g/dL 9.7*   HEMATOCRIT % 30.9*   PLATELETS Thousands/uL 378     Results from last 7 days   Lab Units 02/22/24  1031   SODIUM mmol/L 133*   POTASSIUM mmol/L 5.0   CHLORIDE mmol/L 100   CO2 mmol/L 29   BUN mg/dL 42*   CREATININE mg/dL 2.01*   ANION GAP mmol/L 4   CALCIUM mg/dL 7.7*   GLUCOSE RANDOM mg/dL 171*     Results from last 7 days   Lab Units 02/19/24  1345   INR  1.15     Results from last 7 days   Lab Units 02/22/24  1538 02/22/24  1105 02/22/24  0622 02/21/24  2048 02/21/24  1741 02/21/24  1132 02/21/24  0652 02/20/24  2035 02/20/24  1525 02/20/24  0724 02/19/24  2038 02/19/24  1555   POC GLUCOSE mg/dl 173* 160* 100 199* 82 116 146* 206* 108 96 198* 180*               Lines/Drains:  Invasive Devices       Peripherally Inserted Central Catheter Line  Duration             PICC Line 02/22/24 Right Basilic <1 day              Drain  Duration             Urethral Catheter Latex 16 Fr. 2 days                  Urinary Catheter:  Goal for removal: Voiding trial when ambulation improves         Central Line:  Goal for removal: Will discontinue when meds requiring line are completed.         Telemetry:  Telemetry Orders (From admission, onward)               24 Hour Telemetry Monitoring  Continuous x 24 Hours (Telem)        Question:  Reason for 24 Hour Telemetry  Answer:  PCI/EP study (including pacer and ICD implementation), Cardiac surgery, MI,  abnormal cardiac cath, and chest pain- rule out MI                                Imaging: Reviewed radiology reports from this admission including: procedure reports    Recent Cultures (last 7 days):   Results from last 7 days   Lab Units 02/16/24  0851   BLOOD CULTURE  No Growth After 5 Days.  No Growth After 5 Days.       Last 24 Hours Medication List:   Current Facility-Administered Medications   Medication Dose Route Frequency Provider Last Rate    acetaminophen  975 mg Oral Q6H PRN Yuniel Mcgee PA-C      ALPRAZolam  0.5 mg Oral HS PRN Kip Cabello MD      amiodarone  200 mg Oral Daily With Breakfast Yuniel Mcgee PA-C      aspirin  81 mg Oral Daily Yuniel Mcgee PA-C      atorvastatin  80 mg Oral After Dinner Yuniel Mcgee PA-C      cefazolin  2,000 mg Intravenous Q6H Yuniel Mcgee PA-C 2,000 mg (02/22/24 1758)    clopidogrel  75 mg Oral Daily Yuniel Mcgee PA-C      Diclofenac Sodium  2 g Topical 4x Daily Kip Cabello MD      enoxaparin  40 mg Subcutaneous Q24H TEJSA Yuniel Mcgee PA-C      furosemide  40 mg Intravenous BID (diuretic) Yuniel Mcgee PA-C      HYDROmorphone  0.5 mg Intravenous Q6H PRN Kip Cabello MD      insulin glargine  35 Units Subcutaneous HS Yuniel Mcgee PA-C      insulin lispro  1-5 Units Subcutaneous 4x Daily (AC & HS) Yuniel Mcgee PA-C      insulin lispro  7 Units Subcutaneous TID With Meals Yuniel Mcgee PA-C      isosorbide dinitrate  10 mg Oral TID after meals Nash Samuels DO      lidocaine  2 patch Topical Once Kip Cabello MD      melatonin  3 mg Oral HS Kip Cabello MD      metoprolol  5 mg Intravenous Q6H PRN Yuniel Mcgee PA-C      metoprolol succinate  25 mg Oral Q12H Yuniel Mcgee PA-C      oxyCODONE  5 mg Oral Q6H PRN Yuniel Mcgee PA-C      polyethylene glycol  17 g Oral Daily Yuniel Mcgee PA-C      saccharomyces boulardii  250 mg Oral BID  Yuniel Mcgee PA-C      senna-docusate sodium  2 tablet Oral BID Yuniel Mcgee PA-C          Today, Patient Was Seen By: Kip Cabello MD    **Please Note: This note may have been constructed using a voice recognition system.**

## 2024-02-22 NOTE — PROGRESS NOTES
Patient admitted to [facility]; Advanced Heart Failure Census.     Outpatient Advanced Heart Failure LCSW completed electronic chart review and rounded with the HF Team.  HF Team discussed Today's Plan with patient.     Referral for outpatient social work not entered at this time.   Please enter referral if outpatient resources are needed in the future.

## 2024-02-22 NOTE — DISCHARGE INSTRUCTIONS
Incision/Wound Care:    Shower daily. Gently wash your incision with warm water and mild soap. Do not scrub the area. Gently pat the area dry with a clean towel. If you have a bandage, dry the area and put on a new, clean bandage. Change your bandage at least daily, or if it gets wet or dirty. Always wash your hands before you care for your wound. Do not apply ointments, creams, lotions, powders, etc. If you develop signs of an infection (fever > 101, redness, warm skin, or drainage) please call your surgeon's office.     Do not pick at or touch puncture sites or incisions. Allow and scabs to come off on their own.     As your incision heals, sometimes small tender lumps or pimple-like bumps develop which is due to your body attempting to “dissolve” the suture (stiches).

## 2024-02-22 NOTE — ASSESSMENT & PLAN NOTE
CT 2/7 shows small consolidation in the upper lobes and left lower lobe with enhancing lesion in the peripheral of right lower lobe, may be embolic versus hematogenous seeding from high-grade bacteremia.  there is no evident of pulmonary embolism to suggest that this is an infarct.   Repeat CT 2/15 revealed possible septic emboli, gas and fluid subcutaneous collection in the pacemaker bed likely postsurgical, reactive mild mediastinal lymphadenopathy.  CT chest without contrast  2/21: Peripheral left lower lobe opacity with reverse halo sign appearance. This can be seen in the setting of pulmonary infarct   Doppler US is negative for DVT  VQ Scan is intermediate for PE  Mixed data. Unable to obtain PE study to confirm/rule out given LINDEN. Suspicion remains higher for septic embolism. Given hemodynamic stability with only 1L NC oxygen requirement due to volume overload, would hold off on AC for now.

## 2024-02-22 NOTE — PLAN OF CARE
Problem: PHYSICAL THERAPY ADULT  Goal: Performs mobility at highest level of function for planned discharge setting.  See evaluation for individualized goals.  Description: Treatment/Interventions: Functional transfer training, LE strengthening/ROM, Elevations, Therapeutic exercise, Endurance training, Patient/family training, Equipment eval/education, Bed mobility, Spoke to nursing, Gait training, OT, Spoke to case management  Equipment Recommended: Wheelchair, Walker       See flowsheet documentation for full assessment, interventions and recommendations.  Outcome: Progressing  Note: Prognosis: Good  Problem List: Decreased strength, Decreased endurance, Impaired balance, Decreased mobility, Pain  Assessment: Patient received in bed. Patient agreeable to therapy. Patient performs rolling with moderate assistance x1, to R and L. Patient also performs supine > sit with Max Ax1. Patient performs static/dynamic sitting balance for improved strength core musculature and improve upright stability. See flowsheet for balance activities performed. Unable to transfer or ambulate at this time d/t high pain level.  Patient returns to supine positioning with Mod Ax1. Patient left in bed with all needs met and call bell/personal items within reach. The patient's AM-PAC Basic Mobility Inpatient Short Form Raw Score is 12 showing further need for skilled PT services in order to improve functional mobility, decrease need for assistance, and return to PLOF. PT is anticipating Level 3 - Minimum Resource Intensity on d/c from hospital.  Will continue to follow as able.  Barriers to Discharge: None     Rehab Resource Intensity Level, PT: III (Minimum Resource Intensity) (pending progress)    See flowsheet documentation for full assessment.

## 2024-02-23 LAB
ALBUMIN SERPL BCP-MCNC: 2.4 G/DL (ref 3.5–5)
ALP SERPL-CCNC: 46 U/L (ref 34–104)
ALT SERPL W P-5'-P-CCNC: <3 U/L (ref 7–52)
ANION GAP SERPL CALCULATED.3IONS-SCNC: 6 MMOL/L
AST SERPL W P-5'-P-CCNC: 11 U/L (ref 13–39)
BASOPHILS # BLD AUTO: 0.03 THOUSANDS/ÂΜL (ref 0–0.1)
BASOPHILS NFR BLD AUTO: 0 % (ref 0–1)
BILIRUB DIRECT SERPL-MCNC: 0.16 MG/DL (ref 0–0.2)
BILIRUB SERPL-MCNC: 0.25 MG/DL (ref 0.2–1)
BUN SERPL-MCNC: 41 MG/DL (ref 5–25)
CALCIUM SERPL-MCNC: 7.9 MG/DL (ref 8.4–10.2)
CHLORIDE SERPL-SCNC: 98 MMOL/L (ref 96–108)
CO2 SERPL-SCNC: 31 MMOL/L (ref 21–32)
CREAT SERPL-MCNC: 2.02 MG/DL (ref 0.6–1.3)
EOSINOPHIL # BLD AUTO: 0.06 THOUSAND/ÂΜL (ref 0–0.61)
EOSINOPHIL NFR BLD AUTO: 1 % (ref 0–6)
ERYTHROCYTE [DISTWIDTH] IN BLOOD BY AUTOMATED COUNT: 13.8 % (ref 11.6–15.1)
GFR SERPL CREATININE-BSD FRML MDRD: 34 ML/MIN/1.73SQ M
GLUCOSE SERPL-MCNC: 135 MG/DL (ref 65–140)
GLUCOSE SERPL-MCNC: 167 MG/DL (ref 65–140)
GLUCOSE SERPL-MCNC: 187 MG/DL (ref 65–140)
GLUCOSE SERPL-MCNC: 189 MG/DL (ref 65–140)
GLUCOSE SERPL-MCNC: 69 MG/DL (ref 65–140)
GLUCOSE SERPL-MCNC: 85 MG/DL (ref 65–140)
HCT VFR BLD AUTO: 30.8 % (ref 36.5–49.3)
HGB BLD-MCNC: 9.5 G/DL (ref 12–17)
IMM GRANULOCYTES # BLD AUTO: 0.08 THOUSAND/UL (ref 0–0.2)
IMM GRANULOCYTES NFR BLD AUTO: 1 % (ref 0–2)
LYMPHOCYTES # BLD AUTO: 1.22 THOUSANDS/ÂΜL (ref 0.6–4.47)
LYMPHOCYTES NFR BLD AUTO: 10 % (ref 14–44)
MAGNESIUM SERPL-MCNC: 1.8 MG/DL (ref 1.9–2.7)
MCH RBC QN AUTO: 28.3 PG (ref 26.8–34.3)
MCHC RBC AUTO-ENTMCNC: 30.8 G/DL (ref 31.4–37.4)
MCV RBC AUTO: 92 FL (ref 82–98)
MONOCYTES # BLD AUTO: 0.92 THOUSAND/ÂΜL (ref 0.17–1.22)
MONOCYTES NFR BLD AUTO: 8 % (ref 4–12)
NEUTROPHILS # BLD AUTO: 9.65 THOUSANDS/ÂΜL (ref 1.85–7.62)
NEUTS SEG NFR BLD AUTO: 80 % (ref 43–75)
NRBC BLD AUTO-RTO: 0 /100 WBCS
PHOSPHATE SERPL-MCNC: 5.1 MG/DL (ref 2.3–4.1)
PLATELET # BLD AUTO: 372 THOUSANDS/UL (ref 149–390)
PMV BLD AUTO: 8.9 FL (ref 8.9–12.7)
POTASSIUM SERPL-SCNC: 4.7 MMOL/L (ref 3.5–5.3)
PROT SERPL-MCNC: 6.3 G/DL (ref 6.4–8.4)
RBC # BLD AUTO: 3.36 MILLION/UL (ref 3.88–5.62)
SODIUM SERPL-SCNC: 135 MMOL/L (ref 135–147)
WBC # BLD AUTO: 11.96 THOUSAND/UL (ref 4.31–10.16)

## 2024-02-23 PROCEDURE — 80076 HEPATIC FUNCTION PANEL: CPT | Performed by: INTERNAL MEDICINE

## 2024-02-23 PROCEDURE — 82948 REAGENT STRIP/BLOOD GLUCOSE: CPT

## 2024-02-23 PROCEDURE — 99233 SBSQ HOSP IP/OBS HIGH 50: CPT | Performed by: INTERNAL MEDICINE

## 2024-02-23 PROCEDURE — 99024 POSTOP FOLLOW-UP VISIT: CPT | Performed by: INTERNAL MEDICINE

## 2024-02-23 PROCEDURE — 85025 COMPLETE CBC W/AUTO DIFF WBC: CPT | Performed by: INTERNAL MEDICINE

## 2024-02-23 PROCEDURE — 83735 ASSAY OF MAGNESIUM: CPT | Performed by: INTERNAL MEDICINE

## 2024-02-23 PROCEDURE — 99223 1ST HOSP IP/OBS HIGH 75: CPT | Performed by: INTERNAL MEDICINE

## 2024-02-23 PROCEDURE — 84100 ASSAY OF PHOSPHORUS: CPT | Performed by: INTERNAL MEDICINE

## 2024-02-23 PROCEDURE — 80048 BASIC METABOLIC PNL TOTAL CA: CPT | Performed by: INTERNAL MEDICINE

## 2024-02-23 PROCEDURE — 99232 SBSQ HOSP IP/OBS MODERATE 35: CPT | Performed by: INTERNAL MEDICINE

## 2024-02-23 RX ORDER — ISOSORBIDE DINITRATE 20 MG/1
20 TABLET ORAL
Status: DISCONTINUED | OUTPATIENT
Start: 2024-02-23 | End: 2024-03-01 | Stop reason: HOSPADM

## 2024-02-23 RX ORDER — HYDRALAZINE HYDROCHLORIDE 25 MG/1
25 TABLET, FILM COATED ORAL EVERY 8 HOURS SCHEDULED
Status: DISCONTINUED | OUTPATIENT
Start: 2024-02-23 | End: 2024-02-24

## 2024-02-23 RX ORDER — CEFAZOLIN SODIUM 2 G/50ML
2000 SOLUTION INTRAVENOUS EVERY 8 HOURS
Status: DISCONTINUED | OUTPATIENT
Start: 2024-02-23 | End: 2024-03-01 | Stop reason: HOSPADM

## 2024-02-23 RX ORDER — MAGNESIUM SULFATE HEPTAHYDRATE 40 MG/ML
2 INJECTION, SOLUTION INTRAVENOUS ONCE
Status: COMPLETED | OUTPATIENT
Start: 2024-02-23 | End: 2024-02-23

## 2024-02-23 RX ORDER — FUROSEMIDE 10 MG/ML
60 INJECTION INTRAMUSCULAR; INTRAVENOUS
Status: DISCONTINUED | OUTPATIENT
Start: 2024-02-23 | End: 2024-02-28

## 2024-02-23 RX ADMIN — CLOPIDOGREL BISULFATE 75 MG: 75 TABLET ORAL at 08:24

## 2024-02-23 RX ADMIN — OXYCODONE HYDROCHLORIDE 5 MG: 5 TABLET ORAL at 08:22

## 2024-02-23 RX ADMIN — HEPARIN SODIUM 5000 UNITS: 5000 INJECTION INTRAVENOUS; SUBCUTANEOUS at 21:01

## 2024-02-23 RX ADMIN — HEPARIN SODIUM 5000 UNITS: 5000 INJECTION INTRAVENOUS; SUBCUTANEOUS at 13:50

## 2024-02-23 RX ADMIN — FUROSEMIDE 60 MG: 10 INJECTION, SOLUTION INTRAMUSCULAR; INTRAVENOUS at 17:03

## 2024-02-23 RX ADMIN — OXYCODONE HYDROCHLORIDE 5 MG: 5 TABLET ORAL at 17:03

## 2024-02-23 RX ADMIN — METOPROLOL SUCCINATE 25 MG: 25 TABLET, EXTENDED RELEASE ORAL at 05:16

## 2024-02-23 RX ADMIN — HYDRALAZINE HYDROCHLORIDE 25 MG: 25 TABLET, FILM COATED ORAL at 13:50

## 2024-02-23 RX ADMIN — Medication 2 G: at 11:07

## 2024-02-23 RX ADMIN — ACETAMINOPHEN 650 MG: 325 TABLET, FILM COATED ORAL at 21:02

## 2024-02-23 RX ADMIN — Medication 2 G: at 17:08

## 2024-02-23 RX ADMIN — HEPARIN SODIUM 5000 UNITS: 5000 INJECTION INTRAVENOUS; SUBCUTANEOUS at 05:14

## 2024-02-23 RX ADMIN — CEFAZOLIN SODIUM 2000 MG: 2 SOLUTION INTRAVENOUS at 21:02

## 2024-02-23 RX ADMIN — INSULIN LISPRO 1 UNITS: 100 INJECTION, SOLUTION INTRAVENOUS; SUBCUTANEOUS at 21:04

## 2024-02-23 RX ADMIN — Medication 250 MG: at 17:04

## 2024-02-23 RX ADMIN — INSULIN LISPRO 7 UNITS: 100 INJECTION, SOLUTION INTRAVENOUS; SUBCUTANEOUS at 11:13

## 2024-02-23 RX ADMIN — Medication 2 G: at 08:43

## 2024-02-23 RX ADMIN — ASPIRIN 81 MG CHEWABLE TABLET 81 MG: 81 TABLET CHEWABLE at 08:24

## 2024-02-23 RX ADMIN — SENNOSIDES, DOCUSATE SODIUM 2 TABLET: 8.6; 5 TABLET ORAL at 17:04

## 2024-02-23 RX ADMIN — HYDRALAZINE HYDROCHLORIDE 25 MG: 25 TABLET, FILM COATED ORAL at 21:02

## 2024-02-23 RX ADMIN — FUROSEMIDE 40 MG: 10 INJECTION, SOLUTION INTRAMUSCULAR; INTRAVENOUS at 08:25

## 2024-02-23 RX ADMIN — Medication 2 G: at 21:04

## 2024-02-23 RX ADMIN — INSULIN LISPRO 7 UNITS: 100 INJECTION, SOLUTION INTRAVENOUS; SUBCUTANEOUS at 08:43

## 2024-02-23 RX ADMIN — Medication 250 MG: at 08:24

## 2024-02-23 RX ADMIN — ACETAMINOPHEN 650 MG: 325 TABLET, FILM COATED ORAL at 13:50

## 2024-02-23 RX ADMIN — SENNOSIDES, DOCUSATE SODIUM 2 TABLET: 8.6; 5 TABLET ORAL at 08:24

## 2024-02-23 RX ADMIN — METOPROLOL SUCCINATE 25 MG: 25 TABLET, EXTENDED RELEASE ORAL at 17:15

## 2024-02-23 RX ADMIN — ACETAMINOPHEN 650 MG: 325 TABLET, FILM COATED ORAL at 04:13

## 2024-02-23 RX ADMIN — ISOSORBIDE DINITRATE 20 MG: 20 TABLET ORAL at 12:16

## 2024-02-23 RX ADMIN — INSULIN GLARGINE 35 UNITS: 100 INJECTION, SOLUTION SUBCUTANEOUS at 21:01

## 2024-02-23 RX ADMIN — CEFAZOLIN SODIUM 2000 MG: 2 SOLUTION INTRAVENOUS at 04:13

## 2024-02-23 RX ADMIN — INSULIN LISPRO 1 UNITS: 100 INJECTION, SOLUTION INTRAVENOUS; SUBCUTANEOUS at 17:05

## 2024-02-23 RX ADMIN — POLYETHYLENE GLYCOL 3350 17 G: 17 POWDER, FOR SOLUTION ORAL at 08:24

## 2024-02-23 RX ADMIN — Medication 3 MG: at 21:02

## 2024-02-23 RX ADMIN — MAGNESIUM SULFATE HEPTAHYDRATE 2 G: 40 INJECTION, SOLUTION INTRAVENOUS at 08:24

## 2024-02-23 RX ADMIN — AMIODARONE HYDROCHLORIDE 200 MG: 200 TABLET ORAL at 08:24

## 2024-02-23 RX ADMIN — INSULIN LISPRO 7 UNITS: 100 INJECTION, SOLUTION INTRAVENOUS; SUBCUTANEOUS at 17:04

## 2024-02-23 RX ADMIN — ISOSORBIDE DINITRATE 20 MG: 20 TABLET ORAL at 17:04

## 2024-02-23 RX ADMIN — CEFAZOLIN SODIUM 2000 MG: 2 SOLUTION INTRAVENOUS at 13:55

## 2024-02-23 RX ADMIN — MAGNESIUM HYDROXIDE 30 ML: 1200 LIQUID ORAL at 08:48

## 2024-02-23 RX ADMIN — ATORVASTATIN CALCIUM 80 MG: 80 TABLET, FILM COATED ORAL at 17:04

## 2024-02-23 RX ADMIN — INSULIN LISPRO 1 UNITS: 100 INJECTION, SOLUTION INTRAVENOUS; SUBCUTANEOUS at 11:07

## 2024-02-23 NOTE — PROGRESS NOTES
NEPHROLOGY PROGRESS NOTE   Nadir Myers 63 y.o. male MRN: [id number]  Unit/Bed#: [facility] 519-01 Encounter: [id number]      ASSESSMENT/PLAN:  LINDEN: Suspect ATN in the setting of surgery and infection.  Head CT with contrast 2/15 which could be slowing recovery but initial LINDEN was prior to contrast.  Baseline creatinine less than 1  Initially admitted with creatinine 1.4 which quickly improved to 0.6-0.8 but then worsened on 2/15  Creatinine today stable at 2  UA: Innumerable RBCs, 4-10 WBC, 0-3 hyaline casts glucose  Urine sodium 39, urine 37  CT: No hydronephrosis  Continue to hold spironolactone and Jardiance  Lasix increased today by heart failure team  Sepsis due to MSSA bacteremia with ICD infection  Status post ICD extraction 2/8, extravascular ICD placement 2/20 and revision 2/21  On cefazolin 2 g every 6 hours per ID  Chronic CHF with EF 45%   Weight up about 20lb this admission   Currently on lasix 60 mg IV twice daily which was increased today  Left foot fourth toe gangrene status post amputation 1/31  Hyponatremia: Improving with sodium up to 135.  Likely due to  Hypomagnesemia: Magnesium 1.8  Getting mag sulfate 2 g IV x 1    Plan Summary:   Continue to hold Jardiance and spironolactone  Continue IV Lasix  Check a.m. BMP    SUBJECTIVE:  Frustrated prolonged hospital stay.  No chest pain or shortness of breath.    OBJECTIVE:  Current Weight: Weight - Scale: 115 kg (254 lb 4.8 oz)  Vitals:    02/23/24 1110   BP: 142/75   Pulse: 75   Resp:    Temp: 97.7 °F (36.5 °C)   SpO2: 95%       Intake/Output Summary (Last 24 hours) at 2/23/2024 1153  Last data filed at 2/23/2024 1101  Gross per 24 hour   Intake 1150 ml   Output 2880 ml   Net -1730 ml       General:  appears comfortable and in no acute distress   Skin:  No rash  Eyes:  Sclerae anicteric, no periorbital edema   ENT:  Moist mucous membranes  Neck:  Trachea midline, symmetric   Chest:  Clear to auscultation bilaterally with no wheezes, rales or  rhonchi  CVS:  Regular rate and rhythm  Abdomen:  Soft, nontender, nondistended  Neuro:  Awake and alert  Psych:  Appropriate affect  Extremities: +lower extremity edema   : chatman in place with clear urine output       Medications:  Scheduled Meds:  Current Facility-Administered Medications   Medication Dose Route Frequency Provider Last Rate    acetaminophen  650 mg Oral Q8H [facility] Kip Cabello MD      ALPRAZolam  0.5 mg Oral HS PRN Kip Cabello MD      amiodarone  200 mg Oral Daily With Breakfast Yuniel Mcgee PA-C      aspirin  81 mg Oral Daily Yuniel Mcgee PA-C      atorvastatin  80 mg Oral After Dinner Yuniel Mcgee PA-C      cefazolin  2,000 mg Intravenous Q8H Zach Medina MD      clopidogrel  75 mg Oral Daily Yuniel Mcgee PA-C      Diclofenac Sodium  2 g Topical 4x Daily Kip Cabello MD      furosemide  60 mg Intravenous BID (diuretic) Nash Samuels DO      heparin (porcine)  5,000 Units Subcutaneous Q8H [facility] Kip Cabello MD      HYDROmorphone  0.5 mg Intravenous Q6H PRN Kip Cabello MD      insulin glargine  35 Units Subcutaneous HS Yuniel Mcgee PA-C      insulin lispro  1-5 Units Subcutaneous 4x Daily (AC & HS) Yuniel Mcgee PA-C      insulin lispro  7 Units Subcutaneous TID With Meals Yuniel Mcgee PA-C      isosorbide dinitrate  20 mg Oral TID after meals Nash Samuels DO      melatonin  3 mg Oral HS Kip Cabello MD      metoprolol succinate  25 mg Oral Q12H Yuniel Mgcee PA-C      oxyCODONE  5 mg Oral Q6H PRN Yuniel Mcgee PA-C      polyethylene glycol  17 g Oral Daily Yuniel Mcgee PA-C      saccharomyces boulardii  250 mg Oral BID Yuniel Mcgee PA-C      senna-docusate sodium  2 tablet Oral BID Yuniel Mcgee PA-C         PRN Meds:.  ALPRAZolam    HYDROmorphone    oxyCODONE        Laboratory Results:  Results from last 7 days   Lab Units 02/23/24  0458 02/23/24  0000 02/22/24  1031 02/21/24  1100  02/21/24 0443 02/20/24  0504 02/19/24  0451 02/18/24  1126 02/18/24  0502 02/18/24  0500 02/17/24 0444   WBC Thousand/uL  --  11.96* 14.46*  --  12.19* 11.08* 11.34*  --   --  12.14* 11.06*   HEMOGLOBIN g/dL  --  9.5* 9.7*  --  10.5* 10.0* 10.1*  --   --  9.5* 9.2*   HEMATOCRIT %  --  30.8* 30.9*  --  33.5* 32.6* 33.1*  --   --  30.6* 28.9*   PLATELETS Thousands/uL  --  372 378  --  425* 423* 445*  --   --  409* 403*   SODIUM mmol/L 135  --  133*  --  132* 135 134* 129* 129*  --  132*   POTASSIUM mmol/L 4.7  --  5.0 5.1 5.8* 4.8 5.0 5.3 6.0*  --  4.9   CHLORIDE mmol/L 98  --  100  --  100 101 101 99 101  --  100   CO2 mmol/L 31  --  29  --  24 29 26 26 22  --  25   BUN mg/dL 41*  --  42*  --  38* 29* 27* 31* 31*  --  33*   CREATININE mg/dL 2.02*  --  2.01*  --  1.76* 1.52* 1.51* 1.44* 1.42*  --  1.55*   CALCIUM mg/dL 7.9*  --  7.7*  --  8.4 8.3* 8.3* 8.2* 8.1*  --  7.8*   MAGNESIUM mg/dL 1.8*  --   --   --   --   --  2.2  --   --   --   --    PHOSPHORUS mg/dL 5.1*  --   --   --   --   --   --   --   --   --   --

## 2024-02-23 NOTE — ASSESSMENT & PLAN NOTE
Pleuritic pain in left lower chest wall since 2/20. Tender to touch. Suspect secondary to ICD manipulation +/- septic pulmonary emboli  EKG with no acute ischemic changes.  Voltaren gel/lidocaine patch/prn pain meds.

## 2024-02-23 NOTE — ASSESSMENT & PLAN NOTE
CT 2/7 shows small consolidation in the upper lobes and left lower lobe with enhancing lesion in the peripheral of right lower lobe, may be embolic versus hematogenous seeding from high-grade bacteremia.  there is no evident of pulmonary embolism to suggest that this is an infarct.   Repeat CT 2/15 revealed possible septic emboli, gas and fluid subcutaneous collection in the pacemaker bed likely postsurgical, reactive mild mediastinal lymphadenopathy.  CT chest without contrast  2/21: Peripheral left lower lobe opacity with reverse halo sign appearance. This can be seen in the setting of pulmonary infarct   Doppler US is negative for DVT  VQ Scan is intermediate for PE  TTE 2/21 with normal right ventricle function  Unable to obtain PE study to confirm/rule out given LINDEN. Suspicion remains higher for septic embolism. Given hemodynamic stability with only 1L NC oxygen requirement due to volume overload, would hold off on AC for now.   Will appreciate pulmonology input.

## 2024-02-23 NOTE — PROGRESS NOTES
Progress Note - Infectious Disease   Nadir Myers 63 y.o. male MRN: [id number]  Unit/Bed#: [facility] 519-01 Encounter: [id number]      Impression/Plan:    Sepsis, present on arrival; fever, leukocytosis  -Source is bacteremia with ICD infection and patients foot infection as below. No other clear source. Had intermittent fevers since 2/10, though blood cultures clear on 2/12 and 2/16. CT C/A/P 2/15 notable for pulmonary septic emboli and gas/fluid collection at site of ICD removal, though no other infectious source and EP felt ICD site findings were post operative. Podiatry feels foot is healing well. Patient without any localizing symptoms. Now afebrile since 2/18. Persistent fevers may have been reactive to lung findings as below.   -Antibiotic as below  -Trend fever curve  -Repeat CBC + diff tomorrow AM     Persistent MSSA bacteremia with ICD infection  -Likely initial source was patients foot wound as below with subsequent ICD infection. Blood cultures positive on 1/31-2/08 despite being on Cefazolin, suspect due to ICD infection. GILL 2/07 found a mobile mass on device lead in right atrium, concerning for infection. No valve vegetations. CT scans from 2/15 and 2/07 negative for abscess though suspicious for pulmonary septic emboli. ICD extracted on 2/08 with lead culture that grew MSSA. Only 1 of 2 blood cultures positive on 2/09, blood cultures 2/12 and 2/16 negative. Extravascular ICD placed 2/20, though lead was in left pleural space and repositioning attempts on 2/21 unsuccessful. Plan now is to reimplant at later date after patient has healed and discharge with Life Vest.  -Continue IV Cefazolin, change dose to 2g every 8 hours given renal function  -Will need 6 weeks of IV antibiotic from extraction and first negative blood cultures, through 3/25/2024; script left in chart for CM  -PICC to be removed on 3/26/2024  -Will need weekly CBC + diff, BMP as outpatient and ID follow up  -Follow up with Cardiac  surgery and EP to determine future date for ICD reimplantation    3. Abnormal CT chest  -CT 2/07 noted small consolidations in upper lobes and left lower lobe, with enhancing lesion in perieprhal of right lower lobe. Repeat CT 2/15 with progressive left lower lobe infiltrate, new subpleural nodular airspace focus, with resolving foci at anterior left upper lobe and inferior right lower lobe. Suspect due to septic emboli. CT 2/21 with peripheral left lower lobe opacity showing reverse halo sign now, read as possible pulmonary infarct. VQ scan indeterminate for PE.  -Plan for prolonged course of antibiotic as above  -Monitor for any development of respiratory symptoms  -Would plan to repeat CT chest as outpatient in 4 weeks to ensure resolution    4. 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 Culture polymicrobial of tissue with MSSA, Finegoldia and Anaerococccus. Likely source of patient's bacteremia. Surgical cure felt to be obtained per Podiatry. Per follow up on 2/15, amputation site stable with intact sutures and no evidence of dehiscence or ischemia.   -Ongoing local wound care per Podiatry.     5. Staph epidermidis in 1 of 2 blood cultures  -1 set from 2/03 with Staph epi, did not grow in prior blood cultures from 1/31 or subsequent blood cultures. Suspect this was skin contaminant and as above MSSA is true pathogen.  -No further workup at this time    6. LINDEN  -Developing over admission. May be related to IV contrast, cardiorenal. Consideration for Staphylococcus related GN. CrCl stable above 30 at the moment. Nephrology following now.  -Renally adjust Cefazolin if CrCl drops to < 30  -Monitor BMP and urine output    Plan and recommendations were discussed with primary team. They agree with continuing IV Cefazolin.  ID will reassess on 2/26, but call over weekend as needed.    Antibiotics:  Cefazolin    Subjective:  Denies fever, chills, nausea, cough, SOB.    Objective:  Vitals:  Temp:   [97.2 °F (36.2 °C)-98.7 °F (37.1 °C)] 97.7 °F (36.5 °C)  HR:  [75-94] 91  Resp:  [16-18] 18  BP: (129-165)/(69-98) 150/71  SpO2:  [93 %-96 %] 93 %  Temp (24hrs), Av °F (36.7 °C), Min:97.2 °F (36.2 °C), Max:98.7 °F (37.1 °C)  Current: Temperature: 97.7 °F (36.5 °C)    Physical Exam:   General Appearance:  Alert, interactive, nontoxic, no acute distress.   Throat: Oropharynx moist without lesions.    Lungs:   Clear to auscultation bilaterally; no wheezes, rhonchi or rales; respirations unlabored   Heart:  RRR   Abdomen:   Soft, non-tender     Extremities: Left foot with dressing intact   Skin: No new rashes or lesions. Chest wall bandage at ICD extraction site intact       Labs:   All pertinent labs and imaging studies were personally reviewed  Results from last 7 days   Lab Units 24  0000 24  1031 24  0443   WBC Thousand/uL 11.96* 14.46* 12.19*   HEMOGLOBIN g/dL 9.5* 9.7* 10.5*   PLATELETS Thousands/uL 372 378 425*     Results from last 7 days   Lab Units 24  0458 24  1031 24  1100 24  0443   SODIUM mmol/L 135 133*  --  132*   POTASSIUM mmol/L 4.7 5.0   < > 5.8*   CHLORIDE mmol/L 98 100  --  100   CO2 mmol/L 31 29  --  24   BUN mg/dL 41* 42*  --  38*   CREATININE mg/dL 2.02* 2.01*  --  1.76*   EGFR ml/min/1.73sq m 34 34  --  40   CALCIUM mg/dL 7.9* 7.7*  --  8.4   AST U/L 11*  --   --   --    ALT U/L <3*  --   --   --    ALK PHOS U/L 46  --   --   --     < > = values in this interval not displayed.                         Micro:          Imaging:          Zach Medina MD  [facility]

## 2024-02-23 NOTE — PROGRESS NOTES
[facility]  Progress Note  Name: Nadir Myers I  MRN: [id number]  Unit/Bed#: [facility] 519-01 I Date of Admission: 1/31/2024   Date of Service: 2/23/2024 I Hospital Day: 23    Assessment/Plan   Abnormal CT of the chest  Assessment & Plan  CT 2/7 shows small consolidation in the upper lobes and left lower lobe with enhancing lesion in the peripheral of right lower lobe, may be embolic versus hematogenous seeding from high-grade bacteremia.  there is no evident of pulmonary embolism to suggest that this is an infarct.   Repeat CT 2/15 revealed possible septic emboli, gas and fluid subcutaneous collection in the pacemaker bed likely postsurgical, reactive mild mediastinal lymphadenopathy.  CT chest without contrast  2/21: Peripheral left lower lobe opacity with reverse halo sign appearance. This can be seen in the setting of pulmonary infarct   Doppler US is negative for DVT  VQ Scan is intermediate for PE  TTE 2/21 with normal right ventricle function  Unable to obtain PE study to confirm/rule out given LINDEN. Suspicion remains higher for septic embolism. Given hemodynamic stability with only 1L NC oxygen requirement due to volume overload, would hold off on AC for now.   Will appreciate pulmonology input.     Pleuritic chest pain  Assessment & Plan  Pleuritic pain in left lower chest wall since 2/20. Tender to touch. Suspect secondary to ICD manipulation +/- septic pulmonary emboli  EKG with no acute ischemic changes.  Voltaren gel/lidocaine patch/prn pain meds.    ICD (implantable cardioverter-defibrillator) in place  Assessment & Plan  Patient had extraction of ICD  Patient underwent AV ICD placement on February 20, 2024  Patient had lead dislodgment leading to repeat procedure on February 21, 2024  During the procedure patient's lead had poor sensing and therefore could not be positioned under the sternum and pocket was closed.  Multiple location under the sternum where  "attempted.   Will plan for LifeVest on discharge and then discuss S-ICD in office.     Constipation  Assessment & Plan  Received Senokot, MiraLAX and prune juice 2/17 and had a BM   continue bowel regimen. Increase senekot   Milk of magnesia x1    LINDEN (acute kidney injury) ([facility])  Assessment & Plan  Likely ATN  Monitor with lasix  Hold jardiance and aldactone  Nephrology consult     Electrolyte abnormalities  Assessment & Plan  Monitor/replete serum potassium/magnesium/phosphate as necessary    Gangrene of toe of left foot ([facility])  Assessment & Plan  Appreciate podiatry input -> status post left fourth toe amputation on 1/31  [place] to be surgical cure per podiatry  Wound care  Antibiotics per ID    MSSA bacteremia  Assessment & Plan  Persistent MSSA bacteremia most likely source foot wound, complicated with ICD infection  Blood cultures from 1/31-2/9  positive despite being on Ancef suspect due to ICD infection  GILL with evidence of mobile mass on ICD lead in the right atrium, concerning for infection, no valvular vegetation  S/p ICD removal with EP on 2/8  CT chest 2/7 and 2/15 with possible septic pulmonary emboli  Repeat Blood cultures from 2/12 negative, repeat blood cultures 2/16 negative at 72 hours  Continue IV Ancef, with plan to treat 6 weeks until 3/25/2024   PICC placed 2/22    LUE weakness  Assessment & Plan  Presented w/ transient left-sided weakness and a facial droop now resolved  Appreciate neurology input deeming symptoms likely secondary to septic shock/infection and hyperglycemia, as neurologic workup including CT/MRI imaging negative for evidence of stroke or intracranial vessel occlusion, but noted chronic microangiopathic changes -> radiology report did however mention: \"Moderate bilateral supraclinoid ICA and moderate to severe left cavernous ICA segment stenosis.\"  Continue dual endplate therapy with ASA/Plavix - c/w statin  PT/OT as tolerated    PAD (peripheral artery disease) ([facility])  Assessment " & Plan  Continue dual antiplatelet therapy with ASA/Plavix - continue statin      Coronary artery disease involving native coronary artery of native heart without angina pectoris  Assessment & Plan  History of coronary artery disease status post HANNA to left circumflex 2015, repeat cath 2023 with  of mid LAD and L PDA.  Ischemic cardiomyopathy  Continue aspirin/Plavix/statin/metoprolol    Chronic systolic CHF (HCC)  Assessment & Plan  Diuretics resumed per HF team.     Hyponatremia  Assessment & Plan  Mild in setting of LINDEN  Monitor     V-tach ([facility])  Assessment & Plan  History of monomorphic VT status post secondary prevention with [employer] dual-chamber ICD 5/2023  Status post ICD extraction 2/8/2024  Lifevest on dc    Type 2 diabetes mellitus, with long-term current use of insulin ([facility])  Assessment & Plan  Lab Results   Component Value Date    HGBA1C 9.8 (H) 01/31/2024     Continue basal/prandial insulin. BG goal 140-180  Hypoglycemia protocol  Carbohydrate restriction  Continue to adjust    * Septic shock (HCC)  Assessment & Plan  Initially admitted to the ICU requiring vasopressor support, now off, with maintenance of hemodynamic stability  Due to MSSA bacteremia from a gangrenous left fourth toe (see below) complicated by ICD infection  Continue to monitor vitals and maintain hemodynamics including temperature curve  See additional management below  Shock resolved             VTE Pharmacologic Prophylaxis:   Moderate Risk (Score 3-4) - Pharmacological DVT Prophylaxis Ordered: heparin.    Mobility:   Basic Mobility Inpatient Raw Score: 17  JH-HLM Goal: 5: Stand one or more mins  JH-HLM Achieved: 6: Walk 10 steps or more  HLM Goal achieved. Continue to encourage appropriate mobility.    Patient Centered Rounds: I performed bedside rounds with nursing staff today.   Discussions with Specialists or Other Care Team Provider: Pulm    Education and Discussions with Family / Patient: Updated  (wife) at  bedside.    Total Time Spent on Date of Encounter in care of patient: 35 mins. This time was spent on one or more of the following: performing physical exam; counseling and coordination of care; obtaining or reviewing history; documenting in the medical record; reviewing/ordering tests, medications or procedures; communicating with other healthcare professionals and discussing with patient's family/caregivers.    Current Length of Stay: 23 day(s)  Current Patient Status: Inpatient   Certification Statement: The patient will continue to require additional inpatient hospital stay due to above  Discharge Plan: Anticipate discharge in >72 hrs to home with home services.    Code Status: Level 1 - Full Code    Subjective:   Chest pain better controlled today. Breathing stable. In better spirit today     Objective:     Vitals:   Temp (24hrs), Av °F (36.7 °C), Min:97.2 °F (36.2 °C), Max:98.7 °F (37.1 °C)    Temp:  [97.2 °F (36.2 °C)-98.7 °F (37.1 °C)] 97.7 °F (36.5 °C)  HR:  [75-94] 91  Resp:  [16-18] 18  BP: (129-165)/(69-98) 150/71  SpO2:  [93 %-96 %] 93 %  Body mass index is 34.49 kg/m².     Input and Output Summary (last 24 hours):     Intake/Output Summary (Last 24 hours) at 2024 1315  Last data filed at 2024 1101  Gross per 24 hour   Intake 795 ml   Output 2880 ml   Net -2085 ml       Physical Exam:   Physical Exam  Vitals and nursing note reviewed.   Constitutional:       General: He is not in acute distress.     Appearance: He is well-developed.   HENT:      Head: Normocephalic and atraumatic.   Eyes:      Conjunctiva/sclera: Conjunctivae normal.   Cardiovascular:      Rate and Rhythm: Normal rate and regular rhythm.      Heart sounds: No murmur heard.  Pulmonary:      Effort: Pulmonary effort is normal. No respiratory distress.      Breath sounds: Rales present.   Chest:      Chest wall: Tenderness present.   Abdominal:      Palpations: Abdomen is soft.      Tenderness: There is no abdominal  tenderness.   Musculoskeletal:         General: No swelling.      Cervical back: Neck supple.      Right lower leg: Edema present.      Left lower leg: Edema present.   Skin:     General: Skin is warm and dry.      Capillary Refill: Capillary refill takes less than 2 seconds.   Neurological:      Mental Status: He is alert.   Psychiatric:         Mood and Affect: Mood normal.          Additional Data:     Labs:  Results from last 7 days   Lab Units 02/23/24  0000   WBC Thousand/uL 11.96*   HEMOGLOBIN g/dL 9.5*   HEMATOCRIT % 30.8*   PLATELETS Thousands/uL 372   NEUTROS PCT % 80*   LYMPHS PCT % 10*   MONOS PCT % 8   EOS PCT % 1     Results from last 7 days   Lab Units 02/23/24  0458   SODIUM mmol/L 135   POTASSIUM mmol/L 4.7   CHLORIDE mmol/L 98   CO2 mmol/L 31   BUN mg/dL 41*   CREATININE mg/dL 2.02*   ANION GAP mmol/L 6   CALCIUM mg/dL 7.9*   ALBUMIN g/dL 2.4*   TOTAL BILIRUBIN mg/dL 0.25   ALK PHOS U/L 46   ALT U/L <3*   AST U/L 11*   GLUCOSE RANDOM mg/dL 69     Results from last 7 days   Lab Units 02/19/24  1345   INR  1.15     Results from last 7 days   Lab Units 02/23/24  1110 02/23/24  0603 02/23/24  0524 02/22/24  2033 02/22/24  1538 02/22/24  1105 02/22/24  0622 02/21/24  2048 02/21/24  1741 02/21/24  1132 02/21/24  0652 02/20/24  2035   POC GLUCOSE mg/dl 187* 135 85 113 173* 160* 100 199* 82 116 146* 206*               Lines/Drains:  Invasive Devices       Peripherally Inserted Central Catheter Line  Duration             PICC Line 02/22/24 Right Basilic 1 day              Drain  Duration             Urethral Catheter Latex 16 Fr. 3 days                  Urinary Catheter:  Goal for removal: Voiding trial when ambulation improves         Central Line:  Goal for removal: No longer needed. Will place order to discontinue.         Telemetry:  Telemetry Orders (From admission, onward)               24 Hour Telemetry Monitoring  Continuous x 24 Hours ([place])        Expiring   Question:  Reason for 24 Hour  Telemetry  Answer:  PCI/EP study (including pacer and ICD implementation), Cardiac surgery, MI, abnormal cardiac cath, and chest pain- rule out MI                              Imaging: Reviewed radiology reports from this admission including: chest xray    Recent Cultures (last 7 days):         Last 24 Hours Medication List:   Current Facility-Administered Medications   Medication Dose Route Frequency Provider Last Rate    acetaminophen  650 mg Oral Q8H [facility] Kip Cabello MD      ALPRAZolam  0.5 mg Oral HS PRN Kip Cabello MD      amiodarone  200 mg Oral Daily With Breakfast Yuniel Mcgee PA-C      aspirin  81 mg Oral Daily Yuniel Mcgee PA-C      atorvastatin  80 mg Oral After Dinner Yuniel Mcgee PA-C      cefazolin  2,000 mg Intravenous Q8H Zach Medina MD      clopidogrel  75 mg Oral Daily Yuniel Mcgee PA-C      Diclofenac Sodium  2 g Topical 4x Daily Kip Cabello MD      furosemide  60 mg Intravenous BID (diuretic) Nash Samuels DO      heparin (porcine)  5,000 Units Subcutaneous Q8H [facility] Kip Cabello MD      hydrALAZINE  25 mg Oral Q8H [facility] Lamont Murcia MD      HYDROmorphone  0.5 mg Intravenous Q6H PRN Kip Cabello MD      insulin glargine  35 Units Subcutaneous HS Yuniel Mcgee PA-C      insulin lispro  1-5 Units Subcutaneous 4x Daily (AC & HS) Yuniel Mcgee PA-C      insulin lispro  7 Units Subcutaneous TID With Meals Yuniel Mcgee PA-C      isosorbide dinitrate  20 mg Oral TID after meals Nash Samuels DO      melatonin  3 mg Oral HS Kip Cabello MD      metoprolol succinate  25 mg Oral Q12H Yuniel Mcgee PA-C      oxyCODONE  5 mg Oral Q6H PRN Yuniel Mcgee PA-C      polyethylene glycol  17 g Oral Daily Yuniel Mcgee PA-C      saccharomyces boulardii  250 mg Oral BID Yuniel Mcgee PA-C      senna-docusate sodium  2 tablet Oral BID Yuniel Mcgee PA-C          Today, Patient Was Seen By: Kip Cabello  MD    **Please Note: This note may have been constructed using a voice recognition system.**

## 2024-02-23 NOTE — PROGRESS NOTES
Progress Note - Advanced Heart Failure Team  Nadir Myers 63 y.o. male MRN: [id number]  Unit/Bed#: [facility] 519-01 Encounter: [id number]    Subjective    Reports frustration with procedural complications and prolonged hospitalization. States breathing is stable. No fevers, chest pain, dizziness, or palpitations.    Objective   Vitals: Blood pressure 142/75, pulse 75, temperature 97.7 °F (36.5 °C), resp. rate 18, height 6' (1.829 m), weight 115 kg (254 lb 4.8 oz), SpO2 95%.  Orthostatic Blood Pressures      Flowsheet Row Most Recent Value   Blood Pressure 142/75 filed at 02/23/2024 1110   Patient Position - Orthostatic VS Lying filed at 02/22/2024 0238              Invasive Devices       Peripherally Inserted Central Catheter Line  Duration             PICC Line 02/22/24 Right Basilic 1 day              Drain  Duration             Urethral Catheter Latex 16 Fr. 2 days                    Physical Exam  Constitutional:       General: He is not in acute distress.  Cardiovascular:      Rate and Rhythm: Normal rate and regular rhythm.   Pulmonary:      Effort: Pulmonary effort is normal.   Musculoskeletal:      Right lower leg: Edema present.      Left lower leg: Edema present.   Skin:     General: Skin is warm.   Neurological:      Mental Status: He is alert.         Lab Results: I have personally reviewed pertinent lab results.            Results from last 7 days   Lab Units 02/23/24  0458 02/22/24  1031 02/21/24  1100 02/21/24  0443   POTASSIUM mmol/L 4.7 5.0 5.1 5.8*   CO2 mmol/L 31 29  --  24   CHLORIDE mmol/L 98 100  --  100   BUN mg/dL 41* 42*  --  38*   CREATININE mg/dL 2.02* 2.01*  --  1.76*     Results from last 7 days   Lab Units 02/23/24  0000 02/22/24  1031 02/21/24  0443   HEMOGLOBIN g/dL 9.5* 9.7* 10.5*   HEMATOCRIT % 30.8* 30.9* 33.5*   PLATELETS Thousands/uL 372 378 425*         Results from last 7 days   Lab Units 02/19/24  1345   INR  1.15         Imaging: I have personally reviewed pertinent reports.     ECHO:  Results for orders placed during the hospital encounter of 17    Echo complete with contrast if indicated    [place] [address]  [phone]    Transthoracic Echocardiogram  2D, M-mode, Doppler, and Color Doppler    Study date:  26-Sep-2017    Patient: EAGLE REBOLLAR  MR number: [id number]  Account number: [id number]  : 1960  Age: 57 years  Gender: Male  Status: Outpatient  Location: [address] [place] [address]  Height: 72 in  Weight: 263.3 lb  BP: 142/ 88 mmHg    Indications: CAD    Diagnoses: I25.10 - Atherosclerotic heart disease of native coronary artery without angina pectoris    Sonographer:  OLIVE Benoit  Primary Physician:  Paty Ortiz MD  Referring Physician:  Eagle Chavez MD  Group:  [facility]  Interpreting Physician:  Torrey Duenas MD    SUMMARY    LEFT VENTRICLE:  Size was normal.  Systolic function was normal. Ejection fraction was estimated to be 65 %.  There was mild concentric hypertrophy.  Doppler parameters were consistent with abnormal left ventricular relaxation (grade 1 diastolic dysfunction).    RIGHT VENTRICLE:  The size was normal.  Systolic function was normal.    TRICUSPID VALVE:  There was trace regurgitation.    HISTORY: PRIOR HISTORY: Hypertension, CAD s/p PCI, smoker, diabetes, high cholesterol    PROCEDURE: The study was performed in the [address] [place] [address]. This was a routine study. The transthoracic approach was used. The study included complete 2D imaging, M-mode, complete spectral Doppler, and color Doppler. The  heart rate was 112 bpm, at the start of the study. Images were obtained from the parasternal, apical, subcostal, and suprasternal notch acoustic windows. Echocardiographic views were limited due to decreased penetration. This was a  technically difficult study.    LEFT VENTRICLE: Size was normal. Systolic  function was normal. Ejection fraction was estimated to be 65 %. There were no regional wall motion abnormalities. Wall thickness was mildly increased. There was mild concentric hypertrophy.  DOPPLER: Doppler parameters were consistent with abnormal left ventricular relaxation (grade 1 diastolic dysfunction).    RIGHT VENTRICLE: The size was normal. Systolic function was normal. Wall thickness was normal.    LEFT ATRIUM: Size was normal.    RIGHT ATRIUM: Size was normal.    MITRAL VALVE: Valve structure was normal. There was normal leaflet separation. DOPPLER: The transmitral velocity was within the normal range. There was no evidence for stenosis. There was no regurgitation.    AORTIC VALVE: The valve was trileaflet. Leaflets exhibited normal thickness and normal cuspal separation. DOPPLER: Transaortic velocity was within the normal range. There was no evidence for stenosis. There was no regurgitation.    TRICUSPID VALVE: The valve structure was normal. There was normal leaflet separation. DOPPLER: The transtricuspid velocity was within the normal range. There was no evidence for stenosis. There was trace regurgitation. The tricuspid jet  envelope definition was inadequate for estimation of RV systolic pressure. There are no indirect findings suggestive of moderate or severe pulmonary hypertension.    PULMONIC VALVE: Leaflets exhibited normal thickness, no calcification, and normal cuspal separation. DOPPLER: The transpulmonic velocity was within the normal range. There was no regurgitation.    PERICARDIUM: There was no pericardial effusion. The pericardium was normal in appearance.    AORTA: The root exhibited normal size.    SYSTEMIC VEINS: IVC: The inferior vena cava was normal in size and course. Respirophasic changes were normal.    SYSTEM MEASUREMENT TABLES    2D  %FS: 47.6 %  AV Diam: 3.34 cm  EDV(Teich): 101.71 ml  EF Biplane: 67.76 %  EF(Cube): 85.61 %  EF(Teich): 79 %  ESV(Cube): 14.82 ml  ESV(Teich):  21.36 ml  IVSd: 1.58 cm  LA Area: 12.78 cm2  LA Diam: 3.37 cm  LVEDV MOD A2C: 49.73 ml  LVEDV MOD A4C: 61.78 ml  LVEDV MOD BP: 58.1 ml  LVEF MOD A2C: 65.64 %  LVEF MOD A4C: 71.2 %  LVESV MOD A2C: 17.09 ml  LVESV MOD A4C: 17.79 ml  LVESV MOD BP: 18.73 ml  LVIDd: 4.69 cm  LVIDs: 2.46 cm  LVLd A2C: 6.97 cm  LVLd A4C: 7.74 cm  LVLs A2C: 5.23 cm  LVLs A4C: 6.13 cm  LVPWd: 1.23 cm  RA Area: 14.52 cm2  RV Diam.: 2.86 cm  SI(Cube): 36.73 ml/m2  SI(Teich): 33.48 ml/m2  SV MOD A2C: 32.64 ml  SV MOD A4C: 43.98 ml  SV(Cube): 88.15 ml  SV(Teich): 80.35 ml    MM  TAPSE: 3.26 cm    PW  E': 0.08 m/s    [facility] Accredited Echocardiography Laboratory    Prepared and electronically signed by    Torrey Duenas MD  Signed 26-Sep-2017 12:55:39    Results for orders placed during the hospital encounter of 01/31/24    Echo complete w/ contrast if indicated    Interpretation Summary    Left Ventricle: Left ventricular cavity size is normal. Wall thickness is moderately increased. There is moderate concentric hypertrophy. The left ventricular ejection fraction is 42%. Systolic function is moderately reduced. Diastolic function is mildly abnormal, consistent with grade I (abnormal) relaxation. 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 There is no thrombus.    The following segments are akinetic: apical anterior, apical septal, apical inferior, apical lateral and apex.    All other segments are normal.    Tricuspid Valve: There is mild regurgitation.      GILL:  No results found for this or any previous visit.    Results for orders placed during the hospital encounter of 01/31/24    GILL    Interpretation Summary  Images from the original result were not included.      Left Ventricle: Left ventricular cavity size is normal. Wall thickness is normal. The left ventricular ejection fraction is 45%. Systolic function is mildly reduced. There is hypokinesis with akinesis of the aneurysmal portion of the apex.    Right Ventricle: Right ventricular  cavity size is normal. Systolic function is normal.    Right Atrium: There is a 1.8 x 1.1 cm mobile mass affixed to the device lead as it passes through the right atrium.    Aortic Valve: The aortic valve is trileaflet. The leaflets are mildly thickened. The leaflets are not calcified. The leaflets exhibit normal mobility. There is Lambl's excrescence on the tip of the right coronary aortic valve leaflet.    There is a 1.8 x 1.1 cm mobile mass affixed to the cardiac device lead as is passes through the right atrium.  In the setting of bacteremia this is most likely represents infection.    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 prevention Medtronic dual chamber ICD (5/2023) complicated by device infection requiring explant    Septic shock (resolved) with left toe gangrene / staph aureus bacteremia    LINDEN likely 2/2 to sepsis-related ATN, recent procedures    Coronary artery disease with  mLAD and LPDA     LUE weakness / facial droop     LV apical aneurysm     Peripheral arterial disease     Hypertension    History of orthostatic hypotension     DM Type 2     63 year old male with chronic heart failure with mid-range EF being followed by heart failure outpatient presented with Staph bacteremia and septic shock complicating a lower extremity wound infection. Vegetation noted on ICD lead, prompting lead extraction. Now stable from infectious standpoint with  reimplantation of subcutaneous ICD, however with inability to obtain proper subcutaneous lead placement.    Patient remains volume overloaded on examination today.   Likely component of progressive volume overload due to hold of medical therapy and fluids.  3.7 L total output / +1.5L intake in past 24 hours.   Creatinine stable at 2.0.   Will increase diuresis to Furosemide 60mg IV BID.  Target net negative fluid balance of 2.5 to 3.0 L  over the next 24 hours.   Maintain potassium 4-5 mEq/L and Mg > 2.     MAPs remain elevated. Unable to start SGLT-2, MRA, and ARB/ARNi due to renal impairment.    Continue Metoprolol Succinate 25mg BID  Continue Isordil 20mg TID / Hydralazine 25mg TID    Continue dual antiplatelet therapy with high intensity statin.  Continue amiodarone for VT suppression.    Plan for LifeVest on discharge and implantation of traditional Marshall-Sci S-ICD as outpatient per EP.     Lamont Murcia MD  Cardiology Fellow

## 2024-02-23 NOTE — ASSESSMENT & PLAN NOTE
Received Senokot, MiraLAX and prune juice 2/17 and had a BM   continue bowel regimen. Increase senekot   Milk of magnesia x1

## 2024-02-23 NOTE — TREATMENT PLAN
Podiatry was contacted by nursing in regards to new deep tissue injury with central blister on plantar aspect of right hallux.    Deep tissue injury to the plantar right hallux measuring approximately 1.5 x 1.0 cm with intact central blister containing serous fluid without signs of active infection: no purulence, no malodor, no ascending erythema, no crepitus, no fluctuance.    Right great hallux deep tissue injury was dressed using Betadine DSD.  Wound care orders placed and appreciate nursing assistance with dressing changes.  Bilateral Prevalon boots ordered to offload foot.

## 2024-02-23 NOTE — ASSESSMENT & PLAN NOTE
Appreciate podiatry input -> status post left fourth toe amputation on 1/31  [place] to be surgical cure per podiatry  Wound care  Antibiotics per ID

## 2024-02-23 NOTE — CONSULTS
PULMONOLOGY CONSULT NOTE     Name: Nadir Myers   Age & Sex: 63 y.o. male   MRN: [id number]  Unit/Bed#: [facility] 519-01   Encounter: [id number]        Reason for consultation: abnormal CT finding    Requesting physician: Kip Cabello    Assessment and Plan:    Abnormal Ct finding with waxed and waned consolidations, likely pulmonary septic emboli resulting consolidation/pulmonary infarct  Acute hypoxic resp failure, resolved  Recent MSSA bacteremia with ICD infection, s/p ICD removal on 2/8  Acute kidney injury  Tobacco use      Patient has new CT scan finding of abnormal lung nodules, with relatively normal Ct scan in 5/2023 and presented with bacteremia with ICD infection. Throwing back his CT on 2/7, 2/15 and 2/21 he has a waxed and waned new pulmonary nodules/consolidations.     This can be seen with septic embolic with MSSA bacteremia. Compares to the LLL consolidation with reverse halo sign I would still consider infectious etiology. Other things to consider might be inflammatory and neoplasm, although I do not think patient has any underline autoimmune feature and although he has smoking hx his previous CT is normal.     For infectious etiology, I would prefer this is resulted as MSSA bacteremia. Other source might be related to fungal infection. I would suggest to check on aspergillus Glucomannan and fungitel.If those markers are elevated this is not unreasonable to evaluate by bronchoscope to obtain tissue culture.     Patient has no other underline resulting high suspicion of hypercoagulable status given his rapid resolution of oxygen requirement for PE. He has a structured lung disease therefore his VQ scan can sometime being undetermined. I would not suggest to start anticoagulation just because of the CT scan finding. If the suspicion is still high, he might obtain a CT PE study when his kidney injury recovered.     We will continue to follow while in hospital    History of Present Illness   HPI:   Nadir Myers is a 63 y.o. male with PMHx DM2, CAD, HFrEF sp ICD, PAD, recent cellulitis on the left foot, was originally admitted on 1/31 due to severe sepsis due to MSSA bacteremia 2/2 infected foot and ICD infection. During hosptial course patient underwent amputation of Lt toe and Icd removal on 2/7. During hospitalization patient developed multiple lung nodules with progression, concern for septic emboli, with the last Ct scan showing LLL reverse halo sign and concerning for pulmonary infarction. Noted he required O2 support (6L on 2/20) which has resolved. Now he is on room air. Due to his worsening kidney function a CT PE study cannot performed and we were consulted for a possible pulmonary infarct.     Patient stated he smokes about 1 pk a day since age of 16 and is an every day smoker. He has never been diagnose with any pulmonary disease or pulmonary embolism. At baseline he does not need oxygen, and can ambulate without assistance. He has no cough/hemoptysis/sputum production. He noticed recently he has trouble laying flat to sleep. Noted he gained 24 lbs during hospitalization.    Review of his CT image:    5/2023 CT chest: normal lung parenchyma, small pleura effusion on the right. Mild basal septal thickening    2/7/2024 CT chest: small nodules on JONI, RUL, LLL and worsening consolidation/possible cavitation on the RLL    2/15/2024 CT chest: improvement of RLL consolidation, remaining small nodule on JONI, RUL that slightly improve. Worsening LLL consolidation. New small nodules on the RUL and JONI as well    2/21/2024 CT chest: continues to improve for RLL consolidation. Worsening LLL consolidation with a possible reverse halo sign. Stable and slightly improvement on the JONI, RUL nodules.     VQ scan: indetermined.  Duppler: negative DVT    Review of systems:  12 point review of systems was completed and was otherwise negative except as listed in HPI.      Historical Information   Past Medical  History:   Diagnosis Date    Diabetes mellitus (HCC)     Myocardial infarction (HCC)      Past Surgical History:   Procedure Laterality Date    CARDIAC CATHETERIZATION N/A 05/03/2023    Procedure: Cardiac Coronary Angiogram;  Surgeon: Valeriy Shore MD;  Location: [facility];  Service: Cardiology    CARDIAC CATHETERIZATION Left 05/03/2023    Procedure: Cardiac Left Heart Cath;  Surgeon: Valeriy Shore MD;  Location: [facility];  Service: Cardiology    CARDIAC DEFIBRILLATOR PLACEMENT  05/2023    CARDIAC ELECTROPHYSIOLOGY PROCEDURE N/A 05/12/2023    Procedure: Cardiac icd implant;  Surgeon: Urbano Maria MD;  Location: [facility];  Service: Cardiology    CARDIAC ELECTROPHYSIOLOGY PROCEDURE N/A 2/20/2024    Procedure: EV ICD IMPLANTATION;  Surgeon: Arturo Wooten DO;  Location: [facility] MAIN [facility];  Service: Cardiology    IR LOWER EXTREMITY ANGIOGRAM  1/18/2024    ID RMVL TRANSVNS PM ELTRD DUAL LEAD SYS N/A 2/20/2024    Procedure: EV ICD IMPLANTATION;  Surgeon: Abhilash Ferrera MD;  Location: [facility];  Service: Cardiac Surgery    ID RMVL TRANSVNS PM ELTRD DUAL LEAD SYS N/A 2/21/2024    Procedure: REMOVAL OF LEAD AND GENERATOR AND ATTEMPTED LEAD REVISION;  Surgeon: Abhilash Ferrera MD;  Location: [facility];  Service: Cardiac Surgery    WOUND DEBRIDEMENT Left 2/4/2024    Procedure: LEFT FOURTH TOE AMPUTATION SURGICAL WOUND DEBRIDEMENT FOOT/TOE (WASH OUT);  Surgeon: Bebo Hopper DPM;  Location: [facility];  Service: Podiatry     History reviewed. No pertinent family history.        Social History: 45+ year smoking hx, 1 pack per day  Social History     Tobacco Use   Smoking Status Every Day    Types: Cigarettes   Smokeless Tobacco Never   Tobacco Comments    X2 cigarettes/day         Meds/Allergies   Current Facility-Administered Medications   Medication Dose Route Frequency    acetaminophen (TYLENOL) tablet 650 mg  650 mg Oral Q8H TEJAS    ALPRAZolam (XANAX) tablet 0.5 mg  0.5 mg Oral HS PRN     amiodarone tablet 200 mg  200 mg Oral Daily With Breakfast    aspirin chewable tablet 81 mg  81 mg Oral Daily    atorvastatin (LIPITOR) tablet 80 mg  80 mg Oral After Dinner    ceFAZolin (ANCEF) IVPB (premix in dextrose) 2,000 mg 50 mL  2,000 mg Intravenous Q6H    clopidogrel (PLAVIX) tablet 75 mg  75 mg Oral Daily    Diclofenac Sodium (VOLTAREN) 1 % topical gel 2 g  2 g Topical 4x Daily    furosemide (LASIX) injection 40 mg  40 mg Intravenous BID (diuretic)    heparin (porcine) subcutaneous injection 5,000 Units  5,000 Units Subcutaneous Q8H TEJAS    HYDROmorphone (DILAUDID) injection 0.5 mg  0.5 mg Intravenous Q6H PRN    insulin glargine (LANTUS) subcutaneous injection 35 Units 0.35 mL  35 Units Subcutaneous HS    insulin lispro (HumaLOG) 100 units/mL subcutaneous injection 1-5 Units  1-5 Units Subcutaneous 4x Daily (AC & HS)    insulin lispro (HumaLOG) 100 units/mL subcutaneous injection 7 Units  7 Units Subcutaneous TID With Meals    isosorbide dinitrate (ISORDIL) tablet 10 mg  10 mg Oral TID after meals    melatonin tablet 3 mg  3 mg Oral HS    metoprolol succinate (TOPROL-XL) 24 hr tablet 25 mg  25 mg Oral Q12H    oxyCODONE (ROXICODONE) IR tablet 5 mg  5 mg Oral Q6H PRN    polyethylene glycol (MIRALAX) packet 17 g  17 g Oral Daily    saccharomyces boulardii (FLORASTOR) capsule 250 mg  250 mg Oral BID    senna-docusate sodium (SENOKOT S) 8.6-50 mg per tablet 2 tablet  2 tablet Oral BID     Medications Prior to Admission   Medication    aspirin 81 MG tablet    BD Pen Needle Micro U/F 32G X 6 MM MISC    clopidogrel (PLAVIX) 75 mg tablet    insulin detemir (Levemir FlexPen) 100 Units/mL injection pen    JANUMET -1000 MG TB24    losartan (COZAAR) 25 mg tablet    metFORMIN (GLUCOPHAGE) 1000 MG tablet    spironolactone (ALDACTONE) 25 mg tablet     Allergies   Allergen Reactions    Vancomycin Rash     NOT AN ALLERGY - 1/31/24 patient experienced vancomycin infusion reaction, please extend duration of infusion time  to prevent future infusion reactions       Vitals: Blood pressure 149/83, pulse 80, temperature 97.8 °F (36.6 °C), resp. rate 18, height 6' (1.829 m), weight 114 kg (251 lb), SpO2 96%., RA, Body mass index is 34.04 kg/m².      Intake/Output Summary (Last 24 hours) at 2/23/2024 0755  Last data filed at 2/23/2024 0516  Gross per 24 hour   Intake 1520 ml   Output 3680 ml   Net -2160 ml       Physical Exam  Constitutional:       Appearance: He is obese. He is not ill-appearing.   Cardiovascular:      Rate and Rhythm: Normal rate and regular rhythm.      Pulses: Normal pulses.   Pulmonary:      Effort: Pulmonary effort is normal. No respiratory distress.   Abdominal:      General: Abdomen is flat.      Palpations: Abdomen is soft.   Musculoskeletal:      Comments: Lt lower leg covered. Noted post procedure   Skin:     General: Skin is warm.   Neurological:      General: No focal deficit present.      Mental Status: He is alert and oriented to person, place, and time.         Labs: I have personally reviewed pertinent lab results.  Laboratory and Diagnostics  Results from last 7 days   Lab Units 02/23/24  0000 02/22/24  1031 02/21/24  0443 02/20/24  0504 02/19/24  0451 02/18/24  0500 02/17/24 0444   WBC Thousand/uL 11.96* 14.46* 12.19* 11.08* 11.34* 12.14* 11.06*   HEMOGLOBIN g/dL 9.5* 9.7* 10.5* 10.0* 10.1* 9.5* 9.2*   HEMATOCRIT % 30.8* 30.9* 33.5* 32.6* 33.1* 30.6* 28.9*   PLATELETS Thousands/uL 372 378 425* 423* 445* 409* 403*   NEUTROS PCT % 80*  --   --   --   --   --   --    MONOS PCT % 8  --   --   --   --   --   --    EOS PCT % 1  --   --   --   --   --   --      Results from last 7 days   Lab Units 02/23/24  0458 02/22/24  1031 02/21/24  1100 02/21/24  0443 02/20/24  0504 02/19/24  0451 02/18/24  1126 02/18/24  0502   SODIUM mmol/L 135 133*  --  132* 135 134* 129* 129*   POTASSIUM mmol/L 4.7 5.0 5.1 5.8* 4.8 5.0 5.3 6.0*   CHLORIDE mmol/L 98 100  --  100 101 101 99 101   CO2 mmol/L 31 29  --  24 29 26 26 22    ANION GAP mmol/L 6 4  --  8 5 7 4 6   BUN mg/dL 41* 42*  --  38* 29* 27* 31* 31*   CREATININE mg/dL 2.02* 2.01*  --  1.76* 1.52* 1.51* 1.44* 1.42*   CALCIUM mg/dL 7.9* 7.7*  --  8.4 8.3* 8.3* 8.2* 8.1*   GLUCOSE RANDOM mg/dL 69 171*  --  158* 87 67 255* 85   ALT U/L <3*  --   --   --   --   --   --   --    AST U/L 11*  --   --   --   --   --   --   --    ALK PHOS U/L 46  --   --   --   --   --   --   --    ALBUMIN g/dL 2.4*  --   --   --   --   --   --   --    TOTAL BILIRUBIN mg/dL 0.25  --   --   --   --   --   --   --      Results from last 7 days   Lab Units 02/23/24  0458 02/19/24  0451   MAGNESIUM mg/dL 1.8* 2.2   PHOSPHORUS mg/dL 5.1*  --       Results from last 7 days   Lab Units 02/19/24  1345   INR  1.15   PTT seconds 30                                      Imaging and other studies: I have personally reviewed pertinent reports.    XR foot left 3+ views    Result Date: 2/6/2024  Impression: Postoperative change reflecting recent partial left fourth ray amputation as noted. Workstation performed: [id number]     MRI brain wo contrast    Result Date: 2/1/2024  Impression: No acute infarction, intracranial hemorrhage or mass effect Workstation performed: [id number]     XR foot 3+ views LEFT    Result Date: 2/1/2024  Impression: No radiographic evidence of osteomyelitis. Workstation performed: [id number]     XR chest 2 views    Result Date: 1/31/2024  Impression: No acute cardiopulmonary disease. Workstation performed: [id number]     CT stroke alert brain    Result Date: 1/31/2024  Impression: No acute intracranial abnormality. Chronic microangiopathic ischemic changes. Findings were directly discussed with Dr. Maria Juan  at 10:03 a.m. Workstation performed: [id number]     CTA stroke alert (head/neck)    Result Date: 1/31/2024  Impression: 1. CTA head: Negative for large vessel intracranial occlusion. Moderate bilateral supraclinoid ICA and moderate to severe left cavernous ICA segment stenosis. 2. CTA  neck:  No extracranial carotid stenosis.  The cervical vertebral arteries are patent. Findings were directly discussed with Dr. Maria Juan  at 10:03 a.m. Workstation performed: [id number]         Code Status: Level 1 - Full Code        Ion Jonas MD  Pulmonary/Critical Care Fellow  [facility]

## 2024-02-23 NOTE — CASE MANAGEMENT
Case Management Progress Note    Patient name Nadir Myers  Location [facility] 519/[facility] 519-01 MRN [id number]  : 1960 Date 2024       LOS (days): 23  Geometric Mean LOS (GMLOS) (days): 9.9  Days to GMLOS:-13.1        Pt's LOS is 23 days. Pt is not yet ready for d/c. Pt was admitted for Septic Shock, Gangrene of Left 4th Toe, and is S/P Amputation of Left 4th Digit and S/P Partial Amputation of Left 4th Ray. Pt requires IV ABX through 3/25/24 for MSSA Bacteremia. Pt also required an ICD placement for Ischemic Cardiomyopathy, but was unsuccessful and is S/P ICD Removal due to unsuccessful attempts at lead placement.    Pt remains recommended to have Home Health Care services via a VNA for his aftercare plan. Pt has been referred to and accepted for services by [facility]. Pt will also require a referral to [facility] for administration of IV ABX at home. Once pt is closer to being ready for d/c, CM will obtain Rx for IV ABX from Infectious Disease and make AIDIN referral to [facility].    Once pt is medically cleared for d/c by [facility], pt will d/c to home and have [facility] and [facility] for his aftercare providers. [facility] day [facility] to initiate home infusion of IV ABX.    CM to follow.

## 2024-02-24 LAB
ANION GAP SERPL CALCULATED.3IONS-SCNC: 7 MMOL/L
BASOPHILS # BLD AUTO: 0.03 THOUSANDS/ÂΜL (ref 0–0.1)
BASOPHILS NFR BLD AUTO: 0 % (ref 0–1)
BUN SERPL-MCNC: 42 MG/DL (ref 5–25)
CALCIUM SERPL-MCNC: 8.1 MG/DL (ref 8.4–10.2)
CHLORIDE SERPL-SCNC: 98 MMOL/L (ref 96–108)
CO2 SERPL-SCNC: 31 MMOL/L (ref 21–32)
CREAT SERPL-MCNC: 2.2 MG/DL (ref 0.6–1.3)
EOSINOPHIL # BLD AUTO: 0.06 THOUSAND/ÂΜL (ref 0–0.61)
EOSINOPHIL NFR BLD AUTO: 1 % (ref 0–6)
ERYTHROCYTE [DISTWIDTH] IN BLOOD BY AUTOMATED COUNT: 13.8 % (ref 11.6–15.1)
GFR SERPL CREATININE-BSD FRML MDRD: 30 ML/MIN/1.73SQ M
GLUCOSE SERPL-MCNC: 104 MG/DL (ref 65–140)
GLUCOSE SERPL-MCNC: 161 MG/DL (ref 65–140)
GLUCOSE SERPL-MCNC: 291 MG/DL (ref 65–140)
GLUCOSE SERPL-MCNC: 90 MG/DL (ref 65–140)
HCT VFR BLD AUTO: 30.3 % (ref 36.5–49.3)
HGB BLD-MCNC: 9.3 G/DL (ref 12–17)
IMM GRANULOCYTES # BLD AUTO: 0.1 THOUSAND/UL (ref 0–0.2)
IMM GRANULOCYTES NFR BLD AUTO: 1 % (ref 0–2)
LYMPHOCYTES # BLD AUTO: 1.32 THOUSANDS/ÂΜL (ref 0.6–4.47)
LYMPHOCYTES NFR BLD AUTO: 13 % (ref 14–44)
MCH RBC QN AUTO: 28 PG (ref 26.8–34.3)
MCHC RBC AUTO-ENTMCNC: 30.7 G/DL (ref 31.4–37.4)
MCV RBC AUTO: 91 FL (ref 82–98)
MONOCYTES # BLD AUTO: 0.66 THOUSAND/ÂΜL (ref 0.17–1.22)
MONOCYTES NFR BLD AUTO: 7 % (ref 4–12)
NEUTROPHILS # BLD AUTO: 7.66 THOUSANDS/ÂΜL (ref 1.85–7.62)
NEUTS SEG NFR BLD AUTO: 78 % (ref 43–75)
NRBC BLD AUTO-RTO: 0 /100 WBCS
PLATELET # BLD AUTO: 344 THOUSANDS/UL (ref 149–390)
PMV BLD AUTO: 9 FL (ref 8.9–12.7)
POTASSIUM SERPL-SCNC: 4.4 MMOL/L (ref 3.5–5.3)
RBC # BLD AUTO: 3.32 MILLION/UL (ref 3.88–5.62)
SODIUM SERPL-SCNC: 136 MMOL/L (ref 135–147)
WBC # BLD AUTO: 9.83 THOUSAND/UL (ref 4.31–10.16)

## 2024-02-24 PROCEDURE — 99233 SBSQ HOSP IP/OBS HIGH 50: CPT | Performed by: INTERNAL MEDICINE

## 2024-02-24 PROCEDURE — 85025 COMPLETE CBC W/AUTO DIFF WBC: CPT | Performed by: INTERNAL MEDICINE

## 2024-02-24 PROCEDURE — 87305 ASPERGILLUS AG IA: CPT | Performed by: INTERNAL MEDICINE

## 2024-02-24 PROCEDURE — 87449 NOS EACH ORGANISM AG IA: CPT | Performed by: INTERNAL MEDICINE

## 2024-02-24 PROCEDURE — 82948 REAGENT STRIP/BLOOD GLUCOSE: CPT

## 2024-02-24 PROCEDURE — 99232 SBSQ HOSP IP/OBS MODERATE 35: CPT | Performed by: INTERNAL MEDICINE

## 2024-02-24 PROCEDURE — 97168 OT RE-EVAL EST PLAN CARE: CPT

## 2024-02-24 PROCEDURE — 80048 BASIC METABOLIC PNL TOTAL CA: CPT | Performed by: INTERNAL MEDICINE

## 2024-02-24 PROCEDURE — 99024 POSTOP FOLLOW-UP VISIT: CPT | Performed by: INTERNAL MEDICINE

## 2024-02-24 RX ORDER — HYDRALAZINE HYDROCHLORIDE 50 MG/1
50 TABLET, FILM COATED ORAL EVERY 8 HOURS SCHEDULED
Status: DISCONTINUED | OUTPATIENT
Start: 2024-02-24 | End: 2024-02-28

## 2024-02-24 RX ORDER — METOLAZONE 5 MG/1
2.5 TABLET ORAL DAILY
Status: DISCONTINUED | OUTPATIENT
Start: 2024-02-24 | End: 2024-02-26

## 2024-02-24 RX ADMIN — HEPARIN SODIUM 5000 UNITS: 5000 INJECTION INTRAVENOUS; SUBCUTANEOUS at 15:10

## 2024-02-24 RX ADMIN — ISOSORBIDE DINITRATE 20 MG: 20 TABLET ORAL at 17:04

## 2024-02-24 RX ADMIN — Medication 250 MG: at 17:05

## 2024-02-24 RX ADMIN — INSULIN GLARGINE 35 UNITS: 100 INJECTION, SOLUTION SUBCUTANEOUS at 21:28

## 2024-02-24 RX ADMIN — METOLAZONE 2.5 MG: 5 TABLET ORAL at 11:09

## 2024-02-24 RX ADMIN — SENNOSIDES, DOCUSATE SODIUM 2 TABLET: 8.6; 5 TABLET ORAL at 17:04

## 2024-02-24 RX ADMIN — OXYCODONE HYDROCHLORIDE 5 MG: 5 TABLET ORAL at 17:06

## 2024-02-24 RX ADMIN — ISOSORBIDE DINITRATE 20 MG: 20 TABLET ORAL at 07:49

## 2024-02-24 RX ADMIN — AMIODARONE HYDROCHLORIDE 200 MG: 200 TABLET ORAL at 07:50

## 2024-02-24 RX ADMIN — INSULIN LISPRO 7 UNITS: 100 INJECTION, SOLUTION INTRAVENOUS; SUBCUTANEOUS at 17:06

## 2024-02-24 RX ADMIN — FUROSEMIDE 60 MG: 10 INJECTION, SOLUTION INTRAMUSCULAR; INTRAVENOUS at 15:10

## 2024-02-24 RX ADMIN — HYDRALAZINE HYDROCHLORIDE 25 MG: 25 TABLET, FILM COATED ORAL at 05:41

## 2024-02-24 RX ADMIN — ISOSORBIDE DINITRATE 20 MG: 20 TABLET ORAL at 11:33

## 2024-02-24 RX ADMIN — CEFAZOLIN SODIUM 2000 MG: 2 SOLUTION INTRAVENOUS at 21:31

## 2024-02-24 RX ADMIN — Medication 3 MG: at 21:27

## 2024-02-24 RX ADMIN — METOPROLOL SUCCINATE 25 MG: 25 TABLET, EXTENDED RELEASE ORAL at 17:32

## 2024-02-24 RX ADMIN — ATORVASTATIN CALCIUM 80 MG: 80 TABLET, FILM COATED ORAL at 17:05

## 2024-02-24 RX ADMIN — CEFAZOLIN SODIUM 2000 MG: 2 SOLUTION INTRAVENOUS at 15:09

## 2024-02-24 RX ADMIN — HYDRALAZINE HYDROCHLORIDE 50 MG: 50 TABLET ORAL at 21:27

## 2024-02-24 RX ADMIN — ACETAMINOPHEN 650 MG: 325 TABLET, FILM COATED ORAL at 21:28

## 2024-02-24 RX ADMIN — INSULIN LISPRO 7 UNITS: 100 INJECTION, SOLUTION INTRAVENOUS; SUBCUTANEOUS at 07:46

## 2024-02-24 RX ADMIN — INSULIN LISPRO 1 UNITS: 100 INJECTION, SOLUTION INTRAVENOUS; SUBCUTANEOUS at 21:30

## 2024-02-24 RX ADMIN — CLOPIDOGREL BISULFATE 75 MG: 75 TABLET ORAL at 08:31

## 2024-02-24 RX ADMIN — Medication 250 MG: at 08:31

## 2024-02-24 RX ADMIN — FUROSEMIDE 60 MG: 10 INJECTION, SOLUTION INTRAMUSCULAR; INTRAVENOUS at 08:33

## 2024-02-24 RX ADMIN — INSULIN LISPRO 3 UNITS: 100 INJECTION, SOLUTION INTRAVENOUS; SUBCUTANEOUS at 17:05

## 2024-02-24 RX ADMIN — POLYETHYLENE GLYCOL 3350 17 G: 17 POWDER, FOR SOLUTION ORAL at 08:31

## 2024-02-24 RX ADMIN — Medication 2 G: at 08:32

## 2024-02-24 RX ADMIN — INSULIN LISPRO 1 UNITS: 100 INJECTION, SOLUTION INTRAVENOUS; SUBCUTANEOUS at 11:34

## 2024-02-24 RX ADMIN — METOPROLOL SUCCINATE 25 MG: 25 TABLET, EXTENDED RELEASE ORAL at 05:41

## 2024-02-24 RX ADMIN — ASPIRIN 81 MG CHEWABLE TABLET 81 MG: 81 TABLET CHEWABLE at 08:31

## 2024-02-24 RX ADMIN — HYDRALAZINE HYDROCHLORIDE 50 MG: 50 TABLET ORAL at 15:09

## 2024-02-24 RX ADMIN — ACETAMINOPHEN 650 MG: 325 TABLET, FILM COATED ORAL at 05:41

## 2024-02-24 RX ADMIN — CEFAZOLIN SODIUM 2000 MG: 2 SOLUTION INTRAVENOUS at 05:38

## 2024-02-24 RX ADMIN — SENNOSIDES, DOCUSATE SODIUM 2 TABLET: 8.6; 5 TABLET ORAL at 08:31

## 2024-02-24 RX ADMIN — ACETAMINOPHEN 650 MG: 325 TABLET, FILM COATED ORAL at 15:09

## 2024-02-24 RX ADMIN — HEPARIN SODIUM 5000 UNITS: 5000 INJECTION INTRAVENOUS; SUBCUTANEOUS at 21:27

## 2024-02-24 RX ADMIN — INSULIN LISPRO 7 UNITS: 100 INJECTION, SOLUTION INTRAVENOUS; SUBCUTANEOUS at 11:33

## 2024-02-24 RX ADMIN — HEPARIN SODIUM 5000 UNITS: 5000 INJECTION INTRAVENOUS; SUBCUTANEOUS at 05:41

## 2024-02-24 NOTE — ASSESSMENT & PLAN NOTE
Pleuritic pain in left lower chest wall since 2/20. Tender to touch. Suspect secondary to ICD manipulation +/- septic pulmonary emboli  EKG with no acute ischemic changes.  Voltaren gel/lidocaine patch/prn pain meds. Improving

## 2024-02-24 NOTE — PROGRESS NOTES
Progress Note - Nephrology   Nadir Myers 63 y.o. male MRN: [id number]  Unit/Bed#: [facility] 519-01 Encounter: [id number]      Assessment / Plan:  LINDEN likely d/t ATN in setting of surgery/infection, s/p IV dye 2/15, b/l sCr 1  -sCr up to 2.2, higher on  IV diuresis per cardiology  -would continue to hold jardiance and aldactone  -UA with microscopy shows +glucose, large blood, +WBCs, 2+ protein, innumerable RBCs, 4-10 WBCs, 0-3 hyaline casts  -continue to monitor renal indices on lasix 60mg IV BID(dose increased by HF team today)  -avoid ARB/ACEI for now in light of LINDEN  Sepsis d/t MSSA bacteremia and ICD infection s/p extraction , extravascular ICD placement  and revision , on IV Abx   Systolic CHF, EF 35% - monitor daily weight on IV diuresis per cardiology, metolazone 2.5mg daily added today  Left 4th toe gangrene s/p amputation   Hyponatremia - sNa 136, resolved on diuretics, monitor BMP  Anemia - Hgb 9.3, monitor CBC, transfuse prn  Hyperkalemia - resolved off aldactone, monitor on IV lasix        Subjective:   He denies chest pain or shortness of breath.  Does have ongoing leg swelling.  No other complaints.      Objective:     Vitals: Blood pressure 149/87, pulse 78, temperature (!) 97.4 °F (36.3 °C), temperature source Oral, resp. rate 16, height 6' (1.829 m), weight 113 kg (250 lb), SpO2 95%.,Body mass index is 33.91 kg/m².Temp (24hrs), Av.8 °F (36.6 °C), Min:97.4 °F (36.3 °C), Max:98.6 °F (37 °C)      Weight (last 2 days)       Date/Time Weight    24 0314 113 (250)    24 0600 115 (254.3)    24 0512 114 (251)              Intake/Output Summary (Last 24 hours) at 2024 1305  Last data filed at 2024 1203  Gross per 24 hour   Intake 837 ml   Output 2375 ml   Net -1538 ml     I/O last 24 hours:  In: 1317 [P.O.:1242; IV Piggyback:75]  Out: 3075 [Urine:3075]        Physical Exam:   Physical Exam  Vitals reviewed.   Constitutional:       General: He is not in acute  distress.     Appearance: He is well-developed. He is obese. He is not diaphoretic.   HENT:      Head: Normocephalic and atraumatic.      Nose: Nose normal.      Mouth/Throat:      Mouth: Mucous membranes are moist.      Pharynx: No oropharyngeal exudate.   Eyes:      General: No scleral icterus.        Right eye: No discharge.         Left eye: No discharge.   Neck:      Thyroid: No thyromegaly.   Cardiovascular:      Rate and Rhythm: Normal rate and regular rhythm.      Heart sounds: Normal heart sounds.   Pulmonary:      Effort: Pulmonary effort is normal.      Breath sounds: Normal breath sounds. No wheezing or rales.   Abdominal:      General: Bowel sounds are normal. There is no distension.      Palpations: Abdomen is soft.      Tenderness: There is no abdominal tenderness.   Genitourinary:     Comments: chatman  Musculoskeletal:         General: Swelling (b/l LE, left > right hand edema) present. Normal range of motion.      Cervical back: Neck supple.   Lymphadenopathy:      Cervical: No cervical adenopathy.   Skin:     General: Skin is warm and dry.      Coloration: Skin is not jaundiced.      Findings: No rash.   Neurological:      General: No focal deficit present.      Mental Status: He is alert.      Comments: awake   Psychiatric:         Mood and Affect: Mood normal.         Behavior: Behavior normal.         Invasive Devices       Peripherally Inserted Central Catheter Line  Duration             PICC Line 02/22/24 Right Basilic 2 days              Drain  Duration             Urethral Catheter Latex 16 Fr. 4 days                    Medications:    Scheduled Meds:  Current Facility-Administered Medications   Medication Dose Route Frequency Provider Last Rate    acetaminophen  650 mg Oral Q8H TEJAS Kip Cabello MD      ALPRAZolam  0.5 mg Oral HS PRN Kip Cabello MD      amiodarone  200 mg Oral Daily With Breakfast Yuniel Mcgee PA-C      aspirin  81 mg Oral Daily Yuniel Mcgee PA-C       atorvastatin  80 mg Oral After Dinner Yuniel Mcgee PA-C      cefazolin  2,000 mg Intravenous Q8H Zach Medina MD 2,000 mg (02/24/24 0538)    clopidogrel  75 mg Oral Daily Yuniel Mcgee PA-C      Diclofenac Sodium  2 g Topical 4x Daily Kip Cabello MD      furosemide  60 mg Intravenous BID (diuretic) Nash Samuels DO      heparin (porcine)  5,000 Units Subcutaneous Q8H [facility] Kip Cabello MD      hydrALAZINE  50 mg Oral Q8H [facility] Nash Samuels DO      HYDROmorphone  0.5 mg Intravenous Q6H PRN Kip Cabello MD      insulin glargine  35 Units Subcutaneous HS Yuniel Mcgee PA-C      insulin lispro  1-5 Units Subcutaneous 4x Daily (AC & HS) Yuniel Mcgee PA-C      insulin lispro  7 Units Subcutaneous TID With Meals Yuniel Mcgee PA-C      isosorbide dinitrate  20 mg Oral TID after meals Nash Samuels DO      melatonin  3 mg Oral HS Kip Cabello MD      metolazone  2.5 mg Oral Daily Nash Samuels DO      metoprolol succinate  25 mg Oral Q12H Yuniel Mcgee PA-C      oxyCODONE  5 mg Oral Q6H PRN Yuniel Mcgee PA-C      polyethylene glycol  17 g Oral Daily Yuniel Mcgee PA-C      saccharomyces boulardii  250 mg Oral BID Yuniel Mcgee PA-C      senna-docusate sodium  2 tablet Oral BID Yuniel Mcgee PA-C         PRN Meds:.  ALPRAZolam    HYDROmorphone    oxyCODONE    Continuous Infusions:         LAB RESULTS:      Results from last 7 days   Lab Units 02/24/24  0541 02/23/24  0458 02/23/24  0000 02/22/24  1031 02/21/24  1100 02/21/24  0443 02/20/24  0504 02/19/24  0451 02/18/24  1126 02/18/24  0502 02/18/24  0500   WBC Thousand/uL 9.83  --  11.96* 14.46*  --  12.19* 11.08* 11.34*  --   --  12.14*   HEMOGLOBIN g/dL 9.3*  --  9.5* 9.7*  --  10.5* 10.0* 10.1*  --   --  9.5*   HEMATOCRIT % 30.3*  --  30.8* 30.9*  --  33.5* 32.6* 33.1*  --   --  30.6*   PLATELETS Thousands/uL 344  --  372 378  --  425* 423* 445*  --   --  409*   NEUTROS PCT % 78*  --  80*   "--   --   --   --   --   --   --   --    LYMPHS PCT % 13*  --  10*  --   --   --   --   --   --   --   --    MONOS PCT % 7  --  8  --   --   --   --   --   --   --   --    EOS PCT % 1  --  1  --   --   --   --   --   --   --   --    POTASSIUM mmol/L 4.4 4.7  --  5.0 5.1 5.8* 4.8 5.0 5.3   < >  --    CHLORIDE mmol/L 98 98  --  100  --  100 101 101 99   < >  --    CO2 mmol/L 31 31  --  29  --  24 29 26 26   < >  --    BUN mg/dL 42* 41*  --  42*  --  38* 29* 27* 31*   < >  --    CREATININE mg/dL 2.20* 2.02*  --  2.01*  --  1.76* 1.52* 1.51* 1.44*   < >  --    CALCIUM mg/dL 8.1* 7.9*  --  7.7*  --  8.4 8.3* 8.3* 8.2*   < >  --    ALK PHOS U/L  --  46  --   --   --   --   --   --   --   --   --    ALT U/L  --  <3*  --   --   --   --   --   --   --   --   --    AST U/L  --  11*  --   --   --   --   --   --   --   --   --    MAGNESIUM mg/dL  --  1.8*  --   --   --   --   --  2.2  --   --   --    PHOSPHORUS mg/dL  --  5.1*  --   --   --   --   --   --   --   --   --     < > = values in this interval not displayed.       CUTURES:  Lab Results   Component Value Date    BLOODCX No Growth After 5 Days. 02/16/2024    BLOODCX No Growth After 5 Days. 02/16/2024    BLOODCX No Growth After 5 Days. 02/12/2024    BLOODCX No Growth After 5 Days. 02/12/2024    BLOODCX No Growth After 5 Days. 02/09/2024    BLOODCX Staphylococcus aureus (A) 02/09/2024    BLOODCX Staphylococcus aureus (A) 02/08/2024                 Portions of the record may have been created with voice recognition software. Occasional wrong word or \"sound a like\" substitutions may have occurred due to the inherent limitations of voice recognition software. Read the chart carefully and recognize, using context, where substitutions have occurred.If you have any questions, please contact the dictating provider.    "

## 2024-02-24 NOTE — ASSESSMENT & PLAN NOTE
CT 2/7 shows small consolidation in the upper lobes and left lower lobe with enhancing lesion in the peripheral of right lower lobe, may be embolic versus hematogenous seeding from high-grade bacteremia.  there is no evident of pulmonary embolism to suggest that this is an infarct.   Repeat CT 2/15 revealed possible septic emboli, gas and fluid subcutaneous collection in the pacemaker bed likely postsurgical, reactive mild mediastinal lymphadenopathy.  CT chest without contrast  2/21: Peripheral left lower lobe opacity with reverse halo sign appearance. This can be seen in the setting of pulmonary infarct   Doppler US is negative for DVT  VQ Scan is intermediate for PE  TTE 2/21 with normal right ventricle function  Unable to obtain PE study to confirm/rule out given LINDEN. Suspicion remains higher for septic embolism. Given hemodynamic stability with only 1L NC oxygen requirement due to volume overload, would hold off on AC for now.   Appreciate pulmonology input  Fungal workup pending.

## 2024-02-24 NOTE — OCCUPATIONAL THERAPY NOTE
Occupational Therapy Re-Evaluation      Nadir GARCIA Myers    2/24/2024    Principal Problem:    Septic shock (HCC)  Active Problems:    Type 2 diabetes mellitus, with long-term current use of insulin ([facility])    V-tach (HCC)    Hyponatremia    Chronic systolic CHF (HCC)    Coronary artery disease involving native coronary artery of native heart without angina pectoris    PAD (peripheral artery disease) (HCC)    LUE weakness    MSSA bacteremia    Gangrene of toe of left foot (HCC)    Electrolyte abnormalities    Abnormal CT of the chest    LINDEN (acute kidney injury) ([facility])    Constipation    ICD (implantable cardioverter-defibrillator) in place    Pleuritic chest pain      Past Medical History:   Diagnosis Date    Diabetes mellitus (HCC)     Myocardial infarction (HCC)        Past Surgical History:   Procedure Laterality Date    CARDIAC CATHETERIZATION N/A 05/03/2023    Procedure: Cardiac Coronary Angiogram;  Surgeon: Valeriy Shore MD;  Location: [facility]ARD[facility];  Service: Cardiology    CARDIAC CATHETERIZATION Left 05/03/2023    Procedure: Cardiac Left Heart Cath;  Surgeon: Valeriy Shore MD;  Location: [facility]ARD[facility];  Service: Cardiology    CARDIAC DEFIBRILLATOR PLACEMENT  05/2023    CARDIAC ELECTROPHYSIOLOGY PROCEDURE N/A 05/12/2023    Procedure: Cardiac icd implant;  Surgeon: Urbano Maria MD;  Location: [facility]ARD[facility];  Service: Cardiology    CARDIAC ELECTROPHYSIOLOGY PROCEDURE N/A 2/20/2024    Procedure: EV ICD IMPLANTATION;  Surgeon: Arturo Wooten DO;  Location: [facility] MAIN OR;  Service: Cardiology    IR LOWER EXTREMITY ANGIOGRAM  1/18/2024    TX RMVL TRANSVNS PM ELTRD DUAL LEAD SYS N/A 2/20/2024    Procedure: EV ICD IMPLANTATION;  Surgeon: Abhilash Ferrera MD;  Location: [facility] MAIN [facility];  Service: Cardiac Surgery    TX RMVL TRANSVNS PM ELTRD DUAL LEAD SYS N/A 2/21/2024    Procedure: REMOVAL OF LEAD AND GENERATOR AND ATTEMPTED LEAD REVISION;  Surgeon: Abhilash Ferrera MD;  Location: [facility] MAIN [facility];  Service:  "Cardiac Surgery    WOUND DEBRIDEMENT Left 2/4/2024    Procedure: LEFT FOURTH TOE AMPUTATION SURGICAL WOUND DEBRIDEMENT FOOT/TOE (WASH OUT);  Surgeon: Bebo Hopper DPM;  Location: [facility];  Service: Podiatry        02/24/24 1021   OT Last Visit   OT Visit Date 02/24/24   Note Type   Note type Re-Evaluation   Pain Assessment   Pain Assessment Tool 0-10   Pain Score 5   Pain Location/Orientation Orientation: Left;Location: Incision;Location: Generalized   Restrictions/Precautions   Weight Bearing Precautions Per Order Yes   LLE Weight Bearing Per Order WBAT   Braces or Orthoses   (surgical shoe R LE, LLE toe off loading shoe)   Other Precautions Fall Risk;Pain;WBS;Cardiac/sternal  (pacer 2/20)   Home Living   Type of Home Mobile home   Home Layout One level   Bathroom Shower/Tub Walk-in shower   Bathroom Toilet Standard   Bathroom Equipment Built-in shower seat   Home Equipment Cane   Prior Function   Level of [place] Independent with ADLs;Independent with functional mobility;Independent with IADLS   Lives With Spouse;Son   Receives Help From Family   IADLs Independent with driving;Independent with meal prep;Independent with medication management   Falls in the last 6 months 0   Lifestyle   Autonomy Pt reports I w/ ADLs, IADLs, and fxnl mobility w/ SPC PRN at baseline; + driving.   Reciprocal Relationships Pt lives w/ his spouse and son   Intrinsic Gratification Pt enjoys walking and working on cars   Subjective   Subjective \"I am much weaker now\"   ADL   Where Assessed Chair   Eating Assistance 7  Independent   Grooming Assistance 7  Independent   Grooming Deficit Setup   UB Bathing Assistance 5  Supervision/Setup   UB Bathing Deficit Setup;Increased time to complete   LB Bathing Assistance 4  Minimal Assistance   LB Bathing Deficit Increased time to complete   UB Dressing Assistance 4  Minimal Assistance   UB Dressing Deficit Increased time to complete;Thread RUE;Pull over head;Pull around back   LB Dressing " Assistance 4  Minimal Assistance   LB Dressing Deficit Don/doff R sock;Don/doff L sock   Toileting Assistance  4  Minimal Assistance   Toileting Deficit Increased time to complete   Functional Assistance 4  Minimal Assistance   Bed Mobility   Additional Comments pt OOB in chair. per RN, pt able to get OOB this morning w minimal help   Transfers   Sit to Stand 5  Supervision   Additional items Verbal cues   Stand to Sit 5  Supervision   Additional items Verbal cues   Stand pivot 5  Supervision   Additional items Verbal cues   Functional Mobility   Functional Mobility 4  Minimal assistance   Additional Comments cues for self pacing   Additional items Rolling walker   Balance   Static Sitting Good   Dynamic Sitting Fair +   Static Standing Fair   Dynamic Standing Fair   Activity Tolerance   Activity Tolerance Patient limited by fatigue   RUE Assessment   RUE Assessment WFL   LUE Assessment   LUE Assessment X  (LUE swollen, limited AROM due to edema. unable to fully close hand)   Edema   LUE Edema +2   Hand Function   Gross Motor Coordination Functional   Fine Motor Coordination Impaired  (LUE due to edema)   Sensation   Light Touch No apparent deficits   Additional Comments LUE swollen, unable to make a fist   Vision - Complex Assessment   Tracking Intact   Psychosocial   Patient Behaviors/Mood Flat affect   Cognition   Overall Cognitive Status WFL   Arousal/Participation Alert;Cooperative   Attention Within functional limits   Orientation Level Oriented X4   Memory Within functional limits   Following Commands Follows all commands and directions without difficulty   Assessment   Limitation Decreased ADL status;Decreased endurance;Decreased self-care trans;Decreased high-level ADLs   Assessment Pt is a 63 y.o. male seen for OT evaluation s/p admit to [facility] on 1/31/2024 w/ Septic shock (HCC).  Pt was initially dc'd from OT on 2/12, being independent w self care. Since then, per CT: Development of large  infiltrate in the left lower lobe, and a few new peripherally oriented scattered densities. Improvement in some other densities. No cavitations but peripheral location is worrisome for septic emboli. Interval pacemaker removal. EV ICD placement on 2/20. Followed by EV ICE removal, unsuccessful attempts at lead placement on 2/21.  Upon re-min/mod assist for donning socks, min assist UE. He c/o LUE being swollen w decreased /unable to make a fist. Pt able to complete sit to stand w close S, increased time. Pt needing cues for self pacing and ECT. Also for encouragement as pt is somewhat self limiting. Pt to benefit from continued skilled OT tx while in the hospital to address deficits as defined above and maximize level of functional independence w ADL's and functional mobility. Occupational Performance areas to address include: bathing/shower, toilet hygiene, dressing, functional mobility, and clothing management.  The patient's raw score on the AM-PAC Daily Activity inpatient short form is 20, standardized score is 42.03, greater than 39.4. Patients at this level are likely to benefit from discharge to home. From OT standpoint, recommendation at time of d/c would be home w family support.   Goals   Patient Goals to get better   Plan   Treatment Interventions ADL retraining;Functional transfer training;Endurance training;Compensatory technique education;Energy conservation;Activityengagement   Goal Expiration Date 03/09/24   OT Frequency 2-3x/wk   Discharge Recommendation   Rehab Resource Intensity Level, OT III (Minimum Resource Intensity)   -PAC Daily Activity Inpatient   Lower Body Dressing 3   Bathing 3   Toileting 3   Upper Body Dressing 3   Grooming 4   Eating 4   Daily Activity Raw Score 20   Daily Activity Standardized Score (Calc for Raw Score >=11) 42.03     OT GOALS TO BE ACHIEVED IN 14 DAYS:    Patient will complete bed mobility mod I  With good safety     Pt will complete UB bathing and dressing  independently    Pt will complete LB bathing and dressing with supervision/use of AE PRN    Pt will complete toileting w mod I and good safety     Pt will complete functional transfers/mobility in room and bathroom mod I      Pt will tolerate standing at sinkside x 5 min w F+ balance for increased safety w hygiene    Pt will demonstrate good ECT/self pacing skills with all self care and functional mobility

## 2024-02-24 NOTE — PROGRESS NOTES
Heart Failure/ Pulmonary Hypertension Progress Note - Nadir Myers 63 y.o. male MRN: [id number]    Unit/Bed#: [facility] 519-01 Encounter: [id number]      Assessment:    Principal Problem:    Septic shock ([facility])  Active Problems:    Chronic systolic CHF ([facility])    Type 2 diabetes mellitus, with long-term current use of insulin ([facility])    V-tach ([facility])    Hyponatremia    Coronary artery disease involving native coronary artery of native heart without angina pectoris    PAD (peripheral artery disease) ([facility])    LUE weakness    MSSA bacteremia    Gangrene of toe of left foot ([facility])    Electrolyte abnormalities    Abnormal CT of the chest    LINDEN (acute kidney injury) ([facility])    Constipation    ICD (implantable cardioverter-defibrillator) in place    Pleuritic chest pain      Interim:  Ins/outs: 795/2275: -1480 w/   Weight: 250 lbs  Tele:  MAPs: 108 mmHg  K 4.4  Cr 2.20 ( baseline around 1.5)     Lead placement on EV ICD failed     # Chronic HFimpEF, Stage C, w/ decompensation due to medication hold  Impression: iCM with large apical aneurysm with progression of CAD/late presenting MI with occluded mid LAD and LPDA. LV mildly dilated. Started on milrinone 0.25mcg/kg/min 5/6/23 due to rising creatinine and worsening volume status, stopped 5/10/23. ,       Weight: 205 lbs on 6/5, 218 lbs today  BNP 2/20/24: 1809  6/2/23: 279     Studies- personally reviewed by me  EKG- narrow QRS with bigeminy; inferior infarct, anterolateral infarct     Echo 2/1/24:  LVEF: 40%, akinetic segments  LVEDd: 5.1 cm  RV: normal     Echo 8/7/23:  LVEF: 40%  LVEDd: 4.1 cm  LV apex aneurysmal, no thrombus  RV: normal     LHC 5/3/23: chronically occluded mid LAD and LPDA c/w echo findings of apical aneurysm, not amenable to PCI  Patent mid LCx stent   LVEDP 30 mmHg     Echocardiogram 5/2/23  LVEF:  10-15%, apical aneurysm  LVIDd: 5.6 cm  RV: normal  MR: mild  PASP: 49 mmHg     Echo 9/26/17:  LVEF: 65%     Neurohormonal Blockade:  --Beta-Blocker: metoprolol  tartrate 25 mg BID  --ACEi, ARB or ARNi: Entresto stopped due to hypotension    (or SVR reduction) isordil 20 mg TID/ hydralazine 25 mg TID  --Aldosterone Receptor Blocker: spironolactone 12.5 mg daily- onhold due to hyperkalemia  --SGLT2-I: Jardiance 10 mg daily- on hold  --Diuretic: stopped  Inpt: lasix 60 mg IV BID     Sudden Cardiac Death Risk Reduction:  MDT ICD 5/12/23 for secondary prevention  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 Moderately reduced RV function and mildly dilated LV concerning for LVAD. Mildly dilated LV also concerning given VT.   Has not smoked since May 2     # Staph bacteremia  GILL 2/7/24: There is a 1.8 x 1.1 cm mobile mass affixed to the cardiac device lead as is passes through the right atrium. In the setting of bacteremia this is most likely represents infection.   # New Q wave MI, later presenting or silent LAD territory infarction  # hyperlipidemia-atorvastatin 80 mg  9/16/23: LDL 51, HDL 68  # current tobacco use- quit since hospital  # Ventricular tachycardia- scar mediated  Rx: amiodarone 200 mg daily  # CKD, Cr improved to 1.05 on 7/3/23 labs, Cr 1.76 today  # hyponatremia- due to HF, , now up to 137 on 7/3/23  # DM2, HgA1C 7.1% on 9/16/23     TODAY'S PLAN:  Continue  lasix 60 mg IV BID  Holding on spironolactone due to hyperkalemia  Empa on hold due to LINDEN  Continue metoprolol  BP up but volume up, will see where he levels out, may be able to add ARNI or ARB later- cr too high to start today  Increase hydralazine to 50 mg TID for further SR reduction  Will need S- ICD as outpt  Home on [employer]   --2g sodium diet  - Daily weights    Review of Systems     Central Line (day,  reason):  Escobar catheter (day, reason):    Vitals: Blood pressure 149/88, pulse 81, temperature (!) 97.4 °F (36.3 °C), temperature source Oral, resp. rate 16, height 6' (1.829 m), weight 113 kg (250 lb), SpO2 95%., Body mass index is 33.91 kg/m²., I/O last 3 completed shifts:  In: 1110 [P.O.:960; IV Piggyback:150]  Out: 3655 [Urine:3655]  I/O this shift:  In: 300 [P.O.:300]  Out: 200 [Urine:200]  Wt Readings from Last 3 Encounters:   02/24/24 113 kg (250 lb)   01/20/24 101 kg (223 lb 12.3 oz)   11/13/23 101 kg (222 lb)       Intake/Output Summary (Last 24 hours) at 2/24/2024 0900  Last data filed at 2/24/2024 0801  Gross per 24 hour   Intake 615 ml   Output 2175 ml   Net -1560 ml     I/O last 3 completed shifts:  In: 1110 [P.O.:960; IV Piggyback:150]  Out: 3655 [Urine:3655]    No significant arrhythmias seen on telemetry review.       Physical Exam:  Vitals:    02/24/24 0239 02/24/24 0314 02/24/24 0540 02/24/24 0742   BP: 145/83  149/89 149/88   BP Location:    Right arm   Pulse: 74  78 81   Resp:    16   Temp:    (!) 97.4 °F (36.3 °C)   TempSrc:    Oral   SpO2: 93%  95% 95%   Weight:  113 kg (250 lb)     Height:           GEN: Nadir Myers appears well, alert and oriented x 3, pleasant and cooperative   HEENT: pupils equal, round, and reactive to light; extraocular muscles intact  NECK: supple, no carotid bruits   HEART: regular rhythm, normal S1 and S2, no murmurs, clicks, gallops or rubs, JVP is    LUNGS: clear to auscultation bilaterally; no wheezes, rales, or rhonchi   ABDOMEN: normal bowel sounds, soft, no tenderness, no distention  EXTREMITIES: peripheral pulses normal; no clubbing, cyanosis, or edema  NEURO: no focal findings   SKIN: normal without suspicious lesions on exposed skin      Current Facility-Administered Medications:     acetaminophen (TYLENOL) tablet 650 mg, 650 mg, Oral, Q8H Kip LAZARO MD, 650 mg at 02/24/24 0541    ALPRAZolam (XANAX) tablet 0.5 mg, 0.5 mg, Oral, HS PRN, Kip  MD Destiney    amiodarone tablet 200 mg, 200 mg, Oral, Daily With Breakfast, Yuniel Mcgee PA-C, 200 mg at 02/24/24 0750    aspirin chewable tablet 81 mg, 81 mg, Oral, Daily, Yuniel Mcgee PA-C, 81 mg at 02/24/24 0831    atorvastatin (LIPITOR) tablet 80 mg, 80 mg, Oral, After Dinner, Yuniel Mcgee PA-C, 80 mg at 02/23/24 1704    ceFAZolin (ANCEF) IVPB (premix in dextrose) 2,000 mg 50 mL, 2,000 mg, Intravenous, Q8H, Zach Medina MD, Last Rate: 100 mL/hr at 02/24/24 0538, 2,000 mg at 02/24/24 0538    clopidogrel (PLAVIX) tablet 75 mg, 75 mg, Oral, Daily, Yuniel Mcgee PA-C, 75 mg at 02/24/24 0831    Diclofenac Sodium (VOLTAREN) 1 % topical gel 2 g, 2 g, Topical, 4x Daily, Kip Cabello MD, 2 g at 02/24/24 0832    furosemide (LASIX) injection 60 mg, 60 mg, Intravenous, BID (diuretic), Nash Samuels DO, 60 mg at 02/24/24 0833    heparin (porcine) subcutaneous injection 5,000 Units, 5,000 Units, Subcutaneous, Q8H [facility], Kip Cabello MD, 5,000 Units at 02/24/24 0541    hydrALAZINE (APRESOLINE) tablet 25 mg, 25 mg, Oral, Q8H TEJAS, Lamont Murcia MD, 25 mg at 02/24/24 0541    HYDROmorphone (DILAUDID) injection 0.5 mg, 0.5 mg, Intravenous, Q6H PRN, Kip Cabello MD    insulin glargine (LANTUS) subcutaneous injection 35 Units 0.35 mL, 35 Units, Subcutaneous, HS, Yuniel Mcgee PA-C, 35 Units at 02/23/24 2101    insulin lispro (HumaLOG) 100 units/mL subcutaneous injection 1-5 Units, 1-5 Units, Subcutaneous, 4x Daily (AC & HS), 1 Units at 02/23/24 2104 **AND** Fingerstick Glucose (POCT), , , 4x Daily AC and at bedtime, Yuniel Mcgee PA-C    insulin lispro (HumaLOG) 100 units/mL subcutaneous injection 7 Units, 7 Units, Subcutaneous, TID With Meals, Yuniel Mcgee PA-C, 7 Units at 02/24/24 0746    isosorbide dinitrate (ISORDIL) tablet 20 mg, 20 mg, Oral, TID after meals, Nash Samuels DO, 20 mg at 02/24/24 0749    melatonin tablet 3 mg, 3 mg, Oral, HS, Kip Cabello MD, 3 mg at  02/23/24 2102    metoprolol succinate (TOPROL-XL) 24 hr tablet 25 mg, 25 mg, Oral, Q12H, Yuniel Mcgee PA-C, 25 mg at 02/24/24 0541    oxyCODONE (ROXICODONE) IR tablet 5 mg, 5 mg, Oral, Q6H PRN, Yuniel Mcgee PA-C, 5 mg at 02/23/24 1703    polyethylene glycol (MIRALAX) packet 17 g, 17 g, Oral, Daily, Yuniel Mcgee PA-C, 17 g at 02/24/24 0831    saccharomyces boulardii (FLORASTOR) capsule 250 mg, 250 mg, Oral, BID, Yuniel Mcgee PA-C, 250 mg at 02/24/24 0831    senna-docusate sodium (SENOKOT S) 8.6-50 mg per tablet 2 tablet, 2 tablet, Oral, BID, Yuniel Mcgee PA-C, 2 tablet at 02/24/24 0831      Labs & Results:        Results from last 7 days   Lab Units 02/24/24  0541 02/23/24  0000 02/22/24  1031   WBC Thousand/uL 9.83 11.96* 14.46*   HEMOGLOBIN g/dL 9.3* 9.5* 9.7*   HEMATOCRIT % 30.3* 30.8* 30.9*   PLATELETS Thousands/uL 344 372 378         Results from last 7 days   Lab Units 02/24/24  0541 02/23/24  0458 02/22/24  1031   POTASSIUM mmol/L 4.4 4.7 5.0   CHLORIDE mmol/L 98 98 100   CO2 mmol/L 31 31 29   BUN mg/dL 42* 41* 42*   CREATININE mg/dL 2.20* 2.02* 2.01*   CALCIUM mg/dL 8.1* 7.9* 7.7*   ALK PHOS U/L  --  46  --    ALT U/L  --  <3*  --    AST U/L  --  11*  --      Results from last 7 days   Lab Units 02/19/24  1345   INR  1.15         Counseling / Coordination of Care  Total floor / unit time spent today 25 minutes.  Greater than 50% of total time was spent with the patient and / or family counseling and / or coordination of care.  A description of the counseling / coordination of care: 15.      Thank you for the opportunity to participate in the care of this patient.  TOMMIE HERNANDEZ D.O.  DIRECTOR OF HEART FAILURE/ PULMONARY HYPERTENSION  MEDICAL DIRECTOR OF LVAD PROGRAM  [facility]

## 2024-02-24 NOTE — PLAN OF CARE
Problem: OCCUPATIONAL THERAPY ADULT  Goal: Performs self-care activities at highest level of function for planned discharge setting.  See evaluation for individualized goals.  Description: Treatment Interventions: ADL retraining, Functional transfer training, Endurance training, Patient/family training, Equipment evaluation/education, Compensatory technique education, Continued evaluation, Energy conservation, Activityengagement          See flowsheet documentation for full assessment, interventions and recommendations.   Note: Limitation: Decreased ADL status, Decreased endurance, Decreased self-care trans, Decreased high-level ADLs     Assessment: Pt is a 63 y.o. male seen for OT evaluation s/p admit to [facility] on 1/31/2024 w/ Septic shock (HCC).  Pt was initially dc'd from OT on 2/12, being independent w self care. Since then, per CT: Development of large infiltrate in the left lower lobe, and a few new peripherally oriented scattered densities. Improvement in some other densities. No cavitations but peripheral location is worrisome for septic emboli. Interval pacemaker removal. EV ICD placement on 2/20. Followed by EV ICE removal, unsuccessful attempts at lead placement on 2/21.  Upon re-min/mod assist for donning socks, min assist UE. He c/o LUE being swollen w decreased /unable to make a fist. Pt able to complete sit to stand w close S, increased time. Pt needing cues for self pacing and ECT. Also for encouragement as pt is somewhat self limiting. Pt to benefit from continued skilled OT tx while in the hospital to address deficits as defined above and maximize level of functional independence w ADL's and functional mobility. Occupational Performance areas to address include: bathing/shower, toilet hygiene, dressing, functional mobility, and clothing management.  The patient's raw score on the AM-PAC Daily Activity inpatient short form is 20, standardized score is 42.03, greater than 39.4.  Patients at this level are likely to benefit from discharge to home. From OT standpoint, recommendation at time of d/c would be home w family support.     Rehab Resource Intensity Level, OT: III (Minimum Resource Intensity)

## 2024-02-24 NOTE — ASSESSMENT & PLAN NOTE
Appreciate podiatry input -> status post left fourth toe amputation on 1/31  [facility] to be surgical cure per podiatry  Wound care  Antibiotics per ID

## 2024-02-24 NOTE — PROGRESS NOTES
[facility]  Progress Note  Name: Nadir Myers I  MRN: [id number]  Unit/Bed#: [facility] 519-01 I Date of Admission: 1/31/2024   Date of Service: 2/24/2024 I Hospital Day: 24    Assessment/Plan   Abnormal CT of the chest  Assessment & Plan  CT 2/7 shows small consolidation in the upper lobes and left lower lobe with enhancing lesion in the peripheral of right lower lobe, may be embolic versus hematogenous seeding from high-grade bacteremia.  there is no evident of pulmonary embolism to suggest that this is an infarct.   Repeat CT 2/15 revealed possible septic emboli, gas and fluid subcutaneous collection in the pacemaker bed likely postsurgical, reactive mild mediastinal lymphadenopathy.  CT chest without contrast  2/21: Peripheral left lower lobe opacity with reverse halo sign appearance. This can be seen in the setting of pulmonary infarct   Doppler US is negative for DVT  VQ Scan is intermediate for PE  TTE 2/21 with normal right ventricle function  Unable to obtain PE study to confirm/rule out given LINDEN. Suspicion remains higher for septic embolism. Given hemodynamic stability with only 1L NC oxygen requirement due to volume overload, would hold off on AC for now.   Appreciate pulmonology input  Fungal workup pending.     Pleuritic chest pain  Assessment & Plan  Pleuritic pain in left lower chest wall since 2/20. Tender to touch. Suspect secondary to ICD manipulation +/- septic pulmonary emboli  EKG with no acute ischemic changes.  Voltaren gel/lidocaine patch/prn pain meds. Improving    ICD (implantable cardioverter-defibrillator) in place  Assessment & Plan  Patient had extraction of ICD  Patient underwent AV ICD placement on February 20, 2024  Patient had lead dislodgment leading to repeat procedure on February 21, 2024  During the procedure patient's lead had poor sensing and therefore could not be positioned under the sternum and pocket was closed.  Multiple location under  "the sternum where attempted.   Will plan for LifeVest on discharge and then discuss S-ICD in office.     Constipation  Assessment & Plan  BM 2/22  continue bowel regimen    LINDEN (acute kidney injury) ([facility])  Assessment & Plan  Likely ATN  Monitor with lasix  Hold jardiance and aldactone  Nephrology consult     Electrolyte abnormalities  Assessment & Plan  Monitor/replete serum potassium/magnesium/phosphate as necessary    Gangrene of toe of left foot ([facility])  Assessment & Plan  Appreciate podiatry input -> status post left fourth toe amputation on 1/31  [place] to be surgical cure per podiatry  Wound care  Antibiotics per ID    MSSA bacteremia  Assessment & Plan  Persistent MSSA bacteremia most likely source foot wound, complicated with ICD infection  Blood cultures from 1/31-2/9  positive despite being on Ancef suspect due to ICD infection  GILL with evidence of mobile mass on ICD lead in the right atrium, concerning for infection, no valvular vegetation  S/p ICD removal with EP on 2/8  CT chest 2/7 and 2/15 with possible septic pulmonary emboli  Repeat Blood cultures from 2/12 negative, repeat blood cultures 2/16 negative at 72 hours  Continue IV Ancef, with plan to treat 6 weeks until 3/25/2024   PICC placed 2/22    LUE weakness  Assessment & Plan  Presented w/ transient left-sided weakness and a facial droop now resolved  Appreciate neurology input deeming symptoms likely secondary to septic shock/infection and hyperglycemia, as neurologic workup including CT/MRI imaging negative for evidence of stroke or intracranial vessel occlusion, but noted chronic microangiopathic changes -> radiology report did however mention: \"Moderate bilateral supraclinoid ICA and moderate to severe left cavernous ICA segment stenosis.\"  Continue dual endplate therapy with ASA/Plavix - c/w statin  PT/OT as tolerated    PAD (peripheral artery disease) ([facility])  Assessment & Plan  Continue dual antiplatelet therapy with ASA/Plavix - continue " statin      Coronary artery disease involving native coronary artery of native heart without angina pectoris  Assessment & Plan  History of coronary artery disease status post HANNA to left circumflex 2015, repeat cath 2023 with  of mid LAD and L PDA.  Ischemic cardiomyopathy  Continue aspirin/Plavix/statin/metoprolol    Chronic systolic CHF (HCC)  Assessment & Plan  Diuretics per HF team.   isordil 20 mg TID and add hydralazine 25 mg TID     Hyponatremia  Assessment & Plan  Mild in setting of LINDEN  Monitor     V-tach ([facility])  Assessment & Plan  History of monomorphic VT status post secondary prevention with [employer] dual-chamber ICD 5/2023  Status post ICD extraction 2/8/2024  Lifevest on dc    Type 2 diabetes mellitus, with long-term current use of insulin ([facility])  Assessment & Plan  Lab Results   Component Value Date    HGBA1C 9.8 (H) 01/31/2024     Continue basal/prandial insulin. BG goal 140-180  Hypoglycemia protocol  Carbohydrate restriction  Continue to adjust    * Septic shock ([facility])  Assessment & Plan  Initially admitted to the ICU requiring vasopressor support, now off, with maintenance of hemodynamic stability  Due to MSSA bacteremia from a gangrenous left fourth toe (see below) complicated by ICD infection  Continue to monitor vitals and maintain hemodynamics including temperature curve  See additional management below  Shock resolved             VTE Pharmacologic Prophylaxis:   Moderate Risk (Score 3-4) - Pharmacological DVT Prophylaxis Ordered: heparin.    Mobility:   Basic Mobility Inpatient Raw Score: 17  JH-HLM Goal: 5: Stand one or more mins  JH-HLM Achieved: 7: Walk 25 feet or more  HLM Goal achieved. Continue to encourage appropriate mobility.    Patient Centered Rounds: I performed bedside rounds with nursing staff today.   Discussions with Specialists or Other Care Team Provider: Cardiology     Education and Discussions with Family / Patient:  patient .     Total Time Spent on Date of Encounter in  care of patient: 35 mins. This time was spent on one or more of the following: performing physical exam; counseling and coordination of care; obtaining or reviewing history; documenting in the medical record; reviewing/ordering tests, medications or procedures; communicating with other healthcare professionals and discussing with patient's family/caregivers.    Current Length of Stay: 24 day(s)  Current Patient Status: Inpatient   Certification Statement: The patient will continue to require additional inpatient hospital stay due to LINDEN, IV lasix   Discharge Plan: Anticipate discharge in >72 hrs to home with home services.    Code Status: Level 1 - Full Code    Subjective:   Chest pain better. Understandably feels down  and tearful about the prolonged hospitalization. Reassurance given.     Objective:     Vitals:   Temp (24hrs), Av.8 °F (36.6 °C), Min:97.4 °F (36.3 °C), Max:98.6 °F (37 °C)    Temp:  [97.4 °F (36.3 °C)-98.6 °F (37 °C)] 97.4 °F (36.3 °C)  HR:  [74-85] 78  Resp:  [16-18] 16  BP: (135-149)/(70-89) 149/87  SpO2:  [91 %-95 %] 95 %  Body mass index is 33.91 kg/m².     Input and Output Summary (last 24 hours):     Intake/Output Summary (Last 24 hours) at 2024 1400  Last data filed at 2024 1203  Gross per 24 hour   Intake 837 ml   Output 2375 ml   Net -1538 ml       Physical Exam:   Physical Exam  Vitals and nursing note reviewed.   Constitutional:       General: He is not in acute distress.     Appearance: He is well-developed.   HENT:      Head: Normocephalic and atraumatic.   Eyes:      Conjunctiva/sclera: Conjunctivae normal.   Cardiovascular:      Rate and Rhythm: Normal rate and regular rhythm.      Heart sounds: No murmur heard.  Pulmonary:      Effort: Pulmonary effort is normal. No respiratory distress.      Breath sounds: Normal breath sounds.   Abdominal:      Palpations: Abdomen is soft.      Tenderness: There is no abdominal tenderness.   Musculoskeletal:         General: No  swelling.      Cervical back: Neck supple.   Skin:     General: Skin is warm and dry.      Capillary Refill: Capillary refill takes less than 2 seconds.   Neurological:      Mental Status: He is alert.   Psychiatric:         Mood and Affect: Mood normal.          Additional Data:     Labs:  Results from last 7 days   Lab Units 02/24/24  0541   WBC Thousand/uL 9.83   HEMOGLOBIN g/dL 9.3*   HEMATOCRIT % 30.3*   PLATELETS Thousands/uL 344   NEUTROS PCT % 78*   LYMPHS PCT % 13*   MONOS PCT % 7   EOS PCT % 1     Results from last 7 days   Lab Units 02/24/24  0541 02/23/24  0458   SODIUM mmol/L 136 135   POTASSIUM mmol/L 4.4 4.7   CHLORIDE mmol/L 98 98   CO2 mmol/L 31 31   BUN mg/dL 42* 41*   CREATININE mg/dL 2.20* 2.02*   ANION GAP mmol/L 7 6   CALCIUM mg/dL 8.1* 7.9*   ALBUMIN g/dL  --  2.4*   TOTAL BILIRUBIN mg/dL  --  0.25   ALK PHOS U/L  --  46   ALT U/L  --  <3*   AST U/L  --  11*   GLUCOSE RANDOM mg/dL 90 69     Results from last 7 days   Lab Units 02/19/24  1345   INR  1.15     Results from last 7 days   Lab Units 02/24/24  1116 02/24/24  0626 02/23/24  2017 02/23/24  1559 02/23/24  1110 02/23/24  0603 02/23/24  0524 02/22/24  2033 02/22/24  1538 02/22/24  1105 02/22/24  0622 02/21/24  2048   POC GLUCOSE mg/dl 161* 104 189* 167* 187* 135 85 113 173* 160* 100 199*               Lines/Drains:  Invasive Devices       Peripherally Inserted Central Catheter Line  Duration             PICC Line 02/22/24 Right Basilic 2 days              Drain  Duration             Urethral Catheter Latex 16 Fr. 4 days                  Urinary Catheter:  Goal for removal: Voiding trial when ambulation improves         Central Line:  Goal for removal: Will discontinue when meds requiring line are completed.         Telemetry:  Telemetry Orders (From admission, onward)               24 Hour Telemetry Monitoring  Continuous x 24 Hours (Telem)        Question:  Reason for 24 Hour Telemetry  Answer:  PCI/EP study (including pacer and ICD  implementation), Cardiac surgery, MI, abnormal cardiac cath, and chest pain- rule out MI                                Imaging: Reviewed radiology reports from this admission including: chest xray    Recent Cultures (last 7 days):         Last 24 Hours Medication List:   Current Facility-Administered Medications   Medication Dose Route Frequency Provider Last Rate    acetaminophen  650 mg Oral Q8H [facility] Kip Cabello MD      ALPRAZolam  0.5 mg Oral HS PRN Kip Cabello MD      amiodarone  200 mg Oral Daily With Breakfast Yuniel Mcgee PA-C      aspirin  81 mg Oral Daily Yuniel Mcgee PA-C      atorvastatin  80 mg Oral After Dinner Yuniel Mcgee PA-C      cefazolin  2,000 mg Intravenous Q8H Zach Medina MD 2,000 mg (02/24/24 0538)    clopidogrel  75 mg Oral Daily Yuniel Mcgee PA-C      Diclofenac Sodium  2 g Topical 4x Daily Kip Cabello MD      furosemide  60 mg Intravenous BID (diuretic) Nash Samuels DO      heparin (porcine)  5,000 Units Subcutaneous Q8H [facility] Kip Cabello MD      hydrALAZINE  50 mg Oral Q8H [facility] Nash Samuels DO      HYDROmorphone  0.5 mg Intravenous Q6H PRN Kip Cabello MD      insulin glargine  35 Units Subcutaneous HS Yuniel Mcgee PA-C      insulin lispro  1-5 Units Subcutaneous 4x Daily (AC & HS) Yuniel Mcgee PA-C      insulin lispro  7 Units Subcutaneous TID With Meals Yuniel Mcgee PA-C      isosorbide dinitrate  20 mg Oral TID after meals Nash Samuels DO      melatonin  3 mg Oral HS Kip Cabello MD      metolazone  2.5 mg Oral Daily Nash Samuels DO      metoprolol succinate  25 mg Oral Q12H Yuniel Mcgee PA-C      oxyCODONE  5 mg Oral Q6H PRN Yuniel Mcgee PA-C      polyethylene glycol  17 g Oral Daily Yuniel Mcgee PA-C      saccharomyces boulardii  250 mg Oral BID Yuniel Mcgee PA-C      senna-docusate sodium  2 tablet Oral BID Yuniel Mcgee PA-C          Today, Patient Was Seen By: Kip  MD Destiney    **Please Note: This note may have been constructed using a voice recognition system.**

## 2024-02-25 PROBLEM — E87.1 HYPONATREMIA: Status: RESOLVED | Noted: 2023-05-06 | Resolved: 2024-02-25

## 2024-02-25 LAB
ANION GAP SERPL CALCULATED.3IONS-SCNC: 7 MMOL/L
BASOPHILS # BLD AUTO: 0.03 THOUSANDS/ÂΜL (ref 0–0.1)
BASOPHILS NFR BLD AUTO: 0 % (ref 0–1)
BUN SERPL-MCNC: 46 MG/DL (ref 5–25)
CALCIUM SERPL-MCNC: 8.2 MG/DL (ref 8.4–10.2)
CHLORIDE SERPL-SCNC: 96 MMOL/L (ref 96–108)
CO2 SERPL-SCNC: 33 MMOL/L (ref 21–32)
CREAT SERPL-MCNC: 2.36 MG/DL (ref 0.6–1.3)
EOSINOPHIL # BLD AUTO: 0.06 THOUSAND/ÂΜL (ref 0–0.61)
EOSINOPHIL NFR BLD AUTO: 1 % (ref 0–6)
ERYTHROCYTE [DISTWIDTH] IN BLOOD BY AUTOMATED COUNT: 13.8 % (ref 11.6–15.1)
GFR SERPL CREATININE-BSD FRML MDRD: 28 ML/MIN/1.73SQ M
GLUCOSE SERPL-MCNC: 124 MG/DL (ref 65–140)
GLUCOSE SERPL-MCNC: 143 MG/DL (ref 65–140)
GLUCOSE SERPL-MCNC: 187 MG/DL (ref 65–140)
GLUCOSE SERPL-MCNC: 193 MG/DL (ref 65–140)
GLUCOSE SERPL-MCNC: 266 MG/DL (ref 65–140)
HCT VFR BLD AUTO: 29.5 % (ref 36.5–49.3)
HGB BLD-MCNC: 9.4 G/DL (ref 12–17)
IMM GRANULOCYTES # BLD AUTO: 0.06 THOUSAND/UL (ref 0–0.2)
IMM GRANULOCYTES NFR BLD AUTO: 1 % (ref 0–2)
LYMPHOCYTES # BLD AUTO: 1.21 THOUSANDS/ÂΜL (ref 0.6–4.47)
LYMPHOCYTES NFR BLD AUTO: 13 % (ref 14–44)
MCH RBC QN AUTO: 28.3 PG (ref 26.8–34.3)
MCHC RBC AUTO-ENTMCNC: 31.9 G/DL (ref 31.4–37.4)
MCV RBC AUTO: 89 FL (ref 82–98)
MONOCYTES # BLD AUTO: 0.63 THOUSAND/ÂΜL (ref 0.17–1.22)
MONOCYTES NFR BLD AUTO: 7 % (ref 4–12)
NEUTROPHILS # BLD AUTO: 7.01 THOUSANDS/ÂΜL (ref 1.85–7.62)
NEUTS SEG NFR BLD AUTO: 78 % (ref 43–75)
NRBC BLD AUTO-RTO: 0 /100 WBCS
PLATELET # BLD AUTO: 371 THOUSANDS/UL (ref 149–390)
PMV BLD AUTO: 9.2 FL (ref 8.9–12.7)
POTASSIUM SERPL-SCNC: 4.2 MMOL/L (ref 3.5–5.3)
RBC # BLD AUTO: 3.32 MILLION/UL (ref 3.88–5.62)
SODIUM SERPL-SCNC: 136 MMOL/L (ref 135–147)
WBC # BLD AUTO: 9 THOUSAND/UL (ref 4.31–10.16)

## 2024-02-25 PROCEDURE — 82948 REAGENT STRIP/BLOOD GLUCOSE: CPT

## 2024-02-25 PROCEDURE — 85025 COMPLETE CBC W/AUTO DIFF WBC: CPT | Performed by: INTERNAL MEDICINE

## 2024-02-25 PROCEDURE — 99233 SBSQ HOSP IP/OBS HIGH 50: CPT | Performed by: INTERNAL MEDICINE

## 2024-02-25 PROCEDURE — 99024 POSTOP FOLLOW-UP VISIT: CPT | Performed by: INTERNAL MEDICINE

## 2024-02-25 PROCEDURE — 99232 SBSQ HOSP IP/OBS MODERATE 35: CPT | Performed by: INTERNAL MEDICINE

## 2024-02-25 PROCEDURE — 80048 BASIC METABOLIC PNL TOTAL CA: CPT | Performed by: INTERNAL MEDICINE

## 2024-02-25 RX ORDER — METHOCARBAMOL 500 MG/1
500 TABLET, FILM COATED ORAL EVERY 6 HOURS PRN
Status: DISCONTINUED | OUTPATIENT
Start: 2024-02-25 | End: 2024-03-01 | Stop reason: HOSPADM

## 2024-02-25 RX ORDER — INSULIN GLARGINE 100 [IU]/ML
38 INJECTION, SOLUTION SUBCUTANEOUS
Status: DISCONTINUED | OUTPATIENT
Start: 2024-02-25 | End: 2024-03-01 | Stop reason: HOSPADM

## 2024-02-25 RX ADMIN — POLYETHYLENE GLYCOL 3350 17 G: 17 POWDER, FOR SOLUTION ORAL at 08:17

## 2024-02-25 RX ADMIN — ACETAMINOPHEN 650 MG: 325 TABLET, FILM COATED ORAL at 05:37

## 2024-02-25 RX ADMIN — FUROSEMIDE 60 MG: 10 INJECTION, SOLUTION INTRAMUSCULAR; INTRAVENOUS at 08:17

## 2024-02-25 RX ADMIN — CEFAZOLIN SODIUM 2000 MG: 2 SOLUTION INTRAVENOUS at 13:23

## 2024-02-25 RX ADMIN — Medication 2 G: at 17:01

## 2024-02-25 RX ADMIN — METOPROLOL SUCCINATE 25 MG: 25 TABLET, EXTENDED RELEASE ORAL at 05:37

## 2024-02-25 RX ADMIN — HYDRALAZINE HYDROCHLORIDE 50 MG: 50 TABLET ORAL at 05:37

## 2024-02-25 RX ADMIN — SENNOSIDES, DOCUSATE SODIUM 2 TABLET: 8.6; 5 TABLET ORAL at 17:00

## 2024-02-25 RX ADMIN — HEPARIN SODIUM 5000 UNITS: 5000 INJECTION INTRAVENOUS; SUBCUTANEOUS at 13:26

## 2024-02-25 RX ADMIN — Medication 250 MG: at 17:00

## 2024-02-25 RX ADMIN — Medication 250 MG: at 08:17

## 2024-02-25 RX ADMIN — INSULIN LISPRO 7 UNITS: 100 INJECTION, SOLUTION INTRAVENOUS; SUBCUTANEOUS at 11:25

## 2024-02-25 RX ADMIN — METOPROLOL SUCCINATE 25 MG: 25 TABLET, EXTENDED RELEASE ORAL at 17:15

## 2024-02-25 RX ADMIN — INSULIN LISPRO 1 UNITS: 100 INJECTION, SOLUTION INTRAVENOUS; SUBCUTANEOUS at 21:35

## 2024-02-25 RX ADMIN — ISOSORBIDE DINITRATE 20 MG: 20 TABLET ORAL at 11:30

## 2024-02-25 RX ADMIN — HEPARIN SODIUM 5000 UNITS: 5000 INJECTION INTRAVENOUS; SUBCUTANEOUS at 21:35

## 2024-02-25 RX ADMIN — HYDRALAZINE HYDROCHLORIDE 50 MG: 50 TABLET ORAL at 21:35

## 2024-02-25 RX ADMIN — ISOSORBIDE DINITRATE 20 MG: 20 TABLET ORAL at 17:01

## 2024-02-25 RX ADMIN — ATORVASTATIN CALCIUM 80 MG: 80 TABLET, FILM COATED ORAL at 17:00

## 2024-02-25 RX ADMIN — ACETAMINOPHEN 650 MG: 325 TABLET, FILM COATED ORAL at 21:37

## 2024-02-25 RX ADMIN — INSULIN LISPRO 1 UNITS: 100 INJECTION, SOLUTION INTRAVENOUS; SUBCUTANEOUS at 17:00

## 2024-02-25 RX ADMIN — ASPIRIN 81 MG CHEWABLE TABLET 81 MG: 81 TABLET CHEWABLE at 08:17

## 2024-02-25 RX ADMIN — METOLAZONE 2.5 MG: 5 TABLET ORAL at 08:24

## 2024-02-25 RX ADMIN — ACETAMINOPHEN 650 MG: 325 TABLET, FILM COATED ORAL at 13:23

## 2024-02-25 RX ADMIN — AMIODARONE HYDROCHLORIDE 200 MG: 200 TABLET ORAL at 08:17

## 2024-02-25 RX ADMIN — CEFAZOLIN SODIUM 2000 MG: 2 SOLUTION INTRAVENOUS at 21:38

## 2024-02-25 RX ADMIN — CEFAZOLIN SODIUM 2000 MG: 2 SOLUTION INTRAVENOUS at 05:36

## 2024-02-25 RX ADMIN — SENNOSIDES, DOCUSATE SODIUM 2 TABLET: 8.6; 5 TABLET ORAL at 08:17

## 2024-02-25 RX ADMIN — Medication 2 G: at 08:17

## 2024-02-25 RX ADMIN — FUROSEMIDE 60 MG: 10 INJECTION, SOLUTION INTRAMUSCULAR; INTRAVENOUS at 17:00

## 2024-02-25 RX ADMIN — ISOSORBIDE DINITRATE 20 MG: 20 TABLET ORAL at 08:17

## 2024-02-25 RX ADMIN — HEPARIN SODIUM 5000 UNITS: 5000 INJECTION INTRAVENOUS; SUBCUTANEOUS at 05:37

## 2024-02-25 RX ADMIN — Medication 3 MG: at 21:37

## 2024-02-25 RX ADMIN — INSULIN LISPRO 7 UNITS: 100 INJECTION, SOLUTION INTRAVENOUS; SUBCUTANEOUS at 08:19

## 2024-02-25 RX ADMIN — INSULIN GLARGINE 38 UNITS: 100 INJECTION, SOLUTION SUBCUTANEOUS at 21:52

## 2024-02-25 RX ADMIN — INSULIN LISPRO 7 UNITS: 100 INJECTION, SOLUTION INTRAVENOUS; SUBCUTANEOUS at 17:01

## 2024-02-25 RX ADMIN — CLOPIDOGREL BISULFATE 75 MG: 75 TABLET ORAL at 08:17

## 2024-02-25 RX ADMIN — HYDRALAZINE HYDROCHLORIDE 50 MG: 50 TABLET ORAL at 13:23

## 2024-02-25 NOTE — PROGRESS NOTES
Heart Failure/ Pulmonary Hypertension Progress Note - Nadir Myers 63 y.o. male MRN: [id number]    Unit/Bed#: [facility] 519-01 Encounter: [id number]      Assessment:    Principal Problem:    Septic shock ([facility])  Active Problems:    Chronic systolic CHF ([facility])    Type 2 diabetes mellitus, with long-term current use of insulin ([facility])    V-tach ([facility])    Hyponatremia    Coronary artery disease involving native coronary artery of native heart without angina pectoris    PAD (peripheral artery disease) ([facility])    LUE weakness    MSSA bacteremia    Gangrene of toe of left foot ([facility])    Electrolyte abnormalities    Abnormal CT of the chest    LINDEN (acute kidney injury) ([facility])    Constipation    ICD (implantable cardioverter-defibrillator) in place    Pleuritic chest pain      Interim:  Ins/outs: 1152/4350: -3198 w/ furosemide 60 mg IV BID  Weight: 247 lbs (250)  Tele:  MAPs: 108 mmHg  K 4.2  Cr 2.36 ( baseline around 1.5)     Lead placement on EV ICD failed     # Chronic HFimpEF, Stage C, w/ decompensation due to medication hold  Impression: iCM with large apical aneurysm with progression of CAD/late presenting MI with occluded mid LAD and LPDA. LV mildly dilated. Started on milrinone 0.25mcg/kg/min 5/6/23 due to rising creatinine and worsening volume status, stopped 5/10/23. ,       Weight: 205 lbs on 6/5, 218 lbs today  BNP 2/20/24: 1809  6/2/23: 279     Studies- personally reviewed by me  EKG- narrow QRS with bigeminy; inferior infarct, anterolateral infarct     Echo 2/1/24:  LVEF: 40%, akinetic segments  LVEDd: 5.1 cm  RV: normal     Echo 8/7/23:  LVEF: 40%  LVEDd: 4.1 cm  LV apex aneurysmal, no thrombus  RV: normal     LHC 5/3/23: chronically occluded mid LAD and LPDA c/w echo findings of apical aneurysm, not amenable to PCI  Patent mid LCx stent   LVEDP 30 mmHg     Echocardiogram 5/2/23  LVEF:  10-15%, apical aneurysm  LVIDd: 5.6 cm  RV: normal  MR: mild  PASP: 49 mmHg     Echo 9/26/17:  LVEF: 65%     Neurohormonal  Blockade:  --Beta-Blocker: metoprolol tartrate 25 mg BID  --ACEi, ARB or ARNi: Entresto stopped due to hypotension    (or SVR reduction) isordil 20 mg TID/ hydralazine 25 mg TID  --Aldosterone Receptor Blocker: spironolactone 12.5 mg daily- onhold due to hyperkalemia  --SGLT2-I: Jardiance 10 mg daily- on hold  --Diuretic: stopped  Inpt: lasix 60 mg IV BID     Sudden Cardiac Death Risk Reduction:  MDT ICD 5/12/23 for secondary prevention  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 Moderately reduced RV function and mildly dilated LV concerning for LVAD. Mildly dilated LV also concerning given VT.   Has not smoked since May 2     # Staph bacteremia  GILL 2/7/24: There is a 1.8 x 1.1 cm mobile mass affixed to the cardiac device lead as is passes through the right atrium. In the setting of bacteremia this is most likely represents infection.   # New Q wave MI, later presenting or silent LAD territory infarction  # hyperlipidemia-atorvastatin 80 mg  9/16/23: LDL 51, HDL 68  # current tobacco use- quit since hospital  # Ventricular tachycardia- scar mediated  Rx: amiodarone 200 mg daily  # CKD, Cr improved to 1.05 on 7/3/23 labs, Cr 1.76 today  # hyponatremia- due to HF, , now up to 137 on 7/3/23  # DM2, HgA1C 7.1% on 9/16/23     TODAY'S PLAN:  Continue  lasix 60 mg IV BID with metolazone  Cr trending up, total body fluid overloaded  Holding on spironolactone due to hyperkalemia  Empa on hold due to LINDEN  Continue metoprolol  BP up but volume up, will see where he levels out, may be able to add ARNI or ARB later- cr too high to start today  Increase hydralazine to 50 mg TID for further SR reduction  Will need S- ICD as outpt  Home on  Lifevest   --2g sodium diet  - Daily weights    Review of Systems   Constitutional:  Negative for activity change, appetite change, fatigue and unexpected weight change.   HENT:  Negative for congestion and nosebleeds.    Eyes: Negative.    Respiratory:  Negative for cough, chest tightness and shortness of breath.    Cardiovascular:  Negative for chest pain, palpitations and leg swelling.   Gastrointestinal:  Negative for abdominal distention.   Endocrine: Negative.    Genitourinary: Negative.    Musculoskeletal: Negative.    Skin: Negative.    Neurological:  Negative for dizziness, syncope and weakness.   Hematological: Negative.    Psychiatric/Behavioral: Negative.          Central Line (day, reason):  Escobar catheter (day, reason):    Vitals: Blood pressure 134/75, pulse 85, temperature (!) 97.2 °F (36.2 °C), resp. rate 19, height 6' (1.829 m), weight 112 kg (247 lb 9.2 oz), SpO2 94%., Body mass index is 33.58 kg/m²., I/O last 3 completed shifts:  In: 1302 [P.O.:1002; IV Piggyback:300]  Out: 5475 [Urine:5475]  I/O this shift:  In: 480 [P.O.:480]  Out: 450 [Urine:450]  Wt Readings from Last 3 Encounters:   02/25/24 112 kg (247 lb 9.2 oz)   01/20/24 101 kg (223 lb 12.3 oz)   11/13/23 101 kg (222 lb)       Intake/Output Summary (Last 24 hours) at 2/25/2024 0848  Last data filed at 2/25/2024 0801  Gross per 24 hour   Intake 1332 ml   Output 4600 ml   Net -3268 ml     I/O last 3 completed shifts:  In: 1302 [P.O.:1002; IV Piggyback:300]  Out: 5475 [Urine:5475]    No significant arrhythmias seen on telemetry review.       Physical Exam:  Vitals:    02/24/24 2307 02/25/24 0305 02/25/24 0725 02/25/24 0725   BP: 143/84  134/75 134/75   BP Location: Left arm      Pulse: 80  84 85   Resp: 19      Temp: 97.8 °F (36.6 °C)  (!) 97.2 °F (36.2 °C) (!) 97.2 °F (36.2 °C)   TempSrc: Oral      SpO2: 95%  95% 94%   Weight:  112 kg (247 lb 9.2 oz)     Height:           GEN: Nadir Myers appears well, alert and oriented x 3, pleasant  and cooperative   HEENT: pupils equal, round, and reactive to light; extraocular muscles intact  NECK: supple, no carotid bruits   HEART: regular rhythm, normal S1 and S2, no murmurs, clicks, gallops or rubs, JVP is    LUNGS: clear to auscultation bilaterally; no wheezes, rales, or rhonchi   ABDOMEN: normal bowel sounds, soft, no tenderness, no distention  EXTREMITIES: peripheral pulses normal; no clubbing, cyanosis, or edema  NEURO: no focal findings   SKIN: normal without suspicious lesions on exposed skin      Current Facility-Administered Medications:     acetaminophen (TYLENOL) tablet 650 mg, 650 mg, Oral, Q8H [facility], Kip Cabello MD, 650 mg at 02/25/24 0537    ALPRAZolam (XANAX) tablet 0.5 mg, 0.5 mg, Oral, HS PRN, Kip Cabello MD    amiodarone tablet 200 mg, 200 mg, Oral, Daily With Breakfast, Yuniel Mcgee PA-C, 200 mg at 02/25/24 0817    aspirin chewable tablet 81 mg, 81 mg, Oral, Daily, Yuniel Mcgee PA-C, 81 mg at 02/25/24 0817    atorvastatin (LIPITOR) tablet 80 mg, 80 mg, Oral, After Dinner, Yuniel Mcgee PA-C, 80 mg at 02/24/24 1705    ceFAZolin (ANCEF) IVPB (premix in dextrose) 2,000 mg 50 mL, 2,000 mg, Intravenous, Q8H, Zach Medina MD, Stopped at 02/25/24 0621    clopidogrel (PLAVIX) tablet 75 mg, 75 mg, Oral, Daily, Yuniel Mcgee PA-C, 75 mg at 02/25/24 0817    Diclofenac Sodium (VOLTAREN) 1 % topical gel 2 g, 2 g, Topical, 4x Daily, Kip Cabello MD, 2 g at 02/25/24 0817    furosemide (LASIX) injection 60 mg, 60 mg, Intravenous, BID (diuretic), Nash Samuels DO, 60 mg at 02/24/24 1510    heparin (porcine) subcutaneous injection 5,000 Units, 5,000 Units, Subcutaneous, Q[facility]H [facility], 5,000 Units at 02/25/24 0537    hydrALAZINE (APRESOLINE) tablet 50 mg, 50 mg, Oral, Q8H [facility], Nash Samuels DO, 50 mg at 02/25/24 0537    HYDROmorphone (DILAUDID) injection 0.5 mg, 0.5 mg, Intravenous, Q6H PRN, Kip Cabello MD    insulin glargine (LANTUS) subcutaneous injection  35 Units 0.35 mL, 35 Units, Subcutaneous, HS, Yuniel Mcgee PA-C, 35 Units at 02/24/24 2128    insulin lispro (HumaLOG) 100 units/mL subcutaneous injection 1-5 Units, 1-5 Units, Subcutaneous, 4x Daily (AC & HS), 1 Units at 02/24/24 2130 **AND** Fingerstick Glucose (POCT), , , 4x Daily AC and at bedtime, Yuniel Mcgee PA-C    insulin lispro (HumaLOG) 100 units/mL subcutaneous injection 7 Units, 7 Units, Subcutaneous, TID With Meals, Yuniel Mcgee PA-C, 7 Units at 02/25/24 0819    isosorbide dinitrate (ISORDIL) tablet 20 mg, 20 mg, Oral, TID after meals, Nash Samuels DO, 20 mg at 02/25/24 0817    melatonin tablet 3 mg, 3 mg, Oral, HS, Kip Cabello MD, 3 mg at 02/24/24 2127    metolazone (ZAROXOLYN) tablet 2.5 mg, 2.5 mg, Oral, Daily, Nash Samuels DO, 2.5 mg at 02/25/24 0824    metoprolol succinate (TOPROL-XL) 24 hr tablet 25 mg, 25 mg, Oral, Q12H, Yuniel Mcgee PA-C, 25 mg at 02/25/24 0537    oxyCODONE (ROXICODONE) IR tablet 5 mg, 5 mg, Oral, Q6H PRN, Yuniel Mcgee PA-C, 5 mg at 02/24/24 1706    polyethylene glycol (MIRALAX) packet 17 g, 17 g, Oral, Daily, Yuniel Mcgee PA-C, 17 g at 02/25/24 0817    saccharomyces boulardii (FLORASTOR) capsule 250 mg, 250 mg, Oral, BID, Yuniel Mcgee PA-C, 250 mg at 02/25/24 0817    senna-docusate sodium (SENOKOT S) 8.6-50 mg per tablet 2 tablet, 2 tablet, Oral, BID, Yuniel Mcgee PA-C, 2 tablet at 02/25/24 0817      Labs & Results:        Results from last 7 days   Lab Units 02/25/24  0537 02/24/24  0541 02/23/24  0000   WBC Thousand/uL 9.00 9.83 11.96*   HEMOGLOBIN g/dL 9.4* 9.3* 9.5*   HEMATOCRIT % 29.5* 30.3* 30.8*   PLATELETS Thousands/uL 371 344 372         Results from last 7 days   Lab Units 02/25/24  0537 02/24/24  0541 02/23/24  0458   POTASSIUM mmol/L 4.2 4.4 4.7   CHLORIDE mmol/L 96 98 98   CO2 mmol/L 33* 31 31   BUN mg/dL 46* 42* 41*   CREATININE mg/dL 2.36* 2.20* 2.02*   CALCIUM mg/dL 8.2* 8.1* 7.9*   ALK  PHOS U/L  --   --  46   ALT U/L  --   --  <3*   AST U/L  --   --  11*     Results from last 7 days   Lab Units 02/19/24  1345   INR  1.15         Counseling / Coordination of Care  Total floor / unit time spent today 25 minutes.  Greater than 50% of total time was spent with the patient and / or family counseling and / or coordination of care.  A description of the counseling / coordination of care: 15.      Thank you for the opportunity to participate in the care of this patient.  TOMMIE HERNANDEZ D.O.  DIRECTOR OF HEART FAILURE/ PULMONARY HYPERTENSION  MEDICAL DIRECTOR OF LVAD PROGRAM  [facility]

## 2024-02-25 NOTE — PROGRESS NOTES
Progress Note - Nephrology   Nadir Myers 63 y.o. male MRN: [id number]  Unit/Bed#: [facility] 519-01 Encounter: [id number]      Assessment / Plan:  LINDEN likely d/t ATN in setting of surgery/infection, s/p IV dye 2/15, b/l sCr 1  -sCr up to 2.36 today, higher on  IV diuresis per cardiology  -would continue to hold jardiance and aldactone  -UA with microscopy shows +glucose, large blood, +WBCs, 2+ protein, innumerable RBCs, 4-10 WBCs, 0-3 hyaline casts  -continue to monitor renal indices on lasix 60mg IV BID(dose increased by HF team )  -avoid ARB/ACEI for now in light of LINDEN  Elevated total Co2 likely d/t contraction alkalosis in setting of diuretic use - monitor this  Sepsis d/t MSSA bacteremia and ICD infection s/p extraction , extravascular ICD placement  and revision , on IV Abx   Systolic CHF, EF 35% - monitor daily weight on IV diuresis per cardiology, metolazone 2.5mg daily added , weight improving  Left 4th toe gangrene s/p amputation   Anemia - Hgb 9.4 and stable, monitor CBC, transfuse prn  Hyperkalemia - resolved off aldactone, monitor on IV lasix        Subjective:   He denies chest pain or shortness of breath.  No complaints.      Objective:     Vitals: Blood pressure 132/75, pulse 83, temperature (!) 97.2 °F (36.2 °C), resp. rate 19, height 6' (1.829 m), weight 112 kg (247 lb 9.2 oz), SpO2 95%.,Body mass index is 33.58 kg/m².Temp (24hrs), Av.4 °F (36.3 °C), Min:97.1 °F (36.2 °C), Max:97.8 °F (36.6 °C)      Weight (last 2 days)       Date/Time Weight    24 0305 112 (247.58)    24 0314 113 (250)    24 0600 115 (254.3)              Intake/Output Summary (Last 24 hours) at 2024 1221  Last data filed at 2024 1132  Gross per 24 hour   Intake 1110 ml   Output 5100 ml   Net -3990 ml     I/O last 24 hours:  In: 1632 [P.O.:1482; IV Piggyback:150]  Out: 5900 [Urine:5900]        Physical Exam:   Physical Exam  Vitals reviewed.   Constitutional:       General:  He is not in acute distress.     Appearance: He is well-developed. He is obese. He is not diaphoretic.   HENT:      Head: Normocephalic and atraumatic.      Nose: Nose normal.      Mouth/Throat:      Mouth: Mucous membranes are moist.      Pharynx: No oropharyngeal exudate.   Eyes:      General: No scleral icterus.        Right eye: No discharge.         Left eye: No discharge.   Neck:      Thyroid: No thyromegaly.   Cardiovascular:      Rate and Rhythm: Normal rate and regular rhythm.      Heart sounds: Normal heart sounds.   Pulmonary:      Effort: Pulmonary effort is normal.      Breath sounds: Normal breath sounds. No wheezing or rales.   Abdominal:      General: Bowel sounds are normal. There is no distension.      Palpations: Abdomen is soft.      Tenderness: There is no abdominal tenderness.   Genitourinary:     Comments: chatman  Musculoskeletal:         General: Swelling (b/l LE) present. Normal range of motion.      Cervical back: Neck supple.   Lymphadenopathy:      Cervical: No cervical adenopathy.   Skin:     General: Skin is warm and dry.      Coloration: Skin is not jaundiced.      Findings: No rash.   Neurological:      Mental Status: He is alert.      Comments: awake   Psychiatric:         Behavior: Behavior normal.         Invasive Devices       Peripherally Inserted Central Catheter Line  Duration             PICC Line 02/22/24 Right Basilic 3 days              Drain  Duration             Urethral Catheter Latex 16 Fr. 4 days                    Medications:    Scheduled Meds:  Current Facility-Administered Medications   Medication Dose Route Frequency Provider Last Rate    acetaminophen  650 mg Oral Q8H [facility] Kip Cabello MD      ALPRAZolam  0.5 mg Oral HS PRN Kip Cabello MD      amiodarone  200 mg Oral Daily With Breakfast Yuniel Mcgee PA-C      aspirin  81 mg Oral Daily Yuniel Mcgee PA-C      atorvastatin  80 mg Oral After Dinner Yuniel Mcgee PA-C      cefazolin  2,000  mg Intravenous Q8H Zach Medina MD Stopped (02/25/24 0621)    clopidogrel  75 mg Oral Daily Yuniel Mcgee PA-C      Diclofenac Sodium  2 g Topical 4x Daily Kip Cabello MD      furosemide  60 mg Intravenous BID (diuretic) Nash Samuels DO      heparin (porcine)  5,000 Units Subcutaneous Q8H [facility] Kip Cabello MD      hydrALAZINE  50 mg Oral Q8H [facility] Nash Samuels DO      HYDROmorphone  0.5 mg Intravenous Q6H PRN Kip Cabello MD      insulin glargine  35 Units Subcutaneous HS Yuniel Mcgee PA-C      insulin lispro  1-5 Units Subcutaneous 4x Daily (AC & HS) Yuniel Mcgee PA-C      insulin lispro  7 Units Subcutaneous TID With Meals Yuniel Mcgee PA-C      isosorbide dinitrate  20 mg Oral TID after meals Nash Samuels DO      melatonin  3 mg Oral HS Kip Cabello MD      methocarbamol  500 mg Oral Q6H PRN Kip Cabello MD      metolazone  2.5 mg Oral Daily Nash Samuels DO      metoprolol succinate  25 mg Oral Q12H Yunile Mcgee PA-C      oxyCODONE  5 mg Oral Q6H PRN Yuniel Mcgee PA-C      polyethylene glycol  17 g Oral Daily Yuniel Mcgee PA-C      saccharomyces boulardii  250 mg Oral BID Yuniel Mcgee PA-C      senna-docusate sodium  2 tablet Oral BID Yuniel Mcgee PA-C         PRN Meds:.  ALPRAZolam    HYDROmorphone    methocarbamol    oxyCODONE    Continuous Infusions:         LAB RESULTS:      Results from last 7 days   Lab Units 02/25/24  0537 02/24/24  0541 02/23/24  0458 02/23/24  0000 02/22/24  1031 02/21/24  1100 02/21/24  0443 02/20/24  0504 02/19/24  0451   WBC Thousand/uL 9.00 9.83  --  11.96* 14.46*  --  12.19* 11.08* 11.34*   HEMOGLOBIN g/dL 9.4* 9.3*  --  9.5* 9.7*  --  10.5* 10.0* 10.1*   HEMATOCRIT % 29.5* 30.3*  --  30.8* 30.9*  --  33.5* 32.6* 33.1*   PLATELETS Thousands/uL 371 344  --  372 378  --  425* 423* 445*   NEUTROS PCT % 78* 78*  --  80*  --   --   --   --   --    LYMPHS PCT % 13* 13*  --  10*  --   --   --   --   --   "  MONOS PCT % 7 7  --  8  --   --   --   --   --    EOS PCT % 1 1  --  1  --   --   --   --   --    POTASSIUM mmol/L 4.2 4.4 4.7  --  5.0 5.1 5.8* 4.8 5.0   CHLORIDE mmol/L 96 98 98  --  100  --  100 101 101   CO2 mmol/L 33* 31 31  --  29  --  24 29 26   BUN mg/dL 46* 42* 41*  --  42*  --  38* 29* 27*   CREATININE mg/dL 2.36* 2.20* 2.02*  --  2.01*  --  1.76* 1.52* 1.51*   CALCIUM mg/dL 8.2* 8.1* 7.9*  --  7.7*  --  8.4 8.3* 8.3*   ALK PHOS U/L  --   --  46  --   --   --   --   --   --    ALT U/L  --   --  <3*  --   --   --   --   --   --    AST U/L  --   --  11*  --   --   --   --   --   --    MAGNESIUM mg/dL  --   --  1.8*  --   --   --   --   --  2.2   PHOSPHORUS mg/dL  --   --  5.1*  --   --   --   --   --   --        CUTURES:  Lab Results   Component Value Date    BLOODCX No Growth After 5 Days. 02/16/2024    BLOODCX No Growth After 5 Days. 02/16/2024    BLOODCX No Growth After 5 Days. 02/12/2024    BLOODCX No Growth After 5 Days. 02/12/2024    BLOODCX No Growth After 5 Days. 02/09/2024    BLOODCX Staphylococcus aureus (A) 02/09/2024    BLOODCX Staphylococcus aureus (A) 02/08/2024                 Portions of the record may have been created with voice recognition software. Occasional wrong word or \"sound a like\" substitutions may have occurred due to the inherent limitations of voice recognition software. Read the chart carefully and recognize, using context, where substitutions have occurred.If you have any questions, please contact the dictating provider.    "

## 2024-02-25 NOTE — ASSESSMENT & PLAN NOTE
Diuretics per HF team.  On IV Lasix 60 mg twice daily and metolazone  isordil 20 mg TID and hydralazine to 50 mg TID

## 2024-02-25 NOTE — PROGRESS NOTES
[facility]  Progress Note  Name: Nadir Myers I  MRN: [id number]  Unit/Bed#: [facility] 519-01 I Date of Admission: 1/31/2024   Date of Service: 2/25/2024 I Hospital Day: 25    Assessment/Plan   Abnormal CT of the chest  Assessment & Plan  CT 2/7 shows small consolidation in the upper lobes and left lower lobe with enhancing lesion in the peripheral of right lower lobe, may be embolic versus hematogenous seeding from high-grade bacteremia.  there is no evident of pulmonary embolism to suggest that this is an infarct.   Repeat CT 2/15 revealed possible septic emboli, gas and fluid subcutaneous collection in the pacemaker bed likely postsurgical, reactive mild mediastinal lymphadenopathy.  CT chest without contrast  2/21: Peripheral left lower lobe opacity with reverse halo sign appearance. This can be seen in the setting of pulmonary infarct   Doppler US is negative for DVT  VQ Scan is intermediate for PE  TTE 2/21 with normal right ventricle function  Unable to obtain PE study to confirm/rule out given LINDEN. Suspicion remains higher for septic embolism. Given hemodynamic stability with only 1L NC oxygen requirement due to volume overload, would hold off on AC for now.   Appreciate pulmonology input  Fungal workup pending.     Pleuritic chest pain  Assessment & Plan  Pleuritic pain in left lower chest wall since 2/20. Tender to touch. Suspect secondary to ICD manipulation +/- septic pulmonary emboli  EKG with no acute ischemic changes.  Voltaren gel/lidocaine patch/prn pain meds. Improving    ICD (implantable cardioverter-defibrillator) in place  Assessment & Plan  Patient had extraction of ICD  Patient underwent AV ICD placement on February 20, 2024  Patient had lead dislodgment leading to repeat procedure on February 21, 2024  During the procedure patient's lead had poor sensing and therefore could not be positioned under the sternum and pocket was closed.  Multiple location under  "the sternum where attempted.   Will plan for LifeVest on discharge and then discuss S-ICD in office.     Constipation  Assessment & Plan  BM 2/22  continue bowel regimen    LINDEN (acute kidney injury) ([facility])  Assessment & Plan  Likely ATN  Monitor with lasix  Hold jardiance and aldactone  Nephrology following    Electrolyte abnormalities  Assessment & Plan  Monitor/replete serum potassium/magnesium/phosphate as necessary    Gangrene of toe of left foot ([facility])  Assessment & Plan  Appreciate podiatry input -> status post left fourth toe amputation on 1/31  [place] to be surgical cure per podiatry  Wound care  Antibiotics per ID    MSSA bacteremia  Assessment & Plan  Persistent MSSA bacteremia most likely source foot wound, complicated with ICD infection  Blood cultures from 1/31-2/9  positive despite being on Ancef suspect due to ICD infection  GILL with evidence of mobile mass on ICD lead in the right atrium, concerning for infection, no valvular vegetation  S/p ICD removal with EP on 2/8  CT chest 2/7 and 2/15 with possible septic pulmonary emboli  Repeat Blood cultures from 2/12 negative, repeat blood cultures 2/16 negative at 72 hours  Continue IV Ancef, with plan to treat 6 weeks until 3/25/2024   PICC placed 2/22    LUE weakness  Assessment & Plan  Presented w/ transient left-sided weakness and a facial droop now resolved  Appreciate neurology input deeming symptoms likely secondary to septic shock/infection and hyperglycemia, as neurologic workup including CT/MRI imaging negative for evidence of stroke or intracranial vessel occlusion, but noted chronic microangiopathic changes -> radiology report did however mention: \"Moderate bilateral supraclinoid ICA and moderate to severe left cavernous ICA segment stenosis.\"  Continue dual endplate therapy with ASA/Plavix - c/w statin  PT/OT as tolerated    PAD (peripheral artery disease) ([facility])  Assessment & Plan  Continue dual antiplatelet therapy with ASA/Plavix - continue " statin      Coronary artery disease involving native coronary artery of native heart without angina pectoris  Assessment & Plan  History of coronary artery disease status post HANNA to left circumflex 2015, repeat cath 2023 with  of mid LAD and L PDA.  Ischemic cardiomyopathy  Continue aspirin/Plavix/statin/metoprolol    Chronic systolic CHF (HCC)  Assessment & Plan  Diuretics per HF team.  On IV Lasix 60 mg twice daily and metolazone  isordil 20 mg TID and hydralazine to 50 mg TID     V-tach ([facility])  Assessment & Plan  History of monomorphic VT status post secondary prevention with [employer] dual-chamber ICD 5/2023  Status post ICD extraction 2/8/2024  Lifevest on dc    Type 2 diabetes mellitus, with long-term current use of insulin ([facility])  Assessment & Plan  Lab Results   Component Value Date    HGBA1C 9.8 (H) 01/31/2024     Continue to adjust basal/prandial insulin. BG goal 140-180  Hypoglycemia protocol  Carbohydrate restriction  Continue to adjust    * Septic shock ([facility])  Assessment & Plan  Initially admitted to the ICU requiring vasopressor support, now off, with maintenance of hemodynamic stability  Due to MSSA bacteremia from a gangrenous left fourth toe (see below) complicated by ICD infection  Continue to monitor vitals and maintain hemodynamics including temperature curve  See additional management below  Shock resolved    Hyponatremia-resolved as of 2/25/2024  Assessment & Plan  Mild in setting of LINDEN  Monitor              VTE Pharmacologic Prophylaxis:   Moderate Risk (Score 3-4) - Pharmacological DVT Prophylaxis Ordered: heparin.    Mobility:   Basic Mobility Inpatient Raw Score: 17  JH-HLM Goal: 5: Stand one or more mins  JH-HLM Achieved: 2: Bed activities/Dependent transfer  HLM Goal NOT achieved. Continue with multidisciplinary rounding and encourage appropriate mobility to improve upon HLM goals.    Patient Centered Rounds: I performed bedside rounds with nursing staff today.   Discussions with  Specialists or Other Care Team Provider: Heart failure    Education and Discussions with Family / Patient:  patient .     Total Time Spent on Date of Encounter in care of patient: 35 mins. This time was spent on one or more of the following: performing physical exam; counseling and coordination of care; obtaining or reviewing history; documenting in the medical record; reviewing/ordering tests, medications or procedures; communicating with other healthcare professionals and discussing with patient's family/caregivers.    Current Length of Stay: 25 day(s)  Current Patient Status: Inpatient   Certification Statement: The patient will continue to require additional inpatient hospital stay due to above  Discharge Plan: Anticipate discharge in >72 hrs to discharge location to be determined pending rehab evaluations.    Code Status: Level 1 - Full Code    Subjective:   No new complaints. CP singnificantly improved.     Objective:     Vitals:   Temp (24hrs), Av.4 °F (36.3 °C), Min:97.1 °F (36.2 °C), Max:97.8 °F (36.6 °C)    Temp:  [97.1 °F (36.2 °C)-97.8 °F (36.6 °C)] 97.2 °F (36.2 °C)  HR:  [80-99] 83  Resp:  [15-19] 19  BP: (132-147)/(66-85) 143/66  SpO2:  [94 %-96 %] 95 %  Body mass index is 33.58 kg/m².     Input and Output Summary (last 24 hours):     Intake/Output Summary (Last 24 hours) at 2024 1344  Last data filed at 2024 1132  Gross per 24 hour   Intake 1110 ml   Output 5100 ml   Net -3990 ml       Physical Exam:   Physical Exam  Vitals and nursing note reviewed.   Constitutional:       General: He is not in acute distress.     Appearance: He is well-developed.   HENT:      Head: Normocephalic and atraumatic.   Eyes:      Conjunctiva/sclera: Conjunctivae normal.   Cardiovascular:      Rate and Rhythm: Normal rate and regular rhythm.      Heart sounds: No murmur heard.  Pulmonary:      Effort: Pulmonary effort is normal. No respiratory distress.      Breath sounds: Rales present.   Abdominal:       Palpations: Abdomen is soft.      Tenderness: There is no abdominal tenderness.   Musculoskeletal:      Cervical back: Neck supple.      Right lower leg: Edema present.      Left lower leg: Edema present.   Skin:     General: Skin is warm and dry.      Capillary Refill: Capillary refill takes less than 2 seconds.   Neurological:      Mental Status: He is alert.   Psychiatric:         Mood and Affect: Mood normal.          Additional Data:     Labs:  Results from last 7 days   Lab Units 02/25/24  0537   WBC Thousand/uL 9.00   HEMOGLOBIN g/dL 9.4*   HEMATOCRIT % 29.5*   PLATELETS Thousands/uL 371   NEUTROS PCT % 78*   LYMPHS PCT % 13*   MONOS PCT % 7   EOS PCT % 1     Results from last 7 days   Lab Units 02/25/24  0537 02/24/24  0541 02/23/24  0458   SODIUM mmol/L 136   < > 135   POTASSIUM mmol/L 4.2   < > 4.7   CHLORIDE mmol/L 96   < > 98   CO2 mmol/L 33*   < > 31   BUN mg/dL 46*   < > 41*   CREATININE mg/dL 2.36*   < > 2.02*   ANION GAP mmol/L 7   < > 6   CALCIUM mg/dL 8.2*   < > 7.9*   ALBUMIN g/dL  --   --  2.4*   TOTAL BILIRUBIN mg/dL  --   --  0.25   ALK PHOS U/L  --   --  46   ALT U/L  --   --  <3*   AST U/L  --   --  11*   GLUCOSE RANDOM mg/dL 124   < > 69    < > = values in this interval not displayed.     Results from last 7 days   Lab Units 02/19/24  1345   INR  1.15     Results from last 7 days   Lab Units 02/25/24  1043 02/25/24  0607 02/24/24  2121 02/24/24  1559 02/24/24  1116 02/24/24  0626 02/23/24  2017 02/23/24  1559 02/23/24  1110 02/23/24  0603 02/23/24  0524 02/22/24  2033   POC GLUCOSE mg/dl 266* 143* 193* 291* 161* 104 189* 167* 187* 135 85 113               Lines/Drains:  Invasive Devices       Peripherally Inserted Central Catheter Line  Duration             PICC Line 02/22/24 Right Basilic 3 days              Drain  Duration             Urethral Catheter Latex 16 Fr. 5 days                  Urinary Catheter:  Goal for removal: Voiding trial when ambulation improves         Central  Line:  Goal for removal: Will discontinue when meds requiring line are completed.         Telemetry:  Telemetry Orders (From admission, onward)               24 Hour Telemetry Monitoring  Continuous x 24 Hours (Te[place])        Question:  Reason for 24 Hour Telemetry  Answer:  PCI/EP study (including pacer and ICD implementation), Cardiac surgery, MI, abnormal cardiac cath, and chest pain- rule out MI                                Imaging: Reviewed radiology reports from this admission including: chest xray    Recent Cultures (last 7 days):         Last 24 Hours Medication List:   Current Facility-Administered Medications   Medication Dose Route Frequency Provider Last Rate    acetaminophen  650 mg Oral Q8H [facility] Kip Cabello MD      ALPRAZolam  0.5 mg Oral HS PRN Kip Cabello MD      amiodarone  200 mg Oral Daily With Breakfast Yuniel Mcgee PA-C      aspirin  81 mg Oral Daily Yuniel Mcgee PA-C      atorvastatin  80 mg Oral After Dinner Yuniel Mcgee PA-C      cefazolin  2,000 mg Intravenous Q8H Zach eMdina MD 2,000 mg (02/25/24 1323)    clopidogrel  75 mg Oral Daily Yuniel Mcgee PA-C      Diclofenac Sodium  2 g Topical 4x Daily Kip Cabello MD      furosemide  60 mg Intravenous BID (diuretic) Nash Samuels DO      heparin (porcine)  5,000 Units Subcutaneous Q8H [facility] Kip Cabello MD      hydrALAZINE  50 mg Oral Q8H [facility] Nash Samuels DO      insulin glargine  38 Units Subcutaneous HS Kip Cabello MD      insulin lispro  1-5 Units Subcutaneous 4x Daily (AC & HS) Yuniel Mcgee PA-C      insulin lispro  7 Units Subcutaneous TID With Meals Yuniel Mcgee PA-C      isosorbide dinitrate  20 mg Oral TID after meals Nash Samuels DO      melatonin  3 mg Oral HS Kip Cabello MD      methocarbamol  500 mg Oral Q6H PRN Kip Cabello MD      metolazone  2.5 mg Oral Daily Nash Samuels DO      metoprolol succinate  25 mg Oral Q12H Yuniel Mcgee PA-C      oxyCODONE  5 mg  Oral Q6H PRN Yuniel Mcgee PA-C      polyethylene glycol  17 g Oral Daily Yuniel Mcgee PA-C      saccharomyces boulardii  250 mg Oral BID Yuniel Mcgee PA-C      senna-docusate sodium  2 tablet Oral BID Yuniel Mcgee PA-C          Today, Patient Was Seen By: Kip Cabello MD    **Please Note: This note may have been constructed using a voice recognition system.**

## 2024-02-25 NOTE — ASSESSMENT & PLAN NOTE
Appreciate podiatry input -> status post left fourth toe amputation on 1/31  [place] to be surgical cure per podiatry  Wound care  Antibiotics per ID

## 2024-02-25 NOTE — ASSESSMENT & PLAN NOTE
Lab Results   Component Value Date    HGBA1C 9.8 (H) 01/31/2024     Continue to adjust basal/prandial insulin. BG goal 140-180  Hypoglycemia protocol  Carbohydrate restriction  Continue to adjust

## 2024-02-26 ENCOUNTER — PATIENT OUTREACH (OUTPATIENT)
Dept: CARDIOLOGY CLINIC | Facility: CLINIC | Age: 64
End: 2024-02-26

## 2024-02-26 DIAGNOSIS — R93.89 ABNORMAL CT OF THE CHEST: Primary | ICD-10-CM

## 2024-02-26 PROBLEM — E87.8 ELECTROLYTE ABNORMALITY: Status: RESOLVED | Noted: 2024-02-02 | Resolved: 2024-02-26

## 2024-02-26 PROBLEM — K59.00 CONSTIPATION: Status: RESOLVED | Noted: 2024-02-17 | Resolved: 2024-02-26

## 2024-02-26 PROBLEM — A41.9 SEPTIC SHOCK (HCC): Status: RESOLVED | Noted: 2024-01-31 | Resolved: 2024-02-26

## 2024-02-26 PROBLEM — R65.21 SEPTIC SHOCK (HCC): Status: RESOLVED | Noted: 2024-01-31 | Resolved: 2024-02-26

## 2024-02-26 PROBLEM — I50.23 ACUTE ON CHRONIC SYSTOLIC CHF (CONGESTIVE HEART FAILURE) (HCC): Status: ACTIVE | Noted: 2023-08-02

## 2024-02-26 LAB
ANION GAP SERPL CALCULATED.3IONS-SCNC: 8 MMOL/L
BASOPHILS # BLD AUTO: 0.02 THOUSANDS/ÂΜL (ref 0–0.1)
BASOPHILS NFR BLD AUTO: 0 % (ref 0–1)
BUN SERPL-MCNC: 43 MG/DL (ref 5–25)
CALCIUM SERPL-MCNC: 8.4 MG/DL (ref 8.4–10.2)
CHLORIDE SERPL-SCNC: 97 MMOL/L (ref 96–108)
CO2 SERPL-SCNC: 34 MMOL/L (ref 21–32)
CREAT SERPL-MCNC: 2.12 MG/DL (ref 0.6–1.3)
EOSINOPHIL # BLD AUTO: 0.09 THOUSAND/ÂΜL (ref 0–0.61)
EOSINOPHIL NFR BLD AUTO: 1 % (ref 0–6)
ERYTHROCYTE [DISTWIDTH] IN BLOOD BY AUTOMATED COUNT: 13.7 % (ref 11.6–15.1)
GFR SERPL CREATININE-BSD FRML MDRD: 32 ML/MIN/1.73SQ M
GLUCOSE SERPL-MCNC: 103 MG/DL (ref 65–140)
GLUCOSE SERPL-MCNC: 103 MG/DL (ref 65–140)
GLUCOSE SERPL-MCNC: 172 MG/DL (ref 65–140)
GLUCOSE SERPL-MCNC: 187 MG/DL (ref 65–140)
GLUCOSE SERPL-MCNC: 200 MG/DL (ref 65–140)
GLUCOSE SERPL-MCNC: 225 MG/DL (ref 65–140)
HCT VFR BLD AUTO: 30.7 % (ref 36.5–49.3)
HGB BLD-MCNC: 9.3 G/DL (ref 12–17)
IMM GRANULOCYTES # BLD AUTO: 0.07 THOUSAND/UL (ref 0–0.2)
IMM GRANULOCYTES NFR BLD AUTO: 1 % (ref 0–2)
LYMPHOCYTES # BLD AUTO: 1.4 THOUSANDS/ÂΜL (ref 0.6–4.47)
LYMPHOCYTES NFR BLD AUTO: 17 % (ref 14–44)
MAGNESIUM SERPL-MCNC: 2.1 MG/DL (ref 1.9–2.7)
MCH RBC QN AUTO: 27.9 PG (ref 26.8–34.3)
MCHC RBC AUTO-ENTMCNC: 30.3 G/DL (ref 31.4–37.4)
MCV RBC AUTO: 92 FL (ref 82–98)
MONOCYTES # BLD AUTO: 0.65 THOUSAND/ÂΜL (ref 0.17–1.22)
MONOCYTES NFR BLD AUTO: 8 % (ref 4–12)
NEUTROPHILS # BLD AUTO: 6.14 THOUSANDS/ÂΜL (ref 1.85–7.62)
NEUTS SEG NFR BLD AUTO: 73 % (ref 43–75)
NRBC BLD AUTO-RTO: 0 /100 WBCS
PLATELET # BLD AUTO: 391 THOUSANDS/UL (ref 149–390)
PMV BLD AUTO: 9.1 FL (ref 8.9–12.7)
POTASSIUM SERPL-SCNC: 3.9 MMOL/L (ref 3.5–5.3)
RBC # BLD AUTO: 3.33 MILLION/UL (ref 3.88–5.62)
SODIUM SERPL-SCNC: 139 MMOL/L (ref 135–147)
WBC # BLD AUTO: 8.37 THOUSAND/UL (ref 4.31–10.16)

## 2024-02-26 PROCEDURE — 82948 REAGENT STRIP/BLOOD GLUCOSE: CPT

## 2024-02-26 PROCEDURE — 99232 SBSQ HOSP IP/OBS MODERATE 35: CPT | Performed by: INTERNAL MEDICINE

## 2024-02-26 PROCEDURE — 83735 ASSAY OF MAGNESIUM: CPT | Performed by: INTERNAL MEDICINE

## 2024-02-26 PROCEDURE — 99233 SBSQ HOSP IP/OBS HIGH 50: CPT | Performed by: INTERNAL MEDICINE

## 2024-02-26 PROCEDURE — 99222 1ST HOSP IP/OBS MODERATE 55: CPT | Performed by: PSYCHIATRY & NEUROLOGY

## 2024-02-26 PROCEDURE — 80048 BASIC METABOLIC PNL TOTAL CA: CPT | Performed by: INTERNAL MEDICINE

## 2024-02-26 PROCEDURE — 97116 GAIT TRAINING THERAPY: CPT

## 2024-02-26 PROCEDURE — 97530 THERAPEUTIC ACTIVITIES: CPT

## 2024-02-26 PROCEDURE — 85025 COMPLETE CBC W/AUTO DIFF WBC: CPT | Performed by: INTERNAL MEDICINE

## 2024-02-26 RX ORDER — WATER 10 ML/10ML
INJECTION INTRAMUSCULAR; INTRAVENOUS; SUBCUTANEOUS
Status: DISPENSED
Start: 2024-02-26 | End: 2024-02-27

## 2024-02-26 RX ORDER — ACETAZOLAMIDE 500 MG/5ML
500 INJECTION, POWDER, LYOPHILIZED, FOR SOLUTION INTRAVENOUS ONCE
Status: COMPLETED | OUTPATIENT
Start: 2024-02-26 | End: 2024-02-26

## 2024-02-26 RX ORDER — POTASSIUM CHLORIDE 20 MEQ/1
20 TABLET, EXTENDED RELEASE ORAL ONCE
Status: COMPLETED | OUTPATIENT
Start: 2024-02-26 | End: 2024-02-26

## 2024-02-26 RX ADMIN — INSULIN LISPRO 7 UNITS: 100 INJECTION, SOLUTION INTRAVENOUS; SUBCUTANEOUS at 17:49

## 2024-02-26 RX ADMIN — INSULIN LISPRO 7 UNITS: 100 INJECTION, SOLUTION INTRAVENOUS; SUBCUTANEOUS at 12:02

## 2024-02-26 RX ADMIN — HEPARIN SODIUM 5000 UNITS: 5000 INJECTION INTRAVENOUS; SUBCUTANEOUS at 05:45

## 2024-02-26 RX ADMIN — ISOSORBIDE DINITRATE 20 MG: 20 TABLET ORAL at 12:03

## 2024-02-26 RX ADMIN — CEFAZOLIN SODIUM 2000 MG: 2 SOLUTION INTRAVENOUS at 13:31

## 2024-02-26 RX ADMIN — Medication 250 MG: at 08:28

## 2024-02-26 RX ADMIN — INSULIN GLARGINE 38 UNITS: 100 INJECTION, SOLUTION SUBCUTANEOUS at 21:14

## 2024-02-26 RX ADMIN — INSULIN LISPRO 1 UNITS: 100 INJECTION, SOLUTION INTRAVENOUS; SUBCUTANEOUS at 21:12

## 2024-02-26 RX ADMIN — ISOSORBIDE DINITRATE 20 MG: 20 TABLET ORAL at 08:27

## 2024-02-26 RX ADMIN — POTASSIUM CHLORIDE 20 MEQ: 1500 TABLET, EXTENDED RELEASE ORAL at 12:01

## 2024-02-26 RX ADMIN — CEFAZOLIN SODIUM 2000 MG: 2 SOLUTION INTRAVENOUS at 05:47

## 2024-02-26 RX ADMIN — Medication 3 MG: at 21:08

## 2024-02-26 RX ADMIN — HEPARIN SODIUM 5000 UNITS: 5000 INJECTION INTRAVENOUS; SUBCUTANEOUS at 13:29

## 2024-02-26 RX ADMIN — CLOPIDOGREL BISULFATE 75 MG: 75 TABLET ORAL at 08:28

## 2024-02-26 RX ADMIN — HYDRALAZINE HYDROCHLORIDE 50 MG: 50 TABLET ORAL at 05:45

## 2024-02-26 RX ADMIN — Medication 2 G: at 08:28

## 2024-02-26 RX ADMIN — INSULIN LISPRO 1 UNITS: 100 INJECTION, SOLUTION INTRAVENOUS; SUBCUTANEOUS at 12:02

## 2024-02-26 RX ADMIN — FUROSEMIDE 60 MG: 10 INJECTION, SOLUTION INTRAMUSCULAR; INTRAVENOUS at 17:51

## 2024-02-26 RX ADMIN — FUROSEMIDE 60 MG: 10 INJECTION, SOLUTION INTRAMUSCULAR; INTRAVENOUS at 08:29

## 2024-02-26 RX ADMIN — INSULIN LISPRO 7 UNITS: 100 INJECTION, SOLUTION INTRAVENOUS; SUBCUTANEOUS at 08:28

## 2024-02-26 RX ADMIN — HYDRALAZINE HYDROCHLORIDE 50 MG: 50 TABLET ORAL at 21:08

## 2024-02-26 RX ADMIN — CEFAZOLIN SODIUM 2000 MG: 2 SOLUTION INTRAVENOUS at 21:14

## 2024-02-26 RX ADMIN — ACETAMINOPHEN 650 MG: 325 TABLET, FILM COATED ORAL at 13:28

## 2024-02-26 RX ADMIN — ACETAMINOPHEN 650 MG: 325 TABLET, FILM COATED ORAL at 21:07

## 2024-02-26 RX ADMIN — AMIODARONE HYDROCHLORIDE 200 MG: 200 TABLET ORAL at 08:27

## 2024-02-26 RX ADMIN — METOPROLOL SUCCINATE 25 MG: 25 TABLET, EXTENDED RELEASE ORAL at 05:45

## 2024-02-26 RX ADMIN — ATORVASTATIN CALCIUM 80 MG: 80 TABLET, FILM COATED ORAL at 17:49

## 2024-02-26 RX ADMIN — HEPARIN SODIUM 5000 UNITS: 5000 INJECTION INTRAVENOUS; SUBCUTANEOUS at 21:08

## 2024-02-26 RX ADMIN — Medication 250 MG: at 17:49

## 2024-02-26 RX ADMIN — ISOSORBIDE DINITRATE 20 MG: 20 TABLET ORAL at 17:49

## 2024-02-26 RX ADMIN — HYDRALAZINE HYDROCHLORIDE 50 MG: 50 TABLET ORAL at 13:28

## 2024-02-26 RX ADMIN — METOLAZONE 2.5 MG: 5 TABLET ORAL at 08:36

## 2024-02-26 RX ADMIN — ASPIRIN 81 MG CHEWABLE TABLET 81 MG: 81 TABLET CHEWABLE at 08:27

## 2024-02-26 RX ADMIN — INSULIN LISPRO 1 UNITS: 100 INJECTION, SOLUTION INTRAVENOUS; SUBCUTANEOUS at 17:49

## 2024-02-26 RX ADMIN — ACETAMINOPHEN 650 MG: 325 TABLET, FILM COATED ORAL at 05:44

## 2024-02-26 RX ADMIN — ACETAZOLAMIDE 500 MG: 500 INJECTION, POWDER, LYOPHILIZED, FOR SOLUTION INTRAVENOUS at 15:21

## 2024-02-26 RX ADMIN — METOPROLOL SUCCINATE 25 MG: 25 TABLET, EXTENDED RELEASE ORAL at 17:49

## 2024-02-26 NOTE — PROGRESS NOTES
Patient continues admission at [facility]; Advanced Heart Failure Census.     Outpatient Advanced Heart Failure LCSW completed electronic chart review and rounded with the HF Team. Spouse at bedside. Left hand and legs still swollen upon HF exam; kidney function improved; increase diuresis. Pt awaits new battery for LifeVest which is being mailed to pt's home.      Referral for outpatient social work not entered at this time.   Please enter referral if outpatient resources are needed in the future.

## 2024-02-26 NOTE — PLAN OF CARE
Problem: METABOLIC, FLUID AND ELECTROLYTES - ADULT  Goal: Electrolytes maintained within normal limits  Description: INTERVENTIONS:  - Monitor labs and assess patient for signs and symptoms of electrolyte imbalances  - Administer electrolyte replacement as ordered  - Monitor response to electrolyte replacements, including repeat lab results as appropriate  - Instruct patient on fluid and nutrition as appropriate  Outcome: Progressing  Goal: Fluid balance maintained  Description: INTERVENTIONS:  - Monitor labs   - Monitor I/O and WT  - Instruct patient on fluid and nutrition as appropriate  - Assess for signs & symptoms of volume excess or deficit  Outcome: Progressing  Goal: Glucose maintained within target range  Description: INTERVENTIONS:  - Monitor Blood Glucose as ordered  - Assess for signs and symptoms of hyperglycemia and hypoglycemia  - Administer ordered medications to maintain glucose within target range  - Assess nutritional intake and initiate nutrition service referral as needed  Outcome: Progressing     Problem: Prexisting or High Potential for Compromised Skin Integrity  Goal: Skin integrity is maintained or improved  Description: INTERVENTIONS:  - Identify patients at risk for skin breakdown  - Assess and monitor skin integrity  - Assess and monitor nutrition and hydration status  - Monitor labs   - Assess for incontinence   - Turn and reposition patient  - Assist with mobility/ambulation  - Relieve pressure over bony prominences  - Avoid friction and shearing  - Provide appropriate hygiene as needed including keeping skin clean and dry  - Evaluate need for skin moisturizer/barrier cream  - Collaborate with interdisciplinary team   - Patient/family teaching  - Consider wound care consult   Outcome: Progressing     Problem: PAIN - ADULT  Goal: Verbalizes/displays adequate comfort level or baseline comfort level  Description: Interventions:  - Encourage patient to monitor pain and request  assistance  - Assess pain using appropriate pain scale  - Administer analgesics based on type and severity of pain and evaluate response  - Implement non-pharmacological measures as appropriate and evaluate response  - Consider cultural and social influences on pain and pain management  - Notify physician/advanced practitioner if interventions unsuccessful or patient reports new pain  Outcome: Progressing     Problem: INFECTION - ADULT  Goal: Absence or prevention of progression during hospitalization  Description: INTERVENTIONS:  - Assess and monitor for signs and symptoms of infection  - Monitor lab/diagnostic results  - Monitor all insertion sites, i.e. indwelling lines, tubes, and drains  - Monitor endotracheal if appropriate and nasal secretions for changes in amount and color  - [place] appropriate cooling/warming therapies per order  - Administer medications as ordered  - Instruct and encourage patient and family to use good hand hygiene technique  - Identify and instruct in appropriate isolation precautions for identified infection/condition  Outcome: Progressing  Goal: Absence of fever/infection during neutropenic period  Description: INTERVENTIONS:  - Monitor WBC    Outcome: Progressing     Problem: SAFETY ADULT  Goal: Patient will remain free of falls  Description: INTERVENTIONS:  - Educate patient/family on patient safety including physical limitations  - Instruct patient to call for assistance with activity   - Consult OT/PT to assist with strengthening/mobility   - Keep Call bell within reach  - Keep bed low and locked with side rails adjusted as appropriate  - Keep care items and personal belongings within reach  - Initiate and maintain comfort rounds  - Make Fall Risk Sign visible to staff  - Apply yellow socks and bracelet for high fall risk patients  - Consider moving patient to room near nurses station  Outcome: Progressing  Goal: Maintain or return to baseline ADL function  Description:  INTERVENTIONS:  -  Assess patient's ability to carry out ADLs; assess patient's baseline for ADL function and identify physical deficits which impact ability to perform ADLs (bathing, care of mouth/teeth, toileting, grooming, dressing, etc.)  - Assess/evaluate cause of self-care deficits   - Assess range of motion  - Assess patient's mobility; develop plan if impaired  - Assess patient's need for assistive devices and provide as appropriate  - Encourage maximum independence but intervene and supervise when necessary  - Involve family in performance of ADLs  - Assess for home care needs following discharge   - Consider OT consult to assist with ADL evaluation and planning for discharge  - Provide patient education as appropriate  Outcome: Progressing  Goal: Maintains/Returns to pre admission functional level  Description: INTERVENTIONS:  - Perform AM-PAC 6 Click Basic Mobility/ Daily Activity assessment daily.  - Set and communicate daily mobility goal to care team and patient/family/caregiver.   - Collaborate with rehabilitation services on mobility goals if consulted  - Record patient progress and toleration of activity level   Outcome: Progressing     Problem: DISCHARGE PLANNING  Goal: Discharge to home or other facility with appropriate resources  Description: INTERVENTIONS  - Identify barriers to discharge w/patient and caregiver  - Arrange for needed discharge resources and transportation as appropriate  - Identify discharge learning needs (meds, wound care, etc.)  - Arrange for interpretive services to assist at discharge as needed  - Refer to Case Management Department for coordinating discharge planning if the patient needs post-hospital services based on physician/advanced practitioner order or complex needs related to functional status, cognitive ability, or social support system  Outcome: Progressing     Problem: Knowledge Deficit  Goal: Patient/family/caregiver demonstrates understanding of disease  process, treatment plan, medications, and discharge instructions  Description: Complete learning assessment and assess knowledge base.  Interventions:  - Provide teaching at level of understanding  - Provide teaching via preferred learning methods  Outcome: Progressing

## 2024-02-26 NOTE — PROGRESS NOTES
NEPHROLOGY PROGRESS NOTE   Nadir Myers 63 y.o. male MRN: [id number]  Unit/Bed#: [facility] 519-01 Encounter: [id number]      ASSESSMENT/PLAN:  LINDEN: Suspect ATN in the setting of surgery and infection.  Head CT with contrast 2/15 which could be slowing recovery but initial LINDEN was prior to contrast.  Baseline creatinine less than 1  Initially admitted with creatinine 1.4 which quickly improved to 0.6-0.8 but then worsened on 2/15  Creatinine peaked at 2.36 yesterday and today improving to 2.12  UA: Innumerable RBCs, 4-10 WBC, 0-3 hyaline casts glucose  CT: No hydronephrosis  Continue to hold spironolactone and Jardiance  Continue IV Lasix  Sepsis due to MSSA bacteremia with ICD infection  Status post ICD extraction 2/8, extravascular ICD placement 2/20 and revision 2/21  On cefazolin 2 g every 6 hours per ID  Chronic CHF with EF 35%   Currently on lasix 60 mg IV twice daily and metolazone 2.5 mg daily  Weight down 3kg in 24 hours   Left foot fourth toe gangrene status post amputation 1/31  Anemia of chronic disease: Hemoglobin stable at 9.3  Elevated bicarb: suspect metabolic alkalosis in the setting of diuresis and will monitor     Plan Summary:   Continue to hold Jardiance and spironolactone  Continue lasix 60mg IV bid and metolazone 2.5mg daily   Check a.m. BMP    SUBJECTIVE:  Feeling okay.  Thinks his right arm edema has improved but still significant edema in left arm and legs.  No shortness of breath.    OBJECTIVE:  Current Weight: Weight - Scale: 109 kg (240 lb 4.8 oz)  Vitals:    02/26/24 0711   BP: 111/69   Pulse: 80   Resp: 16   Temp: (!) 97.2 °F (36.2 °C)   SpO2: 96%       Intake/Output Summary (Last 24 hours) at 2/26/2024 0926  Last data filed at 2/26/2024 0838  Gross per 24 hour   Intake 1027.67 ml   Output 4600 ml   Net -3572.33 ml       General:  appears comfortable and in no acute distress   Skin:  No rash  Eyes:  Sclerae anicteric, no periorbital edema   ENT:  Moist mucous membranes  Neck:  Trachea  midline, symmetric   Chest:  Clear to auscultation bilaterally with no wheezes, rales or rhonchi  CVS:  Regular rate and rhythm  Abdomen:  Soft, nontender, nondistended  Neuro:  Awake and alert  Psych:  Appropriate affect  Extremities: +LE edema and LUE edema     Medications:  Scheduled Meds:  Current Facility-Administered Medications   Medication Dose Route Frequency Provider Last Rate    acetaminophen  650 mg Oral Q8H [facility] Kip Cabello MD      ALPRAZolam  0.5 mg Oral HS PRN Kip Cabello MD      amiodarone  200 mg Oral Daily With Breakfast Yuniel Mcgee PA-C      aspirin  81 mg Oral Daily Yuniel Mcgee PA-C      atorvastatin  80 mg Oral After Dinner Yuniel Mcgee PA-C      cefazolin  2,000 mg Intravenous Q8H Zach Medina MD 2,000 mg (02/26/24 0547)    clopidogrel  75 mg Oral Daily Yuniel Mcgee PA-C      Diclofenac Sodium  2 g Topical 4x Daily Kip Cabello MD      furosemide  60 mg Intravenous BID (diuretic) Nash Samuels DO      heparin (porcine)  5,000 Units Subcutaneous Q8H [facility] Kip Cabello MD      hydrALAZINE  50 mg Oral Q8H [facility] Nash Samuels DO      insulin glargine  38 Units Subcutaneous HS Kip Cabello MD      insulin lispro  1-5 Units Subcutaneous 4x Daily (AC & HS) Yuniel Mcgee PA-C      insulin lispro  7 Units Subcutaneous TID With Meals Yuniel Mcgee PA-C      isosorbide dinitrate  20 mg Oral TID after meals Nash Samuels DO      melatonin  3 mg Oral HS Kip Cabello MD      methocarbamol  500 mg Oral Q6H PRN Kip Cabello MD      metolazone  2.5 mg Oral Daily Nash Samuels DO      metoprolol succinate  25 mg Oral Q12H Yuniel Mcgee PA-C      oxyCODONE  5 mg Oral Q6H PRN Yuniel Mcgee PA-C      polyethylene glycol  17 g Oral Daily Yuniel Mcgee PA-C      saccharomyces boulardii  250 mg Oral BID Yuniel Mcgee PA-C      senna-docusate sodium  2 tablet Oral BID Yuniel Mcgee PA-C         PRN Meds:.  ALPRAZolam     methocarbamol    oxyCODONE        Laboratory Results:  Results from last 7 days   Lab Units 02/26/24  0540 02/25/24  0537 02/24/24  0541 02/23/24  0458 02/23/24  0000 02/22/24  1031 02/21/24  1100 02/21/24  0443 02/20/24  0504   WBC Thousand/uL 8.37 9.00 9.83  --  11.96* 14.46*  --  12.19* 11.08*   HEMOGLOBIN g/dL 9.3* 9.4* 9.3*  --  9.5* 9.7*  --  10.5* 10.0*   HEMATOCRIT % 30.7* 29.5* 30.3*  --  30.8* 30.9*  --  33.5* 32.6*   PLATELETS Thousands/uL 391* 371 344  --  372 378  --  425* 423*   SODIUM mmol/L 139 136 136 135  --  133*  --  132* 135   POTASSIUM mmol/L 3.9 4.2 4.4 4.7  --  5.0 5.1 5.8* 4.8   CHLORIDE mmol/L 97 96 98 98  --  100  --  100 101   CO2 mmol/L 34* 33* 31 31  --  29  --  24 29   BUN mg/dL 43* 46* 42* 41*  --  42*  --  38* 29*   CREATININE mg/dL 2.12* 2.36* 2.20* 2.02*  --  2.01*  --  1.76* 1.52*   CALCIUM mg/dL 8.4 8.2* 8.1* 7.9*  --  7.7*  --  8.4 8.3*   MAGNESIUM mg/dL 2.1  --   --  1.8*  --   --   --   --   --    PHOSPHORUS mg/dL  --   --   --  5.1*  --   --   --   --   --

## 2024-02-26 NOTE — ASSESSMENT & PLAN NOTE
Appreciate podiatry input -> status post left fourth toe amputation on 1/31  [place] to be surgical cure per podiatry  Wound care  Antibiotics per ID

## 2024-02-26 NOTE — DISCHARGE INSTR - OTHER ORDERS
Skin Care Plan:  1-Cleanse B/L Sacro-Buttocks with soap and water. Pat dry. Apply Silicone Border Foam (Mepilex) to area. [place] with P for Prevention and change every 3 days or PRN soilage/displacement. Peel back and inspect Q-shift.  2-Turn/reposition q2h or when medically stable for pressure re-distribution on skin .  3-Elevate heels to offload pressure.  4-Moisturize skin daily with skin nourishing cream  5-Ehob cushion in chair when out of bed.  6-Preventative Hydraguard to bilateral heels BID and PRN.   7-B/L Toe Wounds: per Podiatry recommendations

## 2024-02-26 NOTE — PROGRESS NOTES
Progress Note - Advanced Heart Failure Team  Nadir Myers 63 y.o. male MRN: [id number]  Unit/Bed#: [facility] 519-01 Encounter: [id number]    Subjective    States breathing is stable. No fevers, chest pain, dizziness, or palpitations. Urinating well.     Objective   Vitals: Blood pressure 125/56, pulse 84, temperature 98 °F (36.7 °C), resp. rate 16, height 6' (1.829 m), weight 109 kg (240 lb 4.8 oz), SpO2 95%.  Orthostatic Blood Pressures      Flowsheet Row Most Recent Value   Blood Pressure 125/56 filed at 02/26/2024 1330   Patient Position - Orthostatic VS Lying filed at 02/26/2024 0209              Invasive Devices       Peripherally Inserted Central Catheter Line  Duration             PICC Line 02/22/24 Right Basilic 4 days              Drain  Duration             Urethral Catheter Latex 16 Fr. 6 days                    Physical Exam  Constitutional:       General: He is not in acute distress.  Cardiovascular:      Rate and Rhythm: Normal rate and regular rhythm.   Pulmonary:      Effort: Pulmonary effort is normal.   Musculoskeletal:      Right lower leg: Edema present.      Left lower leg: Edema present.   Skin:     General: Skin is warm.   Neurological:      Mental Status: He is alert.         Lab Results: I have personally reviewed pertinent lab results.            Results from last 7 days   Lab Units 02/26/24  0540 02/25/24  0537 02/24/24  0541   POTASSIUM mmol/L 3.9 4.2 4.4   CO2 mmol/L 34* 33* 31   CHLORIDE mmol/L 97 96 98   BUN mg/dL 43* 46* 42*   CREATININE mg/dL 2.12* 2.36* 2.20*     Results from last 7 days   Lab Units 02/26/24  0540 02/25/24  0537 02/24/24  0541   HEMOGLOBIN g/dL 9.3* 9.4* 9.3*   HEMATOCRIT % 30.7* 29.5* 30.3*   PLATELETS Thousands/uL 391* 371 344         Results from last 7 days   Lab Units 02/19/24  1345   INR  1.15         Imaging: I have personally reviewed pertinent reports.    ECHO:  Results for orders placed during the hospital encounter of 09/26/17    Echo complete with contrast  if indicated    Narrative  [address] [address]  [phone]    Transthoracic Echocardiogram  2D, M-mode, Doppler, and Color Doppler    Study date:  26-Sep-2017    Patient: EAGLE REOBLLAR  MR number: [id number]  Account number: [id number]  : 1960  Age: 57 years  Gender: Male  Status: Outpatient  Location: [address] [place] [address]  Height: 72 in  Weight: 263.3 lb  BP: 142/ 88 mmHg    Indications: CAD    Diagnoses: I25.10 - Atherosclerotic heart disease of native coronary artery without angina pectoris    Sonographer:  OLIVE Benoit  Primary Physician:  Paty Ortiz MD  Referring Physician:  Eagle Chavez MD  Group:  [facility]  Interpreting Physician:  Torrey Duenas MD    SUMMARY    LEFT VENTRICLE:  Size was normal.  Systolic function was normal. Ejection fraction was estimated to be 65 %.  There was mild concentric hypertrophy.  Doppler parameters were consistent with abnormal left ventricular relaxation (grade 1 diastolic dysfunction).    RIGHT VENTRICLE:  The size was normal.  Systolic function was normal.    TRICUSPID VALVE:  There was trace regurgitation.    HISTORY: PRIOR HISTORY: Hypertension, CAD s/p PCI, smoker, diabetes, high cholesterol    PROCEDURE: The study was performed in the [address] [place] [address]. This was a routine study. The transthoracic approach was used. The study included complete 2D imaging, M-mode, complete spectral Doppler, and color Doppler. The  heart rate was 112 bpm, at the start of the study. Images were obtained from the parasternal, apical, subcostal, and suprasternal notch acoustic windows. Echocardiographic views were limited due to decreased penetration. This was a  technically difficult study.    LEFT VENTRICLE: Size was normal. Systolic function was normal. Ejection fraction was estimated to be 65 %. There were no regional wall motion  abnormalities. Wall thickness was mildly increased. There was mild concentric hypertrophy.  DOPPLER: Doppler parameters were consistent with abnormal left ventricular relaxation (grade 1 diastolic dysfunction).    RIGHT VENTRICLE: The size was normal. Systolic function was normal. Wall thickness was normal.    LEFT ATRIUM: Size was normal.    RIGHT ATRIUM: Size was normal.    MITRAL VALVE: Valve structure was normal. There was normal leaflet separation. DOPPLER: The transmitral velocity was within the normal range. There was no evidence for stenosis. There was no regurgitation.    AORTIC VALVE: The valve was trileaflet. Leaflets exhibited normal thickness and normal cuspal separation. DOPPLER: Transaortic velocity was within the normal range. There was no evidence for stenosis. There was no regurgitation.    TRICUSPID VALVE: The valve structure was normal. There was normal leaflet separation. DOPPLER: The transtricuspid velocity was within the normal range. There was no evidence for stenosis. There was trace regurgitation. The tricuspid jet  envelope definition was inadequate for estimation of RV systolic pressure. There are no indirect findings suggestive of moderate or severe pulmonary hypertension.    PULMONIC VALVE: Leaflets exhibited normal thickness, no calcification, and normal cuspal separation. DOPPLER: The transpulmonic velocity was within the normal range. There was no regurgitation.    PERICARDIUM: There was no pericardial effusion. The pericardium was normal in appearance.    AORTA: The root exhibited normal size.    SYSTEMIC VEINS: IVC: The inferior vena cava was normal in size and course. Respirophasic changes were normal.    SYSTEM MEASUREMENT TABLES    2D  %FS: 47.6 %  AV Diam: 3.34 cm  EDV(Teich): 101.71 ml  EF Biplane: 67.76 %  EF(Cube): 85.61 %  EF(Teich): 79 %  ESV(Cube): 14.82 ml  ESV(Teich): 21.36 ml  IVSd: 1.58 cm  LA Area: 12.78 cm2  LA Diam: 3.37 cm  LVEDV MOD A2C: 49.73 ml  LVEDV MOD A4C:  61.78 ml  LVEDV MOD BP: 58.1 ml  LVEF MOD A2C: 65.64 %  LVEF MOD A4C: 71.2 %  LVESV MOD A2C: 17.09 ml  LVESV MOD A4C: 17.79 ml  LVESV MOD BP: 18.73 ml  LVIDd: 4.69 cm  LVIDs: 2.46 cm  LVLd A2C: 6.97 cm  LVLd A4C: 7.74 cm  LVLs A2C: 5.23 cm  LVLs A4C: 6.13 cm  LVPWd: 1.23 cm  RA Area: 14.52 cm2  RV Diam.: 2.86 cm  SI(Cube): 36.73 ml/m2  SI(Teich): 33.48 ml/m2  SV MOD A2C: 32.64 ml  SV MOD A4C: 43.98 ml  SV(Cube): 88.15 ml  SV(Teich): 80.35 ml    MM  TAPSE: 3.26 cm    PW  E': 0.08 m/s    [facility]    Prepared and electronically signed by    Torrey Deunas MD  Signed 26-Sep-2017 12:55:39    Results for orders placed during the hospital encounter of 01/31/24    Echo complete w/ contrast if indicated    Interpretation Summary    Left Ventricle: Left ventricular cavity size is normal. Wall thickness is moderately increased. There is moderate concentric hypertrophy. The left ventricular ejection fraction is 42%. Systolic function is moderately reduced. Diastolic function is mildly abnormal, consistent with grade I (abnormal) relaxation. 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 There is no thrombus.    The following segments are akinetic: apical anterior, apical septal, apical inferior, apical lateral and apex.    All other segments are normal.    Tricuspid Valve: There is mild regurgitation.      GILL:  No results found for this or any previous visit.    Results for orders placed during the hospital encounter of 01/31/24    GILL    Interpretation Summary  Images from the original result were not included.      Left Ventricle: Left ventricular cavity size is normal. Wall thickness is normal. The left ventricular ejection fraction is 45%. Systolic function is mildly reduced. There is hypokinesis with akinesis of the aneurysmal portion of the apex.    Right Ventricle: Right ventricular cavity size is normal. Systolic function is normal.    Right Atrium: There is a 1.8 x 1.1 cm mobile mass  affixed to the device lead as it passes through the right atrium.    Aortic Valve: The aortic valve is trileaflet. The leaflets are mildly thickened. The leaflets are not calcified. The leaflets exhibit normal mobility. There is Lambl's excrescence on the tip of the right coronary aortic valve leaflet.    There is a 1.8 x 1.1 cm mobile mass affixed to the cardiac device lead as is passes through the right atrium.  In the setting of bacteremia this is most likely represents infection.    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 prevention [employer] dual chamber ICD (5/2023) complicated by device infection requiring explant    Septic shock (resolved) with left toe gangrene / staph aureus bacteremia    LINDEN likely 2/2 to sepsis-related ATN, recent procedures    Coronary artery disease with  mLAD and LPDA     LUE weakness / facial droop     LV apical aneurysm     Peripheral arterial disease     Hypertension    History of orthostatic hypotension     DM Type 2     63 year old male with chronic heart failure with mid-range EF being followed by heart failure outpatient presented with Staph bacteremia and septic shock complicating a lower extremity wound infection. Vegetation noted on ICD lead, prompting lead extraction. Now stable from infectious standpoint with  reimplantation of subcutaneous ICD, however with inability to obtain proper subcutaneous lead placement.    Patient remains volume overloaded on examination today.   Likely component of progressive volume overload due to hold of medical therapy and fluids.  Excellent urine output over the weekend on Furosemide 80mg BID + Metolazone 2.5mg   Creatinine stable.     Continue Furosemide 80mg BID and will add 1 dose of Acetazolamide 500mg IV.     Target net negative fluid balance of 2.5 to 3.0 L over the next 24 hours.   Maintain potassium 4-5  mEq/L and Mg > 2.     MAP improved on afterload reducing therapy.    Unable to start SGLT-2, MRA, and ARB/ARNi due to renal impairment.    Continue Metoprolol Succinate 25mg BID  Continue Isordil 20mg TID / Hydralazine 50mg TID    Continue dual antiplatelet therapy with high intensity statin.  Continue amiodarone for VT suppression.    Plan for LifeVest on discharge and implantation of traditional [place] S-ICD as outpatient per EP.     Lamont Murcia MD  Cardiology Fellow

## 2024-02-26 NOTE — WOUND OSTOMY CARE
Consult Note - Wound   Nadir Myers 63 y.o. male MRN: [id number]  Unit/Bed#: [facility] 519-01 Encounter: [id number]        History and Present Illness:  Patient is a 62 yo male that was admitted to [facility] for treatment of MSSA Bacteremia. Patient has a PMH of GERD, T2DM, CAD. Patient is a min assist for turning and repositioning. Patient is continent of bowel and bladder is managed via internal urinary catheter. On assessment, patient is seen ambulating with therapy.    Wound Care was consulted for right toe wound     Assessment Findings:   Podiatry is following and managing Right Great Toe Wound and Left Foot Toe Wounds. B/L heels and sacro-buttocks are intact and blanchable with no wounds present. Preventative orders in place for areas.     Primary RN updated with assessment.     Orders listed below and wound care will sign off, call or tiger text with questions.     Skin Care Plan:  1-Cleanse B/L Sacro-Buttocks with soap and water. Pat dry. Apply Silicone Border Foam (Mepilex) to area. [place] with P for Prevention and change every 3 days or PRN soilage/displacement. Peel back and inspect Q-shift.  2-Turn/reposition q2h or when medically stable for pressure re-distribution on skin .  3-Elevate heels to offload pressure.  4-Moisturize skin daily with skin nourishing cream  5-Ehob cushion in chair when out of bed.  6-Preventative Hydraguard to bilateral heels BID and PRN.   7-B/L Toe Wounds: per Podiatry recommendations             Maritza Aguilar RN, BSN, CWOCN

## 2024-02-26 NOTE — PHYSICAL THERAPY NOTE
"   PT Treatment       02/26/24 1210   PT Last Visit   PT Visit Date 02/26/24   Note Type   Note Type Treatment   Pain Assessment   Pain Assessment Tool 0-10   Pain Score No Pain   Restrictions/Precautions   Weight Bearing Precautions Per Order Yes   LLE Weight Bearing Per Order (S)  WBAT  (\"to left heel\" as per podiatry note from 2/22)   Braces or Orthoses   (patient with DARCO and surgical shoe present; reports he has difficulty wearing DARCO and donned surgical shoe today with VC to WB to heel)   Other Precautions Fall Risk;Telemetry;WBS   General   Chart Reviewed Yes   Response to Previous Treatment Patient with no complaints from previous session.   Family/Caregiver Present Yes  (spouse)   Cognition   Overall Cognitive Status WFL   Orientation Level Oriented X4   Subjective   Subjective \"how about a cane\"   Bed Mobility   Supine to Sit 5  Supervision   Additional items HOB elevated   Sit to Supine 5  Supervision   Additional items HOB elevated   Transfers   Sit to Stand 5  Supervision   Additional items Increased time required;Verbal cues   Stand to Sit 5  Supervision   Additional items Increased time required;Verbal cues   Additional Comments patient demonstrated safe hand placement for standing transfers with use of RW   Ambulation/Elevation   Gait pattern Excessively slow;Short stride;Decreased foot clearance   Gait Assistance 5  Supervision  (S with RW and CG assist with SPC)   Additional items Verbal cues   Assistive Device Rolling walker;SPC   Distance 30 feet x2 w/ RW + 10 feet with SPC   Ambulation/Elevation Additional Comments gait training with RW and SPC. patient encouraged to be using RW at this time for improved balance and endurance and to assist in maintaining compliance with WB to heel only of left foot.   Balance   Static Sitting Good   Static Standing Fair   Ambulatory Fair -   Endurance Deficit   Endurance Deficit Yes   Endurance Deficit Description fatigue, weakness   Activity Tolerance "   Activity Tolerance Patient tolerated treatment well   Nurse Made Aware carmen to see per KAVEH Ramirez   Assessment   Prognosis Good   Problem List Decreased strength;Decreased endurance;Impaired balance;Decreased mobility   Assessment PT initiated treatment session in order to assist patient in achieving goals to improve transfers, gait training, and overall activity tolerance. Patient is able to perform standing transfers and ambulation with use of RW at a supervision level (VC to encourage WB to heel of left foot with surgical shoe donned). He walked 30 feet x 2, reduced gait speed, no LOB. In future sessions plan to trial stairs with HR and SPC (patient has 3 CONNIE); deferred today due to fatigue. Patient will continue to benefit from continued skilled PT this admission to achieve maximal function and safety.   Goals   Patient Goals to go home   STG Expiration Date 02/29/24   PT Treatment Day 9   Plan   Treatment/Interventions Functional transfer training;LE strengthening/ROM;Therapeutic exercise;Endurance training;Patient/family training;Equipment eval/education;Bed mobility;Gait training;OT;Spoke to nursing;Elevations   Progress Progressing toward goals   PT Frequency 3-5x/wk   Discharge Recommendation   Rehab Resource Intensity Level, PT III (Minimum Resource Intensity)   Equipment Recommended Walker;Other (Comment)  (notifed CM patient will benefit from RW and commode for d/c home and PT provided with SPC today)   Walker Package Recommended Wheeled walker   Change/add to Walker Package? No   Additional Comments Patient and his spouse participated in 10 minutes of education regarding mobility this admission and upon d/c. This included the following: confirmation of home set up for d/c (one level with 3 CONNIE), dme for d/c (recommend RW and bedside commode; provided with SPC to use on stairs), f/u with HHPT services, and recommendation to change positions at least 1x/hour (even if sit<-->stand) and to ambulate 3-5x  within home to continue participation in mobility upon d/c.   AM-PAC Basic Mobility Inpatient   Turning in Flat Bed Without Bedrails 4   Lying on Back to Sitting on Edge of Flat Bed Without Bedrails 4   Moving Bed to Chair 3   Standing Up From Chair Using Arms 3   Walk in Room 3   Climb 3-5 Stairs With Railing 2   Basic Mobility Inpatient Raw Score 19   Basic Mobility Standardized Score 42.48   Highest Level Of Mobility   JH-HLM Goal 6: Walk 10 steps or more   JH-HLM Achieved 7: Walk 25 feet or more         The patient's AM-PAC Basic Mobility Inpatient Standardized Score is less than 42.9, suggesting this patient may benefit from discharge to post-acute rehabilitation services. Please also refer to the recommendation of the Physical Therapist for safe discharge planning.    BRADLY LEYVA PT, DPT

## 2024-02-26 NOTE — PLAN OF CARE
Problem: PHYSICAL THERAPY ADULT  Goal: Performs mobility at highest level of function for planned discharge setting.  See evaluation for individualized goals.  Description: Treatment/Interventions: Functional transfer training, LE strengthening/ROM, Elevations, Therapeutic exercise, Endurance training, Patient/family training, Equipment eval/education, Bed mobility, Spoke to nursing, Gait training, OT, Spoke to case management  Equipment Recommended: Wheelchair, Walker       See flowsheet documentation for full assessment, interventions and recommendations.  Outcome: Progressing  Note: Prognosis: Good  Problem List: Decreased strength, Decreased endurance, Impaired balance, Decreased mobility  Assessment: PT initiated treatment session in order to assist patient in achieving goals to improve transfers, gait training, and overall activity tolerance. Patient is able to perform standing transfers and ambulation with use of RW at a supervision level (VC to encourage WB to heel of left foot with surgical shoe donned). He walked 30 feet x 2, reduced gait speed, no LOB. In future sessions plan to trial stairs with HR and SPC (patient has 3 CONNIE); deferred today due to fatigue. Patient will continue to benefit from continued skilled PT this admission to achieve maximal function and safety.  Barriers to Discharge: None     Rehab Resource Intensity Level, PT: III (Minimum Resource Intensity)    See flowsheet documentation for full assessment.

## 2024-02-26 NOTE — PROGRESS NOTES
PULMONOLOGY PROGRESS NOTE     Name: Nadir Myers   Age & Sex: 63 y.o. male   MRN: [id number]  Unit/Bed#: [facility] 519-01   Encounter: [id number]      Assessment and Plan:    Abnormal Ct finding with waxed and waned consolidations, likely pulmonary septic emboli resulting consolidation/pulmonary infarct  Acute hypoxic resp failure, resolved  Recent MSSA bacteremia with ICD infection, s/p ICD removal on 2/8  Acute kidney injury  Tobacco use    - currently on room air. Baseline does not require any oxygen. No hemoptysis or any worsening respiratory symptoms  - unlikely to be in the setting of large PE. Based on clinical findings, this is unlikely to make the diagnosis of PE purely based on the CT scan finding for reverse halo sign. Noted patient does not have worsening oxygen requirement. I will argue there is no need to pursue for anticoagulation at this point. If he still has strong clinical suspicion of PE then can wait until his kidney function improve to obtain chest PE study.  - pending fungital and aspergillous galactomannan. He will needs pulm follow up as outpatient we can arrange and repeat CT chest 4-6 weeks after discharge  - we will sign off for now. Please call with questions      Subjective  Seen and examined at bedside. Awake and not in stress. On room air    Review of systems:  12 point review of systems was completed and was otherwise negative except as listed as above      Historical Information   Past Medical History:   Diagnosis Date    Diabetes mellitus (HCC)     Myocardial infarction (HCC)      Past Surgical History:   Procedure Laterality Date    CARDIAC CATHETERIZATION N/A 05/03/2023    Procedure: Cardiac Coronary Angiogram;  Surgeon: Valeriy Shore MD;  [facility]: [facility];  Service: Cardiology    CARDIAC CATHETERIZATION Left 05/03/2023    Procedure: Cardiac Left Heart Cath;  Surgeon: Valeriy Shore MD;  [facility]: [facility];  Service: Cardiology    CARDIAC  DEFIBRILLATOR PLACEMENT  05/2023    CARDIAC ELECTROPHYSIOLOGY PROCEDURE N/A 05/12/2023    Procedure: Cardiac icd implant;  Surgeon: Urbano Maria MD;  [facility]: [facility];  Service: Cardiology    CARDIAC ELECTROPHYSIOLOGY PROCEDURE N/A 2/20/2024    Procedure: EV ICD IMPLANTATION;  Surgeon: Arturo Wooten DO;  Location: [facility];  Service: Cardiology    CARDIAC ELECTROPHYSIOLOGY PROCEDURE N/A 2/8/2024    Procedure: Cardiac laser lead extraction;  Surgeon: Arturo Wooten DO;  [facility]: [facility];  Service: Cardiology    IR LOWER EXTREMITY ANGIOGRAM  1/18/2024    MD RMVL TRANSVNS PM ELTRD DUAL LEAD SYS N/A 2/20/2024    Procedure: EV ICD IMPLANTATION;  Surgeon: Abhilash Ferrera MD;  Location: [facility];  Service: Cardiac Surgery    MD RMVL TRANSVNS PM ELTRD DUAL LEAD SYS N/A 2/21/2024    Procedure: REMOVAL OF LEAD AND GENERATOR AND ATTEMPTED LEAD REVISION;  Surgeon: Abhilash Ferrera MD;  [facility];  Service: Cardiac Surgery    WOUND DEBRIDEMENT Left 2/4/2024    Procedure: LEFT FOURTH TOE AMPUTATION SURGICAL WOUND DEBRIDEMENT FOOT/TOE (WASH OUT);  Surgeon: Bebo Hopper DPM;  Location: [facility];  Service: Podiatry     History reviewed. No pertinent family history.        Social History: 45+ year smoking hx, 1 pack per day  Social History     Tobacco Use   Smoking Status Every Day    Types: Cigarettes   Smokeless Tobacco Never   Tobacco Comments    X2 cigarettes/day         Meds/Allergies   Current Facility-Administered Medications   Medication Dose Route Frequency    acetaminophen (TYLENOL) tablet 650 mg  650 mg Oral Q8H TEJAS    acetaZOLAMIDE (DIAMOX) injection 500 mg  500 mg Intravenous Once    ALPRAZolam (XANAX) tablet 0.5 mg  0.5 mg Oral HS PRN    amiodarone tablet 200 mg  200 mg Oral Daily With Breakfast    aspirin chewable tablet 81 mg  81 mg Oral Daily    atorvastatin (LIPITOR) tablet 80 mg  80 mg Oral After Dinner    ceFAZolin (ANCEF) IVPB (premix in dextrose) 2,000 mg 50 mL  2,000  mg Intravenous Q8H    clopidogrel (PLAVIX) tablet 75 mg  75 mg Oral Daily    Diclofenac Sodium (VOLTAREN) 1 % topical gel 2 g  2 g Topical 4x Daily    furosemide (LASIX) injection 60 mg  60 mg Intravenous BID (diuretic)    heparin (porcine) subcutaneous injection 5,000 Units  5,000 Units Subcutaneous Q8H TEJAS    hydrALAZINE (APRESOLINE) tablet 50 mg  50 mg Oral Q8H TEJAS    insulin glargine (LANTUS) subcutaneous injection 38 Units 0.38 mL  38 Units Subcutaneous HS    insulin lispro (HumaLOG) 100 units/mL subcutaneous injection 1-5 Units  1-5 Units Subcutaneous 4x Daily (AC & HS)    insulin lispro (HumaLOG) 100 units/mL subcutaneous injection 7 Units  7 Units Subcutaneous TID With Meals    isosorbide dinitrate (ISORDIL) tablet 20 mg  20 mg Oral TID after meals    melatonin tablet 3 mg  3 mg Oral HS    methocarbamol (ROBAXIN) tablet 500 mg  500 mg Oral Q6H PRN    metoprolol succinate (TOPROL-XL) 24 hr tablet 25 mg  25 mg Oral Q12H    oxyCODONE (ROXICODONE) IR tablet 5 mg  5 mg Oral Q6H PRN    polyethylene glycol (MIRALAX) packet 17 g  17 g Oral Daily    saccharomyces boulardii (FLORASTOR) capsule 250 mg  250 mg Oral BID    senna-docusate sodium (SENOKOT S) 8.6-50 mg per tablet 2 tablet  2 tablet Oral BID     Medications Prior to Admission   Medication    aspirin 81 MG tablet    BD Pen Needle Micro U/F 32G X 6 MM MISC    clopidogrel (PLAVIX) 75 mg tablet    insulin detemir (Levemir FlexPen) 100 Units/mL injection pen    JANUMET -1000 MG TB24    losartan (COZAAR) 25 mg tablet    metFORMIN (GLUCOPHAGE) 1000 MG tablet    spironolactone (ALDACTONE) 25 mg tablet     Allergies   Allergen Reactions    Vancomycin Rash     NOT AN ALLERGY - 1/31/24 patient experienced vancomycin infusion reaction, please extend duration of infusion time to prevent future infusion reactions       Vitals: Blood pressure 125/56, pulse 84, temperature 98 °F (36.7 °C), resp. rate 16, height 6' (1.829 m), weight 109 kg (240 lb 4.8 oz), SpO2 95%.,  RA, Body mass index is 32.59 kg/m².      Intake/Output Summary (Last 24 hours) at 2/26/2024 1400  Last data filed at 2/26/2024 1211  Gross per 24 hour   Intake 1789.67 ml   Output 4700 ml   Net -2910.33 ml       Physical Exam  Constitutional:       Appearance: He is obese. He is not ill-appearing.   Cardiovascular:      Rate and Rhythm: Normal rate and regular rhythm.      Pulses: Normal pulses.   Pulmonary:      Effort: Pulmonary effort is normal. No respiratory distress.   Abdominal:      General: Abdomen is flat.      Palpations: Abdomen is soft.   Musculoskeletal:      Comments: Lt lower leg covered. Noted post procedure   Skin:     General: Skin is warm.   Neurological:      General: No focal deficit present.      Mental Status: He is alert and oriented to person, place, and time.         Labs: I have personally reviewed pertinent lab results.  Laboratory and Diagnostics  Results from last 7 days   Lab Units 02/26/24  0540 02/25/24  0537 02/24/24  0541 02/23/24  0000 02/22/24  1031 02/21/24  0443 02/20/24  0504   WBC Thousand/uL 8.37 9.00 9.83 11.96* 14.46* 12.19* 11.08*   HEMOGLOBIN g/dL 9.3* 9.4* 9.3* 9.5* 9.7* 10.5* 10.0*   HEMATOCRIT % 30.7* 29.5* 30.3* 30.8* 30.9* 33.5* 32.6*   PLATELETS Thousands/uL 391* 371 344 372 378 425* 423*   NEUTROS PCT % 73 78* 78* 80*  --   --   --    MONOS PCT % 8 7 7 8  --   --   --    EOS PCT % 1 1 1 1  --   --   --      Results from last 7 days   Lab Units 02/26/24  0540 02/25/24  0537 02/24/24  0541 02/23/24  0458 02/22/24  1031 02/21/24  1100 02/21/24  0443 02/20/24  0504   SODIUM mmol/L 139 136 136 135 133*  --  132* 135   POTASSIUM mmol/L 3.9 4.2 4.4 4.7 5.0 5.1 5.8* 4.8   CHLORIDE mmol/L 97 96 98 98 100  --  100 101   CO2 mmol/L 34* 33* 31 31 29  --  24 29   ANION GAP mmol/L 8 7 7 6 4  --  8 5   BUN mg/dL 43* 46* 42* 41* 42*  --  38* 29*   CREATININE mg/dL 2.12* 2.36* 2.20* 2.02* 2.01*  --  1.76* 1.52*   CALCIUM mg/dL 8.4 8.2* 8.1* 7.9* 7.7*  --  8.4 8.3*   GLUCOSE  RANDOM mg/dL 103 124 90 69 171*  --  158* 87   ALT U/L  --   --   --  <3*  --   --   --   --    AST U/L  --   --   --  11*  --   --   --   --    ALK PHOS U/L  --   --   --  46  --   --   --   --    ALBUMIN g/dL  --   --   --  2.4*  --   --   --   --    TOTAL BILIRUBIN mg/dL  --   --   --  0.25  --   --   --   --      Results from last 7 days   Lab Units 02/26/24  0540 02/23/24  0458   MAGNESIUM mg/dL 2.1 1.8*   PHOSPHORUS mg/dL  --  5.1*                                             Imaging and other studies: I have personally reviewed pertinent reports.    XR foot left 3+ views    Result Date: 2/6/2024  Impression: Postoperative change reflecting recent partial left fourth ray amputation as noted. Workstation performed: [id number]     MRI brain wo contrast    Result Date: 2/1/2024  Impression: No acute infarction, intracranial hemorrhage or mass effect Workstation performed: [id number]     XR foot 3+ views LEFT    Result Date: 2/1/2024  Impression: No radiographic evidence of osteomyelitis. Workstation performed: [id number]     XR chest 2 views    Result Date: 1/31/2024  Impression: No acute cardiopulmonary disease. Workstation performed: [id number]     CT stroke alert brain    Result Date: 1/31/2024  Impression: No acute intracranial abnormality. Chronic microangiopathic ischemic changes. Findings were directly discussed with Dr. Maria Juan  at 10:03 a.m. Workstation performed: [id number]     CTA stroke alert (head/neck)    Result Date: 1/31/2024  Impression: 1. CTA head: Negative for large vessel intracranial occlusion. Moderate bilateral supraclinoid ICA and moderate to severe left cavernous ICA segment stenosis. 2. CTA neck:  No extracranial carotid stenosis.  The cervical vertebral arteries are patent. Findings were directly discussed with Dr. Maria Juan  at 10:03 a.m. Workstation performed: [id number]         Code Status: Level 1 - Full Code        Ion Jonas MD  Pulmonary/Critical Care  Fellow  [facility]

## 2024-02-26 NOTE — CONSULTS
"Consultation - Behavioral Health   Nadir Myers 63 y.o. male MRN: [id number]  Unit/Bed#: [facility] 519-01 Encounter: [id number]      Chief Complaint: \"I feel okay\"    History of Present Illness   Physician Requesting Consult: Kip Cabello MD  Reason for Consult / Principal Problem: Depression    Valerie Myers is a 63 year old male with a past medical history of CAD, PAD, CHF, and T2DM. He initially presented to the hospital on 1/31/24 and was admitted to the ICU for septic shock. He had a recent prior admission for cellulitis of left 4th toe. During this admission he was found to have MSSA bacteremia likely secondary to gangrenous left 4th toe and complicated by ICD infection. His left 4th toe was amputated, and his ICD has been removed. Patient is being treated with 6 weeks of IV ancef which will conclude on 3/25/24. Due to prolonged hospital course with complications, patient began to feel depressed over the weekend. Today he reports that he is feeling better and can see a light at the end of the tunnel. He feels mildly depressed, but no anxiety. His sleep, energy, and appetite have been normal. He has no suicidal or homicidal ideation, plan, or intent. He has no auditory or visual hallucinations or delusions. He has no past psychiatric history. He reports that he feels guilty for what he is putting his wife through. She comes to visit him every day from 7:30am-1pm. He has a daughter and son, ages 28 and 30, who also come to visit. He has other local family members and friends who come to check on him as well. In the past, he enjoyed working with his hands on cars and sheds, but unfortunately, due to his medical conditions, he states that he is unable to do these activities any more.       Psychiatric Review Of Systems:  sleep: no  appetite changes: no  weight changes: no  energy/anergy: no  interest/pleasure/anhedonia: no  somatic symptoms: no  anxiety/panic: no  belinda: no  guilty/hopeless: yes  self injurious " behavior/risky behavior: no    Historical Information   Past Psychiatric History:   None  Currently in treatment with no psychiatrist or therapist.  Past Suicide attempts: none  Past Violent behavior: none  Past Psychiatric medication trial: none    Substance Abuse History:  Patient denies use of alcohol, tobacco products, or other drugs.     I have assessed this patient for substance use within the past 12 months  History of IP/OP rehabilitation program: none  Smoking history: none  Family Psychiatric History:   No family history of mental illness, suicide, or substance use disorder.     Social History  Education: high school diploma/GED  Learning Disabilities:  none  Marital history:   Living arrangement, social support: The patient lives in home with wife and son, age 30.  Occupational History: retired, worked for power [employer] previously  Functioning Relationships: good support system.  Other Pertinent History:  No history of trauma, legal issues, or  service.    Traumatic History:   Abuse:  none  Other Traumatic Events:  none    Past Medical History:   Diagnosis Date    Diabetes mellitus (HCC)     Myocardial infarction (HCC)        Medical Review Of Systems:  Review of Systems - Negative except bilateral edema in the lower extremity, depression, all system reviewed and are negative    Meds/Allergies   current meds:   Current Facility-Administered Medications   Medication Dose Route Frequency    acetaminophen (TYLENOL) tablet 650 mg  650 mg Oral Q8H TEJAS    acetaZOLAMIDE (DIAMOX) injection 500 mg  500 mg Intravenous Once    ALPRAZolam (XANAX) tablet 0.5 mg  0.5 mg Oral HS PRN    amiodarone tablet 200 mg  200 mg Oral Daily With Breakfast    aspirin chewable tablet 81 mg  81 mg Oral Daily    atorvastatin (LIPITOR) tablet 80 mg  80 mg Oral After Dinner    ceFAZolin (ANCEF) IVPB (premix in dextrose) 2,000 mg 50 mL  2,000 mg Intravenous Q8H    clopidogrel (PLAVIX) tablet 75 mg  75 mg Oral Daily     Diclofenac Sodium (VOLTAREN) 1 % topical gel 2 g  2 g Topical 4x Daily    furosemide (LASIX) injection 60 mg  60 mg Intravenous BID (diuretic)    heparin (porcine) subcutaneous injection 5,000 Units  5,000 Units Subcutaneous Q8H TEJAS    hydrALAZINE (APRESOLINE) tablet 50 mg  50 mg Oral Q8H TEJAS    insulin glargine (LANTUS) subcutaneous injection 38 Units 0.38 mL  38 Units Subcutaneous HS    insulin lispro (HumaLOG) 100 units/mL subcutaneous injection 1-5 Units  1-5 Units Subcutaneous 4x Daily (AC & HS)    insulin lispro (HumaLOG) 100 units/mL subcutaneous injection 7 Units  7 Units Subcutaneous TID With Meals    isosorbide dinitrate (ISORDIL) tablet 20 mg  20 mg Oral TID after meals    melatonin tablet 3 mg  3 mg Oral HS    methocarbamol (ROBAXIN) tablet 500 mg  500 mg Oral Q6H PRN    metoprolol succinate (TOPROL-XL) 24 hr tablet 25 mg  25 mg Oral Q12H    oxyCODONE (ROXICODONE) IR tablet 5 mg  5 mg Oral Q6H PRN    polyethylene glycol (MIRALAX) packet 17 g  17 g Oral Daily    saccharomyces boulardii (FLORASTOR) capsule 250 mg  250 mg Oral BID    senna-docusate sodium (SENOKOT S) 8.6-50 mg per tablet 2 tablet  2 tablet Oral BID     Allergies   Allergen Reactions    Vancomycin Rash     NOT AN ALLERGY - 1/31/24 patient experienced vancomycin infusion reaction, please extend duration of infusion time to prevent future infusion reactions       Objective   Vital signs in last 24 hours:  Temp:  [97.1 °F (36.2 °C)-98 °F (36.7 °C)] 98 °F (36.7 °C)  HR:  [75-84] 84  Resp:  [16-20] 16  BP: (111-155)/(56-92) 125/56      Intake/Output Summary (Last 24 hours) at 2/26/2024 1430  Last data filed at 2/26/2024 1211  Gross per 24 hour   Intake 1678 ml   Output 4700 ml   Net -3022 ml       Mental Status Evaluation:  Appearance:  age appropriate   Behavior:  cooperative   Speech:  normal pitch and normal volume   Mood:  depressed   Affect:  mood-congruent   Language: naming objects and repeating phrases   Thought Process:  goal directed    Associations: intact associations   Thought Content:  normal   Perceptual Disturbances: None   Risk Potential: Suicidal Ideations none, Homicidal Ideations none, and Potential for Aggression No   Sensorium:  person, place, time/date, and situation   Memory:  recent and remote memory grossly intact   Cognition:  recent and remote memory grossly intact   Consciousness:  alert and awake    Attention: attention span and concentration were age appropriate   Intellect: within normal limits   Fund of Knowledge: awareness of current events: fair, past history: fair, and vocabulary: fair   Insight:  age appropriate   Judgment: age appropriate   Muscle Strength and Tone: Within normal limits   Gait/Station: Unable to assess, patient in bed   Motor Activity: no abnormal movements     Lab Results:  I have personally reviewed all pertinent laboratory/tests results.  Labs in last 72 hours:   Recent Labs     02/26/24  0540   WBC 8.37   RBC 3.33*   HGB 9.3*   HCT 30.7*   *   RDW 13.7   NEUTROABS 6.14   SODIUM 139   K 3.9   CL 97   CO2 34*   BUN 43*   CREATININE 2.12*   GLUC 103   CALCIUM 8.4       Code Status: )Level 1 - Full Code    Assessment/Plan     Assessment:  Nadir Myers is a 63 y.o. male  a past medical history of CAD, PAD, CHF, and T2DM. He initially presented to the hospital on 1/31/24 and was admitted to the ICU for septic shock. He had a recent prior admission for cellulitis of left 4th toe. During this admission he was found to have MSSA bacteremia likely secondary to gangrenous left 4th toe and complicated by ICD infection. His left 4th toe was amputated, and his ICD has been removed. Patient is being treated with 6 weeks of IV ancef which will conclude on 3/25/24. Due to prolonged hospital course with complications, patient began to feel depressed over the weekend.  He states that he feels better the clinical admitting he is from the medical point of view.  He denies any suicidal or homicidal ideation  plan or intent he does not have any psychotic symptoms  Diagnosis:  Adjustment disorder with depressed mood  Plan:   Continue medical management  At this time patient stated that he feels better he does not feel the need for been seen every day  Discussed with the primary team  No other intervention at this time I will sign off but call me back if necessary  Risks, benefits and possible side effects of Medications:   No medication recommended at this moment         Gricel Santos MD

## 2024-02-26 NOTE — RESTORATIVE TECHNICIAN NOTE
Restorative Technician Note      Patient Name: Nadir Myers     Note Type: Mobility  Patient Position Upon Consult: Supine  Activity Performed: Ambulated  Assistive Device: Roller walker  Patient Position at End of Consult: Bedside chair; All needs within reach

## 2024-02-26 NOTE — ASSESSMENT & PLAN NOTE
Diuretics per HF team.  On IV Lasix 60 mg twice daily and metolazone  isordil 20 mg TID and hydralazine to 50 mg TID   Escobar in place for accurate I/Os.

## 2024-02-26 NOTE — PROGRESS NOTES
[facility]  Progress Note  Name: Nadir Myers I  MRN: [id number]  Unit/Bed#: [facility] 519-01 I Date of Admission: 1/31/2024   Date of Service: 2/26/2024 I Hospital Day: 26    Assessment/Plan   * MSSA bacteremia  Assessment & Plan  Persistent MSSA bacteremia most likely source foot wound, complicated with ICD infection  Blood cultures from 1/31-2/9  positive despite being on Ancef suspect due to ICD infection  GILL with evidence of mobile mass on ICD lead in the right atrium, concerning for infection, no valvular vegetation  S/p ICD removal with EP on 2/8  CT chest 2/7 and 2/15 with possible septic pulmonary emboli  Repeat Blood cultures from 2/12 negative, repeat blood cultures 2/16 negative at 72 hours  Continue IV Ancef, with plan to treat 6 weeks until 3/25/2024   PICC placed 2/22    Abnormal CT of the chest  Assessment & Plan  CT 2/7 shows small consolidation in the upper lobes and left lower lobe with enhancing lesion in the peripheral of right lower lobe, may be embolic versus hematogenous seeding from high-grade bacteremia.  there is no evident of pulmonary embolism to suggest that this is an infarct.   Repeat CT 2/15 revealed possible septic emboli, gas and fluid subcutaneous collection in the pacemaker bed likely postsurgical, reactive mild mediastinal lymphadenopathy.  CT chest without contrast  2/21: Peripheral left lower lobe opacity with reverse halo sign appearance. This can be seen in the setting of pulmonary infarct   Doppler US is negative for DVT  VQ Scan is intermediate for PE  TTE 2/21 with normal right ventricle function  Unable to obtain PE study to confirm/rule out given LINDEN. Suspicion remains higher for septic embolism. Given hemodynamic stability with only 1L NC oxygen requirement due to volume overload, would hold off on AC for now.   Appreciate pulmonology input  Fungal workup pending.     LINDEN (acute kidney injury) (HCC)  Assessment & Plan  Likely  "ATN  Monitor with lasix  Hold jardiance and aldactone  Nephrology following    ICD (implantable cardioverter-defibrillator) in place  Assessment & Plan  Patient had extraction of ICD  Patient underwent AV ICD placement on February 20, 2024  Patient had lead dislodgment leading to repeat procedure on February 21, 2024  During the procedure patient's lead had poor sensing and therefore could not be positioned under the sternum and pocket was closed.  Multiple location under the sternum where attempted.   Will plan for LifeVest on discharge and then discuss S-ICD in office.     Pleuritic chest pain  Assessment & Plan  Pleuritic pain in left lower chest wall since 2/20. Tender to touch. Suspect secondary to ICD manipulation +/- septic pulmonary emboli  EKG with no acute ischemic changes.  Voltaren gel/lidocaine patch/prn pain meds. Improving    Gangrene of toe of left foot ([facility])  Assessment & Plan  Appreciate podiatry input -> status post left fourth toe amputation on 1/31  Holden to be surgical cure per podiatry  Wound care  Antibiotics per ID    LUE weakness  Assessment & Plan  Presented w/ transient left-sided weakness and a facial droop now resolved  Appreciate neurology input deeming symptoms likely secondary to septic shock/infection and hyperglycemia, as neurologic workup including CT/MRI imaging negative for evidence of stroke or intracranial vessel occlusion, but noted chronic microangiopathic changes -> radiology report did however mention: \"Moderate bilateral supraclinoid ICA and moderate to severe left cavernous ICA segment stenosis.\"  Continue dual endplate therapy with ASA/Plavix - c/w statin  PT/OT as tolerated    PAD (peripheral artery disease) ([facility])  Assessment & Plan  Continue dual antiplatelet therapy with ASA/Plavix - continue statin      Coronary artery disease involving native coronary artery of native heart without angina pectoris  Assessment & Plan  History of coronary artery disease status post " HANNA to left circumflex 2015, repeat cath 2023 with  of mid LAD and L PDA.  Ischemic cardiomyopathy  Continue aspirin/Plavix/statin/metoprolol    Chronic systolic CHF (HCC)  Assessment & Plan  Diuretics per HF team.  On IV Lasix 60 mg twice daily and metolazone  isordil 20 mg TID and hydralazine to 50 mg TID     V-tach ([facility])  Assessment & Plan  History of monomorphic VT status post secondary prevention with [employer] dual-chamber ICD 5/2023  Status post ICD extraction 2/8/2024  Lifevest on dc    Type 2 diabetes mellitus, with long-term current use of insulin ([facility])  Assessment & Plan  Lab Results   Component Value Date    HGBA1C 9.8 (H) 01/31/2024     Continue to adjust basal/prandial insulin. BG goal 140-180  Hypoglycemia protocol  Carbohydrate restriction  Continue to adjust    Constipation-resolved as of 2/26/2024  Assessment & Plan  BM 2/22  continue bowel regimen    Electrolyte abnormalities-resolved as of 2/26/2024  Assessment & Plan  Monitor/replete serum potassium/magnesium/phosphate as necessary    Septic shock ([facility])-resolved as of 2/26/2024  Assessment & Plan  Initially admitted to the ICU requiring vasopressor support, now off, with maintenance of hemodynamic stability  Due to MSSA bacteremia from a gangrenous left fourth toe (see below) complicated by ICD infection  Continue to monitor vitals and maintain hemodynamics including temperature curve  See additional management below  Shock resolved             VTE Pharmacologic Prophylaxis:   Moderate Risk (Score 3-4) - Pharmacological DVT Prophylaxis Ordered: heparin.    Mobility:   Basic Mobility Inpatient Raw Score: 17  JH-HLM Goal: 5: Stand one or more mins  JH-HLM Achieved: 5: Stand (1 or more minutes)  HLM Goal achieved. Continue to encourage appropriate mobility.    Patient Centered Rounds: I performed bedside rounds with nursing staff today.   Discussions with Specialists or Other Care Team Provider: HF    Education and Discussions with Family /  Patient: Updated  (wife) at bedside.    Total Time Spent on Date of Encounter in care of patient: 35 mins. This time was spent on one or more of the following: performing physical exam; counseling and coordination of care; obtaining or reviewing history; documenting in the medical record; reviewing/ordering tests, medications or procedures; communicating with other healthcare professionals and discussing with patient's family/caregivers.    Current Length of Stay: 26 day(s)  Current Patient Status: Inpatient   Certification Statement: The patient will continue to require additional inpatient hospital stay due to IV lasix. LINDEN  Discharge Plan: Anticipate discharge in >72 hrs to home with home services.    Code Status: Level 1 - Full Code    Subjective:   CP and SOB better. Making good UO. Still feels depressed. No suicidal ideas    Objective:     Vitals:   Temp (24hrs), Av.5 °F (36.4 °C), Min:97.1 °F (36.2 °C), Max:98 °F (36.7 °C)    Temp:  [97.1 °F (36.2 °C)-98 °F (36.7 °C)] 97.2 °F (36.2 °C)  HR:  [75-84] 80  Resp:  [16-20] 16  BP: (111-152)/(66-87) 111/69  SpO2:  [93 %-96 %] 96 %  Body mass index is 32.59 kg/m².     Input and Output Summary (last 24 hours):     Intake/Output Summary (Last 24 hours) at 2024 1023  Last data filed at 2024 0838  Gross per 24 hour   Intake 1027.67 ml   Output 4600 ml   Net -3572.33 ml       Physical Exam:   Physical Exam  Vitals and nursing note reviewed.   Constitutional:       General: He is not in acute distress.     Appearance: He is well-developed.   HENT:      Head: Normocephalic and atraumatic.   Eyes:      Conjunctiva/sclera: Conjunctivae normal.   Cardiovascular:      Rate and Rhythm: Normal rate and regular rhythm.      Heart sounds: No murmur heard.  Pulmonary:      Effort: Pulmonary effort is normal. No respiratory distress.      Breath sounds: Normal breath sounds.   Abdominal:      Palpations: Abdomen is soft.      Tenderness: There is no  abdominal tenderness.   Musculoskeletal:      Cervical back: Neck supple.      Right lower leg: Edema present.      Left lower leg: Edema present.   Skin:     General: Skin is warm and dry.      Capillary Refill: Capillary refill takes less than 2 seconds.   Neurological:      Mental Status: He is alert.   Psychiatric:      Comments: depressed          Additional Data:     Labs:  Results from last 7 days   Lab Units 02/26/24  0540   WBC Thousand/uL 8.37   HEMOGLOBIN g/dL 9.3*   HEMATOCRIT % 30.7*   PLATELETS Thousands/uL 391*   NEUTROS PCT % 73   LYMPHS PCT % 17   MONOS PCT % 8   EOS PCT % 1     Results from last 7 days   Lab Units 02/26/24  0540 02/24/24  0541 02/23/24  0458   SODIUM mmol/L 139   < > 135   POTASSIUM mmol/L 3.9   < > 4.7   CHLORIDE mmol/L 97   < > 98   CO2 mmol/L 34*   < > 31   BUN mg/dL 43*   < > 41*   CREATININE mg/dL 2.12*   < > 2.02*   ANION GAP mmol/L 8   < > 6   CALCIUM mg/dL 8.4   < > 7.9*   ALBUMIN g/dL  --   --  2.4*   TOTAL BILIRUBIN mg/dL  --   --  0.25   ALK PHOS U/L  --   --  46   ALT U/L  --   --  <3*   AST U/L  --   --  11*   GLUCOSE RANDOM mg/dL 103   < > 69    < > = values in this interval not displayed.     Results from last 7 days   Lab Units 02/19/24  1345   INR  1.15     Results from last 7 days   Lab Units 02/26/24  0604 02/25/24  2048 02/25/24  1608 02/25/24  1043 02/25/24  0607 02/24/24  2121 02/24/24  1559 02/24/24  1116 02/24/24  0626 02/23/24  2017 02/23/24  1559 02/23/24  1110   POC GLUCOSE mg/dl 103 225* 187* 266* 143* 193* 291* 161* 104 189* 167* 187*               Lines/Drains:  Invasive Devices       Peripherally Inserted Central Catheter Line  Duration             PICC Line 02/22/24 Right Basilic 4 days              Drain  Duration             Urethral Catheter Latex 16 Fr. 5 days                  Urinary Catheter:  Goal for removal: Voiding trial when ambulation improves         Central Line:  Goal for removal: Will discontinue when meds requiring line are  completed.         Telemetry:  Telemetry Orders (From admission, onward)               24 Hour Telemetry Monitoring  Continuous x 24 Hours (Telem)        Expiring   Question:  Reason for 24 Hour Telemetry  Answer:  PCI/EP study (including pacer and ICD implementation), Cardiac surgery, MI, abnormal cardiac cath, and chest pain- rule out MI                                Imaging: Reviewed radiology reports from this admission including: chest xray    Recent Cultures (last 7 days):         Last 24 Hours Medication List:   Current Facility-Administered Medications   Medication Dose Route Frequency Provider Last Rate    acetaminophen  650 mg Oral Q8H [facility] Kip Cabello MD      ALPRAZolam  0.5 mg Oral HS PRN Kip Cabello MD      amiodarone  200 mg Oral Daily With Breakfast Ynuiel Mcgee PA-C      aspirin  81 mg Oral Daily Yuniel Mcgee PA-C      atorvastatin  80 mg Oral After Dinner Yuniel Mcgee PA-C      cefazolin  2,000 mg Intravenous Q8H Zach Medina MD 2,000 mg (02/26/24 0581)    clopidogrel  75 mg Oral Daily Yuniel Mcgee PA-C      Diclofenac Sodium  2 g Topical 4x Daily Kip Cabello MD      furosemide  60 mg Intravenous BID (diuretic) Nash Samuels DO      heparin (porcine)  5,000 Units Subcutaneous Q8H [facility] Kip Cabello MD      hydrALAZINE  50 mg Oral Q8H [facility] Nash Samuels DO      insulin glargine  38 Units Subcutaneous HS Kip Cabello MD      insulin lispro  1-5 Units Subcutaneous 4x Daily (AC & HS) Yuniel Mcgee PA-C      insulin lispro  7 Units Subcutaneous TID With Meals Yuniel Mcgee PA-C      isosorbide dinitrate  20 mg Oral TID after meals Nash Samuels DO      melatonin  3 mg Oral HS Kip Cabello MD      methocarbamol  500 mg Oral Q6H PRN Kip Cabello MD      metolazone  2.5 mg Oral Daily Nash Samuels DO      metoprolol succinate  25 mg Oral Q12H Yuniel Mcgee PA-C      oxyCODONE  5 mg Oral Q6H PRN Yuniel Mcgee PA-C      polyethylene  glycol  17 g Oral Daily Yuniel Mcgee PA-C      saccharomyces boulardii  250 mg Oral BID Yuniel Mcgee PA-C      senna-docusate sodium  2 tablet Oral BID Yuniel Mcgee PA-C          Today, Patient Was Seen By: Kip Cabello MD    **Please Note: This note may have been constructed using a voice recognition system.**

## 2024-02-27 LAB
ANION GAP SERPL CALCULATED.3IONS-SCNC: 8 MMOL/L
BUN SERPL-MCNC: 47 MG/DL (ref 5–25)
CALCIUM SERPL-MCNC: 8.3 MG/DL (ref 8.4–10.2)
CHLORIDE SERPL-SCNC: 97 MMOL/L (ref 96–108)
CO2 SERPL-SCNC: 33 MMOL/L (ref 21–32)
CREAT SERPL-MCNC: 1.88 MG/DL (ref 0.6–1.3)
ERYTHROCYTE [DISTWIDTH] IN BLOOD BY AUTOMATED COUNT: 13.6 % (ref 11.6–15.1)
GFR SERPL CREATININE-BSD FRML MDRD: 37 ML/MIN/1.73SQ M
GLUCOSE SERPL-MCNC: 202 MG/DL (ref 65–140)
GLUCOSE SERPL-MCNC: 216 MG/DL (ref 65–140)
GLUCOSE SERPL-MCNC: 300 MG/DL (ref 65–140)
GLUCOSE SERPL-MCNC: 95 MG/DL (ref 65–140)
GLUCOSE SERPL-MCNC: 96 MG/DL (ref 65–140)
HCT VFR BLD AUTO: 29.8 % (ref 36.5–49.3)
HGB BLD-MCNC: 9.3 G/DL (ref 12–17)
MAGNESIUM SERPL-MCNC: 2.1 MG/DL (ref 1.9–2.7)
MCH RBC QN AUTO: 28.5 PG (ref 26.8–34.3)
MCHC RBC AUTO-ENTMCNC: 31.2 G/DL (ref 31.4–37.4)
MCV RBC AUTO: 91 FL (ref 82–98)
PLATELET # BLD AUTO: 383 THOUSANDS/UL (ref 149–390)
PMV BLD AUTO: 9 FL (ref 8.9–12.7)
POTASSIUM SERPL-SCNC: 3.2 MMOL/L (ref 3.5–5.3)
RBC # BLD AUTO: 3.26 MILLION/UL (ref 3.88–5.62)
SODIUM SERPL-SCNC: 138 MMOL/L (ref 135–147)
WBC # BLD AUTO: 7.54 THOUSAND/UL (ref 4.31–10.16)

## 2024-02-27 PROCEDURE — 97116 GAIT TRAINING THERAPY: CPT

## 2024-02-27 PROCEDURE — 85027 COMPLETE CBC AUTOMATED: CPT | Performed by: INTERNAL MEDICINE

## 2024-02-27 PROCEDURE — 83735 ASSAY OF MAGNESIUM: CPT | Performed by: INTERNAL MEDICINE

## 2024-02-27 PROCEDURE — 99233 SBSQ HOSP IP/OBS HIGH 50: CPT | Performed by: INTERNAL MEDICINE

## 2024-02-27 PROCEDURE — 97530 THERAPEUTIC ACTIVITIES: CPT

## 2024-02-27 PROCEDURE — 82948 REAGENT STRIP/BLOOD GLUCOSE: CPT

## 2024-02-27 PROCEDURE — 99232 SBSQ HOSP IP/OBS MODERATE 35: CPT | Performed by: FAMILY MEDICINE

## 2024-02-27 PROCEDURE — 99232 SBSQ HOSP IP/OBS MODERATE 35: CPT | Performed by: INTERNAL MEDICINE

## 2024-02-27 PROCEDURE — 80048 BASIC METABOLIC PNL TOTAL CA: CPT | Performed by: PHYSICIAN ASSISTANT

## 2024-02-27 RX ORDER — WATER 10 ML/10ML
INJECTION INTRAMUSCULAR; INTRAVENOUS; SUBCUTANEOUS
Status: COMPLETED
Start: 2024-02-27 | End: 2024-02-27

## 2024-02-27 RX ORDER — POTASSIUM CHLORIDE 20 MEQ/1
40 TABLET, EXTENDED RELEASE ORAL ONCE
Status: COMPLETED | OUTPATIENT
Start: 2024-02-27 | End: 2024-02-27

## 2024-02-27 RX ORDER — ACETAZOLAMIDE 500 MG/5ML
500 INJECTION, POWDER, LYOPHILIZED, FOR SOLUTION INTRAVENOUS ONCE
Qty: 500 MG | Refills: 0 | Status: COMPLETED | OUTPATIENT
Start: 2024-02-27 | End: 2024-02-27

## 2024-02-27 RX ORDER — POTASSIUM CHLORIDE 20 MEQ/1
40 TABLET, EXTENDED RELEASE ORAL 2 TIMES DAILY
Status: DISCONTINUED | OUTPATIENT
Start: 2024-02-27 | End: 2024-02-27

## 2024-02-27 RX ORDER — POTASSIUM CHLORIDE 20 MEQ/1
40 TABLET, EXTENDED RELEASE ORAL 2 TIMES DAILY
Status: DISCONTINUED | OUTPATIENT
Start: 2024-02-27 | End: 2024-02-28

## 2024-02-27 RX ADMIN — CEFAZOLIN SODIUM 2000 MG: 2 SOLUTION INTRAVENOUS at 05:36

## 2024-02-27 RX ADMIN — CEFAZOLIN SODIUM 2000 MG: 2 SOLUTION INTRAVENOUS at 14:12

## 2024-02-27 RX ADMIN — INSULIN LISPRO 7 UNITS: 100 INJECTION, SOLUTION INTRAVENOUS; SUBCUTANEOUS at 11:33

## 2024-02-27 RX ADMIN — FUROSEMIDE 60 MG: 10 INJECTION, SOLUTION INTRAMUSCULAR; INTRAVENOUS at 16:44

## 2024-02-27 RX ADMIN — Medication 2 G: at 09:23

## 2024-02-27 RX ADMIN — INSULIN LISPRO 7 UNITS: 100 INJECTION, SOLUTION INTRAVENOUS; SUBCUTANEOUS at 16:47

## 2024-02-27 RX ADMIN — INSULIN LISPRO 1 UNITS: 100 INJECTION, SOLUTION INTRAVENOUS; SUBCUTANEOUS at 16:46

## 2024-02-27 RX ADMIN — ATORVASTATIN CALCIUM 80 MG: 80 TABLET, FILM COATED ORAL at 17:26

## 2024-02-27 RX ADMIN — AMIODARONE HYDROCHLORIDE 200 MG: 200 TABLET ORAL at 09:21

## 2024-02-27 RX ADMIN — HYDRALAZINE HYDROCHLORIDE 50 MG: 50 TABLET ORAL at 14:12

## 2024-02-27 RX ADMIN — ACETAMINOPHEN 650 MG: 325 TABLET, FILM COATED ORAL at 14:12

## 2024-02-27 RX ADMIN — ASPIRIN 81 MG CHEWABLE TABLET 81 MG: 81 TABLET CHEWABLE at 09:21

## 2024-02-27 RX ADMIN — ISOSORBIDE DINITRATE 20 MG: 20 TABLET ORAL at 16:46

## 2024-02-27 RX ADMIN — METOPROLOL SUCCINATE 25 MG: 25 TABLET, EXTENDED RELEASE ORAL at 17:26

## 2024-02-27 RX ADMIN — Medication 250 MG: at 09:21

## 2024-02-27 RX ADMIN — HEPARIN SODIUM 5000 UNITS: 5000 INJECTION INTRAVENOUS; SUBCUTANEOUS at 05:36

## 2024-02-27 RX ADMIN — INSULIN GLARGINE 38 UNITS: 100 INJECTION, SOLUTION SUBCUTANEOUS at 21:20

## 2024-02-27 RX ADMIN — INSULIN LISPRO 2 UNITS: 100 INJECTION, SOLUTION INTRAVENOUS; SUBCUTANEOUS at 11:32

## 2024-02-27 RX ADMIN — CEFAZOLIN SODIUM 2000 MG: 2 SOLUTION INTRAVENOUS at 21:11

## 2024-02-27 RX ADMIN — ACETAZOLAMIDE 500 MG: 500 INJECTION, POWDER, LYOPHILIZED, FOR SOLUTION INTRAVENOUS at 14:12

## 2024-02-27 RX ADMIN — METOPROLOL SUCCINATE 25 MG: 25 TABLET, EXTENDED RELEASE ORAL at 05:40

## 2024-02-27 RX ADMIN — POLYETHYLENE GLYCOL 3350 17 G: 17 POWDER, FOR SOLUTION ORAL at 09:14

## 2024-02-27 RX ADMIN — Medication 250 MG: at 17:26

## 2024-02-27 RX ADMIN — HYDRALAZINE HYDROCHLORIDE 50 MG: 50 TABLET ORAL at 21:10

## 2024-02-27 RX ADMIN — INSULIN LISPRO 3 UNITS: 100 INJECTION, SOLUTION INTRAVENOUS; SUBCUTANEOUS at 21:21

## 2024-02-27 RX ADMIN — SENNOSIDES, DOCUSATE SODIUM 2 TABLET: 8.6; 5 TABLET ORAL at 09:21

## 2024-02-27 RX ADMIN — HEPARIN SODIUM 5000 UNITS: 5000 INJECTION INTRAVENOUS; SUBCUTANEOUS at 21:11

## 2024-02-27 RX ADMIN — HYDRALAZINE HYDROCHLORIDE 50 MG: 50 TABLET ORAL at 05:36

## 2024-02-27 RX ADMIN — POTASSIUM CHLORIDE 40 MEQ: 1500 TABLET, EXTENDED RELEASE ORAL at 14:12

## 2024-02-27 RX ADMIN — WATER 10 ML: 1 INJECTION INTRAMUSCULAR; INTRAVENOUS; SUBCUTANEOUS at 14:12

## 2024-02-27 RX ADMIN — ACETAMINOPHEN 650 MG: 325 TABLET, FILM COATED ORAL at 21:10

## 2024-02-27 RX ADMIN — FUROSEMIDE 60 MG: 10 INJECTION, SOLUTION INTRAMUSCULAR; INTRAVENOUS at 09:22

## 2024-02-27 RX ADMIN — POTASSIUM CHLORIDE 40 MEQ: 1500 TABLET, EXTENDED RELEASE ORAL at 06:27

## 2024-02-27 RX ADMIN — ISOSORBIDE DINITRATE 20 MG: 20 TABLET ORAL at 12:47

## 2024-02-27 RX ADMIN — CLOPIDOGREL BISULFATE 75 MG: 75 TABLET ORAL at 09:21

## 2024-02-27 RX ADMIN — HEPARIN SODIUM 5000 UNITS: 5000 INJECTION INTRAVENOUS; SUBCUTANEOUS at 14:12

## 2024-02-27 RX ADMIN — Medication 3 MG: at 21:10

## 2024-02-27 NOTE — PROGRESS NOTES
[facility]  Progress Note  Name: Nadir Myers I  MRN: [id number]  [facility] Date of Admission: 1/31/2024   Date of Service: 2/27/2024 I Hospital Day: 27    Assessment/Plan   * MSSA bacteremia  Assessment & Plan  Persistent MSSA bacteremia most likely source foot wound, complicated with ICD infection  Blood cultures from 1/31-2/9  positive despite being on Ancef suspect due to ICD infection  GILL with evidence of mobile mass on ICD lead in the right atrium, concerning for infection, no valvular vegetation  S/p ICD removal with EP on 2/8  CT chest 2/7 and 2/15 with possible septic pulmonary emboli  Repeat Blood cultures from 2/12 negative, repeat blood cultures 2/16 negative   Continue IV Ancef, with plan to treat 6 weeks until 3/25/2024   PICC placed 2/22    Pleuritic chest pain  Assessment & Plan  Pleuritic pain in left lower chest wall since 2/20. Tender to touch. Suspect secondary to ICD manipulation +/- septic pulmonary emboli  EKG with no acute ischemic changes.  Voltaren gel/lidocaine patch/prn pain meds. Improving    ICD (implantable cardioverter-defibrillator) in place  Assessment & Plan  Patient had extraction of ICD  Patient underwent AV ICD placement on February 20, 2024  Patient had lead dislodgment leading to repeat procedure on February 21, 2024  During the procedure patient's lead had poor sensing and therefore could not be positioned under the sternum and pocket was closed.  Multiple location under the sternum where attempted.   Will plan for LifeVest on discharge and then discuss S-ICD in office.     LINDEN (acute kidney injury) (HCC)  Assessment & Plan  Likely ATN  Monitor with lasix  Hold jardiance and aldactone  Nephrology and cardiology following    Abnormal CT of the chest  Assessment & Plan  CT 2/7 shows small consolidation in the upper lobes and left lower lobe with enhancing lesion in the peripheral of right lower lobe, may be embolic  "versus hematogenous seeding from high-grade bacteremia.  there is no evident of pulmonary embolism to suggest that this is an infarct.   Repeat CT 2/15 revealed possible septic emboli, gas and fluid subcutaneous collection in the pacemaker bed likely postsurgical, reactive mild mediastinal lymphadenopathy.  CT chest without contrast  2/21: Peripheral left lower lobe opacity with reverse halo sign appearance. This can be seen in the setting of pulmonary infarct   Doppler US is negative for DVT  VQ Scan is intermediate for PE  TTE 2/21 with normal right ventricle function  Unable to obtain PE study to confirm/rule out given LINDEN. Suspicion remains higher for septic embolism. Given hemodynamic stability with only 1L NC oxygen requirement due to volume overload, would hold off on AC for now.   Appreciate pulmonology input    Gangrene of toe of left foot ([facility])  Assessment & Plan  Appreciate podiatry input -> status post left fourth toe amputation on 1/31  [place] to be surgical cure per podiatry  Wound care  Antibiotics per ID    LUE weakness  Assessment & Plan  Presented w/ transient left-sided weakness and a facial droop now resolved  Appreciate neurology input deeming symptoms likely secondary to septic shock/infection and hyperglycemia, as neurologic workup including CT/MRI imaging negative for evidence of stroke or intracranial vessel occlusion, but noted chronic microangiopathic changes -> radiology report did however mention: \"Moderate bilateral supraclinoid ICA and moderate to severe left cavernous ICA segment stenosis.\"  Continue dual endplate therapy with ASA/Plavix - c/w statin  PT/OT as tolerated    PAD (peripheral artery disease) ([facility])  Assessment & Plan  Continue dual antiplatelet therapy with ASA/Plavix - continue statin      Coronary artery disease involving native coronary artery of native heart without angina pectoris  Assessment & Plan  History of coronary artery disease status post HANNA to left circumflex " 2015, repeat cath 2023 with  of mid LAD and L PDA.  Ischemic cardiomyopathy  Continue aspirin/Plavix/statin/metoprolol    Acute on chronic systolic CHF (congestive heart failure) ([facility])  Assessment & Plan  Diuretics per HF team.  On IV Lasix 60 mg twice daily and metolazone  isordil 20 mg TID and hydralazine to 50 mg TID   Escobar in place for accurate I/Os.     V-tach ([facility])  Assessment & Plan  History of monomorphic VT status post secondary prevention with [employer] dual-chamber ICD 5/2023  Status post ICD extraction 2/8/2024  Lifevest on dc    Type 2 diabetes mellitus, with long-term current use of insulin ([facility])  Assessment & Plan  Lab Results   Component Value Date    HGBA1C 9.8 (H) 01/31/2024     Continue to adjust basal/prandial insulin. BG goal 140-180  Hypoglycemia protocol  Carbohydrate restriction  Continue to adjust               VTE Pharmacologic Prophylaxis:   Moderate Risk (Score 3-4) - Pharmacological DVT Prophylaxis Ordered: heparin.    Mobility:   Basic Mobility Inpatient Raw Score: 19  JH-HLM Goal: 6: Walk 10 steps or more  JH-HLM Achieved: 7: Walk 25 feet or more  HLM Goal achieved. Continue to encourage appropriate mobility.    Patient Centered Rounds: I performed bedside rounds with nursing staff today.   Discussions with Specialists or Other Care Team Provider: Infectious disease    Education and Discussions with Family / Patient: Updated  (wife) at bedside.    Total Time Spent on Date of Encounter in care of patient: 45 mins. This time was spent on one or more of the following: performing physical exam; counseling and coordination of care; obtaining or reviewing history; documenting in the medical record; reviewing/ordering tests, medications or procedures; communicating with other healthcare professionals and discussing with patient's family/caregivers.    Current Length of Stay: 27 day(s)  Current Patient Status: Inpatient   Certification Statement: The patient will continue to  require additional inpatient hospital stay due to IV diuresis  Discharge Plan: Anticipate discharge in 48 hrs to home with home services.    Code Status: Level 1 - Full Code    Subjective:   This is a very pleasant 63-year-old gentleman who was seen and evaluated today at bedside.  Patient has no acute complaints at this time.    Objective:     Vitals:   Temp (24hrs), Av.8 °F (36.6 °C), Min:97.5 °F (36.4 °C), Max:98 °F (36.7 °C)    Temp:  [97.5 °F (36.4 °C)-98 °F (36.7 °C)] 97.9 °F (36.6 °C)  HR:  [70-85] 81  Resp:  [16-18] 16  BP: (117-155)/(66-81) 143/66  SpO2:  [92 %-95 %] 95 %  Body mass index is 31.93 kg/m².     Input and Output Summary (last 24 hours):     Intake/Output Summary (Last 24 hours) at 2024 1522  Last data filed at 2024 1356  Gross per 24 hour   Intake 1200 ml   Output 4800 ml   Net -3600 ml       Physical Exam:   Physical Exam  Vitals reviewed.   HENT:      Head: Normocephalic.      Nose: No congestion.      Mouth/Throat:      Pharynx: No oropharyngeal exudate or posterior oropharyngeal erythema.   Eyes:      Conjunctiva/sclera: Conjunctivae normal.   Cardiovascular:      Rate and Rhythm: Normal rate and regular rhythm.   Pulmonary:      Effort: Pulmonary effort is normal.   Abdominal:      General: Abdomen is flat.      Palpations: Abdomen is soft.   Skin:     General: Skin is warm and dry.   Neurological:      Mental Status: He is alert and oriented to person, place, and time. Mental status is at baseline.          Additional Data:     Labs:  Results from last 7 days   Lab Units 24  0443 24  0540   WBC Thousand/uL 7.54 8.37   HEMOGLOBIN g/dL 9.3* 9.3*   HEMATOCRIT % 29.8* 30.7*   PLATELETS Thousands/uL 383 391*   NEUTROS PCT %  --  73   LYMPHS PCT %  --  17   MONOS PCT %  --  8   EOS PCT %  --  1     Results from last 7 days   Lab Units 24  0443 24  0541 24  0458   SODIUM mmol/L 138   < > 135   POTASSIUM mmol/L 3.2*   < > 4.7   CHLORIDE mmol/L 97   < >  98   CO2 mmol/L 33*   < > 31   BUN mg/dL 47*   < > 41*   CREATININE mg/dL 1.88*   < > 2.02*   ANION GAP mmol/L 8   < > 6   CALCIUM mg/dL 8.3*   < > 7.9*   ALBUMIN g/dL  --   --  2.4*   TOTAL BILIRUBIN mg/dL  --   --  0.25   ALK PHOS U/L  --   --  46   ALT U/L  --   --  <3*   AST U/L  --   --  11*   GLUCOSE RANDOM mg/dL 96   < > 69    < > = values in this interval not displayed.         Results from last 7 days   Lab Units 02/27/24  1046 02/27/24  0544 02/26/24  2034 02/26/24  1520 02/26/24  1042 02/26/24  0604 02/25/24  2048 02/25/24  1608 02/25/24  1043 02/25/24  0607 02/24/24  2121 02/24/24  1559   POC GLUCOSE mg/dl 216* 95 200* 172* 187* 103 225* 187* 266* 143* 193* 291*               Lines/Drains:  Invasive Devices       Peripherally Inserted Central Catheter Line  Duration             PICC Line 02/22/24 Right Basilic 5 days              Drain  Duration             Urethral Catheter Latex 16 Fr. 7 days                  Urinary Catheter:  Goal for removal: Remove after 48 hrs of I/O monitoring         Central Line:  Goal for removal: Will discontinue when meds requiring line are completed.         Telemetry:  Telemetry Orders (From admission, onward)               LifeVest Patient: Continuous Telemetry Monitoring during hospitalization (non-expiring)  Continuous LifeVest Telemetry Monitoring        References:    LifeVest Policy                     Telemetry Reviewed: Normal Sinus Rhythm  Indication for Continued Telemetry Use: Lifevest (remains on tele entire hospital stay)             Imaging: Reviewed radiology reports from this admission including: VQ scan    Recent Cultures (last 7 days):         Last 24 Hours Medication List:   Current Facility-Administered Medications   Medication Dose Route Frequency Provider Last Rate    acetaminophen  650 mg Oral Q8H [facility] Kip Cabello MD      ALPRAZolam  0.5 mg Oral HS PRN Kip Cabello MD      amiodarone  200 mg Oral Daily With Breakfast Yuniel Mcgee PA-C       aspirin  81 mg Oral Daily Yuniel Mcgee PA-C      atorvastatin  80 mg Oral After Dinner Yuniel Mcgee PA-C      cefazolin  2,000 mg Intravenous Q8H Zach Medina MD 2,000 mg (02/27/24 1412)    clopidogrel  75 mg Oral Daily Yuniel Mcgee PA-C      Diclofenac Sodium  2 g Topical 4x Daily Kip Cabello MD      furosemide  60 mg Intravenous BID (diuretic) Nash Samuels DO      heparin (porcine)  5,000 Units Subcutaneous Q8H [facility] Kip Cabello MD      hydrALAZINE  50 mg Oral Q8H [facility] Nash Samuels DO      insulin glargine  38 Units Subcutaneous HS Kip Cabello MD      insulin lispro  1-5 Units Subcutaneous 4x Daily (AC & HS) Yuniel Mcgee PA-C      insulin lispro  7 Units Subcutaneous TID With Meals Yuniel Mcgee PA-C      isosorbide dinitrate  20 mg Oral TID after meals Nash Samuels DO      melatonin  3 mg Oral HS Kip Cabello MD      methocarbamol  500 mg Oral Q6H PRN Kip Cabello MD      metoprolol succinate  25 mg Oral Q12H Yuniel Mcgee PA-C      oxyCODONE  5 mg Oral Q6H PRN Yuniel Mcgee PA-C      polyethylene glycol  17 g Oral Daily Yuniel Mcgee PA-C      potassium chloride  40 mEq Oral BID Lamont Murcia MD      saccharomyces boulardii  250 mg Oral BID Yuniel Mcgee PA-C      senna-docusate sodium  2 tablet Oral BID Yuniel Mcgee PA-C          Today, Patient Was Seen By: Gilbert Quigley MD    **Please Note: This note may have been constructed using a voice recognition system.**

## 2024-02-27 NOTE — PLAN OF CARE
Problem: PHYSICAL THERAPY ADULT  Goal: Performs mobility at highest level of function for planned discharge setting.  See evaluation for individualized goals.  Description: Treatment/Interventions: Functional transfer training, LE strengthening/ROM, Elevations, Therapeutic exercise, Endurance training, Patient/family training, Equipment eval/education, Bed mobility, Spoke to nursing, Gait training, OT, Spoke to case management  Equipment Recommended: Wheelchair, Walker       See flowsheet documentation for full assessment, interventions and recommendations.  Outcome: Progressing  Note: Prognosis: Good  Problem List: Decreased strength, Decreased endurance, Impaired balance, Decreased mobility  Assessment: PT initiated treatment session in order to assist patient in achieving goals to improve gait training and stair training and to provide education in setting of anticipated d/c home this week. Patient is performing bed mobility and transfers at mod-I level and ambulation and stair negotiation S level. He is ambulating household distances with use of RW without difficulty and able to negotiate 3 stairs today. D/c recommendation remains for home with supportive spouse and HHPT. Patient will continue to benefit from continued skilled PT this admission to achieve maximal function and safety.  Barriers to Discharge: None     Rehab Resource Intensity Level, PT: III (Minimum Resource Intensity)    See flowsheet documentation for full assessment.

## 2024-02-27 NOTE — PHYSICAL THERAPY NOTE
"   PT Treatment       02/27/24 1100   PT Last Visit   PT Visit Date 02/27/24   Note Type   Note Type Treatment   Pain Assessment   Pain Assessment Tool 0-10   Pain Score No Pain   Restrictions/Precautions   Weight Bearing Precautions Per Order Yes   LLE Weight Bearing Per Order WBAT  (WBAT to left heel (as per podiatry progress note from 2/27))   Braces or Orthoses Other (Comment)  (patient wearing surgical shoe L LE)   Other Precautions Telemetry;Fall Risk   General   Chart Reviewed Yes   Response to Previous Treatment Patient with no complaints from previous session.   Family/Caregiver Present Yes  (spouse)   Cognition   Overall Cognitive Status WFL   Orientation Level Oriented X4   Subjective   Subjective \"we can do the steps\"   Bed Mobility   Supine to Sit 6  Modified independent   Additional items HOB elevated   Sit to Supine 6  Modified independent   Additional items HOB elevated   Additional Comments patient in bed with L LE elevated pre and post treatment   Transfers   Sit to Stand 6  Modified independent   Stand to Sit 6  Modified independent   Additional Comments demonstrates safe hand placement with use of RW   Ambulation/Elevation   Gait pattern Excessively slow;Short stride;Decreased foot clearance   Gait Assistance 5  Supervision   Additional items Verbal cues   Assistive Device Rolling walker   Distance 30 feet x 2   Stair Management Assistance 5  Supervision   Additional items Verbal cues   Stair Management Technique One rail L;Foreward;Nonreciprocal;With cane;Backward   Number of Stairs 3  (1 step forward/1 step backwards x 3)   Ambulation/Elevation Additional Comments gait training with use of RW: patient able to don surgical shoe to L LE and recall he is to weight bear to heel. he walked 30 feet x 2 presenting with reduced gait speed; no LOB. during stair training PT demonstrated/educated on non-reciprocal negotiating ascending with R LE first and descending with L LE first. performed stairs with " L HR and SPC in R  UE. patient demonstrated safe technique and able to clear step adequately   Balance   Static Sitting Normal   Static Standing Fair   Ambulatory Fair   Endurance Deficit   Endurance Deficit Yes   Endurance Deficit Description hospital admission x nearly 1 month with reduced overall mobility; gross weakness   Activity Tolerance   Activity Tolerance Patient tolerated treatment well   Assessment   Prognosis Good   Problem List Decreased strength;Decreased endurance;Impaired balance;Decreased mobility   Assessment PT initiated treatment session in order to assist patient in achieving goals to improve gait training and stair training and to provide education in setting of anticipated d/c home this week. Patient is performing bed mobility and transfers at mod-I level and ambulation and stair negotiation S level. He is ambulating household distances with use of RW without difficulty and able to negotiate 3 stairs today. D/c recommendation remains for home with supportive spouse and HHPT. Patient will continue to benefit from continued skilled PT this admission to achieve maximal function and safety.   Goals   Patient Goals to go home   STG Expiration Date 03/12/24  (goals extended today. not met. remain appropriate)   PT Treatment Day 10   Plan   Treatment/Interventions LE strengthening/ROM;Therapeutic exercise;Endurance training;Patient/family training;Equipment eval/education;Gait training;OT;Spoke to nursing;Elevations   Progress Progressing toward goals   PT Frequency 2-3x/wk  (+ restorative therapist)   Discharge Recommendation   Rehab Resource Intensity Level, PT III (Minimum Resource Intensity)   Equipment Recommended Walker;Other (Comment)  (RW and commode for d/c home)   Walker Package Recommended Wheeled walker   Change/add to Walker Package? No   Additional Comments Patient was provided with written hand out created by therapist which educated patient on LE there-ex and generalized goals for  mobility this admission and upon d/c home. Therapist educated/demonstrated 4 LE there-ex and patient denied questions. Discussed goal to ambulate 3-5x/day within home with use of RW and also proper stair negotiation (non-reciprocal ascending with unaffected LE first and descending with affected LE first). Patient is anticipating that he will be d/c home this week. Review equipment for d/c (will be issued commode and RW) and spouse ordered mechanical bed to improve bed mobility. Patient and spouse deny questions/concerns about d/c home.   AM-PAC Basic Mobility Inpatient   Turning in Flat Bed Without Bedrails 4   Lying on Back to Sitting on Edge of Flat Bed Without Bedrails 4   Moving Bed to Chair 4   Standing Up From Chair Using Arms 4   Walk in Room 3   Climb 3-5 Stairs With Railing 3   Basic Mobility Inpatient Raw Score 22   Basic Mobility Standardized Score 47.4   Highest Level Of Mobility   JH-HLM Goal 7: Walk 25 feet or more   JH-HLM Achieved 7: Walk 25 feet or more       The patient's AM-PAC Basic Mobility Inpatient Standardized Score is greater than 42.9, suggesting this patient may benefit from discharge to home. Please also refer to the recommendation of the Physical Therapist for safe discharge planning.    BRADLY LEYVA PT, DPT

## 2024-02-27 NOTE — PLAN OF CARE
Problem: METABOLIC, FLUID AND ELECTROLYTES - ADULT  Goal: Electrolytes maintained within normal limits  Description: INTERVENTIONS:  - Monitor labs and assess patient for signs and symptoms of electrolyte imbalances  - Administer electrolyte replacement as ordered  - Monitor response to electrolyte replacements, including repeat lab results as appropriate  - Instruct patient on fluid and nutrition as appropriate  Outcome: Progressing  Goal: Fluid balance maintained  Description: INTERVENTIONS:  - Monitor labs   - Monitor I/O and WT  - Instruct patient on fluid and nutrition as appropriate  - Assess for signs & symptoms of volume excess or deficit  Outcome: Progressing  Goal: Glucose maintained within target range  Description: INTERVENTIONS:  - Monitor Blood Glucose as ordered  - Assess for signs and symptoms of hyperglycemia and hypoglycemia  - Administer ordered medications to maintain glucose within target range  - Assess nutritional intake and initiate nutrition service referral as needed  Outcome: Progressing     Problem: Prexisting or High Potential for Compromised Skin Integrity  Goal: Skin integrity is maintained or improved  Description: INTERVENTIONS:  - Identify patients at risk for skin breakdown  - Assess and monitor skin integrity  - Assess and monitor nutrition and hydration status  - Monitor labs   - Assess for incontinence   - Turn and reposition patient  - Assist with mobility/ambulation  - Relieve pressure over bony prominences  - Avoid friction and shearing  - Provide appropriate hygiene as needed including keeping skin clean and dry  - Evaluate need for skin moisturizer/barrier cream  - Collaborate with interdisciplinary team   - Patient/family teaching  - Consider wound care consult   Outcome: Progressing     Problem: PAIN - ADULT  Goal: Verbalizes/displays adequate comfort level or baseline comfort level  Description: Interventions:  - Encourage patient to monitor pain and request  assistance  - Assess pain using appropriate pain scale  - Administer analgesics based on type and severity of pain and evaluate response  - Implement non-pharmacological measures as appropriate and evaluate response  - Consider cultural and social influences on pain and pain management  - Notify physician/advanced practitioner if interventions unsuccessful or patient reports new pain  Outcome: Progressing     Problem: INFECTION - ADULT  Goal: Absence or prevention of progression during hospitalization  Description: INTERVENTIONS:  - Assess and monitor for signs and symptoms of infection  - Monitor lab/diagnostic results  - Monitor all insertion sites, i.e. indwelling lines, tubes, and drains  - Monitor endotracheal if appropriate and nasal secretions for changes in amount and color  - [place] appropriate cooling/warming therapies per order  - Administer medications as ordered  - Instruct and encourage patient and family to use good hand hygiene technique  - Identify and instruct in appropriate isolation precautions for identified infection/condition  Outcome: Progressing  Goal: Absence of fever/infection during neutropenic period  Description: INTERVENTIONS:  - Monitor WBC    Outcome: Progressing     Problem: SAFETY ADULT  Goal: Patient will remain free of falls  Description: INTERVENTIONS:  - Educate patient/family on patient safety including physical limitations  - Instruct patient to call for assistance with activity   - Consult OT/PT to assist with strengthening/mobility   - Keep Call bell within reach  - Keep bed low and locked with side rails adjusted as appropriate  - Keep care items and personal belongings within reach  - Initiate and maintain comfort rounds  - Make Fall Risk Sign visible to staff  - Apply yellow socks and bracelet for high fall risk patients  - Consider moving patient to room near nurses station  Outcome: Progressing  Goal: Maintain or return to baseline ADL function  Description:  INTERVENTIONS:  -  Assess patient's ability to carry out ADLs; assess patient's baseline for ADL function and identify physical deficits which impact ability to perform ADLs (bathing, care of mouth/teeth, toileting, grooming, dressing, etc.)  - Assess/evaluate cause of self-care deficits   - Assess range of motion  - Assess patient's mobility; develop plan if impaired  - Assess patient's need for assistive devices and provide as appropriate  - Encourage maximum independence but intervene and supervise when necessary  - Involve family in performance of ADLs  - Assess for home care needs following discharge   - Consider OT consult to assist with ADL evaluation and planning for discharge  - Provide patient education as appropriate  Outcome: Progressing  Goal: Maintains/Returns to pre admission functional level  Description: INTERVENTIONS:  - Perform AM-PAC 6 Click Basic Mobility/ Daily Activity assessment daily.  - Set and communicate daily mobility goal to care team and patient/family/caregiver.   - Collaborate with rehabilitation services on mobility goals if consulted  - Record patient progress and toleration of activity level   Outcome: Progressing     Problem: DISCHARGE PLANNING  Goal: Discharge to home or other facility with appropriate resources  Description: INTERVENTIONS:  - Identify barriers to discharge w/patient and caregiver  - Arrange for needed discharge resources and transportation as appropriate  - Identify discharge learning needs (meds, wound care, etc.)  - Arrange for interpretive services to assist at discharge as needed  - Refer to Case Management Department for coordinating discharge planning if the patient needs post-hospital services based on physician/advanced practitioner order or complex needs related to functional status, cognitive ability, or social support system  Outcome: Progressing     Problem: Knowledge Deficit  Goal: Patient/family/caregiver demonstrates understanding of disease  process, treatment plan, medications, and discharge instructions  Description: Complete learning assessment and assess knowledge base.  Interventions:  - Provide teaching at level of understanding  - Provide teaching via preferred learning methods  Outcome: Progressing

## 2024-02-27 NOTE — PROGRESS NOTES
Progress Note - Advanced Heart Failure Team  Nadir Myers 63 y.o. male MRN: [id number]  Unit/Bed#: [facility] 519-01 Encounter: [id number]    Subjective    States breathing is stable. No fevers, chest pain, dizziness, or palpitations. Urinating well.     Objective   Vitals: Blood pressure 155/81, pulse 74, temperature 97.9 °F (36.6 °C), resp. rate 16, height 6' (1.829 m), weight 107 kg (235 lb 7.2 oz), SpO2 92%.  Orthostatic Blood Pressures      Flowsheet Row Most Recent Value   Blood Pressure 155/81 filed at 02/27/2024 1117   Patient Position - Orthostatic VS Lying filed at 02/26/2024 0209              Invasive Devices       Peripherally Inserted Central Catheter Line  Duration             PICC Line 02/22/24 Right Basilic 5 days              Drain  Duration             Urethral Catheter Latex 16 Fr. 6 days                    Physical Exam  Constitutional:       General: He is not in acute distress.  Cardiovascular:      Rate and Rhythm: Normal rate and regular rhythm.   Pulmonary:      Effort: Pulmonary effort is normal.   Musculoskeletal:      Right lower leg: Edema present.      Left lower leg: Edema present.   Skin:     General: Skin is warm.   Neurological:      Mental Status: He is alert.         Lab Results: I have personally reviewed pertinent lab results.            Results from last 7 days   Lab Units 02/27/24  0443 02/26/24  0540 02/25/24  0537   POTASSIUM mmol/L 3.2* 3.9 4.2   CO2 mmol/L 33* 34* 33*   CHLORIDE mmol/L 97 97 96   BUN mg/dL 47* 43* 46*   CREATININE mg/dL 1.88* 2.12* 2.36*     Results from last 7 days   Lab Units 02/27/24  0443 02/26/24  0540 02/25/24  0537   HEMOGLOBIN g/dL 9.3* 9.3* 9.4*   HEMATOCRIT % 29.8* 30.7* 29.5*   PLATELETS Thousands/uL 383 391* 371                   Imaging: I have personally reviewed pertinent reports.    ECHO:  Results for orders placed during the hospital encounter of 09/26/17    Echo complete with contrast if indicated    Narrative  [facility]  [address] [address]  [phone]    Transthoracic Echocardiogram  2D, M-mode, Doppler, and Color Doppler    Study date:  26-Sep-2017    Patient: EAGLE REBOLLAR  MR number: [id number]  Account number: [id number]  : 1960  Age: 57 years  Gender: Male  Status: Outpatient  Location: [address] [place] [address]  Height: 72 in  Weight: 263.3 lb  BP: 142/ 88 mmHg    Indications: CAD    Diagnoses: I25.10 - Atherosclerotic heart disease of native coronary artery without angina pectoris    Sonographer:  OLIVE Benoit  Primary Physician:  Paty Ortiz MD  Referring Physician:  Eagle Chavez MD  Group:  [facility]  Interpreting Physician:  Torrey Duenas MD    SUMMARY    LEFT VENTRICLE:  Size was normal.  Systolic function was normal. Ejection fraction was estimated to be 65 %.  There was mild concentric hypertrophy.  Doppler parameters were consistent with abnormal left ventricular relaxation (grade 1 diastolic dysfunction).    RIGHT VENTRICLE:  The size was normal.  Systolic function was normal.    TRICUSPID VALVE:  There was trace regurgitation.    HISTORY: PRIOR HISTORY: Hypertension, CAD s/p PCI, smoker, diabetes, high cholesterol    PROCEDURE: The study was performed in the [address] [place] [address]. This was a routine study. The transthoracic approach was used. The study included complete 2D imaging, M-mode, complete spectral Doppler, and color Doppler. The  heart rate was 112 bpm, at the start of the study. Images were obtained from the parasternal, apical, subcostal, and suprasternal notch acoustic windows. Echocardiographic views were limited due to decreased penetration. This was a  technically difficult study.    LEFT VENTRICLE: Size was normal. Systolic function was normal. Ejection fraction was estimated to be 65 %. There were no regional wall motion abnormalities. Wall thickness was mildly increased. There was mild  concentric hypertrophy.  DOPPLER: Doppler parameters were consistent with abnormal left ventricular relaxation (grade 1 diastolic dysfunction).    RIGHT VENTRICLE: The size was normal. Systolic function was normal. Wall thickness was normal.    LEFT ATRIUM: Size was normal.    RIGHT ATRIUM: Size was normal.    MITRAL VALVE: Valve structure was normal. There was normal leaflet separation. DOPPLER: The transmitral velocity was within the normal range. There was no evidence for stenosis. There was no regurgitation.    AORTIC VALVE: The valve was trileaflet. Leaflets exhibited normal thickness and normal cuspal separation. DOPPLER: Transaortic velocity was within the normal range. There was no evidence for stenosis. There was no regurgitation.    TRICUSPID VALVE: The valve structure was normal. There was normal leaflet separation. DOPPLER: The transtricuspid velocity was within the normal range. There was no evidence for stenosis. There was trace regurgitation. The tricuspid jet  envelope definition was inadequate for estimation of RV systolic pressure. There are no indirect findings suggestive of moderate or severe pulmonary hypertension.    PULMONIC VALVE: Leaflets exhibited normal thickness, no calcification, and normal cuspal separation. DOPPLER: The transpulmonic velocity was within the normal range. There was no regurgitation.    PERICARDIUM: There was no pericardial effusion. The pericardium was normal in appearance.    AORTA: The root exhibited normal size.    SYSTEMIC VEINS: IVC: The inferior vena cava was normal in size and course. Respirophasic changes were normal.    SYSTEM MEASUREMENT TABLES    2D  %FS: 47.6 %  AV Diam: 3.34 cm  EDV(Teich): 101.71 ml  EF Biplane: 67.76 %  EF(Cube): 85.61 %  EF(Teich): 79 %  ESV(Cube): 14.82 ml  ESV(Teich): 21.36 ml  IVSd: 1.58 cm  LA Area: 12.78 cm2  LA Diam: 3.37 cm  LVEDV MOD A2C: 49.73 ml  LVEDV MOD A4C: 61.78 ml  LVEDV MOD BP: 58.1 ml  LVEF MOD A2C: 65.64 %  LVEF MOD  A4C: 71.2 %  LVESV MOD A2C: 17.09 ml  LVESV MOD A4C: 17.79 ml  LVESV MOD BP: 18.73 ml  LVIDd: 4.69 cm  LVIDs: 2.46 cm  LVLd A2C: 6.97 cm  LVLd A4C: 7.74 cm  LVLs A2C: 5.23 cm  LVLs A4C: 6.13 cm  LVPWd: 1.23 cm  RA Area: 14.52 cm2  RV Diam.: 2.86 cm  SI(Cube): 36.73 ml/m2  SI(Teich): 33.48 ml/m2  SV MOD A2C: 32.64 ml  SV MOD A4C: 43.98 ml  SV(Cube): 88.15 ml  SV(Teich): 80.35 ml    MM  TAPSE: 3.26 cm    PW  E': 0.08 m/s    [facility]    Prepared and electronically signed by    Torrey Duenas MD  Signed 26-Sep-2017 12:55:39    Results for orders placed during the hospital encounter of 01/31/24    Echo complete w/ contrast if indicated    Interpretation Summary    Left Ventricle: Left ventricular cavity size is normal. Wall thickness is moderately increased. There is moderate concentric hypertrophy. The left ventricular ejection fraction is 42%. Systolic function is moderately reduced. Diastolic function is mildly abnormal, consistent with grade I (abnormal) relaxation. 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 There is no thrombus.    The following segments are akinetic: apical anterior, apical septal, apical inferior, apical lateral and apex.    All other segments are normal.    Tricuspid Valve: There is mild regurgitation.      GILL:  No results found for this or any previous visit.    Results for orders placed during the hospital encounter of 01/31/24    GILL    Interpretation Summary  Images from the original result were not included.      Left Ventricle: Left ventricular cavity size is normal. Wall thickness is normal. The left ventricular ejection fraction is 45%. Systolic function is mildly reduced. There is hypokinesis with akinesis of the aneurysmal portion of the apex.    Right Ventricle: Right ventricular cavity size is normal. Systolic function is normal.    Right Atrium: There is a 1.8 x 1.1 cm mobile mass affixed to the device lead as it passes through the right  atrium.    Aortic Valve: The aortic valve is trileaflet. The leaflets are mildly thickened. The leaflets are not calcified. The leaflets exhibit normal mobility. There is Lambl's excrescence on the tip of the right coronary aortic valve leaflet.    There is a 1.8 x 1.1 cm mobile mass affixed to the cardiac device lead as is passes through the right atrium.  In the setting of bacteremia this is most likely represents infection.    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 prevention [employer] dual chamber ICD (5/2023) complicated by device infection requiring explant    Septic shock (resolved) with left toe gangrene / staph aureus bacteremia    LINDEN likely 2/2 to sepsis-related ATN, recent procedures    Coronary artery disease with  mLAD and LPDA     LUE weakness / facial droop     LV apical aneurysm     Peripheral arterial disease     Hypertension    History of orthostatic hypotension     DM Type 2     63 year old male with chronic heart failure with mid-range EF being followed by heart failure outpatient presented with Staph bacteremia and septic shock complicating a lower extremity wound infection. Vegetation noted on ICD lead, prompting lead extraction. Now stable from infectious standpoint with  reimplantation of subcutaneous ICD, however with inability to obtain proper subcutaneous lead placement.    Patient remains volume overloaded on examination today.   Responded well to IV loop diuretics with Diamox yesterday: -5.4L out, net negative over 3 L.   Creatinine improving 2.1-> 1.8.   Weight trend 240 lb -> 235 lb (baseline 230 lb).    Continue Furosemide 80mg BID and 1 dose of Acetazolamide 500mg IV today, anticipate transition to PO tomorrow.      Maintain potassium 4-5 mEq/L and Mg > 2.     MAP improved on afterload reducing therapy.    Unable to start SGLT-2, MRA, and ARB/ARNi due to  renal impairment.    Continue Metoprolol Succinate 25mg BID  Continue Isordil 20mg TID / Hydralazine 50mg TID    Continue dual antiplatelet therapy with high intensity statin.  Continue amiodarone for VT suppression.    Plan for LifeVest on discharge and implantation of traditional [place] S-ICD as outpatient per EP.   Patient requested clarification on LifeVest instructions, and we will reach out to [facility] rep for assistance.     Lamont Murcia MD  Cardiology Fellow

## 2024-02-27 NOTE — PROGRESS NOTES
NEPHROLOGY PROGRESS NOTE   Nadir Myers 63 y.o. male MRN: [id number]  Unit/Bed#: [facility] 519-01 Encounter: [id number]      ASSESSMENT & PLAN:  1.  Acute kidney injury suspected secondary to ATN in the setting of surgery and sepsis.  Baseline creatinine less than 1.  Admitted with a creatinine 1.4 initially improved to 0.6-0.8 but then worsened on 2/15, peaked at 2.36 on 2/25, kidney function improving with creatinine down to 1.8 this morning  Jardiance and spironolactone on hold, kidney function improving on current IV diuretics.  Discussed with cardiology plan to continue with IV diuretic for another 24 hours and consider transition to oral diuretics  Follow daily labs    2 sepsis secondary to MSSA bacteremia with ICD infection status post ICD extraction.  Infectious disease on board managing antibiotics.  On Ancef for 6 weeks total.    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  Denies any significant complaints, no chest pain, no shortness of breath, abdominal pain, no recent nausea vomiting    OBJECTIVE:  Current Weight: Weight - Scale: 107 kg (235 lb 7.2 oz)  Vitals:    02/27/24 1117   BP: 155/81   Pulse: 74   Resp: 16   Temp: 97.9 °F (36.6 °C)   SpO2: 92%       Intake/Output Summary (Last 24 hours) at 2/27/2024 1119  Last data filed at 2/27/2024 0901  Gross per 24 hour   Intake 1740 ml   Output 5350 ml   Net -3610 ml       General: conscious, cooperative, in not acute distress  Eyes: conjunctivae pale, anicteric sclerae  ENT: lips and mucous membranes  moist  Neck: supple, no JVD  Chest: clear breath sounds bilateral, no crackles, ronchus or wheezings  CVS: distinct S1 & S2, normal rate, regular rhythm  Abdomen: soft, non-tender, non-distended, normoactive bowel sounds  Extremities: Positive edema of both legs, left foot with dressing  Skin: no rash  Neuro: awake, alert, oriented      Medications:    Current Facility-Administered Medications:     acetaminophen (TYLENOL) tablet 650 mg, 650 mg, Oral, Q8H [facility], Kip Cabello MD, 650 mg at 02/26/24 2107    ALPRAZolam (XANAX) tablet 0.5 mg, 0.5 mg, Oral, HS PRN, Kip Cabello MD    amiodarone tablet 200 mg, 200 mg, Oral, Daily With Breakfast, Yuniel Mcgee PA-C, 200 mg at 02/27/24 0921    aspirin chewable tablet 81 mg, 81 mg, Oral, Daily, Yuniel Mcgee PA-C, 81 mg at 02/27/24 0921    atorvastatin (LIPITOR) tablet 80 mg, 80 mg, Oral, After Dinner, Yuniel Mcgee PA-C, 80 mg at 02/26/24 1749    ceFAZolin (ANCEF) IVPB (premix in dextrose) 2,000 mg 50 mL, 2,000 mg, Intravenous, Q8H, Zach Medina MD, Last Rate: 100 mL/hr at 02/27/24 0536, 2,000 mg at 02/27/24 0536    clopidogrel (PLAVIX) tablet 75 mg, 75 mg, Oral, Daily, Yuniel Mcgee PA-C, 75 mg at 02/27/24 0921    Diclofenac Sodium (VOLTAREN) 1 % topical gel 2 g, 2 g, Topical, 4x Daily, Kip Cabello MD, 2 g at 02/27/24 0923    furosemide (LASIX) injection 60 mg, 60 mg, Intravenous, BID (diuretic), Nash Samuels DO, 60 mg at 02/27/24 0922    heparin (porcine) subcutaneous injection 5,000 Units, 5,000 Units, Subcutaneous, Q8H [facility], Kip Cabello MD, 5,000 Units at 02/27/24 0536    hydrALAZINE (APRESOLINE) tablet 50 mg, 50 mg, Oral, Q8H [facility], Nash Samuels DO, 50 mg at 02/27/24 0536    insulin glargine (LANTUS) subcutaneous injection 38 Units 0.38 mL, 38 Units, Subcutaneous, HS, Kip Cabello MD, 38 Units at 02/26/24 2114    insulin lispro (HumaLOG) 100 units/mL subcutaneous injection 1-5 Units, 1-5 Units, Subcutaneous, 4x Daily (AC & HS), 1 Units  at 02/26/24 2112 **AND** Fingerstick Glucose (POCT), , , 4x Daily AC and at bedtime, Yuniel Mcgee PA-C    insulin lispro (HumaLOG) 100 units/mL subcutaneous injection 7 Units, 7 Units, Subcutaneous, TID With Meals, Yuniel Mcgee PA-C, 7 Units at 02/26/24 1749    isosorbide dinitrate (ISORDIL) tablet 20 mg, 20 mg, Oral, TID after meals, Nash Samuels DO, 20 mg at 02/26/24 1749    melatonin tablet 3 mg, 3 mg, Oral, HS, Kip Cabello MD, 3 mg at 02/26/24 2108    methocarbamol (ROBAXIN) tablet 500 mg, 500 mg, Oral, Q6H PRN, Kip Cabello MD    metoprolol succinate (TOPROL-XL) 24 hr tablet 25 mg, 25 mg, Oral, Q12H, Yuniel Mcgee PA-C, 25 mg at 02/27/24 0540    oxyCODONE (ROXICODONE) IR tablet 5 mg, 5 mg, Oral, Q6H PRN, Yuniel Mcgee PA-C, 5 mg at 02/24/24 1706    polyethylene glycol (MIRALAX) packet 17 g, 17 g, Oral, Daily, Yuniel Mcgee PA-C, 17 g at 02/27/24 0914    saccharomyces boulardii (FLORASTOR) capsule 250 mg, 250 mg, Oral, BID, Yuniel Mcgee PA-C, 250 mg at 02/27/24 0921    senna-docusate sodium (SENOKOT S) 8.6-50 mg per tablet 2 tablet, 2 tablet, Oral, BID, Yuniel Mcgee PA-C, 2 tablet at 02/27/24 0921    Invasive Devices:   Urethral Catheter Latex 16 Fr. (Active)   Reasons to continue Urinary Catheter  Acute urinary retention/obstruction failing urinary retention protocol 02/27/24 0845   Goal for Removal Voiding trial when ambulation improves 02/27/24 0845   Site Assessment Clean;Skin intact 02/27/24 0845   Escobar Care Done 02/26/24 2100   Collection Container Standard drainage bag 02/27/24 0845   Securement Method Securing device (Describe) 02/27/24 0845   Output (mL) 800 mL 02/27/24 0901       Lab Results:   Results from last 7 days   Lab Units 02/27/24  0443 02/26/24  0540 02/25/24  0537 02/24/24  0541 02/23/24  0458   WBC Thousand/uL 7.54 8.37 9.00   < >  --    HEMOGLOBIN g/dL 9.3* 9.3* 9.4*   < >  --    HEMATOCRIT % 29.8* 30.7* 29.5*   < >  --   "  PLATELETS Thousands/uL 383 391* 371   < >  --    SODIUM mmol/L 138 139 136   < > 135   POTASSIUM mmol/L 3.2* 3.9 4.2   < > 4.7   CHLORIDE mmol/L 97 97 96   < > 98   CO2 mmol/L 33* 34* 33*   < > 31   BUN mg/dL 47* 43* 46*   < > 41*   CREATININE mg/dL 1.88* 2.12* 2.36*   < > 2.02*   CALCIUM mg/dL 8.3* 8.4 8.2*   < > 7.9*   MAGNESIUM mg/dL 2.1 2.1  --   --  1.8*   PHOSPHORUS mg/dL  --   --   --   --  5.1*   ALK PHOS U/L  --   --   --   --  46   ALT U/L  --   --   --   --  <3*   AST U/L  --   --   --   --  11*    < > = values in this interval not displayed.       Previous work up:  See previous notes      Portions of the record may have been created with voice recognition software. Occasional wrong word or \"sound a like\" substitutions may have occurred due to the inherent limitations of voice recognition software. Read the chart carefully and recognize, using context, where substitutions have occurred.If you have any questions, please contact the dictating provider.  "

## 2024-02-27 NOTE — ASSESSMENT & PLAN NOTE
CT 2/7 shows small consolidation in the upper lobes and left lower lobe with enhancing lesion in the peripheral of right lower lobe, may be embolic versus hematogenous seeding from high-grade bacteremia.  there is no evident of pulmonary embolism to suggest that this is an infarct.   Repeat CT 2/15 revealed possible septic emboli, gas and fluid subcutaneous collection in the pacemaker bed likely postsurgical, reactive mild mediastinal lymphadenopathy.  CT chest without contrast  2/21: Peripheral left lower lobe opacity with reverse halo sign appearance. This can be seen in the setting of pulmonary infarct   Doppler US is negative for DVT  VQ Scan is intermediate for PE  TTE 2/21 with normal right ventricle function  Unable to obtain PE study to confirm/rule out given LINDEN. Suspicion remains higher for septic embolism. Given hemodynamic stability with only 1L NC oxygen requirement due to volume overload, would hold off on AC for now.   Appreciate pulmonology input

## 2024-02-27 NOTE — ASSESSMENT & PLAN NOTE
Persistent MSSA bacteremia most likely source foot wound, complicated with ICD infection  Blood cultures from 1/31-2/9  positive despite being on Ancef suspect due to ICD infection  GILL with evidence of mobile mass on ICD lead in the right atrium, concerning for infection, no valvular vegetation  S/p ICD removal with EP on 2/8  CT chest 2/7 and 2/15 with possible septic pulmonary emboli  Repeat Blood cultures from 2/12 negative, repeat blood cultures 2/16 negative   Continue IV Ancef, with plan to treat 6 weeks until 3/25/2024   PICC placed 2/22

## 2024-02-27 NOTE — ASSESSMENT & PLAN NOTE
Appreciate podiatry input -> status post left fourth toe amputation on 1/31  [place] to be surgical cure per podiatry  Wound care  Antibiotics per ID

## 2024-02-27 NOTE — PROGRESS NOTES
Patient:    MRN:  [id number]    Aidin Request ID:  [id number]    Level of care reserved:  Infusion    Partner Reserved:  [facility], [place] [address] [phone]    Clinical needs requested:    Geography searched:  [phone]    Start of Service:    Request sent:  2:25pm EST on 2/26/2024 by Farhad Pires    Partner reserved:  9:44am EST on 2/27/2024 by Farhad Pires    Choice list shared:

## 2024-02-27 NOTE — PROGRESS NOTES
[facility] Podiatry - Progress Note  Patient: Nadir Myers 63 y.o. male   MRN: [id number]  PCP: Tigre Hare DO  Unit/Bed#: [facility] 519-01 Encounter: [id number]  Date Of Visit: 02/27/24    ASSESSMENT:    Nadir Myers is a 63 y.o. male with:    Septic shock - POA, resolved  Left fourth toe gangrene  - s/p left 4th digit amputation (DOS: 1/31/24)  - s/p left foot washout, partial 4th ray with closure (DOS 2/4/2024)  Left foot cellulitis  Bacteremia   Type 2 diabetes mellitus  Three-vessel coronary artery disease, heart failure with reduced ejection fraction.  Tobacco dependence  Peripheral arterial disease    PLAN:    Dressings changed at bedside today. Left 4th digit amputation site is noted to be stable at this time. Sutures intact with skin edges well coapted. Right hallux deep tissue injury noted to be stable at this time.   Suture removal performed at bedside today. See procedure note below.   Patient remains stable for discharge from a podiatry standpoint.  Patient will follow up outpatient for post-operative care, instructions placed with discharge information.   Podiatry will be signing off at this time. Please re-consult as needed.   Per infectious disease, continue long-term IV antibiotics for 6 weeks through 3/25/2024 secondary to MSSA bacteremia.   Advised patient to offload his heels using prevalon boots or wedges when non ambulatory. In addition, recommended propping his right foot up to prevent the constant pressure of the end of the hospital bed on the right hallux causing the DTI.   Elevation on green foam wedges or pillows when non-ambulatory.  Rest of care per primary team.    Procedure Note: Sutures Removal   After verbal consent was obtained, patient's sutures were removed at bedside utilizing a suture removal kit. The patient tolerated this well and without incident.     Weight bearing status: Weightbearing as tolerated to left foot       SUBJECTIVE:     The patient was seen, evaluated, and  assessed at bedside today. The patient was awake, alert, and in no acute distress. No acute events overnight. The patient reports he is doing well today. Patient denies N/V/F/chills/SOB/CP.      OBJECTIVE:     Vitals:   /66   Pulse 81   Temp 97.9 °F (36.6 °C)   Resp 16   Ht 6' (1.829 m)   Wt 107 kg (235 lb 7.2 oz)   SpO2 95%   BMI 31.93 kg/m²     Temp (24hrs), Av.8 °F (36.6 °C), Min:97.5 °F (36.4 °C), Max:98 °F (36.7 °C)      Physical Exam:     General:  Alert, cooperative, and in no distress.  Lower extremity exam:  Cardiovascular status at baseline.  Neurological status at baseline. S/p right 4th ray amputation. No calf tenderness noted.     Left foot: Epithelialization noted to left partial 4th ray amputation site. Sutures removed at bedside with no complications. No signs of acute infection noted.     Right hallux: DTI noted to distal right hallux plantarly with no open lesions appreciated.     Clinical Images 24:          Additional Data:     Labs:    Results from last 7 days   Lab Units 24  0443 24  0540   WBC Thousand/uL 7.54 8.37   HEMOGLOBIN g/dL 9.3* 9.3*   HEMATOCRIT % 29.8* 30.7*   PLATELETS Thousands/uL 383 391*   NEUTROS PCT %  --  73   LYMPHS PCT %  --  17   MONOS PCT %  --  8   EOS PCT %  --  1     Results from last 7 days   Lab Units 24  0443 24  0541 24  0458   POTASSIUM mmol/L 3.2*   < > 4.7   CHLORIDE mmol/L 97   < > 98   CO2 mmol/L 33*   < > 31   BUN mg/dL 47*   < > 41*   CREATININE mg/dL 1.88*   < > 2.02*   CALCIUM mg/dL 8.3*   < > 7.9*   ALK PHOS U/L  --   --  46   ALT U/L  --   --  <3*   AST U/L  --   --  11*    < > = values in this interval not displayed.           * I Have Reviewed All Lab Data Listed Above.    Recent Cultures (last 7 days):               Imaging: I have personally reviewed pertinent films in PACS  EKG, Pathology, and Other Studies: I have personally reviewed pertinent reports.      ** Please Note: Portions of the record  "may have been created with voice recognition software. Occasional wrong word or \"sound a like\" substitutions may have occurred due to the inherent limitations of voice recognition software. Read the chart carefully and recognize, using context, where substitutions have occurred. **      "

## 2024-02-27 NOTE — OCCUPATIONAL THERAPY NOTE
Occupational Therapy cx        Patient Name: Nadir Myers  Today's Date: 2/27/2024 02/27/24 1045   OT Last Visit   OT Visit Date 02/27/24   Note Type   Note Type Treatment   Cancel Reasons Other  (pt unavail with another provider at the time of attempted treatment. will f/u as able.)         Maritza Gallagher, DEMETRIO, OTR/L

## 2024-02-27 NOTE — PROGRESS NOTES
Progress Note - Infectious Disease   Nadir Myers 63 y.o. male MRN: [id number]  Unit/Bed#: [facility] 519-01 Encounter: [id number]      Impression/Plan:    Sepsis, present on arrival; fever, leukocytosis  -Source is bacteremia with ICD infection and patients foot infection as below. No other clear source. Had intermittent fevers since 2/10, though blood cultures clear on 2/12 and 2/16. CT C/A/P 2/15 notable for pulmonary septic emboli and gas/fluid collection at site of ICD removal, though no other infectious source and EP felt ICD site findings were post operative. Podiatry feels foot is healing well. Patient without any localizing symptoms. Now afebrile since 2/18, WBC normal since 2/24. Persistent fevers may have been reactive to lung findings as below.   -Antibiotic as below  -Trend fever curve     Persistent MSSA bacteremia with ICD infection  -Likely initial source was patients foot wound as below with subsequent ICD infection. Blood cultures positive on 1/31-2/08 despite being on Cefazolin, suspect due to ICD infection. GILL 2/07 found a mobile mass on device lead in right atrium, concerning for infection. No valve vegetations. CT scans from 2/15 and 2/07 negative for abscess though suspicious for pulmonary septic emboli. ICD extracted on 2/08 with lead culture that grew MSSA. Only 1 of 2 blood cultures positive on 2/09, blood cultures 2/12 and 2/16 negative. Extravascular ICD placed 2/20, though lead was in left pleural space and repositioning attempts on 2/21 unsuccessful. Plan now is to reimplant at later date after patient has healed and discharge with Life Vest.  -Continue IV Cefazolin 2g every 8 hours   -Will need 6 weeks of IV antibiotic from extraction and first negative blood cultures, through 3/25/2024; script left in chart for CM  -PICC to be removed on 3/26/2024  -Will need weekly CBC + diff, BMP as outpatient and ID follow up; will message office for follow up needs closer to discharge  -Follow up  with Cardiac surgery and EP to determine future date for ICD reimplantation  -No contraindications to discharge from ID standpoint when otherwise medically ready    3. Abnormal CT chest  -CT 2/07 noted small consolidations in upper lobes and left lower lobe, with enhancing lesion in perieprhal of right lower lobe. Repeat CT 2/15 with progressive left lower lobe infiltrate, new subpleural nodular airspace focus, with resolving foci at anterior left upper lobe and inferior right lower lobe. Suspect due to septic emboli. CT 2/21 with peripheral left lower lobe opacity showing reverse halo sign now, read as possible pulmonary infarct. VQ scan indeterminate for PE.   -Plan for prolonged course of antibiotic as above  -Monitor for any development of respiratory symptoms  -Would plan to repeat CT chest as outpatient in 4 weeks to ensure resolution    4. 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 Culture polymicrobial of tissue with MSSA, Finegoldia and Anaerococccus. Likely source of patient's bacteremia. Surgical cure felt to be obtained per Podiatry. Per follow up on 2/15, amputation site stable with intact sutures and no evidence of dehiscence or ischemia.   -Ongoing local wound care per Podiatry.     5. Staph epidermidis in 1 of 2 blood cultures  -1 set from 2/03 with Staph epi, did not grow in prior blood cultures from 1/31 or subsequent blood cultures. Suspect this was skin contaminant and as above MSSA is true pathogen.  -No further workup at this time    6. LINDEN  -Developing over admission. Baseline Cr of <1,  peaked at 2.36. May be related to IV contrast, cardiorenal. Consideration for Staphylococcus related GN. CrCl stable above 30 at the moment. Nephrology following. Serum Cr now improving.  -Renally adjust Cefazolin if CrCl drops to < 30  -Monitor BMP   -Nephrology recs    Plan and recommendations were discussed with primary team. They agree with continuing IV Cefazolin for 6 week  course.    Antibiotics:  Cefazolin    Subjective:  Denies fever, chills, nausea, cough, SOB, back pain.    Objective:  Vitals:  Temp:  [97.5 °F (36.4 °C)-98 °F (36.7 °C)] 97.5 °F (36.4 °C)  HR:  [70-85] 78  Resp:  [18] 18  BP: (117-145)/(56-86) 130/69  SpO2:  [93 %-95 %] 94 %  Temp (24hrs), Av.7 °F (36.5 °C), Min:97.5 °F (36.4 °C), Max:98 °F (36.7 °C)  Current: Temperature: 97.5 °F (36.4 °C)    Physical Exam:   General Appearance:  Alert, interactive, nontoxic, no acute distress.   Throat: Oropharynx moist without lesions.    Lungs:   Clear to auscultation bilaterally; no wheezes, rhonchi or rales; respirations unlabored   Heart:  RRR   Abdomen:   Soft, non-tender     Extremities: Left foot with dressing intact   Skin: No new rashes or lesions. Chest wall bandage at ICD extraction site intact       Labs:   All pertinent labs and imaging studies were personally reviewed  Results from last 7 days   Lab Units 24  0443 24  0540 24  0537   WBC Thousand/uL 7.54 8.37 9.00   HEMOGLOBIN g/dL 9.3* 9.3* 9.4*   PLATELETS Thousands/uL 383 391* 371     Results from last 7 days   Lab Units 24  0443 24  0540 24  0537 24  0541 24  0458   SODIUM mmol/L 138 139 136   < > 135   POTASSIUM mmol/L 3.2* 3.9 4.2   < > 4.7   CHLORIDE mmol/L 97 97 96   < > 98   CO2 mmol/L 33* 34* 33*   < > 31   BUN mg/dL 47* 43* 46*   < > 41*   CREATININE mg/dL 1.88* 2.12* 2.36*   < > 2.02*   EGFR ml/min/1.73sq m 37 32 28   < > 34   CALCIUM mg/dL 8.3* 8.4 8.2*   < > 7.9*   AST U/L  --   --   --   --  11*   ALT U/L  --   --   --   --  <3*   ALK PHOS U/L  --   --   --   --  46    < > = values in this interval not displayed.                         Micro:          Imaging:          Zach Medina MD  [facility]

## 2024-02-28 LAB
ALBUMIN SERPL BCP-MCNC: 2.7 G/DL (ref 3.5–5)
ALP SERPL-CCNC: 40 U/L (ref 34–104)
ALT SERPL W P-5'-P-CCNC: <3 U/L (ref 7–52)
ANION GAP SERPL CALCULATED.3IONS-SCNC: 10 MMOL/L
AST SERPL W P-5'-P-CCNC: 17 U/L (ref 13–39)
BASOPHILS # BLD AUTO: 0.02 THOUSANDS/ÂΜL (ref 0–0.1)
BASOPHILS NFR BLD AUTO: 0 % (ref 0–1)
BILIRUB SERPL-MCNC: 0.21 MG/DL (ref 0.2–1)
BUN SERPL-MCNC: 46 MG/DL (ref 5–25)
CALCIUM ALBUM COR SERPL-MCNC: 9.3 MG/DL (ref 8.3–10.1)
CALCIUM SERPL-MCNC: 8.3 MG/DL (ref 8.4–10.2)
CHLORIDE SERPL-SCNC: 98 MMOL/L (ref 96–108)
CO2 SERPL-SCNC: 31 MMOL/L (ref 21–32)
CREAT SERPL-MCNC: 1.96 MG/DL (ref 0.6–1.3)
DME PARACHUTE DELIVERY DATE REQUESTED: NORMAL
DME PARACHUTE ITEM DESCRIPTION: NORMAL
DME PARACHUTE ITEM DESCRIPTION: NORMAL
DME PARACHUTE ORDER STATUS: NORMAL
DME PARACHUTE SUPPLIER NAME: NORMAL
DME PARACHUTE SUPPLIER PHONE: NORMAL
EOSINOPHIL # BLD AUTO: 0.08 THOUSAND/ÂΜL (ref 0–0.61)
EOSINOPHIL NFR BLD AUTO: 1 % (ref 0–6)
ERYTHROCYTE [DISTWIDTH] IN BLOOD BY AUTOMATED COUNT: 13.8 % (ref 11.6–15.1)
FUNGITELL VALUE: <31.25 PG/ML
GALACTOMANNAN AG SPEC IA-ACNC: 0.02 INDEX (ref 0–0.49)
GFR SERPL CREATININE-BSD FRML MDRD: 35 ML/MIN/1.73SQ M
GLUCOSE SERPL-MCNC: 120 MG/DL (ref 65–140)
GLUCOSE SERPL-MCNC: 141 MG/DL (ref 65–140)
GLUCOSE SERPL-MCNC: 190 MG/DL (ref 65–140)
GLUCOSE SERPL-MCNC: 228 MG/DL (ref 65–140)
GLUCOSE SERPL-MCNC: 316 MG/DL (ref 65–140)
HCT VFR BLD AUTO: 29.5 % (ref 36.5–49.3)
HGB BLD-MCNC: 9.3 G/DL (ref 12–17)
IMM GRANULOCYTES # BLD AUTO: 0.08 THOUSAND/UL (ref 0–0.2)
IMM GRANULOCYTES NFR BLD AUTO: 1 % (ref 0–2)
INTERPRETATION: NORMAL
JCPYV AB SERPL QL IA: NEGATIVE
LYMPHOCYTES # BLD AUTO: 1.4 THOUSANDS/ÂΜL (ref 0.6–4.47)
LYMPHOCYTES NFR BLD AUTO: 18 % (ref 14–44)
Lab: NORMAL
MCH RBC QN AUTO: 27.9 PG (ref 26.8–34.3)
MCHC RBC AUTO-ENTMCNC: 31.5 G/DL (ref 31.4–37.4)
MCV RBC AUTO: 89 FL (ref 82–98)
MONOCYTES # BLD AUTO: 0.54 THOUSAND/ÂΜL (ref 0.17–1.22)
MONOCYTES NFR BLD AUTO: 7 % (ref 4–12)
NEUTROPHILS # BLD AUTO: 5.52 THOUSANDS/ÂΜL (ref 1.85–7.62)
NEUTS SEG NFR BLD AUTO: 73 % (ref 43–75)
NRBC BLD AUTO-RTO: 0 /100 WBCS
PLATELET # BLD AUTO: 379 THOUSANDS/UL (ref 149–390)
PMV BLD AUTO: 8.9 FL (ref 8.9–12.7)
POTASSIUM SERPL-SCNC: 3.6 MMOL/L (ref 3.5–5.3)
PROT SERPL-MCNC: 6.1 G/DL (ref 6.4–8.4)
RBC # BLD AUTO: 3.33 MILLION/UL (ref 3.88–5.62)
REFERENCE VALUE: NORMAL
SODIUM SERPL-SCNC: 139 MMOL/L (ref 135–147)
WBC # BLD AUTO: 7.64 THOUSAND/UL (ref 4.31–10.16)

## 2024-02-28 PROCEDURE — 82948 REAGENT STRIP/BLOOD GLUCOSE: CPT

## 2024-02-28 PROCEDURE — 80053 COMPREHEN METABOLIC PANEL: CPT | Performed by: FAMILY MEDICINE

## 2024-02-28 PROCEDURE — 85025 COMPLETE CBC W/AUTO DIFF WBC: CPT | Performed by: FAMILY MEDICINE

## 2024-02-28 PROCEDURE — 99232 SBSQ HOSP IP/OBS MODERATE 35: CPT | Performed by: FAMILY MEDICINE

## 2024-02-28 PROCEDURE — 99232 SBSQ HOSP IP/OBS MODERATE 35: CPT | Performed by: INTERNAL MEDICINE

## 2024-02-28 PROCEDURE — 99024 POSTOP FOLLOW-UP VISIT: CPT | Performed by: INTERNAL MEDICINE

## 2024-02-28 RX ORDER — FUROSEMIDE 10 MG/ML
60 INJECTION INTRAMUSCULAR; INTRAVENOUS
Status: DISCONTINUED | OUTPATIENT
Start: 2024-02-28 | End: 2024-02-29

## 2024-02-28 RX ORDER — POTASSIUM CHLORIDE 20 MEQ/1
40 TABLET, EXTENDED RELEASE ORAL ONCE
Status: COMPLETED | OUTPATIENT
Start: 2024-02-28 | End: 2024-02-28

## 2024-02-28 RX ADMIN — Medication 250 MG: at 17:45

## 2024-02-28 RX ADMIN — HYDRALAZINE HYDROCHLORIDE 50 MG: 50 TABLET ORAL at 13:16

## 2024-02-28 RX ADMIN — CEFAZOLIN SODIUM 2000 MG: 2 SOLUTION INTRAVENOUS at 13:16

## 2024-02-28 RX ADMIN — Medication 250 MG: at 08:32

## 2024-02-28 RX ADMIN — Medication 2 G: at 08:35

## 2024-02-28 RX ADMIN — INSULIN LISPRO 3 UNITS: 100 INJECTION, SOLUTION INTRAVENOUS; SUBCUTANEOUS at 21:45

## 2024-02-28 RX ADMIN — Medication 2 G: at 11:33

## 2024-02-28 RX ADMIN — ISOSORBIDE DINITRATE 20 MG: 20 TABLET ORAL at 17:47

## 2024-02-28 RX ADMIN — SENNOSIDES, DOCUSATE SODIUM 2 TABLET: 8.6; 5 TABLET ORAL at 17:45

## 2024-02-28 RX ADMIN — ASPIRIN 81 MG CHEWABLE TABLET 81 MG: 81 TABLET CHEWABLE at 08:32

## 2024-02-28 RX ADMIN — ATORVASTATIN CALCIUM 80 MG: 80 TABLET, FILM COATED ORAL at 17:45

## 2024-02-28 RX ADMIN — FUROSEMIDE 60 MG: 10 INJECTION, SOLUTION INTRAMUSCULAR; INTRAVENOUS at 17:45

## 2024-02-28 RX ADMIN — INSULIN LISPRO 7 UNITS: 100 INJECTION, SOLUTION INTRAVENOUS; SUBCUTANEOUS at 08:25

## 2024-02-28 RX ADMIN — CEFAZOLIN SODIUM 2000 MG: 2 SOLUTION INTRAVENOUS at 05:14

## 2024-02-28 RX ADMIN — AMIODARONE HYDROCHLORIDE 200 MG: 200 TABLET ORAL at 08:33

## 2024-02-28 RX ADMIN — CLOPIDOGREL BISULFATE 75 MG: 75 TABLET ORAL at 08:33

## 2024-02-28 RX ADMIN — HEPARIN SODIUM 5000 UNITS: 5000 INJECTION INTRAVENOUS; SUBCUTANEOUS at 05:17

## 2024-02-28 RX ADMIN — POTASSIUM CHLORIDE 40 MEQ: 1500 TABLET, EXTENDED RELEASE ORAL at 08:32

## 2024-02-28 RX ADMIN — ACETAMINOPHEN 650 MG: 325 TABLET, FILM COATED ORAL at 13:15

## 2024-02-28 RX ADMIN — Medication 3 MG: at 21:44

## 2024-02-28 RX ADMIN — HEPARIN SODIUM 5000 UNITS: 5000 INJECTION INTRAVENOUS; SUBCUTANEOUS at 21:44

## 2024-02-28 RX ADMIN — INSULIN GLARGINE 38 UNITS: 100 INJECTION, SOLUTION SUBCUTANEOUS at 21:45

## 2024-02-28 RX ADMIN — ISOSORBIDE DINITRATE 20 MG: 20 TABLET ORAL at 08:32

## 2024-02-28 RX ADMIN — INSULIN LISPRO 7 UNITS: 100 INJECTION, SOLUTION INTRAVENOUS; SUBCUTANEOUS at 17:48

## 2024-02-28 RX ADMIN — HYDRALAZINE HYDROCHLORIDE 75 MG: 25 TABLET, FILM COATED ORAL at 21:44

## 2024-02-28 RX ADMIN — ACETAMINOPHEN 650 MG: 325 TABLET, FILM COATED ORAL at 21:44

## 2024-02-28 RX ADMIN — INSULIN LISPRO 1 UNITS: 100 INJECTION, SOLUTION INTRAVENOUS; SUBCUTANEOUS at 17:45

## 2024-02-28 RX ADMIN — HEPARIN SODIUM 5000 UNITS: 5000 INJECTION INTRAVENOUS; SUBCUTANEOUS at 13:16

## 2024-02-28 RX ADMIN — METOPROLOL SUCCINATE 25 MG: 25 TABLET, EXTENDED RELEASE ORAL at 05:17

## 2024-02-28 RX ADMIN — ACETAMINOPHEN 650 MG: 325 TABLET, FILM COATED ORAL at 05:17

## 2024-02-28 RX ADMIN — INSULIN LISPRO 7 UNITS: 100 INJECTION, SOLUTION INTRAVENOUS; SUBCUTANEOUS at 11:35

## 2024-02-28 RX ADMIN — HYDRALAZINE HYDROCHLORIDE 50 MG: 50 TABLET ORAL at 05:17

## 2024-02-28 RX ADMIN — FUROSEMIDE 60 MG: 10 INJECTION, SOLUTION INTRAMUSCULAR; INTRAVENOUS at 08:35

## 2024-02-28 RX ADMIN — SENNOSIDES, DOCUSATE SODIUM 2 TABLET: 8.6; 5 TABLET ORAL at 08:34

## 2024-02-28 RX ADMIN — METOPROLOL SUCCINATE 25 MG: 25 TABLET, EXTENDED RELEASE ORAL at 17:45

## 2024-02-28 RX ADMIN — POTASSIUM CHLORIDE 40 MEQ: 1500 TABLET, EXTENDED RELEASE ORAL at 11:33

## 2024-02-28 RX ADMIN — POLYETHYLENE GLYCOL 3350 17 G: 17 POWDER, FOR SOLUTION ORAL at 08:30

## 2024-02-28 RX ADMIN — CEFAZOLIN SODIUM 2000 MG: 2 SOLUTION INTRAVENOUS at 21:44

## 2024-02-28 RX ADMIN — ISOSORBIDE DINITRATE 20 MG: 20 TABLET ORAL at 11:33

## 2024-02-28 RX ADMIN — INSULIN LISPRO 2 UNITS: 100 INJECTION, SOLUTION INTRAVENOUS; SUBCUTANEOUS at 11:36

## 2024-02-28 NOTE — PROGRESS NOTES
[facility]  Progress Note  Name: Nadir Myers I  MRN: [id number]  [facility] I Date of Admission: 1/31/2024   Date of Service: 2/28/2024 I Hospital Day: 28    Assessment/Plan   * MSSA bacteremia  Assessment & Plan  Persistent MSSA bacteremia most likely source foot wound, complicated with ICD infection  Blood cultures from 1/31-2/9  positive despite being on Ancef suspect due to ICD infection  GILL with evidence of mobile mass on ICD lead in the right atrium, concerning for infection, no valvular vegetation  S/p ICD removal with EP on 2/8  CT chest 2/7 and 2/15 with possible septic pulmonary emboli  Repeat Blood cultures from 2/12 negative, repeat blood cultures 2/16 negative   Continue IV Ancef, with plan to treat 6 weeks until 3/25/2024   PICC placed 2/22    Gangrene of toe of left foot ([facility])  Assessment & Plan  Appreciate podiatry input -> status post left fourth toe amputation on 1/31  [place] to be surgical cure per podiatry  Wound care  Antibiotics per ID    Acute on chronic systolic CHF (congestive heart failure) (HCC)  Assessment & Plan  Diuretics per HF team.  On IV Lasix 60 mg twice daily and metolazone  isordil 20 mg TID and hydralazine to 50 mg TID   Escobar in place for accurate I/Os.     Pleuritic chest pain  Assessment & Plan  Pleuritic pain in left lower chest wall since 2/20. Tender to touch. Suspect secondary to ICD manipulation +/- septic pulmonary emboli  EKG with no acute ischemic changes.  Voltaren gel/lidocaine patch/prn pain meds. Improving    ICD (implantable cardioverter-defibrillator) in place  Assessment & Plan  Patient had extraction of ICD  Patient underwent AV ICD placement on February 20, 2024  Patient had lead dislodgment leading to repeat procedure on February 21, 2024  During the procedure patient's lead had poor sensing and therefore could not be positioned under the sternum and pocket was closed.  Multiple location under the sternum  "where attempted.   Will plan for LifeVest on discharge and then discuss S-ICD in office.     LINDEN (acute kidney injury) ([facility])  Assessment & Plan  Likely ATN  Monitor with lasix  Hold jardiance and aldactone  Nephrology and cardiology following    Abnormal CT of the chest  Assessment & Plan  CT 2/7 shows small consolidation in the upper lobes and left lower lobe with enhancing lesion in the peripheral of right lower lobe, may be embolic versus hematogenous seeding from high-grade bacteremia.  there is no evident of pulmonary embolism to suggest that this is an infarct.   Repeat CT 2/15 revealed possible septic emboli, gas and fluid subcutaneous collection in the pacemaker bed likely postsurgical, reactive mild mediastinal lymphadenopathy.  CT chest without contrast  2/21: Peripheral left lower lobe opacity with reverse halo sign appearance. This can be seen in the setting of pulmonary infarct   Doppler US is negative for DVT  VQ Scan is intermediate for PE  TTE 2/21 with normal right ventricle function  Unable to obtain PE study to confirm/rule out given LINDEN. Suspicion remains higher for septic embolism. Given hemodynamic stability with only 1L NC oxygen requirement due to volume overload, would hold off on AC for now.   Appreciate pulmonology input    LUE weakness  Assessment & Plan  Presented w/ transient left-sided weakness and a facial droop now resolved  Appreciate neurology input deeming symptoms likely secondary to septic shock/infection and hyperglycemia, as neurologic workup including CT/MRI imaging negative for evidence of stroke or intracranial vessel occlusion, but noted chronic microangiopathic changes -> radiology report did however mention: \"Moderate bilateral supraclinoid ICA and moderate to severe left cavernous ICA segment stenosis.\"  Continue dual endplate therapy with ASA/Plavix - c/w statin  PT/OT as tolerated    PAD (peripheral artery disease) ([facility])  Assessment & Plan  Continue dual antiplatelet " therapy with ASA/Plavix - continue statin      Coronary artery disease involving native coronary artery of native heart without angina pectoris  Assessment & Plan  History of coronary artery disease status post HANNA to left circumflex 2015, repeat cath 2023 with  of mid LAD and L PDA.  Ischemic cardiomyopathy  Continue aspirin/Plavix/statin/metoprolol    V-tach ([facility])  Assessment & Plan  History of monomorphic VT status post secondary prevention with [employer] dual-chamber ICD 5/2023  Status post ICD extraction 2/8/2024  Lifevest on dc    Type 2 diabetes mellitus, with long-term current use of insulin ([facility])  Assessment & Plan  Lab Results   Component Value Date    HGBA1C 9.8 (H) 01/31/2024     Continue to adjust basal/prandial insulin. BG goal 140-180  Hypoglycemia protocol  Carbohydrate restriction  Continue to adjust               VTE Pharmacologic Prophylaxis:   Moderate Risk (Score 3-4) - Pharmacological DVT Prophylaxis Ordered: heparin.    Mobility:   Basic Mobility Inpatient Raw Score: 22  JH-HLM Goal: 7: Walk 25 feet or more  JH-HLM Achieved: 7: Walk 25 feet or more  HLM Goal achieved. Continue to encourage appropriate mobility.    Patient Centered Rounds: I performed bedside rounds with nursing staff today.   Discussions with Specialists or Other Care Team Provider: Heart failure    Education and Discussions with Family / Patient: Patient declined call to .     Total Time Spent on Date of Encounter in care of patient: 45 mins. This time was spent on one or more of the following: performing physical exam; counseling and coordination of care; obtaining or reviewing history; documenting in the medical record; reviewing/ordering tests, medications or procedures; communicating with other healthcare professionals and discussing with patient's family/caregivers.    Current Length of Stay: 28 day(s)  Current Patient Status: Inpatient   Certification Statement: The patient will continue to require  additional inpatient hospital stay due to additional IV diuresis  Discharge Plan: Anticipate discharge in 24-48 hrs to home.    Code Status: Level 1 - Full Code    Subjective:   This is a very pleasant 63-year-old gentleman who was seen evaluated today at bedside.  Patient is feeling a lot better.  Patient is eager to go home.    Objective:     Vitals:   Temp (24hrs), Av.8 °F (36.6 °C), Min:97.5 °F (36.4 °C), Max:98.1 °F (36.7 °C)    Temp:  [97.5 °F (36.4 °C)-98.1 °F (36.7 °C)] 97.5 °F (36.4 °C)  HR:  [70-83] 83  Resp:  [18] 18  BP: (114-155)/(60-86) 134/75  SpO2:  [91 %-95 %] 92 %  Body mass index is 31.75 kg/m².     Input and Output Summary (last 24 hours):     Intake/Output Summary (Last 24 hours) at 2024 1508  Last data filed at 2024 1344  Gross per 24 hour   Intake 753 ml   Output 3920 ml   Net -3167 ml       Physical Exam:   Physical Exam  Vitals reviewed.   HENT:      Head: Normocephalic.      Nose: No congestion.      Mouth/Throat:      Pharynx: No oropharyngeal exudate or posterior oropharyngeal erythema.   Eyes:      Conjunctiva/sclera: Conjunctivae normal.   Cardiovascular:      Rate and Rhythm: Normal rate and regular rhythm.      Heart sounds: Murmur heard.   Pulmonary:      Effort: Pulmonary effort is normal.   Abdominal:      General: Abdomen is flat.      Palpations: Abdomen is soft.   Musculoskeletal:      Right lower leg: Edema present.      Left lower leg: Edema present.   Skin:     General: Skin is warm and dry.   Neurological:      Mental Status: He is alert and oriented to person, place, and time. Mental status is at baseline.          Additional Data:     Labs:  Results from last 7 days   Lab Units 24  0523   WBC Thousand/uL 7.64   HEMOGLOBIN g/dL 9.3*   HEMATOCRIT % 29.5*   PLATELETS Thousands/uL 379   NEUTROS PCT % 73   LYMPHS PCT % 18   MONOS PCT % 7   EOS PCT % 1     Results from last 7 days   Lab Units 24  0523   SODIUM mmol/L 139   POTASSIUM mmol/L 3.6    CHLORIDE mmol/L 98   CO2 mmol/L 31   BUN mg/dL 46*   CREATININE mg/dL 1.96*   ANION GAP mmol/L 10   CALCIUM mg/dL 8.3*   ALBUMIN g/dL 2.7*   TOTAL BILIRUBIN mg/dL 0.21   ALK PHOS U/L 40   ALT U/L <3*   AST U/L 17   GLUCOSE RANDOM mg/dL 120         Results from last 7 days   Lab Units 02/28/24  1122 02/28/24  0604 02/27/24 2028 02/27/24  1545 02/27/24  1046 02/27/24  0544 02/26/24  2034 02/26/24  1520 02/26/24  1042 02/26/24  0604 02/25/24  2048 02/25/24  1608   POC GLUCOSE mg/dl 228* 141* 300* 202* 216* 95 200* 172* 187* 103 225* 187*               Lines/Drains:  Invasive Devices       Peripherally Inserted Central Catheter Line  Duration             PICC Line 02/22/24 Right Basilic 6 days                    Central Line:  Goal for removal: Will discontinue when meds requiring line are completed.         Telemetry:  Telemetry Orders (From admission, onward)               LifeVest Patient: Continuous Telemetry Monitoring during hospitalization (non-expiring)  Continuous LifeVest Telemetry Monitoring        References:    LifeVest Policy                     Telemetry Reviewed: Normal Sinus Rhythm  Indication for Continued Telemetry Use: Lifevest (remains on tele entire hospital stay)             Imaging: No pertinent imaging reviewed.    Recent Cultures (last 7 days):         Last 24 Hours Medication List:   Current Facility-Administered Medications   Medication Dose Route Frequency Provider Last Rate    acetaminophen  650 mg Oral Q8H [facility] Kip Cabello MD      ALPRAZolam  0.5 mg Oral HS PRN Kip Cabello MD      amiodarone  200 mg Oral Daily With Breakfast Yuniel Mcgee PA-C      aspirin  81 mg Oral Daily Yuniel Mcgee PA-C      atorvastatin  80 mg Oral After Dinner Yuniel Mcgee PA-C      cefazolin  2,000 mg Intravenous Q8H Zach Medina MD 2,000 mg (02/28/24 1316)    clopidogrel  75 mg Oral Daily Yuniel Mcgee PA-C      Diclofenac Sodium  2 g Topical 4x Daily Kip Cabello MD       furosemide  60 mg Intravenous BID (diuretic) Lamont Murcia MD      heparin (porcine)  5,000 Units Subcutaneous Q8H [facility] Kip Cabello MD      hydrALAZINE  75 mg Oral Q8H [facility] Aaron Capps MD      insulin glargine  38 Units Subcutaneous HS Kip Cabello MD      insulin lispro  1-5 Units Subcutaneous 4x Daily (AC & HS) Yuniel Mcgee PA-C      insulin lispro  7 Units Subcutaneous TID With Meals Yuniel Mcgee PA-C      isosorbide dinitrate  20 mg Oral TID after meals Nash Samuels DO      melatonin  3 mg Oral HS Kip Cabello MD      methocarbamol  500 mg Oral Q6H PRN Kip Cabello MD      metoprolol succinate  25 mg Oral Q12H Yuniel Mcgee PA-C      oxyCODONE  5 mg Oral Q6H PRN Yuniel Mcgee PA-C      polyethylene glycol  17 g Oral Daily Yuniel Mcgee PA-C      saccharomyces boulardii  250 mg Oral BID Yuniel Mcgee PA-C      senna-docusate sodium  2 tablet Oral BID Yuniel Mcgee PA-C          Today, Patient Was Seen By: Gilbert Quigley MD    **Please Note: This note may have been constructed using a voice recognition system.**

## 2024-02-28 NOTE — PROGRESS NOTES
NEPHROLOGY PROGRESS NOTE   Nadir Myers 63 y.o. male MRN: [id number]  Unit/Bed#: [facility] 519-01 Encounter: [id number]      ASSESSMENT & PLAN:  1.  Acute kidney injury suspected secondary to ATN in the setting of surgery and sepsis.  Baseline creatinine less than 1.  Admitted with a creatinine 1.4 initially improved to 0.6-0.8 but then worsened on 2/15, peaked at 2.36 on 2/25.  Kidney function improved and is stable in the high ones with current diuretics.  Cardiology team on board managing diuretics, consider transition to oral, will defer to cardiology  Follow daily labs    2 sepsis secondary to MSSA bacteremia with ICD infection status post ICD extraction.  Infectious disease on board managing antibiotics.  On Ancef for 6 weeks total.    #3 HFmrEF, acute on chronic, ischemic cardiomyopathy, patient continues with lower extremity edema, heart failure team on board managing diuretics, currently on IV Lasix, will defer transition to oral as per cardiology team    4 anemia component of chronic kidney disease and acute illness, hemoglobin remains low but is stable at 9.3 for the last couple of days    5 hypokalemia in the setting of diuresis, continue with replacement, follow daily labs, most recent potassium 3.6    6 left foot fourth toe gangrene status post amputation on 1/31, podiatry on board        SUBJECTIVE:  Patient seen and examined, in general feeling better, patient's wife and son at bedside.  Denies any chest pain, no shortness of breath, no abdominal pain, no recent nausea vomiting    OBJECTIVE:  Current Weight: Weight - Scale: 106 kg (234 lb 2.1 oz)  Vitals:    02/28/24 1116   BP: 144/80   Pulse: 78   Resp:    Temp: 97.5 °F (36.4 °C)   SpO2: 93%       Intake/Output Summary (Last 24 hours) at 2/28/2024 1127  Last data filed at 2/28/2024 1001  Gross per 24 hour   Intake 993 ml   Output 4725 ml   Net -3732 ml       General: conscious, cooperative, in not acute distress  Eyes: conjunctivae pink, anicteric  sclerae  ENT: lips and mucous membranes moist  Neck: supple, no JVD  Chest: clear breath sounds bilateral, no crackles, ronchus or wheezings  CVS: distinct S1 & S2, normal rate, regular rhythm  Abdomen: soft, non-tender, non-distended, normoactive bowel sounds  Extremities: + edema of both legs  Skin: no rash  Neuro: awake, alert, oriented        Medications:    Current Facility-Administered Medications:     acetaminophen (TYLENOL) tablet 650 mg, 650 mg, Oral, Q8H [facility], Kip Cabello MD, 650 mg at 02/28/24 0517    ALPRAZolam (XANAX) tablet 0.5 mg, 0.5 mg, Oral, HS PRN, Kip Cabello MD    amiodarone tablet 200 mg, 200 mg, Oral, Daily With Breakfast, Yuniel Mcgee PA-C, 200 mg at 02/28/24 0833    aspirin chewable tablet 81 mg, 81 mg, Oral, Daily, Yuniel Mcgee PA-C, 81 mg at 02/28/24 0832    atorvastatin (LIPITOR) tablet 80 mg, 80 mg, Oral, After Dinner, Yuniel Mcgee PA-C, 80 mg at 02/27/24 1726    ceFAZolin (ANCEF) IVPB (premix in dextrose) 2,000 mg 50 mL, 2,000 mg, Intravenous, Q8H, Zach Medina MD, Last Rate: 100 mL/hr at 02/28/24 0514, 2,000 mg at 02/28/24 0514    clopidogrel (PLAVIX) tablet 75 mg, 75 mg, Oral, Daily, Yuniel Mcgee PA-C, 75 mg at 02/28/24 0833    Diclofenac Sodium (VOLTAREN) 1 % topical gel 2 g, 2 g, Topical, 4x Daily, Kip Cabello MD, 2 g at 02/28/24 0835    furosemide (LASIX) injection 60 mg, 60 mg, Intravenous, BID (diuretic), Lamont Murcia MD    heparin (porcine) subcutaneous injection 5,000 Units, 5,000 Units, Subcutaneous, Q8H [facility], Kip Cabello MD, 5,000 Units at 02/28/24 0517    hydrALAZINE (APRESOLINE) tablet 50 mg, 50 mg, Oral, Q8H [facility], Nash Samuels DO, 50 mg at 02/28/24 0517    insulin glargine (LANTUS) subcutaneous injection 38 Units 0.38 mL, 38 Units, Subcutaneous, HS, Kip Cabello MD, 38 Units at 02/27/24 2120    insulin lispro (HumaLOG) 100 units/mL subcutaneous injection 1-5 Units, 1-5 Units, Subcutaneous, 4x Daily (AC & HS), 3 Units at 02/27/24  2121 **AND** Fingerstick Glucose (POCT), , , 4x Daily AC and at bedtime, Yuniel Mcgee PA-C    insulin lispro (HumaLOG) 100 units/mL subcutaneous injection 7 Units, 7 Units, Subcutaneous, TID With Meals, Yuniel Mcgee PA-C, 7 Units at 02/28/24 0825    isosorbide dinitrate (ISORDIL) tablet 20 mg, 20 mg, Oral, TID after meals, Nash Samuels DO, 20 mg at 02/28/24 0832    melatonin tablet 3 mg, 3 mg, Oral, HS, Kip Cabello MD, 3 mg at 02/27/24 2110    methocarbamol (ROBAXIN) tablet 500 mg, 500 mg, Oral, Q6H PRN, Kip Cabello MD    metoprolol succinate (TOPROL-XL) 24 hr tablet 25 mg, 25 mg, Oral, Q12H, Yuniel Mcgee PA-C, 25 mg at 02/28/24 0517    oxyCODONE (ROXICODONE) IR tablet 5 mg, 5 mg, Oral, Q6H PRN, Yuniel Mcgee PA-C, 5 mg at 02/24/24 1706    polyethylene glycol (MIRALAX) packet 17 g, 17 g, Oral, Daily, Yuniel Mcgee PA-C, 17 g at 02/28/24 0830    potassium chloride (Klor-Con M20) CR tablet 40 mEq, 40 mEq, Oral, Once, Lamont Murcia MD    saccharomyces boulardii (FLORASTOR) capsule 250 mg, 250 mg, Oral, BID, Yuniel Mcgee PA-C, 250 mg at 02/28/24 0832    senna-docusate sodium (SENOKOT S) 8.6-50 mg per tablet 2 tablet, 2 tablet, Oral, BID, Yuniel Mcgee PA-C, 2 tablet at 02/28/24 0834    Invasive Devices:   Urethral Catheter Latex 16 Fr. (Active)   Reasons to continue Urinary Catheter  Acute urinary retention/obstruction failing urinary retention protocol 02/27/24 0845   Goal for Removal Voiding trial when ambulation improves 02/27/24 0845   Site Assessment Clean;Skin intact 02/27/24 0845   Escobar Care Done 02/26/24 2100   Collection Container Standard drainage bag 02/27/24 0845   Securement Method Securing device (Describe) 02/27/24 0845   Output (mL) 800 mL 02/27/24 0901       Lab Results:   Results from last 7 days   Lab Units 02/28/24  0523 02/27/24  0443 02/26/24  0540 02/24/24  0541 02/23/24  0458   WBC Thousand/uL 7.64 7.54 8.37   < >  --    HEMOGLOBIN  "g/dL 9.3* 9.3* 9.3*   < >  --    HEMATOCRIT % 29.5* 29.8* 30.7*   < >  --    PLATELETS Thousands/uL 379 383 391*   < >  --    SODIUM mmol/L 139 138 139   < > 135   POTASSIUM mmol/L 3.6 3.2* 3.9   < > 4.7   CHLORIDE mmol/L 98 97 97   < > 98   CO2 mmol/L 31 33* 34*   < > 31   BUN mg/dL 46* 47* 43*   < > 41*   CREATININE mg/dL 1.96* 1.88* 2.12*   < > 2.02*   CALCIUM mg/dL 8.3* 8.3* 8.4   < > 7.9*   MAGNESIUM mg/dL  --  2.1 2.1  --  1.8*   PHOSPHORUS mg/dL  --   --   --   --  5.1*   ALK PHOS U/L 40  --   --   --  46   ALT U/L <3*  --   --   --  <3*   AST U/L 17  --   --   --  11*    < > = values in this interval not displayed.       Previous work up:  See previous notes      Portions of the record may have been created with voice recognition software. Occasional wrong word or \"sound a like\" substitutions may have occurred due to the inherent limitations of voice recognition software. Read the chart carefully and recognize, using context, where substitutions have occurred.If you have any questions, please contact the dictating provider.  "

## 2024-02-28 NOTE — PLAN OF CARE
Problem: INFECTION - ADULT  Goal: Absence or prevention of progression during hospitalization  Description: INTERVENTIONS:  - Assess and monitor for signs and symptoms of infection  - Monitor lab/diagnostic results  - Monitor all insertion sites, i.e. indwelling lines, tubes, and drains  - Monitor endotracheal if appropriate and nasal secretions for changes in amount and color  - Dailey appropriate cooling/warming therapies per order  - Administer medications as ordered  - Instruct and encourage patient and family to use good hand hygiene technique  - Identify and instruct in appropriate isolation precautions for identified infection/condition  Outcome: Progressing  Goal: Absence of fever/infection during neutropenic period  Description: INTERVENTIONS:  - Monitor WBC    Outcome: Progressing

## 2024-02-28 NOTE — RESTORATIVE TECHNICIAN NOTE
Restorative Technician Note      Patient Name: Nadir Myers     Note Type: Mobility  Patient Position Upon Consult: Supine  Activity Performed: Ambulated  Assistive Device: Roller walker  Patient Position at End of Consult: All needs within reach; Supine

## 2024-02-28 NOTE — ASSESSMENT & PLAN NOTE
Appreciate podiatry input -> status post left fourth toe amputation on 1/31  [place] to be surgical cure per podiatry  Wound care  Antibiotics per ID

## 2024-02-28 NOTE — PROGRESS NOTES
Progress Note - Advanced Heart Failure Team  Nadir Myers 63 y.o. male MRN: [id number]  Unit/Bed#: [facility] 519-01 Encounter: [id number]    Subjective    States breathing is stable. No fevers, chest pain, dizziness, or palpitations. Urinating well. Reports leg swelling is significantly improved.      Objective   Vitals: Blood pressure 145/82, pulse 72, temperature 97.8 °F (36.6 °C), temperature source Oral, resp. rate 18, height 6' (1.829 m), weight 106 kg (234 lb 2.1 oz), SpO2 92%.  Orthostatic Blood Pressures      Flowsheet Row Most Recent Value   Blood Pressure 145/82 filed at 02/28/2024 0732   Patient Position - Orthostatic VS Sitting filed at 02/28/2024 0732              Invasive Devices       Peripherally Inserted Central Catheter Line  Duration             PICC Line 02/22/24 Right Basilic 6 days              Drain  Duration             Urethral Catheter Latex 16 Fr. 7 days                    Physical Exam  Constitutional:       General: He is not in acute distress.  Cardiovascular:      Rate and Rhythm: Normal rate and regular rhythm.   Pulmonary:      Effort: Pulmonary effort is normal.   Musculoskeletal:      Right lower leg: Edema present.      Left lower leg: Edema present.   Skin:     General: Skin is warm.   Neurological:      Mental Status: He is alert.         Lab Results: I have personally reviewed pertinent lab results.            Results from last 7 days   Lab Units 02/28/24  0523 02/27/24  0443 02/26/24  0540   POTASSIUM mmol/L 3.6 3.2* 3.9   CO2 mmol/L 31 33* 34*   CHLORIDE mmol/L 98 97 97   BUN mg/dL 46* 47* 43*   CREATININE mg/dL 1.96* 1.88* 2.12*     Results from last 7 days   Lab Units 02/28/24  0523 02/27/24  0443 02/26/24  0540   HEMOGLOBIN g/dL 9.3* 9.3* 9.3*   HEMATOCRIT % 29.5* 29.8* 30.7*   PLATELETS Thousands/uL 379 383 391*                   Imaging: I have personally reviewed pertinent reports.    ECHO:  Results for orders placed during the hospital encounter of 09/26/17    Echo  complete with contrast if indicated    Narrative  [address] [id number] [phone]    Transthoracic Echocardiogram  2D, M-mode, Doppler, and Color Doppler    Study date:  26-Sep-2017    Patient: EAGLE REBOLLAR  MR number: [id number]  Account number: [id number]  : 1960  Age: 57 years  Gender: Male  Status: Outpatient  Location: [address] [place] [address]  Height: 72 in  Weight: 263.3 lb  BP: 142/ 88 mmHg    Indications: CAD    Diagnoses: I25.10 - Atherosclerotic heart disease of native coronary artery without angina pectoris    Sonographer:  OLIVE Benoit  Primary Physician:  Paty Ortiz MD  Referring Physician:  Eagle Chavez MD  Group:  [facility]  Interpreting Physician:  Torrey Duenas MD    SUMMARY    LEFT VENTRICLE:  Size was normal.  Systolic function was normal. Ejection fraction was estimated to be 65 %.  There was mild concentric hypertrophy.  Doppler parameters were consistent with abnormal left ventricular relaxation (grade 1 diastolic dysfunction).    RIGHT VENTRICLE:  The size was normal.  Systolic function was normal.    TRICUSPID VALVE:  There was trace regurgitation.    HISTORY: PRIOR HISTORY: Hypertension, CAD s/p PCI, smoker, diabetes, high cholesterol    PROCEDURE: The study was performed in the [address] [place] [address]. This was a routine study. The transthoracic approach was used. The study included complete 2D imaging, M-mode, complete spectral Doppler, and color Doppler. The  heart rate was 112 bpm, at the start of the study. Images were obtained from the parasternal, apical, subcostal, and suprasternal notch acoustic windows. Echocardiographic views were limited due to decreased penetration. This was a  technically difficult study.    LEFT VENTRICLE: Size was normal. Systolic function was normal. Ejection fraction was estimated to be 65 %. There were no regional  wall motion abnormalities. Wall thickness was mildly increased. There was mild concentric hypertrophy.  DOPPLER: Doppler parameters were consistent with abnormal left ventricular relaxation (grade 1 diastolic dysfunction).    RIGHT VENTRICLE: The size was normal. Systolic function was normal. Wall thickness was normal.    LEFT ATRIUM: Size was normal.    RIGHT ATRIUM: Size was normal.    MITRAL VALVE: Valve structure was normal. There was normal leaflet separation. DOPPLER: The transmitral velocity was within the normal range. There was no evidence for stenosis. There was no regurgitation.    AORTIC VALVE: The valve was trileaflet. Leaflets exhibited normal thickness and normal cuspal separation. DOPPLER: Transaortic velocity was within the normal range. There was no evidence for stenosis. There was no regurgitation.    TRICUSPID VALVE: The valve structure was normal. There was normal leaflet separation. DOPPLER: The transtricuspid velocity was within the normal range. There was no evidence for stenosis. There was trace regurgitation. The tricuspid jet  envelope definition was inadequate for estimation of RV systolic pressure. There are no indirect findings suggestive of moderate or severe pulmonary hypertension.    PULMONIC VALVE: Leaflets exhibited normal thickness, no calcification, and normal cuspal separation. DOPPLER: The transpulmonic velocity was within the normal range. There was no regurgitation.    PERICARDIUM: There was no pericardial effusion. The pericardium was normal in appearance.    AORTA: The root exhibited normal size.    SYSTEMIC VEINS: IVC: The inferior vena cava was normal in size and course. Respirophasic changes were normal.    SYSTEM MEASUREMENT TABLES    2D  %FS: 47.6 %  AV Diam: 3.34 cm  EDV(Teich): 101.71 ml  EF Biplane: 67.76 %  EF(Cube): 85.61 %  EF(Teich): 79 %  ESV(Cube): 14.82 ml  ESV(Teich): 21.36 ml  IVSd: 1.58 cm  LA Area: 12.78 cm2  LA Diam: 3.37 cm  LVEDV MOD A2C: 49.73  ml  LVEDV MOD A4C: 61.78 ml  LVEDV MOD BP: 58.1 ml  LVEF MOD A2C: 65.64 %  LVEF MOD A4C: 71.2 %  LVESV MOD A2C: 17.09 ml  LVESV MOD A4C: 17.79 ml  LVESV MOD BP: 18.73 ml  LVIDd: 4.69 cm  LVIDs: 2.46 cm  LVLd A2C: 6.97 cm  LVLd A4C: 7.74 cm  LVLs A2C: 5.23 cm  LVLs A4C: 6.13 cm  LVPWd: 1.23 cm  RA Area: 14.52 cm2  RV Diam.: 2.86 cm  SI(Cube): 36.73 ml/m2  SI(Teich): 33.48 ml/m2  SV MOD A2C: 32.64 ml  SV MOD A4C: 43.98 ml  SV(Cube): 88.15 ml  SV(Teich): 80.35 ml    MM  TAPSE: 3.26 cm    PW  E': 0.08 m/s    [facility]    Prepared and electronically signed by    Torrey Duenas MD  Signed 26-Sep-2017 12:55:39    Results for orders placed during the hospital encounter of 01/31/24    Echo complete w/ contrast if indicated    Interpretation Summary    Left Ventricle: Left ventricular cavity size is normal. Wall thickness is moderately increased. There is moderate concentric hypertrophy. The left ventricular ejection fraction is 42%. Systolic function is moderately reduced. Diastolic function is mildly abnormal, consistent with grade I (abnormal) relaxation. 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 There is no thrombus.    The following segments are akinetic: apical anterior, apical septal, apical inferior, apical lateral and apex.    All other segments are normal.    Tricuspid Valve: There is mild regurgitation.      GILL:  No results found for this or any previous visit.    Results for orders placed during the hospital encounter of 01/31/24    GILL    Interpretation Summary  Images from the original result were not included.      Left Ventricle: Left ventricular cavity size is normal. Wall thickness is normal. The left ventricular ejection fraction is 45%. Systolic function is mildly reduced. There is hypokinesis with akinesis of the aneurysmal portion of the apex.    Right Ventricle: Right ventricular cavity size is normal. Systolic function is normal.    Right Atrium: There is a 1.8 x  1.1 cm mobile mass affixed to the device lead as it passes through the right atrium.    Aortic Valve: The aortic valve is trileaflet. The leaflets are mildly thickened. The leaflets are not calcified. The leaflets exhibit normal mobility. There is Lambl's excrescence on the tip of the right coronary aortic valve leaflet.    There is a 1.8 x 1.1 cm mobile mass affixed to the cardiac device lead as is passes through the right atrium.  In the setting of bacteremia this is most likely represents infection.    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 prevention [employer] dual chamber ICD (5/2023) complicated by device infection requiring explant    Septic shock (resolved) with left toe gangrene / staph aureus bacteremia    LINDEN likely 2/2 to sepsis-related ATN, recent procedures    Coronary artery disease with  mLAD and LPDA     LUE weakness / facial droop     LV apical aneurysm     Peripheral arterial disease     Hypertension    History of orthostatic hypotension     DM Type 2     63 year old male with chronic heart failure with mid-range EF being followed by heart failure outpatient presented with Staph bacteremia and septic shock complicating a lower extremity wound infection. Vegetation noted on ICD lead, prompting lead extraction. Now stable from infectious standpoint with  reimplantation of subcutaneous ICD, however with inability to obtain proper subcutaneous lead placement.    Patient remains volume overloaded on examination today with mild-moderate pitting edema in bilateral lower extremities.     Continued to responded well to IV loop diuretics with Diamox yesterday: -4.9L out, net negative over 4L.   Creatinine remains stable around 1.9   Weight trend 235 lb -> 233 lb (baseline 230 lb).    Continue Furosemide 60mg BID   Maintain potassium 4-5 mEq/L and Mg > 2.     Unable to start  SGLT-2, MRA, and ARB/ARNi due to renal impairment.    Continue Metoprolol Succinate 25mg BID  Continue Isordil 20mg TID / Hydralazine 75mg TID    Continue dual antiplatelet therapy with high intensity statin.  Continue amiodarone for VT suppression.    Plan for LifeVest on discharge and implantation of traditional [place] S-ICD as outpatient per EP.   Patient requested clarification on LifeVest instructions, and [facility].     Lamont Murcia MD  Cardiology Fellow

## 2024-02-28 NOTE — PLAN OF CARE
Problem: METABOLIC, FLUID AND ELECTROLYTES - ADULT  Goal: Electrolytes maintained within normal limits  Description: INTERVENTIONS:  - Monitor labs and assess patient for signs and symptoms of electrolyte imbalances  - Administer electrolyte replacement as ordered  - Monitor response to electrolyte replacements, including repeat lab results as appropriate  - Instruct patient on fluid and nutrition as appropriate  Outcome: Progressing  Goal: Fluid balance maintained  Description: INTERVENTIONS:  - Monitor labs   - Monitor I/O and WT  - Instruct patient on fluid and nutrition as appropriate  - Assess for signs & symptoms of volume excess or deficit  Outcome: Progressing  Goal: Glucose maintained within target range  Description: INTERVENTIONS:  - Monitor Blood Glucose as ordered  - Assess for signs and symptoms of hyperglycemia and hypoglycemia  - Administer ordered medications to maintain glucose within target range  - Assess nutritional intake and initiate nutrition service referral as needed  Outcome: Progressing     Problem: Prexisting or High Potential for Compromised Skin Integrity  Goal: Skin integrity is maintained or improved  Description: INTERVENTIONS:  - Identify patients at risk for skin breakdown  - Assess and monitor skin integrity  - Assess and monitor nutrition and hydration status  - Monitor labs   - Assess for incontinence   - Turn and reposition patient  - Assist with mobility/ambulation  - Relieve pressure over bony prominences  - Avoid friction and shearing  - Provide appropriate hygiene as needed including keeping skin clean and dry  - Evaluate need for skin moisturizer/barrier cream  - Collaborate with interdisciplinary team   - Patient/family teaching  - Consider wound care consult   Outcome: Progressing     Problem: PAIN - ADULT  Goal: Verbalizes/displays adequate comfort level or baseline comfort level  Description: Interventions:  - Encourage patient to monitor pain and request  assistance  - Assess pain using appropriate pain scale  - Administer analgesics based on type and severity of pain and evaluate response  - Implement non-pharmacological measures as appropriate and evaluate response  - Consider cultural and social influences on pain and pain management  - Notify physician/advanced practitioner if interventions unsuccessful or patient reports new pain  Outcome: Progressing     Problem: INFECTION - ADULT  Goal: Absence or prevention of progression during hospitalization  Description: INTERVENTIONS:  - Assess and monitor for signs and symptoms of infection  - Monitor lab/diagnostic results  - Monitor all insertion sites, i.e. indwelling lines, tubes, and drains  - Monitor endotracheal if appropriate and nasal secretions for changes in amount and color  - [place] appropriate cooling/warming therapies per order  - Administer medications as ordered  - Instruct and encourage patient and family to use good hand hygiene technique  - Identify and instruct in appropriate isolation precautions for identified infection/condition  Outcome: Progressing  Goal: Absence of fever/infection during neutropenic period  Description: INTERVENTIONS:  - Monitor WBC    Outcome: Progressing     Problem: SAFETY ADULT  Goal: Patient will remain free of falls  Description: INTERVENTIONS:  - Educate patient/family on patient safety including physical limitations  - Instruct patient to call for assistance with activity   - Consult OT/PT to assist with strengthening/mobility   - Keep Call bell within reach  - Keep bed low and locked with side rails adjusted as appropriate  - Keep care items and personal belongings within reach  - Initiate and maintain comfort rounds  - Make Fall Risk Sign visible to staff  - Apply yellow socks and bracelet for high fall risk patients  - Consider moving patient to room near nurses station  Outcome: Progressing  Goal: Maintain or return to baseline ADL function  Description:  INTERVENTIONS:  -  Assess patient's ability to carry out ADLs; assess patient's baseline for ADL function and identify physical deficits which impact ability to perform ADLs (bathing, care of mouth/teeth, toileting, grooming, dressing, etc.)  - Assess/evaluate cause of self-care deficits   - Assess range of motion  - Assess patient's mobility; develop plan if impaired  - Assess patient's need for assistive devices and provide as appropriate  - Encourage maximum independence but intervene and supervise when necessary  - Involve family in performance of ADLs  - Assess for home care needs following discharge   - Consider OT consult to assist with ADL evaluation and planning for discharge  - Provide patient education as appropriate  Outcome: Progressing  Goal: Maintains/Returns to pre admission functional level  Description: INTERVENTIONS:  - Perform AM-PAC 6 Click Basic Mobility/ Daily Activity assessment daily.  - Set and communicate daily mobility goal to care team and patient/family/caregiver.   - Collaborate with rehabilitation services on mobility goals if consulted  - Record patient progress and toleration of activity level   Outcome: Progressing     Problem: DISCHARGE PLANNING  Goal: Discharge to home or other facility with appropriate resources  Description: INTERVENTIONS:  - Identify barriers to discharge w/patient and caregiver  - Arrange for needed discharge resources and transportation as appropriate  - Identify discharge learning needs (meds, wound care, etc.)  - Arrange for interpretive services to assist at discharge as needed  - Refer to Case Management Department for coordinating discharge planning if the patient needs post-hospital services based on physician/advanced practitioner order or complex needs related to functional status, cognitive ability, or social support system  Outcome: Progressing     Problem: Knowledge Deficit  Goal: Patient/family/caregiver demonstrates understanding of disease  process, treatment plan, medications, and discharge instructions  Description: Complete learning assessment and assess knowledge base.  Interventions:  - Provide teaching at level of understanding  - Provide teaching via preferred learning methods  Outcome: Progressing

## 2024-02-29 ENCOUNTER — PATIENT OUTREACH (OUTPATIENT)
Dept: CARDIOLOGY CLINIC | Facility: CLINIC | Age: 64
End: 2024-02-29

## 2024-02-29 LAB
ALBUMIN SERPL BCP-MCNC: 2.7 G/DL (ref 3.5–5)
ALP SERPL-CCNC: 38 U/L (ref 34–104)
ALT SERPL W P-5'-P-CCNC: <3 U/L (ref 7–52)
ANION GAP SERPL CALCULATED.3IONS-SCNC: 10 MMOL/L
AST SERPL W P-5'-P-CCNC: 18 U/L (ref 13–39)
BASOPHILS # BLD AUTO: 0.03 THOUSANDS/ÂΜL (ref 0–0.1)
BASOPHILS NFR BLD AUTO: 0 % (ref 0–1)
BILIRUB SERPL-MCNC: 0.22 MG/DL (ref 0.2–1)
BUN SERPL-MCNC: 52 MG/DL (ref 5–25)
CALCIUM ALBUM COR SERPL-MCNC: 9.4 MG/DL (ref 8.3–10.1)
CALCIUM SERPL-MCNC: 8.4 MG/DL (ref 8.4–10.2)
CHLORIDE SERPL-SCNC: 99 MMOL/L (ref 96–108)
CO2 SERPL-SCNC: 29 MMOL/L (ref 21–32)
CREAT SERPL-MCNC: 1.8 MG/DL (ref 0.6–1.3)
EOSINOPHIL # BLD AUTO: 0.06 THOUSAND/ÂΜL (ref 0–0.61)
EOSINOPHIL NFR BLD AUTO: 1 % (ref 0–6)
ERYTHROCYTE [DISTWIDTH] IN BLOOD BY AUTOMATED COUNT: 13.9 % (ref 11.6–15.1)
GFR SERPL CREATININE-BSD FRML MDRD: 39 ML/MIN/1.73SQ M
GLUCOSE SERPL-MCNC: 163 MG/DL (ref 65–140)
GLUCOSE SERPL-MCNC: 170 MG/DL (ref 65–140)
GLUCOSE SERPL-MCNC: 175 MG/DL (ref 65–140)
GLUCOSE SERPL-MCNC: 227 MG/DL (ref 65–140)
GLUCOSE SERPL-MCNC: 254 MG/DL (ref 65–140)
HCT VFR BLD AUTO: 31 % (ref 36.5–49.3)
HGB BLD-MCNC: 9.4 G/DL (ref 12–17)
IMM GRANULOCYTES # BLD AUTO: 0.1 THOUSAND/UL (ref 0–0.2)
IMM GRANULOCYTES NFR BLD AUTO: 1 % (ref 0–2)
LYMPHOCYTES # BLD AUTO: 1.5 THOUSANDS/ÂΜL (ref 0.6–4.47)
LYMPHOCYTES NFR BLD AUTO: 18 % (ref 14–44)
MAGNESIUM SERPL-MCNC: 2 MG/DL (ref 1.9–2.7)
MCH RBC QN AUTO: 27.8 PG (ref 26.8–34.3)
MCHC RBC AUTO-ENTMCNC: 30.3 G/DL (ref 31.4–37.4)
MCV RBC AUTO: 92 FL (ref 82–98)
MONOCYTES # BLD AUTO: 0.63 THOUSAND/ÂΜL (ref 0.17–1.22)
MONOCYTES NFR BLD AUTO: 8 % (ref 4–12)
NEUTROPHILS # BLD AUTO: 6.1 THOUSANDS/ÂΜL (ref 1.85–7.62)
NEUTS SEG NFR BLD AUTO: 72 % (ref 43–75)
NRBC BLD AUTO-RTO: 0 /100 WBCS
PHOSPHATE SERPL-MCNC: 4.5 MG/DL (ref 2.3–4.1)
PLATELET # BLD AUTO: 363 THOUSANDS/UL (ref 149–390)
PMV BLD AUTO: 9.2 FL (ref 8.9–12.7)
POTASSIUM SERPL-SCNC: 3.8 MMOL/L (ref 3.5–5.3)
PROT SERPL-MCNC: 6.4 G/DL (ref 6.4–8.4)
RBC # BLD AUTO: 3.38 MILLION/UL (ref 3.88–5.62)
SODIUM SERPL-SCNC: 138 MMOL/L (ref 135–147)
WBC # BLD AUTO: 8.42 THOUSAND/UL (ref 4.31–10.16)

## 2024-02-29 PROCEDURE — 97530 THERAPEUTIC ACTIVITIES: CPT

## 2024-02-29 PROCEDURE — 99232 SBSQ HOSP IP/OBS MODERATE 35: CPT | Performed by: INTERNAL MEDICINE

## 2024-02-29 PROCEDURE — 83735 ASSAY OF MAGNESIUM: CPT | Performed by: FAMILY MEDICINE

## 2024-02-29 PROCEDURE — 80053 COMPREHEN METABOLIC PANEL: CPT | Performed by: FAMILY MEDICINE

## 2024-02-29 PROCEDURE — 85025 COMPLETE CBC W/AUTO DIFF WBC: CPT | Performed by: FAMILY MEDICINE

## 2024-02-29 PROCEDURE — 99232 SBSQ HOSP IP/OBS MODERATE 35: CPT | Performed by: FAMILY MEDICINE

## 2024-02-29 PROCEDURE — 84100 ASSAY OF PHOSPHORUS: CPT | Performed by: FAMILY MEDICINE

## 2024-02-29 PROCEDURE — 99024 POSTOP FOLLOW-UP VISIT: CPT | Performed by: INTERNAL MEDICINE

## 2024-02-29 PROCEDURE — 82948 REAGENT STRIP/BLOOD GLUCOSE: CPT

## 2024-02-29 PROCEDURE — 97116 GAIT TRAINING THERAPY: CPT

## 2024-02-29 RX ORDER — TORSEMIDE 20 MG/1
40 TABLET ORAL DAILY
Status: DISCONTINUED | OUTPATIENT
Start: 2024-03-01 | End: 2024-02-29

## 2024-02-29 RX ORDER — TORSEMIDE 20 MG/1
20 TABLET ORAL DAILY
Status: DISCONTINUED | OUTPATIENT
Start: 2024-02-29 | End: 2024-03-01 | Stop reason: HOSPADM

## 2024-02-29 RX ORDER — TORSEMIDE 20 MG/1
40 TABLET ORAL DAILY
Status: DISCONTINUED | OUTPATIENT
Start: 2024-02-29 | End: 2024-02-29

## 2024-02-29 RX ADMIN — ASPIRIN 81 MG CHEWABLE TABLET 81 MG: 81 TABLET CHEWABLE at 09:06

## 2024-02-29 RX ADMIN — Medication 250 MG: at 09:06

## 2024-02-29 RX ADMIN — CLOPIDOGREL BISULFATE 75 MG: 75 TABLET ORAL at 09:06

## 2024-02-29 RX ADMIN — CEFAZOLIN SODIUM 2000 MG: 2 SOLUTION INTRAVENOUS at 05:45

## 2024-02-29 RX ADMIN — Medication 3 MG: at 21:18

## 2024-02-29 RX ADMIN — AMIODARONE HYDROCHLORIDE 200 MG: 200 TABLET ORAL at 09:06

## 2024-02-29 RX ADMIN — METOPROLOL SUCCINATE 25 MG: 25 TABLET, EXTENDED RELEASE ORAL at 17:43

## 2024-02-29 RX ADMIN — Medication 250 MG: at 17:43

## 2024-02-29 RX ADMIN — HYDRALAZINE HYDROCHLORIDE 75 MG: 25 TABLET, FILM COATED ORAL at 13:42

## 2024-02-29 RX ADMIN — HEPARIN SODIUM 5000 UNITS: 5000 INJECTION INTRAVENOUS; SUBCUTANEOUS at 05:44

## 2024-02-29 RX ADMIN — INSULIN LISPRO 7 UNITS: 100 INJECTION, SOLUTION INTRAVENOUS; SUBCUTANEOUS at 17:43

## 2024-02-29 RX ADMIN — HEPARIN SODIUM 5000 UNITS: 5000 INJECTION INTRAVENOUS; SUBCUTANEOUS at 21:18

## 2024-02-29 RX ADMIN — ISOSORBIDE DINITRATE 20 MG: 20 TABLET ORAL at 09:07

## 2024-02-29 RX ADMIN — CEFAZOLIN SODIUM 2000 MG: 2 SOLUTION INTRAVENOUS at 13:43

## 2024-02-29 RX ADMIN — HEPARIN SODIUM 5000 UNITS: 5000 INJECTION INTRAVENOUS; SUBCUTANEOUS at 13:43

## 2024-02-29 RX ADMIN — HYDRALAZINE HYDROCHLORIDE 75 MG: 25 TABLET, FILM COATED ORAL at 05:45

## 2024-02-29 RX ADMIN — ISOSORBIDE DINITRATE 20 MG: 20 TABLET ORAL at 11:57

## 2024-02-29 RX ADMIN — INSULIN LISPRO 1 UNITS: 100 INJECTION, SOLUTION INTRAVENOUS; SUBCUTANEOUS at 09:07

## 2024-02-29 RX ADMIN — INSULIN LISPRO 2 UNITS: 100 INJECTION, SOLUTION INTRAVENOUS; SUBCUTANEOUS at 21:26

## 2024-02-29 RX ADMIN — ISOSORBIDE DINITRATE 20 MG: 20 TABLET ORAL at 17:43

## 2024-02-29 RX ADMIN — ATORVASTATIN CALCIUM 80 MG: 80 TABLET, FILM COATED ORAL at 17:42

## 2024-02-29 RX ADMIN — CEFAZOLIN SODIUM 2000 MG: 2 SOLUTION INTRAVENOUS at 21:18

## 2024-02-29 RX ADMIN — HYDRALAZINE HYDROCHLORIDE 75 MG: 25 TABLET, FILM COATED ORAL at 21:18

## 2024-02-29 RX ADMIN — INSULIN GLARGINE 38 UNITS: 100 INJECTION, SOLUTION SUBCUTANEOUS at 21:26

## 2024-02-29 RX ADMIN — METOPROLOL SUCCINATE 25 MG: 25 TABLET, EXTENDED RELEASE ORAL at 05:44

## 2024-02-29 RX ADMIN — INSULIN LISPRO 2 UNITS: 100 INJECTION, SOLUTION INTRAVENOUS; SUBCUTANEOUS at 11:56

## 2024-02-29 RX ADMIN — INSULIN LISPRO 1 UNITS: 100 INJECTION, SOLUTION INTRAVENOUS; SUBCUTANEOUS at 17:43

## 2024-02-29 RX ADMIN — TORSEMIDE 20 MG: 20 TABLET ORAL at 13:42

## 2024-02-29 RX ADMIN — INSULIN LISPRO 7 UNITS: 100 INJECTION, SOLUTION INTRAVENOUS; SUBCUTANEOUS at 11:57

## 2024-02-29 RX ADMIN — FUROSEMIDE 60 MG: 10 INJECTION, SOLUTION INTRAMUSCULAR; INTRAVENOUS at 09:09

## 2024-02-29 RX ADMIN — ACETAMINOPHEN 650 MG: 325 TABLET, FILM COATED ORAL at 21:18

## 2024-02-29 RX ADMIN — ACETAMINOPHEN 650 MG: 325 TABLET, FILM COATED ORAL at 05:44

## 2024-02-29 RX ADMIN — INSULIN LISPRO 7 UNITS: 100 INJECTION, SOLUTION INTRAVENOUS; SUBCUTANEOUS at 09:08

## 2024-02-29 NOTE — ANESTHESIA PREPROCEDURE EVALUATION
Procedure:  Cardiac eps (Chest)  REMOVAL OF LEAD AND GENERATOR AND ATTEMPTED LEAD REVISION (Chest)    Relevant Problems   CARDIO   (+) 3-vessel CAD   (+) A-fib (HCC)   (+) Coronary artery disease involving native coronary artery of native heart without angina pectoris   (+) Essential (primary) hypertension   (+) Pleuritic chest pain      ENDO   (+) Type 2 diabetes mellitus with foot ulcer, with long-term current use of insulin (HCC)   (+) Type 2 diabetes mellitus, with long-term current use of insulin (HCC)      GI/HEPATIC   (+) GERD (gastroesophageal reflux disease)      /RENAL   (+) LINDEN (acute kidney injury) (HCC)   (+) Benign prostatic hyperplasia without lower urinary tract symptoms      NEURO/PSYCH   (+) LUE weakness             Anesthesia Plan  ASA Score- 3     Anesthesia Type- general with ASA Monitors.         Additional Monitors:     Airway Plan: ETT.           Plan Factors-Exercise tolerance (METS): >4 METS.    Chart reviewed.   Existing labs reviewed. Patient summary reviewed.                  Induction- intravenous.    Postoperative Plan- . Planned trial extubation    Informed Consent- Anesthetic plan and risks discussed with patient.  I personally reviewed this patient with the CRNA. Discussed and agreed on the Anesthesia Plan with the CRNA..

## 2024-02-29 NOTE — PROGRESS NOTES
Patient continues admission at [facility]; Advanced Heart Failure Census.     Outpatient Advanced Heart Failure LCSW completed electronic chart review and rounded with the HF Team. Spouse at bedside. HF Team discussed Today's Plan and patient confirmed new battery was received for LifeVest.     Referral for outpatient social work not entered at this time.   Please enter referral if outpatient resources are needed in the future.

## 2024-02-29 NOTE — PROGRESS NOTES
NEPHROLOGY PROGRESS NOTE   Nadir Myers 63 y.o. male MRN: [id number]  Unit/Bed#: [facility] 519-01 Encounter: [id number]      ASSESSMENT/PLAN:  LINDEN: Suspect ATN in the setting of surgery and infection. CT with contrast 2/15 which could be slowing recovery but initial LINDEN was prior to contrast.  Baseline creatinine less than 1  Initially admitted with creatinine 1.4 which quickly improved to 0.6-0.8 but then worsened on 2/15  Creatinine peaked at 2.36 and now improving down to 1.8  IV Lasix discontinued and started on torsemide 20 mg daily  UA: Innumerable RBCs, 4-10 WBC, 0-3 hyaline casts glucose  CT: No hydronephrosis  Continue to hold spironolactone and Jardiance  Sepsis due to MSSA bacteremia with ICD infection  Status post ICD extraction 2/8, extravascular ICD placement 2/20 and revision 2/21  On cefazolin 2 g every 6 hours per ID  Chronic CHF with EF 35%   Volume status significantly improved and IV Lasix discontinued and started on torsemide  Left foot fourth toe gangrene status post amputation 1/31  Anemia of chronic disease: Hemoglobin stable at 9.4  Hypokalemia: Resolved    Plan Summary:   Agree with transitioning to oral diuretics--on torsemide 20 mg daily  Check a.m. BMP  Okay to discharge from nephrology standpoint    SUBJECTIVE:  Feeling well.  Edema significantly improved.  Currently off oxygen and denies shortness of breath.    OBJECTIVE:  Current Weight: Weight - Scale: 105 kg (232 lb 2.3 oz)  Vitals:    02/29/24 0747   BP: 132/76   Pulse: 75   Resp: 18   Temp: 97.5 °F (36.4 °C)   SpO2: 92%       Intake/Output Summary (Last 24 hours) at 2/29/2024 1019  Last data filed at 2/29/2024 0815  Gross per 24 hour   Intake 300 ml   Output 2495 ml   Net -2195 ml       General:  appears comfortable and in no acute distress   Skin:  No rash  Eyes:  Sclerae anicteric, no periorbital edema   ENT:  Moist mucous membranes  Neck:  Trachea midline, symmetric   Chest:  Clear to auscultation bilaterally with no wheezes,  rales or rhonchi  CVS:  Regular rate and rhythm  Abdomen:  Soft, nontender, nondistended  Neuro:  Awake and alert  Psych:  Appropriate affect  Extremities: improving LE edema     Medications:  Scheduled Meds:  Current Facility-Administered Medications   Medication Dose Route Frequency Provider Last Rate    acetaminophen  650 mg Oral Q8H [facility] Kip Cabello MD      ALPRAZolam  0.5 mg Oral HS PRN Kip Cabello MD      amiodarone  200 mg Oral Daily With Breakfast Yuniel Mcgee PA-C      aspirin  81 mg Oral Daily Yuniel Mcgee PA-C      atorvastatin  80 mg Oral After Dinner Yuniel Mcgee PA-C      cefazolin  2,000 mg Intravenous Q8H Zach Medina MD 2,000 mg (02/29/24 0545)    clopidogrel  75 mg Oral Daily Yuniel Mcgee PA-C      Diclofenac Sodium  2 g Topical 4x Daily Kip Cabello MD      heparin (porcine)  5,000 Units Subcutaneous Q8H [facility] Kip Cabello MD      hydrALAZINE  75 mg Oral Q8H [facility] Aaron Capps MD      insulin glargine  38 Units Subcutaneous HS Kip Cabello MD      insulin lispro  1-5 Units Subcutaneous 4x Daily (AC & HS) Yuniel Mcgee PA-C      insulin lispro  7 Units Subcutaneous TID With Meals Yuniel Mcgee PA-C      isosorbide dinitrate  20 mg Oral TID after meals Nash Samuels DO      melatonin  3 mg Oral HS Kip Cabello MD      methocarbamol  500 mg Oral Q6H PRN Kip Cabello MD      metoprolol succinate  25 mg Oral Q12H Yuniel Mcgee PA-C      oxyCODONE  5 mg Oral Q6H PRN Yuniel Mcgee PA-C      polyethylene glycol  17 g Oral Daily Yuniel Mcgee PA-C      saccharomyces boulardii  250 mg Oral BID Yuniel Mcgee PA-C      senna-docusate sodium  2 tablet Oral BID Yuniel Mcgee PA-C      torsemide  20 mg Oral Daily Lamont Murcia MD         PRN Meds:.  ALPRAZolam    methocarbamol    oxyCODONE        Laboratory Results:  Results from last 7 days   Lab Units 02/29/24  0541 02/28/24  0523 02/27/24  8136  02/26/24  0540 02/25/24  0537 02/24/24  0541 02/23/24  0458 02/23/24  0000   WBC Thousand/uL 8.42 7.64 7.54 8.37 9.00 9.83  --  11.96*   HEMOGLOBIN g/dL 9.4* 9.3* 9.3* 9.3* 9.4* 9.3*  --  9.5*   HEMATOCRIT % 31.0* 29.5* 29.8* 30.7* 29.5* 30.3*  --  30.8*   PLATELETS Thousands/uL 363 379 383 391* 371 344  --  372   SODIUM mmol/L 138 139 138 139 136 136 135  --    POTASSIUM mmol/L 3.8 3.6 3.2* 3.9 4.2 4.4 4.7  --    CHLORIDE mmol/L 99 98 97 97 96 98 98  --    CO2 mmol/L 29 31 33* 34* 33* 31 31  --    BUN mg/dL 52* 46* 47* 43* 46* 42* 41*  --    CREATININE mg/dL 1.80* 1.96* 1.88* 2.12* 2.36* 2.20* 2.02*  --    CALCIUM mg/dL 8.4 8.3* 8.3* 8.4 8.2* 8.1* 7.9*  --    MAGNESIUM mg/dL 2.0  --  2.1 2.1  --   --  1.8*  --    PHOSPHORUS mg/dL 4.5*  --   --   --   --   --  5.1*  --

## 2024-02-29 NOTE — PLAN OF CARE
Problem: METABOLIC, FLUID AND ELECTROLYTES - ADULT  Goal: Electrolytes maintained within normal limits  Description: INTERVENTIONS:  - Monitor labs and assess patient for signs and symptoms of electrolyte imbalances  - Administer electrolyte replacement as ordered  - Monitor response to electrolyte replacements, including repeat lab results as appropriate  - Instruct patient on fluid and nutrition as appropriate  Outcome: Progressing  Goal: Fluid balance maintained  Description: INTERVENTIONS:  - Monitor labs   - Monitor I/O and WT  - Instruct patient on fluid and nutrition as appropriate  - Assess for signs & symptoms of volume excess or deficit  Outcome: Progressing  Goal: Glucose maintained within target range  Description: INTERVENTIONS:  - Monitor Blood Glucose as ordered  - Assess for signs and symptoms of hyperglycemia and hypoglycemia  - Administer ordered medications to maintain glucose within target range  - Assess nutritional intake and initiate nutrition service referral as needed  Outcome: Progressing     Problem: Prexisting or High Potential for Compromised Skin Integrity  Goal: Skin integrity is maintained or improved  Description: INTERVENTIONS:  - Identify patients at risk for skin breakdown  - Assess and monitor skin integrity  - Assess and monitor nutrition and hydration status  - Monitor labs   - Assess for incontinence   - Turn and reposition patient  - Assist with mobility/ambulation  - Relieve pressure over bony prominences  - Avoid friction and shearing  - Provide appropriate hygiene as needed including keeping skin clean and dry  - Evaluate need for skin moisturizer/barrier cream  - Collaborate with interdisciplinary team   - Patient/family teaching  - Consider wound care consult   Outcome: Progressing     Problem: PAIN - ADULT  Goal: Verbalizes/displays adequate comfort level or baseline comfort level  Description: Interventions:  - Encourage patient to monitor pain and request  assistance  - Assess pain using appropriate pain scale  - Administer analgesics based on type and severity of pain and evaluate response  - Implement non-pharmacological measures as appropriate and evaluate response  - Consider cultural and social influences on pain and pain management  - Notify physician/advanced practitioner if interventions unsuccessful or patient reports new pain  Outcome: Progressing     Problem: INFECTION - ADULT  Goal: Absence or prevention of progression during hospitalization  Description: INTERVENTIONS:  - Assess and monitor for signs and symptoms of infection  - Monitor lab/diagnostic results  - Monitor all insertion sites, i.e. indwelling lines, tubes, and drains  - Monitor endotracheal if appropriate and nasal secretions for changes in amount and color  - [place] appropriate cooling/warming therapies per order  - Administer medications as ordered  - Instruct and encourage patient and family to use good hand hygiene technique  - Identify and instruct in appropriate isolation precautions for identified infection/condition  Outcome: Progressing  Goal: Absence of fever/infection during neutropenic period  Description: INTERVENTIONS:  - Monitor WBC    Outcome: Progressing     Problem: SAFETY ADULT  Goal: Patient will remain free of falls  Description: INTERVENTIONS:  - Educate patient/family on patient safety including physical limitations  - Instruct patient to call for assistance with activity   - Consult OT/PT to assist with strengthening/mobility   - Keep Call bell within reach  - Keep bed low and locked with side rails adjusted as appropriate  - Keep care items and personal belongings within reach  - Initiate and maintain comfort rounds  - Make Fall Risk Sign visible to staff  - Apply yellow socks and bracelet for high fall risk patients  - Consider moving patient to room near nurses station  Outcome: Progressing  Goal: Maintain or return to baseline ADL function  Description:  INTERVENTIONS:  -  Assess patient's ability to carry out ADLs; assess patient's baseline for ADL function and identify physical deficits which impact ability to perform ADLs (bathing, care of mouth/teeth, toileting, grooming, dressing, etc.)  - Assess/evaluate cause of self-care deficits   - Assess range of motion  - Assess patient's mobility; develop plan if impaired  - Assess patient's need for assistive devices and provide as appropriate  - Encourage maximum independence but intervene and supervise when necessary  - Involve family in performance of ADLs  - Assess for home care needs following discharge   - Consider OT consult to assist with ADL evaluation and planning for discharge  - Provide patient education as appropriate  Outcome: Progressing  Goal: Maintains/Returns to pre admission functional level  Description: INTERVENTIONS:  - Perform AM-PAC 6 Click Basic Mobility/ Daily Activity assessment daily.  - Set and communicate daily mobility goal to care team and patient/family/caregiver.   - Collaborate with rehabilitation services on mobility goals if consulted  - Record patient progress and toleration of activity level   Outcome: Progressing     Problem: DISCHARGE PLANNING  Goal: Discharge to home or other facility with appropriate resources  Description: INTERVENTIONS:  - Identify barriers to discharge w/patient and caregiver  - Arrange for needed discharge resources and transportation as appropriate  - Identify discharge learning needs (meds, wound care, etc.)  - Arrange for interpretive services to assist at discharge as needed  - Refer to Case Management Department for coordinating discharge planning if the patient needs post-hospital services based on physician/advanced practitioner order or complex needs related to functional status, cognitive ability, or social support system  Outcome: Progressing     Problem: Knowledge Deficit  Goal: Patient/family/caregiver demonstrates understanding of disease  process, treatment plan, medications, and discharge instructions  Description: Complete learning assessment and assess knowledge base.  Interventions:  - Provide teaching at level of understanding  - Provide teaching via preferred learning methods  Outcome: Progressing

## 2024-02-29 NOTE — PLAN OF CARE
Problem: METABOLIC, FLUID AND ELECTROLYTES - ADULT  Goal: Electrolytes maintained within normal limits  Description: INTERVENTIONS:  - Monitor labs and assess patient for signs and symptoms of electrolyte imbalances  - Administer electrolyte replacement as ordered  - Monitor response to electrolyte replacements, including repeat lab results as appropriate  - Instruct patient on fluid and nutrition as appropriate  2/28/2024 2247 by Rebeca Skaggs  Outcome: Progressing  2/28/2024 2246 by Rebeca Skaggs  Outcome: Progressing  Goal: Fluid balance maintained  Description: INTERVENTIONS:  - Monitor labs   - Monitor I/O and WT  - Instruct patient on fluid and nutrition as appropriate  - Assess for signs & symptoms of volume excess or deficit  2/28/2024 2247 by Rebeca Skaggs  Outcome: Progressing  2/28/2024 2246 by Rebeca Skaggs  Outcome: Progressing  Goal: Glucose maintained within target range  Description: INTERVENTIONS:  - Monitor Blood Glucose as ordered  - Assess for signs and symptoms of hyperglycemia and hypoglycemia  - Administer ordered medications to maintain glucose within target range  - Assess nutritional intake and initiate nutrition service referral as needed  2/28/2024 2247 by Rebeca Skaggs  Outcome: Progressing  2/28/2024 2246 by Rebeca Skaggs  Outcome: Progressing     Problem: Prexisting or High Potential for Compromised Skin Integrity  Goal: Skin integrity is maintained or improved  Description: INTERVENTIONS:  - Identify patients at risk for skin breakdown  - Assess and monitor skin integrity  - Assess and monitor nutrition and hydration status  - Monitor labs   - Assess for incontinence   - Turn and reposition patient  - Assist with mobility/ambulation  - Relieve pressure over bony prominences  - Avoid friction and shearing  - Provide appropriate hygiene as needed including keeping skin clean and dry  - Evaluate need for skin moisturizer/barrier cream  - Collaborate with  interdisciplinary team   - Patient/family teaching  - Consider wound care consult   2/28/2024 2247 by Rebeca Skaggs  Outcome: Progressing  2/28/2024 2246 by Rebeca Skaggs  Outcome: Progressing     Problem: PAIN - ADULT  Goal: Verbalizes/displays adequate comfort level or baseline comfort level  Description: Interventions:  - Encourage patient to monitor pain and request assistance  - Assess pain using appropriate pain scale  - Administer analgesics based on type and severity of pain and evaluate response  - Implement non-pharmacological measures as appropriate and evaluate response  - Consider cultural and social influences on pain and pain management  - Notify physician/advanced practitioner if interventions unsuccessful or patient reports new pain  2/28/2024 2247 by Rebeca Skaggs  Outcome: Progressing  2/28/2024 2246 by Rebeca Skaggs  Outcome: Progressing     Problem: INFECTION - ADULT  Goal: Absence or prevention of progression during hospitalization  Description: INTERVENTIONS:  - Assess and monitor for signs and symptoms of infection  - Monitor lab/diagnostic results  - Monitor all insertion sites, i.e. indwelling lines, tubes, and drains  - Monitor endotracheal if appropriate and nasal secretions for changes in amount and color  - [place] appropriate cooling/warming therapies per order  - Administer medications as ordered  - Instruct and encourage patient and family to use good hand hygiene technique  - Identify and instruct in appropriate isolation precautions for identified infection/condition  2/28/2024 2247 by Rebeca Skaggs  Outcome: Progressing  2/28/2024 2246 by Rebeca Skaggs  Outcome: Progressing  Goal: Absence of fever/infection during neutropenic period  Description: INTERVENTIONS:  - Monitor WBC    2/28/2024 2247 by Rebeca Skaggs  Outcome: Progressing  2/28/2024 2246 by Rebeca Skaggs  Outcome: Progressing     Problem: SAFETY ADULT  Goal: Patient will remain  free of falls  Description: INTERVENTIONS:  - Educate patient/family on patient safety including physical limitations  - Instruct patient to call for assistance with activity   - Consult OT/PT to assist with strengthening/mobility   - Keep Call bell within reach  - Keep bed low and locked with side rails adjusted as appropriate  - Keep care items and personal belongings within reach  - Initiate and maintain comfort rounds  - Make Fall Risk Sign visible to staff  - Apply yellow socks and bracelet for high fall risk patients  - Consider moving patient to room near nurses station  2/28/2024 2247 by Rebeca Skaggs  Outcome: Progressing  2/28/2024 2246 by Rebeca Skaggs  Outcome: Progressing  Goal: Maintain or return to baseline ADL function  Description: INTERVENTIONS:  -  Assess patient's ability to carry out ADLs; assess patient's baseline for ADL function and identify physical deficits which impact ability to perform ADLs (bathing, care of mouth/teeth, toileting, grooming, dressing, etc.)  - Assess/evaluate cause of self-care deficits   - Assess range of motion  - Assess patient's mobility; develop plan if impaired  - Assess patient's need for assistive devices and provide as appropriate  - Encourage maximum independence but intervene and supervise when necessary  - Involve family in performance of ADLs  - Assess for home care needs following discharge   - Consider OT consult to assist with ADL evaluation and planning for discharge  - Provide patient education as appropriate  2/28/2024 2247 by Rebeca Skaggs  Outcome: Progressing  2/28/2024 2246 by Rebeca Skaggs  Outcome: Progressing  Goal: Maintains/Returns to pre admission functional level  Description: INTERVENTIONS:  - Perform AM-PAC 6 Click Basic Mobility/ Daily Activity assessment daily.  - Set and communicate daily mobility goal to care team and patient/family/caregiver.   - Collaborate with rehabilitation services on mobility goals if  consulted  - Record patient progress and toleration of activity level   2/28/2024 2247 by Rebeca Skaggs  Outcome: Progressing  2/28/2024 2246 by Rebeca Skaggs  Outcome: Progressing     Problem: DISCHARGE PLANNING  Goal: Discharge to home or other facility with appropriate resources  Description: INTERVENTIONS:  - Identify barriers to discharge w/patient and caregiver  - Arrange for needed discharge resources and transportation as appropriate  - Identify discharge learning needs (meds, wound care, etc.)  - Arrange for interpretive services to assist at discharge as needed  - Refer to Case Management Department for coordinating discharge planning if the patient needs post-hospital services based on physician/advanced practitioner order or complex needs related to functional status, cognitive ability, or social support system  2/28/2024 2247 by Rebeca Skaggs  Outcome: Progressing  2/28/2024 2246 by Rebeca Skaggs  Outcome: Progressing     Problem: Knowledge Deficit  Goal: Patient/family/caregiver demonstrates understanding of disease process, treatment plan, medications, and discharge instructions  Description: Complete learning assessment and assess knowledge base.  Interventions:  - Provide teaching at level of understanding  - Provide teaching via preferred learning methods  2/28/2024 2247 by Rebeca Skaggs  Outcome: Progressing  2/28/2024 2246 by Rebeca Skaggs  Outcome: Progressing

## 2024-02-29 NOTE — PROGRESS NOTES
[facility]  Progress Note  Name: Nadir Myers I  MRN: [id number]  Unit/Bed#: [facility]PHP 519-01 I Date of Admission: 1/31/2024   Date of Service: 2/29/2024 I Hospital Day: 29    Assessment/Plan   * MSSA bacteremia  Assessment & Plan  Persistent MSSA bacteremia most likely source foot wound, complicated with ICD infection  Blood cultures from 1/31-2/9  positive despite being on Ancef suspect due to ICD infection  GILL with evidence of mobile mass on ICD lead in the right atrium, concerning for infection, no valvular vegetation  S/p ICD removal with EP on 2/8  CT chest 2/7 and 2/15 with possible septic pulmonary emboli  Repeat Blood cultures from 2/12 negative, repeat blood cultures 2/16 negative   Continue IV Ancef, with plan to treat 6 weeks until 3/25/2024   PICC placed 2/22    Gangrene of toe of left foot ([facility])  Assessment & Plan  Appreciate podiatry input -> status post left fourth toe amputation on 1/31  [place] to be surgical cure per podiatry  Wound care  Antibiotics per ID    Acute on chronic systolic CHF (congestive heart failure) (HCC)  Assessment & Plan  Diuretics per HF team.  On IV Lasix 60 mg twice daily and metolazone  isordil 20 mg TID and hydralazine to 50 mg TID   Escobar in place for accurate I/Os.     Pleuritic chest pain  Assessment & Plan  Pleuritic pain in left lower chest wall since 2/20. Tender to touch. Suspect secondary to ICD manipulation +/- septic pulmonary emboli  EKG with no acute ischemic changes.  Voltaren gel/lidocaine patch/prn pain meds. Improving    ICD (implantable cardioverter-defibrillator) in place  Assessment & Plan  Patient had extraction of ICD  Patient underwent AV ICD placement on February 20, 2024  Patient had lead dislodgment leading to repeat procedure on February 21, 2024  During the procedure patient's lead had poor sensing and therefore could not be positioned under the sternum and pocket was closed.  Multiple location under the sternum  where attempted.   Will plan for LifeVest on discharge and then discuss S-ICD in office.     LINDEN (acute kidney injury) ([facility])  Assessment & Plan  Likely ATN  Monitor with lasix  Hold jardiance and aldactone  Nephrology and cardiology following    Abnormal CT of the chest  Assessment & Plan  CT 2/7 shows small consolidation in the upper lobes and left lower lobe with enhancing lesion in the peripheral of right lower lobe, may be embolic versus hematogenous seeding from high-grade bacteremia.  there is no evident of pulmonary embolism to suggest that this is an infarct.   Repeat CT 2/15 revealed possible septic emboli, gas and fluid subcutaneous collection in the pacemaker bed likely postsurgical, reactive mild mediastinal lymphadenopathy.  CT chest without contrast  2/21: Peripheral left lower lobe opacity with reverse halo sign appearance. This can be seen in the setting of pulmonary infarct   Doppler US is negative for DVT  VQ Scan is intermediate for PE  TTE 2/21 with normal right ventricle function  Unable to obtain PE study to confirm/rule out given LINDEN. Suspicion remains higher for septic embolism. Given hemodynamic stability with only 1L NC oxygen requirement due to volume overload, would hold off on AC for now.   Appreciate pulmonology input    PAD (peripheral artery disease) ([facility])  Assessment & Plan  Continue dual antiplatelet therapy with ASA/Plavix - continue statin      Coronary artery disease involving native coronary artery of native heart without angina pectoris  Assessment & Plan  History of coronary artery disease status post HANNA to left circumflex 2015, repeat cath 2023 with  of mid LAD and L PDA.  Ischemic cardiomyopathy  Continue aspirin/Plavix/statin/metoprolol    V-tach ([facility])  Assessment & Plan  History of monomorphic VT status post secondary prevention with [employer] dual-chamber ICD 5/2023  Status post ICD extraction 2/8/2024  Lifevest on dc    Type 2 diabetes mellitus, with long-term  current use of insulin (HCC)  Assessment & Plan  Lab Results   Component Value Date    HGBA1C 9.8 (H) 2024     Continue to adjust basal/prandial insulin. BG goal 140-180  Hypoglycemia protocol  Carbohydrate restriction  Continue to adjust               VTE Pharmacologic Prophylaxis:   Moderate Risk (Score 3-4) - Pharmacological DVT Prophylaxis Ordered: heparin.    Mobility:   Basic Mobility Inpatient Raw Score: 22  JH-HLM Goal: 7: Walk 25 feet or more  JH-HLM Achieved: 7: Walk 25 feet or more  HLM Goal achieved. Continue to encourage appropriate mobility.    Patient Centered Rounds: I performed bedside rounds with nursing staff today.   Discussions with Specialists or Other Care Team Provider: cardiology     Education and Discussions with Family / Patient: Updated  (wife) at bedside.    Total Time Spent on Date of Encounter in care of patient: 45 mins. This time was spent on one or more of the following: performing physical exam; counseling and coordination of care; obtaining or reviewing history; documenting in the medical record; reviewing/ordering tests, medications or procedures; communicating with other healthcare professionals and discussing with patient's family/caregivers.    Current Length of Stay: 29 day(s)  Current Patient Status: Inpatient   Certification Statement: The patient will continue to require additional inpatient hospital stay due to additional diuresis  Discharge Plan: Anticipate discharge tomorrow to home.    Code Status: Level 1 - Full Code    Subjective:   This is a very pleasant 63-year-old gentleman who was seen and evaluated today at bedside.  Patient has no acute concerns at this time.    Objective:     Vitals:   Temp (24hrs), Av.7 °F (36.5 °C), Min:97.2 °F (36.2 °C), Max:98.1 °F (36.7 °C)    Temp:  [97.2 °F (36.2 °C)-98.1 °F (36.7 °C)] 98 °F (36.7 °C)  HR:  [75-86] 78  Resp:  [16-18] 16  BP: (107-162)/(68-91) 138/75  SpO2:  [92 %-96 %] 95 %  Body mass index is  31.48 kg/m².     Input and Output Summary (last 24 hours):     Intake/Output Summary (Last 24 hours) at 2/29/2024 1544  Last data filed at 2/29/2024 1442  Gross per 24 hour   Intake 698.33 ml   Output 2850 ml   Net -2151.67 ml       Physical Exam:   Physical Exam  Vitals reviewed.   HENT:      Head: Normocephalic.      Nose: No congestion.      Mouth/Throat:      Pharynx: No oropharyngeal exudate or posterior oropharyngeal erythema.   Eyes:      Conjunctiva/sclera: Conjunctivae normal.   Cardiovascular:      Rate and Rhythm: Normal rate and regular rhythm.      Heart sounds: Murmur heard.   Pulmonary:      Effort: Pulmonary effort is normal.   Abdominal:      General: Abdomen is flat.      Palpations: Abdomen is soft.   Musculoskeletal:      Right lower leg: Edema present.      Left lower leg: Edema present.   Skin:     General: Skin is warm and dry.   Neurological:      Mental Status: He is alert and oriented to person, place, and time. Mental status is at baseline.          Additional Data:     Labs:  Results from last 7 days   Lab Units 02/29/24  0541   WBC Thousand/uL 8.42   HEMOGLOBIN g/dL 9.4*   HEMATOCRIT % 31.0*   PLATELETS Thousands/uL 363   NEUTROS PCT % 72   LYMPHS PCT % 18   MONOS PCT % 8   EOS PCT % 1     Results from last 7 days   Lab Units 02/29/24  0541   SODIUM mmol/L 138   POTASSIUM mmol/L 3.8   CHLORIDE mmol/L 99   CO2 mmol/L 29   BUN mg/dL 52*   CREATININE mg/dL 1.80*   ANION GAP mmol/L 10   CALCIUM mg/dL 8.4   ALBUMIN g/dL 2.7*   TOTAL BILIRUBIN mg/dL 0.22   ALK PHOS U/L 38   ALT U/L <3*   AST U/L 18   GLUCOSE RANDOM mg/dL 163*         Results from last 7 days   Lab Units 02/29/24  1030 02/29/24  0608 02/28/24  2058 02/28/24  1611 02/28/24  1122 02/28/24  0604 02/27/24 2028 02/27/24  1545 02/27/24  1046 02/27/24  0544 02/26/24  2034 02/26/24  1520   POC GLUCOSE mg/dl 227* 170* 316* 190* 228* 141* 300* 202* 216* 95 200* 172*               Lines/Drains:  Invasive Devices       Peripherally  Inserted Central Catheter Line  Duration             PICC Line 02/22/24 Right Basilic 7 days                    Central Line:  Goal for removal: Will discontinue when meds requiring line are completed.         Telemetry:  Telemetry Orders (From admission, onward)               LifeVest Patient: Continuous Telemetry Monitoring during hospitalization (non-expiring)  Continuous LifeVest Telemetry Monitoring        References:    LifeVest Policy                     Telemetry Reviewed: Normal Sinus Rhythm  Indication for Continued Telemetry Use: Lifevest (remains on tele entire hospital stay)             Imaging: No pertinent imaging reviewed.    Recent Cultures (last 7 days):         Last 24 Hours Medication List:   Current Facility-Administered Medications   Medication Dose Route Frequency Provider Last Rate    acetaminophen  650 mg Oral Q8H [facility] Kip Cabello MD      ALPRAZolam  0.5 mg Oral HS PRN Kip Cabello MD      amiodarone  200 mg Oral Daily With Breakfast Yuniel Mcgee PA-C      aspirin  81 mg Oral Daily Yuniel Mcgee PA-C      atorvastatin  80 mg Oral After Dinner Yuniel Mcgee PA-C      cefazolin  2,000 mg Intravenous Q8H Zach Medina MD Stopped (02/29/24 1442)    clopidogrel  75 mg Oral Daily Yuniel Mcgee PA-C      Diclofenac Sodium  2 g Topical 4x Daily Kip Cabello MD      heparin (porcine)  5,000 Units Subcutaneous Q8H [facility] Kip Cabello MD      hydrALAZINE  75 mg Oral Q8H [facility] Aaron Capps MD      insulin glargine  38 Units Subcutaneous HS Kip Cabello MD      insulin lispro  1-5 Units Subcutaneous 4x Daily (AC & HS) Yuniel Mcgee PA-C      insulin lispro  7 Units Subcutaneous TID With Meals Yuniel Mcgee PA-C      isosorbide dinitrate  20 mg Oral TID after meals Nash Samuels DO      melatonin  3 mg Oral HS Kip Cabello MD      methocarbamol  500 mg Oral Q6H PRN Kip Cabello MD      metoprolol succinate  25 mg Oral Q12H Yuniel Mcgee  DENNIS      oxyCODONE  5 mg Oral Q6H PRN Yuniel Mcgee PA-C      polyethylene glycol  17 g Oral Daily Yuniel Mcgee PA-C      saccharomyces boulardii  250 mg Oral BID Yuniel Mcgee PA-C      senna-docusate sodium  2 tablet Oral BID Yuniel Mcgee PA-C      torsemide  20 mg Oral Daily Lamont Murcia MD          Today, Patient Was Seen By: Gilbert Quigley MD    **Please Note: This note may have been constructed using a voice recognition system.**

## 2024-02-29 NOTE — PHYSICAL THERAPY NOTE
"   PT Treatment       02/29/24 0820   PT Last Visit   PT Visit Date 02/29/24   Note Type   Note Type Treatment   Pain Assessment   Pain Assessment Tool 0-10   Pain Score No Pain   Restrictions/Precautions   Weight Bearing Precautions Per Order Yes   LLE Weight Bearing Per Order WBAT  (to heel)   Braces or Orthoses Other (Comment)  (patient had surgical shoe donned)   Other Precautions Telemetry   General   Chart Reviewed Yes   Response to Previous Treatment Patient with no complaints from previous session.   Family/Caregiver Present Yes  (spouse)   Cognition   Overall Cognitive Status WFL   Orientation Level Oriented X4   Subjective   Subjective \"I want to go home\"   Bed Mobility   Supine to Sit 6  Modified independent   Additional items HOB elevated   Sit to Supine 6  Modified independent   Additional Comments patient in bed pre and post treatment   Transfers   Sit to Stand 6  Modified independent   Additional items Increased time required   Stand to Sit 6  Modified independent   Additional items Increased time required   Additional Comments demonstrates safe hand placement with use of RW   Ambulation/Elevation   Gait pattern Excessively slow;Decreased foot clearance   Gait Assistance 6  Modified independent   Additional items Verbal cues   Assistive Device Rolling walker   Distance 80 feet x 2   Ambulation/Elevation Additional Comments gait training with use of RW: demonstrated improved stability and gait speed today. VC to encourage increased clearance of L LE   Balance   Static Sitting Normal   Static Standing Good   Ambulatory Fair   Endurance Deficit   Endurance Deficit Yes   Endurance Deficit Description hospital admission x approx 1 month with reduced mobility   Activity Tolerance   Activity Tolerance Patient tolerated treatment well   Nurse Made Aware carmen to see per RN Yashira   Assessment   Prognosis Good   Problem List Decreased endurance;Decreased skin integrity;Impaired balance   Assessment PT initiated " treatment session in order to assist patient in achieving goals to improve gait training and overall activity tolerance. Patient had progressed and is now mobilizing mod-I with use of RW for bed mobility, transfers, and ambulation. He is walking both household and community distances with use of RW and has previously cleared stairs by PT in previous session. He presents without further inpt PT needs at this time and PT will sign off. Patient is aware and is agreeable to ongoing ambulation this admission with supervision of spouse. D/c inpt PT.   Goals   Patient Goals to go home   STG Expiration Date 03/12/24   PT Treatment Day 11   Plan   Progress Discontinue PT   Discharge Recommendation   Rehab Resource Intensity Level, PT III (Minimum Resource Intensity)  (hhpt)   Equipment Recommended Walker;Other (Comment)  (rw, commode, spc arranged for d/c)   Additional Comments Patient and spouse participated in 10 minutes of education regarding mobility this admission and upon d/c in setting of this being his final inpt PT session. This included the following: dme for d/c (patient has received bedside commode, RW, and SPC and is aware he is recommended fo ruse of RW upon d/c home), f/u with HHPT services, review of LE HEP provided last session and recommendation to change positions at least 1x/hour (even if sit<-->stand) and to ambulate 3-5x within home to continue participation in mobility upon d/c.   AM-PAC Basic Mobility Inpatient   Turning in Flat Bed Without Bedrails 4   Lying on Back to Sitting on Edge of Flat Bed Without Bedrails 4   Moving Bed to Chair 4   Standing Up From Chair Using Arms 4   Walk in Room 4   Climb 3-5 Stairs With Railing 4   Basic Mobility Inpatient Raw Score 24   Basic Mobility Standardized Score 57.68   Highest Level Of Mobility   JH-HLM Goal 8: Walk 250 feet or more   JH-HLM Achieved 7: Walk 25 feet or more         The patient's AM-PAC Basic Mobility Inpatient Standardized Score is greater than  42.9, suggesting this patient may benefit from discharge to home. Please also refer to the recommendation of the Physical Therapist for safe discharge planning.    BRADLY LEYVA PT, DPT

## 2024-02-29 NOTE — PLAN OF CARE
Problem: PHYSICAL THERAPY ADULT  Goal: Performs mobility at highest level of function for planned discharge setting.  See evaluation for individualized goals.  Description: Treatment/Interventions: Functional transfer training, LE strengthening/ROM, Elevations, Therapeutic exercise, Endurance training, Patient/family training, Equipment eval/education, Bed mobility, Spoke to nursing, Gait training, OT, Spoke to case management  Equipment Recommended: Wheelchair, Walker       See flowsheet documentation for full assessment, interventions and recommendations.  Outcome: Adequate for Discharge  Note: Prognosis: Good  Problem List: Decreased endurance, Decreased skin integrity, Impaired balance  Assessment: PT initiated treatment session in order to assist patient in achieving goals to improve gait training and overall activity tolerance. Patient had progressed and is now mobilizing mod-I with use of RW for bed mobility, transfers, and ambulation. He is walking both household and community distances with use of RW and has previously cleared stairs by PT in previous session. He presents without further inpt PT needs at this time and PT will sign off. Patient is aware and is agreeable to ongoing ambulation this admission with supervision of spouse. D/c inpt PT.  Barriers to Discharge: None     Rehab Resource Intensity Level, PT: III (Minimum Resource Intensity) (hhpt)    See flowsheet documentation for full assessment.

## 2024-02-29 NOTE — ASSESSMENT & PLAN NOTE
Appreciate podiatry input -> status post left fourth toe amputation on 1/31  [place] to be surgical cure per podiatry  Wound care  Antibiotics per ID

## 2024-02-29 NOTE — PROGRESS NOTES
Progress Note - Advanced Heart Failure Team  Nadir Myers 63 y.o. male MRN: [id number]  Unit/Bed#: [facility] 519-01 Encounter: [id number]    Subjective    States breathing is stable. No fevers, chest pain, dizziness, or palpitations. Urinating well. Reports leg swelling is back to baseline. Wishes to go home.     Objective   Vitals: Blood pressure 138/75, pulse 78, temperature 98 °F (36.7 °C), resp. rate 16, height 6' (1.829 m), weight 105 kg (232 lb 2.3 oz), SpO2 95%.  Orthostatic Blood Pressures      Flowsheet Row Most Recent Value   Blood Pressure 138/75 filed at 02/29/2024 1529   Patient Position - Orthostatic VS Lying filed at 02/28/2024 1525              Invasive Devices       Peripherally Inserted Central Catheter Line  Duration             PICC Line 02/22/24 Right Basilic 7 days                    Physical Exam  Constitutional:       General: He is not in acute distress.  Cardiovascular:      Rate and Rhythm: Normal rate and regular rhythm.   Pulmonary:      Effort: Pulmonary effort is normal.   Musculoskeletal:      Right lower leg: No edema.      Left lower leg: No edema.   Skin:     General: Skin is warm.   Neurological:      Mental Status: He is alert.         Lab Results: I have personally reviewed pertinent lab results.            Results from last 7 days   Lab Units 02/29/24  0541 02/28/24  0523 02/27/24  0443   POTASSIUM mmol/L 3.8 3.6 3.2*   CO2 mmol/L 29 31 33*   CHLORIDE mmol/L 99 98 97   BUN mg/dL 52* 46* 47*   CREATININE mg/dL 1.80* 1.96* 1.88*     Results from last 7 days   Lab Units 02/29/24  0541 02/28/24  0523 02/27/24  0443   HEMOGLOBIN g/dL 9.4* 9.3* 9.3*   HEMATOCRIT % 31.0* 29.5* 29.8*   PLATELETS Thousands/uL 363 379 383                   Imaging: I have personally reviewed pertinent reports.    ECHO:  Results for orders placed during the hospital encounter of 09/26/17    Echo complete with contrast if indicated    Narrative  [facility]  [address]  [place], [place] [address] [phone]    Transthoracic Echocardiogram  2D, M-mode, Doppler, and Color Doppler    Study date:  26-Sep-2017    Patient: EAGLE REBOLLAR  MR number: [id number]  Account number: [id number]  : 1960  Age: 57 years  Gender: Male  Status: Outpatient  Location: [address] [place] [address]  Height: 72 in  Weight: 263.3 lb  BP: 142/ 88 mmHg    Indications: CAD    Diagnoses: I25.10 - Atherosclerotic heart disease of native coronary artery without angina pectoris    Sonographer:  OLIVE Benoit  Primary Physician:  Paty Ortiz MD  Referring Physician:  Eagle Chavez MD  Group:  [facility]  Interpreting Physician:  Torrey Duenas MD    SUMMARY    LEFT VENTRICLE:  Size was normal.  Systolic function was normal. Ejection fraction was estimated to be 65 %.  There was mild concentric hypertrophy.  Doppler parameters were consistent with abnormal left ventricular relaxation (grade 1 diastolic dysfunction).    RIGHT VENTRICLE:  The size was normal.  Systolic function was normal.    TRICUSPID VALVE:  There was trace regurgitation.    HISTORY: PRIOR HISTORY: Hypertension, CAD s/p PCI, smoker, diabetes, high cholesterol    PROCEDURE: The study was performed in the [address] [place] [address]. This was a routine study. The transthoracic approach was used. The study included complete 2D imaging, M-mode, complete spectral Doppler, and color Doppler. The  heart rate was 112 bpm, at the start of the study. Images were obtained from the parasternal, apical, subcostal, and suprasternal notch acoustic windows. Echocardiographic views were limited due to decreased penetration. This was a  technically difficult study.    LEFT VENTRICLE: Size was normal. Systolic function was normal. Ejection fraction was estimated to be 65 %. There were no regional wall motion abnormalities. Wall thickness was mildly increased. There was mild concentric  hypertrophy.  DOPPLER: Doppler parameters were consistent with abnormal left ventricular relaxation (grade 1 diastolic dysfunction).    RIGHT VENTRICLE: The size was normal. Systolic function was normal. Wall thickness was normal.    LEFT ATRIUM: Size was normal.    RIGHT ATRIUM: Size was normal.    MITRAL VALVE: Valve structure was normal. There was normal leaflet separation. DOPPLER: The transmitral velocity was within the normal range. There was no evidence for stenosis. There was no regurgitation.    AORTIC VALVE: The valve was trileaflet. Leaflets exhibited normal thickness and normal cuspal separation. DOPPLER: Transaortic velocity was within the normal range. There was no evidence for stenosis. There was no regurgitation.    TRICUSPID VALVE: The valve structure was normal. There was normal leaflet separation. DOPPLER: The transtricuspid velocity was within the normal range. There was no evidence for stenosis. There was trace regurgitation. The tricuspid jet  envelope definition was inadequate for estimation of RV systolic pressure. There are no indirect findings suggestive of moderate or severe pulmonary hypertension.    PULMONIC VALVE: Leaflets exhibited normal thickness, no calcification, and normal cuspal separation. DOPPLER: The transpulmonic velocity was within the normal range. There was no regurgitation.    PERICARDIUM: There was no pericardial effusion. The pericardium was normal in appearance.    AORTA: The root exhibited normal size.    SYSTEMIC VEINS: IVC: The inferior vena cava was normal in size and course. Respirophasic changes were normal.    SYSTEM MEASUREMENT TABLES    2D  %FS: 47.6 %  AV Diam: 3.34 cm  EDV(Teich): 101.71 ml  EF Biplane: 67.76 %  EF(Cube): 85.61 %  EF(Teich): 79 %  ESV(Cube): 14.82 ml  ESV(Teich): 21.36 ml  IVSd: 1.58 cm  LA Area: 12.78 cm2  LA Diam: 3.37 cm  LVEDV MOD A2C: 49.73 ml  LVEDV MOD A4C: 61.78 ml  LVEDV MOD BP: 58.1 ml  LVEF MOD A2C: 65.64 %  LVEF MOD A4C: 71.2  %  LVESV MOD A2C: 17.09 ml  LVESV MOD A4C: 17.79 ml  LVESV MOD BP: 18.73 ml  LVIDd: 4.69 cm  LVIDs: 2.46 cm  LVLd A2C: 6.97 cm  LVLd A4C: 7.74 cm  LVLs A2C: 5.23 cm  LVLs A4C: 6.13 cm  LVPWd: 1.23 cm  RA Area: 14.52 cm2  RV Diam.: 2.86 cm  SI(Cube): 36.73 ml/m2  SI(Teich): 33.48 ml/m2  SV MOD A2C: 32.64 ml  SV MOD A4C: 43.98 ml  SV(Cube): 88.15 ml  SV(Teich): 80.35 ml    MM  TAPSE: 3.26 cm    PW  E': 0.08 m/s    [facility]    Prepared and electronically signed by    Torrey Duenas MD  Signed 26-Sep-2017 12:55:39    Results for orders placed during the hospital encounter of 01/31/24    Echo complete w/ contrast if indicated    Interpretation Summary    Left Ventricle: Left ventricular cavity size is normal. Wall thickness is moderately increased. There is moderate concentric hypertrophy. The left ventricular ejection fraction is 42%. Systolic function is moderately reduced. Diastolic function is mildly abnormal, consistent with grade I (abnormal) relaxation. 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 There is no thrombus.    The following segments are akinetic: apical anterior, apical septal, apical inferior, apical lateral and apex.    All other segments are normal.    Tricuspid Valve: There is mild regurgitation.      GILL:  No results found for this or any previous visit.    Results for orders placed during the hospital encounter of 01/31/24    GILL    Interpretation Summary  Images from the original result were not included.      Left Ventricle: Left ventricular cavity size is normal. Wall thickness is normal. The left ventricular ejection fraction is 45%. Systolic function is mildly reduced. There is hypokinesis with akinesis of the aneurysmal portion of the apex.    Right Ventricle: Right ventricular cavity size is normal. Systolic function is normal.    Right Atrium: There is a 1.8 x 1.1 cm mobile mass affixed to the device lead as it passes through the right atrium.    Aortic  Valve: The aortic valve is trileaflet. The leaflets are mildly thickened. The leaflets are not calcified. The leaflets exhibit normal mobility. There is Lambl's excrescence on the tip of the right coronary aortic valve leaflet.    There is a 1.8 x 1.1 cm mobile mass affixed to the cardiac device lead as is passes through the right atrium.  In the setting of bacteremia this is most likely represents infection.    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 prevention Medtronic dual chamber ICD (5/2023) complicated by device infection requiring explant    Septic shock (resolved) with left toe gangrene / staph aureus bacteremia    LINDEN likely 2/2 to sepsis-related ATN, recent procedures    Coronary artery disease with  mLAD and LPDA     LUE weakness / facial droop     LV apical aneurysm     Peripheral arterial disease     Hypertension    History of orthostatic hypotension     DM Type 2     63 year old male with chronic heart failure with mid-range EF being followed by heart failure outpatient presented with Staph bacteremia and septic shock complicating a lower extremity wound infection. Vegetation noted on ICD lead, prompting lead extraction. Now stable from infectious standpoint with  reimplantation of subcutaneous ICD, however with inability to obtain proper subcutaneous lead placement.    Patient remains volume overloaded on examination today with mild-moderate pitting edema in bilateral lower extremities.     Creatinine remains stable around 1.8   Weight trend 234 lb -> 232 lb (baseline 230 lb).    Maintain potassium 4-5 mEq/L and Mg > 2.     Will switch to PO Torsemide 20mg today and monitor for one more day.    Unable to start SGLT-2, MRA, and ARB/ARNi due to renal impairment.    Continue Metoprolol Succinate 25mg BID  Continue Isordil 20mg TID / Hydralazine 75mg TID    Continue dual  antiplatelet therapy with high intensity statin.  Continue amiodarone for VT suppression.    Plan for LifeVest on discharge and implantation of traditional Otley-Sci S-ICD as outpatient per EP.     Possible discharge tomorrow on oral diuretics.    Lamont Murcia MD  Cardiology Fellow

## 2024-03-01 ENCOUNTER — TELEPHONE (OUTPATIENT)
Dept: NEPHROLOGY | Facility: CLINIC | Age: 64
End: 2024-03-01

## 2024-03-01 ENCOUNTER — HOME CARE VISIT (OUTPATIENT)
Dept: HOME HEALTH SERVICES | Facility: HOME HEALTHCARE | Age: 64
End: 2024-03-01

## 2024-03-01 ENCOUNTER — HOME CARE VISIT (OUTPATIENT)
Dept: HOME HEALTH SERVICES | Facility: HOME HEALTHCARE | Age: 64
End: 2024-03-01
Payer: COMMERCIAL

## 2024-03-01 VITALS
SYSTOLIC BLOOD PRESSURE: 136 MMHG | OXYGEN SATURATION: 95 % | DIASTOLIC BLOOD PRESSURE: 76 MMHG | BODY MASS INDEX: 31.29 KG/M2 | HEIGHT: 72 IN | TEMPERATURE: 97.5 F | WEIGHT: 231.04 LBS | HEART RATE: 76 BPM | RESPIRATION RATE: 18 BRPM

## 2024-03-01 VITALS
RESPIRATION RATE: 20 BRPM | OXYGEN SATURATION: 98 % | DIASTOLIC BLOOD PRESSURE: 84 MMHG | SYSTOLIC BLOOD PRESSURE: 136 MMHG | TEMPERATURE: 98.3 F | HEART RATE: 82 BPM

## 2024-03-01 DIAGNOSIS — I10 ESSENTIAL (PRIMARY) HYPERTENSION: Primary | ICD-10-CM

## 2024-03-01 DIAGNOSIS — N17.9 AKI (ACUTE KIDNEY INJURY) (HCC): ICD-10-CM

## 2024-03-01 LAB
ALBUMIN SERPL BCP-MCNC: 2.9 G/DL (ref 3.5–5)
ALP SERPL-CCNC: 43 U/L (ref 34–104)
ALT SERPL W P-5'-P-CCNC: <3 U/L (ref 7–52)
ANION GAP SERPL CALCULATED.3IONS-SCNC: 10 MMOL/L
AST SERPL W P-5'-P-CCNC: 17 U/L (ref 13–39)
BASOPHILS # BLD AUTO: 0.04 THOUSANDS/ÂΜL (ref 0–0.1)
BASOPHILS NFR BLD AUTO: 0 % (ref 0–1)
BILIRUB SERPL-MCNC: 0.27 MG/DL (ref 0.2–1)
BUN SERPL-MCNC: 49 MG/DL (ref 5–25)
CALCIUM ALBUM COR SERPL-MCNC: 9.6 MG/DL (ref 8.3–10.1)
CALCIUM SERPL-MCNC: 8.7 MG/DL (ref 8.4–10.2)
CHLORIDE SERPL-SCNC: 99 MMOL/L (ref 96–108)
CO2 SERPL-SCNC: 29 MMOL/L (ref 21–32)
CREAT SERPL-MCNC: 1.76 MG/DL (ref 0.6–1.3)
EOSINOPHIL # BLD AUTO: 0.23 THOUSAND/ÂΜL (ref 0–0.61)
EOSINOPHIL NFR BLD AUTO: 2 % (ref 0–6)
ERYTHROCYTE [DISTWIDTH] IN BLOOD BY AUTOMATED COUNT: 14.1 % (ref 11.6–15.1)
GFR SERPL CREATININE-BSD FRML MDRD: 40 ML/MIN/1.73SQ M
GLUCOSE SERPL-MCNC: 116 MG/DL (ref 65–140)
GLUCOSE SERPL-MCNC: 117 MG/DL (ref 65–140)
GLUCOSE SERPL-MCNC: 201 MG/DL (ref 65–140)
HCT VFR BLD AUTO: 33.2 % (ref 36.5–49.3)
HGB BLD-MCNC: 10.1 G/DL (ref 12–17)
IMM GRANULOCYTES # BLD AUTO: 0.11 THOUSAND/UL (ref 0–0.2)
IMM GRANULOCYTES NFR BLD AUTO: 1 % (ref 0–2)
LYMPHOCYTES # BLD AUTO: 1.87 THOUSANDS/ÂΜL (ref 0.6–4.47)
LYMPHOCYTES NFR BLD AUTO: 19 % (ref 14–44)
MAGNESIUM SERPL-MCNC: 2.1 MG/DL (ref 1.9–2.7)
MCH RBC QN AUTO: 28 PG (ref 26.8–34.3)
MCHC RBC AUTO-ENTMCNC: 30.4 G/DL (ref 31.4–37.4)
MCV RBC AUTO: 92 FL (ref 82–98)
MONOCYTES # BLD AUTO: 0.69 THOUSAND/ÂΜL (ref 0.17–1.22)
MONOCYTES NFR BLD AUTO: 7 % (ref 4–12)
NEUTROPHILS # BLD AUTO: 7.01 THOUSANDS/ÂΜL (ref 1.85–7.62)
NEUTS SEG NFR BLD AUTO: 71 % (ref 43–75)
NRBC BLD AUTO-RTO: 0 /100 WBCS
PHOSPHATE SERPL-MCNC: 4.6 MG/DL (ref 2.3–4.1)
PLATELET # BLD AUTO: 354 THOUSANDS/UL (ref 149–390)
PMV BLD AUTO: 9.2 FL (ref 8.9–12.7)
POTASSIUM SERPL-SCNC: 4.2 MMOL/L (ref 3.5–5.3)
PROT SERPL-MCNC: 6.7 G/DL (ref 6.4–8.4)
RBC # BLD AUTO: 3.61 MILLION/UL (ref 3.88–5.62)
SODIUM SERPL-SCNC: 138 MMOL/L (ref 135–147)
WBC # BLD AUTO: 9.95 THOUSAND/UL (ref 4.31–10.16)

## 2024-03-01 PROCEDURE — 85025 COMPLETE CBC W/AUTO DIFF WBC: CPT | Performed by: FAMILY MEDICINE

## 2024-03-01 PROCEDURE — 99232 SBSQ HOSP IP/OBS MODERATE 35: CPT | Performed by: INTERNAL MEDICINE

## 2024-03-01 PROCEDURE — 82948 REAGENT STRIP/BLOOD GLUCOSE: CPT

## 2024-03-01 PROCEDURE — 80053 COMPREHEN METABOLIC PANEL: CPT | Performed by: FAMILY MEDICINE

## 2024-03-01 PROCEDURE — 83735 ASSAY OF MAGNESIUM: CPT | Performed by: FAMILY MEDICINE

## 2024-03-01 PROCEDURE — 99239 HOSP IP/OBS DSCHRG MGMT >30: CPT | Performed by: FAMILY MEDICINE

## 2024-03-01 PROCEDURE — 99233 SBSQ HOSP IP/OBS HIGH 50: CPT | Performed by: INTERNAL MEDICINE

## 2024-03-01 PROCEDURE — G0299 HHS/HOSPICE OF RN EA 15 MIN: HCPCS

## 2024-03-01 PROCEDURE — 84100 ASSAY OF PHOSPHORUS: CPT | Performed by: FAMILY MEDICINE

## 2024-03-01 RX ORDER — INSULIN LISPRO 100 [IU]/ML
7 INJECTION, SOLUTION INTRAVENOUS; SUBCUTANEOUS
Qty: 6.3 ML | Refills: 0 | Status: SHIPPED | OUTPATIENT
Start: 2024-03-01 | End: 2024-03-01

## 2024-03-01 RX ORDER — METHOCARBAMOL 500 MG/1
500 TABLET, FILM COATED ORAL EVERY 6 HOURS PRN
Qty: 60 TABLET | Refills: 0 | Status: SHIPPED | OUTPATIENT
Start: 2024-03-01

## 2024-03-01 RX ORDER — AMIODARONE HYDROCHLORIDE 200 MG/1
200 TABLET ORAL
Qty: 30 TABLET | Refills: 0 | Status: SHIPPED | OUTPATIENT
Start: 2024-03-02 | End: 2024-03-01

## 2024-03-01 RX ORDER — SACCHAROMYCES BOULARDII 250 MG
250 CAPSULE ORAL 2 TIMES DAILY
Qty: 60 CAPSULE | Refills: 0 | Status: SHIPPED | OUTPATIENT
Start: 2024-03-01

## 2024-03-01 RX ORDER — METHOCARBAMOL 500 MG/1
500 TABLET, FILM COATED ORAL EVERY 6 HOURS PRN
Qty: 60 TABLET | Refills: 0 | Status: SHIPPED | OUTPATIENT
Start: 2024-03-01 | End: 2024-03-01

## 2024-03-01 RX ORDER — HYDRALAZINE HYDROCHLORIDE 25 MG/1
75 TABLET, FILM COATED ORAL EVERY 8 HOURS SCHEDULED
Qty: 90 TABLET | Refills: 0 | Status: SHIPPED | OUTPATIENT
Start: 2024-03-01 | End: 2024-03-01

## 2024-03-01 RX ORDER — METOPROLOL SUCCINATE 50 MG/1
50 TABLET, EXTENDED RELEASE ORAL EVERY 12 HOURS
Qty: 60 TABLET | Refills: 0 | Status: SHIPPED | OUTPATIENT
Start: 2024-03-01 | End: 2024-03-01

## 2024-03-01 RX ORDER — CEFAZOLIN SODIUM 2 G/50ML
2000 SOLUTION INTRAVENOUS EVERY 8 HOURS
Qty: 3600 ML | Refills: 0 | Status: SHIPPED | OUTPATIENT
Start: 2024-03-01 | End: 2024-03-01

## 2024-03-01 RX ORDER — AMIODARONE HYDROCHLORIDE 200 MG/1
200 TABLET ORAL
Qty: 30 TABLET | Refills: 0 | Status: SHIPPED | OUTPATIENT
Start: 2024-03-02 | End: 2024-03-12 | Stop reason: SDUPTHER

## 2024-03-01 RX ORDER — OXYCODONE HYDROCHLORIDE 5 MG/1
5 TABLET ORAL EVERY 6 HOURS PRN
Qty: 30 TABLET | Refills: 0 | Status: SHIPPED | OUTPATIENT
Start: 2024-03-01 | End: 2024-03-01

## 2024-03-01 RX ORDER — POLYETHYLENE GLYCOL 3350 17 G/17G
17 POWDER, FOR SOLUTION ORAL DAILY
Qty: 30 EACH | Refills: 0 | Status: SHIPPED | OUTPATIENT
Start: 2024-03-02

## 2024-03-01 RX ORDER — POLYETHYLENE GLYCOL 3350 17 G/17G
17 POWDER, FOR SOLUTION ORAL DAILY
Qty: 30 EACH | Refills: 0 | Status: SHIPPED | OUTPATIENT
Start: 2024-03-02 | End: 2024-03-01

## 2024-03-01 RX ORDER — HYDRALAZINE HYDROCHLORIDE 25 MG/1
75 TABLET, FILM COATED ORAL EVERY 8 HOURS SCHEDULED
Qty: 90 TABLET | Refills: 0 | Status: SHIPPED | OUTPATIENT
Start: 2024-03-01 | End: 2024-03-05

## 2024-03-01 RX ORDER — INSULIN DETEMIR 100 [IU]/ML
38 INJECTION, SOLUTION SUBCUTANEOUS
Qty: 10 ML | Refills: 0 | Status: SHIPPED | OUTPATIENT
Start: 2024-03-01 | End: 2024-03-01

## 2024-03-01 RX ORDER — METOPROLOL SUCCINATE 50 MG/1
50 TABLET, EXTENDED RELEASE ORAL EVERY 12 HOURS
Status: DISCONTINUED | OUTPATIENT
Start: 2024-03-01 | End: 2024-03-01 | Stop reason: HOSPADM

## 2024-03-01 RX ORDER — METOPROLOL SUCCINATE 50 MG/1
50 TABLET, EXTENDED RELEASE ORAL EVERY 12 HOURS
Qty: 60 TABLET | Refills: 0 | Status: SHIPPED | OUTPATIENT
Start: 2024-03-01

## 2024-03-01 RX ORDER — SACCHAROMYCES BOULARDII 250 MG
250 CAPSULE ORAL 2 TIMES DAILY
Qty: 60 CAPSULE | Refills: 0 | Status: SHIPPED | OUTPATIENT
Start: 2024-03-01 | End: 2024-03-01

## 2024-03-01 RX ORDER — TORSEMIDE 20 MG/1
20 TABLET ORAL DAILY
Qty: 30 TABLET | Refills: 0 | Status: SHIPPED | OUTPATIENT
Start: 2024-03-02 | End: 2024-03-01

## 2024-03-01 RX ORDER — ASPIRIN 81 MG/1
81 TABLET, CHEWABLE ORAL DAILY
Qty: 30 TABLET | Refills: 0 | Status: SHIPPED | OUTPATIENT
Start: 2024-03-02

## 2024-03-01 RX ORDER — OXYCODONE HYDROCHLORIDE 5 MG/1
5 TABLET ORAL EVERY 6 HOURS PRN
Qty: 30 TABLET | Refills: 0 | Status: SHIPPED | OUTPATIENT
Start: 2024-03-01 | End: 2024-03-11

## 2024-03-01 RX ORDER — ISOSORBIDE DINITRATE 20 MG/1
20 TABLET ORAL
Qty: 90 TABLET | Refills: 0 | Status: SHIPPED | OUTPATIENT
Start: 2024-03-01 | End: 2024-03-01

## 2024-03-01 RX ORDER — CEFAZOLIN SODIUM 2 G/50ML
2000 SOLUTION INTRAVENOUS EVERY 8 HOURS
Qty: 3600 ML | Refills: 0 | Status: SHIPPED | OUTPATIENT
Start: 2024-03-01 | End: 2024-03-25

## 2024-03-01 RX ORDER — ALPRAZOLAM 0.5 MG/1
0.5 TABLET ORAL
Qty: 10 TABLET | Refills: 0 | Status: SHIPPED | OUTPATIENT
Start: 2024-03-01 | End: 2024-03-11

## 2024-03-01 RX ORDER — TORSEMIDE 20 MG/1
20 TABLET ORAL DAILY
Qty: 30 TABLET | Refills: 0 | Status: SHIPPED | OUTPATIENT
Start: 2024-03-02 | End: 2024-03-05

## 2024-03-01 RX ORDER — ALPRAZOLAM 0.5 MG/1
0.5 TABLET ORAL
Qty: 10 TABLET | Refills: 0 | Status: SHIPPED | OUTPATIENT
Start: 2024-03-01 | End: 2024-03-01

## 2024-03-01 RX ORDER — LANOLIN ALCOHOL/MO/W.PET/CERES
3 CREAM (GRAM) TOPICAL
Qty: 30 TABLET | Refills: 0 | Status: SHIPPED | OUTPATIENT
Start: 2024-03-01 | End: 2024-03-01

## 2024-03-01 RX ORDER — ISOSORBIDE DINITRATE 20 MG/1
20 TABLET ORAL
Qty: 90 TABLET | Refills: 0 | Status: SHIPPED | OUTPATIENT
Start: 2024-03-01 | End: 2024-03-05

## 2024-03-01 RX ORDER — LANOLIN ALCOHOL/MO/W.PET/CERES
3 CREAM (GRAM) TOPICAL
Qty: 30 TABLET | Refills: 0 | Status: SHIPPED | OUTPATIENT
Start: 2024-03-01

## 2024-03-01 RX ORDER — AMOXICILLIN 250 MG
2 CAPSULE ORAL 2 TIMES DAILY
Qty: 60 TABLET | Refills: 0 | Status: SHIPPED | OUTPATIENT
Start: 2024-03-01 | End: 2024-03-01

## 2024-03-01 RX ORDER — ASPIRIN 81 MG/1
81 TABLET, CHEWABLE ORAL DAILY
Qty: 30 TABLET | Refills: 0 | Status: SHIPPED | OUTPATIENT
Start: 2024-03-02 | End: 2024-03-01

## 2024-03-01 RX ORDER — AMOXICILLIN 250 MG
2 CAPSULE ORAL 2 TIMES DAILY
Qty: 60 TABLET | Refills: 0 | Status: SHIPPED | OUTPATIENT
Start: 2024-03-01

## 2024-03-01 RX ADMIN — HEPARIN SODIUM 5000 UNITS: 5000 INJECTION INTRAVENOUS; SUBCUTANEOUS at 06:01

## 2024-03-01 RX ADMIN — ASPIRIN 81 MG CHEWABLE TABLET 81 MG: 81 TABLET CHEWABLE at 08:31

## 2024-03-01 RX ADMIN — INSULIN LISPRO 7 UNITS: 100 INJECTION, SOLUTION INTRAVENOUS; SUBCUTANEOUS at 11:34

## 2024-03-01 RX ADMIN — ISOSORBIDE DINITRATE 20 MG: 20 TABLET ORAL at 08:30

## 2024-03-01 RX ADMIN — TORSEMIDE 20 MG: 20 TABLET ORAL at 08:31

## 2024-03-01 RX ADMIN — INSULIN LISPRO 7 UNITS: 100 INJECTION, SOLUTION INTRAVENOUS; SUBCUTANEOUS at 08:31

## 2024-03-01 RX ADMIN — METOPROLOL SUCCINATE 25 MG: 25 TABLET, EXTENDED RELEASE ORAL at 06:04

## 2024-03-01 RX ADMIN — CLOPIDOGREL BISULFATE 75 MG: 75 TABLET ORAL at 08:31

## 2024-03-01 RX ADMIN — CEFAZOLIN SODIUM 2000 MG: 2 SOLUTION INTRAVENOUS at 05:56

## 2024-03-01 RX ADMIN — ACETAMINOPHEN 650 MG: 325 TABLET, FILM COATED ORAL at 06:04

## 2024-03-01 RX ADMIN — INSULIN LISPRO 1 UNITS: 100 INJECTION, SOLUTION INTRAVENOUS; SUBCUTANEOUS at 11:34

## 2024-03-01 RX ADMIN — CEFAZOLIN SODIUM 2000 MG: 2 SOLUTION INTRAVENOUS at 11:34

## 2024-03-01 RX ADMIN — ISOSORBIDE DINITRATE 20 MG: 20 TABLET ORAL at 11:35

## 2024-03-01 RX ADMIN — HYDRALAZINE HYDROCHLORIDE 75 MG: 25 TABLET, FILM COATED ORAL at 06:04

## 2024-03-01 RX ADMIN — HYDRALAZINE HYDROCHLORIDE 75 MG: 25 TABLET, FILM COATED ORAL at 14:47

## 2024-03-01 RX ADMIN — AMIODARONE HYDROCHLORIDE 200 MG: 200 TABLET ORAL at 08:31

## 2024-03-01 NOTE — PROGRESS NOTES
Spoke with patient at bedside and contacted [facility] representative for follow up teaching for lifevest. All questions answered at bedside. Ghada from [facility] will be calling the patient 3/2 in the AM to follow up with patient and Wife and if they feel uneasy still will be available to visit patient and wife in there home 3/2.

## 2024-03-01 NOTE — PROGRESS NOTES
NEPHROLOGY PROGRESS NOTE   Nadir Myers 63 y.o. male MRN: [id number]  Unit/Bed#: [facility] 519-01 Encounter: [id number]      ASSESSMENT & PLAN:  1.  Acute kidney injury suspected secondary to ATN in the setting of surgery and sepsis.  Baseline creatinine less than 1.  Admitted with a creatinine 1.4 initially improved to 0.6-0.8 but then worsened on 2/15, peaked at 2.36 on 2/25.  His kidney function continues to improve with a creatinine down to 1.76 this morning.  Diuretics were transitioned to oral by cardiology.  Stable from kidney standpoint of view to be discharged, will contact our office to arrange hospital follow-up in 3 to 4 weeks with repeat labs  Discussed with patient about importance to follow low-salt diet and avoid NSAIDs    2 sepsis secondary to MSSA bacteremia with ICD infection status post ICD extraction.  Infectious disease on board managing antibiotics.  On Ancef for 6 weeks total.    #3 HFmrEF, acute on chronic, ischemic cardiomyopathy, volume status improving, heart failure team on board managing diuretics, diuretics were transitioned to oral    4 anemia component of chronic kidney disease and acute illness, hemoglobin low but is stable at 10.1 this morning    5 hypokalemia in the setting of diuresis, proved with replacement, potassium 4.2 today    6 left foot fourth toe gangrene status post amputation on 1/31, podiatry on board    Recommendations as above were discussed with internal medicine attending, he agreed with the plan.      SUBJECTIVE:  Patient seen and examined, feeling better, wife at bedside feeling overwhelmed.  Patient ready to be discharged, denies any chest pain, no shortness of breath, no nausea or vomiting    OBJECTIVE:  Current Weight: Weight - Scale: 105 kg (231 lb 0.7 oz)  Vitals:    03/01/24 1045   BP: 142/75   Pulse: 76   Resp: 18   Temp:    SpO2: 95%       Intake/Output Summary (Last 24 hours) at 3/1/2024 1222  Last data filed at 3/1/2024 1215  Gross per 24 hour   Intake  868.33 ml   Output 2200 ml   Net -1331.67 ml     General: conscious, cooperative, in not acute distress  Eyes: conjunctivae pink, anicteric sclerae  ENT: lips and mucous membranes moist  Neck: supple, no JVD  Chest: clear breath sounds bilateral, no crackles, ronchus or wheezings  CVS: distinct S1 & S2, normal rate, regular rhythm  Abdomen: soft, non-tender, non-distended, normoactive bowel sounds  Extremities: +  edema of both legs  Skin: no rash  Neuro: awake, alert, oriented        Medications:    Current Facility-Administered Medications:     acetaminophen (TYLENOL) tablet 650 mg, 650 mg, Oral, Q8H [facility], Kip Cabello MD, 650 mg at 03/01/24 0604    ALPRAZolam (XANAX) tablet 0.5 mg, 0.5 mg, Oral, HS PRN, Kip Cabello MD    amiodarone tablet 200 mg, 200 mg, Oral, Daily With Breakfast, Yuniel Mcgee PA-C, 200 mg at 03/01/24 0831    aspirin chewable tablet 81 mg, 81 mg, Oral, Daily, Yuniel Mcgee PA-C, 81 mg at 03/01/24 0831    atorvastatin (LIPITOR) tablet 80 mg, 80 mg, Oral, After Dinner, Yuniel Mcgee PA-C, 80 mg at 02/29/24 1742    ceFAZolin (ANCEF) IVPB (premix in dextrose) 2,000 mg 50 mL, 2,000 mg, Intravenous, Q8H, Zach Medina MD, Last Rate: 100 mL/hr at 03/01/24 1134, 2,000 mg at 03/01/24 1134    clopidogrel (PLAVIX) tablet 75 mg, 75 mg, Oral, Daily, Yuniel Mcgee PA-C, 75 mg at 03/01/24 0831    Diclofenac Sodium (VOLTAREN) 1 % topical gel 2 g, 2 g, Topical, 4x Daily, Kip Cabello MD, 2 g at 02/28/24 1133    heparin (porcine) subcutaneous injection 5,000 Units, 5,000 Units, Subcutaneous, Q8H [facility], Kip Cabello MD, 5,000 Units at 03/01/24 0601    hydrALAZINE (APRESOLINE) tablet 75 mg, 75 mg, Oral, Q8H [facility], Aaron Capps MD, 75 mg at 03/01/24 0604    insulin glargine (LANTUS) subcutaneous injection 38 Units 0.38 mL, 38 Units, Subcutaneous, HS, Kip Cabello MD, 38 Units at 02/29/24 2126    insulin lispro (HumaLOG) 100 units/mL subcutaneous injection 1-5 Units, 1-5  Units, Subcutaneous, 4x Daily (AC & HS), 1 Units at 03/01/24 1134 **AND** Fingerstick Glucose (POCT), , , 4x Daily AC and at bedtime, Yuniel Mcgee PA-C    insulin lispro (HumaLOG) 100 units/mL subcutaneous injection 7 Units, 7 Units, Subcutaneous, TID With Meals, Yuniel Mcgee PA-C, 7 Units at 03/01/24 1134    isosorbide dinitrate (ISORDIL) tablet 20 mg, 20 mg, Oral, TID after meals, Nash Samuels DO, 20 mg at 03/01/24 1135    melatonin tablet 3 mg, 3 mg, Oral, HS, Kip Cabello MD, 3 mg at 02/29/24 2118    methocarbamol (ROBAXIN) tablet 500 mg, 500 mg, Oral, Q6H PRN, Kip Cabello MD    metoprolol succinate (TOPROL-XL) 24 hr tablet 50 mg, 50 mg, Oral, Q12H, Lamont Murcia MD    oxyCODONE (ROXICODONE) IR tablet 5 mg, 5 mg, Oral, Q6H PRN, Yuniel Mcgee PA-C, 5 mg at 02/24/24 1706    polyethylene glycol (MIRALAX) packet 17 g, 17 g, Oral, Daily, Yuniel Mcgee PA-C, 17 g at 02/28/24 0830    saccharomyces boulardii (FLORASTOR) capsule 250 mg, 250 mg, Oral, BID, Yuniel Mcgee PA-C, 250 mg at 02/29/24 1743    senna-docusate sodium (SENOKOT S) 8.6-50 mg per tablet 2 tablet, 2 tablet, Oral, BID, Yuniel Mcgee PA-C, 2 tablet at 02/28/24 1745    torsemide (DEMADEX) tablet 20 mg, 20 mg, Oral, Daily, Lamont Murcia MD, 20 mg at 03/01/24 0831    Invasive Devices:   Urethral Catheter Latex 16 Fr. (Active)   Reasons to continue Urinary Catheter  Acute urinary retention/obstruction failing urinary retention protocol 02/27/24 0845   Goal for Removal Voiding trial when ambulation improves 02/27/24 0845   Site Assessment Clean;Skin intact 02/27/24 0845   Escobar Care Done 02/26/24 2100   Collection Container Standard drainage bag 02/27/24 0845   Securement Method Securing device (Describe) 02/27/24 0845   Output (mL) 800 mL 02/27/24 0901       Lab Results:   Results from last 7 days   Lab Units 03/01/24  0603 02/29/24  0541 02/28/24  0523 02/27/24  0443   WBC Thousand/uL 9.95 8.42 7.64 7.54  "  HEMOGLOBIN g/dL 10.1* 9.4* 9.3* 9.3*   HEMATOCRIT % 33.2* 31.0* 29.5* 29.8*   PLATELETS Thousands/uL 354 363 379 383   SODIUM mmol/L 138 138 139 138   POTASSIUM mmol/L 4.2 3.8 3.6 3.2*   CHLORIDE mmol/L 99 99 98 97   CO2 mmol/L 29 29 31 33*   BUN mg/dL 49* 52* 46* 47*   CREATININE mg/dL 1.76* 1.80* 1.96* 1.88*   CALCIUM mg/dL 8.7 8.4 8.3* 8.3*   MAGNESIUM mg/dL 2.1 2.0  --  2.1   PHOSPHORUS mg/dL 4.6* 4.5*  --   --    ALK PHOS U/L 43 38 40  --    ALT U/L <3* <3* <3*  --    AST U/L 17 18 17  --            Portions of the record may have been created with voice recognition software. Occasional wrong word or \"sound a like\" substitutions may have occurred due to the inherent limitations of voice recognition software. Read the chart carefully and recognize, using context, where substitutions have occurred.If you have any questions, please contact the dictating provider.  "

## 2024-03-01 NOTE — PROGRESS NOTES
Progress Note - Advanced Heart Failure Team  Nadir Myers 63 y.o. male MRN: [id number]  Unit/Bed#: [facility] 519-01 Encounter: [id number]    Subjective    States breathing is stable. No fevers, chest pain, dizziness, or palpitations. Urinating well. Reports leg swelling is back to baseline. Wishes to go home.     Objective   Vitals: Blood pressure 142/75, pulse 76, temperature 97.5 °F (36.4 °C), resp. rate 18, height 6' (1.829 m), weight 105 kg (231 lb 0.7 oz), SpO2 95%.  Orthostatic Blood Pressures      Flowsheet Row Most Recent Value   Blood Pressure 142/75 filed at 03/01/2024 1045   Patient Position - Orthostatic VS Lying filed at 02/28/2024 1525              Invasive Devices       Peripherally Inserted Central Catheter Line  Duration             PICC Line 02/22/24 Right Basilic 8 days                    Physical Exam  Constitutional:       General: He is not in acute distress.  Cardiovascular:      Rate and Rhythm: Normal rate and regular rhythm.   Pulmonary:      Effort: Pulmonary effort is normal.   Musculoskeletal:      Right lower leg: No edema.      Left lower leg: No edema.   Skin:     General: Skin is warm.   Neurological:      Mental Status: He is alert.         Lab Results: I have personally reviewed pertinent lab results.            Results from last 7 days   Lab Units 03/01/24  0603 02/29/24  0541 02/28/24  0523   POTASSIUM mmol/L 4.2 3.8 3.6   CO2 mmol/L 29 29 31   CHLORIDE mmol/L 99 99 98   BUN mg/dL 49* 52* 46*   CREATININE mg/dL 1.76* 1.80* 1.96*     Results from last 7 days   Lab Units 03/01/24  0603 02/29/24  0541 02/28/24  0523   HEMOGLOBIN g/dL 10.1* 9.4* 9.3*   HEMATOCRIT % 33.2* 31.0* 29.5*   PLATELETS Thousands/uL 354 363 379                   Imaging: I have personally reviewed pertinent reports.    ECHO:  Results for orders placed during the hospital encounter of 09/26/17    Echo complete with contrast if indicated    Narrative  [facility]  [address]  [place], [place] [address] [phone]    Transthoracic Echocardiogram  2D, M-mode, Doppler, and Color Doppler    Study date:  26-Sep-2017    Patient: EAGLE REBOLLAR  MR number: [id number]  Account number: [id number]  : 1960  Age: 57 years  Gender: Male  Status: Outpatient  Location: [address] [place] [address]  Height: 72 in  Weight: 263.3 lb  BP: 142/ 88 mmHg    Indications: CAD    Diagnoses: I25.10 - Atherosclerotic heart disease of native coronary artery without angina pectoris    Sonographer:  OLIVE Benoit  Primary Physician:  Paty Ortiz MD  Referring Physician:  Eagle Chavez MD  Group:  [facility]  Interpreting Physician:  Torrey Duenas MD    SUMMARY    LEFT VENTRICLE:  Size was normal.  Systolic function was normal. Ejection fraction was estimated to be 65 %.  There was mild concentric hypertrophy.  Doppler parameters were consistent with abnormal left ventricular relaxation (grade 1 diastolic dysfunction).    RIGHT VENTRICLE:  The size was normal.  Systolic function was normal.    TRICUSPID VALVE:  There was trace regurgitation.    HISTORY: PRIOR HISTORY: Hypertension, CAD s/p PCI, smoker, diabetes, high cholesterol    PROCEDURE: The study was performed in the [address] [place] [address]. This was a routine study. The transthoracic approach was used. The study included complete 2D imaging, M-mode, complete spectral Doppler, and color Doppler. The  heart rate was 112 bpm, at the start of the study. Images were obtained from the parasternal, apical, subcostal, and suprasternal notch acoustic windows. Echocardiographic views were limited due to decreased penetration. This was a  technically difficult study.    LEFT VENTRICLE: Size was normal. Systolic function was normal. Ejection fraction was estimated to be 65 %. There were no regional wall motion abnormalities. Wall thickness was mildly increased. There was mild concentric  hypertrophy.  DOPPLER: Doppler parameters were consistent with abnormal left ventricular relaxation (grade 1 diastolic dysfunction).    RIGHT VENTRICLE: The size was normal. Systolic function was normal. Wall thickness was normal.    LEFT ATRIUM: Size was normal.    RIGHT ATRIUM: Size was normal.    MITRAL VALVE: Valve structure was normal. There was normal leaflet separation. DOPPLER: The transmitral velocity was within the normal range. There was no evidence for stenosis. There was no regurgitation.    AORTIC VALVE: The valve was trileaflet. Leaflets exhibited normal thickness and normal cuspal separation. DOPPLER: Transaortic velocity was within the normal range. There was no evidence for stenosis. There was no regurgitation.    TRICUSPID VALVE: The valve structure was normal. There was normal leaflet separation. DOPPLER: The transtricuspid velocity was within the normal range. There was no evidence for stenosis. There was trace regurgitation. The tricuspid jet  envelope definition was inadequate for estimation of RV systolic pressure. There are no indirect findings suggestive of moderate or severe pulmonary hypertension.    PULMONIC VALVE: Leaflets exhibited normal thickness, no calcification, and normal cuspal separation. DOPPLER: The transpulmonic velocity was within the normal range. There was no regurgitation.    PERICARDIUM: There was no pericardial effusion. The pericardium was normal in appearance.    AORTA: The root exhibited normal size.    SYSTEMIC VEINS: IVC: The inferior vena cava was normal in size and course. Respirophasic changes were normal.    SYSTEM MEASUREMENT TABLES    2D  %FS: 47.6 %  AV Diam: 3.34 cm  EDV(Teich): 101.71 ml  EF Biplane: 67.76 %  EF(Cube): 85.61 %  EF(Teich): 79 %  ESV(Cube): 14.82 ml  ESV(Teich): 21.36 ml  IVSd: 1.58 cm  LA Area: 12.78 cm2  LA Diam: 3.37 cm  LVEDV MOD A2C: 49.73 ml  LVEDV MOD A4C: 61.78 ml  LVEDV MOD BP: 58.1 ml  LVEF MOD A2C: 65.64 %  LVEF MOD A4C: 71.2  %  LVESV MOD A2C: 17.09 ml  LVESV MOD A4C: 17.79 ml  LVESV MOD BP: 18.73 ml  LVIDd: 4.69 cm  LVIDs: 2.46 cm  LVLd A2C: 6.97 cm  LVLd A4C: 7.74 cm  LVLs A2C: 5.23 cm  LVLs A4C: 6.13 cm  LVPWd: 1.23 cm  RA Area: 14.52 cm2  RV Diam.: 2.86 cm  SI(Cube): 36.73 ml/m2  SI(Teich): 33.48 ml/m2  SV MOD A2C: 32.64 ml  SV MOD A4C: 43.98 ml  SV(Cube): 88.15 ml  SV(Teich): 80.35 ml    MM  TAPSE: 3.26 cm    PW  E': 0.08 m/s    [facility]    Prepared and electronically signed by    Torrey Duenas MD  Signed 26-Sep-2017 12:55:39    Results for orders placed during the hospital encounter of 01/31/24    Echo complete w/ contrast if indicated    Interpretation Summary    Left Ventricle: Left ventricular cavity size is normal. Wall thickness is moderately increased. There is moderate concentric hypertrophy. The left ventricular ejection fraction is 42%. Systolic function is moderately reduced. Diastolic function is mildly abnormal, consistent with grade I (abnormal) relaxation. 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 There is no thrombus.    The following segments are akinetic: apical anterior, apical septal, apical inferior, apical lateral and apex.    All other segments are normal.    Tricuspid Valve: There is mild regurgitation.      GILL:  No results found for this or any previous visit.    Results for orders placed during the hospital encounter of 01/31/24    GILL    Interpretation Summary  Images from the original result were not included.      Left Ventricle: Left ventricular cavity size is normal. Wall thickness is normal. The left ventricular ejection fraction is 45%. Systolic function is mildly reduced. There is hypokinesis with akinesis of the aneurysmal portion of the apex.    Right Ventricle: Right ventricular cavity size is normal. Systolic function is normal.    Right Atrium: There is a 1.8 x 1.1 cm mobile mass affixed to the device lead as it passes through the right atrium.    Aortic  Valve: The aortic valve is trileaflet. The leaflets are mildly thickened. The leaflets are not calcified. The leaflets exhibit normal mobility. There is Lambl's excrescence on the tip of the right coronary aortic valve leaflet.    There is a 1.8 x 1.1 cm mobile mass affixed to the cardiac device lead as is passes through the right atrium.  In the setting of bacteremia this is most likely represents infection.    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 prevention Medtronic dual chamber ICD (5/2023) complicated by device infection requiring explant    Septic shock (resolved) with left toe gangrene / staph aureus bacteremia    LINDEN likely 2/2 to sepsis-related ATN, recent procedures    Coronary artery disease with  mLAD and LPDA     LUE weakness / facial droop     LV apical aneurysm     Peripheral arterial disease     Hypertension    History of orthostatic hypotension     DM Type 2     63 year old male with chronic heart failure with mid-range EF being followed by heart failure outpatient presented with Staph bacteremia and septic shock complicating a lower extremity wound infection. Vegetation noted on ICD lead, prompting lead extraction. Now stable from infectious standpoint with  reimplantation of subcutaneous ICD, however with inability to obtain proper subcutaneous lead placement.    Patient now appears euvolemic.  Net negative -2.2L on Furosemide IV 60 x 1 and Torsemide 20mg PO yesterday.    Creatinine remains stable to gradually improving. Potassium 4.2    Stable for discharge on Torsemide 20mg daily.     Unable to start SGLT-2, MRA, and ARB/ARNi due to renal impairment.    Continue Metoprolol Succinate 50 mg BID  Continue Isordil 20mg TID / Hydralazine 75mg TID    Continue dual antiplatelet therapy with high intensity statin.  Continue amiodarone for VT suppression.    Plan for  LifeVest on discharge and implantation of traditional [place] S-ICD as outpatient per EP.     Will arrange outpatient HF follow up within 72 hours of discharge. Counseled on weight monitoring at home.     Lamont Murcia MD  Cardiology Fellow

## 2024-03-01 NOTE — PROGRESS NOTES
Progress Note - Infectious Disease   Nadir Myers 63 y.o. male MRN: [id number]  Unit/Bed#: [facility] 519-01 Encounter: [id number]      Impression/Plan:    Sepsis, present on arrival; fever, leukocytosis  -Source is bacteremia with ICD infection and patients foot infection as below. No other clear source. Had intermittent fevers since 2/10, though blood cultures clear on 2/12 and 2/16. CT C/A/P 2/15 notable for pulmonary septic emboli and gas/fluid collection at site of ICD removal, though no other infectious source and EP felt ICD site findings were post operative. Podiatry feels foot is healing well. Patient without any localizing symptoms. Now afebrile since 2/18, WBC normal since 2/24. Persistent fevers may have been reactive to lung findings as below.   -Antibiotic as below  -Trend fever curve     Persistent MSSA bacteremia with ICD infection  -Likely initial source was patients foot wound as below with subsequent ICD infection. Blood cultures positive on 1/31-2/08 despite being on Cefazolin, suspect due to ICD infection. GILL 2/07 found a mobile mass on device lead in right atrium, concerning for infection. No valve vegetations. CT scans from 2/15 and 2/07 negative for abscess though suspicious for pulmonary septic emboli. ICD extracted on 2/08 with lead culture that grew MSSA. Only 1 of 2 blood cultures positive on 2/09, blood cultures 2/12 and 2/16 negative. Extravascular ICD placed 2/20, though lead was in left pleural space and repositioning attempts on 2/21 unsuccessful. Plan now is to reimplant at later date after patient has healed and discharge with Life Vest.  -Continue IV Cefazolin 2g every 8 hours   -Will need 6 weeks of IV antibiotic from extraction and first negative blood cultures, through 3/25/2024; script left in chart for CM  -PICC to be removed on 3/26/2024  -Will need weekly CBC + diff, serum Cr as outpatient and ID follow up; will message office for follow up needs   -Will need surveillance  blood cultures X2 around 4/08/2024  -Follow up with Cardiac surgery and EP to determine future date for ICD reimplantation  -No contraindications to discharge from ID standpoint when otherwise medically ready    3. Abnormal CT chest  -CT 2/07 noted small consolidations in upper lobes and left lower lobe, with enhancing lesion in perieprhal of right lower lobe. Repeat CT 2/15 with progressive left lower lobe infiltrate, new subpleural nodular airspace focus, with resolving foci at anterior left upper lobe and inferior right lower lobe. Suspect due to septic emboli. CT 2/21 with peripheral left lower lobe opacity showing reverse halo sign now, read as possible pulmonary infarct. VQ scan indeterminate for PE.   -Plan for prolonged course of antibiotic as above  -Monitor for any development of respiratory symptoms  -Would plan to repeat CT chest as outpatient in 4 weeks to ensure resolution    4. 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 Culture polymicrobial of tissue with MSSA, Finegoldia and Anaerococccus. Likely source of patient's bacteremia. Surgical cure felt to be obtained per Podiatry. Per follow up on 2/15, amputation site stable with intact sutures and no evidence of dehiscence or ischemia.   -Ongoing local wound care per Podiatry.     5. Staph epidermidis in 1 of 2 blood cultures  -1 set from 2/03 with Staph epi, did not grow in prior blood cultures from 1/31 or subsequent blood cultures. Suspect this was skin contaminant and as above MSSA is true pathogen.  -No further workup at this time    6. LINDEN  -Developing over admission. Baseline Cr of <1,  peaked at 2.36. May be related to IV contrast, cardiorenal. Consideration for Staphylococcus related GN. CrCl stable above 30 at the moment. Nephrology following. Serum Cr now improving, 1.76.  -Renally adjust Cefazolin if CrCl drops to < 30  -Weekly serum Cr while on antibiotic  -Nephrology recs    Plan and recommendations were discussed  with primary team. They agree with continuing IV Cefazolin for 6 week course.    Antibiotics:  Cefazolin    Subjective:  Denies fever, chills, nausea, cough, SOB, diarrhea, back pain.    Objective:  Vitals:  Temp:  [97.2 °F (36.2 °C)-98 °F (36.7 °C)] 97.5 °F (36.4 °C)  HR:  [72-85] 74  Resp:  [16-18] 18  BP: (105-149)/(57-82) 149/82  SpO2:  [94 %-98 %] 94 %  Temp (24hrs), Av.7 °F (36.5 °C), Min:97.2 °F (36.2 °C), Max:98 °F (36.7 °C)  Current: Temperature: 97.5 °F (36.4 °C)    Physical Exam:   General Appearance:  Alert, interactive, nontoxic, no acute distress.   Throat: Oropharynx moist without lesions.    Lungs:   Clear to auscultation bilaterally; no wheezes, rhonchi or rales; respirations unlabored   Heart:  RRR   Abdomen:   Soft, non-tender     Extremities: Left foot with dressing intact   Skin: No new rashes or lesions. Chest wall bandage at ICD extraction site intact       Labs:   All pertinent labs and imaging studies were personally reviewed  Results from last 7 days   Lab Units 24  0603 24  0541 24  0523   WBC Thousand/uL 9.95 8.42 7.64   HEMOGLOBIN g/dL 10.1* 9.4* 9.3*   PLATELETS Thousands/uL 354 363 379     Results from last 7 days   Lab Units 24  0603 24  0541 24  0523   SODIUM mmol/L 138 138 139   POTASSIUM mmol/L 4.2 3.8 3.6   CHLORIDE mmol/L 99 99 98   CO2 mmol/L 29 29 31   BUN mg/dL 49* 52* 46*   CREATININE mg/dL 1.76* 1.80* 1.96*   EGFR ml/min/1.73sq m 40 39 35   CALCIUM mg/dL 8.7 8.4 8.3*   AST U/L 17 18 17   ALT U/L <3* <3* <3*   ALK PHOS U/L 43 38 40                         Micro:          Imaging:          Zach Medina MD  [facility]

## 2024-03-01 NOTE — CASE MANAGEMENT
Case Management Discharge Note    Patient name Nadir Myers  Location [facility] 519/[facility] 519-01 MRN [id number]  : 1960 Date 3/1/2024       Current Admission Date: 2024  Current Admission Diagnosis:MSSA bacteremia      LOS (days): 30  Geometric Mean LOS (GMLOS) (days): 9.9  Days to GMLOS:-20     OBJECTIVE:  Risk of Unplanned Readmission Score: 30.71   Current admission status: Inpatient   Primary Insurance: [employer] [facility] REP    DISCHARGE DETAILS:  Discharge planning discussed with:: Patient  Freedom of Choice: Yes  CM contacted family/caregiver?: Yes  Were Treatment Team discharge recommendations reviewed with patient/caregiver?: Yes  Did patient/caregiver verbalize understanding of patient care needs?: Yes  Were patient/caregiver advised of the risks associated with not following Treatment Team discharge recommendations?: Yes    Contacts  Patient Contacts: Rhoda Myers (pt's wife)  Relationship to Patient:: Family  Contact Method: Phone  Phone Number: [phone]  Reason/Outcome: Emergency Contact    Requested Home Health Care         Is the patient interested in HHC at discharge?: Yes  Home Health Discipline requested:: Nursing, Physical Therapy  Home Health Agency Name:: [facility] Follow-Up Provider:: PCP  Home Health Services Needed:: Administration of IV, IM or SC Medications, Post-Op Care and Assessment, Evaluate Functional Status and Safety, Gait/ADL Training, Strengthening/Theraputic Exercises to Improve Function  Homebound Criteria Met:: Requires the Assistance of Another Person for Safe Ambulation or to Leave the Home  Supporting Clincal Findings:: Limited Endurance    DME Referral Provided  Referral made for DME?: Yes  DME referral completed for the following items:: Walker, Bedside Commode, Other (Cardiac Lifevest device)  DME Supplier Name:: [employer], Other (Add supplier name in comments) ([place])    Treatment Team Recommendation: Home with Home Health Care  Discharge  Destination Plan:: Home with Home Health Care  Transport at Discharge : Family    Additional Comments: Pt is cleared for d/c by ABHISHEK Quigley. RN Sheeba Carreon was notified of pt's d/c order. Pt is accepted for services by both [facility] and [facility] agency for his aftercare plan. Pt has a same-day visit arranged for 8:00PM this night for SHIRA to initiate home infusion of his IV ABX. All of pt's IV ABX equipment and supplies were delivered by [facility] agency. Pt was also fitted for and received a Cardiac Lifevest device from [facility] [facility]. Pt also received both a Rolling Walker and Bedside Commode from [facility]. The pt and his wife Rhoda Myers were both informed of d/c. Wife will transport pt home later this day; pickup time is TBD. IMM signed by pt on 2/28/24. No further CM needs.

## 2024-03-01 NOTE — DISCHARGE SUMMARY
[facility]  Discharge- Nadir Myers 1960, 63 y.o. male MRN: [id number]  Unit/Bed#: [facility] 519-01 Encounter: [id number]  Primary Care Provider: Tigre Hare DO   Date and time admitted to hospital: 1/31/2024  9:37 AM    * MSSA bacteremia  Assessment & Plan  Persistent MSSA bacteremia most likely source foot wound, complicated with ICD infection  Blood cultures from 1/31-2/9  positive despite being on Ancef suspect due to ICD infection  GILL with evidence of mobile mass on ICD lead in the right atrium, concerning for infection, no valvular vegetation  S/p ICD removal with EP on 2/8  CT chest 2/7 and 2/15 with possible septic pulmonary emboli  Repeat Blood cultures from 2/12 negative, repeat blood cultures 2/16 negative   Continue IV Ancef, with plan to treat 6 weeks until 3/25/2024   PICC placed 2/22    Gangrene of toe of left foot (HCC)  Assessment & Plan  Appreciate podiatry input -> status post left fourth toe amputation on 1/31  [place] to be surgical cure per podiatry  Wound care  Antibiotics per ID    Acute on chronic systolic CHF (congestive heart failure) (HCC)  Assessment & Plan  Diuretics per HF team.  On IV Lasix 60 mg twice daily and metolazone  isordil 20 mg TID and hydralazine to 50 mg TID       Pleuritic chest pain  Assessment & Plan  Pleuritic pain in left lower chest wall since 2/20. Tender to touch. Suspect secondary to ICD manipulation +/- septic pulmonary emboli  EKG with no acute ischemic changes.  Voltaren gel/lidocaine patch/prn pain meds. Improving    ICD (implantable cardioverter-defibrillator) in place  Assessment & Plan  Patient had extraction of ICD  Patient underwent AV ICD placement on February 20, 2024  Patient had lead dislodgment leading to repeat procedure on February 21, 2024  During the procedure patient's lead had poor sensing and therefore could not be positioned under the sternum and pocket was closed.  Multiple location under the sternum  "where attempted.   Will plan for LifeVest on discharge and then discuss S-ICD in office.     LINDEN (acute kidney injury) (Formerly [facility])  Assessment & Plan  Likely ATN  Monitor with lasix  Hold jardiance and aldactone  Nephrology and cardiology following    Abnormal CT of the chest  Assessment & Plan  CT 2/7 shows small consolidation in the upper lobes and left lower lobe with enhancing lesion in the peripheral of right lower lobe, may be embolic versus hematogenous seeding from high-grade bacteremia.  there is no evident of pulmonary embolism to suggest that this is an infarct.   Repeat CT 2/15 revealed possible septic emboli, gas and fluid subcutaneous collection in the pacemaker bed likely postsurgical, reactive mild mediastinal lymphadenopathy.  CT chest without contrast  2/21: Peripheral left lower lobe opacity with reverse halo sign appearance. This can be seen in the setting of pulmonary infarct   Doppler US is negative for DVT  VQ Scan is intermediate for PE  TTE 2/21 with normal right ventricle function  Unable to obtain PE study to confirm/rule out given LINDEN. Suspicion remains higher for septic embolism. Given hemodynamic stability with only 1L NC oxygen requirement due to volume overload, would hold off on AC for now.   Appreciate pulmonology input    LUE weakness  Assessment & Plan  Presented w/ transient left-sided weakness and a facial droop now resolved  Appreciate neurology input deeming symptoms likely secondary to septic shock/infection and hyperglycemia, as neurologic workup including CT/MRI imaging negative for evidence of stroke or intracranial vessel occlusion, but noted chronic microangiopathic changes -> radiology report did however mention: \"Moderate bilateral supraclinoid ICA and moderate to severe left cavernous ICA segment stenosis.\"  Continue dual endplate therapy with ASA/Plavix - c/w statin  PT/OT as tolerated    PAD (peripheral artery disease) (Formerly [facility])  Assessment & Plan  Continue dual antiplatelet " therapy with ASA/Plavix - continue statin      Coronary artery disease involving native coronary artery of native heart without angina pectoris  Assessment & Plan  History of coronary artery disease status post HANNA to left circumflex 2015, repeat cath 2023 with  of mid LAD and L PDA.  Ischemic cardiomyopathy  Continue aspirin/Plavix/statin/metoprolol    V-tach (Formerly [facility])  Assessment & Plan  History of monomorphic VT status post secondary prevention with [employer] dual-chamber ICD 5/2023  Status post ICD extraction 2/8/2024  Lifevest on dc    Type 2 diabetes mellitus, with long-term current use of insulin (Formerly [facility])  Assessment & Plan  Lab Results   Component Value Date    HGBA1C 9.8 (H) 01/31/2024     Continue to adjust basal/prandial insulin. BG goal 140-180  Hypoglycemia protocol  Carbohydrate restriction  Continue to adjust        Medical Problems       Resolved Problems  Date Reviewed: 3/1/2024            Resolved    LINDEN (acute kidney injury) (Formerly [facility]) 2/11/2024     Resolved by  Robyn Feldman DO    Overview Deleted 5/3/2023  2:54 PM by Nash Zarate            Hyponatremia 2/25/2024     Resolved by  Kip Cabello MD    Septic shock (Formerly [facility]) 2/26/2024     Resolved by  Kip Cabello MD    Electrolyte abnormalities 2/26/2024     Resolved by  Kip Cabello MD    Constipation 2/26/2024     Resolved by  Kip Cabello MD        Discharging Physician / Practitioner: Gilbert Quigley MD  PCP: Tigre Hare DO  Admission Date:   Admission Orders (From admission, onward)       Ordered        01/31/24 1316  INPATIENT ADMISSION  Once                          Discharge Date: 03/01/24    Consultations During Hospital Stay:  Neurology  Podiatry  PMR  Infectious disease  Electrophysiology  Cardiac surgery  Heart failure  Nephrology  Pulmonology  Behavioral health    Procedures Performed:   ICD removal  Fourth digit amputation on left foot    Significant Findings / Test Results:   See below    Incidental Findings:   CT chest wo  contrast: Left abdominal wall pacemaker device with lead tip extending into the left pleural space., Peripheral left lower lobe opacity with reverse halo sign appearance. This can be seen in the setting of pulmonary infarct and consideration for dedicated PE protocol CT suggested., Left lung nodules again identified, given the rapid appearance these are likely infectious in etiology despite the absence of cavitation septa emboli cannot be entirely excluded., Small bilateral pleural effusions., I personally discussed this study with Dr. Cabello on 2/21/2024 12:18 PM., Workstation performed: [id number]   NM lung ventilation / perfusion: The probability for pulmonary embolus is intermediate., The study was marked in EPIC for immediate notification., Resident: RENATA Medina, the attending radiologist, have reviewed the images and agree with the final report above., Workstation performed: [id number]  I reviewed the above mentioned incidental findings with the patient and/or family and they expressed understanding.    Test Results Pending at Discharge (will require follow up):   None     Outpatient Tests Requested:  None    Complications:  None    Reason for Admission: Severe sepsis    Hospital Course:   aNdir Myers is a 63 y.o. male patient who originally presented to the hospital on 1/31/2024 due to severe sepsis due to wound on patient's foot.  Patient was stabilized in the intensive care unit.  Once source was identified as a left fourth toe gangrene podiatry performed a partial fourth ray with closure.  Infectious disease was consulted and optimized antibiotic regimen for the patient.  Unfortunately patient was found to be bacteremic and because he had an ICD placed he also got infected.  Patient had to have ICD removed.  Heart failure team optimize patient's cardiac function and diuresed the patient.  Also placed him on appropriate goal directed medical therapy.  Patient was educated on how to use LifeVest  by JAZZ.  Patient will have home visiting nurse assist with IV antibiotics.  Spoke to patient's wife and patient extensively today and they both feel comfortable to go home.      Please see above list of diagnoses and related plan for additional information.     Condition at Discharge: stable    Discharge Day Visit / Exam:   Subjective: Pleasant 63-year-old gentleman who was seen evaluated today at bedside.  Patient was initially uncomfortable going home as he did not know how to use LifeVest.  Contacted [facility] and had them reeducate patient.  Vitals: Blood Pressure: 136/76 (03/01/24 1450)  Pulse: 76 (03/01/24 1045)  Temperature: 97.5 °F (36.4 °C) (03/01/24 0724)  Temp Source: Oral (02/28/24 1525)  Respirations: 18 (03/01/24 1045)  Height: 6' (182.9 cm) (02/21/24 0947)  Weight - Scale: 105 kg (231 lb 0.7 oz) (03/01/24 0601)  SpO2: 95 % (03/01/24 1045)  Exam:   Physical Exam  Vitals reviewed.   HENT:      Head: Normocephalic.      Nose: No congestion.      Mouth/Throat:      Pharynx: No oropharyngeal exudate or posterior oropharyngeal erythema.   Eyes:      Conjunctiva/sclera: Conjunctivae normal.   Cardiovascular:      Rate and Rhythm: Normal rate and regular rhythm.      Heart sounds: Murmur heard.   Pulmonary:      Effort: Pulmonary effort is normal.   Abdominal:      General: Abdomen is flat.      Palpations: Abdomen is soft.   Musculoskeletal:      Right lower leg: Edema present.      Left lower leg: Edema present.   Skin:     General: Skin is warm and dry.   Neurological:      Mental Status: He is alert and oriented to person, place, and time. Mental status is at baseline.          Discussion with Family: Updated  (wife) at bedside.    Discharge instructions/Information to patient and family:   See after visit summary for information provided to patient and family.      Provisions for Follow-Up Care:  See after visit summary for information related to follow-up care and any pertinent home health  orders.      Mobility at time of Discharge:   Basic Mobility Inpatient Raw Score: 22  JH-HLM Goal: 7: Walk 25 feet or more  JH-HLM Achieved: 7: Walk 25 feet or more  HLM Goal achieved. Continue to encourage appropriate mobility.     Disposition:   Home with VNA Services (Reminder: Complete face to face encounter)    Planned Readmission: None     Discharge Statement:  I spent 60 minutes discharging the patient. This time was spent on the day of discharge. I had direct contact with the patient on the day of discharge. Greater than 50% of the total time was spent examining patient, answering all patient questions, arranging and discussing plan of care with patient as well as directly providing post-discharge instructions.  Additional time then spent on discharge activities.    Discharge Medications:  See after visit summary for reconciled discharge medications provided to patient and/or family.      **Please Note: This note may have been constructed using a voice recognition system**

## 2024-03-01 NOTE — ASSESSMENT & PLAN NOTE
Appreciate podiatry input -> status post left fourth toe amputation on 1/31  [place] to be surgical cure per podiatry  Wound care  Antibiotics per ID

## 2024-03-02 ENCOUNTER — HOME CARE VISIT (OUTPATIENT)
Dept: HOME HEALTH SERVICES | Facility: HOME HEALTHCARE | Age: 64
End: 2024-03-02
Payer: COMMERCIAL

## 2024-03-02 ENCOUNTER — TELEPHONE (OUTPATIENT)
Dept: OTHER | Facility: OTHER | Age: 64
End: 2024-03-02

## 2024-03-02 VITALS
OXYGEN SATURATION: 96 % | DIASTOLIC BLOOD PRESSURE: 50 MMHG | HEART RATE: 86 BPM | TEMPERATURE: 97.7 F | RESPIRATION RATE: 20 BRPM | SYSTOLIC BLOOD PRESSURE: 90 MMHG

## 2024-03-02 PROCEDURE — G0299 HHS/HOSPICE OF RN EA 15 MIN: HCPCS

## 2024-03-02 NOTE — TELEPHONE ENCOUNTER
[phone]/ Neha ( [facility] visiting nurse)/ Pt Nadir Myers / LUIS 1960/. Neha is calling requesting a call back to discuss pt low bp (80s/50s). Neha also stated pt just took bp meds and bp might be dropping even lower.

## 2024-03-04 ENCOUNTER — TELEPHONE (OUTPATIENT)
Dept: INFECTIOUS DISEASES | Facility: CLINIC | Age: 64
End: 2024-03-04

## 2024-03-04 ENCOUNTER — TELEPHONE (OUTPATIENT)
Age: 64
End: 2024-03-04

## 2024-03-04 ENCOUNTER — HOME CARE VISIT (OUTPATIENT)
Dept: HOME HEALTH SERVICES | Facility: HOME HEALTHCARE | Age: 64
End: 2024-03-04
Payer: COMMERCIAL

## 2024-03-04 VITALS — SYSTOLIC BLOOD PRESSURE: 142 MMHG | OXYGEN SATURATION: 98 % | DIASTOLIC BLOOD PRESSURE: 78 MMHG | HEART RATE: 78 BPM

## 2024-03-04 DIAGNOSIS — R78.81 MSSA BACTEREMIA: Primary | ICD-10-CM

## 2024-03-04 DIAGNOSIS — T82.7XXA: ICD-10-CM

## 2024-03-04 DIAGNOSIS — B95.61 MSSA BACTEREMIA: Primary | ICD-10-CM

## 2024-03-04 PROCEDURE — G0151 HHCP-SERV OF PT,EA 15 MIN: HCPCS

## 2024-03-04 NOTE — TELEPHONE ENCOUNTER
Called pt and scheduled HFU appt on 3/18/24 with Samina Flores in the [facility]. Made pt aware to complete labs prior to the appt. Pt understood with no further questions.

## 2024-03-04 NOTE — TELEPHONE ENCOUNTER
Caller: Nadir    Doctor/Office: Ward/Claudette     CB#: [phone]    Escalation: Appointment Patent needs a appt had sx with Dr Hopper in beginning of Feb.  Please advise

## 2024-03-04 NOTE — TELEPHONE ENCOUNTER
Called and spoke with patient today.   Went over antibiotic plan, weekly labs and follow up appointment.   Informed patient if there are any further questions or concerns to call the office.   Patient verbalizes understanding at this time.

## 2024-03-04 NOTE — PROGRESS NOTES
OPAT NOTE    Supervising/Discharge physician: Carol    Diagnosis: MSSA bacteremia with ICD infection     Drug: Cefazolin     Dose/Route/Frequency:[id number]    Labs/Frequency: CBCD Cre weekly      End Date: 3/25     Infusion/VNA contact: Alexey/THOMAS    Next appointment: 3/11        MA assigned: iMchelle

## 2024-03-04 NOTE — UTILIZATION REVIEW
NOTIFICATION OF ADMISSION DISCHARGE   This is a Notification of Discharge from [facility]. Please be advised that this patient has been discharge from our facility. Below you will find the admission and discharge date and time including the patient’s disposition.   UTILIZATION REVIEW CONTACT:  Giacomo Wallace  Utilization   Network Utilization Review Department  Phone: [phone] x carefully listen to the prompts. All voicemails are confidential.  Email: [email or web address]     ADMISSION INFORMATION  PRESENTATION DATE: 1/31/2024  9:37 AM  OBERVATION ADMISSION DATE:   INPATIENT ADMISSION DATE: 1/31/24  1:16 PM   DISCHARGE DATE: 3/1/2024  5:51 PM   DISPOSITION:Home with Home Health Care    Network Utilization Review Department  ATTENTION: Please call with any questions or concerns to [phone] and carefully listen to the prompts so that you are directed to the right person. All voicemails are confidential.   For Discharge needs, contact Care Management DC Support Team at [phone] opt. 2  Send all requests for admission clinical reviews, approved or denied determinations and any other requests to dedicated fax number below belonging to the campus where the patient is receiving treatment. List of dedicated fax numbers for the Facilities:  FACILITY NAME UR FAX NUMBER   ADMISSION DENIALS (Administrative/Medical Necessity) [phone]   DISCHARGE SUPPORT TEAM ([facility]) [phone]   PARENT CHILD HEALTH (Maternity/NICU/Pediatrics) [phone]   [facility] [phone]   [facility] [phone]   [facility] [phone]   [facility] [phone]   [facility] [phone]   [facility] [phone]   [facility] [phone]   [facility]  [phone]   [facility] [phone]   [facility] [phone]   [facility] [phone]   [facility] [phone]

## 2024-03-05 ENCOUNTER — OFFICE VISIT (OUTPATIENT)
Dept: CARDIOLOGY CLINIC | Facility: CLINIC | Age: 64
End: 2024-03-05

## 2024-03-05 ENCOUNTER — HOME CARE VISIT (OUTPATIENT)
Dept: HOME HEALTH SERVICES | Facility: HOME HEALTHCARE | Age: 64
End: 2024-03-05
Payer: COMMERCIAL

## 2024-03-05 VITALS
DIASTOLIC BLOOD PRESSURE: 58 MMHG | SYSTOLIC BLOOD PRESSURE: 104 MMHG | HEIGHT: 72 IN | BODY MASS INDEX: 32.1 KG/M2 | OXYGEN SATURATION: 97 % | HEART RATE: 79 BPM | WEIGHT: 237 LBS

## 2024-03-05 DIAGNOSIS — I25.10 CORONARY ARTERY DISEASE INVOLVING NATIVE CORONARY ARTERY OF NATIVE HEART WITHOUT ANGINA PECTORIS: ICD-10-CM

## 2024-03-05 DIAGNOSIS — Z95.810 ICD (IMPLANTABLE CARDIOVERTER-DEFIBRILLATOR) IN PLACE: ICD-10-CM

## 2024-03-05 DIAGNOSIS — I50.20 SYSTOLIC CONGESTIVE HEART FAILURE, UNSPECIFIED HF CHRONICITY (HCC): Primary | ICD-10-CM

## 2024-03-05 PROCEDURE — 99024 POSTOP FOLLOW-UP VISIT: CPT | Performed by: NURSE PRACTITIONER

## 2024-03-05 RX ORDER — ISOSORBIDE DINITRATE 20 MG/1
10 TABLET ORAL
Qty: 90 TABLET | Refills: 0 | Status: SHIPPED | OUTPATIENT
Start: 2024-03-05

## 2024-03-05 RX ORDER — TORSEMIDE 20 MG/1
10 TABLET ORAL DAILY
Qty: 30 TABLET | Refills: 3 | Status: SHIPPED | OUTPATIENT
Start: 2024-03-05

## 2024-03-05 RX ORDER — HYDRALAZINE HYDROCHLORIDE 25 MG/1
50 TABLET, FILM COATED ORAL EVERY 8 HOURS SCHEDULED
Qty: 90 TABLET | Refills: 0 | Status: SHIPPED | OUTPATIENT
Start: 2024-03-05 | End: 2024-03-12 | Stop reason: SDUPTHER

## 2024-03-05 NOTE — PATIENT INSTRUCTIONS
Weigh yourself daily  If you gain 3 lbs in one day or 5 lbs in one week, please call the office at [phone] and ask for a nurse or the heart failure nurse  Keep your sodium intake to <2 grams, (2000 mg) per day, and fluids <2 Liters (2000 ml) per day. This is around 6-7, 8 oz glasses of fluid per day    Reduce Torsemide to 10 mg, 1/2 pill per day  Restart Jardiance 10 mg daily  Repeat lab work on Thursday  Reduce Hydralazine to 50 mg three times daily  Reduce Isordil to 10 mg three times daily

## 2024-03-05 NOTE — PROGRESS NOTES
Outpatient Progress Note - [facility]   Nadir Myers 63 y.o. male MRN: [id number]  Encounter: [id number]    Assessment/Plan:  1. Persistent MSSA bacteremia with ICD infection. Recent admission with sepsis. Likely initial source was patients foot wound as below with subsequent ICD infection. Blood cultures positive on 1/31-2/08 despite being on Cefazolin, suspect due to ICD infection. GILL 2/07 found a mobile mass on device lead in right atrium, concerning for infection. No valve vegetations. CT scans from 2/15 and 2/07 negative for abscess though suspicious for pulmonary septic emboli. ICD extracted on 2/08 with lead culture that grew MSSA. Bacteremia eventually cleared. Extravascular ICD placed 2/20, though lead was in left pleural space and repositioning attempts on 2/21 unsuccessful. Plan now is to reimplant at later date after patient has healed and discharged with Life Vest. He remains on 6 week course of IV cefazolin from device extraction. Recent labs 3/7 show improved Cr 1.3, stable WBC and chronic anemia.   -Continue IV Cefazolin 2g every 8 hours through 3/25/2024   -Weekly CBC + diff, serum Cr  -Repeat blood cultures x 2 around week of 4/8   -Follow up with Cardiac surgery and EP to determine future date for ICD reimplantation  -D/C PICC after last dose of abx      2. Bilateral pulmonary septic emboli. CT 2/07 noted small consolidations in upper lobes and left lower lobe, with enhancing lesion in perieprhal of right lower lobe. Repeat CT 2/15 with progressive left lower lobe infiltrate, new subpleural nodular airspace focus, with resolving foci at anterior left upper lobe and inferior right lower lobe. Suspect due to septic emboli. CT 2/21 with peripheral left lower lobe opacity showing reverse halo sign now, read as possible pulmonary infarct. VQ scan indeterminate for PE.   -Plan for prolonged course of antibiotic as above  -Monitor for any development of respiratory  symptoms  -Repeat CT planned this week       3. Left foot 4th toe wet gangrene. Status post amputation on 1/31. Culture polymicrobial of tissue with MSSA, Finegoldia and Anaerococccus. Likely source of patient's bacteremia. Surgical cure felt to be obtained per Podiatry. Amputation site stable with intact sutures and no evidence of dehiscence or ischemia.   -Ongoing local wound care per Podiatry.     Above assessment and plan discussed in detail with patient and wife during examination.     Antibiotics:  IV cefazolin    Subjective:  Patient presents to outpatient ID office for ongoing management of complicated MSSA bacteremia and ICD infection. Patient was recently admitted to [facility]. Bacteremia likely due to left foot wet gangrene s/p successful toe amp. His bacteremia was difficult to clear and GILL showed device/lean infection. ICD was extracted and bacteremia eventually cleared on IV cefazolin. Unfortunately attempt at device reinsertion was unsuccessful and he now has a life vest. He is tolerating the IV cefazolin without missed doses. Wife is administering abx via picc. No difficulties with picc. No  n/v/d. No cp or SOB. No recent fevers. He feels improved. Will see cardiology again tomorrow. Repeat CT chest later this week.     Objective:    Vitals:   Vitals:    03/11/24 1436   BP: 126/72   Pulse: 91   Temp: (!) 97.1 °F (36.2 °C)   SpO2: 97%       Physical Exam:     General Appearance:  Alert, interactive, nontoxic, no acute distress. Ambulating with walker.    HEENT: Oropharynx moist without lesions.    Lungs:   Clear to auscultation bilaterally; no wheezes, rhonchi or rales; respirations unlabored   Heart:  RRR; no murmur. Life vest in place.    Abdomen:   Soft, non-tender, non-distended, positive bowel sounds. No palpable organomegaly.   Extremities: No clubbing, cyanosis or edema   Vascular: PICC line appears to be in proper position in RUE. Clean, dry and intact without evidence of surrounding erythema,  drainage.    Skin: No new rashes or lesions. No draining wounds noted.       Labs, Imaging, & Other studies:   All pertinent labs and imaging studies were personally reviewed by me from 3/7/24.    Results from last 7 days   Lab Units 03/07/24 2029   WBC Thousand/uL 8.95   HEMOGLOBIN g/dL 9.8*   PLATELETS Thousands/uL 255     Results from last 7 days   Lab Units 03/07/24 2029   SODIUM mmol/L 139   POTASSIUM mmol/L 4.6   CHLORIDE mmol/L 104   CO2 mmol/L 26   BUN mg/dL 42*   CREATININE mg/dL 1.30  1.30   EGFR ml/min/1.73sq m 58  58   CALCIUM mg/dL 8.6                       The following portions of the patient's history were reviewed and updated as appropriate: allergies, current medications, past family history, past medical history, past social history, past surgical history and problem list.

## 2024-03-05 NOTE — PATIENT INSTRUCTIONS
-Continue IV Cefazolin 2g every 8 hours through 3/25/2024   -Weekly CBC + diff, serum Cr  -Repeat blood cultures x 2 around week of 4/8   -remove PICC after last dose of antibiotic

## 2024-03-05 NOTE — PROGRESS NOTES
Heart Failure Outpatient Progress Note - Nadir Myers 63 y.o. male MRN: [id number]    @ Encounter: [id number]      Assessment/Plan:    Patient Active Problem List    Diagnosis Date Noted    Localized swelling on left hand 05/13/2023    Acidosis 05/06/2023    V-tach ([facility]) 05/04/2023    Ischemic cardiomyopathy 05/03/2023    Essential (primary) hypertension 05/02/2023    Acute HFrEF 05/02/2023    A-fib ([facility]) 05/02/2023    Benign prostatic hyperplasia without lower urinary tract symptoms 04/11/2018    3-vessel CAD 02/27/2015    Type 2 diabetes mellitus, with long-term current use of insulin ([facility]) 02/27/2015    GERD (gastroesophageal reflux disease) 06/28/2013    Tobacco dependence syndrome 01/18/2012    Pleuritic chest pain 02/22/2024    ICD (implantable cardioverter-defibrillator) in place 02/21/2024    LINDEN (acute kidney injury) ([facility]) 02/17/2024    Abnormal CT of the chest 02/09/2024    MSSA bacteremia 02/02/2024    Gangrene of toe of left foot ([facility]) 02/02/2024    LUE weakness 01/31/2024    Cellulitis of left foot 01/16/2024    Type 2 diabetes mellitus with foot ulcer, with long-term current use of insulin ([facility]) 01/16/2024    Coronary artery disease involving native coronary artery of native heart without angina pectoris 01/16/2024    PAD (peripheral artery disease) ([facility]) 01/16/2024    Acute on chronic systolic CHF (congestive heart failure) ([facility]) 08/02/2023        Chronic HFmrEF. ETIOLOGY: ischemic CMP with large apical aneurysm. Warm and euvolemic on exam. Will attempt to slowly reincorporate GDMT while weaning off Iso/Hydral  if renal function allows. Today, will add back Jardiance. Reduce Torsemide to 10 mg daily. Reduce Isordil to 10 mg Q8 and Hydralazine to 50 mg Q8 hr. May be able to stop all together at next visit and reinstate low dose Entresto. He has post hospital lab work this Thursday.     Weights :231 lbs, today 237 lbs,    Echo 2/21/24  EF 45%     Echo 2/1/24:  LVEF: 40%, akinetic segments  LVEDd:  5.1 cm  RV: normal     Echo 8/7/23:  LVEF: 40%  LVEDd: 4.1 cm  LV apex aneurysmal, no thrombus  RV: normal     LHC 5/3/23: chronically occluded mid LAD and LPDA c/w echo findings of apical aneurysm, not amenable to PCI  Patent mid LCx stent   LVEDP 30 mmHg     Echocardiogram 5/2/23  LVEF:  10-15%, apical aneurysm  LVIDd: 5.6 cm  RV: normal  MR: mild  PASP: 49 mmHg     Echo 9/26/17:  LVEF: 65%          VOLUME:  - home diuretic:  Transitioned to torsemide 10 mg daily    GDMT:  --Beta-Blocker: Metoprolol succinate 50 mg BID  --ACEi, ARB or ARNi: Entresto  (on hold for LINDEN)  --MRA: spironolactone 12.5 mg daily (on hold due to LINDEN / hyperkalemia)  --SGLT2-I: Jardiance 10 mg daily. Restart today  -- other: isordil 10 mg TID /  hydralazine 50 mg q8hr    DEVICE:  - MDT ICD 5/12/23 for secondary prevention  - now ICD explanted 2/8/24 for infection  - Extravascular ICD placed 2/20/24, though lead was in left pleural space and repositioning attempts on 2/21/24 unsuccessful.   - plan:  Lifevest as bridge to subcutaneous ICD as outpt        Staph bacteremia  - Felt most likely from foot infection.   ICD was involved and was extracted.  Possible pulmonary septic emboli.   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/24     CKD with LINDEN  - LINDEN felt due to ATN in setting of surgery/infection   - baseline Cr 1  - current Cr 2.3 --> 2.12 --> 1.88 --> 1.96 --> 1.80 --> 1.76  -has repeat BMP this Thursday  - outpatient nephrology f/u     CAD  - hx PCI  - on ASA, plavix, statin     hyperlipidemia  -atorvastatin 80 mg  9/16/23: LDL 51     Prior tobacco use  - quit     Ventricular tachycardia  - scar mediated  - toprol  - amiodarone 200 mg daily     DM2  - mgt per primary team     HPI:  63 year old male with chronic heart failure with mid-range EF being followed by heart failure outpatient presented with Staph bacteremia and septic shock complicating a lower extremity wound infection. Vegetation noted  on ICD lead, prompting lead extraction. Was stable from infectious standpoint with reimplantation of subcutaneous ICD, however with inability to obtain proper subcutaneous lead placement. During his hospital course patient developed LINDEN.  Unable to be restarted on his home GDMT due to this . Was started and uptitrated on Isordil and Hydralazine. Diuresed to euvolemia. Sent home on LifeVest as bridge to possible SQ AICD implant as outpatient.     Presents today with his wife. Feeling well. Says his BP has been running on the low side at home in the 100-110 range systolic. Feels very overwhelmed with the number of medications he is taking now. Hoping  to get back on his old regimen from PTA. Denies any CP, SOB, orthopnea or PND. His weight has been stable at home since discharge. Appetite normal. Wearing LifeVest.   Past Medical History:   Diagnosis Date    Diabetes mellitus (HCC)     Myocardial infarction (HCC)        12 point ROS negative other than that stated in HPI    Allergies   Allergen Reactions    Vancomycin Rash     NOT AN ALLERGY - 1/31/24 patient experienced vancomycin infusion reaction, please extend duration of infusion time to prevent future infusion reactions     .    Current Outpatient Medications:     ALPRAZolam (XANAX) 0.5 mg tablet, Take 1 tablet (0.5 mg total) by mouth daily at bedtime as needed for anxiety for up to 10 days (Patient not taking: Reported on 3/1/2024), Disp: 10 tablet, Rfl: 0    amiodarone 200 mg tablet, Take 1 tablet (200 mg total) by mouth daily with breakfast Do not start before March 2, 2024., Disp: 30 tablet, Rfl: 0    aspirin 81 mg chewable tablet, Chew 1 tablet (81 mg total) daily Do not start before March 2, 2024., Disp: 30 tablet, Rfl: 0    atorvastatin (LIPITOR) 80 mg tablet, TAKE 1 TABLET (80 MG TOTAL) BY MOUTH DAILY AFTER DINNER, Disp: 90 tablet, Rfl: 2    BD Pen Needle Micro U/F 32G X 6 MM MISC, USE 1 PEN EACH DAILY, Disp: , Rfl:     ceFAZolin (ANCEF) 2000 mg IVPB,  Inject 2,000 mg into a catheter in a vein over 30 minutes at 100 mL/hr every 8 (eight) hours for 24 days, Disp: 3600 mL, Rfl: 0    clopidogrel (PLAVIX) 75 mg tablet, Take 1 tablet (75 mg total) by mouth daily, Disp: 30 tablet, Rfl: 0    hydrALAZINE (APRESOLINE) 25 mg tablet, Take 3 tablets (75 mg total) by mouth every 8 (eight) hours, Disp: 90 tablet, Rfl: 0    insulin detemir (Levemir FlexPen) 100 Units/mL injection pen, Inject 20 Units under the skin daily Patient to take 30 units daily - if fasting bs >150 for 3 days to increase dose by 5 units. Once bs < 150 can go back to 30 units, Disp: , Rfl:     isosorbide dinitrate (ISORDIL) 20 mg tablet, Take 1 tablet (20 mg total) by mouth 3 (three) times daily after meals, Disp: 90 tablet, Rfl: 0    JANUMET -1000 MG TB24, daily , Disp: , Rfl:     melatonin 3 mg, Take 1 tablet (3 mg total) by mouth daily at bedtime (Patient not taking: Reported on 3/1/2024), Disp: 30 tablet, Rfl: 0    metFORMIN (GLUCOPHAGE) 1000 MG tablet, Take 1,000 mg by mouth daily with breakfast, Disp: , Rfl:     methocarbamol (ROBAXIN) 500 mg tablet, Take 1 tablet (500 mg total) by mouth every 6 (six) hours as needed for muscle spasms, Disp: 60 tablet, Rfl: 0    metoprolol succinate (TOPROL-XL) 50 mg 24 hr tablet, Take 1 tablet (50 mg total) by mouth every 12 (twelve) hours, Disp: 60 tablet, Rfl: 0    oxyCODONE (ROXICODONE) 5 immediate release tablet, Take 1 tablet (5 mg total) by mouth every 6 (six) hours as needed for moderate pain for up to 10 days Max Daily Amount: 20 mg (Patient not taking: Reported on 3/1/2024), Disp: 30 tablet, Rfl: 0    polyethylene glycol (MIRALAX) 17 g packet, Take 17 g by mouth daily Do not start before March 2, 2024., Disp: 30 each, Rfl: 0    saccharomyces boulardii (FLORASTOR) 250 mg capsule, Take 1 capsule (250 mg total) by mouth 2 (two) times a day, Disp: 60 capsule, Rfl: 0    senna-docusate sodium (SENOKOT S) 8.6-50 mg per tablet, Take 2 tablets by mouth 2 (two)  times a day, Disp: 60 tablet, Rfl: 0    Sodium Chloride Flush (Normal Saline Flush) 0.9 % SOLN, Inject 10 mL into a catheter in a vein every 8 (eight) hours. Indications: flushing piccline before and after iv antibiotics, Disp: , Rfl:     torsemide (DEMADEX) 20 mg tablet, Take 1 tablet (20 mg total) by mouth daily Do not start before March 2, 2024., Disp: 30 tablet, Rfl: 0    Social History     Socioeconomic History    Marital status: /Civil Union     Spouse name: Not on file    Number of children: Not on file    Years of education: Not on file    Highest education level: Not on file   Occupational History    Not on file   Tobacco Use    Smoking status: Every Day     Types: Cigarettes    Smokeless tobacco: Never    Tobacco comments:     X2 cigarettes/day   Vaping Use    Vaping status: Never Used   Substance and Sexual Activity    Alcohol use: Not Currently    Drug use: Never    Sexual activity: Not Currently     Partners: Female   Other Topics Concern    Not on file   Social History Narrative    Not on file     Social Determinants of Health     Financial Resource Strain: Not on file   Food Insecurity: No Food Insecurity (1/31/2024)    Hunger Vital Sign     Worried About Running Out of Food in the Last Year: Never true     Ran Out of Food in the Last Year: Never true   Transportation Needs: No Transportation Needs (1/31/2024)    PRAPARE - Transportation     Lack of Transportation (Medical): No     Lack of Transportation (Non-Medical): No   Physical Activity: Not on file   Stress: Not on file   Social Connections: Not on file   Intimate Partner Violence: Not on file   Housing Stability: Low Risk  (1/31/2024)    Housing Stability Vital Sign     Unable to Pay for Housing in the Last Year: No     Number of Places Lived in the Last Year: 1     Unstable Housing in the Last Year: No       No family history on file.    Physical Exam:    Vitals: /58 (BP Location: Left arm, Patient Position: Sitting, Cuff Size:  Large)   Pulse 79   Ht 6' (1.829 m)   Wt 108 kg (237 lb)   SpO2 97%   BMI 32.14 kg/m²     Wt Readings from Last 3 Encounters:   03/01/24 105 kg (231 lb 0.7 oz)   01/20/24 101 kg (223 lb 12.3 oz)   11/13/23 101 kg (222 lb)         GEN: Nadir Myers appears well, alert and oriented x 3, pleasant and cooperative   HEENT: pupils equal, round, and reactive to light; extraocular muscles intact  NECK: supple, no carotid bruits   HEART: regular rhythm, normal S1 and S2, no murmurs, clicks, gallops or rubs, JVP is flat   LUNGS: clear to auscultation bilaterally; no wheezes, rales, or rhonchi   ABDOMEN: normal bowel sounds, soft, no tenderness, no distention  EXTREMITIES: peripheral pulses normal; no clubbing, cyanosis, or edema  NEURO: no focal findings   SKIN: normal without suspicious lesions on exposed skin    Labs & Results:  Lab Results   Component Value Date    SODIUM 138 03/01/2024    K 4.2 03/01/2024    CL 99 03/01/2024    CO2 29 03/01/2024    BUN 49 (H) 03/01/2024    CREATININE 1.76 (H) 03/01/2024    GLUC 117 03/01/2024    CALCIUM 8.7 03/01/2024     Lab Results   Component Value Date    WBC 9.95 03/01/2024    HGB 10.1 (L) 03/01/2024    HCT 33.2 (L) 03/01/2024    MCV 92 03/01/2024     03/01/2024     Lab Results   Component Value Date    BNP 1,809 (H) 02/20/2024      Lab Results   Component Value Date    LDLCALC 72 05/07/2015     Lab Results   Component Value Date    TSH 3.10 04/14/2023         EKG personally reviewed by CARLA Leahy.   No results found for this visit on 03/05/24.     Counseling / Coordination of Care  Total face to face time spent with patient 20 minutes.  An additional 10 minutes was spent for chart/data review and visit preparation.       Thank you for the opportunity to participate in the care of this patient.    CARLA Lehay

## 2024-03-07 ENCOUNTER — HOME CARE VISIT (OUTPATIENT)
Dept: HOME HEALTH SERVICES | Facility: HOME HEALTHCARE | Age: 64
End: 2024-03-07
Payer: COMMERCIAL

## 2024-03-07 ENCOUNTER — TELEPHONE (OUTPATIENT)
Dept: PODIATRY | Facility: CLINIC | Age: 64
End: 2024-03-07

## 2024-03-07 ENCOUNTER — TELEPHONE (OUTPATIENT)
Age: 64
End: 2024-03-07

## 2024-03-07 ENCOUNTER — LAB REQUISITION (OUTPATIENT)
Dept: LAB | Facility: HOSPITAL | Age: 64
End: 2024-03-07
Payer: COMMERCIAL

## 2024-03-07 VITALS
TEMPERATURE: 97.6 F | WEIGHT: 235 LBS | BODY MASS INDEX: 31.87 KG/M2 | OXYGEN SATURATION: 98 % | SYSTOLIC BLOOD PRESSURE: 146 MMHG | RESPIRATION RATE: 24 BRPM | HEART RATE: 88 BPM | DIASTOLIC BLOOD PRESSURE: 80 MMHG

## 2024-03-07 DIAGNOSIS — R78.81 BACTEREMIA: ICD-10-CM

## 2024-03-07 DIAGNOSIS — I25.10 ATHEROSCLEROTIC HEART DISEASE OF NATIVE CORONARY ARTERY WITHOUT ANGINA PECTORIS: ICD-10-CM

## 2024-03-07 DIAGNOSIS — B95.61 METHICILLIN SUSCEPTIBLE STAPHYLOCOCCUS AUREUS INFECTION AS THE CAUSE OF DISEASES CLASSIFIED ELSEWHERE: ICD-10-CM

## 2024-03-07 DIAGNOSIS — T82.7XXA INFECTION AND INFLAMMATORY REACTION DUE TO OTHER CARDIAC AND VASCULAR DEVICES, IMPLANTS AND GRAFTS, INITIAL ENCOUNTER (HCC): ICD-10-CM

## 2024-03-07 DIAGNOSIS — I50.20 UNSPECIFIED SYSTOLIC (CONGESTIVE) HEART FAILURE (HCC): ICD-10-CM

## 2024-03-07 LAB
ANION GAP SERPL CALCULATED.3IONS-SCNC: 9 MMOL/L
BASOPHILS # BLD AUTO: 0.02 THOUSANDS/ÂΜL (ref 0–0.1)
BASOPHILS NFR BLD AUTO: 0 % (ref 0–1)
BNP SERPL-MCNC: 879 PG/ML (ref 0–100)
BUN SERPL-MCNC: 42 MG/DL (ref 5–25)
CALCIUM SERPL-MCNC: 8.6 MG/DL (ref 8.4–10.2)
CHLORIDE SERPL-SCNC: 104 MMOL/L (ref 96–108)
CO2 SERPL-SCNC: 26 MMOL/L (ref 21–32)
CREAT SERPL-MCNC: 1.3 MG/DL (ref 0.6–1.3)
CREAT SERPL-MCNC: 1.3 MG/DL (ref 0.6–1.3)
DME PARACHUTE DELIVERY DATE ACTUAL: NORMAL
DME PARACHUTE DELIVERY DATE REQUESTED: NORMAL
DME PARACHUTE ITEM DESCRIPTION: NORMAL
DME PARACHUTE ITEM DESCRIPTION: NORMAL
DME PARACHUTE ORDER STATUS: NORMAL
DME PARACHUTE SUPPLIER NAME: NORMAL
DME PARACHUTE SUPPLIER PHONE: NORMAL
EOSINOPHIL # BLD AUTO: 0.16 THOUSAND/ÂΜL (ref 0–0.61)
EOSINOPHIL NFR BLD AUTO: 2 % (ref 0–6)
ERYTHROCYTE [DISTWIDTH] IN BLOOD BY AUTOMATED COUNT: 15.2 % (ref 11.6–15.1)
GFR SERPL CREATININE-BSD FRML MDRD: 58 ML/MIN/1.73SQ M
GFR SERPL CREATININE-BSD FRML MDRD: 58 ML/MIN/1.73SQ M
GLUCOSE SERPL-MCNC: 149 MG/DL (ref 65–140)
HCT VFR BLD AUTO: 31.3 % (ref 36.5–49.3)
HGB BLD-MCNC: 9.8 G/DL (ref 12–17)
IMM GRANULOCYTES # BLD AUTO: 0.02 THOUSAND/UL (ref 0–0.2)
IMM GRANULOCYTES NFR BLD AUTO: 0 % (ref 0–2)
LYMPHOCYTES # BLD AUTO: 1.42 THOUSANDS/ÂΜL (ref 0.6–4.47)
LYMPHOCYTES NFR BLD AUTO: 16 % (ref 14–44)
MCH RBC QN AUTO: 28.8 PG (ref 26.8–34.3)
MCHC RBC AUTO-ENTMCNC: 31.3 G/DL (ref 31.4–37.4)
MCV RBC AUTO: 92 FL (ref 82–98)
MONOCYTES # BLD AUTO: 0.51 THOUSAND/ÂΜL (ref 0.17–1.22)
MONOCYTES NFR BLD AUTO: 6 % (ref 4–12)
NEUTROPHILS # BLD AUTO: 6.82 THOUSANDS/ÂΜL (ref 1.85–7.62)
NEUTS SEG NFR BLD AUTO: 76 % (ref 43–75)
NRBC BLD AUTO-RTO: 0 /100 WBCS
PLATELET # BLD AUTO: 255 THOUSANDS/UL (ref 149–390)
PMV BLD AUTO: 10 FL (ref 8.9–12.7)
POTASSIUM SERPL-SCNC: 4.6 MMOL/L (ref 3.5–5.3)
RBC # BLD AUTO: 3.4 MILLION/UL (ref 3.88–5.62)
SODIUM SERPL-SCNC: 139 MMOL/L (ref 135–147)
WBC # BLD AUTO: 8.95 THOUSAND/UL (ref 4.31–10.16)

## 2024-03-07 PROCEDURE — 80048 BASIC METABOLIC PNL TOTAL CA: CPT | Performed by: NURSE PRACTITIONER

## 2024-03-07 PROCEDURE — 82565 ASSAY OF CREATININE: CPT | Performed by: NURSE PRACTITIONER

## 2024-03-07 PROCEDURE — G0299 HHS/HOSPICE OF RN EA 15 MIN: HCPCS

## 2024-03-07 PROCEDURE — 85025 COMPLETE CBC W/AUTO DIFF WBC: CPT | Performed by: NURSE PRACTITIONER

## 2024-03-07 PROCEDURE — 83880 ASSAY OF NATRIURETIC PEPTIDE: CPT | Performed by: NURSE PRACTITIONER

## 2024-03-07 NOTE — TELEPHONE ENCOUNTER
Called spoke with mr kern and he has several appts and can not se dr hutton he was theresa to see dori.

## 2024-03-07 NOTE — TELEPHONE ENCOUNTER
Caller: Ginger/SHIRA/RN    Doctor/Office: Dr. Hopper/[facility] SX only    CB#: [phone]    Escalation: Appointment/and care--had amp sx 2/4/24 and has not been called back about his 1st post op visit as of today-also SHIRA Miles called to say that today is her first visit with Nadir and a part of the eschar came off and is now yellowish underneath. Size is .5 x .5-she put pictures under Media tab in Epic for Dr. Hopper to see. Leaving it open/no dressing. Please call back with any wound orders to Ginger at number above-and please call Nadir back to schedule his post op. Thanks

## 2024-03-08 ENCOUNTER — HOME CARE VISIT (OUTPATIENT)
Dept: HOME HEALTH SERVICES | Facility: HOME HEALTHCARE | Age: 64
End: 2024-03-08
Payer: COMMERCIAL

## 2024-03-08 ENCOUNTER — TELEPHONE (OUTPATIENT)
Dept: CARDIOLOGY CLINIC | Facility: CLINIC | Age: 64
End: 2024-03-08

## 2024-03-08 VITALS — OXYGEN SATURATION: 97 % | SYSTOLIC BLOOD PRESSURE: 157 MMHG | HEART RATE: 80 BPM | DIASTOLIC BLOOD PRESSURE: 85 MMHG

## 2024-03-08 PROCEDURE — G0151 HHCP-SERV OF PT,EA 15 MIN: HCPCS

## 2024-03-08 NOTE — TELEPHONE ENCOUNTER
P/c'd. He needs his jardiance paperwork faxed in to [facility].  He said they never received what they needed and he is back on Jardiance now.    Thank you

## 2024-03-11 ENCOUNTER — OFFICE VISIT (OUTPATIENT)
Age: 64
End: 2024-03-11
Payer: COMMERCIAL

## 2024-03-11 ENCOUNTER — HOME CARE VISIT (OUTPATIENT)
Dept: HOME HEALTH SERVICES | Facility: HOME HEALTHCARE | Age: 64
End: 2024-03-11
Payer: COMMERCIAL

## 2024-03-11 VITALS — HEART RATE: 76 BPM | DIASTOLIC BLOOD PRESSURE: 76 MMHG | SYSTOLIC BLOOD PRESSURE: 140 MMHG | OXYGEN SATURATION: 98 %

## 2024-03-11 VITALS
OXYGEN SATURATION: 97 % | SYSTOLIC BLOOD PRESSURE: 126 MMHG | TEMPERATURE: 97.1 F | HEIGHT: 72 IN | HEART RATE: 91 BPM | DIASTOLIC BLOOD PRESSURE: 72 MMHG | BODY MASS INDEX: 32.25 KG/M2 | WEIGHT: 238.13 LBS

## 2024-03-11 DIAGNOSIS — Z95.810 ICD (IMPLANTABLE CARDIOVERTER-DEFIBRILLATOR) IN PLACE: ICD-10-CM

## 2024-03-11 DIAGNOSIS — B95.61 MSSA BACTEREMIA: Primary | ICD-10-CM

## 2024-03-11 DIAGNOSIS — I25.5 ISCHEMIC CARDIOMYOPATHY: ICD-10-CM

## 2024-03-11 DIAGNOSIS — I96 GANGRENE OF TOE OF LEFT FOOT (HCC): ICD-10-CM

## 2024-03-11 DIAGNOSIS — R78.81 MSSA BACTEREMIA: Primary | ICD-10-CM

## 2024-03-11 PROBLEM — T82.7XXA INFECTED DEFIBRILLATOR (HCC): Status: ACTIVE | Noted: 2024-03-11

## 2024-03-11 PROCEDURE — 99203 OFFICE O/P NEW LOW 30 MIN: CPT | Performed by: PHYSICIAN ASSISTANT

## 2024-03-11 PROCEDURE — G0151 HHCP-SERV OF PT,EA 15 MIN: HCPCS

## 2024-03-11 NOTE — PROGRESS NOTES
Heart Failure Outpatient Progress Note - Nadir Myers 64 y.o. male MRN: [id number]    @ Encounter: [id number]      Assessment/Plan:    Patient Active Problem List    Diagnosis Date Noted    Pleuritic chest pain 02/22/2024    ICD (implantable cardioverter-defibrillator) in place 02/21/2024    LINDEN (acute kidney injury) ([facility]) 02/17/2024    Abnormal CT of the chest 02/09/2024    MSSA bacteremia 02/02/2024    Gangrene of toe of left foot ([facility]) 02/02/2024    LUE weakness 01/31/2024    Cellulitis of left foot 01/16/2024    Type 2 diabetes mellitus with foot ulcer, with long-term current use of insulin ([facility]) 01/16/2024    Coronary artery disease involving native coronary artery of native heart without angina pectoris 01/16/2024    PAD (peripheral artery disease) ([facility]) 01/16/2024    Acute on chronic systolic CHF (congestive heart failure) ([facility]) 08/02/2023    Localized swelling on left hand 05/13/2023    Acidosis 05/06/2023    V-tach ([facility]) 05/04/2023    Ischemic cardiomyopathy 05/03/2023    Essential (primary) hypertension 05/02/2023    Acute HFrEF 05/02/2023    A-fib ([facility]) 05/02/2023    Benign prostatic hyperplasia without lower urinary tract symptoms 04/11/2018    3-vessel CAD 02/27/2015    Type 2 diabetes mellitus, with long-term current use of insulin ([facility]) 02/27/2015    GERD (gastroesophageal reflux disease) 06/28/2013    Tobacco dependence syndrome 01/18/2012       Lead placement on EV ICD failed     # Chronic HFimpEF, Stage C, w/ decompensation due to medication hold  Impression: iCM with large apical aneurysm with progression of CAD/late presenting MI with occluded mid LAD and LPDA. LV mildly dilated. Started on milrinone 0.25mcg/kg/min 5/6/23 due to rising creatinine and worsening volume status, stopped 5/10/23. ,       Weight: 205 lbs on 6/5, 218 lbs--> 237 lbs  BNP 3/7/24: 879  2/20/24: 1809  6/2/23: 279     Studies- personally reviewed by me  EKG- narrow QRS with bigeminy; inferior infarct, anterolateral  infarct    Echo 2/21/24:   LVEF: 45%  Akinetic segments     GILL 2/7/24: 1.8 x 1.1 cm mobile mass attached to ICD lead    Echo 2/1/24:  LVEF: 40%, akinetic segments  LVEDd: 5.1 cm  RV: normal     Echo 8/7/23:  LVEF: 40%  LVEDd: 4.1 cm  LV apex aneurysmal, no thrombus  RV: normal     LHC 5/3/23: chronically occluded mid LAD and LPDA c/w echo findings of apical aneurysm, not amenable to PCI  Patent mid LCx stent   LVEDP 30 mmHg     Echocardiogram 5/2/23  LVEF:  10-15%, apical aneurysm  LVIDd: 5.6 cm  RV: normal  MR: mild  PASP: 49 mmHg     Echo 9/26/17:  LVEF: 65%     Neurohormonal Blockade:  --Beta-Blocker: metoprolol succinate 50 mg BID  --ACEi, ARB or ARNi: Entresto stopped due to hypotension    (or SVR reduction) isordil 10 mg TID/ hydralazine 50 mg TID  --Aldosterone Receptor Blocker: spironolactone 12.5 mg daily- onhold due to hyperkalemia  --SGLT2-I: Jardiance 10 mg daily- on hold  --Diuretic: torsemide 10 mg   Inpt:      Sudden Cardiac Death Risk Reduction:  MDT ICD 5/12/23 for secondary prevention  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   - plan:  Lifevest as bridge to subcutaneous ICD as outpt     Electrical Resynchronization:  Narrow QRS     Advanced Therapies (If appropriate):  --Inotrope: was on milrinone 0.25 mcg/kg/min, titrated off  --LVAD/Transplant Candidacy: Current tobacco use prior to admission precludes heart transplant at this time- since quit. Moderately reduced RV function and mildly dilated LV concerning for LVAD. Mildly dilated LV also concerning given VT.   Has not smoked since May 2     # Staph bacteremia  GILL 2/7/24: There is a 1.8 x 1.1 cm mobile mass affixed to the cardiac device lead as is passes through the right atrium. In the setting of bacteremia this is most likely represents infection.   # New Q wave MI, later presenting or silent LAD  territory infarction  Rx: plavix 75 mg  # hyperlipidemia-atorvastatin 80 mg  9/16/23: LDL 51, HDL 68  # current tobacco use- quit since hospital  # Ventricular tachycardia- scar mediated  Rx: amiodarone 200 mg daily  # CKD3, Cr 1.3 on 3/7 down from 1.96 on 2/28  # hyponatremia- due to HF, , now up to 137 on 7/3/23  # DM2, HgA1C 7.1% on 9/16/23    TODAY'S PLAN:  On LifeVest, plan for SubQ ICD per EP  Failed EV ICD  Continue current GDMT  GFR improved on last labs 3/7  --2g sodium diet  - Daily weights    HPI:   64 year old male with known CAD with LHC in 2015 showing obstructive mid-LCx disease treated with PTCA/stening and residual non-obstructive disease in the LAD, now presented with 1-2 weeks of shortness of breath, leg swelling, and >20 lb weight gain in 1 week. He was suspected during a cardiology visit to be in rapid atrial fibrillation, and sent to the ED. He converted to sinus rhythm shortly following IV beta blocker administration.    Patient was taken for LHC which showed chronically occluded mid LAD and LPDA c/w echo findings of apical aneurysm, not amenable to PCI. There was a patent mid LCx stent. Echocardiogram showed severely reduced LV function with EF 10-15%, apical aneurysm, and LVIDd of 5.6 cm. VT was felt to be scar mediated. A MDT dual chamber AICD was placed. Heart failure specific GDMT was initiated and optimized.    He did require transient milrinone support during his hospitalization and this was weaned off     Since discharge he has had orthostatic hypotension, lightheadedness and dizziness. No syncope. Entresto was cut back to 24/26 mg BID, spironolactone stopped and lasix stopped. He is on Jardiance.    Interval History:  S/p hospitalization for septic shock due to staph bacteremia due to lower extremity wound infection. Vegetation noted on ICD lead, led to lead extraction on 2/8/24. EV ICD placed 2/20 but unable to obtain proper lead placement despite adjustment attempts so was  removed. Plan - sent home on LifeVest with plan for SQ ICD implant as outpt.     NP visit 3/5- empagliflozin restarted    Weight up 19 lbs more- 237 lb son follow up though 3/5    Walking with walker  Review of Systems   Constitutional:  Negative for activity change, appetite change and fatigue. Unexpected weight change: wt up 19 lbs more.  HENT:  Negative for congestion and nosebleeds.    Eyes: Negative.    Respiratory:  Negative for cough, chest tightness and shortness of breath.    Cardiovascular:  Negative for chest pain, palpitations and leg swelling.   Gastrointestinal:  Negative for abdominal distention.   Endocrine: Negative.    Genitourinary: Negative.    Musculoskeletal: Negative.    Skin: Negative.    Neurological:  Negative for dizziness, syncope and weakness.   Hematological: Negative.    Psychiatric/Behavioral: Negative.         Past Medical History:   Diagnosis Date    Diabetes mellitus (HCC)     Myocardial infarction (HCC)          Allergies   Allergen Reactions    Vancomycin Rash     NOT AN ALLERGY - 1/31/24 patient experienced vancomycin infusion reaction, please extend duration of infusion time to prevent future infusion reactions     .    Current Outpatient Medications:     ALPRAZolam (XANAX) 0.5 mg tablet, Take 1 tablet (0.5 mg total) by mouth daily at bedtime as needed for anxiety for up to 10 days (Patient not taking: Reported on 3/1/2024), Disp: 10 tablet, Rfl: 0    amiodarone 200 mg tablet, Take 1 tablet (200 mg total) by mouth daily with breakfast Do not start before March 2, 2024., Disp: 30 tablet, Rfl: 0    aspirin 81 mg chewable tablet, Chew 1 tablet (81 mg total) daily Do not start before March 2, 2024., Disp: 30 tablet, Rfl: 0    atorvastatin (LIPITOR) 80 mg tablet, TAKE 1 TABLET (80 MG TOTAL) BY MOUTH DAILY AFTER DINNER, Disp: 90 tablet, Rfl: 2    BD Pen Needle Micro U/F 32G X 6 MM MISC, USE 1 PEN EACH DAILY, Disp: , Rfl:     ceFAZolin (ANCEF) 2000 mg IVPB, Inject 2,000 mg into a  catheter in a vein over 30 minutes at 100 mL/hr every 8 (eight) hours for 24 days, Disp: 3600 mL, Rfl: 0    clopidogrel (PLAVIX) 75 mg tablet, Take 1 tablet (75 mg total) by mouth daily, Disp: 30 tablet, Rfl: 0    Empagliflozin (Jardiance) 10 MG TABS tablet, Take 1 tablet (10 mg total) by mouth every morning, Disp: 30 tablet, Rfl: 2    hydrALAZINE (APRESOLINE) 25 mg tablet, Take 2 tablets (50 mg total) by mouth every 8 (eight) hours, Disp: 90 tablet, Rfl: 0    insulin detemir (Levemir FlexPen) 100 Units/mL injection pen, Inject 20 Units under the skin daily Patient to take 30 units daily - if fasting bs >150 for 3 days to increase dose by 5 units. Once bs < 150 can go back to 30 units, Disp: , Rfl:     isosorbide dinitrate (ISORDIL) 20 mg tablet, Take 0.5 tablets (10 mg total) by mouth 3 (three) times daily after meals, Disp: 90 tablet, Rfl: 0    JANUMET -1000 MG TB24, daily , Disp: , Rfl:     melatonin 3 mg, Take 1 tablet (3 mg total) by mouth daily at bedtime (Patient not taking: Reported on 3/1/2024), Disp: 30 tablet, Rfl: 0    metFORMIN (GLUCOPHAGE) 1000 MG tablet, Take 1,000 mg by mouth daily with breakfast, Disp: , Rfl:     methocarbamol (ROBAXIN) 500 mg tablet, Take 1 tablet (500 mg total) by mouth every 6 (six) hours as needed for muscle spasms, Disp: 60 tablet, Rfl: 0    metoprolol succinate (TOPROL-XL) 50 mg 24 hr tablet, Take 1 tablet (50 mg total) by mouth every 12 (twelve) hours, Disp: 60 tablet, Rfl: 0    oxyCODONE (ROXICODONE) 5 immediate release tablet, Take 1 tablet (5 mg total) by mouth every 6 (six) hours as needed for moderate pain for up to 10 days Max Daily Amount: 20 mg (Patient not taking: Reported on 3/1/2024), Disp: 30 tablet, Rfl: 0    polyethylene glycol (MIRALAX) 17 g packet, Take 17 g by mouth daily Do not start before March 2, 2024. (Patient not taking: Reported on 3/5/2024), Disp: 30 each, Rfl: 0    saccharomyces boulardii (FLORASTOR) 250 mg capsule, Take 1 capsule (250 mg  total) by mouth 2 (two) times a day, Disp: 60 capsule, Rfl: 0    senna-docusate sodium (SENOKOT S) 8.6-50 mg per tablet, Take 2 tablets by mouth 2 (two) times a day (Patient not taking: Reported on 3/5/2024), Disp: 60 tablet, Rfl: 0    Sodium Chloride Flush (Normal Saline Flush) 0.9 % SOLN, Inject 10 mL into a catheter in a vein every 8 (eight) hours. Indications: flushing piccline before and after iv antibiotics, Disp: , Rfl:     torsemide (DEMADEX) 20 mg tablet, Take 0.5 tablets (10 mg total) by mouth daily, Disp: 30 tablet, Rfl: 3    Social History     Socioeconomic History    Marital status: /Civil Union     Spouse name: Not on file    Number of children: Not on file    Years of education: Not on file    Highest education level: Not on file   Occupational History    Not on file   Tobacco Use    Smoking status: Every Day     Types: Cigarettes    Smokeless tobacco: Never    Tobacco comments:     X2 cigarettes/day   Vaping Use    Vaping status: Never Used   Substance and Sexual Activity    Alcohol use: Not Currently    Drug use: Never    Sexual activity: Not Currently     Partners: Female   Other Topics Concern    Not on file   Social History Narrative    Not on file     Social Determinants of Health     Financial Resource Strain: Not on file   Food Insecurity: No Food Insecurity (1/31/2024)    Hunger Vital Sign     Worried About Running Out of Food in the Last Year: Never true     Ran Out of Food in the Last Year: Never true   Transportation Needs: No Transportation Needs (1/31/2024)    PRAPARE - Transportation     Lack of Transportation (Medical): No     Lack of Transportation (Non-Medical): No   Physical Activity: Not on file   Stress: Not on file   Social Connections: Not on file   Intimate Partner Violence: Not on file   Housing Stability: Low Risk  (1/31/2024)    Housing Stability Vital Sign     Unable to Pay for Housing in the Last Year: No     Number of Places Lived in the Last Year: 1     Unstable  "Housing in the Last Year: No       No family history on file.    Physical Exam:    Vitals:     Physical Exam  Constitutional:       Appearance: He is well-developed.   HENT:      Head: Normocephalic and atraumatic.   Eyes:      Pupils: Pupils are equal, round, and reactive to light.   Neck:      Vascular: No JVD.   Cardiovascular:      Rate and Rhythm: Normal rate and regular rhythm.      Heart sounds: No murmur heard.  Pulmonary:      Effort: Pulmonary effort is normal. No respiratory distress.      Breath sounds: Normal breath sounds.   Abdominal:      General: There is no distension.      Palpations: Abdomen is soft.      Tenderness: There is no abdominal tenderness.   Musculoskeletal:         General: Normal range of motion.      Cervical back: Normal range of motion.   Skin:     General: Skin is warm and dry.      Findings: No rash.   Neurological:      Mental Status: He is alert and oriented to person, place, and time.         Labs & Results:    Lab Results   Component Value Date    SODIUM 139 03/07/2024    K 4.6 03/07/2024     03/07/2024    CO2 26 03/07/2024    BUN 42 (H) 03/07/2024    CREATININE 1.30 03/07/2024    CREATININE 1.30 03/07/2024    GLUC 149 (H) 03/07/2024    CALCIUM 8.6 03/07/2024     Lab Results   Component Value Date    WBC 8.95 03/07/2024    HGB 9.8 (L) 03/07/2024    HCT 31.3 (L) 03/07/2024    MCV 92 03/07/2024     03/07/2024     Lab Results   Component Value Date    NTBNP 5,942 (H) 05/02/2023      No results found for: \"CHOLESTEROL\"  Lab Results   Component Value Date    HDL 34 05/07/2015    HDL 43 02/17/2015     Lab Results   Component Value Date    TRIG 122 05/07/2015    TRIG 128 02/17/2015     No results found for: \"NONHDLC\"    EKG personally reviewed by Nash Samuels DO.     Counseling / Coordination of Care  Time spent today 25 minutes.  Greater than 50% of total time was spent with the patient and / or family counseling and / or coordination of care. We went over current " diagnosis, most recent studies and any changes in treatment.    Thank you for the opportunity to participate in the care of this patient.    TOMMIE HERNANDEZ D.O.  DIRECTOR OF HEART FAILURE/ PULMONARY HYPERTENSION  MEDICAL DIRECTOR OF LVAD PROGRAM  [facility]

## 2024-03-12 ENCOUNTER — OFFICE VISIT (OUTPATIENT)
Dept: CARDIOLOGY CLINIC | Facility: CLINIC | Age: 64
End: 2024-03-12
Payer: COMMERCIAL

## 2024-03-12 VITALS
DIASTOLIC BLOOD PRESSURE: 60 MMHG | SYSTOLIC BLOOD PRESSURE: 124 MMHG | OXYGEN SATURATION: 99 % | BODY MASS INDEX: 32.1 KG/M2 | HEIGHT: 72 IN | HEART RATE: 81 BPM | WEIGHT: 237 LBS

## 2024-03-12 DIAGNOSIS — I50.20 SYSTOLIC CONGESTIVE HEART FAILURE, UNSPECIFIED HF CHRONICITY (HCC): ICD-10-CM

## 2024-03-12 DIAGNOSIS — T82.7XXD INFECTION INVOLVING IMPLANTABLE CARDIOVERTER-DEFIBRILLATOR (ICD), SUBSEQUENT ENCOUNTER: ICD-10-CM

## 2024-03-12 DIAGNOSIS — I25.5 ISCHEMIC CARDIOMYOPATHY: ICD-10-CM

## 2024-03-12 DIAGNOSIS — I47.20 V-TACH (HCC): ICD-10-CM

## 2024-03-12 DIAGNOSIS — Z95.810 ICD (IMPLANTABLE CARDIOVERTER-DEFIBRILLATOR) IN PLACE: ICD-10-CM

## 2024-03-12 DIAGNOSIS — I10 ESSENTIAL (PRIMARY) HYPERTENSION: Primary | ICD-10-CM

## 2024-03-12 DIAGNOSIS — I25.10 CORONARY ARTERY DISEASE INVOLVING NATIVE CORONARY ARTERY OF NATIVE HEART WITHOUT ANGINA PECTORIS: ICD-10-CM

## 2024-03-12 DIAGNOSIS — L97.509 FOOT ULCER (HCC): ICD-10-CM

## 2024-03-12 PROCEDURE — 99214 OFFICE O/P EST MOD 30 MIN: CPT | Performed by: INTERNAL MEDICINE

## 2024-03-12 RX ORDER — AMIODARONE HYDROCHLORIDE 200 MG/1
200 TABLET ORAL
Qty: 30 TABLET | Refills: 11 | Status: SHIPPED | OUTPATIENT
Start: 2024-03-12

## 2024-03-12 RX ORDER — HYDRALAZINE HYDROCHLORIDE 25 MG/1
50 TABLET, FILM COATED ORAL EVERY 8 HOURS SCHEDULED
Qty: 90 TABLET | Refills: 3 | Status: SHIPPED | OUTPATIENT
Start: 2024-03-12 | End: 2024-03-20

## 2024-03-12 RX ORDER — CLOPIDOGREL BISULFATE 75 MG/1
75 TABLET ORAL DAILY
Qty: 30 TABLET | Refills: 5 | Status: SHIPPED | OUTPATIENT
Start: 2024-03-12

## 2024-03-12 NOTE — PROGRESS NOTES
OFFICE FOLLOW UP - Nephrology   Nadir Myers 64 y.o. male MRN: [id number]         Interim HPI:   I had the pleasure of seeing Nadir Myers today in the renal clinic for hospital follow-up for acute kidney injury suspect secondary to ATN.  Patient was hospitalized from 1/31/2024 to 3/1/2024 with MSSA bacteremia; likely from gangrene toe complicated by ICD infection status post explantation and treated with IV antibiotics.  Peak creatinine 2.36 and decreased to 1.76 on discharge.   Currently, patient has no complaints at this time and is feeling well. Patient denies any chest pain, shortness of breath or swelling. The last blood work was done on 3/7/2024  which we have reviewed together.        ASSESSMENT and PLAN:  Nadir was seen today for follow-up.    Diagnoses and all orders for this visit:    Acute on chronic systolic CHF (congestive heart failure) ([facility])    Essential (primary) hypertension    LINDEN (acute kidney injury) ([facility])  -     Basic metabolic panel; Future  -     CBC and Platelet; Future  -     Albumin / creatinine urine ratio; Future  -     Phosphorus; Future  -     Magnesium; Future    Gangrene of toe of left foot ([facility])    MSSA bacteremia        Acute kidney injury-improving  Etiology: suspected secondary to ATN with sepsis and surgery  Baseline creatinine  less then <1.0  Admission creatinine 1.4 initially improved to 0.6-0.8 but then worsened on 2/15/24 with peak creatinine, peaked at 2.36 on 2/2524.  Creatinine proved 1.76 on discharge  Diuretics were transitioned to oral by cardiology for discharge  Updated blood work on 3/7/2024 reveals creatinine 1.30 with stable electrolytes and acid-base balance. Had updated bmp 3/14/2024 was 1.24- continues to improve  Avoid NSAIDs, to include Motrin, ibuprofen, Advil, Naprosyn, naproxen, Aleve,  Avoid low blood pressure  Return in 3 months with updated blood work and urine studies to ensure stability and ongoing improvement     Blood  pressure/hypertension  Current blood pressure 142/72  Currently on torsemide 10 mg daily, Isordil 10 mg 3 times daily, Toprol XL 50 mg every 12 hours, hydralazine 50 mg every 8 hours, amiodarone 200 mg daily  Home blood pressure monitoring recommended  Low-salt diet no more than 2000 mg salt per day    Sepsis secondary to MSSA bacteremia with ICD infection status post ICD extraction.  Following infectious disease as outpatient with IV Ancef for 6 weeks total    Acute on chronic CHF/ischemic cardiomyopathy  Cardiology as outpatient  Has life vest in place  Patient examining   Continues on oral diuretics  Recommend low-salt diet    Anemia: likely the setting of acute illness with component of chronic Kidney Disease:  Current HGB 10.3 from 3/14/2024  Monitor for now  Will check iron stores once off ABX  Repeat CBC with next appt    Left foot fourth toe gangrene status post amputation on 1/31/2024  Follows podiatry as outpatient      Age related screening:   Your primary caregiver may do yearly screening for colorectal cancer. It is recommended in all men and women over 45 years old. You may have screening earlier if you have colon disease or a family history of colorectal cancer.       Patient Instructions   Call office with any questions or concerns,  Avoid motrin products,   Get blood and urine studies in 3 months  Return in 3 months for follow up            ROS:   Review of Systems   Constitutional: Negative.  Negative for activity change, appetite change, chills, diaphoresis, fatigue and fever.   HENT: Negative.  Negative for congestion and facial swelling.    Respiratory: Negative.     Cardiovascular: Negative.    Gastrointestinal: Negative.    Endocrine: Negative.    Genitourinary: Negative.    Musculoskeletal: Negative.    Skin: Negative.    Allergic/Immunologic: Negative.    Neurological: Negative.    Hematological: Negative.    Psychiatric/Behavioral: Negative.          Allergies: Vancomycin    Medications:    Current Outpatient Medications:     amiodarone 200 mg tablet, Take 1 tablet (200 mg total) by mouth daily with breakfast, Disp: 30 tablet, Rfl: 11    aspirin 81 mg chewable tablet, Chew 1 tablet (81 mg total) daily Do not start before March 2, 2024., Disp: 30 tablet, Rfl: 0    atorvastatin (LIPITOR) 80 mg tablet, TAKE 1 TABLET (80 MG TOTAL) BY MOUTH DAILY AFTER DINNER, Disp: 90 tablet, Rfl: 2    BD Pen Needle Micro U/F 32G X 6 MM MISC, USE 1 PEN EACH DAILY, Disp: , Rfl:     ceFAZolin (ANCEF) 2000 mg IVPB, Inject 2,000 mg into a catheter in a vein over 30 minutes at 100 mL/hr every 8 (eight) hours for 24 days, Disp: 3600 mL, Rfl: 0    clopidogrel (PLAVIX) 75 mg tablet, Take 1 tablet (75 mg total) by mouth daily, Disp: 30 tablet, Rfl: 5    Empagliflozin (Jardiance) 10 MG TABS tablet, Take 1 tablet (10 mg total) by mouth every morning, Disp: 30 tablet, Rfl: 2    hydrALAZINE (APRESOLINE) 25 mg tablet, Take 2 tablets (50 mg total) by mouth every 8 (eight) hours, Disp: 90 tablet, Rfl: 3    insulin detemir (Levemir FlexPen) 100 Units/mL injection pen, Inject 20 Units under the skin daily Patient to take 30 units daily - if fasting bs >150 for 3 days to increase dose by 5 units. Once bs < 150 can go back to 30 units, Disp: , Rfl:     isosorbide dinitrate (ISORDIL) 20 mg tablet, Take 0.5 tablets (10 mg total) by mouth 3 (three) times daily after meals, Disp: 90 tablet, Rfl: 0    JANUMET -1000 MG TB24, daily , Disp: , Rfl:     metFORMIN (GLUCOPHAGE) 1000 MG tablet, Take 1,000 mg by mouth daily with breakfast, Disp: , Rfl:     metoprolol succinate (TOPROL-XL) 50 mg 24 hr tablet, Take 1 tablet (50 mg total) by mouth every 12 (twelve) hours, Disp: 60 tablet, Rfl: 0    saccharomyces boulardii (FLORASTOR) 250 mg capsule, Take 1 capsule (250 mg total) by mouth 2 (two) times a day, Disp: 60 capsule, Rfl: 0    Sodium Chloride Flush (Normal Saline Flush) 0.9 % SOLN, Inject 10 mL into a catheter in a vein every 8 (eight) hours.  Indications: flushing piccline before and after iv antibiotics, Disp: , Rfl:     torsemide (DEMADEX) 20 mg tablet, Take 0.5 tablets (10 mg total) by mouth daily, Disp: 30 tablet, Rfl: 3    ALPRAZolam (XANAX) 0.5 mg tablet, Take 1 tablet (0.5 mg total) by mouth daily at bedtime as needed for anxiety for up to 10 days (Patient not taking: Reported on 3/1/2024), Disp: 10 tablet, Rfl: 0    melatonin 3 mg, Take 1 tablet (3 mg total) by mouth daily at bedtime (Patient not taking: Reported on 3/1/2024), Disp: 30 tablet, Rfl: 0    methocarbamol (ROBAXIN) 500 mg tablet, Take 1 tablet (500 mg total) by mouth every 6 (six) hours as needed for muscle spasms (Patient not taking: Reported on 3/12/2024), Disp: 60 tablet, Rfl: 0    polyethylene glycol (MIRALAX) 17 g packet, Take 17 g by mouth daily Do not start before March 2, 2024. (Patient not taking: Reported on 3/5/2024), Disp: 30 each, Rfl: 0    senna-docusate sodium (SENOKOT S) 8.6-50 mg per tablet, Take 2 tablets by mouth 2 (two) times a day (Patient not taking: Reported on 3/5/2024), Disp: 60 tablet, Rfl: 0    Past Medical History:   Diagnosis Date    Diabetes mellitus (HCC)     Myocardial infarction (HCC)      Past Surgical History:   Procedure Laterality Date    CARDIAC CATHETERIZATION N/A 05/03/2023    Procedure: Cardiac Coronary Angiogram;  Surgeon: Valeriy Shore MD;  Location: [facility];  Service: Cardiology    CARDIAC CATHETERIZATION Left 05/03/2023    Procedure: Cardiac Left Heart Cath;  Surgeon: Valeriy Shore MD;  [facility]: [facility];  Service: Cardiology    CARDIAC DEFIBRILLATOR PLACEMENT  05/2023    CARDIAC ELECTROPHYSIOLOGY PROCEDURE N/A 05/12/2023    Procedure: Cardiac icd implant;  Surgeon: Urbano Maria MD;  [facility]: [facility];  Service: Cardiology    CARDIAC ELECTROPHYSIOLOGY PROCEDURE N/A 2/20/2024    Procedure: EV ICD IMPLANTATION;  Surgeon: Arturo Wooten DO;  Location: [facility];  Service: Cardiology    CARDIAC  ELECTROPHYSIOLOGY PROCEDURE N/A 2/8/2024    Procedure: Cardiac laser lead extraction;  Surgeon: Arturo Wooten DO;  [facility];  Service: Cardiology    IR LOWER EXTREMITY ANGIOGRAM  1/18/2024    NV RMVL TRANSVNS PM ELTRD DUAL LEAD SYS N/A 2/20/2024    Procedure: EV ICD IMPLANTATION;  Surgeon: Abhilash Ferrera MD;  Location: [facility];  Service: Cardiac Surgery    NV RMVL TRANSVNS PM ELTRD DUAL LEAD SYS N/A 2/21/2024    Procedure: REMOVAL OF LEAD AND GENERATOR AND ATTEMPTED LEAD REVISION;  Surgeon: Abhilash Ferrera MD;  Location: [facility];  Service: Cardiac Surgery    WOUND DEBRIDEMENT Left 2/4/2024    Procedure: LEFT FOURTH TOE AMPUTATION SURGICAL WOUND DEBRIDEMENT FOOT/TOE (WASH OUT);  Surgeon: Bebo Hopper DPM;  Location: [facility];  Service: Podiatry     History reviewed. No pertinent family history.   reports that he has quit smoking. His smoking use included cigarettes. He started smoking about 6 weeks ago. He has never used smokeless tobacco. He reports that he does not currently use alcohol. He reports that he does not use drugs.      Physical Exam:   Vitals:    03/18/24 1127   BP: 142/72   BP Location: Left arm   Patient Position: Sitting   Cuff Size: Standard   Pulse: 87   Weight: 108 kg (238 lb)   Height: 6' (1.829 m)     Body mass index is 32.28 kg/m².  Physical Exam  Vitals and nursing note reviewed.   Constitutional:       Appearance: Normal appearance.   HENT:      Head: Normocephalic and atraumatic.      Nose: Nose normal.      Mouth/Throat:      Mouth: Mucous membranes are moist.      Pharynx: Oropharynx is clear.   Eyes:      Extraocular Movements: Extraocular movements intact.      Conjunctiva/sclera: Conjunctivae normal.   Cardiovascular:      Rate and Rhythm: Normal rate and regular rhythm.      Pulses: Normal pulses.      Heart sounds: Normal heart sounds.   Pulmonary:      Effort: Pulmonary effort is normal.      Breath sounds: Normal breath sounds.   Abdominal:      General:  "Bowel sounds are normal.      Palpations: Abdomen is soft.   Musculoskeletal:         General: Normal range of motion.      Cervical back: Normal range of motion and neck supple.   Skin:     General: Skin is warm and dry.      Coloration: Skin is pale.   Neurological:      General: No focal deficit present.      Mental Status: He is alert and oriented to person, place, and time.   Psychiatric:         Mood and Affect: Mood normal.         Behavior: Behavior normal.            Lab Results:  Results for orders placed or performed in visit on 03/14/24   CBC and differential   Result Value Ref Range    WBC 8.20 4.31 - 10.16 Thousand/uL    RBC 3.51 (L) 3.88 - 5.62 Million/uL    Hemoglobin 10.3 (L) 12.0 - 17.0 g/dL    Hematocrit 32.1 (L) 36.5 - 49.3 %    MCV 92 82 - 98 fL    MCH 29.3 26.8 - 34.3 pg    MCHC 32.1 31.4 - 37.4 g/dL    RDW 15.4 (H) 11.6 - 15.1 %    MPV 10.4 8.9 - 12.7 fL    Platelets 216 149 - 390 Thousands/uL    nRBC 0 /100 WBCs    Neutrophils Relative 75 43 - 75 %    Immature Grans % 1 0 - 2 %    Lymphocytes Relative 15 14 - 44 %    Monocytes Relative 6 4 - 12 %    Eosinophils Relative 3 0 - 6 %    Basophils Relative 0 0 - 1 %    Neutrophils Absolute 6.23 1.85 - 7.62 Thousands/µL    Absolute Immature Grans 0.04 0.00 - 0.20 Thousand/uL    Absolute Lymphocytes 1.22 0.60 - 4.47 Thousands/µL    Absolute Monocytes 0.46 0.17 - 1.22 Thousand/µL    Eosinophils Absolute 0.22 0.00 - 0.61 Thousand/µL    Basophils Absolute 0.03 0.00 - 0.10 Thousands/µL   Creatinine, serum   Result Value Ref Range    Creatinine 1.24 0.60 - 1.30 mg/dL    eGFR 61 ml/min/1.73sq m       Results from last 7 days   Lab Units 03/14/24  1210   WBC Thousand/uL 8.20   HEMOGLOBIN g/dL 10.3*   HEMATOCRIT % 32.1*   PLATELETS Thousands/uL 216   CREATININE mg/dL 1.24           Portions of the record may have been created with voice recognition software. Occasional wrong word or \"sound a like\" substitutions may have occurred due to the inherent " limitations of voice recognition software. Read the chart carefully and recognize,

## 2024-03-13 ENCOUNTER — HOME CARE VISIT (OUTPATIENT)
Dept: HOME HEALTH SERVICES | Facility: HOME HEALTHCARE | Age: 64
End: 2024-03-13
Payer: COMMERCIAL

## 2024-03-13 ENCOUNTER — OFFICE VISIT (OUTPATIENT)
Dept: PODIATRY | Facility: CLINIC | Age: 64
End: 2024-03-13

## 2024-03-13 VITALS
HEIGHT: 72 IN | HEART RATE: 86 BPM | WEIGHT: 236.4 LBS | SYSTOLIC BLOOD PRESSURE: 153 MMHG | BODY MASS INDEX: 32.02 KG/M2 | DIASTOLIC BLOOD PRESSURE: 85 MMHG

## 2024-03-13 VITALS — DIASTOLIC BLOOD PRESSURE: 82 MMHG | HEART RATE: 78 BPM | SYSTOLIC BLOOD PRESSURE: 148 MMHG | OXYGEN SATURATION: 98 %

## 2024-03-13 DIAGNOSIS — Z79.4 TYPE 2 DIABETES MELLITUS WITH FOOT ULCER, WITH LONG-TERM CURRENT USE OF INSULIN (HCC): Primary | ICD-10-CM

## 2024-03-13 DIAGNOSIS — E11.621 TYPE 2 DIABETES MELLITUS WITH FOOT ULCER, WITH LONG-TERM CURRENT USE OF INSULIN (HCC): Primary | ICD-10-CM

## 2024-03-13 DIAGNOSIS — I73.9 PAD (PERIPHERAL ARTERY DISEASE) (HCC): ICD-10-CM

## 2024-03-13 DIAGNOSIS — L97.509 TYPE 2 DIABETES MELLITUS WITH FOOT ULCER, WITH LONG-TERM CURRENT USE OF INSULIN (HCC): Primary | ICD-10-CM

## 2024-03-13 PROCEDURE — 99024 POSTOP FOLLOW-UP VISIT: CPT | Performed by: PODIATRIST

## 2024-03-13 PROCEDURE — G0151 HHCP-SERV OF PT,EA 15 MIN: HCPCS

## 2024-03-13 NOTE — PROGRESS NOTES
Name: Nadir Myers      : 1960      MRN: [id number]  Encounter Provider: Hadley Dela Cruz DPM  Encounter Date: 3/13/2024   Encounter department: [facility] [place]    Assessment & Plan     1. Type 2 diabetes mellitus with foot ulcer, with long-term current use of insulin (HCC)  -     Diabetic Shoe  -     Diabetic Shoe Inserts    2. PAD (peripheral artery disease) ([facility])        Patient has area of scab tissue without any signs of drainage or infection at this time.    Discussed with patient to wash area soap and water dry completely.  Apply Band-Aid to this area.  I did give a prescription for diabetic shoes custom inserts.    Patient is requesting follow-up [facility] with Dr. Pardo who followed him previously.        Return in about 3 weeks (around 4/3/2024) for Dr. MURILLO at [facility]..      Subjective      Patient had surgical procedure completed on 2024 by Dr. Hopper.  Apparently patient did not have follow-up appointment, [facility] called and stated that there was an eschar that was noted and came off yellowish underneath the size of 0.5 x 0.5 cm.  I reviewed the images.      [facility] REPORT  CLINICAL:  <%Indications:  Patient presents with ulceration between left 4th and 5th toes X 3 days.  Physician wants to establish new baseline post intervention.  Operative History:  2023 Cardiac electrophysiology procedure  2023 Cardiac catheterization  2023 Cardiac defibrillator  2024 IR LE angiogram with balloon angioplasty of TPT, PTA, and peroneal  arteries.  Risk Factors:  The patient has history of HTN, ischemic cardiomyopathy, MI, CHF, cellulitis of  left foot, GERD, CAD, current smoking ( 2 to 3 cigarettes per day), and  diabetes.  Height:  72 inches.  Weight:  222 lbs.  Clinical:  Brachial BP:  Right:  95/ mmHg, Left:  IV site.     FINDINGS:     Left            P  W           Ant. Tibial   109  Biphasic    Post. Tibial  129  Biphasic     Ankle         129              Metatarsal    150              Great Toe      85                       CONCLUSION:     Impression     RIGHT LOWER LIMB:  Not evaluated.     LEFT LOWER LIMB  Ankle/Brachial Index: 1.36,  which is in the normal range.  Prior:  0.95.  PPG/PVR Tracings are dampened.  Biphasic waveform's at the ankle.  Metatarsal Pressure: 150 mmHg.  Prior: 100 mmHg.  Great Toe Pressure: 85 mmHg, within the healing range for a diabetic. Prior: 52  mmHg.     In comparison to the study of 01/16/2024, there is improvement in the disease  process post intervention.     SIGNATURE:  Electronically Signed by: EVAN NEELY MD on 2024-01-19 05:03:33 PM              Review of Systems    Current Outpatient Medications on File Prior to Visit   Medication Sig   • amiodarone 200 mg tablet Take 1 tablet (200 mg total) by mouth daily with breakfast   • aspirin 81 mg chewable tablet Chew 1 tablet (81 mg total) daily Do not start before March 2, 2024.   • atorvastatin (LIPITOR) 80 mg tablet TAKE 1 TABLET (80 MG TOTAL) BY MOUTH DAILY AFTER DINNER   • BD Pen Needle Micro U/F 32G X 6 MM MISC USE 1 PEN EACH DAILY   • ceFAZolin (ANCEF) 2000 mg IVPB Inject 2,000 mg into a catheter in a vein over 30 minutes at 100 mL/hr every 8 (eight) hours for 24 days   • clopidogrel (PLAVIX) 75 mg tablet Take 1 tablet (75 mg total) by mouth daily   • Empagliflozin (Jardiance) 10 MG TABS tablet Take 1 tablet (10 mg total) by mouth every morning   • hydrALAZINE (APRESOLINE) 25 mg tablet Take 2 tablets (50 mg total) by mouth every 8 (eight) hours   • insulin detemir (Levemir FlexPen) 100 Units/mL injection pen Inject 20 Units under the skin daily Patient to take 30 units daily - if fasting bs >150 for 3 days to increase dose by 5 units. Once bs < 150 can go back to 30 units   • isosorbide dinitrate (ISORDIL) 20 mg tablet Take 0.5 tablets (10 mg total) by mouth 3 (three) times daily after meals   • JANUMET -1000 MG TB24 daily    • metFORMIN  (GLUCOPHAGE) 1000 MG tablet Take 1,000 mg by mouth daily with breakfast   • metoprolol succinate (TOPROL-XL) 50 mg 24 hr tablet Take 1 tablet (50 mg total) by mouth every 12 (twelve) hours   • saccharomyces boulardii (FLORASTOR) 250 mg capsule Take 1 capsule (250 mg total) by mouth 2 (two) times a day   • Sodium Chloride Flush (Normal Saline Flush) 0.9 % SOLN Inject 10 mL into a catheter in a vein every 8 (eight) hours. Indications: flushing piccline before and after iv antibiotics   • torsemide (DEMADEX) 20 mg tablet Take 0.5 tablets (10 mg total) by mouth daily   • ALPRAZolam (XANAX) 0.5 mg tablet Take 1 tablet (0.5 mg total) by mouth daily at bedtime as needed for anxiety for up to 10 days (Patient not taking: Reported on 3/1/2024)   • melatonin 3 mg Take 1 tablet (3 mg total) by mouth daily at bedtime (Patient not taking: Reported on 3/1/2024)   • methocarbamol (ROBAXIN) 500 mg tablet Take 1 tablet (500 mg total) by mouth every 6 (six) hours as needed for muscle spasms (Patient not taking: Reported on 3/12/2024)   • polyethylene glycol (MIRALAX) 17 g packet Take 17 g by mouth daily Do not start before March 2, 2024. (Patient not taking: Reported on 3/5/2024)   • senna-docusate sodium (SENOKOT S) 8.6-50 mg per tablet Take 2 tablets by mouth 2 (two) times a day (Patient not taking: Reported on 3/5/2024)   • [DISCONTINUED] glimepiride (AMARYL) 4 mg tablet Take 1 tablet by mouth daily NOT TAKING PER PCP (Patient not taking: Reported on 5/22/2023)           Objective     /85   Pulse 86   Ht 6' (1.829 m)   Wt 107 kg (236 lb 6.4 oz)   BMI 32.06 kg/m²     Physical Exam  Constitutional:       Appearance: Normal appearance.   Feet:      Comments: Left foot partial fourth ray resection site appears to be stable there is some scabbing callus and  eschar that is noted distally however this is well adhered.  There is no significant signs of ascending erythema noted at this time no active drainage.  There is minimal  swelling noted to the forefoot.  No other areas of open ulcerations or lesions noted at this time.  No significant pain on palpation to the area.  Neurological:      Mental Status: He is alert.

## 2024-03-14 ENCOUNTER — HOME CARE VISIT (OUTPATIENT)
Dept: HOME HEALTH SERVICES | Facility: HOME HEALTHCARE | Age: 64
End: 2024-03-14
Payer: COMMERCIAL

## 2024-03-14 ENCOUNTER — APPOINTMENT (OUTPATIENT)
Dept: LAB | Facility: IMAGING CENTER | Age: 64
End: 2024-03-14
Payer: COMMERCIAL

## 2024-03-14 VITALS
TEMPERATURE: 95.9 F | RESPIRATION RATE: 22 BRPM | SYSTOLIC BLOOD PRESSURE: 134 MMHG | HEART RATE: 80 BPM | OXYGEN SATURATION: 98 % | BODY MASS INDEX: 31.93 KG/M2 | WEIGHT: 235.4 LBS | DIASTOLIC BLOOD PRESSURE: 78 MMHG

## 2024-03-14 DIAGNOSIS — R78.81 MSSA BACTEREMIA: ICD-10-CM

## 2024-03-14 DIAGNOSIS — T82.7XXA: ICD-10-CM

## 2024-03-14 DIAGNOSIS — B95.61 MSSA BACTEREMIA: ICD-10-CM

## 2024-03-14 LAB
BASOPHILS # BLD AUTO: 0.03 THOUSANDS/ÂΜL (ref 0–0.1)
BASOPHILS NFR BLD AUTO: 0 % (ref 0–1)
CREAT SERPL-MCNC: 1.24 MG/DL (ref 0.6–1.3)
EOSINOPHIL # BLD AUTO: 0.22 THOUSAND/ÂΜL (ref 0–0.61)
EOSINOPHIL NFR BLD AUTO: 3 % (ref 0–6)
ERYTHROCYTE [DISTWIDTH] IN BLOOD BY AUTOMATED COUNT: 15.4 % (ref 11.6–15.1)
GFR SERPL CREATININE-BSD FRML MDRD: 61 ML/MIN/1.73SQ M
HCT VFR BLD AUTO: 32.1 % (ref 36.5–49.3)
HGB BLD-MCNC: 10.3 G/DL (ref 12–17)
IMM GRANULOCYTES # BLD AUTO: 0.04 THOUSAND/UL (ref 0–0.2)
IMM GRANULOCYTES NFR BLD AUTO: 1 % (ref 0–2)
LYMPHOCYTES # BLD AUTO: 1.22 THOUSANDS/ÂΜL (ref 0.6–4.47)
LYMPHOCYTES NFR BLD AUTO: 15 % (ref 14–44)
MCH RBC QN AUTO: 29.3 PG (ref 26.8–34.3)
MCHC RBC AUTO-ENTMCNC: 32.1 G/DL (ref 31.4–37.4)
MCV RBC AUTO: 92 FL (ref 82–98)
MONOCYTES # BLD AUTO: 0.46 THOUSAND/ÂΜL (ref 0.17–1.22)
MONOCYTES NFR BLD AUTO: 6 % (ref 4–12)
NEUTROPHILS # BLD AUTO: 6.23 THOUSANDS/ÂΜL (ref 1.85–7.62)
NEUTS SEG NFR BLD AUTO: 75 % (ref 43–75)
NRBC BLD AUTO-RTO: 0 /100 WBCS
PLATELET # BLD AUTO: 216 THOUSANDS/UL (ref 149–390)
PMV BLD AUTO: 10.4 FL (ref 8.9–12.7)
RBC # BLD AUTO: 3.51 MILLION/UL (ref 3.88–5.62)
WBC # BLD AUTO: 8.2 THOUSAND/UL (ref 4.31–10.16)

## 2024-03-14 PROCEDURE — 36415 COLL VENOUS BLD VENIPUNCTURE: CPT

## 2024-03-14 PROCEDURE — G0299 HHS/HOSPICE OF RN EA 15 MIN: HCPCS

## 2024-03-14 PROCEDURE — 82565 ASSAY OF CREATININE: CPT

## 2024-03-14 PROCEDURE — 85025 COMPLETE CBC W/AUTO DIFF WBC: CPT

## 2024-03-15 ENCOUNTER — HOSPITAL ENCOUNTER (OUTPATIENT)
Dept: RADIOLOGY | Facility: IMAGING CENTER | Age: 64
End: 2024-03-15
Payer: COMMERCIAL

## 2024-03-15 DIAGNOSIS — R93.89 ABNORMAL CT OF THE CHEST: ICD-10-CM

## 2024-03-15 PROCEDURE — 71250 CT THORAX DX C-: CPT

## 2024-03-15 NOTE — TELEPHONE ENCOUNTER
Pt called again for the form to be completed and sent back to [facility]. I can not find the form. He will call the [employer] and have them fax it back to us so I can complete form and send back.

## 2024-03-18 ENCOUNTER — OFFICE VISIT (OUTPATIENT)
Dept: NEPHROLOGY | Facility: CLINIC | Age: 64
End: 2024-03-18
Payer: COMMERCIAL

## 2024-03-18 VITALS
HEIGHT: 72 IN | HEART RATE: 87 BPM | BODY MASS INDEX: 32.23 KG/M2 | WEIGHT: 238 LBS | SYSTOLIC BLOOD PRESSURE: 142 MMHG | DIASTOLIC BLOOD PRESSURE: 72 MMHG

## 2024-03-18 DIAGNOSIS — N17.9 AKI (ACUTE KIDNEY INJURY) (HCC): ICD-10-CM

## 2024-03-18 DIAGNOSIS — B95.61 MSSA BACTEREMIA: ICD-10-CM

## 2024-03-18 DIAGNOSIS — I96 GANGRENE OF TOE OF LEFT FOOT (HCC): ICD-10-CM

## 2024-03-18 DIAGNOSIS — I10 ESSENTIAL (PRIMARY) HYPERTENSION: ICD-10-CM

## 2024-03-18 DIAGNOSIS — R78.81 MSSA BACTEREMIA: ICD-10-CM

## 2024-03-18 DIAGNOSIS — I50.23 ACUTE ON CHRONIC SYSTOLIC CHF (CONGESTIVE HEART FAILURE) (HCC): Primary | ICD-10-CM

## 2024-03-18 PROCEDURE — 99214 OFFICE O/P EST MOD 30 MIN: CPT | Performed by: NURSE PRACTITIONER

## 2024-03-18 NOTE — PATIENT INSTRUCTIONS
Call office with any questions or concerns,  Avoid motrin products,   Get blood and urine studies in 3 months  Return in 3 months for follow up    Acute Kidney Injury (LINDEN)  You have been diagnosed with Acute Kidney Injury (LINDEN). The following information has been developed to provide you with information about LINDEN and treatment.    What is Acute Kidney Injury (LINDEN)?   LINDEN occurs when kidney function decreases over a short period of time. This condition causes a buildup of waste products in the blood and can cause fluid to build up causing swelling in the legs and shortness of breath.  Sometimes called Acute Kidney Failure or Acute Renal Failure, LINDEN is often reversible if it is found and treated quickly.    How do you know if you have LINDEN?  LINDEN is diagnosed by assessing kidney function. This is done by obtaining a blood test to measure the blood level of creatinine. Decreased urine output can sometimes also indicate LINDEN.    Who is at risk for LINDEN?  LINDEN can happen to anyone but usually happens to people who are already sick and may be in the hospital. People are at higher risk for LINDEN if they have any of the following:  age 65 years or older  high or low blood pressure  underlying kidney disease (e.g., Chronic Kidney Disease (CKD)  peripheral vascular disease (hardening of arteries)  chronic diseases such as liver disease, heart disease and diabetes  a single kidney    What are the symptoms of LINDEN?   You may or may not have the symptoms to suggest you have kidney injury until the LINDEN has progressed. Some of the symptoms are listed below:  Not making enough urine  Increased swelling in legs   Feeling tired  Trouble breathing or shortness of breath  Nausea  New or worsening confusion    What causes LINDEN?  The causes are divided into three categories:  Not enough blood flowing to the kidneys (e.g., low blood pressure, bleeding, diarrhea, dehydration)  Injury directly to the kidneys (e.g., blood clots, severe infections  such as sepsis, medicine toxicity, IV contrast dye used for cardiac catheterization or CT scans)  Blockage to the tubes (ureters) that drain the urine from the kidneys (e.g., enlarged prostate, kidney stones, blood clots)    What is the treatment for LINDEN?  The treatment for LINDEN depends on correcting what caused it. Treatment usually involves removing the cause and measures to prevent further injury to the kidneys. This may require the use of intravenous fluids or medications and/or temporary dialysis.    Dialysis is a process using a machine that does the job of the kidneys to remove waste and help correct the electrolyte and fluid balance while the kidneys are recovering.  If dialysis is needed to treat LINDEN, the doctor will assess daily to see if the kidneys are showing signs of recovery. The daily assessments determine how long dialysis needs to continue.    Depending on the cause and the extent of damage, an episode of LINDEN may resolve in a few days to several weeks to several months.      What are the long term effects of having an episode of LINDEN?  People who have one episode of LINDEN are at an increased risk of having another episode of LINDEN as well as other health problems such as kidney disease, stroke, and heart disease.  In a small number of people who had unrecognized kidney disease, an LINDEN episode may result in Chronic Kidney Disease (CKD) which requires lifelong monitoring and treatment.    How do you prevent future episodes of LINDEN?  Make sure to follow up with the kidney doctor after hospital discharge and obtain blood work to reassess and monitor kidney function.  If you have diabetes, keep your blood sugar in goal range and keep appointments with your diabetes specialist.   If you have high blood pressure, have your blood pressure checked regularly to make sure it is in target range  If you take blood pressure medicine called ACEIs or ARBs (e.g., Lisinopril, Enalapril, Diovan, Losartan), your doctor may  tell you to skip a dose or two if you have severe dehydration and your blood pressure is running low.  Avoid using medicines such as NSAIDs (Nonsteroidal Anti-inflammatory Drugs) and Cowan-2 Inhibitors (a type of NSAID) that may be harmful to kidney function. These may include the medicines listed in the table that follows.        Examples of NSAIDS and Cowan-2 Inhibitors   Talk to your doctor or healthcare provider before stopping any medicine ordered for you.   Celecoxib (CELEBREX) Ketoprofen (ORUDIS, ORUVAIL)   Diclofenac (VOLTAREN, CATAFLAM) Ketorolac (TORADOL)   Diflunisal (DOLOBID) Meloxicam (MOBIC)   Etodolac (LODINE) Nabumetone (RELAFEN)   Fenoprofen (NALFON) Naproxen (ALEVE, NAPROSYN,    NAPRELAN, ANAPROX)   Flurbiprofen (ANSAID) Oxaprozin (DAYPRO)   Ibuprofen (MOTRIN, ADVIL) Piroxicam (FELDENE)   Indomethacin (INDOCIN) Sulindac (CLINORIL)    Tolmetin (TOLETIN)     Is there a special diet for people with LINDEN?   People with LINDEN and/or other kidney disease often have high potassium and phosphorus levels in their blood. To protect the kidneys from further injury and to avoid complications, most doctors recommend following a healthy diet choosing foods low potassium and low phosphorus.   Limiting dietary potassium to 2.5 grams/day and phosphorus to 800 milligrams/day is recommended.   A dietitian can help you with learning more about this type of diet.   The tables on the following page may help you to choose lower potassium and phosphorus foods.    The following websites are also good sources of information:  https://www.kidney.org/nutrition/Kidney-Disease-Stages-1-4   https://www.[employer]The Kitchen Hotline/nutrition/foods-to-avoid-with-kidney-disease  https://www.niddk.nih.gov/health-information/kidney-disease/chronic-kidney-disease-ckd/eating-nutrition  https://my.[facility].org/health/articles/23873-qdinx-xbgt-bommge  https://www.[employer].The Kitchen Hotline/y-xx-a-guides/diet-and-chronic-kidney-disease    If you have any questions or  "concerns about your condition, please contact your doctor or healthcare provider.  These tables may help you to choose lower potassium and phosphorus foods.  AVOID these higher phosphorus* foods: CHOOSE these lower phosphorus* foods:   Milk, pudding , yogurt made from animals and from many soy varieties Rice milk (unfortified), nondairy creamer   Hard cheeses, ricotta, cottage cheese, fat-free cream cheese Regular and low-fat cream cheese   Ice cream, frozen yogurt Sherbet, frozen fruit pops, sorbet   Soups made with milk, dried peas, beans, lentils or other high phosphorus ingredients Soups made with broth, are water-based, or other lower phosphorus ingredients   Whole grains, including whole-grain breads, crackers, cereal, rice and pasta, corn tortillas Refined grains, including white bread, crackers, cereals, rice and pasta   Quick breads, biscuits, cornbread, muffins, pancakes, waffles, granola, wheat germ Homemade refined (white) dinner rolls, bagels, English muffins, sugar cookies, shortbread cookies, rossana food cake   Dried peas (split, black-eyed), beans (black, garbanzo, lima, kidney, navy, calvillo), lentils Green peas (canned, frozen), green beans, wax beans   Organ meats, walleye, [place], sardines Lean beef, pork, lamb, poultry, other fish   Nuts and seeds Popcorn   Peanut butter, other nut butters; tofu, veggie or soy burgers Jam, jelly, honey   Chocolate, including chocolate drinks Carob (chocolate-flavored) candy, hard candy,  gumdrops   Scott, pepper-type sodas, flavored magaña, bottled teas, beer Lemon-lime soda, ginger ale or root beer, plain water, cream soda, grape soda   *Always read labels and avoid foods with ingredients containing \"phos\"  AVOID these higher potassium foods: CHOOSE these lower potassium foods:      Milk (fat free, low fat, whole, buttermilk, Soy), yogurt    Regular and low-fat cream cheese      Beans (white, [place]), [place] sprouts, spinach Swiss       chard, broccoli, avocado, " artichoke, potatoes, sweet      potatoes, tomatoes/tomato sauce, beet greens      Green beans, alfalfa sprouts, bamboo shoots (canned),       cabbage, carrots, cauliflower, corn, cucumber, eggplant,      endive, lettuce, mushrooms, onions, radishes,  watercress,       water chestnuts (canned), rice, peas      Halibut, tuna, cod, snapper, tuna fish, turkey    Egg, lean beef, pork, lamb, shellfish, chicken       Banana, papaya, orange, cantaloupe, dates, raisins and      other dried fruit, pomegranate, avocado      Apple, applesauce, blackberries, raspberries, pears,     watermelon, cucumbers, blueberries, cranberries,       peaches      Almonds, peanuts, hazelnuts, [place], cashew, mixed,      seeds (sunflower, pumpkin)     Homemade refined (white) dinner rolls, bagels,      English muffins, flour tortilla, crackers, anabell      crackers, popcorn, pretzels, spaghetti or macaroni,       hummus      Tomato or vegetable juice, prune juice    Papaya, lita, or pear nectar, cranberry juice cocktail      Molasses

## 2024-03-19 DIAGNOSIS — I50.20 SYSTOLIC CONGESTIVE HEART FAILURE, UNSPECIFIED HF CHRONICITY (HCC): ICD-10-CM

## 2024-03-19 DIAGNOSIS — Z95.810 ICD (IMPLANTABLE CARDIOVERTER-DEFIBRILLATOR) IN PLACE: ICD-10-CM

## 2024-03-19 DIAGNOSIS — I25.10 CORONARY ARTERY DISEASE INVOLVING NATIVE CORONARY ARTERY OF NATIVE HEART WITHOUT ANGINA PECTORIS: ICD-10-CM

## 2024-03-20 RX ORDER — HYDRALAZINE HYDROCHLORIDE 25 MG/1
50 TABLET, FILM COATED ORAL EVERY 8 HOURS SCHEDULED
Qty: 540 TABLET | Refills: 1 | Status: SHIPPED | OUTPATIENT
Start: 2024-03-20

## 2024-03-21 ENCOUNTER — HOME CARE VISIT (OUTPATIENT)
Dept: HOME HEALTH SERVICES | Facility: HOME HEALTHCARE | Age: 64
End: 2024-03-21
Payer: COMMERCIAL

## 2024-03-21 ENCOUNTER — APPOINTMENT (OUTPATIENT)
Dept: LAB | Facility: IMAGING CENTER | Age: 64
End: 2024-03-21
Payer: COMMERCIAL

## 2024-03-21 ENCOUNTER — TELEPHONE (OUTPATIENT)
Age: 64
End: 2024-03-21

## 2024-03-21 DIAGNOSIS — R78.81 MSSA BACTEREMIA: ICD-10-CM

## 2024-03-21 DIAGNOSIS — B95.61 MSSA BACTEREMIA: ICD-10-CM

## 2024-03-21 DIAGNOSIS — T82.7XXA: ICD-10-CM

## 2024-03-21 LAB
BASOPHILS # BLD AUTO: 0.02 THOUSANDS/ÂΜL (ref 0–0.1)
BASOPHILS NFR BLD AUTO: 0 % (ref 0–1)
CREAT SERPL-MCNC: 1.2 MG/DL (ref 0.6–1.3)
EOSINOPHIL # BLD AUTO: 0.21 THOUSAND/ÂΜL (ref 0–0.61)
EOSINOPHIL NFR BLD AUTO: 3 % (ref 0–6)
ERYTHROCYTE [DISTWIDTH] IN BLOOD BY AUTOMATED COUNT: 15.3 % (ref 11.6–15.1)
GFR SERPL CREATININE-BSD FRML MDRD: 63 ML/MIN/1.73SQ M
HCT VFR BLD AUTO: 34.3 % (ref 36.5–49.3)
HGB BLD-MCNC: 10.9 G/DL (ref 12–17)
IMM GRANULOCYTES # BLD AUTO: 0.03 THOUSAND/UL (ref 0–0.2)
IMM GRANULOCYTES NFR BLD AUTO: 0 % (ref 0–2)
LYMPHOCYTES # BLD AUTO: 1.14 THOUSANDS/ÂΜL (ref 0.6–4.47)
LYMPHOCYTES NFR BLD AUTO: 16 % (ref 14–44)
MCH RBC QN AUTO: 29.3 PG (ref 26.8–34.3)
MCHC RBC AUTO-ENTMCNC: 31.8 G/DL (ref 31.4–37.4)
MCV RBC AUTO: 92 FL (ref 82–98)
MONOCYTES # BLD AUTO: 0.42 THOUSAND/ÂΜL (ref 0.17–1.22)
MONOCYTES NFR BLD AUTO: 6 % (ref 4–12)
NEUTROPHILS # BLD AUTO: 5.35 THOUSANDS/ÂΜL (ref 1.85–7.62)
NEUTS SEG NFR BLD AUTO: 75 % (ref 43–75)
NRBC BLD AUTO-RTO: 0 /100 WBCS
PLATELET # BLD AUTO: 190 THOUSANDS/UL (ref 149–390)
PMV BLD AUTO: 10.4 FL (ref 8.9–12.7)
RBC # BLD AUTO: 3.72 MILLION/UL (ref 3.88–5.62)
WBC # BLD AUTO: 7.17 THOUSAND/UL (ref 4.31–10.16)

## 2024-03-21 PROCEDURE — 82565 ASSAY OF CREATININE: CPT

## 2024-03-21 PROCEDURE — 36415 COLL VENOUS BLD VENIPUNCTURE: CPT

## 2024-03-21 PROCEDURE — 85025 COMPLETE CBC W/AUTO DIFF WBC: CPT

## 2024-03-21 PROCEDURE — G0299 HHS/HOSPICE OF RN EA 15 MIN: HCPCS

## 2024-03-21 NOTE — TELEPHONE ENCOUNTER
Hello,  Please advise if the following patient can be forced onto the schedule:    Patient: Nadir Myers    : 1960    MRN: [id number]    Call back #: [phone]    Insurance: [employer]    Reason for appointment: SHIRA Miles called/scab fell off and yellow serous drainage the size of a nickel is oozing out of it. Sent message/picture to Dr. Dela Cruz on  who spoke to them but now she thinks he needs to be seen sooner by Dr. Pardo. Please call pt. Back to schedule. Ginger's contact number Is [phone]. Orders are just betadine now/photos under media tab for  To view.      Requested doctor and/or location: Dr. Pardo/Charleen      Thank you.

## 2024-03-26 ENCOUNTER — TELEPHONE (OUTPATIENT)
Age: 64
End: 2024-03-26

## 2024-03-26 ENCOUNTER — HOME CARE VISIT (OUTPATIENT)
Dept: HOME HEALTH SERVICES | Facility: HOME HEALTHCARE | Age: 64
End: 2024-03-26
Payer: COMMERCIAL

## 2024-03-26 VITALS
OXYGEN SATURATION: 98 % | RESPIRATION RATE: 20 BRPM | TEMPERATURE: 96.6 F | DIASTOLIC BLOOD PRESSURE: 80 MMHG | SYSTOLIC BLOOD PRESSURE: 126 MMHG | HEART RATE: 82 BPM | WEIGHT: 234.6 LBS | BODY MASS INDEX: 31.82 KG/M2

## 2024-03-26 VITALS
SYSTOLIC BLOOD PRESSURE: 134 MMHG | TEMPERATURE: 96 F | OXYGEN SATURATION: 96 % | BODY MASS INDEX: 32.01 KG/M2 | RESPIRATION RATE: 20 BRPM | WEIGHT: 236 LBS | DIASTOLIC BLOOD PRESSURE: 80 MMHG | HEART RATE: 80 BPM

## 2024-03-26 PROCEDURE — G0299 HHS/HOSPICE OF RN EA 15 MIN: HCPCS

## 2024-03-26 NOTE — TELEPHONE ENCOUNTER
Called and spoke with Arianna from [facility] today.   Informed Arianna if patient completed his IV antibiotics he would not need further weekly labs.   Arianna verbalizes understanding at this time.

## 2024-03-26 NOTE — TELEPHONE ENCOUNTER
Arianna from [facility] health calling to make sure we don't need additional labs before pulling patient's PICC line. IV cefazolin through yesterday. Weekly labs completed on Thursday. She will be in the home between 10:30 and 11:00. Please call her back to let her know.

## 2024-03-27 ENCOUNTER — TELEPHONE (OUTPATIENT)
Age: 64
End: 2024-03-27

## 2024-03-27 NOTE — TELEPHONE ENCOUNTER
Caller: Ginger from [facility]    Doctor and/or Office: Tammy  #: [phone]    Escalation: Co-payment/Bills Would like to continue seeing him until he comes back to his f/u appt. She will put the orders in if you could just sign them. Also she put some pictures in the media tab if you could please look ate them.  Please advise thank you.

## 2024-03-28 ENCOUNTER — TELEPHONE (OUTPATIENT)
Dept: CARDIOLOGY CLINIC | Facility: CLINIC | Age: 64
End: 2024-03-28

## 2024-03-28 DIAGNOSIS — I50.20 SYSTOLIC CONGESTIVE HEART FAILURE, UNSPECIFIED HF CHRONICITY (HCC): ICD-10-CM

## 2024-03-28 DIAGNOSIS — I25.10 CORONARY ARTERY DISEASE INVOLVING NATIVE CORONARY ARTERY OF NATIVE HEART WITHOUT ANGINA PECTORIS: ICD-10-CM

## 2024-03-28 DIAGNOSIS — Z95.810 ICD (IMPLANTABLE CARDIOVERTER-DEFIBRILLATOR) IN PLACE: ICD-10-CM

## 2024-03-28 NOTE — TELEPHONE ENCOUNTER
Pt called to see if Pre- auth was sent. Called him back and advised it was not. Dr. Samuels has not been in the office this week. Supplied pt with samples of Jardiance. He will pick them up on Monday.

## 2024-04-02 NOTE — PROGRESS NOTES
"Pulmonary Follow Up Note   Nadir Myers 64 y.o. male MRN: [id number]  4/3/2024      Assessment:  Likely Septic Emboli  Recent MSSA bacteremia   Will repeat CT chest in 6 months to ensure resolution of nodules. Will call with results.     Tobacco dependence   Patient has quit smoking since hospitalization    Plan:    Diagnoses and all orders for this visit:    Abnormal CT of the chest  -     CT chest without contrast; Future        No follow-ups on file.    History of Present Illness   HPI:  Nadir Myers is a 64 y.o. male with \"PMHx DM2, CAD, HFrEF sp ICD, PAD, recent cellulitis on the left foot, was originally admitted on 1/31 due to severe sepsis due to MSSA bacteremia 2/2 infected foot and ICD infection. During hosptial course patient underwent amputation of Lt toe and Icd removal on 2/7. During hospitalization patient developed multiple lung nodules with progression, concern for septic emboli, with the last Ct scan showing LLL reverse halo sign and concerning for pulmonary infarction. Noted he required O2 support (6L on 2/20) which has resolved. Now he is on room air. Due to his worsening kidney function a CT PE study cannot performed and we were consulted for a possible pulmonary infarct.\"   As per inpatient consult note. Patient was admitted 1/31/24 - 3/1/24.  Patient is here to follow up on imaging. He states he feels well. He denies any CP, SOB, lightheadedness, palpitations. He has plans to follow up with cards in the next weeks to discuss ICD reimplantation.     Review of Systems   Constitutional:  Negative for activity change, chills, diaphoresis, fatigue and fever.   HENT:  Negative for congestion, postnasal drip, rhinorrhea, sinus pressure, sinus pain and sore throat.    Eyes: Negative.    Respiratory:  Negative for cough, chest tightness and shortness of breath.    Cardiovascular:  Negative for chest pain, palpitations and leg swelling.   Gastrointestinal:  Negative for abdominal distention, " abdominal pain, constipation, diarrhea, nausea and vomiting.   Endocrine: Negative.    Genitourinary: Negative.    Musculoskeletal:  Negative for back pain, gait problem and neck pain.   Skin: Negative.    Allergic/Immunologic: Negative.    Neurological:  Negative for dizziness, syncope, weakness, light-headedness and headaches.   Hematological: Negative.    Psychiatric/Behavioral: Negative.     All other systems reviewed and are negative.      Historical Information   Past Medical History:   Diagnosis Date    Diabetes mellitus (HCC)     Myocardial infarction (HCC)      Past Surgical History:   Procedure Laterality Date    CARDIAC CATHETERIZATION N/A 05/03/2023    Procedure: Cardiac Coronary Angiogram;  Surgeon: Valeriy Shore MD;  [facility]: [facility];  Service: Cardiology    CARDIAC CATHETERIZATION Left 05/03/2023    Procedure: Cardiac Left Heart Cath;  Surgeon: Valeriy Shore MD;  [facility]: [facility];  Service: Cardiology    CARDIAC DEFIBRILLATOR PLACEMENT  05/2023    CARDIAC ELECTROPHYSIOLOGY PROCEDURE N/A 05/12/2023    Procedure: Cardiac icd implant;  Surgeon: Urbano Maria MD;  [facility]: [facility];  Service: Cardiology    CARDIAC ELECTROPHYSIOLOGY PROCEDURE N/A 2/20/2024    Procedure: EV ICD IMPLANTATION;  Surgeon: Arturo Wooten DO;  Location: [facility];  Service: Cardiology    CARDIAC ELECTROPHYSIOLOGY PROCEDURE N/A 2/8/2024    Procedure: Cardiac laser lead extraction;  Surgeon: Arturo Wooten DO;  [facility];  Service: Cardiology    IR LOWER EXTREMITY ANGIOGRAM  1/18/2024    IA RMVL TRANSVNS PM ELTRD DUAL LEAD SYS N/A 2/20/2024    Procedure: EV ICD IMPLANTATION;  Surgeon: Abhilash Ferrera MD;  Location: [facility];  Service: Cardiac Surgery    IA RMVL TRANSVNS PM ELTRD DUAL LEAD SYS N/A 2/21/2024    Procedure: REMOVAL OF LEAD AND GENERATOR AND ATTEMPTED LEAD REVISION;  Surgeon: Abhilash Ferrera MD;  Location[facility];  Service: Cardiac Surgery    WOUND  DEBRIDEMENT Left 2/4/2024    Procedure: LEFT FOURTH TOE AMPUTATION SURGICAL WOUND DEBRIDEMENT FOOT/TOE (WASH OUT);  Surgeon: Bebo Hopper DPM;  Location: [facility];  Service: Podiatry     History reviewed. No pertinent family history.      Meds/Allergies     Current Outpatient Medications:     ALPRAZolam (XANAX) 0.5 mg tablet, Take 1 tablet (0.5 mg total) by mouth daily at bedtime as needed for anxiety for up to 10 days (Patient not taking: Reported on 3/1/2024), Disp: 10 tablet, Rfl: 0    amiodarone 200 mg tablet, Take 1 tablet (200 mg total) by mouth daily with breakfast, Disp: 30 tablet, Rfl: 11    aspirin 81 mg chewable tablet, Chew 1 tablet (81 mg total) daily Do not start before March 2, 2024., Disp: 30 tablet, Rfl: 0    atorvastatin (LIPITOR) 80 mg tablet, TAKE 1 TABLET (80 MG TOTAL) BY MOUTH DAILY AFTER DINNER, Disp: 90 tablet, Rfl: 2    BD Pen Needle Micro U/F 32G X 6 MM MISC, USE 1 PEN EACH DAILY, Disp: , Rfl:     clopidogrel (PLAVIX) 75 mg tablet, Take 1 tablet (75 mg total) by mouth daily, Disp: 30 tablet, Rfl: 5    Empagliflozin (Jardiance) 10 MG TABS tablet, Take 1 tablet (10 mg total) by mouth every morning, Disp: 14 tablet, Rfl: 0    hydrALAZINE (APRESOLINE) 25 mg tablet, TAKE 2 TABLETS (50 MG TOTAL) BY MOUTH EVERY 8 (EIGHT) HOURS, Disp: 540 tablet, Rfl: 1    insulin detemir (Levemir FlexPen) 100 Units/mL injection pen, Inject 20 Units under the skin daily Patient to take 30 units daily - if fasting bs >150 for 3 days to increase dose by 5 units. Once bs < 150 can go back to 30 units, Disp: , Rfl:     isosorbide dinitrate (ISORDIL) 20 mg tablet, Take 0.5 tablets (10 mg total) by mouth 3 (three) times daily after meals, Disp: 90 tablet, Rfl: 0    JANUMET -1000 MG TB24, daily , Disp: , Rfl:     melatonin 3 mg, Take 1 tablet (3 mg total) by mouth daily at bedtime (Patient not taking: Reported on 3/1/2024), Disp: 30 tablet, Rfl: 0    metFORMIN (GLUCOPHAGE) 1000 MG tablet, Take 1,000 mg by mouth daily  with breakfast, Disp: , Rfl:     methocarbamol (ROBAXIN) 500 mg tablet, Take 1 tablet (500 mg total) by mouth every 6 (six) hours as needed for muscle spasms (Patient not taking: Reported on 3/12/2024), Disp: 60 tablet, Rfl: 0    metoprolol succinate (TOPROL-XL) 50 mg 24 hr tablet, Take 1 tablet (50 mg total) by mouth every 12 (twelve) hours, Disp: 60 tablet, Rfl: 0    polyethylene glycol (MIRALAX) 17 g packet, Take 17 g by mouth daily Do not start before March 2, 2024. (Patient not taking: Reported on 3/5/2024), Disp: 30 each, Rfl: 0    saccharomyces boulardii (FLORASTOR) 250 mg capsule, Take 1 capsule (250 mg total) by mouth 2 (two) times a day (Patient taking differently: Take 250 mg by mouth in the morning. Indications: probiotic), Disp: 60 capsule, Rfl: 0    senna-docusate sodium (SENOKOT S) 8.6-50 mg per tablet, Take 2 tablets by mouth 2 (two) times a day (Patient not taking: Reported on 3/5/2024), Disp: 60 tablet, Rfl: 0    Sodium Chloride Flush (Normal Saline Flush) 0.9 % SOLN, Inject 10 mL into a catheter in a vein every 8 (eight) hours. Indications: flushing piccline before and after iv antibiotics, Disp: , Rfl:     torsemide (DEMADEX) 20 mg tablet, Take 0.5 tablets (10 mg total) by mouth daily, Disp: 30 tablet, Rfl: 3  Allergies   Allergen Reactions    Vancomycin Rash     NOT AN ALLERGY - 1/31/24 patient experienced vancomycin infusion reaction, please extend duration of infusion time to prevent future infusion reactions       Vitals: Blood pressure 128/70, pulse 84, temperature 98.1 °F (36.7 °C), temperature source Tympanic, weight 108 kg (238 lb), SpO2 98%. Body mass index is 32.28 kg/m².        Physical Exam  Physical Exam  Vitals and nursing note reviewed.   Constitutional:       Appearance: Normal appearance. He is normal weight.   HENT:      Head: Normocephalic and atraumatic.      Right Ear: External ear normal.      Left Ear: External ear normal.      Nose: Nose normal.      Mouth/Throat:       "Mouth: Mucous membranes are moist.      Pharynx: Oropharynx is clear.   Eyes:      Conjunctiva/sclera: Conjunctivae normal.   Cardiovascular:      Rate and Rhythm: Normal rate and regular rhythm.      Pulses: Normal pulses.      Heart sounds: Normal heart sounds.   Pulmonary:      Effort: Pulmonary effort is normal.      Breath sounds: Normal breath sounds.   Abdominal:      General: Abdomen is flat. Bowel sounds are normal.      Palpations: Abdomen is soft.   Musculoskeletal:         General: No tenderness.      Cervical back: Neck supple. No muscular tenderness.      Right lower leg: Edema present.      Left lower leg: Edema present.   Skin:     General: Skin is warm and dry.      Capillary Refill: Capillary refill takes less than 2 seconds.   Neurological:      Mental Status: He is alert and oriented to person, place, and time. Mental status is at baseline.   Psychiatric:         Mood and Affect: Mood normal.         Behavior: Behavior normal.         Thought Content: Thought content normal.         Judgment: Judgment normal.             Labs: I have personally reviewed pertinent lab results.  Lab Results   Component Value Date    WBC 7.17 03/21/2024    HGB 10.9 (L) 03/21/2024    HCT 34.3 (L) 03/21/2024    MCV 92 03/21/2024     03/21/2024     Lab Results   Component Value Date    GLUCOSE 203 (H) 02/17/2015    CALCIUM 8.6 03/07/2024     02/17/2015    K 4.6 03/07/2024    CO2 26 03/07/2024     03/07/2024    BUN 42 (H) 03/07/2024    CREATININE 1.20 03/21/2024     No results found for: \"IGE\"  Lab Results   Component Value Date    ALT <3 (L) 03/01/2024    AST 17 03/01/2024    ALKPHOS 43 03/01/2024    BILITOT 0.6 02/17/2015         Imaging and other studies: I have personally reviewed pertinent reports.   and I have personally reviewed pertinent films in PACS  CT chest 3/15/24: There is no tracheal or endobronchial lesion. Interval decrease in size of 4 mm left apical nodule (series 5 image 34). " "Interval resolution of previously seen 10 mm juxtapleural left upper lobe nodule. Interval decrease in size of perifissural   left lower lobe nodule measuring 6 mm, previously 10 mm (series 5 image 97). Interval decrease in size of peripheral left lower lobe consolidation (series 5 image 202). Right lower lobe atelectasis versus scarring. Interval resolution of bilateral pleural effusions.  V/Q scan 2/22/24: intermediate probability for pulmonary embolus   CT chest 2/21/24: Peripheral left lower lobe consolidation is identified with a reversed halo sign appearance. 4 mm left apical pulmonary nodule is again identified series 4 image 36. 10 mm juxtapleural left upper lobe nodule is noted series 4 image 42. 10 mm left lower lobe perifissural nodule again seen. Given that these lesions are new from 2/7/2024 there are   likely infectious send although there is no cavitation present, septic emboli not entirely excluded. There is no tracheal or endobronchial lesion. Small bilateral pleural effusions are present.  CT chest 5/2/23: Atelectasis seen in the right middle lobe area. Mild groundglass density seen right lung base, image 85 series 2. Left left lingular granuloma PLEURA: Small to moderate right effusion seen  Pulmonary function testing: none     EKG, Pathology, and Other Studies: I have personally reviewed pertinent reports.    Echo 2/21/24: EF 45%    Sonny Hernandez MD  Pulmonary/Critical Care Fellow PGY-V  [facility]      Disclaimer: Portions of the record may have been created with voice recognition software. Occasional wrong word or \"sound a like\" substitutions may have occurred due to the inherent limitations of voice recognition software. Careful consideration should be taken to recognize, using context, where substitutions have occurred.    "

## 2024-04-03 ENCOUNTER — OFFICE VISIT (OUTPATIENT)
Dept: PULMONOLOGY | Facility: CLINIC | Age: 64
End: 2024-04-03
Payer: COMMERCIAL

## 2024-04-03 VITALS
DIASTOLIC BLOOD PRESSURE: 70 MMHG | OXYGEN SATURATION: 98 % | TEMPERATURE: 98.1 F | SYSTOLIC BLOOD PRESSURE: 128 MMHG | HEART RATE: 84 BPM | WEIGHT: 238 LBS | BODY MASS INDEX: 32.28 KG/M2

## 2024-04-03 DIAGNOSIS — L97.509 FOOT ULCER (HCC): ICD-10-CM

## 2024-04-03 DIAGNOSIS — R93.89 ABNORMAL CT OF THE CHEST: Primary | ICD-10-CM

## 2024-04-03 PROCEDURE — G2211 COMPLEX E/M VISIT ADD ON: HCPCS | Performed by: INTERNAL MEDICINE

## 2024-04-03 PROCEDURE — 99214 OFFICE O/P EST MOD 30 MIN: CPT | Performed by: INTERNAL MEDICINE

## 2024-04-03 NOTE — PROGRESS NOTES
Attending Statement:  I have seen and examined the patient. I have discussed the patient's care with the pulmonary fellow.    I agree with the findings, assessment, and plan as outlined in the note by Dr Hernandez with the addition of the following:    Imaging Studies were reviewed personally on the PAC system:     CT Chest 2/2024: Left abdominal wall pacemaker device with lead tip extending into the left pleural space.  Peripheral left lower lobe opacity with reverse halo sign appearance. This can be seen in the setting of pulmonary infarct and consideration for dedicated PE protocol CT suggested.  Left lung ndules again identified, given the rapid appearance these are likely infectious in etiology despite the absence of cavitation septa emboli cannot be entirely excluded.  Small bilateral pleural effusions.    CT Chest 3/2024: Interval decrease in size of left lower lobe consolidation and left lung nodules consistent with resolving infection.     EXAMINATION:   Clear lungs     A/P:  64M hx DM2, CAD, HFrEF sp ICD, PAD, recent cellulitis on the left foot, was originally admitted on 1/31 due to severe sepsis due to MSSA bacteremia 2/2 infected foot and ICD infection, pulmonary follow up for abnormal CT.    # Scattered lung nodules, consistent with septic emboli   CT shows b.l nodules overall stable to improving. No further infectious symptoms, feels well    - repeat CT chest in 6 months    Lanie Christina MD  [facility]

## 2024-04-04 ENCOUNTER — HOME CARE VISIT (OUTPATIENT)
Dept: HOME HEALTH SERVICES | Facility: HOME HEALTHCARE | Age: 64
End: 2024-04-04
Payer: COMMERCIAL

## 2024-04-04 PROCEDURE — G0299 HHS/HOSPICE OF RN EA 15 MIN: HCPCS

## 2024-04-04 RX ORDER — CLOPIDOGREL BISULFATE 75 MG/1
75 TABLET ORAL DAILY
Qty: 90 TABLET | Refills: 1 | Status: SHIPPED | OUTPATIENT
Start: 2024-04-04

## 2024-04-05 ENCOUNTER — TELEPHONE (OUTPATIENT)
Dept: CARDIOLOGY CLINIC | Facility: CLINIC | Age: 64
End: 2024-04-05

## 2024-04-05 VITALS
OXYGEN SATURATION: 98 % | TEMPERATURE: 96.3 F | HEART RATE: 88 BPM | SYSTOLIC BLOOD PRESSURE: 128 MMHG | RESPIRATION RATE: 22 BRPM | BODY MASS INDEX: 31.95 KG/M2 | WEIGHT: 235.6 LBS | DIASTOLIC BLOOD PRESSURE: 80 MMHG

## 2024-04-05 DIAGNOSIS — Z95.810 ICD (IMPLANTABLE CARDIOVERTER-DEFIBRILLATOR) IN PLACE: ICD-10-CM

## 2024-04-05 RX ORDER — AMIODARONE HYDROCHLORIDE 200 MG/1
200 TABLET ORAL
Qty: 90 TABLET | Refills: 4 | Status: SHIPPED | OUTPATIENT
Start: 2024-04-05

## 2024-04-05 RX ORDER — METOPROLOL SUCCINATE 50 MG/1
50 TABLET, EXTENDED RELEASE ORAL EVERY 12 HOURS
Qty: 60 TABLET | Refills: 11 | Status: SHIPPED | OUTPATIENT
Start: 2024-04-05

## 2024-04-05 NOTE — TELEPHONE ENCOUNTER
Faxed to [facility] patient Assistance Program  patient's completed Application form. F: [phone].

## 2024-04-05 NOTE — TELEPHONE ENCOUNTER
P/c'd and stated that BI the PAP did not get his paperwork yet for jardiance.  We will fax it again today # [phone].

## 2024-04-08 ENCOUNTER — OFFICE VISIT (OUTPATIENT)
Dept: CARDIOLOGY CLINIC | Facility: CLINIC | Age: 64
End: 2024-04-08
Payer: COMMERCIAL

## 2024-04-08 VITALS
SYSTOLIC BLOOD PRESSURE: 120 MMHG | WEIGHT: 236.4 LBS | HEIGHT: 72 IN | DIASTOLIC BLOOD PRESSURE: 68 MMHG | BODY MASS INDEX: 32.02 KG/M2 | HEART RATE: 75 BPM

## 2024-04-08 DIAGNOSIS — I48.0 PAROXYSMAL ATRIAL FIBRILLATION (HCC): Primary | ICD-10-CM

## 2024-04-08 PROCEDURE — 99215 OFFICE O/P EST HI 40 MIN: CPT | Performed by: INTERNAL MEDICINE

## 2024-04-08 PROCEDURE — 93000 ELECTROCARDIOGRAM COMPLETE: CPT | Performed by: INTERNAL MEDICINE

## 2024-04-08 NOTE — PROGRESS NOTES
Cardiology Follow Up    Nadir Myers  1960  [id number]  [facility]  [address]  [place] [address] [phone]    1. Paroxysmal atrial fibrillation (HCC)  POCT ECG          Interval History:     Nadir Myers is a 64 y.o. male with ischemic cardiomyopathy, CAD, ventricular tachycardia episode, LV apical aneurysm, hypertension, hyperlipidemia, diabetes who presents to discuss defibrillator implantation.    Patient had dual-chamber Medtronic ICD placed in May 2023 for secondary prevention for ventricular tachycardia that was thought to be scar related.  He had MSSA bacteremia with the likely source being his left foot toe.  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  He is blood culture remain positive despite being on IV antibiotic and there was suspicion for ICD lead vegetation and infection related to it.  He underwent extraction of his dual-chamber ICD on February 9th 2024.  He underwent Medtronic EV ICD placement on February 20, 2024.  Unfortunately his lead had dislodged into pleural space and he underwent revision procedure on February 21, 2024.  However during the procedure there was very poor sensing on the lead in multiple places and the procedure was aborted.  He was discharged on LifeVest.    He has now completed 6 weeks of IV antibiotics.  He has repeat blood culture ordered to be obtain during the week of 4/8/24. He is currently wearing LifeVest and has not had any inappropriate shocks.       Patient Active Problem List   Diagnosis    Essential (primary) hypertension    Benign prostatic hyperplasia without lower urinary tract symptoms    3-vessel CAD    Type 2 diabetes mellitus, with long-term current use of insulin (HCC)    GERD (gastroesophageal reflux disease)    Tobacco dependence syndrome    Acute HFrEF    A-fib (HCC)    Ischemic cardiomyopathy    V-tach (HCC)    Acidosis     Localized swelling on left hand    Acute on chronic systolic CHF (congestive heart failure) ([facility])    Cellulitis of left foot    Type 2 diabetes mellitus with foot ulcer, with long-term current use of insulin ([facility])    Coronary artery disease involving native coronary artery of native heart without angina pectoris    PAD (peripheral artery disease) ([facility])    LUE weakness    MSSA bacteremia    Gangrene of toe of left foot ([facility])    Abnormal CT of the chest    LINDEN (acute kidney injury) ([facility])    ICD (implantable cardioverter-defibrillator) in place    Pleuritic chest pain    Infected defibrillator ([facility])     Past Medical History:   Diagnosis Date    Diabetes mellitus ([facility])     Myocardial infarction ([facility])      Social History     Socioeconomic History    Marital status: /Civil Union     Spouse name: Not on file    Number of children: Not on file    Years of education: Not on file    Highest education level: Not on file   Occupational History    Not on file   Tobacco Use    Smoking status: Former     Types: Cigarettes     Start date: 1/30/2024    Smokeless tobacco: Never    Tobacco comments:     X2 cigarettes/day   Vaping Use    Vaping status: Never Used   Substance and Sexual Activity    Alcohol use: Not Currently    Drug use: Never    Sexual activity: Not Currently     Partners: Female   Other Topics Concern    Not on file   Social History Narrative    Not on file     Social Determinants of Health     Financial Resource Strain: Not on file   Food Insecurity: No Food Insecurity (1/31/2024)    Hunger Vital Sign     Worried About Running Out of Food in the Last Year: Never true     Ran Out of Food in the Last Year: Never true   Transportation Needs: No Transportation Needs (1/31/2024)    PRAPARE - Transportation     Lack of Transportation (Medical): No     Lack of Transportation (Non-Medical): No   Physical Activity: Not on file   Stress: Not on file   Social Connections: Not on file   Intimate Partner Violence: Not on  file   Housing Stability: Low Risk  (1/31/2024)    Housing Stability Vital Sign     Unable to Pay for Housing in the Last Year: No     Number of Places Lived in the Last Year: 1     Unstable Housing in the Last Year: No      History reviewed. No pertinent family history.  Past Surgical History:   Procedure Laterality Date    CARDIAC CATHETERIZATION N/A 05/03/2023    Procedure: Cardiac Coronary Angiogram;  Surgeon: Valeriy Shore MD;  [facility]: [facility];  Service: Cardiology    CARDIAC CATHETERIZATION Left 05/03/2023    Procedure: Cardiac Left Heart Cath;  Surgeon: Valeriy Shore MD;  [facility];  Service: Cardiology    CARDIAC DEFIBRILLATOR PLACEMENT  05/2023    CARDIAC ELECTROPHYSIOLOGY PROCEDURE N/A 05/12/2023    Procedure: Cardiac icd implant;  Surgeon: Urbano Maria MD;  [facility]: [facility];  Service: Cardiology    CARDIAC ELECTROPHYSIOLOGY PROCEDURE N/A 2/20/2024    Procedure: EV ICD IMPLANTATION;  Surgeon: Arturo Wooten DO;  Location: [facility];  Service: Cardiology    CARDIAC ELECTROPHYSIOLOGY PROCEDURE N/A 2/8/2024    Procedure: Cardiac laser lead extraction;  Surgeon: Arturo Wooten DO;  [facility];  Service: Cardiology    IR LOWER EXTREMITY ANGIOGRAM  1/18/2024    ND RMVL TRANSVNS PM ELTRD DUAL LEAD SYS N/A 2/20/2024    Procedure: EV ICD IMPLANTATION;  Surgeon: Abhilash Ferrera MD;  [facility];  Service: Cardiac Surgery    ND RMVL TRANSVNS PM ELTRD DUAL LEAD SYS N/A 2/21/2024    Procedure: REMOVAL OF LEAD AND GENERATOR AND ATTEMPTED LEAD REVISION;  Surgeon: Abhilash Ferrera MD;  [facility];  Service: Cardiac Surgery    WOUND DEBRIDEMENT Left 2/4/2024    Procedure: LEFT FOURTH TOE AMPUTATION SURGICAL WOUND DEBRIDEMENT FOOT/TOE (WASH OUT);  Surgeon: Bebo Hopper DPM;  Location: [facility];  Service: Podiatry       Current Outpatient Medications:     amiodarone 200 mg tablet, TAKE 1 TABLET BY MOUTH DAILY WITH BREAKFAST., Disp: 90  tablet, Rfl: 4    aspirin 81 mg chewable tablet, Chew 1 tablet (81 mg total) daily Do not start before March 2, 2024., Disp: 30 tablet, Rfl: 0    atorvastatin (LIPITOR) 80 mg tablet, TAKE 1 TABLET (80 MG TOTAL) BY MOUTH DAILY AFTER DINNER, Disp: 90 tablet, Rfl: 2    BD Pen Needle Micro U/F 32G X 6 MM MISC, USE 1 PEN EACH DAILY, Disp: , Rfl:     clopidogrel (PLAVIX) 75 mg tablet, TAKE 1 TABLET BY MOUTH EVERY DAY, Disp: 90 tablet, Rfl: 1    Empagliflozin (Jardiance) 10 MG TABS tablet, Take 1 tablet (10 mg total) by mouth every morning, Disp: 14 tablet, Rfl: 0    hydrALAZINE (APRESOLINE) 25 mg tablet, TAKE 2 TABLETS (50 MG TOTAL) BY MOUTH EVERY 8 (EIGHT) HOURS, Disp: 540 tablet, Rfl: 1    insulin detemir (Levemir FlexPen) 100 Units/mL injection pen, Inject 20 Units under the skin daily Patient to take 30 units daily - if fasting bs >150 for 3 days to increase dose by 5 units. Once bs < 150 can go back to 30 units, Disp: , Rfl:     isosorbide dinitrate (ISORDIL) 20 mg tablet, Take 0.5 tablets (10 mg total) by mouth 3 (three) times daily after meals, Disp: 90 tablet, Rfl: 0    JANUMET -1000 MG TB24, daily , Disp: , Rfl:     melatonin 3 mg, Take 1 tablet (3 mg total) by mouth daily at bedtime, Disp: 30 tablet, Rfl: 0    metFORMIN (GLUCOPHAGE) 1000 MG tablet, Take 1,000 mg by mouth daily with breakfast, Disp: , Rfl:     metoprolol succinate (TOPROL-XL) 50 mg 24 hr tablet, Take 1 tablet (50 mg total) by mouth every 12 (twelve) hours, Disp: 60 tablet, Rfl: 11    saccharomyces boulardii (FLORASTOR) 250 mg capsule, Take 1 capsule (250 mg total) by mouth 2 (two) times a day (Patient taking differently: Take 250 mg by mouth in the morning), Disp: 60 capsule, Rfl: 0    Sodium Chloride Flush (Normal Saline Flush) 0.9 % SOLN, Inject 10 mL into a catheter in a vein every 8 (eight) hours. Indications: flushing piccline before and after iv antibiotics, Disp: , Rfl:     torsemide (DEMADEX) 20 mg tablet, Take 0.5 tablets (10 mg  total) by mouth daily, Disp: 30 tablet, Rfl: 3    ALPRAZolam (XANAX) 0.5 mg tablet, Take 1 tablet (0.5 mg total) by mouth daily at bedtime as needed for anxiety for up to 10 days (Patient not taking: Reported on 3/1/2024), Disp: 10 tablet, Rfl: 0    methocarbamol (ROBAXIN) 500 mg tablet, Take 1 tablet (500 mg total) by mouth every 6 (six) hours as needed for muscle spasms (Patient not taking: Reported on 3/12/2024), Disp: 60 tablet, Rfl: 0    polyethylene glycol (MIRALAX) 17 g packet, Take 17 g by mouth daily Do not start before March 2, 2024. (Patient not taking: Reported on 3/5/2024), Disp: 30 each, Rfl: 0    senna-docusate sodium (SENOKOT S) 8.6-50 mg per tablet, Take 2 tablets by mouth 2 (two) times a day (Patient not taking: Reported on 3/5/2024), Disp: 60 tablet, Rfl: 0  Allergies   Allergen Reactions    Vancomycin Rash     NOT AN ALLERGY - 1/31/24 patient experienced vancomycin infusion reaction, please extend duration of infusion time to prevent future infusion reactions       Imaging: CT chest without contrast    Result Date: 3/21/2024  Narrative: CT CHEST WITHOUT IV CONTRAST INDICATION: R93.89: Abnormal findings on diagnostic imaging of other specified body structures. COMPARISON: CT chest 2/21/2024 TECHNIQUE: CT examination of the chest was performed without intravenous contrast. Multiplanar 2D reformatted images were created from the source data. This examination, like all CT scans performed in the [facility], was performed utilizing techniques to minimize radiation dose exposure, including the use of iterative reconstruction and automated exposure control. Radiation dose length product (DLP) for this visit: 310 mGy-cm FINDINGS: LUNGS: There is no tracheal or endobronchial lesion. Interval decrease in size of 4 mm left apical nodule (series 5 image 34). Interval resolution of previously seen 10 mm juxtapleural left upper lobe nodule. Interval decrease in size of perifissural left lower  lobe nodule measuring 6 mm, previously 10 mm (series 5 image 97). Interval decrease in size of peripheral left lower lobe consolidation (series 5 image 202). Right lower lobe atelectasis versus scarring. PLEURA: Interval resolution of bilateral pleural effusions. HEART/GREAT VESSELS: Coronary artery calcifications present. No thoracic aortic aneurysm. Right arm PICC terminates in the SVC. MEDIASTINUM AND HEAVEN: Unremarkable. CHEST WALL AND LOWER NECK: Unremarkable. VISUALIZED STRUCTURES IN THE UPPER ABDOMEN: Gallbladder is surgically absent. OSSEOUS STRUCTURES: No acute fracture or destructive osseous lesion.     Impression: Interval decrease in size of left lower lobe consolidation and left lung nodules consistent with resolving infection. Workstation performed: [id number]       Review of Systems:  Review of Systems   Constitutional:  Negative for chills, fatigue and fever.   HENT: Negative.     Eyes: Negative.  Negative for discharge.   Respiratory: Negative.  Negative for chest tightness, shortness of breath and wheezing.    Cardiovascular: Negative.  Negative for chest pain, palpitations and leg swelling.   Gastrointestinal: Negative.  Negative for abdominal pain, nausea and vomiting.   Endocrine: Negative.    Genitourinary: Negative.    Musculoskeletal: Negative.  Negative for arthralgias.   Skin: Negative.  Negative for rash.   Allergic/Immunologic: Negative.    Neurological: Negative.  Negative for dizziness and light-headedness.   Hematological: Negative.    Psychiatric/Behavioral: Negative.       Objective:   /68 (BP Location: Right arm, Patient Position: Sitting, Cuff Size: Large)   Pulse 75   Ht 6' (1.829 m)   Wt 107 kg (236 lb 6.4 oz)   BMI 32.06 kg/m²    Physical Exam:  GEN: NAD, Alert and oriented, well appearing  HEENT:Head, neck, ears, oral pharynx: Mucus membranes moist, oral pharynx clear, nares clear. External ears normal  EYES: Pupils equal, sclera anicteric  NECK: No  JVD  CARDIOVASCULAR: RRR, No murmur, rub, gallops S1,S2  LUNGS: Clear To auscultation bilaterally  ABDOMEN: Soft, nondistended  EXTREMITIES/VASCULAR: No edema.  PSYCH: Normal Affect  NEURO: Grossly intact, moving all extremiteis equal, face symetric  HEME: No bleeding, bruising, petechia  SKIN: No significant rashes       Labs & Results:  Below is the patient's most recent value for Albumin, ALT, AST, BUN, Calcium, Chloride, Cholesterol, CO2, Creatinine, GFR, Glucose, HDL, Hematocrit, Hemoglobin, Hemoglobin A1C, LDL, Magnesium, Phosphorus, Platelets, Potassium, PSA, Sodium, Triglycerides, and WBC.   Lab Results   Component Value Date    ALT <3 (L) 2024    AST 17 2024    BUN 42 (H) 2024    CALCIUM 8.6 2024     2024    CHOL 130 2015    CO2 26 2024    CREATININE 1.20 2024    HDL 34 2015    HCT 34.3 (L) 2024    HGB 10.9 (L) 2024    HGBA1C 9.8 (H) 2024    MG 2.1 2024    PHOS 4.6 (H) 2024     2024    K 4.6 2024     2015    TRIG 122 2015    WBC 7.17 2024     Note: for a comprehensive list of the patient's lab results, access the Results Review activity.          Cardiac testing:     I personally reviewed the ECG performed in the clinic on 24. It reveals sinus rhythm with RAMONITA, RBBB, LAFB.     Echocardiograms:  Results for orders placed during the hospital encounter of 17    Echo complete with contrast if indicated    Narrative  [address] [address] [phone]    Transthoracic Echocardiogram  2D, M-mode, Doppler, and Color Doppler    Study date:  26-Sep-2017    Patient: EAGLE REBOLLAR  MR number: [id number]  Account number: [id number]  : 1960  Age: 57 years  Gender: Male  Status: Outpatient  Location: [address] [place] [address] [facility]  Height: 72 in  Weight: 263.3 lb  BP: 142/ 88 mmHg    Indications:  CAD    Diagnoses: I25.10 - Atherosclerotic heart disease of native coronary artery without angina pectoris    Sonographer:  OLIVE Benoit  Primary Physician:  Paty Ortiz MD  Referring Physician:  Nadir Chavez MD  Group:  [facility]  Interpreting Physician:  Torrey Duenas MD    SUMMARY    LEFT VENTRICLE:  Size was normal.  Systolic function was normal. Ejection fraction was estimated to be 65 %.  There was mild concentric hypertrophy.  Doppler parameters were consistent with abnormal left ventricular relaxation (grade 1 diastolic dysfunction).    RIGHT VENTRICLE:  The size was normal.  Systolic function was normal.    TRICUSPID VALVE:  There was trace regurgitation.    HISTORY: PRIOR HISTORY: Hypertension, CAD s/p PCI, smoker, diabetes, high cholesterol    PROCEDURE: The study was performed in the [address] [place] [address] [facility] [place]. This was a routine study. The transthoracic approach was used. The study included complete 2D imaging, M-mode, complete spectral Doppler, and color Doppler. The  heart rate was 112 bpm, at the start of the study. Images were obtained from the parasternal, apical, subcostal, and suprasternal notch acoustic windows. Echocardiographic views were limited due to decreased penetration. This was a  technically difficult study.    LEFT VENTRICLE: Size was normal. Systolic function was normal. Ejection fraction was estimated to be 65 %. There were no regional wall motion abnormalities. Wall thickness was mildly increased. There was mild concentric hypertrophy.  DOPPLER: Doppler parameters were consistent with abnormal left ventricular relaxation (grade 1 diastolic dysfunction).    RIGHT VENTRICLE: The size was normal. Systolic function was normal. Wall thickness was normal.    LEFT ATRIUM: Size was normal.    RIGHT ATRIUM: Size was normal.    MITRAL VALVE: Valve structure was normal. There was normal leaflet separation. DOPPLER: The transmitral velocity  was within the normal range. There was no evidence for stenosis. There was no regurgitation.    AORTIC VALVE: The valve was trileaflet. Leaflets exhibited normal thickness and normal cuspal separation. DOPPLER: Transaortic velocity was within the normal range. There was no evidence for stenosis. There was no regurgitation.    TRICUSPID VALVE: The valve structure was normal. There was normal leaflet separation. DOPPLER: The transtricuspid velocity was within the normal range. There was no evidence for stenosis. There was trace regurgitation. The tricuspid jet  envelope definition was inadequate for estimation of RV systolic pressure. There are no indirect findings suggestive of moderate or severe pulmonary hypertension.    PULMONIC VALVE: Leaflets exhibited normal thickness, no calcification, and normal cuspal separation. DOPPLER: The transpulmonic velocity was within the normal range. There was no regurgitation.    PERICARDIUM: There was no pericardial effusion. The pericardium was normal in appearance.    AORTA: The root exhibited normal size.    SYSTEMIC VEINS: IVC: The inferior vena cava was normal in size and course. Respirophasic changes were normal.    SYSTEM MEASUREMENT TABLES    2D  %FS: 47.6 %  AV Diam: 3.34 cm  EDV(Teich): 101.71 ml  EF Biplane: 67.76 %  EF(Cube): 85.61 %  EF(Teich): 79 %  ESV(Cube): 14.82 ml  ESV(Teich): 21.36 ml  IVSd: 1.58 cm  LA Area: 12.78 cm2  LA Diam: 3.37 cm  LVEDV MOD A2C: 49.73 ml  LVEDV MOD A4C: 61.78 ml  LVEDV MOD BP: 58.1 ml  LVEF MOD A2C: 65.64 %  LVEF MOD A4C: 71.2 %  LVESV MOD A2C: 17.09 ml  LVESV MOD A4C: 17.79 ml  LVESV MOD BP: 18.73 ml  LVIDd: 4.69 cm  LVIDs: 2.46 cm  LVLd A2C: 6.97 cm  LVLd A4C: 7.74 cm  LVLs A2C: 5.23 cm  LVLs A4C: 6.13 cm  LVPWd: 1.23 cm  RA Area: 14.52 cm2  RV Diam.: 2.86 cm  SI(Cube): 36.73 ml/m2  SI(Teich): 33.48 ml/m2  SV MOD A2C: 32.64 ml  SV MOD A4C: 43.98 ml  SV(Cube): 88.15 ml  SV(Teich): 80.35 ml    MM  TAPSE: 3.26 cm    PW  E': 0.08  m/s    [facility]    Prepared and electronically signed by    Torrey Duenas MD  Signed 26-Sep-2017 12:55:39    No results found for this or any previous visit.      Catheterizations:   No results found for this or any previous visit.      Stress Tests:  No results found for this or any previous visit.      Holter monitor -   No results found for this or any previous visit.    No results found for this or any previous visit.      Discussion/Summary:  Persistent MSSA bacteremia  Patient had extraction of ICD on 2/8/2024  Patient underwent EV ICD (MDT) placement on February 20, 2024  Patient had lead dislodgment leading to repeat procedure on February 21, 2024  During the procedure patient's lead had poor sensing and therefore could not be positioned under the sternum and pocket was closed.  Multiple location under the sternum where attempted.  We discussed SQ ICD implantation in the future  Patient will benefit from subcutaneous ICD which puts the lead above the sternum with minimal dislodgment rate.    Will plan for LifeVest on discharge and then discuss S-ICD in office.   Continue goal directed medications for cardiomyopathy  Completed 6 weeks of IV abx 3/25/24  Repeat blood cx - ordered on 3/11/24  We discussed SQ ICD implantation again in detail today. We also contrasted with EV ICD. Answered all the questions including risks/benefits, battery longevity.   He would like to think about the procedure and let us know.   ICM  EF 45%  Continue goal directed medication  Currently on Toprol Xl only  Initial ICD implanted 5/12/2023  Euvolemic  Sees Dr. Samuels in [place]; maintained on torsemide, metoprolol, isordil, hydralazine   Hx of CAD   of LAD and drug-eluting stent to mid circumflex in 2015  Hx of VT  Salcha to be scar mediated  Underwent ICD in May 2023  S/p extraction due to MSSA  Will need ICD for secondary prevention - will implant SQ ICD once patient is  agreeable  No current need for pacing; August 2023 showed AP 8%,  < 0.1%  On amiodarone and metoprolol  Continue for now  Hx of of LV apical aneurysm  HTN  Normotensive int he office  On hydralazine   HLD  DM II  Last A1c 9.8  Maintained on metformin, and Janumet as well insulin    Follow-up in 4 months

## 2024-04-10 ENCOUNTER — TELEPHONE (OUTPATIENT)
Age: 64
End: 2024-04-10

## 2024-04-10 ENCOUNTER — LAB (OUTPATIENT)
Dept: LAB | Facility: IMAGING CENTER | Age: 64
End: 2024-04-10
Payer: COMMERCIAL

## 2024-04-10 ENCOUNTER — OFFICE VISIT (OUTPATIENT)
Dept: INTERNAL MEDICINE CLINIC | Facility: OTHER | Age: 64
End: 2024-04-10
Payer: MEDICARE

## 2024-04-10 VITALS
WEIGHT: 224 LBS | SYSTOLIC BLOOD PRESSURE: 120 MMHG | HEART RATE: 89 BPM | TEMPERATURE: 98.7 F | OXYGEN SATURATION: 98 % | BODY MASS INDEX: 30.34 KG/M2 | HEIGHT: 72 IN | DIASTOLIC BLOOD PRESSURE: 80 MMHG

## 2024-04-10 DIAGNOSIS — T82.7XXS INFECTION INVOLVING IMPLANTABLE CARDIOVERTER-DEFIBRILLATOR (ICD), SEQUELA: ICD-10-CM

## 2024-04-10 DIAGNOSIS — R78.81 MSSA BACTEREMIA: ICD-10-CM

## 2024-04-10 DIAGNOSIS — Z12.5 PROSTATE CANCER SCREENING: ICD-10-CM

## 2024-04-10 DIAGNOSIS — I25.10 CORONARY ARTERY DISEASE INVOLVING NATIVE CORONARY ARTERY OF NATIVE HEART WITHOUT ANGINA PECTORIS: ICD-10-CM

## 2024-04-10 DIAGNOSIS — I50.20 SYSTOLIC CONGESTIVE HEART FAILURE, UNSPECIFIED HF CHRONICITY (HCC): ICD-10-CM

## 2024-04-10 DIAGNOSIS — Z12.11 ENCOUNTER FOR SCREENING COLONOSCOPY: ICD-10-CM

## 2024-04-10 DIAGNOSIS — L97.509 TYPE 2 DIABETES MELLITUS WITH FOOT ULCER, WITH LONG-TERM CURRENT USE OF INSULIN (HCC): ICD-10-CM

## 2024-04-10 DIAGNOSIS — B95.61 MSSA BACTEREMIA: ICD-10-CM

## 2024-04-10 DIAGNOSIS — I50.22 CHRONIC SYSTOLIC CHF (CONGESTIVE HEART FAILURE) (HCC): ICD-10-CM

## 2024-04-10 DIAGNOSIS — I10 ESSENTIAL (PRIMARY) HYPERTENSION: ICD-10-CM

## 2024-04-10 DIAGNOSIS — I25.10 3-VESSEL CAD: ICD-10-CM

## 2024-04-10 DIAGNOSIS — E11.69 TYPE 2 DIABETES MELLITUS WITH OTHER SPECIFIED COMPLICATION, WITH LONG-TERM CURRENT USE OF INSULIN (HCC): Primary | ICD-10-CM

## 2024-04-10 DIAGNOSIS — I48.0 PAROXYSMAL ATRIAL FIBRILLATION (HCC): ICD-10-CM

## 2024-04-10 DIAGNOSIS — E11.621 TYPE 2 DIABETES MELLITUS WITH FOOT ULCER, WITH LONG-TERM CURRENT USE OF INSULIN (HCC): ICD-10-CM

## 2024-04-10 DIAGNOSIS — N17.9 AKI (ACUTE KIDNEY INJURY) (HCC): ICD-10-CM

## 2024-04-10 DIAGNOSIS — Z79.4 TYPE 2 DIABETES MELLITUS WITH FOOT ULCER, WITH LONG-TERM CURRENT USE OF INSULIN (HCC): ICD-10-CM

## 2024-04-10 DIAGNOSIS — Z79.4 TYPE 2 DIABETES MELLITUS WITH OTHER SPECIFIED COMPLICATION, WITH LONG-TERM CURRENT USE OF INSULIN (HCC): Primary | ICD-10-CM

## 2024-04-10 DIAGNOSIS — Z01.00 ROUTINE EYE EXAM: ICD-10-CM

## 2024-04-10 DIAGNOSIS — S98.132A AMPUTATED TOE, LEFT (HCC): ICD-10-CM

## 2024-04-10 PROBLEM — R22.32 LOCALIZED SWELLING ON LEFT HAND: Status: RESOLVED | Noted: 2023-05-13 | Resolved: 2024-04-10

## 2024-04-10 PROBLEM — I96 GANGRENE OF TOE OF LEFT FOOT (HCC): Status: RESOLVED | Noted: 2024-02-02 | Resolved: 2024-04-10

## 2024-04-10 PROBLEM — R29.898 LUE WEAKNESS: Status: RESOLVED | Noted: 2024-01-31 | Resolved: 2024-04-10

## 2024-04-10 PROBLEM — L03.116 CELLULITIS OF LEFT FOOT: Status: RESOLVED | Noted: 2024-01-16 | Resolved: 2024-04-10

## 2024-04-10 PROBLEM — Z95.810 ICD (IMPLANTABLE CARDIOVERTER-DEFIBRILLATOR) IN PLACE: Status: RESOLVED | Noted: 2024-02-21 | Resolved: 2024-04-10

## 2024-04-10 PROBLEM — R07.81 PLEURITIC CHEST PAIN: Status: RESOLVED | Noted: 2024-02-22 | Resolved: 2024-04-10

## 2024-04-10 LAB
ALBUMIN SERPL BCP-MCNC: 3.3 G/DL (ref 3.5–5)
ANION GAP SERPL CALCULATED.3IONS-SCNC: 8 MMOL/L (ref 4–13)
BNP SERPL-MCNC: 640 PG/ML (ref 0–100)
BUN SERPL-MCNC: 37 MG/DL (ref 5–25)
CALCIUM ALBUM COR SERPL-MCNC: 9.5 MG/DL (ref 8.3–10.1)
CALCIUM SERPL-MCNC: 8.9 MG/DL (ref 8.4–10.2)
CHLORIDE SERPL-SCNC: 104 MMOL/L (ref 96–108)
CO2 SERPL-SCNC: 27 MMOL/L (ref 21–32)
CREAT SERPL-MCNC: 1.28 MG/DL (ref 0.6–1.3)
CREAT UR-MCNC: 43.6 MG/DL
ERYTHROCYTE [DISTWIDTH] IN BLOOD BY AUTOMATED COUNT: 15.2 % (ref 11.6–15.1)
GFR SERPL CREATININE-BSD FRML MDRD: 59 ML/MIN/1.73SQ M
GLUCOSE SERPL-MCNC: 254 MG/DL (ref 65–140)
HCT VFR BLD AUTO: 38.8 % (ref 36.5–49.3)
HGB BLD-MCNC: 12.3 G/DL (ref 12–17)
MCH RBC QN AUTO: 29.2 PG (ref 26.8–34.3)
MCHC RBC AUTO-ENTMCNC: 31.7 G/DL (ref 31.4–37.4)
MCV RBC AUTO: 92 FL (ref 82–98)
PHOSPHATE SERPL-MCNC: 4.4 MG/DL (ref 2.3–4.1)
PLATELET # BLD AUTO: 259 THOUSANDS/UL (ref 149–390)
PMV BLD AUTO: 10.6 FL (ref 8.9–12.7)
POTASSIUM SERPL-SCNC: 4.5 MMOL/L (ref 3.5–5.3)
PROT UR-MCNC: 256 MG/DL
PROT/CREAT UR: 5.87 MG/G{CREAT} (ref 0–0.1)
PTH-INTACT SERPL-MCNC: 71 PG/ML (ref 12–88)
RBC # BLD AUTO: 4.21 MILLION/UL (ref 3.88–5.62)
SODIUM SERPL-SCNC: 139 MMOL/L (ref 135–147)
WBC # BLD AUTO: 8.66 THOUSAND/UL (ref 4.31–10.16)

## 2024-04-10 PROCEDURE — 80069 RENAL FUNCTION PANEL: CPT

## 2024-04-10 PROCEDURE — 85027 COMPLETE CBC AUTOMATED: CPT

## 2024-04-10 PROCEDURE — 99204 OFFICE O/P NEW MOD 45 MIN: CPT | Performed by: INTERNAL MEDICINE

## 2024-04-10 PROCEDURE — 84156 ASSAY OF PROTEIN URINE: CPT

## 2024-04-10 PROCEDURE — 83970 ASSAY OF PARATHORMONE: CPT

## 2024-04-10 PROCEDURE — 82570 ASSAY OF URINE CREATININE: CPT

## 2024-04-10 PROCEDURE — 36415 COLL VENOUS BLD VENIPUNCTURE: CPT

## 2024-04-10 PROCEDURE — 83880 ASSAY OF NATRIURETIC PEPTIDE: CPT

## 2024-04-10 PROCEDURE — 87040 BLOOD CULTURE FOR BACTERIA: CPT

## 2024-04-10 RX ORDER — INSULIN DETEMIR 100 [IU]/ML
30 INJECTION, SOLUTION SUBCUTANEOUS DAILY
Status: SHIPPED
Start: 2024-04-10

## 2024-04-10 RX ORDER — CEFAZOLIN SODIUM 10 G/1
INJECTION, POWDER, FOR SOLUTION INTRAVENOUS
COMMUNITY
Start: 2024-03-15 | End: 2024-04-10

## 2024-04-10 NOTE — ASSESSMENT & PLAN NOTE
Currently controlled and in sinus rhythm.  Continue current regimen and follow-up with cardiology.

## 2024-04-10 NOTE — PROGRESS NOTES
Assessment/Plan:    A-fib ([facility])  Currently controlled and in sinus rhythm.  Continue current regimen and follow-up with cardiology.    Chronic systolic CHF (congestive heart failure) ([facility])  Wt Readings from Last 3 Encounters:   04/10/24 102 kg (224 lb)   04/08/24 107 kg (236 lb 6.4 oz)   04/04/24 107 kg (235 lb 9.6 oz)     Euvolemic on examination today.  Continue current regimen and follow-up with cardiology.  Continue to monitor daily weights.          Type 2 diabetes mellitus with foot ulcer, with long-term current use of insulin ([facility])  For now, continue current diabetic regimen.  Recheck A1c on 5/1/2024.  Continue follow-up with podiatry.  Lab Results   Component Value Date    HGBA1C 9.8 (H) 01/31/2024     Infected defibrillator ([facility])  He has completed antibiotic therapy.  Continue follow-up with infectious disease.  Follow-up blood cultures.    Amputated 4th toe, left ([facility])  Continue follow-up with podiatry.       Diagnoses and all orders for this visit:    Type 2 diabetes mellitus with other specified complication, with long-term current use of insulin ([facility])  -      Diabetes Eye Exam  -     Hemoglobin A1C; Future    Encounter for screening colonoscopy  -     Ambulatory Referral to Gastroenterology; Future    Routine eye exam  -      Diabetes Eye Exam    Prostate cancer screening  -     PSA, Total Screen; Future    Paroxysmal atrial fibrillation ([facility])    Chronic systolic CHF (congestive heart failure) ([facility])    3-vessel CAD    Type 2 diabetes mellitus with foot ulcer, with long-term current use of insulin ([facility])    Infection involving implantable cardioverter-defibrillator (ICD), sequela    MSSA bacteremia    Amputated 4th toe, left ([facility])    Other orders  -     Discontinue: ceFAZolin (ANCEF) 10 g injection;  (Patient not taking: Reported on 4/10/2024)  -     insulin detemir (Levemir FlexPen) 100 Units/mL injection pen; Inject 30 Units under the skin daily Patient to take 30 units daily - if fasting bs >150  for 3 days to increase dose by 5 units. Once bs < 150 can go back to 30 units                  Subjective:      Patient ID: Nadir Myers is a 64 y.o. male.    Chief Complaint   Patient presents with   • Establish Care   •      [place] - will schedule   Eye exam - will schedule   PHQ        Nadir Myers is seen today to establish care.  Past medical history reviewed with patient today.  He has been compliant with his medication regimen.  He has been following up with podiatry regarding status post left fourth toe amputation.    He has completed antibiotic therapy after recent MSSA bacteremia.  He has follow-up blood cultures ordered.  He follows up with his cardiologist regarding infected pacemaker lead removal.  He wears a LifeVest.  He has no other complaints or concerns at this time.    Diabetes  He presents for his follow-up diabetic visit. He has type 2 diabetes mellitus. His disease course has been stable. Pertinent negatives for hypoglycemia include no dizziness or headaches. Associated symptoms include foot ulcerations. Pertinent negatives for diabetes include no chest pain, no fatigue and no weakness. Symptoms are stable. Current diabetic treatment includes diet, insulin injections and oral agent (triple therapy). He is compliant with treatment all of the time. He is following a generally healthy diet. His breakfast blood glucose range is generally  mg/dl. His overall blood glucose range is 130-140 mg/dl. An ACE inhibitor/angiotensin II receptor blocker is not being taken. He sees a podiatrist.Eye exam is current.       The following portions of the patient's history were reviewed and updated as appropriate: allergies, current medications, past family history, past medical history, past social history, past surgical history, and problem list.    Review of Systems   Constitutional:  Negative for activity change, appetite change, chills, diaphoresis, fatigue and fever.   HENT:  Negative for  congestion, postnasal drip, rhinorrhea, sinus pressure, sinus pain, sneezing and sore throat.    Eyes:  Negative for visual disturbance.   Respiratory:  Negative for apnea, cough, choking, chest tightness, shortness of breath and wheezing.    Cardiovascular:  Negative for chest pain, palpitations and leg swelling.   Gastrointestinal:  Negative for abdominal distention, abdominal pain, anal bleeding, blood in stool, constipation, diarrhea, nausea and vomiting.   Endocrine: Negative for cold intolerance and heat intolerance.   Genitourinary:  Negative for difficulty urinating, dysuria and hematuria.   Musculoskeletal: Negative.    Skin: Negative.    Neurological:  Negative for dizziness, weakness, light-headedness, numbness and headaches.   Hematological:  Negative for adenopathy.   Psychiatric/Behavioral:  Negative for agitation, sleep disturbance and suicidal ideas.    All other systems reviewed and are negative.        Past Medical History:   Diagnosis Date   • Diabetes mellitus (H[facility])    • Myocardial infarction ([facility])          Current Outpatient Medications:   •  amiodarone 200 mg tablet, TAKE 1 TABLET BY MOUTH DAILY WITH BREAKFAST., Disp: 90 tablet, Rfl: 4  •  aspirin 81 mg chewable tablet, Chew 1 tablet (81 mg total) daily Do not start before March 2, 2024., Disp: 30 tablet, Rfl: 0  •  atorvastatin (LIPITOR) 80 mg tablet, TAKE 1 TABLET (80 MG TOTAL) BY MOUTH DAILY AFTER DINNER, Disp: 90 tablet, Rfl: 2  •  BD Pen Needle Micro U/F 32G X 6 MM MISC, USE 1 PEN EACH DAILY, Disp: , Rfl:   •  clopidogrel (PLAVIX) 75 mg tablet, TAKE 1 TABLET BY MOUTH EVERY DAY, Disp: 90 tablet, Rfl: 1  •  Empagliflozin (Jardiance) 10 MG TABS tablet, Take 1 tablet (10 mg total) by mouth every morning, Disp: 14 tablet, Rfl: 0  •  hydrALAZINE (APRESOLINE) 25 mg tablet, TAKE 2 TABLETS (50 MG TOTAL) BY MOUTH EVERY 8 (EIGHT) HOURS, Disp: 540 tablet, Rfl: 1  •  insulin detemir (Levemir FlexPen) 100 Units/mL injection pen, Inject 30 Units under  the skin daily Patient to take 30 units daily - if fasting bs >150 for 3 days to increase dose by 5 units. Once bs < 150 can go back to 30 units, Disp: , Rfl:   •  isosorbide dinitrate (ISORDIL) 20 mg tablet, Take 0.5 tablets (10 mg total) by mouth 3 (three) times daily after meals, Disp: 90 tablet, Rfl: 0  •  JANUMET -1000 MG TB24, daily , Disp: , Rfl:   •  metFORMIN (GLUCOPHAGE) 1000 MG tablet, Take 1,000 mg by mouth daily with breakfast, Disp: , Rfl:   •  metoprolol succinate (TOPROL-XL) 50 mg 24 hr tablet, Take 1 tablet (50 mg total) by mouth every 12 (twelve) hours, Disp: 60 tablet, Rfl: 11  •  torsemide (DEMADEX) 20 mg tablet, Take 0.5 tablets (10 mg total) by mouth daily, Disp: 30 tablet, Rfl: 3    Allergies   Allergen Reactions   • Vancomycin Rash     NOT AN ALLERGY - 1/31/24 patient experienced vancomycin infusion reaction, please extend duration of infusion time to prevent future infusion reactions       Social History   Past Surgical History:   Procedure Laterality Date   • CARDIAC CATHETERIZATION N/A 05/03/2023    Procedure: Cardiac Coronary Angiogram;  Surgeon: Valeriy Shore MD;  [facility]: [facility];  Service: Cardiology   • CARDIAC CATHETERIZATION Left 05/03/2023    Procedure: Cardiac Left Heart Cath;  Surgeon: Valeriy Shore MD;  [facility];  Service: Cardiology   • CARDIAC DEFIBRILLATOR PLACEMENT  05/2023   • CARDIAC ELECTROPHYSIOLOGY PROCEDURE N/A 05/12/2023    Procedure: Cardiac icd implant;  Surgeon: Urbano Maria MD;  [facility]: [facility];  Service: Cardiology   • CARDIAC ELECTROPHYSIOLOGY PROCEDURE N/A 2/20/2024    Procedure: EV ICD IMPLANTATION;  Surgeon: Arturo Wooten DO;  Location: [facility];  Service: Cardiology   • CARDIAC ELECTROPHYSIOLOGY PROCEDURE N/A 2/8/2024    Procedure: Cardiac laser lead extraction;  Surgeon: Arturo Wooten DO;  [facility];  Service: Cardiology   • IR LOWER EXTREMITY ANGIOGRAM  1/18/2024   • [place]  RMVL TRANSVNS PM ELTRD DUAL LEAD SYS N/A 2/20/2024    Procedure: EV ICD IMPLANTATION;  Surgeon: Abhilash Ferrera MD;  [facility]: [facility];  Service: Cardiac Surgery   • [place] RMVL TRANSVNS PM ELTRD DUAL LEAD SYS N/A 2/21/2024    Procedure: REMOVAL OF LEAD AND GENERATOR AND ATTEMPTED LEAD REVISION;  Surgeon: Abhilash Ferrera MD;  [facility];  Service: Cardiac Surgery   • WOUND DEBRIDEMENT Left 2/4/2024    Procedure: LEFT FOURTH TOE AMPUTATION SURGICAL WOUND DEBRIDEMENT FOOT/TOE (WASH OUT);  Surgeon: Bebo Hopper DPM;  Location: [facility];  Service: Podiatry     History reviewed. No pertinent family history.    Objective:  /80 (BP Location: Left arm, Patient Position: Sitting, Cuff Size: Large)   Pulse 89   Temp 98.7 °F (37.1 °C) (Temporal)   Ht 6' (1.829 m)   Wt 102 kg (224 lb)   SpO2 98%   BMI 30.38 kg/m²     Recent Results (from the past 1344 hour(s))   Fingerstick Glucose (POCT)    Collection Time: 02/14/24 11:07 AM   Result Value Ref Range    POC Glucose 199 (H) 65 - 140 mg/dl   Fingerstick Glucose (POCT)    Collection Time: 02/14/24  3:55 PM   Result Value Ref Range    POC Glucose 217 (H) 65 - 140 mg/dl   Fingerstick Glucose (POCT)    Collection Time: 02/14/24  8:47 PM   Result Value Ref Range    POC Glucose 172 (H) 65 - 140 mg/dl   Comprehensive metabolic panel    Collection Time: 02/15/24  5:15 AM   Result Value Ref Range    Sodium 133 (L) 135 - 147 mmol/L    Potassium 4.6 3.5 - 5.3 mmol/L    Chloride 98 96 - 108 mmol/L    CO2 27 21 - 32 mmol/L    ANION GAP 8 mmol/L    BUN 31 (H) 5 - 25 mg/dL    Creatinine 1.54 (H) 0.60 - 1.30 mg/dL    Glucose 94 65 - 140 mg/dL    Calcium 7.9 (L) 8.4 - 10.2 mg/dL    Corrected Calcium 9.2 8.3 - 10.1 mg/dL    AST 25 13 - 39 U/L    ALT 5 (L) 7 - 52 U/L    Alkaline Phosphatase 46 34 - 104 U/L    Total Protein 6.5 6.4 - 8.4 g/dL    Albumin 2.4 (L) 3.5 - 5.0 g/dL    Total Bilirubin 0.31 0.20 - 1.00 mg/dL    eGFR 47 ml/min/1.73sq m   CBC and differential    Collection  Time: 02/15/24  5:15 AM   Result Value Ref Range    WBC 11.20 (H) 4.31 - 10.16 Thousand/uL    RBC 3.73 (L) 3.88 - 5.62 Million/uL    Hemoglobin 10.5 (L) 12.0 - 17.0 g/dL    Hematocrit 33.5 (L) 36.5 - 49.3 %    MCV 90 82 - 98 fL    MCH 28.2 26.8 - 34.3 pg    MCHC 31.3 (L) 31.4 - 37.4 g/dL    RDW 13.5 11.6 - 15.1 %    MPV 9.6 8.9 - 12.7 fL    Platelets 368 149 - 390 Thousands/uL    nRBC 0 /100 WBCs    Segmented % 82 (H) 43 - 75 %    Immature Grans % 0 0 - 2 %    Lymphocytes % 11 (L) 14 - 44 %    Monocytes % 7 4 - 12 %    Eosinophils Relative 0 0 - 6 %    Basophils Relative 0 0 - 1 %    Absolute Neutrophils 9.06 (H) 1.85 - 7.62 Thousands/µL    Absolute Immature Grans 0.04 0.00 - 0.20 Thousand/uL    Absolute Lymphocytes 1.27 0.60 - 4.47 Thousands/µL    Absolute Monocytes 0.78 0.17 - 1.22 Thousand/µL    Eosinophils Absolute 0.02 0.00 - 0.61 Thousand/µL    Basophils Absolute 0.03 0.00 - 0.10 Thousands/µL   Fingerstick Glucose (POCT)    Collection Time: 02/15/24  7:30 AM   Result Value Ref Range    POC Glucose 98 65 - 140 mg/dl   Fingerstick Glucose (POCT)    Collection Time: 02/15/24  8:29 AM   Result Value Ref Range    POC Glucose 106 65 - 140 mg/dl   Fingerstick Glucose (POCT)    Collection Time: 02/15/24 11:19 AM   Result Value Ref Range    POC Glucose 185 (H) 65 - 140 mg/dl   Fingerstick Glucose (POCT)    Collection Time: 02/15/24  4:15 PM   Result Value Ref Range    POC Glucose 117 65 - 140 mg/dl   Fingerstick Glucose (POCT)    Collection Time: 02/15/24  9:35 PM   Result Value Ref Range    POC Glucose 132 65 - 140 mg/dl   Fingerstick Glucose (POCT)    Collection Time: 02/16/24  7:14 AM   Result Value Ref Range    POC Glucose 74 65 - 140 mg/dl   Fingerstick Glucose (POCT)    Collection Time: 02/16/24  7:46 AM   Result Value Ref Range    POC Glucose 134 65 - 140 mg/dl   Fingerstick Glucose (POCT)    Collection Time: 02/16/24  8:17 AM   Result Value Ref Range    POC Glucose 131 65 - 140 mg/dl   CBC    Collection Time:  02/16/24  8:51 AM   Result Value Ref Range    WBC 11.93 (H) 4.31 - 10.16 Thousand/uL    RBC 3.59 (L) 3.88 - 5.62 Million/uL    Hemoglobin 10.1 (L) 12.0 - 17.0 g/dL    Hematocrit 32.5 (L) 36.5 - 49.3 %    MCV 91 82 - 98 fL    MCH 28.1 26.8 - 34.3 pg    MCHC 31.1 (L) 31.4 - 37.4 g/dL    RDW 13.5 11.6 - 15.1 %    Platelets 390 149 - 390 Thousands/uL    MPV 9.4 8.9 - 12.7 fL   Basic metabolic panel    Collection Time: 02/16/24  8:51 AM   Result Value Ref Range    Sodium 132 (L) 135 - 147 mmol/L    Potassium 4.8 3.5 - 5.3 mmol/L    Chloride 100 96 - 108 mmol/L    CO2 24 21 - 32 mmol/L    ANION GAP 8 mmol/L    BUN 29 (H) 5 - 25 mg/dL    Creatinine 1.40 (H) 0.60 - 1.30 mg/dL    Glucose 163 (H) 65 - 140 mg/dL    Calcium 7.7 (L) 8.4 - 10.2 mg/dL    eGFR 53 ml/min/1.73sq m   Blood culture    Collection Time: 02/16/24  8:51 AM    Specimen: Arm, Left; Blood   Result Value Ref Range    Blood Culture No Growth After 5 Days.    Blood culture    Collection Time: 02/16/24  8:51 AM    Specimen: Arm, Left; Blood   Result Value Ref Range    Blood Culture No Growth After 5 Days.    Fingerstick Glucose (POCT)    Collection Time: 02/16/24 11:22 AM   Result Value Ref Range    POC Glucose 267 (H) 65 - 140 mg/dl   Fingerstick Glucose (POCT)    Collection Time: 02/16/24  4:48 PM   Result Value Ref Range    POC Glucose 186 (H) 65 - 140 mg/dl   Fingerstick Glucose (POCT)    Collection Time: 02/16/24 10:13 PM   Result Value Ref Range    POC Glucose 184 (H) 65 - 140 mg/dl   Basic metabolic panel    Collection Time: 02/17/24  4:44 AM   Result Value Ref Range    Sodium 132 (L) 135 - 147 mmol/L    Potassium 4.9 3.5 - 5.3 mmol/L    Chloride 100 96 - 108 mmol/L    CO2 25 21 - 32 mmol/L    ANION GAP 7 mmol/L    BUN 33 (H) 5 - 25 mg/dL    Creatinine 1.55 (H) 0.60 - 1.30 mg/dL    Glucose 95 65 - 140 mg/dL    Calcium 7.8 (L) 8.4 - 10.2 mg/dL    eGFR 47 ml/min/1.73sq m   CBC    Collection Time: 02/17/24  4:44 AM   Result Value Ref Range    WBC 11.06 (H)  4.31 - 10.16 Thousand/uL    RBC 3.25 (L) 3.88 - 5.62 Million/uL    Hemoglobin 9.2 (L) 12.0 - 17.0 g/dL    Hematocrit 28.9 (L) 36.5 - 49.3 %    MCV 89 82 - 98 fL    MCH 28.3 26.8 - 34.3 pg    MCHC 31.8 31.4 - 37.4 g/dL    RDW 13.7 11.6 - 15.1 %    Platelets 403 (H) 149 - 390 Thousands/uL    MPV 9.6 8.9 - 12.7 fL   Fingerstick Glucose (POCT)    Collection Time: 02/17/24  8:06 AM   Result Value Ref Range    POC Glucose 101 65 - 140 mg/dl   Fingerstick Glucose (POCT)    Collection Time: 02/17/24 11:43 AM   Result Value Ref Range    POC Glucose 186 (H) 65 - 140 mg/dl   Fingerstick Glucose (POCT)    Collection Time: 02/17/24  4:54 PM   Result Value Ref Range    POC Glucose 256 (H) 65 - 140 mg/dl   Fingerstick Glucose (POCT)    Collection Time: 02/17/24  8:42 PM   Result Value Ref Range    POC Glucose 232 (H) 65 - 140 mg/dl   CBC    Collection Time: 02/18/24  5:00 AM   Result Value Ref Range    WBC 12.14 (H) 4.31 - 10.16 Thousand/uL    RBC 3.37 (L) 3.88 - 5.62 Million/uL    Hemoglobin 9.5 (L) 12.0 - 17.0 g/dL    Hematocrit 30.6 (L) 36.5 - 49.3 %    MCV 91 82 - 98 fL    MCH 28.2 26.8 - 34.3 pg    MCHC 31.0 (L) 31.4 - 37.4 g/dL    RDW 13.5 11.6 - 15.1 %    Platelets 409 (H) 149 - 390 Thousands/uL    MPV 9.4 8.9 - 12.7 fL   Basic metabolic panel    Collection Time: 02/18/24  5:02 AM   Result Value Ref Range    Sodium 129 (L) 135 - 147 mmol/L    Potassium 6.0 (H) 3.5 - 5.3 mmol/L    Chloride 101 96 - 108 mmol/L    CO2 22 21 - 32 mmol/L    ANION GAP 6 mmol/L    BUN 31 (H) 5 - 25 mg/dL    Creatinine 1.42 (H) 0.60 - 1.30 mg/dL    Glucose 85 65 - 140 mg/dL    Calcium 8.1 (L) 8.4 - 10.2 mg/dL    eGFR 52 ml/min/1.73sq m   Fingerstick Glucose (POCT)    Collection Time: 02/18/24  7:26 AM   Result Value Ref Range    POC Glucose 183 (H) 65 - 140 mg/dl   Fingerstick Glucose (POCT)    Collection Time: 02/18/24 11:15 AM   Result Value Ref Range    POC Glucose 262 (H) 65 - 140 mg/dl   Basic metabolic panel    Collection Time: 02/18/24 11:26  AM   Result Value Ref Range    Sodium 129 (L) 135 - 147 mmol/L    Potassium 5.3 3.5 - 5.3 mmol/L    Chloride 99 96 - 108 mmol/L    CO2 26 21 - 32 mmol/L    ANION GAP 4 mmol/L    BUN 31 (H) 5 - 25 mg/dL    Creatinine 1.44 (H) 0.60 - 1.30 mg/dL    Glucose 255 (H) 65 - 140 mg/dL    Calcium 8.2 (L) 8.4 - 10.2 mg/dL    eGFR 51 ml/min/1.73sq m   Osmolality, urine    Collection Time: 02/18/24 12:40 PM   Result Value Ref Range    Osmolality, Ur 368 250 - 900 mmol/KG   Sodium, urine, random    Collection Time: 02/18/24 12:40 PM   Result Value Ref Range    Sodium, Ur 35 Reference range not established.   Fingerstick Glucose (POCT)    Collection Time: 02/18/24  5:02 PM   Result Value Ref Range    POC Glucose 132 65 - 140 mg/dl   Fingerstick Glucose (POCT)    Collection Time: 02/18/24  8:35 PM   Result Value Ref Range    POC Glucose 231 (H) 65 - 140 mg/dl   CBC    Collection Time: 02/19/24  4:51 AM   Result Value Ref Range    WBC 11.34 (H) 4.31 - 10.16 Thousand/uL    RBC 3.63 (L) 3.88 - 5.62 Million/uL    Hemoglobin 10.1 (L) 12.0 - 17.0 g/dL    Hematocrit 33.1 (L) 36.5 - 49.3 %    MCV 91 82 - 98 fL    MCH 27.8 26.8 - 34.3 pg    MCHC 30.5 (L) 31.4 - 37.4 g/dL    RDW 13.4 11.6 - 15.1 %    Platelets 445 (H) 149 - 390 Thousands/uL    MPV 9.3 8.9 - 12.7 fL   Basic metabolic panel    Collection Time: 02/19/24  4:51 AM   Result Value Ref Range    Sodium 134 (L) 135 - 147 mmol/L    Potassium 5.0 3.5 - 5.3 mmol/L    Chloride 101 96 - 108 mmol/L    CO2 26 21 - 32 mmol/L    ANION GAP 7 mmol/L    BUN 27 (H) 5 - 25 mg/dL    Creatinine 1.51 (H) 0.60 - 1.30 mg/dL    Glucose 67 65 - 140 mg/dL    Calcium 8.3 (L) 8.4 - 10.2 mg/dL    eGFR 48 ml/min/1.73sq m   Magnesium    Collection Time: 02/19/24  4:51 AM   Result Value Ref Range    Magnesium 2.2 1.9 - 2.7 mg/dL   Fingerstick Glucose (POCT)    Collection Time: 02/19/24  7:22 AM   Result Value Ref Range    POC Glucose 143 (H) 65 - 140 mg/dl   Fingerstick Glucose (POCT)    Collection Time:  02/19/24 11:13 AM   Result Value Ref Range    POC Glucose 219 (H) 65 - 140 mg/dl   Protime-INR    Collection Time: 02/19/24  1:45 PM   Result Value Ref Range    Protime 14.6 (H) 11.6 - 14.5 seconds    INR 1.15 0.84 - 1.19   APTT    Collection Time: 02/19/24  1:45 PM   Result Value Ref Range    PTT 30 23 - 37 seconds   ABORh Recheck - Contact Blood Bank Prior to Collection    Collection Time: 02/19/24  2:43 PM   Result Value Ref Range    ABO Grouping O     Rh Factor Positive    Fingerstick Glucose (POCT)    Collection Time: 02/19/24  3:55 PM   Result Value Ref Range    POC Glucose 180 (H) 65 - 140 mg/dl   Type and screen    Collection Time: 02/19/24  7:48 PM   Result Value Ref Range    ABO Grouping O     Rh Factor Positive     Antibody Screen Negative     Specimen Expiration Date [id number]    Fingerstick Glucose (POCT)    Collection Time: 02/19/24  8:38 PM   Result Value Ref Range    POC Glucose 198 (H) 65 - 140 mg/dl   CBC    Collection Time: 02/20/24  5:04 AM   Result Value Ref Range    WBC 11.08 (H) 4.31 - 10.16 Thousand/uL    RBC 3.57 (L) 3.88 - 5.62 Million/uL    Hemoglobin 10.0 (L) 12.0 - 17.0 g/dL    Hematocrit 32.6 (L) 36.5 - 49.3 %    MCV 91 82 - 98 fL    MCH 28.0 26.8 - 34.3 pg    MCHC 30.7 (L) 31.4 - 37.4 g/dL    RDW 13.5 11.6 - 15.1 %    Platelets 423 (H) 149 - 390 Thousands/uL    MPV 9.4 8.9 - 12.7 fL   Basic metabolic panel    Collection Time: 02/20/24  5:04 AM   Result Value Ref Range    Sodium 135 135 - 147 mmol/L    Potassium 4.8 3.5 - 5.3 mmol/L    Chloride 101 96 - 108 mmol/L    CO2 29 21 - 32 mmol/L    ANION GAP 5 mmol/L    BUN 29 (H) 5 - 25 mg/dL    Creatinine 1.52 (H) 0.60 - 1.30 mg/dL    Glucose 87 65 - 140 mg/dL    Calcium 8.3 (L) 8.4 - 10.2 mg/dL    eGFR 48 ml/min/1.73sq m   B-Type Natriuretic Peptide(BNP)    Collection Time: 02/20/24  5:04 AM   Result Value Ref Range    BNP 1,809 (H) 0 - 100 pg/mL   Fingerstick Glucose (POCT)    Collection Time: 02/20/24  7:24 AM   Result Value Ref Range     POC Glucose 96 65 - 140 mg/dl   ECG 12 lead    Collection Time: 02/20/24  3:14 PM   Result Value Ref Range    Ventricular Rate 83 BPM    Atrial Rate 83 BPM    UT Interval 154 ms    QRSD Interval 154 ms    QT Interval 492 ms    QTC Interval 579 ms    P Axis 53 degrees    QRS Axis -42 degrees    T Wave Axis 46 degrees   Fingerstick Glucose (POCT)    Collection Time: 02/20/24  3:25 PM   Result Value Ref Range    POC Glucose 108 65 - 140 mg/dl   Fingerstick Glucose (POCT)    Collection Time: 02/20/24  8:35 PM   Result Value Ref Range    POC Glucose 206 (H) 65 - 140 mg/dl   CBC    Collection Time: 02/21/24  4:43 AM   Result Value Ref Range    WBC 12.19 (H) 4.31 - 10.16 Thousand/uL    RBC 3.72 (L) 3.88 - 5.62 Million/uL    Hemoglobin 10.5 (L) 12.0 - 17.0 g/dL    Hematocrit 33.5 (L) 36.5 - 49.3 %    MCV 90 82 - 98 fL    MCH 28.2 26.8 - 34.3 pg    MCHC 31.3 (L) 31.4 - 37.4 g/dL    RDW 13.4 11.6 - 15.1 %    Platelets 425 (H) 149 - 390 Thousands/uL    MPV 9.0 8.9 - 12.7 fL   Basic metabolic panel    Collection Time: 02/21/24  4:43 AM   Result Value Ref Range    Sodium 132 (L) 135 - 147 mmol/L    Potassium 5.8 (H) 3.5 - 5.3 mmol/L    Chloride 100 96 - 108 mmol/L    CO2 24 21 - 32 mmol/L    ANION GAP 8 mmol/L    BUN 38 (H) 5 - 25 mg/dL    Creatinine 1.76 (H) 0.60 - 1.30 mg/dL    Glucose 158 (H) 65 - 140 mg/dL    Calcium 8.4 8.4 - 10.2 mg/dL    eGFR 40 ml/min/1.73sq m   Fingerstick Glucose (POCT)    Collection Time: 02/21/24  6:52 AM   Result Value Ref Range    POC Glucose 146 (H) 65 - 140 mg/dl   Echo follow up/limited w/ contrast if indicated    Collection Time: 02/21/24  9:55 AM   Result Value Ref Range    RAA A4C 16.3 cm2    LA Volume Index (BP) 23.9 mL/m2    MV Peak A [place] 1.21 m/s    MV stenosis pressure 1/2 time 43 ms    MV Peak E [place] 80 cm/s    E wave deceleration time 148 ms    E/A ratio 0.66     MV valve area p 1/2 method 5.12     RVID d 3.1 cm    A4C EF 53 %    Left ventricular stroke volume (2D) 61.00 mL    IVSd 1.50  cm    Ao root 3.30 cm    LVPWd 1.50 cm    LA size 3.1 cm    Asc Ao 3.2 cm    LA volume (BP) 56 mL    FS 29 28 - 44    LVIDS 3.50 cm    IVS 1.5 cm    LVIDd 4.90 cm    LA length (A2C) 4.80 cm    LEFT VENTRICLE SYSTOLIC VOLUME (MOD BIPLANE) 2D 50 mL    LV DIASTOLIC VOLUME (MOD BIPLANE) 2D 112 mL    Left Atrium Area-systolic Four Chamber 19.3 cm2    Left Atrium Area-systolic Apical Two Chamber 18.4 cm2    LVSV, 2D 61 mL    BSA 2.34 m2    LV EF 45    Potassium    Collection Time: 02/21/24 11:00 AM   Result Value Ref Range    Potassium 5.1 3.5 - 5.3 mmol/L   Fingerstick Glucose (POCT)    Collection Time: 02/21/24 11:32 AM   Result Value Ref Range    POC Glucose 116 65 - 140 mg/dl   Fingerstick Glucose (POCT)    Collection Time: 02/21/24  5:41 PM   Result Value Ref Range    POC Glucose 82 65 - 140 mg/dl   Fingerstick Glucose (POCT)    Collection Time: 02/21/24  8:48 PM   Result Value Ref Range    POC Glucose 199 (H) 65 - 140 mg/dl   Fingerstick Glucose (POCT)    Collection Time: 02/22/24  6:22 AM   Result Value Ref Range    POC Glucose 100 65 - 140 mg/dl   Prepare Leukoreduced RBC: 4 Units, Leukoreduced    Collection Time: 02/22/24  7:19 AM   Result Value Ref Range    Unit Product Code X5577O58     Unit Number [id number]     Unit ABO O     Unit RH POS     Crossmatch Compatible     Unit Dispense Status Return to Inv     Unit Product Volume 350 ml    Unit Product Code G7656C95     Unit Number [id number]     Unit ABO O     Unit RH POS     Crossmatch Compatible     Unit Dispense Status Return to Inv     Unit Product Volume 300 ml    Unit Product Code S4796Y72     Unit Number [id number]     Unit ABO O     Unit RH POS     Crossmatch Compatible     Unit Dispense Status Return to Inv     Unit Product Volume 300 ml    Unit Product Code G9709X18     Unit Number [id number]     Unit ABO O     Unit RH POS     Crossmatch Compatible     Unit Dispense Status Return to Inv     Unit Product Volume 300 ml   Prepare  Leukoreduced RBC: 4 Units, Leukoreduced    Collection Time: 02/22/24  7:19 AM   Result Value Ref Range    Unit Product Code Y2767C80     Unit Number [id number]     Unit ABO O     Unit RH POS     Crossmatch Compatible     Unit Dispense Status Return to Inv     Unit Product Volume 350 mL   ECG 12 lead    Collection Time: 02/22/24  8:42 AM   Result Value Ref Range    Ventricular Rate 89 BPM    Atrial Rate 89 BPM    PA Interval 154 ms    QRSD Interval 150 ms    QT Interval 444 ms    QTC Interval 540 ms    P Axis 50 degrees    QRS Axis -40 degrees    T Wave Axis 42 degrees   CBC    Collection Time: 02/22/24 10:31 AM   Result Value Ref Range    WBC 14.46 (H) 4.31 - 10.16 Thousand/uL    RBC 3.41 (L) 3.88 - 5.62 Million/uL    Hemoglobin 9.7 (L) 12.0 - 17.0 g/dL    Hematocrit 30.9 (L) 36.5 - 49.3 %    MCV 91 82 - 98 fL    MCH 28.4 26.8 - 34.3 pg    MCHC 31.4 31.4 - 37.4 g/dL    RDW 13.8 11.6 - 15.1 %    Platelets 378 149 - 390 Thousands/uL    MPV 8.7 (L) 8.9 - 12.7 fL   Basic metabolic panel    Collection Time: 02/22/24 10:31 AM   Result Value Ref Range    Sodium 133 (L) 135 - 147 mmol/L    Potassium 5.0 3.5 - 5.3 mmol/L    Chloride 100 96 - 108 mmol/L    CO2 29 21 - 32 mmol/L    ANION GAP 4 mmol/L    BUN 42 (H) 5 - 25 mg/dL    Creatinine 2.01 (H) 0.60 - 1.30 mg/dL    Glucose 171 (H) 65 - 140 mg/dL    Calcium 7.7 (L) 8.4 - 10.2 mg/dL    eGFR 34 ml/min/1.73sq m   Fingerstick Glucose (POCT)    Collection Time: 02/22/24 11:05 AM   Result Value Ref Range    POC Glucose 160 (H) 65 - 140 mg/dl   Fingerstick Glucose (POCT)    Collection Time: 02/22/24  3:38 PM   Result Value Ref Range    POC Glucose 173 (H) 65 - 140 mg/dl   Fingerstick Glucose (POCT)    Collection Time: 02/22/24  8:33 PM   Result Value Ref Range    POC Glucose 113 65 - 140 mg/dl   Urinalysis with microscopic    Collection Time: 02/22/24  9:13 PM   Result Value Ref Range    Color, UA Light Yellow     Clarity, UA Clear     Specific Gravity, UA 1.010 1.003 -  1.030    pH, UA 6.0 4.5, 5.0, 5.5, 6.0, 6.5, 7.0, 7.5, 8.0    Leukocytes, UA (A) Negative     Elevated glucose may cause decreased leukocyte values. See urine microscopic for UWBC result    Nitrite, UA Negative Negative    Protein,  (2+) (A) Negative mg/dl    Glucose, UA >=1000 (1%) (A) Negative mg/dl    Ketones, UA Negative Negative mg/dl    Urobilinogen, UA <2.0 <2.0 mg/dl mg/dl    Bilirubin, UA Negative Negative    Occult Blood, UA Large (A) Negative    RBC, UA Innumerable (A) None Seen, 1-2 /hpf    WBC, UA 4-10 (A) None Seen, 1-2 /hpf    Epithelial Cells Occasional None Seen, Occasional /hpf    Bacteria, UA Occasional None Seen, Occasional /hpf    MUCUS THREADS Occasional (A) None Seen    Hyaline Casts, UA 0-3 (A) None Seen /lpf    Amorphous Crystals, UA Occasional     Budding Yeast Present    Sodium, urine, random    Collection Time: 02/22/24  9:13 PM   Result Value Ref Range    Sodium, Ur 39 Reference range not established.   Chloride, urine, random    Collection Time: 02/22/24  9:13 PM   Result Value Ref Range    Chloride, Ur 37 Reference range not established. mmol/L   Creatinine, urine, random    Collection Time: 02/22/24  9:13 PM   Result Value Ref Range    Creatinine, Ur 43.5 Reference range not established. mg/dL   Urea nitrogen, urine    Collection Time: 02/22/24  9:13 PM   Result Value Ref Range    Urea Nitrogen, Ur 382 Reference range not established. mg/dL   CBC and differential    Collection Time: 02/23/24 12:00 AM   Result Value Ref Range    WBC 11.96 (H) 4.31 - 10.16 Thousand/uL    RBC 3.36 (L) 3.88 - 5.62 Million/uL    Hemoglobin 9.5 (L) 12.0 - 17.0 g/dL    Hematocrit 30.8 (L) 36.5 - 49.3 %    MCV 92 82 - 98 fL    MCH 28.3 26.8 - 34.3 pg    MCHC 30.8 (L) 31.4 - 37.4 g/dL    RDW 13.8 11.6 - 15.1 %    MPV 8.9 8.9 - 12.7 fL    Platelets 372 149 - 390 Thousands/uL    nRBC 0 /100 WBCs    Segmented % 80 (H) 43 - 75 %    Immature Grans % 1 0 - 2 %    Lymphocytes % 10 (L) 14 - 44 %    Monocytes % 8  4 - 12 %    Eosinophils Relative 1 0 - 6 %    Basophils Relative 0 0 - 1 %    Absolute Neutrophils 9.65 (H) 1.85 - 7.62 Thousands/µL    Absolute Immature Grans 0.08 0.00 - 0.20 Thousand/uL    Absolute Lymphocytes 1.22 0.60 - 4.47 Thousands/µL    Absolute Monocytes 0.92 0.17 - 1.22 Thousand/µL    Eosinophils Absolute 0.06 0.00 - 0.61 Thousand/µL    Basophils Absolute 0.03 0.00 - 0.10 Thousands/µL   Basic metabolic panel    Collection Time: 02/23/24  4:58 AM   Result Value Ref Range    Sodium 135 135 - 147 mmol/L    Potassium 4.7 3.5 - 5.3 mmol/L    Chloride 98 96 - 108 mmol/L    CO2 31 21 - 32 mmol/L    ANION GAP 6 mmol/L    BUN 41 (H) 5 - 25 mg/dL    Creatinine 2.02 (H) 0.60 - 1.30 mg/dL    Glucose 69 65 - 140 mg/dL    Calcium 7.9 (L) 8.4 - 10.2 mg/dL    eGFR 34 ml/min/1.73sq m   Hepatic function panel    Collection Time: 02/23/24  4:58 AM   Result Value Ref Range    Total Bilirubin 0.25 0.20 - 1.00 mg/dL    Bilirubin, Direct 0.16 0.00 - 0.20 mg/dL    Alkaline Phosphatase 46 34 - 104 U/L    AST 11 (L) 13 - 39 U/L    ALT <3 (L) 7 - 52 U/L    Total Protein 6.3 (L) 6.4 - 8.4 g/dL    Albumin 2.4 (L) 3.5 - 5.0 g/dL   Magnesium    Collection Time: 02/23/24  4:58 AM   Result Value Ref Range    Magnesium 1.8 (L) 1.9 - 2.7 mg/dL   Phosphorus    Collection Time: 02/23/24  4:58 AM   Result Value Ref Range    Phosphorus 5.1 (H) 2.3 - 4.1 mg/dL   Fingerstick Glucose (POCT)    Collection Time: 02/23/24  5:24 AM   Result Value Ref Range    POC Glucose 85 65 - 140 mg/dl   Fingerstick Glucose (POCT)    Collection Time: 02/23/24  6:03 AM   Result Value Ref Range    POC Glucose 135 65 - 140 mg/dl   Fingerstick Glucose (POCT)    Collection Time: 02/23/24 11:10 AM   Result Value Ref Range    POC Glucose 187 (H) 65 - 140 mg/dl   Fingerstick Glucose (POCT)    Collection Time: 02/23/24  3:59 PM   Result Value Ref Range    POC Glucose 167 (H) 65 - 140 mg/dl   Fingerstick Glucose (POCT)    Collection Time: 02/23/24  8:17 PM   Result Value  Ref Range    POC Glucose 189 (H) 65 - 140 mg/dl   Basic metabolic panel    Collection Time: 02/24/24  5:41 AM   Result Value Ref Range    Sodium 136 135 - 147 mmol/L    Potassium 4.4 3.5 - 5.3 mmol/L    Chloride 98 96 - 108 mmol/L    CO2 31 21 - 32 mmol/L    ANION GAP 7 mmol/L    BUN 42 (H) 5 - 25 mg/dL    Creatinine 2.20 (H) 0.60 - 1.30 mg/dL    Glucose 90 65 - 140 mg/dL    Calcium 8.1 (L) 8.4 - 10.2 mg/dL    eGFR 30 ml/min/1.73sq m   CBC and differential    Collection Time: 02/24/24  5:41 AM   Result Value Ref Range    WBC 9.83 4.31 - 10.16 Thousand/uL    RBC 3.32 (L) 3.88 - 5.62 Million/uL    Hemoglobin 9.3 (L) 12.0 - 17.0 g/dL    Hematocrit 30.3 (L) 36.5 - 49.3 %    MCV 91 82 - 98 fL    MCH 28.0 26.8 - 34.3 pg    MCHC 30.7 (L) 31.4 - 37.4 g/dL    RDW 13.8 11.6 - 15.1 %    MPV 9.0 8.9 - 12.7 fL    Platelets 344 149 - 390 Thousands/uL    nRBC 0 /100 WBCs    Segmented % 78 (H) 43 - 75 %    Immature Grans % 1 0 - 2 %    Lymphocytes % 13 (L) 14 - 44 %    Monocytes % 7 4 - 12 %    Eosinophils Relative 1 0 - 6 %    Basophils Relative 0 0 - 1 %    Absolute Neutrophils 7.66 (H) 1.85 - 7.62 Thousands/µL    Absolute Immature Grans 0.10 0.00 - 0.20 Thousand/uL    Absolute Lymphocytes 1.32 0.60 - 4.47 Thousands/µL    Absolute Monocytes 0.66 0.17 - 1.22 Thousand/µL    Eosinophils Absolute 0.06 0.00 - 0.61 Thousand/µL    Basophils Absolute 0.03 0.00 - 0.10 Thousands/µL   Aspergillus galactomannan antigen    Collection Time: 02/24/24  5:41 AM   Result Value Ref Range    ASPERGILLUS GALACTOMANNAN 0.02 0.00 - 0.49 Index   Fingerstick Glucose (POCT)    Collection Time: 02/24/24  6:26 AM   Result Value Ref Range    POC Glucose 104 65 - 140 mg/dl   Fungitell    Collection Time: 02/24/24  8:36 AM   Result Value Ref Range    Result Negative     FUNGITELL VALUE <31.25 pg/mL    REFERENCE VALUE Comment     INTERPRETATION Notes     Clinical Relevance Notes     DISCLAIMER Notes     ELECTRONICALLY SIGNED BY: Comment    Fingerstick Glucose  (POCT)    Collection Time: 02/24/24 11:16 AM   Result Value Ref Range    POC Glucose 161 (H) 65 - 140 mg/dl   Fingerstick Glucose (POCT)    Collection Time: 02/24/24  3:59 PM   Result Value Ref Range    POC Glucose 291 (H) 65 - 140 mg/dl   Fingerstick Glucose (POCT)    Collection Time: 02/24/24  9:21 PM   Result Value Ref Range    POC Glucose 193 (H) 65 - 140 mg/dl   Basic metabolic panel    Collection Time: 02/25/24  5:37 AM   Result Value Ref Range    Sodium 136 135 - 147 mmol/L    Potassium 4.2 3.5 - 5.3 mmol/L    Chloride 96 96 - 108 mmol/L    CO2 33 (H) 21 - 32 mmol/L    ANION GAP 7 mmol/L    BUN 46 (H) 5 - 25 mg/dL    Creatinine 2.36 (H) 0.60 - 1.30 mg/dL    Glucose 124 65 - 140 mg/dL    Calcium 8.2 (L) 8.4 - 10.2 mg/dL    eGFR 28 ml/min/1.73sq m   CBC and differential    Collection Time: 02/25/24  5:37 AM   Result Value Ref Range    WBC 9.00 4.31 - 10.16 Thousand/uL    RBC 3.32 (L) 3.88 - 5.62 Million/uL    Hemoglobin 9.4 (L) 12.0 - 17.0 g/dL    Hematocrit 29.5 (L) 36.5 - 49.3 %    MCV 89 82 - 98 fL    MCH 28.3 26.8 - 34.3 pg    MCHC 31.9 31.4 - 37.4 g/dL    RDW 13.8 11.6 - 15.1 %    MPV 9.2 8.9 - 12.7 fL    Platelets 371 149 - 390 Thousands/uL    nRBC 0 /100 WBCs    Segmented % 78 (H) 43 - 75 %    Immature Grans % 1 0 - 2 %    Lymphocytes % 13 (L) 14 - 44 %    Monocytes % 7 4 - 12 %    Eosinophils Relative 1 0 - 6 %    Basophils Relative 0 0 - 1 %    Absolute Neutrophils 7.01 1.85 - 7.62 Thousands/µL    Absolute Immature Grans 0.06 0.00 - 0.20 Thousand/uL    Absolute Lymphocytes 1.21 0.60 - 4.47 Thousands/µL    Absolute Monocytes 0.63 0.17 - 1.22 Thousand/µL    Eosinophils Absolute 0.06 0.00 - 0.61 Thousand/µL    Basophils Absolute 0.03 0.00 - 0.10 Thousands/µL   Fingerstick Glucose (POCT)    Collection Time: 02/25/24  6:07 AM   Result Value Ref Range    POC Glucose 143 (H) 65 - 140 mg/dl   Fingerstick Glucose (POCT)    Collection Time: 02/25/24 10:43 AM   Result Value Ref Range    POC Glucose 266 (H) 65 -  140 mg/dl   Fingerstick Glucose (POCT)    Collection Time: 02/25/24  4:08 PM   Result Value Ref Range    POC Glucose 187 (H) 65 - 140 mg/dl   Fingerstick Glucose (POCT)    Collection Time: 02/25/24  8:48 PM   Result Value Ref Range    POC Glucose 225 (H) 65 - 140 mg/dl   Basic metabolic panel    Collection Time: 02/26/24  5:40 AM   Result Value Ref Range    Sodium 139 135 - 147 mmol/L    Potassium 3.9 3.5 - 5.3 mmol/L    Chloride 97 96 - 108 mmol/L    CO2 34 (H) 21 - 32 mmol/L    ANION GAP 8 mmol/L    BUN 43 (H) 5 - 25 mg/dL    Creatinine 2.12 (H) 0.60 - 1.30 mg/dL    Glucose 103 65 - 140 mg/dL    Calcium 8.4 8.4 - 10.2 mg/dL    eGFR 32 ml/min/1.73sq m   CBC and differential    Collection Time: 02/26/24  5:40 AM   Result Value Ref Range    WBC 8.37 4.31 - 10.16 Thousand/uL    RBC 3.33 (L) 3.88 - 5.62 Million/uL    Hemoglobin 9.3 (L) 12.0 - 17.0 g/dL    Hematocrit 30.7 (L) 36.5 - 49.3 %    MCV 92 82 - 98 fL    MCH 27.9 26.8 - 34.3 pg    MCHC 30.3 (L) 31.4 - 37.4 g/dL    RDW 13.7 11.6 - 15.1 %    MPV 9.1 8.9 - 12.7 fL    Platelets 391 (H) 149 - 390 Thousands/uL    nRBC 0 /100 WBCs    Segmented % 73 43 - 75 %    Immature Grans % 1 0 - 2 %    Lymphocytes % 17 14 - 44 %    Monocytes % 8 4 - 12 %    Eosinophils Relative 1 0 - 6 %    Basophils Relative 0 0 - 1 %    Absolute Neutrophils 6.14 1.85 - 7.62 Thousands/µL    Absolute Immature Grans 0.07 0.00 - 0.20 Thousand/uL    Absolute Lymphocytes 1.40 0.60 - 4.47 Thousands/µL    Absolute Monocytes 0.65 0.17 - 1.22 Thousand/µL    Eosinophils Absolute 0.09 0.00 - 0.61 Thousand/µL    Basophils Absolute 0.02 0.00 - 0.10 Thousands/µL   Magnesium    Collection Time: 02/26/24  5:40 AM   Result Value Ref Range    Magnesium 2.1 1.9 - 2.7 mg/dL   Fingerstick Glucose (POCT)    Collection Time: 02/26/24  6:04 AM   Result Value Ref Range    POC Glucose 103 65 - 140 mg/dl   Fingerstick Glucose (POCT)    Collection Time: 02/26/24 10:42 AM   Result Value Ref Range    POC Glucose 187 (H) 65 -  140 mg/dl   Fingerstick Glucose (POCT)    Collection Time: 02/26/24  3:20 PM   Result Value Ref Range    POC Glucose 172 (H) 65 - 140 mg/dl   Fingerstick Glucose (POCT)    Collection Time: 02/26/24  8:34 PM   Result Value Ref Range    POC Glucose 200 (H) 65 - 140 mg/dl   Basic metabolic panel    Collection Time: 02/27/24  4:43 AM   Result Value Ref Range    Sodium 138 135 - 147 mmol/L    Potassium 3.2 (L) 3.5 - 5.3 mmol/L    Chloride 97 96 - 108 mmol/L    CO2 33 (H) 21 - 32 mmol/L    ANION GAP 8 mmol/L    BUN 47 (H) 5 - 25 mg/dL    Creatinine 1.88 (H) 0.60 - 1.30 mg/dL    Glucose 96 65 - 140 mg/dL    Calcium 8.3 (L) 8.4 - 10.2 mg/dL    eGFR 37 ml/min/1.73sq m   CBC    Collection Time: 02/27/24  4:43 AM   Result Value Ref Range    WBC 7.54 4.31 - 10.16 Thousand/uL    RBC 3.26 (L) 3.88 - 5.62 Million/uL    Hemoglobin 9.3 (L) 12.0 - 17.0 g/dL    Hematocrit 29.8 (L) 36.5 - 49.3 %    MCV 91 82 - 98 fL    MCH 28.5 26.8 - 34.3 pg    MCHC 31.2 (L) 31.4 - 37.4 g/dL    RDW 13.6 11.6 - 15.1 %    Platelets 383 149 - 390 Thousands/uL    MPV 9.0 8.9 - 12.7 fL   Magnesium    Collection Time: 02/27/24  4:43 AM   Result Value Ref Range    Magnesium 2.1 1.9 - 2.7 mg/dL   Fingerstick Glucose (POCT)    Collection Time: 02/27/24  5:44 AM   Result Value Ref Range    POC Glucose 95 65 - 140 mg/dl   Fingerstick Glucose (POCT)    Collection Time: 02/27/24 10:46 AM   Result Value Ref Range    POC Glucose 216 (H) 65 - 140 mg/dl   Fingerstick Glucose (POCT)    Collection Time: 02/27/24  3:45 PM   Result Value Ref Range    POC Glucose 202 (H) 65 - 140 mg/dl   Fingerstick Glucose (POCT)    Collection Time: 02/27/24  8:28 PM   Result Value Ref Range    POC Glucose 300 (H) 65 - 140 mg/dl   CBC and differential    Collection Time: 02/28/24  5:23 AM   Result Value Ref Range    WBC 7.64 4.31 - 10.16 Thousand/uL    RBC 3.33 (L) 3.88 - 5.62 Million/uL    Hemoglobin 9.3 (L) 12.0 - 17.0 g/dL    Hematocrit 29.5 (L) 36.5 - 49.3 %    MCV 89 82 - 98 fL     MCH 27.9 26.8 - 34.3 pg    MCHC 31.5 31.4 - 37.4 g/dL    RDW 13.8 11.6 - 15.1 %    MPV 8.9 8.9 - 12.7 fL    Platelets 379 149 - 390 Thousands/uL    nRBC 0 /100 WBCs    Segmented % 73 43 - 75 %    Immature Grans % 1 0 - 2 %    Lymphocytes % 18 14 - 44 %    Monocytes % 7 4 - 12 %    Eosinophils Relative 1 0 - 6 %    Basophils Relative 0 0 - 1 %    Absolute Neutrophils 5.52 1.85 - 7.62 Thousands/µL    Absolute Immature Grans 0.08 0.00 - 0.20 Thousand/uL    Absolute Lymphocytes 1.40 0.60 - 4.47 Thousands/µL    Absolute Monocytes 0.54 0.17 - 1.22 Thousand/µL    Eosinophils Absolute 0.08 0.00 - 0.61 Thousand/µL    Basophils Absolute 0.02 0.00 - 0.10 Thousands/µL   Comprehensive metabolic panel    Collection Time: 02/28/24  5:23 AM   Result Value Ref Range    Sodium 139 135 - 147 mmol/L    Potassium 3.6 3.5 - 5.3 mmol/L    Chloride 98 96 - 108 mmol/L    CO2 31 21 - 32 mmol/L    ANION GAP 10 mmol/L    BUN 46 (H) 5 - 25 mg/dL    Creatinine 1.96 (H) 0.60 - 1.30 mg/dL    Glucose 120 65 - 140 mg/dL    Calcium 8.3 (L) 8.4 - 10.2 mg/dL    Corrected Calcium 9.3 8.3 - 10.1 mg/dL    AST 17 13 - 39 U/L    ALT <3 (L) 7 - 52 U/L    Alkaline Phosphatase 40 34 - 104 U/L    Total Protein 6.1 (L) 6.4 - 8.4 g/dL    Albumin 2.7 (L) 3.5 - 5.0 g/dL    Total Bilirubin 0.21 0.20 - 1.00 mg/dL    eGFR 35 ml/min/1.73sq m   Fingerstick Glucose (POCT)    Collection Time: 02/28/24  6:04 AM   Result Value Ref Range    POC Glucose 141 (H) 65 - 140 mg/dl   Fingerstick Glucose (POCT)    Collection Time: 02/28/24 11:22 AM   Result Value Ref Range    POC Glucose 228 (H) 65 - 140 mg/dl   Wheeled Walker + 1 more item    Collection Time: 02/28/24 11:33 AM   Result Value Ref Range    Supplier Name [employer]     Supplier Phone Number [phone]     Order Status Delivery Successful     Delivery Note      Delivery Request Date 02/28/2024     Date Delivered  02/29/2024     Item Description Wheeled Walker, Adult     Item Description 3 in  1 Commode    Fingerstick Glucose (POCT)    Collection Time: 02/28/24  4:11 PM   Result Value Ref Range    POC Glucose 190 (H) 65 - 140 mg/dl   Fingerstick Glucose (POCT)    Collection Time: 02/28/24  8:58 PM   Result Value Ref Range    POC Glucose 316 (H) 65 - 140 mg/dl   CBC and differential    Collection Time: 02/29/24  5:41 AM   Result Value Ref Range    WBC 8.42 4.31 - 10.16 Thousand/uL    RBC 3.38 (L) 3.88 - 5.62 Million/uL    Hemoglobin 9.4 (L) 12.0 - 17.0 g/dL    Hematocrit 31.0 (L) 36.5 - 49.3 %    MCV 92 82 - 98 fL    MCH 27.8 26.8 - 34.3 pg    MCHC 30.3 (L) 31.4 - 37.4 g/dL    RDW 13.9 11.6 - 15.1 %    MPV 9.2 8.9 - 12.7 fL    Platelets 363 149 - 390 Thousands/uL    nRBC 0 /100 WBCs    Segmented % 72 43 - 75 %    Immature Grans % 1 0 - 2 %    Lymphocytes % 18 14 - 44 %    Monocytes % 8 4 - 12 %    Eosinophils Relative 1 0 - 6 %    Basophils Relative 0 0 - 1 %    Absolute Neutrophils 6.10 1.85 - 7.62 Thousands/µL    Absolute Immature Grans 0.10 0.00 - 0.20 Thousand/uL    Absolute Lymphocytes 1.50 0.60 - 4.47 Thousands/µL    Absolute Monocytes 0.63 0.17 - 1.22 Thousand/µL    Eosinophils Absolute 0.06 0.00 - 0.61 Thousand/µL    Basophils Absolute 0.03 0.00 - 0.10 Thousands/µL   Comprehensive metabolic panel    Collection Time: 02/29/24  5:41 AM   Result Value Ref Range    Sodium 138 135 - 147 mmol/L    Potassium 3.8 3.5 - 5.3 mmol/L    Chloride 99 96 - 108 mmol/L    CO2 29 21 - 32 mmol/L    ANION GAP 10 mmol/L    BUN 52 (H) 5 - 25 mg/dL    Creatinine 1.80 (H) 0.60 - 1.30 mg/dL    Glucose 163 (H) 65 - 140 mg/dL    Calcium 8.4 8.4 - 10.2 mg/dL    Corrected Calcium 9.4 8.3 - 10.1 mg/dL    AST 18 13 - 39 U/L    ALT <3 (L) 7 - 52 U/L    Alkaline Phosphatase 38 34 - 104 U/L    Total Protein 6.4 6.4 - 8.4 g/dL    Albumin 2.7 (L) 3.5 - 5.0 g/dL    Total Bilirubin 0.22 0.20 - 1.00 mg/dL    eGFR 39 ml/min/1.73sq m   Magnesium    Collection Time: 02/29/24  5:41 AM   Result Value Ref Range    Magnesium 2.0 1.9 - 2.7  mg/dL   Phosphorus    Collection Time: 02/29/24  5:41 AM   Result Value Ref Range    Phosphorus 4.5 (H) 2.3 - 4.1 mg/dL   Fingerstick Glucose (POCT)    Collection Time: 02/29/24  6:08 AM   Result Value Ref Range    POC Glucose 170 (H) 65 - 140 mg/dl   Fingerstick Glucose (POCT)    Collection Time: 02/29/24 10:30 AM   Result Value Ref Range    POC Glucose 227 (H) 65 - 140 mg/dl   Fingerstick Glucose (POCT)    Collection Time: 02/29/24  3:26 PM   Result Value Ref Range    POC Glucose 175 (H) 65 - 140 mg/dl   Fingerstick Glucose (POCT)    Collection Time: 02/29/24  8:30 PM   Result Value Ref Range    POC Glucose 254 (H) 65 - 140 mg/dl   Comprehensive metabolic panel    Collection Time: 03/01/24  6:03 AM   Result Value Ref Range    Sodium 138 135 - 147 mmol/L    Potassium 4.2 3.5 - 5.3 mmol/L    Chloride 99 96 - 108 mmol/L    CO2 29 21 - 32 mmol/L    ANION GAP 10 mmol/L    BUN 49 (H) 5 - 25 mg/dL    Creatinine 1.76 (H) 0.60 - 1.30 mg/dL    Glucose 117 65 - 140 mg/dL    Calcium 8.7 8.4 - 10.2 mg/dL    Corrected Calcium 9.6 8.3 - 10.1 mg/dL    AST 17 13 - 39 U/L    ALT <3 (L) 7 - 52 U/L    Alkaline Phosphatase 43 34 - 104 U/L    Total Protein 6.7 6.4 - 8.4 g/dL    Albumin 2.9 (L) 3.5 - 5.0 g/dL    Total Bilirubin 0.27 0.20 - 1.00 mg/dL    eGFR 40 ml/min/1.73sq m   Magnesium    Collection Time: 03/01/24  6:03 AM   Result Value Ref Range    Magnesium 2.1 1.9 - 2.7 mg/dL   Phosphorus    Collection Time: 03/01/24  6:03 AM   Result Value Ref Range    Phosphorus 4.6 (H) 2.3 - 4.1 mg/dL   CBC and differential    Collection Time: 03/01/24  6:03 AM   Result Value Ref Range    WBC 9.95 4.31 - 10.16 Thousand/uL    RBC 3.61 (L) 3.88 - 5.62 Million/uL    Hemoglobin 10.1 (L) 12.0 - 17.0 g/dL    Hematocrit 33.2 (L) 36.5 - 49.3 %    MCV 92 82 - 98 fL    MCH 28.0 26.8 - 34.3 pg    MCHC 30.4 (L) 31.4 - 37.4 g/dL    RDW 14.1 11.6 - 15.1 %    MPV 9.2 8.9 - 12.7 fL    Platelets 354 149 - 390 Thousands/uL    nRBC 0 /100 WBCs    Segmented % 71  43 - 75 %    Immature Grans % 1 0 - 2 %    Lymphocytes % 19 14 - 44 %    Monocytes % 7 4 - 12 %    Eosinophils Relative 2 0 - 6 %    Basophils Relative 0 0 - 1 %    Absolute Neutrophils 7.01 1.85 - 7.62 Thousands/µL    Absolute Immature Grans 0.11 0.00 - 0.20 Thousand/uL    Absolute Lymphocytes 1.87 0.60 - 4.47 Thousands/µL    Absolute Monocytes 0.69 0.17 - 1.22 Thousand/µL    Eosinophils Absolute 0.23 0.00 - 0.61 Thousand/µL    Basophils Absolute 0.04 0.00 - 0.10 Thousands/µL   Fingerstick Glucose (POCT)    Collection Time: 03/01/24  6:08 AM   Result Value Ref Range    POC Glucose 116 65 - 140 mg/dl   Fingerstick Glucose (POCT)    Collection Time: 03/01/24 10:45 AM   Result Value Ref Range    POC Glucose 201 (H) 65 - 140 mg/dl   Basic metabolic panel    Collection Time: 03/07/24  8:29 PM   Result Value Ref Range    Sodium 139 135 - 147 mmol/L    Potassium 4.6 3.5 - 5.3 mmol/L    Chloride 104 96 - 108 mmol/L    CO2 26 21 - 32 mmol/L    ANION GAP 9 mmol/L    BUN 42 (H) 5 - 25 mg/dL    Creatinine 1.30 0.60 - 1.30 mg/dL    Glucose 149 (H) 65 - 140 mg/dL    Calcium 8.6 8.4 - 10.2 mg/dL    eGFR 58 ml/min/1.73sq m   B-Type Natriuretic Peptide(BNP)    Collection Time: 03/07/24  8:29 PM   Result Value Ref Range     (H) 0 - 100 pg/mL   CBC and differential    Collection Time: 03/07/24  8:29 PM   Result Value Ref Range    WBC 8.95 4.31 - 10.16 Thousand/uL    RBC 3.40 (L) 3.88 - 5.62 Million/uL    Hemoglobin 9.8 (L) 12.0 - 17.0 g/dL    Hematocrit 31.3 (L) 36.5 - 49.3 %    MCV 92 82 - 98 fL    MCH 28.8 26.8 - 34.3 pg    MCHC 31.3 (L) 31.4 - 37.4 g/dL    RDW 15.2 (H) 11.6 - 15.1 %    MPV 10.0 8.9 - 12.7 fL    Platelets 255 149 - 390 Thousands/uL    nRBC 0 /100 WBCs    Segmented % 76 (H) 43 - 75 %    Immature Grans % 0 0 - 2 %    Lymphocytes % 16 14 - 44 %    Monocytes % 6 4 - 12 %    Eosinophils Relative 2 0 - 6 %    Basophils Relative 0 0 - 1 %    Absolute Neutrophils 6.82 1.85 - 7.62 Thousands/µL    Absolute Immature  Grans 0.02 0.00 - 0.20 Thousand/uL    Absolute Lymphocytes 1.42 0.60 - 4.47 Thousands/µL    Absolute Monocytes 0.51 0.17 - 1.22 Thousand/µL    Eosinophils Absolute 0.16 0.00 - 0.61 Thousand/µL    Basophils Absolute 0.02 0.00 - 0.10 Thousands/µL   Creatinine, serum    Collection Time: 03/07/24  8:29 PM   Result Value Ref Range    Creatinine 1.30 0.60 - 1.30 mg/dL    eGFR 58 ml/min/1.73sq m   CBC and differential    Collection Time: 03/14/24 12:10 PM   Result Value Ref Range    WBC 8.20 4.31 - 10.16 Thousand/uL    RBC 3.51 (L) 3.88 - 5.62 Million/uL    Hemoglobin 10.3 (L) 12.0 - 17.0 g/dL    Hematocrit 32.1 (L) 36.5 - 49.3 %    MCV 92 82 - 98 fL    MCH 29.3 26.8 - 34.3 pg    MCHC 32.1 31.4 - 37.4 g/dL    RDW 15.4 (H) 11.6 - 15.1 %    MPV 10.4 8.9 - 12.7 fL    Platelets 216 149 - 390 Thousands/uL    nRBC 0 /100 WBCs    Segmented % 75 43 - 75 %    Immature Grans % 1 0 - 2 %    Lymphocytes % 15 14 - 44 %    Monocytes % 6 4 - 12 %    Eosinophils Relative 3 0 - 6 %    Basophils Relative 0 0 - 1 %    Absolute Neutrophils 6.23 1.85 - 7.62 Thousands/µL    Absolute Immature Grans 0.04 0.00 - 0.20 Thousand/uL    Absolute Lymphocytes 1.22 0.60 - 4.47 Thousands/µL    Absolute Monocytes 0.46 0.17 - 1.22 Thousand/µL    Eosinophils Absolute 0.22 0.00 - 0.61 Thousand/µL    Basophils Absolute 0.03 0.00 - 0.10 Thousands/µL   Creatinine, serum    Collection Time: 03/14/24 12:10 PM   Result Value Ref Range    Creatinine 1.24 0.60 - 1.30 mg/dL    eGFR 61 ml/min/1.73sq m   CBC and differential    Collection Time: 03/21/24 12:39 PM   Result Value Ref Range    WBC 7.17 4.31 - 10.16 Thousand/uL    RBC 3.72 (L) 3.88 - 5.62 Million/uL    Hemoglobin 10.9 (L) 12.0 - 17.0 g/dL    Hematocrit 34.3 (L) 36.5 - 49.3 %    MCV 92 82 - 98 fL    MCH 29.3 26.8 - 34.3 pg    MCHC 31.8 31.4 - 37.4 g/dL    RDW 15.3 (H) 11.6 - 15.1 %    MPV 10.4 8.9 - 12.7 fL    Platelets 190 149 - 390 Thousands/uL    nRBC 0 /100 WBCs    Segmented % 75 43 - 75 %    Immature  Grans % 0 0 - 2 %    Lymphocytes % 16 14 - 44 %    Monocytes % 6 4 - 12 %    Eosinophils Relative 3 0 - 6 %    Basophils Relative 0 0 - 1 %    Absolute Neutrophils 5.35 1.85 - 7.62 Thousands/µL    Absolute Immature Grans 0.03 0.00 - 0.20 Thousand/uL    Absolute Lymphocytes 1.14 0.60 - 4.47 Thousands/µL    Absolute Monocytes 0.42 0.17 - 1.22 Thousand/µL    Eosinophils Absolute 0.21 0.00 - 0.61 Thousand/µL    Basophils Absolute 0.02 0.00 - 0.10 Thousands/µL   Creatinine, serum    Collection Time: 03/21/24 12:39 PM   Result Value Ref Range    Creatinine 1.20 0.60 - 1.30 mg/dL    eGFR 63 ml/min/1.73sq m            Physical Exam  Vitals and nursing note reviewed.   Constitutional:       General: He is not in acute distress.     Appearance: He is well-developed. He is not diaphoretic.   HENT:      Head: Normocephalic and atraumatic.   Eyes:      General: No scleral icterus.        Right eye: No discharge.         Left eye: No discharge.      Conjunctiva/sclera: Conjunctivae normal.      Pupils: Pupils are equal, round, and reactive to light.   Neck:      Thyroid: No thyromegaly.      Vascular: No JVD.   Cardiovascular:      Rate and Rhythm: Normal rate and regular rhythm.      Heart sounds: Normal heart sounds. No murmur heard.     No friction rub. No gallop.      Comments: Lifevest in place   Pulmonary:      Effort: Pulmonary effort is normal. No respiratory distress.      Breath sounds: Normal breath sounds. No wheezing or rales.   Chest:      Chest wall: No tenderness.   Abdominal:      General: Bowel sounds are normal. There is no distension.      Palpations: Abdomen is soft. There is no mass.      Tenderness: There is no abdominal tenderness. There is no guarding or rebound.   Musculoskeletal:         General: No tenderness or deformity. Normal range of motion.      Cervical back: Normal range of motion and neck supple.        Feet:    Lymphadenopathy:      Cervical: No cervical adenopathy.   Skin:     General: Skin  is warm and dry.      Coloration: Skin is not pale.      Findings: No erythema or rash.   Neurological:      Mental Status: He is alert and oriented to person, place, and time.      Cranial Nerves: No cranial nerve deficit.      Coordination: Coordination normal.      Deep Tendon Reflexes: Reflexes are normal and symmetric.   Psychiatric:         Behavior: Behavior normal.         Thought Content: Thought content normal.         Judgment: Judgment normal.

## 2024-04-10 NOTE — ASSESSMENT & PLAN NOTE
For now, continue current diabetic regimen.  Recheck A1c on 5/1/2024.  Continue follow-up with podiatry.  Lab Results   Component Value Date    HGBA1C 9.8 (H) 01/31/2024

## 2024-04-10 NOTE — ASSESSMENT & PLAN NOTE
He has completed antibiotic therapy.  Continue follow-up with infectious disease.  Follow-up blood cultures.

## 2024-04-10 NOTE — ASSESSMENT & PLAN NOTE
Wt Readings from Last 3 Encounters:   04/10/24 102 kg (224 lb)   04/08/24 107 kg (236 lb 6.4 oz)   04/04/24 107 kg (235 lb 9.6 oz)     Euvolemic on examination today.  Continue current regimen and follow-up with cardiology.  Continue to monitor daily weights.

## 2024-04-10 NOTE — TELEPHONE ENCOUNTER
Patient called the RX Refill Line. Message is being forwarded to the office.     Patient filled out a pt assistance form for jardiance.  The office faxed the form over but not the script.  They need a script send via fax or you can call them at [phone]    Please contact patient at [phone] with any questions.

## 2024-04-11 ENCOUNTER — HOME CARE VISIT (OUTPATIENT)
Dept: HOME HEALTH SERVICES | Facility: HOME HEALTHCARE | Age: 64
End: 2024-04-11
Payer: COMMERCIAL

## 2024-04-11 ENCOUNTER — TELEPHONE (OUTPATIENT)
Dept: CARDIOLOGY CLINIC | Facility: CLINIC | Age: 64
End: 2024-04-11

## 2024-04-11 ENCOUNTER — TELEPHONE (OUTPATIENT)
Dept: NEPHROLOGY | Facility: CLINIC | Age: 64
End: 2024-04-11

## 2024-04-11 VITALS
TEMPERATURE: 95.1 F | WEIGHT: 235 LBS | HEART RATE: 78 BPM | RESPIRATION RATE: 18 BRPM | DIASTOLIC BLOOD PRESSURE: 82 MMHG | SYSTOLIC BLOOD PRESSURE: 148 MMHG | BODY MASS INDEX: 31.87 KG/M2 | OXYGEN SATURATION: 99 %

## 2024-04-11 DIAGNOSIS — I25.10 CORONARY ARTERY DISEASE INVOLVING NATIVE CORONARY ARTERY OF NATIVE HEART WITHOUT ANGINA PECTORIS: ICD-10-CM

## 2024-04-11 DIAGNOSIS — I50.20 SYSTOLIC CONGESTIVE HEART FAILURE, UNSPECIFIED HF CHRONICITY (HCC): ICD-10-CM

## 2024-04-11 DIAGNOSIS — Z95.810 ICD (IMPLANTABLE CARDIOVERTER-DEFIBRILLATOR) IN PLACE: ICD-10-CM

## 2024-04-11 DIAGNOSIS — I10 ESSENTIAL (PRIMARY) HYPERTENSION: ICD-10-CM

## 2024-04-11 DIAGNOSIS — N17.9 AKI (ACUTE KIDNEY INJURY) (HCC): Primary | ICD-10-CM

## 2024-04-11 PROCEDURE — G0299 HHS/HOSPICE OF RN EA 15 MIN: HCPCS

## 2024-04-11 NOTE — TELEPHONE ENCOUNTER
----- Message from Ludwin Treadwell MD sent at 4/11/2024  2:14 PM EDT -----  Recent labs reviewed, kidney function is stable, noted significant proteinuria.  Please contact patient and tell him that recent labs shows stable kidney function but worsening protein leaking in the urine suspected due to diabetes.  He has a follow-up appointment scheduled with me in 2 months (please order repeat renal function panel and UPC before that appointment).  Thanks,    ----- Message -----  From: Lab, Background User  Sent: 4/10/2024   8:42 PM EDT  To: Ludwin Treadwell MD

## 2024-04-11 NOTE — TELEPHONE ENCOUNTER
Left a voicemail to patient with following information: recent labs shows stable kidney function but worsening protein leaking in the urine suspected due to diabetes.   He has a follow-up appointment scheduled with me in 2 months and repeat renal function panel and UPC before that appointment. Advised patient to please call the office to let us know he received the message and if have any other questions or concerns.           Labs have been ordered and mailed to patient home address.

## 2024-04-11 NOTE — TELEPHONE ENCOUNTER
Patient returning call. Made aware:       Message from Ludwin Treadwell MD sent at 4/11/2024  2:14 PM EDT -----  Recent labs reviewed, kidney function is stable, noted significant proteinuria.  Please contact patient and tell him that recent labs shows stable kidney function but worsening protein leaking in the urine suspected due to diabetes.  He has a follow-up appointment scheduled with me in 2 months (please order repeat renal function panel and UPC before that appointment).  Thanks,     Patient verbalized understanding.

## 2024-04-12 ENCOUNTER — TELEPHONE (OUTPATIENT)
Dept: ADMINISTRATIVE | Facility: OTHER | Age: 64
End: 2024-04-12

## 2024-04-12 NOTE — LETTER
Procedure Request Form: Medicare Annual Wellness Visit (AWV)      Date Requested: 04/15/24  Patient: Nadir Myers  Patient : 1960   Referring Provider: Tigre Hare DO        Date of Procedure ______________________________       The above patient has informed us that they have completed their   most recent Medicare Annual Wellness Visit (AWV) at your facility. Please complete   this form and attach all corresponding procedure reports/results.    Comments __________________________________________________________  ____________________________________________________________________  ____________________________________________________________________  ____________________________________________________________________    Facility Completing Procedure _________________________________________    Form Completed By (print name) _______________________________________      Signature __________________________________________________________      These reports are needed for  compliance.    Please fax this completed form and a copy of the procedure report to our office located at [address] [place] [address] as soon as possible to Fax [phone] chucho Elizabeth: Phone [phone]    We thank you for your assistance in treating our mutual patient.

## 2024-04-12 NOTE — TELEPHONE ENCOUNTER
----- Message from Ayden Farr sent at 4/10/2024 11:05 AM EDT -----  04/10/24 11:05 AM    Hello, our patient Nadir Myers has had Medicare AWV completed/performed. Please assist in updating the patient chart by making an External outreach to [facility] Dr. Tigre Hare facility located in [place]. The date of service is 2023.    Thank you,  Ayden Farr  [facility] [place]

## 2024-04-12 NOTE — LETTER
Procedure Request Form: Medicare Annual Wellness Visit (AWV)      Date Requested: 24  Patient: Nadir Myers  Patient : 1960   Referring Provider: Tigre Hare DO        Date of Procedure ______________________________       The above patient has informed us that they have completed their   most recent Medicare Annual Wellness Visit (AWV) at your facility. Please complete   this form and attach all corresponding procedure reports/results.    Comments DOS: 10/2022. Please attach office note, thank you!  ____________________________________________________________________  ____________________________________________________________________  ____________________________________________________________________    Facility Completing Procedure _________________________________________    Form Completed By (print name) _______________________________________      Signature __________________________________________________________      These reports are needed for  compliance.    Please fax this completed form and a copy of the procedure report to our office located at [address] [place] [address] as soon as possible to Fax [phone] chucho Elizabeth: Phone [phone]    We thank you for your assistance in treating our mutual patient.

## 2024-04-15 ENCOUNTER — OFFICE VISIT (OUTPATIENT)
Dept: PODIATRY | Facility: CLINIC | Age: 64
End: 2024-04-15

## 2024-04-15 VITALS — HEART RATE: 88 BPM | SYSTOLIC BLOOD PRESSURE: 114 MMHG | DIASTOLIC BLOOD PRESSURE: 60 MMHG

## 2024-04-15 DIAGNOSIS — I73.9 PAD (PERIPHERAL ARTERY DISEASE) (HCC): ICD-10-CM

## 2024-04-15 DIAGNOSIS — Z89.422 HISTORY OF COMPLETE RAY AMPUTATION OF FOURTH TOE OF LEFT FOOT (HCC): ICD-10-CM

## 2024-04-15 DIAGNOSIS — Z79.4 TYPE 2 DIABETES MELLITUS WITH OTHER SPECIFIED COMPLICATION, WITH LONG-TERM CURRENT USE OF INSULIN (HCC): Primary | ICD-10-CM

## 2024-04-15 DIAGNOSIS — E11.69 TYPE 2 DIABETES MELLITUS WITH OTHER SPECIFIED COMPLICATION, WITH LONG-TERM CURRENT USE OF INSULIN (HCC): Primary | ICD-10-CM

## 2024-04-15 LAB
BACTERIA BLD CULT: NORMAL
BACTERIA BLD CULT: NORMAL

## 2024-04-15 PROCEDURE — 99024 POSTOP FOLLOW-UP VISIT: CPT | Performed by: PODIATRIST

## 2024-04-15 NOTE — PROGRESS NOTES
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     Subjective:     Patient ID: Nadir Myers is a 64 y.o. male.    1/31/2024: emergent left 4th toe amputation    2/4/2024:wound washout, removal 4th metatarsal and closure of wound    Ptient has not smoked since his hospitalization. The amputation is \"mostly healed.\" HE reports no foot pain.         Review of Systems      Objective:     Physical Exam  Vitals reviewed.   Cardiovascular:      Pulses: Normal pulses.   Musculoskeletal:         General: Deformity (left 4th ray is amputated) present.   Neurological:      Mental Status: He is alert.           "

## 2024-04-15 NOTE — TELEPHONE ENCOUNTER
Upon review of the In Basket request and the patient's chart, initial outreach has been made via fax to facility. Please see Contacts section for details.     Thank you  Clara Cook

## 2024-04-16 NOTE — TELEPHONE ENCOUNTER
As a follow-up, a second attempt has been made for outreach via fax to facility. Please see Contacts section for details.    Thank you  Clara Cook

## 2024-04-18 ENCOUNTER — HOME CARE VISIT (OUTPATIENT)
Dept: HOME HEALTH SERVICES | Facility: HOME HEALTHCARE | Age: 64
End: 2024-04-18
Payer: COMMERCIAL

## 2024-04-18 VITALS
BODY MASS INDEX: 31.84 KG/M2 | HEART RATE: 80 BPM | WEIGHT: 234.8 LBS | RESPIRATION RATE: 22 BRPM | OXYGEN SATURATION: 98 % | DIASTOLIC BLOOD PRESSURE: 82 MMHG | SYSTOLIC BLOOD PRESSURE: 114 MMHG | TEMPERATURE: 94.6 F

## 2024-04-18 PROCEDURE — G0299 HHS/HOSPICE OF RN EA 15 MIN: HCPCS

## 2024-04-25 ENCOUNTER — HOME CARE VISIT (OUTPATIENT)
Dept: HOME HEALTH SERVICES | Facility: HOME HEALTHCARE | Age: 64
End: 2024-04-25
Payer: COMMERCIAL

## 2024-04-25 VITALS
DIASTOLIC BLOOD PRESSURE: 78 MMHG | HEART RATE: 76 BPM | SYSTOLIC BLOOD PRESSURE: 138 MMHG | OXYGEN SATURATION: 97 % | TEMPERATURE: 95.3 F | RESPIRATION RATE: 24 BRPM

## 2024-04-25 PROCEDURE — G0299 HHS/HOSPICE OF RN EA 15 MIN: HCPCS

## 2024-04-26 ENCOUNTER — TELEPHONE (OUTPATIENT)
Dept: CARDIOLOGY CLINIC | Facility: CLINIC | Age: 64
End: 2024-04-26

## 2024-04-26 DIAGNOSIS — I50.20 SYSTOLIC CONGESTIVE HEART FAILURE, UNSPECIFIED HF CHRONICITY (HCC): ICD-10-CM

## 2024-04-26 DIAGNOSIS — Z95.810 ICD (IMPLANTABLE CARDIOVERTER-DEFIBRILLATOR) IN PLACE: Primary | ICD-10-CM

## 2024-04-26 NOTE — TELEPHONE ENCOUNTER
Caller: Nadir    Doctor: Candace    Reason for call: Pt calling to update about ICD placement. He decided he would like to move forward with the placement. He would like to schedule for August if possible.       Call back#: [phone]

## 2024-04-26 NOTE — TELEPHONE ENCOUNTER
Ok thanks.     Yuriy Toscano/Emmie,    Please schedule this patient for subcutaneous ICD in August per patient preference.     Routine labs    Hold medication: none    System/Device company: [employer]    Thank you

## 2024-04-29 ENCOUNTER — PREP FOR PROCEDURE (OUTPATIENT)
Dept: CARDIOLOGY CLINIC | Facility: CLINIC | Age: 64
End: 2024-04-29

## 2024-04-29 ENCOUNTER — TELEPHONE (OUTPATIENT)
Dept: CARDIOLOGY CLINIC | Facility: CLINIC | Age: 64
End: 2024-04-29

## 2024-04-29 DIAGNOSIS — Z95.810 ICD (IMPLANTABLE CARDIOVERTER-DEFIBRILLATOR) IN PLACE: ICD-10-CM

## 2024-04-29 DIAGNOSIS — I50.20 SYSTOLIC CONGESTIVE HEART FAILURE, UNSPECIFIED HF CHRONICITY (HCC): Primary | ICD-10-CM

## 2024-04-29 RX ORDER — METOPROLOL SUCCINATE 50 MG/1
50 TABLET, EXTENDED RELEASE ORAL EVERY 12 HOURS
Qty: 180 TABLET | Refills: 1 | Status: SHIPPED | OUTPATIENT
Start: 2024-04-29

## 2024-04-29 NOTE — TELEPHONE ENCOUNTER
Patient scheduled for Subq ICD implant at [facility] on   8/6/2024   with Dr Maria.     Patient aware of general instructions, labs order.     Meds holds:   LASIX: Hold this medication the morning of the procedure.   JARDIANCE: Hold this medication four (4) day’s prior to the procedure. Last dose on 8/1/2024.  LEVEMIR: take your FULL regular dose the day before procedure, DO NOT apply any amount in the morning of the procedure.   METFORMIN: Hold  this medication the morning of the procedure.   JANUMET: Hold this medication the morning of the procedure.

## 2024-04-29 NOTE — TELEPHONE ENCOUNTER
F/U call to patient regarding wearing Lifevest. Patient stated he is wearing his Lifevest everyday.    Patient & his wife both spoke regarding patient's SOB w/activity. 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 Wife stated he burps during the night when sleeping.  Wife stated patient has more symptoms since HSP D/C & is not the same person.     Patient stated he is intolerance of the heat. Is feeling much more SOB. Last night, was very SOB @ hs & had to put AC on to breath.    Taking daily weights. Today's Wt - 235.1 lbs.  Wt @ 4/8/OV - 236 lb 6.4 oz.    Today's BP - 148/80.    Stated he had edema in right foot but now is decreased.    Current Cardiac Medications:    Jardiance 10 mg daily.  Toprol-XL 50 mg BID.  Amiodarone 200 mg daily.  Plavix 75 mg daily.  Apresoline 50 mg daily.  Isordil 10 mg TID.  ASA 81 mg daily.  Lipitor 80 mg daily.    Do you want patient to have an office visit sooner than his 6/7 OV w/you/    Please advise.

## 2024-04-29 NOTE — TELEPHONE ENCOUNTER
Returned call to patient & communicated Dr Samuels's advise for patient to have a sooner OV.     OV scheduled for patient 5/7. Advised patient to call office with increase in symptoms.    Patient verbalized understanding.

## 2024-05-01 ENCOUNTER — TELEPHONE (OUTPATIENT)
Dept: CARDIOLOGY CLINIC | Facility: CLINIC | Age: 64
End: 2024-05-01

## 2024-05-02 LAB
LEFT EYE DIABETIC RETINOPATHY: POSITIVE
RIGHT EYE DIABETIC RETINOPATHY: POSITIVE

## 2024-05-07 DIAGNOSIS — I50.20 SYSTOLIC CONGESTIVE HEART FAILURE, UNSPECIFIED HF CHRONICITY (HCC): ICD-10-CM

## 2024-05-07 DIAGNOSIS — Z95.810 ICD (IMPLANTABLE CARDIOVERTER-DEFIBRILLATOR) IN PLACE: ICD-10-CM

## 2024-05-07 DIAGNOSIS — I25.10 CORONARY ARTERY DISEASE INVOLVING NATIVE CORONARY ARTERY OF NATIVE HEART WITHOUT ANGINA PECTORIS: ICD-10-CM

## 2024-05-07 RX ORDER — ISOSORBIDE DINITRATE 20 MG/1
10 TABLET ORAL
Qty: 135 TABLET | Refills: 1 | Status: SHIPPED | OUTPATIENT
Start: 2024-05-07

## 2024-05-08 NOTE — TELEPHONE ENCOUNTER
As a final attempt, a third outreach has been made via telephone call to facility. Please see Contacts section for details. This encounter will be closed and completed by end of day. Should we receive the requested information because of previous outreach attempts, the requested patient's chart will be updated appropriately.     Thank you  Clara Cook

## 2024-05-09 ENCOUNTER — TELEPHONE (OUTPATIENT)
Dept: INTERNAL MEDICINE CLINIC | Facility: OTHER | Age: 64
End: 2024-05-09

## 2024-05-09 ENCOUNTER — TELEPHONE (OUTPATIENT)
Age: 64
End: 2024-05-09

## 2024-05-09 ENCOUNTER — PATIENT MESSAGE (OUTPATIENT)
Dept: CARDIOLOGY CLINIC | Facility: CLINIC | Age: 64
End: 2024-05-09

## 2024-05-09 NOTE — TELEPHONE ENCOUNTER
Upon review of the In Basket request we were able to locate, review, and update the patient chart as requested for Medicare AWV. Per office, most recent is from 10/18/22.    Any additional questions or concerns should be emailed to the Practice Liaisons via the appropriate education email address, please do not reply via In Basket.    Thank you  Clara Cook

## 2024-05-09 NOTE — TELEPHONE ENCOUNTER
Patient stopped in the [place] Office and dropped off [facility] Patient Assistance Program forms to be completed by Dr. Lemus.  Forms have been scanned into patients chart and given to Dr. Lemus to complete.

## 2024-05-09 NOTE — TELEPHONE ENCOUNTER
Caller: Nadir Myers    Doctor: Dr. Maria    Reason for call: Patient would like to have his ICD generator change sooner than currently scheduled on 8/6/24    Call back#: [phone]

## 2024-05-14 ENCOUNTER — TELEPHONE (OUTPATIENT)
Dept: CARDIOLOGY CLINIC | Facility: CLINIC | Age: 64
End: 2024-05-14

## 2024-05-14 ENCOUNTER — APPOINTMENT (OUTPATIENT)
Dept: LAB | Facility: IMAGING CENTER | Age: 64
End: 2024-05-14
Payer: COMMERCIAL

## 2024-05-14 DIAGNOSIS — I50.20 SYSTOLIC CONGESTIVE HEART FAILURE, UNSPECIFIED HF CHRONICITY (HCC): ICD-10-CM

## 2024-05-14 DIAGNOSIS — Z79.4 TYPE 2 DIABETES MELLITUS WITH OTHER SPECIFIED COMPLICATION, WITH LONG-TERM CURRENT USE OF INSULIN (HCC): ICD-10-CM

## 2024-05-14 DIAGNOSIS — E11.69 TYPE 2 DIABETES MELLITUS WITH OTHER SPECIFIED COMPLICATION, WITH LONG-TERM CURRENT USE OF INSULIN (HCC): ICD-10-CM

## 2024-05-14 DIAGNOSIS — Z12.5 PROSTATE CANCER SCREENING: ICD-10-CM

## 2024-05-14 DIAGNOSIS — I10 ESSENTIAL (PRIMARY) HYPERTENSION: ICD-10-CM

## 2024-05-14 DIAGNOSIS — Z95.810 ICD (IMPLANTABLE CARDIOVERTER-DEFIBRILLATOR) IN PLACE: ICD-10-CM

## 2024-05-14 DIAGNOSIS — N17.9 AKI (ACUTE KIDNEY INJURY) (HCC): ICD-10-CM

## 2024-05-14 LAB
ALBUMIN SERPL BCP-MCNC: 3.6 G/DL (ref 3.5–5)
ALP SERPL-CCNC: 51 U/L (ref 34–104)
ALT SERPL W P-5'-P-CCNC: 19 U/L (ref 7–52)
ANION GAP SERPL CALCULATED.3IONS-SCNC: 11 MMOL/L (ref 4–13)
AST SERPL W P-5'-P-CCNC: 20 U/L (ref 13–39)
BASOPHILS # BLD AUTO: 0.05 THOUSANDS/ÂΜL (ref 0–0.1)
BASOPHILS NFR BLD AUTO: 1 % (ref 0–1)
BILIRUB SERPL-MCNC: 0.27 MG/DL (ref 0.2–1)
BUN SERPL-MCNC: 42 MG/DL (ref 5–25)
CALCIUM SERPL-MCNC: 8.8 MG/DL (ref 8.4–10.2)
CHLORIDE SERPL-SCNC: 106 MMOL/L (ref 96–108)
CO2 SERPL-SCNC: 24 MMOL/L (ref 21–32)
CREAT SERPL-MCNC: 1.32 MG/DL (ref 0.6–1.3)
CREAT UR-MCNC: 40.1 MG/DL
CREAT UR-MCNC: 40.1 MG/DL
EOSINOPHIL # BLD AUTO: 0.29 THOUSAND/ÂΜL (ref 0–0.61)
EOSINOPHIL NFR BLD AUTO: 3 % (ref 0–6)
ERYTHROCYTE [DISTWIDTH] IN BLOOD BY AUTOMATED COUNT: 14.3 % (ref 11.6–15.1)
EST. AVERAGE GLUCOSE BLD GHB EST-MCNC: 157 MG/DL
GFR SERPL CREATININE-BSD FRML MDRD: 57 ML/MIN/1.73SQ M
GLUCOSE P FAST SERPL-MCNC: 114 MG/DL (ref 65–99)
HBA1C MFR BLD: 7.1 %
HCT VFR BLD AUTO: 38.4 % (ref 36.5–49.3)
HGB BLD-MCNC: 12.1 G/DL (ref 12–17)
IMM GRANULOCYTES # BLD AUTO: 0.05 THOUSAND/UL (ref 0–0.2)
IMM GRANULOCYTES NFR BLD AUTO: 1 % (ref 0–2)
INR PPP: 1.01 (ref 0.84–1.19)
LYMPHOCYTES # BLD AUTO: 1.96 THOUSANDS/ÂΜL (ref 0.6–4.47)
LYMPHOCYTES NFR BLD AUTO: 19 % (ref 14–44)
MAGNESIUM SERPL-MCNC: 1.9 MG/DL (ref 1.9–2.7)
MCH RBC QN AUTO: 28.9 PG (ref 26.8–34.3)
MCHC RBC AUTO-ENTMCNC: 31.5 G/DL (ref 31.4–37.4)
MCV RBC AUTO: 92 FL (ref 82–98)
MICROALBUMIN UR-MCNC: 2134.6 MG/L
MICROALBUMIN/CREAT 24H UR: 5323 MG/G CREATININE (ref 0–30)
MONOCYTES # BLD AUTO: 0.63 THOUSAND/ÂΜL (ref 0.17–1.22)
MONOCYTES NFR BLD AUTO: 6 % (ref 4–12)
NEUTROPHILS # BLD AUTO: 7.21 THOUSANDS/ÂΜL (ref 1.85–7.62)
NEUTS SEG NFR BLD AUTO: 70 % (ref 43–75)
NRBC BLD AUTO-RTO: 0 /100 WBCS
PHOSPHATE SERPL-MCNC: 4.6 MG/DL (ref 2.3–4.1)
PLATELET # BLD AUTO: 231 THOUSANDS/UL (ref 149–390)
PMV BLD AUTO: 10.5 FL (ref 8.9–12.7)
POTASSIUM SERPL-SCNC: 4.7 MMOL/L (ref 3.5–5.3)
PROT SERPL-MCNC: 6.5 G/DL (ref 6.4–8.4)
PROT UR-MCNC: 265 MG/DL
PROT/CREAT UR: 6.61 MG/G{CREAT} (ref 0–0.1)
PROTHROMBIN TIME: 13.2 SECONDS (ref 11.6–14.5)
PSA SERPL-MCNC: 0.16 NG/ML (ref 0–4)
RBC # BLD AUTO: 4.18 MILLION/UL (ref 3.88–5.62)
SODIUM SERPL-SCNC: 141 MMOL/L (ref 135–147)
WBC # BLD AUTO: 10.19 THOUSAND/UL (ref 4.31–10.16)

## 2024-05-14 PROCEDURE — 83735 ASSAY OF MAGNESIUM: CPT

## 2024-05-14 PROCEDURE — 84100 ASSAY OF PHOSPHORUS: CPT

## 2024-05-14 PROCEDURE — G0103 PSA SCREENING: HCPCS

## 2024-05-14 PROCEDURE — 85610 PROTHROMBIN TIME: CPT

## 2024-05-14 PROCEDURE — 36415 COLL VENOUS BLD VENIPUNCTURE: CPT

## 2024-05-14 PROCEDURE — 83036 HEMOGLOBIN GLYCOSYLATED A1C: CPT

## 2024-05-14 PROCEDURE — 85025 COMPLETE CBC W/AUTO DIFF WBC: CPT

## 2024-05-14 PROCEDURE — 80053 COMPREHEN METABOLIC PANEL: CPT

## 2024-05-14 PROCEDURE — 82043 UR ALBUMIN QUANTITATIVE: CPT

## 2024-05-14 PROCEDURE — 82570 ASSAY OF URINE CREATININE: CPT

## 2024-05-14 PROCEDURE — 84156 ASSAY OF PROTEIN URINE: CPT

## 2024-05-14 NOTE — TELEPHONE ENCOUNTER
Pt called and wanted to make sure it was ok to stop the Jardiance for 5 days prior to ICD implant.    Advised the pt the instructions are correct.

## 2024-05-15 ENCOUNTER — TELEPHONE (OUTPATIENT)
Age: 64
End: 2024-05-15

## 2024-05-20 NOTE — TELEPHONE ENCOUNTER
Patients called in and wanted to know the status of his [employer] Patient assistant program form?  He stated it was supposed to be mailed to [employer].  He called them today and they still have not received it.  It is for his Janumet.  The completed form is in media dated 05/09/24.  Patient asked if there are any    samples of Janumet in the office as he will be running out shortly.  Please advise patient at [phone].

## 2024-05-20 NOTE — TELEPHONE ENCOUNTER
Forms were put in the mail today, unaware we were supposed to mail them.  Will forward message to clinical about samples.

## 2024-05-21 ENCOUNTER — HOSPITAL ENCOUNTER (OUTPATIENT)
Facility: HOSPITAL | Age: 64
Setting detail: OUTPATIENT SURGERY
Discharge: HOME | End: 2024-05-22
Attending: INTERNAL MEDICINE | Admitting: INTERNAL MEDICINE
Payer: COMMERCIAL

## 2024-05-21 ENCOUNTER — ANESTHESIA (OUTPATIENT)
Dept: NON INVASIVE DIAGNOSTICS | Facility: HOSPITAL | Age: 64
End: 2024-05-21
Payer: COMMERCIAL

## 2024-05-21 ENCOUNTER — APPOINTMENT (OUTPATIENT)
Dept: RADIOLOGY | Facility: HOSPITAL | Age: 64
End: 2024-05-21
Payer: COMMERCIAL

## 2024-05-21 ENCOUNTER — ANESTHESIA EVENT (OUTPATIENT)
Dept: NON INVASIVE DIAGNOSTICS | Facility: HOSPITAL | Age: 64
End: 2024-05-21
Payer: COMMERCIAL

## 2024-05-21 DIAGNOSIS — I25.5 ISCHEMIC CARDIOMYOPATHY: Primary | ICD-10-CM

## 2024-05-21 DIAGNOSIS — I50.20 SYSTOLIC CONGESTIVE HEART FAILURE, UNSPECIFIED HF CHRONICITY (HCC): ICD-10-CM

## 2024-05-21 DIAGNOSIS — G89.18 POST-OP PAIN: ICD-10-CM

## 2024-05-21 LAB
ANION GAP SERPL CALCULATED.3IONS-SCNC: 6 MMOL/L (ref 4–13)
ATRIAL RATE: 68 BPM
ATRIAL RATE: 72 BPM
BUN SERPL-MCNC: 33 MG/DL (ref 5–25)
CALCIUM SERPL-MCNC: 8.5 MG/DL (ref 8.4–10.2)
CHLORIDE SERPL-SCNC: 106 MMOL/L (ref 96–108)
CO2 SERPL-SCNC: 27 MMOL/L (ref 21–32)
CREAT SERPL-MCNC: 1.19 MG/DL (ref 0.6–1.3)
ERYTHROCYTE [DISTWIDTH] IN BLOOD BY AUTOMATED COUNT: 14.1 % (ref 11.6–15.1)
GFR SERPL CREATININE-BSD FRML MDRD: 64 ML/MIN/1.73SQ M
GLUCOSE P FAST SERPL-MCNC: 155 MG/DL (ref 65–99)
GLUCOSE SERPL-MCNC: 155 MG/DL (ref 65–140)
GLUCOSE SERPL-MCNC: 178 MG/DL (ref 65–140)
GLUCOSE SERPL-MCNC: 213 MG/DL (ref 65–140)
GLUCOSE SERPL-MCNC: 242 MG/DL (ref 65–140)
GLUCOSE SERPL-MCNC: 273 MG/DL (ref 65–140)
HCT VFR BLD AUTO: 37.1 % (ref 36.5–49.3)
HGB BLD-MCNC: 11.8 G/DL (ref 12–17)
INR PPP: 1.01 (ref 0.84–1.19)
MAGNESIUM SERPL-MCNC: 1.9 MG/DL (ref 1.9–2.7)
MCH RBC QN AUTO: 29.5 PG (ref 26.8–34.3)
MCHC RBC AUTO-ENTMCNC: 31.8 G/DL (ref 31.4–37.4)
MCV RBC AUTO: 93 FL (ref 82–98)
P AXIS: 64 DEGREES
P AXIS: 77 DEGREES
PLATELET # BLD AUTO: 200 THOUSANDS/UL (ref 149–390)
PMV BLD AUTO: 10.1 FL (ref 8.9–12.7)
POTASSIUM SERPL-SCNC: 4.4 MMOL/L (ref 3.5–5.3)
PR INTERVAL: 154 MS
PR INTERVAL: 166 MS
PROTHROMBIN TIME: 13.2 SECONDS (ref 11.6–14.5)
QRS AXIS: -39 DEGREES
QRS AXIS: -70 DEGREES
QRSD INTERVAL: 152 MS
QRSD INTERVAL: 163 MS
QT INTERVAL: 484 MS
QT INTERVAL: 496 MS
QTC INTERVAL: 528 MS
QTC INTERVAL: 529 MS
RBC # BLD AUTO: 4 MILLION/UL (ref 3.88–5.62)
SODIUM SERPL-SCNC: 139 MMOL/L (ref 135–147)
T WAVE AXIS: 75 DEGREES
T WAVE AXIS: 82 DEGREES
VENTRICULAR RATE: 68 BPM
VENTRICULAR RATE: 72 BPM
WBC # BLD AUTO: 8.62 THOUSAND/UL (ref 4.31–10.16)

## 2024-05-21 PROCEDURE — 85610 PROTHROMBIN TIME: CPT | Performed by: PHYSICIAN ASSISTANT

## 2024-05-21 PROCEDURE — 33270 INS/REP SUBQ DEFIBRILLATOR: CPT | Performed by: INTERNAL MEDICINE

## 2024-05-21 PROCEDURE — 93010 ELECTROCARDIOGRAM REPORT: CPT | Performed by: INTERNAL MEDICINE

## 2024-05-21 PROCEDURE — 83735 ASSAY OF MAGNESIUM: CPT | Performed by: PHYSICIAN ASSISTANT

## 2024-05-21 PROCEDURE — 82948 REAGENT STRIP/BLOOD GLUCOSE: CPT

## 2024-05-21 PROCEDURE — C1896 LEAD, AICD, NON SING/DUAL: HCPCS | Performed by: INTERNAL MEDICINE

## 2024-05-21 PROCEDURE — 71045 X-RAY EXAM CHEST 1 VIEW: CPT

## 2024-05-21 PROCEDURE — 85027 COMPLETE CBC AUTOMATED: CPT | Performed by: PHYSICIAN ASSISTANT

## 2024-05-21 PROCEDURE — 80048 BASIC METABOLIC PNL TOTAL CA: CPT | Performed by: PHYSICIAN ASSISTANT

## 2024-05-21 PROCEDURE — NC001 PR NO CHARGE: Performed by: PHYSICIAN ASSISTANT

## 2024-05-21 PROCEDURE — 93005 ELECTROCARDIOGRAM TRACING: CPT

## 2024-05-21 PROCEDURE — C1722 AICD, SINGLE CHAMBER: HCPCS | Performed by: INTERNAL MEDICINE

## 2024-05-21 DEVICE — ENVELOPE CMRM6133 ABSORB LRG MR
Type: IMPLANTABLE DEVICE | Status: FUNCTIONAL
Brand: TYRX™

## 2024-05-21 DEVICE — SUBCUTANEOUS IMPLANTABLE CARDIOVERTER DEFIBRILLATOR
Type: IMPLANTABLE DEVICE | Site: CHEST  WALL | Status: FUNCTIONAL
Brand: EMBLEM™ MRI S-ICD

## 2024-05-21 DEVICE — SUBCUTANEOUS ELECTRODE
Type: IMPLANTABLE DEVICE | Site: CHEST  WALL | Status: FUNCTIONAL
Brand: EMBLEM™ S-ICD

## 2024-05-21 RX ORDER — ATORVASTATIN CALCIUM 80 MG/1
80 TABLET, FILM COATED ORAL
Status: DISCONTINUED | OUTPATIENT
Start: 2024-05-21 | End: 2024-05-22 | Stop reason: HOSPADM

## 2024-05-21 RX ORDER — INSULIN LISPRO 100 [IU]/ML
1-5 INJECTION, SOLUTION INTRAVENOUS; SUBCUTANEOUS
Status: DISCONTINUED | OUTPATIENT
Start: 2024-05-21 | End: 2024-05-22 | Stop reason: HOSPADM

## 2024-05-21 RX ORDER — GENTAMICIN 40 MG/ML
INJECTION, SOLUTION INTRAMUSCULAR; INTRAVENOUS CODE/TRAUMA/SEDATION MEDICATION
Status: DISCONTINUED | OUTPATIENT
Start: 2024-05-21 | End: 2024-05-21 | Stop reason: HOSPADM

## 2024-05-21 RX ORDER — LIDOCAINE HYDROCHLORIDE 10 MG/ML
INJECTION, SOLUTION EPIDURAL; INFILTRATION; INTRACAUDAL; PERINEURAL AS NEEDED
Status: DISCONTINUED | OUTPATIENT
Start: 2024-05-21 | End: 2024-05-21

## 2024-05-21 RX ORDER — ONDANSETRON 2 MG/ML
INJECTION INTRAMUSCULAR; INTRAVENOUS AS NEEDED
Status: DISCONTINUED | OUTPATIENT
Start: 2024-05-21 | End: 2024-05-21

## 2024-05-21 RX ORDER — PHENYLEPHRINE HCL IN 0.9% NACL 1 MG/10 ML
SYRINGE (ML) INTRAVENOUS AS NEEDED
Status: DISCONTINUED | OUTPATIENT
Start: 2024-05-21 | End: 2024-05-21

## 2024-05-21 RX ORDER — CLOPIDOGREL BISULFATE 75 MG/1
75 TABLET ORAL DAILY
Status: DISCONTINUED | OUTPATIENT
Start: 2024-05-21 | End: 2024-05-22 | Stop reason: HOSPADM

## 2024-05-21 RX ORDER — TORSEMIDE 10 MG/1
10 TABLET ORAL DAILY
Status: DISCONTINUED | OUTPATIENT
Start: 2024-05-21 | End: 2024-05-22 | Stop reason: HOSPADM

## 2024-05-21 RX ORDER — ISOSORBIDE DINITRATE 10 MG/1
10 TABLET ORAL
Status: DISCONTINUED | OUTPATIENT
Start: 2024-05-21 | End: 2024-05-22 | Stop reason: HOSPADM

## 2024-05-21 RX ORDER — CEFAZOLIN SODIUM 2 G/50ML
2000 SOLUTION INTRAVENOUS ONCE
Status: COMPLETED | OUTPATIENT
Start: 2024-05-21 | End: 2024-05-21

## 2024-05-21 RX ORDER — PROPOFOL 10 MG/ML
INJECTION, EMULSION INTRAVENOUS AS NEEDED
Status: DISCONTINUED | OUTPATIENT
Start: 2024-05-21 | End: 2024-05-21

## 2024-05-21 RX ORDER — SODIUM CHLORIDE 9 MG/ML
20 INJECTION, SOLUTION INTRAVENOUS CONTINUOUS
Status: DISCONTINUED | OUTPATIENT
Start: 2024-05-21 | End: 2024-05-21

## 2024-05-21 RX ORDER — INSULIN LISPRO 100 [IU]/ML
1-6 INJECTION, SOLUTION INTRAVENOUS; SUBCUTANEOUS
Status: DISCONTINUED | OUTPATIENT
Start: 2024-05-21 | End: 2024-05-22 | Stop reason: HOSPADM

## 2024-05-21 RX ORDER — EPHEDRINE SULFATE 50 MG/ML
INJECTION INTRAVENOUS AS NEEDED
Status: DISCONTINUED | OUTPATIENT
Start: 2024-05-21 | End: 2024-05-21

## 2024-05-21 RX ORDER — DEXAMETHASONE SODIUM PHOSPHATE 4 MG/ML
INJECTION, SOLUTION INTRA-ARTICULAR; INTRALESIONAL; INTRAMUSCULAR; INTRAVENOUS; SOFT TISSUE AS NEEDED
Status: DISCONTINUED | OUTPATIENT
Start: 2024-05-21 | End: 2024-05-21

## 2024-05-21 RX ORDER — ACETAMINOPHEN 325 MG/1
650 TABLET ORAL EVERY 4 HOURS PRN
Status: DISCONTINUED | OUTPATIENT
Start: 2024-05-21 | End: 2024-05-22 | Stop reason: HOSPADM

## 2024-05-21 RX ORDER — FENTANYL CITRATE 50 UG/ML
INJECTION, SOLUTION INTRAMUSCULAR; INTRAVENOUS AS NEEDED
Status: DISCONTINUED | OUTPATIENT
Start: 2024-05-21 | End: 2024-05-21

## 2024-05-21 RX ORDER — METOPROLOL SUCCINATE 50 MG/1
50 TABLET, EXTENDED RELEASE ORAL EVERY 12 HOURS
Status: DISCONTINUED | OUTPATIENT
Start: 2024-05-21 | End: 2024-05-22 | Stop reason: HOSPADM

## 2024-05-21 RX ORDER — ASPIRIN 81 MG/1
81 TABLET, CHEWABLE ORAL DAILY
Status: DISCONTINUED | OUTPATIENT
Start: 2024-05-21 | End: 2024-05-22 | Stop reason: HOSPADM

## 2024-05-21 RX ORDER — HYDRALAZINE HYDROCHLORIDE 50 MG/1
50 TABLET, FILM COATED ORAL EVERY 8 HOURS SCHEDULED
Status: DISCONTINUED | OUTPATIENT
Start: 2024-05-21 | End: 2024-05-22 | Stop reason: HOSPADM

## 2024-05-21 RX ORDER — AMIODARONE HYDROCHLORIDE 200 MG/1
200 TABLET ORAL
Status: DISCONTINUED | OUTPATIENT
Start: 2024-05-22 | End: 2024-05-22 | Stop reason: HOSPADM

## 2024-05-21 RX ORDER — LIDOCAINE HYDROCHLORIDE 10 MG/ML
INJECTION, SOLUTION EPIDURAL; INFILTRATION; INTRACAUDAL; PERINEURAL CODE/TRAUMA/SEDATION MEDICATION
Status: DISCONTINUED | OUTPATIENT
Start: 2024-05-21 | End: 2024-05-21 | Stop reason: HOSPADM

## 2024-05-21 RX ORDER — OXYCODONE HYDROCHLORIDE 5 MG/1
5 TABLET ORAL EVERY 4 HOURS PRN
Status: DISCONTINUED | OUTPATIENT
Start: 2024-05-21 | End: 2024-05-22 | Stop reason: HOSPADM

## 2024-05-21 RX ADMIN — FENTANYL CITRATE 25 MCG: 50 INJECTION INTRAMUSCULAR; INTRAVENOUS at 08:45

## 2024-05-21 RX ADMIN — Medication 100 MCG: at 08:09

## 2024-05-21 RX ADMIN — CEFAZOLIN SODIUM 2000 MG: 2 SOLUTION INTRAVENOUS at 08:06

## 2024-05-21 RX ADMIN — EPHEDRINE SULFATE 10 MG: 50 INJECTION, SOLUTION INTRAVENOUS at 08:20

## 2024-05-21 RX ADMIN — EPHEDRINE SULFATE 5 MG: 50 INJECTION, SOLUTION INTRAVENOUS at 09:02

## 2024-05-21 RX ADMIN — Medication 100 MCG: at 08:16

## 2024-05-21 RX ADMIN — HYDRALAZINE HYDROCHLORIDE 50 MG: 50 TABLET ORAL at 14:06

## 2024-05-21 RX ADMIN — FENTANYL CITRATE 50 MCG: 50 INJECTION INTRAMUSCULAR; INTRAVENOUS at 08:09

## 2024-05-21 RX ADMIN — HYDRALAZINE HYDROCHLORIDE 50 MG: 50 TABLET ORAL at 22:42

## 2024-05-21 RX ADMIN — ASPIRIN 81 MG CHEWABLE TABLET 81 MG: 81 TABLET CHEWABLE at 12:19

## 2024-05-21 RX ADMIN — INSULIN LISPRO 4 UNITS: 100 INJECTION, SOLUTION INTRAVENOUS; SUBCUTANEOUS at 17:30

## 2024-05-21 RX ADMIN — PROPOFOL 150 MG: 10 INJECTION, EMULSION INTRAVENOUS at 08:01

## 2024-05-21 RX ADMIN — INSULIN LISPRO 2 UNITS: 100 INJECTION, SOLUTION INTRAVENOUS; SUBCUTANEOUS at 22:42

## 2024-05-21 RX ADMIN — ISOSORBIDE DINITRATE 10 MG: 10 TABLET ORAL at 22:42

## 2024-05-21 RX ADMIN — EPHEDRINE SULFATE 10 MG: 50 INJECTION, SOLUTION INTRAVENOUS at 08:14

## 2024-05-21 RX ADMIN — ISOSORBIDE DINITRATE 10 MG: 10 TABLET ORAL at 14:08

## 2024-05-21 RX ADMIN — TORSEMIDE 10 MG: 10 TABLET ORAL at 12:20

## 2024-05-21 RX ADMIN — EPHEDRINE SULFATE 5 MG: 50 INJECTION, SOLUTION INTRAVENOUS at 08:55

## 2024-05-21 RX ADMIN — METOPROLOL SUCCINATE 50 MG: 50 TABLET, EXTENDED RELEASE ORAL at 23:35

## 2024-05-21 RX ADMIN — Medication 100 MCG: at 08:20

## 2024-05-21 RX ADMIN — DEXAMETHASONE SODIUM PHOSPHATE 4 MG: 4 INJECTION INTRA-ARTICULAR; INTRALESIONAL; INTRAMUSCULAR; INTRAVENOUS; SOFT TISSUE at 08:33

## 2024-05-21 RX ADMIN — EPHEDRINE SULFATE 5 MG: 50 INJECTION, SOLUTION INTRAVENOUS at 09:39

## 2024-05-21 RX ADMIN — ATORVASTATIN CALCIUM 80 MG: 80 TABLET, FILM COATED ORAL at 17:29

## 2024-05-21 RX ADMIN — METOPROLOL SUCCINATE 50 MG: 50 TABLET, EXTENDED RELEASE ORAL at 12:21

## 2024-05-21 RX ADMIN — INSULIN LISPRO 2 UNITS: 100 INJECTION, SOLUTION INTRAVENOUS; SUBCUTANEOUS at 12:25

## 2024-05-21 RX ADMIN — PHENYLEPHRINE HYDROCHLORIDE 50 MCG/MIN: 10 INJECTION INTRAVENOUS at 08:18

## 2024-05-21 RX ADMIN — LIDOCAINE HYDROCHLORIDE 50 MG: 10 INJECTION, SOLUTION EPIDURAL; INFILTRATION; INTRACAUDAL; PERINEURAL at 08:01

## 2024-05-21 RX ADMIN — INSULIN DETEMIR 25 UNITS: 100 INJECTION, SOLUTION SUBCUTANEOUS at 12:21

## 2024-05-21 RX ADMIN — Medication 100 MCG: at 08:14

## 2024-05-21 RX ADMIN — SODIUM CHLORIDE: 0.9 INJECTION, SOLUTION INTRAVENOUS at 07:50

## 2024-05-21 RX ADMIN — FENTANYL CITRATE 25 MCG: 50 INJECTION INTRAMUSCULAR; INTRAVENOUS at 08:06

## 2024-05-21 RX ADMIN — CLOPIDOGREL BISULFATE 75 MG: 75 TABLET ORAL at 12:19

## 2024-05-21 RX ADMIN — ONDANSETRON 4 MG: 2 INJECTION INTRAMUSCULAR; INTRAVENOUS at 08:33

## 2024-05-21 NOTE — ANESTHESIA PREPROCEDURE EVALUATION
Procedure:  Cardiac icd implant subq (Chest)    Relevant Problems   CARDIO   (+) 3-vessel CAD   (+) A-fib (HCC)   (+) Essential (primary) hypertension      ENDO   (+) Type 2 diabetes mellitus with foot ulcer, with long-term current use of insulin (HCC)   (+) Type 2 diabetes mellitus, with long-term current use of insulin (HCC)      /RENAL   (+) Benign prostatic hyperplasia without lower urinary tract symptoms      Behavioral Health   (+) Tobacco dependence syndrome      Cardiovascular/Peripheral Vascular   (+) Ischemic cardiomyopathy   (+) V-tach (HCC)      Hx GERD  LV apical aneurysm  dual-chamber Medtronic ICD placed in May 2023 for secondary prevention for ventricular tachycardia that was thought to be scar related. there was suspicion for ICD lead vegetation and infection related to it.  He underwent extraction of his dual-chamber ICD on February 9th 2024.  He underwent Medtronic EV ICD placement on February 20, 2024.  Unfortunately his lead had dislodged into pleural space and he underwent revision procedure on February 21, 2024.  However during the procedure there was very poor sensing on the lead in multiple places and the procedure was aborted.  He was discharged on LifeVest.     He has now completed 6 weeks of IV antibiotics.  He has repeat blood culture ordered to be obtain during the week of 4/8/24. He is currently wearing LifeVest and has not had any inappropriate shocks.     Echo 2/2024:   Left Ventricle: Left ventricular cavity size is normal. Wall thickness is increased. The left ventricular ejection fraction is 45%. Systolic function is mildly reduced.    The following segments are aneurysmal: apical septal.    The following segments are akinetic: apical anterior, apical inferior, apical lateral and apex.    The following segments are hypokinetic: mid inferior.    All other segments are normal.    No pericardial effusion.    [place] is not well visualized on this study. Consider repeat study with echo  contrast if further assessment of the apical segments is clinically indicated.  Physical Exam    Airway    Mallampati score: III  TM Distance: >3 FB  Neck ROM: full     Dental   No notable dental hx     Cardiovascular      Pulmonary      Other Findings        Anesthesia Plan  ASA Score- 3     Anesthesia Type- general with ASA Monitors.         Additional Monitors:     Airway Plan:            Plan Factors-    Chart reviewed.    Patient summary reviewed.    Patient is not a current smoker.              Induction- intravenous.    Postoperative Plan-     Perioperative Resuscitation Plan - Level 1 - Full Code.       Informed Consent- Anesthetic plan and risks discussed with patient.  I personally reviewed this patient with the CRNA. Discussed and agreed on the Anesthesia Plan with the CRNA..

## 2024-05-21 NOTE — ANESTHESIA POSTPROCEDURE EVALUATION
Post-Op Assessment Note    CV Status:  Stable  Pain Score: 0    Pain management: adequate       Mental Status:  Alert and awake   Hydration Status:  Euvolemic   PONV Controlled:  Controlled   Airway Patency:  Patent     Post Op Vitals Reviewed: Yes    No anethesia notable event occurred.    Staff: CRNA               /83 (05/21/24 1039)    Temp (!) 97.4 °F (36.3 °C) (05/21/24 1039)    Pulse 68 (05/21/24 1039)   Resp 15 (05/21/24 1039)    SpO2 94 % (05/21/24 1039)

## 2024-05-21 NOTE — DISCHARGE INSTR - AVS FIRST PAGE
Please refer to post ICD implantation discharge instructions and restrictions below and your ICD booklet/temporary card.     Keep incision dry for one week. Leave outer bandage in place for 1 week - it is water proof, and as long as it is fully adhered to your skin you may shower with it.  If it appears as though the bandage is coming off and/or there is any communication to the area of device incision, please then keep the whole area dry for the remaining week.  After 1 week, please remove by pulling all edges away from the center of the bandage. After the bandage is removed, you may then shower normally and get the area wet with soap and water, no scrubbing, and pat dry. Do not use lotions/powders/creams on incision.    No overhead reaching/pushing/pulling/lifting greater than 5-10lbs with left arm for six weeks. Please call the office if you notice redness, swelling, bleeding, or drainage from incision or if you develop fevers    Implantable Cardioverter Defibrillator      If you have any questions, please call [phone] to speak with a nurse (8:30am-4pm, or [phone] after hours). For appointments, please call [phone].    WHAT YOU SHOULD KNOW:    An implantable cardioverter defibrillator (ICD) is a small device that monitors your heart rate and rhythm. It may be used if you have a ventricular arrhythmia, which is an irregular, dangerous rhythm from the bottom chamber of your heart. Some arrhythmias may cause your heart to suddenly stop beating. An ICD can give a shock to your heart to make it start beating again.       AFTER YOU LEAVE:      Follow up with your primary healthcare provider or cardiologist as directed: You will need to follow-up to have your ICD checked and make sure you are not having problems. Write down your questions so you remember to ask them during your visits.    Driving: you are ok to drive 48 hours after device is implanted     Self-care:   Ask about activity: Ask if you need  to avoid moving your shoulder or arm, and for how long. Ask if you should avoid lifting heavy objects. Do not play any contact sports, such as football or wrestling, until your primary healthcare provider (PHP) or cardiologist tells you it is okay. You may only be able to drive for a certain amount of time per day, or during certain hours. Ask when you can return to work.   Care for your skin over the ICD: see instructions above     When you get a shock from your ICD: A shock may feel like someone has hit you, or you may feel a thump in the chest. If someone is touching you when you get a shock, they may feel a tingling feeling. The first time you feel a shock, it may scare you. Sit or lie down and stay calm. Ask someone to stay with you if possible. Please either call your cardiologist or report to an emergency room.    Safety instructions when you have an ICD:   Carry an ID card for the ICD: This card has important information about your ICD.    Stay away from magnets or machines with electric fields: This includes MRI machines. Avoid leaning into a car engine or doing welding. These things can interfere with how your ICD works.    Tell airport security you have an ICD: You may need to be searched by hand when you go through a security gate. The security gate or handheld wand could harm your ICD.    Keep an ICD diary: Record when you get a shock and what you were doing before you got the shock. Keep track of how you felt before and after the shock, as well as how many shocks you received. Write down the day and time of each shock. Bring the diary with you when you see your PHP or cardiologist.   Carry medical alert identification: Wear medical alert jewelry or carry a card that says you have an ICD. Ask your PHP or cardiologist where to get these items.    Contact your primary healthcare provider or cardiologist if:   You have a fever.    You feel 1 or more shocks from your ICD and feel fine afterwards.   Your  feet or ankles swell.   The area around your ICD is painful or tender after surgery.   The skin around your stitches or staples is red, swollen, or draining pus or fluid.    You have chills, a cough, and feel weak or achy.    You are sad or anxious and find it hard to do your usual activities.   You have questions or concerns about your condition or care.    Seek care immediately or call 911 if:   Your stitches or staples come apart.   Blood soaks through your bandage.   You feel more than 3 shocks in a row from your ICD.   You become weak, dizzy, or faint.   You feel your heart skip beats or beat very fast or slow, but you do not feel a shock from your ICD.   You have chest pain that does not go away with rest or medicine.        © 2014 [employer] Information is for End User's use only and may not be sold, redistributed or otherwise used for commercial purposes. All illustrations and images included in CareNotes® are the copyrighted property of [employer].D.A[employer] Inc[employer] or [employer].  The above information is an  only. It is not intended as medical advice for individual conditions or treatments. Talk to your doctor, nurse or pharmacist before following any medical regimen to see if it is safe and effective for you.

## 2024-05-21 NOTE — H&P
"H&P Exam - Cardiology   Nadir Myers 64 y.o. male MRN: [id number]  Unit/Bed#: [facility] CATH LAB ROOM Encounter: [id number]    Assessment & Plan     Assessment: From prior office visit with Dr. Maria 4/8/2024 -   Persistent MSSA bacteremia  Patient had extraction of ICD on 2/8/2024  Patient underwent EV ICD (MDT) placement on February 20, 2024  Patient had lead dislodgment leading to repeat procedure on February 21, 2024  During the procedure patient's lead had poor sensing and therefore could not be positioned under the sternum and pocket was closed.  Multiple location under the sternum where attempted.  We discussed SQ ICD implantation in the future  Patient will benefit from subcutaneous ICD which puts the lead above the sternum with minimal dislodgment rate.    LifeVest on discharge and then discuss S-ICD in office.   Continue goal directed medications for cardiomyopathy  Completed 6 weeks of IV abx 3/25/24  Repeat blood cx - 4/2024 negative  SQ ICD implantation recommended  ICM  EF 45%  Continue goal directed medication  Currently on Toprol Xl only  Initial ICD implanted 5/12/2023  Euvolemic  Sees Dr. Samuels in [facility]; maintained on torsemide, metoprolol, isordil, hydralazine   Hx of CAD   of LAD and drug-eluting stent to mid circumflex in 2015  Hx of VT  Newborn to be scar mediated  Underwent ICD in May 2023  S/p extraction due to MSSA  Will need ICD for secondary prevention - will implant SQ ICD once patient is agreeable  No current need for pacing; August 2023 showed AP 8%,  < 0.1%  On amiodarone and metoprolol  Continue for now  Hx of of LV apical aneurysm  HTN  Normotensive int he office  On hydralazine   HLD  DM II  Last A1c 9.8  Maintained on metformin, and Janumet as well insulin    Plan:  [employer] subcutaneous ICD implantation      History of Present Illness   HPI:  Nadir Myers is a 64 y.o. year old male - from prior office visit with Dr. Maria - \"with ischemic cardiomyopathy, CAD, " "ventricular tachycardia episode, LV apical aneurysm, hypertension, hyperlipidemia, diabetes who presents to discuss defibrillator implantation.     Patient had dual-chamber Medtronic ICD placed in May 2023 for secondary prevention for ventricular tachycardia that was thought to be scar related.  He had MSSA bacteremia with the likely source being his left foot toe.  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  He is blood culture remain positive despite being on IV antibiotic and there was suspicion for ICD lead vegetation and infection related to it.  He underwent extraction of his dual-chamber ICD on February 9th 2024.  He underwent Medtronic EV ICD placement on February 20, 2024.  Unfortunately his lead had dislodged into pleural space and he underwent revision procedure on February 21, 2024.  However during the procedure there was very poor sensing on the lead in multiple places and the procedure was aborted.  He was discharged on LifeVest.     He has now completed 6 weeks of IV antibiotics.  He has repeat blood culture ordered to be obtain during the week of 4/8/24. He is currently wearing LifeVest and has not had any inappropriate shocks.\"    [employer] subcutaneous ICD implantation was recommended, and he presents today to undergo that procedure.      Review of Systems  ROS as noted above, otherwise 12 point review of systems was performed and is negative.       Historical Information   Past Medical History:   Diagnosis Date    Diabetes mellitus (HCC)     Myocardial infarction (HCC)      Past Surgical History:   Procedure Laterality Date    CARDIAC CATHETERIZATION N/A 05/03/2023    Procedure: Cardiac Coronary Angiogram;  Surgeon: Valeriy Shore MD;  Location: [facility] CARD[facility];  Service: Cardiology    CARDIAC CATHETERIZATION Left 05/03/2023    Procedure: Cardiac Left Heart Cath;  Surgeon: Valeriy Shore MD;  Location: [facility] CARDIAC CATH [facility];  Service: Cardiology    CARDIAC " DEFIBRILLATOR PLACEMENT  05/2023    CARDIAC ELECTROPHYSIOLOGY PROCEDURE N/A 05/12/2023    Procedure: Cardiac icd implant;  Surgeon: Urbano Maria MD;  Location: [facility] CARDIAC CATH [facility];  Service: Cardiology    CARDIAC ELECTROPHYSIOLOGY PROCEDURE N/A 2/20/2024    Procedure: EV ICD IMPLANTATION;  Surgeon: Arturo Wooten DO;  Location: [facility] MAIN OR;  Service: Cardiology    CARDIAC ELECTROPHYSIOLOGY PROCEDURE N/A 2/8/2024    Procedure: Cardiac laser lead extraction;  Surgeon: Arturo Wooten DO;  Location: [facility] CARDIAC CATH LAB;  Service: Cardiology    IR LOWER EXTREMITY ANGIOGRAM  1/18/2024    NC RMVL TRANSVNS PM ELTRD DUAL LEAD SYS N/A 2/20/2024    Procedure: EV ICD IMPLANTATION;  Surgeon: Abhilash Ferrera MD;  Location: [facility] MAIN OR;  Service: Cardiac Surgery    NC RMVL TRANSVNS PM ELTRD DUAL LEAD SYS N/A 2/21/2024    Procedure: REMOVAL OF LEAD AND GENERATOR AND ATTEMPTED LEAD REVISION;  Surgeon: Abhilash Ferrera MD;  Location: [facility] MAIN OR;  Service: Cardiac Surgery    WOUND DEBRIDEMENT Left 2/4/2024    Procedure: LEFT FOURTH TOE AMPUTATION SURGICAL WOUND DEBRIDEMENT FOOT/TOE (WASH OUT);  Surgeon: Bebo Hopper DPM;  Location: [facility] MAIN OR;  Service: Podiatry     Family History: No family history on file.    Social History   Social History     Substance and Sexual Activity   Alcohol Use Not Currently     Social History     Substance and Sexual Activity   Drug Use Never     Social History     Tobacco Use   Smoking Status Former    Types: Cigarettes    Start date: 1/30/2024   Smokeless Tobacco Never   Tobacco Comments    X2 cigarettes/day         Meds/Allergies   all medications and allergies reviewed  Home Medications:   Medications Prior to Admission:     amiodarone 200 mg tablet    aspirin 81 mg chewable tablet    atorvastatin (LIPITOR) 80 mg tablet    clopidogrel (PLAVIX) 75 mg tablet    Empagliflozin (Jardiance) 10 MG TABS tablet    hydrALAZINE (APRESOLINE) 25 mg tablet    insulin detemir (Levemir FlexPen) 100 Units/mL injection  pen    isosorbide dinitrate (ISORDIL) 20 mg tablet    JANUMET -1000 MG TB24    metFORMIN (GLUCOPHAGE) 1000 MG tablet    metoprolol succinate (TOPROL-XL) 50 mg 24 hr tablet    torsemide (DEMADEX) 20 mg tablet    BD Pen Needle Micro U/F 32G X 6 MM MISC    Allergies   Allergen Reactions    Vancomycin Rash     NOT AN ALLERGY - 1/31/24 patient experienced vancomycin infusion reaction, please extend duration of infusion time to prevent future infusion reactions       Objective   Vitals: There were no vitals taken for this visit.      No intake or output data in the 24 hours ending 05/21/24 0724    Invasive Devices       None                   Physical Exam  GEN: NAD, alert and oriented x 3, well appearing  SKIN: dry without significant lesions or rashes  HEENT: NCAT, PERRL, EOMs intact  NECK: No JVD appreciated  CARDIOVASCULAR: RRR, normal S1, S2 without murmurs, rubs, or gallops appreciated  LUNGS: Clear to auscultation bilaterally without wheezes, rhonchi, or rales  ABDOMEN: Soft, nontender, nondistended  EXTREMITIES/VASCULAR: perfused without clubbing, cyanosis, or LE edema b/l  PSYCH: Normal mood and affect  NEURO: CN ll-Xll grossly intact      Lab Results: I have personally reviewed pertinent lab results.    Results from last 7 days   Lab Units 05/14/24  0808   WBC Thousand/uL 10.19*   HEMOGLOBIN g/dL 12.1   HEMATOCRIT % 38.4   PLATELETS Thousands/uL 231     Results from last 7 days   Lab Units 05/14/24  0808   POTASSIUM mmol/L 4.7   CHLORIDE mmol/L 106   CO2 mmol/L 24   BUN mg/dL 42*   CREATININE mg/dL 1.32*   CALCIUM mg/dL 8.8     Results from last 7 days   Lab Units 05/14/24  0808   INR  1.01     Results from last 7 days   Lab Units 05/14/24  0808   MAGNESIUM mg/dL 1.9         Imaging: I have personally reviewed pertinent reports.      ECHO: Results for orders placed during the hospital encounter of 09/26/17    Echo complete with contrast if indicated    Narrative  [facility]  [address]  [place] [address]  [phone]    Transthoracic Echocardiogram  2D, M-mode, Doppler, and Color Doppler    Study date:  26-Sep-2017    Patient: EAGLE REBOLLAR  MR number: [id number]  Account number: [id number]  : 1960  Age: 57 years  Gender: Male  Status: Outpatient  Location: [address] [place] [address]  Height: 72 in  Weight: 263.3 lb  BP: 142/ 88 mmHg    Indications: CAD    Diagnoses: I25.10 - Atherosclerotic heart disease of native coronary artery without angina pectoris    Sonographer:  OLIVE Benoit  Primary Physician:  Paty Ortiz MD  Referring Physician:  Eagle Chavez MD  Group:  [facility]  Interpreting Physician:  Torrey Duenas MD    SUMMARY    LEFT VENTRICLE:  Size was normal.  Systolic function was normal. Ejection fraction was estimated to be 65 %.  There was mild concentric hypertrophy.  Doppler parameters were consistent with abnormal left ventricular relaxation (grade 1 diastolic dysfunction).    RIGHT VENTRICLE:  The size was normal.  Systolic function was normal.    TRICUSPID VALVE:  There was trace regurgitation.    HISTORY: PRIOR HISTORY: Hypertension, CAD s/p PCI, smoker, diabetes, high cholesterol    PROCEDURE: The study was performed in the [address] [place] [address]. This was a routine study. The transthoracic approach was used. The study included complete 2D imaging, M-mode, complete spectral Doppler, and color Doppler. The  heart rate was 112 bpm, at the start of the study. Images were obtained from the parasternal, apical, subcostal, and suprasternal notch acoustic windows. Echocardiographic views were limited due to decreased penetration. This was a  technically difficult study.    LEFT VENTRICLE: Size was normal. Systolic function was normal. Ejection fraction was estimated to be 65 %. There were no regional wall motion abnormalities. Wall thickness was mildly increased. There was mild concentric  hypertrophy.  DOPPLER: Doppler parameters were consistent with abnormal left ventricular relaxation (grade 1 diastolic dysfunction).    RIGHT VENTRICLE: The size was normal. Systolic function was normal. Wall thickness was normal.    LEFT ATRIUM: Size was normal.    RIGHT ATRIUM: Size was normal.    MITRAL VALVE: Valve structure was normal. There was normal leaflet separation. DOPPLER: The transmitral velocity was within the normal range. There was no evidence for stenosis. There was no regurgitation.    AORTIC VALVE: The valve was trileaflet. Leaflets exhibited normal thickness and normal cuspal separation. DOPPLER: Transaortic velocity was within the normal range. There was no evidence for stenosis. There was no regurgitation.    TRICUSPID VALVE: The valve structure was normal. There was normal leaflet separation. DOPPLER: The transtricuspid velocity was within the normal range. There was no evidence for stenosis. There was trace regurgitation. The tricuspid jet  envelope definition was inadequate for estimation of RV systolic pressure. There are no indirect findings suggestive of moderate or severe pulmonary hypertension.    PULMONIC VALVE: Leaflets exhibited normal thickness, no calcification, and normal cuspal separation. DOPPLER: The transpulmonic velocity was within the normal range. There was no regurgitation.    PERICARDIUM: There was no pericardial effusion. The pericardium was normal in appearance.    AORTA: The root exhibited normal size.    SYSTEMIC VEINS: IVC: The inferior vena cava was normal in size and course. Respirophasic changes were normal.    SYSTEM MEASUREMENT TABLES    2D  %FS: 47.6 %  AV Diam: 3.34 cm  EDV(Teich): 101.71 ml  EF Biplane: 67.76 %  EF(Cube): 85.61 %  EF(Teich): 79 %  ESV(Cube): 14.82 ml  ESV(Teich): 21.36 ml  IVSd: 1.58 cm  LA Area: 12.78 cm2  LA Diam: 3.37 cm  LVEDV MOD A2C: 49.73 ml  LVEDV MOD A4C: 61.78 ml  LVEDV MOD BP: 58.1 ml  LVEF MOD A2C: 65.64 %  LVEF MOD A4C: 71.2  %  LVESV MOD A2C: 17.09 ml  LVESV MOD A4C: 17.79 ml  LVESV MOD BP: 18.73 ml  LVIDd: 4.69 cm  LVIDs: 2.46 cm  LVLd A2C: 6.97 cm  LVLd A4C: 7.74 cm  LVLs A2C: 5.23 cm  LVLs A4C: 6.13 cm  LVPWd: 1.23 cm  RA Area: 14.52 cm2  RV Diam.: 2.86 cm  SI(Cube): 36.73 ml/m2  SI(Teich): 33.48 ml/m2  SV MOD A2C: 32.64 ml  SV MOD A4C: 43.98 ml  SV(Cube): 88.15 ml  SV(Teich): 80.35 ml    MM  TAPSE: 3.26 cm    PW  E': 0.08 m/s    [facility]    Prepared and electronically signed by    Torrey Duenas MD  Signed 26-Sep-2017 12:55:39      Results for orders placed during the hospital encounter of 01/31/24    Echo complete w/ contrast if indicated    Interpretation Summary    Left Ventricle: Left ventricular cavity size is normal. Wall thickness is moderately increased. There is moderate concentric hypertrophy. The left ventricular ejection fraction is 42%. Systolic function is moderately reduced. Diastolic function is mildly abnormal, consistent with grade I (abnormal) relaxation. 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 There is no thrombus.    The following segments are akinetic: apical anterior, apical septal, apical inferior, apical lateral and apex.    All other segments are normal.    Tricuspid Valve: There is mild regurgitation.        EKG:         Code Status: Level 1 - Full Code

## 2024-05-22 ENCOUNTER — APPOINTMENT (OUTPATIENT)
Dept: RADIOLOGY | Facility: HOSPITAL | Age: 64
End: 2024-05-22
Payer: COMMERCIAL

## 2024-05-22 VITALS
RESPIRATION RATE: 18 BRPM | WEIGHT: 238 LBS | SYSTOLIC BLOOD PRESSURE: 149 MMHG | TEMPERATURE: 98.3 F | OXYGEN SATURATION: 96 % | BODY MASS INDEX: 32.23 KG/M2 | DIASTOLIC BLOOD PRESSURE: 79 MMHG | HEART RATE: 71 BPM | HEIGHT: 72 IN

## 2024-05-22 PROBLEM — G89.18 POST-OP PAIN: Status: ACTIVE | Noted: 2024-05-22

## 2024-05-22 LAB
ANION GAP SERPL CALCULATED.3IONS-SCNC: 9 MMOL/L (ref 4–13)
BUN SERPL-MCNC: 37 MG/DL (ref 5–25)
CALCIUM SERPL-MCNC: 8.1 MG/DL (ref 8.4–10.2)
CHLORIDE SERPL-SCNC: 104 MMOL/L (ref 96–108)
CO2 SERPL-SCNC: 23 MMOL/L (ref 21–32)
CREAT SERPL-MCNC: 1.25 MG/DL (ref 0.6–1.3)
ERYTHROCYTE [DISTWIDTH] IN BLOOD BY AUTOMATED COUNT: 13.9 % (ref 11.6–15.1)
GFR SERPL CREATININE-BSD FRML MDRD: 60 ML/MIN/1.73SQ M
GLUCOSE P FAST SERPL-MCNC: 192 MG/DL (ref 65–99)
GLUCOSE SERPL-MCNC: 192 MG/DL (ref 65–140)
GLUCOSE SERPL-MCNC: 192 MG/DL (ref 65–140)
HCT VFR BLD AUTO: 33.3 % (ref 36.5–49.3)
HGB BLD-MCNC: 10.8 G/DL (ref 12–17)
MAGNESIUM SERPL-MCNC: 1.9 MG/DL (ref 1.9–2.7)
MCH RBC QN AUTO: 29 PG (ref 26.8–34.3)
MCHC RBC AUTO-ENTMCNC: 32.4 G/DL (ref 31.4–37.4)
MCV RBC AUTO: 90 FL (ref 82–98)
PLATELET # BLD AUTO: 196 THOUSANDS/UL (ref 149–390)
PMV BLD AUTO: 9.9 FL (ref 8.9–12.7)
POTASSIUM SERPL-SCNC: 4.2 MMOL/L (ref 3.5–5.3)
RBC # BLD AUTO: 3.72 MILLION/UL (ref 3.88–5.62)
SODIUM SERPL-SCNC: 136 MMOL/L (ref 135–147)
WBC # BLD AUTO: 11.63 THOUSAND/UL (ref 4.31–10.16)

## 2024-05-22 PROCEDURE — 80048 BASIC METABOLIC PNL TOTAL CA: CPT | Performed by: PHYSICIAN ASSISTANT

## 2024-05-22 PROCEDURE — NC001 PR NO CHARGE: Performed by: PHYSICIAN ASSISTANT

## 2024-05-22 PROCEDURE — 71046 X-RAY EXAM CHEST 2 VIEWS: CPT

## 2024-05-22 PROCEDURE — 82948 REAGENT STRIP/BLOOD GLUCOSE: CPT

## 2024-05-22 PROCEDURE — 83735 ASSAY OF MAGNESIUM: CPT | Performed by: PHYSICIAN ASSISTANT

## 2024-05-22 PROCEDURE — 85027 COMPLETE CBC AUTOMATED: CPT | Performed by: PHYSICIAN ASSISTANT

## 2024-05-22 RX ORDER — OXYCODONE HYDROCHLORIDE AND ACETAMINOPHEN 5; 325 MG/1; MG/1
1 TABLET ORAL EVERY 6 HOURS PRN
Qty: 10 TABLET | Refills: 0 | Status: SHIPPED | OUTPATIENT
Start: 2024-05-22 | End: 2024-06-03

## 2024-05-22 RX ADMIN — INSULIN DETEMIR 25 UNITS: 100 INJECTION, SOLUTION SUBCUTANEOUS at 09:00

## 2024-05-22 RX ADMIN — AMIODARONE HYDROCHLORIDE 200 MG: 200 TABLET ORAL at 08:58

## 2024-05-22 RX ADMIN — CLOPIDOGREL BISULFATE 75 MG: 75 TABLET ORAL at 08:57

## 2024-05-22 RX ADMIN — EMPAGLIFLOZIN 10 MG: 10 TABLET, FILM COATED ORAL at 09:00

## 2024-05-22 RX ADMIN — INSULIN LISPRO 2 UNITS: 100 INJECTION, SOLUTION INTRAVENOUS; SUBCUTANEOUS at 06:20

## 2024-05-22 RX ADMIN — HYDRALAZINE HYDROCHLORIDE 50 MG: 50 TABLET ORAL at 05:34

## 2024-05-22 RX ADMIN — ISOSORBIDE DINITRATE 10 MG: 10 TABLET ORAL at 08:59

## 2024-05-22 RX ADMIN — TORSEMIDE 10 MG: 10 TABLET ORAL at 08:58

## 2024-05-22 RX ADMIN — ASPIRIN 81 MG CHEWABLE TABLET 81 MG: 81 TABLET CHEWABLE at 08:58

## 2024-05-22 NOTE — TELEPHONE ENCOUNTER
Left message on machine for patient to notify him that we do not have any Janumet samples at this time and that his paperwork was mailed on 5/20. Also sent a my chart message to inform him

## 2024-05-22 NOTE — TELEPHONE ENCOUNTER
Pt wondering if you have samples of this prescription - Janumet  --Please call about this.    Also the paperwork on file - he wants to make sure it was mailed the address on the paperwork.  I saw after I hung up this note below so yes got mailed 5/20.  When you call him back please inform him of this..    5/20 note below..  Forms were put in the mail today, unaware we were supposed to mail them.  Will forward message to clinical about samples.

## 2024-05-22 NOTE — DISCHARGE SUMMARY
Discharge Summary - Nadir Myers 64 y.o. male MRN: [id number]    Unit/Bed#: CW2 211-02 Encounter: [id number]      Admission Date: 5/21/2024   Discharge Date: 5/22/2024    Discharge Diagnosis:   Persistent MSSA bacteremia, now status post [employer] subcutaneous ICD implantation 5/21/2024  Patient initially had ICD placed 5/2023 in the setting of  #2  Had extraction of ICD on 2/8/2024 due to bacteremia  Patient underwent EV ICD (MDT) placement on 2/20/2024  Patient had lead dislodgment leading to repeat procedure on 2/21/2024  During the procedure patient's lead had poor sensing and therefore could not be positioned under the sternum and pocket was closed.  Multiple location under the sternum where attempted.  We discussed SQ ICD implantation in the future  Patient will benefit from subcutaneous ICD which puts the lead above the sternum with minimal dislodgment rate.    LifeVest on discharge with plan for eventual S-ICD   Continue goal directed medications for cardiomyopathy  Completed 6 weeks of IV abx 3/25/24  Repeat blood cx - 4/2024 negative  SQ ICD implantation recommended  ICM  EF 45%  Continue goal directed medication  Currently on Toprol Xl only  Initial ICD implanted 5/12/2023  Euvolemic  Sees Dr. Samuels in [facility]; maintained on torsemide, metoprolol, isordil, hydralazine   Not on ACEi/ARB/ARNI due to CKD  Hx of CAD   of LAD and drug-eluting stent to mid circumflex in 2015  Hx of VT  [place] to be scar mediated  Underwent ICD in May 2023  S/p extraction due to MSSA  Will need ICD for secondary prevention - now status post SQ ICD   No current need for pacing; August 2023 showed AP 8%,  < 0.1%  On amiodarone and metoprolol  Continue for now  Hx of of LV apical aneurysm  HTN  Normotensive int he office  On hydralazine   HLD  DM II  Last A1c 9.8  Maintained on metformin, and Janumet as well insulin      Procedures Performed: [employer] subcutaneous ICD implantation  Orders Placed This Encounter  "  Procedures    Cardiac ep lab eps/ablations       Consultants: none      HPI: Please refer to the initial history and physical as well as procedure notes for full details. Briefly, Nadir Myers is a 64 y.o. year old male  - from prior office visit with Dr. Maria - \"with ischemic cardiomyopathy, CAD, ventricular tachycardia episode, LV apical aneurysm, hypertension, hyperlipidemia, diabetes who presents to discuss defibrillator implantation.     Patient had dual-chamber Medtronic ICD placed in May 2023 for secondary prevention for ventricular tachycardia that was thought to be scar related.  He had MSSA bacteremia with the likely source being his left foot toe.  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  He is blood culture remain positive despite being on IV antibiotic and there was suspicion for ICD lead vegetation and infection related to it.  He underwent extraction of his dual-chamber ICD on February 9th 2024.  He underwent [employer] EV ICD placement on February 20, 2024.  Unfortunately his lead had dislodged into pleural space and he underwent revision procedure on February 21, 2024.  However during the procedure there was very poor sensing on the lead in multiple places and the procedure was aborted.  He was discharged on LifeVest.     He has now completed 6 weeks of IV antibiotics.  He has repeat blood culture ordered to be obtain during the week of 4/8/24. He is currently wearing LifeVest and has not had any inappropriate shocks.\"     [employer] subcutaneous ICD implantation was recommended, and he presented this admission to undergo that procedure.      Hospital Course: Nadir Myers presented at his baseline state of health. After the procedure was explained in detail and consent was obtained, he underwent the above procedures without complications. Please see operative notes by Dr. Maria for full details. He tolerated the procedure well. CXR immediately following the " procedure showed appropriate lead placement without pneumothorax. He was then monitored overnight for further observation.    There were no acute issues or events overnight. The following morning he denied all cardiac complaints, including chest pain/pressure, dyspnea, palpitations, dizziness, lightheadedness, or syncope. His vital signs were reviewed and labs are stable. Telemetry showed normal sinus rhythm without arrhythmias. Chest xray this morning again showed appropriate device placement without pneumothorax. Device interrogation showed appropriate device function, including lead sensing, threshold, and impedance. His incision was clean, dry, and intact without swelling, hematoma, redness, bleeding, drainage, or signs of infection.     Review of Systems   Constitutional: Negative for fever and malaise/fatigue.   Cardiovascular:  Negative for chest pain, dyspnea on exertion, irregular heartbeat, leg swelling, near-syncope, orthopnea, palpitations, paroxysmal nocturnal dyspnea and syncope.   All other systems reviewed and are negative.        Physical exam:  GEN: NAD, alert and oriented x 3, well appearing  SKIN: dry without significant lesions or rashes  HEENT: NCAT, PERRL, EOMs intact  NECK: No JVD appreciated  CARDIOVASCULAR: RRR, normal S1, S2 without murmurs, rubs, or gallops appreciated  LUNGS: Clear to auscultation bilaterally without wheezes, rhonchi, or rales  ABDOMEN: Soft, nontender, nondistended  EXTREMITIES/VASCULAR: perfused without clubbing, cyanosis, or LE edema b/l  PSYCH: Normal mood and affect  NEURO: CN ll-Xll grossly intact    He was given routine post implantation discharge instructions and restrictions, including wound care, and these were explained in detail. He was instructed to keep the incision dry for one week. He was given a two week follow up appointment with our device clinic for device interrogation and site check, and he was instructed to follow up with his primary cardiologist  as previously instructed.    In terms of his medications, no changes will be made.  He will be given a small prescription for Percocet to manage his postoperative pain.  Of note, he is not able to tolerate ACEi/ARB/ARNI due to CKD    He is stable for discharge at this time with all questions answered. He was discussed in detail with Dr. Maria who is in agreement with this discharge summary.       Discharge Medications:  See after visit summary for reconciled discharge medications provided to patient and family.      Medications Prior to Admission:     amiodarone 200 mg tablet    aspirin 81 mg chewable tablet    atorvastatin (LIPITOR) 80 mg tablet    clopidogrel (PLAVIX) 75 mg tablet    Empagliflozin (Jardiance) 10 MG TABS tablet    hydrALAZINE (APRESOLINE) 25 mg tablet    insulin detemir (Levemir FlexPen) 100 Units/mL injection pen    isosorbide dinitrate (ISORDIL) 20 mg tablet    JANUMET -1000 MG TB24    metFORMIN (GLUCOPHAGE) 1000 MG tablet    metoprolol succinate (TOPROL-XL) 50 mg 24 hr tablet    torsemide (DEMADEX) 20 mg tablet    BD Pen Needle Micro U/F 32G X 6 MM MISC      Current Facility-Administered Medications   Medication Dose Route Frequency    acetaminophen (TYLENOL) tablet 650 mg  650 mg Oral Q4H PRN    amiodarone tablet 200 mg  200 mg Oral Daily With Breakfast    aspirin chewable tablet 81 mg  81 mg Oral Daily    atorvastatin (LIPITOR) tablet 80 mg  80 mg Oral After Dinner    clopidogrel (PLAVIX) tablet 75 mg  75 mg Oral Daily    Empagliflozin (JARDIANCE) tablet 10 mg  10 mg Oral QAM    hydrALAZINE (APRESOLINE) tablet 50 mg  50 mg Oral Q8H TEJAS    insulin detemir (LEVEMIR) subcutaneous injection 25 Units  25 Units Subcutaneous Daily    insulin lispro (HumALOG/ADMELOG) 100 units/mL subcutaneous injection 1-5 Units  1-5 Units Subcutaneous HS    insulin lispro (HumALOG/ADMELOG) 100 units/mL subcutaneous injection 1-6 Units  1-6 Units Subcutaneous TID AC    isosorbide dinitrate (ISORDIL) tablet  10 mg  10 mg Oral TID after meals    metoprolol succinate (TOPROL-XL) 24 hr tablet 50 mg  50 mg Oral Q12H    oxyCODONE (ROXICODONE) IR tablet 5 mg  5 mg Oral Q4H PRN    torsemide (DEMADEX) tablet 10 mg  10 mg Oral Daily       [facility] Labs/diagnostics:  CBC with diff:   Results from last 7 days   Lab Units 05/22/24  0523 05/21/24  0724   WBC Thousand/uL 11.63* 8.62   HEMOGLOBIN g/dL 10.8* 11.8*   HEMATOCRIT % 33.3* 37.1   MCV fL 90 93   PLATELETS Thousands/uL 196 200   RBC Million/uL 3.72* 4.00   MCH pg 29.0 29.5   MCHC g/dL 32.4 31.8   RDW % 13.9 14.1   MPV fL 9.9 10.1       BMP:  Results from last 7 days   Lab Units 05/22/24  0523 05/21/24  0724   POTASSIUM mmol/L 4.2 4.4   CHLORIDE mmol/L 104 106   CO2 mmol/L 23 27   BUN mg/dL 37* 33*   CREATININE mg/dL 1.25 1.19   CALCIUM mg/dL 8.1* 8.5       Magnesium:   Results from last 7 days   Lab Units 05/22/24  0523 05/21/24  0724   MAGNESIUM mg/dL 1.9 1.9       Coags:   Results from last 7 days   Lab Units 05/21/24  0724   INR  1.01       Complications: none    Condition at Discharge: good     Discharge instructions/Information to patient and family:   See after visit summary for information provided to patient and family.      Provisions for Follow-Up Care:  See after visit summary for information related to follow-up care and any pertinent home health orders.      Disposition: Home    Planned Readmission: No    Discharge Statement   I spent 45 minutes minutes discharging the patient. This time was spent on the day of discharge. I had direct contact with the patient on the day of discharge. Additional documentation is required if more than 30 minutes were spent on discharge. Evaluating the incision, discussing discharge instructions and restrictions, arranging follow up appointments, discussing medications    Discharge Medications:  See after visit summary for reconciled discharge medications provided to patient and family.

## 2024-05-26 DIAGNOSIS — I25.10 CORONARY ARTERY DISEASE INVOLVING NATIVE CORONARY ARTERY OF NATIVE HEART WITHOUT ANGINA PECTORIS: ICD-10-CM

## 2024-05-26 DIAGNOSIS — Z95.810 ICD (IMPLANTABLE CARDIOVERTER-DEFIBRILLATOR) IN PLACE: ICD-10-CM

## 2024-05-26 DIAGNOSIS — I50.20 SYSTOLIC CONGESTIVE HEART FAILURE, UNSPECIFIED HF CHRONICITY (HCC): ICD-10-CM

## 2024-05-26 RX ORDER — TORSEMIDE 20 MG/1
10 TABLET ORAL DAILY
Qty: 45 TABLET | Refills: 1 | Status: SHIPPED | OUTPATIENT
Start: 2024-05-26

## 2024-05-28 ENCOUNTER — OFFICE VISIT (OUTPATIENT)
Dept: INTERNAL MEDICINE CLINIC | Facility: OTHER | Age: 64
End: 2024-05-28
Payer: COMMERCIAL

## 2024-05-28 VITALS
OXYGEN SATURATION: 98 % | HEART RATE: 77 BPM | DIASTOLIC BLOOD PRESSURE: 71 MMHG | TEMPERATURE: 98.2 F | HEIGHT: 72 IN | WEIGHT: 242.8 LBS | SYSTOLIC BLOOD PRESSURE: 140 MMHG | BODY MASS INDEX: 32.89 KG/M2

## 2024-05-28 DIAGNOSIS — T82.7XXS INFECTION INVOLVING IMPLANTABLE CARDIOVERTER-DEFIBRILLATOR (ICD), SEQUELA: Primary | ICD-10-CM

## 2024-05-28 DIAGNOSIS — I10 ESSENTIAL (PRIMARY) HYPERTENSION: ICD-10-CM

## 2024-05-28 PROCEDURE — 99213 OFFICE O/P EST LOW 20 MIN: CPT

## 2024-05-28 PROCEDURE — G2211 COMPLEX E/M VISIT ADD ON: HCPCS

## 2024-05-28 NOTE — ASSESSMENT & PLAN NOTE
New ICD implanted on 5-  Bandages removed today with irrigation.  Infection sites appear clean.  No drainage or evidence of infection  Advised patient there is still surgical glue present to the surgical sites.  This will come off on its own and advised not to pick at it.  Advised warm water and soap.  Follow-up with cardiology scheduled on 6-7-2024  Continue following up with ID

## 2024-05-28 NOTE — PROGRESS NOTES
Assessment/Plan:    1. 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  Infection sites appear clean.  No drainage or evidence of infection  Advised patient there is still surgical glue present to the surgical sites.  This will come off on its own and advised not to pick at it.  Advised warm water and soap.  Follow-up with cardiology scheduled on 6-7-2024  Continue following up with ID  2. Essential (primary) hypertension  Assessment & Plan:  Blood pressure is elevated in office today.  Initial 150/80, repeat 140/71  Patient states that blood pressures have been elevated at home as well.  Advise he contact his cardiologist regarding any adjustments to medications that may need to be made  He does follow-up with cardiology on 6-7-24            M*Modal software was used to dictate this note.  It may contain errors with dictating incorrect words or incorrect spelling. Please contact the provider directly with any questions.    Subjective:      Patient ID: Nadir Myers is a 64 y.o. male.    Patient is a 64-year-old male presenting to the office due to bandage stuck on wound incision  He was admitted to [facility] on 5- for [employer] subcutaneous ICD implantation  There were no complications to the procedure at that time  He does have follow-up with cardiology scheduled for 6-7-2024 and with PCP on 7-  He states that he has been feeling well postoperatively, simply needs assistance with bandage removal today            The following portions of the patient's history were reviewed and updated as appropriate: allergies, current medications, past family history, past medical history, past social history, past surgical history, and problem list.    Review of Systems   Constitutional:  Negative for chills, fatigue and fever.   HENT:  Negative for congestion, ear pain,  postnasal drip, rhinorrhea, sinus pressure, sore throat and trouble swallowing.    Eyes:  Negative for pain and visual disturbance.   Respiratory:  Negative for cough, chest tightness, shortness of breath and wheezing.    Cardiovascular:  Negative for chest pain, palpitations and leg swelling.   Gastrointestinal:  Negative for abdominal pain, nausea and vomiting.   Skin:  Positive for wound.   Neurological:  Negative for dizziness, weakness, light-headedness and headaches.   All other systems reviewed and are negative.        Past Medical History:   Diagnosis Date    Diabetes mellitus (HCC)     Myocardial infarction (HCC)          Current Outpatient Medications:     amiodarone 200 mg tablet, TAKE 1 TABLET BY MOUTH DAILY WITH BREAKFAST., Disp: 90 tablet, Rfl: 4    aspirin 81 mg chewable tablet, Chew 1 tablet (81 mg total) daily Do not start before March 2, 2024., Disp: 30 tablet, Rfl: 0    atorvastatin (LIPITOR) 80 mg tablet, TAKE 1 TABLET (80 MG TOTAL) BY MOUTH DAILY AFTER DINNER, Disp: 90 tablet, Rfl: 2    BD Pen Needle Micro U/F 32G X 6 MM MISC, USE 1 PEN EACH DAILY, Disp: , Rfl:     clopidogrel (PLAVIX) 75 mg tablet, TAKE 1 TABLET BY MOUTH EVERY DAY, Disp: 90 tablet, Rfl: 1    Empagliflozin (Jardiance) 10 MG TABS tablet, Take 1 tablet (10 mg total) by mouth every morning, Disp: 30 tablet, Rfl: 11    hydrALAZINE (APRESOLINE) 25 mg tablet, TAKE 2 TABLETS (50 MG TOTAL) BY MOUTH EVERY 8 (EIGHT) HOURS, Disp: 540 tablet, Rfl: 1    insulin detemir (Levemir FlexPen) 100 Units/mL injection pen, Inject 30 Units under the skin daily Patient to take 30 units daily - if fasting bs >150 for 3 days to increase dose by 5 units. Once bs < 150 can go back to 30 units (Patient taking differently: Inject 25 Units under the skin daily Patient to take 30 units daily - if fasting bs >150 for 3 days to increase dose by 5 units. Once bs < 150 can go back to 30 units), Disp: , Rfl:     isosorbide dinitrate (ISORDIL) 20 mg tablet, TAKE 0.5  TABLETS (10 MG TOTAL) BY MOUTH 3 (THREE) TIMES DAILY AFTER MEALS, Disp: 135 tablet, Rfl: 1    JANUMET -1000 MG TB24, daily , Disp: , Rfl:     metFORMIN (GLUCOPHAGE) 1000 MG tablet, Take 1,000 mg by mouth daily with breakfast, Disp: , Rfl:     metoprolol succinate (TOPROL-XL) 50 mg 24 hr tablet, TAKE 1 TABLET BY MOUTH EVERY 12 HOURS., Disp: 180 tablet, Rfl: 1    torsemide (DEMADEX) 20 mg tablet, TAKE 1/2 TABLET BY MOUTH DAILY, Disp: 45 tablet, Rfl: 1    oxyCODONE-acetaminophen (Percocet) 5-325 mg per tablet, Take 1 tablet by mouth every 6 (six) hours as needed for moderate pain for up to 10 days Max Daily Amount: 4 tablets (Patient not taking: Reported on 5/28/2024), Disp: 10 tablet, Rfl: 0    Allergies   Allergen Reactions    Vancomycin Rash     NOT AN ALLERGY - 1/31/24 patient experienced vancomycin infusion reaction, please extend duration of infusion time to prevent future infusion reactions       Social History   Past Surgical History:   Procedure Laterality Date    CARDIAC CATHETERIZATION N/A 05/03/2023    Procedure: Cardiac Coronary Angiogram;  Surgeon: Valeriy Shore MD;  [facility];  Service: Cardiology    CARDIAC CATHETERIZATION Left 05/03/2023    Procedure: Cardiac Left Heart Cath;  Surgeon: Valeriy Shore MD;  [facility];  Service: Cardiology    CARDIAC DEFIBRILLATOR PLACEMENT  05/2023    CARDIAC ELECTROPHYSIOLOGY PROCEDURE N/A 05/12/2023    Procedure: Cardiac icd implant;  Surgeon: Urbano Maria MD;  [facility];  Service: Cardiology    CARDIAC ELECTROPHYSIOLOGY PROCEDURE N/A 2/20/2024    Procedure: EV ICD IMPLANTATION;  Surgeon: Arturo Wooten DO;  [facility];  Service: Cardiology    CARDIAC ELECTROPHYSIOLOGY PROCEDURE N/A 2/8/2024    Procedure: Cardiac laser lead extraction;  Surgeon: Arturo Wooten DO;  [facility];  Service: Cardiology    CARDIAC ELECTROPHYSIOLOGY PROCEDURE N/A 5/21/2024    Procedure:  Cardiac icd implant subq;  Surgeon: Urbano Maria MD;  [facility];  Service: Cardiology    IR LOWER EXTREMITY ANGIOGRAM  1/18/2024    OR RMVL TRANSVNS PM ELTRD DUAL LEAD SYS N/A 2/20/2024    Procedure: EV ICD IMPLANTATION;  Surgeon: Abhilash Ferrera MD;  [facility];  Service: Cardiac Surgery    OR RMVL TRANSVNS PM ELTRD DUAL LEAD SYS N/A 2/21/2024    Procedure: REMOVAL OF LEAD AND GENERATOR AND ATTEMPTED LEAD REVISION;  Surgeon: Abhilash Ferrera MD;  [facility];  Service: Cardiac Surgery    WOUND DEBRIDEMENT Left 2/4/2024    Procedure: LEFT FOURTH TOE AMPUTATION SURGICAL WOUND DEBRIDEMENT FOOT/TOE (WASH OUT);  Surgeon: Bebo Hopper DPM;  [facility];  Service: Podiatry     History reviewed. No pertinent family history.    Objective:  /71 (BP Location: Right arm, Patient Position: Sitting, Cuff Size: Large) Comment: automatic  Pulse 77   Temp 98.2 °F (36.8 °C) (Temporal)   Ht 6' (1.829 m)   Wt 110 kg (242 lb 12.8 oz) Comment: with shoes on  SpO2 98% Comment: ra  BMI 32.93 kg/m²      Physical Exam  Vitals and nursing note reviewed.   Constitutional:       General: He is not in acute distress.     Appearance: Normal appearance. He is not ill-appearing.   HENT:      Head: Normocephalic and atraumatic.      Right Ear: External ear normal.      Left Ear: External ear normal.      Mouth/Throat:      Mouth: Mucous membranes are moist.   Eyes:      General: No scleral icterus.     Conjunctiva/sclera: Conjunctivae normal.   Cardiovascular:      Rate and Rhythm: Normal rate.   Pulmonary:      Effort: Pulmonary effort is normal. No respiratory distress.   Chest:      Chest wall: No tenderness.   Skin:     General: Skin is warm and dry.      Comments: 2 incisions: 1 midsternal incision appears well-healing.  Surgical glue present  Larger incision noted to the L mid axillary line.  Also appears well-healing, surgical glue present.  No evidence of infection, drainage,  surrounding erythema to either surgical incision   Neurological:      General: No focal deficit present.      Mental Status: He is alert and oriented to person, place, and time. Mental status is at baseline.   Psychiatric:         Mood and Affect: Mood normal.         Behavior: Behavior normal.             Disclaimer: This note was generated with voice recognition software.  Phonetic, grammatical, and spelling errors may be present as a result.  Please contact provider with any concerns or questions

## 2024-05-28 NOTE — ASSESSMENT & PLAN NOTE
Blood pressure is elevated in office today.  Initial 150/80, repeat 140/71  Patient states that blood pressures have been elevated at home as well.  Advise he contact his cardiologist regarding any adjustments to medications that may need to be made  He does follow-up with cardiology on 6-7-24

## 2024-05-29 DIAGNOSIS — Z79.4 TYPE 2 DIABETES MELLITUS WITH OTHER SPECIFIED COMPLICATION, WITH LONG-TERM CURRENT USE OF INSULIN (HCC): Primary | ICD-10-CM

## 2024-05-29 DIAGNOSIS — E11.69 TYPE 2 DIABETES MELLITUS WITH OTHER SPECIFIED COMPLICATION, WITH LONG-TERM CURRENT USE OF INSULIN (HCC): Primary | ICD-10-CM

## 2024-05-30 RX ORDER — INSULIN DETEMIR 100 [IU]/ML
30 INJECTION, SOLUTION SUBCUTANEOUS DAILY
Qty: 30 ML | Refills: 1 | Status: SHIPPED | OUTPATIENT
Start: 2024-05-30

## 2024-05-30 NOTE — TELEPHONE ENCOUNTER
Patient is calling to get the status of his medication refill request, please advise when ready and sent so the pharmacy  Thank you

## 2024-05-31 ENCOUNTER — TELEPHONE (OUTPATIENT)
Age: 64
End: 2024-05-31

## 2024-05-31 DIAGNOSIS — E11.69 TYPE 2 DIABETES MELLITUS WITH OTHER SPECIFIED COMPLICATION, WITH LONG-TERM CURRENT USE OF INSULIN (HCC): Primary | ICD-10-CM

## 2024-05-31 DIAGNOSIS — Z79.4 TYPE 2 DIABETES MELLITUS WITH OTHER SPECIFIED COMPLICATION, WITH LONG-TERM CURRENT USE OF INSULIN (HCC): Primary | ICD-10-CM

## 2024-05-31 NOTE — TELEPHONE ENCOUNTER
Patient called stating that he is out of the [place] that he receives from assistance program and it may not arrive for 3-4 days.  Patient would like to know if another medication can be called in meantime.  Please advise and contact patient if called into pharmacy.

## 2024-06-02 ENCOUNTER — HOSPITAL ENCOUNTER (OUTPATIENT)
Facility: HOSPITAL | Age: 64
Setting detail: OBSERVATION
Discharge: HOME | End: 2024-06-03
Attending: EMERGENCY MEDICINE | Admitting: INTERNAL MEDICINE
Payer: COMMERCIAL

## 2024-06-02 ENCOUNTER — APPOINTMENT (EMERGENCY)
Dept: RADIOLOGY | Facility: HOSPITAL | Age: 64
End: 2024-06-02
Payer: COMMERCIAL

## 2024-06-02 DIAGNOSIS — Z95.810 ICD (IMPLANTABLE CARDIOVERTER-DEFIBRILLATOR) IN PLACE: ICD-10-CM

## 2024-06-02 DIAGNOSIS — R06.00 DYSPNEA: Primary | ICD-10-CM

## 2024-06-02 LAB
2HR DELTA HS TROPONIN: 3 NG/L
4HR DELTA HS TROPONIN: 2 NG/L
ALBUMIN SERPL BCP-MCNC: 3.3 G/DL (ref 3.5–5)
ALP SERPL-CCNC: 53 U/L (ref 34–104)
ALT SERPL W P-5'-P-CCNC: 13 U/L (ref 7–52)
ANION GAP SERPL CALCULATED.3IONS-SCNC: 8 MMOL/L (ref 4–13)
AST SERPL W P-5'-P-CCNC: 15 U/L (ref 13–39)
ATRIAL RATE: 80 BPM
BASOPHILS # BLD AUTO: 0.03 THOUSANDS/ÂΜL (ref 0–0.1)
BASOPHILS NFR BLD AUTO: 0 % (ref 0–1)
BILIRUB SERPL-MCNC: 0.31 MG/DL (ref 0.2–1)
BUN SERPL-MCNC: 42 MG/DL (ref 5–25)
CALCIUM ALBUM COR SERPL-MCNC: 9.3 MG/DL (ref 8.3–10.1)
CALCIUM SERPL-MCNC: 8.7 MG/DL (ref 8.4–10.2)
CARDIAC TROPONIN I PNL SERPL HS: 24 NG/L
CARDIAC TROPONIN I PNL SERPL HS: 26 NG/L
CARDIAC TROPONIN I PNL SERPL HS: 27 NG/L
CHLORIDE SERPL-SCNC: 107 MMOL/L (ref 96–108)
CO2 SERPL-SCNC: 24 MMOL/L (ref 21–32)
CREAT SERPL-MCNC: 1.27 MG/DL (ref 0.6–1.3)
EOSINOPHIL # BLD AUTO: 0.21 THOUSAND/ÂΜL (ref 0–0.61)
EOSINOPHIL NFR BLD AUTO: 2 % (ref 0–6)
ERYTHROCYTE [DISTWIDTH] IN BLOOD BY AUTOMATED COUNT: 13.8 % (ref 11.6–15.1)
GFR SERPL CREATININE-BSD FRML MDRD: 59 ML/MIN/1.73SQ M
GLUCOSE SERPL-MCNC: 223 MG/DL (ref 65–140)
GLUCOSE SERPL-MCNC: 249 MG/DL (ref 65–140)
GLUCOSE SERPL-MCNC: 285 MG/DL (ref 65–140)
HCT VFR BLD AUTO: 34.3 % (ref 36.5–49.3)
HGB BLD-MCNC: 11 G/DL (ref 12–17)
IMM GRANULOCYTES # BLD AUTO: 0.05 THOUSAND/UL (ref 0–0.2)
IMM GRANULOCYTES NFR BLD AUTO: 0 % (ref 0–2)
LYMPHOCYTES # BLD AUTO: 1.25 THOUSANDS/ÂΜL (ref 0.6–4.47)
LYMPHOCYTES NFR BLD AUTO: 11 % (ref 14–44)
MAGNESIUM SERPL-MCNC: 2 MG/DL (ref 1.9–2.7)
MCH RBC QN AUTO: 29.1 PG (ref 26.8–34.3)
MCHC RBC AUTO-ENTMCNC: 32.1 G/DL (ref 31.4–37.4)
MCV RBC AUTO: 91 FL (ref 82–98)
MONOCYTES # BLD AUTO: 0.5 THOUSAND/ÂΜL (ref 0.17–1.22)
MONOCYTES NFR BLD AUTO: 4 % (ref 4–12)
NEUTROPHILS # BLD AUTO: 9.66 THOUSANDS/ÂΜL (ref 1.85–7.62)
NEUTS SEG NFR BLD AUTO: 83 % (ref 43–75)
NRBC BLD AUTO-RTO: 0 /100 WBCS
P AXIS: 60 DEGREES
PLATELET # BLD AUTO: 247 THOUSANDS/UL (ref 149–390)
PMV BLD AUTO: 9.8 FL (ref 8.9–12.7)
POTASSIUM SERPL-SCNC: 4.3 MMOL/L (ref 3.5–5.3)
PR INTERVAL: 164 MS
PROT SERPL-MCNC: 6.6 G/DL (ref 6.4–8.4)
QRS AXIS: -37 DEGREES
QRSD INTERVAL: 156 MS
QT INTERVAL: 470 MS
QTC INTERVAL: 542 MS
RBC # BLD AUTO: 3.78 MILLION/UL (ref 3.88–5.62)
SODIUM SERPL-SCNC: 139 MMOL/L (ref 135–147)
T WAVE AXIS: 67 DEGREES
VENTRICULAR RATE: 80 BPM
WBC # BLD AUTO: 11.7 THOUSAND/UL (ref 4.31–10.16)

## 2024-06-02 PROCEDURE — 84484 ASSAY OF TROPONIN QUANT: CPT

## 2024-06-02 PROCEDURE — 82948 REAGENT STRIP/BLOOD GLUCOSE: CPT

## 2024-06-02 PROCEDURE — 83735 ASSAY OF MAGNESIUM: CPT

## 2024-06-02 PROCEDURE — 80053 COMPREHEN METABOLIC PANEL: CPT

## 2024-06-02 PROCEDURE — 99285 EMERGENCY DEPT VISIT HI MDM: CPT | Performed by: EMERGENCY MEDICINE

## 2024-06-02 PROCEDURE — 71045 X-RAY EXAM CHEST 1 VIEW: CPT

## 2024-06-02 PROCEDURE — 99223 1ST HOSP IP/OBS HIGH 75: CPT | Performed by: INTERNAL MEDICINE

## 2024-06-02 PROCEDURE — NC001 PR NO CHARGE: Performed by: INTERNAL MEDICINE

## 2024-06-02 PROCEDURE — 99285 EMERGENCY DEPT VISIT HI MDM: CPT

## 2024-06-02 PROCEDURE — 36415 COLL VENOUS BLD VENIPUNCTURE: CPT

## 2024-06-02 PROCEDURE — 85025 COMPLETE CBC W/AUTO DIFF WBC: CPT

## 2024-06-02 PROCEDURE — 93005 ELECTROCARDIOGRAM TRACING: CPT

## 2024-06-02 PROCEDURE — 93010 ELECTROCARDIOGRAM REPORT: CPT | Performed by: INTERNAL MEDICINE

## 2024-06-02 RX ORDER — HYDRALAZINE HYDROCHLORIDE 50 MG/1
50 TABLET, FILM COATED ORAL EVERY 8 HOURS SCHEDULED
Status: DISCONTINUED | OUTPATIENT
Start: 2024-06-02 | End: 2024-06-03 | Stop reason: HOSPADM

## 2024-06-02 RX ORDER — AMIODARONE HYDROCHLORIDE 200 MG/1
200 TABLET ORAL
Status: DISCONTINUED | OUTPATIENT
Start: 2024-06-03 | End: 2024-06-03 | Stop reason: HOSPADM

## 2024-06-02 RX ORDER — TORSEMIDE 10 MG/1
10 TABLET ORAL DAILY
Status: DISCONTINUED | OUTPATIENT
Start: 2024-06-03 | End: 2024-06-03 | Stop reason: HOSPADM

## 2024-06-02 RX ORDER — ATORVASTATIN CALCIUM 80 MG/1
80 TABLET, FILM COATED ORAL
Status: DISCONTINUED | OUTPATIENT
Start: 2024-06-02 | End: 2024-06-03 | Stop reason: HOSPADM

## 2024-06-02 RX ORDER — INSULIN GLARGINE 100 [IU]/ML
23 INJECTION, SOLUTION SUBCUTANEOUS
Status: DISCONTINUED | OUTPATIENT
Start: 2024-06-02 | End: 2024-06-03 | Stop reason: HOSPADM

## 2024-06-02 RX ORDER — TORSEMIDE 20 MG/1
20 TABLET ORAL DAILY
Status: DISCONTINUED | OUTPATIENT
Start: 2024-06-02 | End: 2024-06-02

## 2024-06-02 RX ORDER — ISOSORBIDE DINITRATE 10 MG/1
10 TABLET ORAL
Status: DISCONTINUED | OUTPATIENT
Start: 2024-06-02 | End: 2024-06-03 | Stop reason: HOSPADM

## 2024-06-02 RX ORDER — ENOXAPARIN SODIUM 100 MG/ML
40 INJECTION SUBCUTANEOUS DAILY
Status: DISCONTINUED | OUTPATIENT
Start: 2024-06-02 | End: 2024-06-03 | Stop reason: HOSPADM

## 2024-06-02 RX ORDER — ASPIRIN 81 MG/1
81 TABLET, CHEWABLE ORAL DAILY
Status: DISCONTINUED | OUTPATIENT
Start: 2024-06-02 | End: 2024-06-03 | Stop reason: HOSPADM

## 2024-06-02 RX ORDER — CLOPIDOGREL BISULFATE 75 MG/1
75 TABLET ORAL DAILY
Status: DISCONTINUED | OUTPATIENT
Start: 2024-06-02 | End: 2024-06-03 | Stop reason: HOSPADM

## 2024-06-02 RX ORDER — INSULIN LISPRO 100 [IU]/ML
1-6 INJECTION, SOLUTION INTRAVENOUS; SUBCUTANEOUS
Status: DISCONTINUED | OUTPATIENT
Start: 2024-06-02 | End: 2024-06-03 | Stop reason: HOSPADM

## 2024-06-02 RX ORDER — TORSEMIDE 20 MG/1
20 TABLET ORAL ONCE
Status: COMPLETED | OUTPATIENT
Start: 2024-06-02 | End: 2024-06-02

## 2024-06-02 RX ORDER — METOPROLOL SUCCINATE 50 MG/1
50 TABLET, EXTENDED RELEASE ORAL EVERY 12 HOURS
Status: DISCONTINUED | OUTPATIENT
Start: 2024-06-02 | End: 2024-06-03 | Stop reason: HOSPADM

## 2024-06-02 RX ADMIN — ASPIRIN 81 MG CHEWABLE TABLET 81 MG: 81 TABLET CHEWABLE at 14:49

## 2024-06-02 RX ADMIN — ATORVASTATIN CALCIUM 80 MG: 80 TABLET, FILM COATED ORAL at 16:52

## 2024-06-02 RX ADMIN — ISOSORBIDE DINITRATE 10 MG: 10 TABLET ORAL at 16:52

## 2024-06-02 RX ADMIN — INSULIN LISPRO 4 UNITS: 100 INJECTION, SOLUTION INTRAVENOUS; SUBCUTANEOUS at 16:52

## 2024-06-02 RX ADMIN — INSULIN GLARGINE 23 UNITS: 100 INJECTION, SOLUTION SUBCUTANEOUS at 21:07

## 2024-06-02 RX ADMIN — HYDRALAZINE HYDROCHLORIDE 50 MG: 50 TABLET ORAL at 14:50

## 2024-06-02 RX ADMIN — TORSEMIDE 20 MG: 20 TABLET ORAL at 14:50

## 2024-06-02 RX ADMIN — METOPROLOL SUCCINATE 50 MG: 50 TABLET, EXTENDED RELEASE ORAL at 21:07

## 2024-06-02 RX ADMIN — INSULIN LISPRO 3 UNITS: 100 INJECTION, SOLUTION INTRAVENOUS; SUBCUTANEOUS at 21:06

## 2024-06-02 RX ADMIN — HYDRALAZINE HYDROCHLORIDE 50 MG: 50 TABLET ORAL at 21:07

## 2024-06-02 NOTE — ED PROVIDER NOTES
History  Chief Complaint   Patient presents with    Shortness of Breath    Vomiting     Pt has c/o SOB since this AM.  Pt had vomiting since last night.  Pt had a defibrillator placed last Tuesday      64-year-old male with a history of hypertension, hyperlipidemia, type 2 diabetes, CAD, ischemic cardiomyopathy, history of VT, s/p ICD complicated by infection who presents for evaluation of shortness of breath that began earlier this morning.  The patient reports that last night around midnight he experienced several episodes of nonbloody, nonbilious emesis.  The patient reports that this morning he began experiencing shortness of breath while lying flat.  The patient reports that his symptoms improved when sitting up.  The patient states that he still has some shortness of breath when lying down however it is somewhat improved.  Per chart review the patient initially had an ICD placed in May 2023 which was extracted in February of this year due to bacteremia.  The patient had a new ICD placed in February of this year which was complicated by lead dislodgment and poor sensing.  The patient recently had a subcutaneous ICD placed on 5/21.  The patient states that he has been feeling well since discharge until last night.  The patient denies fever, chills, headache, visual disturbances, chest pain, palpitations, shortness of breath, abdominal pain, diarrhea, dysuria, pain or swelling in his lower extremities.        Prior to Admission Medications   Prescriptions Last Dose Informant Patient Reported? Taking?   BD Pen Needle Micro U/F 32G X 6 MM MISC  Self Yes No   Sig: USE 1 PEN EACH DAILY   Empagliflozin (Jardiance) 10 MG TABS tablet   No No   Sig: Take 1 tablet (10 mg total) by mouth every morning   JANUMET -1000 MG TB24  Self Yes No   Sig: daily    amiodarone 200 mg tablet  Self No No   Sig: TAKE 1 TABLET BY MOUTH DAILY WITH BREAKFAST.   aspirin 81 mg chewable tablet  Self No No   Sig: Chew 1 tablet (81 mg  total) daily Do not start before March 2, 2024.   atorvastatin (LIPITOR) 80 mg tablet  Self No No   Sig: TAKE 1 TABLET (80 MG TOTAL) BY MOUTH DAILY AFTER DINNER   clopidogrel (PLAVIX) 75 mg tablet  Self No No   Sig: TAKE 1 TABLET BY MOUTH EVERY DAY   hydrALAZINE (APRESOLINE) 25 mg tablet  Self No No   Sig: TAKE 2 TABLETS (50 MG TOTAL) BY MOUTH EVERY 8 (EIGHT) HOURS   insulin detemir (Levemir FlexPen) 100 Units/mL injection pen   No No   Sig: Inject 30 Units under the skin daily Patient to take 30 units daily - if fasting bs >150 for 3 days to increase dose by 5 units. Once bs < 150 can go back to 30 units   isosorbide dinitrate (ISORDIL) 20 mg tablet   No No   Sig: TAKE 0.5 TABLETS (10 MG TOTAL) BY MOUTH 3 (THREE) TIMES DAILY AFTER MEALS   metFORMIN (GLUCOPHAGE) 1000 MG tablet   No No   Sig: Take 1 tablet (1,000 mg total) by mouth daily with breakfast   metoprolol succinate (TOPROL-XL) 50 mg 24 hr tablet   No No   Sig: TAKE 1 TABLET BY MOUTH EVERY 12 HOURS.   oxyCODONE-acetaminophen (Percocet) 5-325 mg per tablet   No No   Sig: Take 1 tablet by mouth every 6 (six) hours as needed for moderate pain for up to 10 days Max Daily Amount: 4 tablets   Patient not taking: Reported on 5/28/2024   torsemide (DEMADEX) 20 mg tablet   No No   Sig: TAKE 1/2 TABLET BY MOUTH DAILY      Facility-Administered Medications: None       Past Medical History:   Diagnosis Date    Diabetes mellitus (HCC)     Myocardial infarction (HCC)        Past Surgical History:   Procedure Laterality Date    CARDIAC CATHETERIZATION N/A 05/03/2023    Procedure: Cardiac Coronary Angiogram;  Surgeon: Valeriy Shore MD;  [facility];  Service: Cardiology    CARDIAC CATHETERIZATION Left 05/03/2023    Procedure: Cardiac Left Heart Cath;  Surgeon: Valeriy Shore MD;  [facility];  Service: Cardiology    CARDIAC DEFIBRILLATOR PLACEMENT  05/2023    CARDIAC ELECTROPHYSIOLOGY PROCEDURE N/A 05/12/2023    Procedure: Cardiac icd  implant;  Surgeon: Urbano Maria MD;  [facility];  Service: Cardiology    CARDIAC ELECTROPHYSIOLOGY PROCEDURE N/A 2/20/2024    Procedure: EV ICD IMPLANTATION;  Surgeon: Arturo Wooetn DO;  [facility];  Service: Cardiology    CARDIAC ELECTROPHYSIOLOGY PROCEDURE N/A 2/8/2024    Procedure: Cardiac laser lead extraction;  Surgeon: Arturo Wooten DO;  [facility];  Service: Cardiology    CARDIAC ELECTROPHYSIOLOGY PROCEDURE N/A 5/21/2024    Procedure: Cardiac icd implant subq;  Surgeon: Urbano Maria MD;  [facility];  Service: Cardiology    IR LOWER EXTREMITY ANGIOGRAM  1/18/2024    NE RMVL TRANSVNS PM ELTRD DUAL LEAD SYS N/A 2/20/2024    Procedure: EV ICD IMPLANTATION;  Surgeon: Abhilash Ferrera MD;  [facility];  Service: Cardiac Surgery    NE RMVL TRANSVNS PM ELTRD DUAL LEAD SYS N/A 2/21/2024    Procedure: REMOVAL OF LEAD AND GENERATOR AND ATTEMPTED LEAD REVISION;  Surgeon: Abhilash Ferrera MD;  [facility];  Service: Cardiac Surgery    WOUND DEBRIDEMENT Left 2/4/2024    Procedure: LEFT FOURTH TOE AMPUTATION SURGICAL WOUND DEBRIDEMENT FOOT/TOE (WASH OUT);  Surgeon: Bebo Hopper DPM;  [facility];  Service: Podiatry       History reviewed. No pertinent family history.  I have reviewed and agree with the history as documented.    E-Cigarette/Vaping    E-Cigarette Use Never User      E-Cigarette/Vaping Substances    Nicotine No     THC No     CBD No     Flavoring No     Other No     Unknown No      Social History     Tobacco Use    Smoking status: Former     Types: Cigarettes     Start date: 1/30/2024    Smokeless tobacco: Never    Tobacco comments:     X2 cigarettes/day   Vaping Use    Vaping status: Never Used   Substance Use Topics    Alcohol use: Not Currently    Drug use: Never        Review of Systems   Constitutional:  Negative for chills and fever.   HENT:  Negative for congestion and sore throat.    Eyes:  Negative for  pain and redness.   Respiratory:  Positive for shortness of breath. Negative for cough.    Cardiovascular:  Negative for chest pain and palpitations.   Gastrointestinal:  Positive for nausea and vomiting. Negative for abdominal pain and diarrhea.   Genitourinary:  Negative for dysuria and hematuria.   Musculoskeletal:  Negative for arthralgias and myalgias.   Skin:  Negative for color change, pallor and rash.   Neurological:  Negative for syncope, weakness, light-headedness, numbness and headaches.   All other systems reviewed and are negative.      Physical Exam  ED Triage Vitals   Temperature Pulse Respirations Blood Pressure SpO2   06/02/24 0855 06/02/24 0852 06/02/24 0852 06/02/24 0852 06/02/24 0852   97.9 °F (36.6 °C) 81 22 (!) 148/103 93 %      Temp Source Heart Rate Source Patient Position - Orthostatic VS BP Location FiO2 (%)   06/02/24 0855 06/02/24 0852 06/02/24 0852 06/02/24 0852 --   Temporal Monitor Sitting Left arm       Pain Score       06/02/24 0852       No Pain             Orthostatic Vital Signs  Vitals:    06/02/24 0852   BP: (!) 148/103   Pulse: 81   Patient Position - Orthostatic VS: Sitting       Physical Exam  Constitutional:       General: He is not in acute distress.     Appearance: Normal appearance. He is not ill-appearing, toxic-appearing or diaphoretic.   HENT:      Head: Normocephalic and atraumatic.      Nose: Nose normal.      Mouth/Throat:      Mouth: Mucous membranes are moist.      Pharynx: Oropharynx is clear.   Eyes:      Conjunctiva/sclera: Conjunctivae normal.      Pupils: Pupils are equal, round, and reactive to light.   Cardiovascular:      Rate and Rhythm: Normal rate and regular rhythm.      Pulses: Normal pulses.      Heart sounds: Normal heart sounds. No murmur heard.     No friction rub. No gallop.   Pulmonary:      Effort: Tachypnea present. No respiratory distress.      Breath sounds: Normal breath sounds. No wheezing, rhonchi or rales.   Abdominal:      General:  Abdomen is flat.      Palpations: Abdomen is soft.      Tenderness: There is no abdominal tenderness. There is no guarding or rebound.   Musculoskeletal:         General: No swelling or tenderness. Normal range of motion.      Cervical back: Normal range of motion and neck supple. No rigidity or tenderness.      Right lower leg: No edema.      Left lower leg: No edema.   Lymphadenopathy:      Cervical: No cervical adenopathy.   Skin:     General: Skin is warm and dry.      Capillary Refill: Capillary refill takes less than 2 seconds.      Coloration: Skin is not jaundiced or pale.      Findings: No bruising, erythema, lesion or rash.   Neurological:      General: No focal deficit present.      Mental Status: He is alert and oriented to person, place, and time.      Sensory: No sensory deficit.      Motor: No weakness.         ED Medications  Medications - No data to display    Diagnostic Studies  Results Reviewed       Procedure Component Value Units Date/Time    HS Troponin I 2hr [[id number]]  (Normal) Collected: 06/02/24 1121    Lab Status: Final result Specimen: Blood from Arm, Right Updated: 06/02/24 1156     hs TnI 2hr 27 ng/L      Delta 2hr hsTnI 3 ng/L     HS Troponin I 4hr [[id number]]     Lab Status: No result Specimen: Blood     HS Troponin 0hr (reflex protocol) [[id number]]  (Normal) Collected: 06/02/24 0929    Lab Status: Final result Specimen: Blood from Arm, Right Updated: 06/02/24 1014     hs TnI 0hr 24 ng/L     Comprehensive metabolic panel [[id number]]  (Abnormal) Collected: 06/02/24 0929    Lab Status: Final result Specimen: Blood from Arm, Right Updated: 06/02/24 1006     Sodium 139 mmol/L      Potassium 4.3 mmol/L      Chloride 107 mmol/L      CO2 24 mmol/L      ANION GAP 8 mmol/L      BUN 42 mg/dL      Creatinine 1.27 mg/dL      Glucose 223 mg/dL      Calcium 8.7 mg/dL      Corrected Calcium 9.3 mg/dL      AST 15 U/L      ALT 13 U/L      Alkaline Phosphatase 53 U/L      Total Protein 6.6 g/dL       Albumin 3.3 g/dL      Total Bilirubin 0.31 mg/dL      eGFR 59 ml/min/1.73sq m     Narrative:      National Kidney Disease Foundation guidelines for Chronic Kidney Disease (CKD):     Stage 1 with normal or high GFR (GFR > 90 mL/min/1.73 square meters)    Stage 2 Mild CKD (GFR = 60-89 mL/min/1.73 square meters)    Stage 3A Moderate CKD (GFR = 45-59 mL/min/1.73 square meters)    Stage 3B Moderate CKD (GFR = 30-44 mL/min/1.73 square meters)    Stage 4 Severe CKD (GFR = 15-29 mL/min/1.73 square meters)    Stage 5 End Stage CKD (GFR <15 mL/min/1.73 square meters)  Note: GFR calculation is accurate only with a steady state creatinine    Magnesium [[id number]]  (Normal) Collected: 06/02/24 0929    Lab Status: Final result Specimen: Blood from Arm, Right Updated: 06/02/24 1006     Magnesium 2.0 mg/dL     CBC and differential [[id number]]  (Abnormal) Collected: 06/02/24 0929    Lab Status: Final result Specimen: Blood from Arm, Right Updated: 06/02/24 0947     WBC 11.70 Thousand/uL      RBC 3.78 Million/uL      Hemoglobin 11.0 g/dL      Hematocrit 34.3 %      MCV 91 fL      MCH 29.1 pg      MCHC 32.1 g/dL      RDW 13.8 %      MPV 9.8 fL      Platelets 247 Thousands/uL      nRBC 0 /100 WBCs      Segmented % 83 %      Immature Grans % 0 %      Lymphocytes % 11 %      Monocytes % 4 %      Eosinophils Relative 2 %      Basophils Relative 0 %      Absolute Neutrophils 9.66 Thousands/µL      Absolute Immature Grans 0.05 Thousand/uL      Absolute Lymphocytes 1.25 Thousands/µL      Absolute Monocytes 0.50 Thousand/µL      Eosinophils Absolute 0.21 Thousand/µL      Basophils Absolute 0.03 Thousands/µL                    XR chest 1 view portable   Final Result by Kristine Townsend MD (06/02 1002)      Question mild pulmonary venous congestion.            Workstation performed: [id number]               Procedures  Procedures      ED Course                             SBIRT 20yo+      Flowsheet Row Most Recent Value   Initial Alcohol  Screen: US AUDIT-C     1. How often do you have a drink containing alcohol? 0 Filed at: 06/02/2024 0854   2. How many drinks containing alcohol do you have on a typical day you are drinking?  0 Filed at: 06/02/2024 0854   3a. Male UNDER 65: How often do you have five or more drinks on one occasion? 0 Filed at: 06/02/2024 0854   3b. FEMALE Any Age, or MALE 65+: How often do you have 4 or more drinks on one occassion? 0 Filed at: 06/02/2024 0854   Audit-C Score 0 Filed at: 06/02/2024 0854   [place]: How many times in the past year have you...    Used an illegal drug or used a prescription medication for non-medical reasons? Never Filed at: 06/02/2024 0854                  Medical Decision Making  64-year-old male with a history of hypertension, hyperlipidemia, type 2 diabetes, CAD, ischemic cardiomyopathy, history of VT, s/p ICD complicated by infection who presents for evaluation of shortness of breath that began earlier this morning.  The patient is hypertensive and tachypneic.  The remainder the patient's vitals are within the normal limits.  Exam is significant for mild tachypnea, but is otherwise unremarkable.  Differential diagnosis includes CHF exacerbation, arrhythmia, ICD malfunction, ACS, pneumonia.  Will order EKG, troponin, CBC, CMP, chest x-ray, and ICD interrogation.    EKG rate 80, sinus rhythm, RBBB, left axis deviation, no acute ischemic changes, similar to prior.  Initial troponin is 24.  Will continue to trend.  The patient has a leukocytosis with WBC count of 11.  Hemoglobin is stable at 11.  Patient is hyperglycemic.  The remainder the patient's lab work is reviewed without actionable derangements.  Chest x-ray shows mild pulmonary vascular congestion on my interpretation.  The patient is discussed with SOD and admitted for further evaluation and management.    Amount and/or Complexity of Data Reviewed  Labs: ordered.  Radiology: ordered.    Risk  Decision regarding  hospitalization.          Disposition  Final diagnoses:   Dyspnea   ICD (implantable cardioverter-defibrillator) in place     Time reflects when diagnosis was documented in both MDM as applicable and the Disposition within this note       Time User Action Codes Description Comment    6/2/2024  1:14 PM Tello Marie Add [R06.00] Dyspnea     6/2/2024  1:14 PM Tello Marie Add [Z95.810] ICD (implantable cardioverter-defibrillator) in place           ED Disposition       ED Disposition   Admit    Condition   Stable    Date/Time   Sun Jun 2, 2024 1314    Comment   Case was discussed with SOD and the patient's admission status was agreed to be Admission Status: observation status to the service of Dr. Fonseca.               Follow-up Information    None         Patient's Medications   Discharge Prescriptions    No medications on file     No discharge procedures on file.    PDMP Review         Value Time User    PDMP Reviewed  Yes 5/22/2024 11:52 AM Jayshree Schuster PA-C             ED Provider  Attending physically available and evaluated Nadir Myers. I managed the patient along with the ED Attending.    Electronically Signed by           Tello Marie DO  06/02/24 0088

## 2024-06-02 NOTE — PROGRESS NOTES
INTERNAL MEDICINE RESIDENCY SENIOR ADMISSION NOTE     Name: Nadir Myers   Age & Sex: 64 y.o. male   MRN: [id number]  Unit/Bed#: CW2 214-01   Encounter: [id number]  Primary Care Provider: Beau Lemus MD    Admit to team: SOD Team C     Patient seen and examined. Reviewed H&P per Dr. Still. Agree with the assessment and plan with any exception/addition as noted below:    Active Problems:    Essential (primary) hypertension    Type 2 diabetes mellitus, with long-term current use of insulin ([facility])    A-fib ([facility])    Chronic systolic CHF (congestive heart failure) ([facility])    3-vessel CAD    PAD (peripheral artery disease) ([facility])    ICD (implantable cardioverter-defibrillator) in place    #. Acute shortness of breath without hypoxia  Ddx remains broad, but most likely either GI or cardiac. Suspect most likely short of breath secondary to aspirated vomitus, less likely acute CHF due to acuity of symptoms.    Will double home diuresis of torsemide x1 dose now (20 mg x1) - no gut edema to warrant IV   Scheduled home torsemide starting tomorrow (10 mg po qd)  Monitor for hypoxia, increased WOB, tachycardia, or hypotension   Monitor on tele given ischemic/VT hx in absence of device check  Repeat echo now to r/o any interval changes/ acute decompensation   Otherwise, supportive care for aspiration    #. Emesis, ?bilious  Patient is red-green colorblind but believes vomited contents were green.  Possibly 2/2 GERD as occurred in middle of night and awoke patient; possible LPR  Hb at baseline 10-11 range   MCV 91 - will hold off on iron deficiency workup in absence of symptomatic anemia (I.e, longstanding fatigue and dyspnea)  QtC OK - 470 ms    Plan:  Monitor for signs of nausea or vomiting; will not order any standing PRNs for now as patient asx  Trend daily Hb - no signs of active bleed    #. HFrEF, ischemic, status post AICD - most likely stable  Last echo Feb 2024 LVEF 45%, with akinetic apical segments.  Mid inferior  segment was hypokinetic.  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     EKG on admission showed NSR, left axis, RBBB, unchanged from prior.  QTc 470 ms.  No acute drops in hemoglobin or elevated troponins to suggest demand-related ischemia    Acute /sudden shortness of breath is concerning regarding aneurysmal apical septal segment, despite hemodynamic stability     Plan:  Continue home GDMT: Metoprolol succinate 50 mg twice daily, amiodarone 200 mg, atorvastatin 80 mg p.o. daily, Jardiance 10 mg p.o. daily, hydralazine 25 mg every 8 hours, Isordil 20 mg 3 times daily; also takes torsemide 10 mg p.o. daily    Plan:  Repeat echo now to assess for acute changes and cardiac function/output;   Will defer to day team with regards to a cardiology consult  Monitor on telemetry for signs of recurrent or new arrhythmias while awaiting AICD interrogation from [place]  Maintain lytes Goal Mg>2.0, K>4.0.  Check BNP to r/o acute CHF if <100    #. Hypertension  Patient missed all of his medications from earlier today.  Suspect that his uncontrolled BP with SBP in the 170s is secondary to missed medications  Resume home BP meds as above: Hydralazine, Isordil, torsemide    If BP remains uncontrolled despite restarting these home medicines, will consider starting another agent for improved control    #. History of AICD with complications  Patient had AICD placed for V. Tach in 2023, but required explant in February 2024 due to bacteremia from toe with osteomyelitis.  February AICD that was placed after bacteremia clearance was complicated by lead dislodgment/poor sensing.  Patient had most recent AICD placed on 5/28/2024.    Does not meet SIRS or sepsis criteria to warrant abx     Plan:  Monitor temp, daily CBC    #. History of Vtach, s/p AICD  Continue home amiodarone, metoprolol     #. History of A-fib  On DAPT but not on AC  Last note that mentions afib was 5/2023 w/Dr. Chavez, who noted that  patient was in A-fib with RVR and new right bundle at time of outpatient visit, which prompted him to go to the ED for evaluation for late presenting MI.  Chads2vasc=3 (CHF, PAD, DM2)    No mention of A-fib since this outpatient visit; will hold off on anticoagulation for now as patient has been seen multiple times by cardiology this year without mention of initiation of AC    Plan:  Outpatient cardiology follow-up    #.  Type 2 diabetes  Lab Results   Component Value Date    HGBA1C 7.1 (H) 05/14/2024   At home, patient is on detemir 30 units daily, metformin 1000 mg daily, Jardiance 10 mg p.o. daily    Hold home metformin 1000 mg daily while inpatient    Plan:  Carb controlled, cardiac, 2 g salt restricted diet while inpatient  Restart patient's home long-acting insulin equivalent with glargine 23u; will include is now patient's blood sugars  3 times daily AC and nightly Accu-Cheks  Target inpatient blood glucose 140-180      -------------------------------  HPI  Patient is a 64-year-old male past medical history of ischemic/triple-vessel heart disease, HFrEF 45%, Vtach (scar-related) s/p AICD, PAD, former smoker quit 1/2024, DM 2, A-fib not on anticoagulation, who presents to the ED with less than 24 hours of symptoms of acute shortness of breath and one-time episode of vomiting.    Patient states that he was sleeping last night when he was suddenly awakened with a very strong urge to vomit, after which he vomited what he believes to be green content.  He denied vomiting any blood.  However, patient reports that he is red-green color blind.  Patient's wife was asleep during this time and was unable to witness the event.    Patient states that he does occasionally have reflux, but it is not significant enough to the point of needing scope/GI evaluation.  At home, he is not on a PPI or antihistamine.  However, he is on both aspirin and Plavix.  Patient did not take any of his home meds this morning.    Patient has  not had any recurrent episodes of nausea or vomiting after this episode.  He does not have any history of sleep apnea.  He states that he is a light snorer and feels refreshed after a full night of sleep.  He does not feel drowsy during the day.    Patient states that new-onset shortness of breath developed after this episode of vomiting.  He states that shortness of breath been persistent with laying down and is not worse with exertion.  He denies any chest pain, palpitations.  Denies any upper/main airway symptoms or sensation of wheezing.  Patient states that he has never had any symptoms like this before.    Patient's wife was made aware of the incident when she came to the living room to find the patient sitting up on the couch.  At this time, the patient told his wife that he was unable to lay flat as he felt uncomfortable and short of breath.  Due to the novelty of the symptoms, the patient's wife urged him to come to the ER.    Patient was recommended to be admitted from ED due to his complex past medical history.  Although his telemetry showed no events in the ED, we will continue him on telemetry upon medical floors for observation due to his history of V. tach.  Patient denies having any recent shocks.  [employer] representative was called to, interrogate patient's pacemaker as the device reader in the ED is not compatible with patient's model.  EKG showed normal sinus rhythm and troponins were negative.  Chest x-ray showed mild venous congestion but was otherwise negative.    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  However, he does not have any history of COPD or emphysema.  He is a former smoker who quit in January 2024, but smoked for many years.  ---    We will admit patient for monitoring of acute arrhythmias that may have triggered patient's emetic episode and perhaps triggered the beginning of a heart failure exacerbation.  He appears to  have crackles on exam, which may be explained by either the beginnings of heart failure exacerbation or possible aspiration of vomited contents.  Will also trend hemoglobin in the morning to ensure that patient did not have hematemesis.    Confirmed full code.      Objective    BP (!) 173/91   Pulse 67   Temp 97.9 °F (36.6 °C)   Resp 22   SpO2 97%   Physical Exam  Vitals reviewed.   Constitutional:       General: He is not in acute distress.  HENT:      Head: Normocephalic and atraumatic.      Mouth/Throat:      Mouth: Mucous membranes are moist.      Pharynx: Oropharynx is clear.   Eyes:      General: No scleral icterus.     Extraocular Movements: Extraocular movements intact.   Cardiovascular:      Rate and Rhythm: Normal rate and regular rhythm.      Heart sounds: No murmur heard.     No friction rub. No gallop.      Comments: No JVD at 45 degrees  Pulmonary:      Effort: Pulmonary effort is normal. No respiratory distress.      Breath sounds: No stridor. Rales (bibasilar crackles) present. No wheezing.      Comments: Speaking in full sentences, no accessory muscle use, no tachypnea  Abdominal:      General: Bowel sounds are normal. There is no distension.      Palpations: There is no mass.      Tenderness: There is no abdominal tenderness. There is no guarding.   Musculoskeletal:         General: Normal range of motion.      Right lower leg: No edema.      Left lower leg: No edema.   Skin:     General: Skin is warm and dry.      Findings: No rash.   Neurological:      Mental Status: He is alert.      Sensory: No sensory deficit.   Psychiatric:         Mood and Affect: Mood normal.         Behavior: Behavior normal.         Thought Content: Thought content normal.           Code Status: Level 1 - Full Code  Admission Status: OBSERVATION  Disposition: Patient requires Med/Surg with Telemetry  Expected Length of Stay: <2 midnights        Emilee Quintanilla MD   PGY-3 Internal Medicine  [place] Resident

## 2024-06-02 NOTE — ASSESSMENT & PLAN NOTE
Patient had AICD placed for V. Tach in 2023, but required explant in February 2024 due to bacteremia from toe with osteomyelitis.  February AICD that was placed after bacteremia clearance was complicated by lead dislodgment/poor sensing.  Patient had most recent AICD placed on 5/28/2024.    -Interrogate device  -Monitor on telemetry

## 2024-06-02 NOTE — H&P
INTERNAL MEDICINE RESIDENCY ADMISSION H&P     Name: Nadir Myers   Age & Sex: 64 y.o. male   MRN: [id number]  Unit/Bed#: CW2 214-01   Encounter: [id number]  Primary Care Provider: Beau Lemus MD    Code Status: Level 1 - Full Code  Admission Status: OBSERVATION  Disposition: Patient requires Med/Surg with Telemetry    Admit to team: SOD Team C     ASSESSMENT/PLAN     Principal Problem:    Chronic systolic CHF (congestive heart failure) ([facility])  Active Problems:    Essential (primary) hypertension    Type 2 diabetes mellitus, with long-term current use of insulin ([facility])    A-fib ([facility])    3-vessel CAD    PAD (peripheral artery disease) ([facility])    ICD (implantable cardioverter-defibrillator) in place      * Chronic systolic CHF (congestive heart failure) ([facility])  Assessment & Plan  Wt Readings from Last 3 Encounters:   05/28/24 110 kg (242 lb 12.8 oz)   05/21/24 108 kg (238 lb)   04/18/24 107 kg (234 lb 12.8 oz)     Last echo Feb 2024 LVEF 45%, with akinetic apical segments.  Mid inferior segment was hypokinetic.  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      EKG on admission showed NSR, left axis, RBBB, unchanged from prior.  QTc 470 ms.  No acute drops in hemoglobin or elevated troponins to suggest demand-related ischemia       CXR on admission revealed questionable vascular congestion     Will double home diuresis of torsemide x1 dose now (20 mg x1)   Continue home GDMT: Metoprolol succinate 50 mg twice daily, amiodarone 200 mg, atorvastatin 80 mg p.o. daily, Jardiance 10 mg p.o. daily, hydralazine 25 mg every 8 hours, Isordil 20 mg 3 times daily; also takes torsemide 10 mg p.o. daily  Scheduled home torsemide starting tomorrow (10 mg po qd)  Monitor for hypoxia, increased WOB, tachycardia, or hypotension   Monitor on tele given ischemic/VT hx in absence of device check  Repeat echo now to r/o any interval changes/ acute decompensation  Check BNP         ICD (implantable  cardioverter-defibrillator) in place  Assessment & Plan  Patient had AICD placed for V. Tach in 2023, but required explant in February 2024 due to bacteremia from toe with osteomyelitis.  February AICD that was placed after bacteremia clearance was complicated by lead dislodgment/poor sensing.  Patient had most recent AICD placed on 5/28/2024.    -Interrogate device  -Monitor on telemetry       PAD (peripheral artery disease) ([facility])  Assessment & Plan  Continue home statin, Plavix, aspirin    3-vessel CAD  Assessment & Plan  History of coronary artery disease status post HANNA to left circumflex 2015, repeat cath 2023 with  of mid LAD and L PDA.  Ischemic cardiomyopathy  Continue aspirin/Plavix/statin/metoprolol    A-fib ([facility])  Assessment & Plan  Currently in normal sinus rhythm continue metoprolol and amiodarone    Type 2 diabetes mellitus, with long-term current use of insulin ([facility])  Assessment & Plan  Lab Results   Component Value Date    HGBA1C 7.1 (H) 05/14/2024       Recent Labs     06/02/24  1628   POCGLU 285*       Blood Sugar Average: Last 72 hrs:  (P) 285    Patient is on detemir 30 units daily, metformin 1000 mg daily, Jardiance 10 mg p.o. daily     Hold home metformin 1000 mg daily while inpatient     Plan:  Carb controlled, cardiac, 2 g salt restricted diet while inpatient  Restart patient's home long-acting insulin equivalent with glargine 23u; will include is now patient's blood sugars  3 times daily AC and nightly Accu-Cheks  Target inpatient blood glucose 140-180    Essential (primary) hypertension  Assessment & Plan  Continue home antihypertensives        VTE Pharmacologic Prophylaxis: Enoxaparin (Lovenox)  VTE Mechanical Prophylaxis: sequential compression device    CHIEF COMPLAINT     Chief Complaint   Patient presents with    Shortness of Breath    Vomiting     Pt has c/o SOB since this AM.  Pt had vomiting since last night.  Pt had a defibrillator placed last Tuesday       HISTORY OF PRESENT  ILLNESS     This is a 64-year-old male past medical history of ischemic/triple-vessel heart disease, HFrEF 45%, Vtach s/p AICD, PAD, former smoker quit 2024, DM 2, A-fib not on anticoagulation who presented to the ED after having a sudden onset nausea at around 3 AM last night.  He awakened and vomited around 8 times.  Patient reports its nonbloody nonbilious emesis.  He reports that after vomiting went back to bed and could not lie flat due to him having shortness of breath.  He reports leaning forwards helped his shortness of breath.  Due to shortness of breath not resolving contributing to go to the emergency room.  He did not take his torsemide today reports taking it yesterday. He denies any sick contacts, recent antibiotic use, recent travel, chest pain, shortness of breath at this time, nausea, vomiting constipation and diarrhea.  He will be admitted under [place] under Dr. Fonseca to observation.     REVIEW OF SYSTEMS     Review of Systems   Constitutional:  Negative for chills and fever.   HENT:  Negative for ear pain and sore throat.    Eyes:  Negative for pain and visual disturbance.   Respiratory:  Negative for cough and shortness of breath.    Cardiovascular:  Negative for chest pain and palpitations.   Gastrointestinal:  Negative for abdominal pain and vomiting.   Genitourinary:  Negative for dysuria and hematuria.   Musculoskeletal:  Negative for arthralgias and back pain.   Skin:  Negative for color change and rash.   Neurological:  Negative for seizures and syncope.   All other systems reviewed and are negative.    OBJECTIVE     Vitals:    24 0852 24 0855 24 1409   BP: (!) 148/103  (!) 173/91   BP Location: Left arm     Pulse: 81  67   Resp: 22     Temp:  97.9 °F (36.6 °C) 97.9 °F (36.6 °C)   TempSrc:  Temporal    SpO2: 93%  97%      Temperature:   Temp (24hrs), Av.9 °F (36.6 °C), Min:97.9 °F (36.6 °C), Max:97.9 °F (36.6 °C)    Temperature: 97.9 °F (36.6 °C)  Intake &  Output:  I/O       None          Weights:        There is no height or weight on file to calculate BMI.  Weight (last 2 days)       None          Physical Exam  Vitals and nursing note reviewed.   Constitutional:       General: He is not in acute distress.     Appearance: He is well-developed.   HENT:      Head: Normocephalic and atraumatic.   Eyes:      Conjunctiva/sclera: Conjunctivae normal.   Cardiovascular:      Rate and Rhythm: Normal rate and regular rhythm.      Heart sounds: No murmur heard.  Pulmonary:      Effort: Pulmonary effort is normal. No respiratory distress.      Breath sounds: Normal breath sounds.      Comments: B/L Basillar Crackles  Abdominal:      Palpations: Abdomen is soft.      Tenderness: There is no abdominal tenderness.   Musculoskeletal:         General: No swelling or tenderness.      Cervical back: Neck supple.   Skin:     General: Skin is warm and dry.      Capillary Refill: Capillary refill takes less than 2 seconds.   Neurological:      General: No focal deficit present.      Mental Status: He is alert.   Psychiatric:         Mood and Affect: Mood normal.       PAST MEDICAL HISTORY     Past Medical History:   Diagnosis Date    Diabetes mellitus (HCC)     Myocardial infarction (HCC)      PAST SURGICAL HISTORY     Past Surgical History:   Procedure Laterality Date    CARDIAC CATHETERIZATION N/A 05/03/2023    Procedure: Cardiac Coronary Angiogram;  Surgeon: Valeriy Shore MD;  Location: [facility];  Service: Cardiology    CARDIAC CATHETERIZATION Left 05/03/2023    Procedure: Cardiac Left Heart Cath;  Surgeon: Valeriy Shore MD;  [facility]: [facility];  Service: Cardiology    CARDIAC DEFIBRILLATOR PLACEMENT  05/2023    CARDIAC ELECTROPHYSIOLOGY PROCEDURE N/A 05/12/2023    Procedure: Cardiac icd implant;  Surgeon: Urbano Maria MD;  [facility]: [facility];  Service: Cardiology    CARDIAC ELECTROPHYSIOLOGY PROCEDURE N/A 2/20/2024    Procedure: EV ICD  IMPLANTATION;  Surgeon: Arturo Wooten DO;  Location: [facility];  Service: Cardiology    CARDIAC ELECTROPHYSIOLOGY PROCEDURE N/A 2/8/2024    Procedure: Cardiac laser lead extraction;  Surgeon: Arturo Wooten DO;  [facility];  Service: Cardiology    CARDIAC ELECTROPHYSIOLOGY PROCEDURE N/A 5/21/2024    Procedure: Cardiac icd implant subq;  Surgeon: Urbano Maria MD;  [facility];  Service: Cardiology    IR LOWER EXTREMITY ANGIOGRAM  1/18/2024    OK RMVL TRANSVNS PM ELTRD DUAL LEAD SYS N/A 2/20/2024    Procedure: EV ICD IMPLANTATION;  Surgeon: Abhilash Ferrera MD;  Location: [facility];  Service: Cardiac Surgery    OK RMVL TRANSVNS PM ELTRD DUAL LEAD SYS N/A 2/21/2024    Procedure: REMOVAL OF LEAD AND GENERATOR AND ATTEMPTED LEAD REVISION;  Surgeon: Abhilash Ferrera MD;  Location[facility];  Service: Cardiac Surgery    WOUND DEBRIDEMENT Left 2/4/2024    Procedure: LEFT FOURTH TOE AMPUTATION SURGICAL WOUND DEBRIDEMENT FOOT/TOE (WASH OUT);  Surgeon: Bebo Hopper DPM;  Location: [facility];  Service: Podiatry     SOCIAL & FAMILY HISTORY     Social History     Substance and Sexual Activity   Alcohol Use Not Currently       Social History     Substance and Sexual Activity   Drug Use Never     Social History     Tobacco Use   Smoking Status Former    Types: Cigarettes    Start date: 1/30/2024   Smokeless Tobacco Never   Tobacco Comments    X2 cigarettes/day     History reviewed. No pertinent family history.  LABORATORY DATA     Labs: I have personally reviewed pertinent reports.    Results from last 7 days   Lab Units 06/02/24  0929   WBC Thousand/uL 11.70*   HEMOGLOBIN g/dL 11.0*   HEMATOCRIT % 34.3*   PLATELETS Thousands/uL 247   SEGS PCT % 83*   MONO PCT % 4   EOS PCT % 2      Results from last 7 days   Lab Units 06/02/24  0929   POTASSIUM mmol/L 4.3   CHLORIDE mmol/L 107   CO2 mmol/L 24   BUN mg/dL 42*   CREATININE mg/dL 1.27   CALCIUM mg/dL 8.7   ALK PHOS U/L 53   ALT U/L 13   AST  U/L 15     Results from last 7 days   Lab Units 06/02/24  0929   MAGNESIUM mg/dL 2.0                      Micro:  Lab Results   Component Value Date    BLOODCX No Growth After 5 Days. 04/10/2024    BLOODCX No Growth After 5 Days. 04/10/2024    BLOODCX No Growth After 5 Days. 02/16/2024    BLOODCX No Growth After 5 Days. 02/16/2024    WOUNDCULT 3+ Growth of Staphylococcus aureus (A) 01/31/2024    WOUNDCULT 2+ Growth of Staphylococcus aureus (A) 01/31/2024    WOUNDCULT 3+ Growth of Staphylococcus aureus (A) 01/15/2024    WOUNDCULT 2+ Growth of 01/15/2024     IMAGING & DIAGNOSTIC TESTS     Imaging: I have personally reviewed pertinent reports.    XR chest 1 view portable    Result Date: 6/2/2024  Impression: Question mild pulmonary venous congestion. Workstation performed: [id number]     EKG, Pathology, and Other Studies: I have personally reviewed pertinent reports.     ALLERGIES     Allergies   Allergen Reactions    Vancomycin Rash     NOT AN ALLERGY - 1/31/24 patient experienced vancomycin infusion reaction, please extend duration of infusion time to prevent future infusion reactions     MEDICATIONS PRIOR TO ARRIVAL     Prior to Admission medications    Medication Sig Start Date End Date Taking? Authorizing Provider   amiodarone 200 mg tablet TAKE 1 TABLET BY MOUTH DAILY WITH BREAKFAST. 4/5/24  Yes Nash Samuels DO   aspirin 81 mg chewable tablet Chew 1 tablet (81 mg total) daily Do not start before March 2, 2024. 3/2/24  Yes Gilbert Quigley MD   atorvastatin (LIPITOR) 80 mg tablet TAKE 1 TABLET (80 MG TOTAL) BY MOUTH DAILY AFTER DINNER 2/5/24  Yes Nash Samuels DO   clopidogrel (PLAVIX) 75 mg tablet TAKE 1 TABLET BY MOUTH EVERY DAY 4/4/24  Yes Nash Samuels DO   Empagliflozin (Jardiance) 10 MG TABS tablet Take 1 tablet (10 mg total) by mouth every morning 4/11/24  Yes Nash Samuels DO   hydrALAZINE (APRESOLINE) 25 mg tablet TAKE 2 TABLETS (50 MG TOTAL) BY MOUTH EVERY 8 (EIGHT) HOURS 3/20/24  Yes Nash Samuels DO    insulin detemir (Levemir FlexPen) 100 Units/mL injection pen Inject 30 Units under the skin daily Patient to take 30 units daily - if fasting bs >150 for 3 days to increase dose by 5 units. Once bs < 150 can go back to 30 units 5/30/24  Yes Beau Lemus MD   isosorbide dinitrate (ISORDIL) 20 mg tablet TAKE 0.5 TABLETS (10 MG TOTAL) BY MOUTH 3 (THREE) TIMES DAILY AFTER MEALS 5/7/24  Yes CARLA Leahy   metoprolol succinate (TOPROL-XL) 50 mg 24 hr tablet TAKE 1 TABLET BY MOUTH EVERY 12 HOURS. 4/29/24  Yes Nash Samuels DO   torsemide (DEMADEX) 20 mg tablet TAKE 1/2 TABLET BY MOUTH DAILY 5/26/24  Yes CARLA Leahy   BD Pen Needle Micro U/F 32G X 6 MM MISC USE 1 PEN EACH DAILY 5/23/23   Historical Provider, MD   JANUMET -1000 MG TB24 daily  6/11/18   Historical Provider, MD   metFORMIN (GLUCOPHAGE) 1000 MG tablet Take 1 tablet (1,000 mg total) by mouth daily with breakfast 5/30/24   Beau Lemus MD   oxyCODONE-acetaminophen (Percocet) 5-325 mg per tablet Take 1 tablet by mouth every 6 (six) hours as needed for moderate pain for up to 10 days Max Daily Amount: 4 tablets  Patient not taking: Reported on 5/28/2024 5/22/24 6/1/24  Jayshree Schuster PA-C   glimepiride (AMARYL) 4 mg tablet Take 1 tablet by mouth daily NOT TAKING PER PCP  Patient not taking: Reported on 5/22/2023 2/27/15 6/2/23  Historical Provider, MD     MEDICATIONS ADMINISTERED IN LAST 24 HOURS     Medication Administration - last 24 hours from 06/01/2024 1710 to 06/02/2024 1710         Date/Time Order Dose Route Action Action by     06/02/2024 1651 EDT enoxaparin (LOVENOX) subcutaneous injection 40 mg 40 mg Subcutaneous Not Given Dayanara Floyd RN     06/02/2024 1652 EDT insulin lispro (HumALOG/ADMELOG) 100 units/mL subcutaneous injection 1-6 Units 4 Units Subcutaneous Given Dayanara Floyd RN     06/02/2024 1449 EDT aspirin chewable tablet 81 mg 81 mg Oral Given Dayanara Floyd RN     06/02/2024 1652 EDT atorvastatin (LIPITOR)  "tablet 80 mg 80 mg Oral Given Dayanara Floyd RN     06/02/2024 1450 EDT clopidogrel (PLAVIX) tablet 75 mg 75 mg Oral Not Given Dayanara Floyd RN     06/02/2024 1450 EDT hydrALAZINE (APRESOLINE) tablet 50 mg 50 mg Oral Given Dayanara Floyd RN     06/02/2024 1652 EDT isosorbide dinitrate (ISORDIL) tablet 10 mg 10 mg Oral Given Dayanara Floyd RN     06/02/2024 1450 EDT torsemide (DEMADEX) tablet 20 mg 20 mg Oral Given Dayanara Floyd RN          CURRENT MEDICATIONS     Current Facility-Administered Medications   Medication Dose Route Frequency Provider Last Rate    [START ON 6/3/2024] amiodarone  200 mg Oral Daily With Breakfast Aguiar Ragab, DO      aspirin  81 mg Oral Daily Aguiar Ragab, DO      atorvastatin  80 mg Oral After Dinner Aguiar Ragab, DO      clopidogrel  75 mg Oral Daily Aguiar Ragab, DO      [START ON 6/3/2024] Empagliflozin  10 mg Oral QAM Aguiar Ragab, DO      enoxaparin  40 mg Subcutaneous Daily Aguiar Ragab, DO      hydrALAZINE  50 mg Oral Q8H TEJAS Aguiar Ragab, DO      insulin glargine  23 Units Subcutaneous HS Aguiar Ragab, DO      insulin lispro  1-6 Units Subcutaneous 4x Daily (PC & HS) Aguiar Ragab, DO      isosorbide dinitrate  10 mg Oral TID after meals Aguiar Ragab, DO      metoprolol succinate  50 mg Oral Q12H Aguiar Ragab, DO      [START ON 6/3/2024] torsemide  10 mg Oral Daily [facility] Ragab, DO               Admission Time  I spent 45 minutes admitting the patient.  This involved direct patient contact where I performed a full history and physical, reviewing previous records, and reviewing laboratory and other diagnostic studies.    Portions of the record may have been created with voice recognition software.  Occasional wrong word or \"sound a like\" substitutions may have occurred due to the inherent limitations of voice recognition software.  Read the chart carefully and recognize, using context, where substitutions have occurred.    ==  Aguiar Ragab, DO  [facility] " [facility]  Internal Medicine Residency PGY-1

## 2024-06-02 NOTE — ASSESSMENT & PLAN NOTE
Lab Results   Component Value Date    HGBA1C 7.1 (H) 05/14/2024       Recent Labs     06/02/24  1628   POCGLU 285*       Blood Sugar Average: Last 72 hrs:  (P) 285    Patient is on detemir 30 units daily, metformin 1000 mg daily, Jardiance 10 mg p.o. daily     Hold home metformin 1000 mg daily while inpatient     Plan:  Carb controlled, cardiac, 2 g salt restricted diet while inpatient  Restart patient's home long-acting insulin equivalent with glargine 23u; will include is now patient's blood sugars  3 times daily AC and nightly Accu-Cheks  Target inpatient blood glucose 140-180

## 2024-06-02 NOTE — ASSESSMENT & PLAN NOTE
Wt Readings from Last 3 Encounters:   05/28/24 110 kg (242 lb 12.8 oz)   05/21/24 108 kg (238 lb)   04/18/24 107 kg (234 lb 12.8 oz)     Last echo Feb 2024 LVEF 45%, with akinetic apical segments.  Mid inferior segment was hypokinetic.  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      EKG on admission showed NSR, left axis, RBBB, unchanged from prior.  QTc 470 ms.  No acute drops in hemoglobin or elevated troponins to suggest demand-related ischemia       CXR on admission revealed questionable vascular congestion  BNP 1200  Echo pending      Continue home GDMT: Metoprolol succinate 50 mg twice daily, amiodarone 200 mg, atorvastatin 80 mg p.o. daily, Jardiance 10 mg p.o. daily, hydralazine 25 mg every 8 hours, Isordil 20 mg 3 times daily; also takes torsemide 10 mg p.o. daily  Scheduled home torsemide 6/3 (10 mg po qd)  Monitor for hypoxia, increased WOB, tachycardia, or hypotension   Monitor on tele given ischemic/VT hx in absence of device check  Repeat echo now to r/o any interval changes/ acute decompensation  Pt left AMA

## 2024-06-02 NOTE — ED NOTES
Tried to interrogate pacemaker twice with [employer]. Called [employer] at [phone] and spoke to Yolie. She reports the patient's ICD is not compatible with our reader and they have to have a tech come out to interrogate. They will call us with a time the rep will come out.      Savana Estevez RN  06/02/24 [id number]

## 2024-06-03 ENCOUNTER — APPOINTMENT (OUTPATIENT)
Dept: NON INVASIVE DIAGNOSTICS | Facility: HOSPITAL | Age: 64
End: 2024-06-03
Payer: COMMERCIAL

## 2024-06-03 ENCOUNTER — TELEPHONE (OUTPATIENT)
Dept: CARDIOLOGY CLINIC | Facility: CLINIC | Age: 64
End: 2024-06-03

## 2024-06-03 VITALS
HEART RATE: 68 BPM | RESPIRATION RATE: 18 BRPM | HEIGHT: 72 IN | TEMPERATURE: 97.7 F | WEIGHT: 242 LBS | DIASTOLIC BLOOD PRESSURE: 81 MMHG | OXYGEN SATURATION: 96 % | BODY MASS INDEX: 32.78 KG/M2 | SYSTOLIC BLOOD PRESSURE: 142 MMHG

## 2024-06-03 LAB
ANION GAP SERPL CALCULATED.3IONS-SCNC: 8 MMOL/L (ref 4–13)
APICAL FOUR CHAMBER EJECTION FRACTION: 33 %
BNP SERPL-MCNC: 1250 PG/ML (ref 0–100)
BSA FOR ECHO PROCEDURE: 2.31 M2
BUN SERPL-MCNC: 36 MG/DL (ref 5–25)
CALCIUM SERPL-MCNC: 8.6 MG/DL (ref 8.4–10.2)
CHLORIDE SERPL-SCNC: 106 MMOL/L (ref 96–108)
CO2 SERPL-SCNC: 26 MMOL/L (ref 21–32)
CREAT SERPL-MCNC: 1.28 MG/DL (ref 0.6–1.3)
E WAVE DECELERATION TIME: 227 MS
E/A RATIO: 1.27
ERYTHROCYTE [DISTWIDTH] IN BLOOD BY AUTOMATED COUNT: 13.7 % (ref 11.6–15.1)
GFR SERPL CREATININE-BSD FRML MDRD: 59 ML/MIN/1.73SQ M
GLUCOSE SERPL-MCNC: 128 MG/DL (ref 65–140)
GLUCOSE SERPL-MCNC: 227 MG/DL (ref 65–140)
GLUCOSE SERPL-MCNC: 266 MG/DL (ref 65–140)
HCT VFR BLD AUTO: 34.3 % (ref 36.5–49.3)
HGB BLD-MCNC: 11 G/DL (ref 12–17)
MCH RBC QN AUTO: 28.9 PG (ref 26.8–34.3)
MCHC RBC AUTO-ENTMCNC: 32.1 G/DL (ref 31.4–37.4)
MCV RBC AUTO: 90 FL (ref 82–98)
MV E'TISSUE VEL-SEP: 5 CM/S
MV PEAK A VEL: 0.84 M/S
MV PEAK E VEL: 107 CM/S
MV STENOSIS PRESSURE HALF TIME: 66 MS
MV VALVE AREA P 1/2 METHOD: 3.33
PLATELET # BLD AUTO: 239 THOUSANDS/UL (ref 149–390)
PMV BLD AUTO: 9.9 FL (ref 8.9–12.7)
POTASSIUM SERPL-SCNC: 4 MMOL/L (ref 3.5–5.3)
RBC # BLD AUTO: 3.81 MILLION/UL (ref 3.88–5.62)
SL CV LV EF: 40
SODIUM SERPL-SCNC: 140 MMOL/L (ref 135–147)
TRICUSPID ANNULAR PLANE SYSTOLIC EXCURSION: 2.1 CM
WBC # BLD AUTO: 10.82 THOUSAND/UL (ref 4.31–10.16)

## 2024-06-03 PROCEDURE — 93321 DOPPLER ECHO F-UP/LMTD STD: CPT

## 2024-06-03 PROCEDURE — 93325 DOPPLER ECHO COLOR FLOW MAPG: CPT | Performed by: INTERNAL MEDICINE

## 2024-06-03 PROCEDURE — 83880 ASSAY OF NATRIURETIC PEPTIDE: CPT

## 2024-06-03 PROCEDURE — 93308 TTE F-UP OR LMTD: CPT

## 2024-06-03 PROCEDURE — 82948 REAGENT STRIP/BLOOD GLUCOSE: CPT

## 2024-06-03 PROCEDURE — 93308 TTE F-UP OR LMTD: CPT | Performed by: INTERNAL MEDICINE

## 2024-06-03 PROCEDURE — 99239 HOSP IP/OBS DSCHRG MGMT >30: CPT | Performed by: INTERNAL MEDICINE

## 2024-06-03 PROCEDURE — 93321 DOPPLER ECHO F-UP/LMTD STD: CPT | Performed by: INTERNAL MEDICINE

## 2024-06-03 PROCEDURE — 93325 DOPPLER ECHO COLOR FLOW MAPG: CPT

## 2024-06-03 PROCEDURE — 85027 COMPLETE CBC AUTOMATED: CPT

## 2024-06-03 PROCEDURE — 80048 BASIC METABOLIC PNL TOTAL CA: CPT

## 2024-06-03 RX ORDER — TORSEMIDE 10 MG/1
10 TABLET ORAL ONCE
Status: COMPLETED | OUTPATIENT
Start: 2024-06-03 | End: 2024-06-03

## 2024-06-03 RX ADMIN — INSULIN LISPRO 2 UNITS: 100 INJECTION, SOLUTION INTRAVENOUS; SUBCUTANEOUS at 12:51

## 2024-06-03 RX ADMIN — METOPROLOL SUCCINATE 50 MG: 50 TABLET, EXTENDED RELEASE ORAL at 08:58

## 2024-06-03 RX ADMIN — ENOXAPARIN SODIUM 40 MG: 40 INJECTION SUBCUTANEOUS at 08:59

## 2024-06-03 RX ADMIN — INSULIN LISPRO 3 UNITS: 100 INJECTION, SOLUTION INTRAVENOUS; SUBCUTANEOUS at 09:05

## 2024-06-03 RX ADMIN — AMIODARONE HYDROCHLORIDE 200 MG: 200 TABLET ORAL at 08:58

## 2024-06-03 RX ADMIN — ISOSORBIDE DINITRATE 10 MG: 10 TABLET ORAL at 08:58

## 2024-06-03 RX ADMIN — PERFLUTREN 0.8 ML/MIN: 6.52 INJECTION, SUSPENSION INTRAVENOUS at 11:50

## 2024-06-03 RX ADMIN — EMPAGLIFLOZIN 10 MG: 10 TABLET, FILM COATED ORAL at 08:59

## 2024-06-03 RX ADMIN — ASPIRIN 81 MG CHEWABLE TABLET 81 MG: 81 TABLET CHEWABLE at 08:58

## 2024-06-03 RX ADMIN — HYDRALAZINE HYDROCHLORIDE 50 MG: 50 TABLET ORAL at 05:17

## 2024-06-03 RX ADMIN — TORSEMIDE 10 MG: 10 TABLET ORAL at 12:51

## 2024-06-03 RX ADMIN — CLOPIDOGREL BISULFATE 75 MG: 75 TABLET ORAL at 08:58

## 2024-06-03 RX ADMIN — ISOSORBIDE DINITRATE 10 MG: 10 TABLET ORAL at 12:51

## 2024-06-03 RX ADMIN — TORSEMIDE 10 MG: 10 TABLET ORAL at 08:58

## 2024-06-03 NOTE — DISCHARGE SUMMARY
INTERNAL MEDICINE RESIDENCY DISCHARGE SUMMARY     Nadir Myers   64 y.o. male  MRN: [id number]  Room/Bed: 2 214/[facility] 214-01     [facility]   Encounter: [id number]    Principal Problem:    Chronic systolic CHF (congestive heart failure) ([facility])  Active Problems:    Essential (primary) hypertension    Type 2 diabetes mellitus, with long-term current use of insulin ([facility])    A-fib ([facility])    3-vessel CAD    PAD (peripheral artery disease) ([facility])    ICD (implantable cardioverter-defibrillator) in place      * Chronic systolic CHF (congestive heart failure) ([facility])  Assessment & Plan  Wt Readings from Last 3 Encounters:   05/28/24 110 kg (242 lb 12.8 oz)   05/21/24 108 kg (238 lb)   04/18/24 107 kg (234 lb 12.8 oz)     Last echo Feb 2024 LVEF 45%, with akinetic apical segments.  Mid inferior segment was hypokinetic.  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      EKG on admission showed NSR, left axis, RBBB, unchanged from prior.  QTc 470 ms.  No acute drops in hemoglobin or elevated troponins to suggest demand-related ischemia       CXR on admission revealed questionable vascular congestion  BNP 1200  Echo pending      Continue home GDMT: Metoprolol succinate 50 mg twice daily, amiodarone 200 mg, atorvastatin 80 mg p.o. daily, Jardiance 10 mg p.o. daily, hydralazine 25 mg every 8 hours, Isordil 20 mg 3 times daily; also takes torsemide 10 mg p.o. daily  Scheduled home torsemide 6/3 (10 mg po qd)  Monitor for hypoxia, increased WOB, tachycardia, or hypotension   Monitor on tele given ischemic/VT hx in absence of device check  Repeat echo now to r/o any interval changes/ acute decompensation  Pt left AMA           ICD (implantable cardioverter-defibrillator) in place  Assessment & Plan  Patient had AICD placed for V. Tach in 2023, but required explant in February 2024 due to bacteremia from toe with osteomyelitis.  February AICD that was placed after  bacteremia clearance was complicated by lead dislodgment/poor sensing.  Patient had most recent AICD placed on 5/28/2024.    -Interrogate device  -Monitor on telemetry       PAD (peripheral artery disease) ([facility])  Assessment & Plan  Continue home statin, Plavix, aspirin    3-vessel CAD  Assessment & Plan  History of coronary artery disease status post HANNA to left circumflex 2015, repeat cath 2023 with  of mid LAD and L PDA.  Ischemic cardiomyopathy  Continue aspirin/Plavix/statin/metoprolol    A-fib ([facility])  Assessment & Plan  Currently in normal sinus rhythm continue metoprolol and amiodarone    Type 2 diabetes mellitus, with long-term current use of insulin ([facility])  Assessment & Plan  Lab Results   Component Value Date    HGBA1C 7.1 (H) 05/14/2024       Recent Labs     06/02/24  1628   POCGLU 285*       Blood Sugar Average: Last 72 hrs:  (P) 285    Patient is on detemir 30 units daily, metformin 1000 mg daily, Jardiance 10 mg p.o. daily     Hold home metformin 1000 mg daily while inpatient     Plan:  Carb controlled, cardiac, 2 g salt restricted diet while inpatient  Restart patient's home long-acting insulin equivalent with glargine 23u; will include is now patient's blood sugars  3 times daily AC and nightly Accu-Cheks  Target inpatient blood glucose 140-180    Essential (primary) hypertension  Assessment & Plan  Continue home antihypertensives        DETAILS OF HOSPITAL COURSE     64-year-old male past medical history of ischemic/triple-vessel heart disease, HFrEF 45%, Vtach s/p AICD, PAD, former smoker quit 1/2024, DM 2, A-fib not on anticoagulation who presented to the ED after having a sudden onset nausea at around 3 AM last night.  He awakened and vomited around 8 times.  Patient reports its nonbloody nonbilious emesis.  He reports that after vomiting went back to bed and could not lie flat due to him having shortness of breath.  He reports leaning forwards helped his shortness of breath.  Due to shortness of  breath not resolving contributing to go to the emergency room.  He did not take his torsemide today reports taking it yesterday. He denies any sick contacts, recent antibiotic use, recent travel, chest pain, shortness of breath at this time, nausea, vomiting constipation and diarrhea.     On floor pt given extra dose of torsemide. CXR showed mild vascular congestion, no peripheral edema or JVD, pt no dyspneic on exertion. pt was put on telemetry with no events, ED tried to interrogate ICD twice our readers were not compatible with his device. [employer] was called and said they would send a rep. Echo done on 6/3. Pt has EP appointment Friday and was ok with having ICD interrogated then.  Pt verbalized that he did not want to wait until echo was read before leaving. Pt was not amenable to staying. Risks explained to patient including arrhythmias, respiratory distress, cardiac arrest, and death. Pt verbalized understanding and agreement with plan.  Pt signed out AMA   DISCHARGE INFORMATION     PCP at Discharge: Beau Lemus MD    Admitting Provider: Konstantin Fonseca MD  Admission Date: 6/2/2024    Discharge Provider: Bob Muniz*  Discharge Date: 6/3/2024    Discharge Disposition: Home/Self Care  Discharge Condition: stable  Discharge with Lines: no        Discharge Diagnoses:  Principal Problem:    Chronic systolic CHF (congestive heart failure) ([facility])  Active Problems:    Essential (primary) hypertension    Type 2 diabetes mellitus, with long-term current use of insulin ([facility])    A-fib ([facility])    3-vessel CAD    PAD (peripheral artery disease) ([facility])    ICD (implantable cardioverter-defibrillator) in place  Resolved Problems:    * No resolved hospital problems. *      Consulting Providers:      Diagnostic & Therapeutic Procedures Performed:  XR chest 1 view portable    Result Date: 6/2/2024  Impression: Question mild pulmonary venous congestion. Workstation performed: [id number]       Code Status:  Level 1 - Full Code  Advance Directive & Living Will: <no information>  Power of :    POLST:      Medications:  Current Discharge Medication List        STOP taking these medications       oxyCODONE-acetaminophen (Percocet) 5-325 mg per tablet Comments:   Reason for Stopping:             Current Discharge Medication List        Current Discharge Medication List        CONTINUE these medications which have NOT CHANGED    Details   amiodarone 200 mg tablet TAKE 1 TABLET BY MOUTH DAILY WITH BREAKFAST.  Qty: 90 tablet, Refills: 4    Associated Diagnoses: ICD (implantable cardioverter-defibrillator) in place      aspirin 81 mg chewable tablet Chew 1 tablet (81 mg total) daily Do not start before March 2, 2024.  Qty: 30 tablet, Refills: 0    Associated Diagnoses: PAD (peripheral artery disease) ([facility])      atorvastatin (LIPITOR) 80 mg tablet TAKE 1 TABLET (80 MG TOTAL) BY MOUTH DAILY AFTER DINNER  Qty: 90 tablet, Refills: 2    Associated Diagnoses: 3-vessel coronary artery disease      clopidogrel (PLAVIX) 75 mg tablet TAKE 1 TABLET BY MOUTH EVERY DAY  Qty: 90 tablet, Refills: 1    Associated Diagnoses: Foot ulcer ([facility])      Empagliflozin (Jardiance) 10 MG TABS tablet Take 1 tablet (10 mg total) by mouth every morning  Qty: 30 tablet, Refills: 11    Associated Diagnoses: ICD (implantable cardioverter-defibrillator) in place; Coronary artery disease involving native coronary artery of native heart without angina pectoris; Systolic congestive heart failure, unspecified HF chronicity ([facility])      hydrALAZINE (APRESOLINE) 25 mg tablet TAKE 2 TABLETS (50 MG TOTAL) BY MOUTH EVERY 8 (EIGHT) HOURS  Qty: 540 tablet, Refills: 1    Associated Diagnoses: Coronary artery disease involving native coronary artery of native heart without angina pectoris; ICD (implantable cardioverter-defibrillator) in place; Systolic congestive heart failure, unspecified HF chronicity ([facility])      insulin detemir (Levemir FlexPen) 100 Units/mL  injection pen Inject 30 Units under the skin daily Patient to take 30 units daily - if fasting bs >150 for 3 days to increase dose by 5 units. Once bs < 150 can go back to 30 units  Qty: 30 mL, Refills: 1    Associated Diagnoses: Type 2 diabetes mellitus with other specified complication, with long-term current use of insulin ([facility])      isosorbide dinitrate (ISORDIL) 20 mg tablet TAKE 0.5 TABLETS (10 MG TOTAL) BY MOUTH 3 (THREE) TIMES DAILY AFTER MEALS  Qty: 135 tablet, Refills: 1    Associated Diagnoses: ICD (implantable cardioverter-defibrillator) in place; Coronary artery disease involving native coronary artery of native heart without angina pectoris; Systolic congestive heart failure, unspecified HF chronicity ([facility])      metoprolol succinate (TOPROL-XL) 50 mg 24 hr tablet TAKE 1 TABLET BY MOUTH EVERY 12 HOURS.  Qty: 180 tablet, Refills: 1    Associated Diagnoses: ICD (implantable cardioverter-defibrillator) in place      torsemide (DEMADEX) 20 mg tablet TAKE 1/2 TABLET BY MOUTH DAILY  Qty: 45 tablet, Refills: 1    Associated Diagnoses: ICD (implantable cardioverter-defibrillator) in place; Coronary artery disease involving native coronary artery of native heart without angina pectoris; Systolic congestive heart failure, unspecified HF chronicity ([facility])      BD Pen Needle Micro U/F 32G X 6 MM MISC USE 1 PEN EACH DAILY      JANUMET -1000 MG TB24 daily       metFORMIN (GLUCOPHAGE) 1000 MG tablet Take 1 tablet (1,000 mg total) by mouth daily with breakfast  Qty: 90 tablet, Refills: 1    Associated Diagnoses: Type 2 diabetes mellitus with other specified complication, with long-term current use of insulin ([facility])             Allergies:  Allergies   Allergen Reactions    Vancomycin Rash     NOT AN ALLERGY - 1/31/24 patient experienced vancomycin infusion reaction, please extend duration of infusion time to prevent future infusion reactions       FOLLOW-UP     PCP Outpatient Follow-up:  yes        Follow up within  "1 week of discharge.     Consulting Providers Follow-up:  none required     Active Issues Requiring Follow-up:   none    Discharge Statement:   I spent 1 hour minutes discharging the patient. This time was spent on the day of discharge. I had direct contact with the patient on the day of discharge. Additional documentation is required if more than 30 minutes were spent on discharge.    Portions of the record may have been created with voice recognition software.  Occasional wrong word or \"sound a like\" substitutions may have occurred due to the inherent limitations of voice recognition software.  Read the chart carefully and recognize, using context, where substitutions have occurred.    ==  Stepan Carlos MD  [facility]  Internal Medicine Resident PGY-1       "

## 2024-06-03 NOTE — TELEPHONE ENCOUNTER
Spoke with pt. He is in the hospital presently. He did say he feels better and will be discharged today.  He sent lifevest back on 5/26/24. Still has ICD.    He will call the office with any Cardiac issues.

## 2024-06-03 NOTE — UTILIZATION REVIEW
Initial Clinical Review    Admission: Date/Time/Statement:   Admission Orders (From admission, onward)       Ordered        06/02/24 1315  Place in Observation  Once                          Orders Placed This Encounter   Procedures    Place in Observation     Standing Status:   Standing     Number of Occurrences:   1     Order Specific Question:   Level of Care     Answer:   Med Surg [16]     ED Arrival Information       Expected   -    Arrival   6/2/2024 08:50    Acuity   Emergent              Means of arrival   Walk-In    Escorted by   Family Member    Service   SOD-C Medicine    Admission type   Emergency              Arrival complaint   short of breath             Chief Complaint   Patient presents with    Shortness of Breath    Vomiting     Pt has c/o SOB since this AM.  Pt had vomiting since last night.  Pt had a defibrillator placed last Tuesday        Initial Presentation: 64 y.o. male past medical history of ischemic/triple-vessel heart disease, HFrEF 45%, Vtach s/p AICD, PAD, former smoker quit 1/2024, DM 2, A-fib not on anticoagulation who presented to the ED after having a sudden onset nausea at around 3 AM last night.  He awakened and vomited around 8 times.  Patient reports its nonbloody nonbilious emesis.  He reports that after vomiting went back to bed and could not lie flat due to him having shortness of breath.  He reports leaning forwards helped his shortness of breath.  Due to shortness of breath not resolving contributing to go to the emergency room.  He did not take his torsemide today reports taking it yesterday. He denies any sick contacts, recent antibiotic use, recent travel, chest pain, shortness of breath at this time, nausea, vomiting constipation and diarrhea.  He will be admitted under SOD-C under Dr. Fonseca to observation.      ADMIT OBSERVATION STATUS    Anticipated Length of Stay/Certification Statement:      Date:     Day 2: Date:    Day 3: Has surpassed a 2nd midnight with active  treatments and services.        ED Triage Vitals   Temperature Pulse Respirations Blood Pressure SpO2   06/02/24 0855 06/02/24 0852 06/02/24 0852 06/02/24 0852 06/02/24 0852   97.9 °F (36.6 °C) 81 22 (!) 148/103 93 %      Temp Source Heart Rate Source Patient Position - Orthostatic VS BP Location FiO2 (%)   06/02/24 0855 06/02/24 0852 06/02/24 0852 06/02/24 0852 --   Temporal Monitor Sitting Left arm       Pain Score       06/02/24 0852       No Pain          Wt Readings from Last 1 Encounters:   06/02/24 110 kg (242 lb 8.1 oz)     Additional Vital Signs:         Pertinent Labs/Diagnostic Test Results:   XR chest 1 view portable   Final Result by Kristine Townsend MD (06/02 1002)      Question mild pulmonary venous congestion.            Workstation performed: [id number]               Results from last 7 days   Lab Units 06/03/24  0524 06/02/24  0929   WBC Thousand/uL 10.82* 11.70*   HEMOGLOBIN g/dL 11.0* 11.0*   HEMATOCRIT % 34.3* 34.3*   PLATELETS Thousands/uL 239 247   TOTAL NEUT ABS Thousands/µL  --  9.66*         Results from last 7 days   Lab Units 06/03/24  0524 06/02/24  0929   SODIUM mmol/L 140 139   POTASSIUM mmol/L 4.0 4.3   CHLORIDE mmol/L 106 107   CO2 mmol/L 26 24   ANION GAP mmol/L 8 8   BUN mg/dL 36* 42*   CREATININE mg/dL 1.28 1.27   EGFR ml/min/1.73sq m 59 59   CALCIUM mg/dL 8.6 8.7   MAGNESIUM mg/dL  --  2.0     Results from last 7 days   Lab Units 06/02/24  0929   AST U/L 15   ALT U/L 13   ALK PHOS U/L 53   TOTAL PROTEIN g/dL 6.6   ALBUMIN g/dL 3.3*   TOTAL BILIRUBIN mg/dL 0.31     Results from last 7 days   Lab Units 06/02/24  2106 06/02/24  1628   POC GLUCOSE mg/dl 249* 285*     Results from last 7 days   Lab Units 06/03/24  0524 06/02/24  0929   GLUCOSE RANDOM mg/dL 128 223*             BETA-HYDROXYBUTYRATE   Date Value Ref Range Status   01/31/2024 0.3 <0.6 mmol/L Final                      Results from last 7 days   Lab Units 06/02/24  1318 06/02/24  1121 06/02/24  0929   HS TNI 0HR ng/L   --   --  24   HS TNI 2HR ng/L  --  27  --    HSTNI D2 ng/L  --  3  --    HS TNI 4HR ng/L 26  --   --    HSTNI D4 ng/L 2  --   --                                  Results from last 7 days   Lab Units 06/03/24  0524   BNP pg/mL 1,250*                                                                                       ED Treatment:   Medication Administration from 06/02/2024 0850 to 06/02/2024 1351       None          Past Medical History:   Diagnosis Date    Diabetes mellitus ([facility])     Myocardial infarction ([facility])      Present on Admission:   3-vessel CAD   A-fib ([facility])   Chronic systolic CHF (congestive heart failure) ([facility])   ICD (implantable cardioverter-defibrillator) in place   Essential (primary) hypertension   PAD (peripheral artery disease) ([facility])      Admitting Diagnosis: Dyspnea [R06.00]  SOB (shortness of breath) [R06.02]  ICD (implantable cardioverter-defibrillator) in place [Z95.810]  Age/Sex: 64 y.o. male  Admission Orders:  Scheduled Medications:  amiodarone, 200 mg, Oral, Daily With Breakfast  aspirin, 81 mg, Oral, Daily  atorvastatin, 80 mg, Oral, After Dinner  clopidogrel, 75 mg, Oral, Daily  Empagliflozin, 10 mg, Oral, QAM  enoxaparin, 40 mg, Subcutaneous, Daily  hydrALAZINE, 50 mg, Oral, Q8H TEJAS  insulin glargine, 23 Units, Subcutaneous, HS  insulin lispro, 1-6 Units, Subcutaneous, 4x Daily (PC & HS)  isosorbide dinitrate, 10 mg, Oral, TID after meals  metoprolol succinate, 50 mg, Oral, Q12H  torsemide, 10 mg, Oral, Daily      Continuous IV Infusions:     PRN Meds:       None    Network Utilization Review Department  ATTENTION: Please call with any questions or concerns to [phone] and carefully listen to the prompts so that you are directed to the right person. All voicemails are confidential.   For Discharge needs, contact Care Management DC Support Team at [phone] opt. 2  Send all requests for admission clinical reviews, approved or denied determinations and any other requests to  dedicated fax number below belonging to the campus where the patient is receiving treatment. List of dedicated fax numbers for the Facilities:  FACILITY NAME UR FAX NUMBER   ADMISSION DENIALS (Administrative/Medical Necessity) [phone]   DISCHARGE SUPPORT TEAM (NETWORK) [phone]   PARENT CHILD HEALTH (Maternity/NICU/Pediatrics) [phone]   [facility] [phone]   [facility] [phone]   [facility] [phone]   [facility] [phone]   [facility] [phone]   [facility] [phone]   [facility] [phone]   [facility] [phone]   [facility] [phone]   [facility] [phone]   [facility] [phone]   [facility] [phone]

## 2024-06-03 NOTE — CASE MANAGEMENT
Case Management Assessment & Discharge Planning Note    Patient name Nadir Myers  Location CW2 214/CW2 214-01 MRN [id number]  : 1960 Date 6/3/2024       Current Admission Date: 2024  Current Admission Diagnosis:Chronic systolic CHF (congestive heart failure) ([facility])   Patient Active Problem List    Diagnosis Date Noted Date Diagnosed    Post-op pain 2024     Amputated 4th toe, left ([facility]) 04/10/2024     Infected defibrillator ([facility]) 2024     ICD (implantable cardioverter-defibrillator) in place 2024     Abnormal CT of the chest 2024     MSSA bacteremia 2024     Type 2 diabetes mellitus with foot ulcer, with long-term current use of insulin ([facility]) 2024     3-vessel CAD 2024     PAD (peripheral artery disease) ([facility]) 2024     Chronic systolic CHF (congestive heart failure) ([facility]) 2023     Acidosis 2023     V-tach ([facility]) 2023     Ischemic cardiomyopathy 2023     Essential (primary) hypertension 2023     Acute HFrEF 2023     A-fib ([facility]) 2023     Benign prostatic hyperplasia without lower urinary tract symptoms 2018     Type 2 diabetes mellitus, with long-term current use of insulin ([facility]) 2015     Tobacco dependence syndrome 2012       LOS (days): 0  Geometric Mean LOS (GMLOS) (days):   Days to GMLOS:     OBJECTIVE:              Current admission status: Observation       Preferred Pharmacy:   [id number] [address] - [address]  [address] [address]  Phone: [phone] Fax: [phone]    [facility]  [place], [place] - [address]  [address]  [place] [address]  Phone: [phone] Fax: [phone]    Primary Care Provider: Beau Lemus MD    Primary Insurance: AARP MC REP  Secondary Insurance:     ASSESSMENT:  Active Health Care Proxies       Rhoda Myers Health Care Representative - Spouse   Primary  Phone: [phone] (Mobile)                 Advance Directives  Does patient have a Health Care POA?: No  Was patient offered paperwork?: Yes (Declined)  Does patient have Advance Directives?: No  Was patient offered paperwork?: Yes (Declined)    Obs Notice Signed: 06/03/24    Readmission Root Cause  30 Day Readmission: No    Patient Information  Admitted from:: Home  Mental Status: Alert  During Assessment patient was accompanied by: Spouse  Assessment information provided by:: Patient  Primary Caregiver: Self  Support Systems: Self, Spouse/significant other, Family members  Home entry access options. Select all that apply.: Stairs  Number of steps to enter home.: 3  Do the steps have railings?: Yes  Type of Current Residence: Trailer Home  Living Arrangements: Lives w/ Spouse/significant other, Lives w/ Son    Activities of Daily Living Prior to Admission  Functional Status: Independent  Completes ADLs independently?: Yes  Ambulates independently?: Yes  Does patient currently own DME?: Yes  What DME does the patient currently own?: [employer]  Does patient have a history of Outpatient Therapy (PT/OT)?: Yes  Does the patient have a history of Short-Term Rehab?: No  Does patient have a history of HHC?: Yes  Does patient currently have HHC?: No         Patient Information Continued  Income Source: Pension/half-way  Does patient have prescription coverage?: Yes  Does patient receive dialysis treatments?: No  Does patient have a history of substance abuse?: No  Does patient have a history of Mental Health Diagnosis?: No         Means of Transportation  Means of Transport to Appts:: Drives Self      Social Determinants of Health (SDOH)      Flowsheet Row Most Recent Value   Housing Stability    In the last 12 months, was there a time when you were not able to pay the mortgage or rent on time? N   In the past 12 months, how many times have you moved where you were living? 1   At any time in the past 12 months, were  you homeless or living in a shelter (including now)? N   Transportation Needs    In the past 12 months, has lack of transportation kept you from medical appointments or from getting medications? no   In the past 12 months, has lack of transportation kept you from meetings, work, or from getting things needed for daily living? No   Food Insecurity    Within the past 12 months, you worried that your food would run out before you got the money to buy more. Never true   Within the past 12 months, the food you bought just didn't last and you didn't have money to get more. Never true   Utilities    In the past 12 months has the electric, gas, oil, or water company threatened to shut off services in your home? No            DISCHARGE DETAILS:    Discharge planning discussed with:: Patient and patient's spouse, Rhoda, at bedside  [place] of Choice: Yes     CM contacted family/caregiver?: Yes             Contacts  Patient Contacts: Patient's spouse, Rhoda  Relationship to Patient:: Family  Contact Method: Phone, In Person  Phone Number: [phone]      Additional Comments: CM met with patient's and patient's spouse, Rhoda, at bedside. CM introduced self and explained role. Patient reports that he lives with spouse and son. Patient reports that he was independent prior to admission. Patient reports that he had been connected with [facility] and discharged about 3-4 weeks ago.

## 2024-06-03 NOTE — DISCHARGE INSTR - AVS FIRST PAGE
Follow up with PCP within 1 week of discharge     Follow up with cardiology and have device interrogated at next appointment    Please return to ED if symptoms worsen

## 2024-06-04 ENCOUNTER — TRANSITIONAL CARE MANAGEMENT (OUTPATIENT)
Age: 64
End: 2024-06-04

## 2024-06-04 ENCOUNTER — TELEPHONE (OUTPATIENT)
Age: 64
End: 2024-06-04

## 2024-06-04 NOTE — TELEPHONE ENCOUNTER
LMOM for pt to call me at NH office    Please schedule TCM on or before 6/10    Please send me TEAMS message to see if I am available to take the call.

## 2024-06-04 NOTE — ED ATTENDING ATTESTATION
6/2/2024  I, Tierra Duncan MD, saw and evaluated the patient. I have discussed the patient with the resident/non-physician practitioner and agree with the resident's/non-physician practitioner's findings, Plan of Care, and MDM as documented in the resident's/non-physician practitioner's note, except where noted. All available labs and Radiology studies were reviewed.  I was present for key portions of any procedure(s) performed by the resident/non-physician practitioner and I was immediately available to provide assistance.       At this point I agree with the current assessment done in the Emergency Department.  I have conducted an independent evaluation of this patient a history and physical is as follows:  64-year-old male here with vomiting and shortness of breath.  Patient has been having shortness of breath since this morning.  He vomited once last night.  Patient is status post defibrillator placement less than 1 week ago.  He has a history of coronary artery disease, diabetes, ischemic cardiomyopathy and ventricular tachycardia.  Patient had a complicated course after ICD placement with infection.  Patient had new ICD placed in February of this year with lead dislodgment.  Patient had been feeling well since discharge to last night.  No fevers or chills.  No limb swelling.  On exam patient with increased work of breathing, tachypnea, but no respiratory distress.  Otherwise nonfocal exam.  Impression: Shortness of breath, hypertension, cardiomyopathy.  Will plan to admit to the hospital  ED Course         Critical Care Time  Procedures

## 2024-06-05 ENCOUNTER — RA CDI HCC (OUTPATIENT)
Dept: OTHER | Facility: HOSPITAL | Age: 64
End: 2024-06-05

## 2024-06-05 DIAGNOSIS — E11.51 TYPE 2 DIABETES MELLITUS WITH DIABETIC PERIPHERAL ANGIOPATHY WITHOUT GANGRENE, WITHOUT LONG-TERM CURRENT USE OF INSULIN (HCC): Primary | ICD-10-CM

## 2024-06-05 NOTE — PROGRESS NOTES
HCC coding opportunities          Chart Reviewed number of suggestions sent to Provider: 2     Patients Insurance   I11.0 and E11.51  Medicare Insurance: [employer] Medicare Complete

## 2024-06-05 NOTE — PROGRESS NOTES
Heart Failure Outpatient Progress Note - Nadir Myers 64 y.o. male MRN: [id number]    @ Encounter: [id number]      Assessment/Plan:    Patient Active Problem List    Diagnosis Date Noted    Type 2 diabetes mellitus with diabetic peripheral angiopathy without gangrene ([facility]) 06/05/2024    Post-op pain 05/22/2024    Amputated 4th toe, left ([facility]) 04/10/2024    Infected defibrillator ([facility]) 03/11/2024    ICD (implantable cardioverter-defibrillator) in place 02/21/2024    Abnormal CT of the chest 02/09/2024    MSSA bacteremia 02/02/2024    Type 2 diabetes mellitus with foot ulcer, with long-term current use of insulin ([facility]) 01/16/2024    3-vessel CAD 01/16/2024    PAD (peripheral artery disease) ([facility]) 01/16/2024    Chronic systolic CHF (congestive heart failure) ([facility]) 08/02/2023    Acidosis 05/06/2023    V-tach ([facility]) 05/04/2023    Ischemic cardiomyopathy 05/03/2023    Essential (primary) hypertension 05/02/2023    Acute HFrEF 05/02/2023    A-fib ([facility]) 05/02/2023    Benign prostatic hyperplasia without lower urinary tract symptoms 04/11/2018    Type 2 diabetes mellitus, with long-term current use of insulin ([facility]) 02/27/2015    Tobacco dependence syndrome 01/18/2012       Lead placement on EV ICD failed     # Chronic HFimpEF, Stage C, w/ decompensation due to medication hold  Impression: iCM with large apical aneurysm with progression of CAD/late presenting MI with occluded mid LAD and LPDA. LV mildly dilated. Started on milrinone 0.25mcg/kg/min 5/6/23 due to rising creatinine and worsening volume status, stopped 5/10/23. ,  EF since improved     Weight: 205 lbs on 6/5, 218 lbs--> 237 lbs--> 245 lbs  BNP: 6/3/24: 1250  3/7/24: 879  2/20/24: 1809  6/2/23: 279     Studies- personally reviewed by me  EKG- narrow QRS with bigeminy; inferior infarct, anterolateral infarct    Echo 6/3/24:  LVEF: 40%, akinetic apical anterior,inferior  LVEDd:  RV: normal  Mild MR    Echo 2/21/24:   LVEF: 45%  Akinetic segments     GILL 2/7/24:  1.8 x 1.1 cm mobile mass attached to ICD lead    Echo 2/1/24:  LVEF: 40%, akinetic segments  LVEDd: 5.1 cm  RV: normal     Echo 8/7/23:  LVEF: 40%  LVEDd: 4.1 cm  LV apex aneurysmal, no thrombus  RV: normal     LHC 5/3/23: chronically occluded mid LAD and LPDA c/w echo findings of apical aneurysm, not amenable to PCI  Patent mid LCx stent   LVEDP 30 mmHg     Echocardiogram 5/2/23  LVEF:  10-15%, apical aneurysm  LVIDd: 5.6 cm  RV: normal  MR: mild  PASP: 49 mmHg     Echo 9/26/17:  LVEF: 65%     Neurohormonal Blockade:  --Beta-Blocker: metoprolol succinate 50 mg BID  --ACEi, ARB or ARNi: Entresto stopped due to hypotension- will restart entresto 49/51 mg BID    (or SVR reduction) isordil 10 mg TID/ hydralazine 50 mg TID- will stop  --Aldosterone Receptor Blocker: spironolactone 12.5 mg daily- onhold due to hyperkalemia  --SGLT2-I: Jardiance 10 mg daily- on hold  --Diuretic: torsemide 10 mg   Inpt:      Sudden Cardiac Death Risk Reduction:  MDT ICD 5/12/23 for secondary prevention  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   SQ ICD placed 5/21/24     Electrical Resynchronization:  Narrow QRS     Advanced Therapies (If appropriate):  --Inotrope: was on milrinone 0.25 mcg/kg/min, titrated off  --LVAD/Transplant Candidacy: Current tobacco use prior to admission precludes heart transplant at this time- since quit. Moderately reduced RV function and mildly dilated LV concerning for LVAD. Mildly dilated LV also concerning given VT.   Has not smoked since May 2     # Staph bacteremia  GILL 2/7/24: There is a 1.8 x 1.1 cm mobile mass affixed to the cardiac device lead as is passes through the right atrium. In the setting of bacteremia this is most likely represents infection.   # New Q wave MI, later presenting or silent LAD territory infarction  Rx: plavix 75 mg  #  hyperlipidemia-atorvastatin 80 mg  9/16/23: LDL 51, HDL 68  # current tobacco use- quit since hospital  # Ventricular tachycardia- scar mediated  Rx: amiodarone 200 mg daily  # CKD3, Cr 1.3 on 3/7 down from 1.96 on 2/28  # hyponatremia- due to HF, , now up to 137 on 7/3/23  # DM2, HgA1C 7.1% on 9/16/23    TODAY'S PLAN:  Stop isordil/ hydralazine, start entresto 49/51 mg BID  May need to increase to high dose   Continue current GDMT  GFR improved on last labs 6/3-   --2g sodium diet  - Daily weights    HPI:   64 year old male with known CAD with LHC in 2015 showing obstructive mid-LCx disease treated with PTCA/stening and residual non-obstructive disease in the LAD, now presented with 1-2 weeks of shortness of breath, leg swelling, and >20 lb weight gain in 1 week. He was suspected during a cardiology visit to be in rapid atrial fibrillation, and sent to the ED. He converted to sinus rhythm shortly following IV beta blocker administration.    Patient was taken for LHC which showed chronically occluded mid LAD and LPDA c/w echo findings of apical aneurysm, not amenable to PCI. There was a patent mid LCx stent. Echocardiogram showed severely reduced LV function with EF 10-15%, apical aneurysm, and LVIDd of 5.6 cm. VT was felt to be scar mediated. A MDT dual chamber AICD was placed. Heart failure specific GDMT was initiated and optimized.    He did require transient milrinone support during his hospitalization and this was weaned off     Since discharge he has had orthostatic hypotension, lightheadedness and dizziness. No syncope. Entresto was cut back to 24/26 mg BID, spironolactone stopped and lasix stopped. He is on Jardiance.     S/p hospitalization for septic shock due to staph bacteremia due to lower extremity wound infection. Vegetation noted on ICD lead, led to lead extraction on 2/8/24. EV ICD placed 2/20 but unable to obtain proper lead placement despite adjustment attempts so was removed. Plan - sent  home on LifeVest with plan for SQ ICD implant as outpt.     Interval History:  Admitted 5/21, for SQ ICD placement  Echo 6/3/24:  LVEF: 40%, akinetic apical anterior,inferior  LVEDd:  RV: normal  Mild MR    Device check - no events  No new symptoms  Review of Systems   Constitutional:  Negative for activity change, appetite change and fatigue. Unexpected weight change: wt up 19 lbs more.  HENT:  Negative for congestion and nosebleeds.    Eyes: Negative.    Respiratory:  Negative for cough, chest tightness and shortness of breath.    Cardiovascular:  Negative for chest pain, palpitations and leg swelling.   Gastrointestinal:  Negative for abdominal distention.   Endocrine: Negative.    Genitourinary: Negative.    Musculoskeletal: Negative.    Skin: Negative.    Neurological:  Negative for dizziness, syncope and weakness.   Hematological: Negative.    Psychiatric/Behavioral: Negative.         Past Medical History:   Diagnosis Date    Diabetes mellitus (HCC)     Myocardial infarction (HCC)          Allergies   Allergen Reactions    Vancomycin Rash     NOT AN ALLERGY - 1/31/24 patient experienced vancomycin infusion reaction, please extend duration of infusion time to prevent future infusion reactions     .    Current Outpatient Medications:     amiodarone 200 mg tablet, TAKE 1 TABLET BY MOUTH DAILY WITH BREAKFAST., Disp: 90 tablet, Rfl: 4    aspirin 81 mg chewable tablet, Chew 1 tablet (81 mg total) daily Do not start before March 2, 2024., Disp: 30 tablet, Rfl: 0    atorvastatin (LIPITOR) 80 mg tablet, TAKE 1 TABLET (80 MG TOTAL) BY MOUTH DAILY AFTER DINNER, Disp: 90 tablet, Rfl: 2    BD Pen Needle Micro U/F 32G X 6 MM MISC, USE 1 PEN EACH DAILY, Disp: , Rfl:     clopidogrel (PLAVIX) 75 mg tablet, TAKE 1 TABLET BY MOUTH EVERY DAY, Disp: 90 tablet, Rfl: 1    Empagliflozin (Jardiance) 10 MG TABS tablet, Take 1 tablet (10 mg total) by mouth every morning, Disp: 30 tablet, Rfl: 11    hydrALAZINE (APRESOLINE) 25 mg tablet,  TAKE 2 TABLETS (50 MG TOTAL) BY MOUTH EVERY 8 (EIGHT) HOURS, Disp: 540 tablet, Rfl: 1    insulin detemir (Levemir FlexPen) 100 Units/mL injection pen, Inject 30 Units under the skin daily Patient to take 30 units daily - if fasting bs >150 for 3 days to increase dose by 5 units. Once bs < 150 can go back to 30 units, Disp: 30 mL, Rfl: 1    isosorbide dinitrate (ISORDIL) 20 mg tablet, TAKE 0.5 TABLETS (10 MG TOTAL) BY MOUTH 3 (THREE) TIMES DAILY AFTER MEALS, Disp: 135 tablet, Rfl: 1    metoprolol succinate (TOPROL-XL) 50 mg 24 hr tablet, TAKE 1 TABLET BY MOUTH EVERY 12 HOURS., Disp: 180 tablet, Rfl: 1    sitaGLIPtin-metFORMIN (JANUMET)  MG per tablet, Take 1 tablet by mouth 2 (two) times a day with meals, Disp: 180 tablet, Rfl: 3    torsemide (DEMADEX) 20 mg tablet, TAKE 1/2 TABLET BY MOUTH DAILY, Disp: 45 tablet, Rfl: 1    sitaGLIPtin (JANUVIA) 100 mg tablet, Take 1 tablet (100 mg total) by mouth daily (Patient not taking: Reported on 6/7/2024), Disp: 90 tablet, Rfl: 1    Social History     Socioeconomic History    Marital status: /Civil Union     Spouse name: Not on file    Number of children: Not on file    Years of education: Not on file    Highest education level: Not on file   Occupational History    Not on file   Tobacco Use    Smoking status: Former     Types: Cigarettes     Start date: 1/30/2024    Smokeless tobacco: Never   Vaping Use    Vaping status: Never Used   Substance and Sexual Activity    Alcohol use: Not Currently    Drug use: Never    Sexual activity: Not Currently     Partners: Female   Other Topics Concern    Not on file   Social History Narrative    Not on file     Social Determinants of Health     Financial Resource Strain: Not on file   Food Insecurity: No Food Insecurity (6/3/2024)    Hunger Vital Sign     Worried About Running Out of Food in the Last Year: Never true     Ran Out of Food in the Last Year: Never true   Transportation Needs: No Transportation Needs (6/3/2024)     PRAPARE - Transportation     Lack of Transportation (Medical): No     Lack of Transportation (Non-Medical): No   Physical Activity: Not on file   Stress: Not on file   Social Connections: Not on file   Intimate Partner Violence: Not on file   Housing Stability: Low Risk  (6/3/2024)    Housing Stability Vital Sign     Unable to Pay for Housing in the Last Year: No     Number of Times Moved in the Last Year: 1     Homeless in the Last Year: No       No family history on file.    Physical Exam:    Vitals:   Vitals:    06/07/24 0804   BP: 150/80   Pulse: 68   SpO2: 98%         Physical Exam  Constitutional:       Appearance: He is well-developed.   HENT:      Head: Normocephalic and atraumatic.   Eyes:      Pupils: Pupils are equal, round, and reactive to light.   Neck:      Vascular: No JVD.   Cardiovascular:      Rate and Rhythm: Normal rate and regular rhythm.      Heart sounds: No murmur heard.  Pulmonary:      Effort: Pulmonary effort is normal. No respiratory distress.      Breath sounds: Normal breath sounds.   Abdominal:      General: There is no distension.      Palpations: Abdomen is soft.      Tenderness: There is no abdominal tenderness.   Musculoskeletal:         General: Normal range of motion.      Cervical back: Normal range of motion.   Skin:     General: Skin is warm and dry.      Findings: No rash.   Neurological:      Mental Status: He is alert and oriented to person, place, and time.         Labs & Results:    Lab Results   Component Value Date    SODIUM 140 06/03/2024    K 4.0 06/03/2024     06/03/2024    CO2 26 06/03/2024    BUN 36 (H) 06/03/2024    CREATININE 1.28 06/03/2024    GLUC 128 06/03/2024    CALCIUM 8.6 06/03/2024     Lab Results   Component Value Date    WBC 10.82 (H) 06/03/2024    HGB 11.0 (L) 06/03/2024    HCT 34.3 (L) 06/03/2024    MCV 90 06/03/2024     06/03/2024     Lab Results   Component Value Date    NTBNP 5,942 (H) 05/02/2023      No results found for:  "\"CHOLESTEROL\"  Lab Results   Component Value Date    HDL 34 05/07/2015    HDL 43 02/17/2015     Lab Results   Component Value Date    TRIG 122 05/07/2015    TRIG 128 02/17/2015     No results found for: \"NONHDLC\"    EKG personally reviewed by Nash Hernandez DO.     Counseling / Coordination of Care  Time spent today 25 minutes.  Greater than 50% of total time was spent with the patient and / or family counseling and / or coordination of care. We went over current diagnosis, most recent studies and any changes in treatment.    Thank you for the opportunity to participate in the care of this patient.    NASH HERNANDEZ D.O.  DIRECTOR OF HEART FAILURE/ PULMONARY HYPERTENSION  MEDICAL DIRECTOR OF LVAD PROGRAM  [facility]        "

## 2024-06-06 ENCOUNTER — OFFICE VISIT (OUTPATIENT)
Dept: INTERNAL MEDICINE CLINIC | Facility: OTHER | Age: 64
End: 2024-06-06
Payer: COMMERCIAL

## 2024-06-06 VITALS
WEIGHT: 242 LBS | DIASTOLIC BLOOD PRESSURE: 66 MMHG | OXYGEN SATURATION: 98 % | HEART RATE: 70 BPM | TEMPERATURE: 98.1 F | SYSTOLIC BLOOD PRESSURE: 130 MMHG | BODY MASS INDEX: 32.82 KG/M2

## 2024-06-06 DIAGNOSIS — Z79.4 TYPE 2 DIABETES MELLITUS WITH OTHER SPECIFIED COMPLICATION, WITH LONG-TERM CURRENT USE OF INSULIN (HCC): ICD-10-CM

## 2024-06-06 DIAGNOSIS — I25.5 ISCHEMIC CARDIOMYOPATHY: ICD-10-CM

## 2024-06-06 DIAGNOSIS — I50.21 ACUTE HFREF (HEART FAILURE WITH REDUCED EJECTION FRACTION) (HCC): Primary | ICD-10-CM

## 2024-06-06 DIAGNOSIS — E11.69 TYPE 2 DIABETES MELLITUS WITH OTHER SPECIFIED COMPLICATION, WITH LONG-TERM CURRENT USE OF INSULIN (HCC): ICD-10-CM

## 2024-06-06 PROCEDURE — 99496 TRANSJ CARE MGMT HIGH F2F 7D: CPT | Performed by: INTERNAL MEDICINE

## 2024-06-06 NOTE — PROGRESS NOTES
Transition of Care Visit  Name: Nadir Myers      : 1960      MRN: [id number]  Encounter Provider: Beau Lemus MD  Encounter Date: 2024   Encounter department: [facility] [place]    Assessment & Plan   1. Acute HFrEF  Assessment & Plan:  Wt Readings from Last 3 Encounters:   24 110 kg (242 lb)   24 110 kg (242 lb)   24 110 kg (242 lb 12.8 oz)   He has a follow-up appointment with his heart failure cardiologist tomorrow and electrophysiology.  For now continue current regimen which he has been compliant with.  He is currently asymptomatic although I do appreciate bibasilar crackles on auscultation today.          2. Type 2 diabetes mellitus with other specified complication, with long-term current use of insulin (HCC)  -     sitaGLIPtin (JANUVIA) 100 mg tablet; Take 1 tablet (100 mg total) by mouth daily  3. Ischemic cardiomyopathy       History of Present Illness     Transitional Care Management Review:   Nadir Myers is a 64 y.o. male here for TCM follow up.     During the TCM phone call patient stated:  TCM Call     Date and time call was made  2024  9:20 AM    Hospital care reviewed  Records reviewed    Patient was hospitialized at  [facility]    Date of Admission  24    Date of discharge  24    Diagnosis  chronic systoic CHF    Disposition  Home    Current Symptoms  None      TCM Call     Post hospital issues  None    Scheduled for follow up?  Yes    Did you obtain your prescribed medications  Yes    Do you need help managing your prescriptions or medications  No    Is transportation to your appointment needed  No    I have advised the patient to call PCP with any new or worsening symptoms  Ayden Farr CMA        Nadir Myers is seen today for transition of care.  Hospitalization and discharge summary reviewed today.  He presented to the emergency department with sudden onset of nausea and vomiting.  He was  experiencing shortness of breath after vomiting.  Chest x-ray showed mild vascular congestion, no pulmonary edema or JVD.  He reports compliance with torsemide although did not take torsemide the day of onset of symptoms.  He underwent echocardiogram to which shows an ejection fraction of 40% with moderately reduced systolic function (echocardiogram from earlier this year showed mildly reduced systolic function), left ventricular wall thickness mildly increased, global akinesia.  Several attempts to interrogate his ICD were unsuccessful.  He has follow-up scheduled with his electrophysiologist and was advised to interrogate his ICD then.  He did not want to wait for the echocardiogram results and signed out AMA.        Review of Systems   Constitutional:  Negative for activity change, appetite change, chills, diaphoresis, fatigue and fever.   HENT:  Negative for congestion, postnasal drip, rhinorrhea, sinus pressure, sinus pain, sneezing and sore throat.    Eyes:  Negative for visual disturbance.   Respiratory:  Negative for apnea, cough, choking, chest tightness, shortness of breath and wheezing.    Cardiovascular:  Negative for chest pain, palpitations and leg swelling.   Gastrointestinal:  Negative for abdominal distention, abdominal pain, anal bleeding, blood in stool, constipation, diarrhea, nausea and vomiting.   Endocrine: Negative for cold intolerance and heat intolerance.   Genitourinary:  Negative for difficulty urinating, dysuria and hematuria.   Musculoskeletal: Negative.    Skin: Negative.    Neurological:  Negative for dizziness, weakness, light-headedness, numbness and headaches.   Hematological:  Negative for adenopathy.   Psychiatric/Behavioral:  Negative for agitation, sleep disturbance and suicidal ideas.    All other systems reviewed and are negative.    Objective     /66 (BP Location: Left arm, Patient Position: Sitting, Cuff Size: Standard)   Pulse 70   Temp 98.1 °F (36.7 °C) (Temporal)    Wt 110 kg (242 lb)   SpO2 98%   BMI 32.82 kg/m²     Physical Exam  Vitals and nursing note reviewed.   Constitutional:       General: He is not in acute distress.     Appearance: He is well-developed. He is not diaphoretic.   HENT:      Head: Normocephalic and atraumatic.   Eyes:      General: No scleral icterus.        Right eye: No discharge.         Left eye: No discharge.      Conjunctiva/sclera: Conjunctivae normal.      Pupils: Pupils are equal, round, and reactive to light.   Neck:      Thyroid: No thyromegaly.      Vascular: No JVD.   Cardiovascular:      Rate and Rhythm: Normal rate and regular rhythm.      Heart sounds: Normal heart sounds. No murmur heard.     No friction rub. No gallop.   Pulmonary:      Effort: Pulmonary effort is normal. No respiratory distress.      Breath sounds: Normal breath sounds. No wheezing or rales.      Comments: Bibasilar crackles appreciated  Chest:      Chest wall: No tenderness.   Abdominal:      General: Bowel sounds are normal. There is no distension.      Palpations: Abdomen is soft. There is no mass.      Tenderness: There is no abdominal tenderness. There is no guarding or rebound.   Musculoskeletal:         General: No tenderness or deformity. Normal range of motion.      Cervical back: Normal range of motion and neck supple.   Lymphadenopathy:      Cervical: No cervical adenopathy.   Skin:     General: Skin is warm and dry.      Coloration: Skin is not pale.      Findings: No erythema or rash.   Neurological:      Mental Status: He is alert and oriented to person, place, and time.      Cranial Nerves: No cranial nerve deficit.      Coordination: Coordination normal.      Deep Tendon Reflexes: Reflexes are normal and symmetric.   Psychiatric:         Behavior: Behavior normal.         Thought Content: Thought content normal.         Judgment: Judgment normal.           Administrative Statements   I have spent a total time of 25 minutes on 06/06/24 In caring for  this patient including Diagnostic results, Prognosis, Risks and benefits of tx options, Instructions for management, Patient and family education, Importance of tx compliance, Risk factor reductions, Impressions, Counseling / Coordination of care, Documenting in the medical record, Reviewing / ordering tests, medicine, procedures  , and Obtaining or reviewing history  .

## 2024-06-06 NOTE — ASSESSMENT & PLAN NOTE
Wt Readings from Last 3 Encounters:   06/06/24 110 kg (242 lb)   06/03/24 110 kg (242 lb)   05/28/24 110 kg (242 lb 12.8 oz)   He has a follow-up appointment with his heart failure cardiologist tomorrow and electrophysiology.  For now continue current regimen which he has been compliant with.  He is currently asymptomatic although I do appreciate bibasilar crackles on auscultation today.

## 2024-06-07 ENCOUNTER — OFFICE VISIT (OUTPATIENT)
Dept: CARDIOLOGY CLINIC | Facility: CLINIC | Age: 64
End: 2024-06-07
Payer: COMMERCIAL

## 2024-06-07 ENCOUNTER — IN-CLINIC DEVICE VISIT (OUTPATIENT)
Dept: CARDIOLOGY CLINIC | Facility: CLINIC | Age: 64
End: 2024-06-07
Payer: COMMERCIAL

## 2024-06-07 VITALS
DIASTOLIC BLOOD PRESSURE: 80 MMHG | WEIGHT: 245.8 LBS | HEART RATE: 68 BPM | OXYGEN SATURATION: 98 % | BODY MASS INDEX: 33.34 KG/M2 | SYSTOLIC BLOOD PRESSURE: 150 MMHG

## 2024-06-07 DIAGNOSIS — I73.9 PAD (PERIPHERAL ARTERY DISEASE) (HCC): ICD-10-CM

## 2024-06-07 DIAGNOSIS — I10 ESSENTIAL (PRIMARY) HYPERTENSION: ICD-10-CM

## 2024-06-07 DIAGNOSIS — I50.22 CHRONIC SYSTOLIC CHF (CONGESTIVE HEART FAILURE) (HCC): ICD-10-CM

## 2024-06-07 DIAGNOSIS — I47.20 VENTRICULAR TACHYARRHYTHMIA (HCC): Primary | ICD-10-CM

## 2024-06-07 DIAGNOSIS — I47.20 V-TACH (HCC): ICD-10-CM

## 2024-06-07 DIAGNOSIS — I25.5 ISCHEMIC CARDIOMYOPATHY: Primary | ICD-10-CM

## 2024-06-07 PROCEDURE — 99024 POSTOP FOLLOW-UP VISIT: CPT | Performed by: INTERNAL MEDICINE

## 2024-06-07 PROCEDURE — 99214 OFFICE O/P EST MOD 30 MIN: CPT | Performed by: INTERNAL MEDICINE

## 2024-06-07 RX ORDER — SACUBITRIL AND VALSARTAN 49; 51 MG/1; MG/1
1 TABLET, FILM COATED ORAL 2 TIMES DAILY
Qty: 60 TABLET | Refills: 5 | Status: SHIPPED | OUTPATIENT
Start: 2024-06-07

## 2024-06-07 NOTE — PROGRESS NOTES
Results for orders placed or performed in visit on 06/07/24   Cardiac EP device report    Narrative    [employer] SQ ICD/ ACTIVE SYSTEM IS MRI CONDITIONAL  DEVICE INTERROGATED IN THE [place] OFFICE. SUB Q: WOUND CHECK: INCISION CLEAN AND DRY WITH EDGES APPROXIMATED; WOUND CARE AND RESTRICTIONS REVIEWED WITH PATIENT. PICS IN MEDIA. BATTERY VOLTAGE ADEQUATE (100%). PRESENTING RHYTHM: NSR @ 68 BPM. ELECTRODE PARAMETERS WITHIN NORMAL LIMITS (55 OHMS). NO SIGNIFICANT HIGH RATE EPISODES. NO PROGRAMMING CHANGES MADE TO DEVICE PARAMETERS. PT SEEN TODAY BY DR. BELLO.  NORMAL DEVICE FUNCTION. CH

## 2024-06-13 ENCOUNTER — OFFICE VISIT (OUTPATIENT)
Dept: NEPHROLOGY | Facility: CLINIC | Age: 64
End: 2024-06-13
Payer: COMMERCIAL

## 2024-06-13 VITALS
SYSTOLIC BLOOD PRESSURE: 120 MMHG | BODY MASS INDEX: 33.23 KG/M2 | DIASTOLIC BLOOD PRESSURE: 60 MMHG | WEIGHT: 245 LBS | HEART RATE: 71 BPM | OXYGEN SATURATION: 96 %

## 2024-06-13 DIAGNOSIS — I50.22 CHRONIC SYSTOLIC CHF (CONGESTIVE HEART FAILURE) (HCC): ICD-10-CM

## 2024-06-13 DIAGNOSIS — Z95.810 ICD (IMPLANTABLE CARDIOVERTER-DEFIBRILLATOR) IN PLACE: ICD-10-CM

## 2024-06-13 DIAGNOSIS — N18.31 STAGE 3A CHRONIC KIDNEY DISEASE (HCC): Primary | ICD-10-CM

## 2024-06-13 DIAGNOSIS — E11.51 TYPE 2 DIABETES MELLITUS WITH DIABETIC PERIPHERAL ANGIOPATHY WITHOUT GANGRENE, WITHOUT LONG-TERM CURRENT USE OF INSULIN (HCC): ICD-10-CM

## 2024-06-13 DIAGNOSIS — I25.10 3-VESSEL CAD: ICD-10-CM

## 2024-06-13 DIAGNOSIS — I10 ESSENTIAL (PRIMARY) HYPERTENSION: ICD-10-CM

## 2024-06-13 PROCEDURE — 99214 OFFICE O/P EST MOD 30 MIN: CPT | Performed by: INTERNAL MEDICINE

## 2024-06-13 NOTE — PROGRESS NOTES
OFFICE FOLLOW UP - Nephrology   Nadir Myers 64 y.o. male MRN: [id number]       ASSESSMENT and PLAN:  Nadir was seen today for follow-up and chronic kidney disease.    Diagnoses and all orders for this visit:    Stage 3a chronic kidney disease (HCC)  -     CBC; Future  -     Renal function panel; Future  -     PTH, intact; Future  -     Protein / creatinine ratio, urine; Future    Chronic systolic CHF (congestive heart failure) (HCC)    Type 2 diabetes mellitus with diabetic peripheral angiopathy without gangrene, without long-term current use of insulin (HCC)    Essential (primary) hypertension    3-vessel CAD    ICD (implantable cardioverter-defibrillator) in place        This is a 64-year-old gentleman who returns to the office for follow-up of chronic kidney disease, patient was initially seen while hospitalized on February 2024 with MSSA bacteremia in the setting of toe gangrene complicated with ICD infection status post explantation, hospital course complicated with LINDEN suspected secondary to ATN, last time patient seen in our office was on 3/18/2024 by one of our advanced practitioner as a hospital follow-up.    1 chronic kidney disease stage IIIa with most recent creatinine 1.28 with an estimated GFR of 59 with proteinuria.  CKD after episode of LINDEN on 2/2024 (at that time creatinine peaked at 2.36 and improved down to 1.7 on discharge day) as well as component of diabetes nephropathy.  Discussed with patient about importance to follow low-salt diet, avoid NSAIDs, no changes on his medications this moment.  I would like to see him back in the office in 6 months with repeat labs.    2 hypertension, blood pressure currently well-controlled, continue current regimen, cardiology plan to stop Isordil and hydralazine and start Entresto  Advised to follow low-salt diet    3 chronic HFrEF, ischemic cardiomyopathy, on exam looks euvolemic, continue follow-up with cardiology, currently on torsemide 20 mg half a  tablet daily  Status post new ICD implanted on 5/21/2024    #4 diabetes, management per primary care doctor, goal to keep her hemoglobin A1c close or below 7%.  Most recent A1c down to 7.1% on 5/2024, noted hemoglobin A1c was as high as 12% on 02/2023    5 anemia, plan to repeat CBC in 6 months    6 mineral bone disease, plan to check for follow, calcium and PTH in 6 months      Patient Instructions   As we discussed in the office visit and after reviewing most recent blood test now you have very mild to moderate chronic kidney disease stage III yea after episode of acute kidney injury February 2024.  We discussed about importance to follow low-salt diet.  Discussed about importance to stay physically active.  Avoid NSAIDs (no ibuprofen, Motrin, Advil, Aleve, naproxen).  Okay to take Tylenol or acetaminophen as needed for pain.  Continue close follow-up with primary care doctor and your cardiologist.  I would like to see you back in the office in 6 months with repeat labs.        HPI: Nadir Myers is a 64 y.o. male who is here for Follow-up and Chronic Kidney Disease  .    Patient with history of ischemic cardiomyopathy, chronic HFrEF, status post ICD, who was hospitalized in [facility] on February 2024 with MSSA bacteremia likely secondary to toe gangrene, complicated with ICD infection status post explantation as well as acute kidney injury with creatinine up to 2.36.    Patient last time seen in our office was on 3/18/2024 hospital follow-up, today he returns to the office for CKD follow-up    Since our last visit, there were a new ICD placement on 5/21/2024.  Noted patient was hospitalized on 6/2/2024 due to shortness of breath and acute onset vomiting that resolved spontaneously.     Patient currently has no complaints at this time and is feeling well.   Patient denies any chest pain, shortness of breath and swelling.   Denies any abdominal pain, no nausea, vomiting, no diarrhea or  constipation.  Denies any urinary problems, no burning sensation or gross hematuria.    The last blood work was done on 06/03/2024, which we have reviewed together.  Recent creatinine 1.28 with an estimated GFR of 59    ROS:   All the systems were reviewed and were negative except as documented on the HPI.    Allergies: Vancomycin    Medications:   Current Outpatient Medications:     amiodarone 200 mg tablet, TAKE 1 TABLET BY MOUTH DAILY WITH BREAKFAST., Disp: 90 tablet, Rfl: 4    aspirin 81 mg chewable tablet, Chew 1 tablet (81 mg total) daily Do not start before March 2, 2024., Disp: 30 tablet, Rfl: 0    atorvastatin (LIPITOR) 80 mg tablet, TAKE 1 TABLET (80 MG TOTAL) BY MOUTH DAILY AFTER DINNER, Disp: 90 tablet, Rfl: 2    BD Pen Needle Micro U/F 32G X 6 MM MISC, USE 1 PEN EACH DAILY, Disp: , Rfl:     clopidogrel (PLAVIX) 75 mg tablet, TAKE 1 TABLET BY MOUTH EVERY DAY, Disp: 90 tablet, Rfl: 1    Empagliflozin (Jardiance) 10 MG TABS tablet, Take 1 tablet (10 mg total) by mouth every morning, Disp: 30 tablet, Rfl: 11    insulin detemir (Levemir FlexPen) 100 Units/mL injection pen, Inject 30 Units under the skin daily Patient to take 30 units daily - if fasting bs >150 for 3 days to increase dose by 5 units. Once bs < 150 can go back to 30 units, Disp: 30 mL, Rfl: 1    metoprolol succinate (TOPROL-XL) 50 mg 24 hr tablet, TAKE 1 TABLET BY MOUTH EVERY 12 HOURS., Disp: 180 tablet, Rfl: 1    sacubitril-valsartan (Entresto) 49-51 MG TABS, Take 1 tablet by mouth 2 (two) times a day, Disp: 60 tablet, Rfl: 5    sitaGLIPtin-metFORMIN (JANUMET)  MG per tablet, Take 1 tablet by mouth 2 (two) times a day with meals, Disp: 180 tablet, Rfl: 3    torsemide (DEMADEX) 20 mg tablet, TAKE 1/2 TABLET BY MOUTH DAILY, Disp: 45 tablet, Rfl: 1    sitaGLIPtin (JANUVIA) 100 mg tablet, Take 1 tablet (100 mg total) by mouth daily (Patient not taking: Reported on 6/7/2024), Disp: 90 tablet, Rfl: 1    Past Medical History:   Diagnosis Date     Diabetes mellitus (HCC)     Myocardial infarction (HCC)      Past Surgical History:   Procedure Laterality Date    CARDIAC CATHETERIZATION N/A 05/03/2023    Procedure: Cardiac Coronary Angiogram;  Surgeon: Valeriy Shore MD;  [facility]: [facility];  Service: Cardiology    CARDIAC CATHETERIZATION Left 05/03/2023    Procedure: Cardiac Left Heart Cath;  Surgeon: Valeriy Shore MD;  Location: [facility];  Service: Cardiology    CARDIAC DEFIBRILLATOR PLACEMENT  05/2023    CARDIAC ELECTROPHYSIOLOGY PROCEDURE N/A 05/12/2023    Procedure: Cardiac icd implant;  Surgeon: Urbano Maria MD;  Location: [facility];  Service: Cardiology    CARDIAC ELECTROPHYSIOLOGY PROCEDURE N/A 2/20/2024    Procedure: EV ICD IMPLANTATION;  Surgeon: Arturo Wooten DO;  Location: [facility];  Service: Cardiology    CARDIAC ELECTROPHYSIOLOGY PROCEDURE N/A 2/8/2024    Procedure: Cardiac laser lead extraction;  Surgeon: Arturo Wooten DO;  [facility];  Service: Cardiology    CARDIAC ELECTROPHYSIOLOGY PROCEDURE N/A 5/21/2024    Procedure: Cardiac icd implant subq;  Surgeon: Urbano Maria MD;  [facility];  Service: Cardiology    IR LOWER EXTREMITY ANGIOGRAM  1/18/2024    ME RMVL TRANSVNS PM ELTRD DUAL LEAD SYS N/A 2/20/2024    Procedure: EV ICD IMPLANTATION;  Surgeon: Abhilash Ferrera MD;  Location: [facility];  Service: Cardiac Surgery    ME RMVL TRANSVNS PM ELTRD DUAL LEAD SYS N/A 2/21/2024    Procedure: REMOVAL OF LEAD AND GENERATOR AND ATTEMPTED LEAD REVISION;  Surgeon: Abhilash Ferrera MD;  Location[facility];  Service: Cardiac Surgery    WOUND DEBRIDEMENT Left 2/4/2024    Procedure: LEFT FOURTH TOE AMPUTATION SURGICAL WOUND DEBRIDEMENT FOOT/TOE (WASH OUT);  Surgeon: Bebo Hopper DPM;  Location: [facility];  Service: Podiatry     History reviewed. No pertinent family history.   reports that he has quit smoking. His smoking use included cigarettes. He started smoking about 4  "months ago. He has never used smokeless tobacco. He reports that he does not currently use alcohol. He reports that he does not use drugs.      Physical Exam:   Vitals:    06/13/24 0924   BP: 120/60   BP Location: Left arm   Patient Position: Sitting   Cuff Size: Adult   Pulse: 71   SpO2: 96%   Weight: 111 kg (245 lb)     Body mass index is 33.23 kg/m².    General: Obese, cooperative, in not acute distress  Eyes: conjunctivae pink, anicteric sclerae  ENT: lips and mucous membranes moist  Neck: supple, no JVD  Chest: clear breath sounds bilateral, no crackles, ronchus or wheezings  CVS: distinct S1 & S2, normal rate, regular rhythm  Abdomen: soft, non-tender, non-distended, normoactive bowel sounds  Back: no CVA tenderness  Extremities: no edema of both legs  Skin: no rash  Neuro: awake, alert, oriented          Laboratory Results:  Lab Results   Component Value Date    WBC 10.82 (H) 06/03/2024    HGB 11.0 (L) 06/03/2024    HCT 34.3 (L) 06/03/2024    MCV 90 06/03/2024     06/03/2024     Lab Results   Component Value Date    SODIUM 140 06/03/2024    K 4.0 06/03/2024     06/03/2024    CO2 26 06/03/2024    BUN 36 (H) 06/03/2024    CREATININE 1.28 06/03/2024    GLUC 128 06/03/2024    CALCIUM 8.6 06/03/2024     Lab Results   Component Value Date    PTH 71.0 04/10/2024    CALCIUM 8.6 06/03/2024    PHOS 4.6 (H) 05/14/2024           Portions of the record may have been created with voice recognition software. Occasional wrong word or \"sound a like\" substitutions may have occurred due to the inherent limitations of voice recognition software. Read the chart carefully and recognize, using context, where substitutions have occurred.If you have any questions, please contact the dictating provider.  "

## 2024-06-13 NOTE — PATIENT INSTRUCTIONS
As we discussed in the office visit and after reviewing most recent blood test now you have very mild to moderate chronic kidney disease stage III jhoan after episode of acute kidney injury February 2024.  We discussed about importance to follow low-salt diet.  Discussed about importance to stay physically active.  Avoid NSAIDs (no ibuprofen, Motrin, Advil, Aleve, naproxen).  Okay to take Tylenol or acetaminophen as needed for pain.  Continue close follow-up with primary care doctor and your cardiologist.  I would like to see you back in the office in 6 months with repeat labs.

## 2024-06-18 ENCOUNTER — TELEPHONE (OUTPATIENT)
Dept: CARDIOLOGY CLINIC | Facility: CLINIC | Age: 64
End: 2024-06-18

## 2024-06-18 DIAGNOSIS — I50.22 CHRONIC SYSTOLIC CHF (CONGESTIVE HEART FAILURE) (HCC): ICD-10-CM

## 2024-06-18 NOTE — TELEPHONE ENCOUNTER
Called pt, no answer, LMOM for pt to call back. Asked pt to call back and let me know the medication for the [employer] form and if he is getting the medication presently or does a PA need to be done?

## 2024-06-19 ENCOUNTER — TELEPHONE (OUTPATIENT)
Dept: CARDIOLOGY CLINIC | Facility: CLINIC | Age: 64
End: 2024-06-19

## 2024-06-19 RX ORDER — SACUBITRIL AND VALSARTAN 49; 51 MG/1; MG/1
1 TABLET, FILM COATED ORAL 2 TIMES DAILY
Qty: 180 TABLET | Refills: 3 | Status: SHIPPED | OUTPATIENT
Start: 2024-06-19

## 2024-06-19 NOTE — TELEPHONE ENCOUNTER
Faxed completed [facility] PAP form and signed script for Entresto to [facility] # [phone].

## 2024-06-24 ENCOUNTER — OFFICE VISIT (OUTPATIENT)
Dept: PODIATRY | Facility: CLINIC | Age: 64
End: 2024-06-24
Payer: COMMERCIAL

## 2024-06-24 VITALS — HEART RATE: 86 BPM | SYSTOLIC BLOOD PRESSURE: 120 MMHG | DIASTOLIC BLOOD PRESSURE: 68 MMHG

## 2024-06-24 DIAGNOSIS — Z79.4 TYPE 2 DIABETES MELLITUS WITH OTHER SPECIFIED COMPLICATION, WITH LONG-TERM CURRENT USE OF INSULIN (HCC): Primary | ICD-10-CM

## 2024-06-24 DIAGNOSIS — I73.9 PAD (PERIPHERAL ARTERY DISEASE) (HCC): ICD-10-CM

## 2024-06-24 DIAGNOSIS — Z89.422 HISTORY OF COMPLETE RAY AMPUTATION OF FOURTH TOE OF LEFT FOOT (HCC): ICD-10-CM

## 2024-06-24 DIAGNOSIS — E11.69 TYPE 2 DIABETES MELLITUS WITH OTHER SPECIFIED COMPLICATION, WITH LONG-TERM CURRENT USE OF INSULIN (HCC): Primary | ICD-10-CM

## 2024-06-24 PROCEDURE — 99213 OFFICE O/P EST LOW 20 MIN: CPT | Performed by: PODIATRIST

## 2024-06-24 NOTE — PROGRESS NOTES
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     HE has quit smoking    Begin at risk foot care every 10-12 weeks (Q7)    Subjective:     Patient ID: Nadir Myers is a 64 y.o. male.    F/U DM foot care. Last February he had a 4th ray amputation. He is healed.         Review of Systems    As stated in HPI, otherwise normal    Medical History Reviewed by provider this encounter:  Tobacco  Allergies  Meds  Problems  Med Hx  Surg Hx  Fam Hx        Objective:     Physical Exam  Vitals reviewed.   Cardiovascular:      Rate and Rhythm: Normal rate.      Pulses: Normal pulses.   Pulmonary:      Effort: Pulmonary effort is normal. No respiratory distress.   Musculoskeletal:         General: Deformity (left 4th toe amputation.) present.   Skin:     General: Skin is dry.      Capillary Refill: Capillary refill takes less than 2 seconds.      Findings: No erythema.   Neurological:      Mental Status: He is alert.      Sensory: Sensory deficit present.

## 2024-07-11 DIAGNOSIS — Z79.4 TYPE 2 DIABETES MELLITUS WITH OTHER SPECIFIED COMPLICATION, WITH LONG-TERM CURRENT USE OF INSULIN (HCC): Primary | ICD-10-CM

## 2024-07-11 DIAGNOSIS — E11.69 TYPE 2 DIABETES MELLITUS WITH OTHER SPECIFIED COMPLICATION, WITH LONG-TERM CURRENT USE OF INSULIN (HCC): Primary | ICD-10-CM

## 2024-07-11 NOTE — TELEPHONE ENCOUNTER
Reason for call:   [x] Refill   [] Prior Auth  [] Other:     Office:   [x] PCP/Provider -   [] Specialty/Provider -     BP PEN NEEDLE MICRO 32G X 6 MM    PHARMACY  HOME [place] [address] [address]    Does the patient have enough for 3 days?   [x] Yes   [] No - Send as HP to POD

## 2024-07-15 NOTE — TELEPHONE ENCOUNTER
Patient called the RX Refill Line. Message is being forwarded to the office.     Totally out of needles.      Patient have been out of his pen needles and have requested this for refill on 07.11.2024    Please review and sent to pharmacy ASAP    Please contact patient at [phone]

## 2024-07-16 RX ORDER — PEN NEEDLE, DIABETIC 32 GX 1/4"
NEEDLE, DISPOSABLE MISCELLANEOUS DAILY
Qty: 100 EACH | Refills: 5 | Status: SHIPPED | OUTPATIENT
Start: 2024-07-16

## 2024-07-18 ENCOUNTER — OFFICE VISIT (OUTPATIENT)
Dept: INTERNAL MEDICINE CLINIC | Facility: OTHER | Age: 64
End: 2024-07-18
Payer: COMMERCIAL

## 2024-07-18 VITALS
OXYGEN SATURATION: 99 % | WEIGHT: 246.4 LBS | RESPIRATION RATE: 20 BRPM | TEMPERATURE: 98.2 F | BODY MASS INDEX: 33.38 KG/M2 | SYSTOLIC BLOOD PRESSURE: 118 MMHG | DIASTOLIC BLOOD PRESSURE: 64 MMHG | HEART RATE: 76 BPM | HEIGHT: 72 IN

## 2024-07-18 DIAGNOSIS — I10 ESSENTIAL (PRIMARY) HYPERTENSION: ICD-10-CM

## 2024-07-18 DIAGNOSIS — Z71.89 ADVANCED CARE PLANNING/COUNSELING DISCUSSION: ICD-10-CM

## 2024-07-18 DIAGNOSIS — E11.69 TYPE 2 DIABETES MELLITUS WITH OTHER SPECIFIED COMPLICATION, WITH LONG-TERM CURRENT USE OF INSULIN (HCC): Primary | ICD-10-CM

## 2024-07-18 DIAGNOSIS — I25.5 ISCHEMIC CARDIOMYOPATHY: ICD-10-CM

## 2024-07-18 DIAGNOSIS — Z79.4 TYPE 2 DIABETES MELLITUS WITH OTHER SPECIFIED COMPLICATION, WITH LONG-TERM CURRENT USE OF INSULIN (HCC): Primary | ICD-10-CM

## 2024-07-18 DIAGNOSIS — Z13.6 SCREENING FOR CARDIOVASCULAR CONDITION: ICD-10-CM

## 2024-07-18 DIAGNOSIS — Z12.5 SCREENING FOR PROSTATE CANCER: ICD-10-CM

## 2024-07-18 DIAGNOSIS — Z00.00 MEDICARE ANNUAL WELLNESS VISIT, SUBSEQUENT: ICD-10-CM

## 2024-07-18 DIAGNOSIS — I50.22 CHRONIC SYSTOLIC CHF (CONGESTIVE HEART FAILURE) (HCC): ICD-10-CM

## 2024-07-18 PROBLEM — G89.18 POST-OP PAIN: Status: RESOLVED | Noted: 2024-05-22 | Resolved: 2024-07-18

## 2024-07-18 PROBLEM — B95.61 MSSA BACTEREMIA: Status: RESOLVED | Noted: 2024-02-02 | Resolved: 2024-07-18

## 2024-07-18 PROBLEM — T82.7XXA INFECTED DEFIBRILLATOR (HCC): Status: RESOLVED | Noted: 2024-03-11 | Resolved: 2024-07-18

## 2024-07-18 PROBLEM — R78.81 MSSA BACTEREMIA: Status: RESOLVED | Noted: 2024-02-02 | Resolved: 2024-07-18

## 2024-07-18 PROBLEM — E87.20 ACIDOSIS: Status: RESOLVED | Noted: 2023-05-06 | Resolved: 2024-07-18

## 2024-07-18 PROCEDURE — G0439 PPPS, SUBSEQ VISIT: HCPCS | Performed by: INTERNAL MEDICINE

## 2024-07-18 PROCEDURE — 99497 ADVNCD CARE PLAN 30 MIN: CPT | Performed by: INTERNAL MEDICINE

## 2024-07-18 PROCEDURE — 99214 OFFICE O/P EST MOD 30 MIN: CPT | Performed by: INTERNAL MEDICINE

## 2024-07-18 PROCEDURE — G0444 DEPRESSION SCREEN ANNUAL: HCPCS | Performed by: INTERNAL MEDICINE

## 2024-07-18 RX ORDER — INSULIN GLARGINE 100 [IU]/ML
30 INJECTION, SOLUTION SUBCUTANEOUS
Qty: 15 ML | Refills: 5 | Status: SHIPPED | OUTPATIENT
Start: 2024-07-18

## 2024-07-18 NOTE — ASSESSMENT & PLAN NOTE
Continue current diabetic regimen.  Will switch from Levemir to Lantus, 30 units daily.  Due to a misunderstanding he was taking both Janumet and metformin.  I advised that he stop metformin.  Lab Results   Component Value Date    HGBA1C 7.1 (H) 05/14/2024

## 2024-07-18 NOTE — PROGRESS NOTES
Ambulatory Visit  Name: Nadir Myers      : 1960      MRN: [id number]  Encounter Provider: Beau Lemus MD  Encounter Date: 2024   Encounter department: [facility] [place]    Assessment & Plan   1. Type 2 diabetes mellitus with other specified complication, with long-term current use of insulin (HCC)  Assessment & Plan:  Continue current diabetic regimen.  Will switch from Levemir to Lantus, 30 units daily.  Due to a misunderstanding he was taking both Janumet and metformin.  I advised that he stop metformin.  Lab Results   Component Value Date    HGBA1C 7.1 (H) 2024     Orders:  -     Insulin Glargine Solostar (Lantus SoloStar) 100 UNIT/ML SOPN; Inject 0.3 mL (30 Units total) under the skin daily at bedtime  -     CBC and differential; Future; Expected date: 10/18/2024  -     Comprehensive metabolic panel; Future; Expected date: 10/18/2024  -     Hemoglobin A1C; Future; Expected date: 10/18/2024  2. Medicare annual wellness visit, subsequent  3. Screening for cardiovascular condition  4. Screening for prostate cancer  5. Ischemic cardiomyopathy  Assessment & Plan:  Stable, asymptomatic.  Continue follow-up with cardiology.  Continue current medication regimen.  6. Essential (primary) hypertension  Assessment & Plan:  Controlled on current antihypertensive regimen.  7. Chronic systolic CHF (congestive heart failure) (HCC)  8. Advanced care planning/counseling discussion  Assessment & Plan:  Nadir Myers, his wife Rhoda Myers, and I had a thorough discussion regarding advance care planning.  he  has a Living Will at home to which I recommended he provide a copy to be scanned for his medical records.  ACP documentation provided to patient today.  he  understands that today's visit is a billable service.  Refer to ACP note.      Serious Illness Conversation    1. What is your understanding now of where you are with your illness?  Prognostic Understanding:  appropriate understanding of prognosis     2. How much information about what is likely to be ahead with your illness would you like to have?  Information: patient wants to be fully informed     3. What did you (clinician) communicate to the patient?  Prognostic Communication: Uncertain - It can be difficult to predict what will happen with your illness. I hope you will continue to live well for a long time but I’m worried that you could get sick quickly, and I think it is important to prepare for that possibility.     4. If your health situation worsens, what are your most important goals?  Goals: have my medical decisions respected, be mentally aware, be at home, be emotionally at peace, be spiritually and emotionally at peace, be physically comfortable, not be a burden     5. What are the biggest fears and worries about the future and your health?  Fears/Worries: loss of control, being a physical burden, being dependent, being a financial burden, burdening others     6. What abilities are so critical to your life that you cannot imagine living without them?  Unacceptable Function: being unconscious     7. What gives you strength as you think about the future with your illness?     8. If you become sicker, how much are you willing to go through for the possibility of gaining more time?  Be in the hospital: Yes Have a feeding tube: Yes   Be in the ICU: Yes Live in a nursing home: Yes   Be on a ventilator: Yes Be uncomfortable: Yes   Be on dialysis: Yes Undergo aggressive test and/or procedures: Yes   9. How much does your proxy and family know about your priorities and wishes?  Discussion Discussion: extensive discussion with family about goals and wishes        How does this plan sound to you? I will do everything I can to help you through this.  Patient verbalized understanding of the plan     I have spent 16 minutes speaking with my patient on advanced care planning today or during this visit     Advanced  directives  Five Wishes: Patient does not have Five Wishes- would like information            Preventive health issues were discussed with patient, and age appropriate screening tests were ordered as noted in patient's After Visit Summary. Personalized health advice and appropriate referrals for health education or preventive services given if needed, as noted in patient's After Visit Summary.    History of Present Illness     Nadir Myers is seen today for follow up of chronic conditions.   Recent laboratory studies reviewed today with the patient.   he has been compliant with his medication regimen.     he has no complaints or concerns at this time.       Diabetes  He presents for his follow-up diabetic visit. He has type 2 diabetes mellitus. His disease course has been stable. There are no hypoglycemic associated symptoms. Pertinent negatives for hypoglycemia include no dizziness or headaches. Associated symptoms include foot paresthesias and foot ulcerations. Pertinent negatives for diabetes include no chest pain, no fatigue and no weakness. Symptoms are stable. Current diabetic treatment includes insulin injections and oral agent (triple therapy). He is compliant with treatment all of the time. An ACE inhibitor/angiotensin II receptor blocker is being taken. He sees a podiatrist.Eye exam is current.   Hypertension  This is a chronic problem. The current episode started more than 1 year ago. The problem is unchanged. The problem is controlled. Pertinent negatives include no chest pain, headaches, palpitations or shortness of breath. Past treatments include angiotensin blockers, beta blockers and diuretics. The current treatment provides moderate improvement. There are no compliance problems.    Hyperlipidemia  This is a chronic problem. The current episode started more than 1 year ago. The problem is controlled. Recent lipid tests were reviewed and are normal. Pertinent negatives include no chest pain or  shortness of breath. Current antihyperlipidemic treatment includes statins. The current treatment provides moderate improvement of lipids. There are no compliance problems.       Patient Care Team:  Beau Lemus MD as PCP - General (Internal Medicine)  Beau Lemus MD as PCP - PCP-[facility] ([facility])  DO Nadir Swenson MD as Cardiologist (Cardiology)  Adelina Muir RN as Registered Nurse (Cardiology)  Nash Samuels DO as Cardiologist (Cardiology)  Ludwin Treadwell MD (Nephrology)    Review of Systems   Constitutional:  Negative for activity change, appetite change, chills, diaphoresis, fatigue and fever.   HENT:  Negative for congestion, postnasal drip, rhinorrhea, sinus pressure, sinus pain, sneezing and sore throat.    Eyes:  Negative for visual disturbance.   Respiratory:  Negative for apnea, cough, choking, chest tightness, shortness of breath and wheezing.    Cardiovascular:  Negative for chest pain, palpitations and leg swelling.   Gastrointestinal:  Negative for abdominal distention, abdominal pain, anal bleeding, blood in stool, constipation, diarrhea, nausea and vomiting.   Endocrine: Negative for cold intolerance and heat intolerance.   Genitourinary:  Negative for difficulty urinating, dysuria and hematuria.   Musculoskeletal: Negative.    Skin: Negative.    Neurological:  Negative for dizziness, weakness, light-headedness, numbness and headaches.   Hematological:  Negative for adenopathy.   Psychiatric/Behavioral:  Negative for agitation, sleep disturbance and suicidal ideas.    All other systems reviewed and are negative.    Medical History Reviewed by provider this encounter:  Tobacco  Allergies  Meds  Problems  Med Hx  Surg Hx  Fam Hx       Annual Wellness Visit Questionnaire   Nadir is here for his Subsequent Wellness visit. Last Medicare Wellness visit information reviewed, patient interviewed and updates made to the record today.      Health Risk  Assessment:   Patient rates overall health as fair. Patient feels that their physical health rating is same. Patient is satisfied with their life. Eyesight was rated as same. Hearing was rated as same. Patient feels that their emotional and mental health rating is same. Patients states they are never, rarely angry. Patient states they are sometimes unusually tired/fatigued. Pain experienced in the last 7 days has been none. Patient states that he has experienced weight loss or gain in last 6 months.     Depression Screening:   PHQ-2 Score: 1      Fall Risk Screening:   In the past year, patient has experienced: no history of falling in past year      Home Safety:  Patient does not have trouble with stairs inside or outside of their home. Patient has working smoke alarms and has no working carbon monoxide detector. Home safety hazards include: none.     Nutrition:   Current diet is Diabetic.     Medications:   Patient is not currently taking any over-the-counter supplements. Patient is able to manage medications.     Activities of Daily Living (ADLs)/Instrumental Activities of Daily Living (IADLs):   Walk and transfer into and out of bed and chair?: Yes  Dress and groom yourself?: Yes    Bathe or shower yourself?: Yes    Feed yourself? Yes  Do your laundry/housekeeping?: Yes  Manage your money, pay your bills and track your expenses?: Yes  Make your own meals?: Yes    Do your own shopping?: Yes    Previous Hospitalizations:   Any hospitalizations or ED visits within the last 12 months?: Yes    How many hospitalizations have you had in the last year?: 1-2    Advance Care Planning:   Living will: No    Durable POA for healthcare: No    Advanced directive: No    Advanced directive counseling given: Yes    End of Life Decisions reviewed with patient: Yes    Provider agrees with end of life decisions: Yes      Cognitive Screening:   Provider or family/friend/caregiver concerned regarding cognition?: No    PREVENTIVE  SCREENINGS      Cardiovascular Screening:    General: Screening Current and Risks and Benefits Discussed      Diabetes Screening:     General: Screening Not Indicated, History Diabetes, Risks and Benefits Discussed and Screening Current      Colorectal Cancer Screening:     General: Risks and Benefits Discussed    Due for: Cologuard      Prostate Cancer Screening:    General: Screening Current and Risks and Benefits Discussed      Osteoporosis Screening:    General: Screening Not Indicated      Abdominal Aortic Aneurysm (AAA) Screening:    Risk factors include: tobacco use        General: Risks and Benefits Discussed and Screening Current      Lung Cancer Screening:     General: Risks and Benefits Discussed and Screening Current    Due for: Low Dose CT (LDCT)      Hepatitis C Screening:    General: Risks and Benefits Discussed and Screening Current    Screening, Brief Intervention, and Referral to Treatment (SBIRT)    Screening  Typical number of drinks in a day: 0  Typical number of drinks in a week: 0  Interpretation: Low risk drinking behavior.    Single Item Drug Screening:  How often have you used an illegal drug (including marijuana) or a prescription medication for non-medical reasons in the past year? never    Single Item Drug Screen Score: 0  Interpretation: Negative screen for possible drug use disorder    Brief Intervention  Alcohol & drug use screenings were reviewed. No concerns regarding substance use disorder identified.     Annual Depression Screening  Time spent screening and evaluating the patient for depression during today's encounter was 5 minutes.    Other Counseling Topics:   Car/seat belt/driving safety, skin self-exam, sunscreen and calcium and vitamin D intake and regular weightbearing exercise.     Social Determinants of Health     Food Insecurity: No Food Insecurity (7/18/2024)    Hunger Vital Sign     Worried About Running Out of Food in the Last Year: Never true     Ran Out of Food in  the Last Year: Never true   Transportation Needs: No Transportation Needs (7/18/2024)    PRAPARE - Transportation     Lack of Transportation (Medical): No     Lack of Transportation (Non-Medical): No   Housing Stability: Low Risk  (7/18/2024)    Housing Stability Vital Sign     Unable to Pay for Housing in the Last Year: No     Number of Times Moved in the Last Year: 1     Homeless in the Last Year: No   Utilities: Not At Risk (7/18/2024)    [facility] Utilities     Threatened with loss of utilities: No     No results found.    Objective     /64 (BP Location: Left arm, Patient Position: Sitting, Cuff Size: Large)   Pulse 76   Temp 98.2 °F (36.8 °C) (Temporal)   Resp 20   Ht 6' (1.829 m)   Wt 112 kg (246 lb 6.4 oz)   SpO2 99%   BMI 33.42 kg/m²     Physical Exam  Vitals and nursing note reviewed.   Constitutional:       General: He is not in acute distress.     Appearance: Normal appearance. He is normal weight. He is not ill-appearing, toxic-appearing or diaphoretic.   HENT:      Head: Normocephalic and atraumatic.      Right Ear: Tympanic membrane, ear canal and external ear normal. There is no impacted cerumen.      Left Ear: Tympanic membrane, ear canal and external ear normal. There is no impacted cerumen.      Nose: Nose normal. No congestion or rhinorrhea.      Mouth/Throat:      Mouth: Mucous membranes are moist.      Pharynx: Oropharynx is clear. No oropharyngeal exudate or posterior oropharyngeal erythema.   Eyes:      General: No scleral icterus.        Right eye: No discharge.         Left eye: No discharge.      Extraocular Movements: Extraocular movements intact.      Conjunctiva/sclera: Conjunctivae normal.      Pupils: Pupils are equal, round, and reactive to light.   Neck:      Vascular: No carotid bruit.   Cardiovascular:      Rate and Rhythm: Normal rate and regular rhythm.      Pulses: Normal pulses.      Heart sounds: Normal heart sounds. No murmur heard.     No friction rub. No gallop.    Pulmonary:      Effort: Pulmonary effort is normal. No respiratory distress.      Breath sounds: Normal breath sounds. No wheezing or rales.   Abdominal:      General: Abdomen is flat. Bowel sounds are normal. There is no distension.      Palpations: Abdomen is soft. There is no mass.      Tenderness: There is no abdominal tenderness. There is no guarding.      Hernia: No hernia is present.   Musculoskeletal:         General: No swelling, tenderness, deformity or signs of injury. Normal range of motion.      Cervical back: Normal range of motion and neck supple. No rigidity. No muscular tenderness.      Right lower leg: No edema.      Left lower leg: No edema.   Lymphadenopathy:      Cervical: No cervical adenopathy.   Skin:     General: Skin is warm and dry.      Capillary Refill: Capillary refill takes less than 2 seconds.      Coloration: Skin is not jaundiced or pale.      Findings: No bruising, erythema, lesion or rash.   Neurological:      General: No focal deficit present.      Mental Status: He is alert and oriented to person, place, and time. Mental status is at baseline.      Cranial Nerves: No cranial nerve deficit.      Sensory: No sensory deficit.      Motor: No weakness.      Coordination: Coordination normal.      Gait: Gait normal.      Deep Tendon Reflexes: Reflexes normal.   Psychiatric:         Mood and Affect: Mood normal.         Behavior: Behavior normal.         Thought Content: Thought content normal.         Judgment: Judgment normal.       Administrative Statements   I have spent a total time of 30  minutes in caring for this patient on the day of the visit/encounter including Diagnostic results, Prognosis, Risks and benefits of tx options, Instructions for management, Patient and family education, Importance of tx compliance, Risk factor reductions, Impressions, Counseling / Coordination of care, Documenting in the medical record, Reviewing / ordering tests, medicine, procedures  , and  Obtaining or reviewing history  .

## 2024-07-18 NOTE — PATIENT INSTRUCTIONS
Medicare Preventive Visit Patient Instructions  Thank you for completing your Welcome to Medicare Visit or Medicare Annual Wellness Visit today. Your next wellness visit will be due in one year (7/19/2025).  The screening/preventive services that you may require over the next 5-10 years are detailed below. Some tests may not apply to you based off risk factors and/or age. Screening tests ordered at today's visit but not completed yet may show as past due. Also, please note that scanned in results may not display below.  Preventive Screenings:  Service Recommendations Previous Testing/Comments   Colorectal Cancer Screening  Colonoscopy    Fecal Occult Blood Test (FOBT)/Fecal Immunochemical Test (FIT)  Fecal DNA/Cologuard Test  Flexible Sigmoidoscopy Age: 45-75 years old   Colonoscopy: every 10 years (May be performed more frequently if at higher risk)  OR  FOBT/FIT: every 1 year  OR  Cologuard: every 3 years  OR  Sigmoidoscopy: every 5 years  Screening may be recommended earlier than age 45 if at higher risk for colorectal cancer. Also, an individualized decision between you and your healthcare provider will decide whether screening between the ages of 76-85 would be appropriate. Colonoscopy: Not on file  FOBT/FIT: Not on file  Cologuard: Not on file  Sigmoidoscopy: Not on file          Prostate Cancer Screening Individualized decision between patient and health care provider in men between ages of 55-69   Medicare will cover every 12 months beginning on the day after your 50th birthday PSA: 0.16 ng/mL     Screening Current     Hepatitis C Screening Once for adults born between 1945 and 1965  More frequently in patients at high risk for Hepatitis C Hep C Antibody: Not on file        Diabetes Screening 1-2 times per year if you're at risk for diabetes or have pre-diabetes Fasting glucose: 192 mg/dL (5/22/2024)  A1C: 7.1 % (5/14/2024)  Screening Not Indicated  History Diabetes   Cholesterol Screening Once every 5 years  if you don't have a lipid disorder. May order more often based on risk factors. Lipid panel: 09/16/2023  Screening Current      Other Preventive Screenings Covered by Medicare:  Abdominal Aortic Aneurysm (AAA) Screening: covered once if your at risk. You're considered to be at risk if you have a family history of AAA or a male between the age of 65-75 who smoking at least 100 cigarettes in your lifetime.  Lung Cancer Screening: covers low dose CT scan once per year if you meet all of the following conditions: (1) Age 55-77; (2) No signs or symptoms of lung cancer; (3) Current smoker or have quit smoking within the last 15 years; (4) You have a tobacco smoking history of at least 20 pack years (packs per day x number of years you smoked); (5) You get a written order from a healthcare provider.  Glaucoma Screening: covered annually if you're considered high risk: (1) You have diabetes OR (2) Family history of glaucoma OR (3)  aged 50 and older OR (4)  American aged 65 and older  Osteoporosis Screening: covered every 2 years if you meet one of the following conditions: (1) Have a vertebral abnormality; (2) On glucocorticoid therapy for more than 3 months; (3) Have primary hyperparathyroidism; (4) On osteoporosis medications and need to assess response to drug therapy.  HIV Screening: covered annually if you're between the age of 15-65. Also covered annually if you are younger than 15 and older than 65 with risk factors for HIV infection. For pregnant patients, it is covered up to 3 times per pregnancy.    Immunizations:  Immunization Recommendations   Influenza Vaccine Annual influenza vaccination during flu season is recommended for all persons aged >= 6 months who do not have contraindications   Pneumococcal Vaccine   * Pneumococcal conjugate vaccine = PCV13 (Prevnar 13), PCV15 (Vaxneuvance), PCV20 (Prevnar 20)  * Pneumococcal polysaccharide vaccine = PPSV23 (Pneumovax) Adults 19-63 yo with  certain risk factors or if 65+ yo  If never received any pneumonia vaccine: recommend Prevnar 20 (PCV20)  Give PCV20 if previously received 1 dose of PCV13 or PPSV23   Hepatitis B Vaccine 3 dose series if at intermediate or high risk (ex: diabetes, end stage renal disease, liver disease)   Respiratory syncytial virus (RSV) Vaccine - COVERED BY MEDICARE PART D  * RSVPreF3 (Arexvy) CDC recommends that adults 60 years of age and older may receive a single dose of RSV vaccine using shared clinical decision-making (SCDM)   Tetanus (Td) Vaccine - COST NOT COVERED BY MEDICARE PART B Following completion of primary series, a booster dose should be given every 10 years to maintain immunity against tetanus. Td may also be given as tetanus wound prophylaxis.   Tdap Vaccine - COST NOT COVERED BY MEDICARE PART B Recommended at least once for all adults. For pregnant patients, recommended with each pregnancy.   Shingles Vaccine (Shingrix) - COST NOT COVERED BY MEDICARE PART B  2 shot series recommended in those 19 years and older who have or will have weakened immune systems or those 50 years and older     Health Maintenance Due:      Topic Date Due   • Hepatitis C Screening  Never done   • HIV Screening  Never done   • Colorectal Cancer Screening  Never done     Immunizations Due:      Topic Date Due   • Pneumococcal Vaccine: Pediatrics (0 to 5 Years) and At-Risk Patients (6 to 64 Years) (1 of 2 - PCV) Never done   • Hepatitis A Vaccine (1 of 2 - Risk 2-dose series) Never done   • COVID-19 Vaccine (4 - 2023-24 season) 09/01/2023   • Influenza Vaccine (1) 09/01/2024     Advance Directives   What are advance directives?  Advance directives are legal documents that state your wishes and plans for medical care. These plans are made ahead of time in case you lose your ability to make decisions for yourself. Advance directives can apply to any medical decision, such as the treatments you want, and if you want to donate organs.   What  are the types of advance directives?  There are many types of advance directives, and each state has rules about how to use them. You may choose a combination of any of the following:  Living will:  This is a written record of the treatment you want. You can also choose which treatments you do not want, which to limit, and which to stop at a certain time. This includes surgery, medicine, IV fluid, and tube feedings.   Durable power of  for healthcare (DPAHC):  This is a written record that states who you want to make healthcare choices for you when you are unable to make them for yourself. This person, called a proxy, is usually a family member or a friend. You may choose more than 1 proxy.  Do not resuscitate (DNR) order:  A DNR order is used in case your heart stops beating or you stop breathing. It is a request not to have certain forms of treatment, such as CPR. A DNR order may be included in other types of advance directives.  Medical directive:  This covers the care that you want if you are in a coma, near death, or unable to make decisions for yourself. You can list the treatments you want for each condition. Treatment may include pain medicine, surgery, blood transfusions, dialysis, IV or tube feedings, and a ventilator (breathing machine).  Values history:  This document has questions about your views, beliefs, and how you feel and think about life. This information can help others choose the care that you would choose.  Why are advance directives important?  An advance directive helps you control your care. Although spoken wishes may be used, it is better to have your wishes written down. Spoken wishes can be misunderstood, or not followed. Treatments may be given even if you do not want them. An advance directive may make it easier for your family to make difficult choices about your care.   Weight Management   Why it is important to manage your weight:  Being overweight increases your risk of  health conditions such as heart disease, high blood pressure, type 2 diabetes, and certain types of cancer. It can also increase your risk for osteoarthritis, sleep apnea, and other respiratory problems. Aim for a slow, steady weight loss. Even a small amount of weight loss can lower your risk of health problems.  How to lose weight safely:  A safe and healthy way to lose weight is to eat fewer calories and get regular exercise. You can lose up about 1 pound a week by decreasing the number of calories you eat by 500 calories each day.   Healthy meal plan for weight management:  A healthy meal plan includes a variety of foods, contains fewer calories, and helps you stay healthy. A healthy meal plan includes the following:  Eat whole-grain foods more often.  A healthy meal plan should contain fiber. Fiber is the part of grains, fruits, and vegetables that is not broken down by your body. Whole-grain foods are healthy and provide extra fiber in your diet. Some examples of whole-grain foods are whole-wheat breads and pastas, oatmeal, brown rice, and bulgur.  Eat a variety of vegetables every day.  Include dark, leafy greens such as spinach, kale, danielle greens, and mustard greens. Eat yellow and orange vegetables such as carrots, sweet potatoes, and winter squash.   Eat a variety of fruits every day.  Choose fresh or canned fruit (canned in its own juice or light syrup) instead of juice. Fruit juice has very little or no fiber.  Eat low-fat dairy foods.  Drink fat-free (skim) milk or 1% milk. Eat fat-free yogurt and low-fat cottage cheese. Try low-fat cheeses such as mozzarella and other reduced-fat cheeses.  Choose meat and other protein foods that are low in fat.  Choose beans or other legumes such as split peas or lentils. Choose fish, skinless poultry (chicken or turkey), or lean cuts of red meat (beef or pork). Before you cook meat or poultry, cut off any visible fat.   Use less fat and oil.  Try baking foods  instead of frying them. Add less fat, such as margarine, sour cream, regular salad dressing and mayonnaise to foods. Eat fewer high-fat foods. Some examples of high-fat foods include french fries, doughnuts, ice cream, and cakes.  Eat fewer sweets.  Limit foods and drinks that are high in sugar. This includes candy, cookies, regular soda, and sweetened drinks.  Exercise:  Exercise at least 30 minutes per day on most days of the week. Some examples of exercise include walking, biking, dancing, and swimming. You can also fit in more physical activity by taking the stairs instead of the elevator or parking farther away from stores. Ask your healthcare provider about the best exercise plan for you.      © Copyright [employer] 2018 Information is for End User's use only and may not be sold, redistributed or otherwise used for commercial purposes. All illustrations and images included in CareNotes® are the copyrighted property of [employer]. or [employer]    Medicare Preventive Visit Patient Instructions  Thank you for completing your Welcome to Medicare Visit or Medicare Annual Wellness Visit today. Your next wellness visit will be due in one year (7/19/2025).  The screening/preventive services that you may require over the next 5-10 years are detailed below. Some tests may not apply to you based off risk factors and/or age. Screening tests ordered at today's visit but not completed yet may show as past due. Also, please note that scanned in results may not display below.  Preventive Screenings:  Service Recommendations Previous Testing/Comments   Colorectal Cancer Screening  Colonoscopy    Fecal Occult Blood Test (FOBT)/Fecal Immunochemical Test (FIT)  Fecal DNA/Cologuard Test  Flexible Sigmoidoscopy Age: 45-75 years old   Colonoscopy: every 10 years (May be performed more frequently if at higher risk)  OR  FOBT/FIT: every 1 year  OR  Cologuard: every 3 years  OR  Sigmoidoscopy: every 5 years  Screening  may be recommended earlier than age 45 if at higher risk for colorectal cancer. Also, an individualized decision between you and your healthcare provider will decide whether screening between the ages of 76-85 would be appropriate. Colonoscopy: Not on file  FOBT/FIT: Not on file  Cologuard: Not on file  Sigmoidoscopy: Not on file          Prostate Cancer Screening Individualized decision between patient and health care provider in men between ages of 55-69   Medicare will cover every 12 months beginning on the day after your 50th birthday PSA: 0.16 ng/mL     Screening Current     Hepatitis C Screening Once for adults born between 1945 and 1965  More frequently in patients at high risk for Hepatitis C Hep C Antibody: Not on file        Diabetes Screening 1-2 times per year if you're at risk for diabetes or have pre-diabetes Fasting glucose: 192 mg/dL (5/22/2024)  A1C: 7.1 % (5/14/2024)  Screening Not Indicated  History Diabetes   Cholesterol Screening Once every 5 years if you don't have a lipid disorder. May order more often based on risk factors. Lipid panel: 09/16/2023  Screening Current      Other Preventive Screenings Covered by Medicare:  Abdominal Aortic Aneurysm (AAA) Screening: covered once if your at risk. You're considered to be at risk if you have a family history of AAA or a male between the age of 65-75 who smoking at least 100 cigarettes in your lifetime.  Lung Cancer Screening: covers low dose CT scan once per year if you meet all of the following conditions: (1) Age 55-77; (2) No signs or symptoms of lung cancer; (3) Current smoker or have quit smoking within the last 15 years; (4) You have a tobacco smoking history of at least 20 pack years (packs per day x number of years you smoked); (5) You get a written order from a healthcare provider.  Glaucoma Screening: covered annually if you're considered high risk: (1) You have diabetes OR (2) Family history of glaucoma OR (3)  aged 50 and  older OR (4)  American aged 65 and older  Osteoporosis Screening: covered every 2 years if you meet one of the following conditions: (1) Have a vertebral abnormality; (2) On glucocorticoid therapy for more than 3 months; (3) Have primary hyperparathyroidism; (4) On osteoporosis medications and need to assess response to drug therapy.  HIV Screening: covered annually if you're between the age of 15-65. Also covered annually if you are younger than 15 and older than 65 with risk factors for HIV infection. For pregnant patients, it is covered up to 3 times per pregnancy.    Immunizations:  Immunization Recommendations   Influenza Vaccine Annual influenza vaccination during flu season is recommended for all persons aged >= 6 months who do not have contraindications   Pneumococcal Vaccine   * Pneumococcal conjugate vaccine = PCV13 (Prevnar 13), PCV15 (Vaxneuvance), PCV20 (Prevnar 20)  * Pneumococcal polysaccharide vaccine = PPSV23 (Pneumovax) Adults 19-65 yo with certain risk factors or if 65+ yo  If never received any pneumonia vaccine: recommend Prevnar 20 (PCV20)  Give PCV20 if previously received 1 dose of PCV13 or PPSV23   Hepatitis B Vaccine 3 dose series if at intermediate or high risk (ex: diabetes, end stage renal disease, liver disease)   Respiratory syncytial virus (RSV) Vaccine - COVERED BY MEDICARE PART D  * RSVPreF3 (Arexvy) CDC recommends that adults 60 years of age and older may receive a single dose of RSV vaccine using shared clinical decision-making (SCDM)   Tetanus (Td) Vaccine - COST NOT COVERED BY MEDICARE PART B Following completion of primary series, a booster dose should be given every 10 years to maintain immunity against tetanus. Td may also be given as tetanus wound prophylaxis.   Tdap Vaccine - COST NOT COVERED BY MEDICARE PART B Recommended at least once for all adults. For pregnant patients, recommended with each pregnancy.   Shingles Vaccine (Shingrix) - COST NOT COVERED BY  MEDICARE PART B  2 shot series recommended in those 19 years and older who have or will have weakened immune systems or those 50 years and older     Health Maintenance Due:      Topic Date Due   • Hepatitis C Screening  Never done   • HIV Screening  Never done   • Colorectal Cancer Screening  Never done     Immunizations Due:      Topic Date Due   • Pneumococcal Vaccine: Pediatrics (0 to 5 Years) and At-Risk Patients (6 to 64 Years) (1 of 2 - PCV) Never done   • Hepatitis A Vaccine (1 of 2 - Risk 2-dose series) Never done   • COVID-19 Vaccine (4 - 2023-24 season) 09/01/2023   • Influenza Vaccine (1) 09/01/2024     Advance Directives   What are advance directives?  Advance directives are legal documents that state your wishes and plans for medical care. These plans are made ahead of time in case you lose your ability to make decisions for yourself. Advance directives can apply to any medical decision, such as the treatments you want, and if you want to donate organs.   What are the types of advance directives?  There are many types of advance directives, and each state has rules about how to use them. You may choose a combination of any of the following:  Living will:  This is a written record of the treatment you want. You can also choose which treatments you do not want, which to limit, and which to stop at a certain time. This includes surgery, medicine, IV fluid, and tube feedings.   Durable power of  for healthcare (DPAHC):  This is a written record that states who you want to make healthcare choices for you when you are unable to make them for yourself. This person, called a proxy, is usually a family member or a friend. You may choose more than 1 proxy.  Do not resuscitate (DNR) order:  A DNR order is used in case your heart stops beating or you stop breathing. It is a request not to have certain forms of treatment, such as CPR. A DNR order may be included in other types of advance directives.  Medical  directive:  This covers the care that you want if you are in a coma, near death, or unable to make decisions for yourself. You can list the treatments you want for each condition. Treatment may include pain medicine, surgery, blood transfusions, dialysis, IV or tube feedings, and a ventilator (breathing machine).  Values history:  This document has questions about your views, beliefs, and how you feel and think about life. This information can help others choose the care that you would choose.  Why are advance directives important?  An advance directive helps you control your care. Although spoken wishes may be used, it is better to have your wishes written down. Spoken wishes can be misunderstood, or not followed. Treatments may be given even if you do not want them. An advance directive may make it easier for your family to make difficult choices about your care.   Weight Management   Why it is important to manage your weight:  Being overweight increases your risk of health conditions such as heart disease, high blood pressure, type 2 diabetes, and certain types of cancer. It can also increase your risk for osteoarthritis, sleep apnea, and other respiratory problems. Aim for a slow, steady weight loss. Even a small amount of weight loss can lower your risk of health problems.  How to lose weight safely:  A safe and healthy way to lose weight is to eat fewer calories and get regular exercise. You can lose up about 1 pound a week by decreasing the number of calories you eat by 500 calories each day.   Healthy meal plan for weight management:  A healthy meal plan includes a variety of foods, contains fewer calories, and helps you stay healthy. A healthy meal plan includes the following:  Eat whole-grain foods more often.  A healthy meal plan should contain fiber. Fiber is the part of grains, fruits, and vegetables that is not broken down by your body. Whole-grain foods are healthy and provide extra fiber in your  diet. Some examples of whole-grain foods are whole-wheat breads and pastas, oatmeal, brown rice, and bulgur.  Eat a variety of vegetables every day.  Include dark, leafy greens such as spinach, kale, danielle greens, and mustard greens. Eat yellow and orange vegetables such as carrots, sweet potatoes, and winter squash.   Eat a variety of fruits every day.  Choose fresh or canned fruit (canned in its own juice or light syrup) instead of juice. Fruit juice has very little or no fiber.  Eat low-fat dairy foods.  Drink fat-free (skim) milk or 1% milk. Eat fat-free yogurt and low-fat cottage cheese. Try low-fat cheeses such as mozzarella and other reduced-fat cheeses.  Choose meat and other protein foods that are low in fat.  Choose beans or other legumes such as split peas or lentils. Choose fish, skinless poultry (chicken or turkey), or lean cuts of red meat (beef or pork). Before you cook meat or poultry, cut off any visible fat.   Use less fat and oil.  Try baking foods instead of frying them. Add less fat, such as margarine, sour cream, regular salad dressing and mayonnaise to foods. Eat fewer high-fat foods. Some examples of high-fat foods include french fries, doughnuts, ice cream, and cakes.  Eat fewer sweets.  Limit foods and drinks that are high in sugar. This includes candy, cookies, regular soda, and sweetened drinks.  Exercise:  Exercise at least 30 minutes per day on most days of the week. Some examples of exercise include walking, biking, dancing, and swimming. You can also fit in more physical activity by taking the stairs instead of the elevator or parking farther away from stores. Ask your healthcare provider about the best exercise plan for you.      © Copyright [employer] 2018 Information is for End User's use only and may not be sold, redistributed or otherwise used for commercial purposes. All illustrations and images included in CareNotes® are the copyrighted property of [employer] or [employer]  [facility]

## 2024-07-18 NOTE — ASSESSMENT & PLAN NOTE
Nadir Myers, his wife Rhoda Myers, and I had a thorough discussion regarding advance care planning.  he  has a Living Will at home to which I recommended he provide a copy to be scanned for his medical records.  ACP documentation provided to patient today.  he  understands that today's visit is a billable service.  Refer to ACP note.

## 2024-07-26 ENCOUNTER — PATIENT MESSAGE (OUTPATIENT)
Dept: CARDIOLOGY CLINIC | Facility: CLINIC | Age: 64
End: 2024-07-26

## 2024-07-26 NOTE — PATIENT COMMUNICATION
Phone call to patient.  Dr. Samuels is not available.  Vomitting does not sound like a cardiac issue.  Suggested patient call his family doctor to evaluate.    Patient understood instructions and agreed to call family doctor.

## 2024-08-01 ENCOUNTER — OFFICE VISIT (OUTPATIENT)
Dept: CARDIOLOGY CLINIC | Facility: CLINIC | Age: 64
End: 2024-08-01
Payer: COMMERCIAL

## 2024-08-01 VITALS
WEIGHT: 247 LBS | BODY MASS INDEX: 33.46 KG/M2 | HEIGHT: 72 IN | DIASTOLIC BLOOD PRESSURE: 60 MMHG | SYSTOLIC BLOOD PRESSURE: 108 MMHG | OXYGEN SATURATION: 99 % | HEART RATE: 73 BPM

## 2024-08-01 DIAGNOSIS — I25.10 CORONARY ARTERY DISEASE INVOLVING NATIVE CORONARY ARTERY OF NATIVE HEART WITHOUT ANGINA PECTORIS: ICD-10-CM

## 2024-08-01 DIAGNOSIS — I47.20 VENTRICULAR TACHYARRHYTHMIA (HCC): Primary | ICD-10-CM

## 2024-08-01 DIAGNOSIS — I49.9 VENTRICULAR ARRHYTHMIA: ICD-10-CM

## 2024-08-01 DIAGNOSIS — Z92.29 H/O AMIODARONE THERAPY: ICD-10-CM

## 2024-08-01 PROCEDURE — 93000 ELECTROCARDIOGRAM COMPLETE: CPT | Performed by: INTERNAL MEDICINE

## 2024-08-01 PROCEDURE — 99215 OFFICE O/P EST HI 40 MIN: CPT | Performed by: INTERNAL MEDICINE

## 2024-08-01 NOTE — PROGRESS NOTES
Cardiology Follow Up    Nadir Myers  1960  [id number]  [facility]  [address] [facility]  [place] [id number] [phone]    1. Ventricular tachyarrhythmia (HCC)  POCT ECG    TSH, 3rd generation with Free T4 reflex      2. Coronary artery disease involving native coronary artery of native heart without angina pectoris  POCT ECG    TSH, 3rd generation with Free T4 reflex      3. H/O amiodarone therapy  POCT ECG    TSH, 3rd generation with Free T4 reflex      4. Ventricular arrhythmia  TSH, 3rd generation with Free T4 reflex            Interval History:     Nadir Myers is a 64 y.o. male with ischemic cardiomyopathy, CAD, ventricular tachycardia episode, LV apical aneurysm, hypertension, hyperlipidemia, diabetes who presented 4/8/2024 to discuss defibrillator implantation.    Patient had dual-chamber Medtronic ICD placed in May 2023 for secondary prevention for ventricular tachycardia that was thought to be scar related.  He had MSSA bacteremia with the likely source being his left foot toe.  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  He is blood culture remain positive despite being on IV antibiotic and there was suspicion for ICD lead vegetation and infection related to it.  He underwent extraction of his dual-chamber ICD on February 9th 2024.  He underwent Medtronic EV ICD placement on February 20, 2024.  Unfortunately his lead had dislodged into pleural space and he underwent revision procedure on February 21, 2024.  However during the procedure there was very poor sensing on the lead in multiple places and the procedure was aborted.  He was discharged on LifeVest.    He had completed 6 weeks of IV antibiotics.  He had repeat blood culture ordered to be obtain during the week of 4/8/24. He is currently wearing LifeVest and has not had any inappropriate shocks.     Interval history:  Nadir presents for  a follow-up visit.  He underwent subcu ICD placement on 5/21/2024. He is feeling well. He has been having nausea/vomiting spells. Occurs once week. Denies weight gain at the time of episodes. Otherwise denies any chest pain, palpitations, orthopnea, PND or syncope. Reports compliance with medications.       Patient Active Problem List   Diagnosis    Essential (primary) hypertension    Benign prostatic hyperplasia without lower urinary tract symptoms    Type 2 diabetes mellitus, with long-term current use of insulin (formerly [facility])    Tobacco dependence syndrome    Acute HFrEF    A-fib (formerly [facility])    Ischemic cardiomyopathy    V-tach (formerly [facility])    Chronic systolic CHF (congestive heart failure) (formerly [facility])    Type 2 diabetes mellitus with foot ulcer, with long-term current use of insulin (formerly [facility])    3-vessel CAD    PAD (peripheral artery disease) (formerly [facility])    Abnormal CT of the chest    ICD (implantable cardioverter-defibrillator) in place    Amputated 4th toe, left (formerly [facility])    Type 2 diabetes mellitus with diabetic peripheral angiopathy without gangrene (formerly [facility])    Advanced care planning/counseling discussion     Past Medical History:   Diagnosis Date    Diabetes mellitus (formerly [facility])     Infected defibrillator (formerly [facility]) 03/11/2024    Myocardial infarction (formerly [facility])      Social History     Socioeconomic History    Marital status: /Civil Union     Spouse name: Not on file    Number of children: Not on file    Years of education: Not on file    Highest education level: Not on file   Occupational History    Not on file   Tobacco Use    Smoking status: Former     Types: Cigarettes     Start date: 1/30/2024    Smokeless tobacco: Never   Vaping Use    Vaping status: Never Used   Substance and Sexual Activity    Alcohol use: Not Currently    Drug use: Never    Sexual activity: Not Currently     Partners: Female   Other Topics Concern    Not on file   Social History Narrative    Not on file     Social Determinants of Health     Financial Resource Strain: Not on file    Food Insecurity: No Food Insecurity (7/18/2024)    Hunger Vital Sign     Worried About Running Out of Food in the Last Year: Never true     Ran Out of Food in the Last Year: Never true   Transportation Needs: No Transportation Needs (7/18/2024)    PRAPARE - Transportation     Lack of Transportation (Medical): No     Lack of Transportation (Non-Medical): No   Physical Activity: Not on file   Stress: Not on file   Social Connections: Not on file   Intimate Partner Violence: Not on file   Housing Stability: Low Risk  (7/18/2024)    Housing Stability Vital Sign     Unable to Pay for Housing in the Last Year: No     Number of Times Moved in the Last Year: 1     Homeless in the Last Year: No      No family history on file.  Past Surgical History:   Procedure Laterality Date    CARDIAC CATHETERIZATION N/A 05/03/2023    Procedure: Cardiac Coronary Angiogram;  Surgeon: Valeriy Shore MD;  Location: [facility];  Service: Cardiology    CARDIAC CATHETERIZATION Left 05/03/2023    Procedure: Cardiac Left Heart Cath;  Surgeon: Valeriy Shore MD;  [facility];  Service: Cardiology    CARDIAC DEFIBRILLATOR PLACEMENT  05/2023    CARDIAC ELECTROPHYSIOLOGY PROCEDURE N/A 05/12/2023    Procedure: Cardiac icd implant;  Surgeon: Urbano Maria MD;  [facility]: [facility];  Service: Cardiology    CARDIAC ELECTROPHYSIOLOGY PROCEDURE N/A 2/20/2024    Procedure: EV ICD IMPLANTATION;  Surgeon: Arturo Wooten DO;  Location: [facility];  Service: Cardiology    CARDIAC ELECTROPHYSIOLOGY PROCEDURE N/A 2/8/2024    Procedure: Cardiac laser lead extraction;  Surgeon: Arturo Wooten DO;  [facility];  Service: Cardiology    CARDIAC ELECTROPHYSIOLOGY PROCEDURE N/A 5/21/2024    Procedure: Cardiac icd implant subq;  Surgeon: Urbano Maria MD;  [facility];  Service: Cardiology    IR LOWER EXTREMITY ANGIOGRAM  1/18/2024    DC RMVL TRANSVNS PM ELTRD DUAL LEAD SYS N/A 2/20/2024     Procedure: EV ICD IMPLANTATION;  Surgeon: Abhilash Ferrera MD;  Location: [facility];  Service: Cardiac Surgery    PA RMVL TRANSVNS PM ELTRD DUAL LEAD SYS N/A 2/21/2024    Procedure: REMOVAL OF LEAD AND GENERATOR AND ATTEMPTED LEAD REVISION;  Surgeon: Abhilash Ferrera MD;  [facility];  Service: Cardiac Surgery    WOUND DEBRIDEMENT Left 2/4/2024    Procedure: LEFT FOURTH TOE AMPUTATION SURGICAL WOUND DEBRIDEMENT FOOT/TOE (WASH OUT);  Surgeon: Bebo Hopper DPM;  Location: [facility];  Service: Podiatry       Current Outpatient Medications:     amiodarone 200 mg tablet, TAKE 1 TABLET BY MOUTH DAILY WITH BREAKFAST., Disp: 90 tablet, Rfl: 4    aspirin 81 mg chewable tablet, Chew 1 tablet (81 mg total) daily Do not start before March 2, 2024., Disp: 30 tablet, Rfl: 0    atorvastatin (LIPITOR) 80 mg tablet, TAKE 1 TABLET (80 MG TOTAL) BY MOUTH DAILY AFTER DINNER, Disp: 90 tablet, Rfl: 2    BD Pen Needle Micro U/F 32G X 6 MM MISC, Inject under the skin in the morning, Disp: 100 each, Rfl: 5    clopidogrel (PLAVIX) 75 mg tablet, TAKE 1 TABLET BY MOUTH EVERY DAY, Disp: 90 tablet, Rfl: 1    Empagliflozin (Jardiance) 10 MG TABS tablet, Take 1 tablet (10 mg total) by mouth every morning, Disp: 30 tablet, Rfl: 11    Insulin Glargine Solostar (Lantus SoloStar) 100 UNIT/ML SOPN, Inject 0.3 mL (30 Units total) under the skin daily at bedtime, Disp: 15 mL, Rfl: 5    metoprolol succinate (TOPROL-XL) 50 mg 24 hr tablet, TAKE 1 TABLET BY MOUTH EVERY 12 HOURS., Disp: 180 tablet, Rfl: 1    sacubitril-valsartan (Entresto) 49-51 MG TABS, Take 1 tablet by mouth 2 (two) times a day, Disp: 180 tablet, Rfl: 3    sitaGLIPtin-metFORMIN (JANUMET)  MG per tablet, Take 1 tablet by mouth 2 (two) times a day with meals (Patient taking differently: Take 1 tablet by mouth in the morning), Disp: 180 tablet, Rfl: 3    torsemide (DEMADEX) 20 mg tablet, TAKE 1/2 TABLET BY MOUTH DAILY, Disp: 45 tablet, Rfl: 1  Allergies   Allergen Reactions     Vancomycin Rash     NOT AN ALLERGY - 1/31/24 patient experienced vancomycin infusion reaction, please extend duration of infusion time to prevent future infusion reactions       Imaging: CT chest without contrast    Result Date: 3/21/2024  Narrative: CT CHEST WITHOUT IV CONTRAST INDICATION: R93.89: Abnormal findings on diagnostic imaging of other specified body structures. COMPARISON: CT chest 2/21/2024 TECHNIQUE: CT examination of the chest was performed without intravenous contrast. Multiplanar 2D reformatted images were created from the source data. This examination, like all CT scans performed in the [facility], was performed utilizing techniques to minimize radiation dose exposure, including the use of iterative reconstruction and automated exposure control. Radiation dose length product (DLP) for this visit: 310 mGy-cm FINDINGS: LUNGS: There is no tracheal or endobronchial lesion. Interval decrease in size of 4 mm left apical nodule (series 5 image 34). Interval resolution of previously seen 10 mm juxtapleural left upper lobe nodule. Interval decrease in size of perifissural left lower lobe nodule measuring 6 mm, previously 10 mm (series 5 image 97). Interval decrease in size of peripheral left lower lobe consolidation (series 5 image 202). Right lower lobe atelectasis versus scarring. PLEURA: Interval resolution of bilateral pleural effusions. HEART/GREAT VESSELS: Coronary artery calcifications present. No thoracic aortic aneurysm. Right arm PICC terminates in the SVC. MEDIASTINUM AND HEAVEN: Unremarkable. CHEST WALL AND LOWER NECK: Unremarkable. VISUALIZED STRUCTURES IN THE UPPER ABDOMEN: Gallbladder is surgically absent. OSSEOUS STRUCTURES: No acute fracture or destructive osseous lesion.     Impression: Interval decrease in size of left lower lobe consolidation and left lung nodules consistent with resolving infection. Workstation performed: [id number]       Review of Systems:  Review of  Systems   Constitutional:  Negative for chills, fatigue and fever.   HENT: Negative.     Eyes: Negative.  Negative for discharge.   Respiratory: Negative.  Negative for chest tightness, shortness of breath and wheezing.    Cardiovascular: Negative.  Negative for chest pain, palpitations and leg swelling.   Gastrointestinal: Negative.  Negative for abdominal pain, nausea and vomiting.   Endocrine: Negative.    Genitourinary: Negative.    Musculoskeletal: Negative.  Negative for arthralgias.   Skin: Negative.  Negative for rash.   Allergic/Immunologic: Negative.    Neurological: Negative.  Negative for dizziness and light-headedness.   Hematological: Negative.    Psychiatric/Behavioral: Negative.       Objective:   /60 (BP Location: Right arm, Patient Position: Sitting, Cuff Size: Standard)   Pulse 73   Ht 6' (1.829 m)   Wt 112 kg (247 lb)   SpO2 99%   BMI 33.50 kg/m²    Physical Exam:  GEN: NAD, Alert and oriented, well appearing  HEENT:Head, neck, ears, oral pharynx: Mucus membranes moist, oral pharynx clear, nares clear. External ears normal  EYES: Pupils equal, sclera anicteric  NECK: No JVD  CARDIOVASCULAR: RRR, No murmur, rub, gallops S1,S2  LUNGS: Clear To auscultation bilaterally  ABDOMEN: Soft, nondistended  EXTREMITIES/VASCULAR: No edema.  PSYCH: Normal Affect  NEURO: Grossly intact, moving all extremiteis equal, face symetric  HEME: No bleeding, bruising, petechia  SKIN: No significant rashes       Labs & Results:  Below is the patient's most recent value for Albumin, ALT, AST, BUN, Calcium, Chloride, Cholesterol, CO2, Creatinine, GFR, Glucose, HDL, Hematocrit, Hemoglobin, Hemoglobin A1C, LDL, Magnesium, Phosphorus, Platelets, Potassium, PSA, Sodium, Triglycerides, and WBC.   Lab Results   Component Value Date    ALT 13 06/02/2024    AST 15 06/02/2024    BUN 36 (H) 06/03/2024    CALCIUM 8.6 06/03/2024     06/03/2024    CHOL 130 05/07/2015    CO2 26 06/03/2024    CREATININE 1.28 06/03/2024     HDL 34 2015    HCT 34.3 (L) 2024    HGB 11.0 (L) 2024    HGBA1C 7.1 (H) 2024    MG 2.0 2024    PHOS 4.6 (H) 2024     2024    K 4.0 2024    PSA 0.16 2024     2015    TRIG 122 2015    WBC 10.82 (H) 2024     Note: for a comprehensive list of the patient's lab results, access the Results Review activity.          Cardiac testing:     I personally reviewed the ECG performed in the clinic on 24. It reveals sinus rhythm with RBBB .     Echocardiograms:  Results for orders placed during the hospital encounter of 17    Echo complete with contrast if indicated    [place] [address]  [phone]    Transthoracic Echocardiogram  2D, M-mode, Doppler, and Color Doppler    Study date:  26-Sep-2017    Patient: EAGLE REBOLLAR  MR number: [id number]  Account number: [id number]  : 1960  Age: 57 years  Gender: Male  Status: Outpatient  Location: [address] [place] [address] [facility] [place]  Height: 72 in  Weight: 263.3 lb  BP: 142/ 88 mmHg    Indications: CAD    Diagnoses: I25.10 - Atherosclerotic heart disease of native coronary artery without angina pectoris    Sonographer:  OLIVE Benoit  Primary Physician:  Paty Ortiz MD  Referring Physician:  Eagle Chavez MD  Group:  [facility]  Interpreting Physician:  Torrey Duenas MD    SUMMARY    LEFT VENTRICLE:  Size was normal.  Systolic function was normal. Ejection fraction was estimated to be 65 %.  There was mild concentric hypertrophy.  Doppler parameters were consistent with abnormal left ventricular relaxation (grade 1 diastolic dysfunction).    RIGHT VENTRICLE:  The size was normal.  Systolic function was normal.    TRICUSPID VALVE:  There was trace regurgitation.    HISTORY: PRIOR HISTORY: Hypertension, CAD s/p PCI, smoker, diabetes, high cholesterol    PROCEDURE:  The study was performed in the [address] [place] [address] [facility]. This was a routine study. The transthoracic approach was used. The study included complete 2D imaging, M-mode, complete spectral Doppler, and color Doppler. The  heart rate was 112 bpm, at the start of the study. Images were obtained from the parasternal, apical, subcostal, and suprasternal notch acoustic windows. Echocardiographic views were limited due to decreased penetration. This was a  technically difficult study.    LEFT VENTRICLE: Size was normal. Systolic function was normal. Ejection fraction was estimated to be 65 %. There were no regional wall motion abnormalities. Wall thickness was mildly increased. There was mild concentric hypertrophy.  DOPPLER: Doppler parameters were consistent with abnormal left ventricular relaxation (grade 1 diastolic dysfunction).    RIGHT VENTRICLE: The size was normal. Systolic function was normal. Wall thickness was normal.    LEFT ATRIUM: Size was normal.    RIGHT ATRIUM: Size was normal.    MITRAL VALVE: Valve structure was normal. There was normal leaflet separation. DOPPLER: The transmitral velocity was within the normal range. There was no evidence for stenosis. There was no regurgitation.    AORTIC VALVE: The valve was trileaflet. Leaflets exhibited normal thickness and normal cuspal separation. DOPPLER: Transaortic velocity was within the normal range. There was no evidence for stenosis. There was no regurgitation.    TRICUSPID VALVE: The valve structure was normal. There was normal leaflet separation. DOPPLER: The transtricuspid velocity was within the normal range. There was no evidence for stenosis. There was trace regurgitation. The tricuspid jet  envelope definition was inadequate for estimation of RV systolic pressure. There are no indirect findings suggestive of moderate or severe pulmonary hypertension.    PULMONIC VALVE: Leaflets exhibited normal thickness, no calcification, and normal cuspal  separation. DOPPLER: The transpulmonic velocity was within the normal range. There was no regurgitation.    PERICARDIUM: There was no pericardial effusion. The pericardium was normal in appearance.    AORTA: The root exhibited normal size.    SYSTEMIC VEINS: IVC: The inferior vena cava was normal in size and course. Respirophasic changes were normal.    SYSTEM MEASUREMENT TABLES    2D  %FS: 47.6 %  AV Diam: 3.34 cm  EDV(Teich): 101.71 ml  EF Biplane: 67.76 %  EF(Cube): 85.61 %  EF(Teich): 79 %  ESV(Cube): 14.82 ml  ESV(Teich): 21.36 ml  IVSd: 1.58 cm  LA Area: 12.78 cm2  LA Diam: 3.37 cm  LVEDV MOD A2C: 49.73 ml  LVEDV MOD A4C: 61.78 ml  LVEDV MOD BP: 58.1 ml  LVEF MOD A2C: 65.64 %  LVEF MOD A4C: 71.2 %  LVESV MOD A2C: 17.09 ml  LVESV MOD A4C: 17.79 ml  LVESV MOD BP: 18.73 ml  LVIDd: 4.69 cm  LVIDs: 2.46 cm  LVLd A2C: 6.97 cm  LVLd A4C: 7.74 cm  LVLs A2C: 5.23 cm  LVLs A4C: 6.13 cm  LVPWd: 1.23 cm  RA Area: 14.52 cm2  RV Diam.: 2.86 cm  SI(Cube): 36.73 ml/m2  SI(Teich): 33.48 ml/m2  SV MOD A2C: 32.64 ml  SV MOD A4C: 43.98 ml  SV(Cube): 88.15 ml  SV(Teich): 80.35 ml    MM  TAPSE: 3.26 cm    PW  E': 0.08 m/s    [employer] Accredited Echocardiography Laboratory    Prepared and electronically signed by    Torrey Duenas MD  Signed 26-Sep-2017 12:55:39    No results found for this or any previous visit.      Catheterizations:   No results found for this or any previous visit.      Stress Tests:  No results found for this or any previous visit.      Holter monitor -   No results found for this or any previous visit.    No results found for this or any previous visit.      Discussion/Summary:  Persistent MSSA bacteremia  Patient had extraction of ICD on 2/8/2024  Patient underwent EV ICD (MDT) placement on February 20, 2024  Patient had lead dislodgment leading to repeat procedure on February 21, 2024  During the procedure patient's lead had poor sensing and therefore could not be positioned under the  sternum and pocket was closed.  Multiple location under the sternum where attempted.  We discussed SQ ICD implantation in the future  Patient will benefit from subcutaneous ICD which puts the lead above the sternum with minimal dislodgment rate.    Will plan for LifeVest on discharge and then discuss S-ICD in office.   Continue goal directed medications for cardiomyopathy  Completed 6 weeks of IV abx 3/25/24  Repeat blood cx - ordered on 3/11/24  We discussed SQ ICD implantation again in detail today. We also contrasted with EV ICD. Answered all the questions including risks/benefits, battery longevity.   He is s/p SQ ICD 5/21/24  Doing well and denies any discomfort, palpitations  ICM  EF 45%-->40%  Continue goal directed medication  Currently on Toprol Xl, Jardiance, torsemide and Entresto  Initial ICD implanted 5/12/2023  Euvolemic  Sees Dr. Samuels in [place]  Hx of CAD   of LAD and drug-eluting stent to mid circumflex in 2015  Maintained on Plavix, aspirin   Denies angina  Hx of VT  [place] to be scar mediated  Underwent ICD in May 2023  S/p extraction due to MSSA  Will need ICD for secondary prevention - will implant SQ ICD once patient is agreeable  No current need for pacing; August 2023 showed AP 8%,  < 0.1%  On amiodarone and metoprolol  Continue for now  Hx of of LV apical aneurysm  HTN  Normotensive in the office  On Entresto, metoprolol   HLD  DM II  Last A1c 7.1 in May 2024  Maintained on metformin, and Janumet as well insulin    Follow-up in 6 months

## 2024-08-05 DIAGNOSIS — L97.509 FOOT ULCER (HCC): ICD-10-CM

## 2024-08-05 DIAGNOSIS — Z95.810 ICD (IMPLANTABLE CARDIOVERTER-DEFIBRILLATOR) IN PLACE: ICD-10-CM

## 2024-08-05 RX ORDER — CLOPIDOGREL BISULFATE 75 MG/1
75 TABLET ORAL DAILY
Qty: 100 TABLET | Refills: 1 | Status: SHIPPED | OUTPATIENT
Start: 2024-08-05

## 2024-08-05 RX ORDER — METOPROLOL SUCCINATE 50 MG/1
50 TABLET, EXTENDED RELEASE ORAL EVERY 12 HOURS
Qty: 180 TABLET | Refills: 1 | Status: SHIPPED | OUTPATIENT
Start: 2024-08-05

## 2024-09-03 DIAGNOSIS — Z95.810 ICD (IMPLANTABLE CARDIOVERTER-DEFIBRILLATOR) IN PLACE: ICD-10-CM

## 2024-09-03 DIAGNOSIS — I50.20 SYSTOLIC CONGESTIVE HEART FAILURE, UNSPECIFIED HF CHRONICITY (HCC): ICD-10-CM

## 2024-09-03 DIAGNOSIS — I25.10 CORONARY ARTERY DISEASE INVOLVING NATIVE CORONARY ARTERY OF NATIVE HEART WITHOUT ANGINA PECTORIS: ICD-10-CM

## 2024-09-03 NOTE — PROGRESS NOTES
Heart Failure Outpatient Progress Note - Nadir Myers 64 y.o. male MRN: [id number]    @ Encounter: [id number]      Assessment/Plan:    Patient Active Problem List    Diagnosis Date Noted    Advanced care planning/counseling discussion 07/18/2024    Type 2 diabetes mellitus with diabetic peripheral angiopathy without gangrene ([facility]) 06/05/2024    Amputated 4th toe, left ([facility]) 04/10/2024    ICD (implantable cardioverter-defibrillator) in place 02/21/2024    Abnormal CT of the chest 02/09/2024    Type 2 diabetes mellitus with foot ulcer, with long-term current use of insulin ([facility]) 01/16/2024    3-vessel CAD 01/16/2024    PAD (peripheral artery disease) ([facility]) 01/16/2024    Chronic systolic CHF (congestive heart failure) ([facility]) 08/02/2023    V-tach ([facility]) 05/04/2023    Ischemic cardiomyopathy 05/03/2023    Essential (primary) hypertension 05/02/2023    Acute HFrEF 05/02/2023    A-fib ([facility]) 05/02/2023    Benign prostatic hyperplasia without lower urinary tract symptoms 04/11/2018    Type 2 diabetes mellitus, with long-term current use of insulin ([facility]) 02/27/2015    Tobacco dependence syndrome 01/18/2012       Lead placement on EV ICD failed     # Chronic HFimpEF, Stage C, w/ decompensation due to medication hold  Impression: iCM with large apical aneurysm with progression of CAD/late presenting MI with occluded mid LAD and LPDA. LV mildly dilated. Started on milrinone 0.25mcg/kg/min 5/6/23 due to rising creatinine and worsening volume status, stopped 5/10/23. ,  EF since improved     Weight: 205 lbs on 6/5, 218 lbs--> 237 lbs--> 245 lbs--> 249 lbs  BNP: 6/3/24: 1250  3/7/24: 879  2/20/24: 1809  6/2/23: 279     Studies- personally reviewed by me  EKG- narrow QRS with bigeminy; inferior infarct, anterolateral infarct    Echo 6/3/24:  LVEF: 40%, akinetic apical anterior,inferior  LVEDd:  RV: normal  Mild MR    Echo 2/21/24:   LVEF: 45%  Akinetic segments     GILL 2/7/24: 1.8 x 1.1 cm mobile mass attached to ICD lead    Echo  2/1/24:  LVEF: 40%, akinetic segments  LVEDd: 5.1 cm  RV: normal     Echo 8/7/23:  LVEF: 40%  LVEDd: 4.1 cm  LV apex aneurysmal, no thrombus  RV: normal     LHC 5/3/23: chronically occluded mid LAD and LPDA c/w echo findings of apical aneurysm, not amenable to PCI  Patent mid LCx stent   LVEDP 30 mmHg     Echocardiogram 5/2/23  LVEF:  10-15%, apical aneurysm  LVIDd: 5.6 cm  RV: normal  MR: mild  PASP: 49 mmHg     Echo 9/26/17:  LVEF: 65%     Neurohormonal Blockade:  --Beta-Blocker: metoprolol succinate 50 mg BID  --ACEi, ARB or ARNi: Entresto 49/51 mg BID    (or SVR reduction)   --Aldosterone Receptor Blocker: spironolactone 12.5 mg daily- onhold due to hyperkalemia  --SGLT2-I: Jardiance 10 mg daily- on hold  --Diuretic: torsemide 10 mg   Inpt:      Sudden Cardiac Death Risk Reduction:  MDT ICD 5/12/23 for secondary prevention  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   SQ ICD placed 5/21/24  Interrogation 6/7/24: no events     Electrical Resynchronization:  Narrow QRS     Advanced Therapies (If appropriate):  --Inotrope: was on milrinone 0.25 mcg/kg/min, titrated off  --LVAD/Transplant Candidacy: Current tobacco use prior to admission precludes heart transplant at this time- since quit. Moderately reduced RV function and mildly dilated LV concerning for LVAD. Mildly dilated LV also concerning given VT.   Has not smoked since May 2     # Staph bacteremia  GILL 2/7/24: There is a 1.8 x 1.1 cm mobile mass affixed to the cardiac device lead as is passes through the right atrium. In the setting of bacteremia this is most likely represents infection.   # New Q wave MI, later presenting or silent LAD territory infarction  Rx: plavix 75 mg  # hyperlipidemia-atorvastatin 80 mg  9/16/23: LDL 51, HDL 68  # current tobacco use- quit since hospital  # Ventricular tachycardia- scar  mediated  Rx: amiodarone 200 mg daily  # CKD3, Cr 1.3 on 3/7 down from 1.96 on 2/28, 1.28  # hyponatremia- due to HF, , now up to 137 on 7/3/23  # DM2, HgA1C 7.1% on 9/16/23    TODAY'S PLAN:  Increase entresto to 97/103 mg BID  States Dr Maria would like to change his amiodarone to another med? Guessing tikosyn  Would like to have this soon    Continue current GDMT  GFR improved on last labs 6/3-   --2g sodium diet  - Daily weights    HPI:   64 year old male with known CAD with LHC in 2015 showing obstructive mid-LCx disease treated with PTCA/stening and residual non-obstructive disease in the LAD, now presented with 1-2 weeks of shortness of breath, leg swelling, and >20 lb weight gain in 1 week. He was suspected during a cardiology visit to be in rapid atrial fibrillation, and sent to the ED. He converted to sinus rhythm shortly following IV beta blocker administration.    Patient was taken for LHC which showed chronically occluded mid LAD and LPDA c/w echo findings of apical aneurysm, not amenable to PCI. There was a patent mid LCx stent. Echocardiogram showed severely reduced LV function with EF 10-15%, apical aneurysm, and LVIDd of 5.6 cm. VT was felt to be scar mediated. A MDT dual chamber AICD was placed. Heart failure specific GDMT was initiated and optimized.    He did require transient milrinone support during his hospitalization and this was weaned off     Since discharge he has had orthostatic hypotension, lightheadedness and dizziness. No syncope. Entresto was cut back to 24/26 mg BID, spironolactone stopped and lasix stopped. He is on Jardiance.     S/p hospitalization for septic shock due to staph bacteremia due to lower extremity wound infection. Vegetation noted on ICD lead, led to lead extraction on 2/8/24. EV ICD placed 2/20 but unable to obtain proper lead placement despite adjustment attempts so was removed. Plan - sent home on LifeVest with plan for SQ ICD implant as outpt.      Interval History:  Admitted 5/21, for SQ ICD placement  Echo 6/3/24:  LVEF: 40%, akinetic apical anterior,inferior  LVEDd:  RV: normal  Mild MR    Device check - no events  No new symptoms  Review of Systems   Constitutional:  Negative for activity change, appetite change and fatigue. Unexpected weight change: wt up 19 lbs more.  HENT:  Negative for congestion and nosebleeds.    Eyes: Negative.    Respiratory:  Negative for cough, chest tightness and shortness of breath.    Cardiovascular:  Negative for chest pain, palpitations and leg swelling.   Gastrointestinal:  Negative for abdominal distention.   Endocrine: Negative.    Genitourinary: Negative.    Musculoskeletal: Negative.    Skin: Negative.    Neurological:  Negative for dizziness, syncope and weakness.   Hematological: Negative.    Psychiatric/Behavioral: Negative.         Past Medical History:   Diagnosis Date    Diabetes mellitus (HCC)     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    Myocardial infarction (HCC)          Allergies   Allergen Reactions    Vancomycin Rash     NOT AN ALLERGY - 1/31/24 patient experienced vancomycin infusion reaction, please extend duration of infusion time to prevent future infusion reactions     .    Current Outpatient Medications:     amiodarone 200 mg tablet, TAKE 1 TABLET BY MOUTH DAILY WITH BREAKFAST., Disp: 90 tablet, Rfl: 4    aspirin 81 mg chewable tablet, Chew 1 tablet (81 mg total) daily Do not start before March 2, 2024., Disp: 30 tablet, Rfl: 0    atorvastatin (LIPITOR) 80 mg tablet, TAKE 1 TABLET (80 MG TOTAL) BY MOUTH DAILY AFTER DINNER, Disp: 90 tablet, Rfl: 2    BD Pen Needle Micro U/F 32G X 6 MM MISC, Inject under the skin in the morning, Disp: 100 each, Rfl: 5    clopidogrel (PLAVIX) 75 mg tablet, TAKE 1 TABLET BY MOUTH EVERY DAY, Disp: 100 tablet, Rfl: 1    Empagliflozin (Jardiance) 10 MG TABS tablet, Take 1 tablet (10 mg total) by mouth every morning, Disp: 30 tablet, Rfl: 11    Insulin Glargine Solostar  (Lantus SoloStar) 100 UNIT/ML SOPN, Inject 0.3 mL (30 Units total) under the skin daily at bedtime, Disp: 15 mL, Rfl: 5    metoprolol succinate (TOPROL-XL) 50 mg 24 hr tablet, TAKE 1 TABLET BY MOUTH EVERY 12 HOURS, Disp: 180 tablet, Rfl: 1    sacubitril-valsartan (Entresto) 49-51 MG TABS, Take 1 tablet by mouth 2 (two) times a day, Disp: 180 tablet, Rfl: 3    sitaGLIPtin-metFORMIN (JANUMET)  MG per tablet, Take 1 tablet by mouth 2 (two) times a day with meals (Patient taking differently: Take 1 tablet by mouth in the morning), Disp: 180 tablet, Rfl: 3    torsemide (DEMADEX) 20 mg tablet, TAKE 1/2 TABLET BY MOUTH DAILY, Disp: 45 tablet, Rfl: 1    Social History     Socioeconomic History    Marital status: /Civil Union     Spouse name: Not on file    Number of children: Not on file    Years of education: Not on file    Highest education level: Not on file   Occupational History    Not on file   Tobacco Use    Smoking status: Former     Types: Cigarettes     Start date: 1/30/2024    Smokeless tobacco: Never   Vaping Use    Vaping status: Never Used   Substance and Sexual Activity    Alcohol use: Not Currently    Drug use: Never    Sexual activity: Not Currently     Partners: Female   Other Topics Concern    Not on file   Social History Narrative    Not on file     Social Determinants of Health     Financial Resource Strain: Not on file   Food Insecurity: No Food Insecurity (7/18/2024)    Hunger Vital Sign     Worried About Running Out of Food in the Last Year: Never true     Ran Out of Food in the Last Year: Never true   Transportation Needs: No Transportation Needs (7/18/2024)    PRAPARE - Transportation     Lack of Transportation (Medical): No     Lack of Transportation (Non-Medical): No   Physical Activity: Not on file   Stress: Not on file   Social Connections: Not on file   Intimate Partner Violence: Not on file   Housing Stability: Low Risk  (7/18/2024)    Housing Stability Vital Sign     Unable to  "Pay for Housing in the Last Year: No     Number of Times Moved in the Last Year: 1     Homeless in the Last Year: No       No family history on file.    Physical Exam:    Vitals:   There were no vitals filed for this visit.        Physical Exam  Constitutional:       Appearance: He is well-developed.   HENT:      Head: Normocephalic and atraumatic.   Eyes:      Pupils: Pupils are equal, round, and reactive to light.   Neck:      Vascular: No JVD.   Cardiovascular:      Rate and Rhythm: Normal rate and regular rhythm.      Heart sounds: No murmur heard.  Pulmonary:      Effort: Pulmonary effort is normal. No respiratory distress.      Breath sounds: Normal breath sounds.   Abdominal:      General: There is no distension.      Palpations: Abdomen is soft.      Tenderness: There is no abdominal tenderness.   Musculoskeletal:         General: Normal range of motion.      Cervical back: Normal range of motion.   Skin:     General: Skin is warm and dry.      Findings: No rash.   Neurological:      Mental Status: He is alert and oriented to person, place, and time.         Labs & Results:    Lab Results   Component Value Date    SODIUM 140 06/03/2024    K 4.0 06/03/2024     06/03/2024    CO2 26 06/03/2024    BUN 36 (H) 06/03/2024    CREATININE 1.28 06/03/2024    GLUC 128 06/03/2024    CALCIUM 8.6 06/03/2024     Lab Results   Component Value Date    WBC 10.82 (H) 06/03/2024    HGB 11.0 (L) 06/03/2024    HCT 34.3 (L) 06/03/2024    MCV 90 06/03/2024     06/03/2024     Lab Results   Component Value Date    NTBNP 5,942 (H) 05/02/2023      No results found for: \"CHOLESTEROL\"  Lab Results   Component Value Date    HDL 34 05/07/2015    HDL 43 02/17/2015     Lab Results   Component Value Date    TRIG 122 05/07/2015    TRIG 128 02/17/2015     No results found for: \"NONHDLC\"    EKG personally reviewed by Nash Samuels DO.     Counseling / Coordination of Care  Time spent today 25 minutes.  Greater than 50% of total time " was spent with the patient and / or family counseling and / or coordination of care. We went over current diagnosis, most recent studies and any changes in treatment.    Thank you for the opportunity to participate in the care of this patient.    TOMMIE HERNANDEZ D.O.  DIRECTOR OF HEART FAILURE/ PULMONARY HYPERTENSION  MEDICAL DIRECTOR OF LVAD PROGRAM  [facility]

## 2024-09-04 ENCOUNTER — TELEPHONE (OUTPATIENT)
Dept: NEPHROLOGY | Facility: CLINIC | Age: 64
End: 2024-09-04

## 2024-09-04 RX ORDER — TORSEMIDE 20 MG/1
10 TABLET ORAL DAILY
Qty: 45 TABLET | Refills: 1 | Status: SHIPPED | OUTPATIENT
Start: 2024-09-04

## 2024-09-04 NOTE — TELEPHONE ENCOUNTER
Left message for patient to schedule follow up appointment (recall list). This is the first attempt.

## 2024-09-05 ENCOUNTER — PATIENT MESSAGE (OUTPATIENT)
Dept: CARDIOLOGY CLINIC | Facility: CLINIC | Age: 64
End: 2024-09-05

## 2024-09-05 ENCOUNTER — TELEPHONE (OUTPATIENT)
Dept: CARDIOLOGY CLINIC | Facility: CLINIC | Age: 64
End: 2024-09-05

## 2024-09-05 ENCOUNTER — OFFICE VISIT (OUTPATIENT)
Dept: CARDIOLOGY CLINIC | Facility: CLINIC | Age: 64
End: 2024-09-05
Payer: COMMERCIAL

## 2024-09-05 ENCOUNTER — DOCUMENTATION (OUTPATIENT)
Dept: CARDIOLOGY CLINIC | Facility: CLINIC | Age: 64
End: 2024-09-05

## 2024-09-05 VITALS
SYSTOLIC BLOOD PRESSURE: 140 MMHG | DIASTOLIC BLOOD PRESSURE: 82 MMHG | BODY MASS INDEX: 33.72 KG/M2 | WEIGHT: 249 LBS | OXYGEN SATURATION: 98 % | HEIGHT: 72 IN | HEART RATE: 72 BPM

## 2024-09-05 DIAGNOSIS — I25.5 ISCHEMIC CARDIOMYOPATHY: ICD-10-CM

## 2024-09-05 DIAGNOSIS — I47.20 V-TACH (HCC): Primary | ICD-10-CM

## 2024-09-05 DIAGNOSIS — I50.22 CHRONIC SYSTOLIC CHF (CONGESTIVE HEART FAILURE) (HCC): Primary | ICD-10-CM

## 2024-09-05 DIAGNOSIS — I50.22 CHRONIC SYSTOLIC CHF (CONGESTIVE HEART FAILURE) (HCC): ICD-10-CM

## 2024-09-05 DIAGNOSIS — I25.10 3-VESSEL CAD: ICD-10-CM

## 2024-09-05 PROCEDURE — 99214 OFFICE O/P EST MOD 30 MIN: CPT | Performed by: INTERNAL MEDICINE

## 2024-09-05 RX ORDER — SACUBITRIL AND VALSARTAN 97; 103 MG/1; MG/1
1 TABLET, FILM COATED ORAL 2 TIMES DAILY
Start: 2024-09-05

## 2024-09-05 RX ORDER — SACUBITRIL AND VALSARTAN 97; 103 MG/1; MG/1
1 TABLET, FILM COATED ORAL 2 TIMES DAILY
Qty: 180 TABLET | Refills: 3 | Status: SHIPPED | OUTPATIENT
Start: 2024-09-05

## 2024-09-05 NOTE — PROGRESS NOTES
Called [employer] regarding patient's ordered today by Dr Samuels, increased dose of Entresto to  mg BID.    Spoke with [facility] who sated they will need a new order faxed to them with increase in dosage.

## 2024-09-05 NOTE — TELEPHONE ENCOUNTER
"Called [facility] [phone] for med pricing. Spoke to Chacha.      ID #: [id number]      Per insurance this is only an estimate.      Dofetilide 500 mcg (0.5mg)  -    NO AUTH REQUIRED  [facility]:  $ 46.66 (30 day retail pharmacy)   $ 131 (90 day mail order pharmacy)     Sotalol 120 mg  -   NO AUTH REQUIRED   [facility]:   $ 0.00 (30 day retail pharmacy)   $ 0.00 (90 day mail order pharmacy)     Call reference #: [id number]     9/5/2024        Thanks,  Henny \"Emmie\" Meño     "

## 2024-09-05 NOTE — TELEPHONE ENCOUNTER
"Left voicemail on machine informing patient to call and schedule a MEDICATION ADMISSION procedure. Sent MyChart.    Thanks,  Henny \"Emmie\" Meño    "

## 2024-09-05 NOTE — TELEPHONE ENCOUNTER
----- Message from Urbano Maria MD sent at 9/5/2024 11:07 AM EDT -----  Can we set-up sotalol/dofetilide admission? Patient needs to come off amiodarone for 3 weeks prior to admission.      Please also price out medications.      thanks  ----- Message -----  From: Nash Samuels DO  Sent: 9/5/2024   8:41 AM EDT  To: Beau Lemus MD; Urbano Maria MD

## 2024-09-09 ENCOUNTER — OFFICE VISIT (OUTPATIENT)
Dept: PODIATRY | Facility: CLINIC | Age: 64
End: 2024-09-09
Payer: COMMERCIAL

## 2024-09-09 DIAGNOSIS — Z89.422 HISTORY OF COMPLETE RAY AMPUTATION OF FOURTH TOE OF LEFT FOOT (HCC): ICD-10-CM

## 2024-09-09 DIAGNOSIS — I73.9 PAD (PERIPHERAL ARTERY DISEASE) (HCC): ICD-10-CM

## 2024-09-09 DIAGNOSIS — Z79.4 TYPE 2 DIABETES MELLITUS WITH OTHER SPECIFIED COMPLICATION, WITH LONG-TERM CURRENT USE OF INSULIN (HCC): Primary | ICD-10-CM

## 2024-09-09 DIAGNOSIS — E11.69 TYPE 2 DIABETES MELLITUS WITH OTHER SPECIFIED COMPLICATION, WITH LONG-TERM CURRENT USE OF INSULIN (HCC): Primary | ICD-10-CM

## 2024-09-09 PROCEDURE — 11721 DEBRIDE NAIL 6 OR MORE: CPT | Performed by: PODIATRIST

## 2024-09-09 PROCEDURE — RECHECK: Performed by: PODIATRIST

## 2024-09-11 ENCOUNTER — IN-CLINIC DEVICE VISIT (OUTPATIENT)
Dept: CARDIOLOGY CLINIC | Facility: CLINIC | Age: 64
End: 2024-09-11
Payer: COMMERCIAL

## 2024-09-11 DIAGNOSIS — I47.20 VENTRICULAR TACHYCARDIA (HCC): Primary | ICD-10-CM

## 2024-09-11 PROCEDURE — 93261 INTERROGATE SUBQ DEFIB: CPT | Performed by: INTERNAL MEDICINE

## 2024-09-11 NOTE — PROGRESS NOTES
Results for orders placed or performed in visit on 24   Cardiac EP device report    Narrative    [employer] SQ ICD/ ACTIVE SYSTEM IS MRI CONDITIONAL  Sensing Configuration: PrimaryGain SettinXPost Shock Pacing: ON  DEVICE INTERROGATED IN THE [place] OFFICE. SUB Q: BATTERY VOLTAGE ADEQUATE (98%). PRESENTING RHYTHM: NSR @ 72 BPM. ELECTRODE PARAMETERS WITHIN NORMAL LIMITS (95 OHMS). NO SIGNIFICANT HIGH RATE EPISODES. NO PROGRAMMING CHANGES MADE TO DEVICE PARAMETERS.  NORMAL DEVICE FUNCTION. CH

## 2024-09-17 ENCOUNTER — TELEPHONE (OUTPATIENT)
Dept: CARDIOLOGY CLINIC | Facility: CLINIC | Age: 64
End: 2024-09-17

## 2024-09-17 NOTE — TELEPHONE ENCOUNTER
Spent 45 minutes on hold with [facility] & finally left a message for them to return call.  F:[phone].    Faxed [facility] patient's completed [facility] patient assistance programming application for Jardiance with Jardiance order & physician's signature & return Fax & C/B number.

## 2024-09-17 NOTE — TELEPHONE ENCOUNTER
Called patient & left him a message with details of previous information & C/B number for any questions or concerns.

## 2024-09-17 NOTE — TELEPHONE ENCOUNTER
Patient came into office wanting to talk to a nurse, stating he is unable to get his Jardiance from [facility], who stated they are using a new pharmacy, & he had to get new orders from his cardiologist for Jardiance.

## 2024-09-30 ENCOUNTER — TELEPHONE (OUTPATIENT)
Dept: CARDIOLOGY CLINIC | Facility: CLINIC | Age: 64
End: 2024-09-30

## 2024-09-30 NOTE — TELEPHONE ENCOUNTER
Called patient who is agreeable to come into office & complete a new [employer] Patient assistance Program Application & will com into office tomorrow or the days following.

## 2024-09-30 NOTE — TELEPHONE ENCOUNTER
Returned call to patient & left a message with C/B number & request to return call to office.    Also, called [facility] @   Phone: [phone]. After being placed on hold for over 20 minutes, I discontented & called patient back & left a message again to return call to office.

## 2024-10-02 NOTE — TELEPHONE ENCOUNTER
Patient came into office for  Cares Application. Patient stated he will take home the forms to complete & return them to office next week.

## 2024-10-07 ENCOUNTER — HOSPITAL ENCOUNTER (INPATIENT)
Facility: HOSPITAL | Age: 64
LOS: 2 days | Discharge: HOME | DRG: 309 | End: 2024-10-09
Attending: INTERNAL MEDICINE | Admitting: INTERNAL MEDICINE
Payer: COMMERCIAL

## 2024-10-07 DIAGNOSIS — I50.22 CHRONIC SYSTOLIC CHF (CONGESTIVE HEART FAILURE) (HCC): ICD-10-CM

## 2024-10-07 DIAGNOSIS — I47.20 V-TACH (HCC): ICD-10-CM

## 2024-10-07 DIAGNOSIS — N17.9 ACUTE KIDNEY INJURY (HCC): ICD-10-CM

## 2024-10-07 LAB
ANION GAP SERPL CALCULATED.3IONS-SCNC: 6 MMOL/L (ref 4–13)
ANION GAP SERPL CALCULATED.3IONS-SCNC: 9 MMOL/L (ref 4–13)
ATRIAL RATE: 69 BPM
BASOPHILS # BLD AUTO: 0.03 THOUSANDS/ΜL (ref 0–0.1)
BASOPHILS NFR BLD AUTO: 0 % (ref 0–1)
BUN SERPL-MCNC: 47 MG/DL (ref 5–25)
BUN SERPL-MCNC: 52 MG/DL (ref 5–25)
CALCIUM SERPL-MCNC: 8.8 MG/DL (ref 8.4–10.2)
CALCIUM SERPL-MCNC: 9.1 MG/DL (ref 8.4–10.2)
CHLORIDE SERPL-SCNC: 104 MMOL/L (ref 96–108)
CHLORIDE SERPL-SCNC: 104 MMOL/L (ref 96–108)
CO2 SERPL-SCNC: 22 MMOL/L (ref 21–32)
CO2 SERPL-SCNC: 25 MMOL/L (ref 21–32)
CREAT SERPL-MCNC: 1.93 MG/DL (ref 0.6–1.3)
CREAT SERPL-MCNC: 1.94 MG/DL (ref 0.6–1.3)
EOSINOPHIL # BLD AUTO: 0.16 THOUSAND/ΜL (ref 0–0.61)
EOSINOPHIL NFR BLD AUTO: 2 % (ref 0–6)
ERYTHROCYTE [DISTWIDTH] IN BLOOD BY AUTOMATED COUNT: 14.4 % (ref 11.6–15.1)
GFR SERPL CREATININE-BSD FRML MDRD: 35 ML/MIN/1.73SQ M
GFR SERPL CREATININE-BSD FRML MDRD: 36 ML/MIN/1.73SQ M
GLUCOSE SERPL-MCNC: 146 MG/DL (ref 65–140)
GLUCOSE SERPL-MCNC: 164 MG/DL (ref 65–140)
GLUCOSE SERPL-MCNC: 182 MG/DL (ref 65–140)
GLUCOSE SERPL-MCNC: 238 MG/DL (ref 65–140)
HCT VFR BLD AUTO: 37.4 % (ref 36.5–49.3)
HGB BLD-MCNC: 11.9 G/DL (ref 12–17)
IMM GRANULOCYTES # BLD AUTO: 0.05 THOUSAND/UL (ref 0–0.2)
IMM GRANULOCYTES NFR BLD AUTO: 1 % (ref 0–2)
LYMPHOCYTES # BLD AUTO: 1.49 THOUSANDS/ΜL (ref 0.6–4.47)
LYMPHOCYTES NFR BLD AUTO: 16 % (ref 14–44)
MAGNESIUM SERPL-MCNC: 2.4 MG/DL (ref 1.9–2.7)
MCH RBC QN AUTO: 28.6 PG (ref 26.8–34.3)
MCHC RBC AUTO-ENTMCNC: 31.8 G/DL (ref 31.4–37.4)
MCV RBC AUTO: 90 FL (ref 82–98)
MONOCYTES # BLD AUTO: 0.56 THOUSAND/ΜL (ref 0.17–1.22)
MONOCYTES NFR BLD AUTO: 6 % (ref 4–12)
NEUTROPHILS # BLD AUTO: 6.83 THOUSANDS/ΜL (ref 1.85–7.62)
NEUTS SEG NFR BLD AUTO: 75 % (ref 43–75)
NRBC BLD AUTO-RTO: 0 /100 WBCS
P AXIS: 33 DEGREES
PLATELET # BLD AUTO: 213 THOUSANDS/UL (ref 149–390)
PMV BLD AUTO: 9.7 FL (ref 8.9–12.7)
POTASSIUM SERPL-SCNC: 5.2 MMOL/L (ref 3.5–5.3)
POTASSIUM SERPL-SCNC: 6 MMOL/L (ref 3.5–5.3)
PR INTERVAL: 182 MS
QRS AXIS: -42 DEGREES
QRSD INTERVAL: 154 MS
QT INTERVAL: 466 MS
QTC INTERVAL: 499 MS
RBC # BLD AUTO: 4.16 MILLION/UL (ref 3.88–5.62)
SODIUM SERPL-SCNC: 135 MMOL/L (ref 135–147)
SODIUM SERPL-SCNC: 135 MMOL/L (ref 135–147)
T WAVE AXIS: 53 DEGREES
VENTRICULAR RATE: 69 BPM
WBC # BLD AUTO: 9.12 THOUSAND/UL (ref 4.31–10.16)

## 2024-10-07 PROCEDURE — 90673 RIV3 VACCINE NO PRESERV IM: CPT | Performed by: INTERNAL MEDICINE

## 2024-10-07 PROCEDURE — G0008 ADMIN INFLUENZA VIRUS VAC: HCPCS | Performed by: INTERNAL MEDICINE

## 2024-10-07 PROCEDURE — 80048 BASIC METABOLIC PNL TOTAL CA: CPT | Performed by: PHYSICIAN ASSISTANT

## 2024-10-07 PROCEDURE — 93010 ELECTROCARDIOGRAM REPORT: CPT | Performed by: STUDENT IN AN ORGANIZED HEALTH CARE EDUCATION/TRAINING PROGRAM

## 2024-10-07 PROCEDURE — 99222 1ST HOSP IP/OBS MODERATE 55: CPT | Performed by: INTERNAL MEDICINE

## 2024-10-07 PROCEDURE — 93005 ELECTROCARDIOGRAM TRACING: CPT

## 2024-10-07 PROCEDURE — 82948 REAGENT STRIP/BLOOD GLUCOSE: CPT

## 2024-10-07 PROCEDURE — 83735 ASSAY OF MAGNESIUM: CPT | Performed by: PHYSICIAN ASSISTANT

## 2024-10-07 PROCEDURE — 85025 COMPLETE CBC W/AUTO DIFF WBC: CPT | Performed by: PHYSICIAN ASSISTANT

## 2024-10-07 RX ORDER — CLOPIDOGREL BISULFATE 75 MG/1
75 TABLET ORAL DAILY
Status: DISCONTINUED | OUTPATIENT
Start: 2024-10-07 | End: 2024-10-08

## 2024-10-07 RX ORDER — METOPROLOL SUCCINATE 50 MG/1
50 TABLET, EXTENDED RELEASE ORAL EVERY 12 HOURS
Status: DISCONTINUED | OUTPATIENT
Start: 2024-10-07 | End: 2024-10-09

## 2024-10-07 RX ORDER — ASPIRIN 81 MG/1
81 TABLET, CHEWABLE ORAL DAILY
Status: DISCONTINUED | OUTPATIENT
Start: 2024-10-07 | End: 2024-10-09 | Stop reason: HOSPADM

## 2024-10-07 RX ORDER — INSULIN LISPRO 100 [IU]/ML
2-12 INJECTION, SOLUTION INTRAVENOUS; SUBCUTANEOUS
Status: DISCONTINUED | OUTPATIENT
Start: 2024-10-07 | End: 2024-10-09 | Stop reason: HOSPADM

## 2024-10-07 RX ORDER — ASPIRIN 81 MG/1
81 TABLET, CHEWABLE ORAL DAILY
Status: DISCONTINUED | OUTPATIENT
Start: 2024-10-08 | End: 2024-10-07

## 2024-10-07 RX ORDER — ATORVASTATIN CALCIUM 80 MG/1
80 TABLET, FILM COATED ORAL
Status: DISCONTINUED | OUTPATIENT
Start: 2024-10-07 | End: 2024-10-09 | Stop reason: HOSPADM

## 2024-10-07 RX ORDER — INSULIN GLARGINE 100 [IU]/ML
30 INJECTION, SOLUTION SUBCUTANEOUS
Status: DISCONTINUED | OUTPATIENT
Start: 2024-10-07 | End: 2024-10-09 | Stop reason: HOSPADM

## 2024-10-07 RX ORDER — INSULIN LISPRO 100 [IU]/ML
1-5 INJECTION, SOLUTION INTRAVENOUS; SUBCUTANEOUS
Status: DISCONTINUED | OUTPATIENT
Start: 2024-10-07 | End: 2024-10-09 | Stop reason: HOSPADM

## 2024-10-07 RX ORDER — CLOPIDOGREL BISULFATE 75 MG/1
75 TABLET ORAL DAILY
Status: DISCONTINUED | OUTPATIENT
Start: 2024-10-08 | End: 2024-10-07

## 2024-10-07 RX ADMIN — METOPROLOL SUCCINATE 50 MG: 50 TABLET, EXTENDED RELEASE ORAL at 21:24

## 2024-10-07 RX ADMIN — INSULIN LISPRO 2 UNITS: 100 INJECTION, SOLUTION INTRAVENOUS; SUBCUTANEOUS at 16:50

## 2024-10-07 RX ADMIN — ASPIRIN 81 MG CHEWABLE TABLET 81 MG: 81 TABLET CHEWABLE at 17:04

## 2024-10-07 RX ADMIN — INFLUENZA A VIRUS A/WEST VIRGINIA/30/2022 (H1N1) RECOMBINANT HEMAGGLUTININ ANTIGEN, INFLUENZA A VIRUS A/MASSACHUSETTS/18/2022 (H3N2) RECOMBINANT HEMAGGLUTININ ANTIGEN, AND INFLUENZA B VIRUS B/AUSTRIA/1359417/2021 RECOMBINANT HEMAGGLUTININ ANTIGEN 0.5 ML: 45; 45; 45 INJECTION INTRAMUSCULAR at 15:10

## 2024-10-07 RX ADMIN — CLOPIDOGREL BISULFATE 75 MG: 75 TABLET ORAL at 17:04

## 2024-10-07 RX ADMIN — INSULIN GLARGINE 30 UNITS: 100 INJECTION, SOLUTION SUBCUTANEOUS at 21:24

## 2024-10-07 RX ADMIN — INSULIN LISPRO 2 UNITS: 100 INJECTION, SOLUTION INTRAVENOUS; SUBCUTANEOUS at 21:24

## 2024-10-07 RX ADMIN — ATORVASTATIN CALCIUM 80 MG: 80 TABLET, FILM COATED ORAL at 16:51

## 2024-10-07 NOTE — ASSESSMENT & PLAN NOTE
- had Medtronic dual-chamber ICD placed 5/12/23 for secondary prevention  - given MSSA bacteremia (felt to be due to left foot fourth toe wet gangrene status post amputation 1/2024), underwent extraction of this device 2/9/24  - underwent Medtronic EV ICD implant 2/20/24 with frequent subs all lead dislodgment into the pleural space and attempted revision 2/21/24 abandoned given inability to sense with lead and complete extraction done instead  - underwent Loaded Pocket subcutaneous ICD 5/22/24

## 2024-10-07 NOTE — ASSESSMENT & PLAN NOTE
- maintained on GDMT of metoprolol succinate and Entresto, also on spironolactone and Jardiance  - EF of 45% per echo 2/21/2024 /had been as low as 10 to 15% in 5/2023 with recovery

## 2024-10-07 NOTE — CONSULTS
Advanced Heart Failure/Pulmonary Hypertension Consult Note - Nadir Myers 64 y.o. male MRN: [id number]    Unit/Bed#: [facility] 501-01 Encounter: [id number]      Assessment:    Principal Problem:    V-tach ([facility])  Active Problems:    Ischemic cardiomyopathy    Chronic systolic CHF (congestive heart failure) ([facility])    Type 2 diabetes mellitus with foot ulcer, with long-term current use of insulin ([facility])    3-vessel CAD    ICD (implantable cardioverter-defibrillator) in place    Acute kidney injury ([facility])      Plan:  LINDEN on CKD  - unclear etiology: possibly pre-renal vs disease progression  - baseline Cr 1.2-1.4  -holding all GDMT for now.  -appreciate nephrology input.     Chronic HFmrEF.   -ETIOLOGY: ischemic CMP with large apical aneurysm. with progression of CAD/late presenting MI with occluded mid LAD and LPDA. LV mildly dilated.    -warm and euvolemic on exam. Hemodynamically stable.   -holding all GDMT for now given LINDEN  -anticipate restarting Entresto only tomorrow if renal function remains stable.       EKG- narrow QRS with bigeminy; inferior infarct, anterolateral infarct     TTE 6/3/24: LVEF 40%     Echo 2/1/24:  LVEF: 40%, akinetic segments  LVEDd: 5.1 cm  RV: normal     Echo 8/7/23:  LVEF: 40%  LVEDd: 4.1 cm  LV apex aneurysmal, no thrombus  RV: normal     LHC 5/3/23:   chronically occluded mid LAD and LPDA c/w echo findings of apical aneurysm, not amenable to PCI  Patent mid LCx stent   LVEDP 30 mmHg     Echocardiogram 5/2/23  LVEF:  10-15%, apical aneurysm  LVIDd: 5.6 cm  RV: normal  MR: mild  PASP: 49 mmHg     Echo 9/26/17:  LVEF: 65%       VOLUME:  - home diuretic:  Torsemide 10 mg daily- ON HOLD    GDMT:  --Beta-Blocker: Metoprolol succinate 50 mg BID  --ACEi, ARB or ARNi: Entresto 49/51 mg BID- ON HOLD  --MRA: NA  --SGLT2-I: Jardiance 10 mg daily. ON HOLD    DEVICE:  - MDT ICD 5/12/23 for secondary prevention  - ICD explanted 2/8/24 for infection  - Extravascular ICD placed 2/20/24, though lead was in  left pleural space and repositioning attempts on 2/21/24 unsuccessful.   - Now SQ ICD placed 5/21/24   -Interrogation 9/11/24:SUB Q: BATTERY VOLTAGE ADEQUATE (98%). PRESENTING RHYTHM: NSR @ 72 BPM. ELECTRODE PARAMETERS WITHIN NORMAL LIMITS (95 OHMS). NO SIGNIFICANT HIGH RATE EPISODES. NO PROGRAMMING CHANGES MADE TO DEVICE PARAMETERS.  NORMAL DEVICE FUNCTION        Advanced Therapies (If appropriate):  --Inotrope: was on milrinone 0.25 mcg/kg/min, titrated off  --LVAD/Transplant Candidacy: Current tobacco use prior to admission precludes heart transplant at this time- since quit. Moderately reduced RV function and mildly dilated LV concerning for LVAD. Mildly dilated LV also concerning given VT.   -Has not smoked since May 2     Staph bacteremia  - Felt most likely from foot infection.   ICD was involved and was extracted.  Possible pulmonary septic emboli.   -GILL 2/7/24: There is a 1.8 x 1.1 cm mobile mass affixed to the cardiac device lead as is passes through the right atrium. In the setting of bacteremia this is most likely represents infection.      Foot infection  - Status post amputation on 1/31/24      CAD  - hx PCI 2015 Lcx  -no angina  - on ASA, plavix, statin  - -Q wave MI, later presenting or silent LAD territory infarction     hyperlipidemia  -atorvastatin 80 mg  Lab Results   Component Value Date    LDLCALC 72 05/07/2015       Prior tobacco use  - quit     Ventricular tachycardia  - scar mediated  - toprol as above  - amiodarone 200 mg daily  -further AAD management per EP     DM2  Lab Results   Component Value Date    HGBA1C 7.1 (H) 05/14/2024     HPI:  64-year-old male with past medical history as described above who presented electively on 10/7/2024 under the direction of our EP team for initiation of new antiarrhythmic therapy with plan to discontinue amiodarone.  Unfortunately admission labs drawn showed evidence of worsening renal function with a creatinine of up to 1.9.  This is above a baseline  of 1.2-1.4.  He reports feeling at his baseline with no acute complaints.  Has had no recent illness or any other acute issues.  See Dr. Samuels on 9/5/2024.  At that time plan was to increase his Entresto dose up to 97/103 mg twice daily however the new prescription was never received.  As such she has stayed on his prior dose of 49/51 twice daily and the remainder of his other GDMT as prescribed.  He does admit to periodic episodes of nausea/vomiting once every 3 weeks to a month.  He describes this as waking him up in the middle of the night after which she vomits and then returns to normal sleep.  Other than this he has been in his usual state of health with no complaints of chest pain, unusual shortness of breath, orthopnea or PND.  He has gained a substantial amount of weight but reports this is caloric in nature.        Past Medical History:   Diagnosis Date    Diabetes mellitus (HCC)     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    Myocardial infarction (HCC)        Review of Systems - Negative except that stated in HPI    Allergies   Allergen Reactions    Vancomycin Rash     NOT AN ALLERGY - 1/31/24 patient experienced vancomycin infusion reaction, please extend duration of infusion time to prevent future infusion reactions         Current Facility-Administered Medications:     [START ON 10/8/2024] aspirin chewable tablet 81 mg, 81 mg, Oral, Daily, Soledad Soden, PA-C    atorvastatin (LIPITOR) tablet 80 mg, 80 mg, Oral, After Dinner, Soledad Soden, PA-C    [START ON 10/8/2024] clopidogrel (PLAVIX) tablet 75 mg, 75 mg, Oral, Daily, Soledad Soden, PA-C    insulin glargine (LANTUS) subcutaneous injection 30 Units 0.3 mL, 30 Units, Subcutaneous, HS, Soledad Soden, PA-C    insulin lispro (HumALOG/ADMELOG) 100 units/mL subcutaneous injection 1-5 Units, 1-5 Units, Subcutaneous, HS, Soledad Soden, PA-C    insulin lispro (HumALOG/ADMELOG) 100 units/mL subcutaneous injection 2-12 Units, 2-12 Units, Subcutaneous, TID AC **AND**  Fingerstick Glucose (POCT), , , TID AC, Soledad Mari PA-C    metoprolol succinate (TOPROL-XL) 24 hr tablet 50 mg, 50 mg, Oral, Q12H, Soledad Mari PA-C    Social History     Socioeconomic History    Marital status: /Civil Union     Spouse name: Not on file    Number of children: Not on file    Years of education: Not on file    Highest education level: Not on file   Occupational History    Not on file   Tobacco Use    Smoking status: Former     Types: Cigarettes     Start date: 1/30/2024    Smokeless tobacco: Never   Vaping Use    Vaping status: Never Used   Substance and Sexual Activity    Alcohol use: Not Currently    Drug use: Never    Sexual activity: Not Currently     Partners: Female   Other Topics Concern    Not on file   Social History Narrative    Not on file     Social Determinants of Health     Financial Resource Strain: Not on file   Food Insecurity: No Food Insecurity (7/18/2024)    Hunger Vital Sign     Worried About Running Out of Food in the Last Year: Never true     Ran Out of Food in the Last Year: Never true   Transportation Needs: No Transportation Needs (7/18/2024)    PRAPARE - Transportation     Lack of Transportation (Medical): No     Lack of Transportation (Non-Medical): No   Physical Activity: Not on file   Stress: Not on file   Social Connections: Not on file   Intimate Partner Violence: Not on file   Housing Stability: Low Risk  (7/18/2024)    Housing Stability Vital Sign     Unable to Pay for Housing in the Last Year: No     Number of Times Moved in the Last Year: 1     Homeless in the Last Year: No       No family history on file.    Physical Exam:  Central Line (day, reason):  Escobar catheter (day, reason):    Vitals: Blood pressure 137/83, pulse 68, temperature 98.1 °F (36.7 °C), resp. rate 18, height 6' (1.829 m), weight 116 kg (255 lb 4.7 oz), SpO2 95%., Body mass index is 34.62 kg/m²., No intake/output data recorded.  No intake/output data recorded.  Wt Readings from Last 3  Encounters:   10/07/24 116 kg (255 lb 4.7 oz)   09/05/24 113 kg (249 lb)   08/01/24 112 kg (247 lb)     No intake or output data in the 24 hours ending 10/07/24 1616  No intake/output data recorded.    No significant arrhythmias seen on telemetry review.     Vitals:    10/07/24 1025 10/07/24 1505   BP: 122/69 137/83   BP Location: Left arm    Pulse: 72 68   Resp: 18 18   Temp: 98.6 °F (37 °C) 98.1 °F (36.7 °C)   TempSrc: Oral    SpO2: 97% 95%   Weight: 116 kg (255 lb 4.7 oz)    Height: 6' (1.829 m)        GEN: Nadir Myers appears well, alert and oriented x 3, pleasant and cooperative   HEENT: pupils equal, round, and reactive to light; extraocular muscles intact  NECK: supple, no carotid bruits   HEART: regular rhythm, normal S1 and S2, no murmurs, clicks, gallops or rubs, JVP is flat   LUNGS: clear to auscultation bilaterally; no wheezes, rales, or rhonchi   ABDOMEN: normal bowel sounds, soft, no tenderness, no distention  EXTREMITIES: peripheral pulses normal; no clubbing, cyanosis, or edema  NEURO: no focal findings   SKIN: normal without suspicious lesions on exposed skin    Labs & Results:        Results from last 7 days   Lab Units 10/07/24  1143   WBC Thousand/uL 9.12   HEMOGLOBIN g/dL 11.9*   HEMATOCRIT % 37.4   PLATELETS Thousands/uL 213         Results from last 7 days   Lab Units 10/07/24  1450 10/07/24  1240   POTASSIUM mmol/L 5.2 6.0*   CHLORIDE mmol/L 104 104   CO2 mmol/L 22 25   BUN mg/dL 52* 47*   CREATININE mg/dL 1.94* 1.93*   CALCIUM mg/dL 9.1 8.8           EKG personally reviewed by CARLA Leahy.     Thank you for the opportunity to participate in the care of this patient.    Maria C AGUIRRE

## 2024-10-07 NOTE — QUICK NOTE
Repeat blood work that was still drawn from IV, midline has not been placed today, did show elevated creatinine still.  Discussed with heart failure team as he is on Entresto, Jardiance, and diuretic.    Patient had been advised to increase dose of Entresto and last office visit with heart failure however he reports that when he received the prescription from the pharmacy that he still received Entresto 49/51 and that is what he has been taking at home.    He denies any fluctuation weight, needing to uptitrate Jardiance, any recent illness.    Will have midline placed tomorrow and have blood work done in the morning to trend creatinine and appreciate heart failure seeing the patient as well to discuss medication adjustment.  Will hold Entresto, Jardiance, and diuretic for time being.    I discussed with the patient that given his kidney function where currently is at we will likely not be able to utilize antiarrhythmic therapy that is renally excreted such as sotalol that he had been set up for.  Will likely restart amiodarone on discharge after further workup.

## 2024-10-07 NOTE — ASSESSMENT & PLAN NOTE
Wt Readings from Last 3 Encounters:   10/07/24 116 kg (255 lb 4.7 oz)   09/05/24 113 kg (249 lb)   08/01/24 112 kg (247 lb)

## 2024-10-07 NOTE — H&P
H&P Exam - Electrophysiology  Nadir Myers 64 y.o. male MRN: [id number]  Unit/Bed#: [facility] 501-01 Encounter: [id number]    Assessment & Plan     Assessment & Plan  V-tach (HCC)  - felt to be scar mediated given ICMP  - has had ICD due to this diagnosis as well (see below for device details)  - beta blocker therapy of metoprolol succinate 50mg twice daily as an outpatient  - had been maintained on amiodarone until a month ago    Patient presents today in sinus rhythm with right bundle branch block at baseline to undergo antiarrhythmic initiation.  However, blood work that was drawn showed a potassium of 6 and creatinine of 1.91 baseline is closer to 1.2.  Discussed with the patient and he denies any change in baseline health such as nausea, vomiting, dehydration.      Would prefer to have just repeated blood work but unfortunately patient is a tough stick and blood draw has been attempted more than 5 times which is unsuccessful.  Therefore, will have midline placed and blood work redrawn.    It was explained to the patient that once midline is placed, we will repeat blood work and determine if potassium and creatinine are accurate.  If it is, would likely need to be admitted for workup of LINDEN otherwise, if lower close to baseline can then likely start antiarrhythmic later tonight.  ICD (implantable cardioverter-defibrillator) in place  - had Medtronic dual-chamber ICD placed 5/12/23 for secondary prevention  - given MSSA bacteremia (felt to be due to left foot fourth toe wet gangrene status post amputation 1/2024), underwent extraction of this device 2/9/24  - underwent Medtronic EV ICD implant 2/20/24 with frequent subs all lead dislodgment into the pleural space and attempted revision 2/21/24 abandoned given inability to sense with lead and complete extraction done instead  - underwent [employer] subcutaneous ICD 5/22/24  3-vessel CAD  - with  of LAD and HANNA to mid circumflex 2/2015   Ischemic  "cardiomyopathy  - maintained on GDMT of metoprolol succinate and Entresto, also on spironolactone and Jardiance  - EF of 45% per echo 2/21/2024 /had been as low as 10 to 15% in 5/2023 with recovery  Chronic systolic CHF (congestive heart failure) ([facility])  Wt Readings from Last 3 Encounters:   10/07/24 116 kg (255 lb 4.7 oz)   09/05/24 113 kg (249 lb)   08/01/24 112 kg (247 lb)             Type 2 diabetes mellitus with foot ulcer, with long-term current use of insulin ([facility])  Lab Results   Component Value Date    HGBA1C 7.1 (H) 05/14/2024       No results for input(s): \"POCGLU\" in the last 72 hours.    Blood Sugar Average: Last 72 hrs:              History of Present Illness   HPI:  Nadir Myers is a 64 y.o. year old male with past medical history as stated above.    Patient is well-known to the electrophysiology service as he did have ventricular tachycardia leading to secondary prevention ICD being placed in May 2023.  Unfortunate, he developed MSSA bacteremia at the beginning of 2024 and had to undergo extraction of his device.  It was felt that he may benefit from Medtronic EV ICD that was implanted 2/20/2024.  Unfortunate, he had the lead dislodged into his pleural space and when revision was attempted, it was unsuccessful and therefore full extraction was undergone.  He was discharged with LifeVest and presented back in May of this year and underwent subcutaneous ICD implantation.  He followed with Dr. Maria in the outpatient setting and was doing well.  However, it was felt that he should be transition from amiodarone and he therefore presents this to the hospital admission to undergo transition to class III.    EKG:       Review of Systems  ROS as noted above, otherwise 12 point review of systems was performed and is negative.     Historical Information   Past Medical History:   Diagnosis Date    Diabetes mellitus ([facility])     Infected defibrillator ([facility]) 03/11/2024    Myocardial infarction ([facility])      Past " Surgical History:   Procedure Laterality Date    CARDIAC CATHETERIZATION N/A 05/03/2023    Procedure: Cardiac Coronary Angiogram;  Surgeon: Valeriy Shore MD;  [facility]: [facility];  Service: Cardiology    CARDIAC CATHETERIZATION Left 05/03/2023    Procedure: Cardiac Left Heart Cath;  Surgeon: Valeriy Shore MD;  [facility]: [facility];  Service: Cardiology    CARDIAC DEFIBRILLATOR PLACEMENT  05/2023    CARDIAC ELECTROPHYSIOLOGY PROCEDURE N/A 05/12/2023    Procedure: Cardiac icd implant;  Surgeon: Urbano Maria MD;  [facility]: [facility];  Service: Cardiology    CARDIAC ELECTROPHYSIOLOGY PROCEDURE N/A 2/20/2024    Procedure: EV ICD IMPLANTATION;  Surgeon: Arturo Wooten DO;  Location: [facility] MA[facility];  Service: Cardiology    CARDIAC ELECTROPHYSIOLOGY PROCEDURE N/A 2/8/2024    Procedure: Cardiac laser lead extraction;  Surgeon: Arturo Wooten DO;  [facility];  Service: Cardiology    CARDIAC ELECTROPHYSIOLOGY PROCEDURE N/A 5/21/2024    Procedure: Cardiac icd implant subq;  Surgeon: Urbano Maria MD;  [facility];  Service: Cardiology    IR LOWER EXTREMITY ANGIOGRAM  1/18/2024    W[place] RMVL TRANSVNS PM ELTRD DUAL LEAD SYS N/A 2/20/2024    Procedure: EV ICD IMPLANTATION;  Surgeon: Abhilash Ferrera MD;  Location: [facility];  Service: Cardiac Surgery    WY RMVL TRANSVNS PM ELTRD DUAL LEAD SYS N/A 2/21/2024    Procedure: REMOVAL OF LEAD AND GENERATOR AND ATTEMPTED LEAD REVISION;  Surgeon: Abhilash Ferrera MD;  Location: [facility];  Service: Cardiac Surgery    WOUND DEBRIDEMENT Left 2/4/2024    Procedure: LEFT FOURTH TOE AMPUTATION SURGICAL WOUND DEBRIDEMENT FOOT/TOE (WASH OUT);  Surgeon: Bebo Hopper DPM;  Location: [facility];  Service: Podiatry     Family History: No family history on file.    Social History   Social History     Substance and Sexual Activity   Alcohol Use Not Currently     Social History     Substance and Sexual Activity   Drug Use Never      Social History     Tobacco Use   Smoking Status Former    Types: Cigarettes    Start date: 1/30/2024   Smokeless Tobacco Never       Meds/Allergies   all medications and allergies reviewed  Home Medications:   Medications Prior to Admission:     aspirin 81 mg chewable tablet    atorvastatin (LIPITOR) 80 mg tablet    clopidogrel (PLAVIX) 75 mg tablet    Empagliflozin (Jardiance) 10 MG TABS tablet    Insulin Glargine Solostar (Lantus SoloStar) 100 UNIT/ML SOPN    metFORMIN (GLUCOPHAGE) 1000 MG tablet    metoprolol succinate (TOPROL-XL) 50 mg 24 hr tablet    sacubitril-valsartan (Entresto)  MG TABS    sacubitril-valsartan (Entresto)  MG TABS    torsemide (DEMADEX) 20 mg tablet    amiodarone 200 mg tablet    BD Pen Needle Micro U/F 32G X 6 MM MISC    sitaGLIPtin-metFORMIN (JANUMET)  MG per tablet    Allergies   Allergen Reactions    Vancomycin Rash     NOT AN ALLERGY - 1/31/24 patient experienced vancomycin infusion reaction, please extend duration of infusion time to prevent future infusion reactions       Objective   Vitals: Blood pressure 122/69, pulse 72, temperature 98.6 °F (37 °C), temperature source Oral, resp. rate 18, height 6' (1.829 m), weight 116 kg (255 lb 4.7 oz), SpO2 97%.  Orthostatic Blood Pressures      Flowsheet Row Most Recent Value   Blood Pressure 122/69 filed at 10/07/2024 1025   Patient Position - Orthostatic VS Sitting filed at 10/07/2024 1025            No intake or output data in the 24 hours ending 10/07/24 1348    Invasive Devices       Peripheral Intravenous Line  Duration             Peripheral IV 06/02/24 Distal;Right;Upper;Ventral (anterior) Arm 127 days    Peripheral IV 06/03/24 Left Antecubital 126 days    Peripheral IV 10/07/24 Right Antecubital <1 day                    Physical Exam     Lab Results: I have personally reviewed pertinent lab results.    Results from last 7 days   Lab Units 10/07/24  1143   WBC Thousand/uL 9.12   HEMOGLOBIN g/dL 11.9*    HEMATOCRIT % 37.4   PLATELETS Thousands/uL 213     Results from last 7 days   Lab Units 10/07/24  1240   POTASSIUM mmol/L 6.0*   CHLORIDE mmol/L 104   CO2 mmol/L 25   BUN mg/dL 47*   CREATININE mg/dL 1.93*   CALCIUM mg/dL 8.8         Results from last 7 days   Lab Units 10/07/24  1240   MAGNESIUM mg/dL 2.4         Imaging: Results Review Statement: No pertinent imaging studies reviewed.  Results for orders placed during the hospital encounter of 17    Echo complete with contrast if indicated    Narrative  [place] [phone]    Transthoracic Echocardiogram  2D, M-mode, Doppler, and Color Doppler    Study date:  26-Sep-2017    Patient: EAGLE REBOLLAR  MR number: [id number]  Account number: [id number]  : 1960  Age: 57 years  Gender: Male  Status: Outpatient  Location: [address] [place] [address]  Height: 72 in  Weight: 263.3 lb  BP: 142/ 88 mmHg    Indications: CAD    Diagnoses: I25.10 - Atherosclerotic heart disease of native coronary artery without angina pectoris    Sonographer:  OLIVE Benoit  Primary Physician:  Paty Ortiz MD  Referring Physician:  Eagle Chavez MD  Group:  [facility]  Interpreting Physician:  Torrey Duenas MD    SUMMARY    LEFT VENTRICLE:  Size was normal.  Systolic function was normal. Ejection fraction was estimated to be 65 %.  There was mild concentric hypertrophy.  Doppler parameters were consistent with abnormal left ventricular relaxation (grade 1 diastolic dysfunction).    RIGHT VENTRICLE:  The size was normal.  Systolic function was normal.    TRICUSPID VALVE:  There was trace regurgitation.    HISTORY: PRIOR HISTORY: Hypertension, CAD s/p PCI, smoker, diabetes, high cholesterol    PROCEDURE: The study was performed in the [address] [place] [address]. This was a routine study. The transthoracic approach was used. The study included  complete 2D imaging, M-mode, complete spectral Doppler, and color Doppler. The  heart rate was 112 bpm, at the start of the study. Images were obtained from the parasternal, apical, subcostal, and suprasternal notch acoustic windows. Echocardiographic views were limited due to decreased penetration. This was a  technically difficult study.    LEFT VENTRICLE: Size was normal. Systolic function was normal. Ejection fraction was estimated to be 65 %. There were no regional wall motion abnormalities. Wall thickness was mildly increased. There was mild concentric hypertrophy.  DOPPLER: Doppler parameters were consistent with abnormal left ventricular relaxation (grade 1 diastolic dysfunction).    RIGHT VENTRICLE: The size was normal. Systolic function was normal. Wall thickness was normal.    LEFT ATRIUM: Size was normal.    RIGHT ATRIUM: Size was normal.    MITRAL VALVE: Valve structure was normal. There was normal leaflet separation. DOPPLER: The transmitral velocity was within the normal range. There was no evidence for stenosis. There was no regurgitation.    AORTIC VALVE: The valve was trileaflet. Leaflets exhibited normal thickness and normal cuspal separation. DOPPLER: Transaortic velocity was within the normal range. There was no evidence for stenosis. There was no regurgitation.    TRICUSPID VALVE: The valve structure was normal. There was normal leaflet separation. DOPPLER: The transtricuspid velocity was within the normal range. There was no evidence for stenosis. There was trace regurgitation. The tricuspid jet  envelope definition was inadequate for estimation of RV systolic pressure. There are no indirect findings suggestive of moderate or severe pulmonary hypertension.    PULMONIC VALVE: Leaflets exhibited normal thickness, no calcification, and normal cuspal separation. DOPPLER: The transpulmonic velocity was within the normal range. There was no regurgitation.    PERICARDIUM: There was no pericardial  effusion. The pericardium was normal in appearance.    AORTA: The root exhibited normal size.    SYSTEMIC VEINS: IVC: The inferior vena cava was normal in size and course. Respirophasic changes were normal.    SYSTEM MEASUREMENT TABLES    2D  %FS: 47.6 %  AV Diam: 3.34 cm  EDV(Teich): 101.71 ml  EF Biplane: 67.76 %  EF(Cube): 85.61 %  EF(Teich): 79 %  ESV(Cube): 14.82 ml  ESV(Teich): 21.36 ml  IVSd: 1.58 cm  LA Area: 12.78 cm2  LA Diam: 3.37 cm  LVEDV MOD A2C: 49.73 ml  LVEDV MOD A4C: 61.78 ml  LVEDV MOD BP: 58.1 ml  LVEF MOD A2C: 65.64 %  LVEF MOD A4C: 71.2 %  LVESV MOD A2C: 17.09 ml  LVESV MOD A4C: 17.79 ml  LVESV MOD BP: 18.73 ml  LVIDd: 4.69 cm  LVIDs: 2.46 cm  LVLd A2C: 6.97 cm  LVLd A4C: 7.74 cm  LVLs A2C: 5.23 cm  LVLs A4C: 6.13 cm  LVPWd: 1.23 cm  RA Area: 14.52 cm2  RV Diam.: 2.86 cm  SI(Cube): 36.73 ml/m2  SI(Teich): 33.48 ml/m2  SV MOD A2C: 32.64 ml  SV MOD A4C: 43.98 ml  SV(Cube): 88.15 ml  SV(Teich): 80.35 ml    MM  TAPSE: 3.26 cm    PW  E': 0.08 m/s    [employer] Accredited Echocardiography Laboratory    Prepared and electronically signed by    Torrey Duenas MD  Signed 26-Sep-2017 12:55:39      Code Status: Level 1 - Full Code    ** Please Note: Dictation voice to text software may have been used in the creation of this document. **

## 2024-10-07 NOTE — ASSESSMENT & PLAN NOTE
"Lab Results   Component Value Date    HGBA1C 7.1 (H) 05/14/2024       No results for input(s): \"POCGLU\" in the last 72 hours.    Blood Sugar Average: Last 72 hrs:      "

## 2024-10-07 NOTE — ASSESSMENT & PLAN NOTE
- felt to be scar mediated given ICMP  - has had ICD due to this diagnosis as well (see below for device details)  - beta blocker therapy of metoprolol succinate 50mg twice daily as an outpatient  - had been maintained on amiodarone until a month ago    Patient presents today in sinus rhythm with right bundle branch block at baseline to undergo antiarrhythmic initiation.  However, blood work that was drawn showed a potassium of 6 and creatinine of 1.91 baseline is closer to 1.2.  Discussed with the patient and he denies any change in baseline health such as nausea, vomiting, dehydration.      Would prefer to have just repeated blood work but unfortunately patient is a tough stick and blood draw has been attempted more than 5 times which is unsuccessful.  Therefore, will have midline placed and blood work redrawn.    It was explained to the patient that once midline is placed, we will repeat blood work and determine if potassium and creatinine are accurate.  If it is, would likely need to be admitted for workup of LINDEN otherwise, if lower close to baseline can then likely start antiarrhythmic later tonight.

## 2024-10-08 PROBLEM — R80.1 PERSISTENT PROTEINURIA: Status: ACTIVE | Noted: 2024-10-08

## 2024-10-08 PROBLEM — N18.31 CKD STAGE 3A, GFR 45-59 ML/MIN (HCC): Status: ACTIVE | Noted: 2024-10-08

## 2024-10-08 LAB
ANION GAP SERPL CALCULATED.3IONS-SCNC: 9 MMOL/L (ref 4–13)
BACTERIA UR QL AUTO: ABNORMAL /HPF
BILIRUB UR QL STRIP: NEGATIVE
BUN SERPL-MCNC: 50 MG/DL (ref 5–25)
CALCIUM SERPL-MCNC: 8.7 MG/DL (ref 8.4–10.2)
CHLORIDE SERPL-SCNC: 107 MMOL/L (ref 96–108)
CLARITY UR: CLEAR
CO2 SERPL-SCNC: 23 MMOL/L (ref 21–32)
COLOR UR: ABNORMAL
CREAT SERPL-MCNC: 1.92 MG/DL (ref 0.6–1.3)
GFR SERPL CREATININE-BSD FRML MDRD: 36 ML/MIN/1.73SQ M
GLUCOSE SERPL-MCNC: 102 MG/DL (ref 65–140)
GLUCOSE SERPL-MCNC: 103 MG/DL (ref 65–140)
GLUCOSE SERPL-MCNC: 182 MG/DL (ref 65–140)
GLUCOSE SERPL-MCNC: 195 MG/DL (ref 65–140)
GLUCOSE SERPL-MCNC: 263 MG/DL (ref 65–140)
GLUCOSE UR STRIP-MCNC: ABNORMAL MG/DL
GRAN CASTS #/AREA URNS LPF: ABNORMAL /[LPF]
HGB UR QL STRIP.AUTO: ABNORMAL
HYALINE CASTS #/AREA URNS LPF: ABNORMAL /LPF
KETONES UR STRIP-MCNC: NEGATIVE MG/DL
LEUKOCYTE ESTERASE UR QL STRIP: ABNORMAL
MAGNESIUM SERPL-MCNC: 2.4 MG/DL (ref 1.9–2.7)
NITRITE UR QL STRIP: NEGATIVE
NON-SQ EPI CELLS URNS QL MICRO: ABNORMAL /HPF
PH UR STRIP.AUTO: 5.5 [PH]
POTASSIUM SERPL-SCNC: 4.3 MMOL/L (ref 3.5–5.3)
PROT UR STRIP-MCNC: ABNORMAL MG/DL
RBC #/AREA URNS AUTO: ABNORMAL /HPF
SODIUM SERPL-SCNC: 139 MMOL/L (ref 135–147)
SP GR UR STRIP.AUTO: 1.02 (ref 1–1.03)
UROBILINOGEN UR STRIP-ACNC: <2 MG/DL
WBC #/AREA URNS AUTO: ABNORMAL /HPF

## 2024-10-08 PROCEDURE — 82948 REAGENT STRIP/BLOOD GLUCOSE: CPT

## 2024-10-08 PROCEDURE — 99232 SBSQ HOSP IP/OBS MODERATE 35: CPT | Performed by: INTERNAL MEDICINE

## 2024-10-08 PROCEDURE — 81001 URINALYSIS AUTO W/SCOPE: CPT | Performed by: NURSE PRACTITIONER

## 2024-10-08 PROCEDURE — 80048 BASIC METABOLIC PNL TOTAL CA: CPT | Performed by: PHYSICIAN ASSISTANT

## 2024-10-08 PROCEDURE — 99223 1ST HOSP IP/OBS HIGH 75: CPT | Performed by: INTERNAL MEDICINE

## 2024-10-08 PROCEDURE — 83735 ASSAY OF MAGNESIUM: CPT | Performed by: PHYSICIAN ASSISTANT

## 2024-10-08 RX ORDER — CLOPIDOGREL BISULFATE 75 MG/1
75 TABLET ORAL DAILY
Status: DISCONTINUED | OUTPATIENT
Start: 2024-10-09 | End: 2024-10-09 | Stop reason: HOSPADM

## 2024-10-08 RX ORDER — SODIUM CHLORIDE 9 MG/ML
50 INJECTION, SOLUTION INTRAVENOUS CONTINUOUS
Status: DISPENSED | OUTPATIENT
Start: 2024-10-08 | End: 2024-10-09

## 2024-10-08 RX ADMIN — METOPROLOL SUCCINATE 50 MG: 50 TABLET, EXTENDED RELEASE ORAL at 09:16

## 2024-10-08 RX ADMIN — ATORVASTATIN CALCIUM 80 MG: 80 TABLET, FILM COATED ORAL at 18:11

## 2024-10-08 RX ADMIN — ASPIRIN 81 MG CHEWABLE TABLET 81 MG: 81 TABLET CHEWABLE at 11:20

## 2024-10-08 RX ADMIN — INSULIN LISPRO 2 UNITS: 100 INJECTION, SOLUTION INTRAVENOUS; SUBCUTANEOUS at 21:20

## 2024-10-08 RX ADMIN — INSULIN GLARGINE 30 UNITS: 100 INJECTION, SOLUTION SUBCUTANEOUS at 21:20

## 2024-10-08 RX ADMIN — SODIUM CHLORIDE 50 ML/HR: 0.9 INJECTION, SOLUTION INTRAVENOUS at 16:45

## 2024-10-08 RX ADMIN — INSULIN LISPRO 2 UNITS: 100 INJECTION, SOLUTION INTRAVENOUS; SUBCUTANEOUS at 16:44

## 2024-10-08 RX ADMIN — INSULIN LISPRO 2 UNITS: 100 INJECTION, SOLUTION INTRAVENOUS; SUBCUTANEOUS at 11:20

## 2024-10-08 RX ADMIN — CLOPIDOGREL BISULFATE 75 MG: 75 TABLET ORAL at 11:20

## 2024-10-08 RX ADMIN — METOPROLOL SUCCINATE 50 MG: 50 TABLET, EXTENDED RELEASE ORAL at 21:19

## 2024-10-08 NOTE — ASSESSMENT & PLAN NOTE
Etiology: Suspect secondary to prior uncontrolled diabetes with A1c as high as 12.4  Recent UPCR 6.61 on 5/14/2024  Consider checking serologies

## 2024-10-08 NOTE — ASSESSMENT & PLAN NOTE
Etiology: Suspect prerenal given fluctuating blood pressure, torsemide, Jardiance and Entresto use versus overall progression  Admission creatinine 1.93 on 10/7/2024  Current creatinine 1.92 today stable electrolytes and acid-base balance  Entresto, Jardiance and torsemide currently on hold per primary team  Nephrotic range proteinuria with last UPCR on 5/14/2024 at 6.61  We will check urinalysis with micro  Consider gentle IVF for 10 hours  Will check bladder scan with PVR  Check kidney bladder ultrasound if renal function worsens  Check BMP in a.m.

## 2024-10-08 NOTE — ASSESSMENT & PLAN NOTE
- felt to be scar mediated given ICMP  - has had ICD due to this diagnosis as well (see below for device details)  - beta blocker therapy of metoprolol succinate 50mg twice daily as an outpatient  - had been maintained on amiodarone until a month ago  - had presented 10/7 for med admission to be switched to Class III AAD but LINDEN, see below  - will likely be placed back on amiodarone (has been on since initial device implantation 5/2023) on discharge - long term toxicities associated with this medication

## 2024-10-08 NOTE — CONSULTS
Assessment & Plan  Acute kidney injury (HCC)  Etiology: Suspect prerenal given fluctuating blood pressure, torsemide, Jardiance and Entresto use versus overall progression  Admission creatinine 1.93 on 10/7/2024  Current creatinine 1.92 today stable electrolytes and acid-base balance  Entresto, Jardiance and torsemide currently on hold per primary team  Nephrotic range proteinuria with last UPCR on 5/14/2024 at 6.61  We will check urinalysis with micro  Consider gentle IVF for 10 hours  Will check bladder scan with PVR  Check kidney bladder ultrasound if renal function worsens  Check BMP in a.m.  CKD stage 3a, GFR 45-59 ml/min ([facility])  Lab Results   Component Value Date    EGFR 36 10/08/2024    EGFR 35 10/07/2024    EGFR 36 10/07/2024    CREATININE 1.92 (H) 10/08/2024    CREATININE 1.94 (H) 10/07/2024    CREATININE 1.93 (H) 10/07/2024   Etiology: Suspect secondary to diabetic kidney disease, hypertension, and prior LINDEN episodes  Most recent baseline creatinine 1.2-1.3 dating back to March 2024  Follows with Dr. Treadwell as outpatient  Will need follow-up on discharge  Persistent proteinuria  Etiology: Suspect secondary to prior uncontrolled diabetes with A1c as high as 12.4  Recent UPCR 6.61 on 5/14/2024  Consider checking serologies  Hypertension  Current blood pressure 125/73  Outpatient medications include Toprol-XL 50 mg every 12 hours, and Entresto  mg ( did not increase) 2 times daily, torsemide 10 mg daily  Current medications includes Toprol XL 50 mg every 12 hours  Recommend low-sodium diet  Avoid hypotension of pertubation's blood pressure    Chronic systolic CHF (congestive heart failure) ([facility])  Patient examining euvolemic  On torsemide 10 mg as outpatient  Recent echocardiogram 6/3/2024 reveals EF of 40%, systolic function moderately reduced no pericardial effusion  Follows heart failure as outpatient and is on Entresto  V-tach ([facility])  Reason for admission was for antiarrhythmic initiation however with  creatinine being elevated this is on hold  EP following  Ischemic cardiomyopathy  On Toprol XL, Entresto along with previous spironolactone and Jardiance as outpatient  Recent echocardiogram reveals EF of 40%  Heart failure follows patient  Type 2 diabetes mellitus with foot ulcer, with long-term current use of insulin ([facility])  Lab Results   Component Value Date    HGBA1C 7.1 (H) 05/14/2024   Has had uncontrolled A1c dating back to 2015 with A1c as high as 12.4  Started to improve in 2023  Is on Jardiance 10 mg, metformin 1000 mg and insulin outpatient  Management per primary team    ICD (implantable cardioverter-defibrillator) in place  History of MSSA bacteremia from left foot toe gangrene status post amputation in January 2024 earlier this year complicated by ICD infection status post explantation 2/9/2024  Status post Medtronic AV ICD implant on 2/20/2024 with frequent lead dislodgment into pleural space  Status post attempted revision on 2/21/2020 for banding given inability to sense with lead and complex extraction done  Status post [employer] subcutaneous ICD on 5/22/2024  EP following    HISTORY OF PRESENT ILLNESS:  Requesting Physician: Urbano Maria MD  Reason for Consult: Acute kidney injury on top of CKD    Nadir Myers is a 64 y.o. male who has PMH of CKD IIIA, previous LINDEN, cardiomyopathy, CHF, diabetes II, three-vessel CAD, V-tach, ICD, previous MSSA bacteremia of left foot toe gangrene status post amputation who presented for antiarrhythmic initiation with EP team.  Upon evaluation of blood work, noted elevation in creatinine 1.93 and nephrology consulted for further evaluation.  On discussion, the patient reports taking all his medicine as prescribed.  He denies increasing his Entresto as previously ordered secondary to pharmacy not supplying this.  Reports he can vomit intermittently at night then dissipates.  Reports blood sugar has been well-controlled.     PAST MEDICAL HISTORY:  Past  Medical History:   Diagnosis Date    Diabetes mellitus (HCC)     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    Myocardial infarction (HCC)        PAST SURGICAL HISTORY:  Past Surgical History:   Procedure Laterality Date    CARDIAC CATHETERIZATION N/A 05/03/2023    Procedure: Cardiac Coronary Angiogram;  Surgeon: Valeriy Shore MD;  [facility];  Service: Cardiology    CARDIAC CATHETERIZATION Left 05/03/2023    Procedure: Cardiac Left Heart Cath;  Surgeon: Valeriy Shore MD;  [facility];  Service: Cardiology    CARDIAC DEFIBRILLATOR PLACEMENT  05/2023    CARDIAC ELECTROPHYSIOLOGY PROCEDURE N/A 05/12/2023    Procedure: Cardiac icd implant;  Surgeon: Urbano Maria MD;  [facility];  Service: Cardiology    CARDIAC ELECTROPHYSIOLOGY PROCEDURE N/A 2/20/2024    Procedure: EV ICD IMPLANTATION;  Surgeon: Arturo Wooten DO;  Location: [facility];  Service: Cardiology    CARDIAC ELECTROPHYSIOLOGY PROCEDURE N/A 2/8/2024    Procedure: Cardiac laser lead extraction;  Surgeon: Arturo Wooten DO;  [facility];  Service: Cardiology    CARDIAC ELECTROPHYSIOLOGY PROCEDURE N/A 5/21/2024    Procedure: Cardiac icd implant subq;  Surgeon: Urbano Maria MD;  [facility];  Service: Cardiology    IR LOWER EXTREMITY ANGIOGRAM  1/18/2024    OH RMVL TRANSVNS PM ELTRD DUAL LEAD SYS N/A 2/20/2024    Procedure: EV ICD IMPLANTATION;  Surgeon: Abhilash Ferrera MD;  Location: [facility];  Service: Cardiac Surgery    OH RMVL TRANSVNS PM ELTRD DUAL LEAD SYS N/A 2/21/2024    Procedure: REMOVAL OF LEAD AND GENERATOR AND ATTEMPTED LEAD REVISION;  Surgeon: Abhilash Ferrera MD;  Location[facility];  Service: Cardiac Surgery    WOUND DEBRIDEMENT Left 2/4/2024    Procedure: LEFT FOURTH TOE AMPUTATION SURGICAL WOUND DEBRIDEMENT FOOT/TOE (WASH OUT);  Surgeon: Bebo Hopper DPM;  Location: [facility];  Service: Podiatry       ALLERGIES:  Allergies   Allergen Reactions     Vancomycin Rash     NOT AN ALLERGY - 1/31/24 patient experienced vancomycin infusion reaction, please extend duration of infusion time to prevent future infusion reactions       SOCIAL HISTORY:  Social History     Substance and Sexual Activity   Alcohol Use Not Currently     Social History     Substance and Sexual Activity   Drug Use Never     Social History     Tobacco Use   Smoking Status Former    Types: Cigarettes    Start date: 1/30/2024   Smokeless Tobacco Never       FAMILY HISTORY:  No family history on file.    MEDICATIONS:    Current Facility-Administered Medications:     aspirin chewable tablet 81 mg, 81 mg, Oral, Daily, Soledad Soden, PA-C, 81 mg at 10/07/24 1704    atorvastatin (LIPITOR) tablet 80 mg, 80 mg, Oral, After Dinner, Soledad Soden, PA-C, 80 mg at 10/07/24 1651    clopidogrel (PLAVIX) tablet 75 mg, 75 mg, Oral, Daily, Soledad Soden, PA-C, 75 mg at 10/07/24 1704    insulin glargine (LANTUS) subcutaneous injection 30 Units 0.3 mL, 30 Units, Subcutaneous, HS, Soledad Soden, PA-C, 30 Units at 10/07/24 2124    insulin lispro (HumALOG/ADMELOG) 100 units/mL subcutaneous injection 1-5 Units, 1-5 Units, Subcutaneous, HS, Soledad Soden, PA-C, 2 Units at 10/07/24 2124    insulin lispro (HumALOG/ADMELOG) 100 units/mL subcutaneous injection 2-12 Units, 2-12 Units, Subcutaneous, TID AC, 2 Units at 10/07/24 1650 **AND** Fingerstick Glucose (POCT), , , TID AC, Soledad Soden, PA-C    metoprolol succinate (TOPROL-XL) 24 hr tablet 50 mg, 50 mg, Oral, Q12H, Soledad Soden, PA-C, 50 mg at 10/08/24 0916      REVIEW OF SYSTEMS:  Patient seen and examined at bedside  Review of Systems   Constitutional: Negative.  Negative for activity change, appetite change, chills, diaphoresis, fatigue and fever.   HENT: Negative.  Negative for congestion and facial swelling.    Respiratory: Negative.     Cardiovascular: Negative.    Gastrointestinal: Negative.    Endocrine: Negative.    Genitourinary: Negative.    Musculoskeletal: Negative.     Skin: Negative.    Allergic/Immunologic: Negative.    Neurological: Negative.    Hematological: Negative.    Psychiatric/Behavioral: Negative.           PHYSICAL EXAM:  Current weight: Weight - Scale: 116 kg (255 lb 4.7 oz)  Vitals:    10/08/24 0311 10/08/24 0657 10/08/24 0700 10/08/24 1053   BP: 112/68 121/75  125/73   BP Location:    Right arm   Pulse: 68 67  68   Resp:    16   Temp: 97.5 °F (36.4 °C) 97.5 °F (36.4 °C)  97.6 °F (36.4 °C)   TempSrc:   Oral Oral   SpO2: 96% 91%  97%   Weight:       Height:           Physical Exam  Vitals and nursing note reviewed.   Constitutional:       Appearance: Normal appearance.   HENT:      Head: Normocephalic and atraumatic.      Nose: Nose normal.      Mouth/Throat:      Mouth: Mucous membranes are moist.      Pharynx: Oropharynx is clear.   Eyes:      Extraocular Movements: Extraocular movements intact.      Conjunctiva/sclera: Conjunctivae normal.   Cardiovascular:      Rate and Rhythm: Normal rate and regular rhythm.      Pulses: Normal pulses.      Heart sounds: Normal heart sounds.   Pulmonary:      Effort: Pulmonary effort is normal.      Breath sounds: Normal breath sounds.   Abdominal:      General: Bowel sounds are normal.      Palpations: Abdomen is soft.   Musculoskeletal:      Cervical back: Normal range of motion.   Skin:     General: Skin is warm.   Neurological:      General: No focal deficit present.      Mental Status: He is alert and oriented to person, place, and time.   Psychiatric:         Mood and Affect: Mood normal.         Behavior: Behavior normal.              Lab Results:   Results from last 7 days   Lab Units 10/08/24  0614 10/07/24  1450 10/07/24  1240 10/07/24  1143   WBC Thousand/uL  --   --   --  9.12   HEMOGLOBIN g/dL  --   --   --  11.9*   HEMATOCRIT %  --   --   --  37.4   PLATELETS Thousands/uL  --   --   --  213   SODIUM mmol/L 139 135 135  --    POTASSIUM mmol/L 4.3 5.2 6.0*  --    CHLORIDE mmol/L 107 104 104  --    CO2 mmol/L 23 22  "25  --    BUN mg/dL 50* 52* 47*  --    CREATININE mg/dL 1.92* 1.94* 1.93*  --    CALCIUM mg/dL 8.7 9.1 8.8  --    MAGNESIUM mg/dL 2.4  --  2.4  --          Medical records through [facility] Everywhere has been reviewed for this consult encounter    Portions of the record may have been created with voice recognition software. Occasional wrong word or \"sound a like\" substitutions may have occurred due to the inherent limitations of voice recognition software. Read the chart carefully and recognize,                     "

## 2024-10-08 NOTE — PLAN OF CARE
Problem: Knowledge Deficit  Goal: Patient/family/caregiver demonstrates understanding of disease process, treatment plan, medications, and discharge instructions  Description: Complete learning assessment and assess knowledge base.  Interventions:  - Provide teaching at level of understanding  - Provide teaching via preferred learning methods  Outcome: Progressing     Problem: CARDIOVASCULAR - ADULT  Goal: Maintains optimal cardiac output and hemodynamic stability  Description: INTERVENTIONS:  - Monitor I/O, vital signs and rhythm  - Monitor for S/S and trends of decreased cardiac output  - Administer and titrate ordered vasoactive medications to optimize hemodynamic stability  - Assess quality of pulses, skin color and temperature  - Assess for signs of decreased coronary artery perfusion  - Instruct patient to report change in severity of symptoms  Outcome: Progressing  Goal: Absence of cardiac dysrhythmias or at baseline rhythm  Description: INTERVENTIONS:  - Continuous cardiac monitoring, vital signs, obtain 12 lead EKG if ordered  - Administer antiarrhythmic and heart rate control medications as ordered  - Monitor electrolytes and administer replacement therapy as ordered  Outcome: Progressing     Problem: PAIN - ADULT  Goal: Verbalizes/displays adequate comfort level or baseline comfort level  Description: Interventions:  - Encourage patient to monitor pain and request assistance  - Assess pain using appropriate pain scale  - Administer analgesics based on type and severity of pain and evaluate response  - Implement non-pharmacological measures as appropriate and evaluate response  - Consider cultural and social influences on pain and pain management  - Notify physician/advanced practitioner if interventions unsuccessful or patient reports new pain  Outcome: Progressing     Problem: INFECTION - ADULT  Goal: Absence or prevention of progression during hospitalization  Description: INTERVENTIONS:  - Assess and  monitor for signs and symptoms of infection  - Monitor lab/diagnostic results  - Monitor all insertion sites, i.e. indwelling lines, tubes, and drains  - Monitor endotracheal if appropriate and nasal secretions for changes in amount and color  - [place] appropriate cooling/warming therapies per order  - Administer medications as ordered  - Instruct and encourage patient and family to use good hand hygiene technique  - Identify and instruct in appropriate isolation precautions for identified infection/condition  Outcome: Progressing  Goal: Absence of fever/infection during neutropenic period  Description: INTERVENTIONS:  - Monitor WBC    Outcome: Progressing     Problem: DISCHARGE PLANNING  Goal: Discharge to home or other facility with appropriate resources  Description: INTERVENTIONS:  - Identify barriers to discharge w/patient and caregiver  - Arrange for needed discharge resources and transportation as appropriate  - Identify discharge learning needs (meds, wound care, etc.)  - Arrange for interpretive services to assist at discharge as needed  - Refer to Case Management Department for coordinating discharge planning if the patient needs post-hospital services based on physician/advanced practitioner order or complex needs related to functional status, cognitive ability, or social support system  Outcome: Progressing

## 2024-10-08 NOTE — ASSESSMENT & PLAN NOTE
- creatinne of 1.9 on presentation 10/7 (baseline closer to 1.2-1.4 though has been known to have some admissions with LINDEN in the past)  - both heart failure and nephrology consulted, greatly appreciate recommendations  - currently will add back Entresto and hold torsemide and Jardiance per heart failure  - will also follow-up on nephrology recommendations once formally seen

## 2024-10-08 NOTE — CASE MANAGEMENT
Case Management Assessment & Discharge Planning Note    Patient name Nadir Myers  Location [facility]01 MRN [id number]  : 1960 Date 10/8/2024       Current Admission Date: 10/7/2024  Current Admission Diagnosis:V-tach ([facility])   Patient Active Problem List    Diagnosis Date Noted Date Diagnosed    CKD stage 3a, GFR 45-59 ml/min ([facility]) 10/08/2024     Persistent proteinuria 10/08/2024     Acute kidney injury ([facility]) 10/07/2024     Advanced care planning/counseling discussion 2024     Type 2 diabetes mellitus with diabetic peripheral angiopathy without gangrene ([facility]) 2024     Amputated 4th toe, left ([facility]) 04/10/2024     ICD (implantable cardioverter-defibrillator) in place 2024     Abnormal CT of the chest 2024     Type 2 diabetes mellitus with foot ulcer, with long-term current use of insulin ([facility]) 2024     3-vessel CAD 2024     PAD (peripheral artery disease) ([facility]) 2024     Chronic systolic CHF (congestive heart failure) ([facility]) 2023     V-tach ([facility]) 2023     Ischemic cardiomyopathy 2023     Hypertension 2023     Acute HFrEF 2023     Benign prostatic hyperplasia without lower urinary tract symptoms 2018     Type 2 diabetes mellitus, with long-term current use of insulin ([facility]) 2015     Tobacco dependence syndrome 2012       LOS (days): 1  Geometric Mean LOS (GMLOS) (days): 2.3  Days to GMLOS:1.1     OBJECTIVE:    Risk of Unplanned Readmission Score: 14.15         Current admission status: Inpatient       Preferred Pharmacy:   [id number] [place] - [address]  [address]  [place] [address]  Phone: [phone] Fax: [phone]    [facility]  [place], [place] - [address]  [address]  [place] [address]  Phone: [phone] Fax: [phone]    Primary Care Provider: Beau Lemus MD    Primary Insurance: AARP [employer] REP  Secondary  Insurance:     ASSESSMENT:  Active Health Care Proxies       Meredith Morel Health Care Agent - Daughter   Primary Phone: [phone] (Mobile)                 Advance Directives  Does patient have a Health Care POA?: Yes  Does patient have Advance Directives?: Yes  Primary Contact: Meredith lubin         Readmission Root Cause  30 Day Readmission: No    Patient Information  Admitted from:: Home  Mental Status: Alert  During Assessment patient was accompanied by: Not accompanied during assessment  Assessment information provided by:: Patient  Primary Caregiver: Self  Support Systems: Spouse/significant other, Self, Daughter  County of Residence: [place]  What city do you live in?: [place]  Home entry access options. Select all that apply.: Stairs  Number of steps to enter home.: 5  Do the steps have railings?: No  Type of Current Residence: 2 story home  Upon entering residence, is there a bedroom on the main floor (no further steps)?: No  A bedroom is located on the following floor levels of residence (select all that apply):: 2nd Floor  Upon entering residence, is there a bathroom on the main floor (no further steps)?: No  Indicate which floors of current residence have a bathroom (select all the apply):: 2nd Floor  Number of steps to 2nd floor from main floor: One Flight  Living Arrangements: Lives w/ Spouse/significant other  Is patient a ?: No    Activities of Daily Living Prior to Admission  Functional Status: Independent  Completes ADLs independently?: Yes  Ambulates independently?: Yes  Does patient use assisted devices?: Yes  Assisted Devices (DME) used: Straight Cane  Does patient have a history of Outpatient Therapy (PT/OT)?: No  Does the patient have a history of Short-Term Rehab?: No  Does patient have a history of HHC?: Yes ([facility])  Does patient currently have HHC?: No         Patient Information Continued  Income Source: Pension/detention  Does patient have prescription coverage?:  Yes  Does patient receive dialysis treatments?: No  Does patient have a history of substance abuse?: No  Does patient have a history of Mental Health Diagnosis?: No         Means of Transportation  Means of Transport to Appts:: Drives Self      Social Determinants of Health (SDOH)      Flowsheet Row Most Recent Value   Housing Stability    In the last 12 months, was there a time when you were not able to pay the mortgage or rent on time? N   In the past 12 months, how many times have you moved where you were living? 1   At any time in the past 12 months, were you homeless or living in a shelter (including now)? N   Transportation Needs    In the past 12 months, has lack of transportation kept you from medical appointments or from getting medications? no   In the past 12 months, has lack of transportation kept you from meetings, work, or from getting things needed for daily living? No   Food Insecurity    Within the past 12 months, you worried that your food would run out before you got the money to buy more. Never true   Within the past 12 months, the food you bought just didn't last and you didn't have money to get more. Never true   Utilities    In the past 12 months has the electric, gas, oil, or water company threatened to shut off services in your home? No            DISCHARGE DETAILS:    Discharge planning discussed with:: patient        CM contacted family/caregiver?: No- see comments (declined)             Contacts  Patient Contacts: patient  Relationship to Patient:: Other (Comment) (self)  Contact Method: In Person  Reason/Outcome: Continuity of Care, Emergency Contact, Discharge Planning    Requested Home Health Care         Is the patient interested in HHC at discharge?: No    DME Referral Provided  Referral made for DME?: No         Would you like to participate in our [facility] service program?  : No - Declined          Additional Comments: CM met w/ pt to introduce CM role, obtain baseline  assessment information and disucss DCP. Pt lvies w/ wife in 2 story home w/ bed and bath on 2nd floor. IPTA. Uses Straight cane as needed for ambulation. Drives. retired. Pt has had HHC in past w/ [facility]. No prior rehab. Denies h/o MH or SA treatment. No d/c needs evaluated.

## 2024-10-08 NOTE — ASSESSMENT & PLAN NOTE
- had Medtronic dual-chamber ICD placed 5/12/23 for secondary prevention  - given MSSA bacteremia (felt to be due to left foot fourth toe wet gangrene status post amputation 1/2024), underwent extraction of this device 2/9/24  - underwent [employer] EV ICD implant 2/20/24 with frequent subs all lead dislodgment into the pleural space and attempted revision 2/21/24 abandoned given inability to sense with lead and complete extraction done instead  - underwent [employer] Live subcutaneous ICD 5/22/24

## 2024-10-08 NOTE — ASSESSMENT & PLAN NOTE
Lab Results   Component Value Date    HGBA1C 7.1 (H) 05/14/2024   Has had uncontrolled A1c dating back to 2015 with A1c as high as 12.4  Started to improve in 2023  Is on Jardiance 10 mg, metformin 1000 mg and insulin outpatient  Management per primary team

## 2024-10-08 NOTE — ASSESSMENT & PLAN NOTE
Lab Results   Component Value Date    HGBA1C 7.1 (H) 05/14/2024       Recent Labs     10/08/24  1545 10/08/24  2032 10/09/24  0642 10/09/24  1102   POCGLU 195* 263* 150* 173*       Blood Sugar Average: Last 72 hrs:  (P) 183.375

## 2024-10-08 NOTE — TELEPHONE ENCOUNTER
Faxed patient's completed  Care   Assistance Program Application forms, patient's & Dr Samuels's portions to:  Fax [phone] - [facility].

## 2024-10-08 NOTE — ASSESSMENT & PLAN NOTE
Reason for admission was for antiarrhythmic initiation however with creatinine being elevated this is on hold  EP following

## 2024-10-08 NOTE — PROGRESS NOTES
Progress Note - Electrophysiology   Name: Nadir Myers 64 y.o. male I MRN: [id number]  Unit/Bed#: [facility] 501-01 I Date of Admission: 10/7/2024   Date of Service: 10/8/2024 I Hospital Day: 1     Assessment & Plan  V-tach (HCC)  - felt to be scar mediated given ICMP  - has had ICD due to this diagnosis as well (see below for device details)  - beta blocker therapy of metoprolol succinate 50mg twice daily as an outpatient  - had been maintained on amiodarone until a month ago  - had presented 10/7 for med admission to be switched to Class III AAD but LINDEN, see below  - will likely be placed back on amiodarone (has been on since initial device implantation 5/2023) on discharge - long term toxicities associated with this medication      Acute kidney injury (HCC)  - creatinne of 1.9 on presentation 10/7 (baseline closer to 1.2-1.4 though has been known to have some admissions with LINDEN in the past)  - both heart failure and nephrology consulted, greatly appreciate recommendations  - currently will add back Entresto and hold torsemide and Jardiance per heart failure  - will also follow-up on nephrology recommendations once formally seen  ICD (implantable cardioverter-defibrillator) in place  - had Medtronic dual-chamber ICD placed 5/12/23 for secondary prevention  - given MSSA bacteremia (felt to be due to left foot fourth toe wet gangrene status post amputation 1/2024), underwent extraction of this device 2/9/24  - underwent Medtronic EV ICD implant 2/20/24 with frequent subs all lead dislodgment into the pleural space and attempted revision 2/21/24 abandoned given inability to sense with lead and complete extraction done instead  - underwent [employer] subcutaneous ICD 5/22/24  3-vessel CAD  - with  of LAD and HANNA to mid circumflex 2/2015   Ischemic cardiomyopathy  - maintained on GDMT of metoprolol succinate and Entresto, also on spironolactone and Jardiance  - EF of 45% per echo 2/21/2024 /had been as low as  10 to 15% in 5/2023 with recovery  Chronic systolic CHF (congestive heart failure) ([facility])  Wt Readings from Last 3 Encounters:   10/07/24 116 kg (255 lb 4.7 oz)   09/05/24 113 kg (249 lb)   08/01/24 112 kg (247 lb)             Type 2 diabetes mellitus with foot ulcer, with long-term current use of insulin ([facility])  Lab Results   Component Value Date    HGBA1C 7.1 (H) 05/14/2024       Recent Labs     10/07/24  1545 10/07/24  2023 10/08/24  0611 10/08/24  1052   POCGLU 164* 238* 102 182*       Blood Sugar Average: Last 72 hrs:  (P) 171.5        Subjective/Objective   Subjective: Offers no complaints today.  Was seen by heart failure and did report that he has had episodic vomiting at night once every 3 or so weeks.    TELE: Sinus rhythm with no ventricular ectopy      Objective:  Vitals: /73 (BP Location: Right arm)   Pulse 68   Temp 97.6 °F (36.4 °C) (Oral)   Resp 16   Ht 6' (1.829 m)   Wt 116 kg (255 lb 4.7 oz)   SpO2 97%   BMI 34.62 kg/m²     Vitals:    10/07/24 1025   Weight: 116 kg (255 lb 4.7 oz)     Orthostatic Blood Pressures      Flowsheet Row Most Recent Value   Blood Pressure 125/73 filed at 10/08/2024 1053   Patient Position - Orthostatic VS Sitting filed at 10/08/2024 1053              Intake/Output Summary (Last 24 hours) at 10/8/2024 1306  Last data filed at 10/8/2024 1237  Gross per 24 hour   Intake 640 ml   Output 950 ml   Net -310 ml       Invasive Devices       Peripheral Intravenous Line  Duration             Peripheral IV 10/07/24 Right Antecubital 1 day                              Scheduled Meds:  Current Facility-Administered Medications   Medication Dose Route Frequency Provider Last Rate    aspirin  81 mg Oral Daily Soledad Soden, PA-C      atorvastatin  80 mg Oral After Dinner Soledad Soden, PA-C      clopidogrel  75 mg Oral Daily Soledad Soden, PA-C      insulin glargine  30 Units Subcutaneous HS Soledad Soden, PA-C      insulin lispro  1-5 Units Subcutaneous HS Soledad Soden, PA-C       insulin lispro  2-12 Units Subcutaneous TID AC Soledad Mari PA-C      metoprolol succinate  50 mg Oral Q12H Soledad Mari PA-C       Continuous Infusions:   PRN Meds:.    Review of Systems:  ROS  ROS as noted above, otherwise 12 point review of systems was performed and is negative.     Physical Exam:   GEN: NAD, alert and oriented, well appearing  SKIN: dry without significant lesions or rashes  HEENT: NCAT, PERRL, EOMs intact  NECK: No JVD appreciated  CARDIOVASCULAR: regular  LUNGS: Good respiratory effort with no audible wheezes  PSYCH: Normal mood and affect  NEURO: CN ll-Xll grossly intact    Physical Exam              Lab Results: I have personally reviewed pertinent lab results.    Results from last 7 days   Lab Units 10/07/24  1143   WBC Thousand/uL 9.12   HEMOGLOBIN g/dL 11.9*   HEMATOCRIT % 37.4   PLATELETS Thousands/uL 213     Results from last 7 days   Lab Units 10/08/24  0614 10/07/24  1450 10/07/24  1240   POTASSIUM mmol/L 4.3 5.2 6.0*   CHLORIDE mmol/L 107 104 104   CO2 mmol/L 23 22 25   BUN mg/dL 50* 52* 47*   CREATININE mg/dL 1.92* 1.94* 1.93*   CALCIUM mg/dL 8.7 9.1 8.8         Results from last 7 days   Lab Units 10/08/24  0614 10/07/24  1240   MAGNESIUM mg/dL 2.4 2.4       Imaging: Results Review Statement: No pertinent imaging studies reviewed.  Results for orders placed during the hospital encounter of 17    Echo complete with contrast if indicated    Narrative  [address] [address] [phone]    Transthoracic Echocardiogram  2D, M-mode, Doppler, and Color Doppler    Study date:  26-Sep-2017    Patient: EAGLE REBOLLAR  MR number: [id number]  Account number: [id number]  : 1960  Age: 57 years  Gender: Male  Status: Outpatient  Location: [address] [place] [address] [facility]  Height: 72 in  Weight: 263.3 lb  BP: 142/ 88 mmHg    Indications: CAD    Diagnoses: I25.10 - Atherosclerotic heart disease of native coronary  artery without angina pectoris    Sonographer:  OLIVE Benoit  Primary Physician:  Paty Ortiz MD  Referring Physician:  Nadir Chavez MD  Group:  [facility]  Interpreting Physician:  Torrey Duenas MD    SUMMARY    LEFT VENTRICLE:  Size was normal.  Systolic function was normal. Ejection fraction was estimated to be 65 %.  There was mild concentric hypertrophy.  Doppler parameters were consistent with abnormal left ventricular relaxation (grade 1 diastolic dysfunction).    RIGHT VENTRICLE:  The size was normal.  Systolic function was normal.    TRICUSPID VALVE:  There was trace regurgitation.    HISTORY: PRIOR HISTORY: Hypertension, CAD s/p PCI, smoker, diabetes, high cholesterol    PROCEDURE: The study was performed in the [address] [place] [address] [facility]. This was a routine study. The transthoracic approach was used. The study included complete 2D imaging, M-mode, complete spectral Doppler, and color Doppler. The  heart rate was 112 bpm, at the start of the study. Images were obtained from the parasternal, apical, subcostal, and suprasternal notch acoustic windows. Echocardiographic views were limited due to decreased penetration. This was a  technically difficult study.    LEFT VENTRICLE: Size was normal. Systolic function was normal. Ejection fraction was estimated to be 65 %. There were no regional wall motion abnormalities. Wall thickness was mildly increased. There was mild concentric hypertrophy.  DOPPLER: Doppler parameters were consistent with abnormal left ventricular relaxation (grade 1 diastolic dysfunction).    RIGHT VENTRICLE: The size was normal. Systolic function was normal. Wall thickness was normal.    LEFT ATRIUM: Size was normal.    RIGHT ATRIUM: Size was normal.    MITRAL VALVE: Valve structure was normal. There was normal leaflet separation. DOPPLER: The transmitral velocity was within the normal range. There was no evidence for stenosis. There was  no regurgitation.    AORTIC VALVE: The valve was trileaflet. Leaflets exhibited normal thickness and normal cuspal separation. DOPPLER: Transaortic velocity was within the normal range. There was no evidence for stenosis. There was no regurgitation.    TRICUSPID VALVE: The valve structure was normal. There was normal leaflet separation. DOPPLER: The transtricuspid velocity was within the normal range. There was no evidence for stenosis. There was trace regurgitation. The tricuspid jet  envelope definition was inadequate for estimation of RV systolic pressure. There are no indirect findings suggestive of moderate or severe pulmonary hypertension.    PULMONIC VALVE: Leaflets exhibited normal thickness, no calcification, and normal cuspal separation. DOPPLER: The transpulmonic velocity was within the normal range. There was no regurgitation.    PERICARDIUM: There was no pericardial effusion. The pericardium was normal in appearance.    AORTA: The root exhibited normal size.    SYSTEMIC VEINS: IVC: The inferior vena cava was normal in size and course. Respirophasic changes were normal.    SYSTEM MEASUREMENT TABLES    2D  %FS: 47.6 %  AV Diam: 3.34 cm  EDV(Teich): 101.71 ml  EF Biplane: 67.76 %  EF(Cube): 85.61 %  EF(Teich): 79 %  ESV(Cube): 14.82 ml  ESV(Teich): 21.36 ml  IVSd: 1.58 cm  LA Area: 12.78 cm2  LA Diam: 3.37 cm  LVEDV MOD A2C: 49.73 ml  LVEDV MOD A4C: 61.78 ml  LVEDV MOD BP: 58.1 ml  LVEF MOD A2C: 65.64 %  LVEF MOD A4C: 71.2 %  LVESV MOD A2C: 17.09 ml  LVESV MOD A4C: 17.79 ml  LVESV MOD BP: 18.73 ml  LVIDd: 4.69 cm  LVIDs: 2.46 cm  LVLd A2C: 6.97 cm  LVLd A4C: 7.74 cm  LVLs A2C: 5.23 cm  LVLs A4C: 6.13 cm  LVPWd: 1.23 cm  RA Area: 14.52 cm2  RV Diam.: 2.86 cm  SI(Cube): 36.73 ml/m2  SI(Teich): 33.48 ml/m2  SV MOD A2C: 32.64 ml  SV MOD A4C: 43.98 ml  SV(Cube): 88.15 ml  SV(Teich): 80.35 ml    MM  TAPSE: 3.26 cm    PW  E': 0.08 m/s    [employer] Commission Accredited Echocardiography Laboratory    Prepared  and electronically signed by    Torrey Duenas MD  Signed 26-Sep-2017 12:55:39      VTE Pharmacologic Prophylaxis: VTE covered by:    None     VTE Mechanical Prophylaxis: sequential compression device

## 2024-10-08 NOTE — UTILIZATION REVIEW
Initial Clinical Review    Admission: Date/Time/Statement:   Admission Orders (From admission, onward)       Ordered        10/07/24 1050  INPATIENT ADMISSION  Once                          Orders Placed This Encounter   Procedures    INPATIENT ADMISSION     Standing Status:   Standing     Number of Occurrences:   1     Order Specific Question:   Level of Care     Answer:   Med Surg [16]     Order Specific Question:   Estimated length of stay     Answer:   More than 2 Midnights     Order Specific Question:   Certification     Answer:   I certify that inpatient services are medically necessary for this patient for a duration of greater than two midnights. See H&P and MD Progress Notes for additional information about the patient's course of treatment.     ED Arrival Information       Patient not seen in ED                       No chief complaint on file.      Initial Presentation: 64 y.o. male w/ PMH of T2DM, CHF, ischemic cardiomyopathy, 3 vessel CAD, s/p ICD placement presented to [facility] home, direct admitted on the EP service to undergo antiarrhythmic initiation d/t VT.  Pt had VT leading to secondary prevention ICD being placed in May 2023. He developed MSSA bacteremia at the beginning of 2024 and had to undergo extraction of his device. Underwent Medtronic EV ICD implantation on 2/20/2024. Lead dislodged into his pleural space, had unsuccessful revision, undergone full extraction. Discharged w/ [employer] and undergone SC ICD implantation 05/2024. Following ED, recommended to transition from amiodarone to Class 3.   At [facility], EKG sinus rhythm, RBBB at baseline. Bld work showed K 6, creatinine 1.91, baseline 1.2.   Plan: given low CrCl we will be not starting sotalol. Will consult advance heart failure team to adjust medications. Resume amio for now. Hold entresto d/t LINDEN. Recheck bld work.    Date: 10/08   Day 2:   EP Notes: TELE: Sinus rhythm with no ventricular ectopy. Creatinine 1.92 today. Pt will be  restarted on amiodarone as he is not a candidate for class III agents due to low CrCl. Nephrology Consulted.    Nephrology Consult: LINDEN on CKD 3, baseline Creatinine of 1.2-1.3, admission cr 1.93, today 1.92. Hold Entresto, torsemide and Jardiance for now. Recommend saline 50 cc an hour for 500 cc. check BMP, magnesium, phosphorus in a.m. Await renal recovery.    Date: 10/09  Day 3: Has surpassed a 2nd midnight with active treatments and services.  Pt reports feeling fine. Received 500 cc IVF yesterday. Creatinine 1.97 today. UA w/ moderate blood, increased leukocytes, 3+ protein, hyaline and granular casts were noted as well.  Of note is that his urinalysis in February also had proteinuria, large hematuria. Postvoid residual currently 0 mL.  Hold all GDMT for now but with plan to restart Entresto only tomorrow followed by Torsemide 10 mg on 10/11 per nephrology recs. Continue to hold Jardiance. check serologies, renal US.     ED Treatment-Medication Administration - No Administrations Displayed (No Start Event Found)       None            Scheduled Medications:  aspirin, 81 mg, Oral, Daily  atorvastatin, 80 mg, Oral, After Dinner  clopidogrel, 75 mg, Oral, Daily  insulin glargine, 30 Units, Subcutaneous, HS  insulin lispro, 1-5 Units, Subcutaneous, HS  insulin lispro, 2-12 Units, Subcutaneous, TID AC  metoprolol succinate, 50 mg, Oral, Q12H      Continuous IV Infusions: none     PRN Meds:     ED Triage Vitals [10/07/24 1025]   Temperature Pulse Respirations Blood Pressure SpO2 Pain Score   98.6 °F (37 °C) 72 18 122/69 97 % No Pain     Weight (last 2 days)       Date/Time Weight    10/07/24 1025 116 (255.29)            Vital Signs (last 3 days)       Date/Time Temp Pulse Resp BP MAP (mmHg) SpO2 O2 Device Patient Position - Orthostatic VS Pain    10/08/24 10:53:22 97.6 °F (36.4 °C) 68 -- 125/73 90 97 % -- -- --    10/08/24 0918 -- -- -- -- -- -- None (Room air) -- No Pain    10/08/24 0700 -- -- -- -- -- -- -- Sitting  --    10/08/24 06:57:52 97.5 °F (36.4 °C) 67 -- 121/75 90 91 % -- -- --    10/08/24 03:11:57 97.5 °F (36.4 °C) 68 -- 112/68 83 96 % -- -- --    10/07/24 23:30:37 97.8 °F (36.6 °C) 67 18 98/51 67 96 % None (Room air) Lying --    10/07/24 21:23:50 -- 69 18 136/83 101 97 % None (Room air) Lying --    10/07/24 2000 -- -- -- -- -- -- -- -- No Pain    10/07/24 18:41:40 97.5 °F (36.4 °C) 70 18 124/77 93 95 % -- -- --    10/07/24 15:05:37 98.1 °F (36.7 °C) 68 18 137/83 101 95 % -- -- --    10/07/24 1025 98.6 °F (37 °C) 72 18 122/69 87 97 % None (Room air) Sitting No Pain              Pertinent Labs/Diagnostic Test Results:   Radiology:  No orders to display     Cardiology:  ECG 12 lead   Final Result by Lisa Dutton MD (10/07 1629)   Normal sinus rhythm   Right bundle branch block   Left axis deviation   Minimal voltage criteria for LVH, may be normal variant   Inferior infarct (cited on or before 02-MAY-2023)   Anterolateral infarct (cited on or before 02-MAY-2023)   Abnormal ECG   When compared with ECG of 02-JUN-2024 08:51,   No significant change was found   Confirmed by Lisa Dutton ([id number]) on 10/7/2024 4:29:01 PM        GI:  No orders to display           Results from last 7 days   Lab Units 10/07/24  1143   WBC Thousand/uL 9.12   HEMOGLOBIN g/dL 11.9*   HEMATOCRIT % 37.4   PLATELETS Thousands/uL 213   TOTAL NEUT ABS Thousands/µL 6.83         Results from last 7 days   Lab Units 10/08/24  0614 10/07/24  1450 10/07/24  1240   SODIUM mmol/L 139 135 135   POTASSIUM mmol/L 4.3 5.2 6.0*   CHLORIDE mmol/L 107 104 104   CO2 mmol/L 23 22 25   ANION GAP mmol/L 9 9 6   BUN mg/dL 50* 52* 47*   CREATININE mg/dL 1.92* 1.94* 1.93*   EGFR ml/min/1.73sq m 36 35 36   CALCIUM mg/dL 8.7 9.1 8.8   MAGNESIUM mg/dL 2.4  --  2.4         Results from last 7 days   Lab Units 10/08/24  1052 10/08/24  0611 10/07/24  2023 10/07/24  1545   POC GLUCOSE mg/dl 182* 102 238* 164*     Results from last 7 days   Lab Units 10/08/24  0614  10/07/24  1450 10/07/24  1240   GLUCOSE RANDOM mg/dL 103 182* 146*             BETA-HYDROXYBUTYRATE   Date Value Ref Range Status   01/31/2024 0.3 <0.6 mmol/L Final        Past Medical History:   Diagnosis Date    Diabetes mellitus (HCC)     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    Myocardial infarction (HCC)      Present on Admission:   V-tach (HCC)   ICD (implantable cardioverter-defibrillator) in place   Chronic systolic CHF (congestive heart failure) (HCC)   Ischemic cardiomyopathy   3-vessel CAD      Admitting Diagnosis: V tach (HCC) [I47.20]  Age/Sex: 64 y.o. male    Network Utilization Review Department  ATTENTION: Please call with any questions or concerns to [phone] and carefully listen to the prompts so that you are directed to the right person. All voicemails are confidential.   For Discharge needs, contact Care Management DC Support Team at [phone] opt. 2  Send all requests for admission clinical reviews, approved or denied determinations and any other requests to dedicated fax number below belonging to the campus where the patient is receiving treatment. List of dedicated fax numbers for the Facilities:  FACILITY NAME UR FAX NUMBER   ADMISSION DENIALS (Administrative/Medical Necessity) [phone]   DISCHARGE SUPPORT TEAM (NETWORK) [phone]   PARENT CHILD HEALTH (Maternity/NICU/Pediatrics) [phone]   [facility] [phone]   [facility] [phone]   [facility] [phone]   [facility] [phone]   [facility] [phone]   [facility] [phone]   [facility] [phone]   [facility] [phone]   [facility] [phone]   [facility] [phone]   [facility] [phone]    [facility] [phone]

## 2024-10-08 NOTE — ASSESSMENT & PLAN NOTE
- had Medtronic dual-chamber ICD placed 5/12/23 for secondary prevention  - given MSSA bacteremia (felt to be due to left foot fourth toe wet gangrene status post amputation 1/2024), underwent extraction of this device 2/9/24  - underwent Medtronic EV ICD implant 2/20/24 with frequent subs all lead dislodgment into the pleural space and attempted revision 2/21/24 abandoned given inability to sense with lead and complete extraction done instead  - underwent [employer] subcutaneous ICD 5/22/24

## 2024-10-08 NOTE — ASSESSMENT & PLAN NOTE
Lab Results   Component Value Date    EGFR 36 10/08/2024    EGFR 35 10/07/2024    EGFR 36 10/07/2024    CREATININE 1.92 (H) 10/08/2024    CREATININE 1.94 (H) 10/07/2024    CREATININE 1.93 (H) 10/07/2024   Etiology: Suspect secondary to diabetic kidney disease, hypertension, and prior LINDEN episodes  Most recent baseline creatinine 1.2-1.3 dating back to March 2024  Follows with Dr. Treadwell as outpatient  Will need follow-up on discharge

## 2024-10-08 NOTE — ASSESSMENT & PLAN NOTE
Current blood pressure 125/73  Outpatient medications include Toprol-XL 50 mg every 12 hours, and Entresto  mg ( did not increase) 2 times daily, torsemide 10 mg daily  Current medications includes Toprol XL 50 mg every 12 hours  Recommend low-sodium diet  Avoid hypotension of pertubation's blood pressure

## 2024-10-08 NOTE — ASSESSMENT & PLAN NOTE
- creatinne of 1.9 on presentation 10/7 (baseline closer to 1.2-1.4 though has been known to have some admissions with LINDEN in the past)  - both heart failure and nephrology consulted, greatly appreciate recommendations  - currently will add back Entresto and hold torsemide and Jardiance per heart failure  - serologies and renal ultrasound performed 10/9  - recommendations as follows:  BMP for this coming Monday with results to Dr. Treadwell  Entresto should resume on 10/10  Hold Jardiance for now  Resume torsemide on 10/11  The patient should avoid nonsteroidals, he was advised to do so

## 2024-10-08 NOTE — ASSESSMENT & PLAN NOTE
Patient examining euvolemic  On torsemide 10 mg as outpatient  Recent echocardiogram 6/3/2024 reveals EF of 40%, systolic function moderately reduced no pericardial effusion  Follows heart failure as outpatient and is on Entresto

## 2024-10-08 NOTE — ASSESSMENT & PLAN NOTE
History of MSSA bacteremia from left foot toe gangrene status post amputation in January 2024 earlier this year complicated by ICD infection status post explantation 2/9/2024  Status post Medtronic AV ICD implant on 2/20/2024 with frequent lead dislodgment into pleural space  Status post attempted revision on 2/21/2020 for banding given inability to sense with lead and complex extraction done  Status post [employer] subcutaneous ICD on 5/22/2024  EP following

## 2024-10-08 NOTE — ASSESSMENT & PLAN NOTE
Lab Results   Component Value Date    HGBA1C 7.1 (H) 05/14/2024       Recent Labs     10/07/24  1545 10/07/24  2023 10/08/24  0611 10/08/24  1052   POCGLU 164* 238* 102 182*       Blood Sugar Average: Last 72 hrs:  (P) 171.5

## 2024-10-08 NOTE — ASSESSMENT & PLAN NOTE
History of MSSA bacteremia from left foot toe gangrene status post amputation in January 2024 earlier this year complicated by ICD infection status post explantation 2/9/2024  Status post Medtronic AV ICD implant on 2/20/2024 with frequent lead dislodgment into pleural space  Status post attempted revision on 2/21/2020 for banding given inability to sense with lead and complex extraction done  Status post [employer] subcutaneous ICD on 5/22/2024  EP following

## 2024-10-08 NOTE — DISCHARGE SUMMARY
Discharge Summary - Electrophysiology   Name: Nadir Myers 64 y.o. male I MRN: [id number]  Unit/Bed#: [facility] 501-01 I Date of Admission: 10/7/2024   Date of Service: 10/9/2024 I Hospital Day: 2      Admission Date: 10/7/2024   Discharge Date: 10/9/2024    Discharge Diagnosis:   Assessment & Plan  V-tach (HCC)  - felt to be scar mediated given ICMP  - has had ICD due to this diagnosis as well (see below for device details)  - beta blocker therapy of metoprolol succinate 50mg twice daily as an outpatient  - had been maintained on amiodarone until a month ago  - had presented 10/7 for med admission to be switched to Class III AAD but LINDEN, see below  - will likely be placed back on amiodarone (has been on since initial device implantation 5/2023) on discharge - long term toxicities associated with this medication      Acute kidney injury (HCC)  - creatinne of 1.9 on presentation 10/7 (baseline closer to 1.2-1.4 though has been known to have some admissions with LINDEN in the past)  - both heart failure and nephrology consulted, greatly appreciate recommendations  - currently will add back Entresto and hold torsemide and Jardiance per heart failure  - serologies and renal ultrasound performed 10/9  - recommendations as follows:  BMP for this coming Monday with results to Dr. Treadwell  Entresto should resume on 10/10  Hold Jardiance for now  Resume torsemide on 10/11  The patient should avoid nonsteroidals, he was advised to do so    ICD (implantable cardioverter-defibrillator) in place  - had Medtronic dual-chamber ICD placed 5/12/23 for secondary prevention  - given MSSA bacteremia (felt to be due to left foot fourth toe wet gangrene status post amputation 1/2024), underwent extraction of this device 2/9/24  - underwent Medtronic EV ICD implant 2/20/24 with frequent subs all lead dislodgment into the pleural space and attempted revision 2/21/24 abandoned given inability to sense with lead and complete extraction done  instead  - underwent [employer] subcutaneous ICD 5/22/24  3-vessel CAD  - with  of LAD and HANNA to mid circumflex 2/2015   Ischemic cardiomyopathy  - maintained on GDMT of metoprolol succinate and Entresto, also on spironolactone and Jardiance  - EF of 45% per echo 2/21/2024 /had been as low as 10 to 15% in 5/2023 with recovery  Chronic systolic CHF (congestive heart failure) ([facility])  Wt Readings from Last 3 Encounters:   10/07/24 116 kg (255 lb 4.7 oz)   09/05/24 113 kg (249 lb)   08/01/24 112 kg (247 lb)             Type 2 diabetes mellitus with foot ulcer, with long-term current use of insulin ([facility])  Lab Results   Component Value Date    HGBA1C 7.1 (H) 05/14/2024       Recent Labs     10/08/24  1545 10/08/24  2032 10/09/24  0642 10/09/24  1102   POCGLU 195* 263* 150* 173*       Blood Sugar Average: Last 72 hrs:  (P) 183.375    Hypertension    CKD stage 3a, GFR 45-59 ml/min ([facility])  Lab Results   Component Value Date    EGFR 35 10/09/2024    EGFR 36 10/08/2024    EGFR 35 10/07/2024    CREATININE 1.97 (H) 10/09/2024    CREATININE 1.92 (H) 10/08/2024    CREATININE 1.94 (H) 10/07/2024     Persistent proteinuria    Bacteremia due to Staphylococcus          Procedures Performed: None  No orders of the defined types were placed in this encounter.      Consultants: Heart failure, Nephrology    HPI: Please refer to the initial history and physical as well as procedure notes for full details. Briefly, Nadir Myers is a 64 y.o. year old male with past medical history as stated above.  Patient is well-known to the electrophysiology service as he did have ventricular tachycardia leading to secondary prevention ICD being placed in May 2023.  Unfortunate, he developed MSSA bacteremia at the beginning of 2024 and had to undergo extraction of his device.  It was felt that he may benefit from Medtronic EV ICD that was implanted 2/20/2024.  Unfortunate, he had the lead dislodged into his pleural space and when revision was  attempted, it was unsuccessful and therefore full extraction was undergone.  He was discharged with LifeVest and presented back in May of this year and underwent subcutaneous ICD implantation.  He followed with Dr. Maria in the outpatient setting and was doing well.  However, it was felt that he should be transition from amiodarone and he therefore presented this to the hospital admission to undergo transition to class III.     Hospital Course:   Nadir Myers is a 64 y.o. male who was admitted elective for antiarrhythmic medication initiation.  Patient was seen in the office by Dr. Maria and recommended to initiate therapy with sotalol versus dofetilide.   Unfortunately, baseline blood work showed bump in creatinine from baseline closer to 1.2-1.4 up to 1.9.  He was evaluated by both our heart failure and nephrology teams with his GDMT of Entresto, Jardiance, and torsemide held.  He did receive further workup with IV hydration, renal ultrasound, and serologies with no improvement in creatinine.  He was therefore advised about close follow-up with heart failure along with nephrology.    Physical exam on the day of discharge was as follows:  GEN: NAD, alert and oriented, well appearing  SKIN: dry without significant lesions or rashes  HEENT: NCAT, PERRL, EOMs intact  NECK: No JVD or carotid bruits appreciated  CARDIOVASCULAR: RRR, normal S1, S2 without murmurs, rubs, or gallops appreciated  LUNGS: Clear to auscultation bilaterally without wheezes, rhonchi, or rales  ABDOMEN: Soft, nontender, nondistended  EXTREMITIES/VASCULAR: perfused without clubbing, cyanosis, or edema b/l  PSYCH: Normal mood and affect  NEURO: CN ll-Xll grossly intact     At time of discharge, patient was ambulating the cardiac unit without complaints of  lightheadedness, presyncope, syncope, chest pain, shortness of breath, orthopnea, PND, palpitations, or bleeding problems. He was given a follow up with Collin Gonzalez PA-C in 4-6 weeks  in the office.  He was advised that heart failure team will reach out to him to set up a follow-up next week.  He was advised to reach out to nephrology as well.    In terms of patient's medications, he was not started on sotalol or dofetilide given kidney function and therefore was to restart amiodarone 200 mg daily upon discharge.  During his hospitalization he was advised about long-term toxicities associated with this medication.    In terms of his heart failure medications, he is to be sent home on Entresto only with Jardiance and torsemide held.  He is to check blood work on Thursday concerning this.  Nephrology recommendations are:  Please order a follow-up BMP for this coming Monday with results to Dr. Treadwell  Entresto should resume on 10/10  Hold Jardiance for now  Resume torsemide on 10/11  The patient should avoid nonsteroidals, he was advised to do so    He is stable for discharge at this time with all questions answered. He was discussed in detail with Dr. Maria who is in agreement with this discharge summary.     Discharge Medications:  See after visit summary for reconciled discharge medications provided to patient and family.      Medications Prior to Admission:     aspirin 81 mg chewable tablet    atorvastatin (LIPITOR) 80 mg tablet    clopidogrel (PLAVIX) 75 mg tablet    Empagliflozin (Jardiance) 10 MG TABS tablet    Insulin Glargine Solostar (Lantus SoloStar) 100 UNIT/ML SOPN    metFORMIN (GLUCOPHAGE) 1000 MG tablet    metoprolol succinate (TOPROL-XL) 50 mg 24 hr tablet    sacubitril-valsartan (Entresto)  MG TABS    sacubitril-valsartan (Entresto)  MG TABS    torsemide (DEMADEX) 20 mg tablet    BD Pen Needle Micro U/F 32G X 6 MM MISC    sitaGLIPtin-metFORMIN (JANUMET)  MG per tablet      Pertininet Labs/diagnostics:  CBC with diff:   Results from last 7 days   Lab Units 10/07/24  1143   WBC Thousand/uL 9.12   HEMOGLOBIN g/dL 11.9*   HEMATOCRIT % 37.4   MCV fL 90   PLATELETS  Thousands/uL 213   RBC Million/uL 4.16   MCH pg 28.6   MCHC g/dL 31.8   RDW % 14.4   MPV fL 9.7   NRBC AUTO /100 WBCs 0       BMP:  Results from last 7 days   Lab Units 10/09/24  0607 10/08/24  0614 10/07/24  1450 10/07/24  1240   POTASSIUM mmol/L 4.6 4.3 5.2 6.0*   CHLORIDE mmol/L 107 107 104 104   CO2 mmol/L 24 23 22 25   BUN mg/dL 46* 50* 52* 47*   CREATININE mg/dL 1.97* 1.92* 1.94* 1.93*   CALCIUM mg/dL 9.1 8.7 9.1 8.8       Magnesium:   Results from last 7 days   Lab Units 10/09/24  0607 10/08/24  0614 10/07/24  1240   MAGNESIUM mg/dL 2.6 2.4 2.4       Coags:       Complications: none    Condition at Discharge: good     Discharge instructions/Information to patient and family:   See after visit summary for information provided to patient and family.      Provisions for Follow-Up Care:  See after visit summary for information related to follow-up care and any pertinent home health orders.      Disposition: Home    Planned Readmission: No    Discharge Statement   I spent 45 minutes minutes discharging the patient. This time was spent on the day of discharge. I had direct contact with the patient on the day of discharge. Additional documentation is required if more than 30 minutes were spent on discharge. Evaluating the incision, discussing discharge instructions and restrictions, arranging follow up appointments, discussing medications

## 2024-10-08 NOTE — ASSESSMENT & PLAN NOTE
Lab Results   Component Value Date    EGFR 35 10/09/2024    EGFR 36 10/08/2024    EGFR 35 10/07/2024    CREATININE 1.97 (H) 10/09/2024    CREATININE 1.92 (H) 10/08/2024    CREATININE 1.94 (H) 10/07/2024

## 2024-10-08 NOTE — ASSESSMENT & PLAN NOTE
On Toprol XL, Entresto along with previous spironolactone and Jardiance as outpatient  Recent echocardiogram reveals EF of 40%  Heart failure follows patient

## 2024-10-09 ENCOUNTER — TELEPHONE (OUTPATIENT)
Dept: NEPHROLOGY | Facility: CLINIC | Age: 64
End: 2024-10-09

## 2024-10-09 ENCOUNTER — APPOINTMENT (OUTPATIENT)
Dept: RADIOLOGY | Facility: HOSPITAL | Age: 64
DRG: 309 | End: 2024-10-09
Payer: COMMERCIAL

## 2024-10-09 VITALS
DIASTOLIC BLOOD PRESSURE: 86 MMHG | BODY MASS INDEX: 34.58 KG/M2 | HEIGHT: 72 IN | SYSTOLIC BLOOD PRESSURE: 151 MMHG | TEMPERATURE: 98 F | RESPIRATION RATE: 18 BRPM | WEIGHT: 255.29 LBS | OXYGEN SATURATION: 98 % | HEART RATE: 68 BPM

## 2024-10-09 DIAGNOSIS — I10 ESSENTIAL (PRIMARY) HYPERTENSION: ICD-10-CM

## 2024-10-09 DIAGNOSIS — N18.31 STAGE 3A CHRONIC KIDNEY DISEASE (HCC): Primary | ICD-10-CM

## 2024-10-09 DIAGNOSIS — N17.9 AKI (ACUTE KIDNEY INJURY) (HCC): ICD-10-CM

## 2024-10-09 PROBLEM — B95.8 BACTEREMIA DUE TO STAPHYLOCOCCUS: Status: ACTIVE | Noted: 2024-10-09

## 2024-10-09 PROBLEM — R78.81 BACTEREMIA DUE TO STAPHYLOCOCCUS: Status: ACTIVE | Noted: 2024-10-09

## 2024-10-09 LAB
ANA SER QL IA: NEGATIVE
ANION GAP SERPL CALCULATED.3IONS-SCNC: 7 MMOL/L (ref 4–13)
BUN SERPL-MCNC: 46 MG/DL (ref 5–25)
C3 SERPL-MCNC: 154 MG/DL (ref 87–200)
C4 SERPL-MCNC: 54 MG/DL (ref 19–52)
CALCIUM SERPL-MCNC: 9.1 MG/DL (ref 8.4–10.2)
CHLORIDE SERPL-SCNC: 107 MMOL/L (ref 96–108)
CO2 SERPL-SCNC: 24 MMOL/L (ref 21–32)
CREAT SERPL-MCNC: 1.97 MG/DL (ref 0.6–1.3)
GFR SERPL CREATININE-BSD FRML MDRD: 35 ML/MIN/1.73SQ M
GLUCOSE SERPL-MCNC: 139 MG/DL (ref 65–140)
GLUCOSE SERPL-MCNC: 150 MG/DL (ref 65–140)
GLUCOSE SERPL-MCNC: 173 MG/DL (ref 65–140)
HBV CORE AB SER QL: NORMAL
HBV SURFACE AB SER-ACNC: <3 MIU/ML
HBV SURFACE AG SER QL: NORMAL
HCV AB SER QL: REACTIVE
IGA SERPL-MCNC: 192 MG/DL (ref 66–433)
MAGNESIUM SERPL-MCNC: 2.6 MG/DL (ref 1.9–2.7)
PHOSPHATE SERPL-MCNC: 5 MG/DL (ref 2.3–4.1)
POTASSIUM SERPL-SCNC: 4.6 MMOL/L (ref 3.5–5.3)
SODIUM SERPL-SCNC: 138 MMOL/L (ref 135–147)

## 2024-10-09 PROCEDURE — 80048 BASIC METABOLIC PNL TOTAL CA: CPT | Performed by: PHYSICIAN ASSISTANT

## 2024-10-09 PROCEDURE — 86334 IMMUNOFIX E-PHORESIS SERUM: CPT | Performed by: INTERNAL MEDICINE

## 2024-10-09 PROCEDURE — 84100 ASSAY OF PHOSPHORUS: CPT | Performed by: NURSE PRACTITIONER

## 2024-10-09 PROCEDURE — 86162 COMPLEMENT TOTAL (CH50): CPT | Performed by: INTERNAL MEDICINE

## 2024-10-09 PROCEDURE — 99239 HOSP IP/OBS DSCHRG MGMT >30: CPT | Performed by: PHYSICIAN ASSISTANT

## 2024-10-09 PROCEDURE — 86037 ANCA TITER EACH ANTIBODY: CPT | Performed by: INTERNAL MEDICINE

## 2024-10-09 PROCEDURE — 76770 US EXAM ABDO BACK WALL COMP: CPT

## 2024-10-09 PROCEDURE — 87340 HEPATITIS B SURFACE AG IA: CPT | Performed by: INTERNAL MEDICINE

## 2024-10-09 PROCEDURE — 86704 HEP B CORE ANTIBODY TOTAL: CPT | Performed by: INTERNAL MEDICINE

## 2024-10-09 PROCEDURE — 82948 REAGENT STRIP/BLOOD GLUCOSE: CPT

## 2024-10-09 PROCEDURE — 99232 SBSQ HOSP IP/OBS MODERATE 35: CPT | Performed by: INTERNAL MEDICINE

## 2024-10-09 PROCEDURE — 86160 COMPLEMENT ANTIGEN: CPT | Performed by: INTERNAL MEDICINE

## 2024-10-09 PROCEDURE — 82784 ASSAY IGA/IGD/IGG/IGM EACH: CPT | Performed by: INTERNAL MEDICINE

## 2024-10-09 PROCEDURE — 86038 ANTINUCLEAR ANTIBODIES: CPT | Performed by: INTERNAL MEDICINE

## 2024-10-09 PROCEDURE — 86706 HEP B SURFACE ANTIBODY: CPT | Performed by: INTERNAL MEDICINE

## 2024-10-09 PROCEDURE — 84165 PROTEIN E-PHORESIS SERUM: CPT | Performed by: INTERNAL MEDICINE

## 2024-10-09 PROCEDURE — 83520 IMMUNOASSAY QUANT NOS NONAB: CPT | Performed by: INTERNAL MEDICINE

## 2024-10-09 PROCEDURE — 83521 IG LIGHT CHAINS FREE EACH: CPT | Performed by: INTERNAL MEDICINE

## 2024-10-09 PROCEDURE — 83735 ASSAY OF MAGNESIUM: CPT | Performed by: PHYSICIAN ASSISTANT

## 2024-10-09 PROCEDURE — 86225 DNA ANTIBODY NATIVE: CPT | Performed by: INTERNAL MEDICINE

## 2024-10-09 PROCEDURE — 86803 HEPATITIS C AB TEST: CPT | Performed by: INTERNAL MEDICINE

## 2024-10-09 RX ORDER — AMIODARONE HYDROCHLORIDE 200 MG/1
200 TABLET ORAL DAILY
Qty: 90 TABLET | Refills: 2 | Status: SHIPPED | OUTPATIENT
Start: 2024-10-09

## 2024-10-09 RX ORDER — SACUBITRIL AND VALSARTAN 49; 51 MG/1; MG/1
1 TABLET, FILM COATED ORAL 2 TIMES DAILY
Start: 2024-10-09

## 2024-10-09 RX ORDER — METOPROLOL SUCCINATE 50 MG/1
50 TABLET, EXTENDED RELEASE ORAL EVERY 12 HOURS
Status: DISCONTINUED | OUTPATIENT
Start: 2024-10-09 | End: 2024-10-09 | Stop reason: HOSPADM

## 2024-10-09 RX ADMIN — CLOPIDOGREL BISULFATE 75 MG: 75 TABLET ORAL at 08:44

## 2024-10-09 RX ADMIN — INSULIN LISPRO 2 UNITS: 100 INJECTION, SOLUTION INTRAVENOUS; SUBCUTANEOUS at 06:44

## 2024-10-09 RX ADMIN — METOPROLOL SUCCINATE 50 MG: 50 TABLET, EXTENDED RELEASE ORAL at 08:44

## 2024-10-09 NOTE — ASSESSMENT & PLAN NOTE
- hx PCI 2015 Lcx  -no angina  - on ASA, plavix, statin  - -Q wave MI, later presenting or silent LAD territory infarction

## 2024-10-09 NOTE — TELEPHONE ENCOUNTER
----- Message from Farhad Soliz MD sent at 10/9/2024 10:56 AM EDT -----  Hi there, this patient is likely going home today.  I ordered various serologies and a renal ultrasound prior to departure.  His creatinine increased without clear etiology why.  His postvoid residual was 0 mL.  On discharge, his Jardiance is to be held, his Entresto will be restarted 10/10 and torsemide 10/11.    Please arrange for office follow-up early next week.  The primary team was going to order a BMP for this coming Monday but if this was not done, please order this and forward results to Dr. Treadwell.  Thanks      Progress Note - Nephrology   Nadir Myers 64 y.o. male MRN: [id number]  Unit/Bed#: [facility] 501-01 Encounter: [id number]        Assessment & Plan  Acute kidney injury (HCC)  Etiology: Suspect prerenal given fluctuating blood pressure, torsemide, Jardiance and Entresto use versus overall progression  · Admission creatinine 1.93 on 10/7/2024  · Creatinine has not changed much by holding Entresto, Jardiance, torsemide and infusion of intravenous fluids  · Entresto, Jardiance and torsemide currently on hold per primary team  · Nephrotic range proteinuria with last UPCR on 5/14/2024 at 6.61  · Current urinalysis with moderate blood, increased leukocytes, 3+ protein, hyaline and granular casts were noted as well.  Of note is that his urinalysis in February also had proteinuria, large hematuria.  · Postvoid residual currently 0 mL  CKD stage 3a, GFR 45-59 ml/min ([facility])  Lab Results  Component Value Date    EGFR 35 10/09/2024    EGFR 36 10/08/2024    EGFR 35 10/07/2024    CREATININE 1.97 (H) 10/09/2024    CREATININE 1.92 (H) 10/08/2024    CREATININE 1.94 (H) 10/07/2024  Etiology: Suspect secondary to diabetic kidney disease, hypertension, and prior LINDEN episodes  · Most recent baseline creatinine 1.2-1.3 dating back to March 2024  · Follows with Dr. Treadwell as outpatient  · Will need follow-up on discharge  Persistent  proteinuria  Etiology: Suspect secondary to prior uncontrolled diabetes with A1c as high as 12.4  · Recent UPCR 6.61 on 5/14/2024  · Will check serologies  Hypertension  · Blood pressure is somewhat labile but in the short-term need to avoid relative hypotension to maintain renal perfusion and avoid worsening renal function  · Will resume Entresto on discharge     Chronic systolic CHF (congestive heart failure) ([facility])  · Seems euvolemic on exam  · Recent echocardiogram 6/3/2024 reveals EF of 40%, systolic function moderately reduced no pericardial effusion  · Follows heart failure as outpatient  · Will resume torsemide on 10/11 to avoid volume overload  V-tach ([facility])  · Reason for admission was for antiarrhythmic initiation however with creatinine being elevated this is on hold  · EP following  Ischemic cardiomyopathy  · On Toprol XL, Entresto along with previous spironolactone and Jardiance as outpatient  · Recent echocardiogram reveals EF of 40%  · Heart failure follows patient  Type 2 diabetes mellitus with foot ulcer, with long-term current use of insulin ([facility])  Lab Results  Component Value Date    HGBA1C 7.1 (H) 05/14/2024  · Has had uncontrolled A1c dating back to 2015 with A1c as high as 12.4  · Is on Jardiance 10 mg, metformin 1000 mg and insulin outpatient  · Management per primary team     ICD (implantable cardioverter-defibrillator) in place  · History of MSSA bacteremia from left foot toe gangrene status post amputation in January 2024 earlier this year complicated by ICD infection status post explantation 2/9/2024  · Status post [employer]onic AV ICD implant on 2/20/2024 with frequent lead dislodgment into pleural space  · Status post attempted revision on 2/21/2020 for banding given inability to sense with lead and complex extraction done  · Status post [employer] subcutaneous ICD on 5/22/2024  · EP following  Bacteremia due to Staphylococcus        Plan:  · The patient can be discharged from a renal  standpoint pending obtaining serologies today and renal ultrasound being performed today  · Please order a follow-up BMP for this coming Monday with results to Dr. Treadwell  · Entresto should resume on 10/10  · Hold Jardiance for now  · Resume torsemide on 10/11  · The patient should avoid nonsteroidals, he was advised to do so  · Will contact our office to arrange for follow-up     Reviewed care coordination as an outpatient with the primary team today

## 2024-10-09 NOTE — ASSESSMENT & PLAN NOTE
Lab Results   Component Value Date    HGBA1C 7.1 (H) 05/14/2024       Recent Labs     10/08/24  1052 10/08/24  1545 10/08/24  2032 10/09/24  0642   POCGLU 182* 195* 263* 150*       Blood Sugar Average: Last 72 hrs:  (P) [id number]

## 2024-10-09 NOTE — DISCHARGE INSTR - AVS FIRST PAGE
MEDICATION CHANGES:  Please restart Entresto tomorrow, 10/10.  Please restart your torsemide the following day, 10/11.  Please do not restart your Jardiance.  Please restart amiodarone 200 mg daily.    Please make sure that blood work, BMP, is repeated on Monday 10/14 and that nephrology office can follow-up with serology and renal ultrasound.

## 2024-10-09 NOTE — UTILIZATION REVIEW
NOTIFICATION OF ADMISSION DISCHARGE   This is a Notification of Discharge from [facility]. Please be advised that this patient has been discharge from our facility. Below you will find the admission and discharge date and time including the patient’s disposition.   UTILIZATION REVIEW CONTACT:  Giacomo Wallace  Utilization   Network Utilization Review Department  Phone: [phone] x carefully listen to the prompts. All voicemails are confidential.  Email: [email or web address]     ADMISSION INFORMATION  PRESENTATION DATE: 10/7/2024 10:12 AM  OBERVATION ADMISSION DATE: N/A  INPATIENT ADMISSION DATE: 10/7/24 10:12 AM   DISCHARGE DATE: 10/9/2024  2:06 PM   DISPOSITION:Home/Self Care    Network Utilization Review Department  ATTENTION: Please call with any questions or concerns to [phone] and carefully listen to the prompts so that you are directed to the right person. All voicemails are confidential.   For Discharge needs, contact Care Management DC Support Team at [phone] opt. 2  Send all requests for admission clinical reviews, approved or denied determinations and any other requests to dedicated fax number below belonging to the campus where the patient is receiving treatment. List of dedicated fax numbers for the Facilities:  FACILITY NAME UR FAX NUMBER   ADMISSION DENIALS (Administrative/Medical Necessity) [phone]   DISCHARGE SUPPORT TEAM ([facility]) [phone]   PARENT CHILD HEALTH (Maternity/NICU/Pediatrics) [phone]   [facility] [phone]   [facility] [phone]   [facility] [phone]   [facility] [phone]   [facility] [phone]   [facility] [phone]   [facility] [phone]   [facility] [phone]   [facility]  [facility] [phone]   [facility] [phone]   [facility] [phone]   [facility] [phone]

## 2024-10-09 NOTE — PROGRESS NOTES
Progress Note - Nephrology   Nadir Myers 64 y.o. male MRN: [id number]  Unit/Bed#: [facility] 501-01 Encounter: [id number]      Assessment & Plan  Acute kidney injury (HCC)  Etiology: Suspect prerenal given fluctuating blood pressure, torsemide, Jardiance and Entresto use versus overall progression  Admission creatinine 1.93 on 10/7/2024  Creatinine has not changed much by holding Entresto, Jardiance, torsemide and infusion of intravenous fluids  Entresto, Jardiance and torsemide currently on hold per primary team  Nephrotic range proteinuria with last UPCR on 5/14/2024 at 6.61  Current urinalysis with moderate blood, increased leukocytes, 3+ protein, hyaline and granular casts were noted as well.  Of note is that his urinalysis in February also had proteinuria, large hematuria.  Postvoid residual currently 0 mL  CKD stage 3a, GFR 45-59 ml/min (Formerly [facility])  Lab Results   Component Value Date    EGFR 35 10/09/2024    EGFR 36 10/08/2024    EGFR 35 10/07/2024    CREATININE 1.97 (H) 10/09/2024    CREATININE 1.92 (H) 10/08/2024    CREATININE 1.94 (H) 10/07/2024   Etiology: Suspect secondary to diabetic kidney disease, hypertension, and prior LINDEN episodes  Most recent baseline creatinine 1.2-1.3 dating back to March 2024  Follows with Dr. Treadwell as outpatient  Will need follow-up on discharge  Persistent proteinuria  Etiology: Suspect secondary to prior uncontrolled diabetes with A1c as high as 12.4  Recent UPCR 6.61 on 5/14/2024  Will check serologies  Hypertension  Blood pressure is somewhat labile but in the short-term need to avoid relative hypotension to maintain renal perfusion and avoid worsening renal function  Will resume Entresto on discharge    Chronic systolic CHF (congestive heart failure) (Formerly [facility])  Seems euvolemic on exam  Recent echocardiogram 6/3/2024 reveals EF of 40%, systolic function moderately reduced no pericardial effusion  Follows heart failure as outpatient  Will resume torsemide on 10/11 to avoid volume  overload  V-tach ([facility])  Reason for admission was for antiarrhythmic initiation however with creatinine being elevated this is on hold  EP following  Ischemic cardiomyopathy  On Toprol XL, Entresto along with previous spironolactone and Jardiance as outpatient  Recent echocardiogram reveals EF of 40%  Heart failure follows patient  Type 2 diabetes mellitus with foot ulcer, with long-term current use of insulin ([facility])  Lab Results   Component Value Date    HGBA1C 7.1 (H) 05/14/2024   Has had uncontrolled A1c dating back to 2015 with A1c as high as 12.4  Is on Jardiance 10 mg, metformin 1000 mg and insulin outpatient  Management per primary team    ICD (implantable cardioverter-defibrillator) in place  History of MSSA bacteremia from left foot toe gangrene status post amputation in January 2024 earlier this year complicated by ICD infection status post explantation 2/9/2024  Status post Medtronic AV ICD implant on 2/20/2024 with frequent lead dislodgment into pleural space  Status post attempted revision on 2/21/2020 for banding given inability to sense with lead and complex extraction done  Status post [employer] subcutaneous ICD on 5/22/2024  EP following  Bacteremia due to Staphylococcus       Plan:  The patient can be discharged from a renal standpoint pending obtaining serologies today and renal ultrasound being performed today  Please order a follow-up BMP for this coming Monday with results to Dr. Treadwell  Entresto should resume on 10/10  Hold Jardiance for now  Resume torsemide on 10/11  The patient should avoid nonsteroidals, he was advised to do so  Will contact our office to arrange for follow-up    Reviewed care coordination as an outpatient with the primary team today.  Note also sent to Dr. Treadwell to coordinate outpatient care as well.      Subjective:   The patient was seen and examined earlier this morning.  He denied shortness of breath, chest pain or pressure, abdominal pain, vomiting,  diarrhea, sweats, chills.  Historically, he denied any issues prior to admission such as fevers, sweats, infection, nonsteroidal usage, difficulty urinating, sore throat.  He does have occasional development of ecchymotic areas which are just about his arms but nowhere else and they do not have to be associated with any trauma.      Objective:     Vitals: Blood pressure 151/86, pulse 68, temperature 98 °F (36.7 °C), resp. rate 18, height 6' (1.829 m), weight 116 kg (255 lb 4.7 oz), SpO2 98%.,Body mass index is 34.62 kg/m².Temp (24hrs), Av.9 °F (36.6 °C), Min:97.6 °F (36.4 °C), Max:98.3 °F (36.8 °C)      Temp:  [97.6 °F (36.4 °C)-98.3 °F (36.8 °C)] 98 °F (36.7 °C)  HR:  [66-74] 68  Resp:  [16-18] 18  BP: (125-156)/(72-86) 151/86  SpO2:  [95 %-99 %] 98 %    Weight (last 2 days)       Date/Time Weight    10/07/24 1025 116 (255.29)                 I/O last 24 hours:  In: 1921.7 [P.O.:1420; I.V.:501.7]  Out: 2950 [Urine:2950]        Physical Exam    /86   Pulse 68   Temp 98 °F (36.7 °C)   Resp 18   Ht 6' (1.829 m)   Wt 116 kg (255 lb 4.7 oz)   SpO2 98%   BMI 34.62 kg/m²   Vital signs were reviewed  Constitutional: The patient was awake, alert, pleasant, cooperative and in no apparent distress  Cardiovascular: No overt jugular venous distention was noted, S1-S2, no pericardial friction rub S3 appreciated, no peripheral edema was noted  Pulmonary: Adequate inspiratory effort, lungs were clear  Skin: There was 1 ecchymotic area noted about his left arm above his wrist area.  It was not raised or tender.  There is seem to be some potential central clearing although it was indistinct.  There were no other ecchymotic areas about his exposed skin              Invasive Devices       None                   Medications:    Scheduled Meds:  Current Facility-Administered Medications   Medication Dose Route Frequency Provider Last Rate    aspirin  81 mg Oral Daily Soledad Mari PA-C      atorvastatin  80 mg Oral After  Dinner Soledad Mari PA-C      clopidogrel  75 mg Oral Daily CARLA Leahy      insulin glargine  30 Units Subcutaneous HS Soledad Mari PA-C      insulin lispro  1-5 Units Subcutaneous HS Soledad Mari PA-C      insulin lispro  2-12 Units Subcutaneous TID AC Soledad Mari PA-C      metoprolol succinate  50 mg Oral Q12H CARLA Leahy         PRN Meds:.    Continuous Infusions:         LAB RESULTS:      Results from last 7 days   Lab Units 10/09/24  0607 10/08/24  0614 10/07/24  1450 10/07/24  1240 10/07/24  1143   WBC Thousand/uL  --   --   --   --  9.12   HEMOGLOBIN g/dL  --   --   --   --  11.9*   HEMATOCRIT %  --   --   --   --  37.4   PLATELETS Thousands/uL  --   --   --   --  213   SEGS PCT %  --   --   --   --  75   LYMPHO PCT %  --   --   --   --  16   MONO PCT %  --   --   --   --  6   EOS PCT %  --   --   --   --  2   POTASSIUM mmol/L 4.6 4.3 5.2 6.0*  --    CHLORIDE mmol/L 107 107 104 104  --    CO2 mmol/L 24 23 22 25  --    BUN mg/dL 46* 50* 52* 47*  --    CREATININE mg/dL 1.97* 1.92* 1.94* 1.93*  --    CALCIUM mg/dL 9.1 8.7 9.1 8.8  --    MAGNESIUM mg/dL 2.6 2.4  --  2.4  --    PHOSPHORUS mg/dL 5.0*  --   --   --   --        CUTURES:  Lab Results   Component Value Date    BLOODCX No Growth After 5 Days. 04/10/2024    BLOODCX No Growth After 5 Days. 04/10/2024    BLOODCX No Growth After 5 Days. 02/16/2024    BLOODCX No Growth After 5 Days. 02/16/2024    BLOODCX No Growth After 5 Days. 02/12/2024    BLOODCX No Growth After 5 Days. 02/12/2024    BLOODCX No Growth After 5 Days. 02/09/2024                 PLEASE NOTE:  This encounter was completed utilizing the [employer]Fluency Direct Speech Voice Recognition Software. Grammatical errors, random word insertions, pronoun errors and incomplete sentences are occasional consequences of the system due to software limitations, ambient noise and hardware issues.These may be missed by proof reading prior to affixing electronic signature. Any questions  or concerns about the content, text or information contained within the body of this dictation should be directly addressed to the physician for clarification. Please do not hesitate to call me directly if you have any any questions or concerns.

## 2024-10-09 NOTE — ASSESSMENT & PLAN NOTE
Seems euvolemic on exam  Recent echocardiogram 6/3/2024 reveals EF of 40%, systolic function moderately reduced no pericardial effusion  Follows heart failure as outpatient  Will resume torsemide on 10/11 to avoid volume overload

## 2024-10-09 NOTE — ASSESSMENT & PLAN NOTE
Blood pressure is somewhat labile but in the short-term need to avoid relative hypotension to maintain renal perfusion and avoid worsening renal function  Will resume Entresto on discharge

## 2024-10-09 NOTE — ASSESSMENT & PLAN NOTE
Etiology: Suspect secondary to prior uncontrolled diabetes with A1c as high as 12.4  Recent UPCR 6.61 on 5/14/2024  Will check serologies

## 2024-10-09 NOTE — ASSESSMENT & PLAN NOTE
History of MSSA bacteremia from left foot toe gangrene status post amputation in January 2024 earlier this year complicated by ICD infection status post explantation 2/9/2024  Status post Medtronic AV ICD implant on 2/20/2024 with frequent lead dislodgment into pleural space  Status post attempted revision on 2/21/2020 for banding given inability to sense with lead and complex extraction done  Status post [employer] subcutaneous ICD on 5/22/2024  EP following

## 2024-10-09 NOTE — PROGRESS NOTES
Progress Note - Heart Failure   Name: Nadir Myers 64 y.o. male I MRN: [id number]  Unit/Bed#: [facility] 501-01 I Date of Admission: 10/7/2024   Date of Service: 10/9/2024 I Hospital Day: 2       Subjective:  Patient seen and examined.  No events overnight. 500 ml IVF given yesterday.  Feels fine    Objective:  Intake/output: 1121/2950/-1.8  Weight: no weights  MAP  mmHg.   Assessment & Plan  Acute kidney injury (HCC)  - unclear etiology: possibly pre-renal vs disease progression  - baseline Cr 1.2-1.4, stable at 1.9 since admit. GFR 35  -holding all GDMT for now but with plan to restart Entresto only tomorrow followed by Torsemide 10 mg on 10/11 per nephrology recs. Continue to hold Jardiance.   -appreciate nephrology input.   Chronic systolic CHF (congestive heart failure) (HCC)  Wt Readings from Last 3 Encounters:   10/07/24 116 kg (255 lb 4.7 oz)   09/05/24 113 kg (249 lb)   08/01/24 112 kg (247 lb)   -ETIOLOGY: ischemic CMP with large apical aneurysm with progression of CAD/late presenting MI with occluded mid LAD and LPDA. LV mildly dilated.    -warm and euvolemic on exam. Hemodynamically stable.   -holding all GDMT for now given worsening renal function. Plan to restart Entresto on 10/10 per nephrology recs. Followed by Torsemide 10 mg on 10/11  -continue to hold Jardiance.          EKG- narrow QRS with bigeminy; inferior infarct, anterolateral infarct      TTE 6/3/24: LVEF 40%     Echo 2/1/24:  LVEF: 40%, akinetic segments  LVEDd: 5.1 cm  RV: normal     Echo 8/7/23:  LVEF: 40%  LVEDd: 4.1 cm  LV apex aneurysmal, no thrombus  RV: normal     LHC 5/3/23:   chronically occluded mid LAD and LPDA c/w echo findings of apical aneurysm, not amenable to PCI  Patent mid LCx stent   LVEDP 30 mmHg     Echocardiogram 5/2/23  LVEF:  10-15%, apical aneurysm  LVIDd: 5.6 cm  RV: normal  MR: mild  PASP: 49 mmHg     Echo 9/26/17:  LVEF: 65%                  VOLUME:  - home diuretic:  Torsemide 10 mg daily- ON  HOLD    GDMT:  --Beta-Blocker: Metoprolol succinate 50 mg BID  --ACEi, ARB or ARNi: Entresto 49/51 mg BID- ON HOLD  --MRA: NA  --SGLT2-I: Jardiance 10 mg daily. ON HOLD    DEVICE:  - MDT ICD 5/12/23 for secondary prevention  - ICD explanted 2/8/24 for infection  - Extravascular ICD placed 2/20/24, though lead was in left pleural space and repositioning attempts on 2/21/24 unsuccessful.   - Now SQ ICD placed 5/21/24   -Interrogation 9/11/24:SUB Q: BATTERY VOLTAGE ADEQUATE (98%). PRESENTING RHYTHM: NSR @ 72 BPM. ELECTRODE PARAMETERS WITHIN NORMAL LIMITS (95 OHMS). NO SIGNIFICANT HIGH RATE EPISODES. NO PROGRAMMING CHANGES MADE TO DEVICE PARAMETERS.  NORMAL DEVICE FUNCTION         Advanced Therapies (If appropriate):  --Inotrope: was on milrinone 0.25 mcg/kg/min, titrated off  --LVAD/Transplant Candidacy: Current tobacco use prior to admission precludes heart transplant at this time- since quit. Moderately reduced RV function and mildly dilated LV concerning for LVAD. Mildly dilated LV also concerning given VT.   -Has not smoked since May 2      3-vessel CAD  - hx PCI 2015 Lcx  -no angina  - on ASA, plavix, statin  - -Q wave MI, later presenting or silent LAD territory infarction     V-tach (HCC)  - scar mediated  - toprol as above  - amiodarone 200 mg daily  -further AAD management per EP  Type 2 diabetes mellitus with foot ulcer, with long-term current use of insulin ([facility])  Lab Results   Component Value Date    HGBA1C 7.1 (H) 05/14/2024       Recent Labs     10/08/24  1052 10/08/24  1545 10/08/24  2032 10/09/24  0642   POCGLU 182* 195* 263* 150*       Blood Sugar Average: Last 72 hrs:  (P) [id number]    Bacteremia due to Staphylococcus  -2/2024, resolved.   - Felt most likely from foot infection.   ICD was involved and was extracted.  Possible pulmonary septic emboli.   -GILL 2/7/24: There is a 1.8 x 1.1 cm mobile mass affixed to the cardiac device lead as is passes through the right atrium. In the setting of  bacteremia this is most likely represents infection.           Objective :  Temp:  [97.7 °F (36.5 °C)-98.3 °F (36.8 °C)] 98 °F (36.7 °C)  HR:  [66-74] 68  BP: (126-156)/(72-86) 151/86  Resp:  [16-18] 18  SpO2:  [95 %-99 %] 98 %  O2 Device: None (Room air)  Orthostatic Blood Pressures      Flowsheet Row Most Recent Value   Blood Pressure 151/86 filed at 10/09/2024 0844   Patient Position - Orthostatic VS Lying filed at 10/09/2024 0252          First Weight: Weight - Scale: 116 kg (255 lb 4.7 oz) (10/07/24 1025)  Vitals:    10/07/24 1025   Weight: 116 kg (255 lb 4.7 oz)     Physical Exam  Vitals and nursing note reviewed.   Constitutional:       Appearance: Normal appearance. He is normal weight.   HENT:      Head: Normocephalic and atraumatic.      Nose: Nose normal.   Cardiovascular:      Rate and Rhythm: Normal rate.      Pulses: Normal pulses.      Heart sounds: Normal heart sounds. No murmur heard.  Pulmonary:      Effort: Pulmonary effort is normal. No respiratory distress.      Breath sounds: Normal breath sounds. No rales.   Abdominal:      General: Abdomen is flat. Bowel sounds are normal. There is no distension.      Palpations: Abdomen is soft.   Musculoskeletal:         General: Normal range of motion.      Cervical back: Normal range of motion and neck supple.      Right lower leg: No edema.      Left lower leg: No edema.   Skin:     General: Skin is warm and dry.      Capillary Refill: Capillary refill takes 2 to 3 seconds.   Neurological:      General: No focal deficit present.      Mental Status: He is alert and oriented to person, place, and time.   Psychiatric:         Mood and Affect: Mood normal.         Behavior: Behavior normal.         Thought Content: Thought content normal.         Judgment: Judgment normal.           Lab Results: I have reviewed the following results:  Results from last 7 days   Lab Units 10/07/24  1143   WBC Thousand/uL 9.12   HEMOGLOBIN g/dL 11.9*   HEMATOCRIT % 37.4    PLATELETS Thousands/uL 213     Results from last 7 days   Lab Units 10/09/24  0607 10/08/24  0614 10/07/24  1450   POTASSIUM mmol/L 4.6 4.3 5.2   CHLORIDE mmol/L 107 107 104   CO2 mmol/L 24 23 22   BUN mg/dL 46* 50* 52*   CREATININE mg/dL 1.97* 1.92* 1.94*   CALCIUM mg/dL 9.1 8.7 9.1         Lab Results   Component Value Date    HGBA1C 7.1 (H) 05/14/2024     Lab Results   Component Value Date    TROPONINI 2.79 (H) 02/16/2015         Other Study Results Review: EKG was reviewed.     VTE Pharmacologic Prophylaxis: Reason for no pharmacologic prophylaxis ambulatory   VTE Mechanical Prophylaxis: reason for no mechanical VTE prophylaxis ambulatory

## 2024-10-09 NOTE — ASSESSMENT & PLAN NOTE
Wt Readings from Last 3 Encounters:   10/07/24 116 kg (255 lb 4.7 oz)   09/05/24 113 kg (249 lb)   08/01/24 112 kg (247 lb)   -ETIOLOGY: ischemic CMP with large apical aneurysm with progression of CAD/late presenting MI with occluded mid LAD and LPDA. LV mildly dilated.    -warm and euvolemic on exam. Hemodynamically stable.   -holding all GDMT for now given worsening renal function. Plan to restart Entresto on 10/10 per nephrology recs. Followed by Torsemide 10 mg on 10/11  -continue to hold Jardiance.          EKG- narrow QRS with bigeminy; inferior infarct, anterolateral infarct      TTE 6/3/24: LVEF 40%     Echo 2/1/24:  LVEF: 40%, akinetic segments  LVEDd: 5.1 cm  RV: normal     Echo 8/7/23:  LVEF: 40%  LVEDd: 4.1 cm  LV apex aneurysmal, no thrombus  RV: normal     LHC 5/3/23:   chronically occluded mid LAD and LPDA c/w echo findings of apical aneurysm, not amenable to PCI  Patent mid LCx stent   LVEDP 30 mmHg     Echocardiogram 5/2/23  LVEF:  10-15%, apical aneurysm  LVIDd: 5.6 cm  RV: normal  MR: mild  PASP: 49 mmHg     Echo 9/26/17:  LVEF: 65%                  VOLUME:  - home diuretic:  Torsemide 10 mg daily- ON HOLD    GDMT:  --Beta-Blocker: Metoprolol succinate 50 mg BID  --ACEi, ARB or ARNi: Entresto 49/51 mg BID- ON HOLD  --MRA: NA  --SGLT2-I: Jardiance 10 mg daily. ON HOLD    DEVICE:  - MDT ICD 5/12/23 for secondary prevention  - ICD explanted 2/8/24 for infection  - Extravascular ICD placed 2/20/24, though lead was in left pleural space and repositioning attempts on 2/21/24 unsuccessful.   - Now SQ ICD placed 5/21/24   -Interrogation 9/11/24:SUB Q: BATTERY VOLTAGE ADEQUATE (98%). PRESENTING RHYTHM: NSR @ 72 BPM. ELECTRODE PARAMETERS WITHIN NORMAL LIMITS (95 OHMS). NO SIGNIFICANT HIGH RATE EPISODES. NO PROGRAMMING CHANGES MADE TO DEVICE PARAMETERS.  NORMAL DEVICE FUNCTION         Advanced Therapies (If appropriate):  --Inotrope: was on milrinone 0.25 mcg/kg/min, titrated off  --LVAD/Transplant  Candidacy: Current tobacco use prior to admission precludes heart transplant at this time- since quit. Moderately reduced RV function and mildly dilated LV concerning for LVAD. Mildly dilated LV also concerning given VT.   -Has not smoked since May 2

## 2024-10-09 NOTE — ASSESSMENT & PLAN NOTE
Etiology: Suspect prerenal given fluctuating blood pressure, torsemide, Jardiance and Entresto use versus overall progression  Admission creatinine 1.93 on 10/7/2024  Creatinine has not changed much by holding Entresto, Jardiance, torsemide and infusion of intravenous fluids  Entresto, Jardiance and torsemide currently on hold per primary team  Nephrotic range proteinuria with last UPCR on 5/14/2024 at 6.61  Current urinalysis with moderate blood, increased leukocytes, 3+ protein, hyaline and granular casts were noted as well.  Of note is that his urinalysis in February also had proteinuria, large hematuria.  Postvoid residual currently 0 mL

## 2024-10-09 NOTE — ASSESSMENT & PLAN NOTE
- unclear etiology: possibly pre-renal vs disease progression  - baseline Cr 1.2-1.4, stable at 1.9 since admit. GFR 35  -holding all GDMT for now but with plan to restart Entresto only tomorrow followed by Torsemide 10 mg on 10/11 per nephrology recs. Continue to hold Jardiance.   -appreciate nephrology input.

## 2024-10-09 NOTE — NURSING NOTE
AVS reviewed with wife and patient at bedside denies any concerns or questions at this time, also reiterated importance of follow up with PCP and all appointments. No concerns or issues meeting the appointment dates and times.

## 2024-10-09 NOTE — ASSESSMENT & PLAN NOTE
Lab Results   Component Value Date    HGBA1C 7.1 (H) 05/14/2024   Has had uncontrolled A1c dating back to 2015 with A1c as high as 12.4  Is on Jardiance 10 mg, metformin 1000 mg and insulin outpatient  Management per primary team

## 2024-10-09 NOTE — ASSESSMENT & PLAN NOTE
Lab Results   Component Value Date    EGFR 35 10/09/2024    EGFR 36 10/08/2024    EGFR 35 10/07/2024    CREATININE 1.97 (H) 10/09/2024    CREATININE 1.92 (H) 10/08/2024    CREATININE 1.94 (H) 10/07/2024   Etiology: Suspect secondary to diabetic kidney disease, hypertension, and prior LINDEN episodes  Most recent baseline creatinine 1.2-1.3 dating back to March 2024  Follows with Dr. Treadwell as outpatient  Will need follow-up on discharge

## 2024-10-09 NOTE — ASSESSMENT & PLAN NOTE
-2/2024, resolved.   - Felt most likely from foot infection.   ICD was involved and was extracted.  Possible pulmonary septic emboli.   -GILL 2/7/24: There is a 1.8 x 1.1 cm mobile mass affixed to the cardiac device lead as is passes through the right atrium. In the setting of bacteremia this is most likely represents infection.

## 2024-10-10 ENCOUNTER — TELEPHONE (OUTPATIENT)
Dept: NEPHROLOGY | Facility: CLINIC | Age: 64
End: 2024-10-10

## 2024-10-10 ENCOUNTER — OFFICE VISIT (OUTPATIENT)
Dept: INTERNAL MEDICINE CLINIC | Facility: OTHER | Age: 64
End: 2024-10-10
Payer: COMMERCIAL

## 2024-10-10 VITALS
OXYGEN SATURATION: 99 % | HEART RATE: 82 BPM | HEIGHT: 72 IN | TEMPERATURE: 97.8 F | BODY MASS INDEX: 35.21 KG/M2 | RESPIRATION RATE: 20 BRPM | WEIGHT: 260 LBS | SYSTOLIC BLOOD PRESSURE: 152 MMHG | DIASTOLIC BLOOD PRESSURE: 92 MMHG

## 2024-10-10 DIAGNOSIS — I10 HYPERTENSION, UNSPECIFIED TYPE: ICD-10-CM

## 2024-10-10 DIAGNOSIS — N18.31 CKD STAGE 3A, GFR 45-59 ML/MIN (HCC): ICD-10-CM

## 2024-10-10 DIAGNOSIS — R80.1 PERSISTENT PROTEINURIA: ICD-10-CM

## 2024-10-10 DIAGNOSIS — N17.9 ACUTE KIDNEY INJURY (HCC): ICD-10-CM

## 2024-10-10 DIAGNOSIS — R76.8 HEPATITIS C ANTIBODY TEST POSITIVE: ICD-10-CM

## 2024-10-10 DIAGNOSIS — Z12.11 ENCOUNTER FOR COLORECTAL CANCER SCREENING: ICD-10-CM

## 2024-10-10 DIAGNOSIS — N17.9 ACUTE KIDNEY INJURY (HCC): Primary | ICD-10-CM

## 2024-10-10 DIAGNOSIS — Z86.19 HISTORY OF HEPATITIS C: ICD-10-CM

## 2024-10-10 DIAGNOSIS — Z12.11 SCREENING FOR COLON CANCER: ICD-10-CM

## 2024-10-10 DIAGNOSIS — I47.20 V-TACH (HCC): Primary | ICD-10-CM

## 2024-10-10 DIAGNOSIS — R31.21 ASYMPTOMATIC MICROSCOPIC HEMATURIA: ICD-10-CM

## 2024-10-10 DIAGNOSIS — Z12.2 ENCOUNTER FOR SCREENING FOR LUNG CANCER: ICD-10-CM

## 2024-10-10 DIAGNOSIS — Z87.891 PERSONAL HISTORY OF NICOTINE DEPENDENCE: ICD-10-CM

## 2024-10-10 DIAGNOSIS — Z95.810 ICD (IMPLANTABLE CARDIOVERTER-DEFIBRILLATOR) IN PLACE: ICD-10-CM

## 2024-10-10 DIAGNOSIS — Z12.12 ENCOUNTER FOR COLORECTAL CANCER SCREENING: ICD-10-CM

## 2024-10-10 PROBLEM — R78.81 BACTEREMIA DUE TO STAPHYLOCOCCUS: Status: RESOLVED | Noted: 2024-10-09 | Resolved: 2024-10-10

## 2024-10-10 PROBLEM — B95.8 BACTEREMIA DUE TO STAPHYLOCOCCUS: Status: RESOLVED | Noted: 2024-10-09 | Resolved: 2024-10-10

## 2024-10-10 LAB
ALBUMIN SERPL ELPH-MCNC: 3.52 G/DL (ref 3.2–5.1)
ALBUMIN SERPL ELPH-MCNC: 53.3 % (ref 48–70)
ALPHA1 GLOB SERPL ELPH-MCNC: 0.26 G/DL (ref 0.15–0.47)
ALPHA1 GLOB SERPL ELPH-MCNC: 4 % (ref 1.8–7)
ALPHA2 GLOB SERPL ELPH-MCNC: 1.17 G/DL (ref 0.42–1.04)
ALPHA2 GLOB SERPL ELPH-MCNC: 17.7 % (ref 5.9–14.9)
BETA GLOB ABNORMAL SERPL ELPH-MCNC: 0.37 G/DL (ref 0.31–0.57)
BETA1 GLOB SERPL ELPH-MCNC: 5.6 % (ref 4.7–7.7)
BETA2 GLOB SERPL ELPH-MCNC: 5.6 % (ref 3.1–7.9)
BETA2+GAMMA GLOB SERPL ELPH-MCNC: 0.37 G/DL (ref 0.2–0.58)
CH50 SERPL-ACNC: 60 U/ML
DSDNA AB SER-ACNC: <1 IU/ML (ref 0–9)
GAMMA GLOB ABNORMAL SERPL ELPH-MCNC: 0.91 G/DL (ref 0.4–1.66)
GAMMA GLOB SERPL ELPH-MCNC: 13.8 % (ref 6.9–22.3)
IGG/ALB SER: 1.14 {RATIO} (ref 1.1–1.8)
INTERPRETATION UR IFE-IMP: NORMAL
KAPPA LC FREE SER-MCNC: 109.1 MG/L (ref 3.3–19.4)
KAPPA LC FREE/LAMBDA FREE SER: 2.47 {RATIO} (ref 0.26–1.65)
LAMBDA LC FREE SERPL-MCNC: 44.2 MG/L (ref 5.7–26.3)
M PROTEIN 1 MFR SERPL ELPH: 2.7 %
M PROTEIN 1 SERPL ELPH-MCNC: 0.18 G/DL
PROT PATTERN SERPL ELPH-IMP: ABNORMAL
PROT SERPL-MCNC: 6.6 G/DL (ref 6.4–8.2)

## 2024-10-10 PROCEDURE — 84165 PROTEIN E-PHORESIS SERUM: CPT | Performed by: PATHOLOGY

## 2024-10-10 PROCEDURE — 99496 TRANSJ CARE MGMT HIGH F2F 7D: CPT | Performed by: INTERNAL MEDICINE

## 2024-10-10 PROCEDURE — 86334 IMMUNOFIX E-PHORESIS SERUM: CPT | Performed by: PATHOLOGY

## 2024-10-10 NOTE — ASSESSMENT & PLAN NOTE
Entresto restarted today.  Continue amiodarone 200 mg daily.  He is to start torsemide tomorrow.  Continue to hold Jardiance.  Follow-up with cardiology.

## 2024-10-10 NOTE — TELEPHONE ENCOUNTER
----- Message from CARLA Gaston sent at 10/9/2024  2:58 PM EDT -----  Patient discharged today for LINDEN on top of CKD . please arrange hospital follow-up in the next 2-4 weeks if able.   Patient normally follows Dr. Treadwell.  Please check BMP in 2 weeks  Thanks  Samina

## 2024-10-10 NOTE — PROGRESS NOTES
Heart Failure Outpatient Progress Note - Nadir Myers 64 y.o. male MRN: [id number]    @ Encounter: [id number]      Assessment/Plan:    Patient Active Problem List    Diagnosis Date Noted    CKD stage 3a, GFR 45-59 ml/min ([facility]) 10/08/2024    Persistent proteinuria 10/08/2024    Acute kidney injury ([facility]) 10/07/2024    Advanced care planning/counseling discussion 07/18/2024    Type 2 diabetes mellitus with diabetic peripheral angiopathy without gangrene ([facility]) 06/05/2024    Amputated 4th toe, left ([facility]) 04/10/2024    ICD (implantable cardioverter-defibrillator) in place 02/21/2024    Abnormal CT of the chest 02/09/2024    Type 2 diabetes mellitus with foot ulcer, with long-term current use of insulin ([facility]) 01/16/2024    3-vessel CAD 01/16/2024    PAD (peripheral artery disease) ([facility]) 01/16/2024    Chronic systolic CHF (congestive heart failure) ([facility]) 08/02/2023    V-tach ([facility]) 05/04/2023    Ischemic cardiomyopathy 05/03/2023    Hypertension 05/02/2023    Acute HFrEF 05/02/2023    Benign prostatic hyperplasia without lower urinary tract symptoms 04/11/2018    Type 2 diabetes mellitus, with long-term current use of insulin ([facility]) 02/27/2015    Tobacco dependence syndrome 01/18/2012    Hepatitis C antibody test positive 10/10/2024    Asymptomatic microscopic hematuria 10/10/2024     Acute kidney injury ([facility])  - unclear etiology: possibly pre-renal vs disease progression  - baseline Cr 1.2-1.4, stable at 1.9 since admit. GFR 35  -holding jardiance for now per nephrology.   -follow up OV with nephrology tomorrow.     Chronic systolic CHF (congestive heart failure) ([facility])      Wt Readings from Last 3 Encounters:   10/07/24 116 kg (255 lb 4.7 oz)   09/05/24 113 kg (249 lb)   08/01/24 112 kg (247 lb)   -ETIOLOGY: ischemic CMP with large apical aneurysm with progression of CAD/late presenting MI with occluded mid LAD and LPDA. LV mildly dilated.    -warm and euvolemic on exam, though MAP above goal. Has gained substantial  weight due to poor eating habits   -Ideally would start on max dose Entresto however renal function precludes  -awaiting today's lab work to re-evaluate  -if creat/GFR still borderline will continue on current Entresto dosing. May need to add Amlodipine or Iso/hydral to improve MAP.  -Would consider restarting Jardiance if ok with nephrology. Can likely stop the diuretic in that case.   -follow up again in one month for GDMT optimization where able.         EKG- narrow QRS with bigeminy; inferior infarct, anterolateral infarct      TTE 6/3/24: LVEF 40%     Echo 2/1/24:  LVEF: 40%, akinetic segments  LVEDd: 5.1 cm  RV: normal     Echo 8/7/23:  LVEF: 40%  LVEDd: 4.1 cm  LV apex aneurysmal, no thrombus  RV: normal     LHC 5/3/23:   chronically occluded mid LAD and LPDA c/w echo findings of apical aneurysm, not amenable to PCI  Patent mid LCx stent   LVEDP 30 mmHg     Echocardiogram 5/2/23  LVEF:  10-15%, apical aneurysm  LVIDd: 5.6 cm  RV: normal  MR: mild  PASP: 49 mmHg     Echo 9/26/17:  LVEF: 65%                  VOLUME:  - home diuretic:  Torsemide 10 mg daily    GDMT:  --Beta-Blocker: Metoprolol succinate 50 mg BID  --ACEi, ARB or ARNi: Entresto 49/51 mg BID  --MRA: NA  --SGLT2-I: Jardiance 10 mg daily. ON HOLD    DEVICE:  - MDT ICD 5/12/23 for secondary prevention  - ICD explanted 2/8/24 for infection  - Extravascular ICD placed 2/20/24, though lead was in left pleural space and repositioning attempts on 2/21/24 unsuccessful.   - Now SQ ICD placed 5/21/24   -Interrogation 9/11/24:SUB Q: BATTERY VOLTAGE ADEQUATE (98%). PRESENTING RHYTHM: NSR @ 72 BPM. ELECTRODE PARAMETERS WITHIN NORMAL LIMITS (95 OHMS). NO SIGNIFICANT HIGH RATE EPISODES. NO PROGRAMMING CHANGES MADE TO DEVICE PARAMETERS.  NORMAL DEVICE FUNCTION         Advanced Therapies (If appropriate):  --Inotrope: was on milrinone 0.25 mcg/kg/min, titrated off  --LVAD/Transplant Candidacy: Current tobacco use prior to admission precludes heart transplant at  this time- since quit. Moderately reduced RV function and mildly dilated LV concerning for LVAD. Mildly dilated LV also concerning given VT.   -Has not smoked since May 2        3-vessel CAD  - hx PCI 2015 Lcx  -Q wave MI, later presenting or silent LAD territory infarction   -no angina  - on ASA, plavix, statin  -may be able to stop Plavix at this point. Would defer to Dr. Samuels.   -recheck lipids now.        V-tach ([facility])  - scar mediated  - toprol as above  - amiodarone 200 mg daily  -further AAD management per EP    Type 2 diabetes mellitus with foot ulcer, with long-term current use of insulin ([facility])        Lab Results   Component Value Date     HGBA1C 7.1 (H) 05/14/2024                Recent Labs     10/08/24  1052 10/08/24  1545 10/08/24  2032 10/09/24  0642   POCGLU 182* 195* 263* 150*         Blood Sugar Average: Last 72 hrs:  (P) [id number]     Bacteremia due to Staphylococcus  -2/2024, resolved.   - Felt most likely from foot infection.   ICD was involved and was extracted.  Possible pulmonary septic emboli.   -GILL 2/7/24: There is a 1.8 x 1.1 cm mobile mass affixed to the cardiac device lead as is passes through the right atrium. In the setting of bacteremia this is most likely represents infection.       HPI:  64-year-old male with past medical history as described above who presented electively on 10/7/2024 under the direction of our EP team for initiation of new antiarrhythmic therapy with plan to discontinue amiodarone.  Unfortunately admission labs drawn showed evidence of worsening renal function with a creatinine of up to 1.9.  This is above a baseline of 1.2-1.4.  He reports feeling at his baseline with no acute complaints.  Has had no recent illness or any other acute issues.  See Dr. Samuels on 9/5/2024.  At that time plan was to increase his Entresto dose up to 97/103 mg twice daily however the new prescription was never received.  As such she has stayed on his prior dose of 49/51 twice  daily and the remainder of his other GDMT as prescribed.  He does admit to periodic episodes of nausea/vomiting once every 3 weeks to a month.  He describes this as waking him up in the middle of the night after which she vomits and then returns to normal sleep.  Other than this he has been in his usual state of health with no complaints of chest pain, unusual shortness of breath, orthopnea or PND.  He has gained a substantial amount of weight but reports this is caloric in nature.     10/14/24. Presents for hospital follow up. Recent elective admission for the reasons noted above. All GDMT placed on hold. Creat remained above baseline but stable. Evaluated by nephrology who recommended holding Jardiance for now. Today he continues to feel well with no complaints. Admits to weight gain due to very poor eating habits. He is other wise feeling at his baseline. Will have his blood work drawn today. Sees nephrology tomorrow.     Past Medical History:   Diagnosis Date    Bacteremia due to Staphylococcus 10/09/2024    Diabetes mellitus (HCC)     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    Myocardial infarction (HCC)        12 point ROS negative other than that stated in HPI    Allergies   Allergen Reactions    Vancomycin Rash     NOT AN ALLERGY - 1/31/24 patient experienced vancomycin infusion reaction, please extend duration of infusion time to prevent future infusion reactions     .    Current Outpatient Medications:     amiodarone 200 mg tablet, Take 1 tablet (200 mg total) by mouth daily, Disp: 90 tablet, Rfl: 2    aspirin 81 mg chewable tablet, Chew 1 tablet (81 mg total) daily Do not start before March 2, 2024., Disp: 30 tablet, Rfl: 0    atorvastatin (LIPITOR) 80 mg tablet, TAKE 1 TABLET (80 MG TOTAL) BY MOUTH DAILY AFTER DINNER, Disp: 90 tablet, Rfl: 2    BD Pen Needle Micro U/F 32G X 6 MM MISC, Inject under the skin in the morning, Disp: 100 each, Rfl: 5    clopidogrel (PLAVIX) 75 mg tablet, TAKE 1 TABLET BY MOUTH  EVERY DAY, Disp: 100 tablet, Rfl: 1    Insulin Glargine Solostar (Lantus SoloStar) 100 UNIT/ML SOPN, Inject 0.3 mL (30 Units total) under the skin daily at bedtime, Disp: 15 mL, Rfl: 5    metFORMIN (GLUCOPHAGE) 1000 MG tablet, Take 1,000 mg by mouth, Disp: , Rfl:     metoprolol succinate (TOPROL-XL) 50 mg 24 hr tablet, TAKE 1 TABLET BY MOUTH EVERY 12 HOURS, Disp: 180 tablet, Rfl: 1    sacubitril-valsartan (Entresto) 49-51 MG TABS, Take 1 tablet by mouth 2 (two) times a day, Disp: , Rfl:     sitaGLIPtin-metFORMIN (JANUMET)  MG per tablet, Take 1 tablet by mouth 2 (two) times a day with meals, Disp: 180 tablet, Rfl: 3    torsemide (DEMADEX) 20 mg tablet, TAKE 1/2 TABLET BY MOUTH DAILY, Disp: 45 tablet, Rfl: 1  No current facility-administered medications for this visit.    Social History     Socioeconomic History    Marital status: /Civil Union     Spouse name: Not on file    Number of children: Not on file    Years of education: Not on file    Highest education level: Not on file   Occupational History    Not on file   Tobacco Use    Smoking status: Former     Types: Cigarettes     Start date: 1/30/2024    Smokeless tobacco: Never   Vaping Use    Vaping status: Never Used   Substance and Sexual Activity    Alcohol use: Not Currently    Drug use: Never    Sexual activity: Not Currently     Partners: Female   Other Topics Concern    Not on file   Social History Narrative    Not on file     Social Determinants of Health     Financial Resource Strain: Not on file   Food Insecurity: No Food Insecurity (10/8/2024)    Hunger Vital Sign     Worried About Running Out of Food in the Last Year: Never true     Ran Out of Food in the Last Year: Never true   Transportation Needs: No Transportation Needs (10/8/2024)    PRAPARE - Transportation     Lack of Transportation (Medical): No     Lack of Transportation (Non-Medical): No   Physical Activity: Not on file   Stress: Not on file   Social Connections: Not on file    Intimate Partner Violence: Not on file   Housing Stability: Low Risk  (10/8/2024)    Housing Stability Vital Sign     Unable to Pay for Housing in the Last Year: No     Number of Times Moved in the Last Year: 1     Homeless in the Last Year: No       No family history on file.    Physical Exam:    Vitals: /76 (BP Location: Left arm, Patient Position: Sitting, Cuff Size: Large)   Pulse 75   Ht 6' (1.829 m)   Wt 122 kg (269 lb)   SpO2 97%   BMI 36.48 kg/m²     Wt Readings from Last 3 Encounters:   10/10/24 118 kg (260 lb)   10/07/24 116 kg (255 lb 4.7 oz)   09/05/24 113 kg (249 lb)           GEN: Nadir Myers appears well, alert and oriented x 3, pleasant and cooperative   HEENT: pupils equal, round, and reactive to light; extraocular muscles intact  NECK: supple, no carotid bruits   HEART: regular rhythm, normal S1 and S2, no murmurs, clicks, gallops or rubs, JVP is flat    LUNGS: clear to auscultation bilaterally; no wheezes, rales, or rhonchi   ABDOMEN: normal bowel sounds, soft, no tenderness, no distention  EXTREMITIES: peripheral pulses normal; no clubbing, cyanosis, or edema  NEURO: no focal findings   SKIN: normal without suspicious lesions on exposed skin    Labs & Results:  Lab Results   Component Value Date     02/17/2015    SODIUM 138 10/09/2024    K 4.6 10/09/2024     10/09/2024    CO2 24 10/09/2024    ANIONGAP 5 02/17/2015    AGAP 7 10/09/2024    BUN 46 (H) 10/09/2024    CREATININE 1.97 (H) 10/09/2024    GLUC 139 10/09/2024    GLUF 192 (H) 05/22/2024    CALCIUM 9.1 10/09/2024    AST 15 06/02/2024    ALT 13 06/02/2024    ALKPHOS 53 06/02/2024    PROT 6.9 02/17/2015    TP 6.6 06/02/2024    BILITOT 0.6 02/17/2015    TBILI 0.31 06/02/2024    EGFR 35 10/09/2024     Lab Results   Component Value Date    WBC 9.12 10/07/2024    HGB 11.9 (L) 10/07/2024    HCT 37.4 10/07/2024    MCV 90 10/07/2024     10/07/2024     Lab Results   Component Value Date    BNP 1,250 (H) 06/03/2024       Lab Results   Component Value Date    HGBA1C 7.1 (H) 05/14/2024     Lab Results   Component Value Date    TSH 3.10 04/14/2023       EKG personally reviewed by CARLA Leahy.   No results found for this visit on 10/14/24.     Counseling / Coordination of Care  Total face to face time spent with patient 15 minutes.  An additional 10 minutes was spent for chart/data review and visit preparation.       Thank you for the opportunity to participate in the care of this patient.    CARLA Leahy

## 2024-10-10 NOTE — TELEPHONE ENCOUNTER
Called pt and scheduled HFU appt on 10/15 at 1:30pm with Maritza Rogers in the AO. Provided pt with AO address during the call.

## 2024-10-10 NOTE — PROGRESS NOTES
Transition of Care Visit  Name: Nadir Myers      : 1960      MRN: [id number]  Encounter Provider: Beau Lemus MD  Encounter Date: 10/10/2024   Encounter department: [facility]    Assessment & Plan  Hepatitis C antibody test positive  Will check a hepatitis quantitative level.  Referral placed to GI.     Orders:    Ambulatory Referral to Gastroenterology; Future    Hepatitis C RNA, Quantitative PCR; Future    Encounter for colorectal cancer screening         V-tach (HCC)  Entresto restarted today.  Continue amiodarone 200 mg daily.  He is to start torsemide tomorrow.  Continue to hold Jardiance.  Follow-up with cardiology.         Encounter for screening for lung cancer  I discussed with him that he is a candidate for lung cancer CT screening.     The following Shared Decision-Making points were covered:  Benefits of screening were discussed, including the rates of reduction in death from lung cancer and other causes.  Harms of screening were reviewed, including false positive tests, radiation exposure levels, risks of invasive procedures, risks of complications of screening, and risk of overdiagnosis.  I counseled on the importance of adherence to annual lung cancer LDCT screening, impact of co-morbidities, and ability or willingness to undergo diagnosis and treatment.  I counseled on the importance of maintaining abstinence as a former smoker or was counseled on the importance of smoking cessation if a current smoker    Review of Eligibility Criteria: He meets all of the criteria for Lung Cancer Screening.   He is 64 y.o.   He has 20 pack year tobacco history and is a current smoker or has quit within the past 15 years  He presents no signs or symptoms of lung cancer    After discussion, the patient decided to elect lung cancer screening.    Orders:    CT lung screening program; Future    CKD stage 3a, GFR 45-59 ml/min (HCC)  Lab Results   Component Value Date     EGFR 35 10/09/2024    EGFR 36 10/08/2024    EGFR 35 10/07/2024    CREATININE 1.97 (H) 10/09/2024    CREATININE 1.92 (H) 10/08/2024    CREATININE 1.94 (H) 10/07/2024            Screening for colon cancer    Orders:    Ambulatory Referral to Gastroenterology; Future    ICD (implantable cardioverter-defibrillator) in place         Personal history of nicotine dependence    Orders:    CT lung screening program; Future    History of hepatitis C    Orders:    Ambulatory Referral to Gastroenterology; Future    Hepatitis C RNA, Quantitative PCR; Future    Acute kidney injury (HCC)  Follow-up BMP.  Nephrology following.         Persistent proteinuria  He has follow up with nephrology.          Asymptomatic microscopic hematuria    Orders:    Urinalysis with microscopic; Future         History of Present Illness     Transitional Care Management Review:   Nadir Myers is a 64 y.o. male here for TCM follow up.     During the TCM phone call patient stated:  TCM Call       Date and time call was made  10/10/2024  8:10 AM    Hospital care reviewed  Records reviewed    Patient was hospitialized at  [facility]    Date of Admission  10/07/24    Date of discharge  10/09/24    Diagnosis  v-tach    Disposition  Home    Current Symptoms  None          TCM Call       Post hospital issues  None    Scheduled for follow up?  Yes    Did you obtain your prescribed medications  Yes    Do you need help managing your prescriptions or medications  No    Is transportation to your appointment needed  No    I have advised the patient to call PCP with any new or worsening symptoms  Ayden Farr CMA          Nadir Myers is seen today for transition of care.  Hospitalization and discharge summary reviewed today.  He was hospitalized at [facility] for elective antiarrhythmic medication initiation.  He was evaluated by heart failure and nephrology during hospitalization.  He had a slight increase in his creatinine  during hospitalization and therefore he was not started on sotalol or dofetilide.  He was restarted on amiodarone 200 mg daily at discharge.  He was also sent home on Entresto to which he is to start today.  Jardiance and torsemide were held, torsemide to be started on 10/11.  He was also found to have hematuria and proteinuria.  He is to have blood work completed yesterday.    He has follow-up scheduled with cardiology and nephrology.  Incidentally he tested positive for hepatitis C antibody.        Review of Systems   Constitutional:  Negative for activity change, appetite change, chills, diaphoresis, fatigue and fever.   HENT:  Negative for congestion, postnasal drip, rhinorrhea, sinus pressure, sinus pain, sneezing and sore throat.    Eyes:  Negative for visual disturbance.   Respiratory:  Negative for apnea, cough, choking, chest tightness, shortness of breath and wheezing.    Cardiovascular:  Negative for chest pain, palpitations and leg swelling.   Gastrointestinal:  Negative for abdominal distention, abdominal pain, anal bleeding, blood in stool, constipation, diarrhea, nausea and vomiting.   Endocrine: Negative for cold intolerance and heat intolerance.   Genitourinary:  Negative for difficulty urinating, dysuria and hematuria.   Musculoskeletal: Negative.    Skin: Negative.    Neurological:  Negative for dizziness, weakness, light-headedness, numbness and headaches.   Hematological:  Negative for adenopathy.   Psychiatric/Behavioral:  Negative for agitation, sleep disturbance and suicidal ideas.    All other systems reviewed and are negative.    Objective     /92 (BP Location: Right arm, Patient Position: Sitting, Cuff Size: Large)   Pulse 82   Temp 97.8 °F (36.6 °C) (Temporal)   Resp 20   Ht 6' (1.829 m)   Wt 118 kg (260 lb)   SpO2 99%   BMI 35.26 kg/m²     Physical Exam  Vitals and nursing note reviewed.   Constitutional:       General: He is not in acute distress.     Appearance: Normal  appearance. He is well-developed and normal weight. He is not ill-appearing, toxic-appearing or diaphoretic.   HENT:      Head: Normocephalic and atraumatic.      Right Ear: Tympanic membrane, ear canal and external ear normal. There is no impacted cerumen.      Left Ear: Tympanic membrane, ear canal and external ear normal. There is no impacted cerumen.      Nose: Nose normal. No congestion or rhinorrhea.      Mouth/Throat:      Mouth: Mucous membranes are moist.      Pharynx: Oropharynx is clear. No oropharyngeal exudate or posterior oropharyngeal erythema.   Eyes:      General: No scleral icterus.        Right eye: No discharge.         Left eye: No discharge.      Extraocular Movements: Extraocular movements intact.      Conjunctiva/sclera: Conjunctivae normal.      Pupils: Pupils are equal, round, and reactive to light.   Neck:      Thyroid: No thyromegaly.      Vascular: No carotid bruit or JVD.   Cardiovascular:      Rate and Rhythm: Normal rate and regular rhythm.      Pulses: Normal pulses.      Heart sounds: Normal heart sounds. No murmur heard.     No friction rub. No gallop.   Pulmonary:      Effort: Pulmonary effort is normal. No respiratory distress.      Breath sounds: Normal breath sounds. No wheezing or rales.   Chest:      Chest wall: No tenderness.   Abdominal:      General: Abdomen is flat. Bowel sounds are normal. There is no distension.      Palpations: Abdomen is soft. There is no mass.      Tenderness: There is no abdominal tenderness. There is no guarding or rebound.      Hernia: No hernia is present.   Musculoskeletal:         General: No swelling, tenderness, deformity or signs of injury. Normal range of motion.      Cervical back: Normal range of motion and neck supple. No rigidity. No muscular tenderness.      Right lower leg: No edema.      Left lower leg: No edema.   Lymphadenopathy:      Cervical: No cervical adenopathy.   Skin:     General: Skin is warm and dry.      Capillary  Refill: Capillary refill takes less than 2 seconds.      Coloration: Skin is not jaundiced or pale.      Findings: No bruising, erythema, lesion or rash.   Neurological:      General: No focal deficit present.      Mental Status: He is alert and oriented to person, place, and time. Mental status is at baseline.      Cranial Nerves: No cranial nerve deficit.      Sensory: No sensory deficit.      Motor: No weakness.      Coordination: Coordination normal.      Gait: Gait normal.      Deep Tendon Reflexes: Reflexes are normal and symmetric. Reflexes normal.   Psychiatric:         Mood and Affect: Mood normal.         Behavior: Behavior normal.         Thought Content: Thought content normal.         Judgment: Judgment normal.       Medications have been reviewed by provider in current encounter    Administrative Statements   I have spent a total time of 30 minutes in caring for this patient on the day of the visit/encounter including Diagnostic results, Prognosis, Risks and benefits of tx options, Instructions for management, Patient and family education, Importance of tx compliance, Risk factor reductions, Impressions, Counseling / Coordination of care, Documenting in the medical record, Reviewing / ordering tests, medicine, procedures  , and Obtaining or reviewing history  .

## 2024-10-10 NOTE — ASSESSMENT & PLAN NOTE
Will check a hepatitis quantitative level.  Referral placed to GI.     Orders:    Ambulatory Referral to Gastroenterology; Future    Hepatitis C RNA, Quantitative PCR; Future

## 2024-10-11 LAB
C-ANCA TITR SER IF: NORMAL TITER
GBM AB SER IA-ACNC: <0.2 UNITS (ref 0–0.9)
MYELOPEROXIDASE AB SER IA-ACNC: <0.2 UNITS (ref 0–0.9)
P-ANCA ATYPICAL TITR SER IF: NORMAL TITER
P-ANCA TITR SER IF: NORMAL TITER
PROTEINASE3 AB SER IA-ACNC: <0.2 UNITS (ref 0–0.9)

## 2024-10-14 ENCOUNTER — OFFICE VISIT (OUTPATIENT)
Dept: CARDIOLOGY CLINIC | Facility: CLINIC | Age: 64
End: 2024-10-14
Payer: COMMERCIAL

## 2024-10-14 ENCOUNTER — APPOINTMENT (OUTPATIENT)
Dept: LAB | Facility: IMAGING CENTER | Age: 64
End: 2024-10-14
Payer: COMMERCIAL

## 2024-10-14 VITALS
SYSTOLIC BLOOD PRESSURE: 148 MMHG | DIASTOLIC BLOOD PRESSURE: 76 MMHG | WEIGHT: 269 LBS | HEIGHT: 72 IN | OXYGEN SATURATION: 97 % | HEART RATE: 75 BPM | BODY MASS INDEX: 36.44 KG/M2

## 2024-10-14 DIAGNOSIS — I25.10 CORONARY ARTERY DISEASE INVOLVING NATIVE CORONARY ARTERY OF NATIVE HEART WITHOUT ANGINA PECTORIS: ICD-10-CM

## 2024-10-14 DIAGNOSIS — R31.21 ASYMPTOMATIC MICROSCOPIC HEMATURIA: ICD-10-CM

## 2024-10-14 DIAGNOSIS — I25.10 CORONARY ARTERY DISEASE INVOLVING NATIVE CORONARY ARTERY OF NATIVE HEART WITHOUT ANGINA PECTORIS: Primary | ICD-10-CM

## 2024-10-14 LAB
BACTERIA UR QL AUTO: ABNORMAL /HPF
BILIRUB UR QL STRIP: NEGATIVE
CHOLEST SERPL-MCNC: 144 MG/DL
CLARITY UR: CLEAR
COLOR UR: ABNORMAL
GLUCOSE UR STRIP-MCNC: ABNORMAL MG/DL
HDLC SERPL-MCNC: 36 MG/DL
HGB UR QL STRIP.AUTO: ABNORMAL
KETONES UR STRIP-MCNC: NEGATIVE MG/DL
LDLC SERPL CALC-MCNC: 69 MG/DL (ref 0–100)
LEUKOCYTE ESTERASE UR QL STRIP: ABNORMAL
NITRITE UR QL STRIP: NEGATIVE
NON-SQ EPI CELLS URNS QL MICRO: ABNORMAL /HPF
PH UR STRIP.AUTO: 6 [PH]
PROT UR STRIP-MCNC: ABNORMAL MG/DL
RBC #/AREA URNS AUTO: ABNORMAL /HPF
SP GR UR STRIP.AUTO: 1.02 (ref 1–1.03)
TRIGL SERPL-MCNC: 194 MG/DL
TSH SERPL DL<=0.05 MIU/L-ACNC: 3.43 UIU/ML (ref 0.45–4.5)
UROBILINOGEN UR STRIP-ACNC: <2 MG/DL
WBC #/AREA URNS AUTO: ABNORMAL /HPF

## 2024-10-14 PROCEDURE — 80061 LIPID PANEL: CPT

## 2024-10-14 PROCEDURE — 99214 OFFICE O/P EST MOD 30 MIN: CPT | Performed by: NURSE PRACTITIONER

## 2024-10-14 PROCEDURE — 81001 URINALYSIS AUTO W/SCOPE: CPT

## 2024-10-14 NOTE — PATIENT INSTRUCTIONS
Weigh yourself daily  If you gain 3 lbs in one day or 5 lbs in one week, please call the office at [phone] and ask for a nurse or the heart failure nurse  Keep your sodium intake to <2 grams, (2000 mg) per day, and fluids <2 Liters (2000 ml) per day. This is around 6-7, 8 oz glasses of fluid per day    Get lab work later today  Work on the diet.   Continue medications as you are.

## 2024-10-15 ENCOUNTER — OFFICE VISIT (OUTPATIENT)
Dept: NEPHROLOGY | Facility: CLINIC | Age: 64
End: 2024-10-15
Payer: COMMERCIAL

## 2024-10-15 ENCOUNTER — TELEPHONE (OUTPATIENT)
Dept: CARDIOLOGY CLINIC | Facility: CLINIC | Age: 64
End: 2024-10-15

## 2024-10-15 ENCOUNTER — DOCUMENTATION (OUTPATIENT)
Dept: HEMATOLOGY ONCOLOGY | Facility: CLINIC | Age: 64
End: 2024-10-15

## 2024-10-15 ENCOUNTER — TELEPHONE (OUTPATIENT)
Dept: INTERNAL MEDICINE CLINIC | Facility: OTHER | Age: 64
End: 2024-10-15

## 2024-10-15 VITALS
DIASTOLIC BLOOD PRESSURE: 72 MMHG | BODY MASS INDEX: 35.21 KG/M2 | WEIGHT: 260 LBS | SYSTOLIC BLOOD PRESSURE: 134 MMHG | HEART RATE: 84 BPM | HEIGHT: 72 IN

## 2024-10-15 DIAGNOSIS — Z79.4 TYPE 2 DIABETES MELLITUS WITH FOOT ULCER, WITH LONG-TERM CURRENT USE OF INSULIN (HCC): ICD-10-CM

## 2024-10-15 DIAGNOSIS — N40.0 BENIGN PROSTATIC HYPERPLASIA WITHOUT LOWER URINARY TRACT SYMPTOMS: ICD-10-CM

## 2024-10-15 DIAGNOSIS — Z95.810 ICD (IMPLANTABLE CARDIOVERTER-DEFIBRILLATOR) IN PLACE: ICD-10-CM

## 2024-10-15 DIAGNOSIS — R80.1 PERSISTENT PROTEINURIA: ICD-10-CM

## 2024-10-15 DIAGNOSIS — N18.31 CKD STAGE 3A, GFR 45-59 ML/MIN (HCC): Primary | ICD-10-CM

## 2024-10-15 DIAGNOSIS — N17.9 ACUTE KIDNEY INJURY (HCC): ICD-10-CM

## 2024-10-15 DIAGNOSIS — D47.2 IGG GAMMOPATHY: ICD-10-CM

## 2024-10-15 DIAGNOSIS — D47.2 IGG GAMMOPATHY: Primary | ICD-10-CM

## 2024-10-15 DIAGNOSIS — I50.22 CHRONIC SYSTOLIC CHF (CONGESTIVE HEART FAILURE) (HCC): ICD-10-CM

## 2024-10-15 DIAGNOSIS — F17.200 TOBACCO DEPENDENCE SYNDROME: ICD-10-CM

## 2024-10-15 DIAGNOSIS — E11.621 TYPE 2 DIABETES MELLITUS WITH FOOT ULCER, WITH LONG-TERM CURRENT USE OF INSULIN (HCC): ICD-10-CM

## 2024-10-15 DIAGNOSIS — L97.509 TYPE 2 DIABETES MELLITUS WITH FOOT ULCER, WITH LONG-TERM CURRENT USE OF INSULIN (HCC): ICD-10-CM

## 2024-10-15 PROBLEM — I12.9 BENIGN HYPERTENSION WITH CKD (CHRONIC KIDNEY DISEASE) STAGE III (HCC): Status: ACTIVE | Noted: 2024-10-15

## 2024-10-15 PROBLEM — N18.30 BENIGN HYPERTENSION WITH CKD (CHRONIC KIDNEY DISEASE) STAGE III (HCC): Status: ACTIVE | Noted: 2024-10-15

## 2024-10-15 PROCEDURE — 99214 OFFICE O/P EST MOD 30 MIN: CPT | Performed by: NURSE PRACTITIONER

## 2024-10-15 NOTE — TELEPHONE ENCOUNTER
Spoke to patient and let him know that thyroid results came back normal, per Dr. Maria. He did not have any questions or concerns.

## 2024-10-15 NOTE — ASSESSMENT & PLAN NOTE
Suspected etiology due to uncontrolled diabetes.  Most recent UPCR 5/14/2024 6.61.  Serologies ordered during last hospitalization.  Serum immunofixation showing IgG G lambda.  Hep B panel normal.  GBM negative.  C3 normal with elevated C4.  Complements normal.  BARBARA, ANCA, anti-DNA double-stranded antibody negative.  -Will refer to hematology for further workup in regards to IgG.  -Continue on Entresto and Lipitor.

## 2024-10-15 NOTE — PROGRESS NOTES
Ambulatory Visit  Name: Nadir Myers      : 1960      MRN: [id number]  Encounter Provider: CARLA Plascencia  Encounter Date: 10/15/2024   Encounter department: [facility] [place]    Assessment & Plan  CKD stage 3a, GFR 45-59 ml/min ([facility])  Lab Results   Component Value Date    EGFR 41 10/14/2024    EGFR 35 10/09/2024    EGFR 36 10/08/2024    CREATININE 1.70 (H) 10/14/2024    CREATININE 1.97 (H) 10/09/2024    CREATININE 1.92 (H) 10/08/2024   Patient follows with Dr. Treadwell.  Currently on Entresto and holding Jardiance since prior hospitalization.  Prior baseline creatinine 1.2-1.3 2024.  -UA with glucose, 3+ protein, moderate occult blood.  -Creatinine 1.7 on repeat BMP 10/14.  -Continue Entresto.  -Restart Jardiance with repeat BMP in 1 week.  -Renal ultrasound with normal echogenicity and contour.  Benign-appearing cyst.  -Recommend avoiding NSAIDs.  -Encourage oral hydration with water.  -Encourage glycemic control as glucose remains elevated.    Acute kidney injury (HCC)  Recent LINDEN in the beginning of October suspected due to hemodynamics, Jardiance, Entresto, and diuretics with concern for overall progression of disease.  Discharge creatinine 1.9.  -Repeat creatinine improved to 1.7.    Persistent proteinuria  Suspected etiology due to uncontrolled diabetes.  Most recent UPCR 2024 6.61.  Serologies ordered during last hospitalization.  Serum immunofixation showing IgG G lambda.  Hep B panel normal.  GBM negative.  C3 normal with elevated C4.  Complements normal.  BARBARA, ANCA, anti-DNA double-stranded antibody negative.  -Will refer to hematology for further workup in regards to IgG.  -Continue on Entresto and Lipitor.    Chronic systolic CHF (congestive heart failure) ([facility])  Following with [facility].  History of ICD.  Most recent echo with EF of 40%.  Currently on torsemide. Reports weight at home around 255 lbs. Reports some true weight  gain lately due to appetite.  -Continues on Entresto.  Will defer increasing dose to cardiology team.  -Okay to restart on Jardiance and discontinue torsemide.  -Discussed checking weight daily and calling for weight gains.    ICD (implantable cardioverter-defibrillator) in place    Benign prostatic hyperplasia without lower urinary tract symptoms  Denies any current symptoms.    Type 2 diabetes mellitus with foot ulcer, with long-term current use of insulin ([facility])    Lab Results   Component Value Date    HGBA1C 8.9 (H) 10/14/2024     Jardiance remains on hold since prior hospitalization.  Plan to resume with repeat BMP.  Currently on Janumet.    Tobacco dependence syndrome  Continue efforts at smoking cessation.    IgG gammopathy  Will refer to hematology.      History of Present Illness     Nadir Myers is a 64 y.o. male who presents for hospital follow-up.  Patient was hospitalized on 10/7 due to V. tach.      Review of Systems   Respiratory:  Negative for shortness of breath.    Cardiovascular:  Negative for chest pain and leg swelling.   Gastrointestinal:  Negative for abdominal pain, diarrhea and nausea.   Genitourinary:  Negative for difficulty urinating.   All other systems reviewed and are negative.          Objective     There were no vitals taken for this visit.    Physical Exam  Vitals reviewed.   Constitutional:       Appearance: Normal appearance.   Pulmonary:      Breath sounds: Normal breath sounds.   Musculoskeletal:      Right lower leg: No edema.      Left lower leg: No edema.   Skin:     General: Skin is warm and dry.   Neurological:      General: No focal deficit present.      Mental Status: He is alert. Mental status is at baseline.   Psychiatric:         Mood and Affect: Mood normal.

## 2024-10-15 NOTE — ASSESSMENT & PLAN NOTE
Following with [facility].  History of ICD.  Most recent echo with EF of 40%.  Currently on torsemide. Reports weight at home around 255 lbs. Reports some true weight gain lately due to appetite.  -Continues on Entresto.  Will defer increasing dose to cardiology team.  -Okay to restart on Jardiance and discontinue torsemide.  -Discussed checking weight daily and calling for weight gains.

## 2024-10-15 NOTE — ASSESSMENT & PLAN NOTE
Lab Results   Component Value Date    EGFR 41 10/14/2024    EGFR 35 10/09/2024    EGFR 36 10/08/2024    CREATININE 1.70 (H) 10/14/2024    CREATININE 1.97 (H) 10/09/2024    CREATININE 1.92 (H) 10/08/2024   Patient follows with Dr. Treadwell.  Currently on Entresto and holding Jardiance since prior hospitalization.  Prior baseline creatinine 1.2-1.3 March 2024.  -UA with glucose, 3+ protein, moderate occult blood.  -Creatinine 1.7 on repeat BMP 10/14.  -Continue Entresto.  -Restart Jardiance with repeat BMP in 1 week.  -Renal ultrasound with normal echogenicity and contour.  Benign-appearing cyst.  -Recommend avoiding NSAIDs.  -Encourage oral hydration with water.  -Encourage glycemic control as glucose remains elevated.

## 2024-10-15 NOTE — TELEPHONE ENCOUNTER
----- Message from Urbano Maria MD sent at 10/14/2024  6:17 PM EDT -----  Please call the patient about normal thyroid results.     Thank you.

## 2024-10-15 NOTE — PROGRESS NOTES
Per ALAINA review:   order IgG/IgA/IgM and UPEP to be completed prior to consultation.   Patient aware   
no

## 2024-10-15 NOTE — ASSESSMENT & PLAN NOTE
Recent LINDEN in the beginning of October suspected due to hemodynamics, Jardiance, Entresto, and diuretics with concern for overall progression of disease.  Discharge creatinine 1.9.  -Repeat creatinine improved to 1.7.

## 2024-10-15 NOTE — ASSESSMENT & PLAN NOTE
Lab Results   Component Value Date    HGBA1C 8.9 (H) 10/14/2024     Jardiance remains on hold since prior hospitalization.  Plan to resume with repeat BMP.  Currently on Janumet.

## 2024-10-16 NOTE — TELEPHONE ENCOUNTER
Relayed this message from Dr Lemus.  He understands, no questions.    'Please contact patient to inform that I reviewed the results of his laboratory studies to which,  1.  Diabetes is uncontrolled.  I would like for him to increase Lantus to 40 units daily.     Otherwise, remaining laboratory studies are stable and within acceptable range.  Continue current medication regimen otherwise.'

## 2024-10-24 ENCOUNTER — APPOINTMENT (OUTPATIENT)
Dept: LAB | Facility: IMAGING CENTER | Age: 64
End: 2024-10-24
Payer: COMMERCIAL

## 2024-10-24 DIAGNOSIS — D47.2 IGG GAMMOPATHY: ICD-10-CM

## 2024-10-24 DIAGNOSIS — N17.9 ACUTE KIDNEY INJURY (HCC): ICD-10-CM

## 2024-10-24 DIAGNOSIS — N18.31 CKD STAGE 3A, GFR 45-59 ML/MIN (HCC): ICD-10-CM

## 2024-10-24 LAB
ANION GAP SERPL CALCULATED.3IONS-SCNC: 7 MMOL/L (ref 4–13)
BUN SERPL-MCNC: 34 MG/DL (ref 5–25)
CALCIUM SERPL-MCNC: 8.3 MG/DL (ref 8.4–10.2)
CHLORIDE SERPL-SCNC: 110 MMOL/L (ref 96–108)
CO2 SERPL-SCNC: 24 MMOL/L (ref 21–32)
CREAT SERPL-MCNC: 1.61 MG/DL (ref 0.6–1.3)
GFR SERPL CREATININE-BSD FRML MDRD: 44 ML/MIN/1.73SQ M
GLUCOSE P FAST SERPL-MCNC: 150 MG/DL (ref 65–99)
IGA SERPL-MCNC: 175 MG/DL (ref 66–433)
IGG SERPL-MCNC: 946 MG/DL (ref 635–1741)
IGM SERPL-MCNC: 39 MG/DL (ref 45–281)
POTASSIUM SERPL-SCNC: 4.7 MMOL/L (ref 3.5–5.3)
SODIUM SERPL-SCNC: 141 MMOL/L (ref 135–147)

## 2024-10-24 PROCEDURE — 36415 COLL VENOUS BLD VENIPUNCTURE: CPT

## 2024-10-24 PROCEDURE — 80048 BASIC METABOLIC PNL TOTAL CA: CPT

## 2024-10-24 PROCEDURE — 86335 IMMUNFIX E-PHORSIS/URINE/CSF: CPT

## 2024-10-24 PROCEDURE — 82784 ASSAY IGA/IGD/IGG/IGM EACH: CPT

## 2024-10-24 PROCEDURE — 84166 PROTEIN E-PHORESIS/URINE/CSF: CPT

## 2024-10-26 LAB
ALBUMIN UR ELPH-MCNC: 79.6 %
ALPHA1 GLOB MFR UR ELPH: 2.5 %
ALPHA2 GLOB MFR UR ELPH: 4.5 %
B-GLOBULIN MFR UR ELPH: 5.5 %
GAMMA GLOB MFR UR ELPH: 7.9 %
INTERPRETATION UR IFE-IMP: NORMAL
PROT PATTERN UR ELPH-IMP: NORMAL
PROT UR-MCNC: 318.6 MG/DL

## 2024-10-26 PROCEDURE — 84166 PROTEIN E-PHORESIS/URINE/CSF: CPT | Performed by: PATHOLOGY

## 2024-10-26 PROCEDURE — 86335 IMMUNFIX E-PHORSIS/URINE/CSF: CPT | Performed by: PATHOLOGY

## 2024-11-06 NOTE — PROGRESS NOTES
Heart Failure Outpatient Progress Note - Nadir Myers 64 y.o. male MRN: [id number]    @ Encounter: [id number]      Assessment/Plan:    Patient Active Problem List    Diagnosis Date Noted    Benign hypertension with CKD (chronic kidney disease) stage III ([facility]) 10/15/2024    IgG gammopathy 10/15/2024    Hepatitis C antibody test positive 10/10/2024    Asymptomatic microscopic hematuria 10/10/2024    CKD stage 3a, GFR 45-59 ml/min ([facility]) 10/08/2024    Persistent proteinuria 10/08/2024    Acute kidney injury ([facility]) 10/07/2024    Advanced care planning/counseling discussion 07/18/2024    Type 2 diabetes mellitus with diabetic peripheral angiopathy without gangrene ([facility]) 06/05/2024    Amputated 4th toe, left ([facility]) 04/10/2024    ICD (implantable cardioverter-defibrillator) in place 02/21/2024    Abnormal CT of the chest 02/09/2024    Type 2 diabetes mellitus with foot ulcer, with long-term current use of insulin ([facility]) 01/16/2024    3-vessel CAD 01/16/2024    PAD (peripheral artery disease) ([facility]) 01/16/2024    Chronic systolic CHF (congestive heart failure) ([facility]) 08/02/2023    V-tach ([facility]) 05/04/2023    Ischemic cardiomyopathy 05/03/2023    Hypertension 05/02/2023    Acute HFrEF 05/02/2023    Benign prostatic hyperplasia without lower urinary tract symptoms 04/11/2018    Type 2 diabetes mellitus, with long-term current use of insulin ([facility]) 02/27/2015    Tobacco dependence syndrome 01/18/2012     Acute kidney injury ([facility])  - unclear etiology: possibly pre-renal vs disease progression  - baseline Cr 1.2-1.4  -In discussion with nephrology okay to restart Entresto and Jardiance  -BMP from 1024 showing improvement in creatinine to 1.      Chronic systolic CHF (congestive heart failure) ([facility])      Wt Readings from Last 3 Encounters:   10/07/24 116 kg (255 lb 4.7 oz)   09/05/24 113 kg (249 lb)   08/01/24 112 kg (247 lb)   -ETIOLOGY: ischemic CMP with large apical aneurysm with progression of CAD/late presenting MI with  occluded mid LAD and LPDA. LV mildly dilated.    -warm and euvolemic on exam, though MAP above goal. Has gained substantial weight due to poor eating habits   -Will increase Entresto to max dose 97/103 mg twice daily.  He gets this through patient assistance.  Will reach out to our heart failure nurse to begin process   -Once he starts on higher dose will recheck BMP 3 to 5 days after  -avoiding MRA for now given borderline renal function    -EKG- narrow QRS with bigeminy; inferior infarct, anterolateral infarct      TTE 6/3/24: LVEF 40%     Echo 2/1/24:  LVEF: 40%, akinetic segments  LVEDd: 5.1 cm  RV: normal     Echo 8/7/23:  LVEF: 40%  LVEDd: 4.1 cm  LV apex aneurysmal, no thrombus  RV: normal     LHC 5/3/23:   chronically occluded mid LAD and LPDA c/w echo findings of apical aneurysm, not amenable to PCI  Patent mid LCx stent   LVEDP 30 mmHg     Echocardiogram 5/2/23  LVEF:  10-15%, apical aneurysm  LVIDd: 5.6 cm  RV: normal  MR: mild  PASP: 49 mmHg     Echo 9/26/17:  LVEF: 65%                  VOLUME:  - home diuretic:  NA    GDMT:  --Beta-Blocker: Metoprolol succinate 50 mg BID  --ACEi, ARB or ARNi: Entresto 49/51 mg BID, increase to 97/103 mg BID  --MRA: NA  --SGLT2-I: Jardiance 10 mg daily.     DEVICE:  - MDT ICD 5/12/23 for secondary prevention  - ICD explanted 2/8/24 for infection  - Extravascular ICD placed 2/20/24, though lead was in left pleural space and repositioning attempts on 2/21/24 unsuccessful.   - Now SQ ICD placed 5/21/24   -Interrogation 9/11/24:SUB Q: BATTERY VOLTAGE ADEQUATE (98%). PRESENTING RHYTHM: NSR @ 72 BPM. ELECTRODE PARAMETERS WITHIN NORMAL LIMITS (95 OHMS). NO SIGNIFICANT HIGH RATE EPISODES. NO PROGRAMMING CHANGES MADE TO DEVICE PARAMETERS.  NORMAL DEVICE FUNCTION         Advanced Therapies (If appropriate):  --Inotrope: was on milrinone 0.25 mcg/kg/min, titrated off  --LVAD/Transplant Candidacy: Current tobacco use prior to admission precludes heart transplant at this time- since  quit. Moderately reduced RV function and mildly dilated LV concerning for LVAD. Mildly dilated LV also concerning given VT.   -Has not smoked since May 2        3-vessel CAD  - hx PCI 2015 Lcx  -Q wave MI, later presenting or silent LAD territory infarction   -no angina  - on ASA, plavix, statin  -may be able to stop Plavix at this point. Would defer to Dr. Samuels.   -recheck lipids now.        V-tach ([facility])  - scar mediated  - toprol as above  - amiodarone 200 mg daily  -further AAD management per EP    Type 2 diabetes mellitus with foot ulcer, with long-term current use of insulin ([facility])        Lab Results   Component Value Date     HGBA1C 7.1 (H) 05/14/2024                Recent Labs     10/08/24  1052 10/08/24  1545 10/08/24  2032 10/09/24  0642   POCGLU 182* 195* 263* 150*         Blood Sugar Average: Last 72 hrs:  (P) [id number]     Nausea/Vomiting  -occurring at least once/month x 6+ months  -wakes patient up at night  -has not yet had EGD/colonoscopy  -has fam h/o colon CA in his mother.   -needs GI eval. Referral sent.     Bacteremia due to Staphylococcus  -2/2024, resolved.   - Felt most likely from foot infection.   ICD was involved and was extracted.  Possible pulmonary septic emboli.   -GILL 2/7/24: There is a 1.8 x 1.1 cm mobile mass affixed to the cardiac device lead as is passes through the right atrium. In the setting of bacteremia this is most likely represents infection.       HPI:  64-year-old male with past medical history as described above who presented electively on 10/7/2024 under the direction of our EP team for initiation of new antiarrhythmic therapy with plan to discontinue amiodarone.  Unfortunately admission labs drawn showed evidence of worsening renal function with a creatinine of up to 1.9.  This is above a baseline of 1.2-1.4.  He reports feeling at his baseline with no acute complaints.  Has had no recent illness or any other acute issues.  See Dr. Samuels on 9/5/2024.  At  that time plan was to increase his Entresto dose up to 97/103 mg twice daily however the new prescription was never received.  As such she has stayed on his prior dose of 49/51 twice daily and the remainder of his other GDMT as prescribed.  He does admit to periodic episodes of nausea/vomiting once every 3 weeks to a month.  He describes this as waking him up in the middle of the night after which she vomits and then returns to normal sleep.  Other than this he has been in his usual state of health with no complaints of chest pain, unusual shortness of breath, orthopnea or PND.  He has gained a substantial amount of weight but reports this is caloric in nature.     10/14/24. Presents for hospital follow up. Recent elective admission for the reasons noted above. All GDMT placed on hold. Creat remained above baseline but stable. Evaluated by nephrology who recommended holding Jardiance for now. Today he continues to feel well with no complaints. Admits to weight gain due to very poor eating habits. He is other wise feeling at his baseline. Will have his blood work drawn today. Sees nephrology tomorrow.     2024 patient presents for 1 month follow-up.  Feels well with the exception of persistent nausea vomiting that wakes him up at night.  States this is occurs at least once per month if not more.  .  He has discussed this with multiple providers none of whom have able to diagnose him.  Does report his mother  of colon cancer in her early 70s.  He has not yet had an EGD or colonoscopy.  Also report substantial weight gain over recent weeks to months.  He attributes this to very poor eating habits    Past Medical History:   Diagnosis Date    Bacteremia due to Staphylococcus 10/09/2024    Diabetes mellitus (HCC)     Infected defibrillator (HCC) 2024    Myocardial infarction (HCC)        12 point ROS negative other than that stated in HPI    Allergies   Allergen Reactions    Vancomycin Rash     NOT AN  ALLERGY - 1/31/24 patient experienced vancomycin infusion reaction, please extend duration of infusion time to prevent future infusion reactions     .    Current Outpatient Medications:     amiodarone 200 mg tablet, Take 1 tablet (200 mg total) by mouth daily, Disp: 90 tablet, Rfl: 2    aspirin 81 mg chewable tablet, Chew 1 tablet (81 mg total) daily Do not start before March 2, 2024., Disp: 30 tablet, Rfl: 0    atorvastatin (LIPITOR) 80 mg tablet, TAKE 1 TABLET (80 MG TOTAL) BY MOUTH DAILY AFTER DINNER, Disp: 90 tablet, Rfl: 2    BD Pen Needle Micro U/F 32G X 6 MM MISC, Inject under the skin in the morning, Disp: 100 each, Rfl: 5    clopidogrel (PLAVIX) 75 mg tablet, TAKE 1 TABLET BY MOUTH EVERY DAY, Disp: 100 tablet, Rfl: 1    Empagliflozin (Jardiance) 10 MG TABS tablet, Take 1 tablet (10 mg total) by mouth every morning, Disp: 90 tablet, Rfl: 3    Insulin Glargine Solostar (Lantus SoloStar) 100 UNIT/ML SOPN, Inject 0.3 mL (30 Units total) under the skin daily at bedtime, Disp: 15 mL, Rfl: 5    metFORMIN (GLUCOPHAGE) 1000 MG tablet, Take 1,000 mg by mouth, Disp: , Rfl:     metoprolol succinate (TOPROL-XL) 50 mg 24 hr tablet, TAKE 1 TABLET BY MOUTH EVERY 12 HOURS, Disp: 180 tablet, Rfl: 1    sacubitril-valsartan (Entresto) 49-51 MG TABS, Take 1 tablet by mouth 2 (two) times a day, Disp: , Rfl:     sitaGLIPtin-metFORMIN (JANUMET)  MG per tablet, Take 1 tablet by mouth 2 (two) times a day with meals, Disp: 180 tablet, Rfl: 3    Social History     Socioeconomic History    Marital status: /Civil Union     Spouse name: Not on file    Number of children: Not on file    Years of education: Not on file    Highest education level: Not on file   Occupational History    Not on file   Tobacco Use    Smoking status: Former     Types: Cigarettes     Start date: 1/30/2024    Smokeless tobacco: Never   Vaping Use    Vaping status: Never Used   Substance and Sexual Activity    Alcohol use: Not Currently    Drug use:  Never    Sexual activity: Not Currently     Partners: Female   Other Topics Concern    Not on file   Social History Narrative    Not on file     Social Determinants of Health     Financial Resource Strain: Not on file   Food Insecurity: No Food Insecurity (10/8/2024)    Nursing - Inadequate Food Risk Classification     Worried About Running Out of Food in the Last Year: Never true     Ran Out of Food in the Last Year: Never true     Ran Out of Food in the Last Year: Not on file   Transportation Needs: No Transportation Needs (10/8/2024)    PRAPARE - Transportation     Lack of Transportation (Medical): No     Lack of Transportation (Non-Medical): No   Physical Activity: Not on file   Stress: Not on file   Social Connections: Not on file   Intimate Partner Violence: Not on file   Housing Stability: Low Risk  (10/8/2024)    Housing Stability Vital Sign     Unable to Pay for Housing in the Last Year: No     Number of Times Moved in the Last Year: 1     Homeless in the Last Year: No       No family history on file.    Physical Exam:    Vitals: /82 (BP Location: Right arm, Patient Position: Sitting, Cuff Size: Large)   Pulse 84   Ht 6' (1.829 m)   Wt 121 kg (266 lb 1.6 oz)   SpO2 97%   BMI 36.09 kg/m²     Wt Readings from Last 3 Encounters:   10/15/24 118 kg (260 lb)   10/14/24 122 kg (269 lb)   10/10/24 118 kg (260 lb)           GEN: Nadir Myers appears well, alert and oriented x 3, pleasant and cooperative   HEENT: pupils equal, round, and reactive to light; extraocular muscles intact  NECK: supple, no carotid bruits   HEART: regular rhythm, normal S1 and S2, no murmurs, clicks, gallops or rubs, JVP is flat    LUNGS: clear to auscultation bilaterally; no wheezes, rales, or rhonchi   ABDOMEN: normal bowel sounds, soft, no tenderness, no distention  EXTREMITIES: peripheral pulses normal; no clubbing, cyanosis, or edema  NEURO: no focal findings   SKIN: normal without suspicious lesions on exposed  skin    Labs & Results:  Lab Results   Component Value Date     02/17/2015    SODIUM 141 10/24/2024    K 4.7 10/24/2024     (H) 10/24/2024    CO2 24 10/24/2024    ANIONGAP 5 02/17/2015    AGAP 7 10/24/2024    BUN 34 (H) 10/24/2024    CREATININE 1.61 (H) 10/24/2024    GLUC 394 (H) 10/14/2024    GLUF 150 (H) 10/24/2024    CALCIUM 8.3 (L) 10/24/2024    AST 15 10/14/2024    ALT 12 10/14/2024    ALKPHOS 50 10/14/2024    PROT 6.9 02/17/2015    TP 6.3 (L) 10/14/2024    BILITOT 0.6 02/17/2015    TBILI 0.27 10/14/2024    EGFR 44 10/24/2024     Lab Results   Component Value Date    WBC 7.07 10/14/2024    HGB 11.2 (L) 10/14/2024    HCT 36.0 (L) 10/14/2024    MCV 93 10/14/2024     10/14/2024     Lab Results   Component Value Date    BNP 1,250 (H) 06/03/2024      Lab Results   Component Value Date    HGBA1C 8.9 (H) 10/14/2024     Lab Results   Component Value Date    GJE9BGJBRRPW 3.427 10/14/2024    TSH 3.10 04/14/2023       EKG personally reviewed by CARLA Leahy.   No results found for this visit on 11/11/24.     Counseling / Coordination of Care  Total face to face time spent with patient 20 minutes.  An additional 10 minutes was spent for chart/data review and visit preparation.       Thank you for the opportunity to participate in the care of this patient.    CARLA Leahy

## 2024-11-11 ENCOUNTER — OFFICE VISIT (OUTPATIENT)
Dept: CARDIOLOGY CLINIC | Facility: CLINIC | Age: 64
End: 2024-11-11
Payer: COMMERCIAL

## 2024-11-11 ENCOUNTER — TELEPHONE (OUTPATIENT)
Dept: CARDIOLOGY CLINIC | Facility: CLINIC | Age: 64
End: 2024-11-11

## 2024-11-11 VITALS
WEIGHT: 266.1 LBS | HEIGHT: 72 IN | HEART RATE: 84 BPM | OXYGEN SATURATION: 97 % | SYSTOLIC BLOOD PRESSURE: 140 MMHG | BODY MASS INDEX: 36.04 KG/M2 | DIASTOLIC BLOOD PRESSURE: 82 MMHG

## 2024-11-11 DIAGNOSIS — I50.20 SYSTOLIC CONGESTIVE HEART FAILURE, UNSPECIFIED HF CHRONICITY (HCC): ICD-10-CM

## 2024-11-11 DIAGNOSIS — R11.10 VOMITING, UNSPECIFIED VOMITING TYPE, UNSPECIFIED WHETHER NAUSEA PRESENT: Primary | ICD-10-CM

## 2024-11-11 PROCEDURE — 99214 OFFICE O/P EST MOD 30 MIN: CPT | Performed by: NURSE PRACTITIONER

## 2024-11-11 RX ORDER — SACUBITRIL AND VALSARTAN 97; 103 MG/1; MG/1
1 TABLET, FILM COATED ORAL 2 TIMES DAILY
Qty: 60 TABLET | Refills: 2 | Status: SHIPPED | OUTPATIENT
Start: 2024-11-11

## 2024-11-11 NOTE — TELEPHONE ENCOUNTER
Called [employer] & spoke with pharmacist Sara & gave a verbal for increase in patient's Sacubitril-valsartan (Entresto)  mg tabs, take 1 tablet by mount 2 (two) times a day.     Was informed that both physician & patient need to complete a [employer] Patient application re-enrolling form, due to increase in dosage.    Called patient who stated he will come into office to get the form & will complete it.

## 2024-11-11 NOTE — TELEPHONE ENCOUNTER
From: CARLA Leahy   Sent: 11/11/2024   9:11 AM EST   To: KAVEH Escoto,     I would like to increase Vinod's Entresto to 97/103 mg twice daily but I understand he is on patient assistance for this.  He said I should reach out to you to start the process.  Let me know what I can do to help     Thanks   Maria C

## 2024-11-11 NOTE — PATIENT INSTRUCTIONS
Weigh yourself daily  If you gain 3 lbs in one day or 5 lbs in one week, please call the office at [phone] and ask for a nurse or the heart failure nurse  Keep your sodium intake to <2 grams, (2000 mg) per day, and fluids <2 Liters (2000 ml) per day. This is around 6-7, 8 oz glasses of fluid per day    We will try to increase your Entresto to 97/103 mg twice daily  I will reach out to Maril[employer] to work on process  After you start it, will check lab work  See GI ASAP.

## 2024-11-13 DIAGNOSIS — I25.10 3-VESSEL CORONARY ARTERY DISEASE: ICD-10-CM

## 2024-11-13 RX ORDER — ATORVASTATIN CALCIUM 80 MG/1
80 TABLET, FILM COATED ORAL
Qty: 90 TABLET | Refills: 1 | Status: SHIPPED | OUTPATIENT
Start: 2024-11-13

## 2024-11-14 ENCOUNTER — TELEPHONE (OUTPATIENT)
Age: 64
End: 2024-11-14

## 2024-11-14 DIAGNOSIS — Z79.4 TYPE 2 DIABETES MELLITUS WITH OTHER SPECIFIED COMPLICATION, WITH LONG-TERM CURRENT USE OF INSULIN (HCC): ICD-10-CM

## 2024-11-14 DIAGNOSIS — E11.69 TYPE 2 DIABETES MELLITUS WITH OTHER SPECIFIED COMPLICATION, WITH LONG-TERM CURRENT USE OF INSULIN (HCC): ICD-10-CM

## 2024-11-14 RX ORDER — PEN NEEDLE, DIABETIC 32 GX 1/4"
NEEDLE, DISPOSABLE MISCELLANEOUS DAILY
Qty: 100 EACH | Refills: 1 | Status: SHIPPED | OUTPATIENT
Start: 2024-11-14

## 2024-11-14 NOTE — ASSESSMENT & PLAN NOTE
Wt Readings from Last 3 Encounters:   11/11/24 121 kg (266 lb 1.6 oz)   10/15/24 118 kg (260 lb)   10/14/24 122 kg (269 lb)   Appears euvolemic in office today

## 2024-11-14 NOTE — ASSESSMENT & PLAN NOTE
had [employer]onic dual-chamber ICD placed 5/12/23 for secondary prevention  given MSSA bacteremia (felt to be due to left foot fourth toe wet gangrene status post amputation 1/2024), underwent extraction of this device 2/9/24  underwent [employer] EV ICD implant 2/20/24 with frequent subs all lead dislodgment into the pleural space and attempted revision 2/21/24 abandoned given inability to sense with lead and complete extraction done instead  underwent [employer] subcutaneous ICD 5/22/24

## 2024-11-14 NOTE — TELEPHONE ENCOUNTER
----- Message from CARLA Hurst sent at 11/12/2024 12:12 PM EST -----  Regarding: RE: appointment  No liver related issues to my knowledge.  ----- Message -----  From: Rebeca Jones  Sent: 11/12/2024   9:45 AM EST  To: CARLA Leahy  Subject: appointment                                      Hello,    Is this patient being seen for anything to do with his liver at this point of just the nausea and vomiting?Please advise.    Thank You  ----- Message -----  From: CARLA Leahy  Sent: 11/11/2024   9:11 AM EST  To: Gastroenterology [address] Clerical    Good morning,    Saw this patient this morning for cardiology/heart failure follow-up.  He is doing well from that perspective however he continues to complain of persistent nausea and vomiting that wakes him up at night.  This has been going on for several months.  I placed a referral to your practice however I feel this visit needs to be expedited.  Is there a way we can get the patient into be seen sooner than later?  A PA or NP is fine as well    Thank you  Maria C AGUIRRE

## 2024-11-14 NOTE — ASSESSMENT & PLAN NOTE
Lab Results   Component Value Date    HGBA1C 8.9 (H) 10/14/2024   Appears uncontrolled per latest A1c  Continue follow-up with PCP

## 2024-11-14 NOTE — ASSESSMENT & PLAN NOTE
Thought to be scar mediated given ICM  S/p ICD given this diagnosis (see device details as below)  Maintained on metoprolol succinate 50 mg twice daily  Was previously maintained off amiodarone and presented 10/7/2024 for class III antiarrhythmic med admission, however due to ongoing LINDEN he was resumed back on amiodarone

## 2024-11-14 NOTE — TELEPHONE ENCOUNTER
Reason for call:   [x] Refill   [] Prior Auth  [] Other:     Office:   [x] PCP/Provider -   [] Specialty/Provider -     Medication: BD Pen Needle Micro U/F 32G X 6 MM MISC     Dose/Frequency: Inject under the skin in the morning     Quantity: 100    Pharmacy: [id number] [place], [place] - [address]     Does the patient have enough for 3 days?   [x] Yes   [] No - Send as HP to POD

## 2024-11-14 NOTE — PROGRESS NOTES
Electrophysiology Follow Up  [facility]  [facility] [address], [place] [address]    Name: Nadir Myers  : 1960  MRN: [id number]    Assessment & Plan  V-tach (HCC)  Thought to be scar mediated given ICM  S/p ICD given this diagnosis (see device details as below)  Maintained on metoprolol succinate 50 mg twice daily  Was previously maintained off amiodarone and presented 10/7/2024 for class III antiarrhythmic med admission, however due to ongoing LINDEN he was resumed back on amiodarone  ICD (implantable cardioverter-defibrillator) in place  had Medtronic dual-chamber ICD placed 23 for secondary prevention  given MSSA bacteremia (felt to be due to left foot fourth toe wet gangrene status post amputation 2024), underwent extraction of this device 24  underwent Medtronic EV ICD implant 24 with frequent subs all lead dislodgment into the pleural space and attempted revision 24 abandoned given inability to sense with lead and complete extraction done instead  underwent [employer] subcutaneous ICD 24  3-vessel CAD  With  of LAD and HANNA to mid circumflex 2015  Ischemic cardiomyopathy  maintained on GDMT of metoprolol succinate and Entresto, also on spironolactone and Jardiance  EF of 45% per echo 2024 /had been as low as 10 to 15% in 2023 with recovery  Chronic systolic CHF (congestive heart failure) (HCC)  Wt Readings from Last 3 Encounters:   24 121 kg (266 lb 1.6 oz)   10/15/24 118 kg (260 lb)   10/14/24 122 kg (269 lb)   Appears euvolemic in office today      Type 2 diabetes mellitus with diabetic peripheral angiopathy without gangrene, without long-term current use of insulin (HCC)    Lab Results   Component Value Date    HGBA1C 8.9 (H) 10/14/2024   Appears uncontrolled per latest A1c  Continue follow-up with PCP       Discussion/Plan:    Patient recently presented to the hospital 10/7/2024 for initiation  of class III antiarrhythmic given his history of VT    Unfortunately after presenting to the hospital he was found to have an LINDEN which prompted evaluation by his cardiology and nephrology teams.  His GDMT of Entresto, Jardiance, and torsemide were held and he did receive IV hydration, renal ultrasound, and serologies by the nephrology team with no improvement in his creatinine.  Given this he was resumed on amiodarone and class III antiarrhythmic was not initiated    Since his hospital stay he reports he has been feeling well and denies acute cardiac complaint    EKG in office today showing sinus rhythm with RBBB, LAFB, and ventricular rate 81 bpm    His latest lab evaluation 10/20/2024 shows his renal function has improved somewhat, however not to the degree which would allow safe initiation of class III agents.  He should continue follow-up with his nephrology team regarding this    In the meantime we plan to continue amiodarone with ongoing device monitoring moving forwards    His LFTs plus TSH 10/2024 were WNL.  06/2024 CXR showed questionable mild pulmonary congestion, he has not had prior PFTs    Order repeat LFT + TSH evaluation and PFTs given ongoing amiodarone therapy    Patient has been instructed to follow up in our EP office in 6 months or as needed. He will call our office with any questions or concerns in the meantime.    Rhythm History:   Atrial fibrillation:      Atrial flutter:      SVT:      VT/VF/PVC:     Device history:   Pacemaker:     Defibrillator:     BIV PPM:     BIV ICD:     ILR:    Interim History/HPI:   Interim history: Nadir Myers is a 64 y.o. male with a PMH of VT, ICD, three-vessel CAD, ischemic cardiomyopathy, CHF, and T2DM.    He presents today for routine outpatient follow-up given his history of VT and ICD in situ.  He reports that since his recent hospital stay in October he he has been feeling well denies acute cardiac complaint.    EKG: sinus rhythm with RBBB, LAFB, and  ventricular rate 81 bpm    Review of Systems   Constitutional:  Negative for activity change, appetite change, chills, fatigue and fever.   HENT:  Negative for nosebleeds.    Respiratory:  Negative for chest tightness and shortness of breath.    Cardiovascular:  Negative for chest pain, palpitations and leg swelling.   Neurological:  Negative for dizziness, syncope, weakness and light-headedness.         OBJECTIVE:   Vitals:   There were no vitals taken for this visit.  There is no height or weight on file to calculate BMI.        Physical Exam:   Physical Exam  Constitutional:       General: He is not in acute distress.     Appearance: Normal appearance. He is not toxic-appearing.   HENT:      Head: Normocephalic and atraumatic.   Eyes:      General:         Right eye: No discharge.         Left eye: No discharge.   Cardiovascular:      Rate and Rhythm: Normal rate and regular rhythm.      Pulses: Normal pulses.   Pulmonary:      Effort: Pulmonary effort is normal.      Breath sounds: Normal breath sounds.   Musculoskeletal:      Right lower leg: No edema.      Left lower leg: No edema.   Skin:     General: Skin is warm and dry.      Capillary Refill: Capillary refill takes less than 2 seconds.   Neurological:      Mental Status: He is alert.            Medications:      Current Outpatient Medications:     amiodarone 200 mg tablet, Take 1 tablet (200 mg total) by mouth daily, Disp: 90 tablet, Rfl: 2    aspirin 81 mg chewable tablet, Chew 1 tablet (81 mg total) daily Do not start before March 2, 2024., Disp: 30 tablet, Rfl: 0    atorvastatin (LIPITOR) 80 mg tablet, TAKE 1 TABLET BY MOUTH EVERY DAY AFTER DINNER, Disp: 90 tablet, Rfl: 1    BD Pen Needle Micro U/F 32G X 6 MM MISC, Inject under the skin in the morning, Disp: 100 each, Rfl: 5    clopidogrel (PLAVIX) 75 mg tablet, TAKE 1 TABLET BY MOUTH EVERY DAY, Disp: 100 tablet, Rfl: 1    Empagliflozin (Jardiance) 10 MG TABS tablet, Take 1 tablet (10 mg total) by mouth  every morning, Disp: 90 tablet, Rfl: 3    Insulin Glargine Solostar (Lantus SoloStar) 100 UNIT/ML SOPN, Inject 0.3 mL (30 Units total) under the skin daily at bedtime, Disp: 15 mL, Rfl: 5    metFORMIN (GLUCOPHAGE) 1000 MG tablet, Take 1,000 mg by mouth, Disp: , Rfl:     metoprolol succinate (TOPROL-XL) 50 mg 24 hr tablet, TAKE 1 TABLET BY MOUTH EVERY 12 HOURS, Disp: 180 tablet, Rfl: 1    sacubitril-valsartan (Entresto)  MG TABS, Take 1 tablet by mouth 2 (two) times a day, Disp: 60 tablet, Rfl: 2    sitaGLIPtin-metFORMIN (JANUMET)  MG per tablet, Take 1 tablet by mouth 2 (two) times a day with meals, Disp: 180 tablet, Rfl: 3       Historical Information   Past Medical History:   Diagnosis Date    Bacteremia due to Staphylococcus 10/09/2024    Diabetes mellitus (HCC)     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    Myocardial infarction (HCC)        Past Surgical History:   Procedure Laterality Date    CARDIAC CATHETERIZATION N/A 05/03/2023    Procedure: Cardiac Coronary Angiogram;  Surgeon: Valeriy Shore MD;  [facility];  Service: Cardiology    CARDIAC CATHETERIZATION Left 05/03/2023    Procedure: Cardiac Left Heart Cath;  Surgeon: Valeriy Shore MD;  [facility];  Service: Cardiology    CARDIAC DEFIBRILLATOR PLACEMENT  05/2023    CARDIAC ELECTROPHYSIOLOGY PROCEDURE N/A 05/12/2023    Procedure: Cardiac icd implant;  Surgeon: Urbano Maria MD;  [facility];  Service: Cardiology    CARDIAC ELECTROPHYSIOLOGY PROCEDURE N/A 2/20/2024    Procedure: EV ICD IMPLANTATION;  Surgeon: Arturo Wooten DO;  [facility];  Service: Cardiology    CARDIAC ELECTROPHYSIOLOGY PROCEDURE N/A 2/8/2024    Procedure: Cardiac laser lead extraction;  Surgeon: Arturo Wooten DO;  [facility];  Service: Cardiology    CARDIAC ELECTROPHYSIOLOGY PROCEDURE N/A 5/21/2024    Procedure: Cardiac icd implant subq;  Surgeon: Urbano Maria MD;  [facility];  Service: Cardiology    IR LOWER EXTREMITY ANGIOGRAM  1/18/2024    MT RMVL TRANSVNS PM ELTRD DUAL LEAD SYS N/A 2/20/2024    Procedure: EV ICD IMPLANTATION;  Surgeon: Abhilash Ferrera MD;  [facility];  Service: Cardiac Surgery    MT RMVL TRANSVNS PM ELTRD DUAL LEAD SYS N/A 2/21/2024    Procedure: REMOVAL OF LEAD AND GENERATOR AND ATTEMPTED LEAD REVISION;  Surgeon: Abhilash Ferrera MD;  [facility];  Service: Cardiac Surgery    WOUND DEBRIDEMENT Left 2/4/2024    Procedure: LEFT FOURTH TOE AMPUTATION SURGICAL WOUND DEBRIDEMENT FOOT/TOE (WASH OUT);  Surgeon: Bebo Hopper DPM;  Location[facility];  Service: Podiatry       Social History     Substance and Sexual Activity   Alcohol Use Not Currently     Social History     Substance and Sexual Activity   Drug Use Never     Social History     Tobacco Use   Smoking Status Former    Types: Cigarettes    Start date: 1/30/2024   Smokeless Tobacco Never       No family history on file.      Labs & Results:  Below is the patient's most recent value for Albumin, ALT, AST, BUN, Calcium, Chloride, Cholesterol, CO2, Creatinine, GFR, Glucose, HDL, Hematocrit, Hemoglobin, Hemoglobin A1C, LDL, Magnesium, Phosphorus, Platelets, Potassium, PSA, Sodium, Triglycerides, and WBC.   Lab Results   Component Value Date    ALT 12 10/14/2024    AST 15 10/14/2024    BUN 34 (H) 10/24/2024    CALCIUM 8.3 (L) 10/24/2024     (H) 10/24/2024    CHOL 130 05/07/2015    CO2 24 10/24/2024    CREATININE 1.61 (H) 10/24/2024    HDL 36 (L) 10/14/2024    HCT 36.0 (L) 10/14/2024    HGB 11.2 (L) 10/14/2024    HGBA1C 8.9 (H) 10/14/2024    MG 2.6 10/09/2024    PHOS 5.0 (H) 10/09/2024     10/14/2024    K 4.7 10/24/2024    PSA 0.16 05/14/2024     02/17/2015    TRIG 194 (H) 10/14/2024    WBC 7.07 10/14/2024     Note: for a comprehensive list of the patient's lab results, access the Results Review activity.

## 2024-11-14 NOTE — ASSESSMENT & PLAN NOTE
maintained on GDMT of metoprolol succinate and Entresto, also on spironolactone and Jardiance  EF of 45% per echo 2/21/2024 /had been as low as 10 to 15% in 5/2023 with recovery

## 2024-11-15 ENCOUNTER — DOCUMENTATION (OUTPATIENT)
Dept: CARDIOLOGY CLINIC | Facility: CLINIC | Age: 64
End: 2024-11-15

## 2024-11-18 ENCOUNTER — OFFICE VISIT (OUTPATIENT)
Dept: PODIATRY | Facility: CLINIC | Age: 64
End: 2024-11-18
Payer: COMMERCIAL

## 2024-11-18 DIAGNOSIS — Z79.4 TYPE 2 DIABETES MELLITUS WITH OTHER SPECIFIED COMPLICATION, WITH LONG-TERM CURRENT USE OF INSULIN (HCC): Primary | ICD-10-CM

## 2024-11-18 DIAGNOSIS — Z89.422 HISTORY OF COMPLETE RAY AMPUTATION OF FOURTH TOE OF LEFT FOOT (HCC): ICD-10-CM

## 2024-11-18 DIAGNOSIS — B35.1 TINEA UNGUIUM: ICD-10-CM

## 2024-11-18 DIAGNOSIS — I73.9 PAD (PERIPHERAL ARTERY DISEASE) (HCC): ICD-10-CM

## 2024-11-18 DIAGNOSIS — E11.69 TYPE 2 DIABETES MELLITUS WITH OTHER SPECIFIED COMPLICATION, WITH LONG-TERM CURRENT USE OF INSULIN (HCC): Primary | ICD-10-CM

## 2024-11-18 PROCEDURE — RECHECK: Performed by: PODIATRIST

## 2024-11-18 PROCEDURE — 11721 DEBRIDE NAIL 6 OR MORE: CPT | Performed by: PODIATRIST

## 2024-11-18 NOTE — PROGRESS NOTES
Nadir Myers  1960  AT RISK FOOT CARE    1. Type 2 diabetes mellitus with other specified complication, with long-term current use of insulin ([facility])        2. PAD (peripheral artery disease) ([facility])        3. 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 Tinea unguium            Patient presents for at-risk foot care.  Patient has no acute concerns today.  Patient has significant lower extremity risk due to previous amputation.    On exam patient has thickened, hypertrophic, discolored, brittle toenails with subungual debris and tenderness x9   Callus: none  Amputation: left 4th toe    Today's treatment includes:  Debridement of toenails. Using nail nipper, noman, and curette, nails were sharply debrided, reduced in thickness and length. Devitalized nail tissue and fungal debris excised and removed. Patient tolerated well.        Discussed proper shoe gear, daily inspections of feet, and general foot health with patient. Patient has Q7  findings and is recommended for at risk foot care every 9-10 weeks.    Patients most recent complete clinical exam was performed: 1/24/2024

## 2024-11-19 ENCOUNTER — OFFICE VISIT (OUTPATIENT)
Dept: CARDIOLOGY CLINIC | Facility: CLINIC | Age: 64
End: 2024-11-19
Payer: COMMERCIAL

## 2024-11-19 VITALS
SYSTOLIC BLOOD PRESSURE: 142 MMHG | HEART RATE: 81 BPM | HEIGHT: 72 IN | WEIGHT: 270 LBS | DIASTOLIC BLOOD PRESSURE: 90 MMHG | BODY MASS INDEX: 36.57 KG/M2

## 2024-11-19 DIAGNOSIS — I25.5 ISCHEMIC CARDIOMYOPATHY: ICD-10-CM

## 2024-11-19 DIAGNOSIS — Z95.810 ICD (IMPLANTABLE CARDIOVERTER-DEFIBRILLATOR) IN PLACE: ICD-10-CM

## 2024-11-19 DIAGNOSIS — I47.20 V-TACH (HCC): Primary | ICD-10-CM

## 2024-11-19 DIAGNOSIS — E11.51 TYPE 2 DIABETES MELLITUS WITH DIABETIC PERIPHERAL ANGIOPATHY WITHOUT GANGRENE, WITHOUT LONG-TERM CURRENT USE OF INSULIN (HCC): ICD-10-CM

## 2024-11-19 DIAGNOSIS — I50.22 CHRONIC SYSTOLIC CHF (CONGESTIVE HEART FAILURE) (HCC): ICD-10-CM

## 2024-11-19 DIAGNOSIS — I25.10 3-VESSEL CAD: ICD-10-CM

## 2024-11-19 PROCEDURE — 93000 ELECTROCARDIOGRAM COMPLETE: CPT

## 2024-11-19 PROCEDURE — 99214 OFFICE O/P EST MOD 30 MIN: CPT

## 2024-11-19 RX ORDER — SACUBITRIL AND VALSARTAN 49; 51 MG/1; MG/1
1 TABLET, FILM COATED ORAL 2 TIMES DAILY
COMMUNITY

## 2024-11-21 ENCOUNTER — TELEPHONE (OUTPATIENT)
Dept: CARDIOLOGY CLINIC | Facility: CLINIC | Age: 64
End: 2024-11-21

## 2024-11-21 NOTE — TELEPHONE ENCOUNTER
Completed forms for Entresto  mg bid sent to [employer] for PAP. # [phone].    Also sent letter of Social security income.

## 2024-12-03 ENCOUNTER — APPOINTMENT (EMERGENCY)
Dept: RADIOLOGY | Facility: HOSPITAL | Age: 64
DRG: 291 | End: 2024-12-03
Payer: COMMERCIAL

## 2024-12-03 ENCOUNTER — NURSE TRIAGE (OUTPATIENT)
Age: 64
End: 2024-12-03

## 2024-12-03 ENCOUNTER — HOSPITAL ENCOUNTER (INPATIENT)
Facility: HOSPITAL | Age: 64
LOS: 1 days | Discharge: HOME | DRG: 291 | End: 2024-12-04
Attending: EMERGENCY MEDICINE | Admitting: INTERNAL MEDICINE
Payer: COMMERCIAL

## 2024-12-03 DIAGNOSIS — I50.21 ACUTE HFREF (HEART FAILURE WITH REDUCED EJECTION FRACTION) (HCC): Primary | ICD-10-CM

## 2024-12-03 DIAGNOSIS — R10.13 DYSPEPSIA: ICD-10-CM

## 2024-12-03 DIAGNOSIS — I50.9 ACUTE EXACERBATION OF CHRONIC HEART FAILURE (HCC): ICD-10-CM

## 2024-12-03 DIAGNOSIS — N18.31 CKD STAGE 3A, GFR 45-59 ML/MIN (HCC): ICD-10-CM

## 2024-12-03 PROBLEM — D64.9 ANEMIA: Status: ACTIVE | Noted: 2024-12-03

## 2024-12-03 PROBLEM — E78.5 HYPERLIPEMIA: Status: ACTIVE | Noted: 2024-12-03

## 2024-12-03 PROBLEM — D72.829 LEUKOCYTOSIS: Status: ACTIVE | Noted: 2024-12-03

## 2024-12-03 LAB
2HR DELTA HS TROPONIN: -7 NG/L
ALBUMIN SERPL BCG-MCNC: 3.3 G/DL (ref 3.5–5)
ALP SERPL-CCNC: 70 U/L (ref 34–104)
ALT SERPL W P-5'-P-CCNC: 12 U/L (ref 7–52)
ANION GAP SERPL CALCULATED.3IONS-SCNC: 8 MMOL/L (ref 4–13)
AST SERPL W P-5'-P-CCNC: 14 U/L (ref 13–39)
ATRIAL RATE: 84 BPM
BASOPHILS # BLD AUTO: 0.03 THOUSANDS/ÂΜL (ref 0–0.1)
BASOPHILS NFR BLD AUTO: 0 % (ref 0–1)
BILIRUB SERPL-MCNC: 0.35 MG/DL (ref 0.2–1)
BNP SERPL-MCNC: 1899 PG/ML (ref 0–100)
BUN SERPL-MCNC: 31 MG/DL (ref 5–25)
CALCIUM ALBUM COR SERPL-MCNC: 9.1 MG/DL (ref 8.3–10.1)
CALCIUM SERPL-MCNC: 8.5 MG/DL (ref 8.4–10.2)
CARDIAC TROPONIN I PNL SERPL HS: 30 NG/L (ref ?–50)
CARDIAC TROPONIN I PNL SERPL HS: 37 NG/L (ref ?–50)
CHLORIDE SERPL-SCNC: 108 MMOL/L (ref 96–108)
CO2 SERPL-SCNC: 23 MMOL/L (ref 21–32)
CREAT SERPL-MCNC: 1.63 MG/DL (ref 0.6–1.3)
EOSINOPHIL # BLD AUTO: 0.16 THOUSAND/ÂΜL (ref 0–0.61)
EOSINOPHIL NFR BLD AUTO: 1 % (ref 0–6)
ERYTHROCYTE [DISTWIDTH] IN BLOOD BY AUTOMATED COUNT: 13.4 % (ref 11.6–15.1)
GFR SERPL CREATININE-BSD FRML MDRD: 44 ML/MIN/1.73SQ M
GLUCOSE SERPL-MCNC: 164 MG/DL (ref 65–140)
GLUCOSE SERPL-MCNC: 198 MG/DL (ref 65–140)
GLUCOSE SERPL-MCNC: 259 MG/DL (ref 65–140)
HCT VFR BLD AUTO: 32.6 % (ref 36.5–49.3)
HGB BLD-MCNC: 10.5 G/DL (ref 12–17)
IMM GRANULOCYTES # BLD AUTO: 0.05 THOUSAND/UL (ref 0–0.2)
IMM GRANULOCYTES NFR BLD AUTO: 0 % (ref 0–2)
LYMPHOCYTES # BLD AUTO: 1.08 THOUSANDS/ÂΜL (ref 0.6–4.47)
LYMPHOCYTES NFR BLD AUTO: 9 % (ref 14–44)
MCH RBC QN AUTO: 29.2 PG (ref 26.8–34.3)
MCHC RBC AUTO-ENTMCNC: 32.2 G/DL (ref 31.4–37.4)
MCV RBC AUTO: 91 FL (ref 82–98)
MONOCYTES # BLD AUTO: 0.64 THOUSAND/ÂΜL (ref 0.17–1.22)
MONOCYTES NFR BLD AUTO: 6 % (ref 4–12)
NEUTROPHILS # BLD AUTO: 9.67 THOUSANDS/ÂΜL (ref 1.85–7.62)
NEUTS SEG NFR BLD AUTO: 84 % (ref 43–75)
NRBC BLD AUTO-RTO: 0 /100 WBCS
P AXIS: 52 DEGREES
PLATELET # BLD AUTO: 292 THOUSANDS/UL (ref 149–390)
PMV BLD AUTO: 10.1 FL (ref 8.9–12.7)
POTASSIUM SERPL-SCNC: 3.9 MMOL/L (ref 3.5–5.3)
PR INTERVAL: 168 MS
PROT SERPL-MCNC: 6.9 G/DL (ref 6.4–8.4)
QRS AXIS: -49 DEGREES
QRSD INTERVAL: 154 MS
QT INTERVAL: 438 MS
QTC INTERVAL: 518 MS
RBC # BLD AUTO: 3.59 MILLION/UL (ref 3.88–5.62)
SODIUM SERPL-SCNC: 139 MMOL/L (ref 135–147)
T WAVE AXIS: 12 DEGREES
VENTRICULAR RATE: 84 BPM
WBC # BLD AUTO: 11.63 THOUSAND/UL (ref 4.31–10.16)

## 2024-12-03 PROCEDURE — 96374 THER/PROPH/DIAG INJ IV PUSH: CPT

## 2024-12-03 PROCEDURE — 83880 ASSAY OF NATRIURETIC PEPTIDE: CPT

## 2024-12-03 PROCEDURE — 99215 OFFICE O/P EST HI 40 MIN: CPT | Performed by: INTERNAL MEDICINE

## 2024-12-03 PROCEDURE — NC001 PR NO CHARGE: Performed by: INTERNAL MEDICINE

## 2024-12-03 PROCEDURE — 99285 EMERGENCY DEPT VISIT HI MDM: CPT | Performed by: EMERGENCY MEDICINE

## 2024-12-03 PROCEDURE — 94640 AIRWAY INHALATION TREATMENT: CPT

## 2024-12-03 PROCEDURE — 84484 ASSAY OF TROPONIN QUANT: CPT

## 2024-12-03 PROCEDURE — 99223 1ST HOSP IP/OBS HIGH 75: CPT | Performed by: INTERNAL MEDICINE

## 2024-12-03 PROCEDURE — 80053 COMPREHEN METABOLIC PANEL: CPT

## 2024-12-03 PROCEDURE — 71046 X-RAY EXAM CHEST 2 VIEWS: CPT

## 2024-12-03 PROCEDURE — 82948 REAGENT STRIP/BLOOD GLUCOSE: CPT

## 2024-12-03 PROCEDURE — 85025 COMPLETE CBC W/AUTO DIFF WBC: CPT

## 2024-12-03 PROCEDURE — 36415 COLL VENOUS BLD VENIPUNCTURE: CPT

## 2024-12-03 PROCEDURE — 93010 ELECTROCARDIOGRAM REPORT: CPT | Performed by: STUDENT IN AN ORGANIZED HEALTH CARE EDUCATION/TRAINING PROGRAM

## 2024-12-03 PROCEDURE — 93005 ELECTROCARDIOGRAM TRACING: CPT

## 2024-12-03 PROCEDURE — 99285 EMERGENCY DEPT VISIT HI MDM: CPT

## 2024-12-03 RX ORDER — INSULIN GLARGINE 100 [IU]/ML
24 INJECTION, SOLUTION SUBCUTANEOUS
Status: DISCONTINUED | OUTPATIENT
Start: 2024-12-03 | End: 2024-12-04 | Stop reason: HOSPADM

## 2024-12-03 RX ORDER — INSULIN LISPRO 100 [IU]/ML
2-12 INJECTION, SOLUTION INTRAVENOUS; SUBCUTANEOUS
Status: DISCONTINUED | OUTPATIENT
Start: 2024-12-03 | End: 2024-12-04

## 2024-12-03 RX ORDER — ASPIRIN 81 MG/1
81 TABLET, CHEWABLE ORAL DAILY
Status: DISCONTINUED | OUTPATIENT
Start: 2024-12-03 | End: 2024-12-03

## 2024-12-03 RX ORDER — TORSEMIDE 10 MG/1
10 TABLET ORAL DAILY
Status: DISCONTINUED | OUTPATIENT
Start: 2024-12-04 | End: 2024-12-03

## 2024-12-03 RX ORDER — FUROSEMIDE 10 MG/ML
40 INJECTION INTRAMUSCULAR; INTRAVENOUS
Status: DISCONTINUED | OUTPATIENT
Start: 2024-12-03 | End: 2024-12-04 | Stop reason: HOSPADM

## 2024-12-03 RX ORDER — METOPROLOL SUCCINATE 50 MG/1
50 TABLET, EXTENDED RELEASE ORAL EVERY 12 HOURS
Status: DISCONTINUED | OUTPATIENT
Start: 2024-12-03 | End: 2024-12-04 | Stop reason: HOSPADM

## 2024-12-03 RX ORDER — FUROSEMIDE 10 MG/ML
40 INJECTION INTRAMUSCULAR; INTRAVENOUS
Status: DISCONTINUED | OUTPATIENT
Start: 2024-12-04 | End: 2024-12-03

## 2024-12-03 RX ORDER — ASPIRIN 81 MG/1
81 TABLET, CHEWABLE ORAL DAILY
Status: DISCONTINUED | OUTPATIENT
Start: 2024-12-03 | End: 2024-12-04 | Stop reason: HOSPADM

## 2024-12-03 RX ORDER — INSULIN LISPRO 100 [IU]/ML
2-12 INJECTION, SOLUTION INTRAVENOUS; SUBCUTANEOUS
Status: DISCONTINUED | OUTPATIENT
Start: 2024-12-03 | End: 2024-12-03

## 2024-12-03 RX ORDER — FUROSEMIDE 10 MG/ML
40 INJECTION INTRAMUSCULAR; INTRAVENOUS ONCE
Status: COMPLETED | OUTPATIENT
Start: 2024-12-03 | End: 2024-12-03

## 2024-12-03 RX ORDER — AMIODARONE HYDROCHLORIDE 200 MG/1
200 TABLET ORAL DAILY
Status: DISCONTINUED | OUTPATIENT
Start: 2024-12-04 | End: 2024-12-04 | Stop reason: HOSPADM

## 2024-12-03 RX ORDER — HEPARIN SODIUM 5000 [USP'U]/ML
5000 INJECTION, SOLUTION INTRAVENOUS; SUBCUTANEOUS EVERY 8 HOURS SCHEDULED
Status: DISCONTINUED | OUTPATIENT
Start: 2024-12-03 | End: 2024-12-04 | Stop reason: HOSPADM

## 2024-12-03 RX ORDER — CLOPIDOGREL BISULFATE 75 MG/1
75 TABLET ORAL DAILY
Status: DISCONTINUED | OUTPATIENT
Start: 2024-12-03 | End: 2024-12-04 | Stop reason: HOSPADM

## 2024-12-03 RX ORDER — PANTOPRAZOLE SODIUM 40 MG/1
40 TABLET, DELAYED RELEASE ORAL
Status: DISCONTINUED | OUTPATIENT
Start: 2024-12-03 | End: 2024-12-04 | Stop reason: HOSPADM

## 2024-12-03 RX ORDER — ATORVASTATIN CALCIUM 80 MG/1
80 TABLET, FILM COATED ORAL
Status: DISCONTINUED | OUTPATIENT
Start: 2024-12-03 | End: 2024-12-04 | Stop reason: HOSPADM

## 2024-12-03 RX ORDER — ALBUTEROL SULFATE 0.83 MG/ML
2.5 SOLUTION RESPIRATORY (INHALATION) ONCE
Status: COMPLETED | OUTPATIENT
Start: 2024-12-03 | End: 2024-12-03

## 2024-12-03 RX ADMIN — IPRATROPIUM BROMIDE 0.5 MG: 0.5 SOLUTION RESPIRATORY (INHALATION) at 10:01

## 2024-12-03 RX ADMIN — ATORVASTATIN CALCIUM 80 MG: 80 TABLET, FILM COATED ORAL at 18:16

## 2024-12-03 RX ADMIN — ALBUTEROL SULFATE 2.5 MG: 2.5 SOLUTION RESPIRATORY (INHALATION) at 10:01

## 2024-12-03 RX ADMIN — HEPARIN SODIUM 5000 UNITS: 5000 INJECTION, SOLUTION INTRAVENOUS; SUBCUTANEOUS at 21:32

## 2024-12-03 RX ADMIN — ASPIRIN 81 MG CHEWABLE TABLET 81 MG: 81 TABLET CHEWABLE at 19:59

## 2024-12-03 RX ADMIN — FUROSEMIDE 40 MG: 10 INJECTION, SOLUTION INTRAMUSCULAR; INTRAVENOUS at 13:12

## 2024-12-03 RX ADMIN — METOPROLOL SUCCINATE 50 MG: 50 TABLET, EXTENDED RELEASE ORAL at 21:31

## 2024-12-03 RX ADMIN — INSULIN LISPRO 6 UNITS: 100 INJECTION, SOLUTION INTRAVENOUS; SUBCUTANEOUS at 18:27

## 2024-12-03 RX ADMIN — FUROSEMIDE 40 MG: 10 INJECTION, SOLUTION INTRAMUSCULAR; INTRAVENOUS at 18:16

## 2024-12-03 RX ADMIN — PANTOPRAZOLE SODIUM 40 MG: 40 TABLET, DELAYED RELEASE ORAL at 21:31

## 2024-12-03 RX ADMIN — INSULIN GLARGINE 24 UNITS: 100 INJECTION, SOLUTION SUBCUTANEOUS at 22:03

## 2024-12-03 RX ADMIN — CLOPIDOGREL BISULFATE 75 MG: 75 TABLET ORAL at 15:15

## 2024-12-03 RX ADMIN — SACUBITRIL AND VALSARTAN 1 TABLET: 49; 51 TABLET, FILM COATED ORAL at 19:59

## 2024-12-03 RX ADMIN — HEPARIN SODIUM 5000 UNITS: 5000 INJECTION, SOLUTION INTRAVENOUS; SUBCUTANEOUS at 15:15

## 2024-12-03 NOTE — ED PROVIDER NOTES
ED Disposition       None          Assessment & Plan       Medical Decision Making  At this time suspicion for acute exacerbation of systolic heart failure versus COPD/asthma exacerbation  Based on CXR underlying concern for pneumonia given possible infiltrate though patient has been afebrile has had intermittent elevations and leukocytosis that are chronic and not in the setting of previous infection  After 1X DuoNebs given elevated BNP and CXR findings, nephrology consulted for initiation of diuresis given underlying concern for CKD versus LINDEN present on admission    Amount and/or Complexity of Data Reviewed  Labs: ordered.     Details: CBC showing leukocytosis with neutrophilic predominance  CMP consistent with hyperglycemia in the setting of type 2 diabetes, elevated creatinine and BUN in the setting of chronic disease though based on previous office visits this is likely reflective of new baseline  BNP elevated to 1899  Radiology: ordered and independent interpretation performed.     Details: CXR showing signs of left lower lobe infiltrate versus atelectasis  Show chronic changes consistent with vascular congestion based on previous chest x-ray    Risk  Prescription drug management.  Decision regarding hospitalization.        ED Course as of 12/04/24 0901   Tue Dec 03, 2024   1014 Patient presenting for 2-day history of shortness of breath described as orthopnea and PND associated with non-productive cough and 30-lb weight gain in 2-months  VSS, Wheezing and diminished breath sounds at bases with increased abdominal distension  Ddx for AHRF, COPD or Asthma Exacerbation, Pleural Effusion or PNA; Low suspicion for PE  S/p 1x Duo-Neb and will follow-up on CXR and lab work before deciding on diuresis for possible AHRF   1114 Given elevated BNP and CXR showing signs of vascular congestion, Nephrology consulted for recommendations regarding diuresis   1116 Unable to confirm ICD function with interrogation, [employer] contacted for local representative to evaluate device   1152 ICD function interrogated at bedside showing no new events captured or shocks delivered; Appears to show function within normal limits at this time       Medications - No data to display    ED Risk Strat Scores                                               History of Present Illness       Chief Complaint   Patient presents with    Shortness of Breath     Reports difficulty breathing for 3 days worse when lying down. Denies CP N/VD.  Does have HF and reports 30 lb  weight gain in  2 months.       Past Medical History:   Diagnosis Date    Bacteremia due to Staphylococcus 10/09/2024    CHF (congestive heart failure) (HCC)     Diabetes mellitus (HCC)     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    Myocardial infarction (HCC)       Past Surgical History:   Procedure Laterality Date    CARDIAC CATHETERIZATION N/A 05/03/2023    Procedure: Cardiac Coronary Angiogram;  Surgeon: Valeriy Shore MD;  [facility];  Service: Cardiology    CARDIAC CATHETERIZATION Left 05/03/2023    Procedure: Cardiac Left Heart Cath;  Surgeon: Valeriy Shore MD;  [facility];  Service: Cardiology    CARDIAC DEFIBRILLATOR PLACEMENT  05/2023    CARDIAC ELECTROPHYSIOLOGY PROCEDURE N/A 05/12/2023    Procedure: Cardiac icd implant;  Surgeon: Urbano Maria MD;  [facility];  Service: Cardiology    CARDIAC ELECTROPHYSIOLOGY PROCEDURE N/A 2/20/2024    Procedure: EV ICD IMPLANTATION;  Surgeon: Arturo Wooten DO;  [facility];  Service: Cardiology    CARDIAC ELECTROPHYSIOLOGY PROCEDURE N/A 2/8/2024    Procedure: Cardiac laser lead extraction;  Surgeon: Arturo Wooten DO;  [facility];  Service: Cardiology    CARDIAC ELECTROPHYSIOLOGY PROCEDURE N/A 5/21/2024    Procedure: Cardiac icd implant subq;  Surgeon: Urbano Maria MD;  [facility];  Service: Cardiology    IR LOWER EXTREMITY  ANGIOGRAM  1/18/2024    [place] RMVL TRANSVNS PM ELTRD DUAL LEAD SYS N/A 2/20/2024    Procedure: EV ICD IMPLANTATION;  Surgeon: Abhilash Ferrera MD;  [facility];  Service: Cardiac Surgery    WA RMVL TRANSVNS PM ELTRD DUAL LEAD SYS N/A 2/21/2024    Procedure: REMOVAL OF LEAD AND GENERATOR AND ATTEMPTED LEAD REVISION;  Surgeon: Abhilash Ferrera MD;  [facility];  Service: Cardiac Surgery    WOUND DEBRIDEMENT Left 2/4/2024    Procedure: LEFT FOURTH TOE AMPUTATION SURGICAL WOUND DEBRIDEMENT FOOT/TOE (WASH OUT);  Surgeon: Bebo Hopper DPM;  [facility];  Service: Podiatry      History reviewed. No pertinent family history.   Social History     Tobacco Use    Smoking status: Former     Types: Cigarettes     Start date: 1/30/2024    Smokeless tobacco: Never   Vaping Use    Vaping status: Never Used   Substance Use Topics    Alcohol use: Not Currently    Drug use: Never      E-Cigarette/Vaping    E-Cigarette Use Never User       E-Cigarette/Vaping Substances    Nicotine No     THC No     CBD No     Flavoring No     Other No     Unknown No       I have reviewed and agree with the history as documented.     Patient is a 64-year-old male past medical history of heart failure reduced ejection fraction (last EF of 40% as of June 2024), coronary artery disease on DAPT, type 2 diabetes insulin-dependent (last A1c 8.9 in November 2024), ventricular tachycardia status post biventricular ICD placement last interrogation September 2024, CKD stage IIIa, hyperlipidemia, 50-pack-year history quit February 2024 who presents to the ED for 2-day history of shortness of breath.  Patient reports that for the past 2 days he has had worsening shortness of breath when lying down flat at night with association with nighttime awakenings feeling short of breath when sleeping flat at night.  He reports this has never happened before prompting his visit to the ED. Reports that during the past 2 days the shortness of breath is  associated with a dry, nonproductive cough and increased abdominal distention.  He reports having a 30 pound weight gain in the past 2 months.  He denies any fevers, chills, diaphoresis, sore throat, chest pain, nausea/, bowel movement changes or urinary changes.      History provided by:  Patient and spouse   used: No    Shortness of Breath  Severity:  Moderate  Onset quality:  Sudden  Duration:  2 days  Timing:  Constant  Progression:  Unchanged  Chronicity:  New  Context: activity    Context comment:  Lying down flat at night  Relieved by:  Sitting up, position changes and rest  Worsened by:  Exertion  Associated symptoms: cough    Associated symptoms: no abdominal pain, no chest pain, no diaphoresis, no ear pain, no fever, no rash, no sore throat and no vomiting        Review of Systems   Constitutional:  Negative for chills, diaphoresis and fever.   HENT:  Negative for congestion, ear pain, hearing loss, mouth sores, postnasal drip, rhinorrhea, sinus pressure, sinus pain and sore throat.    Eyes:  Negative for pain and visual disturbance.   Respiratory:  Positive for cough and shortness of breath.    Cardiovascular:  Negative for chest pain and palpitations.   Gastrointestinal:  Positive for abdominal distention. Negative for abdominal pain, constipation, diarrhea, nausea and vomiting.   Genitourinary:  Negative for dysuria and hematuria.   Musculoskeletal:  Negative for arthralgias and back pain.   Skin:  Negative for color change and rash.   Neurological:  Negative for seizures and syncope.   All other systems reviewed and are negative.          Objective       ED Triage Vitals [12/03/24 0906]   Temperature Pulse Blood Pressure Respirations SpO2 Patient Position - Orthostatic VS   98 °F (36.7 °C) 87 164/80 (!) 26 96 % Lying      Temp src Heart Rate Source BP Location FiO2 (%) Pain Score    -- -- Right arm -- No Pain      Vitals      Date and Time Temp Pulse SpO2 Resp BP Pain Score FACES  Pain Rating User   12/03/24 0906 98 °F (36.7 °C) 87 96 % 26 164/80 No Pain -- EM            Physical Exam  Vitals and nursing note reviewed.   Constitutional:       General: He is not in acute distress.     Appearance: He is well-developed.   HENT:      Head: Normocephalic and atraumatic.   Eyes:      Conjunctiva/sclera: Conjunctivae normal.   Neck:      Vascular: JVD present.   Cardiovascular:      Rate and Rhythm: Normal rate. Rhythm irregular.      Heart sounds: No murmur heard.  Pulmonary:      Effort: Pulmonary effort is normal. No respiratory distress.      Breath sounds: Examination of the right-upper field reveals wheezing. Examination of the left-upper field reveals wheezing. Examination of the right-middle field reveals wheezing. Examination of the left-middle field reveals wheezing. Examination of the right-lower field reveals decreased breath sounds. Examination of the left-lower field reveals decreased breath sounds. Decreased breath sounds and wheezing present. No rhonchi or rales.   Chest:      Chest wall: No mass, tenderness, crepitus or edema.   Abdominal:      Palpations: Abdomen is soft.      Tenderness: There is no abdominal tenderness.   Musculoskeletal:         General: No swelling.      Cervical back: Neck supple.      Right lower leg: Edema present.      Left lower leg: Edema present.   Skin:     General: Skin is warm and dry.      Capillary Refill: Capillary refill takes less than 2 seconds.   Neurological:      General: No focal deficit present.      Mental Status: He is alert and oriented to person, place, and time.   Psychiatric:         Mood and Affect: Mood normal.         Results Reviewed       None            No orders to display       Procedures    ED Medication and Procedure Management   Prior to Admission Medications   Prescriptions Last Dose Informant Patient Reported? Taking?   BD Pen Needle Micro U/F 32G X 6 MM MISC  Self No No   Sig: Inject under the skin in the morning    Empagliflozin (Jardiance) 10 MG TABS tablet  Self No No   Sig: Take 1 tablet (10 mg total) by mouth every morning   Insulin Glargine Solostar (Lantus SoloStar) 100 UNIT/ML SOPN  Self No No   Sig: Inject 0.3 mL (30 Units total) under the skin daily at bedtime   amiodarone 200 mg tablet  Self No No   Sig: Take 1 tablet (200 mg total) by mouth daily   aspirin 81 mg chewable tablet  Self No No   Sig: Chew 1 tablet (81 mg total) daily Do not start before March 2, 2024.   atorvastatin (LIPITOR) 80 mg tablet  Self No No   Sig: TAKE 1 TABLET BY MOUTH EVERY DAY AFTER DINNER   clopidogrel (PLAVIX) 75 mg tablet  Self No No   Sig: TAKE 1 TABLET BY MOUTH EVERY DAY   metFORMIN (GLUCOPHAGE) 1000 MG tablet  Self Yes No   Sig: Take 1,000 mg by mouth   metoprolol succinate (TOPROL-XL) 50 mg 24 hr tablet  Self No No   Sig: TAKE 1 TABLET BY MOUTH EVERY 12 HOURS   sacubitril-valsartan (Entresto) 49-51 MG TABS  Self Yes No   Sig: Take 1 tablet by mouth 2 (two) times a day   sacubitril-valsartan (Entresto)  MG TABS  Self No No   Sig: Take 1 tablet by mouth 2 (two) times a day   Patient not taking: Reported on 11/19/2024   sitaGLIPtin-metFORMIN (JANUMET)  MG per tablet  Self No No   Sig: Take 1 tablet by mouth 2 (two) times a day with meals      Facility-Administered Medications: None     Patient's Medications   Discharge Prescriptions    No medications on file     No discharge procedures on file.  ED SEPSIS DOCUMENTATION            William King MD  12/04/24 0903

## 2024-12-03 NOTE — ED ATTENDING ATTESTATION
12/3/2024  I, George Carlos MD, saw and evaluated the patient. I have discussed the patient with the resident/non-physician practitioner and agree with the resident's/non-physician practitioner's findings, Plan of Care, and MDM as documented in the resident's/non-physician practitioner's note, except where noted. All available labs and Radiology studies were reviewed.  I was present for key portions of any procedure(s) performed by the resident/non-physician practitioner and I was immediately available to provide assistance.       At this point I agree with the current assessment done in the Emergency Department.  I have conducted an independent evaluation of this patient a history and physical is as follows:    ED Course         Critical Care Time  Procedures    63 yo male with hx of chf, cad, icd for vtach, ef 45% on last echo, afib, here for two days of sob, orthopnea, awakens him at night. Pt recently had torsemide stopped due to worsening kidney function. Pt with dry nonproductive cough. Pt with 30 pound weight gain. No n/v/d, no new abdominal pain,  no urinary complaints.  Vss, afebrile, lungs wheezing noted, diminished bs at the bases, rrr, abdomen soft distented, nontender, trace pedal edema.  Cardiac workup, bnp, cxr.  Udn, likely chf exacerbation.

## 2024-12-03 NOTE — ASSESSMENT & PLAN NOTE
Etiology most likely scar mediated following MI. S/p ICD (multiple have been placed, most recently on 5/22/24). Currently on Toprol XL, amiodarone, 200 mg QD. Follows with EP. Was due to start Class III antiarrhytmic (e.g., dofetilide, sotolol), in fact it was the purpose of his admission on 10/7/2024, but did not 2/2 his poor kidney function.  No new concerning arrhythmia on EKG.  ICD interrogated in ED.    Plan  -Place on telemetry  -Continue Amiodarone, 200 mg QD  -Continue Toprol-XL

## 2024-12-03 NOTE — ASSESSMENT & PLAN NOTE
History of GERD, complaining of 1 day history of burning abdominal pain.  Symptoms appear consistent with GERD, will start Protonix.    Plan  -Started pantoprazole, 40 mg QHS

## 2024-12-03 NOTE — ASSESSMENT & PLAN NOTE
Wt Readings from Last 3 Encounters:   12/03/24 122 kg (268 lb 8.3 oz)   11/19/24 122 kg (270 lb)   11/11/24 121 kg (266 lb 1.6 oz)     EF of 40% on 6/3/2024. Presents with worsening SOB, +~30 lb weight gain,, ~+7 lb since last week.  Measures weight weekly.  For GDMT, he is on Toprol-XL 50 mg BID, Jardiance 10 mg QD, Entrsto (49 or 97?) PTA, and was previously on torsemide but this was held 2/2 LINDEN he developed during his last admission. Follows with HF team.  S/p ICD placement (subcutaneous, did have transvenous ICD in the past but was removed 2/2 infection). BNP today: 1899. CXR: Atelectasis versus infiltrate lateral right lower lung field.  Says he has been eating a lot of processed foods that are high in salt and this could lead to his presentation today.  Fortunately, his only symptom is shortness of breath at rest (he said not with exertion).  Was seen by nephrology who felt he would be best served with dose of IV diuretics (40 mg IV Lasix) followed by restarting his torsemide the following day with discharge if he is doing well.      Plan  -Admit to [facility] with telemetry  -Diuresis with IV Lasix, 40 mg and will restart prior torsemide, 10 mg QD tomorroq  -Will continue PTA Toprol-XL, 50 mg BID  -Continue Jardiance, 10 mg QD  -Continue Entresto, 49-51 mg QD, can take higher dose once discharged  -Strict I/O's  -Daily weights  -BMP, mag, Phos labs tomorrow  -Discussed nutrition referral once discharged to discuss proper diet, which he was very interested in.

## 2024-12-03 NOTE — ASSESSMENT & PLAN NOTE
Lab Results   Component Value Date    EGFR 39 12/04/2024    EGFR 44 12/03/2024    EGFR 44 10/24/2024    CREATININE 1.79 (H) 12/04/2024    CREATININE 1.63 (H) 12/03/2024    CREATININE 1.61 (H) 10/24/2024   Assessment:  Hx Stage IIIA CKD with prior baseline ~1.2-1.3). Over the last couple of months though, his Cr has jumped to around 1.7-1.9. This limited what meds he could take for his other co-morbilities.  While it sometimes takes months for creatinine to return to baseline, there is a risk that this is his new baseline.  Not attempted to call this an LINDEN as a result.  Was seen by nephrology during his hospitalization and after discharge. During his last visit with nephrology on 10/15/24, he got the go-ahead to restart Jardiance.  Today, he was evaluated by nephrology who recommended IV diuresis with plan to restart torsemide tomorrow.      Plan:  -Avoid nephrotoxic agents, prolonged hypotension  -Patient will be restarted on torsemide  -Trend BMP daily

## 2024-12-03 NOTE — ASSESSMENT & PLAN NOTE
"Wt Readings from Last 3 Encounters:   12/04/24 119 kg (262 lb)   11/19/24 122 kg (270 lb)   11/11/24 121 kg (266 lb 1.6 oz)     See \"Acute exacerbation of chronic heart failure\"      "

## 2024-12-03 NOTE — ASSESSMENT & PLAN NOTE
Wt Readings from Last 3 Encounters:   12/03/24 122 kg (268 lb 8.3 oz)   11/19/24 122 kg (270 lb)   11/11/24 121 kg (266 lb 1.6 oz)   Patient now presents with shortness of breath, chest x-ray revealing mild vascular congestion with crackles noted right lower lobe-likley volume overload off torsemide and dietary indiscretion with reported eating processed foods  Currently off torsemide 10 mg daily  Consider Lasix milligrams IV x 1  Hopefully transition back to oral torsemide 10 mg in a.m.  Low-sodium diet no more than 2000 mg of salt per day

## 2024-12-03 NOTE — ASSESSMENT & PLAN NOTE
Lab Results   Component Value Date    HGBA1C 8.9 (H) 10/14/2024       Recent Labs     12/03/24  1815 12/03/24  2202 12/04/24  0938   POCGLU 259* 164* 200*       Blood Sugar Average: Last 72 hrs:  (P) [id number]  Assessment:  Last A1c on 10/14/24: 8.9 (was 7.1 of 5/14/24 and 9.8 on 1/31/24) .On metformin, 30U Lantus QHS (PCP wanted this increased to 40U which he did not do) , and Janumet,  mg BID. During last hospitalization, glucose was within goal at 30U Lantus QHS and ISS.    Plan:  -Lantus 24U QHS  -ISS  -Holding metformin, sitagliptin-metformin  -Can continue Entresto  -Goal glucose: 140-180  -Carb control diet  -Hypoglycemia protocol  -Nutrition referral when in outpatient setting   01-Nov-2022 00:58

## 2024-12-03 NOTE — ASSESSMENT & PLAN NOTE
Blood pressure 140s to 160s  Home blood pressure medications include Toprol XL 50 mg every 12 hours, Entresto 49-51 mg tablets-was to start higher dose once medication received in mail  Avoid hypotension of pertubation's of blood pressure

## 2024-12-03 NOTE — CONSULTS
NEPHROLOGY HOSPITAL CONSULTATION   Nadir Myers 64 y.o. male MRN: [id number]  Unit/Bed#: ED 02 Encounter: [id number]    Brief History of Admission -patient with history of CKD, heart failure, prior LINDEN presents to ER with shortness of breath and orthopnea.  Jesus consulted for CKD and volume management.     Assessment & Plan  CKD stage 3a, GFR 45-59 ml/min ([facility])  Lab Results   Component Value Date    EGFR 44 12/03/2024    EGFR 44 10/24/2024    EGFR 41 10/14/2024    CREATININE 1.63 (H) 12/03/2024    CREATININE 1.61 (H) 10/24/2024    CREATININE 1.70 (H) 10/14/2024   Etiology: Suspect secondary to diabetic kidney disease, hypertensive nephrosclerosis, prior LINDEN episodes  Most recent baseline creatinine around 1.6-1.7 since last hospitalization; previously 1.2-1.3  Follows Dr. Treadwell as outpatient  Was last seen on 10/15/2024 as hospital follow-up in which torsemide was discontinued and Jardiance resumed-2 weeks post creatinine 1.61  Torsemide recently discontinued with last office visit  On Entresto per cardiology  Admission creatinine 1.63  Patient for follow-up in January for routine CKD management  Benign hypertension with CKD (chronic kidney disease) stage III ([facility])  Blood pressure 140s to 160s  Home blood pressure medications include Toprol XL 50 mg every 12 hours, Entresto 49-51 mg tablets-was to start higher dose once medication received in mail  Avoid hypotension of pertubation's of blood pressure    Chronic systolic CHF (congestive heart failure) ([facility])  Wt Readings from Last 3 Encounters:   12/03/24 122 kg (268 lb 8.3 oz)   11/19/24 122 kg (270 lb)   11/11/24 121 kg (266 lb 1.6 oz)   Patient now presents with shortness of breath, chest x-ray revealing mild vascular congestion with crackles noted right lower lobe-likley volume overload off torsemide and dietary indiscretion with reported eating processed foods  Currently off torsemide 10 mg daily  Consider Lasix milligrams IV x 1  Hopefully transition back  to oral torsemide 10 mg in a.m.  Low-sodium diet no more than 2000 mg of salt per day  Persistent proteinuria  Etiology: Suspect secondary  to uncontrolled diabetes most recent A1c 10.9.    Most recent UPCR 5/14/2024 6.61.    Serologies during last hospitalizatin:  Serum immunofixation showing IgG G lambda.  Hep B panel normal.  GBM negative.  C3 normal with elevated C4.  Complements normal.  BARBARA, ANCA, anti-DNA double-stranded antibody negative.  Referred to hematology with last office appointment and has an upcoming appointment for IgG evaluation on 12/5/2024  Type 2 diabetes mellitus with diabetic peripheral angiopathy without gangrene (HCC)    Lab Results   Component Value Date    HGBA1C 8.9 (H) 10/14/2024   Management per primary team  Ischemic cardiomyopathy  Follows cardiology as outpatient  Entresto recently increased dosage but have not started secondary to medicine not received in mail at this time  ICD (implantable cardioverter-defibrillator) in place  EP as outpatient  On amiodarone      HISTORY OF PRESENT ILLNESS:  Requesting Physician: George Carlos MD  Reason for Consult: CKD and volume management    Nadir Myers is a 64 y.o. male with PMH CKD IIIA, previous LINDEN, cardiomyopathy, CHF, diabetes II, three-vessel CAD, V-tach, ICD, previous MSSA bacteremia of left foot toe gangrene status post amputation who presented with 2-day history of shortness of breath and unable to lay flat.  Of note patient had a recent hospitalization 10/7/2024 to 10/9/2024 with V. tach treated with amiodarone already has ICD.  Also noted LINDEN at that time.  On discharge, torsemide and entresto  resumed.  During recent nephrology office torsemide was held when Jardiance resumed.  Since that time patient reported weight gain of at least 13 pounds and shortness of breath being off torsemide.  Patient also admits to eating lots of processed foods and wife concurs.  Patient reports feeling better since being here in the ER.   Denies chest pain or worsening shortness of breath.      PAST MEDICAL HISTORY:  Past Medical History:   Diagnosis Date    Bacteremia due to Staphylococcus 10/09/2024    CHF (congestive heart failure) (HCC)     Diabetes mellitus (HCC)     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    Myocardial infarction (HCC)        PAST SURGICAL HISTORY:  Past Surgical History:   Procedure Laterality Date    CARDIAC CATHETERIZATION N/A 05/03/2023    Procedure: Cardiac Coronary Angiogram;  Surgeon: Valeriy Shore MD;  Location: [facility];  Service: Cardiology    CARDIAC CATHETERIZATION Left 05/03/2023    Procedure: Cardiac Left Heart Cath;  Surgeon: Valeriy Shore MD;  [facility]: [facility];  Service: Cardiology    CARDIAC DEFIBRILLATOR PLACEMENT  05/2023    CARDIAC ELECTROPHYSIOLOGY PROCEDURE N/A 05/12/2023    Procedure: Cardiac icd implant;  Surgeon: Urbano Maria MD;  Location: [facility];  Service: Cardiology    CARDIAC ELECTROPHYSIOLOGY PROCEDURE N/A 2/20/2024    Procedure: EV ICD IMPLANTATION;  Surgeon: Arturo Wooten DO;  Location: [facility];  Service: Cardiology    CARDIAC ELECTROPHYSIOLOGY PROCEDURE N/A 2/8/2024    Procedure: Cardiac laser lead extraction;  Surgeon: Arturo Wooten DO;  [facility];  Service: Cardiology    CARDIAC ELECTROPHYSIOLOGY PROCEDURE N/A 5/21/2024    Procedure: Cardiac icd implant subq;  Surgeon: Urbano Maria MD;  [facility];  Service: Cardiology    IR LOWER EXTREMITY ANGIOGRAM  1/18/2024    CO RMVL TRANSVNS PM ELTRD DUAL LEAD SYS N/A 2/20/2024    Procedure: EV ICD IMPLANTATION;  Surgeon: Abhilash Ferrera MD;  Location: [facility];  Service: Cardiac Surgery    CO RMVL TRANSVNS PM ELTRD DUAL LEAD SYS N/A 2/21/2024    Procedure: REMOVAL OF LEAD AND GENERATOR AND ATTEMPTED LEAD REVISION;  Surgeon: Abhilash Ferrera MD;  Location: [facility];  Service: Cardiac Surgery    WOUND DEBRIDEMENT Left 2/4/2024    Procedure: LEFT FOURTH TOE  AMPUTATION SURGICAL WOUND DEBRIDEMENT FOOT/TOE (WASH OUT);  Surgeon: Bebo Hopper DPM;  Location: [facility];  Service: Podiatry       ALLERGIES:  Allergies   Allergen Reactions    Vancomycin Rash     NOT AN ALLERGY - 1/31/24 patient experienced vancomycin infusion reaction, please extend duration of infusion time to prevent future infusion reactions       SOCIAL HISTORY:  Social History     Substance and Sexual Activity   Alcohol Use Not Currently     Social History     Substance and Sexual Activity   Drug Use Never     Social History     Tobacco Use   Smoking Status Former    Types: Cigarettes    Start date: 1/30/2024   Smokeless Tobacco Never       FAMILY HISTORY:  History reviewed. No pertinent family history.    MEDICATIONS:  No current facility-administered medications for this encounter.    Current Outpatient Medications:     amiodarone 200 mg tablet, Take 1 tablet (200 mg total) by mouth daily, Disp: 90 tablet, Rfl: 2    aspirin 81 mg chewable tablet, Chew 1 tablet (81 mg total) daily Do not start before March 2, 2024., Disp: 30 tablet, Rfl: 0    atorvastatin (LIPITOR) 80 mg tablet, TAKE 1 TABLET BY MOUTH EVERY DAY AFTER DINNER, Disp: 90 tablet, Rfl: 1    BD Pen Needle Micro U/F 32G X 6 MM MISC, Inject under the skin in the morning, Disp: 100 each, Rfl: 1    clopidogrel (PLAVIX) 75 mg tablet, TAKE 1 TABLET BY MOUTH EVERY DAY, Disp: 100 tablet, Rfl: 1    Empagliflozin (Jardiance) 10 MG TABS tablet, Take 1 tablet (10 mg total) by mouth every morning, Disp: 90 tablet, Rfl: 3    Insulin Glargine Solostar (Lantus SoloStar) 100 UNIT/ML SOPN, Inject 0.3 mL (30 Units total) under the skin daily at bedtime, Disp: 15 mL, Rfl: 5    metFORMIN (GLUCOPHAGE) 1000 MG tablet, Take 1,000 mg by mouth, Disp: , Rfl:     metoprolol succinate (TOPROL-XL) 50 mg 24 hr tablet, TAKE 1 TABLET BY MOUTH EVERY 12 HOURS, Disp: 180 tablet, Rfl: 1    sacubitril-valsartan (Entresto) 49-51 MG TABS, Take 1 tablet by mouth 2 (two) times a day,  Disp: , Rfl:     sacubitril-valsartan (Entresto)  MG TABS, Take 1 tablet by mouth 2 (two) times a day (Patient not taking: Reported on 11/19/2024), Disp: 60 tablet, Rfl: 2    sitaGLIPtin-metFORMIN (JANUMET)  MG per tablet, Take 1 tablet by mouth 2 (two) times a day with meals, Disp: 180 tablet, Rfl: 3    REVIEW OF SYSTEMS:  Constitutional: Negative for fatigue, anorexia, fever, chills, diaphoresis  HENT: Negative for postnasal drip  Eyes: Negative for visual disturbance.   Respiratory: Shortness of breath has improved since presenting to ER  Cardiovascular: Negative for chest pain, palpitations and leg swelling.   Gastrointestinal: Negative for abdominal pain, constipation, diarrhea, nausea and vomiting.   Genitourinary: No dysuria, hematuria  Endocrine: Negative for polyuria.   Musculoskeletal: Negative for arthralgias, back pain and joint swelling.   Skin: Negative for rash.   Neurological: Negative for focal weakness, headaches, dizziness.  Hematological: Negative for easy bruising or bleeding.  Psychiatric/Behavioral: Negative for confusion and sleep disturbance.   All the systems were reviewed and were negative except as documented on the HPI.    PHYSICAL EXAM:  Current Weight: Weight - Scale: 122 kg (268 lb 8.3 oz)  First Weight: Weight - Scale: 122 kg (268 lb 8.3 oz)  Vitals:    12/03/24 0906 12/03/24 0909 12/03/24 1000 12/03/24 1100   BP: 164/80  167/89 144/80   BP Location: Right arm  Right arm Right arm   Pulse: 87  79 77   Resp: (!) 26      Temp: 98 °F (36.7 °C)      SpO2: 96%  93% 93%   Weight:  122 kg (268 lb 8.3 oz)       No intake or output data in the 24 hours ending 12/03/24 1221  Physical Exam  Vitals reviewed.   Constitutional:       Appearance: Normal appearance. He is obese.   HENT:      Head: Normocephalic and atraumatic.      Nose: Nose normal.      Mouth/Throat:      Mouth: Mucous membranes are moist.      Pharynx: Oropharynx is clear.   Eyes:      Extraocular Movements:  "Extraocular movements intact.      Conjunctiva/sclera: Conjunctivae normal.   Cardiovascular:      Rate and Rhythm: Normal rate and regular rhythm.      Pulses: Normal pulses.      Heart sounds: Normal heart sounds.   Pulmonary:      Effort: Pulmonary effort is normal.      Breath sounds: Rales present.      Comments: RLL crackles.  Otherwise CTA  Abdominal:      General: Bowel sounds are normal.      Palpations: Abdomen is soft.   Musculoskeletal:         General: Normal range of motion.      Cervical back: Normal range of motion.      Right lower leg: Edema present.      Left lower leg: Edema present.      Comments: Trace pitting edema bilaterally   Skin:     General: Skin is warm and dry.      Coloration: Skin is pale.   Neurological:      General: No focal deficit present.      Mental Status: He is alert and oriented to person, place, and time.   Psychiatric:         Mood and Affect: Mood normal.         Behavior: Behavior normal.            Lab Results:   Results from last 7 days   Lab Units 12/03/24  1001   WBC Thousand/uL 11.63*   HEMOGLOBIN g/dL 10.5*   HEMATOCRIT % 32.6*   PLATELETS Thousands/uL 292   POTASSIUM mmol/L 3.9   CHLORIDE mmol/L 108   CO2 mmol/L 23   BUN mg/dL 31*   CREATININE mg/dL 1.63*   CALCIUM mg/dL 8.5   ALK PHOS U/L 70   ALT U/L 12   AST U/L 14     Other Studies:     Portions of the record may have been created with voice recognition software. Occasional wrong word or \"sound a like\" substitutions may have occurred due to the inherent limitations of voice recognition software. Read the chart carefully and recognize, using context, where substitutions have occurred.If you have any questions, please contact the dictating provider.    "

## 2024-12-03 NOTE — ASSESSMENT & PLAN NOTE
Lab Results   Component Value Date    EGFR 39 12/04/2024    EGFR 44 12/03/2024    EGFR 44 10/24/2024    CREATININE 1.79 (H) 12/04/2024    CREATININE 1.63 (H) 12/03/2024    CREATININE 1.61 (H) 10/24/2024     Initial BP of 164/80, 167/89.  Improved to 144/80, prior to receiving Lasix.  Believe blood pressure will improve as he gets diuresed.  Will continue to monitor      Plan  -IV Lasix today, torsemide tomorrow

## 2024-12-03 NOTE — ASSESSMENT & PLAN NOTE
Hx HLD and does have CAD with hx of MI s/p PCI in 2015. Most recent lipid panel on 10/14/2024: Cholesterol: 144, Triglycerides: 194, HDL: 36, LDL: 69. LDL not at goal of <55 (secondary prevention 2/2 hx MI, CAD and T2DM). On atorvastatin 80 mg PTA.     Plan  -Continue atorvastatin, 80 mg QD  -Consider starting ezetimibe, 10 mg QD for better control of LDL

## 2024-12-03 NOTE — ASSESSMENT & PLAN NOTE
Lab Results   Component Value Date    EGFR 44 12/03/2024    EGFR 44 10/24/2024    EGFR 41 10/14/2024    CREATININE 1.63 (H) 12/03/2024    CREATININE 1.61 (H) 10/24/2024    CREATININE 1.70 (H) 10/14/2024     Initial BP of 164/80, 167/89.  Improved to 144/80, prior to receiving Lasix.  Believe blood pressure will improve as he gets diuresed.  Will continue to monitor      Plan  -IV Lasix today, torsemide tomorrow

## 2024-12-03 NOTE — ASSESSMENT & PLAN NOTE
Hx MI s/p cardiac cath (which showed  of LAD) and PCI (mid-Lcx) in 2/2015). Currently on Aspirin 81 mg, Plavix 75 mg, and atorvastatin 80 mg.  Currently denies any chest pain.  No signs of ischemia on EKG.    Plan  -Continue ASA, 81 mg QD  -Continue Plavix, 75 mg QD  -Continue atorvastatin, 80 mg QD

## 2024-12-03 NOTE — ASSESSMENT & PLAN NOTE
Lab Results   Component Value Date    EGFR 44 12/03/2024    EGFR 44 10/24/2024    EGFR 41 10/14/2024    CREATININE 1.63 (H) 12/03/2024    CREATININE 1.61 (H) 10/24/2024    CREATININE 1.70 (H) 10/14/2024   Etiology: Suspect secondary to diabetic kidney disease, hypertensive nephrosclerosis, prior LINDEN episodes  Most recent baseline creatinine around 1.6-1.7 since last hospitalization; previously 1.2-1.3  Follows Dr. Treadwell as outpatient  Was last seen on 10/15/2024 as hospital follow-up in which torsemide was discontinued and Jardiance resumed-2 weeks post creatinine 1.61  Torsemide recently discontinued with last office visit  On Entresto per cardiology  Admission creatinine 1.63  Patient for follow-up in January for routine CKD management

## 2024-12-03 NOTE — ASSESSMENT & PLAN NOTE
Lab Results   Component Value Date    EGFR 44 12/03/2024    EGFR 44 10/24/2024    EGFR 41 10/14/2024    CREATININE 1.63 (H) 12/03/2024    CREATININE 1.61 (H) 10/24/2024    CREATININE 1.70 (H) 10/14/2024     Hx Stage IIIA CKD with prior baseline ~1.2-1.3). Over the last couple of months though, his Cr has jumped to around 1.7-1.9. This limited what meds he could take for his other co-morbilities.  While it sometimes takes months for creatinine to return to baseline, there is a risk that this is his new baseline.  Not attempted to call this an LINDEN as a result.  Was seen by nephrology during his hospitalization and after discharge. During his last visit with nephrology on 10/15/24, he got the go-ahead to restart Jardiance.  Today, he was evaluated by nephrology who recommended IV diuresis with plan to restart torsemide tomorrow.      Plan  -Avoid nephrotoxic agents, prolonged hypotension  -Trend BMP daily

## 2024-12-03 NOTE — H&P
INTERNAL MEDICINE RESIDENCY ADMISSION H&P     Name: Nadir Myers   Age & Sex: 64 y.o. male   MRN: [id number]  Unit/Bed#: ED 02   Encounter: [id number]  Primary Care Provider: Beau Lemus MD    Code Status: Level 1 - Full Code  Admission Status: OBSERVATION  Disposition: Patient requires Med/Surg with Telemetry    Admit to team: SOD Team C     ASSESSMENT/PLAN     Principal Problem:    Acute exacerbation of chronic heart failure (HCC)  Active Problems:    V-tach (HCC)    Type 2 diabetes mellitus with diabetic peripheral angiopathy without gangrene (HCC)    CKD stage 3a, GFR 45-59 ml/min (HCC)    CAD (coronary artery disease)    Chronic systolic CHF (congestive heart failure) (HCC)    GERD (gastroesophageal reflux disease)    Ischemic cardiomyopathy    PAD (peripheral artery disease) (HCC)    ICD (implantable cardioverter-defibrillator) in place    Persistent proteinuria    Hepatitis C antibody test positive    Benign hypertension with CKD (chronic kidney disease) stage III (HCC)    Hyperlipemia    Anemia    Leukocytosis      * Acute exacerbation of chronic heart failure (HCC)  Assessment & Plan  Wt Readings from Last 3 Encounters:   12/03/24 122 kg (268 lb 8.3 oz)   11/19/24 122 kg (270 lb)   11/11/24 121 kg (266 lb 1.6 oz)     EF of 40% on 6/3/2024. Presents with worsening SOB, +~30 lb weight gain,, ~+7 lb since last week.  Measures weight weekly.  For GDMT, he is on Toprol-XL 50 mg BID, Jardiance 10 mg QD, Entrsto (49 or 97?) PTA, and was previously on torsemide but this was held 2/2 LINDEN he developed during his last admission. Follows with HF team.  S/p ICD placement (subcutaneous, did have transvenous ICD in the past but was removed 2/2 infection). BNP today: 1899. CXR: Atelectasis versus infiltrate lateral right lower lung field.  Says he has been eating a lot of processed foods that are high in salt and this could lead to his presentation today.  Fortunately, his only symptom is shortness of breath at  "rest (he said not with exertion).  Was seen by nephrology who felt he would be best served with dose of IV diuretics (40 mg IV Lasix) followed by restarting his torsemide the following day with discharge if he is doing well.      Plan  -Admit to SOD C with telemetry  -Diuresis with IV Lasix, 40 mg and will restart prior torsemide, 10 mg QD tomorroq  -Will continue PTA Toprol-XL, 50 mg BID  -Continue Jardiance, 10 mg QD  -Continue Entresto, 49-51 mg QD, can take higher dose once discharged  -Strict I/O's  -Daily weights  -BMP, mag, Phos labs tomorrow  -Discussed nutrition referral once discharged to discuss proper diet, which he was very interested in.          V-tach (HCC)  Assessment & Plan  Etiology most likely scar mediated following MI. S/p ICD (multiple have been placed, most recently on 5/22/24). Currently on Toprol XL, amiodarone, 200 mg QD. Follows with EP. Was due to start Class III antiarrhytmic (e.g., dofetilide, sotolol), in fact it was the purpose of his admission on 10/7/2024, but did not 2/2 his poor kidney function.  No new concerning arrhythmia on EKG.  ICD interrogated in ED.    Plan  -Place on telemetry  -Continue Amiodarone, 200 mg QD  -Continue Toprol-XL    Type 2 diabetes mellitus with diabetic peripheral angiopathy without gangrene (HCC)  Assessment & Plan  Lab Results   Component Value Date    HGBA1C 8.9 (H) 10/14/2024       No results for input(s): \"POCGLU\" in the last 72 hours.    Blood Sugar Average: Last 72 hrs:    Last A1c on 10/14/24: 8.9 (was 7.1 of 5/14/24 and 9.8 on 1/31/24) .On metformin, 30U Lantus QHS (PCP wanted this increased to 40U which he did not do) , and Janumet,  mg BID. During last hospitalization, glucose was within goal at 30U Lantus QHS and ISS.    Plan  -Lantus 24U QHS  -ISS  -Holding metformin, Janumet  -Goal glucose: 140-180  -Carb control diet  -Hypoglycemia protocol  -Nutrition referral when in outpatient setting    CKD stage 3a, GFR 45-59 ml/min " "([facility])  Assessment & Plan  Lab Results   Component Value Date    EGFR 44 12/03/2024    EGFR 44 10/24/2024    EGFR 41 10/14/2024    CREATININE 1.63 (H) 12/03/2024    CREATININE 1.61 (H) 10/24/2024    CREATININE 1.70 (H) 10/14/2024     Hx Stage IIIA CKD with prior baseline ~1.2-1.3). Over the last couple of months though, his Cr has jumped to around 1.7-1.9. This limited what meds he could take for his other co-morbilities.  While it sometimes takes months for creatinine to return to baseline, there is a risk that this is his new baseline.  Not attempted to call this an LINDEN as a result.  Was seen by nephrology during his hospitalization and after discharge. During his last visit with nephrology on 10/15/24, he got the go-ahead to restart Jardiance.  Today, he was evaluated by nephrology who recommended IV diuresis with plan to restart torsemide tomorrow.      Plan  -Avoid nephrotoxic agents, prolonged hypotension  -Trend BMP daily      CAD (coronary artery disease)  Assessment & Plan  Hx MI s/p cardiac cath (which showed  of LAD) and PCI (mid-Lcx) in 2/2015). Currently on Aspirin 81 mg, Plavix 75 mg, and atorvastatin 80 mg.  Currently denies any chest pain.  No signs of ischemia on EKG.    Plan  -Continue ASA, 81 mg QD  -Continue Plavix, 75 mg QD  -Continue atorvastatin, 80 mg QD    Chronic systolic CHF (congestive heart failure) ([facility])  Assessment & Plan  Wt Readings from Last 3 Encounters:   12/03/24 122 kg (268 lb 8.3 oz)   11/19/24 122 kg (270 lb)   11/11/24 121 kg (266 lb 1.6 oz)     See \"Acute exacerbation of chronic heart failure\"        Leukocytosis  Assessment & Plan  WBC of 11.63, 84% segmented neutrophils/9.67 absolute neutro.  No obvious sign of infection at this time.  Remains afebrile.    Plan  -Trend white count    Anemia  Assessment & Plan  Hb/Hct of 10.5/32.6 with MCV of 91.  Baseline hemoglobin since February of this year 9-11, previously 14-16. Denies any epistaxis, hemoptysis, cuts/trauma, " hematochezia, hematuria.  No iron panel, B12, folate level in chart.  Etiology most likely 2/2 chronic disease.  However other causes should be investigated.  For example, he has not had a colonoscopy.  Documented family history of colon cancer with mother and has been having episodes of nausea/vomiting (though not today).    Plan  -Trend CBC daily  -Transfuse for Hb <8 2/2 cardiac history  -Anemia workup in outpatient setting    Hyperlipemia  Assessment & Plan  Hx HLD and does have CAD with hx of MI s/p PCI in 2015. Most recent lipid panel on 10/14/2024: Cholesterol: 144, Triglycerides: 194, HDL: 36, LDL: 69. LDL not at goal of <55 (secondary prevention 2/2 hx MI, CAD and T2DM). On atorvastatin 80 mg PTA.     Plan  -Continue atorvastatin, 80 mg QD  -Consider starting ezetimibe, 10 mg QD for better control of LDL    Benign hypertension with CKD (chronic kidney disease) stage III (HCC)  Assessment & Plan  Lab Results   Component Value Date    EGFR 44 12/03/2024    EGFR 44 10/24/2024    EGFR 41 10/14/2024    CREATININE 1.63 (H) 12/03/2024    CREATININE 1.61 (H) 10/24/2024    CREATININE 1.70 (H) 10/14/2024     Initial BP of 164/80, 167/89.  Improved to 144/80, prior to receiving Lasix.  Believe blood pressure will improve as he gets diuresed.  Will continue to monitor      Plan  -IV Lasix today, torsemide tomorrow    GERD (gastroesophageal reflux disease)  Assessment & Plan  History of GERD, complaining of 1 day history of burning abdominal pain.  Symptoms appear consistent with GERD, will start Protonix.    Plan  -Start pantoprazole, 40 mg QHS        VTE Pharmacologic Prophylaxis: Heparin  VTE Mechanical Prophylaxis: sequential compression device    CHIEF COMPLAINT     Chief Complaint   Patient presents with    Shortness of Breath     Reports difficulty breathing for 3 days worse when lying down. Denies CP N/VD.  Does have HF and reports 30 lb  weight gain in  2 months. Hx MI        HISTORY OF PRESENT ILLNESS      Nadir Myers is a 64 y.o. male with a PMHx of HFrEF with last EF 40% in June 2024, CAD on DAPT, ventricular tachycardia s/p ICD placement in May 2024, CKD stage IIIa, HLD, and T2DM who presents with a chief complaint of shortness of breath for the past 2 days. Patient states that he has had difficulty breathing when lying down at night with frequent nighttime awakenings which gets better upon sitting up. Has prior hospitalizations for HF exacerbations. He also reports an associated burning abdominal pain in the epigastric area when sitting up which lasts for a minute and goes away on its own, and increased abdominal distension. Has prior hx of GERD. He also endorses a dry, nonproductive cough which also started 2 days ago. Patient states that he has gained 30 pounds in the past 2 months. He denies any recent illnesses or any sick contacts. He also denies chest pain, dizziness, fever, rhinorrhea, sore throat, N/V/D.     REVIEW OF SYSTEMS     Review of Systems   Constitutional:  Negative for chills, diaphoresis and fever.   HENT:  Negative for congestion, ear pain, hearing loss, mouth sores, postnasal drip, rhinorrhea, sinus pain, sneezing and sore throat.    Eyes:  Negative for pain and visual disturbance.   Respiratory:  Positive for cough (dry) and shortness of breath (at rest).    Cardiovascular:  Negative for chest pain and palpitations.   Gastrointestinal:  Positive for abdominal distention and abdominal pain. Negative for constipation, diarrhea, nausea and vomiting.   Genitourinary:  Negative for dysuria and hematuria.   Musculoskeletal:  Negative for arthralgias and back pain.   Skin:  Negative for color change and rash.   Neurological:  Negative for dizziness, seizures, syncope and weakness.   All other systems reviewed and are negative.        OBJECTIVE     Vitals:    12/03/24 0906 12/03/24 0909 12/03/24 1000 12/03/24 1100   BP: 164/80  167/89 144/80   BP Location: Right arm  Right arm Right arm    Pulse: 87  79 77   Resp: (!) 26      Temp: 98 °F (36.7 °C)      SpO2: 96%  93% 93%   Weight:  122 kg (268 lb 8.3 oz)          Temperature:   Temp (24hrs), Av °F (36.7 °C), Min:98 °F (36.7 °C), Max:98 °F (36.7 °C)    Temperature: 98 °F (36.7 °C)    Intake & Output:  I/O       None            Weights:        Body mass index is 36.42 kg/m².  Weight (last 2 days)       Date/Time Weight    24 0909 122 (268.52)            Physical Exam  Constitutional:       General: He is not in acute distress.  HENT:      Head: Normocephalic and atraumatic.      Nose: No congestion or rhinorrhea.   Eyes:      General: No scleral icterus.     Conjunctiva/sclera: Conjunctivae normal.   Neck:      Vascular: JVD present.   Cardiovascular:      Rate and Rhythm: Normal rate and regular rhythm.      Heart sounds: No murmur heard.     No friction rub. No gallop.   Pulmonary:      Effort: Pulmonary effort is normal.      Breath sounds: Examination of the right-upper field reveals wheezing. Examination of the left-upper field reveals wheezing. Examination of the right-middle field reveals wheezing. Examination of the left-middle field reveals wheezing. Examination of the right-lower field reveals decreased breath sounds and rales. Examination of the left-lower field reveals decreased breath sounds. Decreased breath sounds, wheezing and rales present. No rhonchi.   Abdominal:      General: There is distension.      Palpations: Abdomen is soft.      Tenderness: There is no abdominal tenderness.   Musculoskeletal:      Cervical back: Neck supple.      Right lower leg: Edema present.      Left lower leg: Edema present.   Skin:     General: Skin is warm.      Capillary Refill: Capillary refill takes less than 2 seconds.   Neurological:      General: No focal deficit present.      Mental Status: He is alert and oriented to person, place, and time.   Psychiatric:         Mood and Affect: Mood normal.         Behavior: Behavior normal.            PAST MEDICAL HISTORY     Past Medical History:   Diagnosis Date    Bacteremia due to Staphylococcus 10/09/2024    CHF (congestive heart failure) (HCC)     Diabetes mellitus (HCC)     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    Myocardial infarction (HCC)        PAST SURGICAL HISTORY     Past Surgical History:   Procedure Laterality Date    CARDIAC CATHETERIZATION N/A 05/03/2023    Procedure: Cardiac Coronary Angiogram;  Surgeon: Valeriy Shore MD;  Location: [facility];  Service: Cardiology    CARDIAC CATHETERIZATION Left 05/03/2023    Procedure: Cardiac Left Heart Cath;  Surgeon: Valeriy Shore MD;  Location: [facility];  Service: Cardiology    CARDIAC DEFIBRILLATOR PLACEMENT  05/2023    CARDIAC ELECTROPHYSIOLOGY PROCEDURE N/A 05/12/2023    Procedure: Cardiac icd implant;  Surgeon: Urbano Maria MD;  Location: [facility];  Service: Cardiology    CARDIAC ELECTROPHYSIOLOGY PROCEDURE N/A 2/20/2024    Procedure: EV ICD IMPLANTATION;  Surgeon: Arturo Wooten DO;  Location: [facility];  Service: Cardiology    CARDIAC ELECTROPHYSIOLOGY PROCEDURE N/A 2/8/2024    Procedure: Cardiac laser lead extraction;  Surgeon: Arturo Wooten DO;  [facility]: [facility];  Service: Cardiology    CARDIAC ELECTROPHYSIOLOGY PROCEDURE N/A 5/21/2024    Procedure: Cardiac icd implant subq;  Surgeon: Urbano Maria MD;  [facility]: [facility];  Service: Cardiology    IR LOWER EXTREMITY ANGIOGRAM  1/18/2024    TX RMVL TRANSVNS PM ELTRD DUAL LEAD SYS N/A 2/20/2024    Procedure: EV ICD IMPLANTATION;  Surgeon: Abhilash Ferrera MD;  Location: [facility];  Service: Cardiac Surgery    TX RMVL TRANSVNS PM ELTRD DUAL LEAD SYS N/A 2/21/2024    Procedure: REMOVAL OF LEAD AND GENERATOR AND ATTEMPTED LEAD REVISION;  Surgeon: Abhilash Ferrera MD;  Location: [facility];  Service: Cardiac Surgery    WOUND DEBRIDEMENT Left 2/4/2024    Procedure: LEFT FOURTH TOE AMPUTATION SURGICAL WOUND DEBRIDEMENT FOOT/TOE  (WASH OUT);  Surgeon: Bebo Hopper DPM;  Location: [facility];  Service: Podiatry       SOCIAL & FAMILY HISTORY     Social History     Substance and Sexual Activity   Alcohol Use Not Currently       Social History     Substance and Sexual Activity   Drug Use Never     Social History     Tobacco Use   Smoking Status Former    Types: Cigarettes    Start date: 1/30/2024   Smokeless Tobacco Never     History reviewed. No pertinent family history.    LABORATORY DATA     Labs: I have personally reviewed pertinent reports.    Results from last 7 days   Lab Units 12/03/24  1001   WBC Thousand/uL 11.63*   HEMOGLOBIN g/dL 10.5*   HEMATOCRIT % 32.6*   PLATELETS Thousands/uL 292   SEGS PCT % 84*   MONO PCT % 6   EOS PCT % 1      Results from last 7 days   Lab Units 12/03/24  1001   POTASSIUM mmol/L 3.9   CHLORIDE mmol/L 108   CO2 mmol/L 23   BUN mg/dL 31*   CREATININE mg/dL 1.63*   CALCIUM mg/dL 8.5   ALK PHOS U/L 70   ALT U/L 12   AST U/L 14                          Micro:  Lab Results   Component Value Date    BLOODCX No Growth After 5 Days. 04/10/2024    BLOODCX No Growth After 5 Days. 04/10/2024    BLOODCX No Growth After 5 Days. 02/16/2024    BLOODCX No Growth After 5 Days. 02/16/2024    WOUNDCULT 3+ Growth of Staphylococcus aureus (A) 01/31/2024    WOUNDCULT 2+ Growth of Staphylococcus aureus (A) 01/31/2024    WOUNDCULT 3+ Growth of Staphylococcus aureus (A) 01/15/2024    WOUNDCULT 2+ Growth of 01/15/2024       IMAGING & DIAGNOSTIC TESTS     Imaging: I have personally reviewed pertinent reports.    XR chest 2 views  Result Date: 12/3/2024  Impression: Small area of atelectasis versus minimal infiltrate in the right lower lateral lung field. Mild prominence of the heart which is stable from prior. Workstation performed: [id number]       EKG, Pathology, and Other Studies: I have personally reviewed pertinent reports.     ALLERGIES     Allergies   Allergen Reactions    Vancomycin Rash     NOT AN ALLERGY - 1/31/24 patient  experienced vancomycin infusion reaction, please extend duration of infusion time to prevent future infusion reactions       MEDICATIONS PRIOR TO ARRIVAL     Prior to Admission medications    Medication Sig Start Date End Date Taking? Authorizing Provider   amiodarone 200 mg tablet Take 1 tablet (200 mg total) by mouth daily 10/9/24   Soledad Mari PA-C   aspirin 81 mg chewable tablet Chew 1 tablet (81 mg total) daily Do not start before March 2, 2024. 3/2/24   Gilbert Quigley MD   atorvastatin (LIPITOR) 80 mg tablet TAKE 1 TABLET BY MOUTH EVERY DAY AFTER DINNER 11/13/24   Nash Samuels DO   BD Pen Needle Micro U/F 32G X 6 MM MISC Inject under the skin in the morning 11/14/24   Beau Lemus MD   clopidogrel (PLAVIX) 75 mg tablet TAKE 1 TABLET BY MOUTH EVERY DAY 8/5/24   Nash Samuels DO   Empagliflozin (Jardiance) 10 MG TABS tablet Take 1 tablet (10 mg total) by mouth every morning 10/15/24   CARLA Plascencia   Insulin Glargine Solostar (Lantus SoloStar) 100 UNIT/ML SOPN Inject 0.3 mL (30 Units total) under the skin daily at bedtime 7/18/24   Beau Lemus MD   metFORMIN (GLUCOPHAGE) 1000 MG tablet Take 1,000 mg by mouth 8/21/24   Historical Provider, MD   metoprolol succinate (TOPROL-XL) 50 mg 24 hr tablet TAKE 1 TABLET BY MOUTH EVERY 12 HOURS 8/5/24   Nash Samuels DO   sacubitril-valsartan (Entresto) 49-51 MG TABS Take 1 tablet by mouth 2 (two) times a day    Historical Provider, MD   sacubitril-valsartan (Entresto)  MG TABS Take 1 tablet by mouth 2 (two) times a day  Patient not taking: Reported on 11/19/2024 11/11/24   CALRA Leahy   sitaGLIPtin-metFORMIN (JANUMET)  MG per tablet Take 1 tablet by mouth 2 (two) times a day with meals 6/4/24   Beau Lemus MD   glimepiride (AMARYL) 4 mg tablet Take 1 tablet by mouth daily NOT TAKING PER PCP  Patient not taking: Reported on 5/22/2023 2/27/15 6/2/23  Historical Provider, MD       MEDICATIONS ADMINISTERED IN LAST 24 HOURS     Medication  "Administration - last 24 hours from 12/02/2024 1514 to 12/03/2024 1514         Date/Time Order Dose Route Action Action by     12/03/2024 1001 EST albuterol inhalation solution 2.5 mg 2.5 mg Nebulization Given Nena BARTHOLOMEW Jere     12/03/2024 1001 EST ipratropium (ATROVENT) 0.02 % inhalation solution 0.5 mg 0.5 mg Nebulization Given Nena FLORIDA Oquendo     12/03/2024 1312 EST furosemide (LASIX) injection 40 mg 40 mg Intravenous Given Matthieu Roy RN     12/03/2024 1441 EST aspirin chewable tablet 81 mg 81 mg Oral Not Given Evelia Quezada RN            CURRENT MEDICATIONS     Current Facility-Administered Medications   Medication Dose Route Frequency Provider Last Rate    [START ON 12/4/2024] amiodarone  200 mg Oral Daily Mae Delatorre MD      aspirin  81 mg Oral Daily Mae Delatorre MD      atorvastatin  80 mg Oral After Dinner Mae Delatorre MD      clopidogrel  75 mg Oral Daily Mae Delatorre MD      [START ON 12/4/2024] Empagliflozin  10 mg Oral QAM Mae Delatorre MD      heparin (porcine)  5,000 Units Subcutaneous Q8H TEJAS Mae Delatorre MD      insulin glargine  24 Units Subcutaneous HS Mae Delatorre MD      insulin lispro  2-12 Units Subcutaneous TID AC Mae Delatorre MD      metoprolol succinate  50 mg Oral Q12H Mae Delatorre MD      pantoprazole  40 mg Oral HS Mae Delatorre MD      sacubitril-valsartan  1 tablet Oral BID Mae Delatorre MD               Portions of the record may have been created with voice recognition software.  Occasional wrong word or \"sound a like\" substitutions may have occurred due to the inherent limitations of voice recognition software.  Read the chart carefully and recognize, using context, where substitutions have occurred.   "

## 2024-12-03 NOTE — ASSESSMENT & PLAN NOTE
Follows cardiology as outpatient  Entresto recently increased dosage but have not started secondary to medicine not received in mail at this time

## 2024-12-03 NOTE — ASSESSMENT & PLAN NOTE
Hb/Hct of 10.5/32.6 with MCV of 91.  Baseline hemoglobin since February of this year 9-11, previously 14-16. Denies any epistaxis, hemoptysis, cuts/trauma, hematochezia, hematuria.  No iron panel, B12, folate level in chart.  Etiology most likely 2/2 chronic disease.  However other causes should be investigated.  For example, he has not had a colonoscopy.  Documented family history of colon cancer with mother and has been having episodes of nausea/vomiting (though not today).    Plan  -Trend CBC daily  -Transfuse for Hb <8 2/2 cardiac history  -Anemia workup in outpatient setting

## 2024-12-03 NOTE — ASSESSMENT & PLAN NOTE
Wt Readings from Last 3 Encounters:   12/04/24 119 kg (262 lb)   11/19/24 122 kg (270 lb)   11/11/24 121 kg (266 lb 1.6 oz)   Assessment:  -EF of 40% on 6/3/2024. Presents with worsening SOB, +~30 lb weight gain,, ~+7 lb since last week.  Measures weight weekly.  For GDMT, he is on Toprol-XL 50 mg BID, Jardiance 10 mg QD, Entrsto (49 or 97?) PTA, and was previously on torsemide but this was held in October 2/2 LINDEN he developed during his last admission. Follows with HF team.  S/p ICD placement (subcutaneous, did have transvenous ICD in the past but was removed 2/2 infection).   -BNP: 1899. CXR 12/3/2024: Mild prominence of the heart which is stable from prior.  Fortunately. His only symptom is shortness of breath at rest (he said not with exertion).  -Was seen by nephrology who felt he would be best served with dose of IV diuretics (40 mg IV Lasix) followed by restarting his torsemide the following day with discharge if he is doing well.      Plan:  -Admit to SOD C with telemetry  -Diuresis with IV Lasix, 40 mg, will be restarting torsemide at 10 mg QD tomorrow and discontinuing Lasix  -Will continue PTA Toprol-XL, 50 mg BID  -Continue Jardiance, 10 mg QD  -Continue Entresto, 49-51 mg QD, can take higher dose once discharged  -Strict I/O's  -Daily weights  -BMP, mag, Phos labs tomorrow  -Discussed nutrition referral once discharged to discuss proper diet, which he was very interested in  -BNP on Monday, 12/9/2024

## 2024-12-03 NOTE — SEPSIS NOTE
Sepsis Note   Nadir Myers 64 y.o. male MRN: [id number]  Unit/Bed#: ED 02 Encounter: [id number]              Body mass index is 36.42 kg/m².  Wt Readings from Last 1 Encounters:   12/03/24 122 kg (268 lb 8.3 oz)        Ideal body weight: 77.6 kg (171 lb 1.2 oz)  Adjusted ideal body weight: 95.3 kg (210 lb 0.9 oz)  Patient currently meets SIRS criteria with tachypnea and leukocytosis, CXR read showing possibility of left lower infiltrate.  Based on overall presentation with patient having stable hemodynamics and overall well-appearing at this time, suspect this is likely SIRS as opposed to sepsis.  Patient is likely tachypneic from underlying acute heart failure exacerbation and patient's current leukocytosis appears to be within baseline based on previous CBCs showing intermittent leukocytosis within similar range.  Will monitor off antibiotics and treat underlying issues.

## 2024-12-03 NOTE — ASSESSMENT & PLAN NOTE
"Lab Results   Component Value Date    HGBA1C 8.9 (H) 10/14/2024       No results for input(s): \"POCGLU\" in the last 72 hours.    Blood Sugar Average: Last 72 hrs:    Last A1c on 10/14/24: 8.9 (was 7.1 of 5/14/24 and 9.8 on 1/31/24) .On metformin, 30U Lantus QHS (PCP wanted this increased to 40U which he did not do) , and Janumet,  mg BID. During last hospitalization, glucose was within goal at 30U Lantus QHS and ISS.    Plan  -Lantus 24U QHS  -ISS  -Holding metformin, Janumet  -Goal glucose: 140-180  -Carb control diet  -Hypoglycemia protocol  -Nutrition referral when in outpatient setting  "

## 2024-12-03 NOTE — ASSESSMENT & PLAN NOTE
"Wt Readings from Last 3 Encounters:   12/03/24 122 kg (268 lb 8.3 oz)   11/19/24 122 kg (270 lb)   11/11/24 121 kg (266 lb 1.6 oz)     See \"Acute exacerbation of chronic heart failure\"      "

## 2024-12-03 NOTE — ASSESSMENT & PLAN NOTE
WBC of 11.63, 84% segmented neutrophils/9.67 absolute neutro.  No obvious sign of infection at this time.  Remains afebrile.    Plan  -White count returned to normal today  -Trend white count

## 2024-12-03 NOTE — PROGRESS NOTES
INTERNAL MEDICINE RESIDENCY SENIOR ADMISSION NOTE     Name: Nadir Myers   Age & Sex: 64 y.o. male   MRN: [id number]  Unit/Bed#: ED 02   Encounter: [id number]  Primary Care Provider: Beau Lemus MD    Admit to team: SOD Team C     Patient seen and examined. Reviewed H&P per Dr. Whalen . Agree with the assessment and plan with any exception/addition as noted below:    Principal Problem:    Acute exacerbation of chronic heart failure (HCC)  Active Problems:    GERD (gastroesophageal reflux disease)    Ischemic cardiomyopathy    V-tach (HCC)    Chronic systolic CHF (congestive heart failure) (HCC)    CAD (coronary artery disease)    PAD (peripheral artery disease) ([facility])    ICD (implantable cardioverter-defibrillator) in place    Type 2 diabetes mellitus with diabetic peripheral angiopathy without gangrene (HCC)    CKD stage 3a, GFR 45-59 ml/min (HCC)    Persistent proteinuria    Hepatitis C antibody test positive    Benign hypertension with CKD (chronic kidney disease) stage III ([facility])    Hyperlipemia    Anemia    Leukocytosis    -Acute on chronic HEFrEF:  Complaints of worsening of SOB, orthopnea with weight gain of 30 pound gain last 2 months  He was recently admitted with MSSA bacteremia and had LINDEN with incomplete recovery. His torsemide, spironolactone was discontinued 2/2 LINDEN and also Jardiance was held for 2 weeks most likely leading to acute exacerbation of HEFrEF  -On admission, BNP 1899, creatinine 1.63  -s/p IV Lasix 40 mg x1 time.  -Replete electrolytes as needed  -Daily weights and I/os  -Monitor on Telemetry  -Consult nephrology: As per their reccs, s/p 1 dose of Lasix    -Vtach:  S/p [employer] sq ICD placement on 05/22/2024  On Toprol-XL 50 mg BID, amiodarone 200 once daily    -CAD:   With TVD  With  of LAD and HANNA to mid circumflex 2/2015    -HEFrEF:   2/21/2024: EF 45%  Ischemic cardiomyopathy 2/2 TVD  On GDMT therapy: Toprol-xl, Entresto, spironolactone, Jardiance  Spironolactone  discontinued    -DM2:   Last A1C: 8.9 a month ago  Currently on Lantus 30 units at bedtime  Janumet 50-1000mg BID at home  We will dec his home lantus to 24 units and SSI    -CKD 3a:  -During last admission, patient had LINDEN with high creatinine over baseline at discharge, hence torsemide and spironolactone currently on hold   Baseline creatinine previously 1.2-1.3  Last creatinine 1.6  Follows with Dr Treadwell in office  -Nephrology on board  -Avoid nephrotoxic drugs  -Monitor BMP     -Afib:  On metoprolol and amiodarone    -PAD:  Continue home statin, Plavix, aspirin    -Anemia:  Baseline hgb around 12.  -More recently hgb around 10.5-11  -Can get iron panel, vitamin b12, folate   -Most likely related to anemia of chronic kidney disease.    -Hypertension:  Continue meds as mentioned above    -Positive Hep C antibody:    Code Status: Prior  Admission Status: INPATIENT   Disposition: Patient requires Med/Surg with Telemetry  Expected Length of Stay: >2 days

## 2024-12-03 NOTE — TELEPHONE ENCOUNTER
"Patient called in and stated he has been SOB for 2 days. It is worse when he is laying flat or on his side. It improves when he sits up. He is not able to speak in a complete sentence. No pulse ox in home. He has not been ill recently. Denies CP. Going to [facility].    Reason for Disposition   MODERATE difficulty breathing (e.g., speaks in phrases, SOB even at rest, pulse 100-120) of new-onset or worse than normal    Answer Assessment - Initial Assessment Questions  1. RESPIRATORY STATUS: \"Describe your breathing?\" (e.g., wheezing, shortness of breath, unable to speak, severe coughing)       SOB, speaking in short sentences  2. ONSET: \"When did this breathing problem begin?\"       2 days  3. PATTERN \"Does the difficult breathing come and go, or has it been constant since it started?\"       Comes and goes, worse when laying down  4. SEVERITY: \"How bad is your breathing?\" (e.g., mild, moderate, severe)       moderate  5. RECURRENT SYMPTOM: \"Have you had difficulty breathing before?\" If Yes, ask: \"When was the last time?\" and \"What happened that time?\"       no  6. CARDIAC HISTORY: \"Do you have any history of heart disease?\" (e.g., heart attack, angina, bypass surgery, angioplasty)       Yes-CHF, Pacer/defibrillator   7. LUNG HISTORY: \"Do you have any history of lung disease?\"  (e.g., pulmonary embolus, asthma, emphysema)      no  8. CAUSE: \"What do you think is causing the breathing problem?\"       no  9. OTHER SYMPTOMS: \"Do you have any other symptoms?\" (e.g., chest pain, cough, dizziness, fever, runny nose)      no  10. O2 SATURATION MONITOR:  \"Do you use an oxygen saturation monitor (pulse oximeter) at home?\" If Yes, ask: \"What is your reading (oxygen level) today?\" \"What is your usual oxygen saturation reading?\" (e.g., 95%)        no    Protocols used: Breathing Difficulty-Adult-OH    "

## 2024-12-03 NOTE — ASSESSMENT & PLAN NOTE
Etiology: Suspect secondary  to uncontrolled diabetes most recent A1c 10.9.    Most recent UPCR 5/14/2024 6.61.    Serologies during last hospitalizatin:  Serum immunofixation showing IgG G lambda.  Hep B panel normal.  GBM negative.  C3 normal with elevated C4.  Complements normal.  BARBARA, ANCA, anti-DNA double-stranded antibody negative.  Referred to hematology with last office appointment and has an upcoming appointment for IgG evaluation on 12/5/2024

## 2024-12-04 VITALS
WEIGHT: 262 LBS | DIASTOLIC BLOOD PRESSURE: 71 MMHG | RESPIRATION RATE: 16 BRPM | HEART RATE: 71 BPM | TEMPERATURE: 97.6 F | OXYGEN SATURATION: 95 % | HEIGHT: 72 IN | BODY MASS INDEX: 35.49 KG/M2 | SYSTOLIC BLOOD PRESSURE: 135 MMHG

## 2024-12-04 LAB
ANION GAP SERPL CALCULATED.3IONS-SCNC: 8 MMOL/L (ref 4–13)
BUN SERPL-MCNC: 33 MG/DL (ref 5–25)
CALCIUM SERPL-MCNC: 8.2 MG/DL (ref 8.4–10.2)
CHLORIDE SERPL-SCNC: 107 MMOL/L (ref 96–108)
CO2 SERPL-SCNC: 25 MMOL/L (ref 21–32)
CREAT SERPL-MCNC: 1.79 MG/DL (ref 0.6–1.3)
ERYTHROCYTE [DISTWIDTH] IN BLOOD BY AUTOMATED COUNT: 13.5 % (ref 11.6–15.1)
GFR SERPL CREATININE-BSD FRML MDRD: 39 ML/MIN/1.73SQ M
GLUCOSE SERPL-MCNC: 154 MG/DL (ref 65–140)
GLUCOSE SERPL-MCNC: 200 MG/DL (ref 65–140)
GLUCOSE SERPL-MCNC: 202 MG/DL (ref 65–140)
HCT VFR BLD AUTO: 30.2 % (ref 36.5–49.3)
HGB BLD-MCNC: 9.7 G/DL (ref 12–17)
MAGNESIUM SERPL-MCNC: 1.8 MG/DL (ref 1.9–2.7)
MCH RBC QN AUTO: 29 PG (ref 26.8–34.3)
MCHC RBC AUTO-ENTMCNC: 32.1 G/DL (ref 31.4–37.4)
MCV RBC AUTO: 90 FL (ref 82–98)
PHOSPHATE SERPL-MCNC: 4.8 MG/DL (ref 2.3–4.1)
PLATELET # BLD AUTO: 273 THOUSANDS/UL (ref 149–390)
PMV BLD AUTO: 9.7 FL (ref 8.9–12.7)
POTASSIUM SERPL-SCNC: 3.6 MMOL/L (ref 3.5–5.3)
RBC # BLD AUTO: 3.34 MILLION/UL (ref 3.88–5.62)
SODIUM SERPL-SCNC: 140 MMOL/L (ref 135–147)
WBC # BLD AUTO: 9.68 THOUSAND/UL (ref 4.31–10.16)

## 2024-12-04 PROCEDURE — 82948 REAGENT STRIP/BLOOD GLUCOSE: CPT

## 2024-12-04 PROCEDURE — 97166 OT EVAL MOD COMPLEX 45 MIN: CPT

## 2024-12-04 PROCEDURE — 84100 ASSAY OF PHOSPHORUS: CPT

## 2024-12-04 PROCEDURE — 80048 BASIC METABOLIC PNL TOTAL CA: CPT | Performed by: NURSE PRACTITIONER

## 2024-12-04 PROCEDURE — 99232 SBSQ HOSP IP/OBS MODERATE 35: CPT | Performed by: INTERNAL MEDICINE

## 2024-12-04 PROCEDURE — 99238 HOSP IP/OBS DSCHRG MGMT 30/<: CPT | Performed by: INTERNAL MEDICINE

## 2024-12-04 PROCEDURE — 99223 1ST HOSP IP/OBS HIGH 75: CPT | Performed by: INTERNAL MEDICINE

## 2024-12-04 PROCEDURE — 85027 COMPLETE CBC AUTOMATED: CPT

## 2024-12-04 PROCEDURE — 83735 ASSAY OF MAGNESIUM: CPT | Performed by: NURSE PRACTITIONER

## 2024-12-04 PROCEDURE — 97162 PT EVAL MOD COMPLEX 30 MIN: CPT

## 2024-12-04 RX ORDER — INSULIN LISPRO 100 [IU]/ML
4-20 INJECTION, SOLUTION INTRAVENOUS; SUBCUTANEOUS
Status: DISCONTINUED | OUTPATIENT
Start: 2024-12-04 | End: 2024-12-04 | Stop reason: HOSPADM

## 2024-12-04 RX ORDER — MAGNESIUM SULFATE HEPTAHYDRATE 40 MG/ML
2 INJECTION, SOLUTION INTRAVENOUS ONCE
Status: COMPLETED | OUTPATIENT
Start: 2024-12-04 | End: 2024-12-04

## 2024-12-04 RX ORDER — TORSEMIDE 10 MG/1
10 TABLET ORAL DAILY
Status: DISCONTINUED | OUTPATIENT
Start: 2024-12-05 | End: 2024-12-04 | Stop reason: HOSPADM

## 2024-12-04 RX ORDER — TORSEMIDE 10 MG/1
20 TABLET ORAL DAILY
Qty: 60 TABLET | Refills: 3 | Status: SHIPPED | OUTPATIENT
Start: 2024-12-05 | End: 2024-12-12

## 2024-12-04 RX ORDER — POTASSIUM CHLORIDE 1500 MG/1
40 TABLET, EXTENDED RELEASE ORAL ONCE
Status: COMPLETED | OUTPATIENT
Start: 2024-12-04 | End: 2024-12-04

## 2024-12-04 RX ORDER — PANTOPRAZOLE SODIUM 40 MG/1
40 TABLET, DELAYED RELEASE ORAL
Qty: 30 TABLET | Refills: 0 | Status: SHIPPED | OUTPATIENT
Start: 2024-12-04

## 2024-12-04 RX ADMIN — Medication 500 MG: at 10:00

## 2024-12-04 RX ADMIN — AMIODARONE HYDROCHLORIDE 200 MG: 200 TABLET ORAL at 09:33

## 2024-12-04 RX ADMIN — FUROSEMIDE 40 MG: 10 INJECTION, SOLUTION INTRAMUSCULAR; INTRAVENOUS at 09:34

## 2024-12-04 RX ADMIN — SACUBITRIL AND VALSARTAN 1 TABLET: 49; 51 TABLET, FILM COATED ORAL at 11:33

## 2024-12-04 RX ADMIN — INSULIN LISPRO 4 UNITS: 100 INJECTION, SOLUTION INTRAVENOUS; SUBCUTANEOUS at 11:34

## 2024-12-04 RX ADMIN — POTASSIUM CHLORIDE 40 MEQ: 1500 TABLET, EXTENDED RELEASE ORAL at 12:09

## 2024-12-04 RX ADMIN — EMPAGLIFLOZIN 10 MG: 10 TABLET, FILM COATED ORAL at 09:35

## 2024-12-04 RX ADMIN — METOPROLOL SUCCINATE 50 MG: 50 TABLET, EXTENDED RELEASE ORAL at 09:42

## 2024-12-04 RX ADMIN — HEPARIN SODIUM 5000 UNITS: 5000 INJECTION, SOLUTION INTRAVENOUS; SUBCUTANEOUS at 05:30

## 2024-12-04 RX ADMIN — ASPIRIN 81 MG CHEWABLE TABLET 81 MG: 81 TABLET CHEWABLE at 09:33

## 2024-12-04 RX ADMIN — MAGNESIUM SULFATE HEPTAHYDRATE 2 G: 40 INJECTION, SOLUTION INTRAVENOUS at 09:41

## 2024-12-04 RX ADMIN — INSULIN LISPRO 4 UNITS: 100 INJECTION, SOLUTION INTRAVENOUS; SUBCUTANEOUS at 09:39

## 2024-12-04 RX ADMIN — CLOPIDOGREL BISULFATE 75 MG: 75 TABLET ORAL at 09:35

## 2024-12-04 NOTE — ASSESSMENT & PLAN NOTE
Lab Results   Component Value Date    HGBA1C 8.9 (H) 10/14/2024       Recent Labs     12/03/24  1815 12/03/24  2202 12/04/24  0938 12/04/24  1136   POCGLU 259* 164* 200* 202*         Blood Sugar Average: Last 72 hrs:  (P) 206.25  Assessment:  Last A1c on 10/14/24: 8.9 (was 7.1 of 5/14/24 and 9.8 on 1/31/24) .On metformin, 30U Lantus QHS (PCP wanted this increased to 40U which he did not do) , and Janumet,  mg BID. During last hospitalization, glucose was within goal at 30U Lantus QHS and ISS.    Plan:  -Lantus 24U QHS  -ISS  -Holding metformin, sitagliptin-metformin  -Can continue Entresto  -Goal glucose: 140-180  -Carb control diet  -Hypoglycemia protocol  -Nutrition referral when in outpatient setting

## 2024-12-04 NOTE — PROGRESS NOTES
Progress Note - Nephrology   Name: Nadir Myers 64 y.o. male I MRN: [id number]  Unit/Bed#: [facility]PHP 428-01 I Date of Admission: 12/3/2024   Date of Service: 12/4/2024 I Hospital Day: 1    Assessment & Plan  CKD stage 3a, GFR 45-59 ml/min ([facility])  Lab Results   Component Value Date    EGFR 39 12/04/2024    EGFR 44 12/03/2024    EGFR 44 10/24/2024    CREATININE 1.79 (H) 12/04/2024    CREATININE 1.63 (H) 12/03/2024    CREATININE 1.61 (H) 10/24/2024   Creatinine above baseline but overall acceptable.  Can follow-up with Dr. Treadwell as an outpatient has had a negative fluid balance while in the hospital  Benign hypertension with CKD (chronic kidney disease) stage III ([facility])  Blood pressure 140s to 160s  Home blood pressure medications include Toprol XL 50 mg every 12 hours, Entresto 49-51 mg tablets-was to start higher dose once medication received in mail  Avoid hypotension of pertubation's of blood pressure    Chronic HFimpEF, stage C, NYHA II  Wt Readings from Last 3 Encounters:   12/04/24 119 kg (262 lb)   11/19/24 122 kg (270 lb)   11/11/24 121 kg (266 lb 1.6 oz)   Continue goal-directed medical therapies  Appreciate SOD and heart failure management  Persistent proteinuria  Suspected secondary to diabetic glomerulosclerosis serologic workup was essentially negative  Type 2 diabetes mellitus with diabetic peripheral angiopathy without gangrene ([facility])    Lab Results   Component Value Date    HGBA1C 8.9 (H) 10/14/2024   Management per primary team  Ischemic cardiomyopathy  Follows cardiology as outpatient  Entresto recently increased dosage but have not started secondary to medicine not received in mail at this time  ICD (implantable cardioverter-defibrillator) in place  EP as outpatient  On amiodarone     Please contact the SecureChat role for the Nephrology service with any questions/concerns.    Subjective     So far concerns patient was seen today, feeling much better after diuresis, no acute fevers chills no nausea  vomiting    Objective :  Temp:  [97.6 °F (36.4 °C)-98.7 °F (37.1 °C)] 97.6 °F (36.4 °C)  HR:  [70-82] 71  BP: (110-165)/(64-83) 135/71  Resp:  [16-20] 16  SpO2:  [93 %-98 %] 95 %  O2 Device: None (Room air)  Nasal Cannula O2 Flow Rate (L/min):  [2 L/min] 2 L/min    Current Weight: Weight - Scale: 119 kg (262 lb)  First Weight: Weight - Scale: 122 kg (268 lb 8.3 oz)  I/O         12/02 0701  12/03 0700 12/03 0701  12/04 0700 12/04 0701  12/05 0700    P.O.  375 480    I.V. (mL/kg)   10 (0.1)    Total Intake(mL/kg)  375 (3.2) 490 (4.1)    Urine (mL/kg/hr)  3550 500 (0.5)    Total Output  3550 500    Net  -3175 -10                 Physical Exam  Vitals and nursing note reviewed.   Constitutional:       General: He is not in acute distress.     Appearance: He is well-developed.   HENT:      Head: Normocephalic and atraumatic.   Eyes:      Conjunctiva/sclera: Conjunctivae normal.   Cardiovascular:      Rate and Rhythm: Normal rate and regular rhythm.      Heart sounds: No murmur heard.  Pulmonary:      Effort: Pulmonary effort is normal. No respiratory distress.      Breath sounds: Normal breath sounds.   Abdominal:      Palpations: Abdomen is soft.      Tenderness: There is no abdominal tenderness.   Musculoskeletal:         General: No swelling.      Cervical back: Neck supple.   Skin:     General: Skin is warm and dry.      Capillary Refill: Capillary refill takes less than 2 seconds.   Neurological:      Mental Status: He is alert.   Psychiatric:         Mood and Affect: Mood normal.       Medications:    Current Facility-Administered Medications:     amiodarone tablet 200 mg, 200 mg, Oral, Daily, Mae Delatorre MD, 200 mg at 12/04/24 0933    aspirin chewable tablet 81 mg, 81 mg, Oral, Daily, Mae Delatorre MD, 81 mg at 12/04/24 0933    atorvastatin (LIPITOR) tablet 80 mg, 80 mg, Oral, After Dinner, Mae Delatorre MD, 80 mg at 12/03/24 1816    clopidogrel (PLAVIX) tablet 75 mg, 75 mg, Oral, Daily, Mae Delatorre MD, 75  mg at 12/04/24 0935    Empagliflozin (JARDIANCE) tablet 10 mg, 10 mg, Oral, QAM, Mae Delatorre MD, 10 mg at 12/04/24 0935    furosemide (LASIX) injection 40 mg, 40 mg, Intravenous, BID (diuretic), Estiven Manzo MD, 40 mg at 12/04/24 0934    heparin (porcine) subcutaneous injection 5,000 Units, 5,000 Units, Subcutaneous, Q8H TEJAS, 5,000 Units at 12/04/24 0530 **AND** [CANCELED] Platelet count, , , Once, Mae Delatorre MD    insulin glargine (LANTUS) subcutaneous injection 24 Units 0.24 mL, 24 Units, Subcutaneous, HS, Mae Delatorre MD, 24 Units at 12/03/24 2203    insulin lispro (HumALOG/ADMELOG) 100 units/mL subcutaneous injection 4-20 Units, 4-20 Units, Subcutaneous, TID AC **AND** Fingerstick Glucose (POCT), , , TID AC, Miguel Owens,     insulin lispro (HumALOG/ADMELOG) 100 units/mL subcutaneous injection 4-20 Units, 4-20 Units, Subcutaneous, HS, Miguel Owens,     metoprolol succinate (TOPROL-XL) 24 hr tablet 50 mg, 50 mg, Oral, Q12H, Mae Delatorre MD, 50 mg at 12/04/24 0942    pantoprazole (PROTONIX) EC tablet 40 mg, 40 mg, Oral, HS, Mae Delatorre MD, 40 mg at 12/03/24 2131    sacubitril-valsartan (ENTRESTO) 49-51 MG per tablet 1 tablet, 1 tablet, Oral, BID, Mae Delatorre MD, 1 tablet at 12/04/24 1133    [START ON 12/5/2024] torsemide (DEMADEX) tablet 10 mg, 10 mg, Oral, Daily, Estiven Manzo MD      Lab Results: I have reviewed the following results:  Results from last 7 days   Lab Units 12/04/24  0445 12/03/24  1001   WBC Thousand/uL 9.68 11.63*   HEMOGLOBIN g/dL 9.7* 10.5*   HEMATOCRIT % 30.2* 32.6*   PLATELETS Thousands/uL 273 292   POTASSIUM mmol/L 3.6 3.9   CHLORIDE mmol/L 107 108   CO2 mmol/L 25 23   BUN mg/dL 33* 31*   CREATININE mg/dL 1.79* 1.63*   CALCIUM mg/dL 8.2* 8.5   MAGNESIUM mg/dL 1.8*  --    PHOSPHORUS mg/dL 4.8*  --    ALBUMIN g/dL  --  3.3*       Administrative Statements     Portions of the record may have been created with voice recognition software. Occasional wrong word  "or \"sound a like\" substitutions may have occurred due to the inherent limitations of voice recognition software. Read the chart carefully and recognize, using context, where substitutions have occurred.If you have any questions, please contact the dictating provider.  "

## 2024-12-04 NOTE — ASSESSMENT & PLAN NOTE
Assessment and plan:  -Placed after episode of V-fib  -Monitored on telemetry and patient, no events detected

## 2024-12-04 NOTE — ASSESSMENT & PLAN NOTE
Assessment and plan:  -Hepatitis C antibody positive, RNA not detected  -Continue monitoring hepatic function

## 2024-12-04 NOTE — ASSESSMENT & PLAN NOTE
Patient presented with worsening shortness of breath.  Has not been on any home diuretics for Roughly 1 Month.  Patient cannot identify any triggering event.  Denies any sick contacts or recent illnesses.  Denies any dietary indiscretions.    Patient  has put out 3.1 L after 2 doses of IV Lasix over the last 2 days.    Examines euvolemic on exam    Plan:  Stable to go home from heart failure standpoint  Agree with restarting patient's home torsemide to 20 mg daily  He should continue his home GDMT (metoprolol succinate, Jardiance, and Entresto)  He has received his correct dosage of Entresto ( mg) from patient assist, will start taking this dosage starting tomorrow.  He will have a follow-up appointment scheduled with heart failure.

## 2024-12-04 NOTE — ASSESSMENT & PLAN NOTE
Wt Readings from Last 3 Encounters:   12/04/24 119 kg (262 lb)   11/19/24 122 kg (270 lb)   11/11/24 121 kg (266 lb 1.6 oz)   Assessment:  -EF of 40% on 6/3/2024. Presents with worsening SOB, +~30 lb weight gain,, ~+7 lb since last week.  Measures weight weekly.  For GDMT, he is on Toprol-XL 50 mg BID, Jardiance 10 mg QD, Entrsto (49 or 97?) PTA, and was previously on torsemide but this was held in October 2/2 LINDEN he developed during his last admission. Follows with HF team.  S/p ICD placement (subcutaneous, did have transvenous ICD in the past but was removed 2/2 infection).   -BNP: 1899. CXR 12/3/2024: Mild prominence of the heart which is stable from prior.  Fortunately. His only symptom is shortness of breath at rest (he said not with exertion).  -Was seen by nephrology who felt he would be best served with dose of IV diuretics (40 mg IV Lasix) followed by restarting his torsemide the following day with discharge if he is doing well.      Plan:  -Admit to SOD C with telemetry  -Diuresis with IV Lasix, 40 mg, will be restarting torsemide at 20 mg QD tomorrow per HF and discontinuing Lasix  -Will continue PTA Toprol-XL, 50 mg BID  -Continue Jardiance, 10 mg QD  -Continue Entresto, 49-51 mg QD, can take higher dose once discharged  -Strict I/O's  -Daily weights  -BMP, mag, Phos labs tomorrow  -Discussed nutrition referral once discharged to discuss proper diet, which he was very interested in  -BNP on Monday, 12/9/2024

## 2024-12-04 NOTE — DISCHARGE SUMMARY
Discharge Summary - Internal Medicine   Name: Nadir Myers 64 y.o. male I MRN: [id number]  Unit/Bed#: [facility] 428-01 I Date of Admission: 12/3/2024   Date of Service: 12/4/2024 I Hospital Day: 1  { ?Quick Links I Problem List I PORCH I Billing Tip:99113}          INTERNAL MEDICINE RESIDENCY DISCHARGE SUMMARY     Nadir Myers   64 y.o. male  MRN: [id number]  Room/Bed: [facility] 428/[facility] 428-01     [facility] 4 MED SURG/SD   Encounter: [id number]    Principal Problem:    Acute exacerbation of chronic heart failure (HCC)  Active Problems:    GERD (gastroesophageal reflux disease)    Ischemic cardiomyopathy    V-tach (HCC)    Chronic systolic CHF (congestive heart failure) (HCC)    CAD (coronary artery disease)    PAD (peripheral artery disease) (HCC)    ICD (implantable cardioverter-defibrillator) in place    Type 2 diabetes mellitus with diabetic peripheral angiopathy without gangrene (HCC)    CKD stage 3a, GFR 45-59 ml/min (HCC)    Persistent proteinuria    Hepatitis C antibody test positive    Benign hypertension with CKD (chronic kidney disease) stage III (HCC)    Hyperlipemia    Anemia    Leukocytosis      Leukocytosis  Assessment & Plan  WBC of 11.63, 84% segmented neutrophils/9.67 absolute neutro.  No obvious sign of infection at this time.  Remains afebrile.    Plan  -White count returned to normal today  -Trend white count    Anemia  Assessment & Plan  Hb/Hct of 10.5/32.6 with MCV of 91.  Baseline hemoglobin since February of this year 9-11, previously 14-16. Denies any epistaxis, hemoptysis, cuts/trauma, hematochezia, hematuria.  No iron panel, B12, folate level in chart.  Etiology most likely 2/2 chronic disease.  However other causes should be investigated.  For example, he has not had a colonoscopy.  Documented family history of colon cancer with mother and has been having episodes of nausea/vomiting (though not today).    Plan  -Trend CBC daily  -Transfuse for  Hb <8 2/2 cardiac history  -Anemia workup in outpatient setting    Hyperlipemia  Assessment & Plan  Hx HLD and does have CAD with hx of MI s/p PCI in 2015. Most recent lipid panel on 10/14/2024: Cholesterol: 144, Triglycerides: 194, HDL: 36, LDL: 69. LDL not at goal of <55 (secondary prevention 2/2 hx MI, CAD and T2DM). On atorvastatin 80 mg PTA.     Plan  -Continue atorvastatin, 80 mg QD  -Consider starting ezetimibe, 10 mg QD for better control of LDL    Benign hypertension with CKD (chronic kidney disease) stage III (HCC)  Assessment & Plan  Lab Results   Component Value Date    EGFR 39 12/04/2024    EGFR 44 12/03/2024    EGFR 44 10/24/2024    CREATININE 1.79 (H) 12/04/2024    CREATININE 1.63 (H) 12/03/2024    CREATININE 1.61 (H) 10/24/2024     Initial BP of 164/80, 167/89.  Improved to 144/80, prior to receiving Lasix.  Believe blood pressure will improve as he gets diuresed.  Will continue to monitor      Plan  -IV Lasix today, torsemide tomorrow    Hepatitis C antibody test positive  Assessment & Plan  Assessment and plan:  -Hepatitis C antibody positive, RNA not detected  -Continue monitoring hepatic function    Persistent proteinuria  Assessment & Plan  Assessment and plan:  -Continue diabetic management    CKD stage 3a, GFR 45-59 ml/min ([facility])  Assessment & Plan  Lab Results   Component Value Date    EGFR 39 12/04/2024    EGFR 44 12/03/2024    EGFR 44 10/24/2024    CREATININE 1.79 (H) 12/04/2024    CREATININE 1.63 (H) 12/03/2024    CREATININE 1.61 (H) 10/24/2024   Assessment:  Hx Stage IIIA CKD with prior baseline ~1.2-1.3). Over the last couple of months though, his Cr has jumped to around 1.7-1.9. This limited what meds he could take for his other co-morbilities.  While it sometimes takes months for creatinine to return to baseline, there is a risk that this is his new baseline.  Not attempted to call this an LINDEN as a result.  Was seen by nephrology during his hospitalization and after discharge. During  "his last visit with nephrology on 10/15/24, he got the go-ahead to restart Jardiance.  Today, he was evaluated by nephrology who recommended IV diuresis with plan to restart torsemide tomorrow.      Plan:  -Avoid nephrotoxic agents, prolonged hypotension  -Trend BMP daily      Type 2 diabetes mellitus with diabetic peripheral angiopathy without gangrene ([facility])  Assessment & Plan  Lab Results   Component Value Date    HGBA1C 8.9 (H) 10/14/2024       Recent Labs     12/03/24  1815 12/03/24  2202   POCGLU 259* 164*       Blood Sugar Average: Last 72 hrs:  (P) 211.5  Assessment:  Last A1c on 10/14/24: 8.9 (was 7.1 of 5/14/24 and 9.8 on 1/31/24) .On metformin, 30U Lantus QHS (PCP wanted this increased to 40U which he did not do) , and Janumet,  mg BID. During last hospitalization, glucose was within goal at 30U Lantus QHS and ISS.    Plan:  -Lantus 24U QHS  -ISS  -Holding metformin, sitagliptin-metformin  -Can continue Entresto  -Goal glucose: 140-180  -Carb control diet  -Hypoglycemia protocol  -Nutrition referral when in outpatient setting    ICD (implantable cardioverter-defibrillator) in place  Assessment & Plan  Assessment and plan:  -Placed after episode of V-fib  -Monitored on telemetry and patient, no events detected    PAD (peripheral artery disease) ([facility])  Assessment & Plan  Assessment and plan:  -Continue aspirin, Plavix, statin    CAD (coronary artery disease)  Assessment & Plan  Hx MI s/p cardiac cath (which showed  of LAD) and PCI (mid-Lcx) in 2/2015). Currently on Aspirin 81 mg, Plavix 75 mg, and atorvastatin 80 mg.  Currently denies any chest pain.  No signs of ischemia on EKG.    Plan  -Continue ASA, 81 mg QD  -Continue Plavix, 75 mg QD  -Continue atorvastatin, 80 mg QD    Chronic systolic CHF (congestive heart failure) ([facility])  Assessment & Plan  Wt Readings from Last 3 Encounters:   12/04/24 119 kg (262 lb)   11/19/24 122 kg (270 lb)   11/11/24 121 kg (266 lb 1.6 oz)     See \"Acute exacerbation " "of chronic heart failure\"        V-tach (HCC)  Assessment & Plan  Etiology most likely scar mediated following MI. S/p ICD (multiple have been placed, most recently on 5/22/24). Currently on Toprol XL, amiodarone, 200 mg QD. Follows with EP. Was due to start Class III antiarrhytmic (e.g., dofetilide, sotolol), in fact it was the purpose of his admission on 10/7/2024, but did not 2/2 his poor kidney function.  No new concerning arrhythmia on EKG.  ICD interrogated in ED.    Plan  -Place on telemetry  -Continue Amiodarone, 200 mg QD  -Continue Toprol-XL    Ischemic cardiomyopathy  Assessment & Plan  See plan under CAD    GERD (gastroesophageal reflux disease)  Assessment & Plan  History of GERD, complaining of 1 day history of burning abdominal pain.  Symptoms appear consistent with GERD, will start Protonix.    Plan  -Start pantoprazole, 40 mg QHS    * Acute exacerbation of chronic heart failure (HCC)  Assessment & Plan  Wt Readings from Last 3 Encounters:   12/04/24 119 kg (262 lb)   11/19/24 122 kg (270 lb)   11/11/24 121 kg (266 lb 1.6 oz)   Assessment:  -EF of 40% on 6/3/2024. Presents with worsening SOB, +~30 lb weight gain,, ~+7 lb since last week.  Measures weight weekly.  For GDMT, he is on Toprol-XL 50 mg BID, Jardiance 10 mg QD, Entrsto (49 or 97?) PTA, and was previously on torsemide but this was held in October 2/2 LINDEN he developed during his last admission. Follows with HF team.  S/p ICD placement (subcutaneous, did have transvenous ICD in the past but was removed 2/2 infection).   -BNP: 1899. CXR 12/3/2024: Mild prominence of the heart which is stable from prior.  Fortunately. His only symptom is shortness of breath at rest (he said not with exertion).  -Was seen by nephrology who felt he would be best served with dose of IV diuretics (40 mg IV Lasix) followed by restarting his torsemide the following day with discharge if he is doing well.      Plan:  -Admit to SOD C with telemetry  -Diuresis with IV " Lasix, 40 mg, consider restarting torsemide at 10 mg QD tomorrow  -Will continue PTA Toprol-XL, 50 mg BID  -Continue Jardiance, 10 mg QD  -Continue Entresto, 49-51 mg QD, can take higher dose once discharged  -Strict I/O's  -Daily weights  -BMP, mag, Phos labs tomorrow  -Discussed nutrition referral once discharged to discuss proper diet, which he was very interested in.            Subjective: Today the patient reports feeling well.  He has been able to ambulate without getting short of breath.  He denies any shortness of breath at rest, heart palpitations, chest pain, nausea or vomiting, lightheadedness, dizziness, new joint pain or swelling.    Physical Exam  Constitutional:       General: He is not in acute distress.     Appearance: He is obese.   Neck:      Comments: No jugular venous distention  Cardiovascular:      Rate and Rhythm: Normal rate and regular rhythm.      Heart sounds: No murmur heard.  Pulmonary:      Effort: Pulmonary effort is normal.      Breath sounds: No rales.      Comments: Diminished breath sounds in the left lung base  Abdominal:      General: Bowel sounds are normal. There is no distension.      Palpations: Abdomen is soft.   Musculoskeletal:      Right lower leg: No edema.      Left lower leg: No edema.   Skin:     General: Skin is warm and dry.      Capillary Refill: Capillary refill takes less than 2 seconds.      Coloration: Skin is not jaundiced or pale.   Neurological:      Mental Status: He is alert and oriented to person, place, and time.        DETAILS OF HOSPITAL COURSE         DISCHARGE INFORMATION     PCP at Discharge: Beau Lemus MD      Admitting Provider: Beau Lemus MD  Admission Date: 12/3/2024    Discharge Provider: Beau Lemus MD  Discharge Date: 12/4/2024    Discharge Disposition: Home/Self Care  Discharge Condition: stable  Discharge with Lines: no    Discharge Diet: cardiac diet  Activity Restrictions: As tolerated  Test Results Pending at Discharge:  N/A    Discharge Diagnoses:  Principal Problem:    Acute exacerbation of chronic heart failure (HCC)  Active Problems:    GERD (gastroesophageal reflux disease)    Ischemic cardiomyopathy    V-tach (HCC)    Chronic systolic CHF (congestive heart failure) (HCC)    CAD (coronary artery disease)    PAD (peripheral artery disease) (HCC)    ICD (implantable cardioverter-defibrillator) in place    Type 2 diabetes mellitus with diabetic peripheral angiopathy without gangrene (HCC)    CKD stage 3a, GFR 45-59 ml/min (HCC)    Persistent proteinuria    Hepatitis C antibody test positive    Benign hypertension with CKD (chronic kidney disease) stage III (HCC)    Hyperlipemia    Anemia    Leukocytosis  Resolved Problems:    * No resolved hospital problems. *      Consulting Providers:  -CARLA Cagle ([facility] [place]) 12/11/2024  -Diana M Jaiyeola, MD ([facility] [place]) 12/26/2024  -CARLA Leahy ([facility] [place] ) 1/6/2025  -Nash Samuels DO ([facility] [place] ) 1/9/2025   -Collin Gonzalez PA-C ([facility] [place]) 2/6/2025  -Ludwin Treadwell MD ([facility] [place]) 4/15/2025  -Neno Pardo DPM ([facility] [place] ) 5/19/2025  -Collin Gonzalez PA-C ([facility] [place]) 5/23/2025      Diagnostic & Therapeutic Procedures Performed:  XR chest 2 views  Result Date: 12/3/2024  Impression: Small area of atelectasis versus minimal infiltrate in the right lower lateral lung field. Mild prominence of the heart which is stable from prior. Workstation performed: [id number]       Code Status: Level 1 - Full Code  Advance Directive & Living Will: <no information>  Power of :    POLST:      Medications:  Current Discharge Medication List        Current Discharge Medication List        Current Discharge Medication List        CONTINUE these medications which have NOT CHANGED    Details   amiodarone 200 mg tablet Take 1 tablet (200 mg total) by  mouth daily  Qty: 90 tablet, Refills: 2    Associated Diagnoses: V-tach ([facility])      aspirin 81 mg chewable tablet Chew 1 tablet (81 mg total) daily Do not start before March 2, 2024.  Qty: 30 tablet, Refills: 0    Associated Diagnoses: PAD (peripheral artery disease) ([facility])      atorvastatin (LIPITOR) 80 mg tablet TAKE 1 TABLET BY MOUTH EVERY DAY AFTER DINNER  Qty: 90 tablet, Refills: 1    Associated Diagnoses: 3-vessel coronary artery disease      clopidogrel (PLAVIX) 75 mg tablet TAKE 1 TABLET BY MOUTH EVERY DAY  Qty: 100 tablet, Refills: 1    Associated Diagnoses: Foot ulcer ([facility])      Empagliflozin (Jardiance) 10 MG TABS tablet Take 1 tablet (10 mg total) by mouth every morning  Qty: 90 tablet, Refills: 3    Associated Diagnoses: Type 2 diabetes mellitus with foot ulcer, with long-term current use of insulin ([facility])      Insulin Glargine Solostar (Lantus SoloStar) 100 UNIT/ML SOPN Inject 0.3 mL (30 Units total) under the skin daily at bedtime  Qty: 15 mL, Refills: 5    Associated Diagnoses: Type 2 diabetes mellitus with other specified complication, with long-term current use of insulin ([facility])      metFORMIN (GLUCOPHAGE) 1000 MG tablet Take 1,000 mg by mouth      metoprolol succinate (TOPROL-XL) 50 mg 24 hr tablet TAKE 1 TABLET BY MOUTH EVERY 12 HOURS  Qty: 180 tablet, Refills: 1    Associated Diagnoses: ICD (implantable cardioverter-defibrillator) in place      sacubitril-valsartan (Entresto) 49-51 MG TABS Take 1 tablet by mouth 2 (two) times a day      sitaGLIPtin-metFORMIN (JANUMET)  MG per tablet Take 1 tablet by mouth 2 (two) times a day with meals  Qty: 180 tablet, Refills: 3    Associated Diagnoses: Type 2 diabetes mellitus with other specified complication, with long-term current use of insulin ([facility])      BD Pen Needle Micro U/F 32G X 6 MM MISC Inject under the skin in the morning  Qty: 100 each, Refills: 1    Associated Diagnoses: Type 2 diabetes mellitus with other specified complication, with  "long-term current use of insulin (HCC)      sacubitril-valsartan (Entresto)  MG TABS Take 1 tablet by mouth 2 (two) times a day  Qty: 60 tablet, Refills: 2    Associated Diagnoses: Systolic congestive heart failure, unspecified HF chronicity (HCC)             Allergies:  Allergies   Allergen Reactions    Vancomycin Rash     NOT AN ALLERGY - 1/31/24 patient experienced vancomycin infusion reaction, please extend duration of infusion time to prevent future infusion reactions       FOLLOW-UP     PCP Outpatient Follow-up:  -Follow-up with your PCP within 1 week of discharge    Consulting Providers Follow-up:  -CARLA Cagle ([facility] [place]) 12/11/2024  -Diana M Jaiyeola, MD ([facility] [place]) 12/26/2024  -CARLA Leahy ([facility] [place] ) 1/6/2025  -Nash Samuels DO ([facility] [place] ) 1/9/2025   -Collin Gonzalez PA-C ([facility] [place]) 2/6/2025  -Ludwin Treadwell MD ([facility] [place]) 4/15/2025  -Neno Pardo DPM ([facility] ) 5/19/2025  -Collin Gonzalez PA-C ([facility] [place]) 5/23/2025     Active Issues Requiring Follow-up:   -Heart failure  -Hematology issues    Discharge Statement:   I spent 30 minutes minutes discharging the patient. This time was spent on the day of discharge. I had direct contact with the patient on the day of discharge. Additional documentation is required if more than 30 minutes were spent on discharge.    Portions of the record may have been created with voice recognition software.  Occasional wrong word or \"sound a like\" substitutions may have occurred due to the inherent limitations of voice recognition software.  Read the chart carefully and recognize, using context, where substitutions have occurred.    ==  Estiven Manzo MD    Internal Medicine Resident PGY-1   "

## 2024-12-04 NOTE — CONSULTS
Consultation - Heart Failure   Name: Nadir Myers 64 y.o. male I MRN: [id number]  Unit/Bed#: [facility] 428-01 I Date of Admission: 12/3/2024   Date of Service: 12/4/2024 I Hospital Day: 1       Inpatient consult to Heart Failure Service     Date/Time  12/4/2024 1:09 PM     Performed by  Chris Carr MD   Authorized by  Brenden Marquez DO           Physician Requesting Evaluation: Beau Lemus MD   Reason for Evaluation / Principal Problem: Heart failure    Assessment & Plan  Acute exacerbation of chronic heart failure (HCC)  Patient presented with worsening shortness of breath.  Has not been on any home diuretics for Roughly 1 Month.  Patient cannot identify any triggering event.  Denies any sick contacts or recent illnesses.  Denies any dietary indiscretions.    Patient  has put out 3.1 L after 2 doses of IV Lasix over the last 2 days.    Examines euvolemic on exam    Plan:  Stable to go home from heart failure standpoint  Agree with restarting patient's home torsemide to 20 mg daily  He should continue his home GDMT (metoprolol succinate, Jardiance, and Entresto)  He has received his correct dosage of Entresto ( mg) from patient assist, will start taking this dosage starting tomorrow.  He will have a follow-up appointment scheduled with heart failure.    Chronic HFimpEF, stage C, NYHA II    LVEF in June 2024 - 40%  LVEF in February 2024 - EF of 45%  LVEF in May 2023 - EF of 10 to 15%, akinesis of the apex with aneurysm formation    Weight at last HF outpatient office is 266 LBS, since last year has gradually had increasing weight gain  On standing scale today is 262 LBS    Volume status: Patient examines euvolemic  Inpatient diuretic: Received IV Lasix 40 mg daily x 2  Outpatient diuretic: Used to be on torsemide 10 mg daily, however stopped this roughly 1 month ago    Home GDMT:   Entresto 49 - 51 mg twice daily  Toprol-XL 50 mg twice daily  Jardiance 10 mg's   Home GDMT is continued during admission  Not on  MRA due to CKD    Device: ICD initially placed for secondary prevention of VT s/p explant due to device infection.      QRS : has known right bundle branch block.  QRS of 154ms    Now has has a [employer] subcutaneous ICD     Ischemic cardiomyopathy  Continue aspirin and statin  V-tach (HCC)  On amiodarone, follows with EP  ICD (implantable cardioverter-defibrillator) in place  Has a [employer] subcutaneous ICD in place  Had to have initial ICD removed due to device infection  GERD (gastroesophageal reflux disease)    CAD (coronary artery disease)    PAD (peripheral artery disease) ([facility])    Type 2 diabetes mellitus with diabetic peripheral angiopathy without gangrene ([facility])  Last A1c of 8.9  CKD stage 3a, GFR 45-59 ml/min ([facility])  Baseline creatinine roughly 1.6.  Kidney function is stable at this time  Persistent proteinuria    Hepatitis C antibody test positive    Benign hypertension with CKD (chronic kidney disease) stage III ([facility])    Hyperlipemia    Anemia    Leukocytosis    I have discussed the above management plan in detail with the primary service.     History of Present Illness   Nadir Myers is a 64 y.o. male with past medical history of ischemic cardiomyopathy, HFimpEF who presents with 2-day history of worsening shortness of breath.  Was admitted for IV diuresis, patient responded well, symptoms have resolved.  Patient was restarted on his home GDMT.  Was evaluated by nephrology who also recommended patient go home on torsemide at the time of discharge.    At the time of my evaluation patient does not have any complaints.  He examines euvolemic.  States that he has been taking all his home medications.  He was taken off of his home torsemide 10 mg daily roughly 1 month ago due to worsening kidney function.     Patient's blood pressure at the time of ED admission was 164/80.  Chest x-ray shows mild vascular congestion.  Patient does note that his abdomen was significantly distended at the  time of presentation, and has improved significantly since then.    Review of Systems  I have reviewed the patient's PMH, PSH, Social History, Family History, Meds, and Allergies    Objective :  Temp:  [97.6 °F (36.4 °C)-98.7 °F (37.1 °C)] 97.6 °F (36.4 °C)  HR:  [70-82] 71  BP: (110-165)/(64-83) 135/71  Resp:  [16-20] 16  SpO2:  [93 %-98 %] 95 %  O2 Device: None (Room air)  Nasal Cannula O2 Flow Rate (L/min):  [2 L/min] 2 L/min  Orthostatic Blood Pressures      Flowsheet Row Most Recent Value   Blood Pressure 135/71 filed at 12/04/2024 1132   Patient Position - Orthostatic VS Sitting filed at 12/04/2024 0825          First Weight: Weight - Scale: 122 kg (268 lb 8.3 oz) (12/03/24 0909)  Vitals:    12/03/24 0909 12/04/24 0600   Weight: 122 kg (268 lb 8.3 oz) 119 kg (262 lb)       Physical Exam  Vitals and nursing note reviewed.   Constitutional:       General: He is not in acute distress.     Appearance: He is well-developed.   HENT:      Head: Normocephalic and atraumatic.   Eyes:      Conjunctiva/sclera: Conjunctivae normal.   Cardiovascular:      Rate and Rhythm: Normal rate and regular rhythm.      Heart sounds: No murmur heard.  Pulmonary:      Effort: Pulmonary effort is normal. No respiratory distress.      Breath sounds: Normal breath sounds. No decreased breath sounds, wheezing or rales.   Abdominal:      Palpations: Abdomen is soft.      Tenderness: There is no abdominal tenderness.   Musculoskeletal:         General: No swelling.      Cervical back: Neck supple.      Right lower leg: No edema.      Left lower leg: No edema.   Skin:     General: Skin is warm and dry.      Capillary Refill: Capillary refill takes less than 2 seconds.   Neurological:      Mental Status: He is alert.   Psychiatric:         Mood and Affect: Mood normal.           Lab Results: I have reviewed the following results:CBC/BMP:   .     12/04/24  0445   WBC 9.68   HGB 9.7*   HCT 30.2*      SODIUM 140   K 3.6      CO2 25    BUN 33*   CREATININE 1.79*   GLUC 154*   MG 1.8*   PHOS 4.8*      Results from last 7 days   Lab Units 12/04/24  0445 12/03/24  1001   WBC Thousand/uL 9.68 11.63*   HEMOGLOBIN g/dL 9.7* 10.5*   HEMATOCRIT % 30.2* 32.6*   PLATELETS Thousands/uL 273 292     Results from last 7 days   Lab Units 12/04/24  0445 12/03/24  1001   POTASSIUM mmol/L 3.6 3.9   CHLORIDE mmol/L 107 108   CO2 mmol/L 25 23   BUN mg/dL 33* 31*   CREATININE mg/dL 1.79* 1.63*   CALCIUM mg/dL 8.2* 8.5         Lab Results   Component Value Date    HGBA1C 8.9 (H) 10/14/2024     Lab Results   Component Value Date    TROPONINI 2.79 (H) 02/16/2015         Other Study Results Review: EKG was reviewed.     VTE Prophylaxis: VTE covered by:  heparin (porcine), Subcutaneous, 5,000 Units at 12/04/24 0568

## 2024-12-04 NOTE — UTILIZATION REVIEW
Initial Clinical Review    Admission: Date/Time/Statement:   Admission Orders (From admission, onward)       Ordered        12/03/24 1325  INPATIENT ADMISSION  Once                          Orders Placed This Encounter   Procedures    INPATIENT ADMISSION     Standing Status:   Standing     Number of Occurrences:   1     Level of Care:   Med Surg [16]     Estimated length of stay:   More than 2 Midnights     Certification:   I certify that inpatient services are medically necessary for this patient for a duration of greater than two midnights. See H&P and MD Progress Notes for additional information about the patient's course of treatment.     ED Arrival Information       Expected   -    Arrival   12/3/2024 09:01    Acuity   Emergent              Means of arrival   Walk-In    Escorted by   Self    Service   [facility]    Admission type   Emergency              Arrival complaint   sob             Chief Complaint   Patient presents with    Shortness of Breath     Reports difficulty breathing for 3 days worse when lying down. Denies CP N/VD.  Does have HF and reports 30 lb  weight gain in  2 months. Hx MI       Initial Presentation: 64 y.o. male to ED as a walk-in with worsening SOB for past 2 days with ~30 lb wt gain, ~ 7 lbs in last week. SOB worse with lying down at night  PMHx:  HFrEF with last EF 40% in June 2024, CAD on DAPT, ventricular tachycardia s/p ICD placement in May 2024, CKD stage IIIa, HLD, and T2DM .  On presentation tacyhpneic, O2 sat 96% on RA. JVD, + wheezing and rales with decreased breath sounds. Abd distention, b/l LE edema. WBC 11.63, Hgb 10.5, Hct 32.6, BUN 31, Cr 1.63. CXR + small area of atelectasis vs minimal infiltrate in the R lower lateral lung field. BNP 1,899. IV lasix x2, and nebs given in ED  Admitted Inpatient to [facility] Unit with Acute Exac of CHF -- continue IV Lasix 40 BID, restart prior torsemide, 10 mg QD tomorrow. Continue pta po meds, Lantus q hs, accu-cheks w/ ssi. . Strict  I/Os, daily wts. BMP, mag, phos, CBC in AM. Telemetry. Consult Cardiology.    Nephrology consult -- Stage IIIa CKD with a baseline Cr of 1.6-1.7 GFR 45 to 60 mL/min currently on Entresto, Jardiance, because of the rising creatinine torsemide was held, good response with 40 mg of IV Lasix.  Will repeat dose this evening and tomorrow. V-tach with acute on CHF - would consider cardiology eval as well as he is closely followed and last seen on November 11 by heart failure team.    Date: 12/4   Day 2: Cr above baseline but overall acceptable per nephrology.   Cardiology consult -- MAPs can tolerate max dose Entresto 97/103 mg BID. Replace Mag. Torsemide 20 mg on DC. May add Vericiguat as outpt        ED Treatment-Medication Administration from 12/03/2024 0901 to 12/03/2024 2039         Date/Time Order Dose Route Action     12/03/2024 1001 albuterol inhalation solution 2.5 mg 2.5 mg Nebulization Given     12/03/2024 1001 ipratropium (ATROVENT) 0.02 % inhalation solution 0.5 mg 0.5 mg Nebulization Given     12/03/2024 1312 furosemide (LASIX) injection 40 mg 40 mg Intravenous Given     12/03/2024 1515 heparin (porcine) subcutaneous injection 5,000 Units 5,000 Units Subcutaneous Given     12/03/2024 1816 atorvastatin (LIPITOR) tablet 80 mg 80 mg Oral Given     12/03/2024 1515 clopidogrel (PLAVIX) tablet 75 mg 75 mg Oral Given     12/03/2024 1959 sacubitril-valsartan (ENTRESTO) 49-51 MG per tablet 1 tablet 1 tablet Oral Given     12/03/2024 1827 insulin lispro (HumALOG/ADMELOG) 100 units/mL subcutaneous injection 2-12 Units 6 Units Subcutaneous Given     12/03/2024 1959 aspirin chewable tablet 81 mg 81 mg Oral Given     12/03/2024 1816 furosemide (LASIX) injection 40 mg 40 mg Intravenous Given     Scheduled Medications:  amiodarone, 200 mg, Oral, Daily  aspirin, 81 mg, Oral, Daily  atorvastatin, 80 mg, Oral, After Dinner  clopidogrel, 75 mg, Oral, Daily  Empagliflozin, 10 mg, Oral, QAM  furosemide, 40 mg, Intravenous, BID  (diuretic)  heparin (porcine), 5,000 Units, Subcutaneous, Q8H TEJAS  insulin glargine, 24 Units, Subcutaneous, HS  insulin lispro, 4-20 Units, Subcutaneous, TID AC  insulin lispro, 4-20 Units, Subcutaneous, HS  metoprolol succinate, 50 mg, Oral, Q12H  pantoprazole, 40 mg, Oral, HS  sacubitril-valsartan, 1 tablet, Oral, BID  [START ON 12/5/2024] torsemide, 10 mg, Oral, Daily         ED Triage Vitals [12/03/24 0906]   Temperature Pulse Respirations Blood Pressure SpO2 Pain Score   98 °F (36.7 °C) 87 (!) 26 164/80 96 % No Pain     Weight (last 2 days)       Date/Time Weight    12/04/24 0600 119 (262)    12/03/24 0909 122 (268.52)            Vital Signs (last 3 days)       Date/Time Temp Pulse Resp BP MAP (mmHg) SpO2 Calculated FIO2 (%) - Nasal Cannula Nasal Cannula O2 Flow Rate (L/min) O2 Device Patient Position - Orthostatic VS Pain    12/04/24 11:32:56 97.6 °F (36.4 °C) 71 -- 135/71 92 95 % -- -- -- -- --    12/04/24 0927 -- -- -- -- -- 95 % -- -- None (Room air) -- No Pain    12/04/24 08:25:06 98.1 °F (36.7 °C) 70 -- 134/73 93 96 % -- -- None (Room air) Sitting --    12/04/24 0815 -- -- -- -- -- -- -- -- -- -- No Pain    12/04/24 0814 -- -- -- -- -- -- -- -- -- -- No Pain    12/04/24 03:11:56 98.4 °F (36.9 °C) 74 16 126/71 89 93 % -- -- -- -- --    12/03/24 23:10:32 98.7 °F (37.1 °C) 80 17 110/64 79 98 % 28 2 L/min Nasal cannula -- No Pain    12/03/24 2300 -- -- -- -- -- -- -- -- None (Room air) -- --    12/03/24 2116 -- -- -- -- -- -- -- -- -- -- No Pain    12/03/24 20:50:03 98.1 °F (36.7 °C) 77 16 132/76 95 96 % -- -- -- -- --    12/03/24 2000 -- 82 20 151/80 109 95 % -- -- None (Room air) Lying --    12/03/24 1829 -- 82 20 165/83 -- 95 % -- -- None (Room air) Lying --    12/03/24 1530 -- 81 -- 139/67 96 93 % -- -- None (Room air) Lying --    12/03/24 1100 -- 77 -- 144/80 106 93 % -- -- None (Room air) Lying --    12/03/24 1000 -- 79 -- 167/89 118 93 % -- -- None (Room air) Lying --    12/03/24 0919 -- -- -- -- --  -- -- -- None (Room air) -- --    12/03/24 0906 98 °F (36.7 °C) 87 26 164/80 -- 96 % -- -- None (Room air) Lying No Pain         Pertinent Labs/Diagnostic Test Results:   Radiology:  XR chest 2 views   ED Interpretation by William King MD (12/03 1055)   Signs of vascular congestion      Final Interpretation by Yuniel Conway MD (12/03 1100)      Small area of atelectasis versus minimal infiltrate in the right lower lateral lung field.      Mild prominence of the heart which is stable from prior.            Workstation performed: [id number]           Results from last 7 days   Lab Units 12/04/24  0445 12/03/24  1001   WBC Thousand/uL 9.68 11.63*   HEMOGLOBIN g/dL 9.7* 10.5*   HEMATOCRIT % 30.2* 32.6*   PLATELETS Thousands/uL 273 292   TOTAL NEUT ABS Thousands/µL  --  9.67*       Results from last 7 days   Lab Units 12/04/24  0445 12/03/24  1001   SODIUM mmol/L 140 139   POTASSIUM mmol/L 3.6 3.9   CHLORIDE mmol/L 107 108   CO2 mmol/L 25 23   ANION GAP mmol/L 8 8   BUN mg/dL 33* 31*   CREATININE mg/dL 1.79* 1.63*   EGFR ml/min/1.73sq m 39 44   CALCIUM mg/dL 8.2* 8.5   MAGNESIUM mg/dL 1.8*  --    PHOSPHORUS mg/dL 4.8*  --      Results from last 7 days   Lab Units 12/03/24  1001   AST U/L 14   ALT U/L 12   ALK PHOS U/L 70   TOTAL PROTEIN g/dL 6.9   ALBUMIN g/dL 3.3*   TOTAL BILIRUBIN mg/dL 0.35     Results from last 7 days   Lab Units 12/04/24  1136 12/04/24  0938 12/03/24  2202 12/03/24  1815   POC GLUCOSE mg/dl 202* 200* 164* 259*     Results from last 7 days   Lab Units 12/04/24  0445 12/03/24  1001   GLUCOSE RANDOM mg/dL 154* 198*     Results from last 7 days   Lab Units 12/03/24  1159 12/03/24  1001   HS TNI 0HR ng/L  --  37   HS TNI 2HR ng/L 30  --    HSTNI D2 ng/L -7  --        Results from last 7 days   Lab Units 12/03/24  1005   BNP pg/mL 1,899*         Past Medical History:   Diagnosis Date    Bacteremia due to Staphylococcus 10/09/2024    CHF (congestive heart failure) (HCC)     Diabetes mellitus  "(HCC)     Infected defibrillator ([facility]) 03/11/2024    Myocardial infarction (HCC)      Present on Admission:   CKD stage 3a, GFR 45-59 ml/min (HCC)   Chronic HFimpEF, stage C, NYHA II   Type 2 diabetes mellitus with diabetic peripheral angiopathy without gangrene (HCC)   Persistent proteinuria   Benign hypertension with CKD (chronic kidney disease) stage III (HCC)   Ischemic cardiomyopathy   ICD (implantable cardioverter-defibrillator) in place   V-tach (HCC)   PAD (peripheral artery disease) (HCC)   Hepatitis C antibody test positive   CAD (coronary artery disease)   GERD (gastroesophageal reflux disease)      Admitting Diagnosis: SOB (shortness of breath) [R06.02]  Acute HFrEF (heart failure with reduced ejection fraction) ([facility]) [I50.21]  CKD stage 3a, GFR 45-59 ml/min ([facility]) [N18.31]  Acute exacerbation of chronic heart failure ([facility]) [I50.9]  Age/Sex: 64 y.o. male    D/C summary:   63 y/o male with a PMHx of HFrEF with last EF 40% in June 2024, CAD on DAPT, ventricular tachycardia s/p ICD placement in May 2024, CKD stage IIIa, HLD, and T2DM who presents with a chief complaint of shortness of breath for the past 2 days. Patient states that he has had difficulty breathing when lying down at night with frequent nighttime awakenings which gets better upon sitting up. Has prior hospitalizations for HF exacerbations. He also reports an associated burning abdominal pain in the epigastric area when sitting up which lasts for a minute and goes away on its own, and increased abdominal distension. Has prior hx of GERD. He also endorses a dry, nonproductive cough which also started 2 days ago. Patient states that he has gained 30 pounds in the past 2 months. He denies any recent illnesses or any sick contacts. He also denies chest pain, dizziness, fever, rhinorrhea, sore throat, N/V/D.\"     During hospital stay, he was diuresed with IV Lasix 40 mg bid with good response and significant improvement of his symptoms. Nephrology " was following. Heart failure was also consulted as they were following him closely outpatient and recommended Torsemide 20 mg daily along with his GDMT on discharge.     Today on the day of discharge, he reports he feels back to his baseline with no acute physical complaints. Denies any fever, chills, chest pain, SOB, palpitation, abdominal pain, n/v, lightheadedness/dizziness. He was evaluated by PT with no rehab needs. He is medically stable to be discharged home.          Network Utilization Review Department  ATTENTION: Please call with any questions or concerns to [phone] and carefully listen to the prompts so that you are directed to the right person. All voicemails are confidential.   For Discharge needs, contact Care Management DC Support Team at [phone] opt. 2  Send all requests for admission clinical reviews, approved or denied determinations and any other requests to dedicated fax number below belonging to the campus where the patient is receiving treatment. List of dedicated fax numbers for the Facilities:  FACILITY NAME UR FAX NUMBER   ADMISSION DENIALS (Administrative/Medical Necessity) [phone]   DISCHARGE SUPPORT TEAM (NETWORK) [phone]   PARENT CHILD HEALTH (Maternity/NICU/Pediatrics) [phone]   [facility] [phone]   [facility] [phone]   [facility] [phone]   [facility] [phone]   [facility] [phone]   [facility] [phone]   [facility] [phone]   [facility] [phone]   [facility] [phone]   [facility] [phone]   [facility] [phone]   [facility] [phone]

## 2024-12-04 NOTE — DISCHARGE INSTR - AVS FIRST PAGE
Follow up with PCP within 7-14 days for TCM visit.  Follow up with Cardiology and Nephrology as scheduled.   Start taking Torsemide 20 mg daily.   Continue taking all other medications as prescribed.

## 2024-12-04 NOTE — PROGRESS NOTES
Progress Note - Internal Medicine   Name: Nadir Myers 64 y.o. male I MRN: [id number]  Unit/Bed#: [facility] 428-01 I Date of Admission: 12/3/2024   Date of Service: 12/4/2024 I Hospital Day: 1    Assessment & Plan  Acute exacerbation of chronic heart failure (HCC)  Wt Readings from Last 3 Encounters:   12/04/24 119 kg (262 lb)   11/19/24 122 kg (270 lb)   11/11/24 121 kg (266 lb 1.6 oz)   Assessment:  -EF of 40% on 6/3/2024. Presents with worsening SOB, +~30 lb weight gain,, ~+7 lb since last week.  Measures weight weekly.  For GDMT, he is on Toprol-XL 50 mg BID, Jardiance 10 mg QD, Entrsto (49 or 97?) PTA, and was previously on torsemide but this was held in October 2/2 LINDEN he developed during his last admission. Follows with HF team.  S/p ICD placement (subcutaneous, did have transvenous ICD in the past but was removed 2/2 infection).   -BNP: 1899. CXR 12/3/2024: Mild prominence of the heart which is stable from prior.  Fortunately. His only symptom is shortness of breath at rest (he said not with exertion).  -Was seen by nephrology who felt he would be best served with dose of IV diuretics (40 mg IV Lasix) followed by restarting his torsemide the following day with discharge if he is doing well.      Plan:  -Admit to SOD C with telemetry  -Diuresis with IV Lasix, 40 mg, will be restarting torsemide at 10 mg QD tomorrow and discontinuing Lasix  -Will continue PTA Toprol-XL, 50 mg BID  -Continue Jardiance, 10 mg QD  -Continue Entresto, 49-51 mg QD, can take higher dose once discharged  -Strict I/O's  -Daily weights  -BMP, mag, Phos labs tomorrow  -Discussed nutrition referral once discharged to discuss proper diet, which he was very interested in  -BNP on Monday, 12/9/2024        GERD (gastroesophageal reflux disease)  History of GERD, complaining of 1 day history of burning abdominal pain.  Symptoms appear consistent with GERD, will start Protonix.    Plan  -Started pantoprazole, 40 mg QHS  Ischemic  "cardiomyopathy  See plan under CAD  V-tach ([facility])  Etiology most likely scar mediated following MI. S/p ICD (multiple have been placed, most recently on 5/22/24). Currently on Toprol XL, amiodarone, 200 mg QD. Follows with EP. Was due to start Class III antiarrhytmic (e.g., dofetilide, sotolol), in fact it was the purpose of his admission on 10/7/2024, but did not 2/2 his poor kidney function.  No new concerning arrhythmia on EKG.  ICD interrogated in ED.    Plan  -Place on telemetry  -Continue Amiodarone, 200 mg QD  -Continue Toprol-XL  Chronic systolic CHF (congestive heart failure) ([facility])  Wt Readings from Last 3 Encounters:   12/04/24 119 kg (262 lb)   11/19/24 122 kg (270 lb)   11/11/24 121 kg (266 lb 1.6 oz)     See \"Acute exacerbation of chronic heart failure\"      CAD (coronary artery disease)  Hx MI s/p cardiac cath (which showed  of LAD) and PCI (mid-Lcx) in 2/2015). Currently on Aspirin 81 mg, Plavix 75 mg, and atorvastatin 80 mg.  Currently denies any chest pain.  No signs of ischemia on EKG.    Plan  -Continue ASA, 81 mg QD  -Continue Plavix, 75 mg QD  -Continue atorvastatin, 80 mg QD  PAD (peripheral artery disease) ([facility])  Assessment and plan:  -Continue aspirin, Plavix, statin  ICD (implantable cardioverter-defibrillator) in place  Assessment and plan:  -Placed after episode of V-fib  -Monitored on telemetry and patient, no events detected  Type 2 diabetes mellitus with diabetic peripheral angiopathy without gangrene ([facility])  Lab Results   Component Value Date    HGBA1C 8.9 (H) 10/14/2024       Recent Labs     12/03/24  1815 12/03/24  2202 12/04/24  0938   POCGLU 259* 164* 200*       Blood Sugar Average: Last 72 hrs:  (P) [id number]  Assessment:  Last A1c on 10/14/24: 8.9 (was 7.1 of 5/14/24 and 9.8 on 1/31/24) .On metformin, 30U Lantus QHS (PCP wanted this increased to 40U which he did not do) , and Janumet,  mg BID. During last hospitalization, glucose was within goal at 30U Lantus " QHS and ISS.    Plan:  -Lantus 24U QHS  -ISS  -Holding metformin, sitagliptin-metformin  -Can continue Entresto  -Goal glucose: 140-180  -Carb control diet  -Hypoglycemia protocol  -Nutrition referral when in outpatient setting  CKD stage 3a, GFR 45-59 ml/min ([facility])  Lab Results   Component Value Date    EGFR 39 12/04/2024    EGFR 44 12/03/2024    EGFR 44 10/24/2024    CREATININE 1.79 (H) 12/04/2024    CREATININE 1.63 (H) 12/03/2024    CREATININE 1.61 (H) 10/24/2024   Assessment:  Hx Stage IIIA CKD with prior baseline ~1.2-1.3). Over the last couple of months though, his Cr has jumped to around 1.7-1.9. This limited what meds he could take for his other co-morbilities.  While it sometimes takes months for creatinine to return to baseline, there is a risk that this is his new baseline.  Not attempted to call this an LINDEN as a result.  Was seen by nephrology during his hospitalization and after discharge. During his last visit with nephrology on 10/15/24, he got the go-ahead to restart Jardiance.  Today, he was evaluated by nephrology who recommended IV diuresis with plan to restart torsemide tomorrow.      Plan:  -Avoid nephrotoxic agents, prolonged hypotension  -Patient will be restarted on torsemide  -Trend BMP daily    Persistent proteinuria  Assessment and plan:  -Continue diabetic management  Hepatitis C antibody test positive  Assessment and plan:  -Hepatitis C antibody positive, RNA not detected  -Continue monitoring hepatic function  Benign hypertension with CKD (chronic kidney disease) stage III ([facility])  Lab Results   Component Value Date    EGFR 39 12/04/2024    EGFR 44 12/03/2024    EGFR 44 10/24/2024    CREATININE 1.79 (H) 12/04/2024    CREATININE 1.63 (H) 12/03/2024    CREATININE 1.61 (H) 10/24/2024     Initial BP of 164/80, 167/89.  Improved to 144/80, prior to receiving Lasix.  Believe blood pressure will improve as he gets diuresed.  Will continue to monitor      Plan  -IV Lasix today, torsemide  tomorrow  Hyperlipemia  Hx HLD and does have CAD with hx of MI s/p PCI in 2015. Most recent lipid panel on 10/14/2024: Cholesterol: 144, Triglycerides: 194, HDL: 36, LDL: 69. LDL not at goal of <55 (secondary prevention 2/2 hx MI, CAD and T2DM). On atorvastatin 80 mg PTA.     Plan  -Continue atorvastatin, 80 mg QD  -Consider starting ezetimibe, 10 mg QD for better control of LDL  Anemia  Hb/Hct of 10.5/32.6 with MCV of 91.  Baseline hemoglobin since February of this year 9-11, previously 14-16. Denies any epistaxis, hemoptysis, cuts/trauma, hematochezia, hematuria.  No iron panel, B12, folate level in chart.  Etiology most likely 2/2 chronic disease.  However other causes should be investigated.  For example, he has not had a colonoscopy.  Documented family history of colon cancer with mother and has been having episodes of nausea/vomiting (though not today).    Plan  -Trend CBC daily  -Transfuse for Hb <8 2/2 cardiac history  -Anemia workup in outpatient setting  Leukocytosis  WBC of 11.63, 84% segmented neutrophils/9.67 absolute neutro.  No obvious sign of infection at this time.  Remains afebrile.    Plan  -White count returned to normal today  -Trend white count    Disposition: Continue inpatient management    Team: [facility] TEAM C    Subjective   Patient seen and examined. No acute events overnight.  Today the patient reports feeling well.  He has been able to ambulate without getting short of breath.  He denies any shortness of breath at rest, heart palpitations, chest pain, nausea or vomiting, lightheadedness, dizziness, new joint pain or swelling.     Objective :  Temp:  [98.1 °F (36.7 °C)-98.7 °F (37.1 °C)] 98.1 °F (36.7 °C)  HR:  [70-82] 70  BP: (110-165)/(64-83) 134/73  Resp:  [16-20] 16  SpO2:  [93 %-98 %] 96 %  O2 Device: None (Room air)  Nasal Cannula O2 Flow Rate (L/min):  [2 L/min] 2 L/min    I/O         12/02 0701  12/03 0700 12/03 0701  12/04 0700 12/04 0701 12/05 0700    P.O.  375 210    Total  Intake(mL/kg)  375 (3.2) 480 (4)    Urine (mL/kg/hr)  3550 500 (0.9)    Total Output  3550 500    Net  -3175 -20                 Weights:   IBW (Ideal Body Weight): 77.6 kg    Body mass index is 35.53 kg/m².  Weight (last 2 days)       Date/Time Weight    12/04/24 0600 119 (262)    12/03/24 0909 122 (268.52)            Physical Exam  Neck:      Comments: No jugular venous distention  Cardiovascular:      Rate and Rhythm: Normal rate and regular rhythm.      Heart sounds: No murmur heard.  Pulmonary:      Effort: Pulmonary effort is normal.      Breath sounds: Normal breath sounds. No rales.      Comments: Lung sounds diminished on the left lung base   Abdominal:      General: Bowel sounds are normal.      Palpations: Abdomen is soft.      Tenderness: There is no abdominal tenderness.   Musculoskeletal:      Right lower leg: No edema.      Left lower leg: No edema.   Skin:     General: Skin is warm and dry.      Capillary Refill: Capillary refill takes less than 2 seconds.      Coloration: Skin is not jaundiced or pale.   Neurological:      Mental Status: He is alert and oriented to person, place, and time.           Lab Results: I have reviewed the following results:  Recent Labs     12/03/24  1001 12/03/24  1005 12/03/24  1159 12/04/24  0445   WBC 11.63*  --   --  9.68   HGB 10.5*  --   --  9.7*   HCT 32.6*  --   --  30.2*     --   --  273   SODIUM 139  --   --  140   K 3.9  --   --  3.6     --   --  107   CO2 23  --   --  25   BUN 31*  --   --  33*   CREATININE 1.63*  --   --  1.79*   GLUC 198*  --   --  154*   MG  --   --   --  1.8*   PHOS  --   --   --  4.8*   AST 14  --   --   --    ALT 12  --   --   --    ALB 3.3*  --   --   --    TBILI 0.35  --   --   --    ALKPHOS 70  --   --   --    HSTNI0 37  --   --   --    HSTNI2  --   --  30  --    BNP  --  1,899*  --   --        Imaging Results Review: No pertinent imaging studies reviewed.  Other Study Results Review: EKG was reviewed.     Currently  Ordered Meds:   Current Facility-Administered Medications:     amiodarone tablet 200 mg, Daily    aspirin chewable tablet 81 mg, Daily    atorvastatin (LIPITOR) tablet 80 mg, After Dinner    clopidogrel (PLAVIX) tablet 75 mg, Daily    Empagliflozin (JARDIANCE) tablet 10 mg, QAM    furosemide (LASIX) injection 40 mg, BID (diuretic)    heparin (porcine) subcutaneous injection 5,000 Units, Q8H TEJAS **AND** [CANCELED] Platelet count, Once    insulin glargine (LANTUS) subcutaneous injection 24 Units 0.24 mL, HS    insulin lispro (HumALOG/ADMELOG) 100 units/mL subcutaneous injection 2-12 Units, TID AC **AND** Fingerstick Glucose (POCT), TID AC    magnesium gluconate (MAGONATE) tablet 500 mg, BID AC    magnesium sulfate 2 g/50 mL IVPB (premix) 2 g, Once, Last Rate: 2 g (12/04/24 0941)    metoprolol succinate (TOPROL-XL) 24 hr tablet 50 mg, Q12H    pantoprazole (PROTONIX) EC tablet 40 mg, HS    sacubitril-valsartan (ENTRESTO) 49-51 MG per tablet 1 tablet, BID  VTE Pharmacologic Prophylaxis: Heparin  VTE Mechanical Prophylaxis: sequential compression device    Administrative Statements     Portions of the record may have been created with voice recognition software.

## 2024-12-04 NOTE — ASSESSMENT & PLAN NOTE
Lab Results   Component Value Date    EGFR 39 12/04/2024    EGFR 44 12/03/2024    EGFR 44 10/24/2024    CREATININE 1.79 (H) 12/04/2024    CREATININE 1.63 (H) 12/03/2024    CREATININE 1.61 (H) 10/24/2024

## 2024-12-04 NOTE — PHYSICAL THERAPY NOTE
Physical Therapy Evaluation     Patient's Name: Nadir Myers    Admitting Diagnosis  SOB (shortness of breath) [R06.02]  Acute HFrEF (heart failure with reduced ejection fraction) ([facility]) [I50.21]  CKD stage 3a, GFR 45-59 ml/min ([facility]) [N18.31]  Acute exacerbation of chronic heart failure ([facility]) [I50.9]    Problem List  Patient Active Problem List   Diagnosis    Hypertension    Benign prostatic hyperplasia without lower urinary tract symptoms    Type 2 diabetes mellitus, with long-term current use of insulin (HCC)    GERD (gastroesophageal reflux disease)    Tobacco dependence syndrome    Acute HFrEF    Ischemic cardiomyopathy    V-tach (HCC)    Chronic systolic CHF (congestive heart failure) (HCC)    Type 2 diabetes mellitus with foot ulcer, with long-term current use of insulin (HCC)    CAD (coronary artery disease)    PAD (peripheral artery disease) ([facility])    Abnormal CT of the chest    ICD (implantable cardioverter-defibrillator) in place    Amputated 4th toe, left (HCC)    Type 2 diabetes mellitus with diabetic peripheral angiopathy without gangrene ([facility])    Advanced care planning/counseling discussion    Acute kidney injury (HCC)    CKD stage 3a, GFR 45-59 ml/min (HCC)    Persistent proteinuria    Hepatitis C antibody test positive    Asymptomatic microscopic hematuria    Benign hypertension with CKD (chronic kidney disease) stage III (HCC)    IgG gammopathy    Acute exacerbation of chronic heart failure (HCC)    Hyperlipemia    Anemia    Leukocytosis       Past Medical History  Past Medical History:   Diagnosis Date    Bacteremia due to Staphylococcus 10/09/2024    CHF (congestive heart failure) (HCC)     Diabetes mellitus (HCC)     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    Myocardial infarction (HCC)        Past Surgical History  Past Surgical History:   Procedure Laterality Date    CARDIAC CATHETERIZATION N/A 05/03/2023    Procedure: Cardiac Coronary Angiogram;  Surgeon: Valeriy Shore MD;  Location:   CARDIAC CATH LAB;  Service: Cardiology    CARDIAC CATHETERIZATION Left 05/03/2023    Procedure: Cardiac Left Heart Cath;  Surgeon: Valeriy Shore MD;  Location: [facility] CARDIAC C[facility];  Service: Cardiology    CARDIAC DEFIBRILLATOR PLACEMENT  05/2023    CARDIAC ELECTROPHYSIOLOGY PROCEDURE N/A 05/12/2023    Procedure: Cardiac icd implant;  Surgeon: Urbano Maria MD;  Location: [facility] CARDIAC CATH [facility];  Service: Cardiology    CARDIAC ELECTROPHYSIOLOGY PROCEDURE N/A 2/20/2024    Procedure: EV ICD IMPLANTATION;  Surgeon: Arturo Wooten DO;  Location: [facility] MAIN OR;  Service: Cardiology    CARDIAC ELECTROPHYSIOLOGY PROCEDURE N/A 2/8/2024    Procedure: Cardiac laser lead extraction;  Surgeon: Arturo Wooten DO;  Location: [facility]ARD[facility];  Service: Cardiology    CARDIAC ELECTROPHYSIOLOGY PROCEDURE N/A 5/21/2024    Procedure: Cardiac icd implant subq;  Surgeon: Urbano Maria MD;  Location: [facility] CARDIAC [facility];  Service: Cardiology    IR LOWER EXTREMITY ANGIOGRAM  1/18/2024    AK RMVL TRANSVNS PM ELTRD DUAL LEAD SYS N/A 2/20/2024    Procedure: EV ICD IMPLANTATION;  Surgeon: Abhilash Ferrera MD;  Location: [facility] MA[facility];  Service: Cardiac Surgery    AK RMVL TRANSVNS PM ELTRD DUAL LEAD SYS N/A 2/21/2024    Procedure: REMOVAL OF LEAD AND GENERATOR AND ATTEMPTED LEAD REVISION;  Surgeon: Abhilash Ferrera MD;  Location: [facility];  Service: Cardiac Surgery    WOUND DEBRIDEMENT Left 2/4/2024    Procedure: LEFT FOURTH TOE AMPUTATION SURGICAL WOUND DEBRIDEMENT FOOT/TOE (WASH OUT);  Surgeon: Bebo Hopper DPM;  Location: [facility] MAIN OR;  Service: Podiatry          12/04/24 0814   PT Last Visit   PT Visit Date 12/04/24   Note Type   Note type Evaluation   Pain Assessment   Pain Assessment Tool 0-10   Pain Score No Pain   Restrictions/Precautions   Weight Bearing Precautions Per Order No   Home Living   Type of Home Mobile home   Home Layout One level;Stairs to enter with rails  (3STE)   Bathroom Shower/Tub Walk-in shower   Bathroom Toilet  Standard   Bathroom Equipment Shower chair   Bathroom Accessibility Accessible   Home Equipment Walker;Cane  (PRN PTA)   Prior Function   Level of [place] Independent with ADLs;Independent with functional mobility;Independent with IADLS   Lives With Spouse;Son   Receives Help From Family   IADLs Independent with driving;Independent with medication management;Independent with meal prep   Falls in the last 6 months 0   Vocational Retired   General   Family/Caregiver Present No   Cognition   Overall Cognitive Status WFL   Arousal/Participation Alert   Orientation Level Oriented X4   Memory Within functional limits   Following Commands Follows all commands and directions without difficulty   Subjective   Subjective pt pleasant and cooperative throughout therapy session. pt received seated in bedside recliner   RLE Assessment   RLE Assessment WFL   LLE Assessment   LLE Assessment WFL   Bed Mobility   Supine to Sit Unable to assess   Sit to Supine Unable to assess   Transfers   Sit to Stand 6  Modified independent   Stand to Sit 6  Modified independent   Additional Comments transfers w/o AD   Ambulation/Elevation   Gait pattern Wide [place];Shuffling   Gait Assistance 6  Modified independent   Assistive Device None   Distance 250'   Stair Management Assistance 6  Modified independent   Stair Management Technique One rail L;Alternating pattern;Foreward   Number of Stairs 7   Balance   Static Sitting Good   Dynamic Sitting Good   Static Standing Good   Dynamic Standing Good   Ambulatory Fair +   Endurance Deficit   Endurance Deficit No   Activity Tolerance   Activity Tolerance Patient tolerated treatment well   Medical Staff Made Aware OT Noemy, PT Chantel, co-eval due to medical complexity and mulitple comorbidities   Nurse Made Aware RN cleared and updated   Assessment   Prognosis Good   Problem List Decreased mobility   Assessment Pt seen for moderate (evolving) complexity PT evaluation. Moderate eval due to Ongoing  medical management for primary dx, Decreased activity tolerance compared to baseline, Continuous pulse oximetry monitoring  Patient is a 64 y.o. male  who was admitted to [facility] on 12/3/2024  with Acute exacerbation of chronic heart failure (HCC) . Pt [facility] to \[facility]\"" with SOB and weight gain .  At baseline, pt resides with spouse and son in mobile home and was independent prior to hospital admission. Currently, upon initial examination, pt  is requiring  modified independent   for functional transfers and  modified independent   for ambulation with no AD. Pt was left seated at the end of PT session with all needs in reach. Pt with no questions or concerns regarding d/c home; appears to be functioning at/ near baseline mobility levels. Pt with no further acute inpatient PT needs at this time- please re-consult if needed. PT to discharge pt at this time. Encourage pt to ambulate at least 3-4x/day with restorative/ nursing staff while remaining in hospital. The patient's AM-PAC Basic Mobility Inpatient Short Form Raw Score is 24, Standardized Score is 57.68. Based on AM-PAC scoring and patient presentation, PT currently recommending No Post Acute Rehab Needs. Please also refer to the recommendation of the Physical Therapist for safe discharge planning.   Barriers to Discharge None   Goals   Patient Goals to go home   Plan   Treatment/Interventions   (eval only, dc IPPT)   Discharge Recommendation   Rehab Resource Intensity Level, PT No post-acute rehabilitation needs   AM-PAC Basic Mobility Inpatient   Turning in Flat Bed Without Bedrails 4   Lying on Back to Sitting on Edge of Flat Bed Without Bedrails 4   Moving Bed to Chair 4   Standing Up From Chair Using Arms 4   Walk in Room 4   Climb 3-5 Stairs With Railing 4   Basic Mobility Inpatient Raw Score 24   Basic Mobility Standardized Score 57.68   [facility] Highest Level Of Mobility   -HLM Goal 8: Walk 250 feet or more   -HLM Achieved 8: Walk 250  feet ot more   End of Consult   Patient Position at End of Consult Bedside chair;All needs within reach         Everardo Bird, PT

## 2024-12-04 NOTE — ASSESSMENT & PLAN NOTE
Lab Results   Component Value Date    EGFR 39 12/04/2024    EGFR 44 12/03/2024    EGFR 44 10/24/2024    CREATININE 1.79 (H) 12/04/2024    CREATININE 1.63 (H) 12/03/2024    CREATININE 1.61 (H) 10/24/2024   Creatinine above baseline but overall acceptable.  Can follow-up with Dr. Treadwell as an outpatient has had a negative fluid balance while in the hospital

## 2024-12-04 NOTE — QUICK NOTE
Explained to the patient's spouse to provide update regarding the patient's care.  We discussed that he was no longer going to be discharged today due to further workup.  She mentioned that he had a hematology appointment scheduled for tomorrow.  Hematology was contacted and they will reschedule an appointment next week.

## 2024-12-04 NOTE — OCCUPATIONAL THERAPY NOTE
Occupational Therapy Evaluation     Patient Name: Nadir Myers  Today's Date: 12/4/2024  Problem List  Principal Problem:    Acute exacerbation of chronic heart failure (HCC)  Active Problems:    GERD (gastroesophageal reflux disease)    Ischemic cardiomyopathy    V-tach (HCC)    Chronic systolic CHF (congestive heart failure) (HCC)    CAD (coronary artery disease)    PAD (peripheral artery disease) (HCC)    ICD (implantable cardioverter-defibrillator) in place    Type 2 diabetes mellitus with diabetic peripheral angiopathy without gangrene (HCC)    CKD stage 3a, GFR 45-59 ml/min (HCC)    Persistent proteinuria    Hepatitis C antibody test positive    Benign hypertension with CKD (chronic kidney disease) stage III (HCC)    Hyperlipemia    Anemia    Leukocytosis    Past Medical History  Past Medical History:   Diagnosis Date    Bacteremia due to Staphylococcus 10/09/2024    CHF (congestive heart failure) (HCC)     Diabetes mellitus (HCC)     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    Myocardial infarction (HCC)      Past Surgical History  Past Surgical History:   Procedure Laterality Date    CARDIAC CATHETERIZATION N/A 05/03/2023    Procedure: Cardiac Coronary Angiogram;  Surgeon: Valeriy Shore MD;  [facility];  Service: Cardiology    CARDIAC CATHETERIZATION Left 05/03/2023    Procedure: Cardiac Left Heart Cath;  Surgeon: Valeriy Shore MD;  Location: [facility];  Service: Cardiology    CARDIAC DEFIBRILLATOR PLACEMENT  05/2023    CARDIAC ELECTROPHYSIOLOGY PROCEDURE N/A 05/12/2023    Procedure: Cardiac icd implant;  Surgeon: Urbano Maria MD;  [facility]: [facility];  Service: Cardiology    CARDIAC ELECTROPHYSIOLOGY PROCEDURE N/A 2/20/2024    Procedure: EV ICD IMPLANTATION;  Surgeon: Arturo Wooten DO;  Location: [facility];  Service: Cardiology    CARDIAC ELECTROPHYSIOLOGY PROCEDURE N/A 2/8/2024    Procedure: Cardiac laser lead extraction;  Surgeon: Arturo Wooten DO;   Location: [facility];  Service: Cardiology    CARDIAC ELECTROPHYSIOLOGY PROCEDURE N/A 5/21/2024    Procedure: Cardiac icd implant subq;  Surgeon: Urbano Maria MD;  [facility]: [facility];  Service: Cardiology    IR LOWER EXTREMITY ANGIOGRAM  1/18/2024    [place] RMVL TRANSVNS PM ELTRD DUAL LEAD SYS N/A 2/20/2024    Procedure: EV ICD IMPLANTATION;  Surgeon: Abhilash Ferrera MD;  Location: [facility];  Service: Cardiac Surgery    NC RMVL TRANSVNS PM ELTRD DUAL LEAD SYS N/A 2/21/2024    Procedure: REMOVAL OF LEAD AND GENERATOR AND ATTEMPTED LEAD REVISION;  Surgeon: Abhilash Ferrera MD;  Location: [facility];  Service: Cardiac Surgery    WOUND DEBRIDEMENT Left 2/4/2024    Procedure: LEFT FOURTH TOE AMPUTATION SURGICAL WOUND DEBRIDEMENT FOOT/TOE (WASH OUT);  Surgeon: Bebo Hopper DPM;  Location: [facility];  Service: Podiatry         12/04/24 0815   OT Last Visit   OT Visit Date 12/04/24   Note Type   Note type Evaluation   Pain Assessment   Pain Assessment Tool 0-10   Pain Score No Pain   Restrictions/Precautions   Weight Bearing Precautions Per Order No   Other Precautions Telemetry   Home Living   Type of Home Mobile home   Home Layout One level;Stairs to enter with rails  (3 OCNNIE)   Bathroom Shower/Tub Walk-in shower   Bathroom Toilet Standard   Bathroom Equipment Shower chair   Home Equipment Walker;Cane  (not used at baseline)   Prior Function   Level of Day Independent with ADLs;Independent with functional mobility;Independent with IADLS   Lives With Spouse;Son   Receives Help From Family   IADLs Independent with driving;Independent with medication management;Independent with meal prep   Falls in the last 6 months 0   Vocational Retired   Lifestyle   Autonomy Pt reports (I) with ADLs, IADLs, and functional mobility. Pt +  and retired   Reciprocal Relationships family   Service to Others retired   ADL   Where Assessed Edge of bed   Eating Assistance 6  Modified independent   Grooming  Assistance 6  Modified Independent   UB Bathing Assistance 6  Modified Independent   LB Bathing Assistance 6  Modified Independent   UB Dressing Assistance 6  Modified independent   LB Dressing Assistance 6  Modified independent   Toileting Assistance  6  Modified independent   Functional Assistance 6  Modified independent   Bed Mobility   Additional Comments pt OOB in chair   Transfers   Sit to Stand 6  Modified independent   Stand to Sit 6  Modified independent   Additional Comments no AD   Functional Mobility   Functional Mobility 6  Modified independent   Additional Comments Pt MOD IND to ambulate household distance functional mobility without AD   Balance   Static Sitting Good   Dynamic Sitting Good   Static Standing Good   Dynamic Standing Good   Ambulatory Good   Activity Tolerance   Activity Tolerance Patient tolerated treatment well   Medical Staff Made Aware PT   Nurse Made Aware RN Cleared   RUE Assessment   RUE Assessment WFL   LUE Assessment   LUE Assessment WFL   Hand Function   Gross Motor Coordination Functional   Fine Motor Coordination Functional   Cognition   Overall Cognitive Status WFL   Arousal/Participation Alert;Responsive;Cooperative   Attention Within functional limits   Orientation Level Oriented X4   Memory Within functional limits   Following Commands Follows all commands and directions without difficulty   Comments Pt agreeable to therapy with good safety awareness and insight to condition   Assessment   Prognosis Good   Assessment Pt is a 65 y/o male that was admitted to [facility] 12/3/2024 with acute exacerbation of chronic heart failure. Pt  has a past medical history of Bacteremia due to Staphylococcus, CHF (congestive heart failure) ([facility]), Diabetes mellitus ([facility]), Infected defibrillator ([facility]), and Myocardial infarction ([facility]). Pt lives with spouse and son in a one level mobile home with 3 CONNIE, standard toilet, and walk in shower with shower chair. Pt reports using no AD.  Prior to admission pt (I) ADLs, IADLs, and functional mobility. Pt currently MOD IND to complete ADLs, functional transfers, and household distance functional mobility without AD. Pt supine in bed at begning of session, pt seated in bedside chair at end of session with alarm set and items within reach. The patient's raw score on the AM-PAC Daily Activity Inpatient Short Form is 24. A raw score of greater than or equal to 19 suggests the patient may benefit from discharge to home. Please refer to the recommendation of the Occupational Therapist for safe discharge planning. Pt appears to be functioning at/close to baseline, further OT services not indicated at this time. Will d/c OT services, please re-consult if OT needs arise.   Goals   Patient Goals to go home   Plan   OT Frequency Eval only   Discharge Recommendation   Rehab Resource Intensity Level, OT No post-acute rehabilitation needs   AM-PAC Daily Activity Inpatient   Lower Body Dressing 4   Bathing 4   Toileting 4   Upper Body Dressing 4   Grooming 4   Eating 4   Daily Activity Raw Score 24   Daily Activity Standardized Score (Calc for Raw Score >=11) 57.54   AM-PAC Applied Cognition Inpatient   Following a Speech/Presentation 4   Understanding Ordinary Conversation 4   Taking Medications 4   Remembering Where Things Are Placed or Put Away 4   Remembering List of 4-5 Errands 4   Taking Care of Complicated Tasks 4   Applied Cognition Raw Score 24   Applied Cognition Standardized Score 62.21   End of Consult   Education Provided Yes   Patient Position at End of Consult Bedside chair;All needs within reach   Nurse Communication Nurse aware of consult     BRITANY Byrd, OTR/L

## 2024-12-04 NOTE — ASSESSMENT & PLAN NOTE
Wt Readings from Last 3 Encounters:   12/04/24 119 kg (262 lb)   11/19/24 122 kg (270 lb)   11/11/24 121 kg (266 lb 1.6 oz)   Continue goal-directed medical therapies  Appreciate SOD and heart failure management

## 2024-12-04 NOTE — ASSESSMENT & PLAN NOTE
Has a [employer] subcutaneous ICD in place  Had to have initial ICD removed due to device infection

## 2024-12-04 NOTE — CASE MANAGEMENT
Case Management Assessment & Discharge Planning Note    Patient name Nadir Myers  Location [facility]-01 MRN [id number]  : 1960 Date 2024       Current Admission Date: 12/3/2024  Current Admission Diagnosis:Acute exacerbation of chronic heart failure (HCC)   Patient Active Problem List    Diagnosis Date Noted Date Diagnosed    Acute exacerbation of chronic heart failure ([facility]) 2024     Hyperlipemia 2024     Anemia 2024     Leukocytosis 2024     Benign hypertension with CKD (chronic kidney disease) stage III ([facility]) 10/15/2024     IgG gammopathy 10/15/2024     Hepatitis C antibody test positive 10/10/2024     Asymptomatic microscopic hematuria 10/10/2024     CKD stage 3a, GFR 45-59 ml/min ([facility]) 10/08/2024     Persistent proteinuria 10/08/2024     Acute kidney injury ([facility]) 10/07/2024     Advanced care planning/counseling discussion 2024     Type 2 diabetes mellitus with diabetic peripheral angiopathy without gangrene ([facility]) 2024     Amputated 4th toe, left ([facility]) 04/10/2024     ICD (implantable cardioverter-defibrillator) in place 2024     Abnormal CT of the chest 2024     Type 2 diabetes mellitus with foot ulcer, with long-term current use of insulin ([facility]) 2024     CAD (coronary artery disease) 2024     PAD (peripheral artery disease) ([facility]) 2024     Chronic HFimpEF, stage C, NYHA II 2023     V-tach ([facility]) 2023     Ischemic cardiomyopathy 2023     Hypertension 2023     Acute HFrEF 2023     Benign prostatic hyperplasia without lower urinary tract symptoms 2018     Type 2 diabetes mellitus, with long-term current use of insulin ([facility]) 2015     GERD (gastroesophageal reflux disease) 2013     Tobacco dependence syndrome 2012       LOS (days): 1  Geometric Mean LOS (GMLOS) (days): 3.9  Days to GMLOS:2.8     OBJECTIVE:    Risk of Unplanned Readmission Score: 24.01         Current  admission status: Inpatient       Preferred Pharmacy:   [id number] [place] [facility] - [place] - [address]  [address]  [facility] [address]  Phone: [phone] Fax: [phone]    [facility]  [place], [place] - [address]  [address]  [place] [address]  Phone: [phone] Fax: [phone]    Primary Care Provider: Beau Lemus MD    Primary Insurance: [employer] REP  Secondary Insurance:     ASSESSMENT:  Active Health Care Proxies       Meredith Morel Health Care Agent - Daughter   Primary Phone: [phone] (Mobile)                 Advance Directives  Does patient have a Health Care POA?: Yes  Does patient have Advance Directives?: Yes  Advance Directives: Living will, Power of  for health care  Primary Contact: Nereyda Morel/Daughter ([phone])    Readmission Root Cause  30 Day Readmission: No    Patient Information  Admitted from:: Home  Mental Status: Alert  During Assessment patient was accompanied by: Not accompanied during assessment  Assessment information provided by:: Patient  Primary Caregiver: Self  Support Systems: Spouse/significant other, Daughter, Son  County of Residence: [place]  What city do you live in?: [place]  Home entry access options. Select all that apply.: Stairs  Number of steps to enter home.: 3  Do the steps have railings?: Yes  Type of Current Residence: [facility]  Living Arrangements: Lives w/ Spouse/significant other  Is patient a ?: No    Activities of Daily Living Prior to Admission  Functional Status: Independent  Completes ADLs independently?: Yes  Ambulates independently?: Yes  Does patient use assisted devices?: Yes  Assisted Devices (DME) used: Straight Cane  Does patient currently own DME?: Yes  What DME does the patient currently own?: Straight Cane, Walker  Does patient have a history of Outpatient Therapy (PT/OT)?: Yes  Does the patient have a history of Short-Term Rehab?: No  Does patient  have a history of HHC?: Yes ([facility])  Does patient currently have HHC?: No    Patient Information Continued  Income Source: SSI/SSD  Does patient have prescription coverage?: Yes  Does patient have a history of substance abuse?: No  Does patient have a history of Mental Health Diagnosis?: No    Means of Transportation  Means of Transport to Appts:: Drives Self    DISCHARGE DETAILS:    Discharge planning discussed with:: Patient     CM contacted family/caregiver?: No- see comments (Pt reports keeping family updated)  Were Treatment Team discharge recommendations reviewed with patient/caregiver?: Yes  Did patient/caregiver verbalize understanding of patient care needs?: Yes  Were patient/caregiver advised of the risks associated with not following Treatment Team discharge recommendations?: Yes    Contacts  Patient Contacts: Patient  Contact Method: In Person  Reason/Outcome: Continuity of Care, Emergency Contact, Discharge Planning    Other Referral/Resources/Interventions Provided:  Interventions: None Indicated    Treatment Team Recommendation: Home, Outpatient Rehab  Discharge Destination Plan:: Home, Outpatient Rehab  Transport at Discharge : Family     Additional Comments: CM met with pt at bedside who reports that he resides with his wife and son in a 1 story house that has 3 CONNIE. Pt reports being independent with ADLs and uses a cane for ambulation, also has access to a rolling walker. Pt reports a history of OPPT and HHC with [facility], no history of STR. No indicated history of treatment for MH or D/A concerns. CM reviewed level 3 recommendation with pt who is in agreement with OPPT. Pt reports that he's done OPPT in the past and does not need an OPPT list. Pt's son to transport home at time of discharge. CM department to remain available for additional discharge concerns or needs.

## 2024-12-04 NOTE — DISCHARGE SUMMARY
INTERNAL MEDICINE RESIDENCY DISCHARGE SUMMARY     Nadir Myers   64 y.o. male  MRN: [id number]  Room/Bed: [facility] 428/[facility] 428-01     [facility] BE [facility] 4 MED SURG/SD   Encounter: [id number]    Principal Problem:    Acute exacerbation of chronic heart failure (HCC)  Active Problems:    GERD (gastroesophageal reflux disease)    Ischemic cardiomyopathy    V-tach (HCC)    Chronic HFimpEF, stage C, NYHA II    CAD (coronary artery disease)    PAD (peripheral artery disease) (formerly [facility])    ICD (implantable cardioverter-defibrillator) in place    Type 2 diabetes mellitus with diabetic peripheral angiopathy without gangrene (HCC)    CKD stage 3a, GFR 45-59 ml/min (HCC)    Persistent proteinuria    Hepatitis C antibody test positive    Benign hypertension with CKD (chronic kidney disease) stage III (HCC)    Hyperlipemia    Anemia    Leukocytosis      * Acute exacerbation of chronic heart failure (HCC)  Assessment & Plan  Wt Readings from Last 3 Encounters:   12/04/24 119 kg (262 lb)   11/19/24 122 kg (270 lb)   11/11/24 121 kg (266 lb 1.6 oz)   Assessment:  -EF of 40% on 6/3/2024. Presents with worsening SOB, +~30 lb weight gain,, ~+7 lb since last week.  Measures weight weekly.  For GDMT, he is on Toprol-XL 50 mg BID, Jardiance 10 mg QD, Entrsto (49 or 97?) PTA, and was previously on torsemide but this was held in October 2/2 LINDEN he developed during his last admission. Follows with HF team.  S/p ICD placement (subcutaneous, did have transvenous ICD in the past but was removed 2/2 infection).   -BNP: 1899. CXR 12/3/2024: Mild prominence of the heart which is stable from prior.  Fortunately. His only symptom is shortness of breath at rest (he said not with exertion).  -Was seen by nephrology who felt he would be best served with dose of IV diuretics (40 mg IV Lasix) followed by restarting his torsemide the following day with discharge if he is doing well.      Plan:  -Admit to SOD C with  telemetry  -Diuresis with IV Lasix, 40 mg, will be restarting torsemide at 20 mg QD tomorrow per HF and discontinuing Lasix  -Will continue PTA Toprol-XL, 50 mg BID  -Continue Jardiance, 10 mg QD  -Continue Entresto, 49-51 mg QD, can take higher dose once discharged  -Strict I/O's  -Daily weights  -BMP, mag, Phos labs tomorrow  -Discussed nutrition referral once discharged to discuss proper diet, which he was very interested in  -BNP on Monday, 12/9/2024          Hyperlipemia  Assessment & Plan  Hx HLD and does have CAD with hx of MI s/p PCI in 2015. Most recent lipid panel on 10/14/2024: Cholesterol: 144, Triglycerides: 194, HDL: 36, LDL: 69. LDL not at goal of <55 (secondary prevention 2/2 hx MI, CAD and T2DM). On atorvastatin 80 mg PTA.     Plan  -Continue atorvastatin, 80 mg QD  -Consider starting ezetimibe, 10 mg QD for better control of LDL    CKD stage 3a, GFR 45-59 ml/min ([facility])  Assessment & Plan  Lab Results   Component Value Date    EGFR 39 12/04/2024    EGFR 44 12/03/2024    EGFR 44 10/24/2024    CREATININE 1.79 (H) 12/04/2024    CREATININE 1.63 (H) 12/03/2024    CREATININE 1.61 (H) 10/24/2024   Assessment:  Hx Stage IIIA CKD with prior baseline ~1.2-1.3). Over the last couple of months though, his Cr has jumped to around 1.7-1.9. This limited what meds he could take for his other co-morbilities.  While it sometimes takes months for creatinine to return to baseline, there is a risk that this is his new baseline.  Not attempted to call this an LINDEN as a result.  Was seen by nephrology during his hospitalization and after discharge. During his last visit with nephrology on 10/15/24, he got the go-ahead to restart Jardiance.  Today, he was evaluated by nephrology who recommended IV diuresis with plan to restart torsemide tomorrow.      Plan:  -Avoid nephrotoxic agents, prolonged hypotension  -Patient will be restarted on torsemide  -Trend BMP daily      Type 2 diabetes mellitus with diabetic peripheral  "angiopathy without gangrene (HCC)  Assessment & Plan  Lab Results   Component Value Date    HGBA1C 8.9 (H) 10/14/2024       Recent Labs     12/03/24  1815 12/03/24  2202 12/04/24  0938 12/04/24  1136   POCGLU 259* 164* 200* 202*         Blood Sugar Average: Last 72 hrs:  (P) 206.25  Assessment:  Last A1c on 10/14/24: 8.9 (was 7.1 of 5/14/24 and 9.8 on 1/31/24) .On metformin, 30U Lantus QHS (PCP wanted this increased to 40U which he did not do) , and Janumet,  mg BID. During last hospitalization, glucose was within goal at 30U Lantus QHS and ISS.    Plan:  -Lantus 24U QHS  -ISS  -Holding metformin, sitagliptin-metformin  -Can continue Entresto  -Goal glucose: 140-180  -Carb control diet  -Hypoglycemia protocol  -Nutrition referral when in outpatient setting    ICD (implantable cardioverter-defibrillator) in place  Assessment & Plan  Assessment and plan:  -Placed after episode of V-fib  -Monitored on telemetry and patient, no events detected    PAD (peripheral artery disease) ([facility])  Assessment & Plan  Assessment and plan:  -Continue aspirin, Plavix, statin    CAD (coronary artery disease)  Assessment & Plan  Hx MI s/p cardiac cath (which showed  of LAD) and PCI (mid-Lcx) in 2/2015). Currently on Aspirin 81 mg, Plavix 75 mg, and atorvastatin 80 mg.  Currently denies any chest pain.  No signs of ischemia on EKG.    Plan  -Continue ASA, 81 mg QD  -Continue Plavix, 75 mg QD  -Continue atorvastatin, 80 mg QD    Chronic HFimpEF, stage C, NYHA II  Assessment & Plan  Wt Readings from Last 3 Encounters:   12/04/24 119 kg (262 lb)   11/19/24 122 kg (270 lb)   11/11/24 121 kg (266 lb 1.6 oz)     See \"Acute exacerbation of chronic heart failure\"        V-tach ([facility])  Assessment & Plan  Etiology most likely scar mediated following MI. S/p ICD (multiple have been placed, most recently on 5/22/24). Currently on Toprol XL, amiodarone, 200 mg QD. Follows with EP. Was due to start Class III antiarrhytmic (e.g., dofetilide, " "sotolol), in fact it was the purpose of his admission on 10/7/2024, but did not 2/2 his poor kidney function.  No new concerning arrhythmia on EKG.  ICD interrogated in ED.    Plan  -Place on telemetry  -Continue Amiodarone, 200 mg QD  -Continue Toprol-XL    Ischemic cardiomyopathy  Assessment & Plan  See plan under CAD    GERD (gastroesophageal reflux disease)  Assessment & Plan  History of GERD, complaining of 1 day history of burning abdominal pain.  Symptoms appear consistent with GERD, will start Protonix.    Plan  -Started pantoprazole, 40 mg QHS        DETAILS OF HOSPITAL COURSE     HPI as per Dr. Whalen, \"64 y.o. male with a PMHx of HFrEF with last EF 40% in June 2024, CAD on DAPT, ventricular tachycardia s/p ICD placement in May 2024, CKD stage IIIa, HLD, and T2DM who presents with a chief complaint of shortness of breath for the past 2 days. Patient states that he has had difficulty breathing when lying down at night with frequent nighttime awakenings which gets better upon sitting up. Has prior hospitalizations for HF exacerbations. He also reports an associated burning abdominal pain in the epigastric area when sitting up which lasts for a minute and goes away on its own, and increased abdominal distension. Has prior hx of GERD. He also endorses a dry, nonproductive cough which also started 2 days ago. Patient states that he has gained 30 pounds in the past 2 months. He denies any recent illnesses or any sick contacts. He also denies chest pain, dizziness, fever, rhinorrhea, sore throat, N/V/D.\"    During hospital stay, he was diuresed with IV Lasix 40 mg bid with good response and significant improvement of his symptoms. Nephrology was following. Heart failure was also consulted as they were following him closely outpatient and recommended Torsemide 20 mg daily along with his GDMT on discharge.    Today on the day of discharge, he reports he feels back to his baseline with no acute physical " complaints. Denies any fever, chills, chest pain, SOB, palpitation, abdominal pain, n/v, lightheadedness/dizziness. He was evaluated by PT with no rehab needs. He is medically stable to be discharged home.        Physical Exam  Vitals and nursing note reviewed.   Constitutional:       Appearance: Normal appearance. He is obese.   Cardiovascular:      Rate and Rhythm: Normal rate and regular rhythm.      Pulses: Normal pulses.      Heart sounds: Normal heart sounds.   Pulmonary:      Effort: Pulmonary effort is normal.      Comments: Diminished on left  Abdominal:      General: Bowel sounds are normal.      Palpations: Abdomen is soft.   Musculoskeletal:      Right lower leg: No edema.      Left lower leg: No edema.   Neurological:      General: No focal deficit present.      Mental Status: He is alert and oriented to person, place, and time.           DISCHARGE INFORMATION     PCP at Discharge: Beau Lemus MD    Admitting Provider: Beau Lemus MD  Admission Date: 12/3/2024    Discharge Provider: Beau Lemus MD  Discharge Date: 12/4/2024    Discharge Disposition: Home/Self Care  Discharge Condition: stable  Discharge with Lines: no    Discharge Diet: cardiac diet and diabetic diet  Activity Restrictions: none  Test Results Pending at Discharge: None    Discharge Diagnoses:  Principal Problem:    Acute exacerbation of chronic heart failure (HCC)  Active Problems:    GERD (gastroesophageal reflux disease)    Ischemic cardiomyopathy    V-tach (HCC)    Chronic HFimpEF, stage C, NYHA II    CAD (coronary artery disease)    PAD (peripheral artery disease) ([facility])    ICD (implantable cardioverter-defibrillator) in place    Type 2 diabetes mellitus with diabetic peripheral angiopathy without gangrene (HCC)    CKD stage 3a, GFR 45-59 ml/min (HCC)    Persistent proteinuria    Hepatitis C antibody test positive    Benign hypertension with CKD (chronic kidney disease) stage III (HCC)    Hyperlipemia    Anemia     Leukocytosis  Resolved Problems:    * No resolved hospital problems. *      Consulting Providers:      Diagnostic & Therapeutic Procedures Performed:  XR chest 2 views  Result Date: 12/3/2024  Impression: Small area of atelectasis versus minimal infiltrate in the right lower lateral lung field. Mild prominence of the heart which is stable from prior. Workstation performed: [id number]       Code Status: Level 1 - Full Code  Advance Directive & Living Will: <no information>  Power of :    POLST:      Medications:  Current Discharge Medication List        STOP taking these medications       sacubitril-valsartan (Entresto) 49-51 MG TABS Comments:   Reason for Stopping:             Current Discharge Medication List        START taking these medications    Details   pantoprazole (PROTONIX) 40 mg tablet Take 1 tablet (40 mg total) by mouth daily at bedtime  Qty: 30 tablet, Refills: 0    Associated Diagnoses: Dyspepsia      torsemide (DEMADEX) 10 mg tablet Take 2 tablets (20 mg total) by mouth daily Do not start before December 5, 2024.  Qty: 60 tablet, Refills: 3    Associated Diagnoses: Acute HFrEF (heart failure with reduced ejection fraction) ([facility]); Acute exacerbation of chronic heart failure ([facility])           Current Discharge Medication List        CONTINUE these medications which have NOT CHANGED    Details   amiodarone 200 mg tablet Take 1 tablet (200 mg total) by mouth daily  Qty: 90 tablet, Refills: 2    Associated Diagnoses: V-tach ([facility])      aspirin 81 mg chewable tablet Chew 1 tablet (81 mg total) daily Do not start before March 2, 2024.  Qty: 30 tablet, Refills: 0    Associated Diagnoses: PAD (peripheral artery disease) ([facility])      atorvastatin (LIPITOR) 80 mg tablet TAKE 1 TABLET BY MOUTH EVERY DAY AFTER DINNER  Qty: 90 tablet, Refills: 1    Associated Diagnoses: 3-vessel coronary artery disease      clopidogrel (PLAVIX) 75 mg tablet TAKE 1 TABLET BY MOUTH EVERY DAY  Qty: 100 tablet, Refills: 1     Associated Diagnoses: Foot ulcer ([facility])      Empagliflozin (Jardiance) 10 MG TABS tablet Take 1 tablet (10 mg total) by mouth every morning  Qty: 90 tablet, Refills: 3    Associated Diagnoses: Type 2 diabetes mellitus with foot ulcer, with long-term current use of insulin ([facility])      Insulin Glargine Solostar (Lantus SoloStar) 100 UNIT/ML SOPN Inject 0.3 mL (30 Units total) under the skin daily at bedtime  Qty: 15 mL, Refills: 5    Associated Diagnoses: Type 2 diabetes mellitus with other specified complication, with long-term current use of insulin ([facility])      metFORMIN (GLUCOPHAGE) 1000 MG tablet Take 1,000 mg by mouth      metoprolol succinate (TOPROL-XL) 50 mg 24 hr tablet TAKE 1 TABLET BY MOUTH EVERY 12 HOURS  Qty: 180 tablet, Refills: 1    Associated Diagnoses: ICD (implantable cardioverter-defibrillator) in place      sacubitril-valsartan (Entresto)  MG TABS Take 1 tablet by mouth 2 (two) times a day  Qty: 60 tablet, Refills: 2    Associated Diagnoses: Systolic congestive heart failure, unspecified HF chronicity ([facility])      sitaGLIPtin-metFORMIN (JANUMET)  MG per tablet Take 1 tablet by mouth 2 (two) times a day with meals  Qty: 180 tablet, Refills: 3    Associated Diagnoses: Type 2 diabetes mellitus with other specified complication, with long-term current use of insulin ([facility])      BD Pen Needle Micro U/F 32G X 6 MM MISC Inject under the skin in the morning  Qty: 100 each, Refills: 1    Associated Diagnoses: Type 2 diabetes mellitus with other specified complication, with long-term current use of insulin ([facility])             Allergies:  Allergies   Allergen Reactions    Vancomycin Rash     NOT AN ALLERGY - 1/31/24 patient experienced vancomycin infusion reaction, please extend duration of infusion time to prevent future infusion reactions       FOLLOW-UP     PCP Outpatient Follow-up:  Yes    Consulting Providers Follow-up:  yes  Heart failure and Nephrology      Active Issues Requiring Follow-up:  "  none    Discharge Statement:   I spent 45 minutes minutes discharging the patient. This time was spent on the day of discharge. I had direct contact with the patient on the day of discharge. Additional documentation is required if more than 30 minutes were spent on discharge.    Portions of the record may have been created with voice recognition software.  Occasional wrong word or \"sound a like\" substitutions may have occurred due to the inherent limitations of voice recognition software.  Read the chart carefully and recognize, using context, where substitutions have occurred.    ==  Miguel Owens DO  [facility]  Internal Medicine Resident PGY-3    "

## 2024-12-04 NOTE — ASSESSMENT & PLAN NOTE
LVEF in June 2024 - 40%  LVEF in February 2024 - EF of 45%  LVEF in May 2023 - EF of 10 to 15%, akinesis of the apex with aneurysm formation    Weight at last HF outpatient office is 266 LBS, since last year has gradually had increasing weight gain  On standing scale today is 262 LBS    Volume status: Patient examines euvolemic  Inpatient diuretic: Received IV Lasix 40 mg daily x 2  Outpatient diuretic: Used to be on torsemide 10 mg daily, however stopped this roughly 1 month ago    Home GDMT:   Entresto 49 - 51 mg twice daily  Toprol-XL 50 mg twice daily  Jardiance 10 mg's   Home GDMT is continued during admission  Not on MRA due to CKD    Device: ICD initially placed for secondary prevention of VT s/p explant due to device infection.      QRS : has known right bundle branch block.  QRS of 154ms    Now has has a [employer] subcutaneous ICD

## 2024-12-05 ENCOUNTER — TELEPHONE (OUTPATIENT)
Dept: NEPHROLOGY | Facility: CLINIC | Age: 64
End: 2024-12-05

## 2024-12-05 DIAGNOSIS — N18.30 BENIGN HYPERTENSION WITH CKD (CHRONIC KIDNEY DISEASE) STAGE III (HCC): ICD-10-CM

## 2024-12-05 DIAGNOSIS — I10 HYPERTENSION, UNSPECIFIED TYPE: ICD-10-CM

## 2024-12-05 DIAGNOSIS — N18.31 CKD STAGE 3A, GFR 45-59 ML/MIN (HCC): Primary | ICD-10-CM

## 2024-12-05 DIAGNOSIS — R31.21 ASYMPTOMATIC MICROSCOPIC HEMATURIA: ICD-10-CM

## 2024-12-05 DIAGNOSIS — N17.9 ACUTE KIDNEY INJURY (HCC): ICD-10-CM

## 2024-12-05 DIAGNOSIS — I12.9 BENIGN HYPERTENSION WITH CKD (CHRONIC KIDNEY DISEASE) STAGE III (HCC): ICD-10-CM

## 2024-12-05 NOTE — UTILIZATION REVIEW
NOTIFICATION OF ADMISSION DISCHARGE   This is a Notification of Discharge from [facility]. Please be advised that this patient has been discharge from our facility. Below you will find the admission and discharge date and time including the patient’s disposition.   UTILIZATION REVIEW CONTACT:  Giacomo Wallace  Utilization   Network Utilization Review Department  Phone: [phone] x carefully listen to the prompts. All voicemails are confidential.  Email: [email or web address]     ADMISSION INFORMATION  PRESENTATION DATE: 12/3/2024  9:09 AM  OBERVATION ADMISSION DATE: N/A  INPATIENT ADMISSION DATE: 12/3/24  1:25 PM   DISCHARGE DATE: 12/4/2024  6:44 PM   DISPOSITION:Home/Self Care    Network Utilization Review Department  ATTENTION: Please call with any questions or concerns to [phone] and carefully listen to the prompts so that you are directed to the right person. All voicemails are confidential.   For Discharge needs, contact Care Management DC Support Team at [phone] opt. 2  Send all requests for admission clinical reviews, approved or denied determinations and any other requests to dedicated fax number below belonging to the campus where the patient is receiving treatment. List of dedicated fax numbers for the Facilities:  FACILITY NAME UR FAX NUMBER   ADMISSION DENIALS (Administrative/Medical Necessity) [phone]   DISCHARGE SUPPORT TEAM ([facility]) [phone]   PARENT CHILD HEALTH (Maternity/NICU/Pediatrics) [phone]   [facility] [phone]   [facility] [phone]   [facility] [phone]   [facility] [phone]   [facility] [phone]   [facility] [phone]   [facility] [phone]   [facility] [phone]   [facility] [[email or web address]]  [facility] [phone]   [facility] [phone]   [facility] [phone]   [facility] [phone]

## 2024-12-05 NOTE — TELEPHONE ENCOUNTER
----- Message from CARLA Gaston sent at 12/5/2024  8:20 AM EST -----  Patient discharged from [facility] with CKD mated with CHF and diuretics were changed.  Please get BMP in 4 weeks and return to office in 4 to 6 weeks for CKD and volume management  adonay

## 2024-12-05 NOTE — PROGRESS NOTES
Advanced Heart Failure / Pulmonary Hypertension Outpatient Visit    Nadir Myers 64 y.o. male   MRN: [id number]  Encounter: [id number]    Assessment:  Patient Active Problem List    Diagnosis Date Noted    Acute exacerbation of chronic heart failure (HCC) 12/03/2024    Hyperlipemia 12/03/2024    Anemia 12/03/2024    Leukocytosis 12/03/2024    Benign hypertension with CKD (chronic kidney disease) stage III ([facility]) 10/15/2024    IgG gammopathy 10/15/2024    Hepatitis C antibody test positive 10/10/2024    Asymptomatic microscopic hematuria 10/10/2024    CKD stage 3a, GFR 45-59 ml/min ([facility]) 10/08/2024    Persistent proteinuria 10/08/2024    Acute kidney injury ([facility]) 10/07/2024    Advanced care planning/counseling discussion 07/18/2024    Type 2 diabetes mellitus with diabetic peripheral angiopathy without gangrene ([facility]) 06/05/2024    Amputated 4th toe, left ([facility]) 04/10/2024    ICD (implantable cardioverter-defibrillator) in place 02/21/2024    Abnormal CT of the chest 02/09/2024    Type 2 diabetes mellitus with foot ulcer, with long-term current use of insulin ([facility]) 01/16/2024    CAD (coronary artery disease) 01/16/2024    PAD (peripheral artery disease) ([facility]) 01/16/2024    Chronic HFimpEF, stage C, NYHA II 08/02/2023    V-tach ([facility]) 05/04/2023    Ischemic cardiomyopathy 05/03/2023    Hypertension 05/02/2023    Acute HFrEF 05/02/2023    Benign prostatic hyperplasia without lower urinary tract symptoms 04/11/2018    Type 2 diabetes mellitus, with long-term current use of insulin ([facility]) 02/27/2015    GERD (gastroesophageal reflux disease) 06/28/2013    Tobacco dependence syndrome 01/18/2012       Today's Plan:  Torsemide 20 mg daily-stable but slightly volume up. Just started taking diuretic yesterday. Weights stable at home  Toprol XL 50 BID, Entresto  BID, Jardiance 10 mg daily  Creat up on DC to 1.79.  Repeat BMP in 1 week.   Discussed low sodium diet, reduce snacking on salty foods, FR 2 Liters/day  Call   office with worsening HF symptoms, rapid weight gain  BMP in 1 week  RTO w/Dr. Samuels 12/16    Plan:  LINDEN on CKD 3  - unclear etiology: possibly pre-renal vs disease progression  - baseline Cr 1.2-1.4--last BMP --creat up to 1.79 on dc       HFrEF, LVEF 40%  -ETIOLOGY: ischemic CMP with large apical aneurysm with progression of CAD/late presenting MI with occluded mid LAD and LPDA. LV mildly dilated.         Volume status: slightly volume up on exam. Trace NP LE.  JVD @ 7.  Weights stable, no SOB.   WEights: 270 on admit-->on dc 262 lbs--> Today 264 lbs (264 lbs at home)  BP: 120/70, HR 74.   Last BMP : , K 3.6, creat 1.79                  VOLUME:  - home diuretic:  Torsemide 20 mg daily    GDMT:  --Beta-Blocker: Metoprolol succinate 50 mg BID  --ACEi, ARB or ARNi: Entresto 97/103 mg BID  --MRA:  elevated creat on DC--?future start?  --SGLT2-I: Jardiance 10 mg daily.     DEVICE:  - MDT ICD 5/12/23 for secondary prevention  - ICD explanted 2/8/24 for infection  - Extravascular ICD placed 2/20/24, though lead was in left pleural space and repositioning attempts on 2/21/24 unsuccessful.   - Now SQ ICD placed 5/21/24   -Interrogation 9/11/24:SUB Q: BATTERY VOLTAGE ADEQUATE (98%). PRESENTING RHYTHM: NSR @ 72 BPM. ELECTRODE PARAMETERS WITHIN NORMAL LIMITS (95 OHMS). NO SIGNIFICANT HIGH RATE EPISODES. NO PROGRAMMING CHANGES MADE TO DEVICE PARAMETERS.  NORMAL DEVICE FUNCTION       Advanced Therapies (If appropriate):  --Inotrope: was on milrinone 0.25 mcg/kg/min, titrated off  --LVAD/Transplant Candidacy: Current tobacco use prior to admission precludes heart transplant at this time- since quit. Moderately reduced RV function and mildly dilated LV concerning for LVAD. Mildly dilated LV also concerning given VT.   -Has not smoked since May 2    EKG- narrow QRS with bigeminy; inferior infarct, anterolateral infarct    TTE 6/3/24: LVEF 40%     Echo 2/1/24:  LVEF: 40%, akinetic segments  LVEDd: 5.1 cm  RV: normal     Echo  8/7/23:  LVEF: 40%  LVEDd: 4.1 cm  LV apex aneurysmal, no thrombus  RV: normal     LHC 5/3/23:   chronically occluded mid LAD and LPDA c/w echo findings of apical aneurysm, not amenable to PCI  Patent mid LCx stent   LVEDP 30 mmHg     Echocardiogram 5/2/23  LVEF:  10-15%, apical aneurysm  LVIDd: 5.6 cm  RV: normal  MR: mild  PASP: 49 mmHg     Echo 9/26/17:  LVEF: 65%        3-vessel CAD  - hx PCI 2015 Lcx  -Q wave MI, later presenting or silent LAD territory infarction   -no angina  - on ASA, plavix, statin  -may be able to stop Plavix at this point. Would defer to Dr. Samuels.   -recheck lipids now.         V-tach   - scar mediated  - toprol as above  - amiodarone 200 mg daily  -further AAD management per EP     Type 2 diabetes mellitus with foot ulcer, with long-term current use of insulin             Lab Results   Component Value Date     HGBA1C 7.1 (H) 05/14/2024                     Recent Labs     10/08/24  1052 10/08/24  1545 10/08/24  2032 10/09/24  0642   POCGLU 182* 195* 263* 150*         Blood Sugar Average: Last 72 hrs:  (P) [id number]     Nausea/Vomiting  -occurring at least once/month x 6+ months  -wakes patient up at night  -started on PPI, will see GI o/p     Bacteremia due to Staphylococcus  -2/2024, resolved.   - Felt most likely from foot infection.   ICD was involved and was extracted.  Possible pulmonary septic emboli.   -GILL 2/7/24: There is a 1.8 x 1.1 cm mobile mass affixed to the cardiac device lead as is passes through the right atrium. In the setting of bacteremia this is most likely represents infection.       HPI:  64-year-old male with past medical history as described above who presented electively on 10/7/2024 under the direction of our EP team for initiation of new antiarrhythmic therapy with plan to discontinue amiodarone.  Unfortunately admission labs drawn showed evidence of worsening renal function with a creatinine of up to 1.9.  This is above a baseline of 1.2-1.4.  He  reports feeling at his baseline with no acute complaints.  Has had no recent illness or any other acute issues.  See Dr. Samuels on 2024.  At that time plan was to increase his Entresto dose up to 97/103 mg twice daily however the new prescription was never received.  As such she has stayed on his prior dose of 49/51 twice daily and the remainder of his other GDMT as prescribed.  He does admit to periodic episodes of nausea/vomiting once every 3 weeks to a month.  He describes this as waking him up in the middle of the night after which she vomits and then returns to normal sleep.  Other than this he has been in his usual state of health with no complaints of chest pain, unusual shortness of breath, orthopnea or PND.  He has gained a substantial amount of weight but reports this is caloric in nature.      10/14/24. Presents for hospital follow up. Recent elective admission for the reasons noted above. All GDMT placed on hold. Creat remained above baseline but stable. Evaluated by nephrology who recommended holding Jardiance for now. Today he continues to feel well with no complaints. Admits to weight gain due to very poor eating habits. He is other wise feeling at his baseline. Will have his blood work drawn today. Sees nephrology tomorrow.      2024 patient presents for 1 month follow-up.  Feels well with the exception of persistent nausea vomiting that wakes him up at night.  States this occurs at least once per month if not more.  He has discussed this with multiple providers none of whom have able to diagnose him.  Does report his mother  of colon cancer in her early 70s.  He has not yet had an EGD or colonoscopy.  Also report substantial weight gain over recent weeks to months.  He attributes this to very poor eating habits    -24: hosp admit: who presents with a chief complaint of shortness of breath for the past 2 days. Patient states that he has had difficulty breathing when lying down  at night with frequent nighttime awakenings which gets better upon sitting up. Has prior hospitalizations for HF exacerbations. He also reports an associated burning abdominal pain in the epigastric area when sitting up which lasts for a minute and goes away on its own, and increased abdominal distension. Has prior hx of GERD. He also endorses a dry, nonproductive cough which also started 2 days ago. Patient states that he has gained 30 pounds in the past 2 months. He denies any recent illnesses or any sick contacts. He also denies chest pain, dizziness, fever, rhinorrhea, sore throat, N/V/D. During hospital stay, he was diuresed with IV Lasix 40 mg bid with good response and significant improvement of his symptoms. Nephrology was following. Heart failure was also consulted as they were following him closely outpatient and recommended Torsemide 20 mg daily along with his GDMT on discharge.    12/05/24:  1week Post hosp f/u: Patient states he is feeling good overall.  Weights at home stable--264 since d/c.  States he is following a low sodium diet, although admits to eating salty snacks at times.  Following a 2 liters/day FR.  He is voiding well on his current diuretic and states his SOB and orthopnea has improved.  Denies SOB, CP, palpitations, dizziness.     Past Medical History:   Diagnosis Date    Bacteremia due to Staphylococcus 10/09/2024    CHF (congestive heart failure) (HCC)     Diabetes mellitus (HCC)     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    Myocardial infarction (HCC)        Review of Systems   Constitutional:  Negative for activity change, appetite change and fatigue.   HENT: Negative.     Eyes: Negative.    Respiratory:  Negative for shortness of breath.    Cardiovascular:  Negative for chest pain, palpitations and leg swelling.   Gastrointestinal: Negative.  Negative for abdominal distention.   Endocrine: Negative.    Genitourinary:  Positive for frequency.        D/t diuretics   Musculoskeletal:  Negative.    Skin: Negative.    Allergic/Immunologic: Negative.    Neurological: Negative.    Hematological: Negative.    Psychiatric/Behavioral: Negative.         Allergies   Allergen Reactions    Vancomycin Rash     NOT AN ALLERGY - 1/31/24 patient experienced vancomycin infusion reaction, please extend duration of infusion time to prevent future infusion reactions         Current Outpatient Medications:     amiodarone 200 mg tablet, Take 1 tablet (200 mg total) by mouth daily, Disp: 90 tablet, Rfl: 2    aspirin 81 mg chewable tablet, Chew 1 tablet (81 mg total) daily Do not start before March 2, 2024., Disp: 30 tablet, Rfl: 0    atorvastatin (LIPITOR) 80 mg tablet, TAKE 1 TABLET BY MOUTH EVERY DAY AFTER DINNER, Disp: 90 tablet, Rfl: 1    BD Pen Needle Micro U/F 32G X 6 MM MISC, Inject under the skin in the morning, Disp: 100 each, Rfl: 1    clopidogrel (PLAVIX) 75 mg tablet, TAKE 1 TABLET BY MOUTH EVERY DAY, Disp: 100 tablet, Rfl: 1    Empagliflozin (Jardiance) 10 MG TABS tablet, Take 1 tablet (10 mg total) by mouth every morning, Disp: 90 tablet, Rfl: 3    Insulin Glargine Solostar (Lantus SoloStar) 100 UNIT/ML SOPN, Inject 0.3 mL (30 Units total) under the skin daily at bedtime, Disp: 15 mL, Rfl: 5    metFORMIN (GLUCOPHAGE) 1000 MG tablet, Take 1,000 mg by mouth, Disp: , Rfl:     metoprolol succinate (TOPROL-XL) 50 mg 24 hr tablet, TAKE 1 TABLET BY MOUTH EVERY 12 HOURS, Disp: 180 tablet, Rfl: 1    pantoprazole (PROTONIX) 40 mg tablet, Take 1 tablet (40 mg total) by mouth daily at bedtime, Disp: 30 tablet, Rfl: 0    sacubitril-valsartan (Entresto)  MG TABS, Take 1 tablet by mouth 2 (two) times a day, Disp: 60 tablet, Rfl: 2    sitaGLIPtin-metFORMIN (JANUMET)  MG per tablet, Take 1 tablet by mouth 2 (two) times a day with meals, Disp: 180 tablet, Rfl: 3    torsemide (DEMADEX) 10 mg tablet, Take 2 tablets (20 mg total) by mouth daily Do not start before December 5, 2024., Disp: 60 tablet, Rfl:  3    Social History     Socioeconomic History    Marital status: /Civil Union     Spouse name: Not on file    Number of children: Not on file    Years of education: Not on file    Highest education level: Not on file   Occupational History    Not on file   Tobacco Use    Smoking status: Former     Types: Cigarettes     Start date: 2024    Smokeless tobacco: Never   Vaping Use    Vaping status: Never Used   Substance and Sexual Activity    Alcohol use: Not Currently    Drug use: Never    Sexual activity: Not Currently     Partners: Female   Other Topics Concern    Not on file   Social History Narrative    Not on file     Social Drivers of Health     Financial Resource Strain: Not on file   Food Insecurity: Food Insecurity Present (2024)    Nursing - Inadequate Food Risk Classification     Worried About Running Out of Food in the Last Year: Never true     Ran Out of Food in the Last Year: Never true     Ran Out of Food in the Last Year: 3   Transportation Needs: No Transportation Needs (2024)    Nursing - Transportation Risk Classification     Lack of Transportation: Not on file     Lack of Transportation: 2   Physical Activity: Not on file   Stress: Not on file   Social Connections: Not on file   Intimate Partner Violence: Unknown (2024)    Nursing IPS     Feels Physically and Emotionally Safe: Not on file     Physically Hurt by Someone: Not on file     Humiliated or Emotionally Abused by Someone: Not on file     Physically Hurt by Someone: 2     Hurt or Threatened by Someone: 2   Housing Stability: At Risk (2024)    Nursing: Inadequate Housing Risk Classification     Has Housing: Not on file     Worried About Losing Housing: Not on file     Unable to Get Utilities: Not on file     Unable to Pay for Housing in the Last Year: 1     Has Housin     No family history on file.    Vitals:   Blood pressure 120/70, pulse 75, height 6' (1.829 m), weight 120 kg (264 lb 9.6 oz), SpO2  98%.    Wt Readings from Last 10 Encounters:   12/06/24 120 kg (264 lb 9.6 oz)   12/04/24 119 kg (262 lb)   11/19/24 122 kg (270 lb)   11/11/24 121 kg (266 lb 1.6 oz)   10/15/24 118 kg (260 lb)   10/14/24 122 kg (269 lb)   10/10/24 118 kg (260 lb)   10/07/24 116 kg (255 lb 4.7 oz)   09/05/24 113 kg (249 lb)   08/01/24 112 kg (247 lb)     Vitals:    12/06/24 1300   BP: 120/70   BP Location: Left arm   Patient Position: Sitting   Cuff Size: Large   Pulse: 75   SpO2: 98%   Weight: 120 kg (264 lb 9.6 oz)   Height: 6' (1.829 m)       Physical Exam  Constitutional:       Appearance: Normal appearance.   HENT:      Mouth/Throat:      Mouth: Mucous membranes are moist.   Eyes:      Extraocular Movements: Extraocular movements intact.   Neck:      Vascular: JVD present.      Comments: JVD @7  Cardiovascular:      Rate and Rhythm: Normal rate and regular rhythm.      Pulses:           Radial pulses are 2+ on the right side and 2+ on the left side.      Heart sounds: Normal heart sounds, S1 normal and S2 normal.      Comments: Trace LE NP  Pulmonary:      Effort: No respiratory distress.      Breath sounds: Normal air entry. Decreased breath sounds present.   Abdominal:      General: There is no distension.      Palpations: Abdomen is soft.   Musculoskeletal:      Cervical back: Neck supple.   Skin:     General: Skin is warm and dry.   Neurological:      Mental Status: He is alert and oriented to person, place, and time. Mental status is at baseline.   Psychiatric:         Behavior: Behavior is cooperative.         Cognition and Memory: Cognition normal.       Labs & Results:  Lab Results   Component Value Date    WBC 9.68 12/04/2024    HGB 9.7 (L) 12/04/2024    HCT 30.2 (L) 12/04/2024    MCV 90 12/04/2024     12/04/2024     Lab Results   Component Value Date    SODIUM 140 12/04/2024    K 3.6 12/04/2024     12/04/2024    CO2 25 12/04/2024    BUN 33 (H) 12/04/2024    CREATININE 1.79 (H) 12/04/2024    GLUC 154 (H)  12/04/2024    CALCIUM 8.2 (L) 12/04/2024     Lab Results   Component Value Date    INR 1.01 05/21/2024    INR 1.01 05/14/2024    INR 1.15 02/19/2024    PROTIME 13.2 05/21/2024    PROTIME 13.2 05/14/2024    PROTIME 14.6 (H) 02/19/2024     Lab Results   Component Value Date    BNP 1,899 (H) 12/03/2024        CARLA Saini

## 2024-12-06 ENCOUNTER — OFFICE VISIT (OUTPATIENT)
Dept: CARDIOLOGY CLINIC | Facility: CLINIC | Age: 64
End: 2024-12-06
Payer: COMMERCIAL

## 2024-12-06 ENCOUNTER — RA CDI HCC (OUTPATIENT)
Dept: OTHER | Facility: HOSPITAL | Age: 64
End: 2024-12-06

## 2024-12-06 VITALS
BODY MASS INDEX: 35.84 KG/M2 | OXYGEN SATURATION: 98 % | WEIGHT: 264.6 LBS | HEART RATE: 75 BPM | HEIGHT: 72 IN | DIASTOLIC BLOOD PRESSURE: 70 MMHG | SYSTOLIC BLOOD PRESSURE: 120 MMHG

## 2024-12-06 DIAGNOSIS — I50.21 ACUTE HFREF (HEART FAILURE WITH REDUCED EJECTION FRACTION) (HCC): ICD-10-CM

## 2024-12-06 DIAGNOSIS — I50.21 ACUTE HEART FAILURE WITH REDUCED EJECTION FRACTION (HFREF, <= 40%) (HCC): Primary | ICD-10-CM

## 2024-12-06 DIAGNOSIS — N18.31 CKD STAGE 3A, GFR 45-59 ML/MIN (HCC): ICD-10-CM

## 2024-12-06 PROCEDURE — 99214 OFFICE O/P EST MOD 30 MIN: CPT

## 2024-12-06 NOTE — TELEPHONE ENCOUNTER
Called pt and scheduled HFU appt on 1/20 at 7:30am with Yuliana Garces in the AO. Advised pt to complete labs in 4 weeks.

## 2024-12-06 NOTE — PATIENT INSTRUCTIONS
Continue Torsemide 20 mg daily and all other medications    Please weigh yourself every day (after emptying your bladder) and keep a detailed log of weights.   Contact the Heart Failure program at [phone] if you gain 3+ lbs overnight or 5+ lbs in 5-7 days.    Limit daily sodium/salt intake to 2000 mg daily to prevent fluid retention.    Avoid canned foods, fast food/Chinese food, and processed meats (hot dogs, lunch meat, and sausage etc.).   Caution with condiments.    Limit fluid intake to 2000 mL or 2 liters (about 60-65 ounces) daily.    Avoid electrolyte replacement drinks (such as Gatorade, Pedialyte, Propel, Liquid IV, etc.).    Bring complete list of medications and log of daily weights to your follow-up appointment.

## 2024-12-06 NOTE — PROGRESS NOTES
HCC coding opportunities          Chart Reviewed number of suggestions sent to Provider: 2     Patients Insurance   I11.0 and E11.65  Medicare Insurance: [employer] Medicare Complete

## 2024-12-09 ENCOUNTER — TELEPHONE (OUTPATIENT)
Dept: CARDIOLOGY CLINIC | Facility: CLINIC | Age: 64
End: 2024-12-09

## 2024-12-09 NOTE — TELEPHONE ENCOUNTER
Left message asking pt to call back to r/s 1/9/25 appt as Dr Samuels had a change in provider schedule. May offer HF AP for around same time frame as original appt. and next available with Dr Samuels.

## 2024-12-10 ENCOUNTER — OFFICE VISIT (OUTPATIENT)
Dept: INTERNAL MEDICINE CLINIC | Facility: OTHER | Age: 64
End: 2024-12-10
Payer: COMMERCIAL

## 2024-12-10 VITALS
TEMPERATURE: 97.8 F | SYSTOLIC BLOOD PRESSURE: 112 MMHG | DIASTOLIC BLOOD PRESSURE: 62 MMHG | WEIGHT: 262 LBS | BODY MASS INDEX: 35.53 KG/M2 | HEART RATE: 82 BPM | OXYGEN SATURATION: 97 %

## 2024-12-10 DIAGNOSIS — I50.9 ACUTE EXACERBATION OF CHRONIC HEART FAILURE (HCC): ICD-10-CM

## 2024-12-10 DIAGNOSIS — E78.2 MIXED HYPERLIPIDEMIA: ICD-10-CM

## 2024-12-10 DIAGNOSIS — N18.31 CKD STAGE 3A, GFR 45-59 ML/MIN (HCC): ICD-10-CM

## 2024-12-10 DIAGNOSIS — K21.9 GASTROESOPHAGEAL REFLUX DISEASE, UNSPECIFIED WHETHER ESOPHAGITIS PRESENT: ICD-10-CM

## 2024-12-10 DIAGNOSIS — I12.9 BENIGN HYPERTENSION WITH CKD (CHRONIC KIDNEY DISEASE) STAGE III (HCC): ICD-10-CM

## 2024-12-10 DIAGNOSIS — Z76.89 ENCOUNTER FOR SUPPORT AND COORDINATION OF TRANSITION OF CARE: Primary | ICD-10-CM

## 2024-12-10 DIAGNOSIS — I25.10 CORONARY ARTERY DISEASE INVOLVING NATIVE HEART, UNSPECIFIED VESSEL OR LESION TYPE, UNSPECIFIED WHETHER ANGINA PRESENT: ICD-10-CM

## 2024-12-10 DIAGNOSIS — Z79.4 TYPE 2 DIABETES MELLITUS WITH OTHER SPECIFIED COMPLICATION, WITH LONG-TERM CURRENT USE OF INSULIN (HCC): ICD-10-CM

## 2024-12-10 DIAGNOSIS — N18.30 BENIGN HYPERTENSION WITH CKD (CHRONIC KIDNEY DISEASE) STAGE III (HCC): ICD-10-CM

## 2024-12-10 DIAGNOSIS — I47.20 V-TACH (HCC): ICD-10-CM

## 2024-12-10 DIAGNOSIS — E11.69 TYPE 2 DIABETES MELLITUS WITH OTHER SPECIFIED COMPLICATION, WITH LONG-TERM CURRENT USE OF INSULIN (HCC): ICD-10-CM

## 2024-12-10 DIAGNOSIS — D64.9 ANEMIA, UNSPECIFIED TYPE: ICD-10-CM

## 2024-12-10 PROCEDURE — 99496 TRANSJ CARE MGMT HIGH F2F 7D: CPT

## 2024-12-10 NOTE — ASSESSMENT & PLAN NOTE
S/p ICD   Maintained on metoprolol succinate 50 mg twice daily and amiodarone 200 mg daily  Follows with EP, has appointment scheduled 12/13/2024

## 2024-12-10 NOTE — ASSESSMENT & PLAN NOTE
Hx MI s/p cardiac cath (which showed  of LAD) and PCI (mid-Lcx) in 2/2015).   Continue  on Aspirin 81 mg, Plavix 75 mg, and atorvastatin 80 mg   Follows with cardiology

## 2024-12-10 NOTE — PROGRESS NOTES
Transition of Care Visit  Name: Nadir Myers      : 1960      MRN: [id number]  Encounter Provider: Maritza Faria PA-C  Encounter Date: 12/10/2024   Encounter department: [facility] [place]    Assessment & Plan  Encounter for support and coordination of transition of care  Hospitalization discharge summary reviewed. Discussed with Dr. Lemus       Acute exacerbation of chronic heart failure (HCC)  Wt Readings from Last 3 Encounters:   12/10/24 119 kg (262 lb)   24 120 kg (264 lb 9.6 oz)   24 119 kg (262 lb)   Euvolemic on exam today  Has been compliant with low-salt diet, 2 L fluid restriction  Continue torsemide 20 mg daily, metoprolol XL 50 mg twice daily, Entresto  twice daily, Jardiance 10 daily  Creatinine up on discharge to 1.79.  Has repeat BMP scheduled for tomorrow  Has follow-up with Dr. Samuels on 2024.  Advised to call office for any weight gain, shortness of breath prior to this time         Mixed hyperlipidemia  Continue atorvastatin, 80 mg QD       CKD stage 3a, GFR 45-59 ml/min ([facility])  Lab Results   Component Value Date    EGFR 39 2024    EGFR 44 2024    EGFR 44 10/24/2024    CREATININE 1.79 (H) 2024    CREATININE 1.63 (H) 2024    CREATININE 1.61 (H) 10/24/2024   Creatinine above baseline, but has been stable.  This may be patient's new baseline  Nephrology consulted during hospitalization for medication review  Repeat BMP scheduled for tomorrow  Has follow-up with nephrology in January         Coronary artery disease involving native heart, unspecified vessel or lesion type, unspecified whether angina present  Hx MI s/p cardiac cath (which showed  of LAD) and PCI (mid-Lcx) in 2015).   Continue  on Aspirin 81 mg, Plavix 75 mg, and atorvastatin 80 mg   Follows with cardiology       V-tach ([facility])  S/p ICD   Maintained on metoprolol succinate 50 mg twice daily and amiodarone 200 mg daily  Follows with EP, has  appointment scheduled 12/13/2024       Gastroesophageal reflux disease, unspecified whether esophagitis present  Denies GERD symptoms today.  Continue Protonix 40 mg nightly       Type 2 diabetes mellitus with other specified complication, with long-term current use of insulin ([facility])    Lab Results   Component Value Date    HGBA1C 8.9 (H) 10/14/2024   Continue current diabetic regimen. Patient states he was taking both Janumet and metformin. I advised that he stop metformin.   Repeat A1C in 3 months   Encouraged following diabetic diet          Anemia, unspecified type  Baseline hemoglobin since February of this year 9-11, previously 14-16.   Hemoglobin on discharge 9.7  Patient does have upcoming appointment with hematology tomorrow       Benign hypertension with CKD (chronic kidney disease) stage III ([facility])  Lab Results   Component Value Date    EGFR 39 12/04/2024    EGFR 44 12/03/2024    EGFR 44 10/24/2024    CREATININE 1.79 (H) 12/04/2024    CREATININE 1.63 (H) 12/03/2024    CREATININE 1.61 (H) 10/24/2024   BP well-controlled.  Continue current regiment.  Avoid hypotension              History of Present Illness     Transitional Care Management Review:   Nadir Myers is a 64 y.o. male here for TCM follow up.     During the TCM phone call patient stated:  TCM Call       Date and time call was made  12/5/2024  2:43 PM    Hospital care reviewed  Records reviewed    Patient was hospitialized at  [facility]    Date of Admission  12/03/24    Date of discharge  12/04/24    Diagnosis  acute exacerbation of CHF    Disposition  Home    Current Symptoms  None          TCM Call       Post hospital issues  None    Scheduled for follow up?  Yes    Did you obtain your prescribed medications  Yes    Do you need help managing your prescriptions or medications  No    Is transportation to your appointment needed  No    I have advised the patient to call PCP with any new or worsening symptoms  Ayden Farr CMA       "    Patient is a 64-year-old male presenting to the office for TCM  He was admitted to [facility] from 12/3/2024 to 12/4/2024 for exacerbation of CHF    Hospital course:  \"HPI as per Dr. Whalen, \"64 y.o. male with a PMHx of HFrEF with last EF 40% in June 2024, CAD on DAPT, ventricular tachycardia s/p ICD placement in May 2024, CKD stage IIIa, HLD, and T2DM who presents with a chief complaint of shortness of breath for the past 2 days. Patient states that he has had difficulty breathing when lying down at night with frequent nighttime awakenings which gets better upon sitting up. Has prior hospitalizations for HF exacerbations. He also reports an associated burning abdominal pain in the epigastric area when sitting up which lasts for a minute and goes away on its own, and increased abdominal distension. Has prior hx of GERD. He also endorses a dry, nonproductive cough which also started 2 days ago. Patient states that he has gained 30 pounds in the past 2 months. He denies any recent illnesses or any sick contacts. He also denies chest pain, dizziness, fever, rhinorrhea, sore throat, N/V/D.\"     During hospital stay, he was diuresed with IV Lasix 40 mg bid with good response and significant improvement of his symptoms. Nephrology was following. Heart failure was also consulted as they were following him closely outpatient and recommended Torsemide 20 mg daily along with his GDMT on discharge.     Today on the day of discharge, he reports he feels back to his baseline with no acute physical complaints. Denies any fever, chills, chest pain, SOB, palpitation, abdominal pain, n/v, lightheadedness/dizziness. He was evaluated by PT with no rehab needs. He is medically stable to be discharged home.\"      Patient did see cardiology on 12/6/2024.  Advised to continue torsemide 20 mg daily, Toprol XL 50 mg twice daily, Entresto twice daily, Jardiance 10 mg daily  Repeat BMP 1 week, patient will be going for labs " tomorrow  Patient has been compliant with low-sodium diet, fluid restriction 2 L/day  Follow-up with Dr. Samuels scheduled 12/16/24  Today, patient denies shortness of breath, chest pain.  He denies weight gain, he has been monitoring his weights daily.  Denies episodes of hypotension or hypoglycemia        Review of Systems   Constitutional:  Negative for chills, fatigue and fever.   HENT:  Negative for congestion, ear pain, postnasal drip, rhinorrhea, sinus pressure, sore throat and trouble swallowing.    Eyes:  Negative for pain and visual disturbance.   Respiratory:  Negative for cough, chest tightness, shortness of breath and wheezing.    Cardiovascular:  Negative for chest pain, palpitations and leg swelling.   Gastrointestinal:  Negative for abdominal pain, blood in stool, constipation, diarrhea, nausea and vomiting.   Genitourinary:  Negative for difficulty urinating, dysuria, frequency, hematuria and urgency.   Neurological:  Negative for dizziness, weakness, light-headedness, numbness and headaches.   All other systems reviewed and are negative.    Objective   /62 (BP Location: Left arm, Patient Position: Sitting, Cuff Size: Standard)   Pulse 82   Temp 97.8 °F (36.6 °C) (Temporal)   Wt 119 kg (262 lb)   SpO2 97%   BMI 35.53 kg/m²     Physical Exam  Vitals and nursing note reviewed.   Constitutional:       General: He is not in acute distress.     Appearance: Normal appearance. He is not ill-appearing.   HENT:      Head: Normocephalic and atraumatic.      Right Ear: External ear normal.      Left Ear: External ear normal.      Nose: Nose normal.      Mouth/Throat:      Mouth: Mucous membranes are moist.      Pharynx: Oropharynx is clear.   Eyes:      General: No scleral icterus.     Conjunctiva/sclera: Conjunctivae normal.   Cardiovascular:      Rate and Rhythm: Normal rate and regular rhythm.      Pulses: Normal pulses.      Heart sounds: Normal heart sounds. No murmur heard.     No friction rub.  No gallop.   Pulmonary:      Effort: Pulmonary effort is normal. No respiratory distress.      Breath sounds: Normal breath sounds. No wheezing, rhonchi or rales.   Chest:      Chest wall: No tenderness.   Musculoskeletal:      Right lower leg: No edema.      Left lower leg: No edema.   Skin:     General: Skin is warm and dry.      Coloration: Skin is not pale.      Findings: No erythema.   Neurological:      General: No focal deficit present.      Mental Status: He is alert and oriented to person, place, and time. Mental status is at baseline.   Psychiatric:         Mood and Affect: Mood normal.         Behavior: Behavior normal.     Medications have been reviewed by provider in current encounter    Disclaimer: This note was generated with voice recognition software.  Phonetic, grammatical, and spelling errors may be present as a result.  Please contact provider with any concerns or questions

## 2024-12-10 NOTE — ASSESSMENT & PLAN NOTE
Baseline hemoglobin since February of this year 9-11, previously 14-16.   Hemoglobin on discharge 9.7  Patient does have upcoming appointment with hematology tomorrow

## 2024-12-10 NOTE — ASSESSMENT & PLAN NOTE
Lab Results   Component Value Date    HGBA1C 8.9 (H) 10/14/2024   Continue current diabetic regimen. Patient states he was taking both Janumet and metformin. I advised that he stop metformin.   Repeat A1C in 3 months   Encouraged following diabetic diet

## 2024-12-10 NOTE — ASSESSMENT & PLAN NOTE
Lab Results   Component Value Date    EGFR 39 12/04/2024    EGFR 44 12/03/2024    EGFR 44 10/24/2024    CREATININE 1.79 (H) 12/04/2024    CREATININE 1.63 (H) 12/03/2024    CREATININE 1.61 (H) 10/24/2024   Creatinine above baseline, but has been stable.  This may be patient's new baseline  Nephrology consulted during hospitalization for medication review  Repeat BMP scheduled for tomorrow  Has follow-up with nephrology in January

## 2024-12-10 NOTE — ASSESSMENT & PLAN NOTE
Lab Results   Component Value Date    EGFR 39 12/04/2024    EGFR 44 12/03/2024    EGFR 44 10/24/2024    CREATININE 1.79 (H) 12/04/2024    CREATININE 1.63 (H) 12/03/2024    CREATININE 1.61 (H) 10/24/2024   BP well-controlled.  Continue current regiment.  Avoid hypotension

## 2024-12-11 ENCOUNTER — RESULTS FOLLOW-UP (OUTPATIENT)
Dept: CARDIOLOGY CLINIC | Facility: CLINIC | Age: 64
End: 2024-12-11

## 2024-12-11 ENCOUNTER — APPOINTMENT (OUTPATIENT)
Dept: LAB | Facility: IMAGING CENTER | Age: 64
End: 2024-12-11
Payer: COMMERCIAL

## 2024-12-11 ENCOUNTER — CONSULT (OUTPATIENT)
Dept: HEMATOLOGY ONCOLOGY | Facility: CLINIC | Age: 64
End: 2024-12-11
Payer: COMMERCIAL

## 2024-12-11 VITALS
WEIGHT: 262 LBS | DIASTOLIC BLOOD PRESSURE: 70 MMHG | SYSTOLIC BLOOD PRESSURE: 120 MMHG | TEMPERATURE: 97.6 F | BODY MASS INDEX: 35.53 KG/M2 | HEART RATE: 87 BPM | OXYGEN SATURATION: 98 %

## 2024-12-11 DIAGNOSIS — I50.21 ACUTE HEART FAILURE WITH REDUCED EJECTION FRACTION (HFREF, <= 40%) (HCC): ICD-10-CM

## 2024-12-11 DIAGNOSIS — D47.2 BICLONAL GAMMOPATHY: Primary | ICD-10-CM

## 2024-12-11 DIAGNOSIS — N18.9 CHRONIC KIDNEY DISEASE, UNSPECIFIED CKD STAGE: ICD-10-CM

## 2024-12-11 DIAGNOSIS — N18.31 STAGE 3A CHRONIC KIDNEY DISEASE (HCC): ICD-10-CM

## 2024-12-11 DIAGNOSIS — N18.9 HISTORY OF ANEMIA DUE TO CHRONIC KIDNEY DISEASE: ICD-10-CM

## 2024-12-11 DIAGNOSIS — D47.2 IGG GAMMOPATHY: ICD-10-CM

## 2024-12-11 DIAGNOSIS — D53.9 NUTRITIONAL ANEMIA: ICD-10-CM

## 2024-12-11 DIAGNOSIS — I47.20 V-TACH (HCC): ICD-10-CM

## 2024-12-11 DIAGNOSIS — Z86.2 HISTORY OF ANEMIA DUE TO CHRONIC KIDNEY DISEASE: ICD-10-CM

## 2024-12-11 DIAGNOSIS — Z95.810 ICD (IMPLANTABLE CARDIOVERTER-DEFIBRILLATOR) IN PLACE: ICD-10-CM

## 2024-12-11 LAB
ALBUMIN SERPL BCG-MCNC: 3.4 G/DL (ref 3.5–5)
ALP SERPL-CCNC: 56 U/L (ref 34–104)
ALT SERPL W P-5'-P-CCNC: 24 U/L (ref 7–52)
ANION GAP SERPL CALCULATED.3IONS-SCNC: 6 MMOL/L (ref 4–13)
AST SERPL W P-5'-P-CCNC: 21 U/L (ref 13–39)
BILIRUB DIRECT SERPL-MCNC: 0.1 MG/DL (ref 0–0.2)
BILIRUB SERPL-MCNC: 0.23 MG/DL (ref 0.2–1)
BUN SERPL-MCNC: 50 MG/DL (ref 5–25)
CALCIUM ALBUM COR SERPL-MCNC: 9.3 MG/DL (ref 8.3–10.1)
CALCIUM SERPL-MCNC: 8.8 MG/DL (ref 8.4–10.2)
CHLORIDE SERPL-SCNC: 104 MMOL/L (ref 96–108)
CO2 SERPL-SCNC: 27 MMOL/L (ref 21–32)
CREAT SERPL-MCNC: 2.28 MG/DL (ref 0.6–1.3)
GFR SERPL CREATININE-BSD FRML MDRD: 29 ML/MIN/1.73SQ M
GLUCOSE P FAST SERPL-MCNC: 186 MG/DL (ref 65–99)
PHOSPHATE SERPL-MCNC: 3.8 MG/DL (ref 2.3–4.1)
POTASSIUM SERPL-SCNC: 5.2 MMOL/L (ref 3.5–5.3)
PROT SERPL-MCNC: 6.8 G/DL (ref 6.4–8.4)
SODIUM SERPL-SCNC: 137 MMOL/L (ref 135–147)
T4 FREE SERPL-MCNC: 1.1 NG/DL (ref 0.61–1.12)
TSH SERPL DL<=0.05 MIU/L-ACNC: 3.62 UIU/ML (ref 0.45–4.5)

## 2024-12-11 PROCEDURE — 82248 BILIRUBIN DIRECT: CPT

## 2024-12-11 PROCEDURE — 36415 COLL VENOUS BLD VENIPUNCTURE: CPT

## 2024-12-11 PROCEDURE — 84439 ASSAY OF FREE THYROXINE: CPT

## 2024-12-11 PROCEDURE — 84443 ASSAY THYROID STIM HORMONE: CPT

## 2024-12-11 PROCEDURE — 99204 OFFICE O/P NEW MOD 45 MIN: CPT

## 2024-12-11 PROCEDURE — 84100 ASSAY OF PHOSPHORUS: CPT

## 2024-12-11 NOTE — ASSESSMENT & PLAN NOTE
Lab Results   Component Value Date    EGFR 29 12/11/2024    EGFR 39 12/04/2024    EGFR 44 12/03/2024    CREATININE 2.28 (H) 12/11/2024    CREATININE 1.79 (H) 12/04/2024    CREATININE 1.63 (H) 12/03/2024       Orders:  •  Ambulatory referral to Interventional Radiology; Future  •  Bone Marrow Biopsy/Aspirate Exam; Future

## 2024-12-11 NOTE — ASSESSMENT & PLAN NOTE
Orders:  •  Ambulatory Referral to Hematology / Oncology  •  Ambulatory referral to Interventional Radiology; Future  •  Bone Marrow Biopsy/Aspirate Exam; Future

## 2024-12-11 NOTE — ASSESSMENT & PLAN NOTE
Orders:  •  Iron Panel (Includes Ferritin, Iron Sat%, Iron, and TIBC); Future  •  Vitamin B12; Future  •  Folate; Future  •  Erythropoietin; Future  •  Ambulatory referral to Interventional Radiology; Future  •  Bone Marrow Biopsy/Aspirate Exam; Future

## 2024-12-11 NOTE — TELEPHONE ENCOUNTER
----- Message from CARLA Hurst sent at 12/11/2024  2:52 PM EST -----  Mariluz,  Can you please check in on Vinod?  Mild LINDEN and wondering if becoming slightly dehydrated.  This need weight, volume, symptom check please.  Thank you    natalee

## 2024-12-11 NOTE — PROGRESS NOTES
Name: Nadir Myers      : 1960      MRN: [id number]  Encounter Provider: CARLA Cagle  Encounter Date: 2024   Encounter department: [facility] [place]  :  Assessment & Plan  IgG gammopathy    Orders:  •  Ambulatory Referral to Hematology / Oncology  •  Ambulatory referral to Interventional Radiology; Future  •  Bone Marrow Biopsy/Aspirate Exam; Future    History of anemia due to chronic kidney disease    Orders:  •  Iron Panel (Includes Ferritin, Iron Sat%, Iron, and TIBC); Future  •  Vitamin B12; Future  •  Folate; Future  •  Erythropoietin; Future  •  Ambulatory referral to Interventional Radiology; Future  •  Bone Marrow Biopsy/Aspirate Exam; Future    Nutritional anemia    Orders:  •  Vitamin B12; Future  •  Folate; Future    Chronic kidney disease, unspecified CKD stage  Lab Results   Component Value Date    EGFR 29 2024    EGFR 39 2024    EGFR 44 2024    CREATININE 2.28 (H) 2024    CREATININE 1.79 (H) 2024    CREATININE 1.63 (H) 2024       Orders:  •  Ambulatory referral to Interventional Radiology; Future  •  Bone Marrow Biopsy/Aspirate Exam; Future      Biclonal gammopathy    Orders:  •  Ambulatory referral to Interventional Radiology; Future  •  Bone Marrow Biopsy/Aspirate Exam; Future    64-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 Reviewed labs with patient and discussed plasma cells are the most prolific antibody-producing cells and with a plasma cell disorder/malignancy, they overproduce single antibody in the blood stream, monoclonal protein (M Claudio).      Our workup would begin by ruling out plasma cell disorders/malignancies. These include: monoclonal gammopathy (which is an asymptomatic premalignant plasma cell disorder); smoldering MM, MM, Waldenström's, amyloidosis, which is a condition that causes a build up of proteins in different organs and tissues. In addition, we also look at  FLC, Immunoglobulins and assess for CRAB criteria (hypercalcemia, Renal insufficiency, Anemia, lytic bone lesions) and symptoms. We reviewed the natural history of MGUS highlighting the fact that it has the potential for malignant transformation in approximately 1% of patients per year.    We discussed that due to his kidney function and anemia, recommend we proceed with a bone marrow biopsy to determine the disease state.  Reviewed the bone marrow biopsy procedure.  Patient and his wife are agreeable.    In addition, we reviewed the etiologies of anemia, which includes, but not limited to, blood loss, iron deficiency, diet, malabsorption, chronic kidney disease, bone marrow disorder/malignancies.  We discussed the etiology of his anemia likely secondary to chronic kidney disease.  We will further evaluate by obtaining iron studies, vitamin B12, folate and erythropoietin level.  I will call patient with the results.    We will see patient in the office with Dr. Brand in 5 to 6 weeks to review the results of the bone marrow biopsy and plan moving forward.  Patient and his wife are agreeable with this plan.  They are aware to contact us for any additional questions/concerns or worsening symptoms.    History of Present Illness   Chief Complaint   Patient presents with   • Consult     Nadir Myers is a 64 y.o. male who presents for initial consultation office biclonal gammopathy on 12/11/2024.  Patient is here with his wife.  He has a PMH significant for CHF, CAD, ischemic cardiomyopathy, history of MIs x 3, defibrillator, DM 2, BPH, CKD.    Patient was recently hospitalized for CHF exacerbation.  Patient is currently on torsemide.  On recent labs, patient was noted to have biclonal gammopathy, with worsening kidney function and anemia.  Patient is not taking any vitamins or supplements at this time.    Patient endorses fatigue.  Denies any dizziness, lightheadedness, CP, SOB, palpitations.  No fevers, frequent  infections, drenching night sweats, sinus infections, decreased appetite or unintentional weight loss.  Denies any headaches, blurry vision or neuropathy.  Patient denies abnormal bleeding: epistaxis, gingival bleeding, hematuria, dark tarry stools.    Patient has a 40-year smoking history, 1 pack a day, reports he quit in February 2024.  Patient does not drink alcohol.    Patient's recent PSA 2024 was 0.16.  He is not up-to-date on his colon cancer screening.  He is scheduled for consultation with gastroenterology later this month    Labs:  12/11/2024: Creatinine 2.28, EGFR 29, calcium 8.8    12/4/2024: Hgb 9.7, MCV 90, WBC 9.60, platelets 273,000, creatinine 1.79, EGFR 39    10/24/2024: Urine immunofixation shows no monoclonal gammopathy, IgM = 39, IgG = 946, IgA = 175    10/9/2024: Serum immunofixation shows biclonal gammopathy identified as IgG kappa (M peak 1 = 0.  1 8 g/dL) and IgG lambda, FLCR 2.47    Family history:  Mother = breast cancer and rectal cancer     Review of Systems   Constitutional:  Positive for fatigue. Negative for activity change, appetite change, chills, diaphoresis, fever and unexpected weight change.   HENT:  Negative for nosebleeds.    Eyes: Negative.    Respiratory:  Negative for cough and shortness of breath.    Cardiovascular:  Negative for chest pain, palpitations and leg swelling.   Gastrointestinal:  Negative for anal bleeding.   Genitourinary:  Negative for hematuria.   Musculoskeletal: Negative.  Negative for arthralgias.   Neurological:  Negative for dizziness, light-headedness and headaches.   Hematological: Negative.            Objective   /70 (BP Location: Left arm, Patient Position: Sitting, Cuff Size: Large)   Pulse 87   Temp 97.6 °F (36.4 °C) (Temporal)   Wt 119 kg (262 lb)   SpO2 98%   BMI 35.53 kg/m²     Physical Exam  Constitutional:       Appearance: Normal appearance.   HENT:      Head: Normocephalic.   Cardiovascular:      Rate and Rhythm: Regular rhythm.       Heart sounds: Normal heart sounds.   Pulmonary:      Breath sounds: Normal breath sounds.   Musculoskeletal:      Right lower leg: No edema.      Left lower leg: No edema.   Skin:     General: Skin is warm and dry.      Coloration: Skin is pale.   Neurological:      Mental Status: He is oriented to person, place, and time.   Psychiatric:         Mood and Affect: Mood normal.         Behavior: Behavior normal.         Thought Content: Thought content normal.         Judgment: Judgment normal.         Labs: I have reviewed the following labs:  Results for orders placed or performed in visit on 12/11/24   TSH, 3rd generation   Result Value Ref Range    TSH 3RD GENERATON 3.624 0.450 - 4.500 uIU/mL   T4, free   Result Value Ref Range    Free T4 1.10 0.61 - 1.12 ng/dL   Result Value Ref Range    Phosphorus 3.8 2.3 - 4.1 mg/dL   Bilirubin, direct   Result Value Ref Range    Bilirubin, Direct 0.10 0.00 - 0.20 mg/dL     I spent 50 minutes in chart review, counseling, coordination of care, and documentation.    This note has been generated by voice recognition software system.  Therefore, there may be spelling, grammar, and or syntax errors. Please contact if questions arise.

## 2024-12-11 NOTE — ASSESSMENT & PLAN NOTE
Orders:  •  Ambulatory referral to Interventional Radiology; Future  •  Bone Marrow Biopsy/Aspirate Exam; Future

## 2024-12-12 ENCOUNTER — TELEPHONE (OUTPATIENT)
Age: 64
End: 2024-12-12

## 2024-12-12 DIAGNOSIS — I50.21 ACUTE HFREF (HEART FAILURE WITH REDUCED EJECTION FRACTION) (HCC): ICD-10-CM

## 2024-12-12 DIAGNOSIS — I50.9 ACUTE EXACERBATION OF CHRONIC HEART FAILURE (HCC): ICD-10-CM

## 2024-12-12 RX ORDER — TORSEMIDE 10 MG/1
10 TABLET ORAL DAILY
Qty: 30 TABLET | Refills: 3 | Status: SHIPPED | OUTPATIENT
Start: 2024-12-12

## 2024-12-12 NOTE — RESULT ENCOUNTER NOTE
Advised. Verbally understood.  He will shaila;l the office if he has any cardiac issues or he he is needing to do the extra 10 mg of torsemide more then not.

## 2024-12-12 NOTE — TELEPHONE ENCOUNTER
Patient: Nadir Myers    Doctor: CARLA Hurst    Reason for call: Patient called stating that he was returning a phone call from [place]. I tried to transfer the call to [place], but she was not available. Patient said that he would wait for her to call him back. Please call the patient.    Call back number: [phone]

## 2024-12-12 NOTE — TELEPHONE ENCOUNTER
Spoke with pt. He states he is feeling fine. Denies any symptoms of dehydration. Wt is 261.3 lbs.    He said he had been on torsemide 10 mg qd but they increases that to 20 mg qd when he was in the hospital last week.    Taking: torsemide 20 mg qd               Jardiance 10 mg qd    Do you want him to decrease torsemide to 10 mg qd?    Please advise

## 2024-12-12 NOTE — TELEPHONE ENCOUNTER
Caller: Nadir Myers    Provider: Maria C Brody    Call back #: [phone]    Reason for call: Patient is returning Mariluz's call. I attempted to transfer patient to [place], but was unsuccessful. Patient stated that he will wait for a call back. Thank you!

## 2024-12-13 ENCOUNTER — TELEPHONE (OUTPATIENT)
Dept: HEMATOLOGY ONCOLOGY | Facility: CLINIC | Age: 64
End: 2024-12-13

## 2024-12-13 NOTE — PROGRESS NOTES
Heart Failure Outpatient Progress Note - Nadir Myers 64 y.o. male MRN: [id number]    @ Encounter: [id number]      Assessment/Plan:    Patient Active Problem List    Diagnosis Date Noted    Ischemic cardiomyopathy 05/03/2023    Acute exacerbation of chronic heart failure (HCC) 12/03/2024    Hyperlipemia 12/03/2024    Nutritional anemia 12/03/2024    Leukocytosis 12/03/2024    History of anemia due to chronic kidney disease 10/15/2024    Biclonal gammopathy 10/15/2024    Hepatitis C antibody test positive 10/10/2024    Asymptomatic microscopic hematuria 10/10/2024    CKD stage 3a, GFR 45-59 ml/min ([facility]) 10/08/2024    Persistent proteinuria 10/08/2024    Acute kidney injury ([facility]) 10/07/2024    Advanced care planning/counseling discussion 07/18/2024    Type 2 diabetes mellitus with diabetic peripheral angiopathy without gangrene ([facility]) 06/05/2024    Amputated 4th toe, left ([facility]) 04/10/2024    ICD (implantable cardioverter-defibrillator) in place 02/21/2024    Abnormal CT of the chest 02/09/2024    Type 2 diabetes mellitus with foot ulcer, with long-term current use of insulin ([facility]) 01/16/2024    CAD (coronary artery disease) 01/16/2024    PAD (peripheral artery disease) ([facility]) 01/16/2024    Chronic HFimpEF, stage C, NYHA II 08/02/2023    V-tach ([facility]) 05/04/2023    Hypertension 05/02/2023    Acute HFrEF 05/02/2023    Benign prostatic hyperplasia without lower urinary tract symptoms 04/11/2018    Type 2 diabetes mellitus, with long-term current use of insulin ([facility]) 02/27/2015    GERD (gastroesophageal reflux disease) 06/28/2013    Tobacco dependence syndrome 01/18/2012       Lead placement on EV ICD failed     # Chronic HFimpEF, Stage C, w/ decompensation due to medication hold  Impression: iCM with large apical aneurysm with progression of CAD/late presenting MI with occluded mid LAD and LPDA. LV mildly dilated. Started on milrinone 0.25mcg/kg/min 5/6/23 due to rising creatinine and worsening volume status, stopped  5/10/23. ,  EF since improved     Weight: 205 lbs on 6/5, 218 lbs--> 237 lbs--> 245 lbs--> 249 lbs--> 264 lbs  BNP: 6/3/24: 1250  3/7/24: 879  2/20/24: 1809  6/2/23: 279     Studies- personally reviewed by me  EKG- narrow QRS with bigeminy; inferior infarct, anterolateral infarct    Echo 6/3/24:  LVEF: 40%, akinetic apical anterior,inferior  LVEDd:  RV: normal  Mild MR    Echo 2/21/24:   LVEF: 45%  Akinetic segments     GILL 2/7/24: 1.8 x 1.1 cm mobile mass attached to ICD lead    Echo 2/1/24:  LVEF: 40%, akinetic segments  LVEDd: 5.1 cm  RV: normal     Echo 8/7/23:  LVEF: 40%  LVEDd: 4.1 cm  LV apex aneurysmal, no thrombus  RV: normal     LHC 5/3/23: chronically occluded mid LAD and LPDA c/w echo findings of apical aneurysm, not amenable to PCI  Patent mid LCx stent   LVEDP 30 mmHg     Echocardiogram 5/2/23  LVEF:  10-15%, apical aneurysm  LVIDd: 5.6 cm  RV: normal  MR: mild  PASP: 49 mmHg     Echo 9/26/17:  LVEF: 65%     Neurohormonal Blockade:  --Beta-Blocker: metoprolol succinate 50 mg BID  --ACEi, ARB or ARNi: Entresto 97/103 mg BID    (or SVR reduction)   --Aldosterone Receptor Blocker:   --SGLT2-I: Jardiance 10 mg daily  --Diuretic: torsemide 10 mg   Inpt:      Sudden Cardiac Death Risk Reduction:  MDT ICD 5/12/23 for secondary prevention  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   SQ ICD placed 5/21/24  Interrogation 9/11/24: no events  Interrogation 6/7/24: no events     Electrical Resynchronization:  Narrow QRS     Advanced Therapies (If appropriate):  --Inotrope: was on milrinone 0.25 mcg/kg/min, titrated off  --LVAD/Transplant Candidacy: Current tobacco use prior to admission precludes heart transplant at this time- since quit. Moderately reduced RV function and mildly dilated LV concerning for LVAD. Mildly dilated LV also concerning given VT.   Has not smoked  since May 2     # Staph bacteremia  GILL 2/7/24: There is a 1.8 x 1.1 cm mobile mass affixed to the cardiac device lead as is passes through the right atrium. In the setting of bacteremia this is most likely represents infection.   # New Q wave MI, later presenting or silent LAD territory infarction  Rx: plavix 75 mg  # hyperlipidemia-atorvastatin 80 mg  9/16/23: LDL 51, HDL 68  # current tobacco use- quit since hospital  # Ventricular tachycardia- scar mediated  Rx: amiodarone 200 mg daily  # CKD3, Cr 1.3 on 3/7 down from 1.96 on 2/28, 1.28, Cr 2.28 on 12/11  # hyponatremia- due to HF, , now up to 137 on 7/3/23  # DM2, HgA1C 7.1% on 9/16/23    TODAY'S PLAN:  LINDEN on 12/11 labs, Cr up to 2.28- appears pre renal  No lightheadedness  States Dr Maria would like to change his amiodarone to another med? Guessing tikosyn  Would like to have this soon    Continue current GDMT  GFR improved on last labs 6/3-   --2g sodium diet  - Daily weights    HPI:   64 year old male with known CAD with LHC in 2015 showing obstructive mid-LCx disease treated with PTCA/stening and residual non-obstructive disease in the LAD, now presented with 1-2 weeks of shortness of breath, leg swelling, and >20 lb weight gain in 1 week. He was suspected during a cardiology visit to be in rapid atrial fibrillation, and sent to the ED. He converted to sinus rhythm shortly following IV beta blocker administration.    Patient was taken [facility] which showed chronically occluded mid LAD and LPDA c/w echo findings of apical aneurysm, not amenable to PCI. There was a patent mid LCx stent. Echocardiogram showed severely reduced LV function with EF 10-15%, apical aneurysm, and LVIDd of 5.6 cm. VT was felt to be scar mediated. A MDT dual chamber AICD was placed. Heart failure specific GDMT was initiated and optimized.    He did require transient milrinone support during his hospitalization and this was weaned off     Since discharge he has had  orthostatic hypotension, lightheadedness and dizziness. No syncope. Entresto was cut back to 24/26 mg BID, spironolactone stopped and lasix stopped. He is on Jardiance.     S/p hospitalization for septic shock due to staph bacteremia due to lower extremity wound infection. Vegetation noted on ICD lead, led to lead extraction on 2/8/24. EV ICD placed 2/20 but unable to obtain proper lead placement despite adjustment attempts so was removed. Plan - sent home on LifeVest with plan for SQ ICD implant as outpt.     Interval History:  Hospital admit in Dec with SOB, mild exacerbation of CHF.   Weight up to 264 lbs but now down more recently  Review of Systems   Constitutional:  Negative for activity change, appetite change and fatigue. Unexpected weight change: wt up 19 lbs more.  HENT:  Negative for congestion and nosebleeds.    Eyes: Negative.    Respiratory:  Negative for cough, chest tightness and shortness of breath.    Cardiovascular:  Negative for chest pain, palpitations and leg swelling.   Gastrointestinal:  Negative for abdominal distention.   Endocrine: Negative.    Genitourinary: Negative.    Musculoskeletal: Negative.    Skin: Negative.    Neurological:  Negative for dizziness, syncope and weakness.   Hematological: Negative.    Psychiatric/Behavioral: Negative.         Past Medical History:   Diagnosis Date    Bacteremia due to Staphylococcus 10/09/2024    CHF (congestive heart failure) (HCC)     Diabetes mellitus (HCC)     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    Myocardial infarction (HCC)          Allergies   Allergen Reactions    Vancomycin Rash     NOT AN ALLERGY - 1/31/24 patient experienced vancomycin infusion reaction, please extend duration of infusion time to prevent future infusion reactions     .    Current Outpatient Medications:     amiodarone 200 mg tablet, Take 1 tablet (200 mg total) by mouth daily, Disp: 90 tablet, Rfl: 2    aspirin 81 mg chewable tablet, Chew 1 tablet (81 mg total) daily  Do not start before March 2, 2024., Disp: 30 tablet, Rfl: 0    atorvastatin (LIPITOR) 80 mg tablet, TAKE 1 TABLET BY MOUTH EVERY DAY AFTER DINNER, Disp: 90 tablet, Rfl: 1    BD Pen Needle Micro U/F 32G X 6 MM MISC, Inject under the skin in the morning, Disp: 100 each, Rfl: 1    clopidogrel (PLAVIX) 75 mg tablet, TAKE 1 TABLET BY MOUTH EVERY DAY, Disp: 100 tablet, Rfl: 1    Empagliflozin (Jardiance) 10 MG TABS tablet, Take 1 tablet (10 mg total) by mouth every morning, Disp: 90 tablet, Rfl: 3    Insulin Glargine Solostar (Lantus SoloStar) 100 UNIT/ML SOPN, Inject 0.3 mL (30 Units total) under the skin daily at bedtime, Disp: 15 mL, Rfl: 5    metoprolol succinate (TOPROL-XL) 50 mg 24 hr tablet, TAKE 1 TABLET BY MOUTH EVERY 12 HOURS, Disp: 180 tablet, Rfl: 1    pantoprazole (PROTONIX) 40 mg tablet, Take 1 tablet (40 mg total) by mouth daily at bedtime, Disp: 30 tablet, Rfl: 0    sacubitril-valsartan (Entresto)  MG TABS, Take 1 tablet by mouth 2 (two) times a day, Disp: 60 tablet, Rfl: 2    torsemide (DEMADEX) 10 mg tablet, Take 1 tablet (10 mg total) by mouth daily, Disp: 30 tablet, Rfl: 3    sitaGLIPtin-metFORMIN (JANUMET)  MG per tablet, Take 1 tablet by mouth 2 (two) times a day with meals (Patient taking differently: Take 1 tablet by mouth in the morning), Disp: 180 tablet, Rfl: 3    Social History     Socioeconomic History    Marital status: /Civil Union     Spouse name: Not on file    Number of children: Not on file    Years of education: Not on file    Highest education level: Not on file   Occupational History    Not on file   Tobacco Use    Smoking status: Former     Types: Cigarettes     Start date: 1/30/2024    Smokeless tobacco: Never   Vaping Use    Vaping status: Never Used   Substance and Sexual Activity    Alcohol use: Not Currently    Drug use: Never    Sexual activity: Not Currently     Partners: Female   Other Topics Concern    Not on file   Social History Narrative    Not on file      Social Drivers of Health     Financial Resource Strain: Not on file   Food Insecurity: Food Insecurity Present (2024)    Nursing - Inadequate Food Risk Classification     Worried About Running Out of Food in the Last Year: Never true     Ran Out of Food in the Last Year: Never true     Ran Out of Food in the Last Year: 3   Transportation Needs: No Transportation Needs (2024)    Nursing - Transportation Risk Classification     Lack of Transportation: Not on file     Lack of Transportation: 2   Physical Activity: Not on file   Stress: Not on file   Social Connections: Not on file   Intimate Partner Violence: Unknown (2024)    Nursing IPS     Feels Physically and Emotionally Safe: Not on file     Physically Hurt by Someone: Not on file     Humiliated or Emotionally Abused by Someone: Not on file     Physically Hurt by Someone: 2     Hurt or Threatened by Someone: 2   Housing Stability: At Risk (2024)    Nursing: Inadequate Housing Risk Classification     Has Housing: Not on file     Worried About Losing Housing: Not on file     Unable to Get Utilities: Not on file     Unable to Pay for Housing in the Last Year: 1     Has Housin       No family history on file.    Physical Exam:    Vitals:   Vitals:    24 1052   BP: 110/70   Pulse: 74   SpO2: 100%           Physical Exam  Constitutional:       Appearance: He is well-developed.   HENT:      Head: Normocephalic and atraumatic.   Eyes:      Pupils: Pupils are equal, round, and reactive to light.   Neck:      Vascular: No JVD.   Cardiovascular:      Rate and Rhythm: Normal rate and regular rhythm.      Heart sounds: No murmur heard.  Pulmonary:      Effort: Pulmonary effort is normal. No respiratory distress.      Breath sounds: Normal breath sounds.   Abdominal:      General: There is no distension.      Palpations: Abdomen is soft.      Tenderness: There is no abdominal tenderness.   Musculoskeletal:         General: Normal range of  "motion.      Cervical back: Normal range of motion.   Skin:     General: Skin is warm and dry.      Findings: No rash.   Neurological:      Mental Status: He is alert and oriented to person, place, and time.         Labs & Results:    Lab Results   Component Value Date    SODIUM 137 12/11/2024    K 5.2 12/11/2024     12/11/2024    CO2 27 12/11/2024    BUN 50 (H) 12/11/2024    CREATININE 2.28 (H) 12/11/2024    GLUC 154 (H) 12/04/2024    CALCIUM 8.8 12/11/2024     Lab Results   Component Value Date    WBC 9.68 12/04/2024    HGB 9.7 (L) 12/04/2024    HCT 30.2 (L) 12/04/2024    MCV 90 12/04/2024     12/04/2024     Lab Results   Component Value Date    NTBNP 5,942 (H) 05/02/2023      Lab Results   Component Value Date    CHOLESTEROL 144 10/14/2024     Lab Results   Component Value Date    HDL 36 (L) 10/14/2024    HDL 34 05/07/2015    HDL 43 02/17/2015     Lab Results   Component Value Date    TRIG 194 (H) 10/14/2024    TRIG 122 05/07/2015    TRIG 128 02/17/2015     No results found for: \"NONHDLC\"    EKG personally reviewed by Nash Hernandez DO.     Counseling / Coordination of Care  Time spent today 25 minutes.  Greater than 50% of total time was spent with the patient and / or family counseling and / or coordination of care. We went over current diagnosis, most recent studies and any changes in treatment.    Thank you for the opportunity to participate in the care of this patient.    NASH HERNANDEZ D.O.  DIRECTOR OF HEART FAILURE/ PULMONARY HYPERTENSION  MEDICAL DIRECTOR OF LVAD PROGRAM  [facility]        "

## 2024-12-13 NOTE — TELEPHONE ENCOUNTER
A call was placed to Nadir, reached his voicemail, a message was left. Per Radha, an appointment has been scheduled with Dr Brand for 2/4/25 9am. If this is not convenient, please call the office.

## 2024-12-15 ENCOUNTER — VBI (OUTPATIENT)
Dept: ADMINISTRATIVE | Facility: OTHER | Age: 64
End: 2024-12-15

## 2024-12-15 NOTE — TELEPHONE ENCOUNTER
12/15/24 6:14 PM     Chart reviewed for CRC: Colonoscopy ; nothing is submitted to the patient's insurance at this time.     Dulce Butts MA   [facility]

## 2024-12-16 ENCOUNTER — REMOTE DEVICE CLINIC VISIT (OUTPATIENT)
Dept: CARDIOLOGY CLINIC | Facility: CLINIC | Age: 64
End: 2024-12-16
Payer: COMMERCIAL

## 2024-12-16 ENCOUNTER — OFFICE VISIT (OUTPATIENT)
Dept: CARDIOLOGY CLINIC | Facility: CLINIC | Age: 64
End: 2024-12-16
Payer: COMMERCIAL

## 2024-12-16 VITALS
WEIGHT: 264 LBS | DIASTOLIC BLOOD PRESSURE: 70 MMHG | SYSTOLIC BLOOD PRESSURE: 110 MMHG | HEIGHT: 72 IN | OXYGEN SATURATION: 100 % | BODY MASS INDEX: 35.76 KG/M2 | HEART RATE: 74 BPM

## 2024-12-16 DIAGNOSIS — I10 PRIMARY HYPERTENSION: ICD-10-CM

## 2024-12-16 DIAGNOSIS — I47.20 V-TACH (HCC): Primary | ICD-10-CM

## 2024-12-16 DIAGNOSIS — Z95.810 AICD (AUTOMATIC CARDIOVERTER/DEFIBRILLATOR) PRESENT: Primary | ICD-10-CM

## 2024-12-16 DIAGNOSIS — I25.10 CORONARY ARTERY DISEASE INVOLVING NATIVE CORONARY ARTERY OF NATIVE HEART WITHOUT ANGINA PECTORIS: ICD-10-CM

## 2024-12-16 DIAGNOSIS — I25.5 ISCHEMIC CARDIOMYOPATHY: ICD-10-CM

## 2024-12-16 DIAGNOSIS — I50.22 CHRONIC SYSTOLIC CHF (CONGESTIVE HEART FAILURE) (HCC): ICD-10-CM

## 2024-12-16 PROCEDURE — 93295 DEV INTERROG REMOTE 1/2/MLT: CPT | Performed by: INTERNAL MEDICINE

## 2024-12-16 PROCEDURE — 93296 REM INTERROG EVL PM/IDS: CPT | Performed by: INTERNAL MEDICINE

## 2024-12-16 PROCEDURE — 99214 OFFICE O/P EST MOD 30 MIN: CPT | Performed by: INTERNAL MEDICINE

## 2024-12-16 NOTE — PROGRESS NOTES
Results for orders placed or performed in visit on 24   Cardiac EP device report    Narrative    BSCI SQ ICD/ ACTIVE SYSTEM IS MRI CONDITIONAL  Sensing Configuration: PrimaryGain SettinXPost Shock Pacing: ON  LATITUDE TRANSMISSION: BATTERY VOLTAGE ADEQUATE (98% REMIANING BATTERY LIFE). ALL AVAILABLE LEAD PARAMETERS WITHIN NORMAL LIMITS. NO SIGNIFICANT HIGH RATE EPISODES. NORMAL DEVICE FUNCTION. GV

## 2024-12-17 ENCOUNTER — TELEPHONE (OUTPATIENT)
Dept: NEPHROLOGY | Facility: CLINIC | Age: 64
End: 2024-12-17

## 2024-12-17 ENCOUNTER — HOSPITAL ENCOUNTER (OUTPATIENT)
Dept: PULMONOLOGY | Facility: HOSPITAL | Age: 64
Discharge: HOME | End: 2024-12-17
Payer: COMMERCIAL

## 2024-12-17 ENCOUNTER — RESULTS FOLLOW-UP (OUTPATIENT)
Dept: CARDIOLOGY CLINIC | Facility: CLINIC | Age: 64
End: 2024-12-17

## 2024-12-17 DIAGNOSIS — N17.9 ACUTE KIDNEY INJURY (HCC): ICD-10-CM

## 2024-12-17 DIAGNOSIS — I47.20 V-TACH (HCC): ICD-10-CM

## 2024-12-17 DIAGNOSIS — N18.31 CKD STAGE 3A, GFR 45-59 ML/MIN (HCC): Primary | ICD-10-CM

## 2024-12-17 DIAGNOSIS — Z95.810 ICD (IMPLANTABLE CARDIOVERTER-DEFIBRILLATOR) IN PLACE: ICD-10-CM

## 2024-12-17 DIAGNOSIS — I10 HYPERTENSION, UNSPECIFIED TYPE: ICD-10-CM

## 2024-12-17 DIAGNOSIS — R31.21 ASYMPTOMATIC MICROSCOPIC HEMATURIA: ICD-10-CM

## 2024-12-17 PROCEDURE — 94010 BREATHING CAPACITY TEST: CPT

## 2024-12-17 PROCEDURE — 94010 BREATHING CAPACITY TEST: CPT | Performed by: INTERNAL MEDICINE

## 2024-12-17 PROCEDURE — 94729 DIFFUSING CAPACITY: CPT

## 2024-12-17 PROCEDURE — 94729 DIFFUSING CAPACITY: CPT | Performed by: INTERNAL MEDICINE

## 2024-12-17 PROCEDURE — 94760 N-INVAS EAR/PLS OXIMETRY 1: CPT

## 2024-12-17 NOTE — TELEPHONE ENCOUNTER
Called patient and left a voicemail stating the following information:     Most recent BMP reviewed and creatinine is elevated above baseline likely in the setting of increased Entresto.  Have patient repeat BMP in 1 week-reviewed further recommendations will be forthcoming.    Asked the patient please call back with further questions.   Labs have been added to the patients chart.

## 2024-12-17 NOTE — TELEPHONE ENCOUNTER
----- Message from CARLA Gaston sent at 12/17/2024 10:25 AM EST -----  Most recent BMP reviewed and creatinine is elevated above baseline likely in the setting of increased Entresto.  Have patient repeat BMP in 1 week-reviewed further recommendations will be forthcoming  adonay

## 2024-12-20 ENCOUNTER — APPOINTMENT (OUTPATIENT)
Dept: LAB | Facility: IMAGING CENTER | Age: 64
End: 2024-12-20
Payer: COMMERCIAL

## 2024-12-20 DIAGNOSIS — R31.21 ASYMPTOMATIC MICROSCOPIC HEMATURIA: ICD-10-CM

## 2024-12-20 DIAGNOSIS — N18.31 CKD STAGE 3A, GFR 45-59 ML/MIN (HCC): ICD-10-CM

## 2024-12-20 DIAGNOSIS — N18.9 HISTORY OF ANEMIA DUE TO CHRONIC KIDNEY DISEASE: ICD-10-CM

## 2024-12-20 DIAGNOSIS — I25.10 CORONARY ARTERY DISEASE INVOLVING NATIVE CORONARY ARTERY OF NATIVE HEART WITHOUT ANGINA PECTORIS: ICD-10-CM

## 2024-12-20 DIAGNOSIS — I10 HYPERTENSION, UNSPECIFIED TYPE: ICD-10-CM

## 2024-12-20 DIAGNOSIS — D53.9 NUTRITIONAL ANEMIA: ICD-10-CM

## 2024-12-20 DIAGNOSIS — I50.22 CHRONIC SYSTOLIC CHF (CONGESTIVE HEART FAILURE) (HCC): ICD-10-CM

## 2024-12-20 DIAGNOSIS — N17.9 ACUTE KIDNEY INJURY (HCC): ICD-10-CM

## 2024-12-20 DIAGNOSIS — Z86.2 HISTORY OF ANEMIA DUE TO CHRONIC KIDNEY DISEASE: ICD-10-CM

## 2024-12-20 LAB
ANION GAP SERPL CALCULATED.3IONS-SCNC: 4 MMOL/L (ref 4–13)
BUN SERPL-MCNC: 41 MG/DL (ref 5–25)
CALCIUM SERPL-MCNC: 8.9 MG/DL (ref 8.4–10.2)
CHLORIDE SERPL-SCNC: 105 MMOL/L (ref 96–108)
CO2 SERPL-SCNC: 30 MMOL/L (ref 21–32)
CREAT SERPL-MCNC: 1.95 MG/DL (ref 0.6–1.3)
FERRITIN SERPL-MCNC: 211 NG/ML (ref 24–336)
FOLATE SERPL-MCNC: 13.9 NG/ML
GFR SERPL CREATININE-BSD FRML MDRD: 35 ML/MIN/1.73SQ M
GLUCOSE SERPL-MCNC: 281 MG/DL (ref 65–140)
IRON SATN MFR SERPL: 24 % (ref 15–50)
IRON SERPL-MCNC: 70 UG/DL (ref 50–212)
POTASSIUM SERPL-SCNC: 5 MMOL/L (ref 3.5–5.3)
SODIUM SERPL-SCNC: 139 MMOL/L (ref 135–147)
TIBC SERPL-MCNC: 287 UG/DL (ref 250–450)
TRANSFERRIN SERPL-MCNC: 205 MG/DL (ref 203–362)
UIBC SERPL-MCNC: 217 UG/DL (ref 155–355)
VIT B12 SERPL-MCNC: 286 PG/ML (ref 180–914)

## 2024-12-20 PROCEDURE — 82746 ASSAY OF FOLIC ACID SERUM: CPT

## 2024-12-20 PROCEDURE — 83540 ASSAY OF IRON: CPT

## 2024-12-20 PROCEDURE — 83550 IRON BINDING TEST: CPT

## 2024-12-20 PROCEDURE — 80048 BASIC METABOLIC PNL TOTAL CA: CPT

## 2024-12-20 PROCEDURE — 36415 COLL VENOUS BLD VENIPUNCTURE: CPT

## 2024-12-20 PROCEDURE — 82607 VITAMIN B-12: CPT

## 2024-12-20 PROCEDURE — 82668 ASSAY OF ERYTHROPOIETIN: CPT

## 2024-12-20 PROCEDURE — 82728 ASSAY OF FERRITIN: CPT

## 2024-12-21 ENCOUNTER — RESULTS FOLLOW-UP (OUTPATIENT)
Dept: OTHER | Facility: HOSPITAL | Age: 64
End: 2024-12-21

## 2024-12-21 DIAGNOSIS — N17.9 ACUTE KIDNEY INJURY (HCC): ICD-10-CM

## 2024-12-21 DIAGNOSIS — I10 HYPERTENSION, UNSPECIFIED TYPE: ICD-10-CM

## 2024-12-21 DIAGNOSIS — N18.31 CKD STAGE 3A, GFR 45-59 ML/MIN (HCC): Primary | ICD-10-CM

## 2024-12-21 DIAGNOSIS — R31.21 ASYMPTOMATIC MICROSCOPIC HEMATURIA: ICD-10-CM

## 2024-12-23 ENCOUNTER — RESULTS FOLLOW-UP (OUTPATIENT)
Dept: CARDIOLOGY CLINIC | Facility: CLINIC | Age: 64
End: 2024-12-23

## 2024-12-23 NOTE — TELEPHONE ENCOUNTER
Called patient and left a voicemail stating the following information:    Patient know that repeat BMP reviewed and creatinine has improved.  Steveey new baseline with recent increase in entresto .  Potassium high normal at 5.0.  please instruct on low K diet and send information.  Have him repeat BMP, mag, phos, UACR prior to appt with Yuliana in January.      I asked the patient please call back with further questions. Labs have been added to the patients chart.

## 2024-12-23 NOTE — TELEPHONE ENCOUNTER
----- Message from Collin Gonzalez PA-C sent at 12/23/2024  9:04 AM EST -----  Hello,    Can someone please let this patient know that his lung diffusion capacity was normal per his latest PFT evaluation.     Thanks!  ----- Message -----  From: Edgar Serra MD  Sent: 12/20/2024   4:52 PM EST  To: Collin Gonzalez PA-C

## 2024-12-23 NOTE — TELEPHONE ENCOUNTER
----- Message from CARLA Gaston sent at 12/21/2024  5:11 AM EST -----  Please let patient know that repeat BMP reviewed and creatinine has improved.  Tesfaye new baseline with recent increase in entresto .  Potassium high normal at 5.0.  please instruct on low K diet and send information.  Have him repeat BMP, mag, phos, UACR prior to appt with Yuliana in January.

## 2024-12-24 LAB — EPO SERPL-ACNC: 6.7 MIU/ML (ref 2.6–18.5)

## 2024-12-26 ENCOUNTER — TELEPHONE (OUTPATIENT)
Dept: GASTROENTEROLOGY | Facility: MEDICAL CENTER | Age: 64
End: 2024-12-26

## 2024-12-26 ENCOUNTER — OFFICE VISIT (OUTPATIENT)
Dept: GASTROENTEROLOGY | Facility: MEDICAL CENTER | Age: 64
End: 2024-12-26
Payer: COMMERCIAL

## 2024-12-26 ENCOUNTER — TELEPHONE (OUTPATIENT)
Dept: HEMATOLOGY ONCOLOGY | Facility: CLINIC | Age: 64
End: 2024-12-26

## 2024-12-26 VITALS
TEMPERATURE: 97.6 F | SYSTOLIC BLOOD PRESSURE: 113 MMHG | DIASTOLIC BLOOD PRESSURE: 75 MMHG | BODY MASS INDEX: 35.89 KG/M2 | WEIGHT: 264.6 LBS | HEART RATE: 80 BPM

## 2024-12-26 DIAGNOSIS — D64.9 ANEMIA, UNSPECIFIED TYPE: Primary | ICD-10-CM

## 2024-12-26 DIAGNOSIS — R76.8 HEPATITIS C ANTIBODY TEST POSITIVE: ICD-10-CM

## 2024-12-26 DIAGNOSIS — R11.2 NAUSEA AND VOMITING, UNSPECIFIED VOMITING TYPE: ICD-10-CM

## 2024-12-26 DIAGNOSIS — Z12.11 SCREENING FOR COLON CANCER: ICD-10-CM

## 2024-12-26 DIAGNOSIS — R06.6 HICCUPS: ICD-10-CM

## 2024-12-26 PROCEDURE — 99204 OFFICE O/P NEW MOD 45 MIN: CPT | Performed by: INTERNAL MEDICINE

## 2024-12-26 RX ORDER — PANTOPRAZOLE SODIUM 40 MG/1
40 TABLET, DELAYED RELEASE ORAL
Qty: 30 TABLET | Refills: 3 | Status: SHIPPED | OUTPATIENT
Start: 2024-12-26

## 2024-12-26 RX ORDER — FAMOTIDINE 40 MG/1
40 TABLET, FILM COATED ORAL DAILY
Qty: 30 TABLET | Refills: 3 | Status: SHIPPED | OUTPATIENT
Start: 2024-12-26

## 2024-12-26 RX ORDER — SODIUM CHLORIDE, SODIUM LACTATE, POTASSIUM CHLORIDE, CALCIUM CHLORIDE 600; 310; 30; 20 MG/100ML; MG/100ML; MG/100ML; MG/100ML
125 INJECTION, SOLUTION INTRAVENOUS CONTINUOUS
OUTPATIENT
Start: 2024-12-26

## 2024-12-26 RX ORDER — OMEPRAZOLE 40 MG/1
40 CAPSULE, DELAYED RELEASE ORAL
Qty: 30 CAPSULE | Refills: 3 | Status: CANCELLED | OUTPATIENT
Start: 2024-12-26

## 2024-12-26 NOTE — TELEPHONE ENCOUNTER
Procedure Medication Hold Requested  Received: Today  Andreia Samuels, DO  Our mutual patient, Nadir Myers, is scheduled for the following  procedure: Colon/EGD  On: 01/22/2025  With: Dr. Jaiyeola James JOSE Myers is taking the following blood thinner: Plavix       Can this be stopped 5 days prior to the procedure?       Thank you,  Andreia Siddiqui [facility]

## 2024-12-26 NOTE — ASSESSMENT & PLAN NOTE
He previously found to have hepatitis C antibody positive and his PCR was negative confirming no active infection.  He is listed as having cirrhosis in his chart however his abdominal imaging with CT in February 2024 shows normal-appearing liver, liver enzymes are within normal limits and his platelets are normal.  Orders:    Ambulatory Referral to Gastroenterology

## 2024-12-26 NOTE — TELEPHONE ENCOUNTER
Left a message for patient informing him at like to go over his blood work with him.  I will also send a Opt[employer] message.  He can either reply to the message or give us a call back.  Provided HOPE line number.

## 2024-12-26 NOTE — TELEPHONE ENCOUNTER
RE: Procedure Medication Hold Requested  Received: Today  DO Andreia Saenz  Pt can stop Plavix for 5 days          Previous Messages       ----- Message -----  From: Andreia Maldonado  Sent: 12/26/2024   9:10 AM EST  To: Nash Samuels DO  Subject: Procedure Medication Hold Requested              Our mutual patient, Nadir Myers, is scheduled for the following  procedure: Colon/EGD  On: 01/22/2025  With: Dr. Jaiyeola    Nadir Myers is taking the following blood thinner: Plavix       Can this be stopped 5 days prior to the procedure?       Thank you,  Andreia Siddiqui [facility]

## 2024-12-26 NOTE — TELEPHONE ENCOUNTER
Procedure: Colon/EGD  Date: 01/22/2025  Physician performing: Dr. Jaiyeola  Location of procedure:  [place]   Instructions given to patient: Vanessa   Diabetic: Yes   Clearances: Yes     Patient is diabetic   Jardiance 4 day hold pt is aware   LanTUS  Janumet  Patient is on Plavix 5 day hold pt is aware   Needs Cardiac Clearance

## 2024-12-26 NOTE — PROGRESS NOTES
Name: Nadir Myers      : 1960      MRN: [id number]  Encounter Provider: Diana M Jaiyeola, MD  Encounter Date: 2024   Encounter department: [facility]  :  Assessment & Plan  Nausea and vomiting, unspecified vomiting type  He reports symptoms of chronic hiccups and nausea and vomiting.  He was started on pantoprazole a few weeks ago and I recommend that he transition to taking this medication in the morning and add famotidine 40 mg at night.  We discussed symptoms may be related to gastroesophageal reflux, gastroparesis given his history of type 2 diabetes or mechanical etiology causing diaphragmatic irritation.  I recommend obtaining gastric emptying study and diagnostic EGD for further evaluation.  Given his significant cardiac history his procedure will be performed at a hospital setting and we will obtain cardiac clearance  Orders:    NM gastric emptying; Future    famotidine (PEPCID) 40 MG tablet; Take 1 tablet (40 mg total) by mouth daily    pantoprazole (PROTONIX) 40 mg tablet; Take 1 tablet (40 mg total) by mouth daily before breakfast    EGD; Future    Hiccups         Screening for colon cancer  He has no prior colonoscopy and is overdue for screening.  He has a first-degree family member, his mother with a history of colon cancer diagnosed in her 60s.  Discussed indication, risk and benefits today and he is agreeable to proceed  Orders:    Ambulatory Referral to Gastroenterology    Colonoscopy; Future    polyethylene glycol (GOLYTELY) 4000 mL solution; As instructed by the GI office    Hepatitis C antibody test positive  He previously found to have hepatitis C antibody positive and his PCR was negative confirming no active infection.  He is listed as having cirrhosis in his chart however his abdominal imaging with CT in 2024 shows normal-appearing liver, liver enzymes are within normal limits and his platelets are normal.  Orders:     Ambulatory Referral to Gastroenterology    Anemia, unspecified type  He has a history of chronic anemia with a baseline hemoglobin ranging from 9-11.  His iron studies are consistent with anemia of chronic disease.  Etiologies of GI blood loss to contribute to his anemia will be performed at the time of his endoscopy and colonoscopy as well.  Orders:    EGD; Future        History of Present Illness   HPI  Nadir Meyrs is a 64 y.o. male who presents for evaluation.  He has a history of coronary artery disease on aspirin 81 mg, Plavix, type 2 diabetes, ischemic cardiomyopathy, V. tach with PPM in place.    He reports 1 year of symptoms of chronic hiccups during the day and at night with episodes of nausea and vomiting.  When he has hiccups at night twice per month he will have vomiting of nonbloody nonbilious emesis and intermittent epigastric abdominal pain.  He was started on pantoprazole 40 mg which he is taking at night with minor improvement of his symptoms.  He reports no specific triggers to his hiccups or vomiting symptoms.  Usually has a bowel movement once daily which is formed without melena or hematochezia.  He denies early satiety, change in his appetite or weight loss or dysphagia.    He has a family history of his mother diagnosed in her late 60s with colon cancer  His past surgical history includes cholecystectomy  His antiplatelet use includes Plavix    He reports no prior EGD or colonoscopy. Per the electronic medical record he reportedly underwent endoscopy in 2013.  Procedure report is not available for review      2/2024 CT abdomen pelvis showed large infiltrate in the left lower lobe of the lung,, mild mediastinal lymphadenopathy no acute abdominal findings  12/2024 liver enzymes are within normal limits, ferritin 211, iron saturation 24%, hemoglobin 9.7  10/2024 hemoglobin A1c 8.9  10/2024 HCV PCR negative, hepatitis C antibody reactive        Review of Systems   Constitutional:  Negative for  chills and fever.   HENT:  Negative for ear pain and sore throat.    Eyes:  Negative for pain and visual disturbance.   Respiratory:  Negative for cough and shortness of breath.    Cardiovascular:  Negative for chest pain and palpitations.   Gastrointestinal:  Positive for abdominal pain and nausea. Negative for vomiting.   Genitourinary:  Negative for dysuria and hematuria.   Musculoskeletal:  Negative for arthralgias and back pain.   Skin:  Negative for color change and rash.   Neurological:  Negative for seizures and syncope.   All other systems reviewed and are negative.         Objective   /75   Pulse 80   Temp 97.6 °F (36.4 °C)   Wt 120 kg (264 lb 9.6 oz)   BMI 35.89 kg/m²      Physical Exam  Vitals and nursing note reviewed.   Constitutional:       General: He is not in acute distress.     Appearance: He is well-developed.   HENT:      Head: Normocephalic and atraumatic.   Eyes:      Conjunctiva/sclera: Conjunctivae normal.   Cardiovascular:      Rate and Rhythm: Normal rate and regular rhythm.      Heart sounds: No murmur heard.  Pulmonary:      Effort: Pulmonary effort is normal. No respiratory distress.      Breath sounds: Normal breath sounds.   Abdominal:      Palpations: Abdomen is soft.      Tenderness: There is no abdominal tenderness.   Musculoskeletal:         General: No swelling.      Cervical back: Neck supple.   Skin:     General: Skin is warm and dry.      Capillary Refill: Capillary refill takes less than 2 seconds.   Neurological:      Mental Status: He is alert.   Psychiatric:         Mood and Affect: Mood normal.

## 2024-12-30 ENCOUNTER — TELEPHONE (OUTPATIENT)
Dept: RADIOLOGY | Facility: HOSPITAL | Age: 64
End: 2024-12-30

## 2025-01-03 ENCOUNTER — TELEPHONE (OUTPATIENT)
Dept: GASTROENTEROLOGY | Facility: MEDICAL CENTER | Age: 65
End: 2025-01-03

## 2025-01-03 NOTE — TELEPHONE ENCOUNTER
[facility] to review medication hold and prep instructions

## 2025-01-07 ENCOUNTER — APPOINTMENT (OUTPATIENT)
Dept: LAB | Facility: IMAGING CENTER | Age: 65
End: 2025-01-07
Payer: COMMERCIAL

## 2025-01-07 ENCOUNTER — TELEPHONE (OUTPATIENT)
Dept: GASTROENTEROLOGY | Facility: MEDICAL CENTER | Age: 65
End: 2025-01-07

## 2025-01-07 DIAGNOSIS — N18.31 CKD STAGE 3A, GFR 45-59 ML/MIN (HCC): ICD-10-CM

## 2025-01-07 DIAGNOSIS — N17.9 ACUTE KIDNEY INJURY (HCC): ICD-10-CM

## 2025-01-07 DIAGNOSIS — R31.21 ASYMPTOMATIC MICROSCOPIC HEMATURIA: ICD-10-CM

## 2025-01-07 DIAGNOSIS — I10 HYPERTENSION, UNSPECIFIED TYPE: ICD-10-CM

## 2025-01-07 LAB
ANION GAP SERPL CALCULATED.3IONS-SCNC: 11 MMOL/L (ref 4–13)
BUN SERPL-MCNC: 44 MG/DL (ref 5–25)
CALCIUM SERPL-MCNC: 9.4 MG/DL (ref 8.4–10.2)
CHLORIDE SERPL-SCNC: 104 MMOL/L (ref 96–108)
CO2 SERPL-SCNC: 24 MMOL/L (ref 21–32)
CREAT SERPL-MCNC: 1.88 MG/DL (ref 0.6–1.3)
CREAT UR-MCNC: 79.1 MG/DL
GFR SERPL CREATININE-BSD FRML MDRD: 37 ML/MIN/1.73SQ M
GLUCOSE P FAST SERPL-MCNC: 185 MG/DL (ref 65–99)
MAGNESIUM SERPL-MCNC: 2.2 MG/DL (ref 1.9–2.7)
MICROALBUMIN UR-MCNC: 2833.5 MG/L
MICROALBUMIN/CREAT 24H UR: 3582 MG/G CREATININE (ref 0–30)
PHOSPHATE SERPL-MCNC: 4 MG/DL (ref 2.3–4.1)
POTASSIUM SERPL-SCNC: 4.9 MMOL/L (ref 3.5–5.3)
SODIUM SERPL-SCNC: 139 MMOL/L (ref 135–147)

## 2025-01-07 PROCEDURE — 82570 ASSAY OF URINE CREATININE: CPT

## 2025-01-07 PROCEDURE — 84100 ASSAY OF PHOSPHORUS: CPT

## 2025-01-07 PROCEDURE — 80048 BASIC METABOLIC PNL TOTAL CA: CPT

## 2025-01-07 PROCEDURE — 82043 UR ALBUMIN QUANTITATIVE: CPT

## 2025-01-07 PROCEDURE — 36415 COLL VENOUS BLD VENIPUNCTURE: CPT

## 2025-01-07 PROCEDURE — 83735 ASSAY OF MAGNESIUM: CPT

## 2025-01-07 NOTE — TELEPHONE ENCOUNTER
Patient called requesting refill for Pantoprazole. Patient made aware medication was refilled on 12/26/2024 for 30 with 3 refills to [facility] pharmacy. Patient instructed to contact the pharmacy to obtain refills of medication. Patient verbalized understanding.     Advised patient not to use RX number and to speak or leave message for staff.

## 2025-01-10 ENCOUNTER — OFFICE VISIT (OUTPATIENT)
Dept: INTERNAL MEDICINE CLINIC | Facility: OTHER | Age: 65
End: 2025-01-10
Payer: COMMERCIAL

## 2025-01-10 VITALS
TEMPERATURE: 97.7 F | BODY MASS INDEX: 36.16 KG/M2 | DIASTOLIC BLOOD PRESSURE: 62 MMHG | HEART RATE: 79 BPM | HEIGHT: 72 IN | WEIGHT: 267 LBS | SYSTOLIC BLOOD PRESSURE: 118 MMHG | OXYGEN SATURATION: 98 %

## 2025-01-10 DIAGNOSIS — I47.20 V-TACH (HCC): ICD-10-CM

## 2025-01-10 DIAGNOSIS — N40.0 BENIGN PROSTATIC HYPERPLASIA WITHOUT LOWER URINARY TRACT SYMPTOMS: ICD-10-CM

## 2025-01-10 DIAGNOSIS — E78.2 MIXED HYPERLIPIDEMIA: ICD-10-CM

## 2025-01-10 DIAGNOSIS — I26.90 CHRONIC SEPTIC PULMONARY EMBOLISM WITHOUT ACUTE COR PULMONALE (HCC): ICD-10-CM

## 2025-01-10 DIAGNOSIS — I50.23 ACUTE ON CHRONIC SYSTOLIC (CONGESTIVE) HEART FAILURE (HCC): ICD-10-CM

## 2025-01-10 DIAGNOSIS — Z79.4 TYPE 2 DIABETES MELLITUS WITH OTHER SPECIFIED COMPLICATION, WITH LONG-TERM CURRENT USE OF INSULIN (HCC): ICD-10-CM

## 2025-01-10 DIAGNOSIS — I10 HYPERTENSION, UNSPECIFIED TYPE: ICD-10-CM

## 2025-01-10 DIAGNOSIS — E66.01 MORBID (SEVERE) OBESITY DUE TO EXCESS CALORIES (HCC): ICD-10-CM

## 2025-01-10 DIAGNOSIS — E11.69 TYPE 2 DIABETES MELLITUS WITH OTHER SPECIFIED COMPLICATION, WITH LONG-TERM CURRENT USE OF INSULIN (HCC): Primary | ICD-10-CM

## 2025-01-10 DIAGNOSIS — K21.9 GASTROESOPHAGEAL REFLUX DISEASE, UNSPECIFIED WHETHER ESOPHAGITIS PRESENT: ICD-10-CM

## 2025-01-10 DIAGNOSIS — T87.89 OTHER COMPLICATIONS OF AMPUTATION STUMP (HCC): ICD-10-CM

## 2025-01-10 DIAGNOSIS — I27.82 CHRONIC SEPTIC PULMONARY EMBOLISM WITHOUT ACUTE COR PULMONALE (HCC): ICD-10-CM

## 2025-01-10 DIAGNOSIS — E11.621 TYPE 2 DIABETES MELLITUS WITH FOOT ULCER, WITH LONG-TERM CURRENT USE OF INSULIN (HCC): ICD-10-CM

## 2025-01-10 DIAGNOSIS — J96.01 ACUTE RESPIRATORY FAILURE WITH HYPOXIA (HCC): ICD-10-CM

## 2025-01-10 DIAGNOSIS — Z12.5 PROSTATE CANCER SCREENING: ICD-10-CM

## 2025-01-10 DIAGNOSIS — Z79.4 TYPE 2 DIABETES MELLITUS WITH OTHER SPECIFIED COMPLICATION, WITH LONG-TERM CURRENT USE OF INSULIN (HCC): Primary | ICD-10-CM

## 2025-01-10 DIAGNOSIS — Z89.422 HISTORY OF COMPLETE RAY AMPUTATION OF FOURTH TOE OF LEFT FOOT (HCC): ICD-10-CM

## 2025-01-10 DIAGNOSIS — R93.89 ABNORMAL CT OF THE CHEST: ICD-10-CM

## 2025-01-10 DIAGNOSIS — E11.51 TYPE 2 DIABETES MELLITUS WITH DIABETIC PERIPHERAL ANGIOPATHY WITHOUT GANGRENE, WITHOUT LONG-TERM CURRENT USE OF INSULIN (HCC): ICD-10-CM

## 2025-01-10 DIAGNOSIS — N18.31 CKD STAGE 3A, GFR 45-59 ML/MIN (HCC): ICD-10-CM

## 2025-01-10 DIAGNOSIS — E11.69 TYPE 2 DIABETES MELLITUS WITH OTHER SPECIFIED COMPLICATION, WITH LONG-TERM CURRENT USE OF INSULIN (HCC): ICD-10-CM

## 2025-01-10 DIAGNOSIS — I50.22 CHRONIC SYSTOLIC CHF (CONGESTIVE HEART FAILURE) (HCC): ICD-10-CM

## 2025-01-10 DIAGNOSIS — L97.509 TYPE 2 DIABETES MELLITUS WITH FOOT ULCER, WITH LONG-TERM CURRENT USE OF INSULIN (HCC): ICD-10-CM

## 2025-01-10 DIAGNOSIS — Z79.4 TYPE 2 DIABETES MELLITUS WITH FOOT ULCER, WITH LONG-TERM CURRENT USE OF INSULIN (HCC): ICD-10-CM

## 2025-01-10 PROCEDURE — 99214 OFFICE O/P EST MOD 30 MIN: CPT | Performed by: INTERNAL MEDICINE

## 2025-01-10 NOTE — ASSESSMENT & PLAN NOTE
Lab Results   Component Value Date    EGFR 37 01/07/2025    EGFR 35 12/20/2024    EGFR 29 12/11/2024    CREATININE 1.88 (H) 01/07/2025    CREATININE 1.95 (H) 12/20/2024    CREATININE 2.28 (H) 12/11/2024   Continue to trend kidney function.  Avoid nephrotoxic agents.  Continue follow-up with nephrology.  Orders:  •  Comprehensive metabolic panel; Future  •  CBC; Future

## 2025-01-10 NOTE — ASSESSMENT & PLAN NOTE
Wt Readings from Last 3 Encounters:   01/10/25 121 kg (267 lb)   12/26/24 120 kg (264 lb 9.6 oz)   12/16/24 120 kg (264 lb)

## 2025-01-10 NOTE — ASSESSMENT & PLAN NOTE
He reports average fasting blood glucose readings are between 100-120.  For now continue current diabetic regimen although advised to take Janumet twice a day.  Continue to trend A1c.  Lab Results   Component Value Date    HGBA1C 8.9 (H) 10/14/2024     Orders:  •  sitaGLIPtin-metFORMIN (JANUMET)  MG per tablet; Take 1 tablet by mouth 2 (two) times a day with meals  •  Comprehensive metabolic panel; Future  •  CBC; Future  •  Hemoglobin A1C; Future  •  Lipid panel; Future   fhr 140s gbtbv  ctx q 2 min  sve 5/100/0  arom clear fluid  monitor closely.

## 2025-01-10 NOTE — PROGRESS NOTES
Name: Nadir Myers      : 1960      MRN: [id number]  Encounter Provider: Beau Lemus MD  Encounter Date: 1/10/2025   Encounter department: [facility]  :  Assessment & Plan  Type 2 diabetes mellitus with other specified complication, with long-term current use of insulin (HCC)  He reports average fasting blood glucose readings are between 100-120.  For now continue current diabetic regimen although advised to take Janumet twice a day.  Continue to trend A1c.  Lab Results   Component Value Date    HGBA1C 8.9 (H) 10/14/2024     Orders:  •  sitaGLIPtin-metFORMIN (JANUMET)  MG per tablet; Take 1 tablet by mouth 2 (two) times a day with meals  •  Comprehensive metabolic panel; Future  •  CBC; Future  •  Hemoglobin A1C; Future  •  Lipid panel; Future    Hypertension, unspecified type  Stable, controlled on current antihypertensive regimen.       Type 2 diabetes mellitus with diabetic peripheral angiopathy without gangrene, without long-term current use of insulin ([facility])    Lab Results   Component Value Date    HGBA1C 8.9 (H) 10/14/2024          Gastroesophageal reflux disease, unspecified whether esophagitis present  Continue follow-up with gastroenterology.  Continue pantoprazole and famotidine regimen.       Benign prostatic hyperplasia without lower urinary tract symptoms         CKD stage 3a, GFR 45-59 ml/min ([facility])  Lab Results   Component Value Date    EGFR 37 2025    EGFR 35 2024    EGFR 29 2024    CREATININE 1.88 (H) 2025    CREATININE 1.95 (H) 2024    CREATININE 2.28 (H) 2024   Continue to trend kidney function.  Avoid nephrotoxic agents.  Continue follow-up with nephrology.  Orders:  •  Comprehensive metabolic panel; Future  •  CBC; Future    Mixed hyperlipidemia  Continue statin therapy.  Trend lipid panel.  Orders:  •  Comprehensive metabolic panel; Future  •  CBC; Future  •  Hemoglobin A1C; Future  •  Lipid panel;  Future    Prostate cancer screening    Orders:  •  PSA, Total Screen; Future    Abnormal CT of the chest  CT chest ordered.       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)         Chronic septic pulmonary embolism without acute cor pulmonale (HCC)         Acute respiratory failure with hypoxia (HCC)         Morbid (severe) obesity due to excess calories (HCC)         Type 2 diabetes mellitus with foot ulcer, with long-term current use of insulin (HCC)    Lab Results   Component Value Date    HGBA1C 8.9 (H) 10/14/2024          Chronic systolic CHF (congestive heart failure) (HCC)  Wt Readings from Last 3 Encounters:   01/10/25 121 kg (267 lb)   12/26/24 120 kg (264 lb 9.6 oz)   12/16/24 120 kg (264 lb)                  V-tach (HCC)         Acute on chronic systolic (congestive) heart failure (HCC)  Wt Readings from Last 3 Encounters:   01/10/25 121 kg (267 lb)   12/26/24 120 kg (264 lb 9.6 oz)   12/16/24 120 kg (264 lb)       Continue current treatment plan. Continue follow up with cardiology. Euvolemic on exam today.               History of Present Illness     Nadir Myers is seen today for follow up of chronic conditions.   Recent laboratory studies reviewed today with the patient.   he has been compliant with his medication regimen.     he has no complaints or concerns at this time.       Hyperlipidemia  This is a chronic problem. The current episode started more than 1 year ago. The problem is controlled. Recent lipid tests were reviewed and are normal. Pertinent negatives include no chest pain or shortness of breath. Current antihyperlipidemic treatment includes statins. The current treatment provides moderate improvement of lipids. There are no compliance problems.    Hypertension  This is a chronic problem. The current episode started more than 1 year ago. The problem is unchanged. The problem is controlled. Pertinent negatives  include no chest pain, headaches, palpitations or shortness of breath. Past treatments include angiotensin blockers, beta blockers and diuretics. The current treatment provides moderate improvement.   Diabetes  He presents for his follow-up diabetic visit. He has type 2 diabetes mellitus. His disease course has been improving. Pertinent negatives for hypoglycemia include no dizziness or headaches. Pertinent negatives for diabetes include no chest pain, no fatigue and no weakness. Current diabetic treatment includes oral agent (triple therapy). He is compliant with treatment all of the time. An ACE inhibitor/angiotensin II receptor blocker is being taken.   Heartburn  He reports no abdominal pain, no chest pain, no choking, no coughing, no nausea, no sore throat or no wheezing. This is a chronic problem. The current episode started more than 1 year ago. The problem occurs occasionally. The problem has been unchanged. The symptoms are aggravated by certain foods. Pertinent negatives include no fatigue. He has tried a PPI and a histamine-2 antagonist for the symptoms. The treatment provided moderate relief.     Review of Systems   Constitutional:  Negative for activity change, appetite change, chills, diaphoresis, fatigue and fever.   HENT:  Negative for congestion, postnasal drip, rhinorrhea, sinus pressure, sinus pain, sneezing and sore throat.    Eyes:  Negative for visual disturbance.   Respiratory:  Negative for apnea, cough, choking, chest tightness, shortness of breath and wheezing.    Cardiovascular:  Negative for chest pain, palpitations and leg swelling.   Gastrointestinal:  Negative for abdominal distention, abdominal pain, anal bleeding, blood in stool, constipation, diarrhea, nausea and vomiting.   Endocrine: Negative for cold intolerance and heat intolerance.   Genitourinary:  Negative for difficulty urinating, dysuria and hematuria.   Musculoskeletal: Negative.    Skin: Negative.    Neurological:   Negative for dizziness, weakness, light-headedness, numbness and headaches.   Hematological:  Negative for adenopathy.   Psychiatric/Behavioral:  Negative for agitation, sleep disturbance and suicidal ideas.    All other systems reviewed and are negative.      Objective   /62 (BP Location: Left arm, Patient Position: Sitting, Cuff Size: Large)   Pulse 79   Temp 97.7 °F (36.5 °C) (Oral)   Ht 6' (1.829 m)   Wt 121 kg (267 lb)   SpO2 98% Comment: ra  BMI 36.21 kg/m²      Physical Exam  Vitals and nursing note reviewed.   Constitutional:       General: He is not in acute distress.     Appearance: Normal appearance. He is normal weight. He is not ill-appearing, toxic-appearing or diaphoretic.   HENT:      Head: Normocephalic and atraumatic.      Right Ear: Tympanic membrane, ear canal and external ear normal. There is no impacted cerumen.      Left Ear: Tympanic membrane, ear canal and external ear normal. There is no impacted cerumen.      Nose: Nose normal. No congestion or rhinorrhea.      Mouth/Throat:      Mouth: Mucous membranes are moist.      Pharynx: Oropharynx is clear. No oropharyngeal exudate or posterior oropharyngeal erythema.   Eyes:      General: No scleral icterus.        Right eye: No discharge.         Left eye: No discharge.      Extraocular Movements: Extraocular movements intact.      Conjunctiva/sclera: Conjunctivae normal.      Pupils: Pupils are equal, round, and reactive to light.   Neck:      Vascular: No carotid bruit.   Cardiovascular:      Rate and Rhythm: Normal rate and regular rhythm.      Pulses: no weak pulses.           Dorsalis pedis pulses are 1+ on the right side and 1+ on the left side.        Posterior tibial pulses are 1+ on the right side and 1+ on the left side.      Heart sounds: Normal heart sounds. No murmur heard.     No friction rub. No gallop.   Pulmonary:      Effort: Pulmonary effort is normal. No respiratory distress.      Breath sounds: Normal breath  sounds. No wheezing or rales.   Abdominal:      General: Abdomen is flat. Bowel sounds are normal. There is no distension.      Palpations: Abdomen is soft. There is no mass.      Tenderness: There is no abdominal tenderness. There is no guarding.      Hernia: No hernia is present.   Musculoskeletal:         General: No swelling, tenderness, deformity or signs of injury. Normal range of motion.      Cervical back: Normal range of motion and neck supple. No rigidity. No muscular tenderness.      Right lower leg: No edema.      Left lower leg: No edema.   Feet:      Right foot:      Skin integrity: Callus and dry skin present. No ulcer, skin breakdown, erythema or warmth.      Left foot:      Skin integrity: Callus and dry skin present. No ulcer, skin breakdown, erythema or warmth.   Lymphadenopathy:      Cervical: No cervical adenopathy.   Skin:     General: Skin is warm and dry.      Capillary Refill: Capillary refill takes less than 2 seconds.      Coloration: Skin is not jaundiced or pale.      Findings: No bruising, erythema, lesion or rash.   Neurological:      General: No focal deficit present.      Mental Status: He is alert and oriented to person, place, and time. Mental status is at baseline.      Cranial Nerves: No cranial nerve deficit.      Sensory: No sensory deficit.      Motor: No weakness.      Coordination: Coordination normal.      Gait: Gait normal.      Deep Tendon Reflexes: Reflexes normal.   Psychiatric:         Mood and Affect: Mood normal.         Behavior: Behavior normal.         Thought Content: Thought content normal.         Judgment: Judgment normal.       Diabetic Foot Exam    Patient's shoes and socks removed.    Right Foot/Ankle   Right Foot Inspection  Skin Exam: skin normal, skin intact, dry skin, callus and callus. No warmth, no erythema, no maceration, no abnormal color, no pre-ulcer and no ulcer.     Toe Exam: ROM and strength within normal limits.     Sensory   Monofilament  testing: diminished    Vascular  Capillary refills: < 3 seconds  The right DP pulse is 1+. The right PT pulse is 1+.     Left Foot/Ankle  Left Foot Inspection  Skin Exam: skin normal, skin intact, dry skin and callus. No warmth, no erythema, no maceration, normal color, no pre-ulcer and no ulcer.     Toe Exam: ROM and strength within normal limits.     Sensory   Monofilament testing: diminished    Vascular  Capillary refills: < 3 seconds  The left DP pulse is 1+. The left PT pulse is 1+.     Assign Risk Category  Deformity present  Loss of protective sensation  No weak pulses  Risk: 2

## 2025-01-10 NOTE — ASSESSMENT & PLAN NOTE
Continue statin therapy.  Trend lipid panel.  Orders:  •  Comprehensive metabolic panel; Future  •  CBC; Future  •  Hemoglobin A1C; Future  •  Lipid panel; Future

## 2025-01-10 NOTE — TELEPHONE ENCOUNTER
NOT A DUPLICATE medication was sent to the wrong pharmacy    Reason for call:   [x] Refill   [] Prior Auth  [] Other:     Office:   [x] PCP/Provider -   [] Specialty/Provider -     Medication: sitaGLIPtin-metFORMIN (JANUMET)  MG per tablet Take 1 tablet by mouth 2 (two) times a day with meals       Pharmacy: [facility] [address]      Does the patient have enough for 3 days?   [x] Yes   [] No - Send as HP to POD

## 2025-01-13 ENCOUNTER — TELEPHONE (OUTPATIENT)
Dept: GASTROENTEROLOGY | Facility: MEDICAL CENTER | Age: 65
End: 2025-01-13

## 2025-01-13 NOTE — TELEPHONE ENCOUNTER
Our mutual patient, Nadir Myers, is scheduled for the following   procedure(s): Colonoscopy and Endsocopy   On: 1/22/25   With: Dr. Jaiyeola    Our office is requesting cardiology clearance for the upcoming procedure(s).        Thank you,  Andi Siddiqui [facility]

## 2025-01-19 DIAGNOSIS — R11.2 NAUSEA AND VOMITING, UNSPECIFIED VOMITING TYPE: ICD-10-CM

## 2025-01-20 RX ORDER — FAMOTIDINE 40 MG/1
40 TABLET, FILM COATED ORAL DAILY
Qty: 90 TABLET | Refills: 1 | Status: SHIPPED | OUTPATIENT
Start: 2025-01-20

## 2025-01-22 ENCOUNTER — TELEPHONE (OUTPATIENT)
Dept: GASTROENTEROLOGY | Facility: MEDICAL CENTER | Age: 65
End: 2025-01-22

## 2025-01-22 NOTE — TELEPHONE ENCOUNTER
Left voicemail and requested call back     Called patient to reschedule procedure ( Colonoscopy and EGD) asked patient to please give us a call to reschedule

## 2025-01-27 ENCOUNTER — TELEPHONE (OUTPATIENT)
Age: 65
End: 2025-01-27

## 2025-01-27 ENCOUNTER — OFFICE VISIT (OUTPATIENT)
Dept: INTERNAL MEDICINE CLINIC | Facility: OTHER | Age: 65
End: 2025-01-27
Payer: COMMERCIAL

## 2025-01-27 ENCOUNTER — RESULTS FOLLOW-UP (OUTPATIENT)
Dept: INTERNAL MEDICINE CLINIC | Facility: OTHER | Age: 65
End: 2025-01-27

## 2025-01-27 ENCOUNTER — HOSPITAL ENCOUNTER (OUTPATIENT)
Dept: RADIOLOGY | Facility: IMAGING CENTER | Age: 65
Discharge: HOME | End: 2025-01-27
Payer: COMMERCIAL

## 2025-01-27 VITALS
HEART RATE: 83 BPM | WEIGHT: 261.8 LBS | BODY MASS INDEX: 35.46 KG/M2 | SYSTOLIC BLOOD PRESSURE: 130 MMHG | DIASTOLIC BLOOD PRESSURE: 80 MMHG | OXYGEN SATURATION: 96 % | TEMPERATURE: 97.7 F | HEIGHT: 72 IN

## 2025-01-27 DIAGNOSIS — R05.1 ACUTE COUGH: ICD-10-CM

## 2025-01-27 DIAGNOSIS — J20.9 ACUTE BRONCHITIS, UNSPECIFIED ORGANISM: Primary | ICD-10-CM

## 2025-01-27 DIAGNOSIS — J18.9 PNEUMONIA OF RIGHT LOWER LOBE DUE TO INFECTIOUS ORGANISM: Primary | ICD-10-CM

## 2025-01-27 DIAGNOSIS — Z12.12 ENCOUNTER FOR COLORECTAL CANCER SCREENING: ICD-10-CM

## 2025-01-27 DIAGNOSIS — Z12.11 ENCOUNTER FOR COLORECTAL CANCER SCREENING: ICD-10-CM

## 2025-01-27 DIAGNOSIS — I50.9 ACUTE EXACERBATION OF CHRONIC HEART FAILURE (HCC): ICD-10-CM

## 2025-01-27 DIAGNOSIS — J20.9 ACUTE BRONCHITIS, UNSPECIFIED ORGANISM: ICD-10-CM

## 2025-01-27 PROBLEM — J96.01 ACUTE RESPIRATORY FAILURE WITH HYPOXIA (HCC): Status: RESOLVED | Noted: 2025-01-10 | Resolved: 2025-01-27

## 2025-01-27 LAB
SARS-COV-2 AG UPPER RESP QL IA: NEGATIVE
SL AMB POCT RAPID FLU A: NEGATIVE
SL AMB POCT RAPID FLU B: NEGATIVE
VALID CONTROL: NORMAL

## 2025-01-27 PROCEDURE — 87804 INFLUENZA ASSAY W/OPTIC: CPT

## 2025-01-27 PROCEDURE — 87811 SARS-COV-2 COVID19 W/OPTIC: CPT

## 2025-01-27 PROCEDURE — 99213 OFFICE O/P EST LOW 20 MIN: CPT

## 2025-01-27 PROCEDURE — 71046 X-RAY EXAM CHEST 2 VIEWS: CPT

## 2025-01-27 RX ORDER — DOXYCYCLINE 100 MG/1
100 CAPSULE ORAL EVERY 12 HOURS SCHEDULED
Qty: 14 CAPSULE | Refills: 0 | Status: SHIPPED | OUTPATIENT
Start: 2025-01-27 | End: 2025-02-03

## 2025-01-27 RX ORDER — PREDNISONE 20 MG/1
40 TABLET ORAL DAILY
Qty: 10 TABLET | Refills: 0 | Status: SHIPPED | OUTPATIENT
Start: 2025-01-27 | End: 2025-02-01

## 2025-01-27 NOTE — TELEPHONE ENCOUNTER
Deuce from the reading room had called in and stated that the patients x-ray results came back in as immediate findings.     Please have provider review results asap

## 2025-01-27 NOTE — PROGRESS NOTES
Name: Nadir Myers      : 1960      MRN: [id number]  Encounter Provider: Maritza Faria PA-C  Encounter Date: 2025   Encounter department: [facility]  :  Assessment & Plan  Acute bronchitis, unspecified organism  Will treat with Augmentin twice daily x 7 days.  Advised to take with food and probiotic  Also treat with prednisone 40 mg daily x 5 days  Order stat chest x-ray due to rhonchi of left lower lobe to rule out pneumonia  Discussed supportive care including antihistamine, saline nasal sprays, humidifiers, warm liquids with honey, steam inhalation  Offered inhaler, patient declined  Follow-up 1 week or sooner if symptoms worsen  Orders:    amoxicillin-clavulanate (AUGMENTIN) 875-125 mg per tablet; Take 1 tablet by mouth every 12 (twelve) hours for 7 days    predniSONE 20 mg tablet; Take 2 tablets (40 mg total) by mouth daily for 5 days    XR chest pa and lateral; Future    Encounter for colorectal cancer screening         Acute cough    Orders:    POCT Rapid Covid Ag    POCT rapid flu A and B    Acute exacerbation of chronic heart failure (HCC)  Wt Readings from Last 3 Encounters:   25 119 kg (261 lb 12.8 oz)   01/10/25 121 kg (267 lb)   01/15/25 121 kg (267 lb)   Euvolemic on exam today  Has been compliant with low-salt diet, 2 L fluid restriction  Continue torsemide 20 mg daily, metoprolol XL 50 mg twice daily, Entresto  twice daily, Jardiance 10 daily                History of Present Illness   Patient is a 64-year-old male presenting to the office for URI symptoms x 8 days  He is endorsing diarrhea (resolved), abdominal pain (intermittent), cough, fatigue, rhinorrhea  He is endorsing wheezing and SOB at night    POCT COVID: negative  POCT flu: negative    Tx tried: tylenol, saline nasal spray     URI   This is a new problem. The current episode started 1 to 4 weeks ago. The problem has been gradually improving. There has been no fever.  Associated symptoms include coughing, diarrhea (improved), rhinorrhea and wheezing (at night). Pertinent negatives include no abdominal pain, chest pain, congestion, ear pain, headaches, nausea, plugged ear sensation, sinus pain, sore throat or vomiting.     Review of Systems   Constitutional:  Positive for fatigue. Negative for chills and fever.   HENT:  Positive for rhinorrhea. Negative for congestion, ear pain, postnasal drip, sinus pressure, sinus pain, sore throat and trouble swallowing.    Eyes:  Negative for discharge and redness.   Respiratory:  Positive for cough, shortness of breath (at nightdry) and wheezing (at night).    Cardiovascular:  Negative for chest pain, palpitations and leg swelling.   Gastrointestinal:  Positive for diarrhea (improved). Negative for abdominal pain, constipation, nausea and vomiting.   Neurological:  Negative for dizziness, light-headedness and headaches.       Objective   /80 (BP Location: Left arm, Patient Position: Sitting, Cuff Size: Large)   Pulse 83   Temp 97.7 °F (36.5 °C) (Temporal)   Ht 6' (1.829 m)   Wt 119 kg (261 lb 12.8 oz)   SpO2 96%   BMI 35.51 kg/m²      Physical Exam  Vitals and nursing note reviewed.   Constitutional:       General: He is not in acute distress.     Appearance: Normal appearance. He is not ill-appearing.   HENT:      Head: Normocephalic and atraumatic.      Right Ear: External ear normal. There is impacted cerumen.      Left Ear: External ear normal. There is impacted cerumen.      Nose: Nose normal.      Mouth/Throat:      Mouth: Mucous membranes are moist.      Pharynx: Oropharynx is clear. No oropharyngeal exudate or posterior oropharyngeal erythema.   Eyes:      General: No scleral icterus.        Right eye: No discharge.         Left eye: No discharge.      Conjunctiva/sclera: Conjunctivae normal.   Cardiovascular:      Rate and Rhythm: Normal rate and regular rhythm.      Heart sounds: Normal heart sounds. No murmur heard.      No friction rub. No gallop.   Pulmonary:      Effort: Pulmonary effort is normal. No respiratory distress.      Breath sounds: Examination of the left-lower field reveals rhonchi. Wheezing (diffuse) and rhonchi present.   Chest:      Chest wall: No tenderness.   Musculoskeletal:      Cervical back: No tenderness.      Right lower leg: No edema.      Left lower leg: No edema.   Lymphadenopathy:      Cervical: No cervical adenopathy.   Skin:     General: Skin is warm and dry.      Coloration: Skin is not pale.      Findings: No erythema.   Neurological:      General: No focal deficit present.      Mental Status: He is alert and oriented to person, place, and time. Mental status is at baseline.   Psychiatric:         Mood and Affect: Mood normal.         Behavior: Behavior normal.         Disclaimer: This note was generated with voice recognition software.  Phonetic, grammatical, and spelling errors may be present as a result.  Please contact provider with any concerns or questions

## 2025-01-27 NOTE — ASSESSMENT & PLAN NOTE
Wt Readings from Last 3 Encounters:   01/27/25 119 kg (261 lb 12.8 oz)   01/10/25 121 kg (267 lb)   01/15/25 121 kg (267 lb)   Euvolemic on exam today  Has been compliant with low-salt diet, 2 L fluid restriction  Continue torsemide 20 mg daily, metoprolol XL 50 mg twice daily, Entresto  twice daily, Jardiance 10 daily

## 2025-01-29 ENCOUNTER — TELEPHONE (OUTPATIENT)
Age: 65
End: 2025-01-29

## 2025-01-29 DIAGNOSIS — K20.80 PILL ESOPHAGITIS: Primary | ICD-10-CM

## 2025-01-29 DIAGNOSIS — T50.905A PILL ESOPHAGITIS: Primary | ICD-10-CM

## 2025-01-29 NOTE — TELEPHONE ENCOUNTER
Patient returning call that was from earlier from speaking with Macy in regard to taking the doxycycline that he started last night. Patient said that he developed a sore throat, upset stomach and was vomiting this morning. Patient wants to know if he should continue taking this.     Patient has not taken since speaking to macy     Please advise out to patient.

## 2025-01-29 NOTE — TELEPHONE ENCOUNTER
Pt called in and was concerned about possible medication reaction. He started his doxycycline and last night he developed a sore throat, and then this morning has an upset stomach, and vomiting. We did discuss that any antibiotic can cause stomach upset. Discussed him trying to take it with some crackers or a light snack. Pt in agreement. Please advise if pt should continue taking medications.

## 2025-01-29 NOTE — TELEPHONE ENCOUNTER
Doxycycline can be known to cause upset stomach and sore throat.  Please make sure patient is taking with full glass of water and meal.  He may try probiotic daily.  I would also like him to take his Pepcid approximately 30 minutes before eating and taking his pill in the morning.  This should help with the stomach upset.  He can also take Tums if needed. In addition, do not lay down for 30 minutes to 1 hour after taking the medication.    Try the above therapies today.  If symptoms worsen or do not improve, call office and we can switch to alternate antibiotics.  He does need the additional coverage due to his pneumonia, may need to switch to a flouroquinolone if symptoms do not improve     [facility]  [phone]    Vitals   Pre   Post   Assessment   Pre   Post     Temp  Temp: 98 °F (36.7 °C) (01/16/20 1405)  98 LOC  Alert and oriented x 3 same   HR   Pulse (Heart Rate): 83 (01/16/20 1405) 88 Lungs   Clear, no sob  same   B/P   BP: 124/68 (01/16/20 1405) 135/71 Cardiac   RRR  same   Resp   Resp Rate: 16 (01/16/20 1405) 16 Skin   Dry and intact  same   Pain level  Pain Intensity 1: 0 (01/16/20 1218) 8 Edema  none     same   Orders:    Duration:   Start:     9451 End:    1605 Total:   2 hours   Dialyzer:   Dialyzer/Set Up Inspection: Jose Snow (01/16/20 1405)   K Bath:   Dialysate K (mEq/L): 3 (01/16/20 1405)   Ca Bath:   Dialysate CA (mEq/L): 2.5 (01/16/20 1405)   Na/Bicarb:   Dialysate NA (mEq/L): 140 (01/16/20 1405)   Target Fluid Removal:   Goal/Amount of Fluid to Remove (mL): 0 mL (01/16/20 1405)   Access     Type & Location:   Left upper arm AVF,+ bruit and thrill, cannulated for the first time with x2 17 gauge needles without difficulty   Labs     Obtained/Reviewed   Critical Results Called   Date when labs were drawn-  Hgb-    HGB   Date Value Ref Range Status   01/16/2020 9.1 (L) 11.5 - 16.0 g/dL Final     K-    Potassium   Date Value Ref Range Status   01/16/2020 3.8 3.5 - 5.1 mmol/L Final     Ca-   Calcium   Date Value Ref Range Status   01/16/2020 9.5 8.5 - 10.1 MG/DL Final     Bun-   BUN   Date Value Ref Range Status   01/16/2020 91 (H) 6 - 20 MG/DL Final     Creat-   Creatinine   Date Value Ref Range Status   01/16/2020 5.78 (H) 0.55 - 1.02 MG/DL Final        Medications/ Blood Products Given     Name   Dose   Route and Time           None ordered          Blood Volume Processed (BVP):    25.6 Net Fluid   Removed:  0   Comments   Time Out Done: 1325  Primary Nurse Rpt Pre: Jenaro Willard RN  Primary Nurse Rpt Post: Jenaro Willard RN  Pt Education: Procedure and cannulation  Care Plan: Continue current plan of care  Tx Summary:1405- HD was started using left upper arm AVF without difficulty  1505-Pt tolerating treatment well, vitals are stable. 1550- Pt crying out complaining of back pain, primary nurse was made aware   for pain medication administration. 1605- HD completed and pt tolerated it well. All possible blood was returned. Hemostasis was achieved once needles were removed. Post AVF + bruit and thrill. Primary nurse was given a report. Stanford left in the lowest position and call bell within reach  Admiting Diagnosis:ESRD  Pt's previous clinic- N/A  Consent signed - Informed Consent Verified: Yes (01/16/20 1405)  [facility] Consent - Verified  Hepatitis Status- uknown drawn today  Machine #- Machine Number: [id number] (01/16/20 1405)  Telemetry status- tele box  Pre-dialysis wt. -   n/a

## 2025-01-29 NOTE — TELEPHONE ENCOUNTER
Called and discussed with patient per Maritza's message. Understood all instructions and stated no questions at this time.

## 2025-01-31 DIAGNOSIS — I50.21 ACUTE HFREF (HEART FAILURE WITH REDUCED EJECTION FRACTION) (HCC): ICD-10-CM

## 2025-01-31 DIAGNOSIS — I50.9 ACUTE EXACERBATION OF CHRONIC HEART FAILURE (HCC): ICD-10-CM

## 2025-01-31 RX ORDER — TORSEMIDE 10 MG/1
10 TABLET ORAL DAILY
Qty: 90 TABLET | Refills: 1 | Status: SHIPPED | OUTPATIENT
Start: 2025-01-31

## 2025-02-04 ENCOUNTER — TELEPHONE (OUTPATIENT)
Dept: NEPHROLOGY | Facility: CLINIC | Age: 65
End: 2025-02-04

## 2025-02-04 NOTE — TELEPHONE ENCOUNTER
Pt called and stated he would like to keep the 4/15/25 appt with Dr. Treadwell. Pt was offered 2/27/25 with Dr. Treadwell in the [facility] but preferred 4/15/25 appt.

## 2025-02-07 ENCOUNTER — OFFICE VISIT (OUTPATIENT)
Dept: CARDIOLOGY CLINIC | Facility: CLINIC | Age: 65
End: 2025-02-07
Payer: COMMERCIAL

## 2025-02-07 VITALS
SYSTOLIC BLOOD PRESSURE: 138 MMHG | WEIGHT: 264.5 LBS | DIASTOLIC BLOOD PRESSURE: 82 MMHG | HEART RATE: 79 BPM | BODY MASS INDEX: 35.83 KG/M2 | HEIGHT: 72 IN

## 2025-02-07 DIAGNOSIS — I26.90 CHRONIC SEPTIC PULMONARY EMBOLISM WITHOUT ACUTE COR PULMONALE (HCC): ICD-10-CM

## 2025-02-07 DIAGNOSIS — I25.5 ISCHEMIC CARDIOMYOPATHY: ICD-10-CM

## 2025-02-07 DIAGNOSIS — Z95.810 ICD (IMPLANTABLE CARDIOVERTER-DEFIBRILLATOR) IN PLACE: ICD-10-CM

## 2025-02-07 DIAGNOSIS — I27.82 CHRONIC SEPTIC PULMONARY EMBOLISM WITHOUT ACUTE COR PULMONALE (HCC): ICD-10-CM

## 2025-02-07 DIAGNOSIS — I47.20 V-TACH (HCC): Primary | ICD-10-CM

## 2025-02-07 DIAGNOSIS — E11.51 TYPE 2 DIABETES MELLITUS WITH DIABETIC PERIPHERAL ANGIOPATHY WITHOUT GANGRENE, WITHOUT LONG-TERM CURRENT USE OF INSULIN (HCC): ICD-10-CM

## 2025-02-07 DIAGNOSIS — N18.31 CKD STAGE 3A, GFR 45-59 ML/MIN (HCC): ICD-10-CM

## 2025-02-07 DIAGNOSIS — I50.23 ACUTE ON CHRONIC SYSTOLIC (CONGESTIVE) HEART FAILURE (HCC): ICD-10-CM

## 2025-02-07 DIAGNOSIS — I25.10 CORONARY ARTERY DISEASE INVOLVING NATIVE HEART, UNSPECIFIED VESSEL OR LESION TYPE, UNSPECIFIED WHETHER ANGINA PRESENT: ICD-10-CM

## 2025-02-07 DIAGNOSIS — E66.01 MORBID (SEVERE) OBESITY DUE TO EXCESS CALORIES (HCC): ICD-10-CM

## 2025-02-07 PROCEDURE — 99214 OFFICE O/P EST MOD 30 MIN: CPT

## 2025-02-07 PROCEDURE — 93000 ELECTROCARDIOGRAM COMPLETE: CPT

## 2025-02-07 NOTE — PROGRESS NOTES
Electrophysiology Follow Up  [facility]  [facility] [address], [place] [address]    Name: Nadir Myers  : 1960  MRN: [id number]    Assessment & Plan  V-tach ([facility])  Thought to be scar mediated given ICM  S/p ICD given this diagnosis (see device details as below)  Maintained on metoprolol succinate 50 mg twice daily  Was previously maintained off amiodarone and presented 10/7/2024 for class III antiarrhythmic med admission, however due to ongoing LINDEN he was resumed back on amiodarone  ICD (implantable cardioverter-defibrillator) in place  had Medtronic dual-chamber ICD placed 23 for secondary prevention  given MSSA bacteremia (felt to be due to left foot fourth toe wet gangrene status post amputation 2024), underwent extraction of this device 24  underwent Medtronic EV ICD implant 24 with frequent subs all lead dislodgment into the pleural space and attempted revision 24 abandoned given inability to sense with lead and complete extraction done instead  underwent [employer] subcutaneous ICD 24  Ischemic cardiomyopathy  maintained on GDMT of metoprolol succinate and Entresto, also on spironolactone and Jardiance  EF of 45% per echo 2024 /had been as low as 10 to 15% in 2023 with recovery  Appears euvolemic in office today  Coronary artery disease involving native heart, unspecified vessel or lesion type, unspecified whether angina present  Known three-vessel CAD with  of LAD and HANNA to mid circumflex 2015   Type 2 diabetes mellitus with diabetic peripheral angiopathy without gangrene, without long-term current use of insulin ([facility])    Lab Results   Component Value Date    HGBA1C 8.9 (H) 10/14/2024   Continue PCP follow-up  CKD stage 3a, GFR 45-59 ml/min ([facility])  Lab Results   Component Value Date    EGFR 37 2025    EGFR 35 2024    EGFR 29 2024    CREATININE 1.88 (H) 2025    CREATININE  1.95 (H) 12/20/2024    CREATININE 2.28 (H) 12/11/2024   Continue PCP/nephrology follow-up  Chronic septic pulmonary embolism without acute cor pulmonale (HCC)  Continue PCP f/u  Morbid (severe) obesity due to excess calories (HCC)  Recommend weight loss via healthy diet and exercise  Acute on chronic systolic (congestive) heart failure (HCC)  Wt Readings from Last 3 Encounters:   02/07/25 120 kg (264 lb 8 oz)   01/27/25 119 kg (261 lb 12.8 oz)   01/10/25 121 kg (267 lb)       Appears euvolemic  See ICM as above       Discussion/Plan:    Patient was admitted 10/7/2024 for initiation of class III antiarrhythmic given his history of VT, however due to concomitant LINDEN at the time he was initiated on amiodarone instead    His LFTs + TSH 12/11/24 were WNL. 11/2024 PFT's showed normal DLCO but did show a possible restriction/obstruction pattern (should discuss this more w/ PCP)    Since his last outpatient EP appointment he reports he has been feeling well and currently denies acute cardiac complaint    His latest ICD interrogation showed no significant high-rate episodes    Patient's latest renal function evaluation has not improved to the degree desired for us to consider repeat dofetilide trial.    No acute medication changes made today    Modifiable risk factors were discussed today including weight loss, avoiding alcohol/tobacco products, and treatment of KESHA/HTN/DM    Ordered repeat CMP + LFT evaluation for the future, he had recent PFT's 11/2024 as above showing jc DLCO, if he continues on amio after next appt will have to repeat. Otherwise recommended continued outpatient f/u w/ his eye doctor for eye examinations.    During his next EP f/u appointment we'll further discuss his long-term antiarrythmic plan    Patient has been instructed to follow up in our EP office in 6 months or as needed. He will call our office with any questions or concerns in the meantime.    Rhythm History:   Atrial fibrillation:       Atrial flutter:      SVT:      VT/VF/PVC:     Device history:   Pacemaker:     Defibrillator:     BIV PPM:     BIV ICD:     ILR:    Interim History/HPI:   Interim history: Nadir Myers is a 64 y.o. male with a PMH of VT, ICD, three-vessel CAD, ischemic cardiomyopathy, CHF, and T2DM. .    He presents today for routine outpatient follow-up given his history of VT and ICD in situ.  Since his last outpatient EP visit he reports he has been feeling well and currently denies acute cardiac complaint.    EKG: Sinus rhythm with RBBB and ventricular rate 79 bpm    Review of Systems   Constitutional:  Negative for activity change, appetite change, chills, fatigue and fever.   HENT:  Negative for nosebleeds.    Respiratory:  Negative for chest tightness and shortness of breath.    Cardiovascular:  Negative for chest pain, palpitations and leg swelling.   Neurological:  Negative for dizziness, syncope, weakness and light-headedness.         OBJECTIVE:   Vitals:   There were no vitals taken for this visit.  There is no height or weight on file to calculate BMI.        Physical Exam:   Physical Exam  Constitutional:       General: He is not in acute distress.     Appearance: Normal appearance. He is not toxic-appearing.   HENT:      Head: Normocephalic and atraumatic.   Eyes:      General:         Right eye: No discharge.         Left eye: No discharge.   Cardiovascular:      Rate and Rhythm: Normal rate and regular rhythm.      Pulses: Normal pulses.   Pulmonary:      Effort: Pulmonary effort is normal.      Breath sounds: Normal breath sounds.   Musculoskeletal:      Right lower leg: No edema.      Left lower leg: No edema.   Skin:     General: Skin is warm and dry.      Capillary Refill: Capillary refill takes less than 2 seconds.   Neurological:      Mental Status: He is alert.            Medications:      Current Outpatient Medications:     amiodarone 200 mg tablet, Take 1 tablet (200 mg total) by mouth daily, Disp: 90  tablet, Rfl: 2    aspirin 81 mg chewable tablet, Chew 1 tablet (81 mg total) daily Do not start before March 2, 2024., Disp: 30 tablet, Rfl: 0    atorvastatin (LIPITOR) 80 mg tablet, TAKE 1 TABLET BY MOUTH EVERY DAY AFTER DINNER, Disp: 90 tablet, Rfl: 1    BD Pen Needle Micro U/F 32G X 6 MM MISC, Inject under the skin in the morning, Disp: 100 each, Rfl: 1    clopidogrel (PLAVIX) 75 mg tablet, TAKE 1 TABLET BY MOUTH EVERY DAY, Disp: 100 tablet, Rfl: 1    Empagliflozin (Jardiance) 10 MG TABS tablet, Take 1 tablet (10 mg total) by mouth every morning, Disp: 90 tablet, Rfl: 3    famotidine (PEPCID) 40 MG tablet, TAKE 1 TABLET BY MOUTH EVERY DAY, Disp: 90 tablet, Rfl: 1    Insulin Glargine Solostar (Lantus SoloStar) 100 UNIT/ML SOPN, Inject 0.3 mL (30 Units total) under the skin daily at bedtime, Disp: 15 mL, Rfl: 5    metoprolol succinate (TOPROL-XL) 50 mg 24 hr tablet, TAKE 1 TABLET BY MOUTH EVERY 12 HOURS, Disp: 180 tablet, Rfl: 1    sacubitril-valsartan (Entresto)  MG TABS, Take 1 tablet by mouth 2 (two) times a day, Disp: 60 tablet, Rfl: 2    sitaGLIPtin-metFORMIN (JANUMET)  MG per tablet, Take 1 tablet by mouth 2 (two) times a day with meals, Disp: 200 tablet, Rfl: 1    torsemide (DEMADEX) 10 mg tablet, TAKE 1 TABLET BY MOUTH EVERY DAY, Disp: 90 tablet, Rfl: 1       Historical Information   Past Medical History:   Diagnosis Date    Bacteremia due to Staphylococcus 10/09/2024    CHF (congestive heart failure) (HCC)     Diabetes mellitus (HCC)     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    Myocardial infarction (HCC)        Past Surgical History:   Procedure Laterality Date    CARDIAC CATHETERIZATION N/A 05/03/2023    Procedure: Cardiac Coronary Angiogram;  Surgeon: Valeriy Shore MD;  Location: [facility];  Service: Cardiology    CARDIAC CATHETERIZATION Left 05/03/2023    Procedure: Cardiac Left Heart Cath;  Surgeon: Valeriy Shore MD;  Location: [facility];  Service: Cardiology     CARDIAC DEFIBRILLATOR PLACEMENT  2023    CARDIAC ELECTROPHYSIOLOGY PROCEDURE N/A 2023    Procedure: Cardiac icd implant;  Surgeon: Urbano Maria MD;  Location: [facility];  Service: Cardiology    CARDIAC ELECTROPHYSIOLOGY PROCEDURE N/A 2024    Procedure: EV ICD IMPLANTATION;  Surgeon: Arturo Wooten DO;  Location: [facility];  Service: Cardiology    CARDIAC ELECTROPHYSIOLOGY PROCEDURE N/A 2024    Procedure: Cardiac laser lead extraction;  Surgeon: Arturo Wooten DO;  [facility]: [facility];  Service: Cardiology    CARDIAC ELECTROPHYSIOLOGY PROCEDURE N/A 2024    Procedure: Cardiac icd implant subq;  Surgeon: Urbano Maria MD;  [facility]: [facility];  Service: Cardiology    IR LOWER EXTREMITY ANGIOGRAM  2024    [place] RMVL TRANSVNS PM ELTRD DUAL LEAD SYS N/A 2024    Procedure: EV ICD IMPLANTATION;  Surgeon: Abhilash Ferrera MD;  Location: [facility];  Service: Cardiac Surgery    [place] RMVL TRANSVNS PM ELTRD DUAL LEAD SYS N/A 2024    Procedure: REMOVAL OF LEAD AND GENERATOR AND ATTEMPTED LEAD REVISION;  Surgeon: Abhilash Ferrera MD;  Location: [facility];  Service: Cardiac Surgery    WOUND DEBRIDEMENT Left 2024    Procedure: LEFT FOURTH TOE AMPUTATION SURGICAL WOUND DEBRIDEMENT FOOT/TOE (WASH OUT);  Surgeon: Bebo Hopper DPM;  Location: [facility];  Service: Podiatry       Social History     Substance and Sexual Activity   Alcohol Use Not Currently     Social History     Substance and Sexual Activity   Drug Use Never     Social History     Tobacco Use   Smoking Status Former    Types: Cigarettes    Start date: 2024    Quit date: 1976    Years since quittin.1   Smokeless Tobacco Never       No family history on file.      Labs & Results:  Below is the patient's most recent value for Albumin, ALT, AST, BUN, Calcium, Chloride, Cholesterol, CO2, Creatinine, GFR, Glucose, HDL, Hematocrit, Hemoglobin, Hemoglobin A1C, LDL, Magnesium,  Phosphorus, Platelets, Potassium, PSA, Sodium, Triglycerides, and WBC.   Lab Results   Component Value Date    ALT 24 12/11/2024    AST 21 12/11/2024    BUN 44 (H) 01/07/2025    CALCIUM 9.4 01/07/2025     01/07/2025    CHOL 130 05/07/2015    CO2 24 01/07/2025    CREATININE 1.88 (H) 01/07/2025    HDL 36 (L) 10/14/2024    HCT 30.2 (L) 12/04/2024    HGB 9.7 (L) 12/04/2024    HGBA1C 8.9 (H) 10/14/2024    MG 2.2 01/07/2025    PHOS 4.0 01/07/2025     12/04/2024    K 4.9 01/07/2025    PSA 0.16 05/14/2024     02/17/2015    TRIG 194 (H) 10/14/2024    WBC 9.68 12/04/2024     Note: for a comprehensive list of the patient's lab results, access the Results Review activity.

## 2025-02-07 NOTE — ASSESSMENT & PLAN NOTE
Lab Results   Component Value Date    EGFR 37 01/07/2025    EGFR 35 12/20/2024    EGFR 29 12/11/2024    CREATININE 1.88 (H) 01/07/2025    CREATININE 1.95 (H) 12/20/2024    CREATININE 2.28 (H) 12/11/2024   Continue PCP/nephrology follow-up

## 2025-02-07 NOTE — ASSESSMENT & PLAN NOTE
maintained on GDMT of metoprolol succinate and Entresto, also on spironolactone and Jardiance  EF of 45% per echo 2/21/2024 /had been as low as 10 to 15% in 5/2023 with recovery  Appears euvolemic in office today

## 2025-02-07 NOTE — ASSESSMENT & PLAN NOTE
had Medtronic dual-chamber ICD placed 5/12/23 for secondary prevention  given MSSA bacteremia (felt to be due to left foot fourth toe wet gangrene status post amputation 1/2024), underwent extraction of this device 2/9/24  underwent [employer]onic EV ICD implant 2/20/24 with frequent subs all lead dislodgment into the pleural space and attempted revision 2/21/24 abandoned given inability to sense with lead and complete extraction done instead  underwent [employer] subcutaneous ICD 5/22/24

## 2025-02-20 ENCOUNTER — HOSPITAL ENCOUNTER (OUTPATIENT)
Dept: RADIOLOGY | Facility: HOSPITAL | Age: 65
End: 2025-02-20
Attending: INTERNAL MEDICINE
Payer: COMMERCIAL

## 2025-02-20 ENCOUNTER — RESULTS FOLLOW-UP (OUTPATIENT)
Dept: GASTROENTEROLOGY | Facility: MEDICAL CENTER | Age: 65
End: 2025-02-20

## 2025-02-20 DIAGNOSIS — R11.2 NAUSEA AND VOMITING, UNSPECIFIED VOMITING TYPE: ICD-10-CM

## 2025-02-20 PROCEDURE — A9541 TC99M SULFUR COLLOID: HCPCS

## 2025-02-20 PROCEDURE — 78264 GASTRIC EMPTYING IMG STUDY: CPT

## 2025-03-17 ENCOUNTER — REMOTE DEVICE CLINIC VISIT (OUTPATIENT)
Dept: CARDIOLOGY CLINIC | Facility: CLINIC | Age: 65
End: 2025-03-17
Payer: COMMERCIAL

## 2025-03-17 DIAGNOSIS — Z95.810 AICD (AUTOMATIC CARDIOVERTER/DEFIBRILLATOR) PRESENT: Primary | ICD-10-CM

## 2025-03-17 PROCEDURE — 93296 REM INTERROG EVL PM/IDS: CPT | Performed by: INTERNAL MEDICINE

## 2025-03-17 PROCEDURE — 93295 DEV INTERROG REMOTE 1/2/MLT: CPT | Performed by: INTERNAL MEDICINE

## 2025-03-18 ENCOUNTER — RESULTS FOLLOW-UP (OUTPATIENT)
Dept: CARDIOLOGY CLINIC | Facility: CLINIC | Age: 65
End: 2025-03-18

## 2025-03-18 NOTE — PROGRESS NOTES
"Results for orders placed or performed in visit on 03/17/25   Cardiac EP device report    Narrative    [employer] SQ ICD/ ACTIVE SYSTEM IS MRI CONDITIONAL  LATITUDE TRANSMISSION: PRESENTING EGRAM VS@ 77 BPM. BATTERY STATUS \"92%.\" ALL ELECTRODE PARAMETERS WITHIN NORMAL LIMITS. NORMAL DEVICE FUNCTION. NC         "

## 2025-03-25 NOTE — PROGRESS NOTES
Heart Failure Outpatient Progress Note - Nadir Myers 65 y.o. male MRN: [id number]    @ Encounter: [id number]      Assessment/Plan:    Patient Active Problem List    Diagnosis Date Noted    History of anemia due to chronic kidney disease 10/15/2024    Biclonal gammopathy 10/15/2024    Hepatitis C antibody test positive 10/10/2024    Asymptomatic microscopic hematuria 10/10/2024    CKD stage 3a, GFR 45-59 ml/min ([facility]) 10/08/2024    Persistent proteinuria 10/08/2024    Acute kidney injury ([facility]) 10/07/2024    Advanced care planning/counseling discussion 07/18/2024    Type 2 diabetes mellitus with diabetic peripheral angiopathy without gangrene ([facility]) 06/05/2024    Amputated 4th toe, left ([facility]) 04/10/2024    ICD (implantable cardioverter-defibrillator) in place 02/21/2024    Abnormal CT of the chest 02/09/2024    Type 2 diabetes mellitus with foot ulcer, with long-term current use of insulin ([facility]) 01/16/2024    CAD (coronary artery disease) 01/16/2024    PAD (peripheral artery disease) ([facility]) 01/16/2024    Chronic HFimpEF, stage C, NYHA II 08/02/2023    V-tach ([facility]) 05/04/2023    Ischemic cardiomyopathy 05/03/2023    Hypertension 05/02/2023    Acute HFrEF 05/02/2023    Benign prostatic hyperplasia without lower urinary tract symptoms 04/11/2018    Type 2 diabetes mellitus, with long-term current use of insulin ([facility]) 02/27/2015    GERD (gastroesophageal reflux disease) 06/28/2013    Tobacco dependence syndrome 01/18/2012    Other complications of amputation stump ([facility]) 01/10/2025    History of complete ray amputation of fourth toe of left foot ([facility]) 01/10/2025    Chronic septic pulmonary embolism without acute cor pulmonale ([facility]) 01/10/2025    Morbid (severe) obesity due to excess calories ([facility]) 01/10/2025    Acute exacerbation of chronic heart failure ([facility]) 12/03/2024    Hyperlipemia 12/03/2024    Nutritional anemia 12/03/2024    Leukocytosis 12/03/2024       Chronic systolic CHF (congestive heart failure)  "(HCC)  -ETIOLOGY: ischemic CMP with large apical aneurysm with progression of CAD/late presenting MI with occluded mid LAD and LPDA. LV mildly dilated.   Started on milrinone 0.25mcg/kg/min 5/6/23 due to rising creatinine and worsening volume status, stopped 5/10/23. ,  EF since improved   -warm on exam, appears compensated. Continues to gain caloric weight  -continue GDMT as below  -due to for labs before EP visit next month  -needs to focus more on lifestyle    Weight: 205 lbs on 6/5, 218 lbs--> 237 lbs--> 245 lbs--> 249 lbs--> 264, 273 today    -EKG- narrow QRS with bigeminy; inferior infarct, anterolateral infarct      TTE 6/3/24: LVEF 40%     Echo 2/1/24:  LVEF: 40%, akinetic segments  LVEDd: 5.1 cm  RV: normal     Echo 8/7/23:  LVEF: 40%  LVEDd: 4.1 cm  LV apex aneurysmal, no thrombus  RV: normal     LHC 5/3/23:   chronically occluded mid LAD and LPDA c/w echo findings of apical aneurysm, not amenable to PCI  Patent mid LCx stent   LVEDP 30 mmHg     Echocardiogram 5/2/23  LVEF:  10-15%, apical aneurysm  LVIDd: 5.6 cm  RV: normal  MR: mild  PASP: 49 mmHg     Echo 9/26/17:  LVEF: 65%                  VOLUME:  - home diuretic:  Torsemide 10 mg daily    GDMT:  --Beta-Blocker: Metoprolol succinate 50 mg BID  --ACEi, ARB or ARNi: Entresto 97/103 mg BID  --MRA: NA  --SGLT2-I: Jardiance 10 mg daily.     DEVICE:  - MDT ICD 5/12/23 for secondary prevention  - ICD explanted 2/8/24 for infection  - Extravascular ICD placed 2/20/24, though lead was in left pleural space and repositioning attempts on 2/21/24 unsuccessful.   - Now SQ ICD placed 5/21/24   -Interrogation 3/17/25:  PRESENTING EGRAM VS@ 77 BPM. BATTERY STATUS \"92%.\" ALL ELECTRODE PARAMETERS WITHIN NORMAL LIMITS. NORMAL DEVICE FUNCTION.         Advanced Therapies (If appropriate):  --Inotrope: was on milrinone 0.25 mcg/kg/min, titrated off  --LVAD/Transplant Candidacy: Current tobacco use prior to admission precludes heart transplant at this time- since quit. " Moderately reduced RV function and mildly dilated LV concerning for LVAD. Mildly dilated LV also concerning given VT.   -Has not smoked since May 2        3-vessel CAD  - hx PCI 2015 Lcx  -Q wave MI, later presenting or silent LAD territory infarction   -no angina  - on ASA, plavix, statin  -may be able to stop Plavix at this point. Would defer to Dr. Samuels.   -recheck lipids now.        V-tach ([facility])  - scar mediated  - toprol as above  - amiodarone 200 mg daily  - further AAD management per EP    Type 2 diabetes mellitus with foot ulcer, with long-term current use of insulin ([facility])  Lab Results   Component Value Date    HGBA1C 8.9 (H) 10/14/2024       CKD  - new baseline Cr 1.8-2  -due for labs again near term     Nausea/Vomiting  -occurring at least once/month x 6+ months  -wakes patient up at night  -scheduled for EGD/colonoscopy  -has fam h/o colon CA in his mother.   -gastric emptying study normal  -following with GI    Bacteremia due to Staphylococcus  -2/2024, resolved.   - Felt most likely from foot infection.   ICD was involved and was extracted.  Possible pulmonary septic emboli.   -GILL 2/7/24: There is a 1.8 x 1.1 cm mobile mass affixed to the cardiac device lead as is passes through the right atrium. In the setting of bacteremia this is most likely represents infection.       HPI:  64-year-old male with past medical history as described above who presented electively on 10/7/2024 under the direction of our EP team for initiation of new antiarrhythmic therapy with plan to discontinue amiodarone.  Unfortunately admission labs drawn showed evidence of worsening renal function with a creatinine of up to 1.9.  This is above a baseline of 1.2-1.4.  He reports feeling at his baseline with no acute complaints.  Has had no recent illness or any other acute issues.  See Dr. Samuels on 9/5/2024.  At that time plan was to increase his Entresto dose up to 97/103 mg twice daily however the new prescription was never  received.  As such she has stayed on his prior dose of 49/51 twice daily and the remainder of his other GDMT as prescribed.  He does admit to periodic episodes of nausea/vomiting once every 3 weeks to a month.  He describes this as waking him up in the middle of the night after which she vomits and then returns to normal sleep.  Other than this he has been in his usual state of health with no complaints of chest pain, unusual shortness of breath, orthopnea or PND.  He has gained a substantial amount of weight but reports this is caloric in nature.     10/14/24. Presents for hospital follow up. Recent elective admission for the reasons noted above. All GDMT placed on hold. Creat remained above baseline but stable. Evaluated by nephrology who recommended holding Jardiance for now. Today he continues to feel well with no complaints. Admits to weight gain due to very poor eating habits. He is other wise feeling at his baseline. Will have his blood work drawn today. Sees nephrology tomorrow.     2024 patient presents for 1 month follow-up.  Feels well with the exception of persistent nausea vomiting that wakes him up at night.  States this is occurs at least once per month if not more.  .  He has discussed this with multiple providers none of whom have able to diagnose him.  Does report his mother  of colon cancer in her early 70s.  He has not yet had an EGD or colonoscopy.  Also report substantial weight gain over recent weeks to months.  He attributes this to very poor eating habits    Interval History:  Hospital admit in Dec with SOB, mild exacerbation of CHF.   Weight up to 264 lbs but now down more recently    3/27/2025 patient presents for routine follow-up.  He reports feeling quite well.  Admits to caloric weight gain however due to poor dietary habits and overeating.  He continues to lead an active lifestyle.  He works in his yard without any difficulty.  Does have to take breaks at times but this is  his baseline.  Denies chest pain, unusual shortness of breath, orthopnea or PND.  Taking his medications without interruption.  Past Medical History:   Diagnosis Date    Bacteremia due to Staphylococcus 10/09/2024    CHF (congestive heart failure) (HCC)     Diabetes mellitus (HCC)     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    Myocardial infarction (HCC)        12 point ROS negative other than that stated in HPI    Allergies   Allergen Reactions    Vancomycin Rash     NOT AN ALLERGY - 1/31/24 patient experienced vancomycin infusion reaction, please extend duration of infusion time to prevent future infusion reactions     .    Current Outpatient Medications:     amiodarone 200 mg tablet, Take 1 tablet (200 mg total) by mouth daily, Disp: 90 tablet, Rfl: 2    aspirin 81 mg chewable tablet, Chew 1 tablet (81 mg total) daily Do not start before March 2, 2024., Disp: 30 tablet, Rfl: 0    atorvastatin (LIPITOR) 80 mg tablet, TAKE 1 TABLET BY MOUTH EVERY DAY AFTER DINNER, Disp: 90 tablet, Rfl: 1    BD Pen Needle Micro U/F 32G X 6 MM MISC, Inject under the skin in the morning, Disp: 100 each, Rfl: 1    clopidogrel (PLAVIX) 75 mg tablet, TAKE 1 TABLET BY MOUTH EVERY DAY, Disp: 100 tablet, Rfl: 1    Empagliflozin (Jardiance) 10 MG TABS tablet, Take 1 tablet (10 mg total) by mouth every morning, Disp: 90 tablet, Rfl: 3    famotidine (PEPCID) 40 MG tablet, TAKE 1 TABLET BY MOUTH EVERY DAY (Patient not taking: Reported on 2/7/2025), Disp: 90 tablet, Rfl: 1    Insulin Glargine Solostar (Lantus SoloStar) 100 UNIT/ML SOPN, Inject 0.3 mL (30 Units total) under the skin daily at bedtime, Disp: 15 mL, Rfl: 5    metoprolol succinate (TOPROL-XL) 50 mg 24 hr tablet, TAKE 1 TABLET BY MOUTH EVERY 12 HOURS, Disp: 180 tablet, Rfl: 1    sacubitril-valsartan (Entresto)  MG TABS, Take 1 tablet by mouth 2 (two) times a day, Disp: 60 tablet, Rfl: 2    sitaGLIPtin-metFORMIN (JANUMET)  MG per tablet, Take 1 tablet by mouth 2 (two)  times a day with meals (Patient not taking: Reported on 2025), Disp: 200 tablet, Rfl: 1    torsemide (DEMADEX) 10 mg tablet, TAKE 1 TABLET BY MOUTH EVERY DAY, Disp: 90 tablet, Rfl: 1    Social History     Socioeconomic History    Marital status: /Civil Union     Spouse name: Not on file    Number of children: Not on file    Years of education: Not on file    Highest education level: Not on file   Occupational History    Not on file   Tobacco Use    Smoking status: Former     Types: Cigarettes     Start date: 2024     Quit date: 1976     Years since quittin.2    Smokeless tobacco: Never   Vaping Use    Vaping status: Never Used   Substance and Sexual Activity    Alcohol use: Not Currently    Drug use: Never    Sexual activity: Not Currently     Partners: Female   Other Topics Concern    Not on file   Social History Narrative    Not on file     Social Drivers of Health     Financial Resource Strain: Not on file   Food Insecurity: Food Insecurity Present (2024)    Nursing - Inadequate Food Risk Classification     Worried About Running Out of Food in the Last Year: Never true     Ran Out of Food in the Last Year: Never true     Ran Out of Food in the Last Year: Often true   Transportation Needs: No Transportation Needs (2024)    Nursing - Transportation Risk Classification     Lack of Transportation: Not on file     Lack of Transportation: No   Physical Activity: Not on file   Stress: Not on file   Social Connections: Not on file   Intimate Partner Violence: Unknown (2024)    Nursing IPS     Feels Physically and Emotionally Safe: Not on file     Physically Hurt by Someone: Not on file     Humiliated or Emotionally Abused by Someone: Not on file     Physically Hurt by Someone: No     Hurt or Threatened by Someone: No   Housing Stability: At Risk (2024)    Nursing: Inadequate Housing Risk Classification     Has Housing: Not on file     Worried About Losing Housing: Not on file      Unable to Get Utilities: Not on file     Unable to Pay for Housing in the Last Year: Yes     Has Housin       No family history on file.    Physical Exam:    Vitals: /70 (BP Location: Left arm, Patient Position: Sitting, Cuff Size: Standard)   Pulse 81   Ht 6' (1.829 m)   Wt 124 kg (273 lb)   SpO2 97%   BMI 37.03 kg/m²     Wt Readings from Last 3 Encounters:   25 120 kg (264 lb 8 oz)   25 119 kg (261 lb 12.8 oz)   01/10/25 121 kg (267 lb)           GEN: Nadir Myers appears well, alert and oriented x 3, pleasant and cooperative   HEENT: pupils equal, round, and reactive to light; extraocular muscles intact  NECK: supple, no carotid bruits   HEART: regular rhythm, normal S1 and S2, no murmurs, clicks, gallops or rubs, JVP is flat    LUNGS: clear to auscultation bilaterally; no wheezes, rales, or rhonchi   ABDOMEN: normal bowel sounds, soft, no tenderness, no distention  EXTREMITIES: peripheral pulses normal; no clubbing, cyanosis, or edema  NEURO: no focal findings   SKIN: normal without suspicious lesions on exposed skin    Labs & Results:  Lab Results   Component Value Date     2015    SODIUM 139 2025    K 4.9 2025     2025    CO2 24 2025    ANIONGAP 5 2015    AGAP 11 2025    BUN 44 (H) 2025    CREATININE 1.88 (H) 2025    GLUC 281 (H) 2024    GLUF 185 (H) 2025    CALCIUM 9.4 2025    AST 21 2024    ALT 24 2024    ALKPHOS 56 2024    PROT 6.9 2015    TP 6.8 2024    BILITOT 0.6 2015    TBILI 0.23 2024    EGFR 37 2025     Lab Results   Component Value Date    WBC 9.68 2024    HGB 9.7 (L) 2024    HCT 30.2 (L) 2024    MCV 90 2024     2024     Lab Results   Component Value Date    BNP 1,899 (H) 2024      Lab Results   Component Value Date    HGBA1C 8.9 (H) 10/14/2024     Lab Results   Component Value Date     VXA5XQYPYPKX 3.624 12/11/2024    TSH 3.10 04/14/2023       EKG personally reviewed by CARLA Leahy.   No results found for this visit on 03/27/25.     Counseling / Coordination of Care  Total face to face time spent with patient 15 minutes.  An additional 10 minutes was spent for chart/data review and visit preparation.       Thank you for the opportunity to participate in the care of this patient.    CARLA Leahy

## 2025-03-27 ENCOUNTER — OFFICE VISIT (OUTPATIENT)
Dept: CARDIOLOGY CLINIC | Facility: CLINIC | Age: 65
End: 2025-03-27
Payer: COMMERCIAL

## 2025-03-27 VITALS
OXYGEN SATURATION: 97 % | DIASTOLIC BLOOD PRESSURE: 70 MMHG | HEART RATE: 81 BPM | HEIGHT: 72 IN | WEIGHT: 273 LBS | BODY MASS INDEX: 36.98 KG/M2 | SYSTOLIC BLOOD PRESSURE: 110 MMHG

## 2025-03-27 DIAGNOSIS — I25.5 ISCHEMIC CARDIOMYOPATHY: Primary | ICD-10-CM

## 2025-03-27 PROCEDURE — 99214 OFFICE O/P EST MOD 30 MIN: CPT | Performed by: NURSE PRACTITIONER

## 2025-03-27 NOTE — PATIENT INSTRUCTIONS
Weigh yourself daily  If you gain 3 lbs in one day or 5 lbs in one week, please call the office at [phone] and ask for a nurse or the heart failure nurse  Keep your sodium intake to <2 grams, (2000 mg) per day, and fluids <2 Liters (2000 ml) per day. This is around 6-7, 8 oz glasses of fluid per day    Continue medications as you are  Cut back on your calories.   Move a little more.

## 2025-04-02 ENCOUNTER — TELEPHONE (OUTPATIENT)
Age: 65
End: 2025-04-02

## 2025-04-02 NOTE — TELEPHONE ENCOUNTER
Received call from patient requesting to leave a message for Mariluz Hatch regarding his patient assistance for Jardiance. He states it is time for him to renew his application, and is requesting a new form.

## 2025-04-05 DIAGNOSIS — I25.10 CORONARY ARTERY DISEASE INVOLVING NATIVE CORONARY ARTERY OF NATIVE HEART WITHOUT ANGINA PECTORIS: ICD-10-CM

## 2025-04-05 DIAGNOSIS — Z95.810 ICD (IMPLANTABLE CARDIOVERTER-DEFIBRILLATOR) IN PLACE: ICD-10-CM

## 2025-04-05 DIAGNOSIS — I50.20 SYSTOLIC CONGESTIVE HEART FAILURE, UNSPECIFIED HF CHRONICITY (HCC): ICD-10-CM

## 2025-04-07 RX ORDER — METOPROLOL SUCCINATE 50 MG/1
50 TABLET, EXTENDED RELEASE ORAL 2 TIMES DAILY
Qty: 180 TABLET | Refills: 1 | Status: SHIPPED | OUTPATIENT
Start: 2025-04-07

## 2025-04-08 RX ORDER — HYDRALAZINE HYDROCHLORIDE 25 MG/1
50 TABLET, FILM COATED ORAL EVERY 8 HOURS SCHEDULED
Qty: 540 TABLET | Refills: 1 | OUTPATIENT
Start: 2025-04-08

## 2025-04-09 ENCOUNTER — LAB (OUTPATIENT)
Dept: LAB | Facility: IMAGING CENTER | Age: 65
End: 2025-04-09
Payer: COMMERCIAL

## 2025-04-09 DIAGNOSIS — I47.20 V-TACH (HCC): ICD-10-CM

## 2025-04-09 DIAGNOSIS — N18.31 CKD STAGE 3A, GFR 45-59 ML/MIN (HCC): ICD-10-CM

## 2025-04-09 DIAGNOSIS — N17.9 ACUTE KIDNEY INJURY (HCC): ICD-10-CM

## 2025-04-09 LAB
ALBUMIN SERPL BCG-MCNC: 3.9 G/DL (ref 3.5–5)
ALP SERPL-CCNC: 69 U/L (ref 34–104)
ALT SERPL W P-5'-P-CCNC: 16 U/L (ref 7–52)
ANION GAP SERPL CALCULATED.3IONS-SCNC: 9 MMOL/L (ref 4–13)
AST SERPL W P-5'-P-CCNC: 15 U/L (ref 13–39)
BACTERIA UR QL AUTO: ABNORMAL /HPF
BILIRUB SERPL-MCNC: 0.32 MG/DL (ref 0.2–1)
BILIRUB UR QL STRIP: NEGATIVE
BUN SERPL-MCNC: 41 MG/DL (ref 5–25)
CALCIUM SERPL-MCNC: 9.5 MG/DL (ref 8.4–10.2)
CHLORIDE SERPL-SCNC: 108 MMOL/L (ref 96–108)
CLARITY UR: CLEAR
CO2 SERPL-SCNC: 24 MMOL/L (ref 21–32)
COLOR UR: ABNORMAL
CREAT SERPL-MCNC: 1.83 MG/DL (ref 0.6–1.3)
CREAT UR-MCNC: 67.1 MG/DL
ERYTHROCYTE [DISTWIDTH] IN BLOOD BY AUTOMATED COUNT: 15 % (ref 11.6–15.1)
GFR SERPL CREATININE-BSD FRML MDRD: 38 ML/MIN/1.73SQ M
GLUCOSE P FAST SERPL-MCNC: 159 MG/DL (ref 65–99)
GLUCOSE UR STRIP-MCNC: ABNORMAL MG/DL
HCT VFR BLD AUTO: 40.5 % (ref 36.5–49.3)
HGB BLD-MCNC: 12.8 G/DL (ref 12–17)
HGB UR QL STRIP.AUTO: ABNORMAL
KETONES UR STRIP-MCNC: NEGATIVE MG/DL
LEUKOCYTE ESTERASE UR QL STRIP: ABNORMAL
MCH RBC QN AUTO: 28.6 PG (ref 26.8–34.3)
MCHC RBC AUTO-ENTMCNC: 31.6 G/DL (ref 31.4–37.4)
MCV RBC AUTO: 91 FL (ref 82–98)
NITRITE UR QL STRIP: NEGATIVE
NON-SQ EPI CELLS URNS QL MICRO: ABNORMAL /HPF
PH UR STRIP.AUTO: 6.5 [PH]
PHOSPHATE SERPL-MCNC: 4.1 MG/DL (ref 2.3–4.1)
PLATELET # BLD AUTO: 262 THOUSANDS/UL (ref 149–390)
PMV BLD AUTO: 10.1 FL (ref 8.9–12.7)
POTASSIUM SERPL-SCNC: 5.4 MMOL/L (ref 3.5–5.3)
PROT SERPL-MCNC: 7.3 G/DL (ref 6.4–8.4)
PROT UR STRIP-MCNC: ABNORMAL MG/DL
PROT UR-MCNC: 341.4 MG/DL
PROT/CREAT UR: 5.1 MG/G{CREAT}
PTH-INTACT SERPL-MCNC: 137.3 PG/ML (ref 12–88)
RBC # BLD AUTO: 4.47 MILLION/UL (ref 3.88–5.62)
RBC #/AREA URNS AUTO: ABNORMAL /HPF
SODIUM SERPL-SCNC: 141 MMOL/L (ref 135–147)
SP GR UR STRIP.AUTO: 1.02 (ref 1–1.03)
T4 FREE SERPL-MCNC: 0.91 NG/DL (ref 0.61–1.12)
TSH SERPL DL<=0.05 MIU/L-ACNC: 3.68 UIU/ML (ref 0.45–4.5)
UROBILINOGEN UR STRIP-ACNC: <2 MG/DL
WBC # BLD AUTO: 9.49 THOUSAND/UL (ref 4.31–10.16)
WBC #/AREA URNS AUTO: ABNORMAL /HPF

## 2025-04-09 PROCEDURE — 82570 ASSAY OF URINE CREATININE: CPT

## 2025-04-09 PROCEDURE — 84100 ASSAY OF PHOSPHORUS: CPT

## 2025-04-09 PROCEDURE — 36415 COLL VENOUS BLD VENIPUNCTURE: CPT

## 2025-04-09 PROCEDURE — 84443 ASSAY THYROID STIM HORMONE: CPT

## 2025-04-09 PROCEDURE — 80053 COMPREHEN METABOLIC PANEL: CPT

## 2025-04-09 PROCEDURE — 84439 ASSAY OF FREE THYROXINE: CPT

## 2025-04-09 PROCEDURE — 83970 ASSAY OF PARATHORMONE: CPT

## 2025-04-09 PROCEDURE — 84156 ASSAY OF PROTEIN URINE: CPT

## 2025-04-09 PROCEDURE — 81001 URINALYSIS AUTO W/SCOPE: CPT

## 2025-04-09 PROCEDURE — 85027 COMPLETE CBC AUTOMATED: CPT

## 2025-04-10 ENCOUNTER — RESULTS FOLLOW-UP (OUTPATIENT)
Dept: NON INVASIVE DIAGNOSTICS | Facility: CLINIC | Age: 65
End: 2025-04-10

## 2025-04-10 DIAGNOSIS — E11.51 TYPE 2 DIABETES MELLITUS WITH DIABETIC PERIPHERAL ANGIOPATHY WITHOUT GANGRENE, WITHOUT LONG-TERM CURRENT USE OF INSULIN (HCC): Primary | ICD-10-CM

## 2025-04-10 NOTE — TELEPHONE ENCOUNTER
LVM informing patient that lab orders were placed to recheck potassium at end of the month. Left number to office for any questions/concerns.

## 2025-04-10 NOTE — TELEPHONE ENCOUNTER
----- Message from Collin Gonzalez PA-C sent at 4/10/2025  8:14 AM EDT -----  Can someone please let this patient know that we got his labwork back which looks well overall. Only noted issue was a slightly high potassium level. Would just recheck some labs at the end of the month to ensure that level normalizes (nothing for him to do otherwise).    Thanks!  ----- Message -----  From: Lab, Background User  Sent: 4/9/2025   4:04 PM EDT  To: Collin Gonzalez PA-C

## 2025-04-15 ENCOUNTER — OFFICE VISIT (OUTPATIENT)
Dept: NEPHROLOGY | Facility: CLINIC | Age: 65
End: 2025-04-15
Payer: COMMERCIAL

## 2025-04-15 VITALS
SYSTOLIC BLOOD PRESSURE: 140 MMHG | WEIGHT: 276 LBS | DIASTOLIC BLOOD PRESSURE: 80 MMHG | BODY MASS INDEX: 37.43 KG/M2 | OXYGEN SATURATION: 98 % | HEART RATE: 81 BPM

## 2025-04-15 DIAGNOSIS — N25.81 SECONDARY HYPERPARATHYROIDISM OF RENAL ORIGIN (HCC): ICD-10-CM

## 2025-04-15 DIAGNOSIS — E78.2 MIXED HYPERLIPIDEMIA: ICD-10-CM

## 2025-04-15 DIAGNOSIS — N18.32 CKD STAGE 3B, GFR 30-44 ML/MIN (HCC): ICD-10-CM

## 2025-04-15 DIAGNOSIS — Z95.810 ICD (IMPLANTABLE CARDIOVERTER-DEFIBRILLATOR) IN PLACE: ICD-10-CM

## 2025-04-15 DIAGNOSIS — N18.9 HISTORY OF ANEMIA DUE TO CHRONIC KIDNEY DISEASE: ICD-10-CM

## 2025-04-15 DIAGNOSIS — N18.32 TYPE 2 DIABETES MELLITUS WITH STAGE 3B CHRONIC KIDNEY DISEASE, WITH LONG-TERM CURRENT USE OF INSULIN (HCC): Primary | ICD-10-CM

## 2025-04-15 DIAGNOSIS — I13.0 BENIGN HYPERTENSIVE HEART AND CKD, STAGE 3 (GFR 30-59), W CHF (HCC): ICD-10-CM

## 2025-04-15 DIAGNOSIS — Z79.4 TYPE 2 DIABETES MELLITUS WITH STAGE 3B CHRONIC KIDNEY DISEASE, WITH LONG-TERM CURRENT USE OF INSULIN (HCC): Primary | ICD-10-CM

## 2025-04-15 DIAGNOSIS — I25.5 ISCHEMIC CARDIOMYOPATHY: ICD-10-CM

## 2025-04-15 DIAGNOSIS — I50.22 CHRONIC SYSTOLIC CHF (CONGESTIVE HEART FAILURE) (HCC): ICD-10-CM

## 2025-04-15 DIAGNOSIS — Z86.2 HISTORY OF ANEMIA DUE TO CHRONIC KIDNEY DISEASE: ICD-10-CM

## 2025-04-15 DIAGNOSIS — N18.30 BENIGN HYPERTENSIVE HEART AND CKD, STAGE 3 (GFR 30-59), W CHF (HCC): ICD-10-CM

## 2025-04-15 DIAGNOSIS — E11.22 TYPE 2 DIABETES MELLITUS WITH STAGE 3B CHRONIC KIDNEY DISEASE, WITH LONG-TERM CURRENT USE OF INSULIN (HCC): Primary | ICD-10-CM

## 2025-04-15 DIAGNOSIS — E66.01 MORBID (SEVERE) OBESITY DUE TO EXCESS CALORIES (HCC): ICD-10-CM

## 2025-04-15 PROBLEM — N17.9 ACUTE KIDNEY INJURY (HCC): Status: RESOLVED | Noted: 2024-10-07 | Resolved: 2025-04-15

## 2025-04-15 PROCEDURE — G2211 COMPLEX E/M VISIT ADD ON: HCPCS | Performed by: INTERNAL MEDICINE

## 2025-04-15 PROCEDURE — 99214 OFFICE O/P EST MOD 30 MIN: CPT | Performed by: INTERNAL MEDICINE

## 2025-04-15 NOTE — PATIENT INSTRUCTIONS
As we discussed in the office visit your kidney function is lately in the high ones.  You have chronic kidney disease in the setting of long-term history of diabetes, hypertension, heart disease.  I would like to see you back in the office 6 months with repeat blood and urine test.  We discussed about importance to follow a low sodium as well as low potassium diet.  It is very important to have better diabetes control goal to keep hemoglobin A1c closer to below 7%  No changes on your medication at this moment.  Continue close follow-up with your primary care doctor and your cardiologist.  Avoid NSAIDs (no ibuprofen, Motrin, Advil, Aleve, naproxen).  Okay to take Tylenol or acetaminophen as needed for pain.  Keep working on losing weight.

## 2025-04-15 NOTE — ASSESSMENT & PLAN NOTE
Lab Results   Component Value Date    HGBA1C 8.9 (H) 10/14/2024       Orders:    CBC; Future    Renal function panel; Future    PTH, intact; Future    Protein / creatinine ratio, urine; Future  Chronic kidney stage IIIb with creatinine around 1.8-2.0 10/2024.  Previously on the 1-2 1.3 range.  CKD multifactorial in the setting of long-term history of poorly controlled diabetes and this is noted hemoglobin A1c as high as 12.4% in 2023) cardiorenal syndrome, hypertension, morbid obesity.  Goal to keep a hemoglobin A1c close or below 7%  Patient is on ARB as well as SGLT2 inhibitors  Diabetes management as per primary care doctor

## 2025-04-15 NOTE — ASSESSMENT & PLAN NOTE
Wt Readings from Last 3 Encounters:   04/15/25 125 kg (276 lb)   03/27/25 124 kg (273 lb)   02/07/25 120 kg (264 lb 8 oz)          Looks euvolemic on exam.  On GDMT per heart failure team

## 2025-04-15 NOTE — PROGRESS NOTES
Name: Nadir Myers      : 1960      MRN: [id number]  Encounter Provider: Ludwin Treadwell MD  Encounter Date: 4/15/2025   Encounter department: [facility] [place]  :  Assessment & Plan  Type 2 diabetes mellitus with stage 3b chronic kidney disease, with long-term current use of insulin ([facility])    Lab Results   Component Value Date    HGBA1C 8.9 (H) 10/14/2024       Orders:    CBC; Future    Renal function panel; Future    PTH, intact; Future    Protein / creatinine ratio, urine; Future  Chronic kidney stage IIIb with creatinine around 1.8-2.0 10/2024.  Previously on the 1-2 1.3 range.  CKD multifactorial in the setting of long-term history of poorly controlled diabetes and this is noted hemoglobin A1c as high as 12.4% in ) cardiorenal syndrome, hypertension, morbid obesity.  Goal to keep a hemoglobin A1c close or below 7%  Patient is on ARB as well as SGLT2 inhibitors  Diabetes management as per primary care doctor  Benign hypertensive heart and CKD, stage 3 (GFR 30-59), w CHF ([facility])  Wt Readings from Last 3 Encounters:   04/15/25 125 kg (276 lb)   25 124 kg (273 lb)   25 120 kg (264 lb 8 oz)          Euvolemic on exam.  Follows with cardiology.  Currently on Entresto, torsemide 10 mg daily, Jardiance 10 mg daily  Follow a Low-salt diet  CKD stage 3b, GFR 30-44 ml/min ([facility])  Lab Results   Component Value Date    EGFR 38 2025    EGFR 37 2025    EGFR 35 2024    CREATININE 1.83 (H) 2025    CREATININE 1.88 (H) 2025    CREATININE 1.95 (H) 2024          Creatinine around 1.8-2.0 since 10/2024 with nephrotic range proteinuria.  Renal function is stable.  Noted mild hyperkalemia, low potassium diet information provided today.  Patient is on ARB and SGLT2 inhibitor.  Avoid NSAIDs.  Lose weight.  Recommend kidney smart class for education, patient would like to wait and defer class for later    Secondary hyperparathyroidism of renal origin  (HCC)       Most recent calcium and phosphorus within normal limits.  PTH mild elevated but acceptable.  Plan to follow-up in 6 months  Chronic HFimpEF, stage C, NYHA II  Wt Readings from Last 3 Encounters:   04/15/25 125 kg (276 lb)   03/27/25 124 kg (273 lb)   02/07/25 120 kg (264 lb 8 oz)          Looks euvolemic on exam.  On GDMT per heart failure team  Ischemic cardiomyopathy       Ischemic cardiomyopathy with large apical aneurysm with progression of CAD/late presenting MI with occluded mid LAD and LPDA  LVEF 40% on 6/2024  History of anemia due to chronic kidney disease       Recent hemoglobin improved to 12.8  ICD (implantable cardioverter-defibrillator) in place         Mixed hyperlipidemia       On Atorvastatin  Last lipid panel with elevated triglycerides on 10/2024  Morbid (severe) obesity due to excess calories (HCC)       Advised to lose weight      Patient Instructions   As we discussed in the office visit your kidney function is lately in the high ones.  You have chronic kidney disease in the setting of long-term history of diabetes, hypertension, heart disease.  I would like to see you back in the office 6 months with repeat blood and urine test.  We discussed about importance to follow a low sodium as well as low potassium diet.  It is very important to have better diabetes control goal to keep hemoglobin A1c closer to below 7%  No changes on your medication at this moment.  Continue close follow-up with your primary care doctor and your cardiologist.  Avoid NSAIDs (no ibuprofen, Motrin, Advil, Aleve, naproxen).  Okay to take Tylenol or acetaminophen as needed for pain.  Keep working on losing weight.        It was a pleasure evaluating your patient in the office today. Thank you for allowing our team to participate in the care of  Nadir GARCIA Myers. Please do not hesitate to contact our team if further issues/questions shall arise in the interim.     History of Present Illness   HPI  Nadir Myers  is a 65 y.o. male who presents for for CKD follow-up.  Last time seen was on 10/2024 when he saw one of our advanced practitioners.    Noted patient was hospitalized early December 2024 with congestive heart exacerbation requiring IV diuretics.    Today presents to the office with his wife.  In general feeling well, denies significant complaints.  Denies any chest pain, no shortness of breath, no leg swelling.  Denies any abdominal pain, no recent nausea, no vomiting, no diarrhea or constipation.  Denies any urinary problems, no dysuria, no gross hematuria.  Reports quitting smoking more than a year ago.      History obtained from: patient  Pertinent Medical History   CKD stage IIIb with creatinine lately around 1.8-2.0 since 10/2024.  Diabetes for over 10 years.  Hypertension.  Ischemic cardiomyopathy.  Chronic HFpEF.  Morbid obesity.  Hyperlipidemia.        Review of Systems  ROS: All the systems were reviewed and were negative except as documented on the H&P.    Medical History Reviewed by provider this encounter:  Allergies  Meds     .       Current Outpatient Medications on File Prior to Visit   Medication Sig Dispense Refill    amiodarone 200 mg tablet Take 1 tablet (200 mg total) by mouth daily 90 tablet 2    aspirin 81 mg chewable tablet Chew 1 tablet (81 mg total) daily Do not start before March 2, 2024. 30 tablet 0    atorvastatin (LIPITOR) 80 mg tablet TAKE 1 TABLET BY MOUTH EVERY DAY AFTER DINNER 90 tablet 1    BD Pen Needle Micro U/F 32G X 6 MM MISC Inject under the skin in the morning 100 each 1    clopidogrel (PLAVIX) 75 mg tablet TAKE 1 TABLET BY MOUTH EVERY  tablet 1    Empagliflozin (Jardiance) 10 MG TABS tablet Take 1 tablet (10 mg total) by mouth every morning 90 tablet 3    Insulin Glargine Solostar (Lantus SoloStar) 100 UNIT/ML SOPN Inject 0.3 mL (30 Units total) under the skin daily at bedtime 15 mL 5    metoprolol succinate (TOPROL-XL) 50 mg 24 hr tablet TAKE 1 TABLET BY MOUTH EVERY  12 HOURS 180 tablet 1    sacubitril-valsartan (Entresto)  MG TABS Take 1 tablet by mouth 2 (two) times a day 60 tablet 2    sitaGLIPtin-metFORMIN (JANUMET)  MG per tablet Take 1 tablet by mouth 2 (two) times a day with meals 200 tablet 1    torsemide (DEMADEX) 10 mg tablet TAKE 1 TABLET BY MOUTH EVERY DAY 90 tablet 1    famotidine (PEPCID) 40 MG tablet TAKE 1 TABLET BY MOUTH EVERY DAY (Patient not taking: Reported on 2/7/2025) 90 tablet 1    [DISCONTINUED] glimepiride (AMARYL) 4 mg tablet Take 1 tablet by mouth daily NOT TAKING PER PCP (Patient not taking: Reported on 5/22/2023)       No current facility-administered medications on file prior to visit.     Objective   /80 (BP Location: Left arm, Patient Position: Sitting, Cuff Size: Adult)   Pulse 81   Wt 125 kg (276 lb)   SpO2 98%   BMI 37.43 kg/m²      Physical Exam  General: conscious, cooperative, in not acute distress  Eyes: conjunctivae pink, anicteric sclerae  ENT: lips and mucous membranes moist  Neck: supple, no JVD  Chest: clear breath sounds bilateral, no crackles, ronchus or wheezings  CVS: distinct S1 & S2, normal rate, regular rhythm  Abdomen: soft, non-tender, non-distended, normoactive bowel sounds  Back: no CVA tenderness  Extremities: no edema of both legs  Skin: no rash  Neuro: awake, alert, oriented      Laboratory Results:  Results from last 7 days   Lab Units 04/09/25  0707   WBC Thousand/uL 9.49   HEMOGLOBIN g/dL 12.8   HEMATOCRIT % 40.5   PLATELETS Thousands/uL 262   POTASSIUM mmol/L 5.4*   CHLORIDE mmol/L 108   CO2 mmol/L 24   BUN mg/dL 41*   CREATININE mg/dL 1.83*   CALCIUM mg/dL 9.5   PHOSPHORUS mg/dL 4.1       Results for orders placed or performed in visit on 04/09/25   Urinalysis with microscopic   Result Value Ref Range    Color, UA Light Yellow     Clarity, UA Clear     Specific Gravity, UA 1.018 1.003 - 1.030    pH, UA 6.5 4.5, 5.0, 5.5, 6.0, 6.5, 7.0, 7.5, 8.0    Leukocytes, UA (A) Negative     Elevated  glucose may cause decreased leukocyte values. See urine microscopic for UWBC result    Nitrite, UA Negative Negative    Protein,  (3+) (A) Negative mg/dl    Glucose, UA >=1000 (1%) (A) Negative mg/dl    Ketones, UA Negative Negative mg/dl    Urobilinogen, UA <2.0 <2.0 mg/dl mg/dl    Bilirubin, UA Negative Negative    Occult Blood, UA Moderate (A) Negative    RBC, UA 10-20 (A) None Seen, 1-2 /hpf    WBC, UA 1-2 None Seen, 1-2 /hpf    Epithelial Cells None Seen None Seen, Occasional /hpf    Bacteria, UA None Seen None Seen, Occasional /hpf   PTH, intact   Result Value Ref Range    .3 (H) 12.0 - 88.0 pg/mL   Protein / creatinine ratio, urine   Result Value Ref Range    Creatinine, Ur 67.1 Reference range not established. mg/dL    Protein Urine Random 341.4 Reference range not established. mg/dL    Prot/Creat Ratio, Ur 5.1 (H) <=0.2   CBC   Result Value Ref Range    WBC 9.49 4.31 - 10.16 Thousand/uL    RBC 4.47 3.88 - 5.62 Million/uL    Hemoglobin 12.8 12.0 - 17.0 g/dL    Hematocrit 40.5 36.5 - 49.3 %    MCV 91 82 - 98 fL    MCH 28.6 26.8 - 34.3 pg    MCHC 31.6 31.4 - 37.4 g/dL    RDW 15.0 11.6 - 15.1 %    Platelets 262 149 - 390 Thousands/uL    MPV 10.1 8.9 - 12.7 fL   Comprehensive metabolic panel   Result Value Ref Range    Sodium 141 135 - 147 mmol/L    Potassium 5.4 (H) 3.5 - 5.3 mmol/L    Chloride 108 96 - 108 mmol/L    CO2 24 21 - 32 mmol/L    ANION GAP 9 4 - 13 mmol/L    BUN 41 (H) 5 - 25 mg/dL    Creatinine 1.83 (H) 0.60 - 1.30 mg/dL    Glucose, Fasting 159 (H) 65 - 99 mg/dL    Calcium 9.5 8.4 - 10.2 mg/dL    AST 15 13 - 39 U/L    ALT 16 7 - 52 U/L    Alkaline Phosphatase 69 34 - 104 U/L    Total Protein 7.3 6.4 - 8.4 g/dL    Albumin 3.9 3.5 - 5.0 g/dL    Total Bilirubin 0.32 0.20 - 1.00 mg/dL    eGFR 38 ml/min/1.73sq m   TSH, 3rd generation   Result Value Ref Range    TSH 3RD GENERATON 3.676 0.450 - 4.500 uIU/mL   T4, free   Result Value Ref Range    Free T4 0.91 0.61 - 1.12 ng/dL   Phosphorus    Result Value Ref Range    Phosphorus 4.1 2.3 - 4.1 mg/dL

## 2025-04-15 NOTE — ASSESSMENT & PLAN NOTE
Wt Readings from Last 3 Encounters:   04/15/25 125 kg (276 lb)   03/27/25 124 kg (273 lb)   02/07/25 120 kg (264 lb 8 oz)          Euvolemic on exam.  Follows with cardiology.  Currently on Entresto, torsemide 10 mg daily, Jardiance 10 mg daily  Follow a Low-salt diet

## 2025-04-16 ENCOUNTER — TELEPHONE (OUTPATIENT)
Dept: CARDIOLOGY CLINIC | Facility: CLINIC | Age: 65
End: 2025-04-16

## 2025-05-02 DIAGNOSIS — I25.10 3-VESSEL CORONARY ARTERY DISEASE: ICD-10-CM

## 2025-05-02 RX ORDER — ATORVASTATIN CALCIUM 80 MG/1
80 TABLET, FILM COATED ORAL
Qty: 90 TABLET | Refills: 1 | Status: SHIPPED | OUTPATIENT
Start: 2025-05-02

## 2025-05-04 DIAGNOSIS — L97.509 FOOT ULCER (HCC): ICD-10-CM

## 2025-05-05 RX ORDER — CLOPIDOGREL BISULFATE 75 MG/1
75 TABLET ORAL DAILY
Qty: 100 TABLET | Refills: 1 | Status: SHIPPED | OUTPATIENT
Start: 2025-05-05

## 2025-05-19 ENCOUNTER — OFFICE VISIT (OUTPATIENT)
Dept: PODIATRY | Facility: CLINIC | Age: 65
End: 2025-05-19
Payer: COMMERCIAL

## 2025-05-19 VITALS — BODY MASS INDEX: 37.38 KG/M2 | HEIGHT: 72 IN | WEIGHT: 276 LBS

## 2025-05-19 DIAGNOSIS — B35.1 TINEA UNGUIUM: ICD-10-CM

## 2025-05-19 DIAGNOSIS — Z89.422 HISTORY OF COMPLETE RAY AMPUTATION OF FOURTH TOE OF LEFT FOOT (HCC): ICD-10-CM

## 2025-05-19 DIAGNOSIS — E11.42 DIABETIC POLYNEUROPATHY ASSOCIATED WITH TYPE 2 DIABETES MELLITUS (HCC): Primary | ICD-10-CM

## 2025-05-19 DIAGNOSIS — I73.9 PAD (PERIPHERAL ARTERY DISEASE) (HCC): ICD-10-CM

## 2025-05-19 PROCEDURE — 99213 OFFICE O/P EST LOW 20 MIN: CPT | Performed by: PODIATRIST

## 2025-05-19 NOTE — ASSESSMENT & PLAN NOTE
Diagnosis and options discussed with patient  Patient agreeable to the plan as stated below    -DM foot risk is high. Recommend at risk foot care, per patient request annual visit unless new concerns arise.   -Discussed DM risk to lower extremities, proper shoe gear, and daily monitoring of feet.   -Discussed weight loss and suitable exercise regiment.   -Reviewed most recent PCP visit on 1/10/2025  -Educated on A1C and the risks of poorly controlled Diabetes. Reviewed recent A1C:  Lab Results   Component Value Date    HGBA1C 8.9 (H) 10/14/2024    HGBA1C 7.1 (H) 09/16/2023   .     Lab Results   Component Value Date    HGBA1C 8.9 (H) 10/14/2024       Orders:    Diabetic Shoe    Diabetic Shoe Inserts

## 2025-05-19 NOTE — ASSESSMENT & PLAN NOTE
Quit smoking 2 years ago, no acute perfusion signs or symptoms.   Orders:    Diabetic Shoe    Diabetic Shoe Inserts

## 2025-05-19 NOTE — PROGRESS NOTES
Name: Nadir Myers      : 1960      MRN: [id number]  Encounter Provider: Neno Pardo DPM  Encounter Date: 2025   Encounter department: [facility] [place]  :  Assessment & Plan  Diabetic polyneuropathy associated with type 2 diabetes mellitus (HCC)  Diagnosis and options discussed with patient  Patient agreeable to the plan as stated below    -DM foot risk is high. Recommend at risk foot care, per patient request annual visit unless new concerns arise.   -Discussed DM risk to lower extremities, proper shoe gear, and daily monitoring of feet.   -Discussed weight loss and suitable exercise regiment.   -Reviewed most recent PCP visit on 1/10/2025  -Educated on A1C and the risks of poorly controlled Diabetes. Reviewed recent A1C:  Lab Results   Component Value Date    HGBA1C 8.9 (H) 10/14/2024    HGBA1C 7.1 (H) 2023   .     Lab Results   Component Value Date    HGBA1C 8.9 (H) 10/14/2024       Orders:    Diabetic Shoe    Diabetic Shoe Inserts    PAD (peripheral artery disease) ([facility])  Quit smoking 2 years ago, no acute perfusion signs or symptoms.   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   Orders:    Diabetic Shoe    Diabetic Shoe Inserts    Tinea unguium  Minor, conservative care  Orders:    Diabetic Shoe    Diabetic Shoe Inserts        History of Present Illness   HPI  Nadir Myers is a 65 y.o. male who presents for annual DM foot risk. He has previous 4th toe amputation from gangrene. A1C uncontrolled. He quit smoking when the toe was amputated almost 2 years ago. NO acute issues today      Review of Systems  As stated in HPI, otherwise normal    Medical History Reviewed by provider this encounter:  Tobacco  Allergies  Meds  Problems  Med Hx  Surg Hx  Fam Hx           Objective   Ht 6' (1.829 m)   Wt 125 kg (276 lb)   BMI 37.43 kg/m²      Physical  Exam  Vitals reviewed.   Constitutional:       Appearance: He is obese. He is not ill-appearing.     Cardiovascular:      Pulses: Pulses are weak.           Dorsalis pedis pulses are 1+ on the right side and 1+ on the left side.        Posterior tibial pulses are 1+ on the right side and 1+ on the left side.     Musculoskeletal:         General: Deformity (left 4th toe amputation) present.      Right foot: Deformity present.      Left foot: Deformity present.   Feet:      Right foot:      Protective Sensation: 10 sites tested.  5 sites sensed.      Skin integrity: Skin integrity normal. No ulcer, skin breakdown, erythema, warmth, callus or dry skin.      Left foot:      Protective Sensation: 9 sites tested.  4 sites sensed.      Skin integrity: Skin integrity normal. No ulcer, skin breakdown, erythema, warmth, callus or dry skin.     Skin:     Capillary Refill: Capillary refill takes less than 2 seconds.      Findings: No bruising.     Neurological:      Mental Status: He is alert and oriented to person, place, and time.      Sensory: Sensory deficit present.     Diabetic Foot Exam    Patient's shoes and socks removed.    Right Foot/Ankle   Right Foot Inspection  Skin Exam: skin normal and skin intact. No dry skin, no warmth, no callus, no erythema, no maceration, no abnormal color, no pre-ulcer, no ulcer and no callus.     Toe Exam: right toe deformity.     Sensory   Vibration: diminished  Monofilament testing: diminished    Vascular  Capillary refills: < 3 seconds  The right DP pulse is 1+. The right PT pulse is 1+.     Left Foot/Ankle  Left Foot Inspection  Skin Exam: skin normal and skin intact. No dry skin, no warmth, no erythema, no maceration, normal color, no pre-ulcer, no ulcer and no callus.     Toe Exam: left toe deformity.     Sensory   Vibration: diminished  Monofilament testing: diminished    Vascular  Capillary refills: < 3 seconds  The left DP pulse is 1+. The left PT pulse is 1+.     Assign Risk  Category  Deformity present  Loss of protective sensation  Weak pulses  Risk: 3

## 2025-06-03 NOTE — ASSESSMENT & PLAN NOTE
Addended by: AMBAR ZAPATA on: 6/3/2025 10:49 AM     Modules accepted: Orders     Continue dual antiplatelet therapy with ASA/Plavix - continue statin  Sequela complicated by gangrenous left toe (see above)

## 2025-06-05 DIAGNOSIS — Z95.810 ICD (IMPLANTABLE CARDIOVERTER-DEFIBRILLATOR) IN PLACE: ICD-10-CM

## 2025-06-05 DIAGNOSIS — I25.10 CORONARY ARTERY DISEASE INVOLVING NATIVE CORONARY ARTERY OF NATIVE HEART WITHOUT ANGINA PECTORIS: ICD-10-CM

## 2025-06-05 DIAGNOSIS — I50.20 SYSTOLIC CONGESTIVE HEART FAILURE, UNSPECIFIED HF CHRONICITY (HCC): ICD-10-CM

## 2025-06-05 RX ORDER — TORSEMIDE 20 MG/1
10 TABLET ORAL DAILY
Qty: 45 TABLET | Refills: 1 | Status: SHIPPED | OUTPATIENT
Start: 2025-06-05

## 2025-06-07 DIAGNOSIS — Z79.4 TYPE 2 DIABETES MELLITUS WITH OTHER SPECIFIED COMPLICATION, WITH LONG-TERM CURRENT USE OF INSULIN (HCC): ICD-10-CM

## 2025-06-07 DIAGNOSIS — E11.69 TYPE 2 DIABETES MELLITUS WITH OTHER SPECIFIED COMPLICATION, WITH LONG-TERM CURRENT USE OF INSULIN (HCC): ICD-10-CM

## 2025-06-08 RX ORDER — PEN NEEDLE, DIABETIC 32 GX 1/4"
NEEDLE, DISPOSABLE MISCELLANEOUS
Qty: 100 EACH | Refills: 1 | Status: SHIPPED | OUTPATIENT
Start: 2025-06-08

## 2025-06-16 ENCOUNTER — REMOTE DEVICE CLINIC VISIT (OUTPATIENT)
Dept: CARDIOLOGY CLINIC | Facility: CLINIC | Age: 65
End: 2025-06-16
Payer: COMMERCIAL

## 2025-06-16 ENCOUNTER — RESULTS FOLLOW-UP (OUTPATIENT)
Dept: CARDIOLOGY CLINIC | Facility: CLINIC | Age: 65
End: 2025-06-16

## 2025-06-16 DIAGNOSIS — I42.8 OTHER CARDIOMYOPATHY (HCC): ICD-10-CM

## 2025-06-16 DIAGNOSIS — I47.20 VENTRICULAR TACHYCARDIA (HCC): Primary | ICD-10-CM

## 2025-06-16 PROCEDURE — 93295 DEV INTERROG REMOTE 1/2/MLT: CPT | Performed by: INTERNAL MEDICINE

## 2025-06-16 PROCEDURE — 93296 REM INTERROG EVL PM/IDS: CPT | Performed by: INTERNAL MEDICINE

## 2025-06-16 NOTE — PROGRESS NOTES
Results for orders placed or performed in visit on 25   Cardiac EP device report    Narrative    [employer] SQ ICD/ ACTIVE SYSTEM IS MRI CONDITIONAL  Sensing Configuration: PrimaryGain SettinXPost Shock Pacing: ON  LATITUDE TRANSMISSION: SUB Q: BATTERY VOLTAGE ADEQUATE (88%). ELECTRODE PARAMETERS WITHIN NORMAL LIMITS (110 OHMS). NO SIGNIFICANT HIGH RATE EPISODES. NORMAL DEVICE FUNCTION. CH

## 2025-06-19 NOTE — PROGRESS NOTES
Heart Failure Outpatient Progress Note - Nadir Myers 65 y.o. male MRN: [id number]    @ Encounter: [id number]      Assessment/Plan:    Patient Active Problem List    Diagnosis Date Noted    Ischemic cardiomyopathy 05/03/2023    Acute exacerbation of chronic heart failure (HCC) 12/03/2024    Hyperlipemia 12/03/2024    Nutritional anemia 12/03/2024    Leukocytosis 12/03/2024    History of anemia due to chronic kidney disease 10/15/2024    Biclonal gammopathy 10/15/2024    Hepatitis C antibody test positive 10/10/2024    Asymptomatic microscopic hematuria 10/10/2024    Benign hypertensive heart and CKD, stage 3 (GFR 30-59), w CHF ([facility]) 10/08/2024    Persistent proteinuria 10/08/2024    Advanced care planning/counseling discussion 07/18/2024    Type 2 diabetes mellitus with diabetic peripheral angiopathy without gangrene ([facility]) 06/05/2024    Amputated 4th toe, left ([facility]) 04/10/2024    ICD (implantable cardioverter-defibrillator) in place 02/21/2024    Abnormal CT of the chest 02/09/2024    Type 2 diabetes mellitus with foot ulcer, with long-term current use of insulin ([facility]) 01/16/2024    CAD (coronary artery disease) 01/16/2024    PAD (peripheral artery disease) ([facility]) 01/16/2024    Chronic HFimpEF, stage C, NYHA II 08/02/2023    V-tach ([facility]) 05/04/2023    Hypertension 05/02/2023    Acute HFrEF 05/02/2023    Benign prostatic hyperplasia without lower urinary tract symptoms 04/11/2018    Type 2 diabetes mellitus with chronic kidney disease, with long-term current use of insulin ([facility]) 02/27/2015    GERD (gastroesophageal reflux disease) 06/28/2013    Tobacco dependence syndrome 01/18/2012    Tinea unguium 05/19/2025    Diabetic polyneuropathy associated with type 2 diabetes mellitus ([facility]) 05/19/2025    Other complications of amputation stump ([facility]) 01/10/2025    History of complete ray amputation of fourth toe of left foot ([facility]) 01/10/2025    Chronic septic pulmonary embolism without acute cor pulmonale ([facility]) 01/10/2025     Morbid (severe) obesity due to excess calories (HCC) 01/10/2025       Lead placement on EV ICD failed     # Chronic HFimpEF, Stage C, w/ decompensation due to medication hold  Impression: iCM with large apical aneurysm with progression of CAD/late presenting MI with occluded mid LAD and LPDA. LV mildly dilated. Started on milrinone 0.25mcg/kg/min 5/6/23 due to rising creatinine and worsening volume status, stopped 5/10/23. ,  EF since improved     Weight: 205 lbs on 6/5, 218 lbs--> 237 lbs--> 245 lbs--> 249 lbs--> 264 lbs  BNP: 6/3/24: 1250  3/7/24: 879  2/20/24: 1809  6/2/23: 279     Studies- personally reviewed by me  EKG- narrow QRS with bigeminy; inferior infarct, anterolateral infarct    Echo 6/3/24:  LVEF: 40%, akinetic apical anterior,inferior  LVEDd:  RV: normal  Mild MR    Echo 2/21/24:   LVEF: 45%  Akinetic segments     GILL 2/7/24: 1.8 x 1.1 cm mobile mass attached to ICD lead    Echo 2/1/24:  LVEF: 40%, akinetic segments  LVEDd: 5.1 cm  RV: normal     Echo 8/7/23:  LVEF: 40%  LVEDd: 4.1 cm  LV apex aneurysmal, no thrombus  RV: normal     LHC 5/3/23: chronically occluded mid LAD and LPDA c/w echo findings of apical aneurysm, not amenable to PCI  Patent mid LCx stent   LVEDP 30 mmHg     Echocardiogram 5/2/23  LVEF:  10-15%, apical aneurysm  LVIDd: 5.6 cm  RV: normal  MR: mild  PASP: 49 mmHg     Echo 9/26/17:  LVEF: 65%     Neurohormonal Blockade:  --Beta-Blocker: metoprolol succinate 50 mg BID  --ACEi, ARB or ARNi: Entresto 97/103 mg BID    (or SVR reduction)   --Aldosterone Receptor Blocker:   --SGLT2-I: Jardiance 10 mg daily  --Diuretic: torsemide 10 mg   Inpt:      Sudden Cardiac Death Risk Reduction:  MDT ICD 5/12/23 for secondary prevention  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   SQ ICD placed 5/21/24  Interrogation 6/16/25: no events  Interrogation  9/11/24: no events  Interrogation 6/7/24: no events     Electrical Resynchronization:  Narrow QRS     Advanced Therapies (If appropriate):  --Inotrope: was on milrinone 0.25 mcg/kg/min, titrated off  --LVAD/Transplant Candidacy: Current tobacco use prior to admission precludes heart transplant at this time- since quit. Moderately reduced RV function and mildly dilated LV concerning for LVAD. Mildly dilated LV also concerning given VT.   Has not smoked since May 2     # Staph bacteremia  GILL 2/7/24: There is a 1.8 x 1.1 cm mobile mass affixed to the cardiac device lead as is passes through the right atrium. In the setting of bacteremia this is most likely represents infection.   # New Q wave MI, later presenting or silent LAD territory infarction  Rx: plavix 75 mg  # hyperlipidemia-atorvastatin 80 mg  9/16/23: LDL 51, HDL 68  # current tobacco use- quit since hospital  # Ventricular tachycardia- scar mediated  Rx: amiodarone 200 mg daily  # CKD3, Cr 1.3 on 3/7 down from 1.96 on 2/28, 1.28, Cr 2.28 on 12/11, 1.83 on 4/9  # hyponatremia- due to HF, , now up to 137 on 7/3/23  # DM2, HgA1C 7.1% on 9/16/23    TODAY'S PLAN:  Continue current GDMT  Add labs- check LFTs, CMP  Refer to ortho for knee evaluation  --2g sodium diet  - Daily weights    HPI:   65 year old male with known CAD with LHC in 2015 showing obstructive mid-LCx disease treated with PTCA/stening and residual non-obstructive disease in the LAD, now presented with 1-2 weeks of shortness of breath, leg swelling, and >20 lb weight gain in 1 week. He was suspected during a cardiology visit to be in rapid atrial fibrillation, and sent to the ED. He converted to sinus rhythm shortly following IV beta blocker administration.    Patient was taken for C which showed chronically occluded mid LAD and LPDA c/w echo findings of apical aneurysm, not amenable to PCI. There was a patent mid LCx stent. Echocardiogram showed severely reduced LV function with EF  10-15%, apical aneurysm, and LVIDd of 5.6 cm. VT was felt to be scar mediated. A MDT dual chamber AICD was placed. Heart failure specific GDMT was initiated and optimized.    He did require transient milrinone support during his hospitalization and this was weaned off     Since discharge he has had orthostatic hypotension, lightheadedness and dizziness. No syncope. Entresto was cut back to 24/26 mg BID, spironolactone stopped and lasix stopped. He is on Jardiance.     S/p hospitalization for septic shock due to staph bacteremia due to lower extremity wound infection. Vegetation noted on ICD lead, led to lead extraction on 2/8/24. EV ICD placed 2/20 but unable to obtain proper lead placement despite adjustment attempts so was removed. Plan - sent home on LifeVest with plan for SQ ICD implant as outpt.     Interval History:  Interrogation 6/16/25: no events  Cr 1.8 on 4/9  Review of Systems   Constitutional:  Negative for activity change, appetite change and fatigue. Unexpected weight change: wt up 19 lbs more.  HENT:  Negative for congestion and nosebleeds.    Eyes: Negative.    Respiratory:  Negative for cough, chest tightness and shortness of breath.    Cardiovascular:  Negative for chest pain, palpitations and leg swelling.   Gastrointestinal:  Negative for abdominal distention.   Endocrine: Negative.    Genitourinary: Negative.    Musculoskeletal: Negative.    Skin: Negative.    Neurological:  Negative for dizziness, syncope and weakness.   Hematological: Negative.    Psychiatric/Behavioral: Negative.         Past Medical History:   Diagnosis Date    Bacteremia due to Staphylococcus 10/09/2024    CHF (congestive heart failure) (HCC)     Diabetes mellitus (HCC)     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    Myocardial infarction (HCC)          Allergies   Allergen Reactions    Vancomycin Rash     NOT AN ALLERGY - 1/31/24 patient experienced vancomycin infusion reaction, please extend duration of infusion time to  prevent future infusion reactions     .    Current Outpatient Medications:     amiodarone 200 mg tablet, Take 1 tablet (200 mg total) by mouth daily, Disp: 90 tablet, Rfl: 2    aspirin 81 mg chewable tablet, Chew 1 tablet (81 mg total) daily Do not start before March 2, 2024., Disp: 30 tablet, Rfl: 0    atorvastatin (LIPITOR) 80 mg tablet, TAKE 1 TABLET BY MOUTH EVERY DAY AFTER DINNER, Disp: 90 tablet, Rfl: 1    BD Pen Needle Micro Ultrafine 32G X 6 MM MISC, INJECT UNDER THE SKIN IN THE MORNING, Disp: 100 each, Rfl: 1    clopidogrel (PLAVIX) 75 mg tablet, TAKE 1 TABLET BY MOUTH EVERY DAY, Disp: 100 tablet, Rfl: 1    Empagliflozin (Jardiance) 10 MG TABS tablet, Take 1 tablet (10 mg total) by mouth every morning, Disp: 90 tablet, Rfl: 3    famotidine (PEPCID) 40 MG tablet, TAKE 1 TABLET BY MOUTH EVERY DAY (Patient not taking: Reported on 2/7/2025), Disp: 90 tablet, Rfl: 1    Insulin Glargine Solostar (Lantus SoloStar) 100 UNIT/ML SOPN, Inject 0.3 mL (30 Units total) under the skin daily at bedtime, Disp: 15 mL, Rfl: 5    metoprolol succinate (TOPROL-XL) 50 mg 24 hr tablet, TAKE 1 TABLET BY MOUTH EVERY 12 HOURS, Disp: 180 tablet, Rfl: 1    sacubitril-valsartan (Entresto)  MG TABS, Take 1 tablet by mouth 2 (two) times a day, Disp: 60 tablet, Rfl: 2    sitaGLIPtin-metFORMIN (JANUMET)  MG per tablet, Take 1 tablet by mouth 2 (two) times a day with meals, Disp: 200 tablet, Rfl: 1    torsemide (DEMADEX) 10 mg tablet, TAKE 1 TABLET BY MOUTH EVERY DAY, Disp: 90 tablet, Rfl: 1    torsemide (DEMADEX) 20 mg tablet, TAKE 1/2 TABLET BY MOUTH DAILY, Disp: 45 tablet, Rfl: 1    Social History     Socioeconomic History    Marital status: /Civil Union     Spouse name: Not on file    Number of children: Not on file    Years of education: Not on file    Highest education level: Not on file   Occupational History    Not on file   Tobacco Use    Smoking status: Former     Types: Cigarettes     Start date: 1/30/2024      Quit date: 1976     Years since quittin.4    Smokeless tobacco: Never   Vaping Use    Vaping status: Never Used   Substance and Sexual Activity    Alcohol use: Not Currently    Drug use: Never    Sexual activity: Not Currently     Partners: Female   Other Topics Concern    Not on file   Social History Narrative    Not on file     Social Drivers of Health     Financial Resource Strain: Not on file   Food Insecurity: Food Insecurity Present (2024)    Nursing - Inadequate Food Risk Classification     Worried About Running Out of Food in the Last Year: Never true     Ran Out of Food in the Last Year: Never true     Ran Out of Food in the Last Year: Often true   Transportation Needs: No Transportation Needs (2024)    Nursing - Transportation Risk Classification     Lack of Transportation: Not on file     Lack of Transportation: No   Physical Activity: Not on file   Stress: Not on file   Social Connections: Not on file   Intimate Partner Violence: Unknown (2024)    Nursing IPS     Feels Physically and Emotionally Safe: Not on file     Physically Hurt by Someone: Not on file     Humiliated or Emotionally Abused by Someone: Not on file     Physically Hurt by Someone: No     Hurt or Threatened by Someone: No   Housing Stability: At Risk (2024)    Nursing: Inadequate Housing Risk Classification     Has Housing: Not on file     Worried About Losing Housing: Not on file     Unable to Get Utilities: Not on file     Unable to Pay for Housing in the Last Year: Yes     Has Housing: Yes       No family history on file.    Physical Exam:    Vitals:   There were no vitals filed for this visit.          Physical Exam  Constitutional:       Appearance: He is well-developed.   HENT:      Head: Normocephalic and atraumatic.     Eyes:      Pupils: Pupils are equal, round, and reactive to light.     Neck:      Vascular: No JVD.     Cardiovascular:      Rate and Rhythm: Normal rate and regular rhythm.      Heart  "sounds: No murmur heard.  Pulmonary:      Effort: Pulmonary effort is normal. No respiratory distress.      Breath sounds: Normal breath sounds.   Abdominal:      General: There is no distension.      Palpations: Abdomen is soft.      Tenderness: There is no abdominal tenderness.     Musculoskeletal:         General: Normal range of motion.      Cervical back: Normal range of motion.     Skin:     General: Skin is warm and dry.      Findings: No rash.     Neurological:      Mental Status: He is alert and oriented to person, place, and time.         Labs & Results:    Lab Results   Component Value Date    SODIUM 141 04/09/2025    K 5.4 (H) 04/09/2025     04/09/2025    CO2 24 04/09/2025    BUN 41 (H) 04/09/2025    CREATININE 1.83 (H) 04/09/2025    GLUC 281 (H) 12/20/2024    CALCIUM 9.5 04/09/2025     Lab Results   Component Value Date    WBC 9.49 04/09/2025    HGB 12.8 04/09/2025    HCT 40.5 04/09/2025    MCV 91 04/09/2025     04/09/2025     Lab Results   Component Value Date    NTBNP 5,942 (H) 05/02/2023      Lab Results   Component Value Date    CHOLESTEROL 144 10/14/2024     Lab Results   Component Value Date    HDL 36 (L) 10/14/2024    HDL 34 05/07/2015    HDL 43 02/17/2015     Lab Results   Component Value Date    TRIG 194 (H) 10/14/2024    TRIG 122 05/07/2015    TRIG 128 02/17/2015     No results found for: \"NONHDLC\"    EKG personally reviewed by Nash Hernandez DO.     Counseling / Coordination of Care  Time spent today 25 minutes.  Greater than 50% of total time was spent with the patient and / or family counseling and / or coordination of care. We went over current diagnosis, most recent studies and any changes in treatment.    Thank you for the opportunity to participate in the care of this patient.    NASH HERNANDEZ D.O.  DIRECTOR OF HEART FAILURE/ PULMONARY HYPERTENSION  MEDICAL DIRECTOR OF LVAD PROGRAM  [facility]        "

## 2025-06-23 ENCOUNTER — OFFICE VISIT (OUTPATIENT)
Dept: CARDIOLOGY CLINIC | Facility: CLINIC | Age: 65
End: 2025-06-23
Payer: COMMERCIAL

## 2025-06-23 VITALS
HEIGHT: 72 IN | OXYGEN SATURATION: 97 % | SYSTOLIC BLOOD PRESSURE: 138 MMHG | BODY MASS INDEX: 37.26 KG/M2 | WEIGHT: 275.1 LBS | DIASTOLIC BLOOD PRESSURE: 72 MMHG | HEART RATE: 75 BPM

## 2025-06-23 DIAGNOSIS — I10 PRIMARY HYPERTENSION: ICD-10-CM

## 2025-06-23 DIAGNOSIS — M17.12 ARTHRITIS OF LEFT KNEE: ICD-10-CM

## 2025-06-23 DIAGNOSIS — I25.10 CORONARY ARTERY DISEASE INVOLVING NATIVE CORONARY ARTERY OF NATIVE HEART WITHOUT ANGINA PECTORIS: ICD-10-CM

## 2025-06-23 DIAGNOSIS — I13.0 BENIGN HYPERTENSIVE HEART AND CKD, STAGE 3 (GFR 30-59), W CHF (HCC): Primary | ICD-10-CM

## 2025-06-23 DIAGNOSIS — N18.30 BENIGN HYPERTENSIVE HEART AND CKD, STAGE 3 (GFR 30-59), W CHF (HCC): Primary | ICD-10-CM

## 2025-06-23 DIAGNOSIS — I50.22 CHRONIC SYSTOLIC CHF (CONGESTIVE HEART FAILURE) (HCC): ICD-10-CM

## 2025-06-23 DIAGNOSIS — I25.5 ISCHEMIC CARDIOMYOPATHY: ICD-10-CM

## 2025-06-23 PROCEDURE — 99214 OFFICE O/P EST MOD 30 MIN: CPT | Performed by: INTERNAL MEDICINE

## 2025-06-23 NOTE — PROGRESS NOTES
Electrophysiology Follow Up  [facility]  [facility] [address], [place] [address]    Name: Nadir Myers  : 1960  MRN: [id number]    Assessment & Plan  V-tach (HCC)  Thought to be scar mediated given ICM  S/p ICD given this diagnosis (see device details as below)  Maintained on metoprolol succinate 50 mg twice daily  Was previously maintained off amiodarone and presented 10/7/2024 for class III antiarrhythmic med admission, however due to ongoing LINDEN he was resumed back on amiodarone  ICD (implantable cardioverter-defibrillator) in place  had Medtronic dual-chamber ICD placed 23 for secondary prevention  given MSSA bacteremia (felt to be due to left foot fourth toe wet gangrene status post amputation 2024), underwent extraction of this device 24  underwent Medtronic EV ICD implant 24 with frequent subs all lead dislodgment into the pleural space and attempted revision 24 abandoned given inability to sense with lead and complete extraction done instead  underwent [employer] subcutaneous ICD 24  Ischemic cardiomyopathy  maintained on GDMT of metoprolol succinate and Entresto, also on spironolactone and Jardiance  EF of 45% per echo 2024 /had been as low as 10 to 15% in 2023 with recovery  Appears euvolemic in office today  Coronary artery disease involving native heart, unspecified vessel or lesion type, unspecified whether angina present  Known three-vessel CAD with  of LAD and HANNA to mid circumflex 2015   Type 2 diabetes mellitus with diabetic peripheral angiopathy without gangrene, without long-term current use of insulin (HCC)    Lab Results   Component Value Date    HGBA1C 8.9 (H) 10/14/2024   Continue PCP f/u  Morbid (severe) obesity due to excess calories (HCC)  Recommend weight loss via healthy diet and exercise  Hypertension, unspecified type  BP in office today 138/82  Continue PCP f/u        Discussion/Plan:    Patient was admitted 10/7/2024 for initiation of class III antiarrhythmic given his history of VT, however due to concomitant LINDEN at the time he was initiated on amiodarone instead    He was seen by outpatient EP 02/07/25 and his medications were continued as prescribed at that time w/ plan to further address Amiodarone during today's visit     His LFTs + TSH 04/2025 were WNL. 11/2024 PFT's showed normal DLCO but did show a possible restriction/obstruction pattern (was told to discuss this more w/ PCP)    EKG in office today showing sinus rhythm w/ RBBB, LAFB, and ventricular rate 71bpm    Since his last outpatient EP appointment he reports he has been feeling well and denies acute cardiac complaint    His antiarrythmic therapy was discussed w/ Dr. Maria. We can consider amiodarone discontinuation and continued monitoring vs continue amiodarone with scheduled organ monitoring for now. If he were to come off amio and have a significant repeat event then we can consider ablation vs just resuming amiodarone.    Patient reports he would like to trial himself off the amiodarone first    Modifiable risk factors were discussed today including weight loss, avoiding alcohol/tobacco products, and treatment of KESHA/HTN/DM (if present)    Patient has been instructed to follow up in our EP office in 6 months or as needed. He will call our office with any questions or concerns in the meantime.    Rhythm History:   Atrial fibrillation:      Atrial flutter:      SVT:      VT/VF/PVC:     Device history:   Pacemaker:     Defibrillator:     BIV PPM:     BIV ICD:     ILR:    Interim History/HPI:   Interim history: Nadir Myers is a 65 y.o. male with a PMH of VT, ICD, three-vessel CAD, ischemic cardiomyopathy, CHF, and T2DM.    He presents today for routine outpatient f/u given his history of VT w/ ICD in situ. Since his last outpatient EP appointment he reports he has been feeling well and denies acute  cardiac complaint.    EKG: sinus rhythm w/ RBBB, LAFB, and ventricular rate 71bpm    Review of Systems   Constitutional:  Negative for activity change, appetite change, chills, fatigue and fever.   HENT:  Negative for nosebleeds.    Respiratory:  Negative for chest tightness and shortness of breath.    Cardiovascular:  Negative for chest pain, palpitations and leg swelling.   Neurological:  Negative for dizziness, syncope, weakness and light-headedness.         OBJECTIVE:   Vitals:   There were no vitals taken for this visit.  There is no height or weight on file to calculate BMI.        Physical Exam:   Physical Exam  Constitutional:       General: He is not in acute distress.     Appearance: Normal appearance. He is not toxic-appearing.   HENT:      Head: Normocephalic and atraumatic.     Eyes:      General:         Right eye: No discharge.         Left eye: No discharge.       Cardiovascular:      Rate and Rhythm: Normal rate and regular rhythm.      Pulses: Normal pulses.   Pulmonary:      Effort: Pulmonary effort is normal.      Breath sounds: Normal breath sounds.     Musculoskeletal:      Right lower leg: No edema.      Left lower leg: No edema.     Skin:     General: Skin is warm and dry.      Capillary Refill: Capillary refill takes less than 2 seconds.     Neurological:      Mental Status: He is alert.            Medications:    Current Medications[1]       Historical Information   Past Medical History[2]    Past Surgical History[3]    Social History     Substance and Sexual Activity   Alcohol Use Not Currently     Social History     Substance and Sexual Activity   Drug Use Never     Tobacco Use History[4]    Family History[5]      Labs & Results:  Below is the patient's most recent value for Albumin, ALT, AST, BUN, Calcium, Chloride, Cholesterol, CO2, Creatinine, GFR, Glucose, HDL, Hematocrit, Hemoglobin, Hemoglobin A1C, LDL, Magnesium, Phosphorus, Platelets, Potassium, PSA, Sodium, Triglycerides, and  WBC.   Lab Results   Component Value Date    ALT 16 04/09/2025    AST 15 04/09/2025    BUN 41 (H) 04/09/2025    CALCIUM 9.5 04/09/2025     04/09/2025    CHOL 130 05/07/2015    CO2 24 04/09/2025    CREATININE 1.83 (H) 04/09/2025    HDL 36 (L) 10/14/2024    HCT 40.5 04/09/2025    HGB 12.8 04/09/2025    HGBA1C 8.9 (H) 10/14/2024    MG 2.2 01/07/2025    PHOS 4.1 04/09/2025     04/09/2025    K 5.4 (H) 04/09/2025    PSA 0.16 05/14/2024     02/17/2015    TRIG 194 (H) 10/14/2024    WBC 9.49 04/09/2025     Note: for a comprehensive list of the patient's lab results, access the Results Review activity.         [1]   Current Outpatient Medications:     amiodarone 200 mg tablet, Take 1 tablet (200 mg total) by mouth daily, Disp: 90 tablet, Rfl: 2    aspirin 81 mg chewable tablet, Chew 1 tablet (81 mg total) daily Do not start before March 2, 2024., Disp: 30 tablet, Rfl: 0    atorvastatin (LIPITOR) 80 mg tablet, TAKE 1 TABLET BY MOUTH EVERY DAY AFTER DINNER, Disp: 90 tablet, Rfl: 1    BD Pen Needle Micro Ultrafine 32G X 6 MM MISC, INJECT UNDER THE SKIN IN THE MORNING, Disp: 100 each, Rfl: 1    clopidogrel (PLAVIX) 75 mg tablet, TAKE 1 TABLET BY MOUTH EVERY DAY, Disp: 100 tablet, Rfl: 1    Empagliflozin (Jardiance) 10 MG TABS tablet, Take 1 tablet (10 mg total) by mouth every morning, Disp: 90 tablet, Rfl: 3    famotidine (PEPCID) 40 MG tablet, TAKE 1 TABLET BY MOUTH EVERY DAY (Patient not taking: Reported on 2/7/2025), Disp: 90 tablet, Rfl: 1    Insulin Glargine Solostar (Lantus SoloStar) 100 UNIT/ML SOPN, Inject 0.3 mL (30 Units total) under the skin daily at bedtime, Disp: 15 mL, Rfl: 5    metoprolol succinate (TOPROL-XL) 50 mg 24 hr tablet, TAKE 1 TABLET BY MOUTH EVERY 12 HOURS, Disp: 180 tablet, Rfl: 1    sacubitril-valsartan (Entresto)  MG TABS, Take 1 tablet by mouth 2 (two) times a day, Disp: 60 tablet, Rfl: 2    sitaGLIPtin-metFORMIN (JANUMET)  MG per tablet, Take 1 tablet by mouth 2 (two)  times a day with meals, Disp: 200 tablet, Rfl: 1    torsemide (DEMADEX) 10 mg tablet, TAKE 1 TABLET BY MOUTH EVERY DAY, Disp: 90 tablet, Rfl: 1    torsemide (DEMADEX) 20 mg tablet, TAKE 1/2 TABLET BY MOUTH DAILY (Patient not taking: Reported on 6/23/2025), Disp: 45 tablet, Rfl: 1  [2]   Past Medical History:  Diagnosis Date    Bacteremia due to Staphylococcus 10/09/2024    CHF (congestive heart failure) (HCC)     Diabetes mellitus (HCC)     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    Myocardial infarction (HCC)    [3]   Past Surgical History:  Procedure Laterality Date    CARDIAC CATHETERIZATION N/A 05/03/2023    Procedure: Cardiac Coronary Angiogram;  Surgeon: Valeriy Shore MD;  Location: [facility];  Service: Cardiology    CARDIAC CATHETERIZATION Left 05/03/2023    Procedure: Cardiac Left Heart Cath;  Surgeon: Valeriy Shore MD;  Location: [facility];  Service: Cardiology    CARDIAC DEFIBRILLATOR PLACEMENT  05/2023    CARDIAC ELECTROPHYSIOLOGY PROCEDURE N/A 05/12/2023    Procedure: Cardiac icd implant;  Surgeon: Urbano Maria MD;  Location: [facility];  Service: Cardiology    CARDIAC ELECTROPHYSIOLOGY PROCEDURE N/A 02/20/2024    Procedure: EV ICD IMPLANTATION;  Surgeon: Arturo Wooten DO;  Location: [facility];  Service: Cardiology    CARDIAC ELECTROPHYSIOLOGY PROCEDURE N/A 02/08/2024    Procedure: Cardiac laser lead extraction;  Surgeon: Arturo Wooten DO;  [facility]: [facility];  Service: Cardiology    CARDIAC ELECTROPHYSIOLOGY PROCEDURE N/A 05/21/2024    Procedure: Cardiac icd implant subq;  Surgeon: Urbano Maria MD;  [facility]: [facility];  Service: Cardiology    IR LOWER EXTREMITY ANGIOGRAM  01/18/2024    IA RMVL TRANSVNS PM ELTRD DUAL LEAD SYS N/A 02/20/2024    Procedure: EV ICD IMPLANTATION;  Surgeon: Abhilash Ferrera MD;  Location: [facility];  Service: Cardiac Surgery    IA RMVL TRANSVNS PM ELTRD DUAL LEAD SYS N/A 02/21/2024    Procedure: REMOVAL OF LEAD  AND GENERATOR AND ATTEMPTED LEAD REVISION;  Surgeon: Abhilash Ferrera MD;  Location: [facility];  Service: Cardiac Surgery    WOUND DEBRIDEMENT Left 2024    Procedure: LEFT FOURTH TOE AMPUTATION SURGICAL WOUND DEBRIDEMENT FOOT/TOE (WASH OUT);  Surgeon: Bebo Hopper DPM;  Location: [facility];  Service: Podiatry   [4]   Social History  Tobacco Use   Smoking Status Former    Types: Cigarettes    Start date: 2024    Quit date: 1976    Years since quittin.5   Smokeless Tobacco Never   [5] No family history on file.

## 2025-06-23 NOTE — ASSESSMENT & PLAN NOTE
Known three-vessel CAD with  of LAD and HANNA to mid circumflex 2/2015    Standing/Walking/Toileting/Moving from bed to stretcher

## 2025-06-23 NOTE — ASSESSMENT & PLAN NOTE
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

## 2025-06-30 ENCOUNTER — OFFICE VISIT (OUTPATIENT)
Dept: OBGYN CLINIC | Facility: CLINIC | Age: 65
End: 2025-06-30
Payer: COMMERCIAL

## 2025-06-30 VITALS — HEIGHT: 72 IN | WEIGHT: 275 LBS | BODY MASS INDEX: 37.25 KG/M2

## 2025-06-30 DIAGNOSIS — M25.561 ACUTE PAIN OF RIGHT KNEE: ICD-10-CM

## 2025-06-30 DIAGNOSIS — M25.562 ACUTE PAIN OF LEFT KNEE: ICD-10-CM

## 2025-06-30 DIAGNOSIS — M17.0 PRIMARY OSTEOARTHRITIS OF BOTH KNEES: Primary | ICD-10-CM

## 2025-06-30 PROCEDURE — 99204 OFFICE O/P NEW MOD 45 MIN: CPT | Performed by: FAMILY MEDICINE

## 2025-06-30 NOTE — PROGRESS NOTES
Assessment:     Assessment & Plan  Primary osteoarthritis of both knees    Orders:    Ambulatory Referral to Orthopedic Surgery    Injection Procedure Prior Authorization; Future    Ambulatory Referral to Physical Therapy; Future    Acute pain of right knee    Orders:    XR knee 4+ vw right injury; Future    Injection Procedure Prior Authorization; Future    Ambulatory Referral to Physical Therapy; Future    Acute pain of left knee    Orders:    XR knee 4+ vw left injury; Future    Injection Procedure Prior Authorization; Future    Ambulatory Referral to Physical Therapy; Future    BMI 35.0-35.9,adult    Orders:    Injection Procedure Prior Authorization; Future    Ambulatory Referral to Physical Therapy; Future        Diagnosis and Orders:      1. Primary osteoarthritis of both knees  Ambulatory Referral to Orthopedic Surgery    Injection Procedure Prior Authorization    Ambulatory Referral to Physical Therapy      2. Acute pain of right knee  XR knee 4+ vw right injury    Injection Procedure Prior Authorization    Ambulatory Referral to Physical Therapy      3. Acute pain of left knee  XR knee 4+ vw left injury    Injection Procedure Prior Authorization    Ambulatory Referral to Physical Therapy      4. BMI 35.0-35.9,adult  Injection Procedure Prior Authorization    Ambulatory Referral to Physical Therapy        Orders Placed This Encounter   Procedures    XR knee 4+ vw right injury    XR knee 4+ vw left injury    Injection Procedure Prior Authorization    Ambulatory Referral to Physical Therapy        Impression:   Bilateral knee pain likely secondary to acute exacerbation of chronic primary knee osteoarthritis.      Pertinent past medical history  # Chronic HFimpEF, Stage C, w/ decompensation due to medication hold   On Plavix  Diabetes on insulin management  Chronic kidney disease    Conservative Management   We discussed different treatment options:  Reviewed cardiology documentation completed on 06/23/2025.   Treatment plan consisted of referral to Ortho for knee pain  Nash Samuels DO referred patient for knee pain  04/09/2025 CMP completed with abnormal values.  eGFR is 30, creatinine is 1.83  Reviewed A1c.  A1c completed October 2024 was 8.9.  Reviewed epic medication.  Patient is on Plavix, insulin management.  Aqua therapy prescribed.  Pending Visco for bilateral ultrasound-guided injections        General Conservative Management:    Osteoarthritis treatment goal is to minimize pain and optimize function.  First-line management  Ice or Heat Therapy as needed 1-2 times daily for 10-20 minutes. As tolerated.   Over the counter Tylenol and/or NSAIDs  as needed based off your Past Medical Hx. Please follow product label for dosing and maximum limits.  If you have concerns about utilizing Tylenol/NSAIDs please discuss with your primary care physician.  Trial of over the counter Topical Analgesics such as Lidocaine cream or Voltaren Gel, as tolerated. If skin becomes irritated, discontinue use.   Please range joint through gentle range of motion, as tolerated.   Initiate Home Exercise Program for Stretching and Strengthening affected area.    Weight management may benefit lower extremity osteoarthritis.  Normal BMI 18 through 24.  May request referral to weight management or nutritionist/dietitian.  Initiate Formal Physical Therapy at any preferred location.     Additional management  Optional treatment measures include the following  For hip/knee osteoarthritis: Medial  brace for patients with medial compartmental osteoarthritis.  For hip/knee osteoarthritis assistive ambulation devices: Cane, walker  Interarticular glucocorticoid steroid if needing short-term pain relief  May consider viscosupplementation and/or PRP injections  Referral to an orthopedic surgeon to discuss potential surgical management        Imaging  (I personally reviewed images in PACS and report):   All imaging from today was reviewed by myself  and explained to the patient.   06/30/2025 left knee x-ray: No acute osseous abnormality,  06/30/2025 right knee x-ray: No acute osseous abnormality  Kellgren-Jaspal Classification     Present Grade Radiological findings   [] 0 No radiological findings   [] 1 Doubtful narrowing of joint space and possible osteophytic lipping   [] 2 Definite osteophytes and possible narrowing of joint space   [x] 3 Moderate multiple osteophytes, definitive narrowing of joint space, small pseudocystic areas with sclerotic walls and possibly deformity of bone contour   [] 4 Large osteophytes, marked narrowing of joint space, severe sclerosis and definitive deformity of bone contour   **If more than 1 [x] is present patient is between grades       Procedure  Reviewed Visco-supplementation with patient. Order placed. Patient will follow up for procedure.     Shared decision making, patient agreeable to plan.      No follow-ups on file.        HPI:   Nadir Myers is a 65 y.o. male  who presents for evaluation of   Chief Complaint   Patient presents with    Left Knee - Swelling, Clicking, Pain     Pain 7    Right Knee - Pain, Locking, Swelling, Clicking     Pain 6          Onset/Mechanism: Bilateral knee pain for several years.  Denies any trauma recently..  Location: Left knee worse than right..  Severity: Current severity: 7/10.   Pain described as: Constant pressure  Radiation: Will radiate down the leg.  Provocative: Stairs, weightbearing walking.  Associated symptoms: Will get intermittent swelling..    Denies any hx of fracture of affected limb.   Denies any surgical history of affected limb.      Summary of treatment to-date:   Seen by PCP  Does have a cane and walker but does not utilize them at times  Denies bracing  Did have corticosteroid injections over 20 years ago.  Denies any recent viscosupplementation      Following History Reviewed and Updated   Past Medical History[1]  Past Surgical History[2]  Family  History[3]    Social History     Substance and Sexual Activity   Alcohol Use Not Currently     Social History     Substance and Sexual Activity   Drug Use Never     Tobacco Use History[4]    Social Drivers of Health     Tobacco Use: Medium Risk (5/19/2025)    Patient History     Smoking Tobacco Use: Former     Smokeless Tobacco Use: Never     Passive Exposure: Not on file   Alcohol Use: Not on file   Financial Resource Strain: Not on file   Food Insecurity: Food Insecurity Present (12/4/2024)    Nursing - Inadequate Food Risk Classification     Worried About Running Out of Food in the Last Year: Never true     Ran Out of Food in the Last Year: Never true     Ran Out of Food in the Last Year: Often true   Transportation Needs: No Transportation Needs (12/4/2024)    Nursing - Transportation Risk Classification     Lack of Transportation: Not on file     Lack of Transportation: No   Physical Activity: Not on file   Stress: Not on file   Social Connections: Not on file   Intimate Partner Violence: Unknown (12/4/2024)    Nursing IPS     Feels Physically and Emotionally Safe: Not on file     Physically Hurt by Someone: Not on file     Humiliated or Emotionally Abused by Someone: Not on file     Physically Hurt by Someone: No     Hurt or Threatened by Someone: No   Depression: Not at risk (7/18/2024)    PHQ-2     PHQ-2 Score: 1   Housing Stability: At Risk (12/4/2024)    Nursing: Inadequate Housing Risk Classification     Has Housing: Not on file     Worried About Losing Housing: Not on file     Unable to Get Utilities: Not on file     Unable to Pay for Housing in the Last Year: Yes     Has Housing: Yes   Utilities: Not At Risk (12/4/2024)    Nursing - Utilities Risk Classification     Unable to Get Utilities: Not on file     Threatened with loss of utilities: No   Health Literacy: Adequate Health Literacy (4/25/2024)    OASIS : Health Literacy     Frequency of needing help to read materials from doctor or pharmacy:  Never        Allergies[5]    Review of Systems      Review of Systems     Review of Systems   Constitutional: Negative for chills and fever.   HENT: Negative for drooling and sneezing.    Eyes: Negative for redness.   Respiratory: Negative for cough and wheezing.    Gastrointestinal: Negative for vomiting.   Psychiatric/Behavioral: Negative for behavioral problems. The patient is not nervous/anxious.      All other systems negative.   Physical Exam   Physical Exam    Vitals and nursing note reviewed.  Constitutional:   Appearance. Normal Appearance.  Ht 6' (1.829 m)   Wt 125 kg (275 lb)   BMI 37.30 kg/m²     Body mass index is 37.3 kg/m².   HENT:  Head: Atraumatic.  Nose: Nose normal  Eyes: Conjunctiva/sclera: Conjunctivae normal.  Cardiovascular:   Rate and Rhythm: Bilateral equal distal pulses  Pulmonary:   Effort: Pulmonary effort is normal  Skin:   General: Skin is warm and dry.  Neurological:   General: No focal deficit present.  Mental Status: Alert and oriented to person, place, and time.   Psychiatric:   Mood and Affect: mood normal.  Behavior: Behavior normal     Musculoskeletal Exam     Ortho Exam     B/L Knee:       General :   alert and oriented, in no acute distress   Gait: Antalgic. The patient can bear weight on the injured extremity.   Bilateral Lower Extremity  Knee Effusion:  0-1+ left, right 0   Ecchymosis:  none   Knee ROM:  Grossly intact range of motion   Patella:  Patella does track normally and symmetrical.  (observing the patella for smooth motion while the patient contracts the quadriceps muscle)     Inspection: No gross deformity, erythema, warmth   Tenderness: No tenderness to joint line today   Strength: Grossly Intact Bilaterally    Stability:  Anterior drawer: not performed  Posterior drawer: not performed  Medial collateral ligament: Pain on the left only, with mild laxity but endpoint appreciated, normal right knee exam  Lateral collateral ligament: negative     Sensation:   intact  "to light touch   Pulses: normal PT pulses        (Test not completed if space left blank )                 Procedures       Portions of the record may have been created with voice recognition software. Occasional wrong word or \"sound alike\" substitutions may have occurred due to the inherent limitations of voice recognition software. Please review the chart carefully and recognize, using context, where substitutions/typographical errors may have occurred.          [1]   Past Medical History:  Diagnosis Date    Bacteremia due to Staphylococcus 10/09/2024    CHF (congestive heart failure) (HCC)     Diabetes mellitus (HCC)     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    Myocardial infarction (HCC)    [2]   Past Surgical History:  Procedure Laterality Date    CARDIAC CATHETERIZATION N/A 05/03/2023    Procedure: Cardiac Coronary Angiogram;  Surgeon: Valeriy Shore MD;  Location: [facility];  Service: Cardiology    CARDIAC CATHETERIZATION Left 05/03/2023    Procedure: Cardiac Left Heart Cath;  Surgeon: Valeriy Shore MD;  Location: [facility];  Service: Cardiology    CARDIAC DEFIBRILLATOR PLACEMENT  05/2023    CARDIAC ELECTROPHYSIOLOGY PROCEDURE N/A 05/12/2023    Procedure: Cardiac icd implant;  Surgeon: Urbano Maria MD;  [facility]: [facility];  Service: Cardiology    CARDIAC ELECTROPHYSIOLOGY PROCEDURE N/A 02/20/2024    Procedure: EV ICD IMPLANTATION;  Surgeon: Arturo Wooten DO;  Location: [facility];  Service: Cardiology    CARDIAC ELECTROPHYSIOLOGY PROCEDURE N/A 02/08/2024    Procedure: Cardiac laser lead extraction;  Surgeon: Arturo Wooten DO;  [facility];  Service: Cardiology    CARDIAC ELECTROPHYSIOLOGY PROCEDURE N/A 05/21/2024    Procedure: Cardiac icd implant subq;  Surgeon: Urbano Maria MD;  [facility];  Service: Cardiology    IR LOWER EXTREMITY ANGIOGRAM  01/18/2024    [place] RMVL TRANSVNS PM ELTRD DUAL LEAD SYS N/A 02/20/2024    Procedure: " EV ICD IMPLANTATION;  Surgeon: Abhilash Ferrera MD;  Location[facility];  Service: Cardiac Surgery    [place] RMVL TRANSVNS PM ELTRD DUAL LEAD SYS N/A 2024    Procedure: REMOVAL OF LEAD AND GENERATOR AND ATTEMPTED LEAD REVISION;  Surgeon: Abhilash Ferrera MD;  Location[facility];  Service: Cardiac Surgery    WOUND DEBRIDEMENT Left 2024    Procedure: LEFT FOURTH TOE AMPUTATION SURGICAL WOUND DEBRIDEMENT FOOT/TOE (WASH OUT);  Surgeon: Bebo Hopper DPM;  Location: [facility];  Service: Podiatry   [3] No family history on file.  [4]   Social History  Tobacco Use   Smoking Status Former    Types: Cigarettes    Start date: 2024    Quit date: 1976    Years since quittin.5   Smokeless Tobacco Never   [5]   Allergies  Allergen Reactions    Vancomycin Rash     NOT AN ALLERGY - 24 patient experienced vancomycin infusion reaction, please extend duration of infusion time to prevent future infusion reactions

## 2025-07-02 ENCOUNTER — OFFICE VISIT (OUTPATIENT)
Dept: CARDIOLOGY CLINIC | Facility: CLINIC | Age: 65
End: 2025-07-02
Payer: COMMERCIAL

## 2025-07-02 VITALS
WEIGHT: 273.4 LBS | DIASTOLIC BLOOD PRESSURE: 82 MMHG | BODY MASS INDEX: 37.03 KG/M2 | HEART RATE: 71 BPM | SYSTOLIC BLOOD PRESSURE: 138 MMHG | HEIGHT: 72 IN

## 2025-07-02 DIAGNOSIS — E11.51 TYPE 2 DIABETES MELLITUS WITH DIABETIC PERIPHERAL ANGIOPATHY WITHOUT GANGRENE, WITHOUT LONG-TERM CURRENT USE OF INSULIN (HCC): ICD-10-CM

## 2025-07-02 DIAGNOSIS — E66.01 MORBID (SEVERE) OBESITY DUE TO EXCESS CALORIES (HCC): ICD-10-CM

## 2025-07-02 DIAGNOSIS — Z95.810 ICD (IMPLANTABLE CARDIOVERTER-DEFIBRILLATOR) IN PLACE: ICD-10-CM

## 2025-07-02 DIAGNOSIS — I25.10 CORONARY ARTERY DISEASE INVOLVING NATIVE HEART, UNSPECIFIED VESSEL OR LESION TYPE, UNSPECIFIED WHETHER ANGINA PRESENT: ICD-10-CM

## 2025-07-02 DIAGNOSIS — I10 HYPERTENSION, UNSPECIFIED TYPE: ICD-10-CM

## 2025-07-02 DIAGNOSIS — I47.20 V-TACH (HCC): Primary | ICD-10-CM

## 2025-07-02 DIAGNOSIS — I25.5 ISCHEMIC CARDIOMYOPATHY: ICD-10-CM

## 2025-07-02 LAB
ATRIAL RATE: 71 BPM
P AXIS: 44 DEGREES
PR INTERVAL: 188 MS
QRS AXIS: -47 DEGREES
QRSD INTERVAL: 158 MS
QT INTERVAL: 502 MS
QTC INTERVAL: 546 MS
T WAVE AXIS: 50 DEGREES
VENTRICULAR RATE: 71 BPM

## 2025-07-02 PROCEDURE — 99214 OFFICE O/P EST MOD 30 MIN: CPT

## 2025-07-02 PROCEDURE — 93000 ELECTROCARDIOGRAM COMPLETE: CPT

## 2025-07-02 NOTE — Clinical Note
Patient's amiodarone was discontinued today after discussion w/ Dr. Maria  Device team - can we please schedule him for monthly device interrogations x 3 months since he's coming off this medication?  Dr. Samuels - included you to keep you in the loop.  Thanks everyone!

## 2025-07-08 ENCOUNTER — APPOINTMENT (OUTPATIENT)
Dept: LAB | Facility: IMAGING CENTER | Age: 65
End: 2025-07-08
Payer: COMMERCIAL

## 2025-07-08 DIAGNOSIS — I10 HYPERTENSION, UNSPECIFIED TYPE: ICD-10-CM

## 2025-07-08 DIAGNOSIS — I12.9 BENIGN HYPERTENSION WITH CKD (CHRONIC KIDNEY DISEASE) STAGE III (HCC): ICD-10-CM

## 2025-07-08 DIAGNOSIS — E11.51 TYPE 2 DIABETES MELLITUS WITH DIABETIC PERIPHERAL ANGIOPATHY WITHOUT GANGRENE, WITHOUT LONG-TERM CURRENT USE OF INSULIN (HCC): ICD-10-CM

## 2025-07-08 DIAGNOSIS — I50.22 CHRONIC SYSTOLIC CHF (CONGESTIVE HEART FAILURE) (HCC): ICD-10-CM

## 2025-07-08 DIAGNOSIS — N17.9 ACUTE KIDNEY INJURY (HCC): ICD-10-CM

## 2025-07-08 DIAGNOSIS — Z12.5 PROSTATE CANCER SCREENING: ICD-10-CM

## 2025-07-08 DIAGNOSIS — N18.30 BENIGN HYPERTENSION WITH CKD (CHRONIC KIDNEY DISEASE) STAGE III (HCC): ICD-10-CM

## 2025-07-08 DIAGNOSIS — N18.30 BENIGN HYPERTENSIVE HEART AND CKD, STAGE 3 (GFR 30-59), W CHF (HCC): ICD-10-CM

## 2025-07-08 DIAGNOSIS — Z79.4 TYPE 2 DIABETES MELLITUS WITH OTHER SPECIFIED COMPLICATION, WITH LONG-TERM CURRENT USE OF INSULIN (HCC): ICD-10-CM

## 2025-07-08 DIAGNOSIS — I25.5 ISCHEMIC CARDIOMYOPATHY: ICD-10-CM

## 2025-07-08 DIAGNOSIS — R31.21 ASYMPTOMATIC MICROSCOPIC HEMATURIA: ICD-10-CM

## 2025-07-08 DIAGNOSIS — E11.69 TYPE 2 DIABETES MELLITUS WITH OTHER SPECIFIED COMPLICATION, WITH LONG-TERM CURRENT USE OF INSULIN (HCC): ICD-10-CM

## 2025-07-08 DIAGNOSIS — N18.31 CKD STAGE 3A, GFR 45-59 ML/MIN (HCC): ICD-10-CM

## 2025-07-08 DIAGNOSIS — I13.0 BENIGN HYPERTENSIVE HEART AND CKD, STAGE 3 (GFR 30-59), W CHF (HCC): ICD-10-CM

## 2025-07-08 DIAGNOSIS — E78.2 MIXED HYPERLIPIDEMIA: ICD-10-CM

## 2025-07-08 LAB
ALBUMIN SERPL BCG-MCNC: 3.8 G/DL (ref 3.5–5)
ALP SERPL-CCNC: 65 U/L (ref 34–104)
ALT SERPL W P-5'-P-CCNC: 14 U/L (ref 7–52)
ANION GAP SERPL CALCULATED.3IONS-SCNC: 9 MMOL/L (ref 4–13)
AST SERPL W P-5'-P-CCNC: 16 U/L (ref 13–39)
BILIRUB SERPL-MCNC: 0.28 MG/DL (ref 0.2–1)
BNP SERPL-MCNC: 619 PG/ML (ref 0–100)
BUN SERPL-MCNC: 39 MG/DL (ref 5–25)
CALCIUM SERPL-MCNC: 9 MG/DL (ref 8.4–10.2)
CHLORIDE SERPL-SCNC: 109 MMOL/L (ref 96–108)
CHOLEST SERPL-MCNC: 149 MG/DL (ref ?–200)
CO2 SERPL-SCNC: 24 MMOL/L (ref 21–32)
CREAT SERPL-MCNC: 1.93 MG/DL (ref 0.6–1.3)
ERYTHROCYTE [DISTWIDTH] IN BLOOD BY AUTOMATED COUNT: 13.9 % (ref 11.6–15.1)
EST. AVERAGE GLUCOSE BLD GHB EST-MCNC: 197 MG/DL
GFR SERPL CREATININE-BSD FRML MDRD: 35 ML/MIN/1.73SQ M
GLUCOSE P FAST SERPL-MCNC: 175 MG/DL (ref 65–99)
HBA1C MFR BLD: 8.5 %
HCT VFR BLD AUTO: 40.2 % (ref 36.5–49.3)
HDLC SERPL-MCNC: 36 MG/DL
HGB BLD-MCNC: 12.7 G/DL (ref 12–17)
LDLC SERPL CALC-MCNC: 77 MG/DL (ref 0–100)
MCH RBC QN AUTO: 28.6 PG (ref 26.8–34.3)
MCHC RBC AUTO-ENTMCNC: 31.6 G/DL (ref 31.4–37.4)
MCV RBC AUTO: 91 FL (ref 82–98)
NONHDLC SERPL-MCNC: 113 MG/DL
PLATELET # BLD AUTO: 238 THOUSANDS/UL (ref 149–390)
PMV BLD AUTO: 10.3 FL (ref 8.9–12.7)
POTASSIUM SERPL-SCNC: 5.1 MMOL/L (ref 3.5–5.3)
PROT SERPL-MCNC: 7 G/DL (ref 6.4–8.4)
PSA SERPL-MCNC: 0.27 NG/ML (ref 0–4)
RBC # BLD AUTO: 4.44 MILLION/UL (ref 3.88–5.62)
SODIUM SERPL-SCNC: 142 MMOL/L (ref 135–147)
TRIGL SERPL-MCNC: 182 MG/DL (ref ?–150)
WBC # BLD AUTO: 8.98 THOUSAND/UL (ref 4.31–10.16)

## 2025-07-08 PROCEDURE — 83880 ASSAY OF NATRIURETIC PEPTIDE: CPT

## 2025-07-08 PROCEDURE — 85027 COMPLETE CBC AUTOMATED: CPT

## 2025-07-08 PROCEDURE — 36415 COLL VENOUS BLD VENIPUNCTURE: CPT

## 2025-07-08 PROCEDURE — 80061 LIPID PANEL: CPT

## 2025-07-08 PROCEDURE — G0103 PSA SCREENING: HCPCS

## 2025-07-08 PROCEDURE — 83036 HEMOGLOBIN GLYCOSYLATED A1C: CPT

## 2025-07-11 ENCOUNTER — TELEPHONE (OUTPATIENT)
Dept: CARDIOLOGY CLINIC | Facility: CLINIC | Age: 65
End: 2025-07-11

## 2025-07-11 NOTE — TELEPHONE ENCOUNTER
LVM for patient that his August appointment with Dr Maria has been rescheduled to 11/10/25 @ 8am because he had just seen Collin Gonzalez PA-C on 06/02/25.  Dr Maria felt the August appointment would have been too soon.  Left direct phone # of [phone], option #1 for call back/confirmation of his new appt.

## 2025-07-18 ENCOUNTER — EVALUATION (OUTPATIENT)
Dept: PHYSICAL THERAPY | Age: 65
End: 2025-07-18
Attending: FAMILY MEDICINE
Payer: COMMERCIAL

## 2025-07-18 ENCOUNTER — TELEPHONE (OUTPATIENT)
Dept: OBGYN CLINIC | Facility: HOSPITAL | Age: 65
End: 2025-07-18

## 2025-07-18 DIAGNOSIS — M17.0 PRIMARY OSTEOARTHRITIS OF BOTH KNEES: Primary | ICD-10-CM

## 2025-07-18 DIAGNOSIS — M25.562 ACUTE PAIN OF LEFT KNEE: ICD-10-CM

## 2025-07-18 DIAGNOSIS — M25.561 ACUTE PAIN OF RIGHT KNEE: ICD-10-CM

## 2025-07-18 PROCEDURE — 97162 PT EVAL MOD COMPLEX 30 MIN: CPT

## 2025-07-18 PROCEDURE — 97110 THERAPEUTIC EXERCISES: CPT

## 2025-07-18 NOTE — TELEPHONE ENCOUNTER
Called patient:    I left the patient several voicemails in the past 2 weeks. We have made multiple attempts to contact the patient regarding their gel injections and if we are proceeding with no response. At this time, we are closing the referral for the gel injs. If patient would like to proceed with obtaining gel injections he can call us back, I provided my direct cb#.

## 2025-07-18 NOTE — PROGRESS NOTES
PT Evaluation     Today's date: 2025  Patient name: Nadir Myers  : 1960  MRN: [id number]  Referring provider: Zulay Pemberton*  Dx:   Encounter Diagnosis     ICD-10-CM    1. Primary osteoarthritis of both knees  M17.0 Ambulatory Referral to Physical Therapy      2. Acute pain of right knee  M25.561 Ambulatory Referral to Physical Therapy      3. Acute pain of left knee  M25.562 Ambulatory Referral to Physical Therapy      4. BMI 35.0-35.9,adult  Z68.35 Ambulatory Referral to Physical Therapy                     Assessment  Impairments: abnormal gait, abnormal or restricted ROM, activity intolerance, impaired physical strength, lacks appropriate home exercise program, pain with function, weight-bearing intolerance, poor posture , poor body mechanics and endurance  Other impairment: bilateral knee pain, le decreased strength, + antalgic gait , decreased ability to perform floor to stand transfer without assist , 60 lb wt gain in + 1year after losing 100 lbs 2 years ago  Functional limitations: c/o bilateral post knee pain with bending, standing , walking, lifting , and yardwork walking up hills  Symptom irritability: moderate    Assessment details: Nadir Myers is a 65 year old male referred to outpt PT for aquatic therapy presenting with a diagnosis of bilateral knee arthritis and recent exacerbation of pain noted since 2025. PT is warranted to address the above stated deficits in efforts to reduce pain and maximize function in efforts to return to all prior adl's . PT shall monitor tolerance to exer due to cardiac hx and hx of defibrillator present. PT shall address c/o's pain , le weakness and rom hamstring , quad , ITB and hip flexor tightness which may be impacting his c/o's pain and limited mobility.   Barriers to therapy: Hx of 2 MI's  , defibrillator in place, Dm type 2, arthritis bilateral knee joints ,   Understanding of Dx/Px/POC: good     Prognosis: good    Goals  Stg :   1.independence with home exer program in 2-3 weeks ( pt may perform exer in local pool or land based stretching to tolerance  2. Increase slr to -80 degrees in 2-3 weeks   3 reduction in pain by 25 percent in 2-3 weeks  4 increase le mmt by 1/2 muscle grade in 2-3 weeks     Ltg   1 increase le mmt by 1 muscle grade in 4-6 weeks  2 reduction of pain by 50 percent in 4-6 weeks  3 achieve a non antalgic gait in 4-6 weeks   4 pt annie to walk up hills without increase knee pain upon discharge.      Plan  Patient would benefit from: PT eval and skilled physical therapy  Referral necessary: Yes    Planned therapy interventions: aquatic therapy, muscle pump exercises, neuromuscular re-education, strengthening, stretching, therapeutic activities, therapeutic exercise, transfer training, gait training, home exercise program and manual therapy  Other planned therapy interventions: progress to land based exer programming    Frequency: 2x week  Plan of Care beginning date: 7/18/2025  Plan of Care expiration date: 9/18/2025  Treatment plan discussed with: patient      Subjective Evaluation    History of Present Illness  Onset date: April 2024.  Mechanism of injury: Nadir Myers is a 65 year old male referred to outpt PT with a diagnosis of primary OA of both le's and c/o exacerbation of bilateral post knee joints since April of 2025. He reports having to stop his exer routine of walking daily due to increased post knee joint pain.  He did report 10 years ago having left knee pain for which medical intervention did help in the past. Currently he reports walking up hills hurts the most while cutting the lawn. He reports being unable to getup from the floor without assistance and also reports sustaining  1 fall this year when his right knee buckled. There is report of left le weakness in comparison to his right le. He is interested in having less pain and returning to his exer routine and daily activities in the home with  yardwork without fear of falling or c/o pain. He presents with written order for aquatic exer with PT recommending eventual transition to land based programming when able.           Recurrent probem    Quality of life: good    Patient Goals  Patient goals for therapy: decreased pain, increased strength, increased motion, independence with ADLs/IADLs and return to sport/leisure activities  Patient goal: to have less pain and improve strength in both le's  Pain  Current pain ratin  At best pain ratin  At worst pain ratin  Location: post aspect of both knees c/o pain  Quality: dull ache, tight, pulling and pressure  Relieving factors: change in position, ice, heat and rest  Aggravating factors: lifting, overhead activity, stair climbing, walking, standing and running  Progression: worsening    Social Support  Steps to enter house: yes (3 steps left railing)  Stairs in house: no   Lives in: one-story house  Lives with: spouse    Employment status: not working (retired   much walking)  Exercise history: c/o left knee weakness, and c/o right knee buckling in the past 1 fall in the past 1 year  Stress factors comments: pt lost 100 lbs 2 years ago but has gait 60 lb back  since may of 2024 now trying to lose it again,    Treatments  Current treatment: physical therapy    Objective     Active Range of Motion   Left Hip   Flexion: WFL  Extension: 10 degrees   Abduction: WFL  Adduction: WFL  Internal rotation (90/90): 30 degrees     Right Hip   Flexion: WFL  Extension: 10 degrees   Abduction: WFL  Adduction: WFL  Internal rotation (90/90): 30 degrees   Left Knee   Prone flexion: 105 degrees   Extension: WFL    Right Knee   Prone flexion: 105 degrees   Extension: WFL  Left Ankle/Foot   Dorsiflexion (ke): 15 degrees   Plantar flexion: WFL  Inversion: WFL  Eversion: WFL    Right Ankle/Foot   Dorsiflexion (ke): 15 degrees   Plantar flexion: WFL  Inversion: WFL  Eversion: WFL    Additional Active  Range of Motion Details  Slr 0-65 degrees bilaterally,   significant ITB tightness bilaterally, mod quad tightness     left 4th toe removed feb of 2024 due to infection hx DM type 2, (pt has treadmill )  Ant drawer neg, med /lat valgus ,varus testing negative   C/o post knee joint pain reported. No patellar pain reported   Dorsiflexion goal 20 degrees     Strength/Myotome Testing     Left Hip   Planes of Motion   Flexion: 4-  Extension: 3+  Abduction: 4+  Adduction: 3+  External rotation: 4+  Internal rotation: 4+    Right Hip   Planes of Motion   Flexion: 4  Extension: 4-  Abduction: 5  Adduction: 4-  External rotation: 4+  Internal rotation: 4+    Left Knee   Flexion: 4-  Extension: 4+    Right Knee   Flexion: 4  Extension: 5    Left Ankle/Foot   Dorsiflexion: 5  Plantar flexion: 5  Inversion: 5  Eversion: 5    Right Ankle/Foot   Dorsiflexion: 5  Plantar flexion: 5  Inversion: 5  Eversion: 5    Additional Strength Details  Left 1st hallux weakness 3+/5   pt does have diabetic footwear    Ambulation     Ambulation: Level Surfaces   Ambulation without assistive device: independent (100ft)    Additional Level Surfaces Ambulation Details  + lateral trunk bending, decreased bilateral hip and knee flexion,     Ambulation: Stairs   Ascend stairs: independent (pain noted post knee joints)  Pattern: reciprocal  Railings: one rail  Pattern: non-reciprocal  Railings: one rail  Curbs: independent  PMH: Medical History    Diagnosis Date Comment Source   Bacteremia due to Staphylococcus 10/09/2024     CHF (congestive heart failure) (HCC)      Diabetes mellitus (HCC)      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     Myocardial infarction (HCC)         Surgical History    Procedure Laterality Date Comment Source   CARDIAC CATHETERIZATION N/A 05/03/2023 Procedure: Cardiac Coronary Angiogram;  Surgeon: Valeriy Shore MD;  Location: [facility];  Service: Cardiology    CARDIAC CATHETERIZATION Left 05/03/2023 Procedure:  Cardiac Left Heart Cath;  Surgeon: Valeriy Shore MD;  Location: [facility];  Service: Cardiology    CARDIAC DEFIBRILLATOR PLACEMENT  05/2023     CARDIAC ELECTROPHYSIOLOGY PROCEDURE N/A 05/12/2023 Procedure: Cardiac icd implant;  Surgeon: Urbano Maria MD;  Location: [facility];  Service: Cardiology    CARDIAC ELECTROPHYSIOLOGY PROCEDURE N/A 02/20/2024 Procedure: EV ICD IMPLANTATION;  Surgeon: Arturo Wooten DO;  Location: [facility];  Service: Cardiology    CARDIAC ELECTROPHYSIOLOGY PROCEDURE N/A 02/08/2024 Procedure: Cardiac laser lead extraction;  Surgeon: Arturo Wooten DO;  [facility];  Service: Cardiology    CARDIAC ELECTROPHYSIOLOGY PROCEDURE N/A 05/21/2024 Procedure: Cardiac icd implant subq;  Surgeon: Urbano Maria MD;  [facility];  Service: Cardiology    IR LOWER EXTREMITY ANGIOGRAM  01/18/2024     NE RMVL TRANSVNS PM ELTRD DUAL LEAD SYS N/A 02/20/2024 Procedure: EV ICD IMPLANTATION;  Surgeon: Abhilash Ferrera MD;  Location[facility];  Service: Cardiac Surgery    NE RMVL TRANSVNS PM ELTRD DUAL LEAD SYS N/A 02/21/2024 Procedure: REMOVAL OF LEAD AND GENERATOR AND ATTEMPTED LEAD REVISION;  Surgeon: Abhilash Ferrera MD;  Location: [facility];  Service: Cardiac Surgery    WOUND DEBRIDEMENT Left 02/04/2024 Procedure: LEFT FOURTH TOE AMPUTATION SURGICAL WOUND DEBRIDEMENT FOOT/TOE (WASH OUT);  Surgeon: Bebo Hopper DPM;  Location: [facility];  Service: Podiatry             Precautions: allergy vancomycin   CARDIAC defibrillator present    Aqua therapy 2 x week  yes pt can swim 10 pool sessions  Manuals 7/18/25             I eval                                                    Neuro Re-Ed             Heelcord stretch 1minx 1            Hamstring stretch 1 set of 5hold 20 sec            Lunge stretch on step  2 reps 30 sec hold                         Aquatic PT              Water walking fwd bwd, laps              Sidestepping              Ther Ex  sitting heelcord and hamstring stretching with strap             Sitting laq, hip flexion , , leg thrusts             Standing heelraises, hip abd/add, knee flexion, hip flex/ext             Ladder squats             Step ups             Jogging/marching in place, 1/2jj, cross country             Flutter kicks with black float             Bicycling with black floats ( 2)                          Ther Activity                                       Gait Training                                       Modalities

## 2025-07-20 VITALS — DIASTOLIC BLOOD PRESSURE: 72 MMHG | HEART RATE: 72 BPM | SYSTOLIC BLOOD PRESSURE: 118 MMHG

## 2025-07-23 ENCOUNTER — OFFICE VISIT (OUTPATIENT)
Dept: PHYSICAL THERAPY | Age: 65
End: 2025-07-23
Attending: FAMILY MEDICINE
Payer: COMMERCIAL

## 2025-07-23 DIAGNOSIS — M25.561 ACUTE PAIN OF RIGHT KNEE: ICD-10-CM

## 2025-07-23 DIAGNOSIS — M17.0 PRIMARY OSTEOARTHRITIS OF BOTH KNEES: Primary | ICD-10-CM

## 2025-07-23 PROCEDURE — 97113 AQUATIC THERAPY/EXERCISES: CPT

## 2025-07-23 NOTE — PROGRESS NOTES
Daily Note     Today's date: 2025  Patient name: Nadir Myers  : 1960  MRN: [id number]  Referring provider: Zulay Pemberton*  Dx:   Encounter Diagnosis     ICD-10-CM    1. Primary osteoarthritis of both knees  M17.0       2. Acute pain of right knee  M25.561           Start Time: 0800  Stop Time: 0850  Subject: Pt stated that knees feel about the same    Objective: See treatment diary below      Assessment: Fair tolerance to 1st session of aqua based exercises. Improved B knee ROM. Progress as able       Plan: Cont with plan of care      Precautions: allergy vancomycin   CARDIAC defibrillator present    Aqua therapy 2 x week  yes pt can swim 10 pool sessions  Manuals 25  POOL            I eval                                                    Neuro Re-Ed             Heelcord stretch 1minx 1 NT           Hamstring stretch 1 set of 5hold 20 sec 20SEC 5X            Lunge stretch on step  2 reps 30 sec hold 30SEC 5X                         Aquatic PT              Water walking fwd bwd, laps   3 laps            Sidestepping   6X 1 side of the pool            Ther Ex sitting heelcord and hamstring stretching with strap  20SEC 5X            Sitting laq, hip flexion , , leg thrusts  20X            Standing heelraises, hip abd/add, knee flexion, hip flex/ext  20X            Ladder squats  20X            Step ups  20X            Jogging/marching in place, 1/2jj, cross country             Flutter kicks with black float  NT           Bicycling with black floats ( 2)  5 MIN                        Ther Activity                                         Gait Training                                       Modalities

## 2025-07-24 ENCOUNTER — APPOINTMENT (OUTPATIENT)
Dept: PHYSICAL THERAPY | Age: 65
End: 2025-07-24
Attending: FAMILY MEDICINE
Payer: COMMERCIAL

## 2025-07-25 ENCOUNTER — OFFICE VISIT (OUTPATIENT)
Dept: INTERNAL MEDICINE CLINIC | Facility: OTHER | Age: 65
End: 2025-07-25
Payer: COMMERCIAL

## 2025-07-25 VITALS
DIASTOLIC BLOOD PRESSURE: 70 MMHG | HEART RATE: 75 BPM | HEIGHT: 72 IN | SYSTOLIC BLOOD PRESSURE: 118 MMHG | OXYGEN SATURATION: 97 % | WEIGHT: 272 LBS | TEMPERATURE: 98.4 F | BODY MASS INDEX: 36.84 KG/M2

## 2025-07-25 DIAGNOSIS — Z13.6 SCREENING FOR CARDIOVASCULAR CONDITION: ICD-10-CM

## 2025-07-25 DIAGNOSIS — Z79.4 TYPE 2 DIABETES MELLITUS WITH OTHER SPECIFIED COMPLICATION, WITH LONG-TERM CURRENT USE OF INSULIN (HCC): ICD-10-CM

## 2025-07-25 DIAGNOSIS — I50.22 CHRONIC SYSTOLIC CHF (CONGESTIVE HEART FAILURE) (HCC): ICD-10-CM

## 2025-07-25 DIAGNOSIS — K74.60 CIRRHOSIS OF LIVER WITHOUT ASCITES, UNSPECIFIED HEPATIC CIRRHOSIS TYPE (HCC): ICD-10-CM

## 2025-07-25 DIAGNOSIS — I10 HYPERTENSION, UNSPECIFIED TYPE: ICD-10-CM

## 2025-07-25 DIAGNOSIS — E11.69 TYPE 2 DIABETES MELLITUS WITH OTHER SPECIFIED COMPLICATION, WITH LONG-TERM CURRENT USE OF INSULIN (HCC): Primary | ICD-10-CM

## 2025-07-25 DIAGNOSIS — Z12.11 ENCOUNTER FOR SCREENING FOR MALIGNANT NEOPLASM OF COLON: ICD-10-CM

## 2025-07-25 DIAGNOSIS — E11.621 TYPE 2 DIABETES MELLITUS WITH FOOT ULCER, WITH LONG-TERM CURRENT USE OF INSULIN (HCC): ICD-10-CM

## 2025-07-25 DIAGNOSIS — E11.51 TYPE 2 DIABETES MELLITUS WITH DIABETIC PERIPHERAL ANGIOPATHY WITHOUT GANGRENE, WITHOUT LONG-TERM CURRENT USE OF INSULIN (HCC): ICD-10-CM

## 2025-07-25 DIAGNOSIS — L97.509 TYPE 2 DIABETES MELLITUS WITH FOOT ULCER, WITH LONG-TERM CURRENT USE OF INSULIN (HCC): ICD-10-CM

## 2025-07-25 DIAGNOSIS — K21.9 GASTROESOPHAGEAL REFLUX DISEASE WITHOUT ESOPHAGITIS: ICD-10-CM

## 2025-07-25 DIAGNOSIS — Z00.00 MEDICARE ANNUAL WELLNESS VISIT, SUBSEQUENT: ICD-10-CM

## 2025-07-25 DIAGNOSIS — Z13.1 SCREENING FOR DIABETES MELLITUS: ICD-10-CM

## 2025-07-25 DIAGNOSIS — Z12.5 SCREENING FOR PROSTATE CANCER: ICD-10-CM

## 2025-07-25 DIAGNOSIS — Z79.4 TYPE 2 DIABETES MELLITUS WITH FOOT ULCER, WITH LONG-TERM CURRENT USE OF INSULIN (HCC): ICD-10-CM

## 2025-07-25 DIAGNOSIS — I25.10 CORONARY ARTERY DISEASE INVOLVING NATIVE HEART, UNSPECIFIED VESSEL OR LESION TYPE, UNSPECIFIED WHETHER ANGINA PRESENT: ICD-10-CM

## 2025-07-25 DIAGNOSIS — Z79.4 TYPE 2 DIABETES MELLITUS WITH OTHER SPECIFIED COMPLICATION, WITH LONG-TERM CURRENT USE OF INSULIN (HCC): Primary | ICD-10-CM

## 2025-07-25 DIAGNOSIS — E11.69 TYPE 2 DIABETES MELLITUS WITH OTHER SPECIFIED COMPLICATION, WITH LONG-TERM CURRENT USE OF INSULIN (HCC): ICD-10-CM

## 2025-07-25 DIAGNOSIS — R11.2 NAUSEA AND VOMITING, UNSPECIFIED VOMITING TYPE: ICD-10-CM

## 2025-07-25 DIAGNOSIS — E78.2 MIXED HYPERLIPIDEMIA: ICD-10-CM

## 2025-07-25 DIAGNOSIS — Z71.89 ADVANCED CARE PLANNING/COUNSELING DISCUSSION: ICD-10-CM

## 2025-07-25 PROBLEM — I50.9 ACUTE EXACERBATION OF CHRONIC HEART FAILURE (HCC): Status: RESOLVED | Noted: 2024-12-03 | Resolved: 2025-07-25

## 2025-07-25 PROBLEM — I50.21 ACUTE HFREF (HEART FAILURE WITH REDUCED EJECTION FRACTION) (HCC): Status: RESOLVED | Noted: 2023-05-02 | Resolved: 2025-07-25

## 2025-07-25 PROBLEM — B35.1 TINEA UNGUIUM: Status: RESOLVED | Noted: 2025-05-19 | Resolved: 2025-07-25

## 2025-07-25 PROBLEM — D72.829 LEUKOCYTOSIS: Status: RESOLVED | Noted: 2024-12-03 | Resolved: 2025-07-25

## 2025-07-25 PROCEDURE — G0439 PPPS, SUBSEQ VISIT: HCPCS | Performed by: INTERNAL MEDICINE

## 2025-07-25 PROCEDURE — 99497 ADVNCD CARE PLAN 30 MIN: CPT | Performed by: INTERNAL MEDICINE

## 2025-07-25 PROCEDURE — 99214 OFFICE O/P EST MOD 30 MIN: CPT | Performed by: INTERNAL MEDICINE

## 2025-07-25 PROCEDURE — G2211 COMPLEX E/M VISIT ADD ON: HCPCS | Performed by: INTERNAL MEDICINE

## 2025-07-25 RX ORDER — FAMOTIDINE 20 MG/1
20 TABLET, FILM COATED ORAL 2 TIMES DAILY
Qty: 200 TABLET | Refills: 1 | Status: SHIPPED | OUTPATIENT
Start: 2025-07-25

## 2025-07-25 RX ORDER — INSULIN DETEMIR 100 [IU]/ML
38 INJECTION, SOLUTION SUBCUTANEOUS DAILY
Qty: 15 ML | Refills: 3 | Status: SHIPPED | OUTPATIENT
Start: 2025-07-25

## 2025-07-25 NOTE — ASSESSMENT & PLAN NOTE
Lab Results   Component Value Date    HGBA1C 8.5 (H) 07/08/2025       Orders:  •  Hemoglobin A1C; Future

## 2025-07-25 NOTE — ASSESSMENT & PLAN NOTE
Wt Readings from Last 3 Encounters:   07/25/25 123 kg (272 lb)   07/02/25 124 kg (273 lb 6.4 oz)   06/30/25 125 kg (275 lb)       Continue follow-up with cardiology.  Continue current medication regimen.  Monitor clinically.  Currently euvolemic on exam today.

## 2025-07-25 NOTE — ASSESSMENT & PLAN NOTE
He feels he had better diet control with Levemir.  He like to switch to Levemir at 38 units daily.  Continue Janumet.  Continue to trend kidney function.  Continue function worsens will discontinue Januvia.  Lab Results   Component Value Date    HGBA1C 8.5 (H) 07/08/2025     Orders:  •  insulin detemir (Levemir FlexPen) 100 Units/mL injection pen; Inject 38 Units under the skin daily  •  Hemoglobin A1C; Future

## 2025-07-25 NOTE — PROGRESS NOTES
Name: Nadir Myers      : 1960      MRN: [id number]  Encounter Provider: Beau Lemus MD  Encounter Date: 2025   Encounter department: [facility]  :  Assessment & Plan  Type 2 diabetes mellitus with other specified complication, with long-term current use of insulin (HCC)    Lab Results   Component Value Date    HGBA1C 8.5 (H) 2025       Orders:  •  Ambulatory Referral to Ophthalmology; Future  •  insulin detemir (Levemir FlexPen) 100 Units/mL injection pen; Inject 38 Units under the skin daily  •  Hemoglobin A1C; Future    Encounter for screening for malignant neoplasm of colon    Orders:  •  Ambulatory Referral to Gastroenterology; Future    Type 2 diabetes mellitus with diabetic peripheral angiopathy without gangrene, without long-term current use of insulin (HCC)  He feels he had better diet control with Levemir.  He like to switch to Levemir at 38 units daily.  Continue Janumet.  Continue to trend kidney function.  Continue function worsens will discontinue Januvia.  Lab Results   Component Value Date    HGBA1C 8.5 (H) 2025     Orders:  •  insulin detemir (Levemir FlexPen) 100 Units/mL injection pen; Inject 38 Units under the skin daily  •  Hemoglobin A1C; Future    Cirrhosis of liver without ascites, unspecified hepatic cirrhosis type (HCC)  Continue to trend LFTs.       Type 2 diabetes mellitus with foot ulcer, with long-term current use of insulin (HCC)    Lab Results   Component Value Date    HGBA1C 8.5 (H) 2025       Orders:  •  Hemoglobin A1C; Future    Medicare annual wellness visit, subsequent  Medicare wellness visit completed today.       Screening for diabetes mellitus         Screening for cardiovascular condition         Screening for prostate cancer         Nausea and vomiting, unspecified vomiting type         Gastroesophageal reflux disease without esophagitis  Will restart Pepcid at 20 mg twice a day.  Recommend he  follow-up with gastroenterology.  Orders:  •  famotidine (PEPCID) 20 mg tablet; Take 1 tablet (20 mg total) by mouth 2 (two) times a day    Hypertension, unspecified type  Controlled.  Continue current antihypertensive regimen.       Coronary artery disease involving native heart, unspecified vessel or lesion type, unspecified whether angina present  Continue current medication regimen and treatment plan.  Continue follow-up with cardiology.       Chronic HFimpEF, stage C, NYHA II  Wt Readings from Last 3 Encounters:   07/25/25 123 kg (272 lb)   07/02/25 124 kg (273 lb 6.4 oz)   06/30/25 125 kg (275 lb)       Continue follow-up with cardiology.  Continue current medication regimen.  Monitor clinically.  Currently euvolemic on exam today.       Mixed hyperlipidemia  Continue statin therapy.       Advanced care planning/counseling discussion  Nadir JOSE Myers, his wife Rhoda Myers, and I had a thorough discussion regarding advance care planning.  he  has a Living Will at home to which I recommended he provide a copy to be scanned for his medical records.  ACP documentation provided to patient today.  he  understands that today's visit is a billable service.  Refer to ACP note.    Serious Illness Conversation    1. What is your understanding now of where you are with your illness?  Prognostic Understanding: appropriate understanding of prognosis     2. How much information about what is likely to be ahead with your illness would you like to have?  Information: patient wants to be fully informed     3. What did you (clinician) communicate to the patient?     4. If your health situation worsens, what are your most important goals?  Goals: have my medical decisions respected, be mentally aware, be physically comfortable, be emotionally at peace, be spiritually and emotionally at peace, not be a burden     5. What are the biggest fears and worries about the future and your health?  Fears/Worries: loss of control, being  dependent, being a financial burden, being a physical burden, burdening others     6. What abilities are so critical to your life that you cannot imagine living without them?  Unacceptable Function: being unconscious     7. What gives you strength as you think about the future with your illness?     8. If you become sicker, how much are you willing to go through for the possibility of gaining more time?  Be in the hospital: Yes Have a feeding tube: Yes   Be in the ICU: Yes Live in a nursing home: Yes   Be on a ventilator: Yes Be uncomfortable: Yes   Be on dialysis: Yes Undergo aggressive test and/or procedures: Yes   9. How much does your proxy and family know about your priorities and wishes?  Discussion Discussion: extensive discussion with family about goals and wishes        How does this plan sound to you? I will do everything I can to help you through this.  Patient verbalized understanding of the plan     I have spent 16 minutes speaking with my patient on advanced care planning today or during this visit     Advanced directives  Five Wishes: Patient does not have Five Wishes- would like information                Depression Screening and Follow-up Plan: Patient was screened for depression during today's encounter. They screened negative with a PHQ-2 score of 0.        Preventive health issues were discussed with patient, and age appropriate screening tests were ordered as noted in patient's After Visit Summary. Personalized health advice and appropriate referrals for health education or preventive services given if needed, as noted in patient's After Visit Summary.    History of Present Illness     Nadir Myers is seen today for follow up of chronic conditions.   Recent laboratory studies reviewed today with the patient.   he has been compliant with his medication regimen.     he has no complaints or concerns at this time.       Hypertension  This is a chronic problem. The current episode started more  than 1 year ago. The problem is unchanged. The problem is controlled. Pertinent negatives include no chest pain, palpitations or shortness of breath. Past treatments include beta blockers, angiotensin blockers and diuretics. Compliance problems include diet and exercise.    Diabetes  He presents for his follow-up diabetic visit. He has type 2 diabetes mellitus. His disease course has been stable. Pertinent negatives for hypoglycemia include no confusion. There are no diabetic associated symptoms. Pertinent negatives for diabetes include no chest pain and no fatigue. Symptoms are stable. Current diabetic treatment includes insulin injections and oral agent (dual therapy). He is compliant with treatment all of the time.   Heartburn  He reports no abdominal pain, no chest pain, no coughing, no nausea, no sore throat or no wheezing. This is a chronic problem. The current episode started more than 1 year ago. The problem occurs frequently. The problem has been unchanged. The symptoms are aggravated by certain foods. Pertinent negatives include no fatigue. He has tried nothing for the symptoms.      Patient Care Team:  Beau Lemus MD as PCP - General (Internal Medicine)  Beau Lemus MD as PCP - PCP-[facility] ([facility])  DO Nadir Swenson MD as Cardiologist (Cardiology)  Adelina Muir RN as Registered Nurse (Cardiology)  Nash Samuels DO as Cardiologist (Cardiology)  Ludwin Treadwell MD (Nephrology)  CARLA Plascencia as Nurse Practitioner (Nephrology)  Marisa Wang PA-C (Gastroenterology)  CARLA Hall as Nurse Practitioner (Gastroenterology)    Review of Systems   Constitutional: Negative.  Negative for chills, fatigue and fever.   HENT:  Negative for congestion, ear pain, postnasal drip, rhinorrhea and sore throat.    Eyes: Negative.    Respiratory:  Negative for cough, chest tightness, shortness of breath and wheezing.    Cardiovascular:  Negative for  chest pain and palpitations.   Gastrointestinal:  Negative for abdominal distention, abdominal pain, blood in stool, constipation, diarrhea and nausea.   Endocrine: Negative.    Genitourinary:  Negative for difficulty urinating, dysuria and hematuria.   Musculoskeletal: Negative.    Skin: Negative.    Allergic/Immunologic: Negative for environmental allergies and food allergies.   Neurological: Negative.    Hematological:  Negative for adenopathy.   Psychiatric/Behavioral:  Negative for agitation, behavioral problems, confusion and sleep disturbance.    All other systems reviewed and are negative.    Medical History Reviewed by provider this encounter:  Tobacco  Allergies  Meds  Problems  Med Hx  Surg Hx  Fam Hx       Annual Wellness Visit Questionnaire   Nadir is here for his Subsequent Wellness visit. Last Medicare Wellness visit information reviewed, patient interviewed and updates made to the record today.      Health Risk Assessment:   Patient rates overall health as fair. Patient feels that their physical health rating is slightly better. Patient is satisfied with their life. Eyesight was rated as same. Hearing was rated as same. Patient feels that their emotional and mental health rating is same. Patients states they are never, rarely angry. Patient states they are sometimes unusually tired/fatigued. Pain experienced in the last 7 days has been none. Patient states that he has experienced no weight loss or gain in last 6 months.     Depression Screening:   PHQ-2 Score: 0      Fall Risk Screening:   In the past year, patient has experienced: no history of falling in past year      Home Safety:  Patient does not have trouble with stairs inside or outside of their home. Patient has working smoke alarms and has no working carbon monoxide detector. Home safety hazards include: none.     Nutrition:   Current diet is Diabetic, No Added Salt and Low Carb.     Medications:   Patient is currently taking  over-the-counter supplements. OTC medications include: see medication list. Patient is able to manage medications.     Activities of Daily Living (ADLs)/Instrumental Activities of Daily Living (IADLs):   Walk and transfer into and out of bed and chair?: Yes  Dress and groom yourself?: Yes    Bathe or shower yourself?: Yes    Feed yourself? Yes  Do your laundry/housekeeping?: Yes  Manage your money, pay your bills and track your expenses?: Yes  Make your own meals?: Yes    Do your own shopping?: Yes    Previous Hospitalizations:   Any hospitalizations or ED visits within the last 12 months?: No      Advance Care Planning:   Living will: Yes    Advanced directive: Yes    Advanced directive counseling given: Yes    End of Life Decisions reviewed with patient: Yes    Provider agrees with end of life decisions: Yes      Cognitive Screening:   Provider or family/friend/caregiver concerned regarding cognition?: No    Preventive Screenings      Cardiovascular Screening:    General: Screening Not Indicated, History Lipid Disorder, Risks and Benefits Discussed and Screening Current      Diabetes Screening:     General: Screening Not Indicated, History Diabetes, Risks and Benefits Discussed and Screening Current    Due for: Blood Glucose      Colorectal Cancer Screening:     General: Risks and Benefits Discussed      Prostate Cancer Screening:    General: Screening Current and Risks and Benefits Discussed      Osteoporosis Screening:    General: Screening Not Indicated      Abdominal Aortic Aneurysm (AAA) Screening:    Risk factors include: tobacco use        General: Screening Not Indicated      Lung Cancer Screening:     General: Screening Not Indicated      Hepatitis C Screening:    General: Screening Not Indicated and History Hepatitis C    Immunizations:  - Immunizations due: Prevnar 20, Zoster (Shingrix), Hepatitis A and Hepatitis B  - Risks/benefits immunizations discussed    - Immunizations given per orders       Screening, Brief Intervention, and Referral to Treatment (SBIRT)     Screening  Typical number of drinks in a day: 0  Typical number of drinks in a week: 0  Interpretation: Low risk drinking behavior.    Single Item Drug Screening:  How often have you used an illegal drug (including marijuana) or a prescription medication for non-medical reasons in the past year? never    Single Item Drug Screen Score: 0  Interpretation: Negative screen for possible drug use disorder    Brief Intervention  Alcohol & drug use screenings were reviewed. No concerns regarding substance use disorder identified.     Other Counseling Topics:   Car/seat belt/driving safety, skin self-exam, sunscreen and calcium and vitamin D intake and regular weightbearing exercise.     Social Drivers of Health     Food Insecurity: Food Insecurity Present (7/25/2025)    Nursing - Inadequate Food Risk Classification    • Worried About Running Out of Food in the Last Year: Never true    • Ran Out of Food in the Last Year: Never true    • Ran Out of Food in the Last Year: Often true   Transportation Needs: No Transportation Needs (7/25/2025)    PRAPARE - Transportation    • Lack of Transportation (Medical): No    • Lack of Transportation (Non-Medical): No   Housing Stability: Low Risk  (7/25/2025)    Housing Stability Vital Sign    • Unable to Pay for Housing in the Last Year: No    • Number of Times Moved in the Last Year: 0    • Homeless in the Last Year: No   Utilities: Not At Risk (7/25/2025)    [facility] Utilities    • Threatened with loss of utilities: No     No results found.    Objective   /70 (BP Location: Left arm, Patient Position: Sitting, Cuff Size: Adult)   Pulse 75   Temp 98.4 °F (36.9 °C) (Temporal)   Ht 6' (1.829 m)   Wt 123 kg (272 lb)   SpO2 97%   BMI 36.89 kg/m²     Physical Exam  Vitals and nursing note reviewed.   Constitutional:       General: He is not in acute distress.     Appearance: He is well-developed. He is not  diaphoretic.   HENT:      Head: Normocephalic and atraumatic.     Eyes:      General: No scleral icterus.        Right eye: No discharge.         Left eye: No discharge.      Conjunctiva/sclera: Conjunctivae normal.      Pupils: Pupils are equal, round, and reactive to light.     Neck:      Thyroid: No thyromegaly.      Vascular: No JVD.     Cardiovascular:      Rate and Rhythm: Normal rate and regular rhythm.      Heart sounds: Normal heart sounds. No murmur heard.     No friction rub. No gallop.   Pulmonary:      Effort: Pulmonary effort is normal. No respiratory distress.      Breath sounds: Normal breath sounds. No wheezing or rales.   Chest:      Chest wall: No tenderness.   Abdominal:      General: Bowel sounds are normal. There is no distension.      Palpations: Abdomen is soft. There is no mass.      Tenderness: There is no abdominal tenderness. There is no guarding or rebound.     Musculoskeletal:         General: No tenderness or deformity. Normal range of motion.      Cervical back: Normal range of motion and neck supple.   Lymphadenopathy:      Cervical: No cervical adenopathy.     Skin:     General: Skin is warm and dry.      Coloration: Skin is not pale.      Findings: No erythema or rash.     Neurological:      Mental Status: He is alert and oriented to person, place, and time.      Cranial Nerves: No cranial nerve deficit.      Coordination: Coordination normal.      Deep Tendon Reflexes: Reflexes are normal and symmetric.     Psychiatric:         Behavior: Behavior normal.         Thought Content: Thought content normal.         Judgment: Judgment normal.

## 2025-07-25 NOTE — PATIENT INSTRUCTIONS
Medicare Preventive Visit Patient Instructions  Thank you for completing your Welcome to Medicare Visit or Medicare Annual Wellness Visit today. Your next wellness visit will be due in one year (7/26/2026).  The screening/preventive services that you may require over the next 5-10 years are detailed below. Some tests may not apply to you based off risk factors and/or age. Screening tests ordered at today's visit but not completed yet may show as past due. Also, please note that scanned in results may not display below.  Preventive Screenings:  Service Recommendations Previous Testing/Comments   Colorectal Cancer Screening  Colonoscopy    Fecal Occult Blood Test (FOBT)/Fecal Immunochemical Test (FIT)  Fecal DNA/Cologuard Test  Flexible Sigmoidoscopy Age: 45-75 years old   Colonoscopy: every 10 years (May be performed more frequently if at higher risk)  OR  FOBT/FIT: every 1 year  OR  Cologuard: every 3 years  OR  Sigmoidoscopy: every 5 years  Screening may be recommended earlier than age 45 if at higher risk for colorectal cancer. Also, an individualized decision between you and your healthcare provider will decide whether screening between the ages of 76-85 would be appropriate. Colonoscopy: Not on file  FOBT/FIT: Not on file  Cologuard: Not on file  Sigmoidoscopy: Not on file          Prostate Cancer Screening Individualized decision between patient and health care provider in men between ages of 55-69   Medicare will cover every 12 months beginning on the day after your 50th birthday PSA: 0.274 ng/mL     Screening Current     Hepatitis C Screening Once for adults born between 1945 and 1965  More frequently in patients at high risk for Hepatitis C Hep C Antibody: 10/14/2024    Screening Not Indicated  History Hepatitis C   Diabetes Screening 1-2 times per year if you're at risk for diabetes or have pre-diabetes Fasting glucose: 175 mg/dL (7/8/2025)  A1C: 8.5 % (7/8/2025)  Screening Not Indicated  History Diabetes    Cholesterol Screening Once every 5 years if you don't have a lipid disorder. May order more often based on risk factors. Lipid panel: 07/08/2025  Screening Not Indicated  History Lipid Disorder      Other Preventive Screenings Covered by Medicare:  Abdominal Aortic Aneurysm (AAA) Screening: covered once if your at risk. You're considered to be at risk if you have a family history of AAA or a male between the age of 65-75 who smoking at least 100 cigarettes in your lifetime.  Lung Cancer Screening: covers low dose CT scan once per year if you meet all of the following conditions: (1) Age 55-77; (2) No signs or symptoms of lung cancer; (3) Current smoker or have quit smoking within the last 15 years; (4) You have a tobacco smoking history of at least 20 pack years (packs per day x number of years you smoked); (5) You get a written order from a healthcare provider.  Glaucoma Screening: covered annually if you're considered high risk: (1) You have diabetes OR (2) Family history of glaucoma OR (3)  aged 50 and older OR (4)  American aged 65 and older  Osteoporosis Screening: covered every 2 years if you meet one of the following conditions: (1) Have a vertebral abnormality; (2) On glucocorticoid therapy for more than 3 months; (3) Have primary hyperparathyroidism; (4) On osteoporosis medications and need to assess response to drug therapy.  HIV Screening: covered annually if you're between the age of 15-65. Also covered annually if you are younger than 15 and older than 65 with risk factors for HIV infection. For pregnant patients, it is covered up to 3 times per pregnancy.    Immunizations:  Immunization Recommendations   Influenza Vaccine Annual influenza vaccination during flu season is recommended for all persons aged >= 6 months who do not have contraindications   Pneumococcal Vaccine   * Pneumococcal conjugate vaccine = PCV13 (Prevnar 13), PCV15 (Vaxneuvance), PCV20 (Prevnar 20)  *  Pneumococcal polysaccharide vaccine = PPSV23 (Pneumovax) Adults 19-65 yo with certain risk factors or if 65+ yo  If never received any pneumonia vaccine: recommend Prevnar 20 (PCV20)  Give PCV20 if previously received 1 dose of PCV13 or PPSV23   Hepatitis B Vaccine 3 dose series if at intermediate or high risk (ex: diabetes, end stage renal disease, liver disease)   Respiratory syncytial virus (RSV) Vaccine - COVERED BY MEDICARE PART D  * RSVPreF3 (Arexvy) CDC recommends that adults 60 years of age and older may receive a single dose of RSV vaccine using shared clinical decision-making (SCDM)   Tetanus (Td) Vaccine - COST NOT COVERED BY MEDICARE PART B Following completion of primary series, a booster dose should be given every 10 years to maintain immunity against tetanus. Td may also be given as tetanus wound prophylaxis.   Tdap Vaccine - COST NOT COVERED BY MEDICARE PART B Recommended at least once for all adults. For pregnant patients, recommended with each pregnancy.   Shingles Vaccine (Shingrix) - COST NOT COVERED BY MEDICARE PART B  2 shot series recommended in those 19 years and older who have or will have weakened immune systems or those 50 years and older     Health Maintenance Due:      Topic Date Due   • HIV Screening  Never done   • Colorectal Cancer Screening  Never done   • Hepatitis C Screening  Discontinued     Immunizations Due:      Topic Date Due   • Pneumococcal Vaccine: 50+ Years (1 of 2 - PCV) Never done   • COVID-19 Vaccine (4 - 2024-25 season) 09/01/2024   • Influenza Vaccine (1) 09/01/2025     Advance Directives   What are advance directives?  Advance directives are legal documents that state your wishes and plans for medical care. These plans are made ahead of time in case you lose your ability to make decisions for yourself. Advance directives can apply to any medical decision, such as the treatments you want, and if you want to donate organs.   What are the types of advance directives?   There are many types of advance directives, and each state has rules about how to use them. You may choose a combination of any of the following:  Living will:  This is a written record of the treatment you want. You can also choose which treatments you do not want, which to limit, and which to stop at a certain time. This includes surgery, medicine, IV fluid, and tube feedings.   Durable power of  for healthcare (DPAHC):  This is a written record that states who you want to make healthcare choices for you when you are unable to make them for yourself. This person, called a proxy, is usually a family member or a friend. You may choose more than 1 proxy.  Do not resuscitate (DNR) order:  A DNR order is used in case your heart stops beating or you stop breathing. It is a request not to have certain forms of treatment, such as CPR. A DNR order may be included in other types of advance directives.  Medical directive:  This covers the care that you want if you are in a coma, near death, or unable to make decisions for yourself. You can list the treatments you want for each condition. Treatment may include pain medicine, surgery, blood transfusions, dialysis, IV or tube feedings, and a ventilator (breathing machine).  Values history:  This document has questions about your views, beliefs, and how you feel and think about life. This information can help others choose the care that you would choose.  Why are advance directives important?  An advance directive helps you control your care. Although spoken wishes may be used, it is better to have your wishes written down. Spoken wishes can be misunderstood, or not followed. Treatments may be given even if you do not want them. An advance directive may make it easier for your family to make difficult choices about your care.   Weight Management   Why it is important to manage your weight:  Being overweight increases your risk of health conditions such as heart disease,  high blood pressure, type 2 diabetes, and certain types of cancer. It can also increase your risk for osteoarthritis, sleep apnea, and other respiratory problems. Aim for a slow, steady weight loss. Even a small amount of weight loss can lower your risk of health problems.  How to lose weight safely:  A safe and healthy way to lose weight is to eat fewer calories and get regular exercise. You can lose up about 1 pound a week by decreasing the number of calories you eat by 500 calories each day.   Healthy meal plan for weight management:  A healthy meal plan includes a variety of foods, contains fewer calories, and helps you stay healthy. A healthy meal plan includes the following:  Eat whole-grain foods more often.  A healthy meal plan should contain fiber. Fiber is the part of grains, fruits, and vegetables that is not broken down by your body. Whole-grain foods are healthy and provide extra fiber in your diet. Some examples of whole-grain foods are whole-wheat breads and pastas, oatmeal, brown rice, and bulgur.  Eat a variety of vegetables every day.  Include dark, leafy greens such as spinach, kale, danielle greens, and mustard greens. Eat yellow and orange vegetables such as carrots, sweet potatoes, and winter squash.   Eat a variety of fruits every day.  Choose fresh or canned fruit (canned in its own juice or light syrup) instead of juice. Fruit juice has very little or no fiber.  Eat low-fat dairy foods.  Drink fat-free (skim) milk or 1% milk. Eat fat-free yogurt and low-fat cottage cheese. Try low-fat cheeses such as mozzarella and other reduced-fat cheeses.  Choose meat and other protein foods that are low in fat.  Choose beans or other legumes such as split peas or lentils. Choose fish, skinless poultry (chicken or turkey), or lean cuts of red meat (beef or pork). Before you cook meat or poultry, cut off any visible fat.   Use less fat and oil.  Try baking foods instead of frying them. Add less fat, such as  margarine, sour cream, regular salad dressing and mayonnaise to foods. Eat fewer high-fat foods. Some examples of high-fat foods include french fries, doughnuts, ice cream, and cakes.  Eat fewer sweets.  Limit foods and drinks that are high in sugar. This includes candy, cookies, regular soda, and sweetened drinks.  Exercise:  Exercise at least 30 minutes per day on most days of the week. Some examples of exercise include walking, biking, dancing, and swimming. You can also fit in more physical activity by taking the stairs instead of the elevator or parking farther away from stores. Ask your healthcare provider about the best exercise plan for you.    © Copyright [employer] 2018 Information is for End User's use only and may not be sold, redistributed or otherwise used for commercial purposes. All illustrations and images included in CareNotes® are the copyrighted property of [employer] or [employer]     No

## 2025-07-25 NOTE — ASSESSMENT & PLAN NOTE
Nadir Myers, his wife Rhoda Myers, and I had a thorough discussion regarding advance care planning.  he  has a Living Will at home to which I recommended he provide a copy to be scanned for his medical records.  ACP documentation provided to patient today.  he  understands that today's visit is a billable service.  Refer to ACP note.    Serious Illness Conversation    1. What is your understanding now of where you are with your illness?  Prognostic Understanding: appropriate understanding of prognosis     2. How much information about what is likely to be ahead with your illness would you like to have?  Information: patient wants to be fully informed     3. What did you (clinician) communicate to the patient?     4. If your health situation worsens, what are your most important goals?  Goals: have my medical decisions respected, be mentally aware, be physically comfortable, be emotionally at peace, be spiritually and emotionally at peace, not be a burden     5. What are the biggest fears and worries about the future and your health?  Fears/Worries: loss of control, being dependent, being a financial burden, being a physical burden, burdening others     6. What abilities are so critical to your life that you cannot imagine living without them?  Unacceptable Function: being unconscious     7. What gives you strength as you think about the future with your illness?     8. If you become sicker, how much are you willing to go through for the possibility of gaining more time?  Be in the hospital: Yes Have a feeding tube: Yes   Be in the ICU: Yes Live in a nursing home: Yes   Be on a ventilator: Yes Be uncomfortable: Yes   Be on dialysis: Yes Undergo aggressive test and/or procedures: Yes   9. How much does your proxy and family know about your priorities and wishes?  Discussion Discussion: extensive discussion with family about goals and wishes        How does this plan sound to you? I will do everything I can to  help you through this.  Patient verbalized understanding of the plan     I have spent 16 minutes speaking with my patient on advanced care planning today or during this visit     Advanced directives  Five Wishes: Patient does not have Five Wishes- would like information

## 2025-07-25 NOTE — ASSESSMENT & PLAN NOTE
Will restart Pepcid at 20 mg twice a day.  Recommend he follow-up with gastroenterology.  Orders:  •  famotidine (PEPCID) 20 mg tablet; Take 1 tablet (20 mg total) by mouth 2 (two) times a day

## 2025-07-28 RX ORDER — INSULIN DETEMIR 100 [IU]/ML
38 INJECTION, SOLUTION SUBCUTANEOUS DAILY
Refills: 3 | OUTPATIENT
Start: 2025-07-28

## 2025-07-29 ENCOUNTER — APPOINTMENT (OUTPATIENT)
Dept: PHYSICAL THERAPY | Age: 65
End: 2025-07-29
Attending: FAMILY MEDICINE
Payer: COMMERCIAL

## 2025-07-31 ENCOUNTER — APPOINTMENT (OUTPATIENT)
Dept: PHYSICAL THERAPY | Age: 65
End: 2025-07-31
Attending: FAMILY MEDICINE
Payer: COMMERCIAL

## (undated) DEVICE — Device

## (undated) DEVICE — GLOVE INDICATOR PI UNDERGLOVE SZ 7.5 BLUE

## (undated) DEVICE — GAUZE SPONGES,16 PLY: Brand: CURITY

## (undated) DEVICE — PROBE COVER: Brand: STERILE PROBE COVER

## (undated) DEVICE — SPONGE STICK WITH PVP-I: Brand: KENDALL

## (undated) DEVICE — 3M™ IOBAN™ 2 ANTIMICROBIAL INCISE DRAPE 6650EZ: Brand: IOBAN™ 2

## (undated) DEVICE — RADIFOCUS OPTITORQUE ANGIOGRAPHIC CATHETER: Brand: OPTITORQUE

## (undated) DEVICE — DRAPE SURGIKIT SADDLE BAG

## (undated) DEVICE — CARDIOVASCULAR SPLIT DRAPE: Brand: CONVERTORS

## (undated) DEVICE — SUT MONOCRYL 4-0 PS-2 18 IN Y496G

## (undated) DEVICE — DISPOSABLE EQUIPMENT COVER: Brand: LARGE TOWEL DRAPE

## (undated) DEVICE — MICROPUNCTURE SET 4FR 10CM .018 NITINOL TRANSITIONLESS TIP

## (undated) DEVICE — ACE WRAP 4 IN UNSTERILE

## (undated) DEVICE — BETHLEHEM UNIV MAJ EXT ,KIT: Brand: CARDINAL HEALTH

## (undated) DEVICE — INTENDED FOR TISSUE SEPARATION, AND OTHER PROCEDURES THAT REQUIRE A SHARP SURGICAL BLADE TO PUNCTURE OR CUT.: Brand: BARD-PARKER ® CARBON RIB-BACK BLADES

## (undated) DEVICE — KERLIX BANDAGE ROLL: Brand: KERLIX

## (undated) DEVICE — LEAD CUTTER: Brand: LEAD CUTTER

## (undated) DEVICE — CATH DIAG 5FR IMPULSE 110CM 6S PIG 145 ANG

## (undated) DEVICE — SUT SILK 2 60 IN SA8H

## (undated) DEVICE — LIGHT HANDLE COVER SLEEVE DISP BLUE STELLAR

## (undated) DEVICE — GLOVE SRG BIOGEL 7.5

## (undated) DEVICE — GLOVE INDICATOR PI UNDERGLOVE SZ 8 BLUE

## (undated) DEVICE — DRAPE SHEET THREE QUARTER

## (undated) DEVICE — DRAPE C-ARM BAG/DOME XR ELSTIC OPEN LF

## (undated) DEVICE — INTRO SHEATH PEEL AWAY 7FR

## (undated) DEVICE — ANTIBACTERIAL UNDYED BRAIDED (POLYGLACTIN 910), SYNTHETIC ABSORBABLE SUTURE: Brand: COATED VICRYL

## (undated) DEVICE — SCD SEQUENTIAL COMPRESSION COMFORT SLEEVE LARGE KNEE LENGTH: Brand: KENDALL SCD

## (undated) DEVICE — 3M™ STERI-STRIP™ REINFORCED ADHESIVE SKIN CLOSURES, R1547, 1/2 IN X 4 IN (12 MM X 100 MM), 6 STRIPS/ENVELOPE: Brand: 3M™ STERI-STRIP™

## (undated) DEVICE — MEDI-VAC YANK SUCT HNDL W/TPRD BULBOUS TIP: Brand: CARDINAL HEALTH

## (undated) DEVICE — DRAPE C-ARMOUR

## (undated) DEVICE — SKN PRP WNG SPNGE PVP SCRB STR: Brand: MEDLINE INDUSTRIES, INC.

## (undated) DEVICE — 3M™ TEGADERM™ TRANSPARENT FILM DRESSING FRAME STYLE, 1626W, 4 IN X 4-3/4 IN (10 CM X 12 CM), 50/CT 4CT/CASE: Brand: 3M™ TEGADERM™

## (undated) DEVICE — BULB SYRINGE,IRRIGATION WITH PROTECTIVE CAP: Brand: DOVER

## (undated) DEVICE — PERCUTANEOUS ENTRY THINWALL NEEDLE  ONE-PART: Brand: COOK

## (undated) DEVICE — GLOVE SRG BIOGEL ECLIPSE 8

## (undated) DEVICE — GLIDESHEATH BASIC HYDROPHILIC COATED INTRODUCER SHEATH: Brand: GLIDESHEATH

## (undated) DEVICE — IV EXTENSION TUBING 33 IN

## (undated) DEVICE — PAD GROUNDING DUAL ADULT

## (undated) DEVICE — TUBING SUCTION 5MM X 12 FT

## (undated) DEVICE — 3M™ IOBAN™ 2 ANTIMICROBIAL INCISE DRAPE 6651EZ: Brand: IOBAN™ 2

## (undated) DEVICE — SYRINGE 10ML SLIP TIP LF

## (undated) DEVICE — PENCIL ELECTROSURG E-Z CLEAN -0035H

## (undated) DEVICE — SNAP KOVER: Brand: UNBRANDED

## (undated) DEVICE — GLOVE SRG BIOGEL ORTHOPEDIC 7.5

## (undated) DEVICE — DRESSING AQUACEL AG 3.5 X 6 IN

## (undated) DEVICE — CHLORAPREP HI-LITE 26ML ORANGE

## (undated) DEVICE — SUT SILK 0 CT-1 30 IN 424H

## (undated) DEVICE — SUT ETHILON 3-0 FS-1 18 IN 663G

## (undated) DEVICE — TRAY FOLEY 16FR SURESTEP TEMP SENS URIMETER STAT LOK

## (undated) DEVICE — PLUMEPEN PRO 10FT

## (undated) DEVICE — PANNUS RETENTION SYSTEM

## (undated) DEVICE — SPONGE LAP 18 X 18 IN

## (undated) DEVICE — THE VASC BAND HEMOSTAT IS A COMPRESSION DEVICE TO ASSIST HEMOSTASIS OF ARTERIAL, VENOUS AND HEMODIALYSIS PERCUTANEOUS ACCESS SITES.: Brand: VASC BAND™ HEMOSTAT

## (undated) DEVICE — GUIDEWIRE WHOLEY HI TORQUE INTERM MOD J .035 145CM

## (undated) DEVICE — INTRO SHEATH PEEL AWAY 9 FR

## (undated) DEVICE — SUT VICRYL PLUS 2-0 CTB-1 27 IN VCPB259H

## (undated) DEVICE — STRL BETHLACCESS CATHETER PACK: Brand: CARDINAL HEALTH

## (undated) DEVICE — ROSEBUD DISSECTORS: Brand: DEROYAL

## (undated) DEVICE — DEFIB ADULT ELECTRODE CARDINAL

## (undated) DEVICE — CURITY NON-ADHERENT STRIPS: Brand: CURITY